# Patient Record
Sex: MALE | Race: WHITE | Employment: OTHER | URBAN - NONMETROPOLITAN AREA
[De-identification: names, ages, dates, MRNs, and addresses within clinical notes are randomized per-mention and may not be internally consistent; named-entity substitution may affect disease eponyms.]

---

## 2017-01-04 ENCOUNTER — OFFICE VISIT (OUTPATIENT)
Dept: CARDIOLOGY | Age: 62
End: 2017-01-04
Payer: COMMERCIAL

## 2017-01-04 VITALS
HEART RATE: 76 BPM | HEIGHT: 72 IN | BODY MASS INDEX: 29.53 KG/M2 | DIASTOLIC BLOOD PRESSURE: 66 MMHG | WEIGHT: 218 LBS | SYSTOLIC BLOOD PRESSURE: 130 MMHG

## 2017-01-04 DIAGNOSIS — I15.9 SECONDARY HYPERTENSION: ICD-10-CM

## 2017-01-04 DIAGNOSIS — I25.10 CORONARY ARTERY DISEASE INVOLVING NATIVE HEART WITHOUT ANGINA PECTORIS, UNSPECIFIED VESSEL OR LESION TYPE: Primary | ICD-10-CM

## 2017-01-04 PROCEDURE — 99213 OFFICE O/P EST LOW 20 MIN: CPT | Performed by: CLINICAL NURSE SPECIALIST

## 2017-01-04 RX ORDER — CYANOCOBALAMIN 1000 UG/ML
1000 INJECTION INTRAMUSCULAR; SUBCUTANEOUS ONCE
COMMUNITY
End: 2017-10-14 | Stop reason: SDUPTHER

## 2017-02-14 ENCOUNTER — TRANSCRIBE ORDERS (OUTPATIENT)
Dept: GENERAL RADIOLOGY | Facility: HOSPITAL | Age: 62
End: 2017-02-14

## 2017-02-14 ENCOUNTER — HOSPITAL ENCOUNTER (OUTPATIENT)
Dept: GENERAL RADIOLOGY | Facility: HOSPITAL | Age: 62
Discharge: HOME | End: 2017-02-14
Admitting: NURSE PRACTITIONER

## 2017-02-14 ENCOUNTER — LAB (OUTPATIENT)
Dept: LAB | Facility: HOSPITAL | Age: 62
End: 2017-02-14

## 2017-02-14 DIAGNOSIS — R05.9 COUGH: Primary | ICD-10-CM

## 2017-02-14 DIAGNOSIS — R05.9 COUGH: ICD-10-CM

## 2017-02-14 LAB
ALBUMIN SERPL-MCNC: 4.2 G/DL (ref 3.5–5)
ALBUMIN/GLOB SERPL: 1.1 G/DL (ref 1.1–2.5)
ALP SERPL-CCNC: 91 U/L (ref 24–120)
ALT SERPL W P-5'-P-CCNC: 42 U/L (ref 0–54)
ANION GAP SERPL CALCULATED.3IONS-SCNC: 13 MMOL/L (ref 4–13)
AST SERPL-CCNC: 34 U/L (ref 7–45)
AUTO MIXED CELLS #: 0.6 10*3/UL (ref 0.1–2.6)
AUTO MIXED CELLS %: 14.2 % (ref 0.1–24)
BACTERIA UR QL AUTO: ABNORMAL /HPF
BILIRUB SERPL-MCNC: 1 MG/DL (ref 0.1–1)
BILIRUB UR QL STRIP: ABNORMAL
BUN BLD-MCNC: 16 MG/DL (ref 5–21)
BUN/CREAT SERPL: 21.3
CALCIUM SPEC-SCNC: 9.4 MG/DL (ref 8.4–10.4)
CHLORIDE SERPL-SCNC: 96 MMOL/L (ref 98–110)
CLARITY UR: CLEAR
CO2 SERPL-SCNC: 34 MMOL/L (ref 24–31)
COD CRY URNS QL: ABNORMAL /HPF
COLOR UR: ABNORMAL
CREAT BLD-MCNC: 0.75 MG/DL (ref 0.5–1.4)
ERYTHROCYTE [DISTWIDTH] IN BLOOD BY AUTOMATED COUNT: 13.1 % (ref 12–15)
GFR SERPL CREATININE-BSD FRML MDRD: 105 ML/MIN/1.73
GLOBULIN UR ELPH-MCNC: 3.9 GM/DL
GLUCOSE BLD-MCNC: 122 MG/DL (ref 70–100)
GLUCOSE UR STRIP-MCNC: NEGATIVE MG/DL
HCT VFR BLD AUTO: 36.2 % (ref 40–52)
HGB BLD-MCNC: 13 G/DL (ref 14–18)
HGB UR QL STRIP.AUTO: ABNORMAL
HYALINE CASTS UR QL AUTO: ABNORMAL /LPF
KETONES UR QL STRIP: NEGATIVE
LEUKOCYTE ESTERASE UR QL STRIP.AUTO: ABNORMAL
LYMPHOCYTES # BLD AUTO: 1.1 10*3/MM3 (ref 0.8–7)
LYMPHOCYTES NFR BLD AUTO: 25.7 % (ref 15–45)
MCH RBC QN AUTO: 34.1 PG (ref 28–32)
MCHC RBC AUTO-ENTMCNC: 35.9 G/DL (ref 33–36)
MCV RBC AUTO: 95 FL (ref 82–95)
NEUTROPHILS # BLD AUTO: 2.7 10*3/MM3 (ref 1.5–8.3)
NEUTROPHILS NFR BLD AUTO: 60.1 % (ref 39–78)
NITRITE UR QL STRIP: NEGATIVE
PH UR STRIP.AUTO: 5.5 [PH] (ref 5–8)
PLATELET # BLD AUTO: 93 10*3/MM3 (ref 130–400)
PMV BLD AUTO: 8.6 FL (ref 6–12)
POTASSIUM BLD-SCNC: 4.4 MMOL/L (ref 3.5–5.3)
PROCALCITONIN SERPL-MCNC: <0.25 NG/ML
PROT SERPL-MCNC: 8.1 G/DL (ref 6.3–8.7)
PROT UR QL STRIP: ABNORMAL
RBC # BLD AUTO: 3.81 10*6/MM3 (ref 4.2–5.4)
RBC # UR: ABNORMAL /HPF
REF LAB TEST METHOD: ABNORMAL
SODIUM BLD-SCNC: 143 MMOL/L (ref 135–145)
SP GR UR STRIP: >=1.03 (ref 1–1.03)
SQUAMOUS #/AREA URNS HPF: ABNORMAL /HPF
UROBILINOGEN UR QL STRIP: ABNORMAL
WBC NRBC COR # BLD: 4.4 10*3/MM3 (ref 4.8–10.8)
WBC UR QL AUTO: ABNORMAL /HPF

## 2017-02-14 PROCEDURE — 87086 URINE CULTURE/COLONY COUNT: CPT | Performed by: NURSE PRACTITIONER

## 2017-02-14 PROCEDURE — 81001 URINALYSIS AUTO W/SCOPE: CPT

## 2017-02-14 PROCEDURE — 36415 COLL VENOUS BLD VENIPUNCTURE: CPT

## 2017-02-14 PROCEDURE — 71020 HC CHEST PA AND LATERAL: CPT

## 2017-02-14 PROCEDURE — 84145 PROCALCITONIN (PCT): CPT | Performed by: NURSE PRACTITIONER

## 2017-02-14 PROCEDURE — 85025 COMPLETE CBC W/AUTO DIFF WBC: CPT

## 2017-02-14 PROCEDURE — 86738 MYCOPLASMA ANTIBODY: CPT | Performed by: NURSE PRACTITIONER

## 2017-02-14 PROCEDURE — 80053 COMPREHEN METABOLIC PANEL: CPT

## 2017-02-15 LAB
M PNEUMONIAE IGG ABS: 121 U/ML (ref 0–99)
M PNEUMONIAE IGM ABS: <770 U/ML (ref 0–769)

## 2017-02-16 LAB — BACTERIA SPEC AEROBE CULT: NORMAL

## 2017-02-17 ENCOUNTER — LAB (OUTPATIENT)
Dept: LAB | Facility: HOSPITAL | Age: 62
End: 2017-02-17

## 2017-02-17 ENCOUNTER — TRANSCRIBE ORDERS (OUTPATIENT)
Dept: LAB | Facility: HOSPITAL | Age: 62
End: 2017-02-17

## 2017-02-17 DIAGNOSIS — R53.83 OTHER FATIGUE: Primary | ICD-10-CM

## 2017-02-17 DIAGNOSIS — R53.83 OTHER FATIGUE: ICD-10-CM

## 2017-02-17 LAB
ALBUMIN SERPL-MCNC: 4.2 G/DL (ref 3.5–5)
ALBUMIN/GLOB SERPL: 1.1 G/DL (ref 1.1–2.5)
ALP SERPL-CCNC: 94 U/L (ref 24–120)
ALT SERPL W P-5'-P-CCNC: 35 U/L (ref 0–54)
ANION GAP SERPL CALCULATED.3IONS-SCNC: 13 MMOL/L (ref 4–13)
AST SERPL-CCNC: 28 U/L (ref 7–45)
AUTO MIXED CELLS #: 0.7 10*3/UL (ref 0.1–2.6)
AUTO MIXED CELLS %: 9.8 % (ref 0.1–24)
BILIRUB SERPL-MCNC: 1.1 MG/DL (ref 0.1–1)
BUN BLD-MCNC: 19 MG/DL (ref 5–21)
BUN/CREAT SERPL: 24.4
CALCIUM SPEC-SCNC: 9.7 MG/DL (ref 8.4–10.4)
CHLORIDE SERPL-SCNC: 95 MMOL/L (ref 98–110)
CO2 SERPL-SCNC: 32 MMOL/L (ref 24–31)
CREAT BLD-MCNC: 0.78 MG/DL (ref 0.5–1.4)
ERYTHROCYTE [DISTWIDTH] IN BLOOD BY AUTOMATED COUNT: 13 % (ref 12–15)
FLUAV AG NPH QL: NEGATIVE
FLUBV AG NPH QL IA: NEGATIVE
GFR SERPL CREATININE-BSD FRML MDRD: 101 ML/MIN/1.73
GLOBULIN UR ELPH-MCNC: 3.9 GM/DL
GLUCOSE BLD-MCNC: 128 MG/DL (ref 70–100)
HCT VFR BLD AUTO: 37.2 % (ref 40–52)
HGB BLD-MCNC: 13.2 G/DL (ref 14–18)
LYMPHOCYTES # BLD AUTO: 1.8 10*3/MM3 (ref 0.8–7)
LYMPHOCYTES NFR BLD AUTO: 26.4 % (ref 15–45)
MCH RBC QN AUTO: 33.8 PG (ref 28–32)
MCHC RBC AUTO-ENTMCNC: 35.5 G/DL (ref 33–36)
MCV RBC AUTO: 95.1 FL (ref 82–95)
NEUTROPHILS # BLD AUTO: 4.5 10*3/MM3 (ref 1.5–8.3)
NEUTROPHILS NFR BLD AUTO: 63.8 % (ref 39–78)
PLATELET # BLD AUTO: 135 10*3/MM3 (ref 130–400)
PMV BLD AUTO: 8.1 FL (ref 6–12)
POTASSIUM BLD-SCNC: 4.7 MMOL/L (ref 3.5–5.3)
PROT SERPL-MCNC: 8.1 G/DL (ref 6.3–8.7)
RBC # BLD AUTO: 3.91 10*6/MM3 (ref 4.2–5.4)
SODIUM BLD-SCNC: 140 MMOL/L (ref 135–145)
WBC NRBC COR # BLD: 7 10*3/MM3 (ref 4.8–10.8)

## 2017-02-17 PROCEDURE — 85025 COMPLETE CBC W/AUTO DIFF WBC: CPT

## 2017-02-17 PROCEDURE — 80053 COMPREHEN METABOLIC PANEL: CPT

## 2017-02-17 PROCEDURE — 87804 INFLUENZA ASSAY W/OPTIC: CPT

## 2017-02-17 PROCEDURE — 36415 COLL VENOUS BLD VENIPUNCTURE: CPT

## 2017-03-01 DIAGNOSIS — I73.9 CLAUDICATION OF BOTH LOWER EXTREMITIES (HCC): Primary | ICD-10-CM

## 2017-03-07 ENCOUNTER — HOSPITAL ENCOUNTER (OUTPATIENT)
Dept: VASCULAR LAB | Age: 62
Discharge: HOME | End: 2017-03-07
Payer: COMMERCIAL

## 2017-03-07 DIAGNOSIS — I73.9 CLAUDICATION OF BOTH LOWER EXTREMITIES (HCC): ICD-10-CM

## 2017-03-07 PROCEDURE — 93923 UPR/LXTR ART STDY 3+ LVLS: CPT

## 2017-03-16 DIAGNOSIS — I15.9 SECONDARY HYPERTENSION: ICD-10-CM

## 2017-03-17 RX ORDER — DILTIAZEM HYDROCHLORIDE 240 MG/1
CAPSULE, EXTENDED RELEASE ORAL
Qty: 30 CAPSULE | Refills: 5 | Status: SHIPPED | OUTPATIENT
Start: 2017-03-17 | End: 2017-08-08 | Stop reason: DRUGHIGH

## 2017-04-05 ENCOUNTER — TRANSCRIBE ORDERS (OUTPATIENT)
Dept: LAB | Facility: HOSPITAL | Age: 62
End: 2017-04-05

## 2017-04-05 ENCOUNTER — LAB (OUTPATIENT)
Dept: LAB | Facility: HOSPITAL | Age: 62
End: 2017-04-05

## 2017-04-05 DIAGNOSIS — E11.21 TYPE 2 DIABETES MELLITUS WITH DIABETIC NEPHROPATHY, UNSPECIFIED LONG TERM INSULIN USE STATUS: Primary | ICD-10-CM

## 2017-04-05 DIAGNOSIS — E11.21 TYPE 2 DIABETES MELLITUS WITH DIABETIC NEPHROPATHY, UNSPECIFIED LONG TERM INSULIN USE STATUS: ICD-10-CM

## 2017-04-05 LAB
ALBUMIN SERPL-MCNC: 4.3 G/DL (ref 3.5–5)
ALBUMIN/GLOB SERPL: 1.2 G/DL (ref 1.1–2.5)
ALP SERPL-CCNC: 78 U/L (ref 24–120)
ALT SERPL W P-5'-P-CCNC: 19 U/L (ref 0–54)
ANION GAP SERPL CALCULATED.3IONS-SCNC: 10 MMOL/L (ref 4–13)
AST SERPL-CCNC: 22 U/L (ref 7–45)
AUTO MIXED CELLS #: 0.7 10*3/UL (ref 0.1–2.6)
AUTO MIXED CELLS %: 14.6 % (ref 0.1–24)
BACTERIA UR QL AUTO: ABNORMAL /HPF
BILIRUB SERPL-MCNC: 0.9 MG/DL (ref 0.1–1)
BILIRUB UR QL STRIP: NEGATIVE
BUN BLD-MCNC: 18 MG/DL (ref 5–21)
BUN/CREAT SERPL: 21.2
CALCIUM SPEC-SCNC: 9.9 MG/DL (ref 8.4–10.4)
CHLORIDE SERPL-SCNC: 100 MMOL/L (ref 98–110)
CHOLEST SERPL-MCNC: 147 MG/DL (ref 130–200)
CLARITY UR: CLEAR
CO2 SERPL-SCNC: 32 MMOL/L (ref 24–31)
COLOR UR: YELLOW
CREAT BLD-MCNC: 0.85 MG/DL (ref 0.5–1.4)
ERYTHROCYTE [DISTWIDTH] IN BLOOD BY AUTOMATED COUNT: 13 % (ref 12–15)
GFR SERPL CREATININE-BSD FRML MDRD: 91 ML/MIN/1.73
GLOBULIN UR ELPH-MCNC: 3.7 GM/DL
GLUCOSE BLD-MCNC: 111 MG/DL (ref 70–100)
GLUCOSE UR STRIP-MCNC: NEGATIVE MG/DL
HBA1C MFR BLD: 5.7 %
HCT VFR BLD AUTO: 33.7 % (ref 40–52)
HDLC SERPL-MCNC: 46 MG/DL
HGB BLD-MCNC: 11.9 G/DL (ref 14–18)
HGB UR QL STRIP.AUTO: ABNORMAL
HYALINE CASTS UR QL AUTO: ABNORMAL /LPF
KETONES UR QL STRIP: NEGATIVE
LDLC SERPL CALC-MCNC: 72 MG/DL (ref 0–99)
LDLC/HDLC SERPL: 1.56 {RATIO}
LEUKOCYTE ESTERASE UR QL STRIP.AUTO: ABNORMAL
LYMPHOCYTES # BLD AUTO: 1.1 10*3/MM3 (ref 0.8–7)
LYMPHOCYTES NFR BLD AUTO: 24.3 % (ref 15–45)
MCH RBC QN AUTO: 34.1 PG (ref 28–32)
MCHC RBC AUTO-ENTMCNC: 35.3 G/DL (ref 33–36)
MCV RBC AUTO: 96.6 FL (ref 82–95)
NEUTROPHILS # BLD AUTO: 2.9 10*3/MM3 (ref 1.5–8.3)
NEUTROPHILS NFR BLD AUTO: 61.1 % (ref 39–78)
NITRITE UR QL STRIP: NEGATIVE
PH UR STRIP.AUTO: 7 [PH] (ref 5–8)
PLATELET # BLD AUTO: 88 10*3/MM3 (ref 130–400)
PMV BLD AUTO: 8.8 FL (ref 6–12)
POTASSIUM BLD-SCNC: 4.9 MMOL/L (ref 3.5–5.3)
PROT SERPL-MCNC: 8 G/DL (ref 6.3–8.7)
PROT UR QL STRIP: ABNORMAL
RBC # BLD AUTO: 3.49 10*6/MM3 (ref 4.2–5.4)
RBC # UR: ABNORMAL /HPF
REF LAB TEST METHOD: ABNORMAL
SODIUM BLD-SCNC: 142 MMOL/L (ref 135–145)
SP GR UR STRIP: 1.02 (ref 1–1.03)
SQUAMOUS #/AREA URNS HPF: ABNORMAL /HPF
TRIGL SERPL-MCNC: 146 MG/DL (ref 0–149)
UROBILINOGEN UR QL STRIP: ABNORMAL
VLDLC SERPL-MCNC: 29.2 MG/DL
WBC NRBC COR # BLD: 4.7 10*3/MM3 (ref 4.8–10.8)
WBC UR QL AUTO: ABNORMAL /HPF

## 2017-04-05 PROCEDURE — 81001 URINALYSIS AUTO W/SCOPE: CPT

## 2017-04-05 PROCEDURE — 82043 UR ALBUMIN QUANTITATIVE: CPT | Performed by: NURSE PRACTITIONER

## 2017-04-05 PROCEDURE — 80061 LIPID PANEL: CPT

## 2017-04-05 PROCEDURE — 36415 COLL VENOUS BLD VENIPUNCTURE: CPT

## 2017-04-05 PROCEDURE — 80053 COMPREHEN METABOLIC PANEL: CPT

## 2017-04-05 PROCEDURE — 83036 HEMOGLOBIN GLYCOSYLATED A1C: CPT

## 2017-04-05 PROCEDURE — 87086 URINE CULTURE/COLONY COUNT: CPT | Performed by: NURSE PRACTITIONER

## 2017-04-05 PROCEDURE — 82570 ASSAY OF URINE CREATININE: CPT | Performed by: NURSE PRACTITIONER

## 2017-04-05 PROCEDURE — 85025 COMPLETE CBC W/AUTO DIFF WBC: CPT

## 2017-04-06 LAB
CREAT 24H UR-MCNC: 77.4 MG/DL
MICROALB/CRT. RATIO UR: 150.6 MG/G CREAT (ref 0–30)
MICROALBUMIN UR-MCNC: 116.6 UG/ML

## 2017-04-07 LAB — BACTERIA SPEC AEROBE CULT: NORMAL

## 2017-04-10 ENCOUNTER — HOSPITAL ENCOUNTER (OUTPATIENT)
Dept: CT IMAGING | Facility: HOSPITAL | Age: 62
Discharge: HOME | End: 2017-04-10
Admitting: NURSE PRACTITIONER

## 2017-04-10 ENCOUNTER — TRANSCRIBE ORDERS (OUTPATIENT)
Dept: GENERAL RADIOLOGY | Facility: HOSPITAL | Age: 62
End: 2017-04-10

## 2017-04-10 DIAGNOSIS — R17 JAUNDICE: Primary | ICD-10-CM

## 2017-04-10 DIAGNOSIS — R17 JAUNDICE: ICD-10-CM

## 2017-04-10 LAB — CREAT BLDA-MCNC: 0.9 MG/DL (ref 0.6–1.3)

## 2017-04-10 PROCEDURE — 82565 ASSAY OF CREATININE: CPT

## 2017-04-10 PROCEDURE — 0 IOPAMIDOL 61 % SOLUTION: Performed by: PEDIATRICS

## 2017-04-10 PROCEDURE — 74178 CT ABD&PLV WO CNTR FLWD CNTR: CPT

## 2017-04-10 RX ADMIN — IOPAMIDOL 100 ML: 612 INJECTION, SOLUTION INTRAVENOUS at 16:45

## 2017-04-12 ENCOUNTER — OFFICE VISIT (OUTPATIENT)
Dept: UROLOGY | Facility: CLINIC | Age: 62
End: 2017-04-12

## 2017-04-12 ENCOUNTER — HOSPITAL ENCOUNTER (OUTPATIENT)
Dept: GENERAL RADIOLOGY | Facility: HOSPITAL | Age: 62
Discharge: HOME | End: 2017-04-12
Attending: UROLOGY | Admitting: UROLOGY

## 2017-04-12 VITALS
SYSTOLIC BLOOD PRESSURE: 110 MMHG | HEIGHT: 72 IN | TEMPERATURE: 97.1 F | WEIGHT: 225 LBS | BODY MASS INDEX: 30.48 KG/M2 | DIASTOLIC BLOOD PRESSURE: 68 MMHG

## 2017-04-12 DIAGNOSIS — N20.0 KIDNEY STONE: Primary | ICD-10-CM

## 2017-04-12 DIAGNOSIS — N20.1 URETERAL STONE: ICD-10-CM

## 2017-04-12 DIAGNOSIS — N20.0 KIDNEY STONE: ICD-10-CM

## 2017-04-12 LAB
BILIRUB BLD-MCNC: NEGATIVE MG/DL
CLARITY, POC: CLEAR
COLOR UR: YELLOW
GLUCOSE UR STRIP-MCNC: ABNORMAL MG/DL
KETONES UR QL: NEGATIVE
LEUKOCYTE EST, POC: ABNORMAL
NITRITE UR-MCNC: NEGATIVE MG/ML
PH UR: 5 [PH] (ref 5–8)
PROT UR STRIP-MCNC: ABNORMAL MG/DL
RBC # UR STRIP: ABNORMAL /UL
SP GR UR: 1.02 (ref 1–1.03)
UROBILINOGEN UR QL: NORMAL

## 2017-04-12 PROCEDURE — 74000 HC ABDOMEN KUB: CPT

## 2017-04-12 PROCEDURE — 81003 URINALYSIS AUTO W/O SCOPE: CPT | Performed by: UROLOGY

## 2017-04-12 PROCEDURE — 99205 OFFICE O/P NEW HI 60 MIN: CPT | Performed by: UROLOGY

## 2017-04-12 RX ORDER — LANOLIN ALCOHOL/MO/W.PET/CERES
325 CREAM (GRAM) TOPICAL
COMMUNITY
Start: 2014-12-17

## 2017-04-12 RX ORDER — RANOLAZINE 1000 MG/1
1000 TABLET, EXTENDED RELEASE ORAL DAILY
COMMUNITY
Start: 2016-09-27

## 2017-04-12 RX ORDER — HYDROCODONE BITARTRATE AND ACETAMINOPHEN 7.5; 325 MG/1; MG/1
TABLET ORAL AS NEEDED
Status: ON HOLD | COMMUNITY
Start: 2011-08-19 | End: 2017-04-26 | Stop reason: SDUPTHER

## 2017-04-12 RX ORDER — BUSPIRONE HYDROCHLORIDE 15 MG/1
15 TABLET ORAL DAILY
COMMUNITY

## 2017-04-12 RX ORDER — MULTIVIT-MIN/IRON/FOLIC ACID/K 18-600-40
1 CAPSULE ORAL DAILY
COMMUNITY

## 2017-04-12 RX ORDER — CYANOCOBALAMIN 1000 UG/ML
INJECTION, SOLUTION INTRAMUSCULAR; SUBCUTANEOUS WEEKLY
COMMUNITY
End: 2022-08-29

## 2017-04-12 RX ORDER — PRAVASTATIN SODIUM 40 MG
40 TABLET ORAL NIGHTLY
COMMUNITY
Start: 2016-04-14

## 2017-04-12 RX ORDER — DOCUSATE SODIUM 100 MG/1
CAPSULE, LIQUID FILLED ORAL DAILY
COMMUNITY

## 2017-04-12 RX ORDER — VITAMIN B COMPLEX
CAPSULE ORAL
COMMUNITY

## 2017-04-12 RX ORDER — PANTOPRAZOLE SODIUM 40 MG/1
40 TABLET, DELAYED RELEASE ORAL DAILY
COMMUNITY
Start: 2016-07-12

## 2017-04-12 RX ORDER — SODIUM CHLORIDE 0.9 % (FLUSH) 0.9 %
1-10 SYRINGE (ML) INJECTION AS NEEDED
Status: CANCELLED | OUTPATIENT
Start: 2017-04-12

## 2017-04-12 RX ORDER — M-VIT,TX,IRON,MINS/CALC/FOLIC 27MG-0.4MG
TABLET ORAL
COMMUNITY

## 2017-04-12 RX ORDER — TRAMADOL HYDROCHLORIDE 50 MG/1
TABLET ORAL
Refills: 0 | COMMUNITY
Start: 2017-01-21 | End: 2022-08-29

## 2017-04-12 RX ORDER — CIPROFLOXACIN 500 MG/1
TABLET, FILM COATED ORAL
COMMUNITY
Start: 2017-04-10 | End: 2017-08-11

## 2017-04-12 RX ORDER — FUROSEMIDE 40 MG/1
40 TABLET ORAL DAILY
COMMUNITY
Start: 2016-07-12 | End: 2022-08-29

## 2017-04-12 RX ORDER — ASPIRIN 81 MG/1
81 TABLET ORAL DAILY
COMMUNITY
Start: 2014-11-04 | End: 2022-08-29

## 2017-04-12 RX ORDER — DILTIAZEM HYDROCHLORIDE 240 MG/1
CAPSULE, COATED, EXTENDED RELEASE ORAL
COMMUNITY
Start: 2017-03-17 | End: 2022-08-29

## 2017-04-12 RX ORDER — SODIUM CHLORIDE 9 MG/ML
100 INJECTION, SOLUTION INTRAVENOUS CONTINUOUS
Status: CANCELLED | OUTPATIENT
Start: 2017-04-12

## 2017-04-12 RX ORDER — CLOPIDOGREL BISULFATE 75 MG/1
75 TABLET ORAL DAILY
COMMUNITY
Start: 2016-04-14

## 2017-04-12 RX ORDER — TAMSULOSIN HYDROCHLORIDE 0.4 MG/1
1 CAPSULE ORAL DAILY
COMMUNITY
Start: 2017-04-11 | End: 2017-08-23 | Stop reason: SDUPTHER

## 2017-04-12 NOTE — PROGRESS NOTES
Subjective    Mr. Lucero is 61 y.o. male    Chief Complaint: Left Flank Pain    History of Present Illness     Urolithiasis  Patient complains of left flank pain without radiation to the abdomen. Onset of symptoms was asymptomatic 2 days ago with controlled course since that time. Patient describes the pain as none, continuous and rated as no. The patient has had no nausea and no vomiting. There has been no fever or chills. The patient is not complaining of dysuria, frequency, or urgency.  Previous management of stones includes ESWL    The following portions of the patient's history were reviewed and updated as appropriate: allergies, current medications, past family history, past medical history, past social history, past surgical history and problem list.    Review of Systems   Constitutional: Negative for appetite change and fever.   HENT: Negative for hearing loss and sore throat.    Eyes: Negative for pain and redness.   Respiratory: Negative for cough and shortness of breath.    Cardiovascular: Negative for chest pain and leg swelling.   Gastrointestinal: Negative for anal bleeding, nausea and vomiting.   Endocrine: Negative for cold intolerance and heat intolerance.   Genitourinary: Negative for dysuria, flank pain and hematuria.   Musculoskeletal: Negative for joint swelling and myalgias.   Skin: Negative for color change and rash.   Allergic/Immunologic: Negative for immunocompromised state.   Neurological: Negative for dizziness and speech difficulty.   Hematological: Negative for adenopathy. Does not bruise/bleed easily.   Psychiatric/Behavioral: Negative for dysphoric mood and suicidal ideas.         Current Outpatient Prescriptions:   •  aspirin (ASPIR) 81 MG EC tablet, Take  by mouth., Disp: , Rfl:   •  b complex vitamins capsule, Take  by mouth., Disp: , Rfl:   •  busPIRone (BUSPAR) 15 MG tablet, Take  by mouth., Disp: , Rfl:   •  ciprofloxacin (CIPRO) 500 MG tablet, , Disp: , Rfl:   •  clopidogrel  (PLAVIX) 75 MG tablet, Take  by mouth., Disp: , Rfl:   •  cyanocobalamin 1000 MCG/ML injection, Inject  into the shoulder, thigh, or buttocks., Disp: , Rfl:   •  diltiaZEM CD (CARTIA XT) 240 MG 24 hr capsule, Take 1 capsule by mouth daily, Disp: , Rfl:   •  docusate sodium (COLACE) 100 MG capsule, Take  by mouth., Disp: , Rfl:   •  ferrous sulfate 325 (65 FE) MG EC tablet, Take  by mouth., Disp: , Rfl:   •  furosemide (LASIX) 40 MG tablet, Take  by mouth., Disp: , Rfl:   •  HYDROcodone-acetaminophen (NORCO) 7.5-325 MG per tablet, Take  by mouth Every 6 (Six) Hours., Disp: , Rfl:   •  metFORMIN (GLUCOPHAGE) 1000 MG tablet, Take  by mouth., Disp: , Rfl:   •  pantoprazole (PROTONIX) 40 MG EC tablet, Take  by mouth., Disp: , Rfl:   •  pravastatin (PRAVACHOL) 40 MG tablet, Take  by mouth., Disp: , Rfl:   •  ranolazine (RANEXA) 1000 MG 12 hr tablet, Take 1 tablet by mouth 2 times daily, Disp: , Rfl:   •  tamsulosin (FLOMAX) 0.4 MG capsule 24 hr capsule, , Disp: , Rfl:   •  therapeutic multivitamin-minerals (THERAGRAN-M) tablet, Take  by mouth., Disp: , Rfl:   •  traMADol (ULTRAM) 50 MG tablet, TAKE 1 TABLET BY MOUTH FOUR TIMES DAILY AS NEEDED FOR PAIN, Disp: , Rfl: 0  •  Vitamin D, Cholecalciferol, 1000 UNITS tablet, Take  by mouth., Disp: , Rfl:     Past Medical History:   Diagnosis Date   • Cancer     non-hodgkins lymphoma   • Diabetes mellitus    • Hyperlipidemia    • Hypertension    • Kidney stone        Past Surgical History:   Procedure Laterality Date   • CORONARY ARTERY BYPASS GRAFT      2   • KIDNEY STONE SURGERY     • KNEE SURGERY     • NECK SURGERY     • SHOULDER SURGERY         Social History     Social History   • Marital status:      Spouse name: N/A   • Number of children: N/A   • Years of education: N/A     Social History Main Topics   • Smoking status: Never Smoker   • Smokeless tobacco: None   • Alcohol use No   • Drug use: None   • Sexual activity: Not Asked     Other Topics Concern   • None  "    Social History Narrative   • None       Family History   Problem Relation Age of Onset   • Kidney disease Father    • Cancer Mother      lung       Objective    /68  Temp 97.1 °F (36.2 °C)  Ht 72\" (182.9 cm)  Wt 225 lb (102 kg)  BMI 30.52 kg/m2    Physical Exam   Constitutional: He is oriented to person, place, and time. He appears well-developed and well-nourished. No distress.   Pulmonary/Chest: Effort normal.   Abdominal: Soft. He exhibits no distension and no mass. There is no tenderness. There is no rebound and no guarding. No hernia.   Neurological: He is alert and oriented to person, place, and time.   Skin: Skin is warm and dry. He is not diaphoretic.   Psychiatric: He has a normal mood and affect.   Vitals reviewed.          Results for orders placed or performed in visit on 04/12/17   POC Urinalysis Dipstick, Automated   Result Value Ref Range    Color Yellow Yellow, Straw, Dark Yellow, Philomena    Clarity, UA Clear Clear    Glucose,  mg/dL (A) Negative, 1000 mg/dL (3+) mg/dL    Bilirubin Negative Negative    Ketones, UA Negative Negative    Specific Gravity  1.025 1.005 - 1.030    Blood, UA Large (A) Negative    pH, Urine 5.0 5.0 - 8.0    Protein, POC 30 mg/dL (A) Negative mg/dL    Urobilinogen, UA Normal Normal    Leukocytes Small (1+) (A) Negative    Nitrite, UA Negative Negative     CT independent review  The CT scan of the abdomen/pelvis done without contrast is available for me to review.  Treatment recommendations require an independent review.  First I scanned the liver, spleen, and bowel pattern.  The retroperitoneum including the major vessels and lymphatic packages are briefly reviewed.  This film as been reviewed by the radiologist to determine any non urologic abnormalities that are present.  The kidneys are closely inspected for size, symmetry, contour, parenchymal thickness, perinephric reaction, presence of calcifications, and intrarenal dilation of the collecting system.  " The ureters are inspected for their course, caliber, and any calcifications.  The bladder is inspected for its thickness, size, and presence of any calcifications.  This scan shows:    The right kidney appears normal on this non-contrasted CT scan.  The renal parenchymal is norml in thickness.  There are no solid masses or cysts.  There is no hydronephrosis.  There are no stones.      The left kidney appears abnormal on this non contrasted CT scan.   3.2 cm worth of stone in his left lower pole and left renal pelvis there is virtually no hydronephrosis.  He has a 5 mm stone in his proximal left ureter.    The bladder appears normal on this non-contrasted CT scan.  The bladder appears normal in thickness.  There no masses or stones seen on this exam.    KUB independent review    A KUB is available for me to review today.  The image is inspected for a bowel gas pattern and the general bone structure of the spine and pelvis. The kidneys are then inspected closely.  Renal outline is noted if identifiable. The kidney, collecting system, and anticipated path of the ureter are examined for calcifications including those in the true pelvis.  This film reveals:    On the right there unable to see calcifications because of oral contrast in colon.    On the left there unable to see calcifications because of oral contrast in colon.  .      Assessment and Plan    Franko was seen today for flank pain.    Diagnoses and all orders for this visit:    Kidney stone  -     POC Urinalysis Dipstick, Automated  -     Case Request; Standing  -     sodium chloride 0.9 % flush 1-10 mL; Infuse 1-10 mL into a venous catheter As Needed for Line Care.  -     sodium chloride 0.9 % infusion; Infuse 100 mL/hr into a venous catheter Continuous.  -     levoFLOXacin (LEVAQUIN) 500 mg in sodium chloride 0.9 % 150 mL IVPB; Infuse 500 mg into a venous catheter 1 (One) Time.  -     Case Request    Ureteral stone  -     Case Request; Standing  -     sodium  chloride 0.9 % flush 1-10 mL; Infuse 1-10 mL into a venous catheter As Needed for Line Care.  -     sodium chloride 0.9 % infusion; Infuse 100 mL/hr into a venous catheter Continuous.  -     levoFLOXacin (LEVAQUIN) 500 mg in sodium chloride 0.9 % 150 mL IVPB; Infuse 500 mg into a venous catheter 1 (One) Time.  -     Case Request    Other orders  -     Provide instructions to patient on NPO status  -     Obtain informed consent  -     Follow Anesthesia Guidelines / Standing Orders; Standing  -     Verify NPO Status; Standing  -     Verify informed consent; Standing  -     INDIGO hose- To be placed on patient in pre-op; Standing  -     SCD (sequential compression device)- to be placed on patient in Pre-op; Standing  -     Insert Peripheral IV; Standing  -     Saline Lock & Maintain IV Access; Standing  -     Follow Anesthesia Guidelines / Standing Orders; Future            Extremely complicated patient.  He has had a CT scan done in workup of jaundice.  This revealed significant amount of stone disease approximately 3.7 cm in aggregate diameter.  He has 3.2 cm stone in his left lower pole and left renal pelvis and his proximal ureter as a 5 mm stone interestingly enough his collecting system is not dilated.  He does have a history of non-Hodgkin's lymphoma in addition he does have thrombocytopenia with a platelet count of 88.  I discussed with him that he would need at least 2 procedures to fully eradicate his stone disease.    For the proximal ureteral stone I have recommended ureteroscopic management with the stent we discussed risks benefits and alternatives to that.    Again he has a significant amount of stone disease in his kidney totaling much greater than 2 cm. Typically first stone burden this high output recommend a percutaneous nephrolithotomy.  Certainly with his thrombocytopenia I am concerned about the possibility of putting a 30 French hole in his kidney.  I am going to discuss this with Dr. Sprague before   proceeding with a percutaneous nephrolithotomy.

## 2017-04-13 ENCOUNTER — OFFICE VISIT (OUTPATIENT)
Dept: VASCULAR SURGERY | Facility: CLINIC | Age: 62
End: 2017-04-13

## 2017-04-13 VITALS
HEIGHT: 72 IN | WEIGHT: 227 LBS | HEART RATE: 76 BPM | DIASTOLIC BLOOD PRESSURE: 70 MMHG | BODY MASS INDEX: 30.75 KG/M2 | SYSTOLIC BLOOD PRESSURE: 92 MMHG

## 2017-04-13 DIAGNOSIS — I87.2 VENOUS (PERIPHERAL) INSUFFICIENCY: ICD-10-CM

## 2017-04-13 DIAGNOSIS — I10 ESSENTIAL HYPERTENSION: ICD-10-CM

## 2017-04-13 DIAGNOSIS — E78.5 HYPERLIPIDEMIA, UNSPECIFIED HYPERLIPIDEMIA TYPE: ICD-10-CM

## 2017-04-13 DIAGNOSIS — I72.3 ILIAC ARTERY ANEURYSM, BILATERAL (HCC): Primary | ICD-10-CM

## 2017-04-13 PROCEDURE — 99204 OFFICE O/P NEW MOD 45 MIN: CPT | Performed by: SURGERY

## 2017-04-13 RX ORDER — TIZANIDINE 4 MG/1
4 TABLET ORAL AS NEEDED
COMMUNITY
End: 2022-08-29

## 2017-04-13 NOTE — PROGRESS NOTES
04/13/2017      Bartolo Pierson MD  [address] [place] [address]    Franko Lucero  1955    Chief Complaint   Patient presents with   • Aortic Aneurysm     Hi iliac aneurysm/Ref Dr. Pierson/Pt has Non Hod Lymph/Having kidney stone surg next week       Dear Bartolo Pierson MD:      HPI  I had the pleasure of seeing your patient Franko Lucero in the office today.  Thank you kindly for this consultation.  As you recall, Franko Lucero is a 61 y.o.  male who you are currently following for routine health maintenance.  He recently underwent a CAT scan of his abdomen and pelvis which found incidental bilateral common iliac artery aneurysms.  Currently he denies any symptoms of claudication in the lower extremities.    Past Medical History:   Diagnosis Date   • Anemia    • Anxiety    • BPH (benign prostatic hypertrophy)    • CAD (coronary artery disease)    • Cancer     non-hodgkins lymphoma   • Diabetes mellitus    • Hyperlipidemia    • Hypertension    • Iliac artery aneurysm, bilateral    • Kidney stone    • Sleep apnea        Past Surgical History:   Procedure Laterality Date   • COLONOSCOPY  02/04/2014   • CORONARY ARTERY BYPASS GRAFT      2   • KIDNEY STONE SURGERY     • KNEE SURGERY      replacement   • NECK SURGERY     • ROTATOR CUFF REPAIR     • SHOULDER SURGERY     • TONSILLECTOMY         Family History   Problem Relation Age of Onset   • Kidney disease Father    • Heart disease Father    • Cancer Mother      lung       Social History     Social History   • Marital status:      Spouse name: N/A   • Number of children: N/A   • Years of education: N/A     Occupational History   • Not on file.     Social History Main Topics   • Smoking status: Former Smoker     Quit date: 1997   • Smokeless tobacco: Never Used   • Alcohol use No   • Drug use: No   • Sexual activity: Defer     Other Topics Concern   • Not on file     Social History Narrative       Allergies   Allergen Reactions   • Adhesive  Tape      Pt states that if it isn't left on very long it is okay   • Cefazolin    • Cefuroxime Axetil    • Cephalosporins    • Neomycin-Polymyxin B Gu    • Neosporin [Neomycin-Bacitracin Zn-Polymyx]    • Penicillins      Pt denies   • Percocet [Oxycodone-Acetaminophen]        Prior to Admission medications    Medication Sig Start Date End Date Taking? Authorizing Provider   aspirin (ASPIR) 81 MG EC tablet Take  by mouth. 11/4/14   Historical Provider, MD   b complex vitamins capsule Take  by mouth.    Historical Provider, MD   busPIRone (BUSPAR) 15 MG tablet Take  by mouth.    Historical Provider, MD   ciprofloxacin (CIPRO) 500 MG tablet  4/10/17   Historical Provider, MD   clopidogrel (PLAVIX) 75 MG tablet Take  by mouth. 4/14/16   Historical Provider, MD   cyanocobalamin 1000 MCG/ML injection Inject  into the shoulder, thigh, or buttocks.    Historical Provider, MD   diltiaZEM CD (CARTIA XT) 240 MG 24 hr capsule Take 1 capsule by mouth daily 3/17/17   Historical Provider, MD   docusate sodium (COLACE) 100 MG capsule Take  by mouth.    Historical Provider, MD   ferrous sulfate 325 (65 FE) MG EC tablet Take  by mouth. 12/17/14   Historical Provider, MD   furosemide (LASIX) 40 MG tablet Take  by mouth. 7/12/16   Historical Provider, MD   HYDROcodone-acetaminophen (NORCO) 7.5-325 MG per tablet Take  by mouth Every 6 (Six) Hours. 8/19/11   Historical Provider, MD   metFORMIN (GLUCOPHAGE) 1000 MG tablet Take  by mouth.    Historical Provider, MD   pantoprazole (PROTONIX) 40 MG EC tablet Take  by mouth. 7/12/16   Historical Provider, MD   pravastatin (PRAVACHOL) 40 MG tablet Take  by mouth. 4/14/16   Historical Provider, MD   ranolazine (RANEXA) 1000 MG 12 hr tablet Take 1 tablet by mouth 2 times daily 9/27/16   Historical Provider, MD   tamsulosin (FLOMAX) 0.4 MG capsule 24 hr capsule  4/11/17   Historical Provider, MD   therapeutic multivitamin-minerals (THERAGRAN-M) tablet Take  by mouth.    Historical Provider, MD  "  traMADol (ULTRAM) 50 MG tablet TAKE 1 TABLET BY MOUTH FOUR TIMES DAILY AS NEEDED FOR PAIN 1/21/17   Historical Provider, MD   Vitamin D, Cholecalciferol, 1000 UNITS tablet Take  by mouth.    Historical Provider, MD       Review of Systems   Constitutional: Negative.    HENT: Negative.    Eyes: Negative.    Respiratory: Negative.    Cardiovascular: Negative.    Gastrointestinal: Negative.    Endocrine: Negative.    Genitourinary: Negative.    Musculoskeletal: Negative.    Skin: Negative.    Allergic/Immunologic: Negative.    Neurological: Negative.    Hematological: Negative.    Psychiatric/Behavioral: Negative.    All other systems reviewed and are negative.      BP 92/70  Pulse 76  Ht 72\" (182.9 cm)  Wt 227 lb (103 kg)  BMI 30.79 kg/m2  Physical Exam   Constitutional: He is oriented to person, place, and time. He appears well-developed and well-nourished. No distress.   HENT:   Head: Normocephalic and atraumatic.   Mouth/Throat: Oropharynx is clear and moist and mucous membranes are normal.   Eyes: Pupils are equal, round, and reactive to light. No scleral icterus.   Neck: Normal range of motion. No JVD present. Carotid bruit is not present. No thyromegaly present.   Cardiovascular: Normal rate, regular rhythm, S1 normal, S2 normal, normal heart sounds, intact distal pulses and normal pulses.  Exam reveals no gallop and no friction rub.    No murmur heard.  Pulses:       Femoral pulses are 2+ on the right side, and 2+ on the left side.       Popliteal pulses are 2+ on the right side, and 2+ on the left side.        Dorsalis pedis pulses are 2+ on the right side, and 2+ on the left side.        Posterior tibial pulses are 2+ on the right side, and 2+ on the left side.   Pulmonary/Chest: Effort normal and breath sounds normal.   Abdominal: Soft. Normal appearance, normal aorta and bowel sounds are normal. He exhibits no abdominal bruit. There is no hepatosplenomegaly. There is no tenderness.   Musculoskeletal: " Normal range of motion.       Vascular Status -  His exam exhibits no right foot edema. His exam exhibits no left foot edema.  Neurological: He is alert and oriented to person, place, and time. He has normal strength. No cranial nerve deficit.   Skin: Skin is warm, dry and intact. He is not diaphoretic.   Psychiatric: He has a normal mood and affect. His behavior is normal. Judgment and thought content normal. Cognition and memory are normal.   Nursing note and vitals reviewed.      Diagnostic Data:  CT ABDOMEN PELVIS W WO CONTRAST- 4/10/2017 15:00 CST      HISTORY: R17, unspecified jaundice       COMPARISON: None available       DOSE LENGTH PRODUCT: 1338 mGy cm. Automated exposure control was also  utilized to decrease patient radiation dose.      TECHNIQUE: Precontrast images of the abdomen followed by postcontrast  images of the abdomen and pelvis were obtained with intravenous and oral  contrast. Multiplanar reformatted images were provided for review.      FINDINGS:   Coronary artery calcifications are seen in the lower chest. The lung  bases are clear.      No worrisome liver lesion is seen. The gallbladder is normal in  appearance. There is no biliary ductal dilation to explain the patient's  jaundice.      The pancreas is normal in appearance.      The spleen is normal in appearance.      Multiple, large stones are present in the LEFT renal collecting system.  There is also a 5 mm, partially obstructive stone in the proximal to mid  LEFT ureter. Mild proximal urothelial enhancement and thickening are  noted. The RIGHT kidney and bilateral adrenal glands are normal in  appearance.      There is atherosclerosis throughout the aorta and iliac arteries.  Bilateral iliac artery aneurysms are present. These measure up to 1.9 cm  on the LEFT and 1.8 cm on the RIGHT.      There is no evidence of bowel obstruction. No wall thickening is seen.      No abdominopelvic fluid collections are seen. There is  no  lymphadenopathy.      The urinary bladder and prostate are grossly unremarkable.      Marked degenerative changes are seen in the pubic symphysis. Moderate to  marked degenerative changes are also seen in the spine.      IMPRESSION:  1. No CT findings to spine the patient's jaundice.  2. Marked stone disease in the LEFT kidney. There is mild proximal  hydroureter due to a 5 mm LEFT ureteral stone.  3. Bilateral iliac artery aneurysms measuring up to 1.9 cm in AP  dimension.           This report was finalized on 04/10/2017 16:46 by Dr. Hair Alaniz MD.  Patient Active Problem List   Diagnosis   • Iliac artery aneurysm, bilateral   • Hypertension   • Hyperlipidemia   • Diabetes mellitus   • CAD (coronary artery disease)         ICD-10-CM ICD-9-CM   1. Iliac artery aneurysm, bilateral I72.3 442.2   2. Essential hypertension I10 401.9   3. Hyperlipidemia, unspecified hyperlipidemia type E78.5 272.4   4. Venous (peripheral) insufficiency I87.2 459.81       Lab Frequency Next Occurrence   Follow Anesthesia Guidelines / Standing Orders Once 4/12/2017       Plan: After thoroughly evaluating Franko Lucero, I believe the best course of action is to remain conservative from a vascular standpoint.  Currently, he has bilateral iliac artery aneurysms measuring 2.2 and 2.0 cm in greatest dimensions.  We do not need to fix them at this point only continued surveillance.  When they reach 3.0-3.5 cm we will repair them.  We will see Franko Lucero back in 1 year with repeat noninvasive testing for continued surveillance.  We would like for him to wear compression stockings in the 20-30 mmHg as well for the chronic swelling of his legs.  The patient can continue taking her current medication regimen as previously planned.  This was all discussed in full with complete understanding.    Thank you for allowing me to participate in the care of your patient.  Please do not hesitate with any questions or concerns.  I will keep you  aware of any further encounters with Franko Lucero.        Sincerely yours,         Handy Traylor, DO    MD BRYANT Bocanegra/Transcription disclaimer:  Much of this encounter note is an electronic transcription/translation of spoken language to printed text. The electronic translation of spoken language may permit erroneous, or at times, nonsensical words or phrases to be inadvertently transcribed; although I have reviewed the note for such errors, some may still exist.

## 2017-04-14 ENCOUNTER — LAB (OUTPATIENT)
Dept: LAB | Facility: HOSPITAL | Age: 62
End: 2017-04-14

## 2017-04-14 ENCOUNTER — TELEPHONE (OUTPATIENT)
Dept: GASTROENTEROLOGY | Facility: CLINIC | Age: 62
End: 2017-04-14

## 2017-04-14 ENCOUNTER — APPOINTMENT (OUTPATIENT)
Dept: PREADMISSION TESTING | Facility: HOSPITAL | Age: 62
End: 2017-04-14

## 2017-04-14 ENCOUNTER — OFFICE VISIT (OUTPATIENT)
Dept: GASTROENTEROLOGY | Facility: CLINIC | Age: 62
End: 2017-04-14

## 2017-04-14 ENCOUNTER — TELEPHONE (OUTPATIENT)
Dept: UROLOGY | Facility: CLINIC | Age: 62
End: 2017-04-14

## 2017-04-14 VITALS
TEMPERATURE: 96.7 F | DIASTOLIC BLOOD PRESSURE: 70 MMHG | HEART RATE: 72 BPM | OXYGEN SATURATION: 99 % | BODY MASS INDEX: 30.61 KG/M2 | HEIGHT: 72 IN | WEIGHT: 226 LBS | SYSTOLIC BLOOD PRESSURE: 128 MMHG

## 2017-04-14 VITALS
HEIGHT: 72 IN | BODY MASS INDEX: 30.63 KG/M2 | OXYGEN SATURATION: 99 % | WEIGHT: 226.13 LBS | RESPIRATION RATE: 20 BRPM | DIASTOLIC BLOOD PRESSURE: 65 MMHG | SYSTOLIC BLOOD PRESSURE: 108 MMHG | HEART RATE: 67 BPM

## 2017-04-14 DIAGNOSIS — K59.00 CONSTIPATION, UNSPECIFIED CONSTIPATION TYPE: ICD-10-CM

## 2017-04-14 DIAGNOSIS — D61.818 PANCYTOPENIA (HCC): ICD-10-CM

## 2017-04-14 DIAGNOSIS — K92.1 BLACK STOOL: ICD-10-CM

## 2017-04-14 DIAGNOSIS — I10 ESSENTIAL HYPERTENSION: ICD-10-CM

## 2017-04-14 DIAGNOSIS — D50.9 IRON DEFICIENCY ANEMIA, UNSPECIFIED IRON DEFICIENCY ANEMIA TYPE: Primary | ICD-10-CM

## 2017-04-14 DIAGNOSIS — R79.89 ABNORMAL LFTS: ICD-10-CM

## 2017-04-14 DIAGNOSIS — D68.9 COAGULOPATHY (HCC): ICD-10-CM

## 2017-04-14 DIAGNOSIS — E11.9 TYPE 2 DIABETES MELLITUS WITHOUT COMPLICATION, WITHOUT LONG-TERM CURRENT USE OF INSULIN (HCC): ICD-10-CM

## 2017-04-14 LAB
ALBUMIN SERPL-MCNC: 4.2 G/DL (ref 3.5–5)
ALP SERPL-CCNC: 78 U/L (ref 24–120)
ALT SERPL W P-5'-P-CCNC: 19 U/L (ref 0–54)
ANION GAP SERPL CALCULATED.3IONS-SCNC: 11 MMOL/L (ref 4–13)
AST SERPL-CCNC: 34 U/L (ref 7–45)
BILIRUB CONJ SERPL-MCNC: 0 MG/DL (ref 0–0.3)
BILIRUB INDIRECT SERPL-MCNC: 0.4 MG/DL (ref 0–1.1)
BILIRUB SERPL-MCNC: 0.8 MG/DL (ref 0.1–1)
BUN BLD-MCNC: 15 MG/DL (ref 5–21)
BUN/CREAT SERPL: 17.2 (ref 7–25)
CALCIUM SPEC-SCNC: 9.6 MG/DL (ref 8.4–10.4)
CHLORIDE SERPL-SCNC: 100 MMOL/L (ref 98–110)
CO2 SERPL-SCNC: 30 MMOL/L (ref 24–31)
CREAT BLD-MCNC: 0.87 MG/DL (ref 0.5–1.4)
DEPRECATED RDW RBC AUTO: 47.1 FL (ref 40–54)
ERYTHROCYTE [DISTWIDTH] IN BLOOD BY AUTOMATED COUNT: 13.5 % (ref 12–15)
FERRITIN SERPL-MCNC: 256 NG/ML (ref 17.9–464)
GFR SERPL CREATININE-BSD FRML MDRD: 89 ML/MIN/1.73
GLUCOSE BLD-MCNC: 118 MG/DL (ref 70–100)
HAV IGM SERPL QL IA: NEGATIVE
HBV CORE IGM SERPL QL IA: NEGATIVE
HBV SURFACE AG SERPL QL IA: NEGATIVE
HCT VFR BLD AUTO: 32.4 % (ref 40–52)
HCV AB SER DONR QL: NEGATIVE
HCV S/C RATIO: 0.01 (ref 0–0.99)
HGB BLD-MCNC: 11.2 G/DL (ref 14–18)
INR PPP: 1.01 (ref 0.91–1.09)
IRON 24H UR-MRATE: 76 MCG/DL (ref 42–180)
IRON SATN MFR SERPL: 29 % (ref 20–45)
MCH RBC QN AUTO: 33.4 PG (ref 28–32)
MCHC RBC AUTO-ENTMCNC: 34.6 G/DL (ref 33–36)
MCV RBC AUTO: 96.7 FL (ref 82–95)
PLATELET # BLD AUTO: 87 10*3/MM3 (ref 130–400)
PMV BLD AUTO: 9.6 FL (ref 6–12)
POTASSIUM BLD-SCNC: 3.9 MMOL/L (ref 3.5–5.3)
PROT SERPL-MCNC: 7.5 G/DL (ref 6.3–8.7)
PROTHROMBIN TIME: 13.6 SECONDS (ref 11.9–14.6)
RBC # BLD AUTO: 3.35 10*6/MM3 (ref 4.8–5.9)
SODIUM BLD-SCNC: 141 MMOL/L (ref 135–145)
TIBC SERPL-MCNC: 263 MCG/DL (ref 225–420)
WBC NRBC COR # BLD: 5.27 10*3/MM3 (ref 4.8–10.8)

## 2017-04-14 PROCEDURE — 82390 ASSAY OF CERULOPLASMIN: CPT | Performed by: NURSE PRACTITIONER

## 2017-04-14 PROCEDURE — 86038 ANTINUCLEAR ANTIBODIES: CPT | Performed by: NURSE PRACTITIONER

## 2017-04-14 PROCEDURE — 83516 IMMUNOASSAY NONANTIBODY: CPT | Performed by: NURSE PRACTITIONER

## 2017-04-14 PROCEDURE — 85027 COMPLETE CBC AUTOMATED: CPT

## 2017-04-14 PROCEDURE — 85610 PROTHROMBIN TIME: CPT | Performed by: NURSE PRACTITIONER

## 2017-04-14 PROCEDURE — 93005 ELECTROCARDIOGRAM TRACING: CPT

## 2017-04-14 PROCEDURE — 80074 ACUTE HEPATITIS PANEL: CPT | Performed by: NURSE PRACTITIONER

## 2017-04-14 PROCEDURE — 93010 ELECTROCARDIOGRAM REPORT: CPT | Performed by: INTERNAL MEDICINE

## 2017-04-14 PROCEDURE — 82728 ASSAY OF FERRITIN: CPT | Performed by: NURSE PRACTITIONER

## 2017-04-14 PROCEDURE — 80048 BASIC METABOLIC PNL TOTAL CA: CPT

## 2017-04-14 PROCEDURE — 82103 ALPHA-1-ANTITRYPSIN TOTAL: CPT | Performed by: NURSE PRACTITIONER

## 2017-04-14 PROCEDURE — 99204 OFFICE O/P NEW MOD 45 MIN: CPT | Performed by: NURSE PRACTITIONER

## 2017-04-14 PROCEDURE — 80076 HEPATIC FUNCTION PANEL: CPT | Performed by: NURSE PRACTITIONER

## 2017-04-14 PROCEDURE — 83540 ASSAY OF IRON: CPT | Performed by: NURSE PRACTITIONER

## 2017-04-14 PROCEDURE — 36415 COLL VENOUS BLD VENIPUNCTURE: CPT | Performed by: NURSE PRACTITIONER

## 2017-04-14 PROCEDURE — 83550 IRON BINDING TEST: CPT | Performed by: NURSE PRACTITIONER

## 2017-04-14 RX ORDER — OSELTAMIVIR PHOSPHATE 75 MG/1
CAPSULE ORAL
Refills: 0 | COMMUNITY
Start: 2017-02-17 | End: 2017-04-14

## 2017-04-14 RX ORDER — MELOXICAM 15 MG/1
15 TABLET ORAL DAILY
COMMUNITY
Start: 2017-04-11 | End: 2022-08-29

## 2017-04-14 RX ORDER — NITROGLYCERIN 0.4 MG/1
0.4 TABLET SUBLINGUAL
COMMUNITY
Start: 2016-03-01

## 2017-04-14 RX ORDER — ALBUTEROL SULFATE 2.5 MG/3ML
SOLUTION RESPIRATORY (INHALATION)
Refills: 0 | COMMUNITY
Start: 2017-02-14 | End: 2017-08-11

## 2017-04-14 RX ORDER — SODIUM PHOSPHATE,MONO-DIBASIC 19G-7G/118
ENEMA (ML) RECTAL
COMMUNITY
End: 2017-04-14

## 2017-04-14 RX ORDER — POLYETHYLENE GLYCOL 3350 17 G/17G
17 POWDER, FOR SOLUTION ORAL DAILY PRN
COMMUNITY
Start: 2017-04-10

## 2017-04-14 RX ORDER — LEVOFLOXACIN 500 MG/1
TABLET, FILM COATED ORAL
Refills: 0 | COMMUNITY
Start: 2017-02-14 | End: 2017-04-14

## 2017-04-14 RX ORDER — METHYLPREDNISOLONE 4 MG/1
TABLET ORAL
Refills: 0 | COMMUNITY
Start: 2017-02-14 | End: 2017-04-14

## 2017-04-14 RX ORDER — CLARITHROMYCIN 500 MG/1
TABLET, COATED ORAL
Refills: 0 | COMMUNITY
Start: 2017-02-09 | End: 2017-04-14

## 2017-04-14 NOTE — TELEPHONE ENCOUNTER
Miroslava, he is scheduled for EGD and colonoscopy on April 26, his platelets have been low and so I have ordered a CBC for April 24, we will need to see his platelets before he preps on the 25th for colonoscopy on April 26.  I put the order and if he can just call in her mind him to get that lab test done on April 24, I will put her amount in the computer to me so that I know to check on that next week.  Also I think that you did send a coag sheet to Dr. Nguyen about the Plavix.

## 2017-04-14 NOTE — DISCHARGE INSTRUCTIONS
DAY OF SURGERY INSTRUCTIONS        YOUR SURGEON: ***ZAHIDA BURTON    PROCEDURE: ***CYSTOSCOPY LITHOTRIPSY  04/17/2017DATE OF SURGERY: ***    ARRIVAL TIME: AS DIRECTED BY OFFICE    DAY OF SURGERY TAKE ONLY THESE MEDICATIONS: ***            BEFORE YOU COME TO THE HOSPITAL  (Pre-op instructions)  • Do not eat, drink, smoke or chew gum after midnight the night before surgery.  This also includes no mints.  • Morning of surgery take only the medicines you have been instructed with a sip of water unless otherwise instructed  by your physician.  • Do not shave, wear makeup or dark nail polish.  • Remove all jewelry including rings.  • Leave anything you consider valuable at home.  • Leave your suitcase in the car until after your surgery.  • Bring the following with you if applicable:  o Picture ID and insurance, Medicare or Medicaid cards  o Co-pay/deductible required by insurance (cash, check, credit card)  o Medications (no narcotics) in original bottles (not a list) including all over-the-counter medications.  o Copy of advance directive, living will or power-of- documents if not brought to PAT  o CPAP or BIPAP mask and tubing  o Skin prep instruction sheet  o Relaxation aids (MP3 player, book, magazine)  • Confirm your arrival time with you surgeon the day before your surgery (surgery times are subject to change)  • On the day of surgery check in at registration located at the main entrance of the hospital.       Outpatient Surgery Guidelines, Adult  Outpatient procedures are those for which the person having the procedure is allowed to go home the same day as the procedure. Various procedures are done on an outpatient basis. You should follow some general guidelines if you will be having an outpatient procedure.  LET YOUR HEALTH CARE PROVIDER KNOW ABOUT:  · Any allergies you have.  · All medicines you are taking, including vitamins, herbs, eye drops, creams, and over-the-counter medicines.  · Previous problems  you or members of your family have had with the use of anesthetics.  · Any blood disorders you have.  · Previous surgeries you have had.  · Medical conditions you have.  RISKS AND COMPLICATIONS  Your health care provider will discuss possible risks and complications with you before surgery. Common risks and complications include:    · Problems due to the use of anesthetics.  · Blood loss and replacement (does not apply to minor surgical procedures).  · Temporary increase in pain due to surgery.  · Uncorrected pain or problems that the surgery was meant to correct.  · Infection.  · New damage.  BEFORE THE PROCEDURE  · Ask your health care provider about changing or stopping your regular medicines. You may need to stop taking certain medicines in the days or weeks before the procedure.  · Stop smoking at least 2 weeks before surgery. This lowers your risk for complications during and after surgery. Ask your health care provider for help with this if needed.  · Eat your usual meals and a light supper the day before surgery. Continue fluid intake. Do not drink alcohol.  · Do not eat or drink after midnight the night before your surgery.   · Arrange for someone to take you home and to stay with you for 24 hours after the procedure. Medicine given for your procedure may affect your ability to drive or to care for yourself.  · Call your health care provider's office if you develop an illness or problem that may prevent you from safely having your procedure.  AFTER THE PROCEDURE  After surgery, you will be taken to a recovery area, where your progress will be monitored. If there are no complications, you will be allowed to go home when you are awake, stable, and taking fluids well. You may have numbness around the surgical site. Healing will take some time. You will have tenderness at the surgical site and may have some swelling and bruising. You may also have some nausea.  HOME CARE INSTRUCTIONS  · Do not drive for 24  hours, or as directed by your health care provider. Do not drive while taking prescription pain medicines.  · Do not drink alcohol for 24 hours.  · Do not make important decisions or sign legal documents for 24 hours.  · You may resume a normal diet and activities as directed.  · Do not lift anything heavier than 10 pounds (4.5 kg) or play contact sports until your health care provider says it is okay.  · Change your bandages (dressings) as directed.  · Only take over-the-counter or prescription medicines as directed by your health care provider.  · Follow up with your health care provider as directed.  SEEK MEDICAL CARE IF:  · You have increased bleeding (more than a small spot) from the surgical site.  · You have redness, swelling, or increasing pain in the wound.  · You see pus coming from the wound.  · You have a fever.  · You notice a bad smell coming from the wound or dressing.  · You feel lightheaded or faint.  · You develop a rash.  · You have trouble breathing.  · You develop allergies.  MAKE SURE YOU:  · Understand these instructions.  · Will watch your condition.  · Will get help right away if you are not doing well or get worse.     This information is not intended to replace advice given to you by your health care provider. Make sure you discuss any questions you have with your health care provider.     Document Released: 09/12/2002 Document Revised: 05/03/2016 Document Reviewed: 05/22/2014  [employer] Interactive Patient Education ©2016 [employer]       Fall Prevention in Hospitals, Adult  As a hospital patient, your condition and the treatments you receive can increase your risk for falls. Some additional risk factors for falls in a hospital include:  · Being in an unfamiliar environment.  · Being on bed rest.  · Your surgery.  · Taking certain medicines.  · Your tubing requirements, such as intravenous (IV) therapy or catheters.  It is important that you learn how to decrease fall risks while at the  hospital. Below are important tips that can help prevent falls.  SAFETY TIPS FOR PREVENTING FALLS  Talk about your risk of falling.  · Ask your health care provider why you are at risk for falling. Is it your medicine, illness, tubing placement, or something else?  · Make a plan with your health care provider to keep you safe from falls.  · Ask your health care provider or pharmacist about side effects of your medicines. Some medicines can make you dizzy or affect your coordination.  Ask for help.  · Ask for help before getting out of bed. You may need to press your call button.  · Ask for assistance in getting safely to the toilet.  · Ask for a walker or cane to be put at your bedside. Ask that most of the side rails on your bed be placed up before your health care provider leaves the room.  · Ask family or friends to sit with you.  · Ask for things that are out of your reach, such as your glasses, hearing aids, telephone, bedside table, or call button.  Follow these tips to avoid falling:  · Stay lying or seated, rather than standing, while waiting for help.  · Wear rubber-soled slippers or shoes whenever you walk in the hospital.  · Avoid quick, sudden movements.  ¨ Change positions slowly.  ¨ Sit on the side of your bed before standing.  ¨ Stand up slowly and wait before you start to walk.  · Let your health care provider know if there is a spill on the floor.  · Pay careful attention to the medical equipment, electrical cords, and tubes around you.  · When you need help, use your call button by your bed or in the bathroom. Wait for one of your health care providers to help you.  · If you feel dizzy or unsure of your footing, return to bed and wait for assistance.  · Avoid being distracted by the TV, telephone, or another person in your room.  · Do not lean or support yourself on rolling objects, such as IV poles or bedside tables.     This information is not intended to replace advice given to you by your  health care provider. Make sure you discuss any questions you have with your health care provider.     Document Released: 12/15/2001 Document Revised: 01/08/2016 Document Reviewed: 08/25/2013  [employer] Interactive Patient Education ©2016 [employer]       Surgical Site Infections FAQs  What is a Surgical Site Infection (SSI)?  A surgical site infection is an infection that occurs after surgery in the part of the body where the surgery took place. Most patients who have surgery do not develop an infection. However, infections develop in about 1 to 3 out of every 100 patients who have surgery.  Some of the common symptoms of a surgical site infection are:  · Redness and pain around the area where you had surgery  · Drainage of cloudy fluid from your surgical wound  · Fever  Can SSIs be treated?  Yes. Most surgical site infections can be treated with antibiotics. The antibiotic given to you depends on the bacteria (germs) causing the infection. Sometimes patients with SSIs also need another surgery to treat the infection.  What are some of the things that hospitals are doing to prevent SSIs?  To prevent SSIs, doctors, nurses, and other healthcare providers:  · Clean their hands and arms up to their elbows with an antiseptic agent just before the surgery.  · Clean their hands with soap and water or an alcohol-based hand rub before and after caring for each patient.  · May remove some of your hair immediately before your surgery using electric clippers if the hair is in the same area where the procedure will occur. They should not shave you with a razor.  · Wear special hair covers, masks, gowns, and gloves during surgery to keep the surgery area clean.  · Give you antibiotics before your surgery starts. In most cases, you should get antibiotics within 60 minutes before the surgery starts and the antibiotics should be stopped within 24 hours after surgery.  · Clean the skin at the site of your surgery with a special  soap that kills germs.  What can I do to help prevent SSIs?  Before your surgery:  · Tell your doctor about other medical problems you may have. Health problems such as allergies, diabetes, and obesity could affect your surgery and your treatment.  · Quit smoking. Patients who smoke get more infections. Talk to your doctor about how you can quit before your surgery.  · Do not shave near where you will have surgery. Shaving with a razor can irritate your skin and make it easier to develop an infection.  At the time of your surgery:  · Speak up if someone tries to shave you with a razor before surgery. Ask why you need to be shaved and talk with your surgeon if you have any concerns.  · Ask if you will get antibiotics before surgery.  After your surgery:  · Make sure that your healthcare providers clean their hands before examining you, either with soap and water or an alcohol-based hand rub.  · If you do not see your providers clean their hands, please ask them to do so.  · Family and friends who visit you should not touch the surgical wound or dressings.  · Family and friends should clean their hands with soap and water or an alcohol-based hand rub before and after visiting you. If you do not see them clean their hands, ask them to clean their hands.  What do I need to do when I go home from the hospital?  · Before you go home, your doctor or nurse should explain everything you need to know about taking care of your wound. Make sure you understand how to care for your wound before you leave the hospital.  · Always clean your hands before and after caring for your wound.  · Before you go home, make sure you know who to contact if you have questions or problems after you get home.  · If you have any symptoms of an infection, such as redness and pain at the surgery site, drainage, or fever, call your doctor immediately.  If you have additional questions, please ask your doctor or nurse.  Developed and co-sponsored by  The Society for Healthcare Epidemiology of [place] (SHEA); Infectious Diseases Society of [place] (IDSA); American Hospital Association; Association for Professionals in Infection Control and Epidemiology (APIC); Centers for Disease Control and Prevention (CDC); and The Joint Commission.     This information is not intended to replace advice given to you by your health care provider. Make sure you discuss any questions you have with your health care provider.     Document Released: 12/23/2014 Document Revised: 01/08/2016 Document Reviewed: 03/02/2016  [employer] Interactive Patient Education ©2016 [employer]     PATIENT/FAMILY/RESPONSIBLE PARTY VERBALIZES UNDERSTANDING OF ABOVE EDUCATION

## 2017-04-14 NOTE — PROGRESS NOTES
[facility] GASTROENTEROLOGY - OFFICE NOTE    4/14/2017    Franko Lucero   1955    Chief Complaint   Patient presents with   • Anemia    constipation, elevated LFTs,      PCP  Bartolo Pierson MD        HISTORY OF PRESENT ILLNESS    Franko Lucero is a 61 y.o. male presents for evaluation of anemia.  Reviewed labs in [facility] and was anemic June 2016 with a hemoglobin of 12.8 and MCV 98.4, platelets were low at 110 white blood count was normal.  prior to that this his  hemoglobin was normal.  Last CBC was 9 days ago with a white blood count of 4.7, hemoglobin 11.9, MCV 96.6, and platelets 88,000.  Has been on iron supplement for about 2 years.  He is also on B12 supplement.  Referring provider states that he has iron deficiency anemia but I have no previous iron studies for review.  States that he had a dark stool about one week ago but was formed question if it was black.  No Pepto-Bismol , he is on iron pills.  There has been no bright red blood per rectum.  Has been having problems with constipation over the last month, he rarely takes hydrocodone.  He does take tramadol for pain daily over the last year.  No recent new medicines prior to the constipation starting.  He has started on MiraLAX one week ago.  Denies any unintentional weight loss.  He admits to history of peptic ulcer disease around 30 years ago.  Does take aspirin daily as well as Plavix.  Dr. Nguyen manages his Plavix.  Has not noted any hematemesis.  Denies abdominal pain.  Denies nausea or vomiting.  Denies fevers or chills.  Denies unintentional weight loss.  He does not drink alcohol.  Last alcohol was 25 years ago.  Has never had a upper endoscopy.  Last colonoscopy was February 2014 by Dr. Julian noting internal hemorrhoids otherwise normal, recommend repeat colonoscopy in 10 years.      According to PCP's note, 04/10/2017, they felt as if the patient had a jaundiced color.  Was scheduled for a CT scan of the abdomen and  pelvis which showed no liver lesion.  He has had some elevated LFTs intermittently.  Bilirubin was elevated March and May 2016 with normal alkaline phosphatase,  AST, and ALT.  May 2016 bilirubin was 1.4 and AST 49 otherwise normal LFTs.  LFTs were normal October 2016 , there were 2017 and April 2017.  No blood transfusion prior to 1990.  No IV drug use.  No tattoos.  No history of viral hepatitis.  He is unsure of medicines that would have been  The time of liver function tests being elevated.  There is no family history of liver disease.    He does have history of non-Hodgkin's lymphoma diagnosed 6 months ago, he is followed by Dr. Sprague.  He has an upcoming appointment with Dr. Sprague for low platelets.  His last CBC does show pancytopenia.  Admits to seeing Dr. Sprague 2015 with low platelets, says Dr. Sprague wanted to do a bone marrow biopsy and the patient declined.   CT scan abdomen and pelvis with and without contrast 04/10/2017, no worrisome liver lesion seen, the spleen was normal in appearance, marked stone disease in the left kidney, mild proximal hydroureter due to a 5 mm left ureteral stone, bilateral iliac artery aneurysms measuring up to 1.9 cm.  He has seen vascular and they are planning to follow the iliac artery aneurysms.          Past Medical History:   Diagnosis Date   • Anemia    • Anxiety    • BPH (benign prostatic hypertrophy)    • CAD (coronary artery disease)    • Cancer     non-hodgkins lymphoma   • Diabetes mellitus    • Hyperlipidemia    • Hypertension    • Iliac artery aneurysm, bilateral    • Kidney stone    • Sleep apnea        Past Surgical History:   Procedure Laterality Date   • COLONOSCOPY  02/04/2014   • CORONARY ARTERY BYPASS GRAFT      2   • KIDNEY STONE SURGERY     • KNEE SURGERY      replacement   • NECK SURGERY     • ROTATOR CUFF REPAIR     • SHOULDER SURGERY     • TONSILLECTOMY         Outpatient Prescriptions Marked as Taking for the 4/14/17 encounter (Office Visit)  with NEAL Reid   Medication Sig Dispense Refill   • albuterol (PROVENTIL) (2.5 MG/3ML) 0.083% nebulizer solution INHALE 3 ML 4 TIMES A DAY BY NEBULIZATION ROUTE AS DIRECTED FOR 14 DAYS.  0   • aspirin (ASPIR) 81 MG EC tablet Take  by mouth.     • b complex vitamins capsule Take  by mouth.     • busPIRone (BUSPAR) 15 MG tablet Take  by mouth.     • ciprofloxacin (CIPRO) 500 MG tablet      • clopidogrel (PLAVIX) 75 MG tablet Take  by mouth.     • cyanocobalamin 1000 MCG/ML injection Inject  into the shoulder, thigh, or buttocks.     • diltiaZEM CD (CARTIA XT) 240 MG 24 hr capsule Take 1 capsule by mouth daily     • docusate sodium (COLACE) 100 MG capsule Take  by mouth.     • ferrous sulfate 325 (65 FE) MG EC tablet Take  by mouth.     • furosemide (LASIX) 40 MG tablet Take  by mouth.     • HYDROcodone-acetaminophen (NORCO) 7.5-325 MG per tablet Take  by mouth As Needed.     • meloxicam (MOBIC) 15 MG tablet      • metFORMIN (GLUCOPHAGE) 1000 MG tablet Take  by mouth.     • nitroglycerin (NITROSTAT) 0.4 MG SL tablet Place  under the tongue.     • pantoprazole (PROTONIX) 40 MG EC tablet Take  by mouth.     • polyethylene glycol (MIRALAX) powder      • pravastatin (PRAVACHOL) 40 MG tablet Take  by mouth.     • ranolazine (RANEXA) 1000 MG 12 hr tablet Take 1 tablet by mouth 2 times daily     • tamsulosin (FLOMAX) 0.4 MG capsule 24 hr capsule      • therapeutic multivitamin-minerals (THERAGRAN-M) tablet Take  by mouth.     • tiZANidine (ZANAFLEX) 4 MG tablet Take 4 mg by mouth As Needed for Muscle Spasms.     • traMADol (ULTRAM) 50 MG tablet TAKE 1 TABLET BY MOUTH FOUR TIMES DAILY AS NEEDED FOR PAIN  0   • Vitamin D, Cholecalciferol, 1000 UNITS tablet Take  by mouth.         Allergies   Allergen Reactions   • Adhesive Tape      Pt states that if it isn't left on very long it is okay   • Cefazolin    • Cefuroxime Axetil    • Cephalosporins    • Neomycin-Polymyxin B Gu    • Neosporin [Neomycin-Bacitracin  "Zn-Polymyx]    • Penicillins      Pt denies   • Percocet [Oxycodone-Acetaminophen]        Social History     Social History   • Marital status:      Spouse name: N/A   • Number of children: N/A   • Years of education: N/A     Occupational History   • Not on file.     Social History Main Topics   • Smoking status: Former Smoker     Quit date: 1997   • Smokeless tobacco: Never Used   • Alcohol use No   • Drug use: No   • Sexual activity: Defer     Other Topics Concern   • Not on file     Social History Narrative       Family History   Problem Relation Age of Onset   • Kidney disease Father    • Heart disease Father    • Cancer Mother      lung   • Colon cancer Neg Hx    • Colon polyps Neg Hx        Review of Systems   Constitutional: Negative for appetite change, chills, fatigue, fever and unexpected weight change.   HENT: Negative for sore throat and trouble swallowing.    Respiratory: Positive for choking (occasional). Negative for cough, chest tightness, shortness of breath and wheezing.    Cardiovascular: Negative for chest pain and palpitations.   Gastrointestinal: Negative for abdominal distention, abdominal pain, anal bleeding, blood in stool, constipation, diarrhea, nausea, rectal pain and vomiting.        As mentioned in hpi   Genitourinary: Negative for difficulty urinating, dysuria and hematuria.   Musculoskeletal: Negative for arthralgias and back pain.   Skin: Negative for color change and rash.   Neurological: Positive for headaches (intermittent chronic). Negative for dizziness, tremors, seizures, syncope and light-headedness.   Hematological: Negative for adenopathy. Does not bruise/bleed easily.   Psychiatric/Behavioral: Negative for confusion. The patient is not nervous/anxious.        Vitals:    04/14/17 0923   BP: 128/70   BP Location: Left arm   Patient Position: Sitting   Cuff Size: Adult   Pulse: 72   Temp: 96.7 °F (35.9 °C)   SpO2: 99%   Weight: 226 lb (103 kg)   Height: 72\" (182.9 cm) "      Body mass index is 30.65 kg/(m^2).    Physical Exam   Constitutional: He is oriented to person, place, and time. He appears well-developed and well-nourished. No distress.   HENT:   Head: Normocephalic and atraumatic.   Mouth/Throat: Oropharynx is clear and moist.   Eyes: Pupils are equal, round, and reactive to light. No scleral icterus.   Neck: Normal range of motion. Neck supple. No JVD present. No tracheal deviation present.   Cardiovascular: Normal rate, regular rhythm, normal heart sounds and intact distal pulses.  Exam reveals no gallop and no friction rub.    No murmur heard.  Pulmonary/Chest: Effort normal and breath sounds normal. No stridor. No respiratory distress. He has no wheezes. He has no rales.   Abdominal: Soft. Bowel sounds are normal. He exhibits no distension and no mass. There is no tenderness. There is no rebound and no guarding.   Musculoskeletal: Normal range of motion. He exhibits no edema or deformity.   Neurological: He is alert and oriented to person, place, and time.   Skin: Skin is warm and dry. No rash noted.   Psychiatric: He has a normal mood and affect. His behavior is normal.   Vitals reviewed.      Results for orders placed or performed in visit on 04/14/17   Hepatic Function Panel   Result Value Ref Range    Total Protein 7.5 6.3 - 8.7 g/dL    Albumin 4.20 3.50 - 5.00 g/dL    ALT (SGPT) 19 0 - 54 U/L    AST (SGOT) 34 7 - 45 U/L    Alkaline Phosphatase 78 24 - 120 U/L    Total Bilirubin 0.8 0.1 - 1.0 mg/dL    Bilirubin, Direct 0.0 0.0 - 0.3 mg/dL    Bilirubin, Indirect 0.4 0.0 - 1.1 mg/dL   Hepatitis Panel, Acute   Result Value Ref Range    HCV S/C Ratio 0.01 0.00 - 0.99    Hepatitis C Ab Negative Negative    Hep A IgM Negative Negative    Hep B C IgM Negative Negative    Hepatitis B Surface Ag Negative Negative   Protime-INR   Result Value Ref Range    Protime 13.6 11.9 - 14.6 Seconds    INR 1.01 0.91 - 1.09           ASSESSMENT AND PLAN    Franko was seen today for  anemia.    Diagnoses and all orders for this visit:    Iron deficiency anemia, unspecified iron deficiency anemia type  -     Case Request; Standing  -     Case Request  -     CBC & Differential; Future    Constipation, unspecified constipation type  -     Case Request; Standing  -     Case Request    Abnormal LFTs  -     Anti-Smooth Muscle Antibody Titer  -     JEFF  -     Alpha - 1 - Antitrypsin  -     Ceruloplasmin  -     Ferritin  -     Iron Profile  -     Hepatic Function Panel  -     Hepatitis Panel, Acute  -     Mitochondrial Antibodies, M2  -     Hepatic Function Panel; Future  -     Protime-INR  -     US Liver    Coagulopathy    Essential hypertension    Type 2 diabetes mellitus without complication, without long-term current use of insulin    Black stool  -     Case Request; Standing  -     Case Request    Pancytopenia  Comments:  Noted on recent labs, he is to see Dr. Sprague in the near future.    Other orders  -     Implement Anesthesia Orders Day of Procedure; Standing  -     Obtain Informed Consent; Standing  -     Verify Informed Consent; Standing  -     polyethylene glycol (GOLYTELY) 236 G solution; Take 4,000 mL by mouth 1 (One) Time for 1 dose. Take as directed per instruction sheet.     in regards to anemia, constipation, and questionable black stool 1 week ago, we will plan for upper endoscopy as well as colonoscopy.  We will need to have a CBC rechecked a few days prior to procedure to make sure his platelets are not  too low.  Last CBC show pancytopenia, he has upcoming appointment with Dr. Sprague.  LFTs have been intermittently elevated over the last several months.  We will check liver serologies as well as ultrasound of the liver.  In regards to LFTs being abnormal we will have him follow back up in the office in about 6 weeks.  Recommend continue MiraLAX for constipation.  Recommend continue Protonix on a daily basis.  ER if worsening symptoms or active bleeding.  He is on Plavix and will  send coag sheet to Dr. Nguyen.    ESOPHAGOGASTRODUODENOSCOPY WITH ANESTHESIA (N/A), COLONOSCOPY WITH ANESTHESIA (N/A) All risks, benefits, alternatives, and indications of colonoscopy procedure have been discussed with the patient. Risks to include perforation of the colon requiring possible surgery or colostomy, risk of bleeding from biopsies or removal of colon tissue, possibility of missing a colon polyp or cancer, or adverse drug reaction.  Benefits to include the diagnosis and management of disease of the colon and rectum. Alternatives to include barium enema, radiographic evaluation, lab testing or no intervention. Pt verbalizes understanding and agrees.     Risk, benefits, and alternatives of endoscopy were explained in full.  They understand that there is a risk of bleeding, perforation, and infection.  The risk of perforation goes up with esophageal dilation.  Other options to evaluate UGI complaints could involve barium swallow or UGI series, but these would be diagnostic tests only.  Patient was given time to ask questions.  I answered them to their satisfaction and they are agreeable to proceeding    The patient was advised on the risks of stopping blood thinners for a procedure.  The risks discussed included the risk of developing myocardial infarction, CVA, embolus, clogging of the arteries or stents, etc.  We discussed the potential consequences of the risks discussed.  The benefits of stopping as well as the alternatives were discussed as well.   Patient is to hold their anticoagulation medication per the direction of their prescribing physician, Dr Nguyen .    A letter will be sent to prescribing This is to prevent any risk or complication from bleeding intra and post procedure. If they develop bleeding post procedure they are to go the emergency department for further evaluation and treatment immediately.             Body mass index is 30.65 kg/(m^2).     Return in about 6 weeks (around  5/26/2017).          NEAL Mazariegos    EMR Dragon/transcription disclaimer:  Much of this encounter note is electronic transcription/translation of spoken language to printed text.  The electronic translation of spoken language may be erroneous, or at times, nonsensical words or phrases may be inadvertently transcribed.  Although I have reviewed the note for such errors, some may still exist.    Results for orders placed or performed in visit on 04/14/17   Hepatic Function Panel   Result Value Ref Range    Total Protein 7.5 6.3 - 8.7 g/dL    Albumin 4.20 3.50 - 5.00 g/dL    ALT (SGPT) 19 0 - 54 U/L    AST (SGOT) 34 7 - 45 U/L    Alkaline Phosphatase 78 24 - 120 U/L    Total Bilirubin 0.8 0.1 - 1.0 mg/dL    Bilirubin, Direct 0.0 0.0 - 0.3 mg/dL    Bilirubin, Indirect 0.4 0.0 - 1.1 mg/dL   Hepatitis Panel, Acute   Result Value Ref Range    HCV S/C Ratio 0.01 0.00 - 0.99    Hepatitis C Ab Negative Negative    Hep A IgM Negative Negative    Hep B C IgM Negative Negative    Hepatitis B Surface Ag Negative Negative   Protime-INR   Result Value Ref Range    Protime 13.6 11.9 - 14.6 Seconds    INR 1.01 0.91 - 1.09       Results for orders placed or performed in visit on 04/14/17   Anti-Smooth Muscle Antibody Titer   Result Value Ref Range    Smooth Muscle Ab 14 0 - 19 Units   JEFF   Result Value Ref Range    JEFF Direct Negative Negative   Alpha - 1 - Antitrypsin   Result Value Ref Range    A-1 Antitrypsin 137 90 - 200 mg/dL   Ceruloplasmin   Result Value Ref Range    Ceruloplasmin 27.2 16.0 - 31.0 mg/dL   Ferritin   Result Value Ref Range    Ferritin 256.00 17.90 - 464.00 ng/mL   Iron Profile   Result Value Ref Range    Iron 76 42 - 180 mcg/dL    TIBC 263 225 - 420 mcg/dL    Iron Saturation 29 20 - 45 %   Hepatic Function Panel   Result Value Ref Range    Total Protein 7.5 6.3 - 8.7 g/dL    Albumin 4.20 3.50 - 5.00 g/dL    ALT (SGPT) 19 0 - 54 U/L    AST (SGOT) 34 7 - 45 U/L    Alkaline Phosphatase 78 24 - 120 U/L     Total Bilirubin 0.8 0.1 - 1.0 mg/dL    Bilirubin, Direct 0.0 0.0 - 0.3 mg/dL    Bilirubin, Indirect 0.4 0.0 - 1.1 mg/dL   Hepatitis Panel, Acute   Result Value Ref Range    HCV S/C Ratio 0.01 0.00 - 0.99    Hepatitis C Ab Negative Negative    Hep A IgM Negative Negative    Hep B C IgM Negative Negative    Hepatitis B Surface Ag Negative Negative   Mitochondrial Antibodies, M2   Result Value Ref Range    Mitochondrial Ab <20.0 0.0 - 20.0 Units   Protime-INR   Result Value Ref Range    Protime 13.6 11.9 - 14.6 Seconds    INR 1.01 0.91 - 1.09

## 2017-04-14 NOTE — TELEPHONE ENCOUNTER
----- Message from Amanda Alex MA sent at 4/14/2017 12:55 PM CDT -----  Jessie from [employer] called and family thought he shouldn't stop Plavix please call her at [phone]

## 2017-04-15 LAB
A1AT SERPL-MCNC: 137 MG/DL (ref 90–200)
ACTIN IGG SERPL-ACNC: 14 UNITS (ref 0–19)
CERULOPLASMIN SERPL-MCNC: 27.2 MG/DL (ref 16–31)
DEPRECATED MITOCHONDRIA M2 IGG SER-ACNC: <20 UNITS (ref 0–20)

## 2017-04-17 ENCOUNTER — APPOINTMENT (OUTPATIENT)
Dept: GENERAL RADIOLOGY | Facility: HOSPITAL | Age: 62
End: 2017-04-17

## 2017-04-17 ENCOUNTER — ANESTHESIA EVENT (OUTPATIENT)
Dept: PERIOP | Facility: HOSPITAL | Age: 62
End: 2017-04-17

## 2017-04-17 ENCOUNTER — HOSPITAL ENCOUNTER (OUTPATIENT)
Facility: HOSPITAL | Age: 62
Setting detail: HOSPITAL OUTPATIENT SURGERY
Discharge: HOME | End: 2017-04-17
Attending: UROLOGY | Admitting: UROLOGY

## 2017-04-17 ENCOUNTER — ANESTHESIA (OUTPATIENT)
Dept: PERIOP | Facility: HOSPITAL | Age: 62
End: 2017-04-17

## 2017-04-17 VITALS
OXYGEN SATURATION: 95 % | TEMPERATURE: 97.4 F | DIASTOLIC BLOOD PRESSURE: 80 MMHG | RESPIRATION RATE: 18 BRPM | SYSTOLIC BLOOD PRESSURE: 126 MMHG | HEART RATE: 68 BPM

## 2017-04-17 DIAGNOSIS — N20.1 URETERAL STONE: ICD-10-CM

## 2017-04-17 DIAGNOSIS — N20.0 KIDNEY STONE: ICD-10-CM

## 2017-04-17 LAB
ANA SER QL: NEGATIVE
GLUCOSE BLDC GLUCOMTR-MCNC: 122 MG/DL (ref 70–130)
GLUCOSE BLDC GLUCOMTR-MCNC: 135 MG/DL (ref 70–130)

## 2017-04-17 PROCEDURE — C2617 STENT, NON-COR, TEM W/O DEL: HCPCS | Performed by: UROLOGY

## 2017-04-17 PROCEDURE — C1758 CATHETER, URETERAL: HCPCS | Performed by: UROLOGY

## 2017-04-17 PROCEDURE — C1769 GUIDE WIRE: HCPCS | Performed by: UROLOGY

## 2017-04-17 PROCEDURE — 25010000002 LEVOFLOXACIN PER 250 MG: Performed by: UROLOGY

## 2017-04-17 PROCEDURE — 52356 CYSTO/URETERO W/LITHOTRIPSY: CPT | Performed by: UROLOGY

## 2017-04-17 PROCEDURE — 25010000002 FENTANYL CITRATE (PF) 250 MCG/5ML SOLUTION: Performed by: NURSE ANESTHETIST, CERTIFIED REGISTERED

## 2017-04-17 PROCEDURE — C1894 INTRO/SHEATH, NON-LASER: HCPCS | Performed by: UROLOGY

## 2017-04-17 PROCEDURE — 74420 UROGRAPHY RTRGR +-KUB: CPT

## 2017-04-17 PROCEDURE — 25010000002 PROPOFOL 10 MG/ML EMULSION: Performed by: NURSE ANESTHETIST, CERTIFIED REGISTERED

## 2017-04-17 PROCEDURE — 74420 UROGRAPHY RTRGR +-KUB: CPT | Performed by: UROLOGY

## 2017-04-17 PROCEDURE — 88300 SURGICAL PATH GROSS: CPT | Performed by: UROLOGY

## 2017-04-17 PROCEDURE — 25010000002 NEOSTIGMINE PER 0.5 MG: Performed by: NURSE ANESTHETIST, CERTIFIED REGISTERED

## 2017-04-17 PROCEDURE — 25010000002 MIDAZOLAM PER 1 MG: Performed by: ANESTHESIOLOGY

## 2017-04-17 PROCEDURE — 82360 CALCULUS ASSAY QUANT: CPT | Performed by: UROLOGY

## 2017-04-17 PROCEDURE — 0 IOPAMIDOL 61 % SOLUTION: Performed by: UROLOGY

## 2017-04-17 PROCEDURE — 25010000002 ONDANSETRON PER 1 MG: Performed by: ANESTHESIOLOGY

## 2017-04-17 PROCEDURE — 82962 GLUCOSE BLOOD TEST: CPT

## 2017-04-17 DEVICE — URETERAL STENT
Type: IMPLANTABLE DEVICE | Site: URETER | Status: FUNCTIONAL
Brand: PERCUFLEX™ PLUS

## 2017-04-17 RX ORDER — MIDAZOLAM HYDROCHLORIDE 1 MG/ML
2 INJECTION INTRAMUSCULAR; INTRAVENOUS
Status: DISCONTINUED | OUTPATIENT
Start: 2017-04-17 | End: 2017-04-17 | Stop reason: HOSPADM

## 2017-04-17 RX ORDER — BENZONATATE 100 MG/1
100 CAPSULE ORAL
Status: DISCONTINUED | OUTPATIENT
Start: 2017-04-17 | End: 2017-04-17 | Stop reason: HOSPADM

## 2017-04-17 RX ORDER — SODIUM CHLORIDE 0.9 % (FLUSH) 0.9 %
1-10 SYRINGE (ML) INJECTION AS NEEDED
Status: DISCONTINUED | OUTPATIENT
Start: 2017-04-17 | End: 2017-04-17 | Stop reason: HOSPADM

## 2017-04-17 RX ORDER — ONDANSETRON 2 MG/ML
4 INJECTION INTRAMUSCULAR; INTRAVENOUS AS NEEDED
Status: DISCONTINUED | OUTPATIENT
Start: 2017-04-17 | End: 2017-04-17 | Stop reason: HOSPADM

## 2017-04-17 RX ORDER — PHENYLEPHRINE HCL IN 0.9% NACL 0.8MG/10ML
SYRINGE (ML) INTRAVENOUS AS NEEDED
Status: DISCONTINUED | OUTPATIENT
Start: 2017-04-17 | End: 2017-04-17 | Stop reason: SURG

## 2017-04-17 RX ORDER — MAGNESIUM HYDROXIDE 1200 MG/15ML
LIQUID ORAL AS NEEDED
Status: DISCONTINUED | OUTPATIENT
Start: 2017-04-17 | End: 2017-04-17 | Stop reason: HOSPADM

## 2017-04-17 RX ORDER — FENTANYL CITRATE 50 UG/ML
50 INJECTION, SOLUTION INTRAMUSCULAR; INTRAVENOUS
Status: DISCONTINUED | OUTPATIENT
Start: 2017-04-17 | End: 2017-04-17 | Stop reason: HOSPADM

## 2017-04-17 RX ORDER — HYDROCODONE BITARTRATE AND ACETAMINOPHEN 7.5; 325 MG/1; MG/1
1 TABLET ORAL ONCE AS NEEDED
Status: DISCONTINUED | OUTPATIENT
Start: 2017-04-17 | End: 2017-04-17 | Stop reason: HOSPADM

## 2017-04-17 RX ORDER — MIDAZOLAM HYDROCHLORIDE 1 MG/ML
1 INJECTION INTRAMUSCULAR; INTRAVENOUS
Status: DISCONTINUED | OUTPATIENT
Start: 2017-04-17 | End: 2017-04-17 | Stop reason: HOSPADM

## 2017-04-17 RX ORDER — FENTANYL CITRATE 50 UG/ML
INJECTION, SOLUTION INTRAMUSCULAR; INTRAVENOUS AS NEEDED
Status: DISCONTINUED | OUTPATIENT
Start: 2017-04-17 | End: 2017-04-17 | Stop reason: SURG

## 2017-04-17 RX ORDER — ONDANSETRON 2 MG/ML
4 INJECTION INTRAMUSCULAR; INTRAVENOUS ONCE AS NEEDED
Status: DISCONTINUED | OUTPATIENT
Start: 2017-04-17 | End: 2017-04-17 | Stop reason: HOSPADM

## 2017-04-17 RX ORDER — PHENAZOPYRIDINE HYDROCHLORIDE 100 MG/1
100 TABLET, FILM COATED ORAL 3 TIMES DAILY PRN
Qty: 21 TABLET | Refills: 1 | Status: SHIPPED | OUTPATIENT
Start: 2017-04-17 | End: 2017-08-11

## 2017-04-17 RX ORDER — GLYCOPYRROLATE 0.2 MG/ML
INJECTION INTRAMUSCULAR; INTRAVENOUS AS NEEDED
Status: DISCONTINUED | OUTPATIENT
Start: 2017-04-17 | End: 2017-04-17 | Stop reason: SURG

## 2017-04-17 RX ORDER — HYDROCODONE BITARTRATE AND ACETAMINOPHEN 7.5; 325 MG/1; MG/1
1-2 TABLET ORAL EVERY 4 HOURS PRN
Qty: 20 TABLET | Refills: 0 | Status: SHIPPED | OUTPATIENT
Start: 2017-04-17 | End: 2017-08-11

## 2017-04-17 RX ORDER — LABETALOL HYDROCHLORIDE 5 MG/ML
5 INJECTION, SOLUTION INTRAVENOUS
Status: DISCONTINUED | OUTPATIENT
Start: 2017-04-17 | End: 2017-04-17 | Stop reason: HOSPADM

## 2017-04-17 RX ORDER — SODIUM CHLORIDE 9 MG/ML
100 INJECTION, SOLUTION INTRAVENOUS CONTINUOUS
Status: DISCONTINUED | OUTPATIENT
Start: 2017-04-17 | End: 2017-04-17 | Stop reason: HOSPADM

## 2017-04-17 RX ORDER — NALOXONE HCL 0.4 MG/ML
0.04 VIAL (ML) INJECTION AS NEEDED
Status: DISCONTINUED | OUTPATIENT
Start: 2017-04-17 | End: 2017-04-17 | Stop reason: HOSPADM

## 2017-04-17 RX ORDER — METOCLOPRAMIDE HYDROCHLORIDE 5 MG/ML
5 INJECTION INTRAMUSCULAR; INTRAVENOUS
Status: DISCONTINUED | OUTPATIENT
Start: 2017-04-17 | End: 2017-04-17 | Stop reason: HOSPADM

## 2017-04-17 RX ORDER — FLUMAZENIL 0.1 MG/ML
0.2 INJECTION INTRAVENOUS AS NEEDED
Status: DISCONTINUED | OUTPATIENT
Start: 2017-04-17 | End: 2017-04-17 | Stop reason: HOSPADM

## 2017-04-17 RX ORDER — LEVOFLOXACIN 5 MG/ML
500 INJECTION, SOLUTION INTRAVENOUS ONCE
Status: COMPLETED | OUTPATIENT
Start: 2017-04-17 | End: 2017-04-17

## 2017-04-17 RX ORDER — IPRATROPIUM BROMIDE AND ALBUTEROL SULFATE 2.5; .5 MG/3ML; MG/3ML
3 SOLUTION RESPIRATORY (INHALATION) ONCE AS NEEDED
Status: DISCONTINUED | OUTPATIENT
Start: 2017-04-17 | End: 2017-04-17 | Stop reason: HOSPADM

## 2017-04-17 RX ORDER — HYDRALAZINE HYDROCHLORIDE 20 MG/ML
5 INJECTION INTRAMUSCULAR; INTRAVENOUS
Status: DISCONTINUED | OUTPATIENT
Start: 2017-04-17 | End: 2017-04-17 | Stop reason: HOSPADM

## 2017-04-17 RX ORDER — MEPERIDINE HYDROCHLORIDE 25 MG/ML
12.5 INJECTION INTRAMUSCULAR; INTRAVENOUS; SUBCUTANEOUS
Status: DISCONTINUED | OUTPATIENT
Start: 2017-04-17 | End: 2017-04-17 | Stop reason: HOSPADM

## 2017-04-17 RX ORDER — ROCURONIUM BROMIDE 10 MG/ML
INJECTION, SOLUTION INTRAVENOUS AS NEEDED
Status: DISCONTINUED | OUTPATIENT
Start: 2017-04-17 | End: 2017-04-17 | Stop reason: SURG

## 2017-04-17 RX ORDER — SODIUM CHLORIDE, SODIUM LACTATE, POTASSIUM CHLORIDE, CALCIUM CHLORIDE 600; 310; 30; 20 MG/100ML; MG/100ML; MG/100ML; MG/100ML
100 INJECTION, SOLUTION INTRAVENOUS CONTINUOUS
Status: DISCONTINUED | OUTPATIENT
Start: 2017-04-17 | End: 2017-04-17 | Stop reason: HOSPADM

## 2017-04-17 RX ORDER — LIDOCAINE HYDROCHLORIDE 20 MG/ML
INJECTION, SOLUTION INFILTRATION; PERINEURAL AS NEEDED
Status: DISCONTINUED | OUTPATIENT
Start: 2017-04-17 | End: 2017-04-17 | Stop reason: SURG

## 2017-04-17 RX ORDER — PROPOFOL 10 MG/ML
VIAL (ML) INTRAVENOUS AS NEEDED
Status: DISCONTINUED | OUTPATIENT
Start: 2017-04-17 | End: 2017-04-17 | Stop reason: SURG

## 2017-04-17 RX ADMIN — BENZONATATE 100 MG: 100 CAPSULE, LIQUID FILLED ORAL at 11:59

## 2017-04-17 RX ADMIN — ONDANSETRON 4 MG: 2 INJECTION INTRAMUSCULAR; INTRAVENOUS at 14:05

## 2017-04-17 RX ADMIN — Medication 4 MG: at 13:33

## 2017-04-17 RX ADMIN — LEVOFLOXACIN 500 MG: 500 INJECTION, SOLUTION INTRAVENOUS at 12:25

## 2017-04-17 RX ADMIN — LIDOCAINE HYDROCHLORIDE 0.5 ML: 10 INJECTION, SOLUTION EPIDURAL; INFILTRATION; INTRACAUDAL; PERINEURAL at 11:56

## 2017-04-17 RX ADMIN — SODIUM CHLORIDE, POTASSIUM CHLORIDE, SODIUM LACTATE AND CALCIUM CHLORIDE 100 ML/HR: 600; 310; 30; 20 INJECTION, SOLUTION INTRAVENOUS at 11:56

## 2017-04-17 RX ADMIN — LIDOCAINE HYDROCHLORIDE 100 MG: 20 INJECTION, SOLUTION INFILTRATION; PERINEURAL at 12:33

## 2017-04-17 RX ADMIN — GLYCOPYRROLATE 0.4 MG: 0.2 INJECTION, SOLUTION INTRAMUSCULAR; INTRAVENOUS at 13:33

## 2017-04-17 RX ADMIN — Medication 160 MCG: at 12:50

## 2017-04-17 RX ADMIN — Medication 80 MCG: at 12:43

## 2017-04-17 RX ADMIN — MIDAZOLAM HYDROCHLORIDE 2 MG: 1 INJECTION, SOLUTION INTRAMUSCULAR; INTRAVENOUS at 12:24

## 2017-04-17 RX ADMIN — PROPOFOL 150 MG: 10 INJECTION, EMULSION INTRAVENOUS at 12:33

## 2017-04-17 RX ADMIN — FENTANYL CITRATE 250 MCG: 50 INJECTION INTRAMUSCULAR; INTRAVENOUS at 12:33

## 2017-04-17 RX ADMIN — ROCURONIUM BROMIDE 30 MG: 10 INJECTION INTRAVENOUS at 12:33

## 2017-04-17 NOTE — DISCHARGE INSTRUCTIONS

## 2017-04-17 NOTE — OP NOTE
CYSTOSCOPY URETEROSCOPY LASER LITHOTRIPSY, CYSTOSCOPY URETERAL CATHETER/STENT INSERTION  Procedure Note    Franko Lucero  4/17/2017    Pre-op Diagnosis:   Kidney stone [N20.0]  Ureteral stone [N20.1]    Post-op Diagnosis:     Post-Op Diagnosis Codes:     * Kidney stone [N20.0]     * Ureteral stone [N20.1]    Procedure/CPT® Codes:      Procedure(s):  URETEROSCOPY LASER LITHOTRIPSY WITH DOUBLE J STENT INSERTION, LEFT   CYSTOSCOPY URETERAL DOUBLE J STENT INSERTION, LEFT    Surgeon(s):  Weston Peck MD    Anesthesia: General    Staff:   Circulator: Denae Connelly RN  Scrub Person: Alexis Mora; Terrie Forrest    Estimated Blood Loss: * No values recorded between 4/17/2017 12:28 PM and 4/17/2017  1:31 PM *    Specimens:                  ID Type Source Tests Collected by Time Destination   A : LEFT URETERAL STONE Calculus Ureter, Left TISSUE EXAM Weston Peck MD 4/17/2017 1324          Drains:           Findings: Total 3.7 cm stone burden in left kidney, 3 separate stones treated, 5 mm stone in proximal ureter, 1.1 cm stone renal pelvis, 1cm stone upper pole all treated to passable fragments    Complications: none    Indications: 61-year-old male who had a CT scan done for workup of jaundice.  This revealed 3.7 cm worth of stone in his left kidney and proximal ureter.  This included a 5 mm stone in the proximal ureter 1.17 m stone in the renal pelvis and one similar stone in the upper pole.  In addition he had a 1.5 cm stone in lower pole.  Options were discussed plan was formulated for him to at least ureteroscopic management of his ureteral stone with plans for either percutaneous nephrolithotomy versus stage ureteroscopy.  His clinical course is complicated by thrombocytopenia.    Description of Procedure: After informed consent was obtained, the patient brought the operating room where he underwent general endotracheal anesthesia in the supine position.  He was converted to the dorsal  lithotomy position.  He was prepped and draped in usual sterile fashion.  Timeout was done to ensure the correct patient, procedure and site.  He did receive preoperative antibiotics in the holding area.    22 Chinese Storz endoscope was inserted into urethra in retrograde fashion.  Anterior and posterior urethra were normal.  The bladder was entered and drained.  Bladder was inspected there was no lesions noted the left ureteral orifice was identified and cannulated the 0.38 sensor tip wire.  This went up into the upper pole under fluoroscopic guidance easily.  I then placed a 10 Chinese dual-lumen catheter placed another 0.38 sensor tip wire.  I then placed a 12, 14 Chinese 36 cm ureteral access sheath and this went up atraumatically.  I then placed the flexible ureteroscope over the wire removed the wire.  I encountered the 5 mm stone in the proximal ureter I used settings of 0.2 J and 80 Hz to dust the stone.  I then moved onto the stone in the renal pelvis.  I retropulsed back into the upper pole.  I then used the 200 micron laser fiber on settings of 0.2 J and 80 Hz as well as 2 J and 20 Hz to  combination fragmented and dust the stone.  I then went ahead and treated the stone in the upper pole.  Again we treated the stone until all fragments were dust.  Some dust collected through the access sheath and we went ahead and sent this for chemical analysis.  I inspected the remaining calyces there was no large stones  in the upper or middle pole calyces.  I decided it would be best not to treat the lower pole stone today due to overwhelming burden of stone passage.  I then inspected the ureter its entirety on the way out there was no evidence of ureteral injury I then placed the 10 Chinese dual lumen catheter over the wire instilled 20 mL dilute contrast outlining the collecting system with the findings to be dictated below.  I then backloaded the cystoscope over the wire placed a 626 Percuflex stent with a good curl  seen in the upper pole calyx and good curl seen distally in the bladder.  The bladder was drained scope was removed patient was then awakened from anesthesia and transferred to the recovery room in satisfactory condition.    Left retrograde pyelogram: A 10 Barbadian dual lumen catheter was placed over 0.38 sensor tip wire 20 mL dilute contrast used outline the collecting system.  The ureter was normal course and caliber there was some dilation renal pelvis with some dilation of the upper pole calyx.  There was a filling defect noted in the left lower pole calyx this was an anterior calyx this consistent with his known stone disease.  There is no extravasation of contrast.    Weston Peck MD     Date: 4/17/2017  Time: 1:33 PM

## 2017-04-17 NOTE — ANESTHESIA POSTPROCEDURE EVALUATION
Patient: Franko Lucero    Procedure Summary     Date Anesthesia Start Anesthesia Stop Room / Location    04/17/17 1228 2397  [facility] OR 01 / [facility] OR       Procedure Diagnosis Surgeon Provider    URETEROSCOPY LASER LITHOTRIPSY WITH DOUBLE J STENT INSERTION, LEFT  (Left Ureter); CYSTOSCOPY URETERAL DOUBLE J STENT INSERTION, LEFT (Left Ureter) Kidney stone; Ureteral stone  (Kidney stone [N20.0]; Ureteral stone [N20.1]) MD Bartolo Mitchell CRNA          Anesthesia Type: general  Last vitals  /73 (04/17/17 1457)    Temp      Pulse 64 (04/17/17 1457)   Resp 18 (04/17/17 1457)    SpO2 97 % (04/17/17 1457)      Post Anesthesia Care and Evaluation    Patient location during evaluation: PACU  Patient participation: complete - patient participated  Level of consciousness: awake and alert  Pain management: adequate  Airway patency: patent  Anesthetic complications: No anesthetic complications    Cardiovascular status: acceptable  Respiratory status: acceptable  Hydration status: acceptable

## 2017-04-17 NOTE — ANESTHESIA PREPROCEDURE EVALUATION
Anesthesia Evaluation     no history of anesthetic complications:  NPO Status: > 8 hours   Airway   Mallampati: II  TM distance: >3 FB  Neck ROM: full  no difficulty expected  Dental - normal exam     Pulmonary - normal exam    breath sounds clear to auscultation  (+) a smoker Former, COPD, sleep apnea,   Cardiovascular - normal exam  Exercise tolerance: good (4-7 METS)    Rhythm: regular  Rate: normal    (+) hypertension well controlled, CAD, CABG, PVD,       Neuro/Psych  (+) psychiatric history Depression,    GI/Hepatic/Renal/Endo    (+)  chronic renal disease stones, diabetes mellitus type 2 well controlled,     Musculoskeletal (-) negative ROS    Abdominal  - normal exam   Substance History - negative use     OB/GYN          Other - negative ROS                                   Anesthesia Plan    ASA 3     general     intravenous induction   Anesthetic plan and risks discussed with patient.    Plan discussed with CRNA.

## 2017-04-17 NOTE — PLAN OF CARE
Problem: Patient Care Overview (Adult)  Goal: Plan of Care Review    04/17/17 1523   Coping/Psychosocial Response Interventions   Plan Of Care Reviewed With patient;spouse   Patient Care Overview   Progress improving   Outcome Evaluation   Outcome Summary/Follow up Plan patient mets discharge criteria

## 2017-04-17 NOTE — ANESTHESIA PROCEDURE NOTES
Airway  Airway not difficult    General Information and Staff    Patient location during procedure: OR  CRNA: STEFFANY BRADSHAW    Indications and Patient Condition  Indications for airway management: airway protection    Preoxygenated: yes  MILS maintained throughout  Mask difficulty assessment: 1 - vent by mask    Final Airway Details  Final airway type: endotracheal airway      Successful airway: ETT  Cuffed: yes   Successful intubation technique: direct laryngoscopy  Facilitating devices/methods: intubating stylet  Endotracheal tube insertion site: oral  Blade: Mohan  Blade size: #4  ETT size: 7.5 mm  Cormack-Lehane Classification: grade I - full view of glottis  Placement verified by: chest auscultation and capnometry   Cuff volume (mL): 8  Measured from: lips  ETT to lips (cm): 22  Number of attempts at approach: 1

## 2017-04-19 ENCOUNTER — TRANSCRIBE ORDERS (OUTPATIENT)
Dept: ADMINISTRATIVE | Facility: HOSPITAL | Age: 62
End: 2017-04-19

## 2017-04-19 DIAGNOSIS — D47.2 GAMMOPATHY, MONOCLONAL: ICD-10-CM

## 2017-04-19 DIAGNOSIS — C88.0 WALDENSTROMS MACROGLOBULINEMIA (HCC): Primary | ICD-10-CM

## 2017-04-20 ENCOUNTER — TELEPHONE (OUTPATIENT)
Dept: GASTROENTEROLOGY | Facility: CLINIC | Age: 62
End: 2017-04-20

## 2017-04-20 NOTE — TELEPHONE ENCOUNTER
Please let him know all of his labs are unremarkable.  Waiting on ultrasound of the liver.  He has egd  scheduled for April 26.  I will fax labs to PCP.

## 2017-04-21 ENCOUNTER — TELEPHONE (OUTPATIENT)
Dept: GASTROENTEROLOGY | Facility: CLINIC | Age: 62
End: 2017-04-21

## 2017-04-21 ENCOUNTER — TELEPHONE (OUTPATIENT)
Dept: UROLOGY | Facility: CLINIC | Age: 62
End: 2017-04-21

## 2017-04-21 ENCOUNTER — HOSPITAL ENCOUNTER (OUTPATIENT)
Dept: ULTRASOUND IMAGING | Facility: HOSPITAL | Age: 62
Discharge: HOME | End: 2017-04-21
Admitting: NURSE PRACTITIONER

## 2017-04-21 PROCEDURE — 76705 ECHO EXAM OF ABDOMEN: CPT

## 2017-04-21 NOTE — TELEPHONE ENCOUNTER
Patient was advised that blood in the urine is normal with a stent in place. The patient denies any fever, chills, N&V or suprapubic abdominal pain. Patient was advised to drink clear fluids for the next couple hours and urinate. The patient was advised he may experience some blood in the urine and burning with urination while the stent is in place. If the patient is unable to urinate or develops fever, chills, N&V or suprapubic pain he will return to call for an appt at clinic or seek medical treatment elsewhere after hours. Patient verbalized understanding and all questions were answered.

## 2017-04-21 NOTE — TELEPHONE ENCOUNTER
Please let patient know that the ultrasound did not show any liver masses, no gallstones or ductal dilatation, it did show some mild coarsening of the liver echotexture which could indicate underlying chronic liver disease, can discuss further at his next office appointment when he follows up on his elevated liver function tests.

## 2017-04-21 NOTE — TELEPHONE ENCOUNTER
----- Message from Amanda Alex MA sent at 4/21/2017  2:35 PM CDT -----  Patient called he had surgery with Dr. Peck the 17th on Monday he has passed 3 clots, and it is turning the water in the toilet pure red/lyla color. He is very concerned just wanted to check before he got into the weekend.    Please advise    [phone]

## 2017-04-21 NOTE — TELEPHONE ENCOUNTER
PT called me back and confirmed he will get his lab on Monday when he gets his CT.  PT wanted me to let you that this past Monday, Dr. Peck removed kidney stones.

## 2017-04-24 ENCOUNTER — LAB (OUTPATIENT)
Dept: LAB | Facility: HOSPITAL | Age: 62
End: 2017-04-24

## 2017-04-24 ENCOUNTER — HOSPITAL ENCOUNTER (OUTPATIENT)
Dept: CT IMAGING | Facility: HOSPITAL | Age: 62
Discharge: HOME | End: 2017-04-24
Attending: INTERNAL MEDICINE | Admitting: INTERNAL MEDICINE

## 2017-04-24 ENCOUNTER — ANESTHESIA EVENT (OUTPATIENT)
Dept: GASTROENTEROLOGY | Facility: HOSPITAL | Age: 62
End: 2017-04-24

## 2017-04-24 ENCOUNTER — APPOINTMENT (OUTPATIENT)
Dept: CT IMAGING | Facility: HOSPITAL | Age: 62
End: 2017-04-24
Attending: INTERNAL MEDICINE

## 2017-04-24 DIAGNOSIS — C88.0 WALDENSTROMS MACROGLOBULINEMIA (HCC): ICD-10-CM

## 2017-04-24 DIAGNOSIS — D50.9 IRON DEFICIENCY ANEMIA, UNSPECIFIED IRON DEFICIENCY ANEMIA TYPE: ICD-10-CM

## 2017-04-24 DIAGNOSIS — D47.2 GAMMOPATHY, MONOCLONAL: ICD-10-CM

## 2017-04-24 LAB
BASOPHILS # BLD AUTO: 0.01 10*3/MM3 (ref 0–0.2)
BASOPHILS NFR BLD AUTO: 0.2 % (ref 0–2)
DEPRECATED RDW RBC AUTO: 46.5 FL (ref 40–54)
EOSINOPHIL # BLD AUTO: 0.12 10*3/MM3 (ref 0–0.7)
EOSINOPHIL NFR BLD AUTO: 2.5 % (ref 0–4)
ERYTHROCYTE [DISTWIDTH] IN BLOOD BY AUTOMATED COUNT: 13.2 % (ref 12–15)
HCT VFR BLD AUTO: 31.4 % (ref 40–52)
HGB BLD-MCNC: 10.7 G/DL (ref 14–18)
IMM GRANULOCYTES # BLD: 0.02 10*3/MM3 (ref 0–0.03)
IMM GRANULOCYTES NFR BLD: 0.4 % (ref 0–5)
LYMPHOCYTES # BLD AUTO: 0.94 10*3/MM3 (ref 0.72–4.86)
LYMPHOCYTES NFR BLD AUTO: 19.8 % (ref 15–45)
MCH RBC QN AUTO: 33 PG (ref 28–32)
MCHC RBC AUTO-ENTMCNC: 34.1 G/DL (ref 33–36)
MCV RBC AUTO: 96.9 FL (ref 82–95)
MONOCYTES # BLD AUTO: 0.6 10*3/MM3 (ref 0.19–1.3)
MONOCYTES NFR BLD AUTO: 12.6 % (ref 4–12)
NEUTROPHILS # BLD AUTO: 3.06 10*3/MM3 (ref 1.87–8.4)
NEUTROPHILS NFR BLD AUTO: 64.5 % (ref 39–78)
PLATELET # BLD AUTO: 104 10*3/MM3 (ref 130–400)
PMV BLD AUTO: 9.8 FL (ref 6–12)
RBC # BLD AUTO: 3.24 10*6/MM3 (ref 4.8–5.9)
WBC NRBC COR # BLD: 4.75 10*3/MM3 (ref 4.8–10.8)

## 2017-04-24 PROCEDURE — 85025 COMPLETE CBC W/AUTO DIFF WBC: CPT | Performed by: NURSE PRACTITIONER

## 2017-04-24 PROCEDURE — 36415 COLL VENOUS BLD VENIPUNCTURE: CPT

## 2017-04-24 PROCEDURE — 82565 ASSAY OF CREATININE: CPT

## 2017-04-24 PROCEDURE — 70491 CT SOFT TISSUE NECK W/DYE: CPT

## 2017-04-24 PROCEDURE — 71260 CT THORAX DX C+: CPT

## 2017-04-24 PROCEDURE — 0 IOPAMIDOL 61 % SOLUTION: Performed by: INTERNAL MEDICINE

## 2017-04-24 RX ADMIN — IOPAMIDOL 100 ML: 612 INJECTION, SOLUTION INTRAVENOUS at 12:00

## 2017-04-25 LAB — CREAT BLDA-MCNC: 0.9 MG/DL (ref 0.6–1.3)

## 2017-04-26 ENCOUNTER — TELEPHONE (OUTPATIENT)
Dept: GASTROENTEROLOGY | Facility: CLINIC | Age: 62
End: 2017-04-26

## 2017-04-26 ENCOUNTER — ANESTHESIA (OUTPATIENT)
Dept: GASTROENTEROLOGY | Facility: HOSPITAL | Age: 62
End: 2017-04-26

## 2017-04-26 ENCOUNTER — HOSPITAL ENCOUNTER (OUTPATIENT)
Facility: HOSPITAL | Age: 62
Setting detail: HOSPITAL OUTPATIENT SURGERY
Discharge: HOME | End: 2017-04-26
Attending: INTERNAL MEDICINE | Admitting: INTERNAL MEDICINE

## 2017-04-26 VITALS
OXYGEN SATURATION: 95 % | SYSTOLIC BLOOD PRESSURE: 116 MMHG | HEART RATE: 77 BPM | TEMPERATURE: 97.2 F | WEIGHT: 222 LBS | DIASTOLIC BLOOD PRESSURE: 71 MMHG | BODY MASS INDEX: 30.07 KG/M2 | HEIGHT: 72 IN | RESPIRATION RATE: 18 BRPM

## 2017-04-26 PROCEDURE — 25010000002 PHENYLEPHRINE PER 1 ML: Performed by: NURSE ANESTHETIST, CERTIFIED REGISTERED

## 2017-04-26 PROCEDURE — 43235 EGD DIAGNOSTIC BRUSH WASH: CPT | Performed by: INTERNAL MEDICINE

## 2017-04-26 PROCEDURE — 25010000002 PROPOFOL 10 MG/ML EMULSION: Performed by: NURSE ANESTHETIST, CERTIFIED REGISTERED

## 2017-04-26 PROCEDURE — 45378 DIAGNOSTIC COLONOSCOPY: CPT | Performed by: INTERNAL MEDICINE

## 2017-04-26 RX ORDER — ONDANSETRON 2 MG/ML
4 INJECTION INTRAMUSCULAR; INTRAVENOUS ONCE AS NEEDED
Status: DISCONTINUED | OUTPATIENT
Start: 2017-04-26 | End: 2017-04-26 | Stop reason: HOSPADM

## 2017-04-26 RX ORDER — SODIUM CHLORIDE 9 MG/ML
100 INJECTION, SOLUTION INTRAVENOUS CONTINUOUS
Status: DISCONTINUED | OUTPATIENT
Start: 2017-04-26 | End: 2017-04-26 | Stop reason: HOSPADM

## 2017-04-26 RX ORDER — SODIUM CHLORIDE 0.9 % (FLUSH) 0.9 %
1-10 SYRINGE (ML) INJECTION AS NEEDED
Status: DISCONTINUED | OUTPATIENT
Start: 2017-04-26 | End: 2017-04-26 | Stop reason: HOSPADM

## 2017-04-26 RX ORDER — PROPOFOL 10 MG/ML
VIAL (ML) INTRAVENOUS AS NEEDED
Status: DISCONTINUED | OUTPATIENT
Start: 2017-04-26 | End: 2017-04-26 | Stop reason: SURG

## 2017-04-26 RX ADMIN — PROPOFOL 50 MG: 10 INJECTION, EMULSION INTRAVENOUS at 11:30

## 2017-04-26 RX ADMIN — PHENYLEPHRINE HYDROCHLORIDE 400 MCG: 10 INJECTION INTRAVENOUS at 11:25

## 2017-04-26 RX ADMIN — SODIUM CHLORIDE 100 ML/HR: 9 INJECTION, SOLUTION INTRAVENOUS at 10:36

## 2017-04-26 RX ADMIN — PROPOFOL 50 MG: 10 INJECTION, EMULSION INTRAVENOUS at 11:28

## 2017-04-26 RX ADMIN — PROPOFOL 100 MG: 10 INJECTION, EMULSION INTRAVENOUS at 11:10

## 2017-04-26 RX ADMIN — PROPOFOL 50 MG: 10 INJECTION, EMULSION INTRAVENOUS at 11:37

## 2017-04-26 RX ADMIN — PROPOFOL 100 MG: 10 INJECTION, EMULSION INTRAVENOUS at 11:16

## 2017-04-26 RX ADMIN — PROPOFOL 100 MG: 10 INJECTION, EMULSION INTRAVENOUS at 11:20

## 2017-04-26 NOTE — ANESTHESIA PREPROCEDURE EVALUATION
Anesthesia Evaluation     Patient summary reviewed and Nursing notes reviewed   no history of anesthetic complications:  NPO Status: > 2 hours   Airway   Mallampati: II  TM distance: >3 FB  Neck ROM: limited  no difficulty expected  Dental      Pulmonary    (+) sleep apnea on CPAP,   (-) asthma, not a smoker  Cardiovascular   Exercise tolerance: poor (<4 METS)    ECG reviewed    (+) hypertension, CAD, CABG (2014, 2008), PVD (bilat iliac artery aneurysm),       Neuro/Psych  (+) psychiatric history Anxiety,    (-) seizures, TIA, CVA  GI/Hepatic/Renal/Endo    (+)  chronic renal disease (current stent in place) stones, diabetes mellitus type 2,     ROS Comment: Problems swallowing since acf 1 year ago.     Musculoskeletal (-) negative ROS    Abdominal    Substance History - negative use     OB/GYN negative ob/gyn ROS         Other      history of cancer (non0Hodgkins lymphoma, monitoring, no chemo currently) active      Other Comment: Anemia, thrombocytopenia, having Egd an colon to eval.                           Anesthesia Plan    ASA 3     general     intravenous induction   Anesthetic plan and risks discussed with patient.

## 2017-04-26 NOTE — PLAN OF CARE
Problem: Patient Care Overview (Adult)  Goal: Plan of Care Review  Outcome: Outcome(s) achieved Date Met:  04/26/17 04/26/17 3295   Coping/Psychosocial Response Interventions   Plan Of Care Reviewed With patient;spouse   Patient Care Overview   Progress improving   Outcome Evaluation   Outcome Summary/Follow up Plan discharge criteria met

## 2017-04-26 NOTE — INTERVAL H&P NOTE
At the long conversation with him about his liver function tests.  His LFTs are now normal.  His liver serologies came back unremarkable.  At this point I do not recommend any further GI investigation.  I did time that there are some indicators on his ultrasound that he may be developing early cirrhosis.  This would be idiopathic.  At this point time I advised the patient avoid any liver potential toxicities such as alcohol.  He does not drink any significant alcohol at this point.  I also advised ideal body weight as fatty liver can lead to cirrhosis.  I discussed the importance of a healthy lifestyle, healthy diet, healthy weight and exercise.  He agreed and we will work on his weight.  He was scheduled to come back and see us in the office to review the liver tests.  Since we did this today all seem back in the office as needed.  He was in agreement with this approach.

## 2017-04-26 NOTE — ANESTHESIA POSTPROCEDURE EVALUATION
Patient: Franko Lucero    Procedure Summary     Date Anesthesia Start Anesthesia Stop Room / Location    04/26/17 1111 1145 [facility] ENDOSCOPY 4 / [facility]       Procedure Diagnosis Surgeon Provider    ESOPHAGOGASTRODUODENOSCOPY WITH ANESTHESIA (N/A Esophagus); COLONOSCOPY WITH ANESTHESIA (N/A ) Black stool; Constipation, unspecified constipation type; Iron deficiency anemia, unspecified iron deficiency anemia type  (Black stool [K92.1]; Constipation, unspecified constipation type [K59.00]; Iron deficiency anemia, unspecified iron deficiency anemia type [D50.9]) MD Philip Teague CRNA          Anesthesia Type: general  Last vitals  BP      Temp      Pulse     Resp      SpO2        Post Anesthesia Care and Evaluation    Patient location during evaluation: PACU  Patient participation: complete - patient participated  Level of consciousness: awake and alert  Pain management: adequate  Airway patency: patent  Anesthetic complications: No anesthetic complications    Cardiovascular status: acceptable  Respiratory status: acceptable  Hydration status: acceptable

## 2017-04-26 NOTE — PLAN OF CARE
Problem: GI Endoscopy (Adult)  Goal: Signs and Symptoms of Listed Potential Problems Will be Absent or Manageable (GI Endoscopy)  Outcome: Ongoing (interventions implemented as appropriate)    04/26/17 1146   GI Endoscopy   Problems Assessed (GI Endoscopy) all   Problems Present (GI Endoscopy) none

## 2017-04-26 NOTE — PLAN OF CARE
Problem: Patient Care Overview (Adult)  Goal: Plan of Care Review  Outcome: Ongoing (interventions implemented as appropriate)    04/26/17 1145   Coping/Psychosocial Response Interventions   Plan Of Care Reviewed With patient   Patient Care Overview   Progress progress toward functional goals as expected   Outcome Evaluation   Outcome Summary/Follow up Plan tolerated procedure well

## 2017-04-26 NOTE — H&P (VIEW-ONLY)
[facility] GASTROENTEROLOGY - OFFICE NOTE    4/14/2017    Franko Lucero   1955    Chief Complaint   Patient presents with   • Anemia    constipation, elevated LFTs,      PCP  Bartolo Pierson MD        HISTORY OF PRESENT ILLNESS    Franko Lucero is a 61 y.o. male presents for evaluation of anemia.  Reviewed labs in [facility] and was anemic June 2016 with a hemoglobin of 12.8 and MCV 98.4, platelets were low at 110 white blood count was normal.  prior to that this his  hemoglobin was normal.  Last CBC was 9 days ago with a white blood count of 4.7, hemoglobin 11.9, MCV 96.6, and platelets 88,000.  Has been on iron supplement for about 2 years.  He is also on B12 supplement.  Referring provider states that he has iron deficiency anemia but I have no previous iron studies for review.  States that he had a dark stool about one week ago but was formed question if it was black.  No Pepto-Bismol , he is on iron pills.  There has been no bright red blood per rectum.  Has been having problems with constipation over the last month, he rarely takes hydrocodone.  He does take tramadol for pain daily over the last year.  No recent new medicines prior to the constipation starting.  He has started on MiraLAX one week ago.  Denies any unintentional weight loss.  He admits to history of peptic ulcer disease around 30 years ago.  Does take aspirin daily as well as Plavix.  Dr. Nguyen manages his Plavix.  Has not noted any hematemesis.  Denies abdominal pain.  Denies nausea or vomiting.  Denies fevers or chills.  Denies unintentional weight loss.  He does not drink alcohol.  Last alcohol was 25 years ago.  Has never had a upper endoscopy.  Last colonoscopy was February 2014 by Dr. Julian noting internal hemorrhoids otherwise normal, recommend repeat colonoscopy in 10 years.      According to PCP's note, 04/10/2017, they felt as if the patient had a jaundiced color.  Was scheduled for a CT scan of the abdomen and  pelvis which showed no liver lesion.  He has had some elevated LFTs intermittently.  Bilirubin was elevated March and May 2016 with normal alkaline phosphatase,  AST, and ALT.  May 2016 bilirubin was 1.4 and AST 49 otherwise normal LFTs.  LFTs were normal October 2016 , there were 2017 and April 2017.  No blood transfusion prior to 1990.  No IV drug use.  No tattoos.  No history of viral hepatitis.  He is unsure of medicines that would have been  The time of liver function tests being elevated.  There is no family history of liver disease.    He does have history of non-Hodgkin's lymphoma diagnosed 6 months ago, he is followed by Dr. Sprague.  He has an upcoming appointment with Dr. Sprague for low platelets.  His last CBC does show pancytopenia.  Admits to seeing Dr. Sprague 2015 with low platelets, says Dr. Sprague wanted to do a bone marrow biopsy and the patient declined.   CT scan abdomen and pelvis with and without contrast 04/10/2017, no worrisome liver lesion seen, the spleen was normal in appearance, marked stone disease in the left kidney, mild proximal hydroureter due to a 5 mm left ureteral stone, bilateral iliac artery aneurysms measuring up to 1.9 cm.  He has seen vascular and they are planning to follow the iliac artery aneurysms.          Past Medical History:   Diagnosis Date   • Anemia    • Anxiety    • BPH (benign prostatic hypertrophy)    • CAD (coronary artery disease)    • Cancer     non-hodgkins lymphoma   • Diabetes mellitus    • Hyperlipidemia    • Hypertension    • Iliac artery aneurysm, bilateral    • Kidney stone    • Sleep apnea        Past Surgical History:   Procedure Laterality Date   • COLONOSCOPY  02/04/2014   • CORONARY ARTERY BYPASS GRAFT      2   • KIDNEY STONE SURGERY     • KNEE SURGERY      replacement   • NECK SURGERY     • ROTATOR CUFF REPAIR     • SHOULDER SURGERY     • TONSILLECTOMY         Outpatient Prescriptions Marked as Taking for the 4/14/17 encounter (Office Visit)  with NEAL Reid   Medication Sig Dispense Refill   • albuterol (PROVENTIL) (2.5 MG/3ML) 0.083% nebulizer solution INHALE 3 ML 4 TIMES A DAY BY NEBULIZATION ROUTE AS DIRECTED FOR 14 DAYS.  0   • aspirin (ASPIR) 81 MG EC tablet Take  by mouth.     • b complex vitamins capsule Take  by mouth.     • busPIRone (BUSPAR) 15 MG tablet Take  by mouth.     • ciprofloxacin (CIPRO) 500 MG tablet      • clopidogrel (PLAVIX) 75 MG tablet Take  by mouth.     • cyanocobalamin 1000 MCG/ML injection Inject  into the shoulder, thigh, or buttocks.     • diltiaZEM CD (CARTIA XT) 240 MG 24 hr capsule Take 1 capsule by mouth daily     • docusate sodium (COLACE) 100 MG capsule Take  by mouth.     • ferrous sulfate 325 (65 FE) MG EC tablet Take  by mouth.     • furosemide (LASIX) 40 MG tablet Take  by mouth.     • HYDROcodone-acetaminophen (NORCO) 7.5-325 MG per tablet Take  by mouth As Needed.     • meloxicam (MOBIC) 15 MG tablet      • metFORMIN (GLUCOPHAGE) 1000 MG tablet Take  by mouth.     • nitroglycerin (NITROSTAT) 0.4 MG SL tablet Place  under the tongue.     • pantoprazole (PROTONIX) 40 MG EC tablet Take  by mouth.     • polyethylene glycol (MIRALAX) powder      • pravastatin (PRAVACHOL) 40 MG tablet Take  by mouth.     • ranolazine (RANEXA) 1000 MG 12 hr tablet Take 1 tablet by mouth 2 times daily     • tamsulosin (FLOMAX) 0.4 MG capsule 24 hr capsule      • therapeutic multivitamin-minerals (THERAGRAN-M) tablet Take  by mouth.     • tiZANidine (ZANAFLEX) 4 MG tablet Take 4 mg by mouth As Needed for Muscle Spasms.     • traMADol (ULTRAM) 50 MG tablet TAKE 1 TABLET BY MOUTH FOUR TIMES DAILY AS NEEDED FOR PAIN  0   • Vitamin D, Cholecalciferol, 1000 UNITS tablet Take  by mouth.         Allergies   Allergen Reactions   • Adhesive Tape      Pt states that if it isn't left on very long it is okay   • Cefazolin    • Cefuroxime Axetil    • Cephalosporins    • Neomycin-Polymyxin B Gu    • Neosporin [Neomycin-Bacitracin  "Zn-Polymyx]    • Penicillins      Pt denies   • Percocet [Oxycodone-Acetaminophen]        Social History     Social History   • Marital status:      Spouse name: N/A   • Number of children: N/A   • Years of education: N/A     Occupational History   • Not on file.     Social History Main Topics   • Smoking status: Former Smoker     Quit date: 1997   • Smokeless tobacco: Never Used   • Alcohol use No   • Drug use: No   • Sexual activity: Defer     Other Topics Concern   • Not on file     Social History Narrative       Family History   Problem Relation Age of Onset   • Kidney disease Father    • Heart disease Father    • Cancer Mother      lung   • Colon cancer Neg Hx    • Colon polyps Neg Hx        Review of Systems   Constitutional: Negative for appetite change, chills, fatigue, fever and unexpected weight change.   HENT: Negative for sore throat and trouble swallowing.    Respiratory: Positive for choking (occasional). Negative for cough, chest tightness, shortness of breath and wheezing.    Cardiovascular: Negative for chest pain and palpitations.   Gastrointestinal: Negative for abdominal distention, abdominal pain, anal bleeding, blood in stool, constipation, diarrhea, nausea, rectal pain and vomiting.        As mentioned in hpi   Genitourinary: Negative for difficulty urinating, dysuria and hematuria.   Musculoskeletal: Negative for arthralgias and back pain.   Skin: Negative for color change and rash.   Neurological: Positive for headaches (intermittent chronic). Negative for dizziness, tremors, seizures, syncope and light-headedness.   Hematological: Negative for adenopathy. Does not bruise/bleed easily.   Psychiatric/Behavioral: Negative for confusion. The patient is not nervous/anxious.        Vitals:    04/14/17 0923   BP: 128/70   BP Location: Left arm   Patient Position: Sitting   Cuff Size: Adult   Pulse: 72   Temp: 96.7 °F (35.9 °C)   SpO2: 99%   Weight: 226 lb (103 kg)   Height: 72\" (182.9 cm) "      Body mass index is 30.65 kg/(m^2).    Physical Exam   Constitutional: He is oriented to person, place, and time. He appears well-developed and well-nourished. No distress.   HENT:   Head: Normocephalic and atraumatic.   Mouth/Throat: Oropharynx is clear and moist.   Eyes: Pupils are equal, round, and reactive to light. No scleral icterus.   Neck: Normal range of motion. Neck supple. No JVD present. No tracheal deviation present.   Cardiovascular: Normal rate, regular rhythm, normal heart sounds and intact distal pulses.  Exam reveals no gallop and no friction rub.    No murmur heard.  Pulmonary/Chest: Effort normal and breath sounds normal. No stridor. No respiratory distress. He has no wheezes. He has no rales.   Abdominal: Soft. Bowel sounds are normal. He exhibits no distension and no mass. There is no tenderness. There is no rebound and no guarding.   Musculoskeletal: Normal range of motion. He exhibits no edema or deformity.   Neurological: He is alert and oriented to person, place, and time.   Skin: Skin is warm and dry. No rash noted.   Psychiatric: He has a normal mood and affect. His behavior is normal.   Vitals reviewed.      Results for orders placed or performed in visit on 04/14/17   Hepatic Function Panel   Result Value Ref Range    Total Protein 7.5 6.3 - 8.7 g/dL    Albumin 4.20 3.50 - 5.00 g/dL    ALT (SGPT) 19 0 - 54 U/L    AST (SGOT) 34 7 - 45 U/L    Alkaline Phosphatase 78 24 - 120 U/L    Total Bilirubin 0.8 0.1 - 1.0 mg/dL    Bilirubin, Direct 0.0 0.0 - 0.3 mg/dL    Bilirubin, Indirect 0.4 0.0 - 1.1 mg/dL   Hepatitis Panel, Acute   Result Value Ref Range    HCV S/C Ratio 0.01 0.00 - 0.99    Hepatitis C Ab Negative Negative    Hep A IgM Negative Negative    Hep B C IgM Negative Negative    Hepatitis B Surface Ag Negative Negative   Protime-INR   Result Value Ref Range    Protime 13.6 11.9 - 14.6 Seconds    INR 1.01 0.91 - 1.09           ASSESSMENT AND PLAN    Franko was seen today for  anemia.    Diagnoses and all orders for this visit:    Iron deficiency anemia, unspecified iron deficiency anemia type  -     Case Request; Standing  -     Case Request  -     CBC & Differential; Future    Constipation, unspecified constipation type  -     Case Request; Standing  -     Case Request    Abnormal LFTs  -     Anti-Smooth Muscle Antibody Titer  -     JEFF  -     Alpha - 1 - Antitrypsin  -     Ceruloplasmin  -     Ferritin  -     Iron Profile  -     Hepatic Function Panel  -     Hepatitis Panel, Acute  -     Mitochondrial Antibodies, M2  -     Hepatic Function Panel; Future  -     Protime-INR  -     US Liver    Coagulopathy    Essential hypertension    Type 2 diabetes mellitus without complication, without long-term current use of insulin    Black stool  -     Case Request; Standing  -     Case Request    Pancytopenia  Comments:  Noted on recent labs, he is to see Dr. Sprague in the near future.    Other orders  -     Implement Anesthesia Orders Day of Procedure; Standing  -     Obtain Informed Consent; Standing  -     Verify Informed Consent; Standing  -     polyethylene glycol (GOLYTELY) 236 G solution; Take 4,000 mL by mouth 1 (One) Time for 1 dose. Take as directed per instruction sheet.     in regards to anemia, constipation, and questionable black stool 1 week ago, we will plan for upper endoscopy as well as colonoscopy.  We will need to have a CBC rechecked a few days prior to procedure to make sure his platelets are not  too low.  Last CBC show pancytopenia, he has upcoming appointment with Dr. Sprague.  LFTs have been intermittently elevated over the last several months.  We will check liver serologies as well as ultrasound of the liver.  In regards to LFTs being abnormal we will have him follow back up in the office in about 6 weeks.  Recommend continue MiraLAX for constipation.  Recommend continue Protonix on a daily basis.  ER if worsening symptoms or active bleeding.  He is on Plavix and will  send coag sheet to Dr. Nguyen.    ESOPHAGOGASTRODUODENOSCOPY WITH ANESTHESIA (N/A), COLONOSCOPY WITH ANESTHESIA (N/A) All risks, benefits, alternatives, and indications of colonoscopy procedure have been discussed with the patient. Risks to include perforation of the colon requiring possible surgery or colostomy, risk of bleeding from biopsies or removal of colon tissue, possibility of missing a colon polyp or cancer, or adverse drug reaction.  Benefits to include the diagnosis and management of disease of the colon and rectum. Alternatives to include barium enema, radiographic evaluation, lab testing or no intervention. Pt verbalizes understanding and agrees.     Risk, benefits, and alternatives of endoscopy were explained in full.  They understand that there is a risk of bleeding, perforation, and infection.  The risk of perforation goes up with esophageal dilation.  Other options to evaluate UGI complaints could involve barium swallow or UGI series, but these would be diagnostic tests only.  Patient was given time to ask questions.  I answered them to their satisfaction and they are agreeable to proceeding    The patient was advised on the risks of stopping blood thinners for a procedure.  The risks discussed included the risk of developing myocardial infarction, CVA, embolus, clogging of the arteries or stents, etc.  We discussed the potential consequences of the risks discussed.  The benefits of stopping as well as the alternatives were discussed as well.   Patient is to hold their anticoagulation medication per the direction of their prescribing physician, Dr Nguyen .    A letter will be sent to prescribing This is to prevent any risk or complication from bleeding intra and post procedure. If they develop bleeding post procedure they are to go the emergency department for further evaluation and treatment immediately.             Body mass index is 30.65 kg/(m^2).     Return in about 6 weeks (around  5/26/2017).          NEAL Mazariegos    EMR Dragon/transcription disclaimer:  Much of this encounter note is electronic transcription/translation of spoken language to printed text.  The electronic translation of spoken language may be erroneous, or at times, nonsensical words or phrases may be inadvertently transcribed.  Although I have reviewed the note for such errors, some may still exist.    Results for orders placed or performed in visit on 04/14/17   Hepatic Function Panel   Result Value Ref Range    Total Protein 7.5 6.3 - 8.7 g/dL    Albumin 4.20 3.50 - 5.00 g/dL    ALT (SGPT) 19 0 - 54 U/L    AST (SGOT) 34 7 - 45 U/L    Alkaline Phosphatase 78 24 - 120 U/L    Total Bilirubin 0.8 0.1 - 1.0 mg/dL    Bilirubin, Direct 0.0 0.0 - 0.3 mg/dL    Bilirubin, Indirect 0.4 0.0 - 1.1 mg/dL   Hepatitis Panel, Acute   Result Value Ref Range    HCV S/C Ratio 0.01 0.00 - 0.99    Hepatitis C Ab Negative Negative    Hep A IgM Negative Negative    Hep B C IgM Negative Negative    Hepatitis B Surface Ag Negative Negative   Protime-INR   Result Value Ref Range    Protime 13.6 11.9 - 14.6 Seconds    INR 1.01 0.91 - 1.09       Results for orders placed or performed in visit on 04/14/17   Anti-Smooth Muscle Antibody Titer   Result Value Ref Range    Smooth Muscle Ab 14 0 - 19 Units   JEFF   Result Value Ref Range    JEFF Direct Negative Negative   Alpha - 1 - Antitrypsin   Result Value Ref Range    A-1 Antitrypsin 137 90 - 200 mg/dL   Ceruloplasmin   Result Value Ref Range    Ceruloplasmin 27.2 16.0 - 31.0 mg/dL   Ferritin   Result Value Ref Range    Ferritin 256.00 17.90 - 464.00 ng/mL   Iron Profile   Result Value Ref Range    Iron 76 42 - 180 mcg/dL    TIBC 263 225 - 420 mcg/dL    Iron Saturation 29 20 - 45 %   Hepatic Function Panel   Result Value Ref Range    Total Protein 7.5 6.3 - 8.7 g/dL    Albumin 4.20 3.50 - 5.00 g/dL    ALT (SGPT) 19 0 - 54 U/L    AST (SGOT) 34 7 - 45 U/L    Alkaline Phosphatase 78 24 - 120 U/L     Total Bilirubin 0.8 0.1 - 1.0 mg/dL    Bilirubin, Direct 0.0 0.0 - 0.3 mg/dL    Bilirubin, Indirect 0.4 0.0 - 1.1 mg/dL   Hepatitis Panel, Acute   Result Value Ref Range    HCV S/C Ratio 0.01 0.00 - 0.99    Hepatitis C Ab Negative Negative    Hep A IgM Negative Negative    Hep B C IgM Negative Negative    Hepatitis B Surface Ag Negative Negative   Mitochondrial Antibodies, M2   Result Value Ref Range    Mitochondrial Ab <20.0 0.0 - 20.0 Units   Protime-INR   Result Value Ref Range    Protime 13.6 11.9 - 14.6 Seconds    INR 1.01 0.91 - 1.09

## 2017-04-26 NOTE — INTERVAL H&P NOTE
H&P updated. The patient was examined and the following changes are noted:  He feels well.  His liver serologies were negative.  He did take his Plavix 2 days ago.  He states he believes the hospital told him to continue Plavix but forgot that our office one him stop it 7 days beforehand.  We discussed pursuing the scopes for diagnostic purposes only and even abnormalities found that he may need to come back later for therapeutic maneuver.  Expressed understanding agreed with the plan.

## 2017-04-26 NOTE — PLAN OF CARE
Problem: GI Endoscopy (Adult)  Goal: Signs and Symptoms of Listed Potential Problems Will be Absent or Manageable (GI Endoscopy)  Outcome: Outcome(s) achieved Date Met:  04/26/17 04/26/17 1155   GI Endoscopy   Problems Assessed (GI Endoscopy) all   Problems Present (GI Endoscopy) none

## 2017-05-01 ENCOUNTER — LAB (OUTPATIENT)
Dept: LAB | Facility: HOSPITAL | Age: 62
End: 2017-05-01

## 2017-05-01 ENCOUNTER — TRANSCRIBE ORDERS (OUTPATIENT)
Dept: GENERAL RADIOLOGY | Facility: HOSPITAL | Age: 62
End: 2017-05-01

## 2017-05-01 DIAGNOSIS — R53.83 OTHER FATIGUE: Primary | ICD-10-CM

## 2017-05-01 DIAGNOSIS — D64.9 ANEMIA, UNSPECIFIED: ICD-10-CM

## 2017-05-01 DIAGNOSIS — R53.83 OTHER FATIGUE: ICD-10-CM

## 2017-05-01 LAB
25(OH)D3 SERPL-MCNC: 48.3 NG/ML (ref 30–100)
ALBUMIN SERPL-MCNC: 4.1 G/DL (ref 3.5–5)
ALBUMIN/GLOB SERPL: 1.1 G/DL (ref 1.1–2.5)
ALP SERPL-CCNC: 87 U/L (ref 24–120)
ALT SERPL W P-5'-P-CCNC: 19 U/L (ref 0–54)
ANION GAP SERPL CALCULATED.3IONS-SCNC: 11 MMOL/L (ref 4–13)
AST SERPL-CCNC: 24 U/L (ref 7–45)
AUTO MIXED CELLS #: 0.7 10*3/UL (ref 0.1–2.6)
AUTO MIXED CELLS %: 11.9 % (ref 0.1–24)
BILIRUB SERPL-MCNC: 1 MG/DL (ref 0.1–1)
BUN BLD-MCNC: 17 MG/DL (ref 5–21)
BUN/CREAT SERPL: 18.5
CALCIUM SPEC-SCNC: 9.8 MG/DL (ref 8.4–10.4)
CHLORIDE SERPL-SCNC: 98 MMOL/L (ref 98–110)
CO2 SERPL-SCNC: 33 MMOL/L (ref 24–31)
CREAT BLD-MCNC: 0.92 MG/DL (ref 0.5–1.4)
ERYTHROCYTE [DISTWIDTH] IN BLOOD BY AUTOMATED COUNT: 13.3 % (ref 12–15)
FOLATE SERPL-MCNC: >20 NG/ML
GFR SERPL CREATININE-BSD FRML MDRD: 83 ML/MIN/1.73
GLOBULIN UR ELPH-MCNC: 3.6 GM/DL
GLUCOSE BLD-MCNC: 104 MG/DL (ref 70–100)
HCT VFR BLD AUTO: 31.5 % (ref 40–52)
HETEROPH AB SER QL LA: NEGATIVE
HGB BLD-MCNC: 10.9 G/DL (ref 14–18)
IRON 24H UR-MRATE: 94 MCG/DL (ref 42–180)
IRON SATN MFR SERPL: 34 % (ref 20–45)
LYMPHOCYTES # BLD AUTO: 1.1 10*3/MM3 (ref 0.8–7)
LYMPHOCYTES NFR BLD AUTO: 20.5 % (ref 15–45)
MCH RBC QN AUTO: 33.3 PG (ref 28–32)
MCHC RBC AUTO-ENTMCNC: 34.6 G/DL (ref 33–36)
MCV RBC AUTO: 96.3 FL (ref 82–95)
NEUTROPHILS # BLD AUTO: 3.8 10*3/MM3 (ref 1.5–8.3)
NEUTROPHILS NFR BLD AUTO: 67.6 % (ref 39–78)
PLATELET # BLD AUTO: 132 10*3/MM3 (ref 130–400)
PMV BLD AUTO: 7.9 FL (ref 6–12)
POTASSIUM BLD-SCNC: 4.2 MMOL/L (ref 3.5–5.3)
PROT SERPL-MCNC: 7.7 G/DL (ref 6.3–8.7)
RBC # BLD AUTO: 3.27 10*6/MM3 (ref 4.2–5.4)
RETICS #: 0.11 10*6/MM3 (ref 0.02–0.13)
RETICS/RBC NFR AUTO: 3.42 % (ref 0.6–1.8)
SODIUM BLD-SCNC: 142 MMOL/L (ref 135–145)
T3FREE SERPL-MCNC: 2.87 PG/ML (ref 2.77–5.27)
T4 FREE SERPL-MCNC: 0.84 NG/DL (ref 0.78–2.19)
TIBC SERPL-MCNC: 276 MCG/DL (ref 225–420)
TSH SERPL DL<=0.05 MIU/L-ACNC: 4.23 MIU/ML (ref 0.47–4.68)
VIT B12 BLD-MCNC: >1000 PG/ML (ref 239–931)
WBC NRBC COR # BLD: 5.6 10*3/MM3 (ref 4.8–10.8)

## 2017-05-01 PROCEDURE — 85045 AUTOMATED RETICULOCYTE COUNT: CPT | Performed by: NURSE PRACTITIONER

## 2017-05-01 PROCEDURE — 82607 VITAMIN B-12: CPT | Performed by: NURSE PRACTITIONER

## 2017-05-01 PROCEDURE — 84439 ASSAY OF FREE THYROXINE: CPT | Performed by: NURSE PRACTITIONER

## 2017-05-01 PROCEDURE — 83550 IRON BINDING TEST: CPT | Performed by: NURSE PRACTITIONER

## 2017-05-01 PROCEDURE — 84443 ASSAY THYROID STIM HORMONE: CPT | Performed by: NURSE PRACTITIONER

## 2017-05-01 PROCEDURE — 84481 FREE ASSAY (FT-3): CPT | Performed by: NURSE PRACTITIONER

## 2017-05-01 PROCEDURE — 82668 ASSAY OF ERYTHROPOIETIN: CPT | Performed by: NURSE PRACTITIONER

## 2017-05-01 PROCEDURE — 82306 VITAMIN D 25 HYDROXY: CPT | Performed by: NURSE PRACTITIONER

## 2017-05-01 PROCEDURE — 36415 COLL VENOUS BLD VENIPUNCTURE: CPT

## 2017-05-01 PROCEDURE — 85025 COMPLETE CBC W/AUTO DIFF WBC: CPT

## 2017-05-01 PROCEDURE — 82746 ASSAY OF FOLIC ACID SERUM: CPT | Performed by: NURSE PRACTITIONER

## 2017-05-01 PROCEDURE — 83540 ASSAY OF IRON: CPT | Performed by: NURSE PRACTITIONER

## 2017-05-01 PROCEDURE — 80053 COMPREHEN METABOLIC PANEL: CPT

## 2017-05-01 PROCEDURE — 86308 HETEROPHILE ANTIBODY SCREEN: CPT

## 2017-05-03 ENCOUNTER — OFFICE VISIT (OUTPATIENT)
Dept: UROLOGY | Facility: CLINIC | Age: 62
End: 2017-05-03

## 2017-05-03 ENCOUNTER — HOSPITAL ENCOUNTER (OUTPATIENT)
Dept: GENERAL RADIOLOGY | Facility: HOSPITAL | Age: 62
Discharge: HOME | End: 2017-05-03
Attending: UROLOGY | Admitting: UROLOGY

## 2017-05-03 VITALS
WEIGHT: 225 LBS | BODY MASS INDEX: 30.48 KG/M2 | HEIGHT: 72 IN | SYSTOLIC BLOOD PRESSURE: 132 MMHG | DIASTOLIC BLOOD PRESSURE: 84 MMHG | TEMPERATURE: 97.3 F

## 2017-05-03 DIAGNOSIS — N20.1 URETERAL STONE: ICD-10-CM

## 2017-05-03 DIAGNOSIS — N20.0 KIDNEY STONE: ICD-10-CM

## 2017-05-03 DIAGNOSIS — N20.1 URETERAL STONE: Primary | ICD-10-CM

## 2017-05-03 LAB
BILIRUB BLD-MCNC: NEGATIVE MG/DL
CLARITY, POC: CLEAR
COLOR UR: YELLOW
ETHNIC BACKGROUND STATED: 55.9 MIU/ML (ref 2.6–18.5)
GLUCOSE UR STRIP-MCNC: NEGATIVE MG/DL
KETONES UR QL: NEGATIVE
LEUKOCYTE EST, POC: ABNORMAL
NITRITE UR-MCNC: NEGATIVE MG/ML
PH UR: 5 [PH] (ref 5–8)
PROT UR STRIP-MCNC: ABNORMAL MG/DL
RBC # UR STRIP: ABNORMAL /UL
SP GR UR: 1.02 (ref 1–1.03)
UROBILINOGEN UR QL: NORMAL

## 2017-05-03 PROCEDURE — 99213 OFFICE O/P EST LOW 20 MIN: CPT | Performed by: UROLOGY

## 2017-05-03 PROCEDURE — 81003 URINALYSIS AUTO W/O SCOPE: CPT | Performed by: UROLOGY

## 2017-05-03 PROCEDURE — 52310 CYSTOSCOPY AND TREATMENT: CPT | Performed by: UROLOGY

## 2017-05-03 PROCEDURE — 74000 HC ABDOMEN KUB: CPT

## 2017-05-10 ENCOUNTER — HOSPITAL ENCOUNTER (OUTPATIENT)
Dept: NON INVASIVE DIAGNOSTICS | Age: 62
Discharge: HOME | End: 2017-05-10
Payer: COMMERCIAL

## 2017-05-10 LAB
LV EF: 58 %
LVEF MODALITY: NORMAL

## 2017-05-10 PROCEDURE — 93306 TTE W/DOPPLER COMPLETE: CPT

## 2017-05-10 PROCEDURE — 93227 XTRNL ECG REC<48 HR R&I: CPT | Performed by: INTERNAL MEDICINE

## 2017-05-10 PROCEDURE — 93226 XTRNL ECG REC<48 HR SCAN A/R: CPT

## 2017-05-10 PROCEDURE — 93225 XTRNL ECG REC<48 HRS REC: CPT

## 2017-05-12 DIAGNOSIS — R60.9 EDEMA, UNSPECIFIED TYPE: ICD-10-CM

## 2017-05-12 DIAGNOSIS — I25.10 CORONARY ARTERY DISEASE INVOLVING NATIVE HEART WITHOUT ANGINA PECTORIS, UNSPECIFIED VESSEL OR LESION TYPE: ICD-10-CM

## 2017-05-12 RX ORDER — CLOPIDOGREL BISULFATE 75 MG/1
TABLET ORAL
Qty: 90 TABLET | Refills: 3 | Status: SHIPPED | OUTPATIENT
Start: 2017-05-12 | End: 2018-06-11 | Stop reason: SDUPTHER

## 2017-05-12 RX ORDER — FUROSEMIDE 40 MG/1
TABLET ORAL
Qty: 90 TABLET | Refills: 3 | Status: SHIPPED | OUTPATIENT
Start: 2017-05-12 | End: 2017-07-07 | Stop reason: SDUPTHER

## 2017-05-16 LAB
LAB AP CASE REPORT: NORMAL
LAB AP CLINICAL INFORMATION: NORMAL
Lab: NORMAL
PATH REPORT.FINAL DX SPEC: NORMAL
PATH REPORT.GROSS SPEC: NORMAL

## 2017-06-09 ENCOUNTER — HOSPITAL ENCOUNTER (OUTPATIENT)
Dept: GENERAL RADIOLOGY | Facility: HOSPITAL | Age: 62
Discharge: HOME | End: 2017-06-09
Attending: UROLOGY | Admitting: UROLOGY

## 2017-06-09 DIAGNOSIS — N20.0 KIDNEY STONE: ICD-10-CM

## 2017-06-09 PROCEDURE — 74000 HC ABDOMEN KUB: CPT

## 2017-07-07 ENCOUNTER — OFFICE VISIT (OUTPATIENT)
Dept: PRIMARY CARE CLINIC | Age: 62
End: 2017-07-07
Payer: COMMERCIAL

## 2017-07-07 VITALS
DIASTOLIC BLOOD PRESSURE: 70 MMHG | HEIGHT: 73 IN | HEART RATE: 74 BPM | OXYGEN SATURATION: 97 % | TEMPERATURE: 97.5 F | BODY MASS INDEX: 29.42 KG/M2 | SYSTOLIC BLOOD PRESSURE: 124 MMHG | RESPIRATION RATE: 18 BRPM | WEIGHT: 222 LBS

## 2017-07-07 DIAGNOSIS — R53.83 OTHER FATIGUE: Primary | ICD-10-CM

## 2017-07-07 DIAGNOSIS — R17 JAUNDICE: ICD-10-CM

## 2017-07-07 DIAGNOSIS — Z13.220 SCREENING FOR HYPERLIPIDEMIA: ICD-10-CM

## 2017-07-07 DIAGNOSIS — E11.8 DM (DIABETES MELLITUS) WITH COMPLICATIONS (HCC): ICD-10-CM

## 2017-07-07 DIAGNOSIS — Z13.1 DM (DIABETES MELLITUS SCREEN): ICD-10-CM

## 2017-07-07 DIAGNOSIS — K21.9 GASTROESOPHAGEAL REFLUX DISEASE WITHOUT ESOPHAGITIS: ICD-10-CM

## 2017-07-07 DIAGNOSIS — F41.9 ANXIETY: ICD-10-CM

## 2017-07-07 DIAGNOSIS — E78.5 HYPERLIPIDEMIA, UNSPECIFIED HYPERLIPIDEMIA TYPE: ICD-10-CM

## 2017-07-07 DIAGNOSIS — G47.30 SLEEP APNEA, UNSPECIFIED TYPE: ICD-10-CM

## 2017-07-07 DIAGNOSIS — R60.9 EDEMA, UNSPECIFIED TYPE: ICD-10-CM

## 2017-07-07 LAB
ABO/RH: NORMAL
ALBUMIN SERPL-MCNC: 4.1 G/DL (ref 3.5–5.2)
ALP BLD-CCNC: 71 U/L (ref 40–130)
ALT SERPL-CCNC: 14 U/L (ref 5–41)
AMMONIA: 20 MCG/DL (ref 16–60)
AMYLASE: 30 U/L (ref 28–100)
ANION GAP SERPL CALCULATED.3IONS-SCNC: 19 MMOL/L (ref 7–19)
ANTIBODY SCREEN: NORMAL
AST SERPL-CCNC: 17 U/L (ref 5–40)
BILIRUB SERPL-MCNC: 0.6 MG/DL (ref 0.2–1.2)
BUN BLDV-MCNC: 20 MG/DL (ref 8–23)
CALCIUM SERPL-MCNC: 9.5 MG/DL (ref 8.8–10.2)
CHLORIDE BLD-SCNC: 97 MMOL/L (ref 98–111)
CO2: 26 MMOL/L (ref 22–29)
CREAT SERPL-MCNC: 1.1 MG/DL (ref 0.5–1.2)
DAT POLYSPECIFIC: NORMAL
GFR NON-AFRICAN AMERICAN: >60
GLUCOSE BLD-MCNC: 177 MG/DL (ref 74–109)
HBA1C MFR BLD: 6.2 %
HCT VFR BLD CALC: 32.8 % (ref 42–52)
HEMOGLOBIN: 11 G/DL (ref 14–18)
LIPASE: 49 U/L (ref 13–60)
MCH RBC QN AUTO: 33.1 PG (ref 27–31)
MCHC RBC AUTO-ENTMCNC: 33.5 G/DL (ref 33–37)
MCV RBC AUTO: 98.8 FL (ref 80–94)
PDW BLD-RTO: 13.2 % (ref 11.5–14.5)
PLATELET # BLD: 114 K/UL (ref 130–400)
PMV BLD AUTO: 9.6 FL (ref 9.4–12.4)
POTASSIUM SERPL-SCNC: 4 MMOL/L (ref 3.5–5)
RBC # BLD: 3.32 M/UL (ref 4.7–6.1)
SODIUM BLD-SCNC: 142 MMOL/L (ref 136–145)
TOTAL PROTEIN: 7.7 G/DL (ref 6.6–8.7)
WBC # BLD: 5.4 K/UL (ref 4.8–10.8)

## 2017-07-07 PROCEDURE — 99214 OFFICE O/P EST MOD 30 MIN: CPT | Performed by: NURSE PRACTITIONER

## 2017-07-07 PROCEDURE — 83036 HEMOGLOBIN GLYCOSYLATED A1C: CPT | Performed by: NURSE PRACTITIONER

## 2017-07-07 RX ORDER — BUSPIRONE HYDROCHLORIDE 15 MG/1
15 TABLET ORAL DAILY
Qty: 60 TABLET | Refills: 2 | Status: SHIPPED | OUTPATIENT
Start: 2017-07-07 | End: 2017-09-07 | Stop reason: SDUPTHER

## 2017-07-07 RX ORDER — PRAVASTATIN SODIUM 40 MG
40 TABLET ORAL DAILY
Qty: 90 TABLET | Refills: 2 | Status: SHIPPED | OUTPATIENT
Start: 2017-07-07 | End: 2018-05-16 | Stop reason: SDUPTHER

## 2017-07-07 RX ORDER — FUROSEMIDE 40 MG/1
40 TABLET ORAL DAILY
Qty: 30 TABLET | Refills: 2 | Status: SHIPPED | OUTPATIENT
Start: 2017-07-07 | End: 2017-08-17 | Stop reason: CLARIF

## 2017-07-07 RX ORDER — PANTOPRAZOLE SODIUM 40 MG/1
40 TABLET, DELAYED RELEASE ORAL DAILY
Qty: 30 TABLET | Refills: 2 | Status: SHIPPED | OUTPATIENT
Start: 2017-07-07 | End: 2017-08-17 | Stop reason: CLARIF

## 2017-07-07 ASSESSMENT — ENCOUNTER SYMPTOMS
COUGH: 0
SHORTNESS OF BREATH: 0
ABDOMINAL PAIN: 0
ABDOMINAL DISTENTION: 0
COLOR CHANGE: 1
CHEST TIGHTNESS: 0
EYE ITCHING: 0
EYE DISCHARGE: 0

## 2017-07-19 ENCOUNTER — HOSPITAL ENCOUNTER (OUTPATIENT)
Dept: SLEEP CENTER | Age: 62
Discharge: HOME | End: 2017-07-19
Payer: COMMERCIAL

## 2017-07-19 ENCOUNTER — TELEPHONE (OUTPATIENT)
Dept: PRIMARY CARE CLINIC | Age: 62
End: 2017-07-19

## 2017-07-19 PROCEDURE — 95811 POLYSOM 6/>YRS CPAP 4/> PARM: CPT

## 2017-07-19 PROCEDURE — 95811 POLYSOM 6/>YRS CPAP 4/> PARM: CPT | Performed by: PSYCHIATRY & NEUROLOGY

## 2017-08-01 ENCOUNTER — TELEPHONE (OUTPATIENT)
Dept: PRIMARY CARE CLINIC | Age: 62
End: 2017-08-01

## 2017-08-01 DIAGNOSIS — R07.9 CHEST PAIN, UNSPECIFIED TYPE: Primary | ICD-10-CM

## 2017-08-01 DIAGNOSIS — D69.6 THROMBOPENIA (HCC): Primary | ICD-10-CM

## 2017-08-03 DIAGNOSIS — R07.9 CHEST PAIN, UNSPECIFIED TYPE: Primary | ICD-10-CM

## 2017-08-04 ENCOUNTER — HOSPITAL ENCOUNTER (OUTPATIENT)
Dept: ULTRASOUND IMAGING | Age: 62
Discharge: HOME | End: 2017-08-04
Payer: COMMERCIAL

## 2017-08-04 DIAGNOSIS — D69.6 THROMBOPENIA (HCC): ICD-10-CM

## 2017-08-04 PROCEDURE — 76705 ECHO EXAM OF ABDOMEN: CPT

## 2017-08-04 PROCEDURE — 76536 US EXAM OF HEAD AND NECK: CPT

## 2017-08-08 ENCOUNTER — OFFICE VISIT (OUTPATIENT)
Dept: PRIMARY CARE CLINIC | Age: 62
End: 2017-08-08
Payer: COMMERCIAL

## 2017-08-08 ENCOUNTER — TELEPHONE (OUTPATIENT)
Dept: UROLOGY | Facility: CLINIC | Age: 62
End: 2017-08-08

## 2017-08-08 VITALS
HEART RATE: 79 BPM | DIASTOLIC BLOOD PRESSURE: 74 MMHG | RESPIRATION RATE: 18 BRPM | WEIGHT: 224 LBS | HEIGHT: 73 IN | TEMPERATURE: 97.9 F | BODY MASS INDEX: 29.69 KG/M2 | OXYGEN SATURATION: 96 % | SYSTOLIC BLOOD PRESSURE: 106 MMHG

## 2017-08-08 DIAGNOSIS — I95.9 HYPOTENSION, UNSPECIFIED HYPOTENSION TYPE: ICD-10-CM

## 2017-08-08 DIAGNOSIS — F41.9 ANXIETY: ICD-10-CM

## 2017-08-08 DIAGNOSIS — R53.0 NEOPLASTIC MALIGNANT RELATED FATIGUE: ICD-10-CM

## 2017-08-08 DIAGNOSIS — I15.9 SECONDARY HYPERTENSION: ICD-10-CM

## 2017-08-08 DIAGNOSIS — C83.00 MALIGNANT LYMPHOPLASMACYTIC LYMPHOMA (HCC): ICD-10-CM

## 2017-08-08 DIAGNOSIS — I10 ESSENTIAL HYPERTENSION: Primary | ICD-10-CM

## 2017-08-08 DIAGNOSIS — E07.9 THYROID DISEASE: ICD-10-CM

## 2017-08-08 DIAGNOSIS — N20.0 KIDNEY STONE: Primary | ICD-10-CM

## 2017-08-08 PROCEDURE — 99214 OFFICE O/P EST MOD 30 MIN: CPT | Performed by: NURSE PRACTITIONER

## 2017-08-08 RX ORDER — DILTIAZEM HYDROCHLORIDE 180 MG/1
180 CAPSULE, COATED, EXTENDED RELEASE ORAL DAILY
Qty: 30 CAPSULE | Refills: 1 | Status: SHIPPED | OUTPATIENT
Start: 2017-08-08 | End: 2018-02-15

## 2017-08-08 ASSESSMENT — ENCOUNTER SYMPTOMS
EYE REDNESS: 0
NAUSEA: 0
CHANGE IN BOWEL HABIT: 0
COUGH: 0
ABDOMINAL PAIN: 0
CHEST TIGHTNESS: 0
RHINORRHEA: 0
BLOOD IN STOOL: 0
WHEEZING: 0
SHORTNESS OF BREATH: 0
VOMITING: 0
VOICE CHANGE: 0

## 2017-08-08 NOTE — TELEPHONE ENCOUNTER
Order in       ----- Message from Elsie Le sent at 8/8/2017  3:26 PM CDT -----  Can you put in order for KUB?

## 2017-08-09 ENCOUNTER — HOSPITAL ENCOUNTER (OUTPATIENT)
Dept: NON INVASIVE DIAGNOSTICS | Age: 62
Discharge: HOME | End: 2017-08-09
Payer: COMMERCIAL

## 2017-08-09 ENCOUNTER — HOSPITAL ENCOUNTER (OUTPATIENT)
Dept: NUCLEAR MEDICINE | Age: 62
Discharge: HOME | End: 2017-08-09
Payer: COMMERCIAL

## 2017-08-09 ENCOUNTER — TELEPHONE (OUTPATIENT)
Dept: PRIMARY CARE CLINIC | Age: 62
End: 2017-08-09

## 2017-08-09 ENCOUNTER — HOSPITAL ENCOUNTER (OUTPATIENT)
Dept: NUCLEAR MEDICINE | Age: 62
End: 2017-08-09
Payer: COMMERCIAL

## 2017-08-09 DIAGNOSIS — R07.9 CHEST PAIN, UNSPECIFIED TYPE: ICD-10-CM

## 2017-08-09 DIAGNOSIS — R53.83 FATIGUE, UNSPECIFIED TYPE: ICD-10-CM

## 2017-08-09 DIAGNOSIS — C90.00 MULTIPLE MYELOMA, REMISSION STATUS UNSPECIFIED (HCC): Primary | ICD-10-CM

## 2017-08-09 PROCEDURE — 93017 CV STRESS TEST TRACING ONLY: CPT

## 2017-08-09 PROCEDURE — 6360000002 HC RX W HCPCS: Performed by: NURSE PRACTITIONER

## 2017-08-09 PROCEDURE — A9500 TC99M SESTAMIBI: HCPCS | Performed by: NURSE PRACTITIONER

## 2017-08-09 PROCEDURE — 78452 HT MUSCLE IMAGE SPECT MULT: CPT

## 2017-08-09 PROCEDURE — 3430000000 HC RX DIAGNOSTIC RADIOPHARMACEUTICAL: Performed by: NURSE PRACTITIONER

## 2017-08-09 RX ADMIN — TETRAKIS(2-METHOXYISOBUTYLISOCYANIDE)COPPER(I) TETRAFLUOROBORATE 10 MILLICURIE: 1 INJECTION, POWDER, LYOPHILIZED, FOR SOLUTION INTRAVENOUS at 13:27

## 2017-08-09 RX ADMIN — REGADENOSON 0.4 MG: 0.08 INJECTION, SOLUTION INTRAVENOUS at 13:27

## 2017-08-09 RX ADMIN — TETRAKIS(2-METHOXYISOBUTYLISOCYANIDE)COPPER(I) TETRAFLUOROBORATE 30 MILLICURIE: 1 INJECTION, POWDER, LYOPHILIZED, FOR SOLUTION INTRAVENOUS at 13:27

## 2017-08-11 ENCOUNTER — HOSPITAL ENCOUNTER (OUTPATIENT)
Dept: GENERAL RADIOLOGY | Facility: HOSPITAL | Age: 62
Discharge: HOME | End: 2017-08-11
Attending: UROLOGY | Admitting: UROLOGY

## 2017-08-11 ENCOUNTER — APPOINTMENT (OUTPATIENT)
Dept: PREADMISSION TESTING | Facility: HOSPITAL | Age: 62
End: 2017-08-11

## 2017-08-11 ENCOUNTER — OFFICE VISIT (OUTPATIENT)
Dept: UROLOGY | Facility: CLINIC | Age: 62
End: 2017-08-11

## 2017-08-11 VITALS
BODY MASS INDEX: 30.06 KG/M2 | TEMPERATURE: 96.7 F | DIASTOLIC BLOOD PRESSURE: 84 MMHG | SYSTOLIC BLOOD PRESSURE: 130 MMHG | WEIGHT: 226.8 LBS | HEIGHT: 73 IN

## 2017-08-11 VITALS
BODY MASS INDEX: 30.48 KG/M2 | WEIGHT: 225 LBS | HEIGHT: 72 IN | HEART RATE: 75 BPM | SYSTOLIC BLOOD PRESSURE: 100 MMHG | OXYGEN SATURATION: 98 % | RESPIRATION RATE: 18 BRPM | DIASTOLIC BLOOD PRESSURE: 58 MMHG

## 2017-08-11 DIAGNOSIS — N20.0 KIDNEY STONE: ICD-10-CM

## 2017-08-11 DIAGNOSIS — N20.0 LEFT NEPHROLITHIASIS: ICD-10-CM

## 2017-08-11 DIAGNOSIS — N20.1 URETERAL STONE: Primary | ICD-10-CM

## 2017-08-11 LAB
ANION GAP SERPL CALCULATED.3IONS-SCNC: 10 MMOL/L (ref 4–13)
BACTERIA UR QL AUTO: ABNORMAL /HPF
BILIRUB BLD-MCNC: NEGATIVE MG/DL
BILIRUB UR QL STRIP: NEGATIVE
BUN BLD-MCNC: 21 MG/DL (ref 5–21)
BUN/CREAT SERPL: 19.3 (ref 7–25)
CALCIUM SPEC-SCNC: 9.9 MG/DL (ref 8.4–10.4)
CHLORIDE SERPL-SCNC: 100 MMOL/L (ref 98–110)
CLARITY UR: CLEAR
CLARITY, POC: CLEAR
CO2 SERPL-SCNC: 29 MMOL/L (ref 24–31)
COLOR UR: ABNORMAL
COLOR UR: YELLOW
CREAT BLD-MCNC: 1.09 MG/DL (ref 0.5–1.4)
DEPRECATED RDW RBC AUTO: 50.1 FL (ref 40–54)
ERYTHROCYTE [DISTWIDTH] IN BLOOD BY AUTOMATED COUNT: 14.2 % (ref 12–15)
GFR SERPL CREATININE-BSD FRML MDRD: 68 ML/MIN/1.73
GLUCOSE BLD-MCNC: 100 MG/DL (ref 70–100)
GLUCOSE UR STRIP-MCNC: NEGATIVE MG/DL
GLUCOSE UR STRIP-MCNC: NEGATIVE MG/DL
HCT VFR BLD AUTO: 32.2 % (ref 40–52)
HGB BLD-MCNC: 10.6 G/DL (ref 14–18)
HGB UR QL STRIP.AUTO: NEGATIVE
HYALINE CASTS UR QL AUTO: ABNORMAL /LPF
KETONES UR QL STRIP: NEGATIVE
KETONES UR QL: NEGATIVE
LEUKOCYTE EST, POC: ABNORMAL
LEUKOCYTE ESTERASE UR QL STRIP.AUTO: ABNORMAL
MCH RBC QN AUTO: 32.1 PG (ref 28–32)
MCHC RBC AUTO-ENTMCNC: 32.9 G/DL (ref 33–36)
MCV RBC AUTO: 97.6 FL (ref 82–95)
NITRITE UR QL STRIP: NEGATIVE
NITRITE UR-MCNC: NEGATIVE MG/ML
PH UR STRIP.AUTO: <=5 [PH] (ref 5–8)
PH UR: 5 [PH] (ref 5–8)
PLATELET # BLD AUTO: 123 10*3/MM3 (ref 130–400)
PMV BLD AUTO: 9.9 FL (ref 6–12)
POTASSIUM BLD-SCNC: 4.2 MMOL/L (ref 3.5–5.3)
PROT UR QL STRIP: NEGATIVE
PROT UR STRIP-MCNC: ABNORMAL MG/DL
RBC # BLD AUTO: 3.3 10*6/MM3 (ref 4.8–5.9)
RBC # UR STRIP: ABNORMAL /UL
RBC # UR: ABNORMAL /HPF
REF LAB TEST METHOD: ABNORMAL
SODIUM BLD-SCNC: 139 MMOL/L (ref 135–145)
SP GR UR STRIP: 1.02 (ref 1–1.03)
SP GR UR: 1.02 (ref 1–1.03)
SQUAMOUS #/AREA URNS HPF: ABNORMAL /HPF
UROBILINOGEN UR QL STRIP: ABNORMAL
UROBILINOGEN UR QL: NORMAL
WBC NRBC COR # BLD: 6.27 10*3/MM3 (ref 4.8–10.8)
WBC UR QL AUTO: ABNORMAL /HPF

## 2017-08-11 PROCEDURE — 74000 HC ABDOMEN KUB: CPT

## 2017-08-11 PROCEDURE — 81003 URINALYSIS AUTO W/O SCOPE: CPT | Performed by: UROLOGY

## 2017-08-11 PROCEDURE — 81001 URINALYSIS AUTO W/SCOPE: CPT | Performed by: UROLOGY

## 2017-08-11 PROCEDURE — 87086 URINE CULTURE/COLONY COUNT: CPT | Performed by: UROLOGY

## 2017-08-11 PROCEDURE — 85027 COMPLETE CBC AUTOMATED: CPT | Performed by: UROLOGY

## 2017-08-11 PROCEDURE — 93010 ELECTROCARDIOGRAM REPORT: CPT | Performed by: INTERNAL MEDICINE

## 2017-08-11 PROCEDURE — 80048 BASIC METABOLIC PNL TOTAL CA: CPT | Performed by: UROLOGY

## 2017-08-11 PROCEDURE — 99214 OFFICE O/P EST MOD 30 MIN: CPT | Performed by: UROLOGY

## 2017-08-11 PROCEDURE — 93005 ELECTROCARDIOGRAM TRACING: CPT

## 2017-08-11 RX ORDER — SODIUM CHLORIDE 9 MG/ML
100 INJECTION, SOLUTION INTRAVENOUS CONTINUOUS
Status: CANCELLED | OUTPATIENT
Start: 2017-08-11

## 2017-08-11 RX ORDER — SODIUM CHLORIDE 0.9 % (FLUSH) 0.9 %
1-10 SYRINGE (ML) INJECTION AS NEEDED
Status: CANCELLED | OUTPATIENT
Start: 2017-08-11

## 2017-08-11 NOTE — DISCHARGE INSTRUCTIONS
DAY OF SURGERY INSTRUCTIONS        YOUR SURGEON: dr torrez    PROCEDURE: ***CYSTOSCOPY URETEROSCOPY LASER LITHOTRIPSY  CYSTOSCOPY URETERAL CATHETER/STENT INSERTION    DATE OF SURGERY: ***8/14/2017    ARRIVAL TIME: AS DIRECTED BY OFFICE    DAY OF SURGERY TAKE ONLY THESE MEDICATIONS: ***cartia and ranexa            BEFORE YOU COME TO THE HOSPITAL  (Pre-op instructions)  • Do not eat, drink, smoke or chew gum after midnight the night before surgery.  This also includes no mints.  • Morning of surgery take only the medicines you have been instructed with a sip of water unless otherwise instructed  by your physician.  • Do not shave, wear makeup or dark nail polish.  • Remove all jewelry including rings.  • Leave anything you consider valuable at home.  • Leave your suitcase in the car until after your surgery.  • Bring the following with you if applicable:  o Picture ID and insurance, Medicare or Medicaid cards  o Co-pay/deductible required by insurance (cash, check, credit card)  o Copy of advance directive, living will or power-of- documents if not brought to PAT  o CPAP or BIPAP mask and tubing  o Relaxation aids (MP3 player, book, magazine)  • On the day of surgery check in at registration located at the main entrance of the hospital.       Outpatient Surgery Guidelines, Adult  Outpatient procedures are those for which the person having the procedure is allowed to go home the same day as the procedure. Various procedures are done on an outpatient basis. You should follow some general guidelines if you will be having an outpatient procedure.  LET YOUR HEALTH CARE PROVIDER KNOW ABOUT:  · Any allergies you have.  · All medicines you are taking, including vitamins, herbs, eye drops, creams, and over-the-counter medicines.  · Previous problems you or members of your family have had with the use of anesthetics.  · Any blood disorders you have.  · Previous surgeries you have had.  · Medical conditions you  have.  RISKS AND COMPLICATIONS  Your health care provider will discuss possible risks and complications with you before surgery. Common risks and complications include:    · Problems due to the use of anesthetics.  · Blood loss and replacement (does not apply to minor surgical procedures).  · Temporary increase in pain due to surgery.  · Uncorrected pain or problems that the surgery was meant to correct.  · Infection.  · New damage.  BEFORE THE PROCEDURE  · Ask your health care provider about changing or stopping your regular medicines. You may need to stop taking certain medicines in the days or weeks before the procedure.  · Stop smoking at least 2 weeks before surgery. This lowers your risk for complications during and after surgery. Ask your health care provider for help with this if needed.  · Eat your usual meals and a light supper the day before surgery. Continue fluid intake. Do not drink alcohol.  · Do not eat or drink after midnight the night before your surgery.   · Arrange for someone to take you home and to stay with you for 24 hours after the procedure. Medicine given for your procedure may affect your ability to drive or to care for yourself.  · Call your health care provider's office if you develop an illness or problem that may prevent you from safely having your procedure.  AFTER THE PROCEDURE  After surgery, you will be taken to a recovery area, where your progress will be monitored. If there are no complications, you will be allowed to go home when you are awake, stable, and taking fluids well. You may have numbness around the surgical site. Healing will take some time. You will have tenderness at the surgical site and may have some swelling and bruising. You may also have some nausea.  HOME CARE INSTRUCTIONS  · Do not drive for 24 hours, or as directed by your health care provider. Do not drive while taking prescription pain medicines.  · Do not drink alcohol for 24 hours.  · Do not make  important decisions or sign legal documents for 24 hours.  · You may resume a normal diet and activities as directed.  · Do not lift anything heavier than 10 pounds (4.5 kg) or play contact sports until your health care provider says it is okay.  · Change your bandages (dressings) as directed.  · Only take over-the-counter or prescription medicines as directed by your health care provider.  · Follow up with your health care provider as directed.  SEEK MEDICAL CARE IF:  · You have increased bleeding (more than a small spot) from the surgical site.  · You have redness, swelling, or increasing pain in the wound.  · You see pus coming from the wound.  · You have a fever.  · You notice a bad smell coming from the wound or dressing.  · You feel lightheaded or faint.  · You develop a rash.  · You have trouble breathing.  · You develop allergies.  MAKE SURE YOU:  · Understand these instructions.  · Will watch your condition.  · Will get help right away if you are not doing well or get worse.     This information is not intended to replace advice given to you by your health care provider. Make sure you discuss any questions you have with your health care provider.     Document Released: 09/12/2002 Document Revised: 05/03/2016 Document Reviewed: 05/22/2014  [employer] Interactive Patient Education ©2016 [employer]       Fall Prevention in Hospitals, Adult  As a hospital patient, your condition and the treatments you receive can increase your risk for falls. Some additional risk factors for falls in a hospital include:  · Being in an unfamiliar environment.  · Being on bed rest.  · Your surgery.  · Taking certain medicines.  · Your tubing requirements, such as intravenous (IV) therapy or catheters.  It is important that you learn how to decrease fall risks while at the hospital. Below are important tips that can help prevent falls.  SAFETY TIPS FOR PREVENTING FALLS  Talk about your risk of falling.  · Ask your health care  provider why you are at risk for falling. Is it your medicine, illness, tubing placement, or something else?  · Make a plan with your health care provider to keep you safe from falls.  · Ask your health care provider or pharmacist about side effects of your medicines. Some medicines can make you dizzy or affect your coordination.  Ask for help.  · Ask for help before getting out of bed. You may need to press your call button.  · Ask for assistance in getting safely to the toilet.  · Ask for a walker or cane to be put at your bedside. Ask that most of the side rails on your bed be placed up before your health care provider leaves the room.  · Ask family or friends to sit with you.  · Ask for things that are out of your reach, such as your glasses, hearing aids, telephone, bedside table, or call button.  Follow these tips to avoid falling:  · Stay lying or seated, rather than standing, while waiting for help.  · Wear rubber-soled slippers or shoes whenever you walk in the hospital.  · Avoid quick, sudden movements.  ¨ Change positions slowly.  ¨ Sit on the side of your bed before standing.  ¨ Stand up slowly and wait before you start to walk.  · Let your health care provider know if there is a spill on the floor.  · Pay careful attention to the medical equipment, electrical cords, and tubes around you.  · When you need help, use your call button by your bed or in the bathroom. Wait for one of your health care providers to help you.  · If you feel dizzy or unsure of your footing, return to bed and wait for assistance.  · Avoid being distracted by the TV, telephone, or another person in your room.  · Do not lean or support yourself on rolling objects, such as IV poles or bedside tables.     This information is not intended to replace advice given to you by your health care provider. Make sure you discuss any questions you have with your health care provider.     Document Released: 12/15/2001 Document Revised: 01/08/2016  Document Reviewed: 08/25/2013  [employer] Interactive Patient Education ©2016 [employer]       Surgical Site Infections FAQs  What is a Surgical Site Infection (SSI)?  A surgical site infection is an infection that occurs after surgery in the part of the body where the surgery took place. Most patients who have surgery do not develop an infection. However, infections develop in about 1 to 3 out of every 100 patients who have surgery.  Some of the common symptoms of a surgical site infection are:  · Redness and pain around the area where you had surgery  · Drainage of cloudy fluid from your surgical wound  · Fever  Can SSIs be treated?  Yes. Most surgical site infections can be treated with antibiotics. The antibiotic given to you depends on the bacteria (germs) causing the infection. Sometimes patients with SSIs also need another surgery to treat the infection.  What are some of the things that hospitals are doing to prevent SSIs?  To prevent SSIs, doctors, nurses, and other healthcare providers:  · Clean their hands and arms up to their elbows with an antiseptic agent just before the surgery.  · Clean their hands with soap and water or an alcohol-based hand rub before and after caring for each patient.  · May remove some of your hair immediately before your surgery using electric clippers if the hair is in the same area where the procedure will occur. They should not shave you with a razor.  · Wear special hair covers, masks, gowns, and gloves during surgery to keep the surgery area clean.  · Give you antibiotics before your surgery starts. In most cases, you should get antibiotics within 60 minutes before the surgery starts and the antibiotics should be stopped within 24 hours after surgery.  · Clean the skin at the site of your surgery with a special soap that kills germs.  What can I do to help prevent SSIs?  Before your surgery:  · Tell your doctor about other medical problems you may have. Health problems  such as allergies, diabetes, and obesity could affect your surgery and your treatment.  · Quit smoking. Patients who smoke get more infections. Talk to your doctor about how you can quit before your surgery.  · Do not shave near where you will have surgery. Shaving with a razor can irritate your skin and make it easier to develop an infection.  At the time of your surgery:  · Speak up if someone tries to shave you with a razor before surgery. Ask why you need to be shaved and talk with your surgeon if you have any concerns.  · Ask if you will get antibiotics before surgery.  After your surgery:  · Make sure that your healthcare providers clean their hands before examining you, either with soap and water or an alcohol-based hand rub.  · If you do not see your providers clean their hands, please ask them to do so.  · Family and friends who visit you should not touch the surgical wound or dressings.  · Family and friends should clean their hands with soap and water or an alcohol-based hand rub before and after visiting you. If you do not see them clean their hands, ask them to clean their hands.  What do I need to do when I go home from the hospital?  · Before you go home, your doctor or nurse should explain everything you need to know about taking care of your wound. Make sure you understand how to care for your wound before you leave the hospital.  · Always clean your hands before and after caring for your wound.  · Before you go home, make sure you know who to contact if you have questions or problems after you get home.  · If you have any symptoms of an infection, such as redness and pain at the surgery site, drainage, or fever, call your doctor immediately.  If you have additional questions, please ask your doctor or nurse.  Developed and co-sponsored by The Society for Healthcare Epidemiology of [place] (SHEA); Infectious Diseases Society of [place] (IDSA); American Hospital Association; Association for  Professionals in Infection Control and Epidemiology (APIC); Centers for Disease Control and Prevention (CDC); and The Joint Commission.     This information is not intended to replace advice given to you by your health care provider. Make sure you discuss any questions you have with your health care provider.     Document Released: 12/23/2014 Document Revised: 01/08/2016 Document Reviewed: 03/02/2016  [employer] Interactive Patient Education ©2016 [employer]     PATIENT/FAMILY/RESPONSIBLE PARTY VERBALIZES UNDERSTANDING OF ABOVE EDUCATION.  COPY OF PAIN SCALE GIVEN AND REVIEWED WITH VERBALIZED UNDERSTANDING.

## 2017-08-11 NOTE — PROGRESS NOTES
Subjective    Mr. Lucero is 61 y.o. male    Chief Complaint: Ureteral Stone    History of Present Illness     Urolithiasis  Patient complains of left flank pain without radiation to the abdomen. Onset of symptoms was asymptomatic 4 months ago with controlled course since that time. Patient describes the pain as none, continuous and rated as no. The patient has had no nausea and no vomiting. There has been no fever or chills. The patient is not complaining of dysuria, frequency, or urgency.  Previous management of stones includes ESWL    The following portions of the patient's history were reviewed and updated as appropriate: allergies, current medications, past family history, past medical history, past social history, past surgical history and problem list.    Review of Systems   Constitutional: Negative for chills and fever.   Gastrointestinal: Negative for abdominal pain, anal bleeding and blood in stool.   Genitourinary: Negative for flank pain and hematuria.         Current Outpatient Prescriptions:   •  albuterol (PROVENTIL) (2.5 MG/3ML) 0.083% nebulizer solution, INHALE 3 ML 4 TIMES A DAY BY NEBULIZATION ROUTE AS DIRECTED FOR 14 DAYS., Disp: , Rfl: 0  •  aspirin (ASPIR) 81 MG EC tablet, Take  by mouth., Disp: , Rfl:   •  b complex vitamins capsule, Take  by mouth., Disp: , Rfl:   •  busPIRone (BUSPAR) 15 MG tablet, Take  by mouth., Disp: , Rfl:   •  ciprofloxacin (CIPRO) 500 MG tablet, , Disp: , Rfl:   •  clopidogrel (PLAVIX) 75 MG tablet, Take  by mouth., Disp: , Rfl:   •  cyanocobalamin 1000 MCG/ML injection, Inject  into the shoulder, thigh, or buttocks., Disp: , Rfl:   •  diltiaZEM CD (CARTIA XT) 240 MG 24 hr capsule, Take 1 capsule by mouth daily, Disp: , Rfl:   •  docusate sodium (COLACE) 100 MG capsule, Take  by mouth., Disp: , Rfl:   •  ferrous sulfate 325 (65 FE) MG EC tablet, Take  by mouth., Disp: , Rfl:   •  furosemide (LASIX) 40 MG tablet, Take  by mouth., Disp: , Rfl:   •  meloxicam (MOBIC) 15  MG tablet, , Disp: , Rfl:   •  metFORMIN (GLUCOPHAGE) 1000 MG tablet, Take  by mouth., Disp: , Rfl:   •  nitroglycerin (NITROSTAT) 0.4 MG SL tablet, Place  under the tongue., Disp: , Rfl:   •  pantoprazole (PROTONIX) 40 MG EC tablet, Take  by mouth., Disp: , Rfl:   •  polyethylene glycol (MIRALAX) powder, , Disp: , Rfl:   •  pravastatin (PRAVACHOL) 40 MG tablet, Take  by mouth., Disp: , Rfl:   •  ranolazine (RANEXA) 1000 MG 12 hr tablet, Take 1 tablet by mouth 2 times daily, Disp: , Rfl:   •  tamsulosin (FLOMAX) 0.4 MG capsule 24 hr capsule, , Disp: , Rfl:   •  therapeutic multivitamin-minerals (THERAGRAN-M) tablet, Take  by mouth., Disp: , Rfl:   •  tiZANidine (ZANAFLEX) 4 MG tablet, Take 4 mg by mouth As Needed for Muscle Spasms., Disp: , Rfl:   •  traMADol (ULTRAM) 50 MG tablet, TAKE 1 TABLET BY MOUTH FOUR TIMES DAILY AS NEEDED FOR PAIN, Disp: , Rfl: 0  •  Vitamin D, Cholecalciferol, 1000 UNITS tablet, Take  by mouth., Disp: , Rfl:     Past Medical History:   Diagnosis Date   • Anemia    • Anxiety    • BPH (benign prostatic hypertrophy)    • CAD (coronary artery disease)    • Cancer     non-hodgkins lymphoma   • Diabetes mellitus    • Hyperlipidemia    • Hypertension    • Iliac artery aneurysm, bilateral    • Kidney stone    • Sleep apnea        Past Surgical History:   Procedure Laterality Date   • COLONOSCOPY  02/04/2014   • COLONOSCOPY N/A 4/26/2017    Procedure: COLONOSCOPY WITH ANESTHESIA;  Surgeon: Sher Cui MD;  Location: [facility] ENDOSCOPY;  Service:    • CORONARY ARTERY BYPASS GRAFT      2   • CYSTOSCOPY URETEROSCOPY LASER LITHOTRIPSY Left 4/17/2017    Procedure: URETEROSCOPY LASER LITHOTRIPSY WITH DOUBLE J STENT INSERTION, LEFT ;  Surgeon: Weston Peck MD;  Location: [facility] OR;  Service:    • CYSTOSCOPY W/ URETERAL STENT PLACEMENT Left 4/17/2017    Procedure: CYSTOSCOPY URETERAL DOUBLE J STENT INSERTION, LEFT;  Surgeon: Weston Peck MD;  Location: [facility] OR;  Service:    •  "ENDOSCOPY N/A 4/26/2017    Procedure: ESOPHAGOGASTRODUODENOSCOPY WITH ANESTHESIA;  Surgeon: Sher Cui MD;  Location: [facility] ENDOSCOPY;  Service:    • KIDNEY STONE SURGERY     • KNEE SURGERY      replacement   • NECK SURGERY     • ROTATOR CUFF REPAIR     • SHOULDER SURGERY     • TONSILLECTOMY         Social History     Social History   • Marital status:      Spouse name: N/A   • Number of children: N/A   • Years of education: N/A     Social History Main Topics   • Smoking status: Former Smoker     Types: Cigarettes     Quit date: 1997   • Smokeless tobacco: Never Used   • Alcohol use No   • Drug use: No   • Sexual activity: Defer     Other Topics Concern   • None     Social History Narrative       Family History   Problem Relation Age of Onset   • Kidney disease Father    • Heart disease Father    • Cancer Mother      brain   • Colon cancer Neg Hx    • Colon polyps Neg Hx        Objective    /84  Temp 96.7 °F (35.9 °C)  Ht 73\" (185.4 cm)  Wt 226 lb 12.8 oz (103 kg)  BMI 29.92 kg/m2    Physical Exam   Constitutional: He is oriented to person, place, and time. He appears well-developed and well-nourished. No distress.   Pulmonary/Chest: Effort normal.   Abdominal: Soft. He exhibits no distension and no mass. There is no tenderness. There is no rebound and no guarding. No hernia.   Neurological: He is alert and oriented to person, place, and time.   Skin: Skin is warm and dry. He is not diaphoretic.   Psychiatric: He has a normal mood and affect.   Vitals reviewed.      KUB independent review    A KUB is available for me to review today.  The image is inspected for a bowel gas pattern and the general bone structure of the spine and pelvis. The kidneys are then inspected closely.  Renal outline is noted if identifiable. The kidney, collecting system, and anticipated path of the ureter are examined for calcifications including those in the true pelvis.  This film reveals:    On the right there are " no calcificaitons seen in the kidney or the expected course of the ureter. .    On the left there 1.7 mm stone in proximal ureter, 2.2 cm stone in lower pole.        Results for orders placed or performed in visit on 08/11/17   POC Urinalysis Dipstick, Automated   Result Value Ref Range    Color Yellow Yellow, Straw, Dark Yellow, Philomena    Clarity, UA Clear Clear    Glucose, UA Negative Negative, 1000 mg/dL (3+) mg/dL    Bilirubin Negative Negative    Ketones, UA Negative Negative    Specific Gravity  1.020 1.005 - 1.030    Blood, UA Small (A) Negative    pH, Urine 5.0 5.0 - 8.0    Protein, POC Trace (A) Negative mg/dL    Urobilinogen, UA Normal Normal    Leukocytes Trace (A) Negative    Nitrite, UA Negative Negative     Assessment and Plan    Franko was seen today for flank pain.    Diagnoses and all orders for this visit:    Ureteral stone  -     POC Urinalysis Dipstick, Automated  -     sodium chloride 0.9 % flush 1-10 mL; Infuse 1-10 mL into a venous catheter As Needed for Line Care.  -     sodium chloride 0.9 % infusion; Infuse 100 mL/hr into a venous catheter Continuous.  -     levoFLOXacin (LEVAQUIN) 500 mg in sodium chloride 0.9 % 150 mL IVPB; Infuse 500 mg into a venous catheter 1 (One) Time.  -     Case Request; Standing  -     Case Request    Left nephrolithiasis    Other orders  -     Follow Anesthesia Guidelines / Standing Orders; Future  -     Obtain informed consent  -     Follow Anesthesia Guidelines / Standing Orders; Standing  -     Verify NPO Status; Standing  -     INDIGO hose- To be placed on patient in pre-op; Standing  -     SCD (sequential compression device)- to be placed on patient in Pre-op; Standing  -     Insert Peripheral IV; Standing  -     Saline Lock & Maintain IV Access; Standing          KUB reviewed he has 1.7 cm worth of stone fragments in his proximal ureter.  I am concerned about the possibility that his ureter has been a bucket for several weeks now.  We will plan for ureteroscopic  management Monday.

## 2017-08-13 LAB — BACTERIA SPEC AEROBE CULT: NORMAL

## 2017-08-14 ENCOUNTER — ANESTHESIA EVENT (OUTPATIENT)
Dept: PERIOP | Facility: HOSPITAL | Age: 62
End: 2017-08-14

## 2017-08-14 ENCOUNTER — ANESTHESIA (OUTPATIENT)
Dept: PERIOP | Facility: HOSPITAL | Age: 62
End: 2017-08-14

## 2017-08-14 ENCOUNTER — HOSPITAL ENCOUNTER (OUTPATIENT)
Facility: HOSPITAL | Age: 62
Setting detail: HOSPITAL OUTPATIENT SURGERY
Discharge: HOME | End: 2017-08-14
Attending: UROLOGY | Admitting: UROLOGY

## 2017-08-14 ENCOUNTER — APPOINTMENT (OUTPATIENT)
Dept: GENERAL RADIOLOGY | Facility: HOSPITAL | Age: 62
End: 2017-08-14

## 2017-08-14 VITALS
OXYGEN SATURATION: 97 % | SYSTOLIC BLOOD PRESSURE: 118 MMHG | DIASTOLIC BLOOD PRESSURE: 67 MMHG | HEART RATE: 65 BPM | RESPIRATION RATE: 18 BRPM | TEMPERATURE: 97.6 F

## 2017-08-14 DIAGNOSIS — N20.1 URETERAL STONE: ICD-10-CM

## 2017-08-14 LAB
GLUCOSE BLDC GLUCOMTR-MCNC: 109 MG/DL (ref 70–130)
GLUCOSE BLDC GLUCOMTR-MCNC: 120 MG/DL (ref 70–130)

## 2017-08-14 PROCEDURE — 52356 CYSTO/URETERO W/LITHOTRIPSY: CPT | Performed by: UROLOGY

## 2017-08-14 PROCEDURE — 82360 CALCULUS ASSAY QUANT: CPT | Performed by: UROLOGY

## 2017-08-14 PROCEDURE — 25010000002 ONDANSETRON PER 1 MG: Performed by: NURSE ANESTHETIST, CERTIFIED REGISTERED

## 2017-08-14 PROCEDURE — 82962 GLUCOSE BLOOD TEST: CPT

## 2017-08-14 PROCEDURE — C2617 STENT, NON-COR, TEM W/O DEL: HCPCS | Performed by: UROLOGY

## 2017-08-14 PROCEDURE — 25010000002 LEVOFLOXACIN PER 250 MG: Performed by: UROLOGY

## 2017-08-14 PROCEDURE — 0 IOPAMIDOL 61 % SOLUTION: Performed by: UROLOGY

## 2017-08-14 PROCEDURE — C1758 CATHETER, URETERAL: HCPCS | Performed by: UROLOGY

## 2017-08-14 PROCEDURE — 25010000002 PROPOFOL 10 MG/ML EMULSION: Performed by: NURSE ANESTHETIST, CERTIFIED REGISTERED

## 2017-08-14 PROCEDURE — 74420 UROGRAPHY RTRGR +-KUB: CPT

## 2017-08-14 PROCEDURE — 25010000002 FENTANYL CITRATE (PF) 100 MCG/2ML SOLUTION: Performed by: NURSE ANESTHETIST, CERTIFIED REGISTERED

## 2017-08-14 PROCEDURE — C1894 INTRO/SHEATH, NON-LASER: HCPCS | Performed by: UROLOGY

## 2017-08-14 PROCEDURE — 25010000002 MIDAZOLAM PER 1 MG: Performed by: ANESTHESIOLOGY

## 2017-08-14 PROCEDURE — 25010000002 DEXAMETHASONE PER 1 MG: Performed by: NURSE ANESTHETIST, CERTIFIED REGISTERED

## 2017-08-14 PROCEDURE — C1769 GUIDE WIRE: HCPCS | Performed by: UROLOGY

## 2017-08-14 PROCEDURE — 52352 CYSTOURETERO W/STONE REMOVE: CPT | Performed by: UROLOGY

## 2017-08-14 PROCEDURE — 88300 SURGICAL PATH GROSS: CPT | Performed by: UROLOGY

## 2017-08-14 PROCEDURE — 25010000002 NEOSTIGMINE PER 0.5 MG: Performed by: NURSE ANESTHETIST, CERTIFIED REGISTERED

## 2017-08-14 DEVICE — URETERAL STENT
Type: IMPLANTABLE DEVICE | Site: URETER | Status: FUNCTIONAL
Brand: PERCUFLEX™ PLUS

## 2017-08-14 RX ORDER — MIDAZOLAM HYDROCHLORIDE 1 MG/ML
1 INJECTION INTRAMUSCULAR; INTRAVENOUS
Status: DISCONTINUED | OUTPATIENT
Start: 2017-08-14 | End: 2017-08-14 | Stop reason: HOSPADM

## 2017-08-14 RX ORDER — ONDANSETRON 2 MG/ML
INJECTION INTRAMUSCULAR; INTRAVENOUS AS NEEDED
Status: DISCONTINUED | OUTPATIENT
Start: 2017-08-14 | End: 2017-08-14 | Stop reason: SURG

## 2017-08-14 RX ORDER — PROPOFOL 10 MG/ML
VIAL (ML) INTRAVENOUS AS NEEDED
Status: DISCONTINUED | OUTPATIENT
Start: 2017-08-14 | End: 2017-08-14 | Stop reason: SURG

## 2017-08-14 RX ORDER — DEXTROSE MONOHYDRATE 25 G/50ML
12.5 INJECTION, SOLUTION INTRAVENOUS AS NEEDED
Status: DISCONTINUED | OUTPATIENT
Start: 2017-08-14 | End: 2017-08-14 | Stop reason: HOSPADM

## 2017-08-14 RX ORDER — MEPERIDINE HYDROCHLORIDE 25 MG/ML
12.5 INJECTION INTRAMUSCULAR; INTRAVENOUS; SUBCUTANEOUS
Status: DISCONTINUED | OUTPATIENT
Start: 2017-08-14 | End: 2017-08-14 | Stop reason: HOSPADM

## 2017-08-14 RX ORDER — NALOXONE HCL 0.4 MG/ML
0.4 VIAL (ML) INJECTION AS NEEDED
Status: DISCONTINUED | OUTPATIENT
Start: 2017-08-14 | End: 2017-08-14 | Stop reason: HOSPADM

## 2017-08-14 RX ORDER — MAGNESIUM HYDROXIDE 1200 MG/15ML
LIQUID ORAL AS NEEDED
Status: DISCONTINUED | OUTPATIENT
Start: 2017-08-14 | End: 2017-08-14 | Stop reason: HOSPADM

## 2017-08-14 RX ORDER — SODIUM CHLORIDE, SODIUM LACTATE, POTASSIUM CHLORIDE, CALCIUM CHLORIDE 600; 310; 30; 20 MG/100ML; MG/100ML; MG/100ML; MG/100ML
9 INJECTION, SOLUTION INTRAVENOUS CONTINUOUS
Status: DISCONTINUED | OUTPATIENT
Start: 2017-08-14 | End: 2017-08-14 | Stop reason: HOSPADM

## 2017-08-14 RX ORDER — HYDROCODONE BITARTRATE AND ACETAMINOPHEN 7.5; 325 MG/1; MG/1
1-2 TABLET ORAL EVERY 4 HOURS PRN
Qty: 40 TABLET | Refills: 0 | Status: SHIPPED | OUTPATIENT
Start: 2017-08-14 | End: 2022-09-01 | Stop reason: HOSPADM

## 2017-08-14 RX ORDER — LIDOCAINE HYDROCHLORIDE 10 MG/ML
0.5 INJECTION, SOLUTION INFILTRATION; PERINEURAL ONCE AS NEEDED
Status: DISCONTINUED | OUTPATIENT
Start: 2017-08-14 | End: 2017-08-14 | Stop reason: HOSPADM

## 2017-08-14 RX ORDER — FENTANYL CITRATE 50 UG/ML
INJECTION, SOLUTION INTRAMUSCULAR; INTRAVENOUS AS NEEDED
Status: DISCONTINUED | OUTPATIENT
Start: 2017-08-14 | End: 2017-08-14 | Stop reason: SURG

## 2017-08-14 RX ORDER — ONDANSETRON 2 MG/ML
4 INJECTION INTRAMUSCULAR; INTRAVENOUS ONCE AS NEEDED
Status: DISCONTINUED | OUTPATIENT
Start: 2017-08-14 | End: 2017-08-14 | Stop reason: HOSPADM

## 2017-08-14 RX ORDER — DEXAMETHASONE SODIUM PHOSPHATE 4 MG/ML
INJECTION, SOLUTION INTRA-ARTICULAR; INTRALESIONAL; INTRAMUSCULAR; INTRAVENOUS; SOFT TISSUE AS NEEDED
Status: DISCONTINUED | OUTPATIENT
Start: 2017-08-14 | End: 2017-08-14 | Stop reason: SURG

## 2017-08-14 RX ORDER — MORPHINE SULFATE 2 MG/ML
2 INJECTION, SOLUTION INTRAMUSCULAR; INTRAVENOUS
Status: DISCONTINUED | OUTPATIENT
Start: 2017-08-14 | End: 2017-08-14 | Stop reason: HOSPADM

## 2017-08-14 RX ORDER — LEVOFLOXACIN 5 MG/ML
500 INJECTION, SOLUTION INTRAVENOUS ONCE
Status: COMPLETED | OUTPATIENT
Start: 2017-08-14 | End: 2017-08-14

## 2017-08-14 RX ORDER — DIPHENHYDRAMINE HYDROCHLORIDE 50 MG/ML
12.5 INJECTION INTRAMUSCULAR; INTRAVENOUS
Status: DISCONTINUED | OUTPATIENT
Start: 2017-08-14 | End: 2017-08-14 | Stop reason: HOSPADM

## 2017-08-14 RX ORDER — HYDROCODONE BITARTRATE AND ACETAMINOPHEN 7.5; 325 MG/1; MG/1
1 TABLET ORAL ONCE AS NEEDED
Status: DISCONTINUED | OUTPATIENT
Start: 2017-08-14 | End: 2017-08-14 | Stop reason: HOSPADM

## 2017-08-14 RX ORDER — SODIUM CHLORIDE 0.9 % (FLUSH) 0.9 %
3 SYRINGE (ML) INJECTION AS NEEDED
Status: DISCONTINUED | OUTPATIENT
Start: 2017-08-14 | End: 2017-08-14 | Stop reason: HOSPADM

## 2017-08-14 RX ORDER — SODIUM CHLORIDE 0.9 % (FLUSH) 0.9 %
1-10 SYRINGE (ML) INJECTION AS NEEDED
Status: DISCONTINUED | OUTPATIENT
Start: 2017-08-14 | End: 2017-08-14 | Stop reason: HOSPADM

## 2017-08-14 RX ORDER — ROCURONIUM BROMIDE 10 MG/ML
INJECTION, SOLUTION INTRAVENOUS AS NEEDED
Status: DISCONTINUED | OUTPATIENT
Start: 2017-08-14 | End: 2017-08-14 | Stop reason: SURG

## 2017-08-14 RX ORDER — SODIUM CHLORIDE, SODIUM LACTATE, POTASSIUM CHLORIDE, CALCIUM CHLORIDE 600; 310; 30; 20 MG/100ML; MG/100ML; MG/100ML; MG/100ML
1000 INJECTION, SOLUTION INTRAVENOUS CONTINUOUS PRN
Status: DISCONTINUED | OUTPATIENT
Start: 2017-08-14 | End: 2017-08-14 | Stop reason: HOSPADM

## 2017-08-14 RX ORDER — IPRATROPIUM BROMIDE AND ALBUTEROL SULFATE 2.5; .5 MG/3ML; MG/3ML
3 SOLUTION RESPIRATORY (INHALATION) ONCE AS NEEDED
Status: DISCONTINUED | OUTPATIENT
Start: 2017-08-14 | End: 2017-08-14 | Stop reason: HOSPADM

## 2017-08-14 RX ORDER — PHENYLEPHRINE HCL IN 0.9% NACL 0.8MG/10ML
SYRINGE (ML) INTRAVENOUS AS NEEDED
Status: DISCONTINUED | OUTPATIENT
Start: 2017-08-14 | End: 2017-08-14 | Stop reason: SURG

## 2017-08-14 RX ORDER — FENTANYL CITRATE 50 UG/ML
25 INJECTION, SOLUTION INTRAMUSCULAR; INTRAVENOUS
Status: DISCONTINUED | OUTPATIENT
Start: 2017-08-14 | End: 2017-08-14 | Stop reason: HOSPADM

## 2017-08-14 RX ORDER — LIDOCAINE HYDROCHLORIDE 20 MG/ML
INJECTION, SOLUTION INFILTRATION; PERINEURAL AS NEEDED
Status: DISCONTINUED | OUTPATIENT
Start: 2017-08-14 | End: 2017-08-14 | Stop reason: SURG

## 2017-08-14 RX ORDER — PHENAZOPYRIDINE HYDROCHLORIDE 100 MG/1
100 TABLET, FILM COATED ORAL 3 TIMES DAILY PRN
Qty: 21 TABLET | Refills: 1 | Status: SHIPPED | OUTPATIENT
Start: 2017-08-14 | End: 2022-08-29

## 2017-08-14 RX ORDER — SODIUM CHLORIDE 9 MG/ML
100 INJECTION, SOLUTION INTRAVENOUS CONTINUOUS
Status: DISCONTINUED | OUTPATIENT
Start: 2017-08-14 | End: 2017-08-14 | Stop reason: HOSPADM

## 2017-08-14 RX ORDER — LABETALOL HYDROCHLORIDE 5 MG/ML
5 INJECTION, SOLUTION INTRAVENOUS
Status: DISCONTINUED | OUTPATIENT
Start: 2017-08-14 | End: 2017-08-14 | Stop reason: HOSPADM

## 2017-08-14 RX ORDER — MIDAZOLAM HYDROCHLORIDE 1 MG/ML
2 INJECTION INTRAMUSCULAR; INTRAVENOUS
Status: DISCONTINUED | OUTPATIENT
Start: 2017-08-14 | End: 2017-08-14 | Stop reason: HOSPADM

## 2017-08-14 RX ORDER — GLYCOPYRROLATE 0.2 MG/ML
INJECTION INTRAMUSCULAR; INTRAVENOUS AS NEEDED
Status: DISCONTINUED | OUTPATIENT
Start: 2017-08-14 | End: 2017-08-14 | Stop reason: SURG

## 2017-08-14 RX ORDER — HYDRALAZINE HYDROCHLORIDE 20 MG/ML
5 INJECTION INTRAMUSCULAR; INTRAVENOUS
Status: DISCONTINUED | OUTPATIENT
Start: 2017-08-14 | End: 2017-08-14 | Stop reason: HOSPADM

## 2017-08-14 RX ADMIN — ONDANSETRON HYDROCHLORIDE 4 MG: 2 SOLUTION INTRAMUSCULAR; INTRAVENOUS at 15:50

## 2017-08-14 RX ADMIN — FENTANYL CITRATE 100 MCG: 50 INJECTION, SOLUTION INTRAMUSCULAR; INTRAVENOUS at 15:08

## 2017-08-14 RX ADMIN — LIDOCAINE HYDROCHLORIDE 0.5 ML: 10 INJECTION, SOLUTION EPIDURAL; INFILTRATION; INTRACAUDAL; PERINEURAL at 14:17

## 2017-08-14 RX ADMIN — DEXAMETHASONE SODIUM PHOSPHATE 4 MG: 4 INJECTION, SOLUTION INTRAMUSCULAR; INTRAVENOUS at 15:50

## 2017-08-14 RX ADMIN — HYDROCODONE BITARTRATE AND ACETAMINOPHEN 1 TABLET: 7.5; 325 TABLET ORAL at 16:52

## 2017-08-14 RX ADMIN — GLYCOPYRROLATE 0.4 MG: 0.2 INJECTION, SOLUTION INTRAMUSCULAR; INTRAVENOUS at 15:50

## 2017-08-14 RX ADMIN — MIDAZOLAM HYDROCHLORIDE 2 MG: 1 INJECTION, SOLUTION INTRAMUSCULAR; INTRAVENOUS at 14:55

## 2017-08-14 RX ADMIN — LIDOCAINE HYDROCHLORIDE 50 MG: 20 INJECTION, SOLUTION INFILTRATION; PERINEURAL at 15:08

## 2017-08-14 RX ADMIN — Medication 3 MG: at 15:50

## 2017-08-14 RX ADMIN — ROCURONIUM BROMIDE 5 MG: 10 INJECTION INTRAVENOUS at 15:08

## 2017-08-14 RX ADMIN — PROPOFOL 200 MG: 10 INJECTION, EMULSION INTRAVENOUS at 15:08

## 2017-08-14 RX ADMIN — Medication 160 MCG: at 15:26

## 2017-08-14 RX ADMIN — SODIUM CHLORIDE, POTASSIUM CHLORIDE, SODIUM LACTATE AND CALCIUM CHLORIDE 1000 ML: 600; 310; 30; 20 INJECTION, SOLUTION INTRAVENOUS at 14:17

## 2017-08-14 RX ADMIN — LEVOFLOXACIN 500 MG: 5 INJECTION, SOLUTION INTRAVENOUS at 14:55

## 2017-08-14 NOTE — PLAN OF CARE
Problem: Patient Care Overview (Adult)  Goal: Plan of Care Review  Outcome: Ongoing (interventions implemented as appropriate)    08/14/17 1624   Coping/Psychosocial Response Interventions   Plan Of Care Reviewed With patient   Patient Care Overview   Progress improving   Outcome Evaluation   Outcome Summary/Follow up Plan AAO.VSS. No bleeding. Meets narda carrasquillo.         Problem: Perioperative Period (Adult)  Goal: Signs and Symptoms of Listed Potential Problems Will be Absent or Manageable (Perioperative Period)  Outcome: Ongoing (interventions implemented as appropriate)

## 2017-08-14 NOTE — DISCHARGE INSTRUCTIONS

## 2017-08-14 NOTE — PLAN OF CARE
Problem: Patient Care Overview (Adult)  Goal: Plan of Care Review  Outcome: Ongoing (interventions implemented as appropriate)    08/14/17 1443   Coping/Psychosocial Response Interventions   Plan Of Care Reviewed With patient   Patient Care Overview   Progress no change         Problem: Perioperative Period (Adult)  Goal: Signs and Symptoms of Listed Potential Problems Will be Absent or Manageable (Perioperative Period)  Outcome: Ongoing (interventions implemented as appropriate)    08/14/17 1443   Perioperative Period   Problems Assessed (Perioperative Period) pain;embolism;infection   Problems Present (Perioperative Period) none

## 2017-08-14 NOTE — PLAN OF CARE
Problem: Patient Care Overview (Adult)  Goal: Plan of Care Review  Outcome: Outcome(s) achieved Date Met:  08/14/17 08/14/17 6730   Coping/Psychosocial Response Interventions   Plan Of Care Reviewed With patient;spouse   Patient Care Overview   Progress improving   Outcome Evaluation   Outcome Summary/Follow up Plan patient mets discharge criteria. Medicated for complaints of pain with good relief noted. Patient and spouse verbalize understanding of discharge instructions.          Problem: Perioperative Period (Adult)  Goal: Signs and Symptoms of Listed Potential Problems Will be Absent or Manageable (Perioperative Period)  Outcome: Outcome(s) achieved Date Met:  08/14/17

## 2017-08-14 NOTE — OP NOTE
CYSTOSCOPY URETEROSCOPY LASER LITHOTRIPSY  Procedure Note    Franko Lucero  8/14/2017    Pre-op Diagnosis:   Ureteral stone [N20.1]    Post-op Diagnosis:     Post-Op Diagnosis Codes:     * Ureteral stone [N20.1]    Procedure/CPT® Codes:      Procedure(s):  CYSTOSCOPY URETEROSCOPY LASER LITHOTRIPSY STENT INSERTION STONE EXTRACTION    Surgeon(s):  Weston Peck MD    Anesthesia: General    Staff:   Circulator: Karly Green RN  Scrub Person: Edgar Naik; Bindu Oliva    Estimated Blood Loss: *No blood loss documented*    Specimens:                  ID Type Source Tests Collected by Time Destination   A : left ureteral stone Calculus Ureter, Left TISSUE EXAM Weston Peck MD 8/14/2017 1555          Drains:           Findings: 1.5 cm stone fragments at UPJ; 2 cm stone in lower pole, submucosal wire placement, no extrav on RGP    Complications: none    Indications: 62-year-old gentleman who had a large partial staghorn calculus underwent ureteroscopic management due to thrombocytopenia.  Follow-up imaging revealed fragments and a Steinstrasse in his proximal ureter.  Patient opted for ureteroscopic management.    Description of Procedure: After informed scissors obtained, the patient brought the operating room where he underwent general endotracheal anesthesia in the supine position.  He was then converted to the dorsal lithotomy position.  He was then prepped and draped usual sterile fashion.  Timeout was done to ensure the correct patient, procedure and site.  He did receive preoperative antibiotics in the holding area.    22 [place] Storz endoscope inserted urethra retrograde fashion.  Anterior urethra was normal posterior urethra showed mild bilobar hyperplasia the prostate.  Bladder was entered and drained.  The bladder was inspected there was no lesions noted.  The left ureteral orifice was identified and cannulated with a 0.38 sensor tip wire.  This went up past the area of stone fragments  easily.  I then placed a 10 Mohawk dual-lumen catheter and placed another 0.38 sensor tip wire.  Placed the [employer] 12, 14 [id number] cm ureteral access sheath placing it over the 0.38 sensor tip wire into the proximal ureter.  I then placed the flexible ureteroscope over the wire removed the wire we were at the level of stone.  Laser 200 µ fiber on settings of 1 J and 10 Hz to dust the stone into very small pieces.  Most of the pieces that were above this then flushed down through the access sheath.  I was able to visualize that my other wire had submucosally tunneled through the proximal ureter at the level stone and reentered the collecting system up in the kidney.  I inspected the calyces at this point and did not see any large stone fragments remaining except there was a 2 cm stone in his lower pole.  Because of the submucosal tunneling I felt it was not prudent to proceed with ureteroscopic management of this lower pole stone.  I then removed all the fragments with a 1.9 Mohawk 0 tip nitinol basket clearing them from the ureter.  The ureter was completely clear.  I then placed a 0.38 sensor tip wire through the scope under direct vision into the collecting system.  I removed the wire that was submucosal.  I then removed the access sheath inspecting the ureter in its entirety the way out there is no evidence of damage.  I then placed a 10 Mohawk dual-lumen catheter over the wire.  I instilled 20 mL dilute contrast outlining the collecting system there was no extravasation from approximately ureter there was mild dilation renal pelvis with moderate blunting and dilation of the calyces.  Filling defect was noted in the lower pole consistent with 2 cm worth of stone.  I then backloaded the cystoscope over the wire and placed a 626 [employer] stent with a good curl seen in the upper pole calyx and good curl seen distally in the bladder.  The bladder was drained scope was removed the patient was  wakened from anesthesia and transferred recovery room in satisfactory condition.    Left Retrograde Pyelogram Read: I instilled 20 mL dilute contrast outlining the collecting system there was no extravasation from approximately ureter there was mild dilation renal pelvis with moderate blunting and dilation of the calyces.  Filling defect was noted in the lower pole consistent with 2 cm worth of stone.    Weston Peck MD     Date: 8/14/2017  Time: 3:57 PM

## 2017-08-14 NOTE — ANESTHESIA PROCEDURE NOTES
Airway  Urgency: elective    Airway not difficult    General Information and Staff    Patient location during procedure: OR    Indications and Patient Condition  Indications for airway management: airway protection    Preoxygenated: yes  MILS maintained throughout  Mask difficulty assessment: 1 - vent by mask    Final Airway Details  Final airway type: endotracheal airway      Successful airway: ETT  Cuffed: yes   Successful intubation technique: direct laryngoscopy  Endotracheal tube insertion site: oral  Blade: Dai  Blade size: #2  ETT size: 7.5 mm  Cormack-Lehane Classification: grade I - full view of glottis  Placement verified by: chest auscultation, capnometry and palpation of cuff   Cuff volume (mL): 10  Measured from: lips  ETT to lips (cm): 22  Number of attempts at approach: 1

## 2017-08-14 NOTE — H&P (VIEW-ONLY)
Subjective    Mr. Lucero is 61 y.o. male    Chief Complaint: Ureteral Stone    History of Present Illness     Urolithiasis  Patient complains of left flank pain without radiation to the abdomen. Onset of symptoms was asymptomatic 4 months ago with controlled course since that time. Patient describes the pain as none, continuous and rated as no. The patient has had no nausea and no vomiting. There has been no fever or chills. The patient is not complaining of dysuria, frequency, or urgency.  Previous management of stones includes ESWL    The following portions of the patient's history were reviewed and updated as appropriate: allergies, current medications, past family history, past medical history, past social history, past surgical history and problem list.    Review of Systems   Constitutional: Negative for chills and fever.   Gastrointestinal: Negative for abdominal pain, anal bleeding and blood in stool.   Genitourinary: Negative for flank pain and hematuria.         Current Outpatient Prescriptions:   •  albuterol (PROVENTIL) (2.5 MG/3ML) 0.083% nebulizer solution, INHALE 3 ML 4 TIMES A DAY BY NEBULIZATION ROUTE AS DIRECTED FOR 14 DAYS., Disp: , Rfl: 0  •  aspirin (ASPIR) 81 MG EC tablet, Take  by mouth., Disp: , Rfl:   •  b complex vitamins capsule, Take  by mouth., Disp: , Rfl:   •  busPIRone (BUSPAR) 15 MG tablet, Take  by mouth., Disp: , Rfl:   •  ciprofloxacin (CIPRO) 500 MG tablet, , Disp: , Rfl:   •  clopidogrel (PLAVIX) 75 MG tablet, Take  by mouth., Disp: , Rfl:   •  cyanocobalamin 1000 MCG/ML injection, Inject  into the shoulder, thigh, or buttocks., Disp: , Rfl:   •  diltiaZEM CD (CARTIA XT) 240 MG 24 hr capsule, Take 1 capsule by mouth daily, Disp: , Rfl:   •  docusate sodium (COLACE) 100 MG capsule, Take  by mouth., Disp: , Rfl:   •  ferrous sulfate 325 (65 FE) MG EC tablet, Take  by mouth., Disp: , Rfl:   •  furosemide (LASIX) 40 MG tablet, Take  by mouth., Disp: , Rfl:   •  meloxicam (MOBIC) 15  MG tablet, , Disp: , Rfl:   •  metFORMIN (GLUCOPHAGE) 1000 MG tablet, Take  by mouth., Disp: , Rfl:   •  nitroglycerin (NITROSTAT) 0.4 MG SL tablet, Place  under the tongue., Disp: , Rfl:   •  pantoprazole (PROTONIX) 40 MG EC tablet, Take  by mouth., Disp: , Rfl:   •  polyethylene glycol (MIRALAX) powder, , Disp: , Rfl:   •  pravastatin (PRAVACHOL) 40 MG tablet, Take  by mouth., Disp: , Rfl:   •  ranolazine (RANEXA) 1000 MG 12 hr tablet, Take 1 tablet by mouth 2 times daily, Disp: , Rfl:   •  tamsulosin (FLOMAX) 0.4 MG capsule 24 hr capsule, , Disp: , Rfl:   •  therapeutic multivitamin-minerals (THERAGRAN-M) tablet, Take  by mouth., Disp: , Rfl:   •  tiZANidine (ZANAFLEX) 4 MG tablet, Take 4 mg by mouth As Needed for Muscle Spasms., Disp: , Rfl:   •  traMADol (ULTRAM) 50 MG tablet, TAKE 1 TABLET BY MOUTH FOUR TIMES DAILY AS NEEDED FOR PAIN, Disp: , Rfl: 0  •  Vitamin D, Cholecalciferol, 1000 UNITS tablet, Take  by mouth., Disp: , Rfl:     Past Medical History:   Diagnosis Date   • Anemia    • Anxiety    • BPH (benign prostatic hypertrophy)    • CAD (coronary artery disease)    • Cancer     non-hodgkins lymphoma   • Diabetes mellitus    • Hyperlipidemia    • Hypertension    • Iliac artery aneurysm, bilateral    • Kidney stone    • Sleep apnea        Past Surgical History:   Procedure Laterality Date   • COLONOSCOPY  02/04/2014   • COLONOSCOPY N/A 4/26/2017    Procedure: COLONOSCOPY WITH ANESTHESIA;  Surgeon: Sher Cui MD;  Location: [facility] ENDOSCOPY;  Service:    • CORONARY ARTERY BYPASS GRAFT      2   • CYSTOSCOPY URETEROSCOPY LASER LITHOTRIPSY Left 4/17/2017    Procedure: URETEROSCOPY LASER LITHOTRIPSY WITH DOUBLE J STENT INSERTION, LEFT ;  Surgeon: Weston Peck MD;  Location: [facility] OR;  Service:    • CYSTOSCOPY W/ URETERAL STENT PLACEMENT Left 4/17/2017    Procedure: CYSTOSCOPY URETERAL DOUBLE J STENT INSERTION, LEFT;  Surgeon: Weston Peck MD;  Location: [facility] OR;  Service:    •  "ENDOSCOPY N/A 4/26/2017    Procedure: ESOPHAGOGASTRODUODENOSCOPY WITH ANESTHESIA;  Surgeon: Sher Cui MD;  Location: [facility] ENDOSCOPY;  Service:    • KIDNEY STONE SURGERY     • KNEE SURGERY      replacement   • NECK SURGERY     • ROTATOR CUFF REPAIR     • SHOULDER SURGERY     • TONSILLECTOMY         Social History     Social History   • Marital status:      Spouse name: N/A   • Number of children: N/A   • Years of education: N/A     Social History Main Topics   • Smoking status: Former Smoker     Types: Cigarettes     Quit date: 1997   • Smokeless tobacco: Never Used   • Alcohol use No   • Drug use: No   • Sexual activity: Defer     Other Topics Concern   • None     Social History Narrative       Family History   Problem Relation Age of Onset   • Kidney disease Father    • Heart disease Father    • Cancer Mother      brain   • Colon cancer Neg Hx    • Colon polyps Neg Hx        Objective    /84  Temp 96.7 °F (35.9 °C)  Ht 73\" (185.4 cm)  Wt 226 lb 12.8 oz (103 kg)  BMI 29.92 kg/m2    Physical Exam   Constitutional: He is oriented to person, place, and time. He appears well-developed and well-nourished. No distress.   Pulmonary/Chest: Effort normal.   Abdominal: Soft. He exhibits no distension and no mass. There is no tenderness. There is no rebound and no guarding. No hernia.   Neurological: He is alert and oriented to person, place, and time.   Skin: Skin is warm and dry. He is not diaphoretic.   Psychiatric: He has a normal mood and affect.   Vitals reviewed.      KUB independent review    A KUB is available for me to review today.  The image is inspected for a bowel gas pattern and the general bone structure of the spine and pelvis. The kidneys are then inspected closely.  Renal outline is noted if identifiable. The kidney, collecting system, and anticipated path of the ureter are examined for calcifications including those in the true pelvis.  This film reveals:    On the right there are " no calcificaitons seen in the kidney or the expected course of the ureter. .    On the left there 1.7 mm stone in proximal ureter, 2.2 cm stone in lower pole.        Results for orders placed or performed in visit on 08/11/17   POC Urinalysis Dipstick, Automated   Result Value Ref Range    Color Yellow Yellow, Straw, Dark Yellow, Philomena    Clarity, UA Clear Clear    Glucose, UA Negative Negative, 1000 mg/dL (3+) mg/dL    Bilirubin Negative Negative    Ketones, UA Negative Negative    Specific Gravity  1.020 1.005 - 1.030    Blood, UA Small (A) Negative    pH, Urine 5.0 5.0 - 8.0    Protein, POC Trace (A) Negative mg/dL    Urobilinogen, UA Normal Normal    Leukocytes Trace (A) Negative    Nitrite, UA Negative Negative     Assessment and Plan    Franko was seen today for flank pain.    Diagnoses and all orders for this visit:    Ureteral stone  -     POC Urinalysis Dipstick, Automated  -     sodium chloride 0.9 % flush 1-10 mL; Infuse 1-10 mL into a venous catheter As Needed for Line Care.  -     sodium chloride 0.9 % infusion; Infuse 100 mL/hr into a venous catheter Continuous.  -     levoFLOXacin (LEVAQUIN) 500 mg in sodium chloride 0.9 % 150 mL IVPB; Infuse 500 mg into a venous catheter 1 (One) Time.  -     Case Request; Standing  -     Case Request    Left nephrolithiasis    Other orders  -     Follow Anesthesia Guidelines / Standing Orders; Future  -     Obtain informed consent  -     Follow Anesthesia Guidelines / Standing Orders; Standing  -     Verify NPO Status; Standing  -     INDIGO hose- To be placed on patient in pre-op; Standing  -     SCD (sequential compression device)- to be placed on patient in Pre-op; Standing  -     Insert Peripheral IV; Standing  -     Saline Lock & Maintain IV Access; Standing          KUB reviewed he has 1.7 cm worth of stone fragments in his proximal ureter.  I am concerned about the possibility that his ureter has been a bucket for several weeks now.  We will plan for ureteroscopic  management Monday.

## 2017-08-14 NOTE — ANESTHESIA POSTPROCEDURE EVALUATION
Patient: Franko Lucero    Procedure Summary     Date Anesthesia Start Anesthesia Stop Room / Location    08/14/17 1500 1604 [facility] OR 01 / [facility]       Procedure Diagnosis Surgeon Provider    CYSTOSCOPY URETEROSCOPY LASER LITHOTRIPSY STENT INSERTION STONE EXTRACTION (Left Ureter) Ureteral stone  (Ureteral stone [N20.1]) MD Alaina Mitchell CRNA          Anesthesia Type: general  Last vitals  BP   111/64 (08/14/17 1637)    Temp   97.6 °F (36.4 °C) (08/14/17 1625)    Pulse   67 (08/14/17 1637)   Resp   20 (08/14/17 1637)    SpO2   93 % (08/14/17 1637)      Post Anesthesia Care and Evaluation      Comments: Patient d/c from PACU prior to anes eval based on Leigh Ann score.  Please see RN notes for details of d/c criteria.

## 2017-08-14 NOTE — ANESTHESIA PREPROCEDURE EVALUATION
Anesthesia Evaluation     Patient summary reviewed   no history of anesthetic complications:  NPO Solid Status: > 8 hours       Airway   Mallampati: III  TM distance: >3 FB  Neck ROM: full  Dental      Pulmonary    (+) sleep apnea on CPAP,   (-) asthma, not a smoker  Cardiovascular   Exercise tolerance: good (4-7 METS)    ECG reviewed    (+) hypertension, CAD, CABG (2005, 2014), cardiac stents (prior to Cabg) hyperlipidemia  (-) pacemaker, angina      Neuro/Psych  (-) seizures, CVA  GI/Hepatic/Renal/Endo    (+)  renal disease stones, diabetes mellitus,   (-) GERD, liver disease    Musculoskeletal     Abdominal    Substance History      OB/GYN          Other                                        Anesthesia Plan    ASA 3     general     intravenous induction   Anesthetic plan and risks discussed with patient.

## 2017-08-17 ENCOUNTER — OFFICE VISIT (OUTPATIENT)
Dept: CARDIOLOGY | Age: 62
End: 2017-08-17
Payer: COMMERCIAL

## 2017-08-17 VITALS
BODY MASS INDEX: 31.42 KG/M2 | SYSTOLIC BLOOD PRESSURE: 106 MMHG | DIASTOLIC BLOOD PRESSURE: 62 MMHG | WEIGHT: 232 LBS | HEART RATE: 74 BPM | HEIGHT: 72 IN

## 2017-08-17 DIAGNOSIS — I25.10 CORONARY ARTERY DISEASE INVOLVING NATIVE HEART WITHOUT ANGINA PECTORIS, UNSPECIFIED VESSEL OR LESION TYPE: Primary | ICD-10-CM

## 2017-08-17 DIAGNOSIS — I10 ESSENTIAL HYPERTENSION: ICD-10-CM

## 2017-08-17 PROCEDURE — 99213 OFFICE O/P EST LOW 20 MIN: CPT | Performed by: CLINICAL NURSE SPECIALIST

## 2017-08-17 RX ORDER — FUROSEMIDE 40 MG/1
40 TABLET ORAL DAILY
COMMUNITY
End: 2017-09-07 | Stop reason: SDUPTHER

## 2017-08-17 RX ORDER — PANTOPRAZOLE SODIUM 40 MG/1
40 GRANULE, DELAYED RELEASE ORAL
COMMUNITY
End: 2017-09-07 | Stop reason: SDUPTHER

## 2017-08-18 DIAGNOSIS — G47.33 OBSTRUCTIVE SLEEP APNEA: Primary | ICD-10-CM

## 2017-08-18 RX ORDER — TAMSULOSIN HYDROCHLORIDE 0.4 MG/1
CAPSULE ORAL
Qty: 30 CAPSULE | Refills: 2 | Status: SHIPPED | OUTPATIENT
Start: 2017-08-18 | End: 2017-09-07 | Stop reason: SDUPTHER

## 2017-08-21 ENCOUNTER — TELEPHONE (OUTPATIENT)
Dept: UROLOGY | Facility: CLINIC | Age: 62
End: 2017-08-21

## 2017-08-21 DIAGNOSIS — N20.0 KIDNEY STONE: Primary | ICD-10-CM

## 2017-08-23 ENCOUNTER — PROCEDURE VISIT (OUTPATIENT)
Dept: UROLOGY | Facility: CLINIC | Age: 62
End: 2017-08-23

## 2017-08-23 ENCOUNTER — HOSPITAL ENCOUNTER (OUTPATIENT)
Dept: GENERAL RADIOLOGY | Facility: HOSPITAL | Age: 62
Discharge: HOME | End: 2017-08-23
Attending: UROLOGY | Admitting: UROLOGY

## 2017-08-23 DIAGNOSIS — N20.1 URETERAL STONE: ICD-10-CM

## 2017-08-23 DIAGNOSIS — N20.0 KIDNEY STONE: ICD-10-CM

## 2017-08-23 DIAGNOSIS — N20.0 KIDNEY STONE: Primary | ICD-10-CM

## 2017-08-23 DIAGNOSIS — N20.0 LEFT NEPHROLITHIASIS: Primary | ICD-10-CM

## 2017-08-23 LAB
BILIRUB BLD-MCNC: NEGATIVE MG/DL
CLARITY, POC: CLEAR
COLOR UR: YELLOW
GLUCOSE UR STRIP-MCNC: ABNORMAL MG/DL
KETONES UR QL: NEGATIVE
LAB AP CASE REPORT: NORMAL
LEUKOCYTE EST, POC: ABNORMAL
Lab: NORMAL
NITRITE UR-MCNC: POSITIVE MG/ML
PATH REPORT.FINAL DX SPEC: NORMAL
PATH REPORT.GROSS SPEC: NORMAL
PH UR: 6.5 [PH] (ref 5–8)
PROT UR STRIP-MCNC: ABNORMAL MG/DL
RBC # UR STRIP: ABNORMAL /UL
SP GR UR: 1.02 (ref 1–1.03)
UROBILINOGEN UR QL: NORMAL

## 2017-08-23 PROCEDURE — 81003 URINALYSIS AUTO W/O SCOPE: CPT | Performed by: UROLOGY

## 2017-08-23 PROCEDURE — 74000 HC ABDOMEN KUB: CPT

## 2017-08-23 PROCEDURE — 52310 CYSTOSCOPY AND TREATMENT: CPT | Performed by: UROLOGY

## 2017-08-23 RX ORDER — TAMSULOSIN HYDROCHLORIDE 0.4 MG/1
1 CAPSULE ORAL DAILY
Qty: 30 CAPSULE | Refills: 2 | Status: SHIPPED | OUTPATIENT
Start: 2017-08-23 | End: 2022-08-29

## 2017-08-23 NOTE — PROGRESS NOTES
Cystoscopy with stent removal    Indications: Status post ureteroscopy    Prep: Hibiclens solution    Instrumentation:Flexible cystoscope and Alligator grasping forceps    Procedure: After lidocaine jelly is instilled into the urethra for 10 minutes, the well-lubricated cystoscope was introduced through the urethra into the bladder.  The stent is seen protruding from the left side.  The alligator forceps or used to grasp the stent and pull it out the urethra.  The patient tolerated the procedure well.    Follow-up in 6 weeks renal ultrasound we are going to observe his 2 cm lower pole stone at this point.    Microscopic Urinalysis  I inspected the urine myself based on the clinical situation including the dipstick urine. The urine is spun in a centrifuge for three minutes. The spun urine shows 20-50 rbc/hpf, 0-2 wbc/hpf, none epi/hpf, negative bacteria, trace crystals, and negative casts.

## 2017-08-28 ENCOUNTER — HOSPITAL ENCOUNTER (OUTPATIENT)
Dept: NUCLEAR MEDICINE | Age: 62
Discharge: HOME | End: 2017-08-28
Payer: COMMERCIAL

## 2017-08-28 DIAGNOSIS — E07.9 THYROID DISEASE: ICD-10-CM

## 2017-08-28 PROCEDURE — A9516 IODINE I-123 SOD IODIDE MIC: HCPCS | Performed by: NURSE PRACTITIONER

## 2017-08-28 PROCEDURE — 3430000000 HC RX DIAGNOSTIC RADIOPHARMACEUTICAL: Performed by: NURSE PRACTITIONER

## 2017-08-28 PROCEDURE — 78014 THYROID IMAGING W/BLOOD FLOW: CPT

## 2017-08-28 RX ADMIN — Medication 400 MICRO CURIE: at 15:00

## 2017-08-29 PROCEDURE — A9516 IODINE I-123 SOD IODIDE MIC: HCPCS | Performed by: NURSE PRACTITIONER

## 2017-08-29 PROCEDURE — 3430000000 HC RX DIAGNOSTIC RADIOPHARMACEUTICAL: Performed by: NURSE PRACTITIONER

## 2017-08-30 ENCOUNTER — TELEPHONE (OUTPATIENT)
Dept: NEUROSURGERY | Age: 62
End: 2017-08-30

## 2017-09-07 ENCOUNTER — OFFICE VISIT (OUTPATIENT)
Dept: PRIMARY CARE CLINIC | Age: 62
End: 2017-09-07
Payer: COMMERCIAL

## 2017-09-07 ENCOUNTER — TELEPHONE (OUTPATIENT)
Dept: PRIMARY CARE CLINIC | Age: 62
End: 2017-09-07

## 2017-09-07 VITALS
SYSTOLIC BLOOD PRESSURE: 116 MMHG | RESPIRATION RATE: 16 BRPM | TEMPERATURE: 97.3 F | HEART RATE: 64 BPM | BODY MASS INDEX: 31.69 KG/M2 | OXYGEN SATURATION: 97 % | WEIGHT: 234 LBS | HEIGHT: 72 IN | DIASTOLIC BLOOD PRESSURE: 60 MMHG

## 2017-09-07 DIAGNOSIS — C90.00 MULTIPLE MYELOMA, REMISSION STATUS UNSPECIFIED (HCC): ICD-10-CM

## 2017-09-07 DIAGNOSIS — R53.83 FATIGUE, UNSPECIFIED TYPE: ICD-10-CM

## 2017-09-07 DIAGNOSIS — D64.9 ANEMIA, UNSPECIFIED TYPE: ICD-10-CM

## 2017-09-07 DIAGNOSIS — N20.0 KIDNEY STONES: Primary | ICD-10-CM

## 2017-09-07 DIAGNOSIS — F41.9 ANXIETY: ICD-10-CM

## 2017-09-07 DIAGNOSIS — E11.8 TYPE 2 DIABETES MELLITUS WITH COMPLICATION, WITHOUT LONG-TERM CURRENT USE OF INSULIN (HCC): ICD-10-CM

## 2017-09-07 DIAGNOSIS — K21.9 GASTROESOPHAGEAL REFLUX DISEASE WITHOUT ESOPHAGITIS: ICD-10-CM

## 2017-09-07 PROCEDURE — 99214 OFFICE O/P EST MOD 30 MIN: CPT | Performed by: NURSE PRACTITIONER

## 2017-09-07 RX ORDER — PANTOPRAZOLE SODIUM 40 MG/1
40 GRANULE, DELAYED RELEASE ORAL
Qty: 90 EACH | Refills: 2 | Status: SHIPPED | OUTPATIENT
Start: 2017-09-07 | End: 2017-09-07 | Stop reason: ALTCHOICE

## 2017-09-07 RX ORDER — BUSPIRONE HYDROCHLORIDE 15 MG/1
15 TABLET ORAL DAILY
Qty: 180 TABLET | Refills: 0 | Status: SHIPPED | OUTPATIENT
Start: 2017-09-07 | End: 2018-11-06 | Stop reason: SDUPTHER

## 2017-09-07 RX ORDER — LANOLIN ALCOHOL/MO/W.PET/CERES
325 CREAM (GRAM) TOPICAL 2 TIMES DAILY
Qty: 180 TABLET | Refills: 1 | Status: SHIPPED | OUTPATIENT
Start: 2017-09-07 | End: 2018-02-15 | Stop reason: ALTCHOICE

## 2017-09-07 RX ORDER — HYDROCHLOROTHIAZIDE 25 MG/1
12.5 TABLET ORAL DAILY
Qty: 30 TABLET | Refills: 1 | Status: SHIPPED | OUTPATIENT
Start: 2017-09-07 | End: 2017-12-14 | Stop reason: SDUPTHER

## 2017-09-07 RX ORDER — TAMSULOSIN HYDROCHLORIDE 0.4 MG/1
0.4 CAPSULE ORAL DAILY
Qty: 90 CAPSULE | Refills: 1 | Status: SHIPPED | OUTPATIENT
Start: 2017-09-07 | End: 2017-10-16 | Stop reason: SDUPTHER

## 2017-09-07 RX ORDER — PANTOPRAZOLE SODIUM 40 MG/1
40 TABLET, DELAYED RELEASE ORAL DAILY
Qty: 90 TABLET | Refills: 2 | Status: SHIPPED | OUTPATIENT
Start: 2017-09-07

## 2017-09-07 RX ORDER — FUROSEMIDE 40 MG/1
40 TABLET ORAL DAILY
Qty: 90 TABLET | Refills: 1 | Status: SHIPPED | OUTPATIENT
Start: 2017-09-07 | End: 2018-01-25 | Stop reason: SINTOL

## 2017-09-07 ASSESSMENT — ENCOUNTER SYMPTOMS
CHEST TIGHTNESS: 0
SORE THROAT: 0
EYE REDNESS: 0
ABDOMINAL PAIN: 0
DIARRHEA: 0
SHORTNESS OF BREATH: 0
CONSTIPATION: 0
COUGH: 0
NAUSEA: 0
TROUBLE SWALLOWING: 0
RHINORRHEA: 0
WHEEZING: 0
VOMITING: 0
BLOOD IN STOOL: 0
VOICE CHANGE: 0

## 2017-09-23 DIAGNOSIS — J01.90 ACUTE SINUSITIS, RECURRENCE NOT SPECIFIED, UNSPECIFIED LOCATION: Primary | ICD-10-CM

## 2017-09-23 RX ORDER — AZITHROMYCIN 250 MG/1
TABLET, FILM COATED ORAL
Qty: 1 PACKET | Refills: 0 | Status: SHIPPED | OUTPATIENT
Start: 2017-09-23 | End: 2017-10-03

## 2017-09-23 RX ORDER — METHYLPREDNISOLONE 4 MG/1
TABLET ORAL
Qty: 1 KIT | Refills: 0 | Status: SHIPPED | OUTPATIENT
Start: 2017-09-23 | End: 2017-09-29

## 2017-10-04 ENCOUNTER — OFFICE VISIT (OUTPATIENT)
Dept: UROLOGY | Facility: CLINIC | Age: 62
End: 2017-10-04

## 2017-10-04 ENCOUNTER — HOSPITAL ENCOUNTER (OUTPATIENT)
Dept: ULTRASOUND IMAGING | Facility: HOSPITAL | Age: 62
Discharge: HOME | End: 2017-10-04
Attending: UROLOGY | Admitting: UROLOGY

## 2017-10-04 VITALS — TEMPERATURE: 97.1 F | BODY MASS INDEX: 31.04 KG/M2 | HEIGHT: 72 IN | WEIGHT: 229.2 LBS

## 2017-10-04 DIAGNOSIS — N20.0 LEFT NEPHROLITHIASIS: Primary | ICD-10-CM

## 2017-10-04 DIAGNOSIS — N20.0 KIDNEY STONE: ICD-10-CM

## 2017-10-04 LAB
BILIRUB BLD-MCNC: NEGATIVE MG/DL
CLARITY, POC: CLEAR
COLOR UR: YELLOW
GLUCOSE UR STRIP-MCNC: NEGATIVE MG/DL
KETONES UR QL: NEGATIVE
LEUKOCYTE EST, POC: ABNORMAL
NITRITE UR-MCNC: NEGATIVE MG/ML
PH UR: 5.5 [PH] (ref 5–8)
PROT UR STRIP-MCNC: NEGATIVE MG/DL
RBC # UR STRIP: NEGATIVE /UL
SP GR UR: 1.01 (ref 1–1.03)
UROBILINOGEN UR QL: NORMAL

## 2017-10-04 PROCEDURE — 76775 US EXAM ABDO BACK WALL LIM: CPT

## 2017-10-04 PROCEDURE — 81003 URINALYSIS AUTO W/O SCOPE: CPT | Performed by: UROLOGY

## 2017-10-04 PROCEDURE — 99213 OFFICE O/P EST LOW 20 MIN: CPT | Performed by: UROLOGY

## 2017-10-04 NOTE — PROGRESS NOTES
Subjective    Mr. Lucero is 62 y.o. male    Chief Complaint: Nephrolithiasis    History of Present Illness     Recurrent Nephrolithiasis  Patient is her for recurrent nephrolithiasis.  Location of stone is left renal. Stones were found for workup of abdominal pain.  Pt is currently Asymptomatic.  Pt. Has had stone disease for 4month(s). Risk factors for stone disease include none. Previous management of stone disease was Ureteroscopy.  Stone analysis was Calcium Oxalate Monohydrate.  Metabolic workup revealed hyperoxaluria.  Current treatment regimen includes hydration.  Associated symptoms include none.            The following portions of the patient's history were reviewed and updated as appropriate: allergies, current medications, past family history, past medical history, past social history, past surgical history and problem list.    Review of Systems   Constitutional: Negative for chills and fever.   Gastrointestinal: Negative for abdominal pain, anal bleeding and blood in stool.   Genitourinary: Negative for flank pain and hematuria.         Current Outpatient Prescriptions:   •  aspirin (ASPIR) 81 MG EC tablet, Take 81 mg by mouth Daily., Disp: , Rfl:   •  b complex vitamins capsule, Take  by mouth., Disp: , Rfl:   •  busPIRone (BUSPAR) 15 MG tablet, Take 15 mg by mouth Daily., Disp: , Rfl:   •  clopidogrel (PLAVIX) 75 MG tablet, Take 75 mg by mouth Daily., Disp: , Rfl:   •  cyanocobalamin 1000 MCG/ML injection, Inject  into the shoulder, thigh, or buttocks 1 (One) Time Per Week., Disp: , Rfl:   •  diltiaZEM CD (CARTIA XT) 240 MG 24 hr capsule, Take 1 capsule by mouth daily, Disp: , Rfl:   •  docusate sodium (COLACE) 100 MG capsule, Take  by mouth Daily., Disp: , Rfl:   •  ferrous sulfate 325 (65 FE) MG EC tablet, Take 325 mg by mouth Daily With Breakfast., Disp: , Rfl:   •  furosemide (LASIX) 40 MG tablet, Take 40 mg by mouth Daily., Disp: , Rfl:   •  HYDROcodone-acetaminophen (NORCO) 7.5-325 MG per  tablet, Take 1-2 tablets by mouth Every 4 (Four) Hours As Needed for Moderate Pain (4-6) (Pain)., Disp: 40 tablet, Rfl: 0  •  meloxicam (MOBIC) 15 MG tablet, Take 15 mg by mouth Daily., Disp: , Rfl:   •  metFORMIN (GLUCOPHAGE) 1000 MG tablet, Take 500 mg by mouth 2 (Two) Times a Day With Meals., Disp: , Rfl:   •  nitroglycerin (NITROSTAT) 0.4 MG SL tablet, Place 0.4 mg under the tongue Every 5 (Five) Minutes As Needed., Disp: , Rfl:   •  pantoprazole (PROTONIX) 40 MG EC tablet, Take 40 mg by mouth Daily., Disp: , Rfl:   •  phenazopyridine (PYRIDIUM) 100 MG tablet, Take 1 tablet by mouth 3 (Three) Times a Day As Needed for bladder spasms., Disp: 21 tablet, Rfl: 1  •  polyethylene glycol (MIRALAX) powder, Take 17 g by mouth Daily As Needed., Disp: , Rfl:   •  pravastatin (PRAVACHOL) 40 MG tablet, Take 40 mg by mouth Every Night., Disp: , Rfl:   •  ranolazine (RANEXA) 1000 MG 12 hr tablet, Take 1 tablet by mouth 2 times daily, Disp: , Rfl:   •  tamsulosin (FLOMAX) 0.4 MG capsule 24 hr capsule, Take 1 capsule by mouth Daily., Disp: 30 capsule, Rfl: 2  •  therapeutic multivitamin-minerals (THERAGRAN-M) tablet, Take  by mouth., Disp: , Rfl:   •  tiZANidine (ZANAFLEX) 4 MG tablet, Take 4 mg by mouth As Needed for Muscle Spasms., Disp: , Rfl:   •  traMADol (ULTRAM) 50 MG tablet, TAKE 1 TABLET BY MOUTH FOUR TIMES DAILY AS NEEDED FOR PAIN, Disp: , Rfl: 0  •  Vitamin D, Cholecalciferol, 1000 UNITS tablet, Take 1 tablet by mouth Daily., Disp: , Rfl:     Past Medical History:   Diagnosis Date   • Anemia    • Anxiety    • Arthritis    • BPH (benign prostatic hypertrophy)    • CAD (coronary artery disease)    • Cancer     non-hodgkins lymphoma   • Diabetes mellitus    • Hyperlipidemia    • Hypertension    • Iliac artery aneurysm, bilateral    • Kidney stone    • Sleep apnea        Past Surgical History:   Procedure Laterality Date   • COLONOSCOPY  02/04/2014   • COLONOSCOPY N/A 4/26/2017    Procedure: COLONOSCOPY WITH ANESTHESIA;   "Surgeon: Sher Cui MD;  Location: [facility] ENDOSCOPY;  Service:    • CORONARY ARTERY BYPASS GRAFT      2   • CYSTOSCOPY URETEROSCOPY LASER LITHOTRIPSY Left 4/17/2017    Procedure: URETEROSCOPY LASER LITHOTRIPSY WITH DOUBLE J STENT INSERTION, LEFT ;  Surgeon: Weston Peck MD;  Location: [facility] OR;  Service:    • CYSTOSCOPY URETEROSCOPY LASER LITHOTRIPSY Left 8/14/2017    Procedure: CYSTOSCOPY URETEROSCOPY LASER LITHOTRIPSY STENT INSERTION STONE EXTRACTION;  Surgeon: Weston Peck MD;  Location: [facility] OR;  Service:    • CYSTOSCOPY W/ URETERAL STENT PLACEMENT Left 4/17/2017    Procedure: CYSTOSCOPY URETERAL DOUBLE J STENT INSERTION, LEFT;  Surgeon: Weston Peck MD;  Location: [facility] OR;  Service:    • ENDOSCOPY N/A 4/26/2017    Procedure: ESOPHAGOGASTRODUODENOSCOPY WITH ANESTHESIA;  Surgeon: Sher Cui MD;  Location: [facility] ENDOSCOPY;  Service:    • JOINT REPLACEMENT Right    • KIDNEY STONE SURGERY     • KNEE SURGERY      replacement   • NECK SURGERY     • ROTATOR CUFF REPAIR     • SHOULDER SURGERY     • TONSILLECTOMY         Social History     Social History   • Marital status:      Spouse name: N/A   • Number of children: N/A   • Years of education: N/A     Social History Main Topics   • Smoking status: Former Smoker     Types: Cigarettes     Quit date: 1997   • Smokeless tobacco: Never Used   • Alcohol use No   • Drug use: No   • Sexual activity: Defer     Other Topics Concern   • None     Social History Narrative       Family History   Problem Relation Age of Onset   • Kidney disease Father    • Heart disease Father    • Cancer Mother      brain   • Colon cancer Neg Hx    • Colon polyps Neg Hx        Objective    Temp 97.1 °F (36.2 °C)  Ht 72\" (182.9 cm)  Wt 229 lb 3.2 oz (104 kg)  BMI 31.09 kg/m2    Physical Exam   Constitutional: He is oriented to person, place, and time. He appears well-developed and well-nourished. No distress.   Pulmonary/Chest: Effort normal. "   Abdominal: Soft. He exhibits no distension and no mass. There is no tenderness. There is no rebound and no guarding. No hernia.   Neurological: He is alert and oriented to person, place, and time.   Skin: Skin is warm and dry. He is not diaphoretic.   Psychiatric: He has a normal mood and affect.   Vitals reviewed.          Results for orders placed or performed in visit on 10/04/17   POC Urinalysis Dipstick, Automated   Result Value Ref Range    Color Yellow Yellow, Straw, Dark Yellow, Philomena    Clarity, UA Clear Clear    Glucose, UA Negative Negative, 1000 mg/dL (3+) mg/dL    Bilirubin Negative Negative    Ketones, UA Negative Negative    Specific Gravity  1.015 1.005 - 1.030    Blood, UA Negative Negative    pH, Urine 5.5 5.0 - 8.0    Protein, POC Negative Negative mg/dL    Urobilinogen, UA Normal Normal    Leukocytes Trace (A) Negative    Nitrite, UA Negative Negative   Renal ultrasound independent review    The renal ultrasound is available for me to review.  Treatment recommendations require an independent review.  This film has been reviewed by the radiologist to determine any non urologic abnormalities that are presents.  However, I very closely inspected the kidneys for size, symmetry, contour, parenchymal thickness, perinephric reaction, presence of calcifications, and intrarenal dilation of the collecting system.       The right kidney appears normal on this ultrasound.  The renal parenchymal is norml in thickness.  There are no solid masses or cysts.  There is no hydronephrosis.  There are no stones.      The left kidney appears 1.2 cm stone, no hydro    The bladder appears normal on thisultrsaound.  The bladder appears normal in thickness.  There no masses or stones seen on this exam.         Assessment and Plan    Diagnoses and all orders for this visit:    Left nephrolithiasis  -     POC Urinalysis Dipstick, Automated    Other orders  -     SCANNED PATHOLOGY              Renal ultrasound reviewed he  does have stone in lower pole is no hydronephrosis or washes stone with a KUB in 6 months.

## 2017-10-17 ENCOUNTER — OFFICE VISIT (OUTPATIENT)
Dept: PRIMARY CARE CLINIC | Age: 62
End: 2017-10-17
Payer: COMMERCIAL

## 2017-10-17 VITALS
HEIGHT: 73 IN | BODY MASS INDEX: 29.29 KG/M2 | DIASTOLIC BLOOD PRESSURE: 60 MMHG | HEART RATE: 78 BPM | SYSTOLIC BLOOD PRESSURE: 108 MMHG | WEIGHT: 221 LBS | OXYGEN SATURATION: 98 % | TEMPERATURE: 98.4 F

## 2017-10-17 DIAGNOSIS — J01.00 ACUTE MAXILLARY SINUSITIS, RECURRENCE NOT SPECIFIED: Primary | ICD-10-CM

## 2017-10-17 DIAGNOSIS — R53.83 FATIGUE DUE TO TREATMENT: ICD-10-CM

## 2017-10-17 PROCEDURE — 99214 OFFICE O/P EST MOD 30 MIN: CPT | Performed by: NURSE PRACTITIONER

## 2017-10-17 RX ORDER — METHYLPHENIDATE HYDROCHLORIDE 10 MG/1
10 TABLET ORAL 2 TIMES DAILY
Qty: 60 TABLET | Refills: 0 | Status: SHIPPED | OUTPATIENT
Start: 2017-10-17 | End: 2017-11-22 | Stop reason: SINTOL

## 2017-10-17 RX ORDER — LEVOFLOXACIN 500 MG/1
500 TABLET, FILM COATED ORAL DAILY
Qty: 10 TABLET | Refills: 0 | Status: SHIPPED | OUTPATIENT
Start: 2017-10-17 | End: 2017-10-27

## 2017-11-16 DIAGNOSIS — I25.10 CORONARY ARTERY DISEASE INVOLVING NATIVE HEART WITHOUT ANGINA PECTORIS, UNSPECIFIED VESSEL OR LESION TYPE: ICD-10-CM

## 2017-11-16 DIAGNOSIS — I20.8 ANGINA EFFORT: ICD-10-CM

## 2017-11-16 RX ORDER — RANOLAZINE 1000 MG/1
TABLET, FILM COATED, EXTENDED RELEASE ORAL
Qty: 180 TABLET | Refills: 3 | Status: SHIPPED | OUTPATIENT
Start: 2017-11-16 | End: 2018-03-28 | Stop reason: SDUPTHER

## 2017-11-22 ENCOUNTER — OFFICE VISIT (OUTPATIENT)
Dept: PRIMARY CARE CLINIC | Age: 62
End: 2017-11-22
Payer: COMMERCIAL

## 2017-11-22 VITALS
RESPIRATION RATE: 20 BRPM | WEIGHT: 233 LBS | HEART RATE: 72 BPM | DIASTOLIC BLOOD PRESSURE: 70 MMHG | SYSTOLIC BLOOD PRESSURE: 130 MMHG | TEMPERATURE: 98 F | HEIGHT: 72 IN | BODY MASS INDEX: 31.56 KG/M2 | OXYGEN SATURATION: 98 %

## 2017-11-22 DIAGNOSIS — C88.0 WALDENSTROMS MACROGLOBULINEMIA (HCC): Primary | ICD-10-CM

## 2017-11-22 DIAGNOSIS — R53.83 OTHER FATIGUE: ICD-10-CM

## 2017-11-22 DIAGNOSIS — E53.8 VITAMIN B 12 DEFICIENCY: ICD-10-CM

## 2017-11-22 PROCEDURE — 99214 OFFICE O/P EST MOD 30 MIN: CPT | Performed by: NURSE PRACTITIONER

## 2017-11-22 RX ORDER — DEXTROAMPHETAMINE SACCHARATE, AMPHETAMINE ASPARTATE MONOHYDRATE, DEXTROAMPHETAMINE SULFATE AND AMPHETAMINE SULFATE 2.5; 2.5; 2.5; 2.5 MG/1; MG/1; MG/1; MG/1
10 CAPSULE, EXTENDED RELEASE ORAL EVERY MORNING
Qty: 30 CAPSULE | Refills: 0 | Status: SHIPPED | OUTPATIENT
Start: 2017-11-22 | End: 2017-12-29 | Stop reason: SDUPTHER

## 2017-11-22 RX ORDER — CYANOCOBALAMIN 1000 UG/ML
1000 INJECTION INTRAMUSCULAR; SUBCUTANEOUS ONCE
Qty: 10 ML | Refills: 3 | Status: SHIPPED | OUTPATIENT
Start: 2017-11-22 | End: 2018-04-10

## 2017-11-22 ASSESSMENT — ENCOUNTER SYMPTOMS
BLOOD IN STOOL: 0
CONSTIPATION: 0
SORE THROAT: 0
WHEEZING: 0
CHEST TIGHTNESS: 0
ABDOMINAL PAIN: 0
COUGH: 0
TROUBLE SWALLOWING: 0
VOMITING: 0
NAUSEA: 0
SHORTNESS OF BREATH: 0
VOICE CHANGE: 0
RHINORRHEA: 0
EYE REDNESS: 0
DIARRHEA: 0

## 2017-11-22 NOTE — PROGRESS NOTES
[facility] PRIMARY CARE  [facility] [facility]  [address] [facility] [place] [address]  Dept: [phone]  Dept Fax: [phone]  Loc: [phone]    Rolando Oliveira is a 57 y.o. male who presents today for his medical conditions/complaints as noted below. Rolando Oliveira is c/o of 1 Month Follow-Up and Sinusitis (resolved)      Chief Complaint   Patient presents with    1 Month Follow-Up    Sinusitis     resolved       HPI:       HPI  Patient here to follow-up on fatigue and sinusitis. Patient states that his sinusitis has cleared. Patient states that he began taking the Ritalin twice a day for his persistent fatigue related to his cancer treatment. Patient states that this medication caused him to grind his teeth and talk very fast. Patient states that he cannot take this medication. Patient states that he checked with [address] [place] [address] [employer] and they are okay with him being on weekly vitamin B12 injections. Past Medical History:   Diagnosis Date    Abnormal nuclear stress test 10/22/2014    BiPAP (biphasic positive airway pressure) dependence     8cm to 20cm    Cerebrovascular disease     Chronic kidney disease, stage II (mild) 08/17/2016    Shima Nesbitt M.D.    Coronary artery disease 2/24/2011    Depression     Diabetes mellitus ([facility] [address]     Headache     Hyperlipemia 2/24/2011    Dr. Raquel Berg follows lipids.     Hyperlipidemia     Hypertension     LONG TERM ANTICOAGULENT USE     Low blood pressure 11/19/2014    lymphostatic lymphoma     Nausea 11/19/2014    Obesity     Obstructive sleep apnea     AHI:  21.7 per PSG, 7/2017    Peptic ulcer disease 2/24/2011    Restless leg     S/P CABG x 3 10/22/2014    SVG to RCA; SVG to LAD diagonal; LIMA to LAD    Sleep apnea     Tachyarrhythmia     Thrombocythemia, essential (HCC)     Type 2 diabetes mellitus without complication (HCC)     Type II or unspecified type diabetes mellitus without mention of complication, not stated as uncontrolled         Past Surgical History:   Procedure Laterality Date    CARDIAC CATHETERIZATION  2/28/11    EF 60%    CARDIAC CATHETERIZATION  9/16/05, 3/7/07    EF < 50%    CARDIAC CATHETERIZATION  12/4/12   GERBER    EF  50%    CARDIAC CATHETERIZATION  10/28/14  GERBER Cole M.D.   Matt Estevez CORONARY ARTERY BYPASS GRAFT  9/21/05    LIMA to LAD and diagonal; SVG to posterior descending branch RCA    CORONARY ARTERY BYPASS GRAFT  10/30/2014    Redo Sternotomy - SVG-PDA, TMR (RBL)    KIDNEY SURGERY  08/14/2017    KNEE ARTHROSCOPY      right ACL repair    SHOULDER ARTHROSCOPY      left-rotator cuff repair right- rotator cuff repair    SHOULDER ARTHROSCOPY  08-23-11    right    TONSILLECTOMY         Social History   Substance Use Topics    Smoking status: Former Smoker     Packs/day: 2.00     Years: 30.00     Quit date: 11/13/1998    Smokeless tobacco: Never Used    Alcohol use No        Current Outpatient Prescriptions   Medication Sig Dispense Refill    cyanocobalamin 1000 MCG/ML injection Inject 1 mL into the muscle once for 1 dose 10 mL 3    amphetamine-dextroamphetamine (ADDERALL XR) 10 MG extended release capsule Take 1 capsule by mouth every morning . 30 capsule 0    RANEXA 1000 MG extended release tablet TAKE 1 TABLET BY MOUTH TWO TIMES A  tablet 3    tamsulosin (FLOMAX) 0.4 MG capsule Take 1 capsule(s) every day by oral route as directed for 30 days.  30 capsule 11    furosemide (LASIX) 40 MG tablet Take 1 tablet by mouth daily 90 tablet 1    busPIRone (BUSPAR) 15 MG tablet Take 1 tablet by mouth daily 180 tablet 0    metFORMIN (GLUCOPHAGE) 1000 MG tablet Take 0.5 tablets by mouth every evening 90 tablet 1    ferrous sulfate 325 (65 Fe) MG EC tablet Take 1 tablet by mouth 2 times daily 180 tablet 1    pantoprazole (PROTONIX) 40 MG tablet Take 1 tablet by mouth daily 90 tablet 2    sertraline (ZOLOFT) 50 MG tablet 1/2 QHS x 7 days then increase to one full tablet. 30 tablet 3    pravastatin (PRAVACHOL) 40 MG tablet Take 1 tablet by mouth daily 90 tablet 2    clopidogrel (PLAVIX) 75 MG tablet Take 1 tablet by mouth daily 90 tablet 3    nitroGLYCERIN (NITROSTAT) 0.4 MG SL tablet Place 1 tablet under the tongue every 5 minutes as needed for Chest pain 25 tablet 5    ASPIR-81 81 MG EC tablet Take 81 mg by mouth daily. 0    b complex vitamins capsule Take 1 capsule by mouth daily.  Vitamin D (CHOLECALCIFEROL) 1000 UNITS CAPS capsule Take 1,000 Units by mouth daily.  docusate sodium (COLACE) 100 MG capsule Take 100 mg by mouth daily.  therapeutic multivitamin-minerals (THERAGRAN-M) tablet Take 1 tablet by mouth daily.  hydrochlorothiazide (HYDRODIURIL) 25 MG tablet Take 0.5 tablets by mouth daily 30 tablet 1    diltiazem (CARTIA XT) 180 MG extended release capsule Take 1 capsule by mouth daily 30 capsule 1     No current facility-administered medications for this visit. Allergies   Allergen Reactions    Ancef [Cefazolin Sodium]     Cefuroxime Axetil     Cephalosporins     Neosporin [Neomycin-Polymyxin B Gu]     Tape [Adhesive Tape]          Subjective:      Review of Systems   Constitutional: Positive for fatigue. Negative for activity change, appetite change, fever and unexpected weight change. HENT: Negative for congestion, ear pain, nosebleeds, rhinorrhea, sore throat, trouble swallowing and voice change. Eyes: Negative for redness and visual disturbance. Respiratory: Negative for cough, chest tightness, shortness of breath and wheezing. Cardiovascular: Negative for chest pain, palpitations and leg swelling. Gastrointestinal: Negative for abdominal pain, blood in stool, constipation, diarrhea, nausea and vomiting. Endocrine: Negative for polydipsia, polyphagia and polyuria. Genitourinary: Negative for dysuria, frequency and urgency.    Musculoskeletal: Negative for

## 2017-11-29 ENCOUNTER — TELEPHONE (OUTPATIENT)
Dept: CARDIOLOGY | Age: 62
End: 2017-11-29

## 2017-11-30 ENCOUNTER — TELEPHONE (OUTPATIENT)
Dept: CARDIOLOGY | Age: 62
End: 2017-11-30

## 2017-12-14 DIAGNOSIS — N20.0 KIDNEY STONES: ICD-10-CM

## 2017-12-14 RX ORDER — HYDROCHLOROTHIAZIDE 25 MG/1
TABLET ORAL
Qty: 30 TABLET | Refills: 1 | Status: SHIPPED | OUTPATIENT
Start: 2017-12-14 | End: 2018-01-25 | Stop reason: CLARIF

## 2017-12-29 ENCOUNTER — OFFICE VISIT (OUTPATIENT)
Dept: PRIMARY CARE CLINIC | Age: 62
End: 2017-12-29
Payer: COMMERCIAL

## 2017-12-29 VITALS
BODY MASS INDEX: 29.69 KG/M2 | HEIGHT: 73 IN | SYSTOLIC BLOOD PRESSURE: 108 MMHG | OXYGEN SATURATION: 98 % | RESPIRATION RATE: 18 BRPM | TEMPERATURE: 97.8 F | DIASTOLIC BLOOD PRESSURE: 64 MMHG | WEIGHT: 224 LBS | HEART RATE: 80 BPM

## 2017-12-29 DIAGNOSIS — C90.00 MULTIPLE MYELOMA, REMISSION STATUS UNSPECIFIED (HCC): ICD-10-CM

## 2017-12-29 DIAGNOSIS — D69.6 THROMBOCYTOPENIA (HCC): Primary | ICD-10-CM

## 2017-12-29 DIAGNOSIS — D64.9 ANEMIA, UNSPECIFIED TYPE: ICD-10-CM

## 2017-12-29 DIAGNOSIS — R53.83 OTHER FATIGUE: ICD-10-CM

## 2017-12-29 PROCEDURE — 99214 OFFICE O/P EST MOD 30 MIN: CPT | Performed by: NURSE PRACTITIONER

## 2017-12-29 RX ORDER — DEXTROAMPHETAMINE SACCHARATE, AMPHETAMINE ASPARTATE MONOHYDRATE, DEXTROAMPHETAMINE SULFATE AND AMPHETAMINE SULFATE 2.5; 2.5; 2.5; 2.5 MG/1; MG/1; MG/1; MG/1
10 CAPSULE, EXTENDED RELEASE ORAL DAILY
Qty: 30 CAPSULE | Refills: 0 | Status: SHIPPED | OUTPATIENT
Start: 2017-12-29 | End: 2018-05-08 | Stop reason: CLARIF

## 2017-12-29 ASSESSMENT — ENCOUNTER SYMPTOMS
WHEEZING: 0
RHINORRHEA: 0
VOICE CHANGE: 0
COUGH: 0
EYE REDNESS: 0
SHORTNESS OF BREATH: 0
CONSTIPATION: 0
SORE THROAT: 0
BLOOD IN STOOL: 0
ABDOMINAL PAIN: 0
TROUBLE SWALLOWING: 0
NAUSEA: 0
DIARRHEA: 0
VOMITING: 0
CHEST TIGHTNESS: 0

## 2017-12-29 NOTE — PATIENT INSTRUCTIONS
1 week follow up. Have labs completed one day before appointment. Plan to treat with feraheme in one week if needed due to low iron. Start adderall xr 10mg if needed. Plan to transfuse if Hemoglobin drops below 7 or if become symptomatic with low platelets.

## 2017-12-29 NOTE — PROGRESS NOTES
[facility] PRIMARY CARE  [address]  [address] [place] [address]  Dept: [phone]  Dept Fax: [phone]  Loc: [phone]    Cary Poole is a 57 y.o. male who presents today for his medical conditions/complaints as noted below. Cary Poole is c/o of No chief complaint on file. No chief complaint on file. HPI:       HPI    Pt here today to follow up on labs. Pt is currently receiving rituxan and bendamustine for treatment of his multple myeloma. Pt states that he was given his lab work in [place] and told to report here for possible infusion. Pt H/H are low at 8.7, and 25. Pt platelets are 78 which has been persistent. Pt denies bleeding. Pt c/o fatigue. Pt states that he never got his adderall prescription filled. Pt states that he cannot eat. He states that he will try to eat and cannot due to feeling full. Past Medical History:   Diagnosis Date    Abnormal nuclear stress test 10/22/2014    BiPAP (biphasic positive airway pressure) dependence     8cm to 20cm    Cerebrovascular disease     Chronic kidney disease, stage II (mild) 08/17/2016    Dwight Tafoya M.D.    Coronary artery disease 2/24/2011    Depression     [facility]     Headache     Hyperlipemia 2/24/2011    Dr. Kalina German follows lipids.     Hyperlipidemia     Hypertension     LONG TERM ANTICOAGULENT USE     Low blood pressure 11/19/2014    lymphostatic lymphoma     Nausea 11/19/2014    Obesity     Obstructive sleep apnea     AHI:  21.7 per PSG, 7/2017    Peptic ulcer disease 2/24/2011    Restless leg     S/P CABG x 3 10/22/2014    SVG to RCA; SVG to LAD diagonal; LIMA to LAD    Sleep apnea     Tachyarrhythmia     Thrombocythemia, essential (HCC)     Type 2 diabetes mellitus without complication (HCC)     Type II or unspecified type diabetes mellitus without mention of complication, not stated as uncontrolled         Past Surgical History: Procedure Laterality Date    CARDIAC CATHETERIZATION  2/28/11    EF 60%    CARDIAC CATHETERIZATION  9/16/05, 3/7/07    EF < 50%    CARDIAC CATHETERIZATION  12/4/12   GERBER    EF  50%    CARDIAC CATHETERIZATION  10/28/14  GERBER Andre M.D.   Slimemaria esther Padmini CORONARY ARTERY BYPASS GRAFT  9/21/05    LIMA to LAD and diagonal; SVG to posterior descending branch RCA    CORONARY ARTERY BYPASS GRAFT  10/30/2014    Redo Sternotomy - SVG-PDA, TMR (RBL)    KIDNEY SURGERY  08/14/2017    KNEE ARTHROSCOPY      right ACL repair    SHOULDER ARTHROSCOPY      left-rotator cuff repair right- rotator cuff repair    SHOULDER ARTHROSCOPY  08-23-11    right    TONSILLECTOMY         Social History   Substance Use Topics    Smoking status: Former Smoker     Packs/day: 2.00     Years: 30.00     Quit date: 11/13/1998    Smokeless tobacco: Never Used    Alcohol use No        Current Outpatient Prescriptions   Medication Sig Dispense Refill    amphetamine-dextroamphetamine (ADDERALL XR) 10 MG extended release capsule Take 1 capsule by mouth daily for 30 days. 30 capsule 0    hydrochlorothiazide (HYDRODIURIL) 25 MG tablet TAKE 1/2 TABLET BY MOUTH DAILY. 30 tablet 1    cyanocobalamin 1000 MCG/ML injection Inject 1 mL into the muscle once for 1 dose 10 mL 3    RANEXA 1000 MG extended release tablet TAKE 1 TABLET BY MOUTH TWO TIMES A  tablet 3    tamsulosin (FLOMAX) 0.4 MG capsule Take 1 capsule(s) every day by oral route as directed for 30 days.  30 capsule 11    furosemide (LASIX) 40 MG tablet Take 1 tablet by mouth daily 90 tablet 1    metFORMIN (GLUCOPHAGE) 1000 MG tablet Take 0.5 tablets by mouth every evening 90 tablet 1    ferrous sulfate 325 (65 Fe) MG EC tablet Take 1 tablet by mouth 2 times daily 180 tablet 1    pantoprazole (PROTONIX) 40 MG tablet Take 1 tablet by mouth daily 90 tablet 2    diltiazem (CARTIA XT) 180 MG extended release capsule Take 1 capsule by mouth daily 30 capsule 1    sertraline (ZOLOFT) 50 MG tablet 1/2 QHS x 7 days then increase to one full tablet. 30 tablet 3    pravastatin (PRAVACHOL) 40 MG tablet Take 1 tablet by mouth daily 90 tablet 2    clopidogrel (PLAVIX) 75 MG tablet Take 1 tablet by mouth daily 90 tablet 3    nitroGLYCERIN (NITROSTAT) 0.4 MG SL tablet Place 1 tablet under the tongue every 5 minutes as needed for Chest pain 25 tablet 5    ASPIR-81 81 MG EC tablet Take 81 mg by mouth daily. 0    b complex vitamins capsule Take 1 capsule by mouth daily.  Vitamin D (CHOLECALCIFEROL) 1000 UNITS CAPS capsule Take 1,000 Units by mouth daily.  docusate sodium (COLACE) 100 MG capsule Take 100 mg by mouth daily.  therapeutic multivitamin-minerals (THERAGRAN-M) tablet Take 1 tablet by mouth daily. No current facility-administered medications for this visit. Allergies   Allergen Reactions    Ancef [Cefazolin Sodium]     Cefuroxime Axetil     Cephalosporins     Neosporin [Neomycin-Polymyxin B Gu]     Tape [Adhesive Tape]          Subjective:      Review of Systems   Constitutional: Negative for activity change, appetite change, fatigue, fever and unexpected weight change. HENT: Negative for congestion, ear pain, nosebleeds, rhinorrhea, sore throat, trouble swallowing and voice change. Eyes: Negative for redness and visual disturbance. Respiratory: Negative for cough, chest tightness, shortness of breath and wheezing. Cardiovascular: Negative for chest pain, palpitations and leg swelling. Gastrointestinal: Negative for abdominal pain, blood in stool, constipation, diarrhea, nausea and vomiting. Endocrine: Negative for polydipsia, polyphagia and polyuria. Genitourinary: Negative for dysuria, frequency and urgency. Musculoskeletal: Negative for myalgias. Skin: Negative for rash and wound. Neurological: Negative for dizziness, speech difficulty, light-headedness and headaches. Psychiatric/Behavioral: Negative for agitation, confusion, self-injury and suicidal ideas. The patient is not nervous/anxious. Objective:     Physical Exam   Constitutional: He is oriented to person, place, and time. He appears well-developed and well-nourished. No distress. HENT:   Head: Normocephalic and atraumatic. Right Ear: External ear normal.   Left Ear: External ear normal.   Nose: Nose normal.   Mouth/Throat: Oropharynx is clear and moist. No oropharyngeal exudate. Eyes: Conjunctivae are normal. Pupils are equal, round, and reactive to light. Right eye exhibits no discharge. Left eye exhibits no discharge. Neck: Normal range of motion. Neck supple. Cardiovascular: Normal rate, regular rhythm, normal heart sounds and intact distal pulses. No murmur heard. Pulmonary/Chest: Effort normal and breath sounds normal. No stridor. No respiratory distress. He has no wheezes. He has no rales. He exhibits no tenderness. Abdominal: Soft. Bowel sounds are normal. He exhibits no distension. There is no tenderness. Musculoskeletal: Normal range of motion. He exhibits no edema, tenderness or deformity. Neurological: He is alert and oriented to person, place, and time. He has normal reflexes. No cranial nerve deficit. Coordination normal.   Skin: Skin is warm and dry. No rash noted. He is not diaphoretic. No erythema. Psychiatric: He has a normal mood and affect. His behavior is normal. Thought content normal.   Nursing note and vitals reviewed. /64   Pulse 80   Temp 97.8 °F (36.6 °C) (Temporal)   Resp 18   Ht 6' 1\" (1.854 m)   Wt 224 lb (101.6 kg)   SpO2 98%   BMI 29.55 kg/m²         Assessment:     1. Thrombocytopenia (HCC)  CBC Auto Differential    Iron and TIBC    Vitamin B12    Folate    TSH without Reflex    Urinalysis   2. Multiple myeloma, remission status unspecified (HCC)  CBC Auto Differential    Comprehensive Metabolic Panel   3.  Anemia, unspecified type  CBC Auto Differential    Comprehensive Metabolic Panel    Iron and TIBC    Vitamin B12    Folate    TSH without Reflex    Urinalysis   4. Other fatigue  amphetamine-dextroamphetamine (ADDERALL XR) 10 MG extended release capsule       No results found for this visit on 12/29/17. Plan:     1. Thrombocytopenia ([facility] [address]    2. Multiple myeloma, remission status unspecified ([facility] [address]    3. Anemia, unspecified type    4. Other fatigue        Return in about 1 week (around 1/5/2018). Orders Placed This Encounter   Procedures    CBC Auto Differential     Standing Status:   Future     Standing Expiration Date:   12/29/2018    Comprehensive Metabolic Panel     Standing Status:   Future     Standing Expiration Date:   12/29/2018    Iron and TIBC     Standing Status:   Future     Standing Expiration Date:   12/29/2018     Order Specific Question:   Is Patient Fasting? Answer:   n     Order Specific Question:   No of Hours? Answer:   no    Vitamin B12     Standing Status:   Future     Standing Expiration Date:   12/29/2018    Folate     Standing Status:   Future     Standing Expiration Date:   12/29/2018    TSH without Reflex     Standing Status:   Future     Standing Expiration Date:   12/29/2018    Urinalysis     Standing Status:   Future     Standing Expiration Date:   12/29/2018       Orders Placed This Encounter   Medications    amphetamine-dextroamphetamine (ADDERALL XR) 10 MG extended release capsule     Sig: Take 1 capsule by mouth daily for 30 days. Dispense:  30 capsule     Refill:  0        Patient given educational materials - see patient instructions. Discussed use, benefit, and side effects of prescribed medications. All patient questions answered. Pt voiced understanding. Reviewed health maintenance. Instructed to continue current medications, diet and exercise. Patient agreed with treatment plan. Follow up as directed.      Pt instructed that is symptoms worsen or persist they are to contact office or report to nearest ER. Pt voices understanding and agreement with this plan.      Electronically signed by GRECIA Nixon on 12/29/2017 at 12:58 PM

## 2018-01-05 DIAGNOSIS — D69.6 THROMBOCYTOPENIA (HCC): ICD-10-CM

## 2018-01-05 DIAGNOSIS — C90.00 MULTIPLE MYELOMA, REMISSION STATUS UNSPECIFIED (HCC): ICD-10-CM

## 2018-01-05 DIAGNOSIS — D64.9 ANEMIA, UNSPECIFIED TYPE: ICD-10-CM

## 2018-01-05 LAB
ALBUMIN SERPL-MCNC: 4.2 G/DL (ref 3.5–5.2)
ALP BLD-CCNC: 100 U/L (ref 40–130)
ALT SERPL-CCNC: 12 U/L (ref 5–41)
ANION GAP SERPL CALCULATED.3IONS-SCNC: 20 MMOL/L (ref 7–19)
AST SERPL-CCNC: 21 U/L (ref 5–40)
ATYPICAL LYMPHOCYTE RELATIVE PERCENT: 6 % (ref 0–8)
BACTERIA: NEGATIVE /HPF
BANDED NEUTROPHILS RELATIVE PERCENT: 4 % (ref 0–5)
BASOPHILS ABSOLUTE: 0 K/UL (ref 0–0.2)
BASOPHILS MANUAL: 0 %
BASOPHILS RELATIVE PERCENT: 0 % (ref 0–1)
BILIRUB SERPL-MCNC: 0.7 MG/DL (ref 0.2–1.2)
BILIRUBIN URINE: ABNORMAL
BLOOD, URINE: NEGATIVE
BUN BLDV-MCNC: 20 MG/DL (ref 8–23)
CALCIUM SERPL-MCNC: 9.4 MG/DL (ref 8.8–10.2)
CHLORIDE BLD-SCNC: 92 MMOL/L (ref 98–111)
CLARITY: CLEAR
CO2: 29 MMOL/L (ref 22–29)
COLOR: ABNORMAL
CREAT SERPL-MCNC: 1.1 MG/DL (ref 0.5–1.2)
EOSINOPHILS ABSOLUTE: 0.14 K/UL (ref 0–0.6)
EOSINOPHILS RELATIVE PERCENT: 8 % (ref 0–5)
EPITHELIAL CELLS, UA: 1 /HPF (ref 0–5)
FOLATE: >20 NG/ML (ref 4.5–32.2)
GFR NON-AFRICAN AMERICAN: >60
GLUCOSE BLD-MCNC: 208 MG/DL (ref 74–109)
GLUCOSE URINE: NEGATIVE MG/DL
HCT VFR BLD CALC: 27.2 % (ref 42–52)
HEMOGLOBIN: 8.8 G/DL (ref 14–18)
HYALINE CASTS: 3 /HPF (ref 0–8)
HYPOCHROMIA: ABNORMAL
IRON SATURATION: 21 % (ref 14–50)
IRON: 50 UG/DL (ref 59–158)
KETONES, URINE: NEGATIVE MG/DL
LEUKOCYTE ESTERASE, URINE: ABNORMAL
LYMPHOCYTES ABSOLUTE: 0.1 K/UL (ref 1.1–4.5)
LYMPHOCYTES RELATIVE PERCENT: 2 % (ref 20–40)
MACROCYTES: ABNORMAL
MCH RBC QN AUTO: 32.7 PG (ref 27–31)
MCHC RBC AUTO-ENTMCNC: 32.4 G/DL (ref 33–37)
MCV RBC AUTO: 101.1 FL (ref 80–94)
MONOCYTES ABSOLUTE: 0.4 K/UL (ref 0–0.9)
MONOCYTES RELATIVE PERCENT: 24 % (ref 0–10)
NEUTROPHILS ABSOLUTE: 1.1 K/UL (ref 1.5–7.5)
NEUTROPHILS MANUAL: 56 %
NEUTROPHILS RELATIVE PERCENT: 56 % (ref 50–65)
NITRITE, URINE: NEGATIVE
PDW BLD-RTO: 15.6 % (ref 11.5–14.5)
PH UA: 6
PLATELET # BLD: 102 K/UL (ref 130–400)
PLATELET SLIDE REVIEW: ABNORMAL
PMV BLD AUTO: 10.4 FL (ref 9.4–12.4)
POTASSIUM SERPL-SCNC: 3.6 MMOL/L (ref 3.5–5)
PROTEIN UA: ABNORMAL MG/DL
RBC # BLD: 2.69 M/UL (ref 4.7–6.1)
RBC UA: 2 /HPF (ref 0–4)
SODIUM BLD-SCNC: 141 MMOL/L (ref 136–145)
SPECIFIC GRAVITY UA: 1.02
TOTAL IRON BINDING CAPACITY: 234 UG/DL (ref 250–400)
TOTAL PROTEIN: 7.1 G/DL (ref 6.6–8.7)
TSH SERPL DL<=0.05 MIU/L-ACNC: 3.16 UIU/ML (ref 0.27–4.2)
UROBILINOGEN, URINE: 1 E.U./DL
VITAMIN B-12: 978 PG/ML (ref 211–946)
WBC # BLD: 1.8 K/UL (ref 4.8–10.8)
WBC UA: 8 /HPF (ref 0–5)

## 2018-01-07 LAB — URINE CULTURE, ROUTINE: NORMAL

## 2018-01-08 DIAGNOSIS — D70.9 NEUTROPENIA, UNSPECIFIED TYPE (HCC): ICD-10-CM

## 2018-01-08 DIAGNOSIS — D50.9 IRON DEFICIENCY ANEMIA, UNSPECIFIED IRON DEFICIENCY ANEMIA TYPE: Primary | ICD-10-CM

## 2018-01-08 RX ORDER — FERROUS SULFATE 325(65) MG
325 TABLET ORAL
Qty: 120 TABLET | Refills: 1 | Status: SHIPPED | OUTPATIENT
Start: 2018-01-08 | End: 2018-08-09 | Stop reason: CLARIF

## 2018-01-10 DIAGNOSIS — I15.9 SECONDARY HYPERTENSION: ICD-10-CM

## 2018-01-10 RX ORDER — DILTIAZEM HYDROCHLORIDE 240 MG/1
CAPSULE, COATED, EXTENDED RELEASE ORAL
Qty: 30 CAPSULE | Refills: 6 | Status: SHIPPED | OUTPATIENT
Start: 2018-01-10 | End: 2018-03-28 | Stop reason: SDUPTHER

## 2018-01-14 ASSESSMENT — ENCOUNTER SYMPTOMS
RHINORRHEA: 0
SHORTNESS OF BREATH: 0
VOMITING: 0
COUGH: 1
ABDOMINAL PAIN: 0
CONSTIPATION: 0
NAUSEA: 0
TROUBLE SWALLOWING: 0
CHEST TIGHTNESS: 0
SORE THROAT: 0
DIARRHEA: 0
BLOOD IN STOOL: 0
EYE REDNESS: 0
VOICE CHANGE: 0
WHEEZING: 0

## 2018-01-15 NOTE — PROGRESS NOTES
[facility] PRIMARY CARE  [address]  [address] [place] [address]  Dept: [phone]  Dept Fax: [phone]  Loc: [phone]    Kathy Crow is a 57 y.o. male who presents today for his medical conditions/complaints as noted below. Kathy Crow is c/o of 1 Month Follow-Up (Had a Chemo treatment yesterday and just not feeling well.)      Chief Complaint   Patient presents with    1 Month Follow-Up     Had a Chemo treatment yesterday and just not feeling well. HPI:       Sinusitis   This is a new problem. The current episode started 1 to 4 weeks ago. The problem is unchanged. There has been no fever. His pain is at a severity of 4/10. Associated symptoms include congestion and coughing. Pertinent negatives include no ear pain, headaches, shortness of breath or sore throat. Past treatments include nothing. The treatment provided no relief. Past Medical History:   Diagnosis Date    Abnormal nuclear stress test 10/22/2014    BiPAP (biphasic positive airway pressure) dependence     8cm to 20cm    Cerebrovascular disease     Chronic kidney disease, stage II (mild) 08/17/2016    Osmany Turner M.D.    Coronary artery disease 2/24/2011    Depression     [facility] ([email or web address]     Headache     Hyperlipemia 2/24/2011    Dr. Cl Bruce follows lipids.     Hyperlipidemia     Hypertension     LONG TERM ANTICOAGULENT USE     Low blood pressure 11/19/2014    lymphostatic lymphoma     Nausea 11/19/2014    Obesity     Obstructive sleep apnea     AHI:  21.7 per PSG, 7/2017    Peptic ulcer disease 2/24/2011    Restless leg     S/P CABG x 3 10/22/2014    SVG to RCA; SVG to LAD diagonal; LIMA to LAD    Sleep apnea     Tachyarrhythmia     Thrombocythemia, essential (HCC)     Type 2 diabetes mellitus without complication (HCC)     Type II or unspecified type diabetes mellitus without mention of complication, not stated as uncontrolled         Past

## 2018-01-30 DIAGNOSIS — D50.9 IRON DEFICIENCY ANEMIA, UNSPECIFIED IRON DEFICIENCY ANEMIA TYPE: ICD-10-CM

## 2018-01-30 DIAGNOSIS — D70.9 NEUTROPENIA, UNSPECIFIED TYPE (HCC): ICD-10-CM

## 2018-01-30 LAB
BASOPHILS ABSOLUTE: 0 K/UL (ref 0–0.2)
BASOPHILS RELATIVE PERCENT: 0.5 % (ref 0–1)
EOSINOPHILS ABSOLUTE: 0.3 K/UL (ref 0–0.6)
EOSINOPHILS RELATIVE PERCENT: 7.2 % (ref 0–5)
HCT VFR BLD CALC: 26.8 % (ref 42–52)
HEMOGLOBIN: 8.6 G/DL (ref 14–18)
LYMPHOCYTES ABSOLUTE: 1.6 K/UL (ref 1.1–4.5)
LYMPHOCYTES RELATIVE PERCENT: 37.6 % (ref 20–40)
MCH RBC QN AUTO: 33.1 PG (ref 27–31)
MCHC RBC AUTO-ENTMCNC: 32.1 G/DL (ref 33–37)
MCV RBC AUTO: 103.1 FL (ref 80–94)
MONOCYTES ABSOLUTE: 0.8 K/UL (ref 0–0.9)
MONOCYTES RELATIVE PERCENT: 20.2 % (ref 0–10)
NEUTROPHILS ABSOLUTE: 1.4 K/UL (ref 1.5–7.5)
NEUTROPHILS RELATIVE PERCENT: 34.3 % (ref 50–65)
PDW BLD-RTO: 17.9 % (ref 11.5–14.5)
PLATELET # BLD: 65 K/UL (ref 130–400)
PMV BLD AUTO: 10 FL (ref 9.4–12.4)
RBC # BLD: 2.6 M/UL (ref 4.7–6.1)
WBC # BLD: 4.2 K/UL (ref 4.8–10.8)

## 2018-02-06 ENCOUNTER — OFFICE VISIT (OUTPATIENT)
Dept: PRIMARY CARE CLINIC | Age: 63
End: 2018-02-06
Payer: COMMERCIAL

## 2018-02-06 VITALS
RESPIRATION RATE: 18 BRPM | DIASTOLIC BLOOD PRESSURE: 52 MMHG | BODY MASS INDEX: 30.62 KG/M2 | WEIGHT: 231 LBS | TEMPERATURE: 96.8 F | HEART RATE: 66 BPM | OXYGEN SATURATION: 98 % | HEIGHT: 73 IN | SYSTOLIC BLOOD PRESSURE: 104 MMHG

## 2018-02-06 DIAGNOSIS — B35.4 TINEA CORPORIS: ICD-10-CM

## 2018-02-06 DIAGNOSIS — R60.9 EDEMA, UNSPECIFIED TYPE: ICD-10-CM

## 2018-02-06 DIAGNOSIS — E11.8 TYPE 2 DIABETES MELLITUS WITH COMPLICATION, UNSPECIFIED LONG TERM INSULIN USE STATUS: Primary | ICD-10-CM

## 2018-02-06 DIAGNOSIS — G89.29 OTHER CHRONIC PAIN: ICD-10-CM

## 2018-02-06 LAB — HBA1C MFR BLD: 5.1 %

## 2018-02-06 PROCEDURE — 99214 OFFICE O/P EST MOD 30 MIN: CPT | Performed by: NURSE PRACTITIONER

## 2018-02-06 PROCEDURE — 83036 HEMOGLOBIN GLYCOSYLATED A1C: CPT | Performed by: NURSE PRACTITIONER

## 2018-02-06 RX ORDER — TERBINAFINE HYDROCHLORIDE 250 MG/1
250 TABLET ORAL DAILY
Qty: 7 TABLET | Refills: 0 | Status: SHIPPED | OUTPATIENT
Start: 2018-02-06 | End: 2018-02-13

## 2018-02-06 RX ORDER — SPIRONOLACTONE 25 MG/1
25 TABLET ORAL DAILY
Qty: 30 TABLET | Refills: 0 | Status: SHIPPED | OUTPATIENT
Start: 2018-02-06 | End: 2018-03-01 | Stop reason: SDUPTHER

## 2018-02-06 RX ORDER — TRAMADOL HYDROCHLORIDE 50 MG/1
50 TABLET ORAL EVERY 6 HOURS PRN
Qty: 60 TABLET | Refills: 2 | Status: SHIPPED | OUTPATIENT
Start: 2018-02-06 | End: 2018-03-08

## 2018-02-06 ASSESSMENT — ENCOUNTER SYMPTOMS
VOMITING: 0
RHINORRHEA: 0
WHEEZING: 0
ABDOMINAL PAIN: 0
BLOOD IN STOOL: 0
SHORTNESS OF BREATH: 0
CHEST TIGHTNESS: 0
EYE REDNESS: 0
TROUBLE SWALLOWING: 0
CONSTIPATION: 0
VOICE CHANGE: 0
DIARRHEA: 0
NAUSEA: 0
SORE THROAT: 0
COUGH: 0

## 2018-02-07 NOTE — PROGRESS NOTES
Jose Luis Mckeon M.D.   Burke Lo GRAFT  9/21/05    LIMA to LAD and diagonal; SVG to posterior descending branch RCA    CORONARY ARTERY BYPASS GRAFT  10/30/2014    Redo Sternotomy - SVG-PDA, TMR (RBL)    KIDNEY SURGERY  08/14/2017    KNEE ARTHROSCOPY      right ACL repair    SHOULDER ARTHROSCOPY      left-rotator cuff repair right- rotator cuff repair    SHOULDER ARTHROSCOPY  08-23-11    right    TONSILLECTOMY         Social History   Substance Use Topics    Smoking status: Former Smoker     Packs/day: 2.00     Years: 30.00     Quit date: 11/13/1998    Smokeless tobacco: Never Used    Alcohol use No        Current Outpatient Prescriptions   Medication Sig Dispense Refill    spironolactone (ALDACTONE) 25 MG tablet Take 1 tablet by mouth daily 30 tablet 0    terbinafine (LAMISIL) 250 MG tablet Take 1 tablet by mouth daily for 7 days 7 tablet 0    traMADol (ULTRAM) 50 MG tablet Take 1 tablet by mouth every 6 hours as needed for Pain for up to 30 days . Take lowest dose possible to manage pain. 60 tablet 2    diltiazem (CARDIZEM CD) 240 MG extended release capsule TAKE 1 CAPSULE BY MOUTH DAILY 30 capsule 6    ferrous sulfate 325 (65 Fe) MG tablet Take 1 tablet by mouth 3 times daily (with meals) 120 tablet 1    amphetamine-dextroamphetamine (ADDERALL XR) 10 MG extended release capsule Take 1 capsule by mouth daily for 30 days. 30 capsule 0    cyanocobalamin 1000 MCG/ML injection Inject 1 mL into the muscle once for 1 dose 10 mL 3    RANEXA 1000 MG extended release tablet TAKE 1 TABLET BY MOUTH TWO TIMES A  tablet 3    tamsulosin (FLOMAX) 0.4 MG capsule Take 1 capsule(s) every day by oral route as directed for 30 days.  30 capsule 11    metFORMIN (GLUCOPHAGE) 1000 MG tablet Take 0.5 tablets by mouth every evening 90 tablet 1    ferrous sulfate 325 (65 Fe) MG EC tablet Take 1 tablet by mouth 2 times daily 180 tablet 1

## 2018-02-15 ENCOUNTER — OFFICE VISIT (OUTPATIENT)
Dept: PRIMARY CARE CLINIC | Age: 63
End: 2018-02-15
Payer: COMMERCIAL

## 2018-02-15 VITALS
DIASTOLIC BLOOD PRESSURE: 58 MMHG | SYSTOLIC BLOOD PRESSURE: 104 MMHG | HEART RATE: 87 BPM | TEMPERATURE: 98.4 F | BODY MASS INDEX: 29 KG/M2 | WEIGHT: 221 LBS

## 2018-02-15 DIAGNOSIS — B35.4 TINEA CORPORIS: Primary | ICD-10-CM

## 2018-02-15 DIAGNOSIS — D64.81 ANEMIA ASSOCIATED WITH CHEMOTHERAPY: ICD-10-CM

## 2018-02-15 DIAGNOSIS — T45.1X5A ANEMIA ASSOCIATED WITH CHEMOTHERAPY: ICD-10-CM

## 2018-02-15 PROCEDURE — 99213 OFFICE O/P EST LOW 20 MIN: CPT | Performed by: NURSE PRACTITIONER

## 2018-02-15 RX ORDER — PRENATAL VIT 91/IRON/FOLIC/DHA 28-975-200
COMBINATION PACKAGE (EA) ORAL
Qty: 42 G | Refills: 2 | Status: SHIPPED | OUTPATIENT
Start: 2018-02-15 | End: 2018-08-14

## 2018-02-15 RX ORDER — TERBINAFINE HYDROCHLORIDE 250 MG/1
250 TABLET ORAL DAILY
Qty: 30 TABLET | Refills: 0 | Status: SHIPPED | OUTPATIENT
Start: 2018-02-15 | End: 2018-03-14 | Stop reason: SDUPTHER

## 2018-02-15 ASSESSMENT — ENCOUNTER SYMPTOMS
CHEST TIGHTNESS: 0
CONSTIPATION: 0
VOMITING: 0
SORE THROAT: 0
COUGH: 0
RHINORRHEA: 0
TROUBLE SWALLOWING: 0
VOICE CHANGE: 0
WHEEZING: 0
SHORTNESS OF BREATH: 0
NAUSEA: 0
BLOOD IN STOOL: 0
ABDOMINAL PAIN: 0
EYE REDNESS: 0
DIARRHEA: 0

## 2018-02-15 NOTE — PROGRESS NOTES
[facility] PRIMARY CARE  [address]  [address] [facility] [address]  Dept: [phone]  Dept Fax: [phone]  Loc: [phone]    Sandie Juarez is a 57 y.o. male who presents today for his medical conditions/complaints as noted below. Sandie Juarez is c/o of Rash      Chief Complaint   Patient presents with    Rash       HPI:       HPI     Pt here today for c/o persistent rash and chill that began yesterday. Pt states that the rash has improved but is still there. Pt states that he also developed a chill yesterday but has no other symptoms. Due to pt's current chemo wife wanted him to come in and be seen. Past Medical History:   Diagnosis Date    Abnormal nuclear stress test 10/22/2014    BiPAP (biphasic positive airway pressure) dependence     8cm to 20cm    Cerebrovascular disease     Chronic kidney disease, stage II (mild) 08/17/2016    Noé Kingsley M.D.    Coronary artery disease 2/24/2011    Depression     Diabetes mellitus ([address]     Headache     Hyperlipemia 2/24/2011    Dr. Gibran Lovell follows lipids.     Hyperlipidemia     Hypertension     LONG TERM ANTICOAGULENT USE     Low blood pressure 11/19/2014    lymphostatic lymphoma     Nausea 11/19/2014    Obesity     Obstructive sleep apnea     AHI:  21.7 per PSG, 7/2017    Peptic ulcer disease 2/24/2011    Restless leg     S/P CABG x 3 10/22/2014    SVG to RCA; SVG to LAD diagonal; LIMA to LAD    Sleep apnea     Tachyarrhythmia     Thrombocythemia, essential ([address]     Type 2 diabetes mellitus without complication ([address]     Type II or unspecified type diabetes mellitus without mention of complication, not stated as uncontrolled         Past Surgical History:   Procedure Laterality Date    CARDIAC CATHETERIZATION  2/28/11    EF 60%    CARDIAC CATHETERIZATION  9/16/05, 3/7/07    EF < 50%    CARDIAC CATHETERIZATION  12/4/12   MDL    EF  50%    CARDIAC CATHETERIZATION  10/28/14 Edi Mike M.D.   Caleb Rota GRAFT  9/21/05    LIMA to LAD and diagonal; SVG to posterior descending branch RCA    CORONARY ARTERY BYPASS GRAFT  10/30/2014    Redo Sternotomy - SVG-PDA, TMR (RBL)    KIDNEY SURGERY  08/14/2017    KNEE ARTHROSCOPY      right ACL repair    SHOULDER ARTHROSCOPY      left-rotator cuff repair right- rotator cuff repair    SHOULDER ARTHROSCOPY  08-23-11    right    TONSILLECTOMY         Social History   Substance Use Topics    Smoking status: Former Smoker     Packs/day: 2.00     Years: 30.00     Quit date: 11/13/1998    Smokeless tobacco: Never Used    Alcohol use No        Family History   Problem Relation Age of Onset    Heart Disease Other     Lung Cancer Mother     Cancer Sister        Current Outpatient Prescriptions   Medication Sig Dispense Refill    terbinafine (LAMISIL) 250 MG tablet Take 1 tablet by mouth daily 30 tablet 0    terbinafine (ATHLETES FOOT) 1 % cream Apply topically 2 times daily. 42 g 2    spironolactone (ALDACTONE) 25 MG tablet Take 1 tablet by mouth daily 30 tablet 0    traMADol (ULTRAM) 50 MG tablet Take 1 tablet by mouth every 6 hours as needed for Pain for up to 30 days . Take lowest dose possible to manage pain. 60 tablet 2    diltiazem (CARDIZEM CD) 240 MG extended release capsule TAKE 1 CAPSULE BY MOUTH DAILY 30 capsule 6    ferrous sulfate 325 (65 Fe) MG tablet Take 1 tablet by mouth 3 times daily (with meals) 120 tablet 1    amphetamine-dextroamphetamine (ADDERALL XR) 10 MG extended release capsule Take 1 capsule by mouth daily for 30 days. 30 capsule 0    RANEXA 1000 MG extended release tablet TAKE 1 TABLET BY MOUTH TWO TIMES A  tablet 3    tamsulosin (FLOMAX) 0.4 MG capsule Take 1 capsule(s) every day by oral route as directed for 30 days.  30 capsule 11    metFORMIN (GLUCOPHAGE) 1000 MG tablet Take 0.5 tablets by mouth every evening 90

## 2018-02-16 ENCOUNTER — TELEPHONE (OUTPATIENT)
Dept: PRIMARY CARE CLINIC | Age: 63
End: 2018-02-16

## 2018-02-22 DIAGNOSIS — Z13.220 SCREENING FOR HYPERLIPIDEMIA: ICD-10-CM

## 2018-02-22 DIAGNOSIS — Z13.1 ENCOUNTER FOR SCREENING FOR DIABETES MELLITUS: ICD-10-CM

## 2018-02-22 LAB
CHOLESTEROL, TOTAL: 172 MG/DL (ref 160–199)
HBA1C MFR BLD: 5.3 %
HDLC SERPL-MCNC: 47 MG/DL (ref 55–121)
LDL CHOLESTEROL CALCULATED: 97 MG/DL
TRIGL SERPL-MCNC: 138 MG/DL (ref 0–149)

## 2018-03-14 DIAGNOSIS — B35.4 TINEA CORPORIS: ICD-10-CM

## 2018-03-15 RX ORDER — TERBINAFINE HYDROCHLORIDE 250 MG/1
250 TABLET ORAL DAILY
Qty: 30 TABLET | Refills: 2 | Status: SHIPPED | OUTPATIENT
Start: 2018-03-15 | End: 2018-06-13

## 2018-03-27 DIAGNOSIS — R53.1 WEAKNESS GENERALIZED: Primary | ICD-10-CM

## 2018-03-27 DIAGNOSIS — R53.1 WEAKNESS GENERALIZED: ICD-10-CM

## 2018-03-27 LAB
HCT VFR BLD CALC: 27.5 % (ref 42–52)
HEMOGLOBIN: 9 G/DL (ref 14–18)
MCH RBC QN AUTO: 34.7 PG (ref 27–31)
MCHC RBC AUTO-ENTMCNC: 32.7 G/DL (ref 33–37)
MCV RBC AUTO: 106.2 FL (ref 80–94)
PDW BLD-RTO: 17.2 % (ref 11.5–14.5)
PLATELET # BLD: 53 K/UL (ref 130–400)
PMV BLD AUTO: 10.9 FL (ref 9.4–12.4)
RBC # BLD: 2.59 M/UL (ref 4.7–6.1)
WBC # BLD: 2.7 K/UL (ref 4.8–10.8)

## 2018-03-28 ENCOUNTER — OFFICE VISIT (OUTPATIENT)
Dept: CARDIOLOGY | Age: 63
End: 2018-03-28
Payer: COMMERCIAL

## 2018-03-28 VITALS
HEART RATE: 88 BPM | WEIGHT: 219 LBS | SYSTOLIC BLOOD PRESSURE: 106 MMHG | BODY MASS INDEX: 29.03 KG/M2 | DIASTOLIC BLOOD PRESSURE: 58 MMHG | HEIGHT: 73 IN

## 2018-03-28 DIAGNOSIS — I20.9 ANGINA PECTORIS (HCC): ICD-10-CM

## 2018-03-28 DIAGNOSIS — I15.9 SECONDARY HYPERTENSION: ICD-10-CM

## 2018-03-28 DIAGNOSIS — I25.10 CORONARY ARTERY DISEASE INVOLVING NATIVE HEART WITHOUT ANGINA PECTORIS, UNSPECIFIED VESSEL OR LESION TYPE: ICD-10-CM

## 2018-03-28 DIAGNOSIS — I25.10 ASHD (ARTERIOSCLEROTIC HEART DISEASE): Primary | ICD-10-CM

## 2018-03-28 PROCEDURE — 99213 OFFICE O/P EST LOW 20 MIN: CPT | Performed by: INTERNAL MEDICINE

## 2018-03-28 RX ORDER — RANOLAZINE 1000 MG/1
1000 TABLET, FILM COATED, EXTENDED RELEASE ORAL 2 TIMES DAILY
Qty: 180 TABLET | Refills: 3 | Status: SHIPPED | OUTPATIENT
Start: 2018-03-28 | End: 2019-05-30 | Stop reason: SDUPTHER

## 2018-03-28 RX ORDER — NITROGLYCERIN 0.4 MG/1
0.4 TABLET SUBLINGUAL EVERY 5 MIN PRN
Qty: 25 TABLET | Refills: 5 | Status: SHIPPED | OUTPATIENT
Start: 2018-03-28 | End: 2018-09-27 | Stop reason: SDUPTHER

## 2018-03-28 RX ORDER — METFORMIN HYDROCHLORIDE 500 MG/1
500 TABLET, EXTENDED RELEASE ORAL 2 TIMES DAILY
COMMUNITY
End: 2019-09-05 | Stop reason: DRUGHIGH

## 2018-03-28 RX ORDER — PRAVASTATIN SODIUM 40 MG
40 TABLET ORAL DAILY
Qty: 90 TABLET | Refills: 3 | Status: CANCELLED | OUTPATIENT
Start: 2018-03-28

## 2018-03-28 RX ORDER — DILTIAZEM HYDROCHLORIDE 240 MG/1
240 CAPSULE, COATED, EXTENDED RELEASE ORAL DAILY
Qty: 90 CAPSULE | Refills: 3 | Status: SHIPPED | OUTPATIENT
Start: 2018-03-28 | End: 2018-08-14 | Stop reason: DRUGHIGH

## 2018-03-28 RX ORDER — FERROUS SULFATE 325(65) MG
325 TABLET ORAL 2 TIMES DAILY
COMMUNITY

## 2018-04-05 ENCOUNTER — TELEPHONE (OUTPATIENT)
Dept: PRIMARY CARE CLINIC | Age: 63
End: 2018-04-05

## 2018-04-10 ENCOUNTER — OFFICE VISIT (OUTPATIENT)
Dept: PRIMARY CARE CLINIC | Age: 63
End: 2018-04-10
Payer: COMMERCIAL

## 2018-04-10 VITALS
DIASTOLIC BLOOD PRESSURE: 70 MMHG | OXYGEN SATURATION: 98 % | TEMPERATURE: 97.5 F | HEIGHT: 73 IN | WEIGHT: 219 LBS | BODY MASS INDEX: 29.03 KG/M2 | HEART RATE: 101 BPM | SYSTOLIC BLOOD PRESSURE: 122 MMHG

## 2018-04-10 DIAGNOSIS — J01.00 ACUTE NON-RECURRENT MAXILLARY SINUSITIS: Primary | ICD-10-CM

## 2018-04-10 PROCEDURE — 99213 OFFICE O/P EST LOW 20 MIN: CPT | Performed by: NURSE PRACTITIONER

## 2018-04-10 PROCEDURE — 96372 THER/PROPH/DIAG INJ SC/IM: CPT | Performed by: NURSE PRACTITIONER

## 2018-04-10 RX ORDER — BENZONATATE 100 MG/1
100 CAPSULE ORAL 3 TIMES DAILY PRN
Qty: 21 CAPSULE | Refills: 0 | Status: SHIPPED | OUTPATIENT
Start: 2018-04-10 | End: 2018-04-17

## 2018-04-10 RX ORDER — LEVOFLOXACIN 750 MG/1
750 TABLET ORAL DAILY
Qty: 10 TABLET | Refills: 0 | Status: SHIPPED | OUTPATIENT
Start: 2018-04-10 | End: 2018-04-20

## 2018-04-10 RX ORDER — DEXAMETHASONE SODIUM PHOSPHATE 10 MG/ML
10 INJECTION INTRAMUSCULAR; INTRAVENOUS ONCE
Status: COMPLETED | OUTPATIENT
Start: 2018-04-10 | End: 2018-04-10

## 2018-04-10 RX ADMIN — DEXAMETHASONE SODIUM PHOSPHATE 10 MG: 10 INJECTION INTRAMUSCULAR; INTRAVENOUS at 14:10

## 2018-04-10 ASSESSMENT — PATIENT HEALTH QUESTIONNAIRE - PHQ9
1. LITTLE INTEREST OR PLEASURE IN DOING THINGS: 0
2. FEELING DOWN, DEPRESSED OR HOPELESS: 0
SUM OF ALL RESPONSES TO PHQ QUESTIONS 1-9: 0
SUM OF ALL RESPONSES TO PHQ9 QUESTIONS 1 & 2: 0

## 2018-04-11 ASSESSMENT — ENCOUNTER SYMPTOMS
DIARRHEA: 0
CHEST TIGHTNESS: 0
BLOOD IN STOOL: 0
CONSTIPATION: 0
VOMITING: 0
WHEEZING: 0
COUGH: 0
TROUBLE SWALLOWING: 0
NAUSEA: 0
SINUS PAIN: 1
RHINORRHEA: 1
ABDOMINAL PAIN: 0
SORE THROAT: 1
EYE REDNESS: 0
SINUS PRESSURE: 1
SHORTNESS OF BREATH: 0
VOICE CHANGE: 0

## 2018-04-12 ENCOUNTER — APPOINTMENT (OUTPATIENT)
Dept: ULTRASOUND IMAGING | Facility: HOSPITAL | Age: 63
End: 2018-04-12

## 2018-04-13 DIAGNOSIS — I73.9 PAD (PERIPHERAL ARTERY DISEASE) (HCC): ICD-10-CM

## 2018-04-13 DIAGNOSIS — I71.40 AAA (ABDOMINAL AORTIC ANEURYSM) WITHOUT RUPTURE (HCC): Primary | ICD-10-CM

## 2018-05-08 ENCOUNTER — OFFICE VISIT (OUTPATIENT)
Dept: PRIMARY CARE CLINIC | Age: 63
End: 2018-05-08
Payer: COMMERCIAL

## 2018-05-08 VITALS
TEMPERATURE: 97.8 F | DIASTOLIC BLOOD PRESSURE: 64 MMHG | WEIGHT: 223 LBS | OXYGEN SATURATION: 96 % | HEART RATE: 78 BPM | HEIGHT: 73 IN | BODY MASS INDEX: 29.55 KG/M2 | RESPIRATION RATE: 18 BRPM | SYSTOLIC BLOOD PRESSURE: 96 MMHG

## 2018-05-08 DIAGNOSIS — K08.89 TOOTH PAIN: ICD-10-CM

## 2018-05-08 DIAGNOSIS — D50.9 IRON DEFICIENCY ANEMIA, UNSPECIFIED IRON DEFICIENCY ANEMIA TYPE: ICD-10-CM

## 2018-05-08 DIAGNOSIS — D50.9 IRON DEFICIENCY ANEMIA, UNSPECIFIED IRON DEFICIENCY ANEMIA TYPE: Primary | ICD-10-CM

## 2018-05-08 DIAGNOSIS — R53.83 OTHER FATIGUE: ICD-10-CM

## 2018-05-08 LAB
ALBUMIN SERPL-MCNC: 4.5 G/DL (ref 3.5–5.2)
ALP BLD-CCNC: 76 U/L (ref 40–130)
ALT SERPL-CCNC: 13 U/L (ref 5–41)
ANION GAP SERPL CALCULATED.3IONS-SCNC: 15 MMOL/L (ref 7–19)
AST SERPL-CCNC: 20 U/L (ref 5–40)
BASOPHILS ABSOLUTE: 0 K/UL (ref 0–0.2)
BASOPHILS RELATIVE PERCENT: 0.3 % (ref 0–1)
BILIRUB SERPL-MCNC: 0.6 MG/DL (ref 0.2–1.2)
BUN BLDV-MCNC: 16 MG/DL (ref 8–23)
CALCIUM SERPL-MCNC: 10 MG/DL (ref 8.8–10.2)
CHLORIDE BLD-SCNC: 98 MMOL/L (ref 98–111)
CO2: 27 MMOL/L (ref 22–29)
CREAT SERPL-MCNC: 0.8 MG/DL (ref 0.5–1.2)
CREATININE URINE: 77.7 MG/DL (ref 4.2–622)
EOSINOPHILS ABSOLUTE: 0.1 K/UL (ref 0–0.6)
EOSINOPHILS RELATIVE PERCENT: 3 % (ref 0–5)
GFR NON-AFRICAN AMERICAN: >60
GLUCOSE BLD-MCNC: 107 MG/DL (ref 74–109)
HCT VFR BLD CALC: 27.5 % (ref 42–52)
HEMOGLOBIN: 8.9 G/DL (ref 14–18)
LYMPHOCYTES ABSOLUTE: 0.6 K/UL (ref 1.1–4.5)
LYMPHOCYTES RELATIVE PERCENT: 18.1 % (ref 20–40)
MCH RBC QN AUTO: 35.2 PG (ref 27–31)
MCHC RBC AUTO-ENTMCNC: 32.4 G/DL (ref 33–37)
MCV RBC AUTO: 108.7 FL (ref 80–94)
MICROALBUMIN UR-MCNC: 1.8 MG/DL (ref 0–19)
MICROALBUMIN/CREAT UR-RTO: 23.2 MG/G
MONOCYTES ABSOLUTE: 0.6 K/UL (ref 0–0.9)
MONOCYTES RELATIVE PERCENT: 19.3 % (ref 0–10)
NEUTROPHILS ABSOLUTE: 1.9 K/UL (ref 1.5–7.5)
NEUTROPHILS RELATIVE PERCENT: 57.5 % (ref 50–65)
PDW BLD-RTO: 15.9 % (ref 11.5–14.5)
PLATELET # BLD: 67 K/UL (ref 130–400)
PMV BLD AUTO: 10.1 FL (ref 9.4–12.4)
POTASSIUM SERPL-SCNC: 4.7 MMOL/L (ref 3.5–5)
RBC # BLD: 2.53 M/UL (ref 4.7–6.1)
SODIUM BLD-SCNC: 140 MMOL/L (ref 136–145)
TOTAL PROTEIN: 7 G/DL (ref 6.6–8.7)
WBC # BLD: 3.3 K/UL (ref 4.8–10.8)

## 2018-05-08 PROCEDURE — 99213 OFFICE O/P EST LOW 20 MIN: CPT | Performed by: NURSE PRACTITIONER

## 2018-05-08 ASSESSMENT — ENCOUNTER SYMPTOMS
SHORTNESS OF BREATH: 0
NAUSEA: 0
ABDOMINAL PAIN: 0
CONSTIPATION: 0
VOMITING: 0
TROUBLE SWALLOWING: 0
COUGH: 0
VOICE CHANGE: 0
SORE THROAT: 0
WHEEZING: 0
EYE REDNESS: 0
DIARRHEA: 0
RHINORRHEA: 0
CHEST TIGHTNESS: 0
BLOOD IN STOOL: 0

## 2018-05-08 ASSESSMENT — PATIENT HEALTH QUESTIONNAIRE - PHQ9
SUM OF ALL RESPONSES TO PHQ QUESTIONS 1-9: 0
SUM OF ALL RESPONSES TO PHQ9 QUESTIONS 1 & 2: 0
1. LITTLE INTEREST OR PLEASURE IN DOING THINGS: 0
2. FEELING DOWN, DEPRESSED OR HOPELESS: 0

## 2018-05-16 ENCOUNTER — APPOINTMENT (OUTPATIENT)
Dept: ULTRASOUND IMAGING | Facility: HOSPITAL | Age: 63
End: 2018-05-16

## 2018-05-16 DIAGNOSIS — E78.5 HYPERLIPIDEMIA, UNSPECIFIED HYPERLIPIDEMIA TYPE: ICD-10-CM

## 2018-05-16 RX ORDER — PRAVASTATIN SODIUM 40 MG
40 TABLET ORAL DAILY
Qty: 90 TABLET | Refills: 2 | Status: ON HOLD | OUTPATIENT
Start: 2018-05-16 | End: 2021-03-30

## 2018-05-18 ENCOUNTER — TELEPHONE (OUTPATIENT)
Dept: PRIMARY CARE CLINIC | Age: 63
End: 2018-05-18

## 2018-06-11 DIAGNOSIS — I25.10 CORONARY ARTERY DISEASE INVOLVING NATIVE HEART WITHOUT ANGINA PECTORIS, UNSPECIFIED VESSEL OR LESION TYPE: ICD-10-CM

## 2018-06-12 RX ORDER — CLOPIDOGREL BISULFATE 75 MG/1
TABLET ORAL
Qty: 90 TABLET | Refills: 3 | Status: SHIPPED | OUTPATIENT
Start: 2018-06-12 | End: 2019-07-10 | Stop reason: SDUPTHER

## 2018-07-16 DIAGNOSIS — K21.9 GASTROESOPHAGEAL REFLUX DISEASE WITHOUT ESOPHAGITIS: ICD-10-CM

## 2018-07-16 RX ORDER — PANTOPRAZOLE SODIUM 40 MG/1
TABLET, DELAYED RELEASE ORAL
Qty: 90 TABLET | Refills: 2 | Status: SHIPPED | OUTPATIENT
Start: 2018-07-16 | End: 2018-08-14 | Stop reason: SDUPTHER

## 2018-08-09 ENCOUNTER — OFFICE VISIT (OUTPATIENT)
Dept: PRIMARY CARE CLINIC | Age: 63
End: 2018-08-09
Payer: MEDICARE

## 2018-08-09 ENCOUNTER — TELEPHONE (OUTPATIENT)
Dept: PRIMARY CARE CLINIC | Age: 63
End: 2018-08-09

## 2018-08-09 ENCOUNTER — HOSPITAL ENCOUNTER (OUTPATIENT)
Dept: GENERAL RADIOLOGY | Age: 63
Discharge: HOME | End: 2018-08-09
Payer: MEDICARE

## 2018-08-09 VITALS
DIASTOLIC BLOOD PRESSURE: 60 MMHG | SYSTOLIC BLOOD PRESSURE: 106 MMHG | HEIGHT: 72 IN | HEART RATE: 65 BPM | TEMPERATURE: 96.8 F | WEIGHT: 230.8 LBS | BODY MASS INDEX: 31.26 KG/M2 | OXYGEN SATURATION: 97 %

## 2018-08-09 DIAGNOSIS — I25.10 CARDIOVASCULAR DISEASE: ICD-10-CM

## 2018-08-09 DIAGNOSIS — D64.9 ANEMIA, UNSPECIFIED TYPE: Primary | ICD-10-CM

## 2018-08-09 DIAGNOSIS — R53.83 OTHER FATIGUE: ICD-10-CM

## 2018-08-09 DIAGNOSIS — R06.00 DYSPNEA, UNSPECIFIED TYPE: ICD-10-CM

## 2018-08-09 DIAGNOSIS — D64.9 ANEMIA, UNSPECIFIED TYPE: ICD-10-CM

## 2018-08-09 DIAGNOSIS — R23.2 HOT FLASHES: ICD-10-CM

## 2018-08-09 DIAGNOSIS — R06.02 SHORTNESS OF BREATH: ICD-10-CM

## 2018-08-09 DIAGNOSIS — R45.1 AGITATION: ICD-10-CM

## 2018-08-09 DIAGNOSIS — R79.89 ABNORMAL TSH: ICD-10-CM

## 2018-08-09 DIAGNOSIS — R05.9 COUGH: ICD-10-CM

## 2018-08-09 DIAGNOSIS — R53.1 WEAKNESS: ICD-10-CM

## 2018-08-09 DIAGNOSIS — R17 JAUNDICE: ICD-10-CM

## 2018-08-09 LAB
ALBUMIN SERPL-MCNC: 4.5 G/DL (ref 3.5–5.2)
ALP BLD-CCNC: 82 U/L (ref 40–130)
ALT SERPL-CCNC: 16 U/L (ref 5–41)
ANION GAP SERPL CALCULATED.3IONS-SCNC: 12 MMOL/L (ref 7–19)
AST SERPL-CCNC: 23 U/L (ref 5–40)
BACTERIA: NEGATIVE /HPF
BASOPHILS ABSOLUTE: 0 K/UL (ref 0–0.2)
BASOPHILS RELATIVE PERCENT: 0.3 % (ref 0–1)
BILIRUB SERPL-MCNC: 0.5 MG/DL (ref 0.2–1.2)
BILIRUBIN URINE: NEGATIVE
BLOOD, URINE: NEGATIVE
BUN BLDV-MCNC: 16 MG/DL (ref 8–23)
CALCIUM SERPL-MCNC: 9.9 MG/DL (ref 8.8–10.2)
CHLORIDE BLD-SCNC: 99 MMOL/L (ref 98–111)
CHOLESTEROL, TOTAL: 171 MG/DL (ref 160–199)
CLARITY: CLEAR
CO2: 27 MMOL/L (ref 22–29)
COLOR: YELLOW
CREAT SERPL-MCNC: 0.8 MG/DL (ref 0.5–1.2)
EOSINOPHILS ABSOLUTE: 0.1 K/UL (ref 0–0.6)
EOSINOPHILS RELATIVE PERCENT: 3.9 % (ref 0–5)
EPITHELIAL CELLS, UA: 1 /HPF (ref 0–5)
GFR NON-AFRICAN AMERICAN: >60
GLUCOSE BLD-MCNC: 113 MG/DL (ref 74–109)
GLUCOSE URINE: NEGATIVE MG/DL
HBA1C MFR BLD: 5.9 % (ref 4–6)
HCT VFR BLD CALC: 28.7 % (ref 42–52)
HDLC SERPL-MCNC: 36 MG/DL (ref 55–121)
HEMOGLOBIN: 9.3 G/DL (ref 14–18)
HYALINE CASTS: 0 /HPF (ref 0–8)
IRON SATURATION: 32 % (ref 14–50)
IRON: 86 UG/DL (ref 59–158)
KETONES, URINE: NEGATIVE MG/DL
LDL CHOLESTEROL CALCULATED: 96 MG/DL
LEUKOCYTE ESTERASE, URINE: ABNORMAL
LYMPHOCYTES ABSOLUTE: 0.5 K/UL (ref 1.1–4.5)
LYMPHOCYTES RELATIVE PERCENT: 17.5 % (ref 20–40)
MCH RBC QN AUTO: 34.6 PG (ref 27–31)
MCHC RBC AUTO-ENTMCNC: 32.4 G/DL (ref 33–37)
MCV RBC AUTO: 106.7 FL (ref 80–94)
MONOCYTES ABSOLUTE: 0.4 K/UL (ref 0–0.9)
MONOCYTES RELATIVE PERCENT: 13.9 % (ref 0–10)
NEUTROPHILS ABSOLUTE: 2 K/UL (ref 1.5–7.5)
NEUTROPHILS RELATIVE PERCENT: 63.4 % (ref 50–65)
NITRITE, URINE: NEGATIVE
PDW BLD-RTO: 14.6 % (ref 11.5–14.5)
PH UA: 5.5
PLATELET # BLD: 73 K/UL (ref 130–400)
PMV BLD AUTO: 10.4 FL (ref 9.4–12.4)
POTASSIUM SERPL-SCNC: 4.5 MMOL/L (ref 3.5–5)
PRO-BNP: 127 PG/ML (ref 0–900)
PROTEIN UA: NEGATIVE MG/DL
RBC # BLD: 2.69 M/UL (ref 4.7–6.1)
RBC UA: 2 /HPF (ref 0–4)
SODIUM BLD-SCNC: 138 MMOL/L (ref 136–145)
SPECIFIC GRAVITY UA: 1.02
T3 FREE: 3 PG/ML (ref 2–4.4)
T4 FREE: 1.1 NG/DL (ref 0.9–1.7)
TOTAL IRON BINDING CAPACITY: 272 UG/DL (ref 250–400)
TOTAL PROTEIN: 7.1 G/DL (ref 6.6–8.7)
TRIGL SERPL-MCNC: 196 MG/DL (ref 0–149)
TSH SERPL DL<=0.05 MIU/L-ACNC: 7.62 UIU/ML (ref 0.27–4.2)
UROBILINOGEN, URINE: 1 E.U./DL
VITAMIN B-12: 565 PG/ML (ref 211–946)
WBC # BLD: 3.1 K/UL (ref 4.8–10.8)
WBC UA: 11 /HPF (ref 0–5)

## 2018-08-09 PROCEDURE — 71046 X-RAY EXAM CHEST 2 VIEWS: CPT

## 2018-08-09 PROCEDURE — 93000 ELECTROCARDIOGRAM COMPLETE: CPT | Performed by: NURSE PRACTITIONER

## 2018-08-09 PROCEDURE — 99214 OFFICE O/P EST MOD 30 MIN: CPT | Performed by: NURSE PRACTITIONER

## 2018-08-09 RX ORDER — TRAMADOL HYDROCHLORIDE 50 MG/1
50 TABLET ORAL DAILY PRN
COMMUNITY
Start: 2017-01-21 | End: 2021-01-20

## 2018-08-09 RX ORDER — LEVOTHYROXINE SODIUM 0.05 MG/1
50 TABLET ORAL DAILY
Qty: 30 TABLET | Refills: 3 | Status: SHIPPED | OUTPATIENT
Start: 2018-08-09 | End: 2018-09-27 | Stop reason: ALTCHOICE

## 2018-08-09 RX ORDER — ONDANSETRON HYDROCHLORIDE 8 MG/1
8 TABLET, FILM COATED ORAL PRN
COMMUNITY
Start: 2017-10-03

## 2018-08-09 RX ORDER — BUSPIRONE HYDROCHLORIDE 15 MG/1
7.5 TABLET ORAL 2 TIMES DAILY
COMMUNITY
End: 2019-01-16 | Stop reason: SDUPTHER

## 2018-08-09 RX ORDER — MELOXICAM 15 MG/1
15 TABLET ORAL DAILY
Status: ON HOLD | COMMUNITY
Start: 2017-04-11 | End: 2021-12-17

## 2018-08-09 RX ORDER — POLYETHYLENE GLYCOL 3350 17 G/17G
17 POWDER, FOR SOLUTION ORAL DAILY
COMMUNITY
Start: 2017-04-10 | End: 2019-02-25 | Stop reason: DRUGHIGH

## 2018-08-09 RX ORDER — TIZANIDINE 4 MG/1
TABLET ORAL
Status: ON HOLD | COMMUNITY
Start: 2017-10-24 | End: 2022-08-25 | Stop reason: HOSPADM

## 2018-08-09 RX ORDER — HYDROCODONE BITARTRATE AND ACETAMINOPHEN 7.5; 325 MG/1; MG/1
1-2 TABLET ORAL EVERY 6 HOURS PRN
COMMUNITY
Start: 2017-08-14 | End: 2021-08-24

## 2018-08-09 ASSESSMENT — ENCOUNTER SYMPTOMS
RHINORRHEA: 0
COUGH: 1
WHEEZING: 0
EYE REDNESS: 0
SORE THROAT: 0
ABDOMINAL PAIN: 0
BLOOD IN STOOL: 0
VOMITING: 0
NAUSEA: 0
VOICE CHANGE: 0
CHEST TIGHTNESS: 0
SHORTNESS OF BREATH: 1
DIARRHEA: 0
COLOR CHANGE: 1
TROUBLE SWALLOWING: 0
CONSTIPATION: 0

## 2018-08-09 NOTE — TELEPHONE ENCOUNTER
XR CHEST STANDARD (2 VW)   Order: [id number]   Status:  Final result   Visible to patient:  Yes (MyChart) Dx:  Cardiovascular disease; Other fatigue. .. Notes recorded by GRECIA Jeffrey on 8/9/2018 at 4:10 PM CDT  Normal Results. Contacted pt with normal results above. Pt verbalized understanding.  PP, LPN

## 2018-08-09 NOTE — PROGRESS NOTES
tiZANidine (ZANAFLEX) 4 MG tablet Take 4 mg by mouth      traMADol (ULTRAM) 50 MG tablet Take 50 mg by mouth. Hershal Call levothyroxine (SYNTHROID) 50 MCG tablet Take 1 tablet by mouth Daily 30 tablet 3    pantoprazole (PROTONIX) 40 MG tablet TAKE 1 TABLET BY MOUTH EVERY MORNING BEFORE BREAKFAST. 90 tablet 2    clopidogrel (PLAVIX) 75 MG tablet TAKE 1 TABLET BY MOUTH EVERY DAY 90 tablet 3    pravastatin (PRAVACHOL) 40 MG tablet Take 1 tablet by mouth daily 90 tablet 2    ferrous sulfate 325 (65 Fe) MG tablet Take 325 mg by mouth 2 times daily      metFORMIN (GLUCOPHAGE-XR) 500 MG extended release tablet Take 500 mg by mouth 2 times daily      nitroGLYCERIN (NITROSTAT) 0.4 MG SL tablet Place 1 tablet under the tongue every 5 minutes as needed for Chest pain 25 tablet 5    RANEXA 1000 MG extended release tablet Take 1 tablet by mouth 2 times daily 180 tablet 3    diltiazem (CARDIZEM CD) 240 MG extended release capsule Take 1 capsule by mouth daily 90 capsule 3    sertraline (ZOLOFT) 50 MG tablet TAKE 1/2 TABLET BY MOUTH AT BEDTIME. FOR 7 DAYS THEN INCREASE TO ONE FULL TABLET AT BEDTIME. 30 tablet 3    spironolactone (ALDACTONE) 25 MG tablet Take 1 tablet by mouth daily 30 tablet 11    tamsulosin (FLOMAX) 0.4 MG capsule Take 1 capsule(s) every day by oral route as directed for 30 days. 30 capsule 11    pantoprazole (PROTONIX) 40 MG tablet Take 1 tablet by mouth daily 90 tablet 2    ASPIR-81 81 MG EC tablet Take 81 mg by mouth daily. 0    b complex vitamins capsule Take 1 capsule by mouth daily.  Vitamin D (CHOLECALCIFEROL) 1000 UNITS CAPS capsule Take 1,000 Units by mouth daily.  docusate sodium (COLACE) 100 MG capsule Take 100 mg by mouth daily.  terbinafine (ATHLETES FOOT) 1 % cream Apply topically 2 times daily. 42 g 2    therapeutic multivitamin-minerals (THERAGRAN-M) tablet Take 1 tablet by mouth daily. No current facility-administered medications for this visit.         Allergies Allergen Reactions    Ancef [Cefazolin Sodium]     Cefuroxime Axetil     Cephalosporins     Neosporin [Neomycin-Polymyxin B Gu]     Tape [Adhesive Tape]        Lab Review not applicable    Subjective:   Review of Systems   Constitutional: Positive for activity change (One month), appetite change (improving), fatigue and fever. Negative for unexpected weight change. HENT: Negative for congestion, ear pain, nosebleeds, rhinorrhea, sore throat, trouble swallowing and voice change. Eyes: Negative for redness and visual disturbance. Respiratory: Positive for cough and shortness of breath. Negative for chest tightness and wheezing. Cardiovascular: Positive for leg swelling. Negative for chest pain and palpitations. Gastrointestinal: Negative for abdominal pain, blood in stool, constipation, diarrhea, nausea and vomiting. Endocrine: Positive for heat intolerance (hot flashes). Negative for cold intolerance, polydipsia, polyphagia and polyuria. Genitourinary: Negative for dysuria, frequency and urgency. Musculoskeletal: Negative for gait problem and myalgias. Skin: Positive for color change (gradually getting worse, turning jaundice. ). Negative for rash and wound. Neurological: Positive for dizziness, speech difficulty, weakness and headaches (everyday). Negative for light-headedness. Psychiatric/Behavioral: Positive for agitation and confusion. Negative for self-injury and suicidal ideas. The patient is not nervous/anxious. Objective:     Physical Exam   Constitutional: He is oriented to person, place, and time. He appears well-developed and well-nourished. No distress. HENT:   Head: Normocephalic and atraumatic. Right Ear: External ear normal.   Left Ear: External ear normal.   Nose: Nose normal.   Mouth/Throat: Oropharynx is clear and moist. No oropharyngeal exudate. Eyes: Pupils are equal, round, and reactive to light.  Conjunctivae are normal. Right eye exhibits no discharge. Left eye exhibits no discharge. Neck: Normal range of motion. Neck supple. Cardiovascular: Normal rate, regular rhythm, normal heart sounds and intact distal pulses. No murmur heard. Pulmonary/Chest: Effort normal and breath sounds normal. No stridor. No respiratory distress. He has no wheezes. He has no rales. He exhibits no tenderness. Right breast exhibits no inverted nipple, no mass, no nipple discharge, no skin change and no tenderness. Left breast exhibits no inverted nipple, no mass, no nipple discharge, no skin change and no tenderness. Abdominal: Soft. Bowel sounds are normal. He exhibits no distension. There is no tenderness. Musculoskeletal: Normal range of motion. He exhibits no edema, tenderness or deformity. Neurological: He is alert and oriented to person, place, and time. He has normal reflexes. No cranial nerve deficit. Coordination normal.   Skin: Skin is warm and dry. No rash noted. He is not diaphoretic. No erythema. Psychiatric: He has a normal mood and affect. His behavior is normal. Thought content normal.   Nursing note and vitals reviewed. /60   Pulse 65   Temp 96.8 °F (36 °C)   Ht 6' (1.829 m)   Wt 230 lb 12.8 oz (104.7 kg)   SpO2 97%   BMI 31.30 kg/m²      ? Heart medication causing anayeli, ? Cause of fatigue. Assessment:      Diagnosis Orders   1.  Anemia, unspecified type  CBC Auto Differential    Comprehensive Metabolic Panel    Hemoglobin A1C    Iron and TIBC    Lipid Panel    proBNP, N-TERMINAL    TSH without Reflex    Urinalysis    Vitamin B12    Vitamin D 25 Hydrox, D2 & D3    T3, Free    T4, Free    Transferrin    XR CHEST STANDARD (2 VW)   2. Cardiovascular disease  CBC Auto Differential    Comprehensive Metabolic Panel    Hemoglobin A1C    Iron and TIBC    Lipid Panel    proBNP, N-TERMINAL    TSH without Reflex    Urinalysis    Vitamin B12    Vitamin D 25 Hydrox, D2 & D3    T3, Free    T4, Free    Transferrin    XR CHEST STANDARD (2 VW)

## 2018-08-11 LAB — TRANSFERRIN: 216 MG/DL (ref 200–400)

## 2018-08-13 LAB
VITAMIN D2 AND D3, TOTAL: 49.7 NG/ML (ref 30–80)
VITAMIN D2, 25 HYDROXY: <1 NG/ML
VITAMIN D3,25 HYDROXY: 49.7 NG/ML

## 2018-08-14 ENCOUNTER — OFFICE VISIT (OUTPATIENT)
Dept: CARDIOLOGY | Age: 63
End: 2018-08-14
Payer: MEDICARE

## 2018-08-14 VITALS
SYSTOLIC BLOOD PRESSURE: 110 MMHG | HEART RATE: 72 BPM | HEIGHT: 72 IN | WEIGHT: 230 LBS | BODY MASS INDEX: 31.15 KG/M2 | DIASTOLIC BLOOD PRESSURE: 60 MMHG

## 2018-08-14 DIAGNOSIS — F32.A DEPRESSION, UNSPECIFIED DEPRESSION TYPE: Primary | ICD-10-CM

## 2018-08-14 DIAGNOSIS — I25.10 CORONARY ARTERY DISEASE INVOLVING NATIVE CORONARY ARTERY OF NATIVE HEART WITHOUT ANGINA PECTORIS: ICD-10-CM

## 2018-08-14 DIAGNOSIS — I95.9 HYPOTENSION, UNSPECIFIED HYPOTENSION TYPE: ICD-10-CM

## 2018-08-14 DIAGNOSIS — R53.83 FATIGUE, UNSPECIFIED TYPE: ICD-10-CM

## 2018-08-14 DIAGNOSIS — R06.09 DOE (DYSPNEA ON EXERTION): Primary | ICD-10-CM

## 2018-08-14 DIAGNOSIS — E78.2 MIXED HYPERLIPIDEMIA: ICD-10-CM

## 2018-08-14 DIAGNOSIS — Z95.1 HISTORY OF CORONARY ARTERY BYPASS GRAFT X 1: ICD-10-CM

## 2018-08-14 DIAGNOSIS — R60.0 BILATERAL LEG EDEMA: ICD-10-CM

## 2018-08-14 PROCEDURE — 1101F PT FALLS ASSESS-DOCD LE1/YR: CPT | Performed by: NURSE PRACTITIONER

## 2018-08-14 PROCEDURE — G8427 DOCREV CUR MEDS BY ELIG CLIN: HCPCS | Performed by: NURSE PRACTITIONER

## 2018-08-14 PROCEDURE — G8598 ASA/ANTIPLAT THER USED: HCPCS | Performed by: NURSE PRACTITIONER

## 2018-08-14 PROCEDURE — 99212 OFFICE O/P EST SF 10 MIN: CPT | Performed by: NURSE PRACTITIONER

## 2018-08-14 PROCEDURE — 1036F TOBACCO NON-USER: CPT | Performed by: NURSE PRACTITIONER

## 2018-08-14 PROCEDURE — 1123F ACP DISCUSS/DSCN MKR DOCD: CPT | Performed by: NURSE PRACTITIONER

## 2018-08-14 PROCEDURE — 3017F COLORECTAL CA SCREEN DOC REV: CPT | Performed by: NURSE PRACTITIONER

## 2018-08-14 PROCEDURE — G8417 CALC BMI ABV UP PARAM F/U: HCPCS | Performed by: NURSE PRACTITIONER

## 2018-08-14 PROCEDURE — 4040F PNEUMOC VAC/ADMIN/RCVD: CPT | Performed by: NURSE PRACTITIONER

## 2018-08-14 RX ORDER — DESVENLAFAXINE 25 MG/1
25 TABLET, EXTENDED RELEASE ORAL DAILY
Qty: 60 TABLET | Refills: 1 | Status: SHIPPED | OUTPATIENT
Start: 2018-08-14 | End: 2018-10-15

## 2018-08-14 RX ORDER — DILTIAZEM HYDROCHLORIDE 180 MG/1
180 CAPSULE, COATED, EXTENDED RELEASE ORAL DAILY
Qty: 90 CAPSULE | Refills: 3 | Status: ON HOLD | OUTPATIENT
Start: 2018-08-14 | End: 2022-07-26 | Stop reason: HOSPADM

## 2018-08-14 NOTE — PATIENT INSTRUCTIONS
Continue current medications as prescribed. Continue to follow up with primary care provider for non cardiac medical problems. Call the office with any problems, questions or concerns at [phone]. Follow up as scheduled with your cardiologist - Dr. Mattie Goff as scheduled. The following educational material has been included in this after visit summary for your review: heart health.     Additional instructions:  Coronary artery disease risk factors you can control: Smoking, high blood pressure, high cholesterol, diabetes, being overweight, lack of exercise and stress. Continue heart healthy diet. Take medications as directed. Exercise as tolerated. Strive for 15 minutes of exercise most days of the week. If asked to keep a blood pressure log, do so for 2 weeks. Call the office to report readings at [phone]. Blood pressure goal is 140/90 or less. If you are a diabetic, the goal is 130/80 or less. If you are taking cholesterol lowering medications, it is recommended that lab work be checked annually. Always keep a current medication list. Bring your medications to every office visit. Echocardiogram -  No prep.     [place] at the [address] [place] [address] and [address] [facility] located on the first floor of [place] [address] through hospital main entrance and turn immediately to your left. Date/Time:   Echocardiogram -  No prep. Takes approximately 30 min. An echocardiogram uses sound waves to produce images of your heart. This commonly used test allows your doctor to see how your heart is beating and pumping blood. Your doctor can use the images from an echocardiogram to identify various abnormalities in the heart muscle and valves. This test has 2 parts:   Ø You will be asked to disrobe from the waist up and given a gown to wear.  The technologist will then hook up an EKG monitor to you for the entire exam.   Ø You will then have an ultrasound of your heart (echocardiogram) to assess the heart muscle, heart valves and heart function. You may eat and take any medicines before the exam.   If you need to change your appointment, please call outpatient scheduling at [phone]. Patient Education        A Healthy Heart: Care Instructions  Your Care Instructions    Heart disease occurs when a substance called plaque builds up in the vessels that supply oxygen-rich blood to your heart. This can narrow the blood vessels and reduce blood flow. A heart attack happens when blood flow is completely blocked. A high-fat diet, smoking, and other factors increase the risk of heart disease. Your doctor has found that you have a chance of having heart disease. You can do lots of things to keep your heart healthy. It may not be easy, but you can change your diet, exercise more, and quit smoking. These steps really work to lower your chance of heart disease. Follow-up care is a key part of your treatment and safety. Be sure to make and go to all appointments, and call your doctor if you are having problems. It's also a good idea to know your test results and keep a list of the medicines you take. How can you care for yourself at home? Diet    · Use less salt when you cook and eat. This helps lower your blood pressure. Taste food before salting. Add only a little salt when you think you need it. With time, your taste buds will adjust to less salt.     · Eat fewer snack items, fast foods, canned soups, and other high-salt, high-fat, processed foods.     · Read food labels and try to avoid saturated and trans fats. They increase your risk of heart disease by raising cholesterol levels.     · Limit the amount of solid fat-butter, margarine, and shortening-you eat. Use olive, peanut, or canola oil when you cook. Bake, broil, and steam foods instead of frying them.     · Eating fish can lower your risk for heart disease. Eat at least 2 servings of fish a week.  [place], mackerel, herring, sardines, and chunk a strange feeling in the back, neck, jaw, or upper belly or in one or both shoulders or arms.     · Lightheadedness or sudden weakness.     · A fast or irregular heartbeat.    After you call 911, the  may tell you to chew 1 adult-strength or 2 to 4 low-dose aspirin. Wait for an ambulance. Do not try to drive yourself.   Watch closely for changes in your health, and be sure to contact your doctor if you have any problems. Where can you learn more? Go to https://Acesis.[employer] [facility]. org and sign in to your MetaPack account. Enter W599 in the [facility] box to learn more about \"A Healthy Heart: Care Instructions. \"     If you do not have an account, please click on the \"Sign Up Now\" link. Current as of: December 6, 2017  Content Version: 11.7  © 5271-4683 [employer]. Care instructions adapted under license by [facility] ([facility]). If you have questions about a medical condition or this instruction, always ask your healthcare professional. [address] any warranty or liability for your use of this information.

## 2018-08-14 NOTE — PROGRESS NOTES
Redo Sternotomy - SVG-PDA, TMR (RBL)    KIDNEY SURGERY  08/14/2017    KNEE ARTHROSCOPY      right ACL repair    SHOULDER ARTHROSCOPY      left-rotator cuff repair right- rotator cuff repair    SHOULDER ARTHROSCOPY  08-23-11    right    TONSILLECTOMY       Family History   Problem Relation Age of Onset    Heart Disease Other     Lung Cancer Mother     Cancer Sister      Social History   Substance Use Topics    Smoking status: Former Smoker     Packs/day: 2.00     Years: 30.00     Quit date: 11/13/1998    Smokeless tobacco: Never Used    Alcohol use No      Current Outpatient Prescriptions   Medication Sig Dispense Refill    desvenlafaxine succinate (PRISTIQ) 25 MG TB24 extended release tablet Take 1 tablet by mouth daily Take one tablet daily x 3 days then increase to 2 tablets daily. 60 tablet 1    busPIRone (BUSPAR) 15 MG tablet Take 15 mg by mouth      HYDROcodone-acetaminophen (NORCO) 7.5-325 MG per tablet Take 1-2 tablets by mouth. Mustapha Jhon meloxicam (MOBIC) 15 MG tablet Take 15 mg by mouth      ondansetron (ZOFRAN) 8 MG tablet Take 8 mg by mouth      polyethylene glycol (GLYCOLAX) powder Take 17 g by mouth      tiZANidine (ZANAFLEX) 4 MG tablet Take 4 mg by mouth 1/2 tablet at night      traMADol (ULTRAM) 50 MG tablet Take 50 mg by mouth. Mustapha John levothyroxine (SYNTHROID) 50 MCG tablet Take 1 tablet by mouth Daily 30 tablet 3    clopidogrel (PLAVIX) 75 MG tablet TAKE 1 TABLET BY MOUTH EVERY DAY 90 tablet 3    pravastatin (PRAVACHOL) 40 MG tablet Take 1 tablet by mouth daily 90 tablet 2    ferrous sulfate 325 (65 Fe) MG tablet Take 325 mg by mouth 2 times daily      metFORMIN (GLUCOPHAGE-XR) 500 MG extended release tablet Take 500 mg by mouth 2 times daily      nitroGLYCERIN (NITROSTAT) 0.4 MG SL tablet Place 1 tablet under the tongue every 5 minutes as needed for Chest pain 25 tablet 5    RANEXA 1000 MG extended release tablet Take 1 tablet by mouth 2 times daily 180 tablet 3    involving native coronary artery of native heart without angina pectoris     5. History of coronary artery bypass graft x 1     6. Hypotension, unspecified hypotension type     7. Mixed hyperlipidemia       Data reviewed:  Findings: The resting ejection fraction was calculated to be 59   percent. There was no significantNo change from XR BONE SURVEY   COMPLETE with report dated 9/6/2016 9:00 AM.   evidence of reversible ischemia. There was  evidence of myocardial   infarction with evidence of possible prior inferior infarct. Conclusion: Lexiscan without evidence of reversible ischemia                        Lexiscan with evidence of myocardial infarction   Signed by Dr David Jain on 8/9/2017 5:01 PM     Stable CV status without symptoms of overt heart failure, arrhythmia or angina. JOHNSON multifactorial - hemoglobin 9.3. Mild lower extremity edema could be secondary to hypothyroidism. Recent creatinine and BNP normal.  Check 2D echo with hx of chemotherapy to check EF. Decrease Cardizem CD to 180 mg daily due to low BP. Patient is compliant with medication regimen.     BP Readings from Last 3 Encounters:   08/14/18 110/60   08/09/18 106/60   05/08/18 96/64    Pulse Readings from Last 3 Encounters:   08/14/18 72   08/09/18 65   05/08/18 78        Wt Readings from Last 3 Encounters:   08/14/18 230 lb (104.3 kg)   08/09/18 230 lb 12.8 oz (104.7 kg)   05/08/18 223 lb (101.2 kg)     Labs reviewed:  Recent Results (from the past 1008 hour(s))   CBC Auto Differential    Collection Time: 08/09/18 12:33 PM   Result Value Ref Range    WBC 3.1 (L) 4.8 - 10.8 K/uL    RBC 2.69 (L) 4.70 - 6.10 M/uL    Hemoglobin 9.3 (L) 14.0 - 18.0 g/dL    Hematocrit 28.7 (L) 42.0 - 52.0 %    .7 (H) 80.0 - 94.0 fL    MCH 34.6 (H) 27.0 - 31.0 pg    MCHC 32.4 (L) 33.0 - 37.0 g/dL    RDW 14.6 (H) 11.5 - 14.5 %    Platelets 73 (L) 666 - 400 K/uL    MPV 10.4 9.4 - 12.4 fL    Neutrophils % 63.4 50.0 - 65.0 %    Lymphocytes % 17.5 (L) weeks. Call the office to report readings at [phone]. Blood pressure goal is 140/90 or less. If you are a diabetic, the goal is 130/80 or less. If you are taking cholesterol lowering medications, it is recommended that lab work be checked annually. Always keep a current medication list. Bring your medications to every office visit. Echocardiogram -  No prep.     [place] at the [facility] and [address] [facility] located on the first floor [place] [address] through hospital main entrance and turn immediately to your left. Date/Time: to be scheduled. Echocardiogram -  No prep. Takes approximately 30 min. An echocardiogram uses sound waves to produce images of your heart. This commonly used test allows your doctor to see how your heart is beating and pumping blood. Your doctor can use the images from an echocardiogram to identify various abnormalities in the heart muscle and valves. This test has 2 parts:   Ø You will be asked to disrobe from the waist up and given a gown to wear. The technologist will then hook up an EKG monitor to you for the entire exam.   Ø You will then have an ultrasound of your heart (echocardiogram) to assess the heart muscle, heart valves and heart function. You may eat and take any medicines before the exam.   If you need to change your appointment, please call outpatient scheduling at [phone].     GRECIA King

## 2018-09-14 ENCOUNTER — TELEPHONE (OUTPATIENT)
Dept: PRIMARY CARE CLINIC | Age: 63
End: 2018-09-14

## 2018-09-14 RX ORDER — DOXYCYCLINE HYCLATE 100 MG/1
100 CAPSULE ORAL 2 TIMES DAILY
Qty: 20 CAPSULE | Refills: 0 | Status: SHIPPED | OUTPATIENT
Start: 2018-09-14 | End: 2018-09-24

## 2018-09-14 NOTE — TELEPHONE ENCOUNTER
Called in medication per GRECIA Villar and contacted pt back and left vm notifying of this.  PP, LPN

## 2018-09-21 ENCOUNTER — TELEPHONE (OUTPATIENT)
Dept: VASCULAR SURGERY | Facility: CLINIC | Age: 63
End: 2018-09-21

## 2018-09-21 NOTE — TELEPHONE ENCOUNTER
Inquired if patient would like to reschedule tests and office appointment.Does not wish to reschedule.

## 2018-09-27 ENCOUNTER — OFFICE VISIT (OUTPATIENT)
Dept: CARDIOLOGY | Age: 63
End: 2018-09-27
Payer: MEDICARE

## 2018-09-27 VITALS
DIASTOLIC BLOOD PRESSURE: 58 MMHG | SYSTOLIC BLOOD PRESSURE: 118 MMHG | BODY MASS INDEX: 32.23 KG/M2 | HEIGHT: 72 IN | HEART RATE: 60 BPM | WEIGHT: 238 LBS

## 2018-09-27 DIAGNOSIS — R06.09 DOE (DYSPNEA ON EXERTION): ICD-10-CM

## 2018-09-27 DIAGNOSIS — I25.10 CORONARY ARTERY DISEASE INVOLVING NATIVE CORONARY ARTERY OF NATIVE HEART WITHOUT ANGINA PECTORIS: Primary | ICD-10-CM

## 2018-09-27 DIAGNOSIS — I10 ESSENTIAL HYPERTENSION: ICD-10-CM

## 2018-09-27 DIAGNOSIS — Z95.1 S/P CABG X 3: ICD-10-CM

## 2018-09-27 PROCEDURE — 3017F COLORECTAL CA SCREEN DOC REV: CPT | Performed by: NURSE PRACTITIONER

## 2018-09-27 PROCEDURE — G8417 CALC BMI ABV UP PARAM F/U: HCPCS | Performed by: NURSE PRACTITIONER

## 2018-09-27 PROCEDURE — 99214 OFFICE O/P EST MOD 30 MIN: CPT | Performed by: NURSE PRACTITIONER

## 2018-09-27 PROCEDURE — G8427 DOCREV CUR MEDS BY ELIG CLIN: HCPCS | Performed by: NURSE PRACTITIONER

## 2018-09-27 PROCEDURE — 1101F PT FALLS ASSESS-DOCD LE1/YR: CPT | Performed by: NURSE PRACTITIONER

## 2018-09-27 RX ORDER — NITROGLYCERIN 0.4 MG/1
0.4 TABLET SUBLINGUAL EVERY 5 MIN PRN
Qty: 25 TABLET | Refills: 5 | Status: SHIPPED | OUTPATIENT
Start: 2018-09-27 | End: 2019-09-05 | Stop reason: SDUPTHER

## 2018-09-27 NOTE — PATIENT INSTRUCTIONS
Continue current medications as prescribed. Continue to follow up with primary care provider for non cardiac medical problems. Call the office with any problems, questions or concerns at [phone]. Follow up as scheduled with your cardiologist - Dr. Hilario Mc as scheduled. The following educational material has been included in this after visit summary for your review: heart health.     Additional instructions:  Coronary artery disease risk factors you can control: Smoking, high blood pressure, high cholesterol, diabetes, being overweight, lack of exercise and stress. Continue heart healthy diet. Take medications as directed. Exercise as tolerated. Strive for 15 minutes of exercise most days of the week. If asked to keep a blood pressure log, do so for 2 weeks. Call the office to report readings at [phone]. Blood pressure goal is 140/90 or less. If you are a diabetic, the goal is 130/80 or less. If you are taking cholesterol lowering medications, it is recommended that lab work be checked annually. Always keep a current medication list. Bring your medications to every office visit.      [place] at the [address] [place] [address] and [address] [facility] located on the first floor [place] [address] through hospital main entrance and turn immediately to your left. Date/Time:     Patient's contact number:  [phone] (home) [phone] (work)    Echocardiogram -  No prep. Takes approximately 30 min. An echocardiogram uses sound waves to produce images of your heart. This commonly used test allows your doctor to see how your heart is beating and pumping blood. Your doctor can use the images from an echocardiogram to identify various abnormalities in the heart muscle and valves. This test has 2 parts:   Ø You will be asked to disrobe from the waist up and given a gown to wear.  The technologist will then hook up an EKG monitor to you for the entire exam.   Ø You will then have an · Sweating.     · Shortness of breath.     · Pain, pressure, or a strange feeling in the back, neck, jaw, or upper belly or in one or both shoulders or arms.     · Lightheadedness or sudden weakness.     · A fast or irregular heartbeat.    After you call 911, the  may tell you to chew 1 adult-strength or 2 to 4 low-dose aspirin. Wait for an ambulance. Do not try to drive yourself.   Watch closely for changes in your health, and be sure to contact your doctor if you have any problems. Where can you learn more? Go to https://JinggaMall.compeClearSaleingeb.Wavemark. org and sign in to your [employer]os account. Enter K143 in the [facility] box to learn more about \"A Healthy Heart: Care Instructions. \"     If you do not have an account, please click on the \"Sign Up Now\" link. Current as of: December 6, 2017  Content Version: 11.7  © 9514-9143 [employer]. Care instructions adapted under license by [facility] ([facility]). If you have questions about a medical condition or this instruction, always ask your healthcare professional. [place] [address] any warranty or liability for your use of this information.

## 2018-09-27 NOTE — PROGRESS NOTES
[facility], [place]. [address], [place] [address], [address]  [phone] office  [phone] fax      OFFICE VISIT:  2018    Eder Toussaint - : 1955    Reason For Visit:  Kate Duffy is a 70 y.o. male who is here for 1 Month Follow-Up (Presents for follow up. JOHNSON stable. Has not had echo scheduled. Hx Waldenstrom's macroglobulinemia ); Coronary Artery Disease; Shortness of Breath (JOHNSON); Hypertension; and Hyperlipidemia    The patient presents today for cardiology follow up. Hx Waldenstrom's macroglobulinemia now in remission. Currently on treatment for low RBC. Overall, the patient is doing well from a cardiac standpoint without symptoms to suggest myocardial ischemia. Dyspnea stable. No edema or orthopnea. BP is well controlled on current regimen. The patient's PCP monitors cholesterol. Needs echo scheduled. Subjective  Kate Duffy denies exertional chest pain, orthopnea, paroxysmal nocturnal dyspnea, syncope, presyncope, sustained arrythmia, edema and fatigue. The patient denies numbness or weakness to suggest cerebrovascular accident or transient ischemic attack. + stable JOHNSON.     Eder Toussaint has the following history as recorded in [facility]:    Patient Active Problem List   Diagnosis Code    Coronary artery disease I25.10    Hyperlipemia E78.5    LONG TERM ANTICOAGULENT USE     Diabetes mellitus ([facility] [address] E11.9    Shortness of breath R06.02    Chest tightness R07.89    JOHNSON (dyspnea on exertion) R06.09    Abnormal nuclear stress test R94.39    S/P CABG x 3 Z95.1    Hypertension I10    Type II or unspecified type diabetes mellitus without mention of complication, not stated as uncontrolled E11.9    Weakness generalized R53.1    Nausea R11.0    Hypotension I95.9    History of coronary artery bypass graft x 1 Z95.1    Fatigue R53.83    Tachycardia R00.0    Tachyarrhythmia R00.0    Chronic kidney disease, stage II (mild) N18.2    Obstructive sleep apnea G47.33    BiPAP (biphasic positive airway pressure) dependence Z99.89    Restless leg G25.81    Bilateral leg edema R60.0     Past Medical History:   Diagnosis Date    Abnormal nuclear stress test 10/22/2014    BiPAP (biphasic positive airway pressure) dependence     8cm to 20cm    Cerebrovascular disease     Chronic kidney disease, stage II (mild) 08/17/2016    Sarah Bah M.D.    Coronary artery disease 2/24/2011    Depression     Diabetes mellitus ([facility] [address]     Headache     Hyperlipemia 2/24/2011    Dr. Mikaela Smith follows lipids.     Hyperlipidemia     Hypertension     LONG TERM ANTICOAGULENT USE     Low blood pressure 11/19/2014    lymphostatic lymphoma     Nausea 11/19/2014    Obesity     Obstructive sleep apnea     AHI:  21.7 per PSG, 7/2017    Peptic ulcer disease 2/24/2011    Restless leg     S/P CABG x 3 10/22/2014    SVG to RCA; SVG to LAD diagonal; LIMA to LAD    Sleep apnea     Tachyarrhythmia     Thrombocythemia, essential ([facility] [address]     Type 2 diabetes mellitus without complication ([address]     Type II or unspecified type diabetes mellitus without mention of complication, not stated as uncontrolled      Past Surgical History:   Procedure Laterality Date    CARDIAC CATHETERIZATION  2/28/11    EF 60%    CARDIAC CATHETERIZATION  9/16/05, 3/7/07    EF < 50%    CARDIAC CATHETERIZATION  12/4/12   MDL    EF  50%    CARDIAC CATHETERIZATION  10/28/14  GERBER Ho M.D.   Bertis Linker GRAFT  9/21/05    LIMA to LAD and diagonal; SVG to posterior descending branch RCA    CORONARY ARTERY BYPASS GRAFT  10/30/2014    Redo Sternotomy - SVG-PDA, TMR (RBL)    KIDNEY SURGERY  08/14/2017    KNEE ARTHROSCOPY      right ACL repair    SHOULDER ARTHROSCOPY      left-rotator cuff repair right- rotator cuff repair    SHOULDER ARTHROSCOPY  08-23-11    right    TONSILLECTOMY tablet by mouth daily 90 tablet 2    ASPIR-81 81 MG EC tablet Take 81 mg by mouth daily. 0    b complex vitamins capsule Take 1 capsule by mouth daily.  Vitamin D (CHOLECALCIFEROL) 1000 UNITS CAPS capsule Take 1,000 Units by mouth daily.  docusate sodium (COLACE) 100 MG capsule Take 100 mg by mouth daily. No current facility-administered medications for this visit. Allergies: Ancef [cefazolin sodium]; Cefuroxime axetil; Cephalosporins; Neosporin [neomycin-polymyxin b gu]; and Tape [adhesive tape]    Review of Systems  Constitutional  no appetite change, or unexpected weight change. No fever, chills or diaphoresis. No significant change in activity level or new onset of fatigue. HEENT  no significant rhinorrhea or epistaxis. No tinnitus or significant hearing loss. Eyes  no sudden vision change or amaurosis. No corneal arcus, xantholasma, subconjunctival hemorrhage or discharge. Respiratory  no significant wheezing, stridor, apnea or cough. + stable JOHNSON. Cardiovascular  no exertional chest pain to suggest myocardial ischemia. No orthopnea or PND. No sensation of sustained arrythmia. No occurrence of slow heart rate. No palpitations. No claudication. No leg edema. Gastrointestinal  no abdominal swelling or pain. No blood in stool. No severe constipation, diarrhea, nausea, or vomiting. Genitourinary  no dysuria, frequency, or urgency. No flank pain or hematuria. Musculoskeletal  no back pain or myalgia. No problems with gait. Extremities - no clubbing, cyanosis or edema. Skin  no color change or rash. No pallor. No new surgical incision. Neurologic  no speech difficulty, facial asymmetry or lateralizing weakness. No seizures, presyncope or syncope. No significant dizziness. Hematologic  no easy bruising or excessive bleeding. Psychiatric  no severe anxiety or insomnia. No confusion. All other review of systems are negative.     Objective  Vital Signs 08/09/18 106/60    Pulse Readings from Last 3 Encounters:   09/27/18 60   08/14/18 72   08/09/18 65        Wt Readings from Last 3 Encounters:   09/27/18 238 lb (108 kg)   08/14/18 230 lb (104.3 kg)   08/09/18 230 lb 12.8 oz (104.7 kg)     Plan  Previous cardiac history and records reviewed. Continue current medications as prescribed. Continue to follow up with primary care provider for non cardiac medical problems. Call the office with any problems, questions or concerns at [phone]. Follow up as scheduled with your cardiologist.  The following educational material has been included in this after visit summary for your review: heart health. Additional instructions:  Coronary artery disease risk factors you can control:  Smoking, high blood pressure, high cholesterol, diabetes, being overweight, lack of exercise and stress. Continue heart healthy diet. Take medications as directed. Exercise as tolerated. Strive for 15 minutes of exercise most days of the week. If asked to keep a blood pressure log, do so for 2 weeks. Call the office to report readings at [phone]. Blood pressure goal is 140/90 or less. If you are a diabetic, the goal is 130/80 or less. If you are taking cholesterol lowering medications, it is recommended that lab work be checked annually. Always keep a current medication list.  Bring your medications to every office visit.       GRECIA Anne

## 2018-10-15 ENCOUNTER — HOSPITAL ENCOUNTER (EMERGENCY)
Age: 63
Discharge: HOME | End: 2018-10-15
Payer: MEDICARE

## 2018-10-15 ENCOUNTER — APPOINTMENT (OUTPATIENT)
Dept: GENERAL RADIOLOGY | Age: 63
End: 2018-10-15
Payer: MEDICARE

## 2018-10-15 VITALS
OXYGEN SATURATION: 94 % | TEMPERATURE: 98 F | BODY MASS INDEX: 31.15 KG/M2 | HEART RATE: 77 BPM | WEIGHT: 230 LBS | DIASTOLIC BLOOD PRESSURE: 60 MMHG | HEIGHT: 72 IN | SYSTOLIC BLOOD PRESSURE: 112 MMHG | RESPIRATION RATE: 18 BRPM

## 2018-10-15 DIAGNOSIS — S61.012A LACERATION OF LEFT THUMB WITHOUT FOREIGN BODY WITHOUT DAMAGE TO NAIL, INITIAL ENCOUNTER: Primary | ICD-10-CM

## 2018-10-15 PROCEDURE — 6360000002 HC RX W HCPCS: Performed by: NURSE PRACTITIONER

## 2018-10-15 PROCEDURE — 90471 IMMUNIZATION ADMIN: CPT | Performed by: NURSE PRACTITIONER

## 2018-10-15 PROCEDURE — 90715 TDAP VACCINE 7 YRS/> IM: CPT | Performed by: NURSE PRACTITIONER

## 2018-10-15 PROCEDURE — 12001 RPR S/N/AX/GEN/TRNK 2.5CM/<: CPT

## 2018-10-15 PROCEDURE — 99282 EMERGENCY DEPT VISIT SF MDM: CPT

## 2018-10-15 PROCEDURE — 73130 X-RAY EXAM OF HAND: CPT

## 2018-10-15 PROCEDURE — 99282 EMERGENCY DEPT VISIT SF MDM: CPT | Performed by: NURSE PRACTITIONER

## 2018-10-15 PROCEDURE — 12001 RPR S/N/AX/GEN/TRNK 2.5CM/<: CPT | Performed by: NURSE PRACTITIONER

## 2018-10-15 RX ORDER — TAMSULOSIN HYDROCHLORIDE 0.4 MG/1
CAPSULE ORAL
Qty: 30 CAPSULE | Refills: 11 | Status: SHIPPED | OUTPATIENT
Start: 2018-10-15 | End: 2021-08-24 | Stop reason: SDUPTHER

## 2018-10-15 RX ADMIN — TETANUS TOXOID, REDUCED DIPHTHERIA TOXOID AND ACELLULAR PERTUSSIS VACCINE, ADSORBED 0.5 ML: 5; 2.5; 8; 8; 2.5 SUSPENSION INTRAMUSCULAR at 14:16

## 2018-10-15 ASSESSMENT — PAIN SCALES - GENERAL: PAINLEVEL_OUTOF10: 2

## 2018-10-15 NOTE — ED PROVIDER NOTES
[facility] EMERGENCY DEPT  eMERGENCY dEPARTMENT eNCOUnter      Pt Name: Carmenza Small  MRN: [id number]  [place] 1955  Date of evaluation: 10/15/2018  Provider: GRECIA Rodriguez    CHIEF COMPLAINT       Chief Complaint   Patient presents with    Laceration     Pt to ED with laceration to left hand from pocket knife         HISTORY OF PRESENT ILLNESS   (Location/Symptom, Timing/Onset, Context/Setting, Quality, Duration, Modifying Factors, Severity)  Note limiting factors. Carmenza Small is a 70 y.o. male who presents to the emergency department after lacerating his left hand when he was using a knife to pry something and it slipped     The history is provided by the patient. Hand Problem   Laceration   Location:  Hand  Hand laceration location:  L hand  Length:  2  Depth:  Cutaneous  Quality: straight    Bleeding: controlled    Time since incident:  1 hour  Laceration mechanism:  Knife  Foreign body present:  No foreign bodies  Tetanus status:  Unknown      Nursing Notes were reviewed. REVIEW OF SYSTEMS    (2-9 systems for level 4, 10 or more for level 5)     Review of Systems   Skin: Positive for wound. Except as noted above the remainder of the review ofsystems was reviewed and negative. PAST MEDICAL HISTORY     Past Medical History:   Diagnosis Date    Abnormal nuclear stress test 10/22/2014    BiPAP (biphasic positive airway pressure) dependence     8cm to 20cm    Cerebrovascular disease     Chronic kidney disease, stage II (mild) 08/17/2016    Terence Leavitt M.D.    Coronary artery disease 2/24/2011    Depression     Diabetes mellitus ([facility] [address]     Headache     Hyperlipemia 2/24/2011    Dr. Marbin Godoy follows lipids.     Hyperlipidemia     Hypertension     LONG TERM ANTICOAGULENT USE     Low blood pressure 11/19/2014    lymphostatic lymphoma     Nausea 11/19/2014    Obesity     Obstructive sleep apnea     AHI:  21.7 per PSG, 7/2017    Peptic ulcer disease 2/24/2011    Restless leg     S/P CABG x 3 10/22/2014    SVG to RCA; SVG to LAD diagonal; LIMA to LAD    Sleep apnea     Tachyarrhythmia     Thrombocythemia, essential ([facility] [address]     Type 2 diabetes mellitus without complication ([facility] [address] [facility]     Type II or unspecified type diabetes mellitus without mention of complication, not stated as uncontrolled          SURGICAL HISTORY       Past Surgical History:   Procedure Laterality Date    CARDIAC CATHETERIZATION  2/28/11    EF 60%    CARDIAC CATHETERIZATION  9/16/05, 3/7/07    EF < 50%    CARDIAC CATHETERIZATION  12/4/12   MDL    EF  50%    CARDIAC CATHETERIZATION  10/28/14  GERBER Chand M.D.   Lorrayne Flicker GRAFT  9/21/05    LIMA to LAD and diagonal; SVG to posterior descending branch RCA    CORONARY ARTERY BYPASS GRAFT  10/30/2014    Redo Sternotomy - SVG-PDA, TMR (RBL)    KIDNEY SURGERY  08/14/2017    KNEE ARTHROSCOPY      right ACL repair    SHOULDER ARTHROSCOPY      left-rotator cuff repair right- rotator cuff repair    SHOULDER ARTHROSCOPY  08-23-11    right    TONSILLECTOMY           CURRENT MEDICATIONS       Discharge Medication List as of 10/15/2018  2:52 PM      CONTINUE these medications which have NOT CHANGED    Details   tamsulosin (FLOMAX) 0.4 MG capsule Take 1 capsule(s) every day by oral route as directed for 30 days. , Disp-30 capsule, R-11Normal      nitroGLYCERIN (NITROSTAT) 0.4 MG SL tablet Place 1 tablet under the tongue every 5 minutes as needed for Chest pain, Disp-25 tablet, R-5Normal      diltiazem (CARDIZEM CD) 180 MG extended release capsule Take 1 capsule by mouth daily, Disp-90 capsule, R-3Dose decreasedNormal      busPIRone (BUSPAR) 15 MG tablet Take 15 mg by mouthHistorical Med      HYDROcodone-acetaminophen (NORCO) 7.5-325 MG per tablet Take 1-2 tablets by mouth. .Historical Med      meloxicam (MOBIC) 15 MG tablet Take 15 mg by mouthHistorical Med      ondansetron (ZOFRAN) 8 MG EVENS L  Authorized by: Calin Whittington     Consent:     Consent obtained:  Verbal    Consent given by:  Patient    Risks discussed:  Infection  Anesthesia (see MAR for exact dosages): Anesthesia method:  Local infiltration    Local anesthetic:  Lidocaine 1% w/o epi  Laceration details:     Location:  Finger    Finger location:  L thumb    Length (cm):  2    Depth (mm):  2  Exploration:     Wound extent: no foreign bodies/material noted, no underlying fracture noted and no vascular damage noted      Contaminated: no    Treatment:     Area cleansed with:  Betadine and saline    Amount of cleaning:  Standard    Irrigation method:  Syringe    Visualized foreign bodies/material removed: no    Skin repair:     Repair method:  Sutures    Suture material:  Nylon    Suture technique:  Simple interrupted    Number of sutures:  5  Approximation:     Approximation:  Close    Vermilion border: well-aligned    Post-procedure details:     Dressing:  Antibiotic ointment and sterile dressing    Patient tolerance of procedure: Tolerated well, no immediate complications        FINAL IMPRESSION      1.  Laceration of left thumb without foreign body without damage to nail, initial encounter          DISPOSITION/PLAN   DISPOSITION Decision To Discharge    PATIENT REFERRED TO:  [address] [place] [address] [facility]  [phone]  In 10 days  For suture removal      DISCHARGE MEDICATIONS:  Discharge Medication List as of 10/15/2018  2:52 PM             (Please notethat portions of this note were completed with a voice recognition program.  Efforts were made to edit the dictations but occasionally words are mis-transcribed.)    GRECIA Alston (electronically signed)          GRECIA Alston  10/17/18 0700

## 2018-10-19 LAB
ALBUMIN SERPL-MCNC: 4.6 G/DL (ref 3.5–5.2)
ANION GAP SERPL CALCULATED.3IONS-SCNC: 14 MMOL/L (ref 7–19)
BACTERIA: NEGATIVE /HPF
BASOPHILS ABSOLUTE: 0 K/UL (ref 0–0.2)
BASOPHILS RELATIVE PERCENT: 0.3 % (ref 0–1)
BILIRUBIN URINE: NEGATIVE
BLOOD, URINE: NEGATIVE
BUN BLDV-MCNC: 15 MG/DL (ref 8–23)
CALCIUM SERPL-MCNC: 10.1 MG/DL (ref 8.8–10.2)
CHLORIDE BLD-SCNC: 98 MMOL/L (ref 98–111)
CLARITY: CLEAR
CO2: 27 MMOL/L (ref 22–29)
COLOR: YELLOW
CREAT SERPL-MCNC: 1 MG/DL (ref 0.5–1.2)
CREATININE URINE: 70.5 MG/DL (ref 4.2–622)
EOSINOPHILS ABSOLUTE: 0.1 K/UL (ref 0–0.6)
EOSINOPHILS RELATIVE PERCENT: 2.5 % (ref 0–5)
EPITHELIAL CELLS, UA: 1 /HPF (ref 0–5)
GFR NON-AFRICAN AMERICAN: >60
GLUCOSE BLD-MCNC: 134 MG/DL (ref 74–109)
GLUCOSE URINE: NEGATIVE MG/DL
HCT VFR BLD CALC: 31 % (ref 42–52)
HEMOGLOBIN: 10 G/DL (ref 14–18)
HYALINE CASTS: 0 /HPF (ref 0–8)
KETONES, URINE: NEGATIVE MG/DL
LEUKOCYTE ESTERASE, URINE: ABNORMAL
LYMPHOCYTES ABSOLUTE: 0.6 K/UL (ref 1.1–4.5)
LYMPHOCYTES RELATIVE PERCENT: 16.3 % (ref 20–40)
MAGNESIUM: 2 MG/DL (ref 1.6–2.4)
MCH RBC QN AUTO: 34.5 PG (ref 27–31)
MCHC RBC AUTO-ENTMCNC: 32.3 G/DL (ref 33–37)
MCV RBC AUTO: 106.9 FL (ref 80–94)
MONOCYTES ABSOLUTE: 0.5 K/UL (ref 0–0.9)
MONOCYTES RELATIVE PERCENT: 14.4 % (ref 0–10)
NEUTROPHILS ABSOLUTE: 2.3 K/UL (ref 1.5–7.5)
NEUTROPHILS RELATIVE PERCENT: 65.9 % (ref 50–65)
NITRITE, URINE: NEGATIVE
PARATHYROID HORMONE INTACT: 27.3 PG/ML (ref 15–65)
PDW BLD-RTO: 15.9 % (ref 11.5–14.5)
PH UA: 7
PHOSPHORUS: 3.8 MG/DL (ref 2.5–4.5)
PLATELET # BLD: 71 K/UL (ref 130–400)
PMV BLD AUTO: 10.9 FL (ref 9.4–12.4)
POTASSIUM SERPL-SCNC: 4.4 MMOL/L (ref 3.5–5)
PROTEIN PROTEIN: 19 MG/DL (ref 15–45)
PROTEIN UA: ABNORMAL MG/DL
RBC # BLD: 2.9 M/UL (ref 4.7–6.1)
RBC UA: 4 /HPF (ref 0–4)
SODIUM BLD-SCNC: 139 MMOL/L (ref 136–145)
SPECIFIC GRAVITY UA: 1.02
URIC ACID, SERUM: 4.5 MG/DL (ref 3.4–7)
UROBILINOGEN, URINE: 2 E.U./DL
WBC # BLD: 3.6 K/UL (ref 4.8–10.8)
WBC UA: 13 /HPF (ref 0–5)

## 2018-12-02 ENCOUNTER — HOSPITAL ENCOUNTER (EMERGENCY)
Age: 63
Discharge: HOME | End: 2018-12-02
Payer: MEDICARE

## 2018-12-02 ENCOUNTER — APPOINTMENT (OUTPATIENT)
Dept: GENERAL RADIOLOGY | Age: 63
End: 2018-12-02
Payer: MEDICARE

## 2018-12-02 VITALS
RESPIRATION RATE: 22 BRPM | TEMPERATURE: 98.7 F | WEIGHT: 235 LBS | DIASTOLIC BLOOD PRESSURE: 57 MMHG | SYSTOLIC BLOOD PRESSURE: 122 MMHG | BODY MASS INDEX: 31.87 KG/M2 | OXYGEN SATURATION: 95 % | HEART RATE: 85 BPM

## 2018-12-02 DIAGNOSIS — L03.032 CELLULITIS OF TOE, LEFT: Primary | ICD-10-CM

## 2018-12-02 PROCEDURE — 87070 CULTURE OTHR SPECIMN AEROBIC: CPT

## 2018-12-02 PROCEDURE — 87205 SMEAR GRAM STAIN: CPT

## 2018-12-02 PROCEDURE — 99283 EMERGENCY DEPT VISIT LOW MDM: CPT

## 2018-12-02 PROCEDURE — 99283 EMERGENCY DEPT VISIT LOW MDM: CPT | Performed by: PHYSICIAN ASSISTANT

## 2018-12-02 PROCEDURE — 73630 X-RAY EXAM OF FOOT: CPT

## 2018-12-02 RX ORDER — SULFAMETHOXAZOLE AND TRIMETHOPRIM 800; 160 MG/1; MG/1
1 TABLET ORAL 3 TIMES DAILY
Qty: 21 TABLET | Refills: 0 | Status: SHIPPED | OUTPATIENT
Start: 2018-12-02 | End: 2018-12-09

## 2018-12-02 NOTE — ED PROVIDER NOTES
Disp-30 tablet, R-11Normal      pantoprazole (PROTONIX) 40 MG tablet Take 1 tablet by mouth daily, Disp-90 tablet, R-2Normal      ASPIR-81 81 MG EC tablet Take 81 mg by mouth daily. , R-0      b complex vitamins capsule Take 1 capsule by mouth daily. Vitamin D (CHOLECALCIFEROL) 1000 UNITS CAPS capsule Take 1,000 Units by mouth daily. docusate sodium (COLACE) 100 MG capsule Take 100 mg by mouth daily. !! - Potential duplicate medications found. Please discuss with provider. 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 Social History Main Topics    Smoking status: Former Smoker     Packs/day: 2.00     Years: 30.00     Quit date: 11/13/1998    Smokeless tobacco: Never Used    Alcohol use No    Drug use: No    Sexual activity: Yes     Partners: Female     Other Topics Concern    None     Social History Narrative    None       SCREENINGS           PHYSICAL EXAM    (up to 7 forlevel 4, 8 or more for level 5)     ED Triage Vitals   BP Temp Temp Source Pulse Resp SpO2 Height Weight   12/02/18 1414 12/02/18 1414 12/02/18 1414 12/02/18 1414 12/02/18 1416 12/02/18 1414 -- 12/02/18 1414   (!) 122/57 98.7 °F (37.1 °C) Oral 85 22 95 %  235 lb (106.6 kg)       Physical Exam   Constitutional: He is oriented to person, place, and time. He appears well-developed and well-nourished. No distress. HENT:   Head: Normocephalic and atraumatic.    Right Ear: External ear normal.   Left Ear:

## 2018-12-04 DIAGNOSIS — S91.309A WOUND OF FOOT: Primary | ICD-10-CM

## 2018-12-04 ASSESSMENT — ENCOUNTER SYMPTOMS
ABDOMINAL PAIN: 0
SHORTNESS OF BREATH: 0
COUGH: 0
COLOR CHANGE: 0
NAUSEA: 0
BACK PAIN: 0
WHEEZING: 0
CHEST TIGHTNESS: 0

## 2018-12-06 ENCOUNTER — HOSPITAL ENCOUNTER (OUTPATIENT)
Dept: WOUND CARE | Age: 63
Discharge: HOME | End: 2018-12-06
Payer: MEDICARE

## 2018-12-06 VITALS
SYSTOLIC BLOOD PRESSURE: 97 MMHG | DIASTOLIC BLOOD PRESSURE: 57 MMHG | RESPIRATION RATE: 18 BRPM | WEIGHT: 235 LBS | BODY MASS INDEX: 31.83 KG/M2 | TEMPERATURE: 97.3 F | HEART RATE: 77 BPM | HEIGHT: 72 IN

## 2018-12-06 DIAGNOSIS — E11.621 DIABETIC ULCER OF TOE OF RIGHT FOOT ASSOCIATED WITH TYPE 2 DIABETES MELLITUS, WITH FAT LAYER EXPOSED (HCC): ICD-10-CM

## 2018-12-06 DIAGNOSIS — L97.512 NON-PRESSURE CHRONIC ULCER OF OTHER PART OF RIGHT FOOT WITH FAT LAYER EXPOSED (HCC): ICD-10-CM

## 2018-12-06 DIAGNOSIS — L97.512 DIABETIC ULCER OF TOE OF RIGHT FOOT ASSOCIATED WITH TYPE 2 DIABETES MELLITUS, WITH FAT LAYER EXPOSED (HCC): ICD-10-CM

## 2018-12-06 DIAGNOSIS — E11.8 TYPE 2 DIABETES MELLITUS WITH COMPLICATION, UNSPECIFIED WHETHER LONG TERM INSULIN USE: Primary | ICD-10-CM

## 2018-12-06 LAB
GRAM STAIN RESULT: NORMAL
WOUND/ABSCESS: NORMAL

## 2018-12-06 PROCEDURE — 97597 DBRDMT OPN WND 1ST 20 CM/<: CPT | Performed by: NURSE PRACTITIONER

## 2018-12-06 PROCEDURE — 97597 DBRDMT OPN WND 1ST 20 CM/<: CPT

## 2018-12-06 PROCEDURE — 99214 OFFICE O/P EST MOD 30 MIN: CPT | Performed by: NURSE PRACTITIONER

## 2018-12-06 PROCEDURE — 99213 OFFICE O/P EST LOW 20 MIN: CPT

## 2018-12-06 ASSESSMENT — PAIN DESCRIPTION - PROGRESSION: CLINICAL_PROGRESSION: NOT CHANGED

## 2018-12-06 ASSESSMENT — PAIN DESCRIPTION - ONSET: ONSET: ON-GOING

## 2018-12-06 ASSESSMENT — PAIN DESCRIPTION - DESCRIPTORS: DESCRIPTORS: ACHING

## 2018-12-06 ASSESSMENT — PAIN SCALES - GENERAL: PAINLEVEL_OUTOF10: 4

## 2018-12-06 ASSESSMENT — PAIN DESCRIPTION - FREQUENCY: FREQUENCY: CONTINUOUS

## 2018-12-06 ASSESSMENT — PAIN DESCRIPTION - LOCATION: LOCATION: FOOT

## 2018-12-06 ASSESSMENT — PAIN DESCRIPTION - PAIN TYPE: TYPE: ACUTE PAIN

## 2018-12-06 ASSESSMENT — PAIN DESCRIPTION - ORIENTATION: ORIENTATION: RIGHT

## 2018-12-06 NOTE — PROGRESS NOTES
Cefuroxime axetil; Cephalosporins; and Neosporin [neomycin-polymyxin b gu]    Past Medical History:   Diagnosis Date    Abnormal nuclear stress test 10/22/2014    BiPAP (biphasic positive airway pressure) dependence     8cm to 20cm    Cerebrovascular disease     Chronic kidney disease, stage II (mild) 08/17/2016    Delilah Vincent M.D.   [facility] Coronary artery disease 2/24/2011    Depression     Diabetes mellitus ([facility] [address]     Headache     Hyperlipemia 2/24/2011    Dr. Kan Wadsworth follows lipids.     Hyperlipidemia     Hypertension     LONG TERM ANTICOAGULENT USE     Low blood pressure 11/19/2014    lymphostatic lymphoma     Nausea 11/19/2014    Obesity     Obstructive sleep apnea     AHI:  21.7 per PSG, 7/2017    Peptic ulcer disease 2/24/2011    Restless leg     S/P CABG x 3 10/22/2014    SVG to RCA; SVG to LAD diagonal; LIMA to LAD    Sleep apnea     Tachyarrhythmia     Thrombocythemia, essential ([address]     Type 2 diabetes mellitus without complication ([address]     Type II or unspecified type diabetes mellitus without mention of complication, not stated as uncontrolled      Past Surgical History:   Procedure Laterality Date    CARDIAC CATHETERIZATION  2/28/11    EF 60%    CARDIAC CATHETERIZATION  9/16/05, 3/7/07    EF < 50%    CARDIAC CATHETERIZATION  12/4/12   MDL    EF  50%    CARDIAC CATHETERIZATION  10/28/14  GERBER Weber M.D.   Ellie Richter CORONARY ARTERY BYPASS GRAFT  9/21/05    LIMA to LAD and diagonal; SVG to posterior descending branch RCA    CORONARY ARTERY BYPASS GRAFT  10/30/2014    Redo Sternotomy - SVG-PDA, TMR (RBL)    KIDNEY SURGERY  08/14/2017    KNEE ARTHROSCOPY      right ACL repair    SHOULDER ARTHROSCOPY      left-rotator cuff repair right- rotator cuff repair    SHOULDER ARTHROSCOPY  08-23-11    right    TONSILLECTOMY       Family History   Problem Relation Age of Onset    Heart Disease Other     Lung Cancer atherosclerosis of all vascular beds have been reviewed with the patient including:  Family history, tobacco abuse in all forms, elevated cholesterol, hyperlipidemia, and diabetes. Assessment    1. Type 2 diabetes mellitus with complication, unspecified whether long term insulin use ([address]    2. Non-pressure chronic ulcer of other part of right foot with fat layer exposed ([address]    3. Diabetic ulcer of toe of right foot associated with type 2 diabetes mellitus, with fat layer exposed ([address]        Plan for wound - Dress per physician order  Treatment:     Compression : No   Offloading : Yes   Dressing : SEE AVS Discharge Instructions    Plan  Discussed importance of glucose control and offloading wound. Patient needs diabetic shoes, will address once wound has healed. 1. Follow up 1 week   2. LEAs prior to next visit   3. Labs prior to next visit     I spent a total of 45 minutes face to face with the patient. Over 50% of that time was spent on counseling and care coordination. Patient was told that if symptoms worsen or new symptoms develop they are to go to the emergency department immediately. Patient was educated on diagnosis and treatment plan. All of patient's questions were answered, and the patient understands the discharge plan. Discussed appropriate home care of this wound. Wound redressed. Patient instructions were given. Recommend no smoking  Offloading instructions given    The patient was educated on care and need for follow-up. Strict return instructions including red flag signs and symptoms were discussed with the patient. Medications for discharge discussed, and adverse effects reviewed. Questions invited and answered. Patient shows understanding of the discharge information and agrees to follow-up.     Discharge Instructions       Visit Discharge/Physician Orders    Discharge condition: Stable    Discharge to: Home    Left via:Private automobile    Accompanied by:  self    ECF/HHA:

## 2018-12-11 DIAGNOSIS — E11.621 DIABETIC ULCER OF TOE OF RIGHT FOOT ASSOCIATED WITH TYPE 2 DIABETES MELLITUS, WITH FAT LAYER EXPOSED (HCC): ICD-10-CM

## 2018-12-11 DIAGNOSIS — L97.512 NON-PRESSURE CHRONIC ULCER OF OTHER PART OF RIGHT FOOT WITH FAT LAYER EXPOSED (HCC): ICD-10-CM

## 2018-12-11 DIAGNOSIS — E11.8 TYPE 2 DIABETES MELLITUS WITH COMPLICATION, UNSPECIFIED WHETHER LONG TERM INSULIN USE: ICD-10-CM

## 2018-12-11 DIAGNOSIS — L97.512 DIABETIC ULCER OF TOE OF RIGHT FOOT ASSOCIATED WITH TYPE 2 DIABETES MELLITUS, WITH FAT LAYER EXPOSED (HCC): ICD-10-CM

## 2018-12-11 LAB
ALBUMIN SERPL-MCNC: 4.6 G/DL (ref 3.5–5.2)
ALP BLD-CCNC: 88 U/L (ref 40–130)
ALT SERPL-CCNC: 18 U/L (ref 5–41)
ANION GAP SERPL CALCULATED.3IONS-SCNC: 12 MMOL/L (ref 7–19)
AST SERPL-CCNC: 19 U/L (ref 5–40)
BASOPHILS ABSOLUTE: 0 K/UL (ref 0–0.2)
BASOPHILS RELATIVE PERCENT: 0.2 % (ref 0–1)
BILIRUB SERPL-MCNC: 0.7 MG/DL (ref 0.2–1.2)
BUN BLDV-MCNC: 20 MG/DL (ref 8–23)
C-REACTIVE PROTEIN: 0.21 MG/DL (ref 0–0.5)
CALCIUM SERPL-MCNC: 9.8 MG/DL (ref 8.8–10.2)
CHLORIDE BLD-SCNC: 99 MMOL/L (ref 98–111)
CO2: 27 MMOL/L (ref 22–29)
CREAT SERPL-MCNC: 1.4 MG/DL (ref 0.5–1.2)
EOSINOPHILS ABSOLUTE: 0.1 K/UL (ref 0–0.6)
EOSINOPHILS RELATIVE PERCENT: 3.3 % (ref 0–5)
GFR NON-AFRICAN AMERICAN: 51
GLUCOSE BLD-MCNC: 115 MG/DL (ref 74–109)
HBA1C MFR BLD: 6 % (ref 4–6)
HCT VFR BLD CALC: 31.4 % (ref 42–52)
HEMOGLOBIN: 9.9 G/DL (ref 14–18)
LYMPHOCYTES ABSOLUTE: 0.9 K/UL (ref 1.1–4.5)
LYMPHOCYTES RELATIVE PERCENT: 21.8 % (ref 20–40)
MCH RBC QN AUTO: 34 PG (ref 27–31)
MCHC RBC AUTO-ENTMCNC: 31.5 G/DL (ref 33–37)
MCV RBC AUTO: 107.9 FL (ref 80–94)
MONOCYTES ABSOLUTE: 0.6 K/UL (ref 0–0.9)
MONOCYTES RELATIVE PERCENT: 14.8 % (ref 0–10)
NEUTROPHILS ABSOLUTE: 2.5 K/UL (ref 1.5–7.5)
NEUTROPHILS RELATIVE PERCENT: 59.2 % (ref 50–65)
PDW BLD-RTO: 15.9 % (ref 11.5–14.5)
PLATELET # BLD: 85 K/UL (ref 130–400)
PMV BLD AUTO: 10.3 FL (ref 9.4–12.4)
POTASSIUM SERPL-SCNC: 5.7 MMOL/L (ref 3.5–5)
PREALBUMIN: 25 MG/DL (ref 20–40)
RBC # BLD: 2.91 M/UL (ref 4.7–6.1)
SEDIMENTATION RATE, ERYTHROCYTE: 44 MM/HR (ref 0–15)
SODIUM BLD-SCNC: 138 MMOL/L (ref 136–145)
TOTAL PROTEIN: 7.2 G/DL (ref 6.6–8.7)
WBC # BLD: 4.2 K/UL (ref 4.8–10.8)

## 2018-12-12 ENCOUNTER — HOSPITAL ENCOUNTER (OUTPATIENT)
Dept: NON INVASIVE DIAGNOSTICS | Age: 63
Discharge: HOME | End: 2018-12-12
Payer: MEDICARE

## 2018-12-12 DIAGNOSIS — E11.8 TYPE 2 DIABETES MELLITUS WITH COMPLICATION, UNSPECIFIED WHETHER LONG TERM INSULIN USE: ICD-10-CM

## 2018-12-12 DIAGNOSIS — L97.512 NON-PRESSURE CHRONIC ULCER OF OTHER PART OF RIGHT FOOT WITH FAT LAYER EXPOSED (HCC): ICD-10-CM

## 2018-12-12 DIAGNOSIS — E11.621 DIABETIC ULCER OF TOE OF RIGHT FOOT ASSOCIATED WITH TYPE 2 DIABETES MELLITUS, WITH FAT LAYER EXPOSED (HCC): ICD-10-CM

## 2018-12-12 DIAGNOSIS — L97.512 DIABETIC ULCER OF TOE OF RIGHT FOOT ASSOCIATED WITH TYPE 2 DIABETES MELLITUS, WITH FAT LAYER EXPOSED (HCC): ICD-10-CM

## 2018-12-12 PROCEDURE — 93923 UPR/LXTR ART STDY 3+ LVLS: CPT

## 2018-12-13 ENCOUNTER — OFFICE VISIT (OUTPATIENT)
Dept: PRIMARY CARE CLINIC | Age: 63
End: 2018-12-13
Payer: MEDICARE

## 2018-12-13 ENCOUNTER — HOSPITAL ENCOUNTER (OUTPATIENT)
Dept: WOUND CARE | Age: 63
Discharge: HOME | End: 2018-12-13
Payer: MEDICARE

## 2018-12-13 VITALS
HEIGHT: 72 IN | WEIGHT: 234 LBS | BODY MASS INDEX: 31.69 KG/M2 | HEART RATE: 72 BPM | SYSTOLIC BLOOD PRESSURE: 120 MMHG | DIASTOLIC BLOOD PRESSURE: 60 MMHG | OXYGEN SATURATION: 99 % | TEMPERATURE: 97.7 F

## 2018-12-13 VITALS
TEMPERATURE: 98.5 F | RESPIRATION RATE: 18 BRPM | WEIGHT: 234 LBS | DIASTOLIC BLOOD PRESSURE: 70 MMHG | SYSTOLIC BLOOD PRESSURE: 125 MMHG | HEART RATE: 74 BPM | BODY MASS INDEX: 31.69 KG/M2 | HEIGHT: 72 IN

## 2018-12-13 DIAGNOSIS — E11.9 ENCOUNTER FOR DIABETIC FOOT EXAM (HCC): Primary | ICD-10-CM

## 2018-12-13 DIAGNOSIS — M21.612 BILATERAL BUNIONS: ICD-10-CM

## 2018-12-13 DIAGNOSIS — E11.621 DIABETIC ULCER OF TOE OF RIGHT FOOT ASSOCIATED WITH TYPE 2 DIABETES MELLITUS, WITH FAT LAYER EXPOSED (HCC): ICD-10-CM

## 2018-12-13 DIAGNOSIS — M21.611 BILATERAL BUNIONS: ICD-10-CM

## 2018-12-13 DIAGNOSIS — L97.512 DIABETIC ULCER OF TOE OF RIGHT FOOT ASSOCIATED WITH TYPE 2 DIABETES MELLITUS, WITH FAT LAYER EXPOSED (HCC): ICD-10-CM

## 2018-12-13 DIAGNOSIS — L97.512 NON-PRESSURE CHRONIC ULCER OF OTHER PART OF RIGHT FOOT WITH FAT LAYER EXPOSED (HCC): ICD-10-CM

## 2018-12-13 PROCEDURE — G8427 DOCREV CUR MEDS BY ELIG CLIN: HCPCS | Performed by: NURSE PRACTITIONER

## 2018-12-13 PROCEDURE — 1101F PT FALLS ASSESS-DOCD LE1/YR: CPT | Performed by: NURSE PRACTITIONER

## 2018-12-13 PROCEDURE — G8484 FLU IMMUNIZE NO ADMIN: HCPCS | Performed by: NURSE PRACTITIONER

## 2018-12-13 PROCEDURE — 1123F ACP DISCUSS/DSCN MKR DOCD: CPT | Performed by: NURSE PRACTITIONER

## 2018-12-13 PROCEDURE — 4040F PNEUMOC VAC/ADMIN/RCVD: CPT | Performed by: NURSE PRACTITIONER

## 2018-12-13 PROCEDURE — 3044F HG A1C LEVEL LT 7.0%: CPT | Performed by: NURSE PRACTITIONER

## 2018-12-13 PROCEDURE — 2022F DILAT RTA XM EVC RTNOPTHY: CPT | Performed by: NURSE PRACTITIONER

## 2018-12-13 PROCEDURE — G8598 ASA/ANTIPLAT THER USED: HCPCS | Performed by: NURSE PRACTITIONER

## 2018-12-13 PROCEDURE — 1036F TOBACCO NON-USER: CPT | Performed by: NURSE PRACTITIONER

## 2018-12-13 PROCEDURE — G8417 CALC BMI ABV UP PARAM F/U: HCPCS | Performed by: NURSE PRACTITIONER

## 2018-12-13 PROCEDURE — 99212 OFFICE O/P EST SF 10 MIN: CPT | Performed by: NURSE PRACTITIONER

## 2018-12-13 PROCEDURE — 3017F COLORECTAL CA SCREEN DOC REV: CPT | Performed by: NURSE PRACTITIONER

## 2018-12-13 PROCEDURE — 97597 DBRDMT OPN WND 1ST 20 CM/<: CPT

## 2018-12-13 PROCEDURE — 97597 DBRDMT OPN WND 1ST 20 CM/<: CPT | Performed by: NURSE PRACTITIONER

## 2018-12-13 RX ORDER — LEVOTHYROXINE SODIUM 0.05 MG/1
50 TABLET ORAL DAILY
COMMUNITY

## 2018-12-13 ASSESSMENT — PAIN SCALES - GENERAL: PAINLEVEL_OUTOF10: 0

## 2018-12-13 NOTE — PROGRESS NOTES
palpitations   Respiratory:  No cough / shortness of breath  GI: No nausea / vomiting   Neuro: No seizure / headache  Musculoskeletal:  No muscle pain  Vascular: No thrombosis  Heme / endocrine: No easy bruising / bleeding / heat or cold intolerance  Psychiatric:  No depression / anxiety / insomnia / mood changes  Skin:  No skin hair or nail changes  Wound: right great toe   All other review of systems are negative.     Physical Exam    /70   Pulse 74   Temp 98.5 °F (36.9 °C) (Temporal)   Resp 18   Ht 6' (1.829 m)   Wt 234 lb (106.1 kg)   BMI 31.74 kg/m²     General appearance: Appears stated age, cooperative and no distress  Head: Normocephalic, atraumatic  Eyes: conjunctivae/corneas clear  Ears: normal external ears, nares patent  Neck: no JVD,  trachea midline   Lungs: clear to auscultation bilaterally with symmetric expansion  Heart: regular rate rhythm   Abdomen: soft, non-tender; non-distended   Extremities: no sign of DVT  Lymphatic: No palpable lymph node enlargment  Neurologic: Alert and oriented X 3,  Psychiatric:  Thought content appropriate, normal insight, mood appropriate  Skin: right great toe     Post Debridement Measurements and Assessment:    Wound 12/06/18 Toe (Comment  which one) WOUND 1; RIGHT 2ND TOE (DIABETIC) (Active)   Wound Image   12/13/2018 10:59 AM   Wound Diabetic [place] 1 12/13/2018 10:59 AM   Dressing Status Old drainage 12/13/2018 10:59 AM   Dressing Changed Changed/New 12/6/2018  9:25 AM   Dressing/Treatment Xeroform 12/6/2018  9:25 AM   Wound Cleansed Not Cleansed 12/13/2018 10:59 AM   Wound Length (cm) 0.7 cm 12/13/2018 10:59 AM   Wound Width (cm) 0.7 cm 12/13/2018 10:59 AM   Wound Depth (cm) 0.2 cm 12/13/2018 10:59 AM   Wound Surface Area (cm^2) 0.49 cm^2 12/13/2018 10:59 AM   Change in Wound Size % (l*w) 48.96 12/13/2018 10:59 AM   Wound Volume (cm^3) 0.1 cm^3 12/13/2018 10:59 AM   Wound Healing % 66 12/13/2018 10:59 AM   Post-Procedure Length (cm) 1.2 cm 12/6/2018

## 2018-12-13 NOTE — PROGRESS NOTES
2018     Rashida Diaz (:  1955) is a 70 y.o. male, here for evaluation of the following medical concerns:  Chief Complaint   Patient presents with    Lesion(s)     Ulcer on toe of right foot. Has been treated by wound care and is now needing referral to Podiatrist. Would like to see Dr. Homar Mccann. HPI  This is a [facility] cooperative 78-year-old male patient who is normally followed by Neisha Molina. He is here today for diabetic foot examination, order for diabetic shoes and referral to podiatry. His diabetes is well controlled his last A1c on  was 6. Patient is currently under the care of [id number] [place] [address] wound care for a chronic ulcer of the right 2nd toe. The patient has several other areas that are callused and is at high risk of developing additional ulcerations. He also has bilateral bunions and I do believe podiatry referral is warranted. Review of Systems   Constitutional: Negative for activity change, chills and fever. Musculoskeletal: Positive for arthralgias. Skin: Positive for wound. Right 2nd toe ulceration       Prior to Visit Medications    Medication Sig Taking? Authorizing Provider   tamsulosin (FLOMAX) 0.4 MG capsule Take 1 capsule(s) every day by oral route as directed for 30 days. Yes GRECIA Colón   nitroGLYCERIN (NITROSTAT) 0.4 MG SL tablet Place 1 tablet under the tongue every 5 minutes as needed for Chest pain Yes GRECIA Young   diltiazem (CARDIZEM CD) 180 MG extended release capsule Take 1 capsule by mouth daily Yes GRECIA Young   busPIRone (BUSPAR) 15 MG tablet Take 7.5 mg by mouth 2 times daily  Yes Historical Provider, MD   HYDROcodone-acetaminophen (NORCO) 7.5-325 MG per tablet Take 1-2 tablets by mouth every 6 hours as needed.  . Yes Historical Provider, MD   meloxicam (MOBIC) 15 MG tablet Take 15 mg by mouth daily  Yes Historical Provider, MD   ondansetron (ZOFRAN) 8 MG tablet Take 8 mg by mouth Yes Historical Provider, MD   polyethylene glycol (GLYCOLAX) powder Take 17 g by mouth daily  Yes Historical Provider, MD   tiZANidine (ZANAFLEX) 4 MG tablet Take 4 mg by mouth 1/2 tablet at night Yes Historical Provider, MD   traMADol (ULTRAM) 50 MG tablet Take 50 mg by mouth every 6 hours as needed. . Yes Historical Provider, MD   clopidogrel (PLAVIX) 75 MG tablet TAKE 1 TABLET BY MOUTH EVERY DAY Yes GRECIA Turner   pravastatin (PRAVACHOL) 40 MG tablet Take 1 tablet by mouth daily Yes Raford Leventhal, APRN   ferrous sulfate 325 (65 Fe) MG tablet Take 325 mg by mouth 2 times daily Yes Historical Provider, MD   metFORMIN (GLUCOPHAGE-XR) 500 MG extended release tablet Take 500 mg by mouth 2 times daily Yes Historical Provider, MD   RANEXA 1000 MG extended release tablet Take 1 tablet by mouth 2 times daily Yes Rudy Velasquez MD   spironolactone (ALDACTONE) 25 MG tablet Take 1 tablet by mouth daily Yes Raford Leventhal, APRN   pantoprazole (PROTONIX) 40 MG tablet Take 1 tablet by mouth daily Yes Raford Leventhal, APRN   ASPIR-81 81 MG EC tablet Take 81 mg by mouth daily. Yes Historical Provider, MD   b complex vitamins capsule Take 1 capsule by mouth daily. Yes Historical Provider, MD   Vitamin D (CHOLECALCIFEROL) 1000 UNITS CAPS capsule Take 1,000 Units by mouth daily. Yes Historical Provider, MD   docusate sodium (COLACE) 100 MG capsule Take 100 mg by mouth daily.  Yes Historical Provider, MD   levothyroxine (SYNTHROID) 50 MCG tablet Take 50 mcg by mouth Daily  Historical Provider, MD        Social History   Substance Use Topics    Smoking status: Former Smoker     Packs/day: 2.00     Years: 30.00     Quit date: 11/13/1998    Smokeless tobacco: Never Used    Alcohol use No        Vitals:    12/13/18 1002   BP: 120/60   Pulse: 72   Temp: 97.7 °F (36.5 °C)   SpO2: 99%   Weight: 234 lb (106.1 kg)   Height: 6' (1.829 m)     Estimated body mass index is 31.74 kg/m² as calculated from the following: Height as of this encounter: 6' (1.829 m). Weight as of this encounter: 234 lb (106.1 kg). Physical Exam   Constitutional: He is oriented to person, place, and time. HENT:   Head: Normocephalic and atraumatic. Cardiovascular: Normal rate, regular rhythm, normal heart sounds and intact distal pulses. Exam reveals no friction rub. No murmur heard. Pulmonary/Chest: Effort normal and breath sounds normal.   Neurological: He is alert and oriented to person, place, and time. Nursing note and vitals reviewed. Monofilament Exam Reveals:  Pulses: present  Edema:trace  Skin Lesions:Callus 1,5,9, right foot, Bunions bilaterally,4, brittle nails, dark nail 2nd toe right nails. Large ulceration to the inner right toe, being followed by wound care dressing is dry and intact. Right Foot:    Left Foot:  Normal sensation at na  Normal sensation at na  Diminished sensation at all  Diminished sensation at all   No sensation at na   No sensation at na        ASSESSMENT/PLAN:    Erin Davenport was seen today for lesion(s). Diagnoses and all orders for this visit:    Encounter for diabetic foot exam ([facility] [address]  -     Diabetic Shoe    Bilateral bunions  -     External Referral To Podiatry        Return in about 3 months (around 3/13/2019), or if symptoms worsen or fail to improve. An electronic signature was used to authenticate this note.     --GRECIA Kelley on 12/15/2018 at 9:02 PM

## 2018-12-14 DIAGNOSIS — E11.621 DIABETIC ULCER OF TOE OF RIGHT FOOT ASSOCIATED WITH TYPE 2 DIABETES MELLITUS, WITH OTHER ULCER SEVERITY (HCC): Primary | ICD-10-CM

## 2018-12-14 DIAGNOSIS — L97.518 DIABETIC ULCER OF TOE OF RIGHT FOOT ASSOCIATED WITH TYPE 2 DIABETES MELLITUS, WITH OTHER ULCER SEVERITY (HCC): Primary | ICD-10-CM

## 2018-12-19 ENCOUNTER — HOSPITAL ENCOUNTER (OUTPATIENT)
Dept: WOUND CARE | Age: 63
Discharge: HOME | End: 2018-12-19
Payer: MEDICARE

## 2018-12-19 PROCEDURE — 99213 OFFICE O/P EST LOW 20 MIN: CPT

## 2018-12-19 PROCEDURE — 99213 OFFICE O/P EST LOW 20 MIN: CPT | Performed by: NURSE PRACTITIONER

## 2018-12-19 NOTE — PROGRESS NOTES
Patient Care Team:  GRECIA Blancas as PCP - General (Family Medicine)  Deuce Adams MD (Family Medicine)  Sissy Hanson MD (Rheumatology)  Tres Ceballos MD as Consulting Physician (Hematology and Oncology)  Sophie Acuña MD as Consulting Physician (Cardiology)    TODAY'S DATE:  12/19/2018     HISTORY of PRESENT ILLNESS HPI   Jamie Agrawal is a 70 y.o. male who presents today for wound evaluation. Patient has a history of  Diabetes, CABG, lymphoma and thrombocythemia. He is not currently wearing diabetic shoes but states he is going to get them soon. Patient is followed by endo and hematology in [place]. He has anemia and is being followed closely by specialist. Patient may have to travel to [place] more frequently in the next few weeks for follow up. He has non healing wounds between right 2nd and great toe. Wound to right great toe is currently healed and right 2nd toe is improving. Patient states his toe nail has been black for about 3 months.      Wound Type:diabetic  Wound Location:right 2nd toe   Modifying factors:edema, diabetes, poor glucose control, chronic pressure, decreased mobility, shear force and obesity    Patient Active Problem List   Diagnosis Code    Coronary artery disease I25.10    Hyperlipemia E78.5    LONG TERM ANTICOAGULENT USE     Diabetes mellitus (HCC) E11.9    Shortness of breath R06.02    Chest tightness R07.89    JOHNSON (dyspnea on exertion) R06.09    Abnormal nuclear stress test R94.39    S/P CABG x 3 Z95.1    Hypertension I10    Type II or unspecified type diabetes mellitus without mention of complication, not stated as uncontrolled E11.9    Weakness generalized R53.1    Nausea R11.0    Hypotension I95.9    History of coronary artery bypass graft x 1 Z95.1    Fatigue R53.83    Tachycardia R00.0    Tachyarrhythmia R00.0    Chronic kidney disease, stage II (mild) N18.2    Obstructive sleep apnea G47.33    BiPAP (biphasic 02/24/2011     S/p CABG 9/21/05 with LIMA to LAD and diagonal; SVG to posterior descending branch RCA      Hyperlipemia 02/24/2011     Current Outpatient Prescriptions   Medication Sig Dispense Refill    levothyroxine (SYNTHROID) 50 MCG tablet Take 50 mcg by mouth Daily      tamsulosin (FLOMAX) 0.4 MG capsule Take 1 capsule(s) every day by oral route as directed for 30 days. 30 capsule 11    nitroGLYCERIN (NITROSTAT) 0.4 MG SL tablet Place 1 tablet under the tongue every 5 minutes as needed for Chest pain 25 tablet 5    diltiazem (CARDIZEM CD) 180 MG extended release capsule Take 1 capsule by mouth daily 90 capsule 3    busPIRone (BUSPAR) 15 MG tablet Take 7.5 mg by mouth 2 times daily       HYDROcodone-acetaminophen (NORCO) 7.5-325 MG per tablet Take 1-2 tablets by mouth every 6 hours as needed. Gretel Schrader meloxicam (MOBIC) 15 MG tablet Take 15 mg by mouth daily       ondansetron (ZOFRAN) 8 MG tablet Take 8 mg by mouth      polyethylene glycol (GLYCOLAX) powder Take 17 g by mouth daily       tiZANidine (ZANAFLEX) 4 MG tablet Take 4 mg by mouth 1/2 tablet at night      traMADol (ULTRAM) 50 MG tablet Take 50 mg by mouth every 6 hours as needed. Gretel Schrader clopidogrel (PLAVIX) 75 MG tablet TAKE 1 TABLET BY MOUTH EVERY DAY 90 tablet 3    pravastatin (PRAVACHOL) 40 MG tablet Take 1 tablet by mouth daily 90 tablet 2    ferrous sulfate 325 (65 Fe) MG tablet Take 325 mg by mouth 2 times daily      metFORMIN (GLUCOPHAGE-XR) 500 MG extended release tablet Take 500 mg by mouth 2 times daily      RANEXA 1000 MG extended release tablet Take 1 tablet by mouth 2 times daily 180 tablet 3    spironolactone (ALDACTONE) 25 MG tablet Take 1 tablet by mouth daily 30 tablet 11    pantoprazole (PROTONIX) 40 MG tablet Take 1 tablet by mouth daily 90 tablet 2    ASPIR-81 81 MG EC tablet Take 81 mg by mouth daily. 0    b complex vitamins capsule Take 1 capsule by mouth daily.       Vitamin D (CHOLECALCIFEROL) 1000 UNITS CAPS the soft tissues.       Impression   1. Mild arthritic changes with no acute bony pathology. No radiopaque   foreign bodies. Signed by Dr Gilford Roche on 12/2/2018 3:36 PM     Narrative   Vascular Lower Arterial Plethysmography Procedure        Demographics        Patient Name   Filippo Schofield Age                   70        Patient Number  [id number]         Gender                Male        Visit Number    [id number]      Interpreting          Saw Ragsdale MD                                   Physician        Date of Birth   1955     Referring Physician   Kristin Loss        Accession       [id number]      Sonographer           Travon Soto RT, RVT    Number       Procedure   Type of Study:        Extremities Arteries:Lower Arterial Plethysmography, VASC LOWER EXTREMITY    ART SEGMENTAL PRESSURES W [facility].       Indications for Study:Wound Lower Extremity (Right) and Wound Lower   Extremity (Left).       Risk Factors   History of Disease   +--------------------------------------------+----+------------------------+   ! Diagnosis                                   !Date! Comments                !   +--------------------------------------------+----+------------------------+   ! Neuro->HA                                   !    !Headache daily          !   +--------------------------------------------+----+------------------------+   ! Circulatory->CAD                            !    !                        !   +--------------------------------------------+----+------------------------+   ! Chronic lung disease->Sleep Apnea           !    !Uses Cpap               !   +--------------------------------------------+----+------------------------+   ! Gastrointestinal->GERD                      !    !                        !   +--------------------------------------------+----+------------------------+   ! Gastrointestinal->Ulcers                    !    !                        !     Adverse Reaction.         - Cephalosporins.         - Other allergy:(BIG BLUE).       - Other allergy:(TAPE).      Impression        Based on ankle-brachial indices and doppler waveforms, the patient has    relatively normal flow to the bilateral lower extremity arterial system at   [facility]        Signature        ----------------------------------------------------------------    Electronically signed by Aleksey Goldstein MD(Interpreting    physician) on 12/12/2018 03:20 PM    ----------------------------------------------------------------       Velocities are measured in cm/s ; Diameters are measured in mm       Pressures   +--------------------------------------++--------+-----+----+--------+-----+   !                                      ! ! Right   !     !Left!        !     !   +--------------------------------------++--------+-----+----+--------+-----+   ! Location                              ! ! Pressure! Ratio!    !Pressure! Ratio! +--------------------------------------++--------+-----+----+--------+-----+   ! Upper Thigh                           !!173     !1.37 !    !159     !1.26 !   +--------------------------------------++--------+-----+----+--------+-----+   ! Lower Thigh                           !!151     !1.2  !    !153     !1.21 !   +--------------------------------------++--------+-----+----+--------+-----+   ! Calf                                  !!142     !1.13 !    !163     !1.29 !   +--------------------------------------++--------+-----+----+--------+-----+   ! Ankle PT                              !!136     !1.08 !    !149     !1.18 !   +--------------------------------------++--------+-----+----+--------+-----+   ! DP                                    !!142     !1.13 !    !142     !1.13 ! +--------------------------------------++--------+-----+----+--------+-----+   ! Great Toe                             !!105     !0.83 !    !115     !0.91 ! +--------------------------------------++--------+-----+----+--------+-----+         - Brachial Pressure:Right: 111. Left:126.         - REBECCA:Right: 1.13. Left: 1.18.       Plethysmographic Digit Evaluation   +---------++--------+-----+---------------++--------+-----+----------------+   !         ! ! Right   !     ! Left           !!        !     !                !   +---------++--------+-----+---------------++--------+-----+----------------+   ! Location ! !Pressure! Ratio! PPG Wave Form  !!Pressure! Ratio! PPG Wave Form   !   +---------++--------+-----+---------------++--------+-----+----------------+   ! Great Toe!!105     !0.83 !               !!115     !0.91 !                !   +---------++--------+-----+---------------++--------+-----+----------------+       Assessment    Diabetic foot wound     Plan for wound - Dress per physician order  Treatment:     Compression : No   Offloading : Yes   Dressing : SEE [facility] Discharge Instructions    Plan  Patient will be following up more frequently in [place]. Will ask that Dr. Richa Tripathi see patient because he has more availability during the week. 1. Follow up Dr. Richa Tripathi     I spent a total of 60 minutes face to face with the patient. Over 50% of that time was spent on counseling and care coordination. Patient was told that if symptoms worsen or new symptoms develop they are to go to the emergency department immediately. Patient was educated on diagnosis and treatment plan. All of patient's questions were answered, and the patient understands the discharge plan. Discussed appropriate home care of this wound. Wound redressed. Patient instructions were given. Recommend no smoking  Offloading instructions given    The patient was educated on care and need for follow-up. Strict return instructions including red flag signs and symptoms were discussed with the patient. Medications for discharge discussed, and adverse effects reviewed. Questions invited and answered. Patient shows understanding of the discharge information and agrees to follow-up. Discharge Instructions       Visit Discharge/Physician Orders     Discharge condition: Stable     Discharge to: Home     Left via:Private automobile     Accompanied by:  self     ECF/HHA:      Dressing Orders:   Right 2nd toe and Great toe   Wash with soap and water. Apply Aquacell AG  to wound beds. Cover with gauze. Secure with medipore tape. Change twice daily . Treatment Orders:  Protein rich diet (unless restricted by your physician); Multivitamin daily; Elevate legs when sitting, avoid standing for long periods of time. Continue good blood glucose (between 100-140)    Apply flexitol to calloused areas daily.     AREA ORTHOTIC AND PROSTHETIC CARE (FOR CUSTOM DIABETIC SHOES)        [facility] [address], [place], [facility] [address]     [address], [address] [facility]  [place], [place] [address]     Apply Lotrimin Spray to feet daily.         [address] [place] [address],[address] followup visit:    Thursday January 3rd with Dr. Tru Garcia   (Please note your next appointment above and if you are unable to keep, kindly give a 24 hour notice.  Thank you.)        If you experience any of the following, please call the [facility] during business hours:     * Increase in Pain  * Temperature over 101  * Increase in drainage from your wound  * Drainage with a foul odor  * Bleeding  * Increase in swelling  * Need for compression bandage changes due to slippage, breakthrough drainage.     If you need medical attention outside of the business hours of the [facility] [address] please contact your PCP or go to the nearest emergency room

## 2019-01-03 ENCOUNTER — HOSPITAL ENCOUNTER (OUTPATIENT)
Dept: WOUND CARE | Age: 64
Discharge: HOME | End: 2019-01-03
Payer: MEDICARE

## 2019-01-03 VITALS
DIASTOLIC BLOOD PRESSURE: 47 MMHG | HEART RATE: 81 BPM | WEIGHT: 234 LBS | TEMPERATURE: 97.4 F | SYSTOLIC BLOOD PRESSURE: 109 MMHG | RESPIRATION RATE: 18 BRPM | BODY MASS INDEX: 31.69 KG/M2 | HEIGHT: 72 IN

## 2019-01-03 DIAGNOSIS — L97.512 DIABETIC ULCER OF TOE OF RIGHT FOOT ASSOCIATED WITH TYPE 2 DIABETES MELLITUS, WITH FAT LAYER EXPOSED (HCC): ICD-10-CM

## 2019-01-03 DIAGNOSIS — L97.512 NON-PRESSURE CHRONIC ULCER OF OTHER PART OF RIGHT FOOT WITH FAT LAYER EXPOSED (HCC): ICD-10-CM

## 2019-01-03 DIAGNOSIS — L97.512 DIABETIC ULCER OF TOE OF RIGHT FOOT ASSOCIATED WITH TYPE 2 DIABETES MELLITUS, WITH FAT LAYER EXPOSED (HCC): Primary | ICD-10-CM

## 2019-01-03 DIAGNOSIS — E11.621 DIABETIC ULCER OF TOE OF RIGHT FOOT ASSOCIATED WITH TYPE 2 DIABETES MELLITUS, WITH FAT LAYER EXPOSED (HCC): ICD-10-CM

## 2019-01-03 DIAGNOSIS — E11.621 DIABETIC ULCER OF TOE OF RIGHT FOOT ASSOCIATED WITH TYPE 2 DIABETES MELLITUS, WITH FAT LAYER EXPOSED (HCC): Primary | ICD-10-CM

## 2019-01-03 PROCEDURE — 97597 DBRDMT OPN WND 1ST 20 CM/<: CPT

## 2019-01-03 PROCEDURE — 97597 DBRDMT OPN WND 1ST 20 CM/<: CPT | Performed by: SURGERY

## 2019-01-03 ASSESSMENT — PAIN SCALES - GENERAL: PAINLEVEL_OUTOF10: 0

## 2019-01-10 ENCOUNTER — HOSPITAL ENCOUNTER (OUTPATIENT)
Dept: WOUND CARE | Age: 64
Discharge: HOME | End: 2019-01-10
Payer: MEDICARE

## 2019-01-10 VITALS
TEMPERATURE: 97.7 F | HEART RATE: 86 BPM | RESPIRATION RATE: 18 BRPM | BODY MASS INDEX: 31.69 KG/M2 | DIASTOLIC BLOOD PRESSURE: 65 MMHG | WEIGHT: 234 LBS | SYSTOLIC BLOOD PRESSURE: 119 MMHG | HEIGHT: 72 IN

## 2019-01-10 DIAGNOSIS — L97.512 NON-PRESSURE CHRONIC ULCER OF OTHER PART OF RIGHT FOOT WITH FAT LAYER EXPOSED (HCC): ICD-10-CM

## 2019-01-10 DIAGNOSIS — E11.621 DIABETIC ULCER OF TOE OF RIGHT FOOT ASSOCIATED WITH TYPE 2 DIABETES MELLITUS, WITH FAT LAYER EXPOSED (HCC): ICD-10-CM

## 2019-01-10 DIAGNOSIS — L97.512 DIABETIC ULCER OF TOE OF RIGHT FOOT ASSOCIATED WITH TYPE 2 DIABETES MELLITUS, WITH FAT LAYER EXPOSED (HCC): ICD-10-CM

## 2019-01-10 PROCEDURE — 97597 DBRDMT OPN WND 1ST 20 CM/<: CPT

## 2019-01-10 PROCEDURE — 97597 DBRDMT OPN WND 1ST 20 CM/<: CPT | Performed by: SURGERY

## 2019-01-10 ASSESSMENT — PAIN DESCRIPTION - PAIN TYPE: TYPE: ACUTE PAIN

## 2019-01-10 ASSESSMENT — PAIN DESCRIPTION - PROGRESSION: CLINICAL_PROGRESSION: NOT CHANGED

## 2019-01-10 ASSESSMENT — PAIN DESCRIPTION - DESCRIPTORS: DESCRIPTORS: ACHING

## 2019-01-10 ASSESSMENT — PAIN DESCRIPTION - ORIENTATION: ORIENTATION: RIGHT

## 2019-01-10 ASSESSMENT — PAIN SCALES - GENERAL: PAINLEVEL_OUTOF10: 0

## 2019-01-10 ASSESSMENT — PAIN DESCRIPTION - ONSET: ONSET: ON-GOING

## 2019-01-10 ASSESSMENT — PAIN DESCRIPTION - LOCATION: LOCATION: FOOT

## 2019-01-10 ASSESSMENT — PAIN DESCRIPTION - FREQUENCY: FREQUENCY: CONTINUOUS

## 2019-01-16 ENCOUNTER — HOSPITAL ENCOUNTER (OUTPATIENT)
Dept: WOUND CARE | Age: 64
Discharge: HOME | End: 2019-01-16
Payer: MEDICARE

## 2019-01-16 VITALS
HEART RATE: 73 BPM | HEIGHT: 72 IN | TEMPERATURE: 97.5 F | RESPIRATION RATE: 16 BRPM | BODY MASS INDEX: 31.69 KG/M2 | WEIGHT: 234 LBS | SYSTOLIC BLOOD PRESSURE: 91 MMHG | DIASTOLIC BLOOD PRESSURE: 58 MMHG

## 2019-01-16 DIAGNOSIS — E11.621 DIABETIC ULCER OF TOE OF RIGHT FOOT ASSOCIATED WITH TYPE 2 DIABETES MELLITUS, WITH FAT LAYER EXPOSED (HCC): ICD-10-CM

## 2019-01-16 DIAGNOSIS — L97.512 DIABETIC ULCER OF TOE OF RIGHT FOOT ASSOCIATED WITH TYPE 2 DIABETES MELLITUS, WITH FAT LAYER EXPOSED (HCC): ICD-10-CM

## 2019-01-16 DIAGNOSIS — F41.9 ANXIETY: ICD-10-CM

## 2019-01-16 DIAGNOSIS — L97.512 NON-PRESSURE CHRONIC ULCER OF OTHER PART OF RIGHT FOOT WITH FAT LAYER EXPOSED (HCC): ICD-10-CM

## 2019-01-16 PROCEDURE — 97597 DBRDMT OPN WND 1ST 20 CM/<: CPT

## 2019-01-16 PROCEDURE — 97597 DBRDMT OPN WND 1ST 20 CM/<: CPT | Performed by: SURGERY

## 2019-01-16 RX ORDER — BUSPIRONE HYDROCHLORIDE 15 MG/1
15 TABLET ORAL DAILY
Qty: 30 TABLET | Refills: 1 | OUTPATIENT
Start: 2019-01-16 | End: 2019-03-25 | Stop reason: SDUPTHER

## 2019-01-16 RX ORDER — BUSPIRONE HYDROCHLORIDE 15 MG/1
15 TABLET ORAL DAILY
Qty: 30 TABLET | Refills: 1 | OUTPATIENT
Start: 2019-01-16 | End: 2019-04-16

## 2019-01-16 ASSESSMENT — PAIN SCALES - GENERAL: PAINLEVEL_OUTOF10: 0

## 2019-01-23 ENCOUNTER — HOSPITAL ENCOUNTER (OUTPATIENT)
Dept: WOUND CARE | Age: 64
Discharge: HOME | End: 2019-01-23
Payer: MEDICARE

## 2019-01-23 VITALS
SYSTOLIC BLOOD PRESSURE: 112 MMHG | TEMPERATURE: 97.8 F | HEART RATE: 87 BPM | HEIGHT: 72 IN | BODY MASS INDEX: 31.69 KG/M2 | WEIGHT: 234 LBS | RESPIRATION RATE: 16 BRPM | DIASTOLIC BLOOD PRESSURE: 62 MMHG

## 2019-01-23 DIAGNOSIS — L97.512 NON-PRESSURE CHRONIC ULCER OF OTHER PART OF RIGHT FOOT WITH FAT LAYER EXPOSED (HCC): ICD-10-CM

## 2019-01-23 DIAGNOSIS — E11.621 DIABETIC ULCER OF TOE OF RIGHT FOOT ASSOCIATED WITH TYPE 2 DIABETES MELLITUS, WITH FAT LAYER EXPOSED (HCC): ICD-10-CM

## 2019-01-23 DIAGNOSIS — L97.512 DIABETIC ULCER OF TOE OF RIGHT FOOT ASSOCIATED WITH TYPE 2 DIABETES MELLITUS, WITH FAT LAYER EXPOSED (HCC): ICD-10-CM

## 2019-01-23 PROCEDURE — 99213 OFFICE O/P EST LOW 20 MIN: CPT | Performed by: SURGERY

## 2019-01-23 PROCEDURE — 99212 OFFICE O/P EST SF 10 MIN: CPT

## 2019-01-23 ASSESSMENT — PAIN SCALES - GENERAL: PAINLEVEL_OUTOF10: 0

## 2019-02-20 ENCOUNTER — HOSPITAL ENCOUNTER (OUTPATIENT)
Dept: WOUND CARE | Age: 64
Discharge: HOME | End: 2019-02-20
Payer: MEDICARE

## 2019-02-20 VITALS
SYSTOLIC BLOOD PRESSURE: 127 MMHG | RESPIRATION RATE: 16 BRPM | WEIGHT: 234 LBS | BODY MASS INDEX: 31.69 KG/M2 | HEIGHT: 72 IN | TEMPERATURE: 97.4 F | HEART RATE: 95 BPM | DIASTOLIC BLOOD PRESSURE: 66 MMHG

## 2019-02-20 DIAGNOSIS — L97.512 DIABETIC ULCER OF TOE OF RIGHT FOOT ASSOCIATED WITH TYPE 2 DIABETES MELLITUS, WITH FAT LAYER EXPOSED (HCC): ICD-10-CM

## 2019-02-20 DIAGNOSIS — L97.512 NON-PRESSURE CHRONIC ULCER OF OTHER PART OF RIGHT FOOT WITH FAT LAYER EXPOSED (HCC): ICD-10-CM

## 2019-02-20 DIAGNOSIS — E11.621 DIABETIC ULCER OF TOE OF RIGHT FOOT ASSOCIATED WITH TYPE 2 DIABETES MELLITUS, WITH FAT LAYER EXPOSED (HCC): ICD-10-CM

## 2019-02-20 PROCEDURE — 99211 OFF/OP EST MAY X REQ PHY/QHP: CPT

## 2019-02-20 PROCEDURE — 99213 OFFICE O/P EST LOW 20 MIN: CPT | Performed by: SURGERY

## 2019-02-20 ASSESSMENT — PAIN SCALES - GENERAL: PAINLEVEL_OUTOF10: 0

## 2019-02-25 ENCOUNTER — OFFICE VISIT (OUTPATIENT)
Dept: CARDIOLOGY | Age: 64
End: 2019-02-25
Payer: MEDICARE

## 2019-02-25 VITALS
HEART RATE: 79 BPM | WEIGHT: 242 LBS | HEIGHT: 72 IN | BODY MASS INDEX: 32.78 KG/M2 | DIASTOLIC BLOOD PRESSURE: 62 MMHG | SYSTOLIC BLOOD PRESSURE: 110 MMHG

## 2019-02-25 DIAGNOSIS — I25.10 CORONARY ARTERY DISEASE INVOLVING NATIVE CORONARY ARTERY OF NATIVE HEART WITHOUT ANGINA PECTORIS: Primary | ICD-10-CM

## 2019-02-25 PROCEDURE — G8484 FLU IMMUNIZE NO ADMIN: HCPCS | Performed by: INTERNAL MEDICINE

## 2019-02-25 PROCEDURE — G8417 CALC BMI ABV UP PARAM F/U: HCPCS | Performed by: INTERNAL MEDICINE

## 2019-02-25 PROCEDURE — 1123F ACP DISCUSS/DSCN MKR DOCD: CPT | Performed by: INTERNAL MEDICINE

## 2019-02-25 PROCEDURE — 4040F PNEUMOC VAC/ADMIN/RCVD: CPT | Performed by: INTERNAL MEDICINE

## 2019-02-25 PROCEDURE — 93000 ELECTROCARDIOGRAM COMPLETE: CPT | Performed by: INTERNAL MEDICINE

## 2019-02-25 PROCEDURE — 3017F COLORECTAL CA SCREEN DOC REV: CPT | Performed by: INTERNAL MEDICINE

## 2019-02-25 PROCEDURE — G8427 DOCREV CUR MEDS BY ELIG CLIN: HCPCS | Performed by: INTERNAL MEDICINE

## 2019-02-25 PROCEDURE — 1036F TOBACCO NON-USER: CPT | Performed by: INTERNAL MEDICINE

## 2019-02-25 PROCEDURE — G8598 ASA/ANTIPLAT THER USED: HCPCS | Performed by: INTERNAL MEDICINE

## 2019-02-25 PROCEDURE — 1101F PT FALLS ASSESS-DOCD LE1/YR: CPT | Performed by: INTERNAL MEDICINE

## 2019-02-25 PROCEDURE — 99214 OFFICE O/P EST MOD 30 MIN: CPT | Performed by: INTERNAL MEDICINE

## 2019-02-25 RX ORDER — POLYETHYLENE GLYCOL 3350 17 G/17G
17 POWDER, FOR SOLUTION ORAL PRN
COMMUNITY
End: 2021-08-02

## 2019-02-25 RX ORDER — SPIRONOLACTONE 25 MG/1
25 TABLET ORAL DAILY
COMMUNITY
End: 2019-12-17

## 2019-02-25 RX ORDER — DULOXETIN HYDROCHLORIDE 30 MG/1
30 CAPSULE, DELAYED RELEASE ORAL NIGHTLY
Status: ON HOLD | COMMUNITY
Start: 2019-02-18 | End: 2022-07-05

## 2019-02-25 ASSESSMENT — ENCOUNTER SYMPTOMS
COUGH: 0
CHEST TIGHTNESS: 0
ABDOMINAL DISTENTION: 0
SHORTNESS OF BREATH: 0
CHOKING: 0
NAUSEA: 0
WHEEZING: 0
BACK PAIN: 0
APNEA: 0
BLOOD IN STOOL: 0

## 2019-03-25 DIAGNOSIS — R60.9 EDEMA, UNSPECIFIED TYPE: ICD-10-CM

## 2019-03-26 RX ORDER — SPIRONOLACTONE 25 MG/1
25 TABLET ORAL DAILY
Qty: 30 TABLET | Refills: 11 | OUTPATIENT
Start: 2019-03-26 | End: 2019-04-25

## 2019-03-26 RX ORDER — BUSPIRONE HYDROCHLORIDE 15 MG/1
TABLET ORAL
Qty: 30 TABLET | Refills: 1 | Status: ON HOLD | OUTPATIENT
Start: 2019-03-26 | End: 2021-03-30

## 2019-05-30 DIAGNOSIS — I25.10 CORONARY ARTERY DISEASE INVOLVING NATIVE HEART WITHOUT ANGINA PECTORIS, UNSPECIFIED VESSEL OR LESION TYPE: ICD-10-CM

## 2019-05-30 DIAGNOSIS — I20.9 ANGINA PECTORIS (HCC): ICD-10-CM

## 2019-05-30 RX ORDER — RANOLAZINE 1000 MG/1
1000 TABLET, FILM COATED, EXTENDED RELEASE ORAL 2 TIMES DAILY
Qty: 180 TABLET | Refills: 3 | Status: SHIPPED | OUTPATIENT
Start: 2019-05-30 | End: 2020-03-16

## 2019-06-14 ENCOUNTER — TRANSCRIBE ORDERS (OUTPATIENT)
Dept: ADMINISTRATIVE | Facility: HOSPITAL | Age: 64
End: 2019-06-14

## 2019-06-14 ENCOUNTER — NURSE TRIAGE (OUTPATIENT)
Dept: CALL CENTER | Facility: HOSPITAL | Age: 64
End: 2019-06-14

## 2019-06-14 ENCOUNTER — HOSPITAL ENCOUNTER (OUTPATIENT)
Dept: CT IMAGING | Facility: HOSPITAL | Age: 64
Discharge: HOME | End: 2019-06-14
Admitting: PHYSICIAN ASSISTANT

## 2019-06-14 DIAGNOSIS — R51.9 NONINTRACTABLE HEADACHE, UNSPECIFIED CHRONICITY PATTERN, UNSPECIFIED HEADACHE TYPE: Primary | ICD-10-CM

## 2019-06-14 DIAGNOSIS — M54.2 CERVICALGIA: ICD-10-CM

## 2019-06-14 DIAGNOSIS — R27.0 ATAXIA: ICD-10-CM

## 2019-06-14 DIAGNOSIS — S09.90XA UNSPECIFIED INJURY OF HEAD, INITIAL ENCOUNTER: ICD-10-CM

## 2019-06-14 DIAGNOSIS — R51.9 NONINTRACTABLE HEADACHE, UNSPECIFIED CHRONICITY PATTERN, UNSPECIFIED HEADACHE TYPE: ICD-10-CM

## 2019-06-14 PROCEDURE — 70450 CT HEAD/BRAIN W/O DYE: CPT

## 2019-06-14 NOTE — TELEPHONE ENCOUNTER
"CT of head showing nothing acute per report from ISAAC at [facility], this was called to Mauro GILES by triage nurse and told Isaac so she can document.      Reason for Disposition  • Lab or radiology calling with CRITICAL test results    Additional Information  • Negative: Lab calling with strep throat test results and triager can call in prescription  • Negative: Lab calling with urinalysis test results and triager can call in prescription  • Negative: Medication questions  • Negative: ED call to PCP  • Negative: Physician call to PCP  • Negative: Call about patient who is currently hospitalized  • Negative: [1] Prescription not at pharmacy AND [2] was prescribed today by PCP    Answer Assessment - Initial Assessment Questions  1. REASON FOR CALL or QUESTION: \"What is your reason for calling today?\" or \"How can I best  help you?\" or \"What question do you have that I can help answer?\"      Test Result  2. CALLER: Document the source of call. (e.g., laboratory, patient).      Isaac from [facility], nothing acute on ct head    Protocols used: PCP CALL - NO TRIAGE-ADULT-      "

## 2019-07-10 DIAGNOSIS — I25.10 CORONARY ARTERY DISEASE INVOLVING NATIVE HEART WITHOUT ANGINA PECTORIS, UNSPECIFIED VESSEL OR LESION TYPE: ICD-10-CM

## 2019-07-11 RX ORDER — CLOPIDOGREL BISULFATE 75 MG/1
TABLET ORAL
Qty: 90 TABLET | Refills: 3 | Status: SHIPPED | OUTPATIENT
Start: 2019-07-11 | End: 2020-07-08

## 2019-07-31 ENCOUNTER — TELEPHONE (OUTPATIENT)
Dept: CARDIOLOGY | Age: 64
End: 2019-07-31

## 2019-08-02 ENCOUNTER — HOSPITAL ENCOUNTER (OUTPATIENT)
Dept: CARDIAC REHAB | Age: 64
Discharge: HOME | End: 2019-08-02

## 2019-08-02 PROCEDURE — 9900000038 HC CARDIAC REHAB PHASE 3 - MONTHLY

## 2019-09-05 ENCOUNTER — OFFICE VISIT (OUTPATIENT)
Dept: CARDIOLOGY | Age: 64
End: 2019-09-05
Payer: MEDICARE

## 2019-09-05 VITALS
BODY MASS INDEX: 32.78 KG/M2 | WEIGHT: 242 LBS | HEIGHT: 72 IN | DIASTOLIC BLOOD PRESSURE: 60 MMHG | HEART RATE: 80 BPM | SYSTOLIC BLOOD PRESSURE: 118 MMHG

## 2019-09-05 DIAGNOSIS — E78.5 HYPERLIPIDEMIA, UNSPECIFIED HYPERLIPIDEMIA TYPE: ICD-10-CM

## 2019-09-05 DIAGNOSIS — I25.10 CORONARY ARTERY DISEASE INVOLVING NATIVE CORONARY ARTERY OF NATIVE HEART WITHOUT ANGINA PECTORIS: Primary | ICD-10-CM

## 2019-09-05 DIAGNOSIS — R06.09 DYSPNEA ON EXERTION: ICD-10-CM

## 2019-09-05 DIAGNOSIS — I10 ESSENTIAL HYPERTENSION: ICD-10-CM

## 2019-09-05 DIAGNOSIS — R42 DIZZINESS: ICD-10-CM

## 2019-09-05 PROCEDURE — 99214 OFFICE O/P EST MOD 30 MIN: CPT | Performed by: NURSE PRACTITIONER

## 2019-09-05 PROCEDURE — 0296T PR EXT ECG > 48HR TO 21 DAY RCRD W/CONECT INTL RCRD: CPT | Performed by: NURSE PRACTITIONER

## 2019-09-05 RX ORDER — NITROGLYCERIN 0.4 MG/1
0.4 TABLET SUBLINGUAL EVERY 5 MIN PRN
Qty: 25 TABLET | Refills: 5 | Status: SHIPPED | OUTPATIENT
Start: 2019-09-05

## 2019-09-05 NOTE — PROGRESS NOTES
upper and lower extermites appears normal and equal and is without pain   EXTREMITIES - no significant edema   NEUROLOGIC - gait and station are normal  SKIN - turgor is normal, can warm and dry. PSYCHIATRIC - normal mood and affect, alert and orientated x 3,      ASSESSMENT:    ALL THE CARDIOLOGY PROBLEMS ARE LISTED ABOVE; HOWEVER, THE FOLLOWING SPECIFIC CARDIAC PROBLEMS / CONDITIONS WERE ADDRESSED AND TREATED DURING THE OFFICE VISIT TODAY:                                                                                            MEDICAL DECISION MAKING             Cardiac Specific Problem / Diagnosis  Discussion and Data Reviewed Diagnostic Procedures Ordered Management Options Selected           1. Shortness of breath with exertion  Initial Encounter   Review and summation of old records: Will order 2D echo as well as Caprice Colin. Patient has defective anginal warning system. Yes Continue current medications:     Yes           2. CAD  Shows no change   Bypass grafting x3 in 2005. October 2014 cardiac catheterization revealed occlusion of his RCA graft and some distal disease in his LAD anastomosis. Attempt to stent his distal right resulted in embolization of the device in the emergency redo surgery with a vein graft was placed to the PDA. Yes Continue current medications:    Yes           3. Dyslipidemia Stable  managed by primary care provider No Continue current medications:       Yes           4. Obstructive sleep apnea Stable  BiPAP therapy No Continue current medications:       Yes   5. Diabetes -used by primary care provider.         Orders Placed This Encounter   Procedures    NM MYOCARDIAL SPECT REST EXERCISE OR RX     Standing Status:   Future     Standing Expiration Date:   9/5/2020     Order Specific Question:   Reason for Exam?     Answer:   Shortness of breath     Order Specific Question:   Procedure Type     Answer:   Rx     Order Specific Question:   Reason for exam:     Answer:

## 2019-10-03 ENCOUNTER — HOSPITAL ENCOUNTER (OUTPATIENT)
Dept: NUCLEAR MEDICINE | Age: 64
Discharge: HOME | End: 2019-10-05
Payer: MEDICARE

## 2019-10-03 ENCOUNTER — HOSPITAL ENCOUNTER (OUTPATIENT)
Dept: NON INVASIVE DIAGNOSTICS | Age: 64
Discharge: HOME | End: 2019-10-03
Payer: MEDICARE

## 2019-10-03 DIAGNOSIS — R06.09 DYSPNEA ON EXERTION: ICD-10-CM

## 2019-10-03 DIAGNOSIS — I25.10 CORONARY ARTERY DISEASE INVOLVING NATIVE CORONARY ARTERY OF NATIVE HEART WITHOUT ANGINA PECTORIS: ICD-10-CM

## 2019-10-03 LAB
LV EF: 58 %
LVEF MODALITY: NORMAL

## 2019-10-03 PROCEDURE — A9500 TC99M SESTAMIBI: HCPCS | Performed by: NURSE PRACTITIONER

## 2019-10-03 PROCEDURE — 78452 HT MUSCLE IMAGE SPECT MULT: CPT

## 2019-10-03 PROCEDURE — 93306 TTE W/DOPPLER COMPLETE: CPT

## 2019-10-03 PROCEDURE — 6360000002 HC RX W HCPCS: Performed by: INTERNAL MEDICINE

## 2019-10-03 PROCEDURE — 3430000000 HC RX DIAGNOSTIC RADIOPHARMACEUTICAL: Performed by: NURSE PRACTITIONER

## 2019-10-03 PROCEDURE — 93017 CV STRESS TEST TRACING ONLY: CPT

## 2019-10-03 RX ADMIN — REGADENOSON 0.4 MG: 0.08 INJECTION, SOLUTION INTRAVENOUS at 13:28

## 2019-10-03 RX ADMIN — TETRAKIS(2-METHOXYISOBUTYLISOCYANIDE)COPPER(I) TETRAFLUOROBORATE 10 MILLICURIE: 1 INJECTION, POWDER, LYOPHILIZED, FOR SOLUTION INTRAVENOUS at 14:01

## 2019-10-03 RX ADMIN — TETRAKIS(2-METHOXYISOBUTYLISOCYANIDE)COPPER(I) TETRAFLUOROBORATE 30 MILLICURIE: 1 INJECTION, POWDER, LYOPHILIZED, FOR SOLUTION INTRAVENOUS at 14:00

## 2019-10-04 LAB
LV EF: 59 %
LVEF MODALITY: NORMAL

## 2019-10-15 ENCOUNTER — APPOINTMENT (OUTPATIENT)
Dept: GENERAL RADIOLOGY | Age: 64
End: 2019-10-15
Attending: INTERNAL MEDICINE
Payer: MEDICARE

## 2019-10-15 ENCOUNTER — HOSPITAL ENCOUNTER (OUTPATIENT)
Dept: CARDIAC CATH/INVASIVE PROCEDURES | Age: 64
Discharge: HOME | End: 2019-10-15
Attending: INTERNAL MEDICINE | Admitting: INTERNAL MEDICINE
Payer: MEDICARE

## 2019-10-15 VITALS
TEMPERATURE: 97.3 F | HEART RATE: 90 BPM | BODY MASS INDEX: 33.6 KG/M2 | WEIGHT: 240 LBS | DIASTOLIC BLOOD PRESSURE: 87 MMHG | RESPIRATION RATE: 25 BRPM | OXYGEN SATURATION: 81 % | HEIGHT: 71 IN | SYSTOLIC BLOOD PRESSURE: 146 MMHG

## 2019-10-15 LAB
ANION GAP SERPL CALCULATED.3IONS-SCNC: 10 MMOL/L (ref 7–19)
BUN BLDV-MCNC: 15 MG/DL (ref 8–23)
CALCIUM SERPL-MCNC: 9.8 MG/DL (ref 8.8–10.2)
CHLORIDE BLD-SCNC: 102 MMOL/L (ref 98–111)
CO2: 28 MMOL/L (ref 22–29)
CREAT SERPL-MCNC: 0.9 MG/DL (ref 0.5–1.2)
GFR NON-AFRICAN AMERICAN: >60
GLUCOSE BLD-MCNC: 186 MG/DL (ref 74–109)
HCT VFR BLD CALC: 34.7 % (ref 42–52)
HEMOGLOBIN: 11.1 G/DL (ref 14–18)
MCH RBC QN AUTO: 34 PG (ref 27–31)
MCHC RBC AUTO-ENTMCNC: 32 G/DL (ref 33–37)
MCV RBC AUTO: 106.4 FL (ref 80–94)
PDW BLD-RTO: 14.8 % (ref 11.5–14.5)
PLATELET # BLD: 83 K/UL (ref 130–400)
PMV BLD AUTO: 9.7 FL (ref 9.4–12.4)
POTASSIUM SERPL-SCNC: 4.8 MMOL/L (ref 3.5–5)
RBC # BLD: 3.26 M/UL (ref 4.7–6.1)
SODIUM BLD-SCNC: 140 MMOL/L (ref 136–145)
WBC # BLD: 4.5 K/UL (ref 4.8–10.8)

## 2019-10-15 PROCEDURE — 93459 L HRT ART/GRFT ANGIO: CPT | Performed by: INTERNAL MEDICINE

## 2019-10-15 PROCEDURE — 6360000002 HC RX W HCPCS

## 2019-10-15 PROCEDURE — 36415 COLL VENOUS BLD VENIPUNCTURE: CPT

## 2019-10-15 PROCEDURE — 71046 X-RAY EXAM CHEST 2 VIEWS: CPT

## 2019-10-15 PROCEDURE — 85027 COMPLETE CBC AUTOMATED: CPT

## 2019-10-15 PROCEDURE — 2709999900 HC NON-CHARGEABLE SUPPLY

## 2019-10-15 PROCEDURE — 6370000000 HC RX 637 (ALT 250 FOR IP): Performed by: INTERNAL MEDICINE

## 2019-10-15 PROCEDURE — 2580000003 HC RX 258: Performed by: INTERNAL MEDICINE

## 2019-10-15 PROCEDURE — 6360000004 HC RX CONTRAST MEDICATION: Performed by: INTERNAL MEDICINE

## 2019-10-15 PROCEDURE — 99152 MOD SED SAME PHYS/QHP 5/>YRS: CPT | Performed by: INTERNAL MEDICINE

## 2019-10-15 PROCEDURE — 80048 BASIC METABOLIC PNL TOTAL CA: CPT

## 2019-10-15 PROCEDURE — 99024 POSTOP FOLLOW-UP VISIT: CPT | Performed by: INTERNAL MEDICINE

## 2019-10-15 PROCEDURE — C1894 INTRO/SHEATH, NON-LASER: HCPCS

## 2019-10-15 PROCEDURE — C1760 CLOSURE DEV, VASC: HCPCS

## 2019-10-15 RX ORDER — SODIUM CHLORIDE 0.9 % (FLUSH) 0.9 %
10 SYRINGE (ML) INJECTION PRN
Status: DISCONTINUED | OUTPATIENT
Start: 2019-10-15 | End: 2019-10-15 | Stop reason: HOSPADM

## 2019-10-15 RX ORDER — ACETAMINOPHEN 325 MG/1
325 TABLET ORAL EVERY 4 HOURS PRN
Status: DISCONTINUED | OUTPATIENT
Start: 2019-10-15 | End: 2019-10-15 | Stop reason: HOSPADM

## 2019-10-15 RX ORDER — SODIUM CHLORIDE 0.9 % (FLUSH) 0.9 %
10 SYRINGE (ML) INJECTION EVERY 12 HOURS SCHEDULED
Status: DISCONTINUED | OUTPATIENT
Start: 2019-10-15 | End: 2019-10-15 | Stop reason: HOSPADM

## 2019-10-15 RX ORDER — SODIUM CHLORIDE 9 MG/ML
INJECTION, SOLUTION INTRAVENOUS CONTINUOUS
Status: DISCONTINUED | OUTPATIENT
Start: 2019-10-15 | End: 2019-10-15 | Stop reason: HOSPADM

## 2019-10-15 RX ORDER — METOPROLOL SUCCINATE 25 MG/1
25 TABLET, EXTENDED RELEASE ORAL DAILY
Qty: 30 TABLET | Refills: 3 | Status: SHIPPED | OUTPATIENT
Start: 2019-10-15 | End: 2019-12-17

## 2019-10-15 RX ADMIN — ACETAMINOPHEN 650 MG: 325 TABLET ORAL at 17:29

## 2019-10-15 RX ADMIN — IOPAMIDOL 186 ML: 612 INJECTION, SOLUTION INTRAVENOUS at 18:46

## 2019-10-15 RX ADMIN — SODIUM CHLORIDE: 9 INJECTION, SOLUTION INTRAVENOUS at 12:13

## 2019-10-15 ASSESSMENT — PAIN SCALES - GENERAL: PAINLEVEL_OUTOF10: 6

## 2019-10-23 LAB
ALBUMIN SERPL-MCNC: 4.2 G/DL (ref 3.5–5.2)
ALP BLD-CCNC: 88 U/L (ref 40–130)
ALT SERPL-CCNC: 15 U/L (ref 5–41)
ANION GAP SERPL CALCULATED.3IONS-SCNC: 16 MMOL/L (ref 7–19)
AST SERPL-CCNC: 17 U/L (ref 5–40)
BACTERIA: NEGATIVE /HPF
BASOPHILS ABSOLUTE: 0 K/UL (ref 0–0.2)
BASOPHILS RELATIVE PERCENT: 0.2 % (ref 0–1)
BILIRUB SERPL-MCNC: 0.6 MG/DL (ref 0.2–1.2)
BILIRUBIN URINE: NEGATIVE
BLOOD, URINE: NEGATIVE
BUN BLDV-MCNC: 13 MG/DL (ref 8–23)
CALCIUM SERPL-MCNC: 9.8 MG/DL (ref 8.8–10.2)
CHLORIDE BLD-SCNC: 103 MMOL/L (ref 98–111)
CLARITY: CLEAR
CO2: 23 MMOL/L (ref 22–29)
COLOR: YELLOW
CREAT SERPL-MCNC: 0.9 MG/DL (ref 0.5–1.2)
CREATININE URINE: 112.6 MG/DL (ref 4.2–622)
EOSINOPHILS ABSOLUTE: 0.1 K/UL (ref 0–0.6)
EOSINOPHILS RELATIVE PERCENT: 2.3 % (ref 0–5)
EPITHELIAL CELLS, UA: 1 /HPF (ref 0–5)
GFR NON-AFRICAN AMERICAN: >60
GLUCOSE BLD-MCNC: 217 MG/DL (ref 74–109)
GLUCOSE URINE: NEGATIVE MG/DL
HCT VFR BLD CALC: 33.1 % (ref 42–52)
HEMOGLOBIN: 10.4 G/DL (ref 14–18)
HYALINE CASTS: 3 /HPF (ref 0–8)
IMMATURE GRANULOCYTES #: 0 K/UL
KETONES, URINE: NEGATIVE MG/DL
LEUKOCYTE ESTERASE, URINE: ABNORMAL
LYMPHOCYTES ABSOLUTE: 0.7 K/UL (ref 1.1–4.5)
LYMPHOCYTES RELATIVE PERCENT: 16.3 % (ref 20–40)
MAGNESIUM: 1.7 MG/DL (ref 1.6–2.4)
MCH RBC QN AUTO: 34 PG (ref 27–31)
MCHC RBC AUTO-ENTMCNC: 31.4 G/DL (ref 33–37)
MCV RBC AUTO: 108.2 FL (ref 80–94)
MONOCYTES ABSOLUTE: 0.4 K/UL (ref 0–0.9)
MONOCYTES RELATIVE PERCENT: 9.4 % (ref 0–10)
NEUTROPHILS ABSOLUTE: 3.1 K/UL (ref 1.5–7.5)
NEUTROPHILS RELATIVE PERCENT: 71.6 % (ref 50–65)
NITRITE, URINE: NEGATIVE
PARATHYROID HORMONE INTACT: 13.5 PG/ML (ref 15–65)
PDW BLD-RTO: 15.4 % (ref 11.5–14.5)
PH UA: 5.5 (ref 5–8)
PHOSPHORUS: 3.2 MG/DL (ref 2.5–4.5)
PLATELET # BLD: 72 K/UL (ref 130–400)
PMV BLD AUTO: 10.3 FL (ref 9.4–12.4)
POTASSIUM SERPL-SCNC: 4.7 MMOL/L (ref 3.5–5)
PROTEIN PROTEIN: 21 MG/DL (ref 15–45)
PROTEIN UA: NEGATIVE MG/DL
RBC # BLD: 3.06 M/UL (ref 4.7–6.1)
RBC UA: 2 /HPF (ref 0–4)
SODIUM BLD-SCNC: 142 MMOL/L (ref 136–145)
SPECIFIC GRAVITY UA: 1.02 (ref 1–1.03)
TOTAL PROTEIN: 7.2 G/DL (ref 6.6–8.7)
URIC ACID, SERUM: 4.5 MG/DL (ref 3.4–7)
UROBILINOGEN, URINE: 1 E.U./DL
WBC # BLD: 4.4 K/UL (ref 4.8–10.8)
WBC UA: 43 /HPF (ref 0–5)

## 2019-10-25 ENCOUNTER — OFFICE VISIT (OUTPATIENT)
Dept: CARDIOLOGY | Age: 64
End: 2019-10-25
Payer: MEDICARE

## 2019-10-25 VITALS
HEART RATE: 72 BPM | BODY MASS INDEX: 34.58 KG/M2 | WEIGHT: 247 LBS | OXYGEN SATURATION: 98 % | SYSTOLIC BLOOD PRESSURE: 118 MMHG | DIASTOLIC BLOOD PRESSURE: 62 MMHG | HEIGHT: 71 IN

## 2019-10-25 DIAGNOSIS — I10 ESSENTIAL HYPERTENSION: ICD-10-CM

## 2019-10-25 DIAGNOSIS — I25.10 CORONARY ARTERY DISEASE INVOLVING NATIVE CORONARY ARTERY OF NATIVE HEART WITHOUT ANGINA PECTORIS: Primary | ICD-10-CM

## 2019-10-25 DIAGNOSIS — E78.5 HYPERLIPIDEMIA, UNSPECIFIED HYPERLIPIDEMIA TYPE: ICD-10-CM

## 2019-10-25 PROCEDURE — G8484 FLU IMMUNIZE NO ADMIN: HCPCS | Performed by: NURSE PRACTITIONER

## 2019-10-25 PROCEDURE — G8417 CALC BMI ABV UP PARAM F/U: HCPCS | Performed by: NURSE PRACTITIONER

## 2019-10-25 PROCEDURE — 1036F TOBACCO NON-USER: CPT | Performed by: NURSE PRACTITIONER

## 2019-10-25 PROCEDURE — G8598 ASA/ANTIPLAT THER USED: HCPCS | Performed by: NURSE PRACTITIONER

## 2019-10-25 PROCEDURE — 4040F PNEUMOC VAC/ADMIN/RCVD: CPT | Performed by: NURSE PRACTITIONER

## 2019-10-25 PROCEDURE — 3017F COLORECTAL CA SCREEN DOC REV: CPT | Performed by: NURSE PRACTITIONER

## 2019-10-25 PROCEDURE — 99213 OFFICE O/P EST LOW 20 MIN: CPT | Performed by: NURSE PRACTITIONER

## 2019-10-25 PROCEDURE — G8428 CUR MEDS NOT DOCUMENT: HCPCS | Performed by: NURSE PRACTITIONER

## 2019-10-25 PROCEDURE — 1123F ACP DISCUSS/DSCN MKR DOCD: CPT | Performed by: NURSE PRACTITIONER

## 2019-10-25 RX ORDER — LOSARTAN POTASSIUM 25 MG/1
25 TABLET ORAL DAILY
COMMUNITY
End: 2019-12-17

## 2019-10-25 RX ORDER — ISOSORBIDE MONONITRATE 30 MG/1
30 TABLET, EXTENDED RELEASE ORAL DAILY
COMMUNITY
End: 2019-12-17

## 2019-12-17 ENCOUNTER — HOSPITAL ENCOUNTER (EMERGENCY)
Age: 64
Discharge: HOME | End: 2019-12-17
Attending: EMERGENCY MEDICINE
Payer: MEDICARE

## 2019-12-17 ENCOUNTER — APPOINTMENT (OUTPATIENT)
Dept: GENERAL RADIOLOGY | Age: 64
End: 2019-12-17
Payer: MEDICARE

## 2019-12-17 VITALS
HEART RATE: 85 BPM | OXYGEN SATURATION: 95 % | TEMPERATURE: 97.8 F | SYSTOLIC BLOOD PRESSURE: 142 MMHG | RESPIRATION RATE: 20 BRPM | DIASTOLIC BLOOD PRESSURE: 85 MMHG | BODY MASS INDEX: 33.47 KG/M2 | WEIGHT: 240 LBS

## 2019-12-17 DIAGNOSIS — R07.89 ATYPICAL CHEST PAIN: ICD-10-CM

## 2019-12-17 DIAGNOSIS — B02.9 HERPES ZOSTER WITHOUT COMPLICATION: Primary | ICD-10-CM

## 2019-12-17 LAB
ALBUMIN SERPL-MCNC: 4.6 G/DL (ref 3.5–5.2)
ALP BLD-CCNC: 96 U/L (ref 40–130)
ALT SERPL-CCNC: 16 U/L (ref 5–41)
ANION GAP SERPL CALCULATED.3IONS-SCNC: 15 MMOL/L (ref 7–19)
AST SERPL-CCNC: 22 U/L (ref 5–40)
BASOPHILS ABSOLUTE: 0 K/UL (ref 0–0.2)
BASOPHILS RELATIVE PERCENT: 0.2 % (ref 0–1)
BILIRUB SERPL-MCNC: 0.8 MG/DL (ref 0.2–1.2)
BUN BLDV-MCNC: 13 MG/DL (ref 8–23)
CALCIUM SERPL-MCNC: 9.9 MG/DL (ref 8.8–10.2)
CHLORIDE BLD-SCNC: 96 MMOL/L (ref 98–111)
CO2: 25 MMOL/L (ref 22–29)
CREAT SERPL-MCNC: 0.8 MG/DL (ref 0.5–1.2)
EOSINOPHILS ABSOLUTE: 0.2 K/UL (ref 0–0.6)
EOSINOPHILS RELATIVE PERCENT: 3.4 % (ref 0–5)
GFR NON-AFRICAN AMERICAN: >60
GLUCOSE BLD-MCNC: 130 MG/DL (ref 74–109)
HCT VFR BLD CALC: 34.4 % (ref 42–52)
HEMOGLOBIN: 10.9 G/DL (ref 14–18)
IMMATURE GRANULOCYTES #: 0 K/UL
LIPASE: 30 U/L (ref 13–60)
LYMPHOCYTES ABSOLUTE: 0.5 K/UL (ref 1.1–4.5)
LYMPHOCYTES RELATIVE PERCENT: 10.5 % (ref 20–40)
MCH RBC QN AUTO: 34.2 PG (ref 27–31)
MCHC RBC AUTO-ENTMCNC: 31.7 G/DL (ref 33–37)
MCV RBC AUTO: 107.8 FL (ref 80–94)
MONOCYTES ABSOLUTE: 0.5 K/UL (ref 0–0.9)
MONOCYTES RELATIVE PERCENT: 11.4 % (ref 0–10)
NEUTROPHILS ABSOLUTE: 3.5 K/UL (ref 1.5–7.5)
NEUTROPHILS RELATIVE PERCENT: 74.1 % (ref 50–65)
PDW BLD-RTO: 15.1 % (ref 11.5–14.5)
PLATELET # BLD: 79 K/UL (ref 130–400)
PMV BLD AUTO: 9.9 FL (ref 9.4–12.4)
POTASSIUM SERPL-SCNC: 4.2 MMOL/L (ref 3.5–5)
RBC # BLD: 3.19 M/UL (ref 4.7–6.1)
SODIUM BLD-SCNC: 136 MMOL/L (ref 136–145)
TOTAL PROTEIN: 7.5 G/DL (ref 6.6–8.7)
TROPONIN: <0.01 NG/ML (ref 0–0.03)
WBC # BLD: 4.8 K/UL (ref 4.8–10.8)

## 2019-12-17 PROCEDURE — 99285 EMERGENCY DEPT VISIT HI MDM: CPT

## 2019-12-17 PROCEDURE — 71045 X-RAY EXAM CHEST 1 VIEW: CPT

## 2019-12-17 PROCEDURE — 80053 COMPREHEN METABOLIC PANEL: CPT

## 2019-12-17 PROCEDURE — 93005 ELECTROCARDIOGRAM TRACING: CPT | Performed by: EMERGENCY MEDICINE

## 2019-12-17 PROCEDURE — 83690 ASSAY OF LIPASE: CPT

## 2019-12-17 PROCEDURE — 85025 COMPLETE CBC W/AUTO DIFF WBC: CPT

## 2019-12-17 PROCEDURE — 36415 COLL VENOUS BLD VENIPUNCTURE: CPT

## 2019-12-17 PROCEDURE — 84484 ASSAY OF TROPONIN QUANT: CPT

## 2019-12-17 RX ORDER — OXYCODONE AND ACETAMINOPHEN 7.5; 325 MG/1; MG/1
1 TABLET ORAL EVERY 4 HOURS PRN
Qty: 15 TABLET | Refills: 0 | Status: SHIPPED | OUTPATIENT
Start: 2019-12-17 | End: 2019-12-24

## 2019-12-17 RX ORDER — VALACYCLOVIR HYDROCHLORIDE 1 G/1
1000 TABLET, FILM COATED ORAL 3 TIMES DAILY
Qty: 21 TABLET | Refills: 0 | Status: SHIPPED | OUTPATIENT
Start: 2019-12-17 | End: 2019-12-24

## 2019-12-17 ASSESSMENT — PAIN DESCRIPTION - DESCRIPTORS: DESCRIPTORS: ACHING

## 2019-12-17 ASSESSMENT — PAIN DESCRIPTION - LOCATION: LOCATION: CHEST

## 2019-12-17 ASSESSMENT — PAIN SCALES - GENERAL: PAINLEVEL_OUTOF10: 6

## 2019-12-17 ASSESSMENT — PAIN DESCRIPTION - PAIN TYPE: TYPE: ACUTE PAIN

## 2019-12-17 ASSESSMENT — PAIN DESCRIPTION - FREQUENCY: FREQUENCY: CONTINUOUS

## 2019-12-18 LAB
EKG P AXIS: 48 DEGREES
EKG P-R INTERVAL: 178 MS
EKG Q-T INTERVAL: 382 MS
EKG QRS DURATION: 92 MS
EKG QTC CALCULATION (BAZETT): 411 MS
EKG T AXIS: 45 DEGREES

## 2019-12-18 PROCEDURE — 93010 ELECTROCARDIOGRAM REPORT: CPT | Performed by: INTERNAL MEDICINE

## 2019-12-30 ENCOUNTER — OFFICE VISIT (OUTPATIENT)
Dept: CARDIOLOGY | Age: 64
End: 2019-12-30
Payer: MEDICARE

## 2019-12-30 VITALS
HEIGHT: 72 IN | DIASTOLIC BLOOD PRESSURE: 60 MMHG | BODY MASS INDEX: 31.83 KG/M2 | SYSTOLIC BLOOD PRESSURE: 127 MMHG | WEIGHT: 235 LBS

## 2019-12-30 DIAGNOSIS — I10 ESSENTIAL HYPERTENSION: Primary | ICD-10-CM

## 2019-12-30 PROCEDURE — 4040F PNEUMOC VAC/ADMIN/RCVD: CPT | Performed by: NURSE PRACTITIONER

## 2019-12-30 PROCEDURE — 1123F ACP DISCUSS/DSCN MKR DOCD: CPT | Performed by: NURSE PRACTITIONER

## 2019-12-30 PROCEDURE — 1036F TOBACCO NON-USER: CPT | Performed by: NURSE PRACTITIONER

## 2019-12-30 PROCEDURE — G8428 CUR MEDS NOT DOCUMENT: HCPCS | Performed by: NURSE PRACTITIONER

## 2019-12-30 PROCEDURE — 3017F COLORECTAL CA SCREEN DOC REV: CPT | Performed by: NURSE PRACTITIONER

## 2019-12-30 PROCEDURE — 99212 OFFICE O/P EST SF 10 MIN: CPT | Performed by: NURSE PRACTITIONER

## 2019-12-30 PROCEDURE — G8484 FLU IMMUNIZE NO ADMIN: HCPCS | Performed by: NURSE PRACTITIONER

## 2019-12-30 PROCEDURE — G8598 ASA/ANTIPLAT THER USED: HCPCS | Performed by: NURSE PRACTITIONER

## 2019-12-30 PROCEDURE — G8417 CALC BMI ABV UP PARAM F/U: HCPCS | Performed by: NURSE PRACTITIONER

## 2020-01-06 ENCOUNTER — HOSPITAL ENCOUNTER (EMERGENCY)
Age: 65
Discharge: HOME | End: 2020-01-06
Payer: MEDICARE

## 2020-01-06 ENCOUNTER — APPOINTMENT (OUTPATIENT)
Dept: GENERAL RADIOLOGY | Age: 65
End: 2020-01-06
Payer: MEDICARE

## 2020-01-06 VITALS
TEMPERATURE: 98 F | HEART RATE: 88 BPM | DIASTOLIC BLOOD PRESSURE: 68 MMHG | OXYGEN SATURATION: 94 % | SYSTOLIC BLOOD PRESSURE: 126 MMHG | HEIGHT: 72 IN | RESPIRATION RATE: 18 BRPM | BODY MASS INDEX: 30.48 KG/M2 | WEIGHT: 225 LBS

## 2020-01-06 PROCEDURE — 71046 X-RAY EXAM CHEST 2 VIEWS: CPT

## 2020-01-06 PROCEDURE — 99999 PR OFFICE/OUTPT VISIT,PROCEDURE ONLY: CPT | Performed by: NURSE PRACTITIONER

## 2020-01-06 PROCEDURE — 99283 EMERGENCY DEPT VISIT LOW MDM: CPT

## 2020-01-06 ASSESSMENT — ENCOUNTER SYMPTOMS
VOMITING: 0
DIARRHEA: 0
NAUSEA: 0
SORE THROAT: 0
COUGH: 1
SHORTNESS OF BREATH: 0
TROUBLE SWALLOWING: 0
RHINORRHEA: 0
CONSTIPATION: 0
ABDOMINAL PAIN: 0

## 2020-01-06 ASSESSMENT — PAIN SCALES - GENERAL
PAINLEVEL_OUTOF10: 8
PAINLEVEL_OUTOF10: 6

## 2020-01-06 ASSESSMENT — PAIN DESCRIPTION - PAIN TYPE: TYPE: ACUTE PAIN

## 2020-01-07 NOTE — ED PROVIDER NOTES
for flank pain (left). Musculoskeletal: Negative for arthralgias, myalgias, neck pain and neck stiffness. Neurological: Negative for headaches. Psychiatric/Behavioral: Negative for decreased concentration and sleep disturbance. The patient is not nervous/anxious. A complete review of systems was performed and is negative except as noted above in the HPI. PAST MEDICAL HISTORY     Past Medical History:   Diagnosis Date    Abnormal nuclear stress test 10/22/2014    BiPAP (biphasic positive airway pressure) dependence     8cm to 20cm    Cerebrovascular disease     Chronic kidney disease, stage II (mild) 08/17/2016    Michelle Lopez M.D.    Coronary artery disease 2/24/2011    Depression     Diabetes mellitus ([address]     Headache     Hyperlipemia 2/24/2011    Dr. Meño Ortiz follows lipids.     Hyperlipidemia     Hypertension     LONG TERM ANTICOAGULENT USE     Low blood pressure 11/19/2014    lymphostatic lymphoma     Nausea 11/19/2014    Obesity     Obstructive sleep apnea     AHI:  21.7 per PSG, 7/2017    Peptic ulcer disease 2/24/2011    Restless leg     S/P CABG x 3 10/22/2014    SVG to RCA; SVG to LAD diagonal; LIMA to LAD    Sleep apnea     Tachyarrhythmia     Thrombocythemia, essential ([address]     Type 2 diabetes mellitus without complication ([address]     Type II or unspecified type diabetes mellitus without mention of complication, not stated as uncontrolled          SURGICAL HISTORY       Past Surgical History:   Procedure Laterality Date    CARDIAC CATHETERIZATION  2/28/11    EF 60%    CARDIAC CATHETERIZATION  9/16/05, 3/7/07    EF < 50%    CARDIAC CATHETERIZATION  12/4/12   MDL    EF  50%    CARDIAC CATHETERIZATION  10/28/14  GERBER Woodward M.D.   [facility] [place] [facility]  9/21/05    LIMA to LAD and diagonal; SVG to posterior descending branch RCA    CORONARY ARTERY BYPASS GRAFT  10/30/2014 Redo Sternotomy - SVG-PDA, TMR (RBL)    KIDNEY SURGERY  08/14/2017    KNEE ARTHROSCOPY      right ACL repair    SHOULDER ARTHROSCOPY      left-rotator cuff repair right- rotator cuff repair    SHOULDER ARTHROSCOPY  08-23-11    right    TONSILLECTOMY           CURRENT MEDICATIONS       Previous Medications    B COMPLEX VITAMINS CAPSULE    Take 1 capsule by mouth daily. BUSPIRONE (BUSPAR) 15 MG TABLET    TAKE 1 TABLET BY MOUTH DAILY    CLOPIDOGREL (PLAVIX) 75 MG TABLET    TAKE 1 TABLET BY MOUTH EVERY DAY    DILTIAZEM (CARDIZEM CD) 180 MG EXTENDED RELEASE CAPSULE    Take 1 capsule by mouth daily    DOCUSATE SODIUM (COLACE) 100 MG CAPSULE    Take 100 mg by mouth daily. DULOXETINE (CYMBALTA) 30 MG EXTENDED RELEASE CAPSULE    Take 30 mg by mouth nightly    FERROUS SULFATE 325 (65 FE) MG TABLET    Take 325 mg by mouth 2 times daily    HYDROCODONE-ACETAMINOPHEN (NORCO) 7.5-325 MG PER TABLET    Take 1-2 tablets by mouth every 6 hours as needed. [employer] LEVOTHYROXINE (SYNTHROID) 50 MCG TABLET    Take 50 mcg by mouth Daily    MELOXICAM (MOBIC) 15 MG TABLET    Take 15 mg by mouth daily     METFORMIN (GLUCOPHAGE) 1000 MG TABLET    HOLD FOR 48 HOURS AFTER THE CARDIAC CATHETERIZATION    NITROGLYCERIN (NITROSTAT) 0.4 MG SL TABLET    Place 1 tablet under the tongue every 5 minutes as needed for Chest pain    ONDANSETRON (ZOFRAN) 8 MG TABLET    Take 8 mg by mouth as needed     PANTOPRAZOLE (PROTONIX) 40 MG TABLET    Take 1 tablet by mouth daily    POLYETHYLENE GLYCOL (GLYCOLAX) POWDER    Take 17 g by mouth as needed    PRAVASTATIN (PRAVACHOL) 40 MG TABLET    Take 1 tablet by mouth daily    RANEXA 1000 MG EXTENDED RELEASE TABLET    Take 1 tablet by mouth 2 times daily    TAMSULOSIN (FLOMAX) 0.4 MG CAPSULE    Take 1 capsule(s) every day by oral route as directed for 30 days. TIZANIDINE (ZANAFLEX) 4 MG TABLET    1/2 tablet at night    TRAMADOL (ULTRAM) 50 MG TABLET    Take 50 mg by mouth daily.      VITAMIN D (CHOLECALCIFEROL) (102.1 kg)       Physical Exam  Vitals signs reviewed. Constitutional:       Appearance: Normal appearance. HENT:      Head: Normocephalic and atraumatic. Nose: Nose normal.      Mouth/Throat:      Mouth: Mucous membranes are moist.      Pharynx: Oropharynx is clear. Eyes:      Conjunctiva/sclera: Conjunctivae normal.   Neck:      Musculoskeletal: Normal range of motion and neck supple. Cardiovascular:      Rate and Rhythm: Normal rate and regular rhythm. Pulses: Normal pulses. Heart sounds: Normal heart sounds. Pulmonary:      Effort: Pulmonary effort is normal.      Breath sounds: Normal breath sounds. Musculoskeletal: Normal range of motion. Skin:     General: Skin is warm and dry. Findings: Rash (dry scaly lesions left flank) present. Neurological:      Mental Status: He is alert and oriented to person, place, and time. DIAGNOSTIC RESULTS     EKG: All EKG's are interpreted by the Emergency Department Physician who either signs or Co-signs this chart in the absence of a cardiologist.        RADIOLOGY:   Non-plain film images such as CT, Ultrasound and MRI are read by the radiologist. William Self images are visualized and preliminarily interpreted by the emergency physician with the below findings:        Interpretation per the Radiologist below, if available at the time of this note:    XR CHEST STANDARD (2 VW)   Final Result   Impression:   1. No acute cardiopulmonary process. Signed by Dr Skinny Regalado on 1/6/2020 9:29 PM            ED BEDSIDE ULTRASOUND:   Performed by ED Physician - none    LABS:  Labs Reviewed - No data to display    All other labs were within normal range or not returned as of this dictation.     EMERGENCY DEPARTMENT COURSE and DIFFERENTIALDIAGNOSIS/MDM:   Vitals:    Vitals:    01/06/20 2053   BP: 115/62   Pulse: 84   Resp: 18   Temp: 98 °F (36.7 °C)   SpO2: 92%   Weight: 225 lb (102.1 kg)   Height: 6' (1.829 m)

## 2020-01-17 ASSESSMENT — ENCOUNTER SYMPTOMS
DIARRHEA: 0
VOMITING: 0
ABDOMINAL PAIN: 0
SHORTNESS OF BREATH: 0
NAUSEA: 0

## 2020-03-16 ENCOUNTER — TELEPHONE (OUTPATIENT)
Dept: CARDIOLOGY | Age: 65
End: 2020-03-16

## 2020-03-16 RX ORDER — RANOLAZINE 1000 MG/1
1000 TABLET, EXTENDED RELEASE ORAL 2 TIMES DAILY
Qty: 60 TABLET | Refills: 5 | Status: SHIPPED | OUTPATIENT
Start: 2020-03-16 | End: 2021-03-19 | Stop reason: SDUPTHER

## 2020-06-11 ENCOUNTER — HOSPITAL ENCOUNTER (OUTPATIENT)
Dept: WOUND CARE | Age: 65
Discharge: HOME | End: 2020-06-11
Payer: MEDICARE

## 2020-06-11 VITALS
SYSTOLIC BLOOD PRESSURE: 117 MMHG | HEART RATE: 80 BPM | DIASTOLIC BLOOD PRESSURE: 58 MMHG | WEIGHT: 235 LBS | HEIGHT: 72 IN | TEMPERATURE: 98.2 F | BODY MASS INDEX: 31.83 KG/M2 | RESPIRATION RATE: 18 BRPM

## 2020-06-11 PROBLEM — L97.522 DIABETIC ULCER OF TOE OF LEFT FOOT ASSOCIATED WITH TYPE 2 DIABETES MELLITUS, WITH FAT LAYER EXPOSED (HCC): Chronic | Status: ACTIVE | Noted: 2018-12-06

## 2020-06-11 PROCEDURE — 97597 DBRDMT OPN WND 1ST 20 CM/<: CPT

## 2020-06-11 PROCEDURE — 97597 DBRDMT OPN WND 1ST 20 CM/<: CPT | Performed by: SURGERY

## 2020-06-11 PROCEDURE — 99213 OFFICE O/P EST LOW 20 MIN: CPT

## 2020-06-11 NOTE — PROGRESS NOTES
embolization of stent in the RCA  10/30/2014  Redo CABG x 1 VG-PDA Bernkasandra Hilt)  5/11/2016  DSE negative for myocardial ischemia  5/10/2017  Echo  Normal LVFX  5/26/2017  Holter NSR  10/3/2019  Echo normal LVFX  10/3/2019  lexiscan Positive for inferior MI + myocardial ischemia, EF 59%, -5% ischemic myocardium on stress, uninterpretable risk findings, AUC indication 21, AUC score 7, Kay Cano MD)   10/15/19  Cath  Patent LIMA-diag & LAD, patent VG-OM, patent VG-PDA, distal anterior apical hypokinesis, EF 50%          Chest tightness 09/18/2014     With exertion      JOHNSON (dyspnea on exertion) 09/18/2014    Shortness of breath 11/29/2012    Diabetes mellitus [address]     LONG TERM ANTICOAGULENT USE     Coronary artery disease 02/24/2011     S/p CABG 9/21/05 with LIMA to LAD and diagonal; SVG to posterior descending branch RCA  10/14 Restudy/Failed Stent with embolization of device in RCA  10/14 Redo CABG SV-PDA        Hyperlipemia 02/24/2011     Current Outpatient Medications   Medication Sig Dispense Refill    mupirocin (BACTROBAN) 2 % ointment Apply topically 2 times daily Apply to wound BID 1 Tube 3    ranolazine (RANEXA) 1000 MG extended release tablet Take 1 tablet by mouth 2 times daily 60 tablet 5    metFORMIN (GLUCOPHAGE) 1000 MG tablet HOLD FOR 48 HOURS AFTER THE CARDIAC CATHETERIZATION (Patient taking differently: 1,000 mg 2 times daily (with meals) HOLD FOR 48 HOURS AFTER THE CARDIAC CATHETERIZATION) 60 tablet 3    clopidogrel (PLAVIX) 75 MG tablet TAKE 1 TABLET BY MOUTH EVERY DAY 90 tablet 3    busPIRone (BUSPAR) 15 MG tablet TAKE 1 TABLET BY MOUTH DAILY 30 tablet 1    polyethylene glycol (GLYCOLAX) powder Take 17 g by mouth as needed      DULoxetine (CYMBALTA) 30 MG extended release capsule Take 30 mg by mouth nightly      levothyroxine (SYNTHROID) 50 MCG tablet Take 50 mcg by mouth Daily      tamsulosin (FLOMAX) 0.4 MG capsule Take 1 capsule(s) every day by oral route as directed for 30 days.  27 GI - Abdomen - soft, non tender, bowel sounds X 4 quadrants. No guarding or rebound tenderness. No distension or palpable mass. Genitourinary - deferred. Musculoskeletal - ROM appears normal. min edema. Skin: warm,dry  Neurologic - alert and oriented X 3. Sensation normal  Psychiatric - mood, affect, and behavior appear normal.  Judgment and thought processes appear normal.  Wound - left great toe wound    Wound Picture:        Assessment     left great toe wound    1. Diabetic ulcer of toe of left foot associated with type 2 diabetes mellitus, with fat layer exposed ([address]    2. Type 2 diabetes mellitus with foot ulcer, unspecified whether long term insulin use ([facility] [address]          Procedure Note:    Debridement: After risks benefits and expected outcomes were discussed with the patient an Non-excisional Debridement was performed on 1 . Anesthetic: lidocaine gel    Using curette the wound was sharply debrided    down through and including the removal of viable and non-viable epidermis and dermis. Devitalized Tissue Debrided:  fibrin, biofilm, slough, necrotic/eschar and exudateand epidermis and dermis. Percent of Wound Debrided: 100%    Total Surface Area Debrided:  0.3 sq cm     Post Debridement Measurements and Assessment:  Wound 06/11/20 Toe (Comment  which one) Left;Plantar Wound 1.   Left great toe   Lawler 1 (Active)   Wound Image   6/11/2020  2:01 PM   Wound Diabetic [place] 1 6/11/2020  2:01 PM   Wound Cleansed Rinsed/Irrigated with saline 6/11/2020  2:01 PM   Wound Length (cm) 0.6 cm 6/11/2020  2:01 PM   Wound Width (cm) 0.5 cm 6/11/2020  2:01 PM   Wound Depth (cm) 0.1 cm 6/11/2020  2:01 PM   Wound Surface Area (cm^2) 0.3 cm^2 6/11/2020  2:01 PM   Wound Volume (cm^3) 0.03 cm^3 6/11/2020  2:01 PM   Post-Procedure Length (cm) 0.6 cm 6/11/2020  2:26 PM   Post-Procedure Width (cm) 0.5 cm 6/11/2020  2:26 PM   Post-Procedure Depth (cm) 0.2 cm 6/11/2020  2:26 PM   Post-Procedure Surface Area (cm^2) 0.3 Metatarsal pad to off load wound    Treatment Orders:  X-ray in [address], Labs in [address],   EMILIA on July 8th [place] in [address] Test will be done in [address] at 2:30  Wound Care Appointment to follow test at 3:30    [address] [place] [address],[address] followup visit _____________1 week________________  (Please note your next appointment above and if you are unable to keep, kindly give a 24 hour notice. Thank you.)    If you experience any of the following, please [facility] during business hours:    * Increase in Pain  * Temperature over 101  * Increase in drainage from your wound  * Drainage with a foul odor  * Bleeding  * Increase in swelling  * Need for compression bandage changes due to slippage, breakthrough drainage. If you need medical attention outside of the business hours of the [facility] please contact your PCP or go to the nearest emergency room.         Electronically signed by Fer Ramirez MD on 6/11/20 at 3:58 PM CDT

## 2020-06-16 ENCOUNTER — HOSPITAL ENCOUNTER (OUTPATIENT)
Dept: GENERAL RADIOLOGY | Age: 65
Discharge: HOME | End: 2020-06-16
Payer: MEDICARE

## 2020-06-16 DIAGNOSIS — L97.522 DIABETIC ULCER OF TOE OF LEFT FOOT ASSOCIATED WITH TYPE 2 DIABETES MELLITUS, WITH FAT LAYER EXPOSED (HCC): Chronic | ICD-10-CM

## 2020-06-16 DIAGNOSIS — L97.509 TYPE 2 DIABETES MELLITUS WITH FOOT ULCER, UNSPECIFIED WHETHER LONG TERM INSULIN USE (HCC): ICD-10-CM

## 2020-06-16 DIAGNOSIS — E11.621 DIABETIC ULCER OF TOE OF LEFT FOOT ASSOCIATED WITH TYPE 2 DIABETES MELLITUS, WITH FAT LAYER EXPOSED (HCC): Chronic | ICD-10-CM

## 2020-06-16 DIAGNOSIS — E11.621 TYPE 2 DIABETES MELLITUS WITH FOOT ULCER, UNSPECIFIED WHETHER LONG TERM INSULIN USE (HCC): ICD-10-CM

## 2020-06-16 LAB
ALBUMIN SERPL-MCNC: 4.2 G/DL (ref 3.5–5.2)
ALP BLD-CCNC: 98 U/L (ref 40–130)
ALT SERPL-CCNC: 18 U/L (ref 5–41)
ANION GAP SERPL CALCULATED.3IONS-SCNC: 13 MMOL/L (ref 7–19)
AST SERPL-CCNC: 20 U/L (ref 5–40)
BASOPHILS ABSOLUTE: 0 K/UL (ref 0–0.2)
BASOPHILS RELATIVE PERCENT: 0.5 % (ref 0–1)
BILIRUB SERPL-MCNC: 0.5 MG/DL (ref 0.2–1.2)
BUN BLDV-MCNC: 17 MG/DL (ref 8–23)
C-REACTIVE PROTEIN: 0.33 MG/DL (ref 0–0.5)
CALCIUM SERPL-MCNC: 9.6 MG/DL (ref 8.8–10.2)
CHLORIDE BLD-SCNC: 102 MMOL/L (ref 98–111)
CO2: 25 MMOL/L (ref 22–29)
CREAT SERPL-MCNC: 0.9 MG/DL (ref 0.5–1.2)
EOSINOPHILS ABSOLUTE: 0.1 K/UL (ref 0–0.6)
EOSINOPHILS RELATIVE PERCENT: 2.3 % (ref 0–5)
GFR NON-AFRICAN AMERICAN: >60
GLUCOSE BLD-MCNC: 192 MG/DL (ref 74–109)
HBA1C MFR BLD: 6.6 % (ref 4–6)
HCT VFR BLD CALC: 31.9 % (ref 42–52)
HEMOGLOBIN: 10.3 G/DL (ref 14–18)
IMMATURE GRANULOCYTES #: 0 K/UL
LYMPHOCYTES ABSOLUTE: 0.7 K/UL (ref 1.1–4.5)
LYMPHOCYTES RELATIVE PERCENT: 17.7 % (ref 20–40)
MCH RBC QN AUTO: 34.4 PG (ref 27–31)
MCHC RBC AUTO-ENTMCNC: 32.3 G/DL (ref 33–37)
MCV RBC AUTO: 106.7 FL (ref 80–94)
MONOCYTES ABSOLUTE: 0.4 K/UL (ref 0–0.9)
MONOCYTES RELATIVE PERCENT: 10.5 % (ref 0–10)
NEUTROPHILS ABSOLUTE: 2.7 K/UL (ref 1.5–7.5)
NEUTROPHILS RELATIVE PERCENT: 68.5 % (ref 50–65)
PDW BLD-RTO: 14.7 % (ref 11.5–14.5)
PLATELET # BLD: 81 K/UL (ref 130–400)
PMV BLD AUTO: 10.6 FL (ref 9.4–12.4)
POTASSIUM SERPL-SCNC: 4.9 MMOL/L (ref 3.5–5)
PREALBUMIN: 23 MG/DL (ref 20–40)
RBC # BLD: 2.99 M/UL (ref 4.7–6.1)
SEDIMENTATION RATE, ERYTHROCYTE: 36 MM/HR (ref 0–15)
SODIUM BLD-SCNC: 140 MMOL/L (ref 136–145)
TOTAL PROTEIN: 6.9 G/DL (ref 6.6–8.7)
WBC # BLD: 3.9 K/UL (ref 4.8–10.8)

## 2020-06-16 PROCEDURE — 85652 RBC SED RATE AUTOMATED: CPT

## 2020-06-16 PROCEDURE — 85025 COMPLETE CBC W/AUTO DIFF WBC: CPT

## 2020-06-16 PROCEDURE — 80053 COMPREHEN METABOLIC PANEL: CPT

## 2020-06-16 PROCEDURE — 86140 C-REACTIVE PROTEIN: CPT

## 2020-06-16 PROCEDURE — 84134 ASSAY OF PREALBUMIN: CPT

## 2020-06-16 PROCEDURE — 73620 X-RAY EXAM OF FOOT: CPT

## 2020-06-16 PROCEDURE — 83036 HEMOGLOBIN GLYCOSYLATED A1C: CPT

## 2020-06-16 PROCEDURE — 36415 COLL VENOUS BLD VENIPUNCTURE: CPT

## 2020-06-19 ENCOUNTER — HOSPITAL ENCOUNTER (OUTPATIENT)
Dept: WOUND CARE | Age: 65
Discharge: HOME | End: 2020-06-19
Payer: MEDICARE

## 2020-06-19 VITALS
HEART RATE: 76 BPM | RESPIRATION RATE: 18 BRPM | TEMPERATURE: 98.6 F | BODY MASS INDEX: 31.83 KG/M2 | WEIGHT: 235 LBS | HEIGHT: 72 IN | DIASTOLIC BLOOD PRESSURE: 71 MMHG | SYSTOLIC BLOOD PRESSURE: 120 MMHG

## 2020-06-19 PROCEDURE — 97597 DBRDMT OPN WND 1ST 20 CM/<: CPT

## 2020-06-19 PROCEDURE — 97597 DBRDMT OPN WND 1ST 20 CM/<: CPT | Performed by: SURGERY

## 2020-06-19 RX ORDER — ACETAMINOPHEN 500 MG
1000 TABLET ORAL EVERY 6 HOURS PRN
COMMUNITY

## 2020-06-19 ASSESSMENT — PAIN DESCRIPTION - PROGRESSION: CLINICAL_PROGRESSION: NOT CHANGED

## 2020-06-19 ASSESSMENT — PAIN DESCRIPTION - ORIENTATION: ORIENTATION: LEFT

## 2020-06-19 ASSESSMENT — PAIN DESCRIPTION - FREQUENCY: FREQUENCY: INTERMITTENT

## 2020-06-19 ASSESSMENT — PAIN DESCRIPTION - ONSET: ONSET: ON-GOING

## 2020-06-19 ASSESSMENT — PAIN DESCRIPTION - LOCATION: LOCATION: TOE (COMMENT WHICH ONE)

## 2020-06-19 ASSESSMENT — PAIN SCALES - GENERAL: PAINLEVEL_OUTOF10: 3

## 2020-06-19 ASSESSMENT — PAIN DESCRIPTION - PAIN TYPE: TYPE: ACUTE PAIN

## 2020-06-19 NOTE — PROGRESS NOTES
PHYSICAL EXAM    General Appearance: alert and oriented to person, place and time, well developed and well- nourished, in no acute distress  Skin: warm and dry, no rash or erythema  Head: normocephalic and atraumatic  Eyes: pupils equal, round, and reactive to light, extraocular eye movements intact, conjunctivae normal  ENT: tympanic membrane, external ear and ear canal normal bilaterally, nose without deformity, nasal mucosa and turbinates normal without polyps, lips teeth and gums normal  Neck: supple and non-tender without mass, no thyromegaly or thyroid nodules, no cervical lymphadenopathy  Pulmonary/Chest: clear to auscultation bilaterally- no wheezes, rales or rhonchi, normal air movement, no respiratory distress  Cardiovascular: normal rate, regular rhythm, normal S1 and S2, no murmurs, rubs, clicks, or gallops, distal pulses intact, no carotid bruits  Abdomen: soft, non-tender, non-distended, normal bowel sounds, no masses or organomegaly  Extremities: no cyanosis, clubbing or edema  Musculoskeletal: normal range of motion, no joint swelling, deformity or tenderness  Neurologic: reflexes normal and symmetric, no cranial nerve deficit, gait, coordination and speech normal, sensation of skin normal      Assessment:      Problem List Items Addressed This Visit     * (Principal) Diabetic ulcer of toe of left foot associated with type 2 diabetes mellitus, with fat layer exposed (HCC) (Chronic)           Procedure Note  Indications:  Based on my examination of this patient's wound(s)/ulcer(s) today, debridement is required to promote healing and evaluate the wound base. Performed by: Echo Dawkins MD    Consent obtained:  Yes    Time out taken:  Yes    Pain Control:         Debridement:Non-excisional Debridement    Using curette the wound(s)/ulcer(s) was/were sharply debrided down through and including the removal of epidermis and dermis.         Devitalized Tissue Debrided:  fibrin, biofilm, slough, Calloused 6/19/2020 10:43 AM   Non-staged Wound Description Not applicable 6/93/6476 01:48 AM   [place]%Wound Bed 0 6/19/2020 10:43 AM   Red%Wound Bed 99 6/19/2020 10:43 AM   Yellow%Wound Bed 1 6/19/2020 10:43 AM   Black%Wound Bed 0 6/19/2020 10:43 AM   Purple%Wound Bed 0 6/19/2020 10:43 AM   Other%Wound Bed 0 6/19/2020 10:43 AM   Number of days: 7             Estimated Blood Loss:  Minimal    Hemostasis Achieved:  by pressure    Procedural Pain:  0  / 10     Post Procedural Pain:  0 / 10     Response to treatment:  Well tolerated by patient. Plan:     Problem List Items Addressed This Visit     * (Principal) Diabetic ulcer of toe of left foot associated with type 2 diabetes mellitus, with fat layer exposed ([address] (Chronic)          Wound much improved    Treatment Note please see attached Discharge Instructions    In my professional opinion this patient would benefit from HBO Therapy: No    Written patient dismissal instructions given to patient and signed by patient or POA. Discharge [address] and Hyperbaric Oxygen Therapy   Physician Orders and Discharge Instructions  [address]  Telephone: [phone]    NAME:  Valentino Smack OF BIRTH:  [id number]  MEDICAL RECORD NUMBER:  [id number]  DATE:  6/19/2020    Discharge condition: Stable    Discharge to: Home    Left via:Private automobile    Accompanied by:  self    ECF/[facility]: [facility]     Dressing Orders: Left Great Toe  Bactroban Ointment to open area, Cover with dry gauze, Secure with roll gauze and medipore tape  Change twice daily,   Metatarsal pad to off load wound      [facility] followup visit __________1 week___________________  (Please note your next appointment above and if you are unable to keep, kindly give a 24 hour notice.  Thank you.)          If you experience any of the following, please call the [address] [place] [address] during business

## 2020-06-30 ENCOUNTER — HOSPITAL ENCOUNTER (OUTPATIENT)
Dept: WOUND CARE | Age: 65
Discharge: HOME | End: 2020-06-30
Payer: MEDICARE

## 2020-06-30 VITALS — WEIGHT: 235 LBS | HEIGHT: 72 IN | BODY MASS INDEX: 31.83 KG/M2

## 2020-06-30 PROCEDURE — 97597 DBRDMT OPN WND 1ST 20 CM/<: CPT

## 2020-06-30 PROCEDURE — 97597 DBRDMT OPN WND 1ST 20 CM/<: CPT | Performed by: SURGERY

## 2020-06-30 ASSESSMENT — PAIN SCALES - GENERAL: PAINLEVEL_OUTOF10: 0

## 2020-06-30 NOTE — PROGRESS NOTES
[address]   Progress Note and Procedure Note      Daryl Mackenzie  MEDICAL RECORD NUMBER:  [id number]  AGE: 75 y.o. GENDER: male  : 1955  EPISODE DATE:  2020    Subjective:     Chief Complaint   Patient presents with    Wound Check     Wound Check         HISTORY of PRESENT ILLNESS HPI     Daryl Mackenzie is a 75 y.o. male who presents today for wound/ulcer evaluation. Wound Context: Pt with L great toe wound here for eval/treat  Wound/Ulcer Pain Timing/Severity: none  Quality of pain: N/A  Severity:  0 / 10   Modifying Factors: None  Associated Signs/Symptoms: none    Ulcer Identification:  Ulcer Type: diabetic and pressure  Contributing Factors: diabetes, chronic pressure and shear force    Wound: DM        PAST MEDICAL HISTORY        Diagnosis Date    Abnormal nuclear stress test 10/22/2014    BiPAP (biphasic positive airway pressure) dependence     8cm to 20cm    Cerebrovascular disease     Chronic kidney disease, stage II (mild) 2016    Fred Urena M.D.    Coronary artery disease 2011    Depression     Diabetes mellitus ([address]     Headache     Hyperlipemia 2011    Dr. Jacklyn Steel follows lipids.     Hyperlipidemia     Hypertension     LONG TERM ANTICOAGULENT USE     Low blood pressure 2014    lymphostatic lymphoma     Nausea 2014    Obesity     Obstructive sleep apnea     AHI:  21.7 per PS[facility], 2017    Peptic ulcer disease 2011    Restless leg     S/P CABG x 3 10/22/2014    SVG to RCA; SVG to LAD diagonal; LIMA to LAD    Sleep apnea     Tachyarrhythmia     Thrombocythemia, essential ([address]     Type 2 diabetes mellitus without complication ([address]     Type II or unspecified type diabetes mellitus without mention of complication, not stated as uncontrolled        PAST SURGICAL HISTORY    Past Surgical History:   Procedure Laterality Date    CARDIAC CATHETERIZATION  11    EF 60%    CARDIAC CATHETERIZATION 9/16/05, 3/7/07    EF < 50%    CARDIAC CATHETERIZATION  12/4/12   GERBER    EF  50%    CARDIAC CATHETERIZATION  10/28/14  MDL Osborne Castleman, M.D.   Susan Bang CORONARY ARTERY BYPASS GRAFT  9/21/05    LIMA to LAD and diagonal; SVG to posterior descending branch RCA    CORONARY ARTERY BYPASS GRAFT  10/30/2014    Redo Sternotomy - SVG-PDA, TMR (RBL)    KIDNEY SURGERY  08/14/2017    KNEE ARTHROSCOPY      right ACL repair    SHOULDER ARTHROSCOPY      left-rotator cuff repair right- rotator cuff repair    SHOULDER ARTHROSCOPY  08-23-11    right    TONSILLECTOMY         FAMILY HISTORY    Family History   Problem Relation Age of Onset    Heart Disease Other     Lung Cancer Mother     Cancer Sister        SOCIAL HISTORY    Social History     Tobacco Use    Smoking status: Former Smoker     Packs/day: 0.00     Types: Cigarettes    Smokeless tobacco: Never Used   Substance Use Topics    Alcohol use: No    Drug use: No       ALLERGIES    Allergies   Allergen Reactions    Ancef [Cefazolin Sodium]     Ceftin [Cefuroxime]      \"anything with ceftin in it\"    Cefuroxime Axetil     Cephalosporins        MEDICATIONS    Current Outpatient Medications on File Prior to Encounter   Medication Sig Dispense Refill    acetaminophen (TYLENOL) 500 MG tablet Take 1,000 mg by mouth every 6 hours as needed for Pain      mupirocin (BACTROBAN) 2 % ointment Apply topically 2 times daily Apply to wound BID 1 Tube 3    ranolazine (RANEXA) 1000 MG extended release tablet Take 1 tablet by mouth 2 times daily 60 tablet 5    metFORMIN (GLUCOPHAGE) 1000 MG tablet HOLD FOR 48 HOURS AFTER THE CARDIAC CATHETERIZATION (Patient taking differently: 1,000 mg 2 times daily (with meals) HOLD FOR 48 HOURS AFTER THE CARDIAC CATHETERIZATION) 60 tablet 3    nitroGLYCERIN (NITROSTAT) 0.4 MG SL tablet Place 1 tablet under the tongue every 5 minutes as needed for Chest pain 25 tablet 5    clopidogrel (PLAVIX) 75 MG tablet TAKE 1 TABLET BY MOUTH EVERY DAY 90 tablet 3    busPIRone (BUSPAR) 15 MG tablet TAKE 1 TABLET BY MOUTH DAILY 30 tablet 1    polyethylene glycol (GLYCOLAX) powder Take 17 g by mouth as needed      DULoxetine (CYMBALTA) 30 MG extended release capsule Take 30 mg by mouth nightly      levothyroxine (SYNTHROID) 50 MCG tablet Take 50 mcg by mouth Daily      tamsulosin (FLOMAX) 0.4 MG capsule Take 1 capsule(s) every day by oral route as directed for 30 days. 30 capsule 11    diltiazem (CARDIZEM CD) 180 MG extended release capsule Take 1 capsule by mouth daily 90 capsule 3    HYDROcodone-acetaminophen (NORCO) 7.5-325 MG per tablet Take 1-2 tablets by mouth every 6 hours as needed. Rosevelt Ganga meloxicam (MOBIC) 15 MG tablet Take 15 mg by mouth daily       ondansetron (ZOFRAN) 8 MG tablet Take 8 mg by mouth as needed       tiZANidine (ZANAFLEX) 4 MG tablet 1/2 tablet at night      traMADol (ULTRAM) 50 MG tablet Take 50 mg by mouth daily as needed.  pravastatin (PRAVACHOL) 40 MG tablet Take 1 tablet by mouth daily 90 tablet 2    ferrous sulfate 325 (65 Fe) MG tablet Take 325 mg by mouth 2 times daily      pantoprazole (PROTONIX) 40 MG tablet Take 1 tablet by mouth daily 90 tablet 2    b complex vitamins capsule Take 1 capsule by mouth daily.  Vitamin D (CHOLECALCIFEROL) 1000 UNITS CAPS capsule Take 1,000 Units by mouth daily.  docusate sodium (COLACE) 100 MG capsule Take 100 mg by mouth daily. No current facility-administered medications on file prior to encounter. REVIEW OF SYSTEMS    A comprehensive review of systems was negative.     Objective:      Ht 6' (1.829 m)   Wt 235 lb (106.6 kg)   BMI 31.87 kg/m²     Wt Readings from Last 3 Encounters:   06/30/20 235 lb (106.6 kg)   06/19/20 235 lb (106.6 kg)   06/11/20 235 lb (106.6 kg)       PHYSICAL EXAM    General Appearance: alert and oriented to person, place and time, well developed and well- nourished, Documentation Flow Sheet    Wound/Ulcer #: 1    Percent of Wound(s)/Ulcer(s) Debrided: 100%    Total Surface Area Debrided:  0.06 sq cm       Diabetic/Pressure/Non Pressure Ulcers only:  Ulcer: Diabetic ulcer, fat layer exposed           Post Debridement Measurements:    Wound/Ulcer Descriptions are Pre Debridement --EXCEPT MEASUREMENTS    Wound 06/11/20 Toe (Comment  which one) Left;Plantar Wound 1. Left great toe   Lawler 1 (Active)   Wound Image      6/19/2020 10:43 AM   Wound Diabetic Lawler 1 6/30/2020 11:15 AM   Offloading for Diabetic Foot Ulcers Felt or foam 6/30/2020 11:15 AM   Dressing Status Old drainage 6/19/2020 10:43 AM   Wound Cleansed Not Cleansed 6/19/2020 10:43 AM   Wound Length (cm) 0.3 cm 6/30/2020 11:15 AM   Wound Width (cm) 0.2 cm 6/30/2020 11:15 AM   Wound Depth (cm) 0.1 cm 6/30/2020 11:15 AM   Wound Surface Area (cm^2) 0.06 cm^2 6/30/2020 11:15 AM   Change in Wound Size % (l*w) 80 6/30/2020 11:15 AM   Wound Volume (cm^3) 0.01 cm^3 6/30/2020 11:15 AM   Wound Healing % 67 6/30/2020 11:15 AM   Post-Procedure Length (cm) 0.3 cm 6/30/2020 11:34 AM   Post-Procedure Width (cm) 0.2 cm 6/30/2020 11:34 AM   Post-Procedure Depth (cm) 0.1 cm 6/30/2020 11:34 AM   Post-Procedure Surface Area (cm^2) 0.06 cm^2 6/30/2020 11:34 AM   Post-Procedure Volume (cm^3) 0.01 cm^3 6/30/2020 11:34 AM   Distance Tunneling (cm) 0 cm 6/19/2020 10:43 AM   Tunneling Position ___ O'Clock 0 6/19/2020 10:43 AM   Undermining Starts ___ O'Clock 0 6/19/2020 10:43 AM   Undermining Ends___ O'Clock 0 6/19/2020 10:43 AM   Undermining Maxium Distance (cm) 0 6/19/2020 10:43 AM   Wound Assessment Red;Drainage;Slough; Yellow 6/19/2020 10:43 AM   Drainage Amount Small 6/19/2020 10:43 AM   Drainage Description Serosanguinous 6/19/2020 10:43 AM   Odor None 6/30/2020 11:15 AM   Margins Defined edges 6/19/2020 10:43 AM   Nelda-wound Assessment Calloused 6/19/2020 10:43 AM   Non-staged Wound Description Not applicable 7/17/4269 86:24 AM [place]%Wound Bed 0 6/19/2020 10:43 AM   Red%Wound Bed 99 6/19/2020 10:43 AM   Yellow%Wound Bed 1 6/19/2020 10:43 AM   Black%Wound Bed 0 6/19/2020 10:43 AM   Purple%Wound Bed 0 6/19/2020 10:43 AM   Other%Wound Bed 0 6/19/2020 10:43 AM   Number of days: 18             Estimated Blood Loss:  Minimal    Hemostasis Achieved:  by pressure    Procedural Pain:  0  / 10     Post Procedural Pain:  0 / 10     Response to treatment:  Well tolerated by patient. 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 Discharge [address] and Hyperbaric Oxygen Therapy   Physician Orders and Discharge Instructions  [address]  Telephone: [phone]    NAME:  Amari Escalante OF BIRTH:  1955  MEDICAL RECORD NUMBER:  [id number]  DATE:  6/30/2020    Discharge condition: Stable    Discharge to: Home    Left via:Private automobile    Accompanied by: Self     ECF/[facility]: [facility]     Dressing Orders: Left Great Toe  Bactroban Ointment to open area, Cover with dry gauze, Secure with roll gauze and medipore tape  Change twice daily,   Metatarsal pad to off load wound    [facility] followup visit _____________1 week________________  (Please note your next appointment above and if you are unable to keep, kindly give a 24 hour notice.  Thank you.)    If you experience any of the following, please call the [address] [place] [address] Road during business hours:    * Increase in Pain  * Temperature over 101  * Increase in drainage from your wound  * Drainage with a foul odor  * Bleeding  * Increase in swelling  * Need for compression bandage changes due to slippage, breakthrough drainage. If you need medical attention outside of the business hours of the [address] [place] [address] Road please contact your PCP or go to the nearest emergency room.         Electronically signed by Gabrielle Sumner MD on 6/30/2020 at 11:37 AM

## 2020-07-07 ENCOUNTER — HOSPITAL ENCOUNTER (OUTPATIENT)
Dept: WOUND CARE | Age: 65
Discharge: HOME | End: 2020-07-07
Payer: MEDICARE

## 2020-07-07 ENCOUNTER — HOSPITAL ENCOUNTER (OUTPATIENT)
Dept: NON INVASIVE DIAGNOSTICS | Age: 65
Discharge: HOME | End: 2020-07-07
Payer: MEDICARE

## 2020-07-07 VITALS
BODY MASS INDEX: 31.83 KG/M2 | DIASTOLIC BLOOD PRESSURE: 72 MMHG | HEIGHT: 72 IN | SYSTOLIC BLOOD PRESSURE: 114 MMHG | TEMPERATURE: 98 F | RESPIRATION RATE: 16 BRPM | HEART RATE: 70 BPM | WEIGHT: 235 LBS

## 2020-07-07 PROCEDURE — 97597 DBRDMT OPN WND 1ST 20 CM/<: CPT | Performed by: SURGERY

## 2020-07-07 PROCEDURE — 97597 DBRDMT OPN WND 1ST 20 CM/<: CPT

## 2020-07-07 PROCEDURE — 93923 UPR/LXTR ART STDY 3+ LVLS: CPT

## 2020-07-07 ASSESSMENT — PAIN SCALES - GENERAL: PAINLEVEL_OUTOF10: 0

## 2020-07-07 NOTE — PROGRESS NOTES
9/16/05, 3/7/07    EF < 50%    CARDIAC CATHETERIZATION  12/4/12   GERBER    EF  50%    CARDIAC CATHETERIZATION  10/28/14  GERBER Angela M.D.   [facility] CORONARY ARTERY BYPASS GRAFT  9/21/05    LIMA to LAD and diagonal; SVG to posterior descending branch RCA    CORONARY ARTERY BYPASS GRAFT  10/30/2014    Redo Sternotomy - SVG-PDA, TMR (RBL)    KIDNEY SURGERY  08/14/2017    KNEE ARTHROSCOPY      right ACL repair    SHOULDER ARTHROSCOPY      left-rotator cuff repair right- rotator cuff repair    SHOULDER ARTHROSCOPY  08-23-11    right    TONSILLECTOMY         FAMILY HISTORY    Family History   Problem Relation Age of Onset    Heart Disease Other     Lung Cancer Mother     Cancer Sister        SOCIAL HISTORY    Social History     Tobacco Use    Smoking status: Former Smoker     Packs/day: 0.00     Types: Cigarettes    Smokeless tobacco: Never Used   Substance Use Topics    Alcohol use: No    Drug use: No       ALLERGIES    Allergies   Allergen Reactions    Ancef [Cefazolin Sodium]     Ceftin [Cefuroxime]      \"anything with ceftin in it\"    Cefuroxime Axetil     Cephalosporins        MEDICATIONS    Current Outpatient Medications on File Prior to Encounter   Medication Sig Dispense Refill    acetaminophen (TYLENOL) 500 MG tablet Take 1,000 mg by mouth every 6 hours as needed for Pain      mupirocin (BACTROBAN) 2 % ointment Apply topically 2 times daily Apply to wound BID 1 Tube 3    ranolazine (RANEXA) 1000 MG extended release tablet Take 1 tablet by mouth 2 times daily 60 tablet 5    metFORMIN (GLUCOPHAGE) 1000 MG tablet HOLD FOR 48 HOURS AFTER THE CARDIAC CATHETERIZATION (Patient taking differently: 1,000 mg 2 times daily (with meals) HOLD FOR 48 HOURS AFTER THE CARDIAC CATHETERIZATION) 60 tablet 3    nitroGLYCERIN (NITROSTAT) 0.4 MG SL tablet Place 1 tablet under the tongue every 5 minutes as needed for Chest pain 25 tablet 5    clopidogrel (PLAVIX) 75 MG tablet TAKE 1 TABLET BY MOUTH EVERY DAY 90 tablet 3    busPIRone (BUSPAR) 15 MG tablet TAKE 1 TABLET BY MOUTH DAILY 30 tablet 1    polyethylene glycol (GLYCOLAX) powder Take 17 g by mouth as needed      DULoxetine (CYMBALTA) 30 MG extended release capsule Take 30 mg by mouth nightly      levothyroxine (SYNTHROID) 50 MCG tablet Take 50 mcg by mouth Daily      tamsulosin (FLOMAX) 0.4 MG capsule Take 1 capsule(s) every day by oral route as directed for 30 days. 30 capsule 11    diltiazem (CARDIZEM CD) 180 MG extended release capsule Take 1 capsule by mouth daily 90 capsule 3    HYDROcodone-acetaminophen (NORCO) 7.5-325 MG per tablet Take 1-2 tablets by mouth every 6 hours as needed. Obdulia Jonas meloxicam (MOBIC) 15 MG tablet Take 15 mg by mouth daily       ondansetron (ZOFRAN) 8 MG tablet Take 8 mg by mouth as needed       tiZANidine (ZANAFLEX) 4 MG tablet 1/2 tablet at night      traMADol (ULTRAM) 50 MG tablet Take 50 mg by mouth daily as needed.  pravastatin (PRAVACHOL) 40 MG tablet Take 1 tablet by mouth daily 90 tablet 2    ferrous sulfate 325 (65 Fe) MG tablet Take 325 mg by mouth 2 times daily      pantoprazole (PROTONIX) 40 MG tablet Take 1 tablet by mouth daily 90 tablet 2    b complex vitamins capsule Take 1 capsule by mouth daily.  Vitamin D (CHOLECALCIFEROL) 1000 UNITS CAPS capsule Take 1,000 Units by mouth daily.  docusate sodium (COLACE) 100 MG capsule Take 100 mg by mouth daily. No current facility-administered medications on file prior to encounter. REVIEW OF SYSTEMS    A comprehensive review of systems was negative.     Objective:      /72   Pulse 70   Temp 98 °F (36.7 °C) (Temporal)   Resp 16   Ht 6' (1.829 m)   Wt 235 lb (106.6 kg)   BMI 31.87 kg/m²     Wt Readings from Last 3 Encounters:   07/07/20 235 lb (106.6 kg)   06/30/20 235 lb (106.6 kg)   06/19/20 235 lb (106.6 kg)       PHYSICAL EXAM    General Appearance: alert and oriented to person, place and time, well developed and well- nourished, in no acute distress  Skin: warm and dry, no rash or erythema  Head: normocephalic and atraumatic  Eyes: pupils equal, round, and reactive to light, extraocular eye movements intact, conjunctivae normal  ENT: tympanic membrane, external ear and ear canal normal bilaterally, nose without deformity, nasal mucosa and turbinates normal without polyps, lips teeth and gums normal  Neck: supple and non-tender without mass, no thyromegaly or thyroid nodules, no cervical lymphadenopathy  Pulmonary/Chest: clear to auscultation bilaterally- no wheezes, rales or rhonchi, normal air movement, no respiratory distress  Cardiovascular: normal rate, regular rhythm, normal S1 and S2, no murmurs, rubs, clicks, or gallops, distal pulses intact, no carotid bruits  Abdomen: soft, non-tender, non-distended, normal bowel sounds, no masses or organomegaly  Extremities: no cyanosis, clubbing or edema  Musculoskeletal: normal range of motion, no joint swelling, deformity or tenderness  Neurologic: reflexes normal and symmetric, no cranial nerve deficit, gait, coordination and speech normal, sensation of skin normal      Assessment:      Problem List Items Addressed This Visit     * (Principal) Diabetic ulcer of toe of left foot associated with type 2 diabetes mellitus, with fat layer exposed ([address] - Primary (Chronic)           Procedure Note  Indications:  Based on my examination of this patient's wound(s)/ulcer(s) today, debridement is required to promote healing and evaluate the wound base. Performed by: Sheeba Eric MD    Consent obtained:  Yes    Time out taken:  Yes    Pain Control: Anesthetic  Anesthetic: None       Debridement:Non-excisional Debridement    Using curette the wound(s)/ulcer(s) was/were sharply debrided down through and including the removal of epidermis and dermis.         Devitalized Tissue Debrided:  fibrin, biofilm, slough, necrotic/eschar, exudate and callus      Pre Debridement Measurements:  Are located in the Wound/Ulcer Documentation Flow Sheet    Wound/Ulcer #: 1    Percent of Wound(s)/Ulcer(s) Debrided: 100%    Total Surface Area Debrided:  0.02 sq cm       Diabetic/Pressure/Non Pressure Ulcers only:  Ulcer: Diabetic ulcer, fat layer exposed         Post Debridement Measurements:    Wound/Ulcer Descriptions are Pre Debridement --EXCEPT MEASUREMENTS    Wound 06/11/20 Toe (Comment  which one) Left;Plantar Wound 1.   Left great toe   Lawler 1 (Active)   Wound Image      6/19/2020 10:43 AM   Wound Diabetic Lawler 1 7/7/2020 11:27 AM   Offloading for Diabetic Foot Ulcers Yes (type) 7/7/2020 11:40 AM   Dressing Status Old drainage 7/7/2020 11:27 AM   Dressing Changed Changed/New 7/7/2020 11:40 AM   Dressing/Treatment Hydrating gel 7/7/2020 11:40 AM   Wound Cleansed Not Cleansed 7/7/2020 11:40 AM   Wound Length (cm) 0.2 cm 7/7/2020 11:30 AM   Wound Width (cm) 0.1 cm 7/7/2020 11:30 AM   Wound Depth (cm) 0.1 cm 7/7/2020 11:30 AM   Wound Surface Area (cm^2) 0.02 cm^2 7/7/2020 11:30 AM   Change in Wound Size % (l*w) 93.33 7/7/2020 11:30 AM   Wound Volume (cm^3) 0 cm^3 7/7/2020 11:30 AM   Wound Healing % 100 7/7/2020 11:30 AM   Post-Procedure Length (cm) 0.2 cm 7/7/2020 11:35 AM   Post-Procedure Width (cm) 0.1 cm 7/7/2020 11:35 AM   Post-Procedure Depth (cm) 0.1 cm 7/7/2020 11:35 AM   Post-Procedure Surface Area (cm^2) 0.02 cm^2 7/7/2020 11:35 AM   Post-Procedure Volume (cm^3) 0 cm^3 7/7/2020 11:35 AM   Distance Tunneling (cm) 0 cm 6/19/2020 10:43 AM   Tunneling Position ___ O'Clock 0 6/19/2020 10:43 AM   Undermining Starts ___ O'Clock 0 6/19/2020 10:43 AM   Undermining Ends___ O'Clock 0 6/19/2020 10:43 AM   Undermining Maxium Distance (cm) 0 6/19/2020 10:43 AM   Wound Assessment Pink 7/7/2020 11:40 AM   Drainage Amount Scant 7/7/2020 11:40 AM   Drainage Description Serous 7/7/2020 11:40 AM   Odor None 7/7/2020 11:27 AM   Margins Defined edges 6/30/2020

## 2020-07-09 RX ORDER — CLOPIDOGREL BISULFATE 75 MG/1
TABLET ORAL
Qty: 90 TABLET | Refills: 0 | Status: SHIPPED | OUTPATIENT
Start: 2020-07-09 | End: 2020-10-05

## 2020-07-14 ENCOUNTER — HOSPITAL ENCOUNTER (OUTPATIENT)
Dept: WOUND CARE | Age: 65
Discharge: HOME | End: 2020-07-14
Payer: MEDICARE

## 2020-07-14 VITALS
DIASTOLIC BLOOD PRESSURE: 62 MMHG | RESPIRATION RATE: 16 BRPM | HEIGHT: 72 IN | TEMPERATURE: 97.5 F | BODY MASS INDEX: 31.83 KG/M2 | SYSTOLIC BLOOD PRESSURE: 108 MMHG | HEART RATE: 70 BPM | WEIGHT: 235 LBS

## 2020-07-14 PROCEDURE — 97597 DBRDMT OPN WND 1ST 20 CM/<: CPT | Performed by: SURGERY

## 2020-07-14 PROCEDURE — 97597 DBRDMT OPN WND 1ST 20 CM/<: CPT

## 2020-07-14 ASSESSMENT — PAIN SCALES - GENERAL: PAINLEVEL_OUTOF10: 0

## 2020-07-14 NOTE — PROGRESS NOTES
[address]   Progress Note and Procedure Note      Jennifer Fernadnez  MEDICAL RECORD NUMBER:  [id number]  AGE: 75 y.o. GENDER: male  : 1955  EPISODE DATE:  2020    Subjective:     Chief Complaint   Patient presents with    Wound Check     Wound Check         HISTORY of PRESENT ILLNESS HPI     Jennifer Fernandez is a 75 y.o. male who presents today for wound/ulcer evaluation. Wound Context: Pt with L great toe wound here for eval/treat  Wound/Ulcer Pain Timing/Severity: none  Quality of pain: N/A  Severity:  0 / 10   Modifying Factors: None  Associated Signs/Symptoms: none    Ulcer Identification:  Ulcer Type: diabetic and pressure  Contributing Factors: diabetes, chronic pressure and shear force    Wound: DM        PAST MEDICAL HISTORY        Diagnosis Date    Abnormal nuclear stress test 10/22/2014    BiPAP (biphasic positive airway pressure) dependence     8cm to 20cm    Cerebrovascular disease     Chronic kidney disease, stage II (mild) 2016    Carmen Iglesias M.D.    Coronary artery disease 2011    Depression     Diabetes mellitus ([address]     Headache     Hyperlipemia 2011    Dr. Bradley Enrique follows lipids.     Hyperlipidemia     Hypertension     LONG TERM ANTICOAGULENT USE     Low blood pressure 2014    lymphostatic lymphoma     Nausea 2014    Obesity     Obstructive sleep apnea     AHI:  21.7 per [facility], 2017    Peptic ulcer disease 2011    Restless leg     S/P CABG x 3 10/22/2014    SVG to RCA; SVG to LAD diagonal; LIMA to LAD    Sleep apnea     Tachyarrhythmia     Thrombocythemia, essential ([address]     Type 2 diabetes mellitus without complication ([address]     Type II or unspecified type diabetes mellitus without mention of complication, not stated as uncontrolled        PAST SURGICAL HISTORY    Past Surgical History:   Procedure Laterality Date    CARDIAC CATHETERIZATION  11    EF 60%    CARDIAC CATHETERIZATION 15 MG tablet TAKE 1 TABLET BY MOUTH DAILY 30 tablet 1    polyethylene glycol (GLYCOLAX) powder Take 17 g by mouth as needed      DULoxetine (CYMBALTA) 30 MG extended release capsule Take 30 mg by mouth nightly      levothyroxine (SYNTHROID) 50 MCG tablet Take 50 mcg by mouth Daily      tamsulosin (FLOMAX) 0.4 MG capsule Take 1 capsule(s) every day by oral route as directed for 30 days. 30 capsule 11    diltiazem (CARDIZEM CD) 180 MG extended release capsule Take 1 capsule by mouth daily 90 capsule 3    HYDROcodone-acetaminophen (NORCO) 7.5-325 MG per tablet Take 1-2 tablets by mouth every 6 hours as needed. Abdullahi Francis meloxicam (MOBIC) 15 MG tablet Take 15 mg by mouth daily       ondansetron (ZOFRAN) 8 MG tablet Take 8 mg by mouth as needed       tiZANidine (ZANAFLEX) 4 MG tablet 1/2 tablet at night      traMADol (ULTRAM) 50 MG tablet Take 50 mg by mouth daily as needed.  pravastatin (PRAVACHOL) 40 MG tablet Take 1 tablet by mouth daily 90 tablet 2    ferrous sulfate 325 (65 Fe) MG tablet Take 325 mg by mouth 2 times daily      pantoprazole (PROTONIX) 40 MG tablet Take 1 tablet by mouth daily 90 tablet 2    b complex vitamins capsule Take 1 capsule by mouth daily.  Vitamin D (CHOLECALCIFEROL) 1000 UNITS CAPS capsule Take 1,000 Units by mouth daily.  docusate sodium (COLACE) 100 MG capsule Take 100 mg by mouth daily. No current facility-administered medications on file prior to encounter. REVIEW OF SYSTEMS    A comprehensive review of systems was negative.     Objective:      /62   Pulse 70   Temp 97.5 °F (36.4 °C) (Core)   Resp 16   Ht 6' (1.829 m)   Wt 235 lb (106.6 kg)   BMI 31.87 kg/m²     Wt Readings from Last 3 Encounters:   07/14/20 235 lb (106.6 kg)   07/07/20 235 lb (106.6 kg)   06/30/20 235 lb (106.6 kg)       PHYSICAL EXAM    General Appearance: alert and oriented to person, place and time, well developed and well- nourished, in no acute distress  Skin: warm Sheet    Wound/Ulcer #: 1    Percent of Wound(s)/Ulcer(s) Debrided: 100%    Total Surface Area Debrided:  0.01 sq cm       Diabetic/Pressure/Non Pressure Ulcers only:  Ulcer: Diabetic ulcer, fat layer exposed           Post Debridement Measurements:    Wound/Ulcer Descriptions are Pre Debridement --EXCEPT MEASUREMENTS    Wound 06/11/20 Toe (Comment  which one) Left;Plantar Wound 1.   Left great toe   Lawler 1 (Active)   Wound Image   07/14/20 1009   Wound Diabetic Lawler 1 07/14/20 1009   Offloading for Diabetic Foot Ulcers Yes (type) 07/14/20 1009   Dressing Status Old drainage 07/14/20 1009   Dressing Changed Changed/New 07/14/20 1026   Dressing/Treatment Hydrating gel 07/14/20 1026   Wound Cleansed Not Cleansed 07/14/20 1009   Wound Length (cm) 0.1 cm 07/14/20 1009   Wound Width (cm) 0.1 cm 07/14/20 1009   Wound Depth (cm) 0.1 cm 07/14/20 1009   Wound Surface Area (cm^2) 0.01 cm^2 07/14/20 1009   Change in Wound Size % (l*w) 96.67 07/14/20 1009   Wound Volume (cm^3) 0 cm^3 07/14/20 1009   Wound Healing % 100 07/14/20 1009   Post-Procedure Length (cm) 0.1 cm 07/14/20 1026   Post-Procedure Width (cm) 0.1 cm 07/14/20 1026   Post-Procedure Depth (cm) 0.1 cm 07/14/20 1026   Post-Procedure Surface Area (cm^2) 0.01 cm^2 07/14/20 1026   Post-Procedure Volume (cm^3) 0 cm^3 07/14/20 1026   Distance Tunneling (cm) 0 cm 06/19/20 1043   Tunneling Position ___ O'Clock 0 06/19/20 1043   Undermining Starts ___ O'Clock 0 06/19/20 1043   Undermining Ends___ O'Clock 0 06/19/20 1043   Undermining Maxium Distance (cm) 0 06/19/20 1043   Wound Assessment Colburn 07/14/20 1009   Drainage Amount Scant 07/14/20 1009   Drainage Description Serous 07/14/20 1009   Odor None 07/14/20 1009   Margins Defined edges 07/14/20 1009   Nedla-wound Assessment Calloused 07/14/20 1009   Non-staged Wound Description Not applicable 78/46/40 6877   Colburn%Wound Bed 0 06/19/20 1043   Red%Wound Bed 100 07/14/20 1009   Yellow%Wound Bed 1 06/19/20 1043   Black%Wound Bed 0 06/19/20 1043   Purple%Wound Bed 0 06/19/20 1043   Other%Wound Bed 0 06/19/20 1043   Number of days: 32             Estimated Blood Loss:  Minimal    Hemostasis Achieved:  by pressure    Procedural Pain:  0  / 10     Post Procedural Pain:  0 / 10     Response to treatment:  Well tolerated by patient. Plan:     Problem List Items Addressed This Visit     * (Principal) Diabetic ulcer of toe of left foot associated with type 2 diabetes mellitus, with fat layer exposed ([address] - Primary (Chronic)          Cont current care    Treatment Note please see attached Discharge Instructions    In my professional opinion this patient would benefit from HBO Therapy: No    Written patient dismissal instructions given to patient and signed by patient or POA. Discharge [address] and Hyperbaric Oxygen Therapy   Physician Orders and Discharge Instructions  [address]  Telephone: [phone]    NAME:  Katie Byrne OF BIRTH:  1955  MEDICAL RECORD NUMBER:  [id number]  DATE:  7/14/2020    Discharge condition: Stable    Discharge to: Home    Left via:Private automobile    Accompanied by: Self     ECF/HHA: [facility]     Dressing Orders: Left Great Toe  Bactroban Ointment to open area, Cover with dry gauze, Secure with roll gauze and medipore tape  Change twice daily,   Metatarsal pad to off load wound    [facility] [facility] followup visit ____________1 week_________________  (Please note your next appointment above and if you are unable to keep, kindly give a 24 hour notice.  Thank you.)    If you experience any of the following, please call the [address] [place] [address] during business hours:    * Increase in Pain  * Temperature over 101  * Increase in drainage from your wound  * Drainage with a foul odor  * Bleeding  * Increase in swelling  * Need for compression bandage changes due to slippage, breakthrough drainage. If you need medical attention outside of the business hours of the [address] [place] [address] Road please contact your PCP or go to the nearest emergency room.         Electronically signed by Jose G Reaves MD on 7/14/2020 at 10:37 AM

## 2020-07-21 ENCOUNTER — HOSPITAL ENCOUNTER (OUTPATIENT)
Dept: WOUND CARE | Age: 65
Discharge: HOME | End: 2020-07-21
Payer: MEDICARE

## 2020-07-21 VITALS
HEIGHT: 72 IN | TEMPERATURE: 98 F | HEART RATE: 70 BPM | SYSTOLIC BLOOD PRESSURE: 108 MMHG | RESPIRATION RATE: 16 BRPM | DIASTOLIC BLOOD PRESSURE: 59 MMHG | BODY MASS INDEX: 31.83 KG/M2 | WEIGHT: 235 LBS

## 2020-07-21 PROCEDURE — 97597 DBRDMT OPN WND 1ST 20 CM/<: CPT | Performed by: SURGERY

## 2020-07-21 PROCEDURE — 97597 DBRDMT OPN WND 1ST 20 CM/<: CPT

## 2020-07-21 ASSESSMENT — PAIN DESCRIPTION - FREQUENCY: FREQUENCY: INTERMITTENT

## 2020-07-21 ASSESSMENT — PAIN DESCRIPTION - ONSET: ONSET: ON-GOING

## 2020-07-21 ASSESSMENT — PAIN DESCRIPTION - PROGRESSION: CLINICAL_PROGRESSION: NOT CHANGED

## 2020-07-21 ASSESSMENT — PAIN SCALES - GENERAL: PAINLEVEL_OUTOF10: 0

## 2020-07-21 NOTE — PROGRESS NOTES
50%    CARDIAC CATHETERIZATION  12/4/12   MDL    EF  50%    CARDIAC CATHETERIZATION  10/28/14  GERBER Erickson M.D.   Candy Han CORONARY ARTERY BYPASS GRAFT  9/21/05    LIMA to LAD and diagonal; SVG to posterior descending branch RCA    CORONARY ARTERY BYPASS GRAFT  10/30/2014    Redo Sternotomy - SVG-PDA, TMR (RBL)    KIDNEY SURGERY  08/14/2017    KNEE ARTHROSCOPY      right ACL repair    SHOULDER ARTHROSCOPY      left-rotator cuff repair right- rotator cuff repair    SHOULDER ARTHROSCOPY  08-23-11    right    TONSILLECTOMY         FAMILY HISTORY    Family History   Problem Relation Age of Onset    Heart Disease Other     Lung Cancer Mother     Cancer Sister        SOCIAL HISTORY    Social History     Tobacco Use    Smoking status: Former Smoker     Packs/day: 0.00     Types: Cigarettes    Smokeless tobacco: Never Used   Substance Use Topics    Alcohol use: No    Drug use: No       ALLERGIES    Allergies   Allergen Reactions    Ancef [Cefazolin Sodium]     Ceftin [Cefuroxime]      \"anything with ceftin in it\"    Cefuroxime Axetil     Cephalosporins        MEDICATIONS    Current Outpatient Medications on File Prior to Encounter   Medication Sig Dispense Refill    clopidogrel (PLAVIX) 75 MG tablet TAKE 1 TABLET BY MOUTH EVERY DAY 90 tablet 0    acetaminophen (TYLENOL) 500 MG tablet Take 1,000 mg by mouth every 6 hours as needed for Pain      ranolazine (RANEXA) 1000 MG extended release tablet Take 1 tablet by mouth 2 times daily 60 tablet 5    metFORMIN (GLUCOPHAGE) 1000 MG tablet HOLD FOR 48 HOURS AFTER THE CARDIAC CATHETERIZATION (Patient taking differently: 1,000 mg 2 times daily (with meals) HOLD FOR 48 HOURS AFTER THE CARDIAC CATHETERIZATION) 60 tablet 3    nitroGLYCERIN (NITROSTAT) 0.4 MG SL tablet Place 1 tablet under the tongue every 5 minutes as needed for Chest pain 25 tablet 5    busPIRone (BUSPAR) 15 MG tablet TAKE 1 TABLET BY MOUTH DAILY 30 tablet 1    polyethylene glycol (GLYCOLAX) powder Take 17 g by mouth as needed      DULoxetine (CYMBALTA) 30 MG extended release capsule Take 30 mg by mouth nightly      levothyroxine (SYNTHROID) 50 MCG tablet Take 50 mcg by mouth Daily      tamsulosin (FLOMAX) 0.4 MG capsule Take 1 capsule(s) every day by oral route as directed for 30 days. 30 capsule 11    diltiazem (CARDIZEM CD) 180 MG extended release capsule Take 1 capsule by mouth daily 90 capsule 3    HYDROcodone-acetaminophen (NORCO) 7.5-325 MG per tablet Take 1-2 tablets by mouth every 6 hours as needed. Dalia Rife meloxicam (MOBIC) 15 MG tablet Take 15 mg by mouth daily       ondansetron (ZOFRAN) 8 MG tablet Take 8 mg by mouth as needed       tiZANidine (ZANAFLEX) 4 MG tablet 1/2 tablet at night      traMADol (ULTRAM) 50 MG tablet Take 50 mg by mouth daily as needed.  pravastatin (PRAVACHOL) 40 MG tablet Take 1 tablet by mouth daily 90 tablet 2    ferrous sulfate 325 (65 Fe) MG tablet Take 325 mg by mouth 2 times daily      pantoprazole (PROTONIX) 40 MG tablet Take 1 tablet by mouth daily 90 tablet 2    b complex vitamins capsule Take 1 capsule by mouth daily.  Vitamin D (CHOLECALCIFEROL) 1000 UNITS CAPS capsule Take 1,000 Units by mouth daily.  docusate sodium (COLACE) 100 MG capsule Take 100 mg by mouth daily. No current facility-administered medications on file prior to encounter. REVIEW OF SYSTEMS    A comprehensive review of systems was negative.     Objective:      BP (!) 108/59   Pulse 70   Temp 98 °F (36.7 °C) (Temporal)   Resp 16   Ht 6' (1.829 m)   Wt 235 lb (106.6 kg)   BMI 31.87 kg/m²     Wt Readings from Last 3 Encounters:   07/21/20 235 lb (106.6 kg)   07/14/20 235 lb (106.6 kg)   07/07/20 235 lb (106.6 kg)       PHYSICAL EXAM    General Appearance: alert and oriented to person, place and time, well developed and well- nourished, in no acute distress  Skin: warm and dry, no rash or erythema  Head: normocephalic and atraumatic  Eyes: pupils equal, round, and reactive to light, extraocular eye movements intact, conjunctivae normal  ENT: tympanic membrane, external ear and ear canal normal bilaterally, nose without deformity, nasal mucosa and turbinates normal without polyps, lips teeth and gums normal  Neck: supple and non-tender without mass, no thyromegaly or thyroid nodules, no cervical lymphadenopathy  Pulmonary/Chest: clear to auscultation bilaterally- no wheezes, rales or rhonchi, normal air movement, no respiratory distress  Cardiovascular: normal rate, regular rhythm, normal S1 and S2, no murmurs, rubs, clicks, or gallops, distal pulses intact, no carotid bruits  Abdomen: soft, non-tender, non-distended, normal bowel sounds, no masses or organomegaly  Extremities: no cyanosis, clubbing or edema  Musculoskeletal: normal range of motion, no joint swelling, deformity or tenderness  Neurologic: reflexes normal and symmetric, no cranial nerve deficit, gait, coordination and speech normal, sensation of skin normal      Assessment:      Problem List Items Addressed This Visit     * (Principal) Diabetic ulcer of toe of left foot associated with type 2 diabetes mellitus, with fat layer exposed ([facility] - Primary (Chronic)           Procedure Note  Indications:  Based on my examination of this patient's wound(s)/ulcer(s) today, debridement is required to promote healing and evaluate the wound base. Performed by: Quan Lane MD    Consent obtained:  Yes    Time out taken:  Yes    Pain Control: Anesthetic  Anesthetic: None       Debridement:Non-excisional Debridement    Using curette the wound(s)/ulcer(s) was/were sharply debrided down through and including the removal of epidermis and dermis.         Devitalized Tissue Debrided:  fibrin, biofilm, slough, necrotic/eschar, exudate and callus      Pre Debridement Measurements:  Are located in the [facility]  Documentation Flow Sheet    Wound/Ulcer #: 1    Percent of Wound(s)/Ulcer(s) Debrided: 100%    Total Surface Area Debrided:  0.01 sq cm       Diabetic/Pressure/Non Pressure Ulcers only:  Ulcer: Diabetic ulcer, fat layer exposed           Post Debridement Measurements:    Wound/Ulcer Descriptions are Pre Debridement --EXCEPT MEASUREMENTS    Wound 06/11/20 Toe (Comment  which one) Left;Plantar Wound 1.   Left great toe   Lawler 1 (Active)   Wound Image   07/14/20 1009   Wound Diabetic Lawler 1 07/21/20 0951   Offloading for Diabetic Foot Ulcers Yes (type) 07/21/20 0951   Dressing Status Old drainage 07/21/20 0951   Dressing Changed Changed/New 07/21/20 0956   Dressing/Treatment Hydrating gel 07/21/20 0956   Wound Cleansed Not Cleansed 07/21/20 0951   Wound Length (cm) 0.1 cm 07/21/20 0951   Wound Width (cm) 0.1 cm 07/21/20 0951   Wound Depth (cm) 0.1 cm 07/21/20 0951   Wound Surface Area (cm^2) 0.01 cm^2 07/21/20 0951   Change in Wound Size % (l*w) 96.67 07/21/20 0951   Wound Volume (cm^3) 0 cm^3 07/21/20 0951   Wound Healing % 100 07/21/20 0951   Post-Procedure Length (cm) 0.1 cm 07/21/20 0956   Post-Procedure Width (cm) 0.1 cm 07/21/20 0956   Post-Procedure Depth (cm) 0.1 cm 07/21/20 0956   Post-Procedure Surface Area (cm^2) 0.01 cm^2 07/21/20 0956   Post-Procedure Volume (cm^3) 0 cm^3 07/21/20 0956   Distance Tunneling (cm) 0 cm 06/19/20 1043   Tunneling Position ___ O'Clock 0 06/19/20 1043   Undermining Starts ___ O'Clock 0 06/19/20 1043   Undermining Ends___ O'Clock 0 06/19/20 1043   Undermining Maxium Distance (cm) 0 06/19/20 1043   Wound Assessment Pink 07/21/20 0951   Drainage Amount Scant 07/21/20 0951   Drainage Description Serous 07/21/20 0951   Odor None 07/21/20 0951   Margins Defined edges 07/21/20 0951   Nelda-wound Assessment Calloused 07/21/20 0951   Non-staged Wound Description Not applicable 81/56/38 8444   Emlyn%Wound Bed 0 06/19/20 1043   Red%Wound Bed 100 07/21/20 0951   Yellow%Wound Bed 1 06/19/20 1043   Black%Wound Bed 0 06/19/20 1043   Purple%Wound Bed 0 06/19/20 1043   Other%Wound Bed 0 06/19/20 1043   Number of days: 39             Estimated Blood Loss:  Minimal    Hemostasis Achieved:  by pressure    Procedural Pain:  0  / 10     Post Procedural Pain:  0 / 10     Response to treatment:  Well tolerated by patient. Plan:     Problem List Items Addressed This Visit     * (Principal) Diabetic ulcer of toe of left foot associated with type 2 diabetes mellitus, with fat layer exposed ([address] - Primary (Chronic)          Cont current care    Treatment Note please see attached Discharge Instructions    In my professional opinion this patient would benefit from HBO Therapy: No    Written patient dismissal instructions given to patient and signed by patient or POA. Discharge [address] and Hyperbaric Oxygen Therapy   Physician Orders and Discharge Instructions  [address]  Telephone: [phone]    NAME:  Dennis Desai OF BIRTH:  1955  MEDICAL RECORD NUMBER:  [id number]  DATE:  7/21/2020    Discharge condition: Stable    Discharge to: Home    Left via:Private automobile    Accompanied by: Self     ECF/HHA: [facility]     Dressing Orders: Left Great Toe  Bactroban Ointment to open area, Cover with dry gauze, Secure with roll gauze and medipore tape  Change twice daily,   Metatarsal pad to off load wound    [facility] followup visit _____________1 week________________  (Please note your next appointment above and if you are unable to keep, kindly give a 24 hour notice.  Thank you.)    If you experience any of the following, please call the [address] [place] [address] Road during business hours:    * Increase in Pain  * Temperature over 101  * Increase in drainage from your wound  * Drainage with a foul odor  * Bleeding  * Increase in swelling  * Need for compression bandage changes due to slippage, breakthrough drainage. If you need medical attention outside of the business hours of the [address] [place] [address] Road please contact your PCP or go to the nearest emergency room.         Electronically signed by Isabelle Carty MD on 7/21/2020 at 10:07 AM

## 2020-10-05 RX ORDER — CLOPIDOGREL BISULFATE 75 MG/1
TABLET ORAL
Qty: 90 TABLET | Refills: 0 | Status: SHIPPED | OUTPATIENT
Start: 2020-10-05 | End: 2021-02-18 | Stop reason: SDUPTHER

## 2020-12-14 ENCOUNTER — HOSPITAL ENCOUNTER (OUTPATIENT)
Dept: PULMONOLOGY | Age: 65
Discharge: HOME | End: 2020-12-14
Payer: MEDICARE

## 2021-01-12 DIAGNOSIS — I25.10 CORONARY ARTERY DISEASE INVOLVING NATIVE HEART WITHOUT ANGINA PECTORIS, UNSPECIFIED VESSEL OR LESION TYPE: ICD-10-CM

## 2021-01-13 RX ORDER — CLOPIDOGREL BISULFATE 75 MG/1
TABLET ORAL
Qty: 90 TABLET | Refills: 0 | OUTPATIENT
Start: 2021-01-13

## 2021-01-20 ENCOUNTER — HOSPITAL ENCOUNTER (EMERGENCY)
Age: 66
Discharge: HOME | End: 2021-01-21
Attending: EMERGENCY MEDICINE
Payer: MEDICARE

## 2021-01-20 DIAGNOSIS — E11.621 DIABETIC ULCER OF RIGHT GREAT TOE (HCC): Primary | ICD-10-CM

## 2021-01-20 DIAGNOSIS — L97.519 DIABETIC ULCER OF RIGHT GREAT TOE (HCC): Primary | ICD-10-CM

## 2021-01-20 LAB
ALBUMIN SERPL-MCNC: 3.8 G/DL (ref 3.5–5.2)
ALP BLD-CCNC: 109 U/L (ref 40–130)
ALT SERPL-CCNC: 25 U/L (ref 5–41)
ANION GAP SERPL CALCULATED.3IONS-SCNC: 11 MMOL/L (ref 7–19)
AST SERPL-CCNC: 17 U/L (ref 5–40)
BASOPHILS ABSOLUTE: 0 K/UL (ref 0–0.2)
BASOPHILS RELATIVE PERCENT: 0.2 % (ref 0–1)
BILIRUB SERPL-MCNC: 0.6 MG/DL (ref 0.2–1.2)
BUN BLDV-MCNC: 21 MG/DL (ref 8–23)
C-REACTIVE PROTEIN: 3.02 MG/DL (ref 0–0.5)
CALCIUM SERPL-MCNC: 9.4 MG/DL (ref 8.8–10.2)
CHLORIDE BLD-SCNC: 100 MMOL/L (ref 98–111)
CO2: 25 MMOL/L (ref 22–29)
CREAT SERPL-MCNC: 1.3 MG/DL (ref 0.5–1.2)
EOSINOPHILS ABSOLUTE: 0.1 K/UL (ref 0–0.6)
EOSINOPHILS RELATIVE PERCENT: 1.1 % (ref 0–5)
GFR AFRICAN AMERICAN: >59
GFR NON-AFRICAN AMERICAN: 55
GLUCOSE BLD-MCNC: 225 MG/DL (ref 74–109)
HCT VFR BLD CALC: 28 % (ref 42–52)
HEMOGLOBIN: 9.3 G/DL (ref 14–18)
IMMATURE GRANULOCYTES #: 0 K/UL
LYMPHOCYTES ABSOLUTE: 0.7 K/UL (ref 1.1–4.5)
LYMPHOCYTES RELATIVE PERCENT: 10.4 % (ref 20–40)
MCH RBC QN AUTO: 34.2 PG (ref 27–31)
MCHC RBC AUTO-ENTMCNC: 33.2 G/DL (ref 33–37)
MCV RBC AUTO: 102.9 FL (ref 80–94)
MONOCYTES ABSOLUTE: 0.6 K/UL (ref 0–0.9)
MONOCYTES RELATIVE PERCENT: 10 % (ref 0–10)
NEUTROPHILS ABSOLUTE: 4.9 K/UL (ref 1.5–7.5)
NEUTROPHILS RELATIVE PERCENT: 78 % (ref 50–65)
PDW BLD-RTO: 14.1 % (ref 11.5–14.5)
PLATELET # BLD: 111 K/UL (ref 130–400)
PMV BLD AUTO: 10.5 FL (ref 9.4–12.4)
POTASSIUM SERPL-SCNC: 4.7 MMOL/L (ref 3.5–5)
RBC # BLD: 2.72 M/UL (ref 4.7–6.1)
SODIUM BLD-SCNC: 136 MMOL/L (ref 136–145)
TOTAL PROTEIN: 6.7 G/DL (ref 6.6–8.7)
WBC # BLD: 6.3 K/UL (ref 4.8–10.8)

## 2021-01-20 PROCEDURE — 36415 COLL VENOUS BLD VENIPUNCTURE: CPT

## 2021-01-20 PROCEDURE — 99285 EMERGENCY DEPT VISIT HI MDM: CPT

## 2021-01-20 PROCEDURE — 86140 C-REACTIVE PROTEIN: CPT

## 2021-01-20 PROCEDURE — 85652 RBC SED RATE AUTOMATED: CPT

## 2021-01-20 PROCEDURE — 80053 COMPREHEN METABOLIC PANEL: CPT

## 2021-01-20 PROCEDURE — 85025 COMPLETE CBC W/AUTO DIFF WBC: CPT

## 2021-01-20 PROCEDURE — 87040 BLOOD CULTURE FOR BACTERIA: CPT

## 2021-01-21 ENCOUNTER — APPOINTMENT (OUTPATIENT)
Dept: GENERAL RADIOLOGY | Age: 66
End: 2021-01-21
Payer: MEDICARE

## 2021-01-21 VITALS
SYSTOLIC BLOOD PRESSURE: 120 MMHG | DIASTOLIC BLOOD PRESSURE: 68 MMHG | HEIGHT: 71 IN | WEIGHT: 220 LBS | TEMPERATURE: 98.2 F | OXYGEN SATURATION: 98 % | HEART RATE: 78 BPM | BODY MASS INDEX: 30.8 KG/M2 | RESPIRATION RATE: 16 BRPM

## 2021-01-21 LAB — SEDIMENTATION RATE, ERYTHROCYTE: 113 MM/HR (ref 0–15)

## 2021-01-21 PROCEDURE — 73660 X-RAY EXAM OF TOE(S): CPT

## 2021-01-21 PROCEDURE — 6360000002 HC RX W HCPCS: Performed by: EMERGENCY MEDICINE

## 2021-01-21 PROCEDURE — 96365 THER/PROPH/DIAG IV INF INIT: CPT

## 2021-01-21 PROCEDURE — 96366 THER/PROPH/DIAG IV INF ADDON: CPT

## 2021-01-21 RX ORDER — AMOXICILLIN AND CLAVULANATE POTASSIUM 875; 125 MG/1; MG/1
1 TABLET, FILM COATED ORAL 2 TIMES DAILY
Qty: 28 TABLET | Refills: 0 | Status: SHIPPED | OUTPATIENT
Start: 2021-01-21 | End: 2021-02-04 | Stop reason: ALTCHOICE

## 2021-01-21 RX ORDER — DOXYCYCLINE HYCLATE 100 MG
100 TABLET ORAL 2 TIMES DAILY
Qty: 28 TABLET | Refills: 0 | Status: SHIPPED | OUTPATIENT
Start: 2021-01-21 | End: 2021-02-04 | Stop reason: ALTCHOICE

## 2021-01-21 RX ADMIN — Medication 1000 MG: at 00:31

## 2021-01-21 ASSESSMENT — PAIN SCALES - GENERAL: PAINLEVEL_OUTOF10: 0

## 2021-01-21 NOTE — ED PROVIDER NOTES
[facility] EMERGENCY DEPT  eMERGENCY dEPARTMENT eNCOUnter      Pt Name: Aimee Rm  MRN: [id number]  Mehrd[place]gfurt 1955  Date of evaluation: 1/20/2021  Provider: Salazar Dover       Chief Complaint   Patient presents with    Toe Pain     right great toe    Wound Infection         HISTORY OF PRESENT ILLNESS   (Location/Symptom, Timing/Onset,Context/Setting, Quality, Duration, Modifying Factors, Severity)  Note limiting factors. Aimee Rm is a 77 y.o. male who presents to the emergency department for toe injury to right great toe. He has a hx of lymphoma and DM. Pt stubbed his toe about 2 weeks ago. He developed a \"blood blister\" to the tip. He continued to take care of it himself to a certain extent, but then roughly 6 days ago he stubbed the toe again. He continued to care for it himself, but 2 nights ago he developed worsening skin breakdown. It then developed a malodorous smell. He has not had any fevers with the injury. HPI    NursingNotes were reviewed. REVIEW OF SYSTEMS    (2-9 systems for level 4, 10 or more for level 5)     Review of Systems   Skin: Positive for wound (Diabetic ulcer of the right great toe). All other systems reviewed and are negative. PAST MEDICALHISTORY     Past Medical History:   Diagnosis Date    Abnormal nuclear stress test 10/22/2014    BiPAP (biphasic positive airway pressure) dependence     8cm to 20cm    Cerebrovascular disease     Chronic kidney disease, stage II (mild) 08/17/2016    Eligio Petty M.D.    Coronary artery disease 2/24/2011    Depression     [facility]     Headache     Hyperlipemia 2/24/2011    Dr. Hope Panchal follows lipids.     Hyperlipidemia     Hypertension     LONG TERM ANTICOAGULENT USE     Low blood pressure 11/19/2014    lymphostatic lymphoma     Nausea 11/19/2014    Obesity     Obstructive sleep apnea     AHI:  21.7 per PSG, 7/2017    Peptic ulcer disease 2/24/2011    Restless leg     S/P CABG x 3 10/22/2014    SVG to RCA; SVG to LAD diagonal; LIMA to LAD    Sleep apnea     Tachyarrhythmia     Thrombocythemia, essential ([facility]     Type 2 diabetes mellitus without complication ([facility]     Type II or unspecified type diabetes mellitus without mention of complication, not stated as uncontrolled          SURGICAL HISTORY       Past Surgical History:   Procedure Laterality Date    CARDIAC CATHETERIZATION  2/28/11    EF 60%    CARDIAC CATHETERIZATION  9/16/05, 3/7/07    EF < 50%    CARDIAC CATHETERIZATION  12/4/12   MDL    EF  50%    CARDIAC CATHETERIZATION  10/28/14  GERBER Wilkinson M.D.   Antyne Grieve GRAFT  9/21/05    LIMA to LAD and diagonal; SVG to posterior descending branch RCA    CORONARY ARTERY BYPASS GRAFT  10/30/2014    Redo Sternotomy - SVG-PDA, TMR (RBL)    KIDNEY SURGERY  08/14/2017    KNEE ARTHROSCOPY      right ACL repair    SHOULDER ARTHROSCOPY      left-rotator cuff repair right- rotator cuff repair    SHOULDER ARTHROSCOPY  08-23-11    right    TONSILLECTOMY           CURRENT MEDICATIONS     Discharge Medication List as of 1/21/2021  3:10 AM      CONTINUE these medications which have NOT CHANGED    Details   clopidogrel (PLAVIX) 75 MG tablet TAKE 1 TABLET BY MOUTH EVERY DAY, Disp-90 tablet, R-0Normal      acetaminophen (TYLENOL) 500 MG tablet Take 1,000 mg by mouth every 6 hours as needed for PainHistorical Med      ranolazine (RANEXA) 1000 MG extended release tablet Take 1 tablet by mouth 2 times daily, Disp-60 tablet, R-5Normal      metFORMIN (GLUCOPHAGE) 1000 MG tablet HOLD FOR 48 HOURS AFTER THE CARDIAC CATHETERIZATION, Disp-60 tablet, R-3Normal      nitroGLYCERIN (NITROSTAT) 0.4 MG SL tablet Place 1 tablet under the tongue every 5 minutes as needed for Chest pain, Disp-25 tablet, R-5Normal      busPIRone (BUSPAR) 15 MG tablet TAKE 1 TABLET BY MOUTH DAILY, Disp-30 tablet, R-1Normal      polyethylene glycol (GLYCOLAX) powder Take 17 g by mouth as neededHistorical Med      DULoxetine (CYMBALTA) 30 MG extended release capsule Take 30 mg by mouth nightlyHistorical Med      levothyroxine (SYNTHROID) 50 MCG tablet Take 50 mcg by mouth DailyHistorical Med      tamsulosin (FLOMAX) 0.4 MG capsule Take 1 capsule(s) every day by oral route as directed for 30 days. , Disp-30 capsule, R-11Normal      diltiazem (CARDIZEM CD) 180 MG extended release capsule Take 1 capsule by mouth daily, Disp-90 capsule, R-3Dose decreasedNormal      HYDROcodone-acetaminophen (NORCO) 7.5-325 MG per tablet Take 1-2 tablets by mouth every 6 hours as needed. [place] [facility] Historical Med      meloxicam (MOBIC) 15 MG tablet Take 15 mg by mouth daily Historical Med      ondansetron (ZOFRAN) 8 MG tablet Take 8 mg by mouth as needed Historical Med      tiZANidine (ZANAFLEX) 4 MG tablet 1/2 tablet at nightHistorical Med      pravastatin (PRAVACHOL) 40 MG tablet Take 1 tablet by mouth daily, Disp-90 tablet, R-2Normal      ferrous sulfate 325 (65 Fe) MG tablet Take 325 mg by mouth 2 times dailyHistorical Med      pantoprazole (PROTONIX) 40 MG tablet Take 1 tablet by mouth daily, Disp-90 tablet, R-2Normal      b complex vitamins capsule Take 1 capsule by mouth daily. Vitamin D (CHOLECALCIFEROL) 1000 UNITS CAPS capsule Take 1,000 Units by mouth daily. docusate sodium (COLACE) 100 MG capsule Take 100 mg by mouth daily.              ALLERGIES     Ancef [cefazolin sodium], Ceftin [cefuroxime], Cefuroxime axetil, Cephalosporins, and Neosporin [bacitracin-polymyxin b]    FAMILY HISTORY       Family History   Problem Relation Age of Onset    Heart Disease Other     Lung Cancer Mother     Cancer Sister           SOCIAL HISTORY       Social History     Socioeconomic History    Marital status:      Spouse name: Go Winston Number of children: 2    Years of education: 15    Highest education level: Not on file   Occupational History    Occupation:      Employer: [employer]     Comment: Retired,  for [employer]. Social Needs    Financial resource strain: Not on file    Food insecurity     Worry: Not on file     Inability: Not on file    Transportation needs     Medical: Not on file     Non-medical: Not on file   Tobacco Use    Smoking status: Former Smoker     Packs/day: 0.00     Types: Cigarettes    Smokeless tobacco: Never Used   Substance and Sexual Activity    Alcohol use: No    Drug use: No    Sexual activity: Not on file   Lifestyle    Physical activity     Days per week: Not on file     Minutes per session: Not on file    Stress: Not on file   Relationships    Social connections     Talks on phone: Not on file     Gets together: Not on file     Attends Restoration service: Not on file     Active member of club or organization: Not on file     Attends meetings of clubs or organizations: Not on file     Relationship status: Not on file    Intimate partner violence     Fear of current or ex partner: Not on file     Emotionally abused: Not on file     Physically abused: Not on file     Forced sexual activity: Not on file   Other Topics Concern    Not on file   Social History Narrative    Not on file       SCREENINGS    Elle Coma Scale  Eye Opening: Spontaneous  Best Verbal Response: Oriented  Best Motor Response: Obeys commands  Elel Coma Scale Score: 15        PHYSICAL EXAM    (up to 7 for level 4, 8 or more for level 5)     ED Triage Vitals [01/20/21 2234]   BP Temp Temp Source Pulse Resp SpO2 Height Weight   131/65 98.4 °F (36.9 °C) Oral 89 18 97 % 5' 11\" (1.803 m) 220 lb (99.8 kg)       Physical Exam  Vitals signs and nursing note reviewed. Constitutional:       General: He is not in acute distress. Appearance: He is well-developed. He is not ill-appearing. HENT:      Head: Normocephalic and atraumatic.       Nose: Nose normal. Mouth/Throat:      Mouth: Mucous membranes are moist.      Pharynx: Oropharynx is clear. Eyes:      Conjunctiva/sclera: Conjunctivae normal.      Pupils: Pupils are equal, round, and reactive to light. Neck:      Musculoskeletal: Normal range of motion and neck supple. No muscular tenderness. Cardiovascular:      Rate and Rhythm: Normal rate and regular rhythm. Heart sounds: Normal heart sounds. No murmur. Pulmonary:      Effort: Pulmonary effort is normal.      Breath sounds: Normal breath sounds. No wheezing, rhonchi or rales. Abdominal:      General: Abdomen is flat. Bowel sounds are normal. There is no distension. Palpations: Abdomen is soft. Tenderness: There is no abdominal tenderness. There is no guarding or rebound. Musculoskeletal: Normal range of motion. Right foot: Decreased capillary refill. Swelling present. No crepitus. Feet:       Comments: There is a 2 cm x 1 cm ulceration of the tip of the right great toe. There is a malodorous smell present. Cap refill is decreased. Patient has no sensation, but this is not new. Skin:     General: Skin is warm and dry. Capillary Refill: Capillary refill takes less than 2 seconds. Neurological:      General: No focal deficit present. Mental Status: He is alert and oriented to person, place, and time. Cranial Nerves: No cranial nerve deficit. Psychiatric:         Mood and Affect: Mood normal.         Behavior: Behavior normal.         Thought Content:  Thought content normal.         DIAGNOSTIC RESULTS     EKG: All EKG's areinterpreted by the Emergency Department Physician who either signs or Co-signs this chart in the absence of a cardiologist.      RADIOLOGY:  Non-plain film images such as CT, Ultrasound and MRI are read by the radiologist. Plain radiographic images are visualized and preliminarily interpreted bythe emergency physician with the below findings:    XR toes:  I do not appreciate fx/dislocation. No obvious acute process      XR TOE RIGHT (MIN 2 VIEWS)   Final Result   No acute fracture or dislocation. Signed by Dr Marcelina Weathers on 1/21/2021 7:32 AM              LABS:  Labs Reviewed   CBC WITH AUTO DIFFERENTIAL - Abnormal; Notable for the following components:       Result Value    RBC 2.72 (*)     Hemoglobin 9.3 (*)     Hematocrit 28.0 (*)     .9 (*)     MCH 34.2 (*)     Platelets 111 (*)     Neutrophils % 78.0 (*)     Lymphocytes % 10.4 (*)     Lymphocytes Absolute 0.7 (*)     All other components within normal limits   COMPREHENSIVE METABOLIC PANEL - Abnormal; Notable for the following components:    Glucose 225 (*)     CREATININE 1.3 (*)     GFR Non- 55 (*)     All other components within normal limits   SEDIMENTATION RATE - Abnormal; Notable for the following components:    Sed Rate 113 (*)     All other components within normal limits   C-REACTIVE PROTEIN - Abnormal; Notable for the following components:    CRP 3.02 (*)     All other components within normal limits   CULTURE, BLOOD 1       All other labs were within normal range or not returned as of this dictation. EMERGENCY DEPARTMENT COURSE and DIFFERENTIAL DIAGNOSIS/MDM:   Vitals:    Vitals:    01/20/21 2234 01/21/21 0200 01/21/21 0316   BP: 131/65 117/68 120/68   Pulse: 89 88 78   Resp: 18 18 16   Temp: 98.4 °F (36.9 °C)  98.2 °F (36.8 °C)   TempSrc: Oral     SpO2: 97%  98%   Weight: 220 lb (99.8 kg)     Height: 5' 11\" (1.803 m)         MDM  Number of Diagnoses or Management Options  Diabetic ulcer of right great toe [facility]): new and requires workup  Diagnosis management comments: Patient has a diabetic ulcer to the tip of his right great toe. The x-ray did not show any apparent osteomyelitis or bone destruction at this time. He was given IV vancomycin while here in the ED.   I spoke with the hospitalist as well as the patient and we felt that it was going to be best for him to try an outpatient oral therapy while we get him into wound care. Patient already has an established relationship with wound care. He tells me he was going to call them in the morning. With patient wanting to go home, relatively stable labs as well has good vital signs I feel the patient can be safely discharged at this time. Patient was told that he could return here at any time if he has any further issues or new complaints. He is in agreement with this discharge planning. Amount and/or Complexity of Data Reviewed  Decide to obtain previous medical records or to obtain history from someone other than the patient: yes    Patient Progress  Patient progress: stable      Reassessment      CONSULTS:  None    PROCEDURES:  Unless otherwise noted below, none     Procedures    FINAL IMPRESSION      1.  Diabetic ulcer of right great toe [facility])          DISPOSITION/PLAN   DISPOSITION Decision To Discharge 01/21/2021 03:03:26 AM      PATIENT REFERRED TO:  ADRY Stubbs  [address] [place], [place] [id number]    Call in 2 days      [facility]  [address] [facility] [address] [phone]  Call today  For follow up      DISCHARGE MEDICATIONS:  Discharge Medication List as of 1/21/2021  3:10 AM      START taking these medications    Details   doxycycline hyclate (VIBRA-TABS) 100 MG tablet Take 1 tablet by mouth 2 times daily for 14 days, Disp-28 tablet, R-0Normal      amoxicillin-clavulanate (AUGMENTIN) 875-125 MG per tablet Take 1 tablet by mouth 2 times daily for 14 days, Disp-28 tablet, R-0Normal                (Please note that portions of this note were completed with a voice recognition program.  Efforts were made to edit thedictations but occasionally words are mis-transcribed.)    Teresa Hayes MD (electronically signed)  Attending Emergency Physician          Stanislav Villeda MD  01/21/21 2007 Principal Discharge DX:	Alcoholic intoxication with complication

## 2021-01-26 ENCOUNTER — HOSPITAL ENCOUNTER (OUTPATIENT)
Dept: WOUND CARE | Age: 66
Discharge: HOME | End: 2021-01-26
Payer: MEDICARE

## 2021-01-26 VITALS
SYSTOLIC BLOOD PRESSURE: 119 MMHG | DIASTOLIC BLOOD PRESSURE: 69 MMHG | BODY MASS INDEX: 30.8 KG/M2 | HEIGHT: 71 IN | WEIGHT: 220 LBS | RESPIRATION RATE: 20 BRPM | HEART RATE: 91 BPM | TEMPERATURE: 97.9 F

## 2021-01-26 DIAGNOSIS — E66.9 OBESITY (BMI 30-39.9): ICD-10-CM

## 2021-01-26 DIAGNOSIS — L97.522 DIABETIC ULCER OF TOE OF LEFT FOOT ASSOCIATED WITH TYPE 2 DIABETES MELLITUS, WITH FAT LAYER EXPOSED (HCC): Primary | Chronic | ICD-10-CM

## 2021-01-26 DIAGNOSIS — E11.621 DIABETIC ULCER OF TOE OF LEFT FOOT ASSOCIATED WITH TYPE 2 DIABETES MELLITUS, WITH FAT LAYER EXPOSED (HCC): Primary | Chronic | ICD-10-CM

## 2021-01-26 DIAGNOSIS — L97.512 NON-PRESSURE CHRONIC ULCER OF OTHER PART OF RIGHT FOOT WITH FAT LAYER EXPOSED (HCC): ICD-10-CM

## 2021-01-26 LAB — BLOOD CULTURE, ROUTINE: NORMAL

## 2021-01-26 PROCEDURE — 99213 OFFICE O/P EST LOW 20 MIN: CPT

## 2021-01-26 PROCEDURE — 97597 DBRDMT OPN WND 1ST 20 CM/<: CPT | Performed by: NURSE PRACTITIONER

## 2021-01-26 PROCEDURE — 97597 DBRDMT OPN WND 1ST 20 CM/<: CPT

## 2021-01-26 ASSESSMENT — VISUAL ACUITY: OU: 1

## 2021-01-26 NOTE — PROGRESS NOTES
Patient Care Team:  ADRY Dietrich as PCP - General (Physician Assistant)  ADRY Dietrich as PCP - [facility]ABILITATION [facility]  Edwin Hassan MD (Family Medicine)  Angeles Rodarte MD (Rheumatology)  Kandy Kumar MD as Consulting Physician (Hematology and Oncology)  GRECIA Salgado NP as Nurse Practitioner (Nurse Practitioner Adult Health)    TODAY'S DATE:  1/26/2021     HISTORY of PRESENTILLNESS HPI   Preeti Dumont is a 77 y.o. male who presents today for wound evaluation. Wound Type:diabetic  Wound Location:bilateral  great toes  Modifying factors:diabetes, poor glucose control, chronic pressure, shear force, obesity and anticoagulation therapy    Patient Active Problem List   Diagnosis Code    Coronary artery disease I25.10    Hyperlipemia E78.5    LONG TERM ANTICOAGULENT USE     Diabetes mellitus (HCC) E11.9    Shortness of breath R06.02    Chest tightness R07.89    JOHNSON (dyspnea on exertion) R06.00    Abnormal nuclear stress test R94.39    S/P CABG x 3 Z95.1    Hypertension I10    Type II or unspecified type diabetes mellitus without mention of complication, not stated as uncontrolled E11.9    Weakness generalized R53.1    Nausea R11.0    Hypotension I95.9    History of coronary artery bypass graft x 1 Z95.1    Fatigue R53.83    Tachycardia R00.0    Tachyarrhythmia R00.0    Chronic kidney disease, stage II (mild) N18.2    Obstructive sleep apnea G47.33    BiPAP (biphasic positive airway pressure) dependence Z99.89    Restless leg G25.81    Bilateral leg edema R60.0    Non-pressure chronic ulcer of other part of right foot with fat layer exposed ([address] L97.512    Diabetic ulcer of toe of left foot associated with type 2 diabetes mellitus, with fat layer exposed ([facility]) E11.621, S81.370    Abnormal nuclear cardiac imaging test R93.1    Obesity (BMI 30-39. 9) E66.9       He reports he developed a wound on right foot. This started 1 month(s) ago.  He reports he developed a wound on left foot 6 months ago. He believes this is not healing. He has been applying neosporin. He has not had  fever orchills. He has a history of diabetes mellitus. Stanley Garcia is a 77 y.o. male with the following history reviewed and recorded in [facility]:    Current Outpatient Medications   Medication Sig Dispense Refill    doxycycline hyclate (VIBRA-TABS) 100 MG tablet Take 1 tablet by mouth 2 times daily for 14 days 28 tablet 0    amoxicillin-clavulanate (AUGMENTIN) 875-125 MG per tablet Take 1 tablet by mouth 2 times daily for 14 days 28 tablet 0    clopidogrel (PLAVIX) 75 MG tablet TAKE 1 TABLET BY MOUTH EVERY DAY 90 tablet 0    acetaminophen (TYLENOL) 500 MG tablet Take 1,000 mg by mouth every 6 hours as needed for Pain      ranolazine (RANEXA) 1000 MG extended release tablet Take 1 tablet by mouth 2 times daily 60 tablet 5    metFORMIN (GLUCOPHAGE) 1000 MG tablet HOLD FOR 48 HOURS AFTER THE CARDIAC CATHETERIZATION (Patient taking differently: 1,000 mg 2 times daily (with meals) HOLD FOR 48 HOURS AFTER THE CARDIAC CATHETERIZATION) 60 tablet 3    busPIRone (BUSPAR) 15 MG tablet TAKE 1 TABLET BY MOUTH DAILY 30 tablet 1    polyethylene glycol (GLYCOLAX) powder Take 17 g by mouth as needed      DULoxetine (CYMBALTA) 30 MG extended release capsule Take 30 mg by mouth nightly      levothyroxine (SYNTHROID) 50 MCG tablet Take 50 mcg by mouth Daily      tamsulosin (FLOMAX) 0.4 MG capsule Take 1 capsule(s) every day by oral route as directed for 30 days. 30 capsule 11    diltiazem (CARDIZEM CD) 180 MG extended release capsule Take 1 capsule by mouth daily 90 capsule 3    HYDROcodone-acetaminophen (NORCO) 7.5-325 MG per tablet Take 1-2 tablets by mouth every 6 hours as needed.  Adwoa Hensley meloxicam (MOBIC) 15 MG tablet Take 15 mg by mouth daily       ondansetron (ZOFRAN) 8 MG tablet Take 8 mg by mouth as needed       tiZANidine (ZANAFLEX) 4 MG tablet 1/2 tablet at night      pravastatin (PRAVACHOL) 40 MG tablet Take 1 tablet by mouth daily 90 tablet 2    ferrous sulfate 325 (65 Fe) MG tablet Take 325 mg by mouth 2 times daily      pantoprazole (PROTONIX) 40 MG tablet Take 1 tablet by mouth daily 90 tablet 2    b complex vitamins capsule Take 1 capsule by mouth daily.  Vitamin D (CHOLECALCIFEROL) 1000 UNITS CAPS capsule Take 1,000 Units by mouth daily.  docusate sodium (COLACE) 100 MG capsule Take 100 mg by mouth daily.  nitroGLYCERIN (NITROSTAT) 0.4 MG SL tablet Place 1 tablet under the tongue every 5 minutes as needed for Chest pain 25 tablet 5     No current facility-administered medications for this encounter. Allergies: Ancef [cefazolin sodium], Ceftin [cefuroxime], Cefuroxime axetil, Cephalosporins, and Neosporin [bacitracin-polymyxin b]  Past Medical History:   Diagnosis Date    Abnormal nuclear stress test 10/22/2014    BiPAP (biphasic positive airway pressure) dependence     8cm to 20cm    Cerebrovascular disease     Chronic kidney disease, stage II (mild) 08/17/2016    Nathaniel Petty M.D.   [facility] Coronary artery disease 2/24/2011    Depression     Diabetes mellitus ([facility]     Headache     Hyperlipemia 2/24/2011    Dr. Machelle Calderon follows lipids.     Hyperlipidemia     Hypertension     LONG TERM ANTICOAGULENT USE     Low blood pressure 11/19/2014    lymphostatic lymphoma     Nausea 11/19/2014    Obesity     Obstructive sleep apnea     AHI:  21.7 per PSG, 7/2017    Peptic ulcer disease 2/24/2011    Restless leg     S/P CABG x 3 10/22/2014    SVG to RCA; SVG to LAD diagonal; LIMA to LAD    Sleep apnea     Tachyarrhythmia     Thrombocythemia, essential ([address]     Type 2 diabetes mellitus without complication ([address]     Type II or unspecified type diabetes mellitus without mention of complication, not stated as uncontrolled        Past Surgical History:   Procedure Laterality Date    CARDIAC CATHETERIZATION  2/28/11    EF 60%    CARDIAC CATHETERIZATION [facility] 01/26/21 1032   Drainage Amount Small 01/26/21 1032   Drainage Description Serosanguinous 01/26/21 1032   Odor None 01/26/21 1032   Nelda-wound Assessment Crepitus 01/26/21 1032   Margins Defined edges 01/26/21 1032   Wound Thickness Description not for Pressure Injury Full thickness 01/26/21 1032   Number of days: 0       Wound 01/26/21 Toe (Comment  which one) Right wound 3- right great toe wag 1 (Active)   Wound Image    01/26/21 1032   Wound Etiology Diabetic [place] 1 01/26/21 1032   Dressing Status Old drainage noted 01/26/21 1032   Wound Cleansed Soap and water 01/26/21 1032   Dressing/Treatment Hydrating gel 01/26/21 1100   Offloading for Diabetic Foot Ulcers Felt or foam 01/26/21 1100   Wound Length (cm) 0.3 cm 01/26/21 1032   Wound Width (cm) 0.2 cm 01/26/21 1032   Wound Depth (cm) 0.1 cm 01/26/21 1032   Wound Surface Area (cm^2) 0.06 cm^2 01/26/21 1032   Wound Volume (cm^3) 0.01 cm^3 01/26/21 1032   Post-Procedure Length (cm) 1.2 cm 01/26/21 1100   Post-Procedure Width (cm) 1.9 cm 01/26/21 1100   Post-Procedure Depth (cm) 0.9 cm 01/26/21 1100   Post-Procedure Surface Area (cm^2) 2.28 cm^2 01/26/21 1100   Post-Procedure Volume (cm^3) 2.05 cm^3 01/26/21 1100   Wound Assessment Pink/red;Slough 01/26/21 1032   Drainage Amount Small 01/26/21 1032   Drainage Description Thick; Yellow 01/26/21 1032   Odor Mild 01/26/21 1032   Nelda-wound Assessment Blanchable erythema;Fragile;Crepitus 01/26/21 1032   Margins Defined edges 01/26/21 1032   Wound Thickness Description not for Pressure Injury Full thickness 01/26/21 1032   Number of days: 0            Debridement: Selective Debridement/Non-Excisional Debridement    Using #15 blade scalpel and forceps the wound(s)/ulcer(s) was/were sharply debrided down through and including the removal of epidermis and dermis.         Devitalized Tissue Debrided:  fibrin, biofilm, slough, exudate and callus    Pre Debridement Measurements:  Are located in the [place]  Documentation Flow Sheet    Wound/Ulcer #: 2 and 3    Post Debridement Measurements:  Wound/Ulcer Descriptions are Pre Debridement except measurements:          Percent of Wound(s)/Ulcer(s) Debrided: 100%    Total Surface Area Debrided:  2.76 sq cm     Diabetic/Pressure/Non Pressure Ulcers only:  Ulcer: Diabetic ulcer, fat layer exposed     Estimated Blood Loss:  Minimal    Hemostasis Achieved:  by pressure    Procedural Pain:  0  / 10     Post Procedural Pain:  0 / 10     Response to treatment:  Well tolerated by patient. Assessment    1. Diabetic ulcer of toe of left foot associated with type 2 diabetes mellitus, with fat layer exposed ([address]    2. Non-pressure chronic ulcer of other part of right foot with fat layer exposed ([address]    3. Obesity (BMI 30-39. 9)          Plan    1. EMILIA    Planfor wound - Dress per physician order  Treatment:     Compression : No   Offloading : Yes  Dressing Orders:  Right and left foot: Soap and water wash, tuck gel into the wound bed then apply a layer of adaptic. Secure with dry gauze, rolled gauze, and tape. Change this daily. Treatment Orders:  Avoid pressure to wound, keep felt/foam on at all times. Re-glue if necessary. Do not get this wet  Protein rich diet, multivitamin  Do not wear a show unless it is open toed    Discussed importance of glucose control (he reports recent HgBA1c 7.2), nutrition, offloading, wound care, and plan of care. Patient understanding and questions answered. I spent a total of 90 minutes face to face with the patient. Over 75% of that time was spent on counseling and care coordination. Patient was told that if symptoms worsen or new symptoms develop they are to go to the emergency department immediately. Patient was educated on diagnosis and treatment plan. All of patient's questions were answered, and the patient understands the discharge plan. Discussed appropriate home care of this wound.  Wound redressed. Patient instructions were given.   Recommend no smoking  Offloading instructions given

## 2021-01-26 NOTE — HOME CARE
[address]:     [address], [address] p: [phone] f: [phone]     Ordering Center:     [address],[place] [address]  [address], [place] [address] [address] [phone]  WOUND CARE Dept: [address] [facility] [phone]    Patient Information:      Parker Castillo 54   [phone]   : 1955  AGE: 77 y.o. GENDER: male   EPISODE DATE: 2021    Insurance:      PRIMARY INSURANCE:  Plan: [facility] [employer]  Coverage: [facility] MEDICARE  Effective Date: 2020  Group Number: [unfilled]  Subscriber Number: [id number] - (Medicare Managed)    Payor/Plan Subscr  Sex Relation Sub. Ins. ID Effective Group Num   1. [id number] 1955 Male Self [id number]                                   [address] [address]       Patient Wound Information:      Problem List Items Addressed This Visit     * (Principal) Diabetic ulcer of toe of left foot associated with type 2 diabetes mellitus, with fat layer exposed ([address] - Primary (Chronic)    Relevant Orders    VL LOWER EXTREMITY ARTERIAL SEGMENTAL PRESSURES W PPG    Non-pressure chronic ulcer of other part of right foot with fat layer exposed ([address]    Relevant Orders    VL LOWER EXTREMITY ARTERIAL SEGMENTAL PRESSURES W PPG          WOUNDS REQUIRING DRESSING SUPPLIES:     Wound 21 Toe (Comment  which one) Anterior; Left wound 2- left great toe wag 1 (Active)   Wound Image    21 1032   Wound Etiology Diabetic Lawler 1 21 1032   Dressing Status Old drainage noted 21 1032   Wound Cleansed Soap and water 21 1032   Dressing/Treatment Hydrating gel 21 1100   Offloading for Diabetic Foot Ulcers Felt or foam 21 1100   Wound Length (cm) 0.3 cm 21 1032   Wound Width (cm) 0.2 cm 21 1032   Wound Depth (cm) 0.2 cm 21 1032   Wound Surface Area (cm^2) 0.06 cm^2 21 1032   Wound Volume (cm^3) 0.01 cm^3 01/26/21 1032   Post-Procedure Length (cm) 0.6 cm 01/26/21 1100   Post-Procedure Width (cm) 0.8 cm 01/26/21 1100   Post-Procedure Depth (cm) 0.3 cm 01/26/21 1100   Post-Procedure Surface Area (cm^2) 0.48 cm^2 01/26/21 1100   Post-Procedure Volume (cm^3) 0.14 cm^3 01/26/21 1100   Wound Assessment Slough 01/26/21 1032   Drainage Amount Small 01/26/21 1032   Drainage Description Serosanguinous 01/26/21 1032   Odor None 01/26/21 1032   Nelda-wound Assessment Crepitus 01/26/21 1032   Margins Defined edges 01/26/21 1032   Wound Thickness Description not for Pressure Injury Full thickness 01/26/21 1032   Number of days: 0       Wound 01/26/21 Toe (Comment  which one) Right wound 3- right great toe wag 1 (Active)   Wound Image    01/26/21 1032   Wound Etiology Diabetic Lawler 1 01/26/21 1032   Dressing Status Old drainage noted 01/26/21 1032   Wound Cleansed Soap and water 01/26/21 1032   Dressing/Treatment Hydrating gel 01/26/21 1100   Offloading for Diabetic Foot Ulcers Felt or foam 01/26/21 1100   Wound Length (cm) 0.3 cm 01/26/21 1032   Wound Width (cm) 0.2 cm 01/26/21 1032   Wound Depth (cm) 0.1 cm 01/26/21 1032   Wound Surface Area (cm^2) 0.06 cm^2 01/26/21 1032   Wound Volume (cm^3) 0.01 cm^3 01/26/21 1032   Post-Procedure Length (cm) 1.2 cm 01/26/21 1100   Post-Procedure Width (cm) 1.9 cm 01/26/21 1100   Post-Procedure Depth (cm) 0.9 cm 01/26/21 1100   Post-Procedure Surface Area (cm^2) 2.28 cm^2 01/26/21 1100   Post-Procedure Volume (cm^3) 2.05 cm^3 01/26/21 1100   Wound Assessment Pink/red;Slough 01/26/21 1032   Drainage Amount Small 01/26/21 1032   Drainage Description Thick; Yellow 01/26/21 1032   Odor Mild 01/26/21 1032   Nelda-wound Assessment Blanchable erythema;Fragile;Crepitus 01/26/21 1032   Margins Defined edges 01/26/21 1032   Wound Thickness Description not for Pressure Injury Full thickness 01/26/21 1032   Number of days: 0          Supplies Requested :      WOUND #: 1 and 2 PRIMARY DRESSING:  Hydrogel tube 3 oz size    Cover and Secure with: 4X4 gauze pad  Bulky roll gauze  Other adaptic     FREQUENCY OF DRESSING CHANGES:  Daily         ADDITIONAL ITEMS:  [] Gloves Small  [x] Gloves Medium [] Gloves Large [] Gloves XLarge  [] Tape 1\" [] Tape 2\" [x] Tape 3\"  [x] Medipore Tape  [] Saline  [] Skin Prep   [] Adhesive Remover   [] Cotton Tip Applicators   [] Other:    Patient Wound(s) Debrided: [x] Yes if yes please add date 1/26/21   [] No    Debribement Type: Excisional/Sharp    Patient currently being seen by Home Health: [] Yes   [x] No    Duration for needed supplies:  []15  [x]30  []60  []90 Days    Electronically signed by GRECIA Winter CNP on 1/26/2021 at 12:15 PM     Provider Information:      PROVIDER'S NAME: Alden PAIGE    NPI: [id number]

## 2021-02-04 ENCOUNTER — HOSPITAL ENCOUNTER (OUTPATIENT)
Dept: NON INVASIVE DIAGNOSTICS | Age: 66
Discharge: HOME | End: 2021-02-04
Payer: MEDICARE

## 2021-02-04 ENCOUNTER — HOSPITAL ENCOUNTER (OUTPATIENT)
Dept: WOUND CARE | Age: 66
Discharge: HOME | End: 2021-02-04
Payer: MEDICARE

## 2021-02-04 VITALS
HEIGHT: 71 IN | RESPIRATION RATE: 20 BRPM | WEIGHT: 220 LBS | SYSTOLIC BLOOD PRESSURE: 103 MMHG | BODY MASS INDEX: 30.8 KG/M2 | DIASTOLIC BLOOD PRESSURE: 64 MMHG | HEART RATE: 80 BPM

## 2021-02-04 DIAGNOSIS — E11.621 DIABETIC ULCER OF TOE OF LEFT FOOT ASSOCIATED WITH TYPE 2 DIABETES MELLITUS, WITH FAT LAYER EXPOSED (HCC): Chronic | ICD-10-CM

## 2021-02-04 DIAGNOSIS — E66.9 OBESITY (BMI 30-39.9): ICD-10-CM

## 2021-02-04 DIAGNOSIS — L97.509 TYPE 2 DIABETES MELLITUS WITH FOOT ULCER, UNSPECIFIED WHETHER LONG TERM INSULIN USE (HCC): ICD-10-CM

## 2021-02-04 DIAGNOSIS — L97.522 DIABETIC ULCER OF TOE OF LEFT FOOT ASSOCIATED WITH TYPE 2 DIABETES MELLITUS, WITH FAT LAYER EXPOSED (HCC): ICD-10-CM

## 2021-02-04 DIAGNOSIS — L97.522 DIABETIC ULCER OF TOE OF LEFT FOOT ASSOCIATED WITH TYPE 2 DIABETES MELLITUS, WITH FAT LAYER EXPOSED (HCC): Chronic | ICD-10-CM

## 2021-02-04 DIAGNOSIS — L97.512 NON-PRESSURE CHRONIC ULCER OF OTHER PART OF RIGHT FOOT WITH FAT LAYER EXPOSED (HCC): ICD-10-CM

## 2021-02-04 DIAGNOSIS — E11.621 TYPE 2 DIABETES MELLITUS WITH FOOT ULCER, UNSPECIFIED WHETHER LONG TERM INSULIN USE (HCC): ICD-10-CM

## 2021-02-04 DIAGNOSIS — L97.512 NON-PRESSURE CHRONIC ULCER OF OTHER PART OF RIGHT FOOT WITH FAT LAYER EXPOSED (HCC): Primary | ICD-10-CM

## 2021-02-04 DIAGNOSIS — E11.621 DIABETIC ULCER OF TOE OF LEFT FOOT ASSOCIATED WITH TYPE 2 DIABETES MELLITUS, WITH FAT LAYER EXPOSED (HCC): ICD-10-CM

## 2021-02-04 PROCEDURE — 97597 DBRDMT OPN WND 1ST 20 CM/<: CPT

## 2021-02-04 PROCEDURE — 93923 UPR/LXTR ART STDY 3+ LVLS: CPT

## 2021-02-04 PROCEDURE — 97597 DBRDMT OPN WND 1ST 20 CM/<: CPT | Performed by: NURSE PRACTITIONER

## 2021-02-04 NOTE — PROGRESS NOTES
[address]   Progress Note and Procedure Note      Cuong He  MEDICAL RECORD NUMBER:  [id number]  AGE: 77 y.o. GENDER: male  : 1955  EPISODE DATE:  2021    Subjective:     Chief Complaint   Patient presents with    Wound Check     Pt would rather not have felt and foam, he has 2 off load shoe         HISTORY of PRESENT ILLNESS HPI     Cuong He is a 77 y.o. male who presents today for wound/ulcer evaluation. History of Wound Context: bilateral great toes  Ulcer Identification:  Ulcer Type: diabetic  Contributing Factors: diabetes, chronic pressure, shear force and obesity    Wound: N/A        PAST MEDICAL HISTORY        Diagnosis Date    Abnormal nuclear stress test 10/22/2014    BiPAP (biphasic positive airway pressure) dependence     8cm to 20cm    Cerebrovascular disease     Chronic kidney disease, stage II (mild) 2016    Gerson Walters M.D.    Coronary artery disease 2011    Depression     Diabetes mellitus ([address]     Headache     Hyperlipemia 2011    Dr. Kali Kaplan follows lipids.     Hyperlipidemia     Hypertension     LONG TERM ANTICOAGULENT USE     Low blood pressure 2014    lymphostatic lymphoma     Nausea 2014    Obesity     Obstructive sleep apnea     AHI:  21.7 per PS[facility], 2017    Peptic ulcer disease 2011    Restless leg     S/P CABG x 3 10/22/2014    SVG to RCA; SVG to LAD diagonal; LIMA to LAD    Sleep apnea     Tachyarrhythmia     Thrombocythemia, essential ([address]     Type 2 diabetes mellitus without complication ([address]     Type II or unspecified type diabetes mellitus without mention of complication, not stated as uncontrolled        PAST SURGICAL HISTORY    Past Surgical History:   Procedure Laterality Date    CARDIAC CATHETERIZATION  11    EF 60%    CARDIAC CATHETERIZATION  05, 3/7/07    EF < 50%    CARDIAC CATHETERIZATION  12   MDL    EF  50%    CARDIAC atraumatic  Eyes: pupils equal, round, and reactive to light, extraocular eye movements intact, conjunctivae normal  ENT: tympanic membrane, external ear and ear canal normal bilaterally, nose without deformity, nasal mucosa and turbinates normal without polyps  Neck: supple and non-tender without mass, no thyromegaly or thyroid nodules, no cervical lymphadenopathy  Pulmonary/Chest: clear to auscultation bilaterally- no wheezes, rales or rhonchi, normal air movement, no respiratory distress  Extremities: no cyanosis, clubbing or edema  Musculoskeletal: normal range of motion, no joint swelling, deformity or tenderness  Neurologic: reflexes normal and symmetric, no cranial nerve deficit, gait, coordination and speech normal      Assessment:      Patient Active Problem List   Diagnosis Code    Coronary artery disease I25.10    Hyperlipemia E78.5    LONG TERM ANTICOAGULENT USE     Diabetes mellitus ([facility] E11.9    Shortness of breath R06.02    Chest tightness R07.89    JOHNSON (dyspnea on exertion) R06.00    Abnormal nuclear stress test R94.39    S/P CABG x 3 Z95.1    Hypertension I10    Type II or unspecified type diabetes mellitus without mention of complication, not stated as uncontrolled E11.9    Weakness generalized R53.1    Nausea R11.0    Hypotension I95.9    History of coronary artery bypass graft x 1 Z95.1    Fatigue R53.83    Tachycardia R00.0    Tachyarrhythmia R00.0    Chronic kidney disease, stage II (mild) N18.2    Obstructive sleep apnea G47.33    BiPAP (biphasic positive airway pressure) dependence Z99.89    Restless leg G25.81    Bilateral leg edema R60.0    Non-pressure chronic ulcer of other part of right foot with fat layer exposed ([facility] L97.512    Diabetic ulcer of toe of left foot associated with type 2 diabetes mellitus, with fat layer exposed ([facility]) E11.621, Q75.759    Abnormal nuclear cardiac imaging test R93.1    Obesity (BMI 30-39. 9) E66.9        Procedure Note  Indications:  Based on my examination of this patient's wound(s)/ulcer(s) today, debridement is required to promote healing and evaluate the wound base. Performed by: GRECIA So CNP    Consent obtained:  Yes    Time out taken:  Yes    Pain Control:         Debridement:Non-excisional Debridement    Using #15 blade scalpel and forceps the wound(s)/ulcer(s) was/were sharply debrided down through and including the removal of epidermis and dermis. Devitalized Tissue Debrided:  fibrin, biofilm, slough, exudate and callus    Pre Debridement Measurements:  Are located in the Wound/Ulcer Documentation Flow Sheet    Wound/Ulcer #: 2 and 3    Post Debridement Measurements:  Wound/Ulcer Descriptions are Pre Debridement except measurements:      Percent of Wound/Ulcer Debrided: 100%    Total Surface Area Debrided:  1.23 sq cm     Wound 01/26/21 Toe (Comment  which one) Anterior; Left wound 2- left great toe wag 1 (Active)   Wound Image   02/04/21 1038   Wound Etiology Diabetic Lawler 1 02/04/21 1038   Dressing Status Dry; Intact 02/04/21 1038   Wound Cleansed Soap and water 01/26/21 1032   Dressing/Treatment Hydrating gel 01/26/21 1100   Offloading for Diabetic Foot Ulcers Felt or foam;Forefoot offloading shoe 02/04/21 1038   Wound Length (cm) 0.3 cm 02/04/21 1038   Wound Width (cm) 0.2 cm 02/04/21 1038   Wound Depth (cm) 0.1 cm 02/04/21 1038   Wound Surface Area (cm^2) 0.06 cm^2 02/04/21 1038   Change in Wound Size % (l*w) 0 02/04/21 1038   Wound Volume (cm^3) 0.01 cm^3 02/04/21 1038   Wound Healing % 0 02/04/21 1038   Post-Procedure Length (cm) 0.3 cm 02/04/21 1100   Post-Procedure Width (cm) 0.2 cm 02/04/21 1100   Post-Procedure Depth (cm) 0.1 cm 02/04/21 1100   Post-Procedure Surface Area (cm^2) 0.06 cm^2 02/04/21 1100   Post-Procedure Volume (cm^3) 0.01 cm^3 02/04/21 1100   Distance Tunneling (cm) 0 cm 02/04/21 1038   Tunneling Position ___ O'Clock 0 02/04/21 1038   Undermining Starts ___ O'Clock 02/04/21 1038   Wound Thickness Description not for Pressure Injury Full thickness 01/26/21 1032   Number of days: 9            Diabetic/Pressure/Non Pressure Ulcers only:  Ulcer: Diabetic ulcer, fat layer exposed      Estimated Blood Loss:  Minimal    Hemostasis Achieved:  by pressure    Procedural Pain:  0  / 10     Post Procedural Pain:  0 / 10     Response to treatment:  Well tolerated by patient. Plan:     Problem List Items Addressed This Visit     Diabetic ulcer of toe of left foot associated with type 2 diabetes mellitus, with fat layer exposed ([address] (Chronic)    Relevant Medications    Lidocaine 2 % GEL (Start on 2/4/2021  1:00 PM)    Other Relevant Orders    Supply: Wound Cleanser    Supply: Nelda Wound    Supply: Pack Wound    Supply: Cover and Secure    Supply: Wound Dressings    Diabetes mellitus ([address]    * (Principal) Non-pressure chronic ulcer of other part of right foot with fat layer exposed ([address] - Primary    Relevant Medications    Lidocaine 2 % GEL (Start on 2/4/2021  1:00 PM)    Other Relevant Orders    Supply: Wound Cleanser    Supply: Nelda Wound    Supply: Pack Wound    Supply: Cover and Secure    Supply: Wound Dressings    Obesity (BMI 30-39. 9)          Treatment Note please see attached Discharge Instructions    In my professional opinion this patient would benefit from HBO Therapy: No    Written patient dismissal instructions given to patient and signed by patient or POA. Mr. Carito Calzada is making great progress and reports less discomfort since offloading.     Electronically signed by GRECIA Owens CNP on 2/4/2021 at 12:32 PM

## 2021-02-17 DIAGNOSIS — I25.10 CORONARY ARTERY DISEASE INVOLVING NATIVE HEART WITHOUT ANGINA PECTORIS, UNSPECIFIED VESSEL OR LESION TYPE: ICD-10-CM

## 2021-02-17 RX ORDER — CLOPIDOGREL BISULFATE 75 MG/1
TABLET ORAL
Qty: 90 TABLET | Refills: 0 | OUTPATIENT
Start: 2021-02-17

## 2021-02-18 DIAGNOSIS — I25.10 CORONARY ARTERY DISEASE INVOLVING NATIVE HEART WITHOUT ANGINA PECTORIS, UNSPECIFIED VESSEL OR LESION TYPE: ICD-10-CM

## 2021-02-19 RX ORDER — CLOPIDOGREL BISULFATE 75 MG/1
TABLET ORAL
Qty: 90 TABLET | Refills: 3 | Status: SHIPPED | OUTPATIENT
Start: 2021-02-19

## 2021-02-24 ENCOUNTER — HOSPITAL ENCOUNTER (OUTPATIENT)
Dept: WOUND CARE | Age: 66
Discharge: HOME | End: 2021-02-24
Payer: MEDICARE

## 2021-02-24 VITALS
TEMPERATURE: 98.1 F | DIASTOLIC BLOOD PRESSURE: 64 MMHG | RESPIRATION RATE: 18 BRPM | HEART RATE: 74 BPM | HEIGHT: 71 IN | SYSTOLIC BLOOD PRESSURE: 121 MMHG | WEIGHT: 220 LBS | BODY MASS INDEX: 30.8 KG/M2

## 2021-02-24 DIAGNOSIS — L97.522 DIABETIC ULCER OF TOE OF LEFT FOOT ASSOCIATED WITH TYPE 2 DIABETES MELLITUS, WITH FAT LAYER EXPOSED (HCC): ICD-10-CM

## 2021-02-24 DIAGNOSIS — E66.9 OBESITY (BMI 30-39.9): ICD-10-CM

## 2021-02-24 DIAGNOSIS — E11.621 DIABETIC ULCER OF TOE OF LEFT FOOT ASSOCIATED WITH TYPE 2 DIABETES MELLITUS, WITH FAT LAYER EXPOSED (HCC): ICD-10-CM

## 2021-02-24 DIAGNOSIS — E11.621 DIABETIC ULCER OF TOE OF RIGHT FOOT ASSOCIATED WITH TYPE 2 DIABETES MELLITUS, WITH FAT LAYER EXPOSED (HCC): ICD-10-CM

## 2021-02-24 DIAGNOSIS — L97.512 DIABETIC ULCER OF TOE OF RIGHT FOOT ASSOCIATED WITH TYPE 2 DIABETES MELLITUS, WITH FAT LAYER EXPOSED (HCC): ICD-10-CM

## 2021-02-24 PROCEDURE — 97597 DBRDMT OPN WND 1ST 20 CM/<: CPT | Performed by: NURSE PRACTITIONER

## 2021-02-24 PROCEDURE — 97597 DBRDMT OPN WND 1ST 20 CM/<: CPT

## 2021-02-24 RX ORDER — LIDOCAINE HYDROCHLORIDE 20 MG/ML
JELLY TOPICAL PRN
Status: DISCONTINUED | OUTPATIENT
Start: 2021-02-24 | End: 2021-02-26 | Stop reason: HOSPADM

## 2021-02-24 NOTE — PROGRESS NOTES
[address]   Progress Note and Procedure Note      Tamara Hollis  MEDICAL RECORD NUMBER:  [id number]  AGE: 77 y.o. GENDER: male  : 1955  EPISODE DATE:  2021    Subjective:     Chief Complaint   Patient presents with    Wound Check     Right and Left Great toe wound; Patient denies increased pain at wound site         HISTORY of PRESENT ILLNESS HPI     Tamara Hollis is a 77 y.o. male who presents today for wound/ulcer evaluation. History of Wound Context: bilateral feet wounds  Ulcer Identification:  Ulcer Type: diabetic  Contributing Factors: diabetes, chronic pressure, shear force and obesity    Wound: N/A        PAST MEDICAL HISTORY        Diagnosis Date    Abnormal nuclear stress test 10/22/2014    BiPAP (biphasic positive airway pressure) dependence     8cm to 20cm    Cerebrovascular disease     Chronic kidney disease, stage II (mild) 2016    Re Oneal M.D.    Coronary artery disease 2011    Depression     Diabetes mellitus ([address]     Headache     Hyperlipemia 2011    Dr. Linda Shepard follows lipids.     Hyperlipidemia     Hypertension     LONG TERM ANTICOAGULENT USE     Low blood pressure 2014    lymphostatic lymphoma     Nausea 2014    Obesity     Obstructive sleep apnea     AHI:  21.7 per PS[facility], 2017    Peptic ulcer disease 2011    Restless leg     S/P CABG x 3 10/22/2014    SVG to RCA; SVG to LAD diagonal; LIMA to LAD    Sleep apnea     Tachyarrhythmia     Thrombocythemia, essential ([address]     Type 2 diabetes mellitus without complication ([address]     Type II or unspecified type diabetes mellitus without mention of complication, not stated as uncontrolled        PAST SURGICAL HISTORY    Past Surgical History:   Procedure Laterality Date    CARDIAC CATHETERIZATION  11    EF 60%    CARDIAC CATHETERIZATION  05, 3/7/07    EF < 50%    CARDIAC CATHETERIZATION  12   MDL    EF  50%    CARDIAC CATHETERIZATION  10/28/14  GERBER Bates M.D.   [address] GRAFT  05    LIMA to LAD and diagonal; SVG to posterior descending branch RCA    CORONARY ARTERY BYPASS GRAFT  10/30/2014    Redo Sternotomy - SVG-PDA, TMR (RBL)    KIDNEY SURGERY  2017    KNEE ARTHROSCOPY      right ACL repair    SHOULDER ARTHROSCOPY      left-rotator cuff repair right- rotator cuff repair    SHOULDER ARTHROSCOPY  11    right    TONSILLECTOMY         FAMILY HISTORY    Family History   Problem Relation Age of Onset    Heart Disease Other     Lung Cancer Mother     Cancer Sister        SOCIAL HISTORY    Social History     Tobacco Use    Smoking status: Former Smoker     Packs/day: 0.00     Types: Cigarettes     Quit date: 1998     Years since quittin.0    Smokeless tobacco: Never Used    Tobacco comment: quit 23 years ago   Substance Use Topics    Alcohol use: No    Drug use: No       ALLERGIES    Allergies   Allergen Reactions    Ancef [Cefazolin Sodium]     Ceftin [Cefuroxime]      \"anything with ceftin in it\"    Cefuroxime Axetil     Cephalosporins     Neosporin [Bacitracin-Polymyxin B]      blisters       MEDICATIONS    Current Outpatient Medications on File Prior to Encounter   Medication Sig Dispense Refill    clopidogrel (PLAVIX) 75 MG tablet One daily 90 tablet 3    acetaminophen (TYLENOL) 500 MG tablet Take 1,000 mg by mouth every 6 hours as needed for Pain      ranolazine (RANEXA) 1000 MG extended release tablet Take 1 tablet by mouth 2 times daily 60 tablet 5    metFORMIN (GLUCOPHAGE) 1000 MG tablet HOLD FOR 48 HOURS AFTER THE CARDIAC CATHETERIZATION (Patient taking differently: 1,000 mg 2 times daily (with meals) HOLD FOR 48 HOURS AFTER THE CARDIAC CATHETERIZATION) 60 tablet 3    nitroGLYCERIN (NITROSTAT) 0.4 MG SL tablet Place 1 tablet under the tongue every 5 minutes as needed for Chest pain 25 tablet 5    busPIRone (BUSPAR) 15 MG tablet TAKE 1 TABLET BY MOUTH DAILY 30 tablet 1    polyethylene glycol (GLYCOLAX) powder Take 17 g by mouth as needed      DULoxetine (CYMBALTA) 30 MG extended release capsule Take 30 mg by mouth nightly      levothyroxine (SYNTHROID) 50 MCG tablet Take 50 mcg by mouth Daily      tamsulosin (FLOMAX) 0.4 MG capsule Take 1 capsule(s) every day by oral route as directed for 30 days. 30 capsule 11    diltiazem (CARDIZEM CD) 180 MG extended release capsule Take 1 capsule by mouth daily 90 capsule 3    HYDROcodone-acetaminophen (NORCO) 7.5-325 MG per tablet Take 1-2 tablets by mouth every 6 hours as needed. Alicja Greaser meloxicam (MOBIC) 15 MG tablet Take 15 mg by mouth daily       ondansetron (ZOFRAN) 8 MG tablet Take 8 mg by mouth as needed       tiZANidine (ZANAFLEX) 4 MG tablet 1/2 tablet at night      pravastatin (PRAVACHOL) 40 MG tablet Take 1 tablet by mouth daily 90 tablet 2    ferrous sulfate 325 (65 Fe) MG tablet Take 325 mg by mouth 2 times daily      pantoprazole (PROTONIX) 40 MG tablet Take 1 tablet by mouth daily 90 tablet 2    b complex vitamins capsule Take 1 capsule by mouth daily.  Vitamin D (CHOLECALCIFEROL) 1000 UNITS CAPS capsule Take 1,000 Units by mouth daily.  docusate sodium (COLACE) 100 MG capsule Take 100 mg by mouth daily. No current facility-administered medications on file prior to encounter. REVIEW OF SYSTEMS    A comprehensive review of systems was negative.     Objective:      /64   Pulse 74   Temp 98.1 °F (36.7 °C) (Temporal)   Resp 18   Ht 5' 11\" (1.803 m)   Wt 220 lb (99.8 kg)   BMI 30.68 kg/m²     Wt Readings from Last 3 Encounters:   02/24/21 220 lb (99.8 kg)   02/04/21 220 lb (99.8 kg)   01/26/21 220 lb (99.8 kg)       PHYSICAL EXAM    General Appearance: alert and oriented to person, place and time, well developed and well- nourished, in no acute distress  Skin: warm and dry, no rash or erythema  Head: normocephalic and atraumatic  Eyes: pupils equal, round, and reactive to light, extraocular eye movements intact, conjunctivae normal  ENT: tympanic membrane, external ear and ear canal normal bilaterally, nose without deformity, nasal mucosa and turbinates normal without polyps  Neck: supple and non-tender without mass, no thyromegaly or thyroid nodules, no cervical lymphadenopathy  Pulmonary/Chest: clear to auscultation bilaterally- no wheezes, rales or rhonchi, normal air movement, no respiratory distress  Extremities: no cyanosis, clubbing or edema  Musculoskeletal: normal range of motion, no joint swelling, deformity or tenderness  Neurologic: reflexes normal and symmetric, no cranial nerve deficit, gait, coordination and speech normal      Assessment:      Patient Active Problem List   Diagnosis Code    Coronary artery disease I25.10    Hyperlipemia E78.5    LONG TERM ANTICOAGULENT USE     Diabetes mellitus (HCC) E11.9    Shortness of breath R06.02    Chest tightness R07.89    JOHNSON (dyspnea on exertion) R06.00    Abnormal nuclear stress test R94.39    S/P CABG x 3 Z95.1    Hypertension I10    Type II or unspecified type diabetes mellitus without mention of complication, not stated as uncontrolled E11.9    Weakness generalized R53.1    Nausea R11.0    Hypotension I95.9    History of coronary artery bypass graft x 1 Z95.1    Fatigue R53.83    Tachycardia R00.0    Tachyarrhythmia R00.0    Chronic kidney disease, stage II (mild) N18.2    Obstructive sleep apnea G47.33    BiPAP (biphasic positive airway pressure) dependence Z99.89    Restless leg G25.81    Bilateral leg edema R60.0    Diabetic ulcer of toe of right foot associated with type 2 diabetes mellitus, with fat layer exposed ([address] E11.621, X77.752    Diabetic ulcer of toe of left foot associated with type 2 diabetes mellitus, with fat layer exposed ([address] W29.347, O65.370    Abnormal nuclear cardiac imaging test R93.1    Obesity (BMI 30-39. 9) E66.9        Procedure Note  Indications:  Based on my examination of this patient's wound(s)/ulcer(s) today, debridement is required to promote healing and evaluate the wound base. Performed by: GRECIA Chong CNP    Consent obtained:  Yes    Time out taken:  Yes    Pain Control: Anesthetic  Anesthetic: 2% Lidocaine Gel Topical       Debridement:Non-excisional Debridement    Using #15 blade scalpel the wound(s)/ulcer(s) was/were sharply debrided down through and including the removal of epidermis and dermis. Devitalized Tissue Debrided:  fibrin, biofilm, slough, exudate and callus    Pre Debridement Measurements:  Are located in the Wound/Ulcer Documentation Flow Sheet    Wound/Ulcer #: 2 and 3    Post Debridement Measurements:  Wound/Ulcer Descriptions are Pre Debridement except measurements:      Percent of Wound/Ulcer Debrided: 100%    Total Surface Area Debrided:  0.66 sq cm     Wound 01/26/21 Toe (Comment  which one) Anterior; Left wound 2- left great toe wag 1 (Active)   Wound Image   02/24/21 1115   Wound Etiology Diabetic Lawler 1 02/24/21 1115   Dressing Status Intact;Dry 02/24/21 1115   Wound Cleansed Soap and water 02/24/21 1115   Dressing/Treatment Hydrating gel 02/24/21 1136   Offloading for Diabetic Foot Ulcers Felt or foam 02/24/21 1115   Wound Length (cm) 0.4 cm 02/24/21 1115   Wound Width (cm) 0.4 cm 02/24/21 1115   Wound Depth (cm) 0.1 cm 02/24/21 1115   Wound Surface Area (cm^2) 0.16 cm^2 02/24/21 1115   Change in Wound Size % (l*w) -166.67 02/24/21 1115   Wound Volume (cm^3) 0.02 cm^3 02/24/21 1115   Wound Healing % -100 02/24/21 1115   Post-Procedure Length (cm) 0.4 cm 02/24/21 1136   Post-Procedure Width (cm) 0.4 cm 02/24/21 1136   Post-Procedure Depth (cm) 0.1 cm 02/24/21 1136   Post-Procedure Surface Area (cm^2) 0.16 cm^2 02/24/21 1136   Post-Procedure Volume (cm^3) 0.02 cm^3 02/24/21 1136   Distance Tunneling (cm) 0 cm 02/04/21 1038 Tunneling Position ___ O'Clock 0 02/04/21 1038   Undermining Starts ___ O'Clock 0 02/04/21 1038   Undermining Ends___ O'Clock 0 02/04/21 1038   Undermining Maxium Distance (cm) 0 02/04/21 1038   Wound Assessment Pink/red;Slough 02/24/21 1115   Drainage Amount None 02/24/21 1115   Drainage Description Serosanguinous 02/24/21 1115   Odor None 02/24/21 1115   Nelda-wound Assessment Hyperkeratosis (callous) 02/24/21 1115   Margins Attached edges 02/24/21 1115   Wound Thickness Description not for Pressure Injury Full thickness 01/26/21 1032   Number of days: 29       Wound 01/26/21 Toe (Comment  which one) Right wound 3- right great toe wag 1 (Active)   Wound Image   02/24/21 1115   Wound Etiology Diabetic Lawler 1 02/24/21 1115   Dressing Status Old drainage noted; Intact 02/24/21 1115   Wound Cleansed Soap and water 02/24/21 1115   Dressing/Treatment Hydrating gel 02/24/21 1136   Offloading for Diabetic Foot Ulcers Felt or foam 02/24/21 1115   Wound Length (cm) 0.5 cm 02/24/21 1115   Wound Width (cm) 1.3 cm 02/24/21 1115   Wound Depth (cm) 0.1 cm 02/24/21 1115   Wound Surface Area (cm^2) 0.65 cm^2 02/24/21 1115   Change in Wound Size % (l*w) -983.33 02/24/21 1115   Wound Volume (cm^3) 0.06 cm^3 02/24/21 1115   Wound Healing % -500 02/24/21 1115   Post-Procedure Length (cm) 0.5 cm 02/24/21 1136   Post-Procedure Width (cm) 1 cm 02/24/21 1136   Post-Procedure Depth (cm) 0.1 cm 02/24/21 1136   Post-Procedure Surface Area (cm^2) 0.5 cm^2 02/24/21 1136   Post-Procedure Volume (cm^3) 0.05 cm^3 02/24/21 1136   Distance Tunneling (cm) 0 cm 02/04/21 1038   Tunneling Position ___ O'Clock 0 02/04/21 1038   Undermining Starts ___ O'Clock 0 02/04/21 1038   Undermining Ends___ O'Clock 0 02/04/21 1038   Undermining Maxium Distance (cm) 0 02/04/21 1038   Wound Assessment Pink/red;Slough 02/24/21 1115   Drainage Amount Moderate 02/24/21 1115   Drainage Description Serosanguinous 02/24/21 1115   Odor None 02/24/21 1115   Nelda-wound Assessment Hyperkeratosis (callous) 02/24/21 1115   Margins Attached edges 02/24/21 1115   Wound Thickness Description not for Pressure Injury Full thickness 01/26/21 1032   Number of days: 29            Diabetic/Pressure/Non Pressure Ulcers only:  Ulcer: Diabetic ulcer, fat layer exposed      Estimated Blood Loss:  Minimal    Hemostasis Achieved:  by pressure    Procedural Pain:  0  / 10     Post Procedural Pain:  0 / 10     Response to treatment:  Well tolerated by patient. Plan:     Problem List Items Addressed This Visit     * (Principal) Diabetic ulcer of toe of left foot associated with type 2 diabetes mellitus, with fat layer exposed ([address] (Chronic)    Diabetic ulcer of toe of right foot associated with type 2 diabetes mellitus, with fat layer exposed ([address]    Obesity (BMI 30-39. 9)          Treatment Note please see attached Discharge Instructions    In my professional opinion this patient would benefit from HBO Therapy: No    Written patient dismissal instructions given to patient and signed by patient or POA. Mr. Ronaldo Meyer is healing well. We continue to try to find the best offloading treatment for him. Felt/foam is sliding on his foot and in his shoe. I want to try using a met-pad to push is toes up when he walks.     Electronically signed by GRECIA Young CNP on 2/24/2021 at 11:39 AM

## 2021-02-24 NOTE — PROGRESS NOTES
[address]:     [address], [address] p: [phone] [phone]     Ordering Center:     [address]  [address], [place] [address] [address] [phone]  WOUND CARE Dept: [address] [id number] [phone]    Patient Information:      [address]   [phone]   : 1955  AGE: 77 y.o. GENDER: male   EPISODE DATE: 2021    Insurance:      PRIMARY INSURANCE:  Plan: [facility] [employer]  Coverage: [facility] MEDICARE  Effective Date: 2020  Group Number: [unfilled]  Subscriber Number: [id number] - (Medicare Managed)    Payor/Plan Subscr  Sex Relation Sub. Ins. ID Effective Group Num   1. [id number] 1955 Male Self [id number]                                   [address] [address]       Patient Wound Information:      Problem List Items Addressed This Visit     * (Principal) Diabetic ulcer of toe of left foot associated with type 2 diabetes mellitus, with fat layer exposed ([address] (Chronic)    Diabetic ulcer of toe of right foot associated with type 2 diabetes mellitus, with fat layer exposed ([address]    Obesity (BMI 30-39. 9)          WOUNDS REQUIRING DRESSING SUPPLIES:     Wound 21 Toe (Comment  which one) Anterior; Left wound 2- left great toe wag 1 (Active)   Wound Image   21 1115   Wound Etiology Diabetic Lawler 1 21 1115   Dressing Status Intact;Dry 21 1115   Wound Cleansed Soap and water 21 1115   Dressing/Treatment Hydrating gel 21 1136   Offloading for Diabetic Foot Ulcers Felt or foam 21 1115   Wound Length (cm) 0.4 cm 21 1115   Wound Width (cm) 0.4 cm 21 1115   Wound Depth (cm) 0.1 cm 21 1115   Wound Surface Area (cm^2) 0.16 cm^2 21 1115   Change in Wound Size % (l*w) -166.67 21 1115   Wound Volume (cm^3) 0.02 cm^3 21 1115   Wound Healing % -100 21 1115   Post-Procedure Length (cm) 0.4 cm 02/24/21 1136   Post-Procedure Width (cm) 0.4 cm 02/24/21 1136   Post-Procedure Depth (cm) 0.1 cm 02/24/21 1136   Post-Procedure Surface Area (cm^2) 0.16 cm^2 02/24/21 1136   Post-Procedure Volume (cm^3) 0.02 cm^3 02/24/21 1136   Distance Tunneling (cm) 0 cm 02/04/21 1038   Tunneling Position ___ O'Clock 0 02/04/21 1038   Undermining Starts ___ O'Clock 0 02/04/21 1038   Undermining Ends___ O'Clock 0 02/04/21 1038   Undermining Maxium Distance (cm) 0 02/04/21 1038   Wound Assessment Pink/red;Slough 02/24/21 1115   Drainage Amount None 02/24/21 1115   Drainage Description Serosanguinous 02/24/21 1115   Odor None 02/24/21 1115   Nelda-wound Assessment Hyperkeratosis (callous) 02/24/21 1115   Margins Attached edges 02/24/21 1115   Wound Thickness Description not for Pressure Injury Full thickness 01/26/21 1032   Number of days: 29       Wound 01/26/21 Toe (Comment  which one) Right wound 3- right great toe wag 1 (Active)   Wound Image   02/24/21 1115   Wound Etiology Diabetic Lawler 1 02/24/21 1115   Dressing Status Old drainage noted; Intact 02/24/21 1115   Wound Cleansed Soap and water 02/24/21 1115   Dressing/Treatment Hydrating gel 02/24/21 1136   Offloading for Diabetic Foot Ulcers Felt or foam 02/24/21 1115   Wound Length (cm) 0.5 cm 02/24/21 1115   Wound Width (cm) 1.3 cm 02/24/21 1115   Wound Depth (cm) 0.1 cm 02/24/21 1115   Wound Surface Area (cm^2) 0.65 cm^2 02/24/21 1115   Change in Wound Size % (l*w) -983.33 02/24/21 1115   Wound Volume (cm^3) 0.06 cm^3 02/24/21 1115   Wound Healing % -500 02/24/21 1115   Post-Procedure Length (cm) 0.5 cm 02/24/21 1136   Post-Procedure Width (cm) 1 cm 02/24/21 1136   Post-Procedure Depth (cm) 0.1 cm 02/24/21 1136   Post-Procedure Surface Area (cm^2) 0.5 cm^2 02/24/21 1136   Post-Procedure Volume (cm^3) 0.05 cm^3 02/24/21 1136   Distance Tunneling (cm) 0 cm 02/04/21 1038   Tunneling Position ___ O'Clock 0 02/04/21 1038   Undermining Starts ___ O'Clock 0 02/04/21 1038   Undermining Ends___ O'Clock 0 02/04/21 1038   Undermining Maxium Distance (cm) 0 02/04/21 1038   Wound Assessment Pink/red;Slough 02/24/21 1115   Drainage Amount Moderate 02/24/21 1115   Drainage Description Serosanguinous 02/24/21 1115   Odor None 02/24/21 1115   Nelda-wound Assessment Hyperkeratosis (callous) 02/24/21 1115   Margins Attached edges 02/24/21 1115   Wound Thickness Description not for Pressure Injury Full thickness 01/26/21 1032   Number of days: 29          Supplies Requested :      WOUND #: 2 and 3   PRIMARY DRESSING:  Other: Wound gel   Cover and Secure with: 4X4 gauze pad  Other Adaptic     FREQUENCY OF DRESSING CHANGES:  Daily         ADDITIONAL ITEMS:  [] Gloves Small  [x] Gloves Medium [] Gloves Large [] Gloves XLarge  [] Tape 1\" [x] Tape 2\" [] Tape 3\"  [] Medipore Tape  [x] Saline  [] Skin Prep   [] Adhesive Remover   [x] Cotton Tip Applicators   [] Other:    Patient Wound(s) Debrided: [x] Yes if yes please add date 2/24/21   [] No    Debribement Type: Non-excisional/Selective    Patient currently being seen by Home Health: [] Yes   [x] No    Duration for needed supplies:  []15  [x]30  []60  []90 Days    Electronically signed by Rufina Runner, RN on 2/24/2021 at 5:29 PM     Provider Information:      PROVIDER'S NAME: GRECIA Martinez    NPI: [id number]

## 2021-03-11 ENCOUNTER — HOSPITAL ENCOUNTER (OUTPATIENT)
Dept: WOUND CARE | Age: 66
Discharge: HOME | End: 2021-03-11
Payer: MEDICARE

## 2021-03-11 VITALS
HEIGHT: 71 IN | SYSTOLIC BLOOD PRESSURE: 121 MMHG | WEIGHT: 220 LBS | HEART RATE: 69 BPM | DIASTOLIC BLOOD PRESSURE: 59 MMHG | TEMPERATURE: 98.2 F | BODY MASS INDEX: 30.8 KG/M2 | RESPIRATION RATE: 18 BRPM

## 2021-03-11 DIAGNOSIS — E11.621 DIABETIC ULCER OF TOE OF LEFT FOOT ASSOCIATED WITH TYPE 2 DIABETES MELLITUS, WITH FAT LAYER EXPOSED (HCC): ICD-10-CM

## 2021-03-11 DIAGNOSIS — E11.621 DIABETIC ULCER OF TOE OF RIGHT FOOT ASSOCIATED WITH TYPE 2 DIABETES MELLITUS, WITH FAT LAYER EXPOSED (HCC): ICD-10-CM

## 2021-03-11 DIAGNOSIS — L97.522 DIABETIC ULCER OF TOE OF LEFT FOOT ASSOCIATED WITH TYPE 2 DIABETES MELLITUS, WITH FAT LAYER EXPOSED (HCC): ICD-10-CM

## 2021-03-11 DIAGNOSIS — L97.512 DIABETIC ULCER OF TOE OF RIGHT FOOT ASSOCIATED WITH TYPE 2 DIABETES MELLITUS, WITH FAT LAYER EXPOSED (HCC): ICD-10-CM

## 2021-03-11 DIAGNOSIS — L97.512 NON-PRESSURE CHRONIC ULCER OF OTHER PART OF RIGHT FOOT WITH FAT LAYER EXPOSED (HCC): ICD-10-CM

## 2021-03-11 DIAGNOSIS — E66.9 OBESITY (BMI 30-39.9): Primary | ICD-10-CM

## 2021-03-11 PROCEDURE — 97597 DBRDMT OPN WND 1ST 20 CM/<: CPT | Performed by: NURSE PRACTITIONER

## 2021-03-11 PROCEDURE — 97597 DBRDMT OPN WND 1ST 20 CM/<: CPT

## 2021-03-11 ASSESSMENT — PAIN SCALES - GENERAL: PAINLEVEL_OUTOF10: 0

## 2021-03-11 NOTE — PROGRESS NOTES
[address]   Progress Note and Procedure Note      Marybel Archer  MEDICAL RECORD NUMBER:  [id number]  AGE: 77 y.o. GENDER: male  : 1955  EPISODE DATE:  3/11/2021    Subjective:     Chief Complaint   Patient presents with    Wound Check     bilateral feet wounds         HISTORY of PRESENT ILLNESS HPI     Marybel Archer is a 77 y.o. male who presents today for wound/ulcer evaluation. History of Wound Context: bilateral great toes    Ulcer Identification:  Ulcer Type: diabetic  Contributing Factors: diabetes, chronic pressure, shear force and obesity    Wound: N/A        PAST MEDICAL HISTORY        Diagnosis Date    Abnormal nuclear stress test 10/22/2014    BiPAP (biphasic positive airway pressure) dependence     8cm to 20cm    Cerebrovascular disease     Chronic kidney disease, stage II (mild) 2016    Teresita Guerra M.D.    Coronary artery disease 2011    Depression     Diabetes mellitus ([address]     Headache     Hyperlipemia 2011    Dr. Bossman Ortega follows lipids.     Hyperlipidemia     Hypertension     LONG TERM ANTICOAGULENT USE     Low blood pressure 2014    lymphostatic lymphoma     Nausea 2014    Obesity     Obstructive sleep apnea     AHI:  21.7 per PSG, 2017    Peptic ulcer disease 2011    Restless leg     S/P CABG x 3 10/22/2014    SVG to RCA; SVG to LAD diagonal; LIMA to LAD    Sleep apnea     Tachyarrhythmia     Thrombocythemia, essential ([address]     Type 2 diabetes mellitus without complication ([address]     Type II or unspecified type diabetes mellitus without mention of complication, not stated as uncontrolled        PAST SURGICAL HISTORY    Past Surgical History:   Procedure Laterality Date    CARDIAC CATHETERIZATION  11    EF 60%    CARDIAC CATHETERIZATION  05, 3/7/07    EF < 50%    CARDIAC CATHETERIZATION  12   MDL    EF  50%    CARDIAC CATHETERIZATION  10/28/14  MDL    CERVICAL DISC Sade Figueroa M.D.   Riana Stagger GRAFT  05    LIMA to LAD and diagonal; SVG to posterior descending branch RCA    CORONARY ARTERY BYPASS GRAFT  10/30/2014    Redo Sternotomy - SVG-PDA, TMR (RBL)    KIDNEY SURGERY  2017    KNEE ARTHROSCOPY      right ACL repair    SHOULDER ARTHROSCOPY      left-rotator cuff repair right- rotator cuff repair    SHOULDER ARTHROSCOPY  11    right    TONSILLECTOMY         FAMILY HISTORY    Family History   Problem Relation Age of Onset    Heart Disease Other     Lung Cancer Mother     Cancer Sister        SOCIAL HISTORY    Social History     Tobacco Use    Smoking status: Former Smoker     Packs/day: 0.00     Types: Cigarettes     Quit date: 1998     Years since quittin.1    Smokeless tobacco: Never Used    Tobacco comment: quit 23 years ago   Substance Use Topics    Alcohol use: No    Drug use: No       ALLERGIES    Allergies   Allergen Reactions    Ancef [Cefazolin Sodium]     Ceftin [Cefuroxime]      \"anything with ceftin in it\"    Cefuroxime Axetil     Cephalosporins     Neosporin [Bacitracin-Polymyxin B]      blisters       MEDICATIONS    Current Outpatient Medications on File Prior to Encounter   Medication Sig Dispense Refill    clopidogrel (PLAVIX) 75 MG tablet One daily 90 tablet 3    ranolazine (RANEXA) 1000 MG extended release tablet Take 1 tablet by mouth 2 times daily 60 tablet 5    metFORMIN (GLUCOPHAGE) 1000 MG tablet HOLD FOR 48 HOURS AFTER THE CARDIAC CATHETERIZATION (Patient taking differently: 1,000 mg 2 times daily (with meals) HOLD FOR 48 HOURS AFTER THE CARDIAC CATHETERIZATION) 60 tablet 3    busPIRone (BUSPAR) 15 MG tablet TAKE 1 TABLET BY MOUTH DAILY 30 tablet 1    DULoxetine (CYMBALTA) 30 MG extended release capsule Take 30 mg by mouth nightly      levothyroxine (SYNTHROID) 50 MCG tablet Take 50 mcg by mouth Daily      tamsulosin (FLOMAX) 0.4 MG capsule Take 1 capsule(s) every day by oral route as directed for 30 days. 30 capsule 11    diltiazem (CARDIZEM CD) 180 MG extended release capsule Take 1 capsule by mouth daily 90 capsule 3    meloxicam (MOBIC) 15 MG tablet Take 15 mg by mouth daily       tiZANidine (ZANAFLEX) 4 MG tablet 1/2 tablet at night      pravastatin (PRAVACHOL) 40 MG tablet Take 1 tablet by mouth daily 90 tablet 2    ferrous sulfate 325 (65 Fe) MG tablet Take 325 mg by mouth 2 times daily      pantoprazole (PROTONIX) 40 MG tablet Take 1 tablet by mouth daily 90 tablet 2    b complex vitamins capsule Take 1 capsule by mouth daily.  Vitamin D (CHOLECALCIFEROL) 1000 UNITS CAPS capsule Take 1,000 Units by mouth daily.  docusate sodium (COLACE) 100 MG capsule Take 100 mg by mouth daily.  acetaminophen (TYLENOL) 500 MG tablet Take 1,000 mg by mouth every 6 hours as needed for Pain      nitroGLYCERIN (NITROSTAT) 0.4 MG SL tablet Place 1 tablet under the tongue every 5 minutes as needed for Chest pain 25 tablet 5    polyethylene glycol (GLYCOLAX) powder Take 17 g by mouth as needed      HYDROcodone-acetaminophen (NORCO) 7.5-325 MG per tablet Take 1-2 tablets by mouth every 6 hours as needed. .      ondansetron (ZOFRAN) 8 MG tablet Take 8 mg by mouth as needed        No current facility-administered medications on file prior to encounter. REVIEW OF SYSTEMS    A comprehensive review of systems was negative.     Objective:      BP (!) 121/59   Pulse 69   Temp 98.2 °F (36.8 °C) (Temporal)   Resp 18   Ht 5' 11\" (1.803 m)   Wt 220 lb (99.8 kg)   BMI 30.68 kg/m²     Wt Readings from Last 3 Encounters:   03/11/21 220 lb (99.8 kg)   02/24/21 220 lb (99.8 kg)   02/04/21 220 lb (99.8 kg)       PHYSICAL EXAM    General Appearance: alert and oriented to person, place and time, well developed and well- nourished, in no acute distress  Skin: warm and dry, no rash or erythema  Head: normocephalic and atraumatic  Eyes: pupils equal, round, and reactive to light, extraocular eye movements intact, conjunctivae normal  ENT: tympanic membrane, external ear and ear canal normal bilaterally, nose without deformity, nasal mucosa and turbinates normal without polyps  Neck: supple and non-tender without mass, no thyromegaly or thyroid nodules, no cervical lymphadenopathy  Pulmonary/Chest: clear to auscultation bilaterally- no wheezes, rales or rhonchi, normal air movement, no respiratory distress  Extremities: no cyanosis, clubbing or edema  Musculoskeletal: normal range of motion, no joint swelling, deformity or tenderness  Neurologic: reflexes normal and symmetric, no cranial nerve deficit, gait, coordination and speech normal      Assessment:      Patient Active Problem List   Diagnosis Code    Coronary artery disease I25.10    Hyperlipemia E78.5    LONG TERM ANTICOAGULENT USE     Diabetes mellitus (HCC) E11.9    Shortness of breath R06.02    Chest tightness R07.89    JOHNSON (dyspnea on exertion) R06.00    Abnormal nuclear stress test R94.39    S/P CABG x 3 Z95.1    Hypertension I10    Type II or unspecified type diabetes mellitus without mention of complication, not stated as uncontrolled E11.9    Weakness generalized R53.1    Nausea R11.0    Hypotension I95.9    History of coronary artery bypass graft x 1 Z95.1    Fatigue R53.83    Tachycardia R00.0    Tachyarrhythmia R00.0    Chronic kidney disease, stage II (mild) N18.2    Obstructive sleep apnea G47.33    BiPAP (biphasic positive airway pressure) dependence Z99.89    Restless leg G25.81    Bilateral leg edema R60.0    Diabetic ulcer of toe of right foot associated with type 2 diabetes mellitus, with fat layer exposed ([address] E11.621, L97.512    Diabetic ulcer of toe of left foot associated with type 2 diabetes mellitus, with fat layer exposed ([address] E11.621, O95.327    Abnormal nuclear cardiac imaging test R93.1    Obesity (BMI 30-39. 9) E66.9        Procedure Note  Indications:  Based on my examination of this patient's wound(s)/ulcer(s) today, debridement is required to promote healing and evaluate the wound base. Performed by: GRECIA Michele CNP    Consent obtained:  Yes    Time out taken:  Yes    Pain Control: Anesthetic  Anesthetic: 2% Lidocaine Gel Topical       Debridement:Non-excisional Debridement    Using #15 blade scalpel the wound(s)/ulcer(s) was/were sharply debrided down through and including the removal of epidermis and dermis. Devitalized Tissue Debrided:  fibrin, biofilm, slough, exudate and callus    Pre Debridement Measurements:  Are located in the Wound/Ulcer Documentation Flow Sheet    Wound/Ulcer #: 2 and 3    Post Debridement Measurements:  Wound/Ulcer Descriptions are Pre Debridement except measurements:      Percent of Wound/Ulcer Debrided: 100%    Total Surface Area Debrided:  0.31 sq cm     Wound 01/26/21 Toe (Comment  which one) Anterior; Left wound 2- left great toe wag 1 (Active)   Wound Image    03/11/21 1130   Wound Etiology Diabetic Lawler 1 03/11/21 1130   Dressing Status Intact;Dry 02/24/21 1115   Wound Cleansed Soap and water 03/11/21 1130   Dressing/Treatment Hydrating gel 02/24/21 1136   Offloading for Diabetic Foot Ulcers Felt or foam 03/11/21 1130   Wound Length (cm) 0.2 cm 03/11/21 1130   Wound Width (cm) 0.2 cm 03/11/21 1130   Wound Depth (cm) 0.2 cm 03/11/21 1130   Wound Surface Area (cm^2) 0.04 cm^2 03/11/21 1130   Change in Wound Size % (l*w) 33.33 03/11/21 1130   Wound Volume (cm^3) 0.01 cm^3 03/11/21 1130   Wound Healing % 0 03/11/21 1130   Post-Procedure Length (cm) 0.2 cm 03/11/21 1157   Post-Procedure Width (cm) 0.2 cm 03/11/21 1157   Post-Procedure Depth (cm) 0.2 cm 03/11/21 1157   Post-Procedure Surface Area (cm^2) 0.04 cm^2 03/11/21 1157   Post-Procedure Volume (cm^3) 0.01 cm^3 03/11/21 1157   Distance Tunneling (cm) 0 cm 03/11/21 1130   Tunneling Position ___ O'Clock 0 03/11/21 1130   Undermining Starts ___ O'Clock 0 03/11/21 1130   Undermining Ends___ O'Clock 0 03/11/21 1130   Undermining Maxium Distance (cm) 0 03/11/21 1130   Wound Assessment Pink/red;Slough 03/11/21 1130   Drainage Amount Scant 03/11/21 1130   Drainage Description Serosanguinous 03/11/21 1130   Odor None 03/11/21 1130   Nelda-wound Assessment Hyperkeratosis (callous) 03/11/21 1130   Margins Attached edges 03/11/21 1130   Wound Thickness Description not for Pressure Injury Full thickness 01/26/21 1032   Number of days: 44       Wound 01/26/21 Toe (Comment  which one) Right wound 3- right great toe wag 1 (Active)   Wound Image     03/11/21 1130   Wound Etiology Diabetic Lawler 1 03/11/21 1130   Dressing Status Old drainage noted 03/11/21 1130   Wound Cleansed Soap and water 03/11/21 1130   Dressing/Treatment Hydrating gel 02/24/21 1136   Offloading for Diabetic Foot Ulcers Felt or foam 03/11/21 1130   Wound Length (cm) 0.3 cm 03/11/21 1130   Wound Width (cm) 0.9 cm 03/11/21 1130   Wound Depth (cm) 0.2 cm 03/11/21 1130   Wound Surface Area (cm^2) 0.27 cm^2 03/11/21 1130   Change in Wound Size % (l*w) -350 03/11/21 1130   Wound Volume (cm^3) 0.05 cm^3 03/11/21 1130   Wound Healing % -400 03/11/21 1130   Post-Procedure Length (cm) 0.3 cm 03/11/21 1157   Post-Procedure Width (cm) 0.9 cm 03/11/21 1157   Post-Procedure Depth (cm) 0.2 cm 03/11/21 1157   Post-Procedure Surface Area (cm^2) 0.27 cm^2 03/11/21 1157   Post-Procedure Volume (cm^3) 0.05 cm^3 03/11/21 1157   Distance Tunneling (cm) 0 cm 03/11/21 1130   Tunneling Position ___ O'Clock 0 03/11/21 1130   Undermining Starts ___ O'Clock 0 03/11/21 1130   Undermining Ends___ O'Clock 0 03/11/21 1130   Undermining Maxium Distance (cm) 0 03/11/21 1130   Wound Assessment Pink/red;Slough 03/11/21 1130   Drainage Amount Moderate 03/11/21 1130   Drainage Description Serosanguinous 03/11/21 1130   Odor None 03/11/21 1130   Nelda-wound Assessment Hyperkeratosis (callous); Edematous 03/11/21 1130   Margins Attached edges 03/11/21 1130   Wound Thickness Description not for Pressure Injury Full thickness 01/26/21 1032   Number of days: 44            Diabetic/Pressure/Non Pressure Ulcers only:  Ulcer: Diabetic ulcer, fat layer exposed      Estimated Blood Loss:  Minimal    Hemostasis Achieved:  by pressure    Procedural Pain:  0  / 10     Post Procedural Pain:  0 / 10     Response to treatment:  Well tolerated by patient. Plan:     Problem List Items Addressed This Visit     * (Principal) Diabetic ulcer of toe of left foot associated with type 2 diabetes mellitus, with fat layer exposed ([address] (Chronic)    Relevant Orders    Supply: Wound Cleanser    Diabetic ulcer of toe of right foot associated with type 2 diabetes mellitus, with fat layer exposed ([address]    Obesity (BMI 30-39.9) - Primary      Other Visit Diagnoses     Non-pressure chronic ulcer of other part of right foot with fat layer exposed ([address]        Relevant Medications    Lidocaine 2 % GEL (Completed)    Other Relevant Orders    Supply: Wound Cleanser          Treatment Note please see attached Discharge Instructions    In my professional opinion this patient would benefit from HBO Therapy: No    Written patient dismissal instructions given to patient and signed by patient or POA.            Electronically signed by GRECIA Chun CNP on 3/11/2021 at 12:08 PM

## 2021-03-19 ENCOUNTER — OFFICE VISIT (OUTPATIENT)
Dept: CARDIOLOGY CLINIC | Age: 66
End: 2021-03-19
Payer: MEDICARE

## 2021-03-19 VITALS
OXYGEN SATURATION: 96 % | HEIGHT: 72 IN | HEART RATE: 75 BPM | DIASTOLIC BLOOD PRESSURE: 72 MMHG | SYSTOLIC BLOOD PRESSURE: 118 MMHG | BODY MASS INDEX: 31.97 KG/M2 | WEIGHT: 236 LBS

## 2021-03-19 DIAGNOSIS — I10 ESSENTIAL HYPERTENSION: ICD-10-CM

## 2021-03-19 DIAGNOSIS — I25.10 CORONARY ARTERY DISEASE INVOLVING NATIVE CORONARY ARTERY OF NATIVE HEART WITHOUT ANGINA PECTORIS: Primary | ICD-10-CM

## 2021-03-19 DIAGNOSIS — E78.5 HYPERLIPIDEMIA, UNSPECIFIED HYPERLIPIDEMIA TYPE: ICD-10-CM

## 2021-03-19 PROCEDURE — 3017F COLORECTAL CA SCREEN DOC REV: CPT | Performed by: NURSE PRACTITIONER

## 2021-03-19 PROCEDURE — 1036F TOBACCO NON-USER: CPT | Performed by: NURSE PRACTITIONER

## 2021-03-19 PROCEDURE — 99214 OFFICE O/P EST MOD 30 MIN: CPT | Performed by: NURSE PRACTITIONER

## 2021-03-19 PROCEDURE — 4040F PNEUMOC VAC/ADMIN/RCVD: CPT | Performed by: NURSE PRACTITIONER

## 2021-03-19 PROCEDURE — G8484 FLU IMMUNIZE NO ADMIN: HCPCS | Performed by: NURSE PRACTITIONER

## 2021-03-19 PROCEDURE — G8417 CALC BMI ABV UP PARAM F/U: HCPCS | Performed by: NURSE PRACTITIONER

## 2021-03-19 PROCEDURE — 1123F ACP DISCUSS/DSCN MKR DOCD: CPT | Performed by: NURSE PRACTITIONER

## 2021-03-19 PROCEDURE — 93000 ELECTROCARDIOGRAM COMPLETE: CPT | Performed by: NURSE PRACTITIONER

## 2021-03-19 PROCEDURE — G8428 CUR MEDS NOT DOCUMENT: HCPCS | Performed by: NURSE PRACTITIONER

## 2021-03-19 RX ORDER — RANOLAZINE 1000 MG/1
1000 TABLET, EXTENDED RELEASE ORAL 2 TIMES DAILY
Qty: 180 TABLET | Refills: 3 | Status: SHIPPED | OUTPATIENT
Start: 2021-03-19

## 2021-03-19 ASSESSMENT — ENCOUNTER SYMPTOMS
SHORTNESS OF BREATH: 1
CHEST TIGHTNESS: 0
COUGH: 0
WHEEZING: 0
SORE THROAT: 0

## 2021-03-19 NOTE — PROGRESS NOTES
[facility] Cardiology   Established Patient Office Visit   [facility]. [address] [facility]  [phone]        OFFICE VISIT:  3/19/2021    Lvey Lobato - : 1955    Reason For Visit:  Justiec Petty is a 77 y.o. male who is here for 1 Year Follow Up, Hypertension, and Coronary Artery Disease    1. Coronary artery disease involving native coronary artery of native heart without angina pectoris    2. Essential hypertension    3. Hyperlipidemia, unspecified hyperlipidemia type      Patient with a history of non-Hodgkin's lymphoma, REGINA, chronic kidney disease, hyperlipidemia, coronary artery disease having undergone bypass grafting x3 in . Presents to clinic today for yearly follow-up. Patient denies any chest pain, pressure or tightness. He is experiencing shortness of breath with exertion. This is occurring every day. Patient would like to talk to Dr. Sarah Navas next week regarding pursuing the cardiac catheterization since he had a bad experience the last time with a different cardiologist.    Patient was instructed to present to the ER should symptoms worsen before this appointment. Patient verbalized understanding and agreed with plan.       10/3/2019  lexiscan Positive for inferior MI + myocardial ischemia, EF 59%, -5% ischemic myocardium on stress, uninterpretable risk findings, AUC indication 21, AUC score 7, (MD Dayne)       Patient had diagnostic cardiac catheterization on 10/15/2019 showing;     Selective left heart and coronary arteriography was preformed, which revealed:     1.  Successful femoral artery ultrasound  2.  Successful femoral artery arteriogram  3.  Supervision of the administration of moderate conscious sedation  4.  Severe native vessel coronary artery disease  5.  Left ventricular function is impaired in the distal anterior apical segments with a visually estimate ejection of 50%.   6.  Patent left BRITTANY to the LAD and diagonal  7.  Patent vein graft to the meals) HOLD FOR 48 HOURS AFTER THE CARDIAC CATHETERIZATION) 60 tablet 3    nitroGLYCERIN (NITROSTAT) 0.4 MG SL tablet Place 1 tablet under the tongue every 5 minutes as needed for Chest pain 25 tablet 5    busPIRone (BUSPAR) 15 MG tablet TAKE 1 TABLET BY MOUTH DAILY 30 tablet 1    polyethylene glycol (GLYCOLAX) powder Take 17 g by mouth as needed      DULoxetine (CYMBALTA) 30 MG extended release capsule Take 30 mg by mouth nightly      levothyroxine (SYNTHROID) 50 MCG tablet Take 50 mcg by mouth Daily      tamsulosin (FLOMAX) 0.4 MG capsule Take 1 capsule(s) every day by oral route as directed for 30 days. 30 capsule 11    diltiazem (CARDIZEM CD) 180 MG extended release capsule Take 1 capsule by mouth daily 90 capsule 3    HYDROcodone-acetaminophen (NORCO) 7.5-325 MG per tablet Take 1-2 tablets by mouth every 6 hours as needed. Laura Radha meloxicam (MOBIC) 15 MG tablet Take 15 mg by mouth daily       ondansetron (ZOFRAN) 8 MG tablet Take 8 mg by mouth as needed       tiZANidine (ZANAFLEX) 4 MG tablet 1/2 tablet at night      pravastatin (PRAVACHOL) 40 MG tablet Take 1 tablet by mouth daily 90 tablet 2    ferrous sulfate 325 (65 Fe) MG tablet Take 325 mg by mouth 2 times daily      pantoprazole (PROTONIX) 40 MG tablet Take 1 tablet by mouth daily 90 tablet 2    b complex vitamins capsule Take 1 capsule by mouth daily.  Vitamin D (CHOLECALCIFEROL) 1000 UNITS CAPS capsule Take 1,000 Units by mouth daily.  docusate sodium (COLACE) 100 MG capsule Take 100 mg by mouth daily. No current facility-administered medications for this visit. Allergies:  Ancef [cefazolin sodium], Ceftin [cefuroxime], Cefuroxime axetil, Cephalosporins, and Neosporin [bacitracin-polymyxin b]  Past Medical History:   Diagnosis Date    Abnormal nuclear stress test 10/22/2014    BiPAP (biphasic positive airway pressure) dependence     8cm to 20cm    Cerebrovascular disease     Chronic kidney disease, stage II (mild) 2016    Echo Samaniego M.D.    Coronary artery disease 2011    Depression     Diabetes mellitus ([facility]     Headache     Hyperlipemia 2011    Dr. Rudi Merlin follows lipids.  Hyperlipidemia     Hypertension     LONG TERM ANTICOAGULENT USE     Low blood pressure 2014    lymphostatic lymphoma     Nausea 2014    Obesity     Obstructive sleep apnea     AHI:  21.7 per PSG, 2017    Peptic ulcer disease 2011    Restless leg     S/P CABG x 3 10/22/2014    SVG to RCA; SVG to LAD diagonal; LIMA to LAD    Sleep apnea     Tachyarrhythmia     Thrombocythemia, essential ([facility]     Type 2 diabetes mellitus without complication ([facility]     Type II or unspecified type diabetes mellitus without mention of complication, not stated as uncontrolled      Past Surgical History:   Procedure Laterality Date    CARDIAC CATHETERIZATION  11    EF 60%    CARDIAC CATHETERIZATION  05, 3/7/07    EF < 50%    CARDIAC CATHETERIZATION  12   MDL    EF  50%    CARDIAC CATHETERIZATION  10/28/14  GERBER Raymundo M.D.   [place] GRAFT  05    LIMA to LAD and diagonal; SVG to posterior descending branch RCA    CORONARY ARTERY BYPASS GRAFT  10/30/2014    Redo Sternotomy - SVG-PDA, TMR (RBL)    KIDNEY SURGERY  2017    KNEE ARTHROSCOPY      right ACL repair    SHOULDER ARTHROSCOPY      left-rotator cuff repair right- rotator cuff repair    SHOULDER ARTHROSCOPY  11    right    TONSILLECTOMY       Family History   Problem Relation Age of Onset    Heart Disease Other     Lung Cancer Mother     Cancer Sister      Social History     Tobacco Use    Smoking status: Former Smoker     Packs/day: 0.00     Types: Cigarettes     Quit date: 1998     Years since quittin.1    Smokeless tobacco: Never Used    Tobacco comment: quit 23 years ago   Substance Use Topics    Alcohol use:  No Review of Systems:    Review of Systems   Constitutional: Positive for activity change and fatigue. Negative for chills, diaphoresis and fever. HENT: Negative for congestion and sore throat. Respiratory: Positive for shortness of breath. Negative for cough, chest tightness and wheezing. Cardiovascular: Negative for chest pain, palpitations and leg swelling. Musculoskeletal: Negative. Neurological: Positive for dizziness. Negative for syncope, light-headedness and headaches. Psychiatric/Behavioral: Negative for confusion. The patient is not nervous/anxious. Objective:    /72   Pulse 75   Ht 6' (1.829 m)   Wt 236 lb (107 kg)   SpO2 96%   BMI 32.01 kg/m²     GENERAL - well developed and well nourished, conversant  HEENT   PERRLA, Hearing appears normal, gentleman lids are normal.  External inspection of ears and nose appear normal.  NECK - no thyromegaly, no JVD, trachea is in the midline  CARDIOVASCULAR  PMI is in the mid line clavicular position, Normal S1 and S2 with  No systolic murmur. No S3 or S4    PULMONARY  no respiratory distress. No wheezes or rales. Lungs are clear to ausculation, normal respiratory effort. ABDOMEN   soft, non tender, no rebound  MUSCULOSKELETAL   range of motion of the upper and lower extermites appears normal and equal and is without pain   EXTREMITIES - no significant edema   NEUROLOGIC  gait and station are normal  SKIN - turgor is normal, can warm and dry.   PSYCHIATRIC - normal mood and affect, alert and orientated x 3,      ASSESSMENT:    ALL THE CARDIOLOGY PROBLEMS ARE LISTED ABOVE; HOWEVER, THE FOLLOWING SPECIFIC CARDIAC PROBLEMS / CONDITIONS WERE ADDRESSED AND TREATED DURING THE OFFICE VISIT TODAY:                                                                                            MEDICAL DECISION MAKING             Cardiac Specific Problem / Diagnosis  Discussion and Data Reviewed Diagnostic Procedures Ordered Management Options Selected           1. Dyspnea on exertion  Worsening   Review and summation of old records:    EKG in the office showing sinus rhythm with heart rate of 75 bpm.    Patient's history of CAD/CABG and anginal equivalent of shortness of breath with exertion did recommend cardiac catheterization. Patient would like to discuss with new cardiologist before pursuing. Ranexa refilled. Patient to meet with Dr. Emil Mills to discuss further next week. Yes Continue current medications:     Yes           2. CAD/CABG  Shows no change   Patient is on Plavix, statin, Ranexa, Yes Continue current medications:    Yes           3. Hypertension Well Controlled  blood pressure in the office today 118/72. O2 sat 96%. No Continue current medications: Yes                     Orders Placed This Encounter   Procedures    EKG 12 lead     Order Specific Question:   Reason for Exam?     Answer:   Coronary artery disease     Order Specific Question:   Reason for Exam?     Answer:   Hypertension     Orders Placed This Encounter   Medications    ranolazine (RANEXA) 1000 MG extended release tablet     Sig: Take 1 tablet by mouth 2 times daily     Dispense:  180 tablet     Refill:  3       Discussed with patient. Return in about 2 weeks (around 4/2/2021) for Dr Emil Mills to discuss heart cath . I greatly appreciate the opportunity to meet Tamara Hollis and your confidence in allowing me to participate in his cardiovascular care. Refugio Lisa, APRN - NP  3/19/2021 10:16 AM CDT                    This dictation was generated by voice recognition computer software. Although all attempts are made to edit dictation for accuracy, there may be errors in the transcription that are not intended.

## 2021-03-22 ENCOUNTER — TELEPHONE (OUTPATIENT)
Dept: CARDIOLOGY CLINIC | Age: 66
End: 2021-03-22

## 2021-03-22 NOTE — TELEPHONE ENCOUNTER
Patient needs to be seen first and discussed with him. Tried to get patient to come in today but says he is in [place] and can't. Will talk with Dr. Mary Vasquez and see what he wants to do.

## 2021-03-23 NOTE — TELEPHONE ENCOUNTER
Talked with patient who is in agreement to coming in on 3/30 arrive at [facility] for 10AM procedure. Will hold metformin two days prior. All other meds will be taken that morning with a sip of water. Will start metoprolol 25 mg BID he was on this previously but can not remember why it was stopped, no documentation either. Other instructions given below. WIll notify MARIA ESTHER about platelets and see what he says.

## 2021-03-25 ENCOUNTER — HOSPITAL ENCOUNTER (OUTPATIENT)
Dept: GENERAL RADIOLOGY | Age: 66
Discharge: HOME | End: 2021-03-25
Payer: MEDICARE

## 2021-03-25 ENCOUNTER — HOSPITAL ENCOUNTER (OUTPATIENT)
Dept: WOUND CARE | Age: 66
Discharge: HOME | End: 2021-03-25
Payer: MEDICARE

## 2021-03-25 VITALS
WEIGHT: 230 LBS | HEIGHT: 72 IN | SYSTOLIC BLOOD PRESSURE: 115 MMHG | HEART RATE: 63 BPM | RESPIRATION RATE: 18 BRPM | TEMPERATURE: 96.7 F | BODY MASS INDEX: 31.15 KG/M2 | DIASTOLIC BLOOD PRESSURE: 70 MMHG

## 2021-03-25 DIAGNOSIS — E11.621 DIABETIC ULCER OF TOE OF RIGHT FOOT ASSOCIATED WITH TYPE 2 DIABETES MELLITUS, WITH FAT LAYER EXPOSED (HCC): ICD-10-CM

## 2021-03-25 DIAGNOSIS — E11.621 DIABETIC ULCER OF TOE OF LEFT FOOT ASSOCIATED WITH TYPE 2 DIABETES MELLITUS, WITH FAT LAYER EXPOSED (HCC): ICD-10-CM

## 2021-03-25 DIAGNOSIS — L97.522 DIABETIC ULCER OF TOE OF LEFT FOOT ASSOCIATED WITH TYPE 2 DIABETES MELLITUS, WITH FAT LAYER EXPOSED (HCC): ICD-10-CM

## 2021-03-25 DIAGNOSIS — E66.9 OBESITY (BMI 30-39.9): Primary | ICD-10-CM

## 2021-03-25 DIAGNOSIS — M79.671 RIGHT FOOT PAIN: ICD-10-CM

## 2021-03-25 DIAGNOSIS — L97.512 DIABETIC ULCER OF TOE OF RIGHT FOOT ASSOCIATED WITH TYPE 2 DIABETES MELLITUS, WITH FAT LAYER EXPOSED (HCC): ICD-10-CM

## 2021-03-25 DIAGNOSIS — L97.512 NON-PRESSURE CHRONIC ULCER OF OTHER PART OF RIGHT FOOT WITH FAT LAYER EXPOSED (HCC): ICD-10-CM

## 2021-03-25 PROCEDURE — 73620 X-RAY EXAM OF FOOT: CPT

## 2021-03-25 PROCEDURE — 97597 DBRDMT OPN WND 1ST 20 CM/<: CPT

## 2021-03-25 PROCEDURE — 97597 DBRDMT OPN WND 1ST 20 CM/<: CPT | Performed by: NURSE PRACTITIONER

## 2021-03-25 ASSESSMENT — PAIN DESCRIPTION - DESCRIPTORS: DESCRIPTORS: STABBING

## 2021-03-25 ASSESSMENT — PAIN DESCRIPTION - LOCATION: LOCATION: TOE (COMMENT WHICH ONE)

## 2021-03-25 ASSESSMENT — PAIN DESCRIPTION - PROGRESSION: CLINICAL_PROGRESSION: NOT CHANGED

## 2021-03-25 ASSESSMENT — PAIN DESCRIPTION - PAIN TYPE: TYPE: ACUTE PAIN

## 2021-03-25 NOTE — PLAN OF CARE
Problem: Pain:  Description: Pain management should include both nonpharmacologic and pharmacologic interventions.   Goal: Pain level will decrease  Description: Pain level will decrease  Outcome: Ongoing  Goal: Control of acute pain  Description: Control of acute pain  Outcome: Ongoing  Goal: Control of chronic pain  Description: Control of chronic pain  Outcome: Ongoing     Problem: Wound:  Goal: Will show signs of wound healing; wound closure and no evidence of infection  Description: Will show signs of wound healing; wound closure and no evidence of infection  Outcome: Ongoing     Problem: Blood Glucose:  Goal: Ability to maintain appropriate glucose levels will improve  Description: Ability to maintain appropriate glucose levels will improve  Outcome: Ongoing

## 2021-03-25 NOTE — PROGRESS NOTES
HISTORY    Past Surgical History:   Procedure Laterality Date    CARDIAC CATHETERIZATION  11    EF 60%    CARDIAC CATHETERIZATION  05, 3/7/07    EF < 50%    CARDIAC CATHETERIZATION  12   MDL    EF  50%    CARDIAC CATHETERIZATION  10/28/14  GERBER Whittaker M.D.   Karishmafabbylanden Apple CORONARY ARTERY BYPASS GRAFT  05    LIMA to LAD and diagonal; SVG to posterior descending branch RCA    CORONARY ARTERY BYPASS GRAFT  10/30/2014    Redo Sternotomy - SVG-PDA, TMR (RBL)    KIDNEY SURGERY  2017    KNEE ARTHROSCOPY      right ACL repair    SHOULDER ARTHROSCOPY      left-rotator cuff repair right- rotator cuff repair    SHOULDER ARTHROSCOPY  11    right    TONSILLECTOMY         FAMILY HISTORY    Family History   Problem Relation Age of Onset    Heart Disease Other     Lung Cancer Mother     Cancer Sister        SOCIAL HISTORY    Social History     Tobacco Use    Smoking status: Former Smoker     Packs/day: 0.00     Types: Cigarettes     Quit date: 1998     Years since quittin.1    Smokeless tobacco: Never Used    Tobacco comment: quit 23 years ago   Substance Use Topics    Alcohol use: No    Drug use: No       ALLERGIES    Allergies   Allergen Reactions    Ancef [Cefazolin Sodium]     Ceftin [Cefuroxime]      \"anything with ceftin in it\"    Cefuroxime Axetil     Cephalosporins     Neosporin [Bacitracin-Polymyxin B]      blisters       MEDICATIONS    Current Outpatient Medications on File Prior to Encounter   Medication Sig Dispense Refill    metoprolol tartrate (LOPRESSOR) 25 MG tablet Take 1 tablet by mouth 2 times daily 60 tablet 5    ranolazine (RANEXA) 1000 MG extended release tablet Take 1 tablet by mouth 2 times daily 180 tablet 3    clopidogrel (PLAVIX) 75 MG tablet One daily 90 tablet 3    acetaminophen (TYLENOL) 500 MG tablet Take 1,000 mg by mouth every 6 hours as needed for Pain      busPIRone (BUSPAR) 15 MG tablet TAKE 1 TABLET BY MOUTH DAILY 30 tablet 1    levothyroxine (SYNTHROID) 50 MCG tablet Take 50 mcg by mouth Daily      tamsulosin (FLOMAX) 0.4 MG capsule Take 1 capsule(s) every day by oral route as directed for 30 days. 30 capsule 11    diltiazem (CARDIZEM CD) 180 MG extended release capsule Take 1 capsule by mouth daily 90 capsule 3    HYDROcodone-acetaminophen (NORCO) 7.5-325 MG per tablet Take 1-2 tablets by mouth every 6 hours as needed. Charlie Houstonia meloxicam (MOBIC) 15 MG tablet Take 15 mg by mouth daily       tiZANidine (ZANAFLEX) 4 MG tablet 1/2 tablet at night      pravastatin (PRAVACHOL) 40 MG tablet Take 1 tablet by mouth daily 90 tablet 2    ferrous sulfate 325 (65 Fe) MG tablet Take 325 mg by mouth 2 times daily      pantoprazole (PROTONIX) 40 MG tablet Take 1 tablet by mouth daily 90 tablet 2    b complex vitamins capsule Take 1 capsule by mouth daily.  Vitamin D (CHOLECALCIFEROL) 1000 UNITS CAPS capsule Take 1,000 Units by mouth daily.  docusate sodium (COLACE) 100 MG capsule Take 100 mg by mouth daily.  metFORMIN (GLUCOPHAGE) 1000 MG tablet HOLD FOR 48 HOURS AFTER THE CARDIAC CATHETERIZATION (Patient taking differently: 1,000 mg 2 times daily (with meals) HOLD FOR 48 HOURS AFTER THE CARDIAC CATHETERIZATION) 60 tablet 3    nitroGLYCERIN (NITROSTAT) 0.4 MG SL tablet Place 1 tablet under the tongue every 5 minutes as needed for Chest pain 25 tablet 5    polyethylene glycol (GLYCOLAX) powder Take 17 g by mouth as needed      DULoxetine (CYMBALTA) 30 MG extended release capsule Take 30 mg by mouth nightly      ondansetron (ZOFRAN) 8 MG tablet Take 8 mg by mouth as needed        No current facility-administered medications on file prior to encounter. REVIEW OF SYSTEMS    Pertinent items are noted in HPI.     Objective:      /70   Pulse 63   Temp 96.7 °F (35.9 °C) (Tympanic)   Resp 18   Ht 6' (1.829 m)   Wt 230 lb (104.3 kg)   BMI 31.19 kg/m² Wt Readings from Last 3 Encounters:   03/25/21 230 lb (104.3 kg)   03/19/21 236 lb (107 kg)   03/11/21 220 lb (99.8 kg)       PHYSICAL EXAM    General Appearance: alert and oriented to person, place and time, well developed and well- nourished, in no acute distress  Skin: warm and dry, no rash or erythema  Head: normocephalic and atraumatic  Eyes: pupils equal, round, and reactive to light, extraocular eye movements intact, conjunctivae normal  ENT: tympanic membrane, external ear and ear canal normal bilaterally, nose without deformity, nasal mucosa and turbinates normal without polyps  Neck: supple and non-tender without mass, no thyromegaly or thyroid nodules, no cervical lymphadenopathy  Pulmonary/Chest: clear to auscultation bilaterally- no wheezes, rales or rhonchi, normal air movement, no respiratory distress  Extremities: no cyanosis, clubbing or edema  Musculoskeletal: normal range of motion, no joint swelling, deformity or tenderness  Neurologic: reflexes normal and symmetric, no cranial nerve deficit, gait, coordination and speech normal      Assessment:      Patient Active Problem List   Diagnosis Code    Coronary artery disease I25.10    Hyperlipemia E78.5    LONG TERM ANTICOAGULENT USE     Diabetes mellitus (HCC) E11.9    Shortness of breath R06.02    Chest tightness R07.89    JOHNSON (dyspnea on exertion) R06.00    Abnormal nuclear stress test R94.39    S/P CABG x 3 Z95.1    Hypertension I10    Type II or unspecified type diabetes mellitus without mention of complication, not stated as uncontrolled E11.9    Weakness generalized R53.1    Nausea R11.0    Hypotension I95.9    History of coronary artery bypass graft x 1 Z95.1    Fatigue R53.83    Tachycardia R00.0    Tachyarrhythmia R00.0    Chronic kidney disease, stage II (mild) N18.2    Obstructive sleep apnea G47.33    BiPAP (biphasic positive airway pressure) dependence Z99.89    Restless leg G25.81    Bilateral leg edema R60.0  Diabetic ulcer of toe of right foot associated with type 2 diabetes mellitus, with fat layer exposed ([address] E11.621, L97.512    Diabetic ulcer of toe of left foot associated with type 2 diabetes mellitus, with fat layer exposed ([facility] [address] E11.621, C53.667    Abnormal nuclear cardiac imaging test R93.1    Obesity (BMI 30-39. 9) E66.9    Right foot pain M79.671        Procedure Note  Indications:  Based on my examination of this patient's wound(s)/ulcer(s) today, debridement is required to promote healing and evaluate the wound base. Performed by: GRECIA Mtz CNP    Consent obtained:  Yes    Time out taken:  Yes    Pain Control: Anesthetic  Anesthetic: 2% Lidocaine Gel Topical       Debridement:Non-excisional Debridement    Using curette and #15 blade scalpel the wound(s)/ulcer(s) was/were sharply debrided down through and including the removal of epidermis and dermis. Devitalized Tissue Debrided:  fibrin, biofilm, slough, exudate and callus    Pre Debridement Measurements:  Are located in the Wound/Ulcer Documentation Flow Sheet    Wound/Ulcer #: 3    Post Debridement Measurements:  Wound/Ulcer Descriptions are Pre Debridement except measurements:      Percent of Wound/Ulcer Debrided: 100%    Total Surface Area Debrided:0.08 sq cm     Wound 01/26/21 Toe (Comment  which one) Anterior; Left wound 2- left great toe wag 1 (Active)   Wound Image   03/25/21 1056   Wound Etiology Diabetic Lawler 1 03/25/21 1056   Dressing Status Clean;Dry; Intact 03/25/21 1056   Wound Cleansed Soap and water 03/25/21 1056   Dressing/Treatment Hydrating gel;Gauze dressing/dressing sponge;Tape/Soft cloth adhesive tape 03/11/21 1215   Offloading for Diabetic Foot Ulcers No 03/25/21 1056   Wound Length (cm) 0 cm 03/25/21 1056   Wound Width (cm) 0 cm 03/25/21 1056   Wound Depth (cm) 0 cm 03/25/21 1056   Wound Surface Area (cm^2) 0 cm^2 03/25/21 1056   Change in Wound Size % (l*w) 100 03/25/21 1056   Wound Volume (cm^3) 0 cm^3 03/25/21 1056   Wound Healing % 100 03/25/21 1056   Post-Procedure Length (cm) 0 cm 03/25/21 1122   Post-Procedure Width (cm) 0 cm 03/25/21 1122   Post-Procedure Depth (cm) 0 cm 03/25/21 1122   Post-Procedure Surface Area (cm^2) 0 cm^2 03/25/21 1122   Post-Procedure Volume (cm^3) 0 cm^3 03/25/21 1122   Distance Tunneling (cm) 0 cm 03/25/21 1056   Tunneling Position ___ O'Clock 0 03/25/21 1056   Undermining Starts ___ O'Clock 0 03/25/21 1056   Undermining Ends___ O'Clock 0 03/25/21 1056   Undermining Maxium Distance (cm) 0 03/25/21 1056   Wound Assessment Epithelialization 03/25/21 1056   Drainage Amount None 03/25/21 1056   Drainage Description Serosanguinous 03/11/21 1130   Odor None 03/25/21 1056   Nelda-wound Assessment Hyperkeratosis (callous) 03/25/21 1056   Margins Defined edges 03/25/21 1056   Wound Thickness Description not for Pressure Injury Full thickness 01/26/21 1032   Number of days: 58       Wound 01/26/21 Toe (Comment  which one) Right wound 3- right great toe wag 1 (Active)   Wound Image   03/25/21 1056   Wound Etiology Diabetic Lawler 1 03/25/21 1056   Dressing Status New dressing applied 03/25/21 1135   Wound Cleansed Soap and water 03/25/21 1056   Dressing/Treatment Gauze dressing/dressing sponge; Hydrating gel;Non adherent;Tape/Soft cloth adhesive tape 03/25/21 1135   Offloading for Diabetic Foot Ulcers No 03/25/21 1056   Wound Length (cm) 0.2 cm 03/25/21 1056   Wound Width (cm) 0.4 cm 03/25/21 1056   Wound Depth (cm) 0.1 cm 03/25/21 1056   Wound Surface Area (cm^2) 0.08 cm^2 03/25/21 1056   Change in Wound Size % (l*w) -33.33 03/25/21 1056   Wound Volume (cm^3) 0.01 cm^3 03/25/21 1056   Wound Healing % 0 03/25/21 1056   Post-Procedure Length (cm) 0.2 cm 03/25/21 1122   Post-Procedure Width (cm) 0.4 cm 03/25/21 1122   Post-Procedure Depth (cm) 0.1 cm 03/25/21 1122   Post-Procedure Surface Area (cm^2) 0.08 cm^2 03/25/21 1122   Post-Procedure Volume (cm^3) 0.01 cm^3 03/25/21 1122   Distance Tunneling (cm) 0 cm 03/25/21 1056   Tunneling Position ___ O'Clock 0 03/25/21 1056   Undermining Starts ___ O'Clock 0 03/25/21 1056   Undermining Ends___ O'Clock 0 03/25/21 1056   Undermining Maxium Distance (cm) 0 03/25/21 1056   Wound Assessment Pink/red;Slough 03/25/21 1056   Drainage Amount Small 03/25/21 1056   Drainage Description Serosanguinous 03/25/21 1056   Odor None 03/25/21 1056   Nelda-wound Assessment Hyperkeratosis (callous) 03/25/21 1056   Margins Defined edges 03/25/21 1056   Wound Thickness Description not for Pressure Injury Full thickness 01/26/21 1032   Number of days: 58            Diabetic/Pressure/Non Pressure Ulcers only:  Ulcer: Diabetic ulcer, fat layer exposed      Estimated Blood Loss:  Minimal    Hemostasis Achieved:  by pressure    Procedural Pain:  0  / 10     Post Procedural Pain:  0 / 10     Response to treatment:  Well tolerated by patient. Plan:     Problem List Items Addressed This Visit     Diabetic ulcer of toe of left foot associated with type 2 diabetes mellitus, with fat layer exposed ([address] (Chronic)    Relevant Orders    Supply: Wound Cleanser    * (Principal) Diabetic ulcer of toe of right foot associated with type 2 diabetes mellitus, with fat layer exposed ([address]    Relevant Orders    XR FOOT RIGHT (2 VIEWS) (Completed)    Obesity (BMI 30-39.9) - Primary    Right foot pain    Relevant Orders    XR FOOT RIGHT (2 VIEWS) (Completed)      Other Visit Diagnoses     Non-pressure chronic ulcer of other part of right foot with fat layer exposed ([address]        Relevant Medications    Lidocaine 2 % GEL (Completed)    Other Relevant Orders    Supply: Wound Cleanser          Treatment Note please see attached Discharge Instructions    In my professional opinion this patient would benefit from HBO Therapy: No    Written patient dismissal instructions given to patient and signed by patient or POA.            Electronically signed by GRECIA Urena CNP on 3/25/2021 at 12:38 PM

## 2021-03-29 ENCOUNTER — OFFICE VISIT (OUTPATIENT)
Age: 66
End: 2021-03-29

## 2021-03-29 VITALS — HEART RATE: 94 BPM | OXYGEN SATURATION: 96 %

## 2021-03-29 DIAGNOSIS — Z11.59 SCREENING FOR VIRAL DISEASE: Primary | ICD-10-CM

## 2021-03-29 LAB — SARS-COV-2, PCR: NOT DETECTED

## 2021-03-29 PROCEDURE — 99999 PR OFFICE/OUTPT VISIT,PROCEDURE ONLY: CPT | Performed by: NURSE PRACTITIONER

## 2021-03-30 ENCOUNTER — APPOINTMENT (OUTPATIENT)
Dept: GENERAL RADIOLOGY | Age: 66
End: 2021-03-30
Attending: INTERNAL MEDICINE
Payer: MEDICARE

## 2021-03-30 ENCOUNTER — HOSPITAL ENCOUNTER (OUTPATIENT)
Dept: CARDIAC CATH/INVASIVE PROCEDURES | Age: 66
Discharge: HOME | End: 2021-03-30
Attending: INTERNAL MEDICINE | Admitting: INTERNAL MEDICINE
Payer: MEDICARE

## 2021-03-30 VITALS
TEMPERATURE: 96.8 F | WEIGHT: 230 LBS | SYSTOLIC BLOOD PRESSURE: 114 MMHG | RESPIRATION RATE: 14 BRPM | OXYGEN SATURATION: 100 % | HEART RATE: 67 BPM | HEIGHT: 72 IN | DIASTOLIC BLOOD PRESSURE: 58 MMHG | BODY MASS INDEX: 31.15 KG/M2

## 2021-03-30 LAB
ALBUMIN SERPL-MCNC: 4.2 G/DL (ref 3.5–5.2)
ALP BLD-CCNC: 96 U/L (ref 40–130)
ALT SERPL-CCNC: 17 U/L (ref 5–41)
ANION GAP SERPL CALCULATED.3IONS-SCNC: 8 MMOL/L (ref 7–19)
AST SERPL-CCNC: 16 U/L (ref 5–40)
BILIRUB SERPL-MCNC: 0.4 MG/DL (ref 0.2–1.2)
BUN BLDV-MCNC: 22 MG/DL (ref 8–23)
CALCIUM SERPL-MCNC: 9.3 MG/DL (ref 8.8–10.2)
CHLORIDE BLD-SCNC: 103 MMOL/L (ref 98–111)
CHOLESTEROL, TOTAL: 149 MG/DL (ref 160–199)
CO2: 26 MMOL/L (ref 22–29)
CREAT SERPL-MCNC: 1 MG/DL (ref 0.5–1.2)
GFR AFRICAN AMERICAN: >59
GFR NON-AFRICAN AMERICAN: >60
GLUCOSE BLD-MCNC: 223 MG/DL (ref 70–99)
GLUCOSE BLD-MCNC: 241 MG/DL (ref 74–109)
HCT VFR BLD CALC: 33.7 % (ref 42–52)
HDLC SERPL-MCNC: 35 MG/DL (ref 55–121)
HEMOGLOBIN: 10.4 G/DL (ref 14–18)
LDL CHOLESTEROL CALCULATED: 70 MG/DL
MCH RBC QN AUTO: 34 PG (ref 27–31)
MCHC RBC AUTO-ENTMCNC: 30.9 G/DL (ref 33–37)
MCV RBC AUTO: 110.1 FL (ref 80–94)
PDW BLD-RTO: 16.3 % (ref 11.5–14.5)
PERFORMED ON: ABNORMAL
PLATELET # BLD: 72 K/UL (ref 130–400)
PMV BLD AUTO: 9.9 FL (ref 9.4–12.4)
POTASSIUM REFLEX MAGNESIUM: 4.7 MMOL/L (ref 3.5–5)
RBC # BLD: 3.06 M/UL (ref 4.7–6.1)
SODIUM BLD-SCNC: 137 MMOL/L (ref 136–145)
TOTAL PROTEIN: 6.9 G/DL (ref 6.6–8.7)
TRIGL SERPL-MCNC: 220 MG/DL (ref 0–149)
WBC # BLD: 4.1 K/UL (ref 4.8–10.8)

## 2021-03-30 PROCEDURE — 6360000004 HC RX CONTRAST MEDICATION: Performed by: INTERNAL MEDICINE

## 2021-03-30 PROCEDURE — 2709999900 HC NON-CHARGEABLE SUPPLY

## 2021-03-30 PROCEDURE — 93459 L HRT ART/GRFT ANGIO: CPT | Performed by: INTERNAL MEDICINE

## 2021-03-30 PROCEDURE — 6370000000 HC RX 637 (ALT 250 FOR IP): Performed by: INTERNAL MEDICINE

## 2021-03-30 PROCEDURE — 71046 X-RAY EXAM CHEST 2 VIEWS: CPT

## 2021-03-30 PROCEDURE — 82947 ASSAY GLUCOSE BLOOD QUANT: CPT

## 2021-03-30 PROCEDURE — 80061 LIPID PANEL: CPT

## 2021-03-30 PROCEDURE — 36415 COLL VENOUS BLD VENIPUNCTURE: CPT

## 2021-03-30 PROCEDURE — 80053 COMPREHEN METABOLIC PANEL: CPT

## 2021-03-30 PROCEDURE — 2580000003 HC RX 258: Performed by: INTERNAL MEDICINE

## 2021-03-30 PROCEDURE — 93459 L HRT ART/GRFT ANGIO: CPT

## 2021-03-30 PROCEDURE — 6360000002 HC RX W HCPCS

## 2021-03-30 PROCEDURE — 85027 COMPLETE CBC AUTOMATED: CPT

## 2021-03-30 PROCEDURE — C1760 CLOSURE DEV, VASC: HCPCS

## 2021-03-30 PROCEDURE — 99152 MOD SED SAME PHYS/QHP 5/>YRS: CPT | Performed by: INTERNAL MEDICINE

## 2021-03-30 PROCEDURE — 99152 MOD SED SAME PHYS/QHP 5/>YRS: CPT

## 2021-03-30 PROCEDURE — C1894 INTRO/SHEATH, NON-LASER: HCPCS

## 2021-03-30 PROCEDURE — 99024 POSTOP FOLLOW-UP VISIT: CPT | Performed by: INTERNAL MEDICINE

## 2021-03-30 RX ORDER — BUSPIRONE HYDROCHLORIDE 15 MG/1
15 TABLET ORAL 2 TIMES DAILY
COMMUNITY

## 2021-03-30 RX ORDER — HYDRALAZINE HYDROCHLORIDE 20 MG/ML
10 INJECTION INTRAMUSCULAR; INTRAVENOUS EVERY 10 MIN PRN
Status: DISCONTINUED | OUTPATIENT
Start: 2021-03-30 | End: 2021-03-30 | Stop reason: HOSPADM

## 2021-03-30 RX ORDER — ACETAMINOPHEN 325 MG/1
650 TABLET ORAL EVERY 4 HOURS PRN
Status: DISCONTINUED | OUTPATIENT
Start: 2021-03-30 | End: 2021-03-30 | Stop reason: HOSPADM

## 2021-03-30 RX ORDER — ASPIRIN 81 MG/1
81 TABLET ORAL ONCE
Status: COMPLETED | OUTPATIENT
Start: 2021-03-30 | End: 2021-03-30

## 2021-03-30 RX ORDER — SODIUM CHLORIDE 9 MG/ML
1000 INJECTION, SOLUTION INTRAVENOUS CONTINUOUS
Status: DISCONTINUED | OUTPATIENT
Start: 2021-03-30 | End: 2021-03-30 | Stop reason: HOSPADM

## 2021-03-30 RX ORDER — ISOSORBIDE MONONITRATE 30 MG/1
30 TABLET, EXTENDED RELEASE ORAL DAILY
Qty: 30 TABLET | Refills: 5 | Status: SHIPPED | OUTPATIENT
Start: 2021-03-30 | End: 2021-05-13

## 2021-03-30 RX ORDER — ICOSAPENT ETHYL 1000 MG/1
1 CAPSULE ORAL 2 TIMES DAILY
Status: ON HOLD | COMMUNITY
End: 2021-12-17

## 2021-03-30 RX ORDER — PRAVASTATIN SODIUM 40 MG
40 TABLET ORAL NIGHTLY
Status: ON HOLD | COMMUNITY
End: 2021-12-17

## 2021-03-30 RX ORDER — ISOSORBIDE MONONITRATE 30 MG/1
30 TABLET, EXTENDED RELEASE ORAL DAILY
Status: DISCONTINUED | OUTPATIENT
Start: 2021-03-30 | End: 2021-03-30 | Stop reason: HOSPADM

## 2021-03-30 RX ORDER — SODIUM CHLORIDE 9 MG/ML
INJECTION, SOLUTION INTRAVENOUS CONTINUOUS
Status: DISCONTINUED | OUTPATIENT
Start: 2021-03-30 | End: 2021-03-30 | Stop reason: SDUPTHER

## 2021-03-30 RX ORDER — ONDANSETRON 2 MG/ML
4 INJECTION INTRAMUSCULAR; INTRAVENOUS EVERY 6 HOURS PRN
Status: DISCONTINUED | OUTPATIENT
Start: 2021-03-30 | End: 2021-03-30 | Stop reason: HOSPADM

## 2021-03-30 RX ADMIN — SODIUM CHLORIDE: 9 INJECTION, SOLUTION INTRAVENOUS at 10:35

## 2021-03-30 RX ADMIN — IOPAMIDOL 150 ML: 612 INJECTION, SOLUTION INTRAVENOUS at 11:51

## 2021-03-30 RX ADMIN — Medication 81 MG: at 11:05

## 2021-03-30 NOTE — H&P
Office Visit    3/19/2021  [facility], APRN - NP  Nurse Practitioner Adult Health  Coronary artery disease involving native coronary artery of native heart without angina pectoris +2 more  Dx  1 Year Follow Up , Hypertension , Coronary Artery Disease  Reason for Visit   Progress Notes    [facility]   Established Patient Office Visit   Melanie Loyola. [address] [place], [place] [id number]  [phone]           OFFICE VISIT:  3/19/2021     [facility] - : 1955     Reason For Visit:  Lucía Fragoso is a 77 y.o. male who is here for 1 Year Follow Up, Hypertension, and Coronary Artery Disease     1. Coronary artery disease involving native coronary artery of native heart without angina pectoris    2. Essential hypertension    3. Hyperlipidemia, unspecified hyperlipidemia type       Patient with a history of non-Hodgkin's lymphoma, REGINA, chronic kidney disease, hyperlipidemia, coronary artery disease having undergone bypass grafting x3 in .     Presents to clinic today for yearly follow-up. Patient denies any chest pain, pressure or tightness. He is experiencing shortness of breath with exertion. This is occurring every day.        Patient would like to talk to Dr. Terry Mcwilliams next week regarding pursuing the cardiac catheterization since he had a bad experience the last time with a different cardiologist.     Patient was instructed to present to the ER should symptoms worsen before this appointment.   Patient verbalized understanding and agreed with plan.        10/3/2019  lexiscan Positive for inferior MI + myocardial ischemia, EF 59%, -5% ischemic myocardium on stress, uninterpretable risk findings, AUC indication 21, AUC score 7, (MD Dayne)         Patient had diagnostic cardiac catheterization on 10/15/2019 showing;     Selective left heart and coronary arteriography was preformed, which revealed:     1.  Successful femoral artery ultrasound  2.  Successful femoral artery arteriogram  3.  Supervision of the administration of moderate conscious sedation  4.  Severe native vessel coronary artery disease  5.  Left ventricular function is impaired in the distal anterior apical segments with a visually estimate ejection of 50%.   6.  Patent left BRITTANY to the LAD and diagonal  7.  Patent vein graft to the obtuse marginal  8.  Patent vein graft to the PDA  .       There were no apparent complications.           Christo Smiley is a 77 y.o. male with the following history as [facility] in [facility]:           Patient Active Problem List     Diagnosis Date Noted    Abnormal nuclear cardiac imaging test         Priority: High    Obesity (BMI 30-39.9) 01/26/2021    Diabetic ulcer of toe of right foot associated with type 2 diabetes mellitus, with fat layer exposed ([address] 12/06/2018    Diabetic ulcer of toe of left foot associated with type 2 diabetes mellitus, with fat layer exposed ([address] 12/06/2018    Bilateral leg edema 08/14/2018    Obstructive sleep apnea      BiPAP (biphasic positive airway pressure) dependence      Restless leg      Chronic kidney disease, stage II (mild) 08/17/2016    Tachyarrhythmia 07/07/2016    Tachycardia 12/15/2014    Weakness generalized 11/19/2014    Nausea 11/19/2014    Hypotension 11/19/2014    History of coronary artery bypass graft x 1 11/19/2014    Fatigue 11/19/2014    Hypertension      Type II or unspecified type diabetes mellitus without mention of complication, not stated as uncontrolled      Abnormal nuclear stress test 10/22/2014    S/P CABG x 3 10/22/2014    Chest tightness 09/18/2014    JOHNSON (dyspnea on exertion) 09/18/2014    Shortness of breath 11/29/2012    Diabetes mellitus ([address]      LONG TERM ANTICOAGULENT USE      Coronary artery disease 02/24/2011    Hyperlipemia 02/24/2011      Current Facility-Administered Medications   Current Outpatient Medications   Medication Sig Dispense Refill    ranolazine (RANEXA) 1000 MG extended release tablet Take 1 tablet by mouth 2 times daily 180 tablet 3    clopidogrel (PLAVIX) 75 MG tablet One daily 90 tablet 3    acetaminophen (TYLENOL) 500 MG tablet Take 1,000 mg by mouth every 6 hours as needed for Pain        metFORMIN (GLUCOPHAGE) 1000 MG tablet HOLD FOR 48 HOURS AFTER THE CARDIAC CATHETERIZATION (Patient taking differently: 1,000 mg 2 times daily (with meals) HOLD FOR 48 HOURS AFTER THE CARDIAC CATHETERIZATION) 60 tablet 3    nitroGLYCERIN (NITROSTAT) 0.4 MG SL tablet Place 1 tablet under the tongue every 5 minutes as needed for Chest pain 25 tablet 5    busPIRone (BUSPAR) 15 MG tablet TAKE 1 TABLET BY MOUTH DAILY 30 tablet 1    polyethylene glycol (GLYCOLAX) powder Take 17 g by mouth as needed        DULoxetine (CYMBALTA) 30 MG extended release capsule Take 30 mg by mouth nightly        levothyroxine (SYNTHROID) 50 MCG tablet Take 50 mcg by mouth Daily        tamsulosin (FLOMAX) 0.4 MG capsule Take 1 capsule(s) every day by oral route as directed for 30 days. 30 capsule 11    diltiazem (CARDIZEM CD) 180 MG extended release capsule Take 1 capsule by mouth daily 90 capsule 3    HYDROcodone-acetaminophen (NORCO) 7.5-325 MG per tablet Take 1-2 tablets by mouth every 6 hours as needed.  .        meloxicam (MOBIC) 15 MG tablet Take 15 mg by mouth daily         ondansetron (ZOFRAN) 8 MG tablet Take 8 mg by mouth as needed         tiZANidine (ZANAFLEX) 4 MG tablet 1/2 tablet at night        pravastatin (PRAVACHOL) 40 MG tablet Take 1 tablet by mouth daily 90 tablet 2    ferrous sulfate 325 (65 Fe) MG tablet Take 325 mg by mouth 2 times daily        pantoprazole (PROTONIX) 40 MG tablet Take 1 tablet by mouth daily 90 tablet 2    b complex vitamins capsule Take 1 capsule by mouth daily.        Vitamin D (CHOLECALCIFEROL) 1000 UNITS CAPS capsule Take 1,000 Units by mouth daily.        docusate sodium (COLACE) 100 MG capsule Take 100 mg by mouth daily.          No current facility-administered medications for this visit.          Allergies: Ancef [cefazolin sodium], Ceftin [cefuroxime], Cefuroxime axetil, Cephalosporins, and Neosporin [bacitracin-polymyxin b]  Past Medical History        Past Medical History:   Diagnosis Date    Abnormal nuclear stress test 10/22/2014    BiPAP (biphasic positive airway pressure) dependence       8cm to 20cm    Cerebrovascular disease      Chronic kidney disease, stage II (mild) 08/17/2016     Caron Camilo Coronary artery disease 2/24/2011    Depression      Diabetes mellitus ([facility] [address]      Headache      Hyperlipemia 2/24/2011     Dr. Marielena Ceja follows lipids.  Hyperlipidemia      Hypertension      LONG TERM ANTICOAGULENT USE      Low blood pressure 11/19/2014    lymphostatic lymphoma      Nausea 11/19/2014    Obesity      Obstructive sleep apnea       AHI:  21.7 per PSG, 7/2017    Peptic ulcer disease 2/24/2011    Restless leg      S/P CABG x 3 10/22/2014     SVG to RCA; SVG to LAD diagonal; LIMA to LAD    Sleep apnea      Tachyarrhythmia      Thrombocythemia, essential (HCC)      Type 2 diabetes mellitus without complication ([facility] [address]      Type II or unspecified type diabetes mellitus without mention of complication, not stated as uncontrolled           Past Surgical History         Past Surgical History:   Procedure Laterality Date    CARDIAC CATHETERIZATION   2/28/11     EF 60%    CARDIAC CATHETERIZATION   9/16/05, 3/7/07     EF < 50%    CARDIAC CATHETERIZATION   12/4/12   MDL     EF  50%    CARDIAC CATHETERIZATION   10/28/14  MDL    CERVICAL DISC SURGERY         PAMELA Mackay M.D.   [address] GRAFT   9/21/05     LIMA to LAD and diagonal; SVG to posterior descending branch RCA    CORONARY ARTERY BYPASS GRAFT   10/30/2014     Redo Sternotomy - SVG-PDA, TMR (RBL)    KIDNEY SURGERY   08/14/2017    KNEE ARTHROSCOPY     with patient.     Return in about 2 weeks (around 4/2/2021) for Dr Janice Arriola to discuss heart cath .     I greatly appreciate the opportunity to meet Lavonia Hamman and your confidence in allowing me to participate in his cardiovascular care.     GRECIA Gray NP  3/19/2021 10:16 AM CDT                      This dictation was generated by voice recognition computer software. Although all attempts are made to edit dictation for accuracy, there may be errors in the transcription that are not intended. No significant interval history change since above visit  Patient complains of progressive exertional dyspnea history of CABG x2 suspect anginal equivalent  Advised indications alternatives benefits and risk for diagnostic catheterization patient agreeable wishes to proceed plan today. Note platelet count 00,602 history of non-Hodgkin's lymphoma previous chemotherapy. I have discussed with the patient regarding indications for the proposed procedure LEFT HEART CATHETERIZATION AND POSSIBLE PERCUTANEOUS INTERVENTION  along with possible alternatives benefits and risks including but not limited to risks of death, myocardial infarction, stroke, contrast induced nephropathy which in some cases may lead to acute kidney failure requiring dialysis, allergic reactions, bleeding requiring blood transfusion,  cardiac arrhthymias, respiratory failure which may require placing the patient on respiratory support such as a ventilator or breathing machine,risk of complications which may require vascular surgery, and if coronary intervention is performed emergency CABG may be required in less than 1% of cases. The patient is awake and alert and understands the issues involved and indicates willingness to proceed as ordered. The patient does not have any contraindications to dual antiplatelet therapy. The patient does not have any known  pending surgical procedures in the next 12 months at this time.     The

## 2021-03-30 NOTE — PROGRESS NOTES
AVS, MYNX BOOKLET, MEDICATION INFORMATION EXPLAINED & GIVEN. PT & WIFE VOICED UNDERSTANDINGS. TRANSPORTED PT TO CAR VIA W/C WITH ALL BELONGINGS FOR DISCHARGE HOME PER WIFE.

## 2021-04-05 ENCOUNTER — OFFICE VISIT (OUTPATIENT)
Dept: CARDIOLOGY CLINIC | Age: 66
End: 2021-04-05
Payer: MEDICARE

## 2021-04-05 VITALS
BODY MASS INDEX: 31.15 KG/M2 | WEIGHT: 230 LBS | HEART RATE: 74 BPM | SYSTOLIC BLOOD PRESSURE: 132 MMHG | DIASTOLIC BLOOD PRESSURE: 64 MMHG | OXYGEN SATURATION: 95 % | HEIGHT: 72 IN

## 2021-04-05 DIAGNOSIS — I25.10 CORONARY ARTERY DISEASE INVOLVING NATIVE CORONARY ARTERY OF NATIVE HEART WITHOUT ANGINA PECTORIS: ICD-10-CM

## 2021-04-05 DIAGNOSIS — E78.2 MIXED HYPERLIPIDEMIA: Primary | ICD-10-CM

## 2021-04-05 DIAGNOSIS — R06.09 DYSPNEA ON EXERTION: ICD-10-CM

## 2021-04-05 DIAGNOSIS — I10 ESSENTIAL HYPERTENSION: ICD-10-CM

## 2021-04-05 DIAGNOSIS — Z95.1 S/P CABG X 3: ICD-10-CM

## 2021-04-05 PROCEDURE — 1123F ACP DISCUSS/DSCN MKR DOCD: CPT | Performed by: INTERNAL MEDICINE

## 2021-04-05 PROCEDURE — 4040F PNEUMOC VAC/ADMIN/RCVD: CPT | Performed by: INTERNAL MEDICINE

## 2021-04-05 PROCEDURE — 99214 OFFICE O/P EST MOD 30 MIN: CPT | Performed by: INTERNAL MEDICINE

## 2021-04-05 PROCEDURE — 1036F TOBACCO NON-USER: CPT | Performed by: INTERNAL MEDICINE

## 2021-04-05 PROCEDURE — G8417 CALC BMI ABV UP PARAM F/U: HCPCS | Performed by: INTERNAL MEDICINE

## 2021-04-05 PROCEDURE — 3017F COLORECTAL CA SCREEN DOC REV: CPT | Performed by: INTERNAL MEDICINE

## 2021-04-05 PROCEDURE — G8427 DOCREV CUR MEDS BY ELIG CLIN: HCPCS | Performed by: INTERNAL MEDICINE

## 2021-04-05 ASSESSMENT — ENCOUNTER SYMPTOMS
GASTROINTESTINAL NEGATIVE: 1
EYES NEGATIVE: 1
RESPIRATORY NEGATIVE: 1
NAUSEA: 0
DIARRHEA: 0
SHORTNESS OF BREATH: 0
VOMITING: 0

## 2021-04-05 NOTE — PROGRESS NOTES
[facility]                            Date:  4/5/2021  Patient: Jenn Perez  Age:  77 y.o., 1955      Reason for evaluation:         SUBJECTIVE:    Returns today follow-up assessment had cardiac catheterization last week revealing patent LIMA graft to LAD and diagonal patent vein graft OM patent vein graft to right PDA but has distal disease beyond the anastomosis small vessels at that point medical therapy recommended. His chief complaint is fatigue dyspnea lack of stamina he is also had more prominent headache since being on the Imdur but has now cut down on the dose to 15 mg with food twice a day seems to be tolerating reasonably well. He also has other noncardiac issues including non-Hodgkin's lymphoma anemia hypothyroidism and others. Unclear as to what is the predominant problem causing his fatigue and lack of stamina. After discussion we will try him on a higher dose of beta-blockers and see if this helps with these complaints or makes them worse in which case we will go back to her previous dose. Review of Systems   Constitutional: Negative. Negative for chills, fever and unexpected weight change. HENT: Negative. Eyes: Negative. Respiratory: Negative. Negative for shortness of breath. Cardiovascular: Negative. Negative for chest pain. Gastrointestinal: Negative. Negative for diarrhea, nausea and vomiting. Endocrine: Negative. Genitourinary: Negative. Musculoskeletal: Negative. Skin: Negative. Neurological: Negative. OBJECTIVE:     /64   Pulse 74   Ht 6' (1.829 m)   Wt 230 lb (104.3 kg)   SpO2 95%   BMI 31.19 kg/m²     Labs:   CBC: No results for input(s): WBC, HGB, HCT, PLT in the last 72 hours. BMP:No results for input(s): NA, K, CO2, BUN, CREATININE, LABGLOM, GLUCOSE in the last 72 hours. BNP: No results for input(s): BNP in the last 72 hours.   PT/INR: No results for input(s): PROTIME, INR in the last 72 glycol (GLYCOLAX) powder Take 17 g by mouth as needed      levothyroxine (SYNTHROID) 50 MCG tablet Take 50 mcg by mouth Daily      tamsulosin (FLOMAX) 0.4 MG capsule Take 1 capsule(s) every day by oral route as directed for 30 days. 30 capsule 11    diltiazem (CARDIZEM CD) 180 MG extended release capsule Take 1 capsule by mouth daily 90 capsule 3    HYDROcodone-acetaminophen (NORCO) 7.5-325 MG per tablet Take 1-2 tablets by mouth every 6 hours as needed. [place] meloxicam (MOBIC) 15 MG tablet Take 15 mg by mouth daily       ondansetron (ZOFRAN) 8 MG tablet Take 8 mg by mouth as needed       tiZANidine (ZANAFLEX) 4 MG tablet 1/2 tablet at night      ferrous sulfate 325 (65 Fe) MG tablet Take 325 mg by mouth 2 times daily      pantoprazole (PROTONIX) 40 MG tablet Take 1 tablet by mouth daily 90 tablet 2    b complex vitamins capsule Take 1 capsule by mouth daily.  Vitamin D (CHOLECALCIFEROL) 1000 UNITS CAPS capsule Take 1,000 Units by mouth daily.  docusate sodium (COLACE) 100 MG capsule Take 100 mg by mouth daily.  DULoxetine (CYMBALTA) 30 MG extended release capsule Take 30 mg by mouth nightly       No current facility-administered medications for this visit. Social History     Socioeconomic History    Marital status:      Spouse name: Lisa Leija Number of children: 2    Years of education: 12    Highest education level: Not on file   Occupational History    Occupation:      Employer: [employer]     Comment: Retired,  for the [employer].    Social Needs    Financial resource strain: Not on file    Food insecurity     Worry: Not on file     Inability: Not on file    Transportation needs     Medical: Not on file     Non-medical: Not on file   Tobacco Use    Smoking status: Former Smoker     Packs/day: 0.00     Types: Cigarettes     Quit date: 1998     Years since quittin.1    Smokeless tobacco: Never Used    Tobacco comment: quit 23 years ago   Substance and Sexual Activity    Alcohol use: No    Drug use: No    Sexual activity: Not on file   Lifestyle    Physical activity     Days per week: Not on file     Minutes per session: Not on file    Stress: Not on file   Relationships    Social connections     Talks on phone: Not on file     Gets together: Not on file     Attends Taoism service: Not on file     Active member of club or organization: Not on file     Attends meetings of clubs or organizations: Not on file     Relationship status: Not on file    Intimate partner violence     Fear of current or ex partner: Not on file     Emotionally abused: Not on file     Physically abused: Not on file     Forced sexual activity: Not on file   Other Topics Concern    Not on file   Social History Narrative    Not on file       Physical Examination:  /64   Pulse 74   Ht 6' (1.829 m)   Wt 230 lb (104.3 kg)   SpO2 95%   BMI 31.19 kg/m²   Physical Exam  Constitutional:       Appearance: He is well-developed. Neck:      Vascular: No carotid bruit or JVD. Cardiovascular:      Rate and Rhythm: Normal rate and regular rhythm. Heart sounds: Normal heart sounds. No murmur. No friction rub. No gallop. Pulmonary:      Effort: Pulmonary effort is normal. No respiratory distress. Breath sounds: Normal breath sounds. No wheezing or rales. Abdominal:      General: There is no distension. Tenderness: There is no abdominal tenderness. Lymphadenopathy:      Cervical: No cervical adenopathy. Skin:     General: Skin is warm and dry. ASSESSMENT:     Diagnosis Orders   1. Mixed hyperlipidemia     2. Dyspnea on exertion     3. Essential hypertension     4. Coronary artery disease involving native coronary artery of native heart without angina pectoris     5. S/P CABG x 3         PLAN:  No orders of the defined types were placed in this encounter.     No orders of the defined types were placed in this encounter. 1. Continue present medications  2. Suggest a trial of increase metoprolol 50 mg p.o. twice daily  3. Recommend follow-up assessment in 6 weeks    Return in about 6 weeks (around 5/17/2021). Jameel Ramos MD 4/5/2021 2:06 PM CDT    [facility]      Thisdictation was generated by voice recognition computer software. Although all attempts are made to edit the dictation for accuracy, there may be errors in the transcription that are not intended.

## 2021-04-08 ENCOUNTER — TELEPHONE (OUTPATIENT)
Dept: CARDIOLOGY CLINIC | Age: 66
End: 2021-04-08

## 2021-04-08 ENCOUNTER — HOSPITAL ENCOUNTER (OUTPATIENT)
Dept: WOUND CARE | Age: 66
Discharge: HOME | End: 2021-04-08
Payer: MEDICARE

## 2021-04-08 VITALS
SYSTOLIC BLOOD PRESSURE: 73 MMHG | DIASTOLIC BLOOD PRESSURE: 40 MMHG | TEMPERATURE: 96.8 F | RESPIRATION RATE: 18 BRPM | HEART RATE: 54 BPM

## 2021-04-08 DIAGNOSIS — L97.522 DIABETIC ULCER OF TOE OF LEFT FOOT ASSOCIATED WITH TYPE 2 DIABETES MELLITUS, WITH FAT LAYER EXPOSED (HCC): Primary | ICD-10-CM

## 2021-04-08 DIAGNOSIS — E11.621 DIABETIC ULCER OF TOE OF LEFT FOOT ASSOCIATED WITH TYPE 2 DIABETES MELLITUS, WITH FAT LAYER EXPOSED (HCC): Primary | ICD-10-CM

## 2021-04-08 DIAGNOSIS — L97.512 NON-PRESSURE CHRONIC ULCER OF OTHER PART OF RIGHT FOOT WITH FAT LAYER EXPOSED (HCC): ICD-10-CM

## 2021-04-08 PROCEDURE — 99213 OFFICE O/P EST LOW 20 MIN: CPT

## 2021-04-08 PROCEDURE — 99212 OFFICE O/P EST SF 10 MIN: CPT | Performed by: SURGERY

## 2021-04-08 ASSESSMENT — PAIN SCALES - GENERAL: PAINLEVEL_OUTOF10: 0

## 2021-04-08 NOTE — TELEPHONE ENCOUNTER
Patient reports he saw Dr. Ozuna today for his feet and they were concerned abut this BP. 73/40 53 Left arm 80/49 54 right arm. Patient reports feeling weak, dizzy, and can barely move.

## 2021-04-08 NOTE — PROGRESS NOTES
(!) 73/40   Pulse 54   Temp 96.8 °F (36 °C) (Temporal)   Resp 18     Post Debridement Measurements and Assessment:    Wound 01/26/21 Toe (Comment  which one) Anterior; Left wound 2- left great toe wag 1 (Active)   Wound Image    04/08/21 1341   Wound Etiology Diabetic Lawler 1 04/08/21 1341   Dressing Status Clean;Dry; Intact 03/25/21 1056   Wound Cleansed Not Cleansed 04/08/21 1341   Dressing/Treatment Hydrating gel;Gauze dressing/dressing sponge;Tape/Soft cloth adhesive tape 03/11/21 1215   Offloading for Diabetic Foot Ulcers No 03/25/21 1056   Wound Length (cm) 0.2 cm 04/08/21 1341   Wound Width (cm) 0.2 cm 04/08/21 1341   Wound Depth (cm) 0.1 cm 04/08/21 1341   Wound Surface Area (cm^2) 0.04 cm^2 04/08/21 1341   Change in Wound Size % (l*w) 33.33 04/08/21 1341   Wound Volume (cm^3) 0 cm^3 04/08/21 1341   Wound Healing % 100 04/08/21 1341   Post-Procedure Length (cm) 0.2 cm 04/08/21 1409   Post-Procedure Width (cm) 0.2 cm 04/08/21 1409   Post-Procedure Depth (cm) 0.2 cm 04/08/21 1409   Post-Procedure Surface Area (cm^2) 0.04 cm^2 04/08/21 1409   Post-Procedure Volume (cm^3) 0.01 cm^3 04/08/21 1409   Distance Tunneling (cm) 0 cm 04/08/21 1341   Tunneling Position ___ O'Clock 0 04/08/21 1341   Undermining Starts ___ O'Clock 0 04/08/21 1341   Undermining Ends___ O'Clock 0 04/08/21 1341   Undermining Maxium Distance (cm) 0 04/08/21 1341   Wound Assessment Dry;Devitalized tissue 04/08/21 1341   Drainage Amount None 04/08/21 1341   Drainage Description Serosanguinous 03/11/21 1130   Odor None 04/08/21 1341   Nelda-wound Assessment Hyperkeratosis (callous) 04/08/21 1341   Margins Attached edges 04/08/21 1341   Wound Thickness Description not for Pressure Injury Full thickness 01/26/21 1032   Number of days: 72       Wound 01/26/21 Toe (Comment  which one) Right wound 3- right great toe wag 1 (Active)   Wound Image    04/08/21 1341   Wound Etiology Diabetic Lawler 1 04/08/21 1341   Dressing Status Old drainage noted 04/08/21 1341   Wound Cleansed Not Cleansed 04/08/21 1341   Dressing/Treatment Gauze dressing/dressing sponge; Hydrating gel;Non adherent;Tape/Soft cloth adhesive tape 03/25/21 1135   Offloading for Diabetic Foot Ulcers No 03/25/21 1056   Wound Length (cm) 0.2 cm 04/08/21 1341   Wound Width (cm) 0.6 cm 04/08/21 1341   Wound Depth (cm) 0.2 cm 04/08/21 1341   Wound Surface Area (cm^2) 0.12 cm^2 04/08/21 1341   Change in Wound Size % (l*w) -100 04/08/21 1341   Wound Volume (cm^3) 0.02 cm^3 04/08/21 1341   Wound Healing % -100 04/08/21 1341   Post-Procedure Length (cm) 0.2 cm 03/25/21 1122   Post-Procedure Width (cm) 0.4 cm 03/25/21 1122   Post-Procedure Depth (cm) 0.1 cm 03/25/21 1122   Post-Procedure Surface Area (cm^2) 0.08 cm^2 03/25/21 1122   Post-Procedure Volume (cm^3) 0.01 cm^3 03/25/21 1122   Distance Tunneling (cm) 0 cm 04/08/21 1341   Tunneling Position ___ O'Clock 0 04/08/21 1341   Undermining Starts ___ O'Clock 0 04/08/21 1341   Undermining Ends___ O'Clock 0 04/08/21 1341   Undermining Maxium Distance (cm) 0 04/08/21 1341   Wound Assessment Pink/red;Slough 04/08/21 1341   Drainage Amount Scant 04/08/21 1341   Drainage Description Serosanguinous 04/08/21 1341   Odor None 04/08/21 1341   Nelda-wound Assessment Hyperkeratosis (callous) 04/08/21 1341   Margins Attached edges 04/08/21 1341   Wound Thickness Description not for Pressure Injury Full thickness 01/26/21 1032   Number of days: 72           The patients pain isPain Level: 0  . Wound(s) has improved. Please refer to nursing measurements and assessment regarding wound pre and post debridement.           Assessment:      Patient Active Problem List   Diagnosis    Coronary artery disease    Hyperlipemia    LONG TERM ANTICOAGULENT USE    Diabetes mellitus ([facility] [address]    Shortness of breath    Chest tightness    JOHNSON (dyspnea on exertion)    Abnormal nuclear stress test    S/P CABG x 3    Hypertension    Type II or unspecified type diabetes mellitus without mention of complication, not stated as uncontrolled    Weakness generalized    Nausea    Hypotension    History of coronary artery bypass graft x 1    Fatigue    Tachycardia    Tachyarrhythmia    Chronic kidney disease, stage II (mild)    Obstructive sleep apnea    BiPAP (biphasic positive airway pressure) dependence    Restless leg    Bilateral leg edema    Diabetic ulcer of toe of right foot associated with type 2 diabetes mellitus, with fat layer exposed ([address]    Diabetic ulcer of toe of left foot associated with type 2 diabetes mellitus, with fat layer exposed (H[facility])    Abnormal nuclear cardiac imaging test    Obesity (BMI 30-39. 9)    Right foot pain    Dyspnea on exertion    Status post aorto-coronary artery bypass graft          Plan:          Plan for wound - Dress per physician order  Treatment:     Compression : No   Offloading : Yes   Dressing : gel   Additional Therapy : Met-pad     1. Discussed appropriate home care of this wound. Wound redressed. 2. Patient instructions were given. 3. Follow up: 2 week(s). Patient has good healing of his first right toe wound. X-ray is questionable for osteomyelitis, however there is no signs of infection with the wound healing. We will continue to watch.                          Electronically signed by Shanda Chavez DO on 4/8/2021 at 2:17 PM

## 2021-04-08 NOTE — TELEPHONE ENCOUNTER
Patient said this did not start until he started breaking his imdur in half and taking BID. Will need to go back to how he was taking it originally. Needs to recheck his BP at home twice a day different times during the day. Call me in one week with BP readings to talk with MARIA ESTHER.

## 2021-04-13 NOTE — TELEPHONE ENCOUNTER
Spoke with patient who said he forgot to tell me last week that he had just started lopressor 25 mg bid as well. He was supposed to be on this previously but when he come in for his ov with MARIA ESTHER he never said he wasn't taking it. He just started taking it with the adjustment of imdur. Says he stopped this on his own last Saturday because his readings had not improved. Says he will not continue to take that medication or imdur. Will let maria esther know to see if he wants to do anything else.

## 2021-04-29 ENCOUNTER — HOSPITAL ENCOUNTER (OUTPATIENT)
Dept: WOUND CARE | Age: 66
Discharge: HOME | End: 2021-04-29
Payer: MEDICARE

## 2021-04-29 VITALS
DIASTOLIC BLOOD PRESSURE: 63 MMHG | HEART RATE: 79 BPM | RESPIRATION RATE: 18 BRPM | HEIGHT: 72 IN | TEMPERATURE: 97.1 F | WEIGHT: 230 LBS | SYSTOLIC BLOOD PRESSURE: 126 MMHG | BODY MASS INDEX: 31.15 KG/M2

## 2021-04-29 DIAGNOSIS — E11.621 DIABETIC ULCER OF TOE OF RIGHT FOOT ASSOCIATED WITH TYPE 2 DIABETES MELLITUS, WITH FAT LAYER EXPOSED (HCC): ICD-10-CM

## 2021-04-29 DIAGNOSIS — L97.512 NON-PRESSURE CHRONIC ULCER OF OTHER PART OF RIGHT FOOT WITH FAT LAYER EXPOSED (HCC): ICD-10-CM

## 2021-04-29 DIAGNOSIS — L97.522 DIABETIC ULCER OF TOE OF LEFT FOOT ASSOCIATED WITH TYPE 2 DIABETES MELLITUS, WITH FAT LAYER EXPOSED (HCC): ICD-10-CM

## 2021-04-29 DIAGNOSIS — E66.9 OBESITY (BMI 30-39.9): ICD-10-CM

## 2021-04-29 DIAGNOSIS — M79.671 RIGHT FOOT PAIN: Primary | ICD-10-CM

## 2021-04-29 DIAGNOSIS — L97.512 DIABETIC ULCER OF TOE OF RIGHT FOOT ASSOCIATED WITH TYPE 2 DIABETES MELLITUS, WITH FAT LAYER EXPOSED (HCC): ICD-10-CM

## 2021-04-29 DIAGNOSIS — E11.621 DIABETIC ULCER OF TOE OF LEFT FOOT ASSOCIATED WITH TYPE 2 DIABETES MELLITUS, WITH FAT LAYER EXPOSED (HCC): ICD-10-CM

## 2021-04-29 PROCEDURE — 97597 DBRDMT OPN WND 1ST 20 CM/<: CPT

## 2021-04-29 PROCEDURE — 97597 DBRDMT OPN WND 1ST 20 CM/<: CPT | Performed by: NURSE PRACTITIONER

## 2021-04-29 ASSESSMENT — PAIN SCALES - GENERAL: PAINLEVEL_OUTOF10: 0

## 2021-04-29 NOTE — PROGRESS NOTES
[address]   Progress Note and Procedure Note      Charlotte Garcia  MEDICAL RECORD NUMBER:  [id number]  AGE: 77 y.o. GENDER: male  : 1955  EPISODE DATE:  2021    Subjective:     Chief Complaint   Patient presents with    Wound Check     follow up         HISTORY of PRESENT ILLNESS HPI     Charlotte Garcia is a 77 y.o. male who presents today for wound/ulcer evaluation. History of Wound Context: bilateral feet wounds    Ulcer Identification:  Ulcer Type: diabetic  Contributing Factors: diabetes, chronic pressure, shear force, obesity and non-adherence    Wound: N/A        PAST MEDICAL HISTORY        Diagnosis Date    Abnormal nuclear stress test 10/22/2014    Arthritis     BiPAP (biphasic positive airway pressure) dependence     8cm to 20cm    Cerebrovascular disease     Chronic kidney disease, stage II (mild) 2016    Chago Bill M.D.    Coronary artery disease 2011    Depression     Diabetes mellitus ([address]     Encounter for wound care     PT SEES \"GRACY\" WITH DR. SAMUEL    Great toe pain     RT    Headache     Hyperlipemia 2011    Dr. Fortino Samano follows lipids.     Hyperlipidemia     Hypertension     LONG TERM ANTICOAGULENT USE     Low blood pressure 2014    lymphostatic lymphoma     Nausea 2014    Non Hodgkin's lymphoma (HCC)     Obesity     Obstructive sleep apnea     AHI:  21.7 per PSG, 2017    Peptic ulcer disease 2011    Restless leg     S/P CABG x 3 10/22/2014    SVG to RCA; SVG to LAD diagonal; LIMA to LAD    Sleep apnea     Tachyarrhythmia     Thrombocythemia, essential ([address]     Type 2 diabetes mellitus without complication ([address]     Type II or unspecified type diabetes mellitus without mention of complication, not stated as uncontrolled        PAST SURGICAL HISTORY    Past Surgical History:   Procedure Laterality Date    CARDIAC CATHETERIZATION  11    EF 60%    CARDIAC CATHETERIZATION  05, 3/7/07    EF < 50%    CARDIAC CATHETERIZATION  12   MDL    EF  50%    CARDIAC CATHETERIZATION  10/28/14  MDL    CARDIAC CATHETERIZATION     Chetna Ospina M.D.   [facility] CORONARY ARTERY BYPASS GRAFT  05    LIMA to LAD and diagonal; SVG to posterior descending branch RCA    CORONARY ARTERY BYPASS GRAFT  10/30/2014    Redo Sternotomy - SVG-PDA, TMR (RBL)    KIDNEY SURGERY  2017    KNEE ARTHROSCOPY      right ACL repair    SHOULDER ARTHROSCOPY      left-rotator cuff repair right- rotator cuff repair    SHOULDER ARTHROSCOPY  11    right    TONSILLECTOMY         FAMILY HISTORY    Family History   Problem Relation Age of Onset    Heart Disease Other     Lung Cancer Mother     Cancer Sister        SOCIAL HISTORY    Social History     Tobacco Use    Smoking status: Former Smoker     Packs/day: 0.00     Types: Cigarettes     Quit date: 1998     Years since quittin.2    Smokeless tobacco: Never Used    Tobacco comment: quit 23 years ago   Substance Use Topics    Alcohol use: No    Drug use: No       ALLERGIES    Allergies   Allergen Reactions    Ancef [Cefazolin Sodium]     Ceftin [Cefuroxime]      \"anything with ceftin in it\"    Cefuroxime Axetil     Cephalosporins     Neosporin [Bacitracin-Polymyxin B]      blisters       MEDICATIONS    Current Outpatient Medications on File Prior to Encounter   Medication Sig Dispense Refill    busPIRone (BUSPAR) 15 MG tablet Take 7.5 mg by mouth 2 times daily      pravastatin (PRAVACHOL) 40 MG tablet Take 40 mg by mouth nightly      Icosapent Ethyl (VASCEPA) 1 g CAPS capsule Take 1 capsule by mouth 2 times daily      ranolazine (RANEXA) 1000 MG extended release tablet Take 1 tablet by mouth 2 times daily 180 tablet 3    clopidogrel (PLAVIX) 75 MG tablet One daily (Patient taking differently: Take 75 mg by mouth daily ) 90 tablet 3    acetaminophen (TYLENOL) 500 MG tablet Take 1,000 mg by mouth every 6 hours as needed for Pain      metFORMIN (GLUCOPHAGE) 1000 MG tablet HOLD FOR 48 HOURS AFTER THE CARDIAC CATHETERIZATION (Patient taking differently: 500 mg 2 times daily (with meals) HOLD FOR 48 HOURS AFTER THE CARDIAC CATHETERIZATION) 60 tablet 3    nitroGLYCERIN (NITROSTAT) 0.4 MG SL tablet Place 1 tablet under the tongue every 5 minutes as needed for Chest pain 25 tablet 5    polyethylene glycol (GLYCOLAX) powder Take 17 g by mouth as needed      levothyroxine (SYNTHROID) 50 MCG tablet Take 50 mcg by mouth Daily      tamsulosin (FLOMAX) 0.4 MG capsule Take 1 capsule(s) every day by oral route as directed for 30 days. 30 capsule 11    diltiazem (CARDIZEM CD) 180 MG extended release capsule Take 1 capsule by mouth daily 90 capsule 3    HYDROcodone-acetaminophen (NORCO) 7.5-325 MG per tablet Take 1-2 tablets by mouth every 6 hours as needed. Jania Charline meloxicam (MOBIC) 15 MG tablet Take 15 mg by mouth daily       ondansetron (ZOFRAN) 8 MG tablet Take 8 mg by mouth as needed       tiZANidine (ZANAFLEX) 4 MG tablet 1/2 tablet at night      ferrous sulfate 325 (65 Fe) MG tablet Take 325 mg by mouth 2 times daily      pantoprazole (PROTONIX) 40 MG tablet Take 1 tablet by mouth daily 90 tablet 2    b complex vitamins capsule Take 1 capsule by mouth daily.  Vitamin D (CHOLECALCIFEROL) 1000 UNITS CAPS capsule Take 1,000 Units by mouth daily.  docusate sodium (COLACE) 100 MG capsule Take 100 mg by mouth daily.  isosorbide mononitrate (IMDUR) 30 MG extended release tablet Take 1 tablet by mouth daily (Patient taking differently: Take 15 mg by mouth 2 times daily ) 30 tablet 5    metoprolol tartrate (LOPRESSOR) 25 MG tablet Take 1 tablet by mouth 2 times daily 60 tablet 5    DULoxetine (CYMBALTA) 30 MG extended release capsule Take 30 mg by mouth nightly       No current facility-administered medications on file prior to encounter.         REVIEW OF SYSTEMS    A comprehensive review of systems was negative.     Objective:      /63   Pulse 79   Temp 97.1 °F (36.2 °C) (Temporal)   Resp 18   Ht 6' (1.829 m)   Wt 230 lb (104.3 kg)   BMI 31.19 kg/m²     Wt Readings from Last 3 Encounters:   04/29/21 230 lb (104.3 kg)   04/05/21 230 lb (104.3 kg)   03/30/21 230 lb (104.3 kg)       PHYSICAL EXAM    General Appearance: alert and oriented to person, place and time, well developed and well- nourished, in no acute distress  Skin: warm and dry, no rash or erythema  Head: normocephalic and atraumatic  Eyes: pupils equal, round, and reactive to light, extraocular eye movements intact, conjunctivae normal  ENT: tympanic membrane, external ear and ear canal normal bilaterally, nose without deformity, nasal mucosa and turbinates normal without polyps  Neck: supple and non-tender without mass, no thyromegaly or thyroid nodules, no cervical lymphadenopathy  Pulmonary/Chest: clear to auscultation bilaterally- no wheezes, rales or rhonchi, normal air movement, no respiratory distress  Extremities: no cyanosis, clubbing or edema  Musculoskeletal: normal range of motion, no joint swelling, deformity or tenderness  Neurologic: reflexes normal and symmetric, no cranial nerve deficit, gait, coordination and speech normal      Assessment:      Patient Active Problem List   Diagnosis Code    Coronary artery disease I25.10    Hyperlipemia E78.5    LONG TERM ANTICOAGULENT USE     Diabetes mellitus (HCC) E11.9    Shortness of breath R06.02    Chest tightness R07.89    JOHNSON (dyspnea on exertion) R06.00    Abnormal nuclear stress test R94.39    S/P CABG x 3 Z95.1    Hypertension I10    Type II or unspecified type diabetes mellitus without mention of complication, not stated as uncontrolled E11.9    Weakness generalized R53.1    Nausea R11.0    Hypotension I95.9    History of coronary artery bypass graft x 1 Z95.1    Fatigue R53.83    Tachycardia R00.0    Tachyarrhythmia R00.0    Chronic kidney disease, stage II (mild) N18.2    Obstructive sleep apnea G47.33    BiPAP (biphasic positive airway pressure) dependence Z99.89    Restless leg G25.81    Bilateral leg edema R60.0    Diabetic ulcer of toe of right foot associated with type 2 diabetes mellitus, with fat layer exposed ([address] E11.621, L97.512    Diabetic ulcer of toe of left foot associated with type 2 diabetes mellitus, with fat layer exposed (HCC) E11.621, W94.842    Abnormal nuclear cardiac imaging test R93.1    Obesity (BMI 30-39. 9) E66.9    Right foot pain M79.671    Dyspnea on exertion R06.00    Status post aorto-coronary artery bypass graft Z95.1        Procedure Note  Indications:  Based on my examination of this patient's wound(s)/ulcer(s) today, debridement is required to promote healing and evaluate the wound base. Performed by: GRECIA Adhikari CNP    Consent obtained:  Yes    Time out taken:  Yes    Pain Control: Anesthetic  Anesthetic: 2% Lidocaine Gel Topical       Debridement:Non-excisional Debridement    Using #15 blade scalpel and forceps the wound(s)/ulcer(s) was/were sharply debrided down through and including the removal of epidermis and dermis. Devitalized Tissue Debrided:  fibrin, biofilm, slough, exudate and callus    Pre Debridement Measurements:  Are located in the Wound/Ulcer Documentation Flow Sheet    Wound/Ulcer #: 2 and 3    Post Debridement Measurements:  Wound/Ulcer Descriptions are Pre Debridement except measurements:      Percent of Wound/Ulcer Debrided: 100%    Total Surface Area Debrided:  0.21 sq cm     Wound 01/26/21 Toe (Comment  which one) Anterior; Left wound 2- left great toe wag 1 (Active)   Wound Image   04/29/21 1144   Wound Etiology Diabetic Lawler 1 04/29/21 1144   Dressing Status Clean;Dry; Intact 03/25/21 1056   Wound Cleansed Cleansed with saline 04/29/21 1144   Dressing/Treatment Hydrating gel;Gauze dressing/dressing sponge;Tape/Soft Wound Volume (cm^3) 0.01 cm^3 04/29/21 1144   Wound Healing % 0 04/29/21 1144   Post-Procedure Length (cm) 0.3 cm 04/29/21 1201   Post-Procedure Width (cm) 0.3 cm 04/29/21 1201   Post-Procedure Depth (cm) 0.1 cm 04/29/21 1201   Post-Procedure Surface Area (cm^2) 0.09 cm^2 04/29/21 1201   Post-Procedure Volume (cm^3) 0.01 cm^3 04/29/21 1201   Distance Tunneling (cm) 0 cm 04/29/21 1144   Tunneling Position ___ O'Clock 0 04/29/21 1144   Undermining Starts ___ O'Clock 0 04/29/21 1144   Undermining Ends___ O'Clock 0 04/29/21 1144   Undermining Maxium Distance (cm) 0 04/29/21 1144   Wound Assessment Dry 04/29/21 1144   Drainage Amount None 04/29/21 1144   Drainage Description Serosanguinous 04/08/21 1341   Odor None 04/29/21 1144   Nelda-wound Assessment Hyperkeratosis (callous) 04/29/21 1144   Margins Attached edges 04/29/21 1144   Wound Thickness Description not for Pressure Injury Full thickness 01/26/21 1032   Number of days: 93            Diabetic/Pressure/Non Pressure Ulcers only:  Ulcer: Diabetic ulcer, fat layer exposed      Estimated Blood Loss:  Minimal    Hemostasis Achieved:  by pressure    Procedural Pain:  0  / 10     Post Procedural Pain:  0 / 10     Response to treatment:  Well tolerated by patient. Plan:     Problem List Items Addressed This Visit     Diabetic ulcer of toe of left foot associated with type 2 diabetes mellitus, with fat layer exposed ([address] (Chronic)    Relevant Orders    Supply: Wound Cleanser    Supply: Nelda Wound    Supply: Pack Wound    Supply: Cover and Secure    * (Principal) Diabetic ulcer of toe of right foot associated with type 2 diabetes mellitus, with fat layer exposed ([address]    Obesity (BMI 30-39. 9)    Right foot pain - Primary      Other Visit Diagnoses     Non-pressure chronic ulcer of other part of right foot with fat layer exposed ([address]        Relevant Medications    Lidocaine 2 % GEL (Completed) (Start on 4/29/2021 12:15 PM)    Other Relevant Orders    Supply: Wound Cleanser    Supply: Nelda Wound    Supply: Pack Wound    Supply: Cover and Secure          Treatment Note please see attached Discharge Instructions    In my professional opinion this patient would benefit from HBO Therapy: No    Written patient dismissal instructions given to patient and signed by patient or POA. Mr. Morocho Shown does not offloading and only uses flexitol for his wound care. He is building a large amount of callus which I removed today. He reports pain in his right foot making it hard to walk.       Electronically signed by GRECIA Rebollar CNP on 4/29/2021 at 12:09 PM

## 2021-05-13 ENCOUNTER — HOSPITAL ENCOUNTER (OUTPATIENT)
Dept: WOUND CARE | Age: 66
Discharge: HOME | End: 2021-05-13
Payer: MEDICARE

## 2021-05-13 VITALS
RESPIRATION RATE: 18 BRPM | HEART RATE: 70 BPM | DIASTOLIC BLOOD PRESSURE: 63 MMHG | WEIGHT: 224 LBS | BODY MASS INDEX: 30.34 KG/M2 | SYSTOLIC BLOOD PRESSURE: 114 MMHG | TEMPERATURE: 96.7 F | HEIGHT: 72 IN

## 2021-05-13 DIAGNOSIS — M79.671 RIGHT FOOT PAIN: Primary | ICD-10-CM

## 2021-05-13 DIAGNOSIS — L97.522 DIABETIC ULCER OF TOE OF LEFT FOOT ASSOCIATED WITH TYPE 2 DIABETES MELLITUS, WITH FAT LAYER EXPOSED (HCC): ICD-10-CM

## 2021-05-13 DIAGNOSIS — E11.621 DIABETIC ULCER OF TOE OF RIGHT FOOT ASSOCIATED WITH TYPE 2 DIABETES MELLITUS, WITH FAT LAYER EXPOSED (HCC): ICD-10-CM

## 2021-05-13 DIAGNOSIS — L97.512 NON-PRESSURE CHRONIC ULCER OF OTHER PART OF RIGHT FOOT WITH FAT LAYER EXPOSED (HCC): ICD-10-CM

## 2021-05-13 DIAGNOSIS — E66.9 OBESITY (BMI 30-39.9): ICD-10-CM

## 2021-05-13 DIAGNOSIS — L97.512 DIABETIC ULCER OF TOE OF RIGHT FOOT ASSOCIATED WITH TYPE 2 DIABETES MELLITUS, WITH FAT LAYER EXPOSED (HCC): ICD-10-CM

## 2021-05-13 DIAGNOSIS — E11.621 DIABETIC ULCER OF TOE OF LEFT FOOT ASSOCIATED WITH TYPE 2 DIABETES MELLITUS, WITH FAT LAYER EXPOSED (HCC): ICD-10-CM

## 2021-05-13 PROCEDURE — 97597 DBRDMT OPN WND 1ST 20 CM/<: CPT

## 2021-05-13 PROCEDURE — 97597 DBRDMT OPN WND 1ST 20 CM/<: CPT | Performed by: NURSE PRACTITIONER

## 2021-05-13 NOTE — PLAN OF CARE
Med reconciliation today. Patient stated would not be taking IMDUR r/t makes BP too low and causes headache. Has f/u with [facility] tomorrow 5/14/21.

## 2021-05-14 NOTE — PROGRESS NOTES
[address]   Progress Note and Procedure Note      Jessy Johnson  MEDICAL RECORD NUMBER:  [id number]  AGE: 77 y.o. GENDER: male  : 1955  EPISODE DATE:  2021    Subjective:     Chief Complaint   Patient presents with    Wound Check         HISTORY of PRESENT ILLNESS HPI     Jessy Johnson is a 77 y.o. male who presents today for wound/ulcer evaluation. History of Wound Context: bilateral toe wounds    Ulcer Identification:  Ulcer Type: diabetic  Contributing Factors: diabetes, chronic pressure and shear force    Wound: N/A        PAST MEDICAL HISTORY        Diagnosis Date    Abnormal nuclear stress test 10/22/2014    Arthritis     BiPAP (biphasic positive airway pressure) dependence     8cm to 20cm    Cerebrovascular disease     Chronic kidney disease, stage II (mild) 2016    Jany Costa M.D.    Coronary artery disease 2011    Depression     Diabetes mellitus ([address]     Encounter for wound care     PT SEES \"GRACY\" WITH DR. SAMUEL    Great toe pain     RT    Headache     Hyperlipemia 2011    Dr. Parveen Coker follows lipids.     Hyperlipidemia     Hypertension     LONG TERM ANTICOAGULENT USE     Low blood pressure 2014    lymphostatic lymphoma     Nausea 2014    Non Hodgkin's lymphoma (HCC)     Obesity     Obstructive sleep apnea     AHI:  21.7 per PSG, 2017    Peptic ulcer disease 2011    Restless leg     S/P CABG x 3 10/22/2014    SVG to RCA; SVG to LAD diagonal; LIMA to LAD    Sleep apnea     Tachyarrhythmia     Thrombocythemia, essential ([address]     Type 2 diabetes mellitus without complication ([address]     Type II or unspecified type diabetes mellitus without mention of complication, not stated as uncontrolled        PAST SURGICAL HISTORY    Past Surgical History:   Procedure Laterality Date    CARDIAC CATHETERIZATION  11    EF 60%    CARDIAC CATHETERIZATION  05, 3/7/07    EF < 50%    CARDIAC CATHETERIZATION  12   MDL    EF  50%    CARDIAC CATHETERIZATION  10/28/14  MDL    CARDIAC CATHETERIZATION     Deshawn Doyle M.D.   [place] GRAFT  05    LIMA to LAD and diagonal; SVG to posterior descending branch RCA    CORONARY ARTERY BYPASS GRAFT  10/30/2014    Redo Sternotomy - SVG-PDA, TMR (RBL)    KIDNEY SURGERY  2017    KNEE ARTHROSCOPY      right ACL repair    SHOULDER ARTHROSCOPY      left-rotator cuff repair right- rotator cuff repair    SHOULDER ARTHROSCOPY  11    right    TONSILLECTOMY         FAMILY HISTORY    Family History   Problem Relation Age of Onset    Heart Disease Other     Lung Cancer Mother     Cancer Sister        SOCIAL HISTORY    Social History     Tobacco Use    Smoking status: Former Smoker     Packs/day: 0.00     Types: Cigarettes     Quit date: 1998     Years since quittin.2    Smokeless tobacco: Never Used    Tobacco comment: quit 23 years ago   Substance Use Topics    Alcohol use: No    Drug use: No       ALLERGIES    Allergies   Allergen Reactions    Ancef [Cefazolin Sodium]     Ceftin [Cefuroxime]      \"anything with ceftin in it\"    Cefuroxime Axetil     Cephalosporins     Neosporin [Bacitracin-Polymyxin B]      blisters       MEDICATIONS    Current Outpatient Medications on File Prior to Encounter   Medication Sig Dispense Refill    busPIRone (BUSPAR) 15 MG tablet Take 7.5 mg by mouth 2 times daily      pravastatin (PRAVACHOL) 40 MG tablet Take 40 mg by mouth nightly      Icosapent Ethyl (VASCEPA) 1 g CAPS capsule Take 1 capsule by mouth 2 times daily      metoprolol tartrate (LOPRESSOR) 25 MG tablet Take 1 tablet by mouth 2 times daily 60 tablet 5    ranolazine (RANEXA) 1000 MG extended release tablet Take 1 tablet by mouth 2 times daily 180 tablet 3    clopidogrel (PLAVIX) 75 MG tablet One daily (Patient taking differently: Take 75 mg by mouth daily ) 90 tablet 3    acetaminophen (TYLENOL) 500 MG tablet Take 1,000 mg by mouth every 6 hours as needed for Pain      metFORMIN (GLUCOPHAGE) 1000 MG tablet HOLD FOR 48 HOURS AFTER THE CARDIAC CATHETERIZATION (Patient taking differently: 500 mg 2 times daily (with meals) HOLD FOR 48 HOURS AFTER THE CARDIAC CATHETERIZATION) 60 tablet 3    polyethylene glycol (GLYCOLAX) powder Take 17 g by mouth as needed      levothyroxine (SYNTHROID) 50 MCG tablet Take 50 mcg by mouth Daily      tamsulosin (FLOMAX) 0.4 MG capsule Take 1 capsule(s) every day by oral route as directed for 30 days. 30 capsule 11    diltiazem (CARDIZEM CD) 180 MG extended release capsule Take 1 capsule by mouth daily 90 capsule 3    meloxicam (MOBIC) 15 MG tablet Take 15 mg by mouth daily       tiZANidine (ZANAFLEX) 4 MG tablet 1/2 tablet at night      ferrous sulfate 325 (65 Fe) MG tablet Take 325 mg by mouth 2 times daily      pantoprazole (PROTONIX) 40 MG tablet Take 1 tablet by mouth daily 90 tablet 2    b complex vitamins capsule Take 1 capsule by mouth daily.  Vitamin D (CHOLECALCIFEROL) 1000 UNITS CAPS capsule Take 1,000 Units by mouth daily.  docusate sodium (COLACE) 100 MG capsule Take 100 mg by mouth daily.  nitroGLYCERIN (NITROSTAT) 0.4 MG SL tablet Place 1 tablet under the tongue every 5 minutes as needed for Chest pain (Patient taking differently: Place 0.4 mg under the tongue every 5 minutes as needed for Chest pain Indications: has not had to take ) 25 tablet 5    DULoxetine (CYMBALTA) 30 MG extended release capsule Take 30 mg by mouth nightly      HYDROcodone-acetaminophen (NORCO) 7.5-325 MG per tablet Take 1-2 tablets by mouth every 6 hours as needed. .      ondansetron (ZOFRAN) 8 MG tablet Take 8 mg by mouth as needed        No current facility-administered medications on file prior to encounter. REVIEW OF SYSTEMS    Pertinent items are noted in HPI.     Objective:      /63   Pulse 70   Temp 96.7 °F (35.9 °C) (Temporal)   Resp 18   Ht 6' (1.829 m)   Wt 224 lb (101.6 kg)   BMI 30.38 kg/m²     Wt Readings from Last 3 Encounters:   05/13/21 224 lb (101.6 kg)   04/29/21 230 lb (104.3 kg)   04/05/21 230 lb (104.3 kg)       PHYSICAL EXAM    General Appearance: alert and oriented to person, place and time, well developed and well- nourished, in no acute distress  Skin: warm and dry, no rash or erythema  Head: normocephalic and atraumatic  Eyes: pupils equal, round, and reactive to light, extraocular eye movements intact, conjunctivae normal  ENT: tympanic membrane, external ear and ear canal normal bilaterally, nose without deformity, nasal mucosa and turbinates normal without polyps  Neck: supple and non-tender without mass, no thyromegaly or thyroid nodules, no cervical lymphadenopathy  Pulmonary/Chest: clear to auscultation bilaterally- no wheezes, rales or rhonchi, normal air movement, no respiratory distress  Extremities: no cyanosis, clubbing or edema  Musculoskeletal: normal range of motion, no joint swelling, deformity or tenderness  Neurologic: reflexes normal and symmetric, no cranial nerve deficit, gait, coordination and speech normal      Assessment:      Patient Active Problem List   Diagnosis Code    Coronary artery disease I25.10    Hyperlipemia E78.5    LONG TERM ANTICOAGULENT USE     Diabetes mellitus (HCC) E11.9    Shortness of breath R06.02    Chest tightness R07.89    JOHNSON (dyspnea on exertion) R06.00    Abnormal nuclear stress test R94.39    S/P CABG x 3 Z95.1    Hypertension I10    Type II or unspecified type diabetes mellitus without mention of complication, not stated as uncontrolled E11.9    Weakness generalized R53.1    Nausea R11.0    Hypotension I95.9    History of coronary artery bypass graft x 1 Z95.1    Fatigue R53.83    Tachycardia R00.0    Tachyarrhythmia R00.0    Chronic kidney disease, stage II (mild) N18.2    Obstructive sleep apnea G47.33  BiPAP (biphasic positive airway pressure) dependence Z99.89    Restless leg G25.81    Bilateral leg edema R60.0    Diabetic ulcer of toe of right foot associated with type 2 diabetes mellitus, with fat layer exposed ([address] E11.621, L97.512    Diabetic ulcer of toe of left foot associated with type 2 diabetes mellitus, with fat layer exposed ([address] E11.621, A26.951    Abnormal nuclear cardiac imaging test R93.1    Obesity (BMI 30-39. 9) E66.9    Right foot pain M79.671    Dyspnea on exertion R06.00    Status post aorto-coronary artery bypass graft Z95.1        Procedure Note  Indications:  Based on my examination of this patient's wound(s)/ulcer(s) today, debridement is required to promote healing and evaluate the wound base. Performed by: GRECIA Hope CNP    Consent obtained:  Yes    Time out taken:  Yes    Pain Control:         Debridement:Non-excisional Debridement    Using curette, #15 blade scalpel and forceps the wound(s)/ulcer(s) was/were sharply debrided down through and including the removal of epidermis and dermis. Devitalized Tissue Debrided:  fibrin, biofilm, slough, exudate and callus    Pre Debridement Measurements:  Are located in the Wound/Ulcer Documentation Flow Sheet    Wound/Ulcer #: 2    Post Debridement Measurements:  Wound/Ulcer Descriptions are Pre Debridement except measurements:      Percent of Wound/Ulcer Debrided: 100%    Total Surface Area Debrided:  0.04 sq cm     Wound 01/26/21 Toe (Comment  which one) Anterior; Left wound 2- left great toe wag 1 (Active)   Wound Image   05/13/21 1129   Wound Etiology Diabetic Lawler 1 05/13/21 1129   Dressing Status New dressing applied 05/13/21 1155   Wound Cleansed Soap and water 05/13/21 1129   Dressing/Treatment Moisture barrier 05/13/21 1155   Offloading for Diabetic Foot Ulcers No 05/13/21 1129   Wound Length (cm) 0.2 cm 05/13/21 1129   Wound Width (cm) 0.2 cm 05/13/21 1129   Wound Depth (cm) 0.1 cm 05/13/21 1129 Wound Surface Area (cm^2) 0.04 cm^2 05/13/21 1129   Change in Wound Size % (l*w) 33.33 05/13/21 1129   Wound Volume (cm^3) 0 cm^3 05/13/21 1129   Wound Healing % 100 05/13/21 1129   Post-Procedure Length (cm) 0.2 cm 05/13/21 1151   Post-Procedure Width (cm) 0.2 cm 05/13/21 1151   Post-Procedure Depth (cm) 0.1 cm 05/13/21 1151   Post-Procedure Surface Area (cm^2) 0.04 cm^2 05/13/21 1151   Post-Procedure Volume (cm^3) 0 cm^3 05/13/21 1151   Distance Tunneling (cm) 0 cm 05/13/21 1129   Tunneling Position ___ O'Clock 0 05/13/21 1129   Undermining Starts ___ O'Clock 0 05/13/21 1129   Undermining Ends___ O'Clock 0 05/13/21 1129   Undermining Maxium Distance (cm) 0 05/13/21 1129   Wound Assessment Dry 05/13/21 1129   Drainage Amount None 05/13/21 1129   Drainage Description Serosanguinous 03/11/21 1130   Odor None 05/13/21 1129   Nelda-wound Assessment Hyperkeratosis (callous) 05/13/21 1129   Margins Attached edges 05/13/21 1129   Wound Thickness Description not for Pressure Injury Full thickness 01/26/21 1032   Number of days: 107       Wound 01/26/21 Toe (Comment  which one) Right wound 3- right great toe wag 1 (Active)   Wound Image   05/13/21 1129   Wound Etiology Diabetic Lawler 1 05/13/21 1129   Dressing Status Old drainage noted 04/08/21 1341   Wound Cleansed Soap and water 05/13/21 1129   Dressing/Treatment Moisture barrier 05/13/21 1155   Offloading for Diabetic Foot Ulcers No 05/13/21 1129   Wound Length (cm) 0 cm 05/13/21 1129   Wound Width (cm) 0 cm 05/13/21 1129   Wound Depth (cm) 0 cm 05/13/21 1129   Wound Surface Area (cm^2) 0 cm^2 05/13/21 1129   Change in Wound Size % (l*w) 100 05/13/21 1129   Wound Volume (cm^3) 0 cm^3 05/13/21 1129   Wound Healing % 100 05/13/21 1129   Post-Procedure Length (cm) 0 cm 05/13/21 1129   Post-Procedure Width (cm) 0 cm 05/13/21 1129   Post-Procedure Depth (cm) 0 cm 05/13/21 1129   Post-Procedure Surface Area (cm^2) 0 cm^2 05/13/21 1129   Post-Procedure Volume (cm^3) 0 cm^3

## 2021-05-17 ENCOUNTER — OFFICE VISIT (OUTPATIENT)
Dept: CARDIOLOGY CLINIC | Age: 66
End: 2021-05-17
Payer: MEDICARE

## 2021-05-17 VITALS
BODY MASS INDEX: 31.42 KG/M2 | WEIGHT: 232 LBS | DIASTOLIC BLOOD PRESSURE: 64 MMHG | HEIGHT: 72 IN | HEART RATE: 82 BPM | SYSTOLIC BLOOD PRESSURE: 136 MMHG

## 2021-05-17 DIAGNOSIS — Z95.1 S/P CABG X 3: ICD-10-CM

## 2021-05-17 DIAGNOSIS — E78.2 MIXED HYPERLIPIDEMIA: ICD-10-CM

## 2021-05-17 DIAGNOSIS — R07.89 CHEST TIGHTNESS: ICD-10-CM

## 2021-05-17 DIAGNOSIS — I10 ESSENTIAL HYPERTENSION: Primary | ICD-10-CM

## 2021-05-17 DIAGNOSIS — E66.9 OBESITY (BMI 30-39.9): ICD-10-CM

## 2021-05-17 DIAGNOSIS — R06.09 DOE (DYSPNEA ON EXERTION): ICD-10-CM

## 2021-05-17 DIAGNOSIS — I25.10 CORONARY ARTERY DISEASE INVOLVING NATIVE CORONARY ARTERY OF NATIVE HEART WITHOUT ANGINA PECTORIS: ICD-10-CM

## 2021-05-17 PROCEDURE — 1036F TOBACCO NON-USER: CPT | Performed by: INTERNAL MEDICINE

## 2021-05-17 PROCEDURE — 4040F PNEUMOC VAC/ADMIN/RCVD: CPT | Performed by: INTERNAL MEDICINE

## 2021-05-17 PROCEDURE — 3017F COLORECTAL CA SCREEN DOC REV: CPT | Performed by: INTERNAL MEDICINE

## 2021-05-17 PROCEDURE — 1123F ACP DISCUSS/DSCN MKR DOCD: CPT | Performed by: INTERNAL MEDICINE

## 2021-05-17 PROCEDURE — G8417 CALC BMI ABV UP PARAM F/U: HCPCS | Performed by: INTERNAL MEDICINE

## 2021-05-17 PROCEDURE — G8427 DOCREV CUR MEDS BY ELIG CLIN: HCPCS | Performed by: INTERNAL MEDICINE

## 2021-05-17 PROCEDURE — 99214 OFFICE O/P EST MOD 30 MIN: CPT | Performed by: INTERNAL MEDICINE

## 2021-05-17 ASSESSMENT — ENCOUNTER SYMPTOMS
DIARRHEA: 0
EYES NEGATIVE: 1
SHORTNESS OF BREATH: 0
NAUSEA: 0
VOMITING: 0
GASTROINTESTINAL NEGATIVE: 1
RESPIRATORY NEGATIVE: 1

## 2021-05-17 NOTE — PROGRESS NOTES
[facility] Progress Note                            Date:  5/17/2021  Patient: Paulo Belle  Age:  77 y.o., 1955      Reason for evaluation:         SUBJECTIVE:    Returns today for follow-up assessment. Recently we had prescribed Imdur and metoprolol and he was taking these together and found out that it dropped his blood pressure excessively so he stopped these. Since then however he is clinically improved his dyspnea has improved his stamina has improved he denies anginal chest pain. He does get out of breath if he walks an extended distance but he is able to do this better than he was previously. I have encouraged him to walk and exercise as much as possible. Blood pressure 136/64 heart rate 82. On 3/30/2021 LDL cholesterol 70 triglycerides 220 HDL 35. Review of Systems   Constitutional: Negative. Negative for chills, fever and unexpected weight change. HENT: Negative. Eyes: Negative. Respiratory: Negative. Negative for shortness of breath. Cardiovascular: Negative. Negative for chest pain. Gastrointestinal: Negative. Negative for diarrhea, nausea and vomiting. Endocrine: Negative. Genitourinary: Negative. Musculoskeletal: Negative. Skin: Negative. Neurological: Negative. All other systems reviewed and are negative. OBJECTIVE:     /64   Pulse 82   Ht 6' (1.829 m)   Wt 232 lb (105.2 kg)   BMI 31.46 kg/m²     Labs:   CBC: No results for input(s): WBC, HGB, HCT, PLT in the last 72 hours. BMP:No results for input(s): NA, K, CO2, BUN, CREATININE, LABGLOM, GLUCOSE in the last 72 hours. BNP: No results for input(s): BNP in the last 72 hours. PT/INR: No results for input(s): PROTIME, INR in the last 72 hours. APTT:No results for input(s): APTT in the last 72 hours. CARDIAC ENZYMES:No results for input(s): CKTOTAL, CKMB, CKMBINDEX, TROPONINI in the last 72 hours.   FASTING LIPID PANEL:  Lab Results   Component Value Date    HDL 35 03/30/2021    LDLDIRECT 99 10/29/2014    LDLCALC 70 03/30/2021    TRIG 220 03/30/2021     LIVER PROFILE:No results for input(s): AST, ALT, LABALBU in the last 72 hours. Past Medical History:   Diagnosis Date    Abnormal nuclear stress test 10/22/2014    Arthritis     BiPAP (biphasic positive airway pressure) dependence     8cm to 20cm    Cerebrovascular disease     Chronic kidney disease, stage II (mild) 08/17/2016    Nishant Good M.D.    Coronary artery disease 2/24/2011    Depression     Diabetes mellitus ([address]     Encounter for wound care     PT SEES \"GRACY\" WITH DR. SAMUEL    Great toe pain     RT    Headache     Hyperlipemia 2/24/2011    Dr. Tricia Adames follows lipids.     Hyperlipidemia     Hypertension     LONG TERM ANTICOAGULENT USE     Low blood pressure 11/19/2014    lymphostatic lymphoma     Nausea 11/19/2014    Non Hodgkin's lymphoma (HCC)     Obesity     Obstructive sleep apnea     AHI:  21.7 per PSG, 7/2017    Peptic ulcer disease 2/24/2011    Restless leg     S/P CABG x 3 10/22/2014    SVG to RCA; SVG to LAD diagonal; LIMA to LAD    Sleep apnea     Tachyarrhythmia     Thrombocythemia, essential ([address]     Type 2 diabetes mellitus without complication ([address]     Type II or unspecified type diabetes mellitus without mention of complication, not stated as uncontrolled      Past Surgical History:   Procedure Laterality Date    CARDIAC CATHETERIZATION  2/28/11    EF 60%    CARDIAC CATHETERIZATION  9/16/05, 3/7/07    EF < 50%    CARDIAC CATHETERIZATION  12/4/12   MDL    EF  50%    CARDIAC CATHETERIZATION  10/28/14  MDL    CARDIAC CATHETERIZATION     Rachell Hooper M.D.   Yarely Taft GRAFT  9/21/05    LIMA to LAD and diagonal; SVG to posterior descending branch RCA    CORONARY ARTERY BYPASS GRAFT  10/30/2014    Redo Sternotomy - SVG-PDA, TMR (RBL)    KIDNEY SURGERY  08/14/2017    KNEE ARTHROSCOPY      right ACL repair    SHOULDER ARTHROSCOPY      left-rotator cuff repair right- rotator cuff repair    SHOULDER ARTHROSCOPY  08-23-11    right    TONSILLECTOMY       Family History   Problem Relation Age of Onset    Heart Disease Other     Lung Cancer Mother     Cancer Sister      Allergies   Allergen Reactions    Ancef [Cefazolin Sodium]     Ceftin [Cefuroxime]      \"anything with ceftin in it\"    Cefuroxime Axetil     Cephalosporins     Neosporin [Bacitracin-Polymyxin B]      blisters     Current Outpatient Medications   Medication Sig Dispense Refill    busPIRone (BUSPAR) 15 MG tablet Take 7.5 mg by mouth 2 times daily      pravastatin (PRAVACHOL) 40 MG tablet Take 40 mg by mouth nightly      Icosapent Ethyl (VASCEPA) 1 g CAPS capsule Take 1 capsule by mouth 2 times daily      metoprolol tartrate (LOPRESSOR) 25 MG tablet Take 1 tablet by mouth 2 times daily 60 tablet 5    ranolazine (RANEXA) 1000 MG extended release tablet Take 1 tablet by mouth 2 times daily 180 tablet 3    clopidogrel (PLAVIX) 75 MG tablet One daily (Patient taking differently: Take 75 mg by mouth daily ) 90 tablet 3    acetaminophen (TYLENOL) 500 MG tablet Take 1,000 mg by mouth every 6 hours as needed for Pain      metFORMIN (GLUCOPHAGE) 1000 MG tablet HOLD FOR 48 HOURS AFTER THE CARDIAC CATHETERIZATION (Patient taking differently: 500 mg 2 times daily (with meals) HOLD FOR 48 HOURS AFTER THE CARDIAC CATHETERIZATION) 60 tablet 3    nitroGLYCERIN (NITROSTAT) 0.4 MG SL tablet Place 1 tablet under the tongue every 5 minutes as needed for Chest pain (Patient taking differently: Place 0.4 mg under the tongue every 5 minutes as needed for Chest pain Indications: has not had to take ) 25 tablet 5    polyethylene glycol (GLYCOLAX) powder Take 17 g by mouth as needed      DULoxetine (CYMBALTA) 30 MG extended release capsule Take 30 mg by mouth nightly      levothyroxine (SYNTHROID) 50 MCG tablet Take 50 mcg by mouth Daily      tamsulosin (FLOMAX) 0.4 MG capsule Take 1 capsule(s) every day by oral route as directed for 30 days. 30 capsule 11    diltiazem (CARDIZEM CD) 180 MG extended release capsule Take 1 capsule by mouth daily 90 capsule 3    HYDROcodone-acetaminophen (NORCO) 7.5-325 MG per tablet Take 1-2 tablets by mouth every 6 hours as needed. [place] meloxicam (MOBIC) 15 MG tablet Take 15 mg by mouth daily       ondansetron (ZOFRAN) 8 MG tablet Take 8 mg by mouth as needed       tiZANidine (ZANAFLEX) 4 MG tablet 1/2 tablet at night      ferrous sulfate 325 (65 Fe) MG tablet Take 325 mg by mouth 2 times daily      pantoprazole (PROTONIX) 40 MG tablet Take 1 tablet by mouth daily 90 tablet 2    b complex vitamins capsule Take 1 capsule by mouth daily.  Vitamin D (CHOLECALCIFEROL) 1000 UNITS CAPS capsule Take 1,000 Units by mouth daily.  docusate sodium (COLACE) 100 MG capsule Take 100 mg by mouth daily. No current facility-administered medications for this visit. Social History     Socioeconomic History    Marital status:      Spouse name: Shivani Hobson Number of children: 2    Years of education: 12    Highest education level: Not on file   Occupational History    Occupation:      Employer: [employer]     Comment: Retired,  for the [employer].    Tobacco Use    Smoking status: Former Smoker     Packs/day: 0.00     Types: Cigarettes     Quit date: 1998     Years since quittin.2    Smokeless tobacco: Never Used    Tobacco comment: quit 23 years ago   Vaping Use    Vaping Use: Never used   Substance and Sexual Activity    Alcohol use: No    Drug use: No    Sexual activity: Not on file   Other Topics Concern    Not on file   Social History Narrative    Not on file     Social Determinants of Health     Financial Resource Strain:     Difficulty of Paying Living Expenses:    Food Insecurity:     Worried About Running Out of Food in the Last Year:     Duane of Food in the Last Year:    Transportation Needs:     Lack of Transportation (Medical):  Lack of Transportation (Non-Medical):    Physical Activity:     Days of Exercise per Week:     Minutes of Exercise per Session:    Stress:     Feeling of Stress :    Social Connections:     Frequency of Communication with Friends and Family:     Frequency of Social Gatherings with Friends and Family:     Attends Islam Services:     Active Member of Clubs or Organizations:     Attends Club or Organization Meetings:     Marital Status:    Intimate Partner Violence:     Fear of Current or Ex-Partner:     Emotionally Abused:     Physically Abused:     Sexually Abused:        Physical Examination:  /64   Pulse 82   Ht 6' (1.829 m)   Wt 232 lb (105.2 kg)   BMI 31.46 kg/m²   Physical Exam  Vitals reviewed. Constitutional:       Appearance: He is well-developed. Neck:      Vascular: No carotid bruit or JVD. Cardiovascular:      Rate and Rhythm: Normal rate and regular rhythm. Heart sounds: Normal heart sounds. No murmur heard. No friction rub. No gallop. Pulmonary:      Effort: Pulmonary effort is normal. No respiratory distress. Breath sounds: Normal breath sounds. No wheezing or rales. Abdominal:      General: There is no distension. Tenderness: There is no abdominal tenderness. Lymphadenopathy:      Cervical: No cervical adenopathy. Skin:     General: Skin is warm and dry. ASSESSMENT:     Diagnosis Orders   1. Essential hypertension     2. Mixed hyperlipidemia     3. S/P CABG x 3     4. JOHNSON (dyspnea on exertion)     5. Coronary artery disease involving native coronary artery of native heart without angina pectoris     6. Obesity (BMI 30-39.9)     7. Chest tightness         PLAN:  No orders of the defined types were placed in this encounter.     No orders of the defined types were placed in this encounter. 1. Continue present medications  2. Recommend follow-up assessment in 6 months    Return in about 6 months (around 11/17/2021) for return to Dr. Yas Saldaña only. Davina Buenrostro MD 5/17/2021 2:54 PM CDT    [facility]      Thisdictation was generated by voice recognition computer software. Although all attempts are made to edit the dictation for accuracy, there may be errors in the transcription that are not intended.

## 2021-05-20 ENCOUNTER — HOSPITAL ENCOUNTER (OUTPATIENT)
Dept: WOUND CARE | Age: 66
Discharge: HOME | End: 2021-05-20
Payer: MEDICARE

## 2021-05-20 VITALS
WEIGHT: 232 LBS | DIASTOLIC BLOOD PRESSURE: 62 MMHG | RESPIRATION RATE: 18 BRPM | HEIGHT: 72 IN | HEART RATE: 80 BPM | BODY MASS INDEX: 31.42 KG/M2 | SYSTOLIC BLOOD PRESSURE: 111 MMHG | TEMPERATURE: 96.5 F

## 2021-05-20 DIAGNOSIS — L97.522 DIABETIC ULCER OF TOE OF LEFT FOOT ASSOCIATED WITH TYPE 2 DIABETES MELLITUS, WITH FAT LAYER EXPOSED (HCC): ICD-10-CM

## 2021-05-20 DIAGNOSIS — L97.512 NON-PRESSURE CHRONIC ULCER OF OTHER PART OF RIGHT FOOT WITH FAT LAYER EXPOSED (HCC): ICD-10-CM

## 2021-05-20 DIAGNOSIS — E66.9 OBESITY (BMI 30-39.9): ICD-10-CM

## 2021-05-20 DIAGNOSIS — M79.671 RIGHT FOOT PAIN: Primary | ICD-10-CM

## 2021-05-20 DIAGNOSIS — E11.621 DIABETIC ULCER OF TOE OF RIGHT FOOT ASSOCIATED WITH TYPE 2 DIABETES MELLITUS, WITH FAT LAYER EXPOSED (HCC): ICD-10-CM

## 2021-05-20 DIAGNOSIS — L97.512 DIABETIC ULCER OF TOE OF RIGHT FOOT ASSOCIATED WITH TYPE 2 DIABETES MELLITUS, WITH FAT LAYER EXPOSED (HCC): ICD-10-CM

## 2021-05-20 DIAGNOSIS — E11.621 DIABETIC ULCER OF TOE OF LEFT FOOT ASSOCIATED WITH TYPE 2 DIABETES MELLITUS, WITH FAT LAYER EXPOSED (HCC): ICD-10-CM

## 2021-05-20 PROCEDURE — 99212 OFFICE O/P EST SF 10 MIN: CPT | Performed by: NURSE PRACTITIONER

## 2021-05-20 PROCEDURE — 99212 OFFICE O/P EST SF 10 MIN: CPT

## 2021-05-20 NOTE — PROGRESS NOTES
[address]   Progress Note and Procedure Note      Paulo Belle  MEDICAL RECORD NUMBER:  [id number]  AGE: 77 y.o. GENDER: male  : 1955  EPISODE DATE:  2021    Subjective:     Chief Complaint   Patient presents with    Wound Check         HISTORY of PRESENT ILLNESS HPI     Paulo Belle is a 77 y.o. male who presents today for wound/ulcer evaluation. History of Wound Context: left foot wound follow up    Ulcer Identification:  Ulcer Type: diabetic  Contributing Factors: diabetes, chronic pressure and shear force    Wound: N/A        PAST MEDICAL HISTORY        Diagnosis Date    Abnormal nuclear stress test 10/22/2014    Arthritis     BiPAP (biphasic positive airway pressure) dependence     8cm to 20cm    Cerebrovascular disease     Chronic kidney disease, stage II (mild) 2016    Beto Vega M.D.    Coronary artery disease 2011    Depression     Diabetes mellitus [facility]     Encounter for wound care     PT SEES \"GRACY\" WITH DR. SAMUEL    Great toe pain     RT    Headache     Hyperlipemia 2011    Dr. Brandi Ho follows lipids.     Hyperlipidemia     Hypertension     LONG TERM ANTICOAGULENT USE     Low blood pressure 2014    lymphostatic lymphoma     Nausea 2014    Non Hodgkin's lymphoma (HCC)     Obesity     Obstructive sleep apnea     AHI:  21.7 per PSG, 2017    Peptic ulcer disease 2011    Restless leg     S/P CABG x 3 10/22/2014    SVG to RCA; SVG to LAD diagonal; LIMA to LAD    Sleep apnea     Tachyarrhythmia     Thrombocythemia, essential ([address]     Type 2 diabetes mellitus without complication ([address]     Type II or unspecified type diabetes mellitus without mention of complication, not stated as uncontrolled        PAST SURGICAL HISTORY    Past Surgical History:   Procedure Laterality Date    CARDIAC CATHETERIZATION  11    EF 60%    CARDIAC CATHETERIZATION  05, 3/7/07    EF < 50%    CARDIAC CATHETERIZATION  12   MDL    EF  50%    CARDIAC CATHETERIZATION  10/28/14  MDL    CARDIAC CATHETERIZATION     Jovita Duverney, M.D.   Janeece Nose GRAFT  05    LIMA to LAD and diagonal; SVG to posterior descending branch RCA    CORONARY ARTERY BYPASS GRAFT  10/30/2014    Redo Sternotomy - SVG-PDA, TMR (RBL)    KIDNEY SURGERY  2017    KNEE ARTHROSCOPY      right ACL repair    SHOULDER ARTHROSCOPY      left-rotator cuff repair right- rotator cuff repair    SHOULDER ARTHROSCOPY  11    right    TONSILLECTOMY         FAMILY HISTORY    Family History   Problem Relation Age of Onset    Heart Disease Other     Lung Cancer Mother     Cancer Sister        SOCIAL HISTORY    Social History     Tobacco Use    Smoking status: Former Smoker     Packs/day: 0.00     Types: Cigarettes     Quit date: 1998     Years since quittin.3    Smokeless tobacco: Never Used    Tobacco comment: quit 23 years ago   Vaping Use    Vaping Use: Never used   Substance Use Topics    Alcohol use: No    Drug use: No       ALLERGIES    Allergies   Allergen Reactions    Ancef [Cefazolin Sodium]     Ceftin [Cefuroxime]      \"anything with ceftin in it\"    Cefuroxime Axetil     Cephalosporins     Neosporin [Bacitracin-Polymyxin B]      blisters       MEDICATIONS    Current Outpatient Medications on File Prior to Encounter   Medication Sig Dispense Refill    busPIRone (BUSPAR) 15 MG tablet Take 7.5 mg by mouth 2 times daily      pravastatin (PRAVACHOL) 40 MG tablet Take 40 mg by mouth nightly      Icosapent Ethyl (VASCEPA) 1 g CAPS capsule Take 1 capsule by mouth 2 times daily      metoprolol tartrate (LOPRESSOR) 25 MG tablet Take 1 tablet by mouth 2 times daily 60 tablet 5    ranolazine (RANEXA) 1000 MG extended release tablet Take 1 tablet by mouth 2 times daily 180 tablet 3    clopidogrel (PLAVIX) 75 MG tablet One daily (Patient taking differently: Take 75 mg by mouth daily ) 90 tablet 3    acetaminophen (TYLENOL) 500 MG tablet Take 1,000 mg by mouth every 6 hours as needed for Pain      metFORMIN (GLUCOPHAGE) 1000 MG tablet HOLD FOR 48 HOURS AFTER THE CARDIAC CATHETERIZATION (Patient taking differently: 500 mg 2 times daily (with meals) HOLD FOR 48 HOURS AFTER THE CARDIAC CATHETERIZATION) 60 tablet 3    polyethylene glycol (GLYCOLAX) powder Take 17 g by mouth as needed      DULoxetine (CYMBALTA) 30 MG extended release capsule Take 30 mg by mouth nightly Indications: patient stated received medications yesterday 5/19/21       levothyroxine (SYNTHROID) 50 MCG tablet Take 50 mcg by mouth Daily      tamsulosin (FLOMAX) 0.4 MG capsule Take 1 capsule(s) every day by oral route as directed for 30 days. 30 capsule 11    diltiazem (CARDIZEM CD) 180 MG extended release capsule Take 1 capsule by mouth daily 90 capsule 3    HYDROcodone-acetaminophen (NORCO) 7.5-325 MG per tablet Take 1-2 tablets by mouth every 6 hours as needed. Renell [place] meloxicam (MOBIC) 15 MG tablet Take 15 mg by mouth daily       tiZANidine (ZANAFLEX) 4 MG tablet 1/2 tablet at night      ferrous sulfate 325 (65 Fe) MG tablet Take 325 mg by mouth 2 times daily      pantoprazole (PROTONIX) 40 MG tablet Take 1 tablet by mouth daily 90 tablet 2    b complex vitamins capsule Take 1 capsule by mouth daily.  Vitamin D (CHOLECALCIFEROL) 1000 UNITS CAPS capsule Take 1,000 Units by mouth daily.  docusate sodium (COLACE) 100 MG capsule Take 100 mg by mouth daily.       nitroGLYCERIN (NITROSTAT) 0.4 MG SL tablet Place 1 tablet under the tongue every 5 minutes as needed for Chest pain (Patient taking differently: Place 0.4 mg under the tongue every 5 minutes as needed for Chest pain Indications: has not had to take ) 25 tablet 5    ondansetron (ZOFRAN) 8 MG tablet Take 8 mg by mouth as needed        No current facility-administered medications on file prior to encounter. REVIEW OF SYSTEMS    Pertinent items are noted in HPI.     Objective:      /62   Pulse 80   Temp 96.5 °F (35.8 °C) (Temporal)   Resp 18   Ht 6' (1.829 m)   Wt 232 lb (105.2 kg)   BMI 31.46 kg/m²     Wt Readings from Last 3 Encounters:   05/20/21 232 lb (105.2 kg)   05/17/21 232 lb (105.2 kg)   05/13/21 224 lb (101.6 kg)       PHYSICAL EXAM    General Appearance: alert and oriented to person, place and time, well developed and well- nourished, in no acute distress  Skin: warm and dry, no rash or erythema  Head: normocephalic and atraumatic  Eyes: pupils equal, round, and reactive to light, extraocular eye movements intact, conjunctivae normal  ENT: tympanic membrane, external ear and ear canal normal bilaterally, nose without deformity, nasal mucosa and turbinates normal without polyps  Neck: supple and non-tender without mass, no thyromegaly or thyroid nodules, no cervical lymphadenopathy  Pulmonary/Chest: clear to auscultation bilaterally- no wheezes, rales or rhonchi, normal air movement, no respiratory distress  Extremities: no cyanosis, clubbing or edema  Musculoskeletal: normal range of motion, no joint swelling, deformity or tenderness  Neurologic: reflexes normal and symmetric, no cranial nerve deficit, gait, coordination and speech normal      Assessment:      Patient Active Problem List   Diagnosis Code    Coronary artery disease I25.10    Hyperlipemia E78.5    LONG TERM ANTICOAGULENT USE     Diabetes mellitus (HCC) E11.9    Shortness of breath R06.02    Chest tightness R07.89    JOHNSON (dyspnea on exertion) R06.00    Abnormal nuclear stress test R94.39    S/P CABG x 3 Z95.1    Hypertension I10    Type II or unspecified type diabetes mellitus without mention of complication, not stated as uncontrolled E11.9    Weakness generalized R53.1    Nausea R11.0    Hypotension I95.9    History of coronary artery bypass graft x 1 Z95.1    Fatigue R53.83    Tachycardia R00.0  Tachyarrhythmia R00.0    Chronic kidney disease, stage II (mild) N18.2    Obstructive sleep apnea G47.33    BiPAP (biphasic positive airway pressure) dependence Z99.89    Restless leg G25.81    Bilateral leg edema R60.0    Diabetic ulcer of toe of right foot associated with type 2 diabetes mellitus, with fat layer exposed ([address] E11.621, L97.512    Diabetic ulcer of toe of left foot associated with type 2 diabetes mellitus, with fat layer exposed (HCC) E11.621, R79.856    Abnormal nuclear cardiac imaging test R93.1    Obesity (BMI 30-39. 9) E66.9    Right foot pain M79.671    Dyspnea on exertion R06.00    Status post aorto-coronary artery bypass graft Z95.1                 Wound 01/26/21 Toe (Comment  which one) Anterior; Left wound 2- left great toe wag 1 (Active)   Wound Image   05/20/21 1201   Wound Etiology Diabetic [place] 1 05/20/21 1201   Dressing Status Dry; Intact 05/20/21 1201   Wound Cleansed Soap and water 05/20/21 1201   Dressing/Treatment Moisture barrier 05/13/21 1155   Offloading for Diabetic Foot Ulcers No 05/13/21 1129   Wound Length (cm) 0.2 cm 05/20/21 1201   Wound Width (cm) 0.1 cm 05/20/21 1201   Wound Depth (cm) 0.1 cm 05/20/21 1201   Wound Surface Area (cm^2) 0.02 cm^2 05/20/21 1201   Change in Wound Size % (l*w) 66.67 05/20/21 1201   Wound Volume (cm^3) 0 cm^3 05/20/21 1201   Wound Healing % 100 05/20/21 1201   Post-Procedure Length (cm) 0 cm 05/20/21 1248   Post-Procedure Width (cm) 0 cm 05/20/21 1248   Post-Procedure Depth (cm) 0 cm 05/20/21 1248   Post-Procedure Surface Area (cm^2) 0 cm^2 05/20/21 1248   Post-Procedure Volume (cm^3) 0 cm^3 05/20/21 1248   Distance Tunneling (cm) 0 cm 05/20/21 1201   Tunneling Position ___ O'Clock 0 05/20/21 1201   Undermining Starts ___ O'Clock 0 05/20/21 1201   Undermining Ends___ O'Clock 0 05/20/21 1201   Undermining Maxium Distance (cm) 0 05/20/21 1201   Wound Assessment Dry 05/20/21 1201   Drainage Amount None 05/20/21 1201   Drainage Description Serosanguinous 03/11/21 1130   Odor None 05/20/21 1201   Nelda-wound Assessment Hyperkeratosis (callous) 05/20/21 1201   Margins Attached edges 05/20/21 1201   Wound Thickness Description not for Pressure Injury Full thickness 01/26/21 1032   Number of days: 114       Plan:     Problem List Items Addressed This Visit     * (Principal) Diabetic ulcer of toe of left foot associated with type 2 diabetes mellitus, with fat layer exposed ([address] (Chronic)    Relevant Orders    Supply: Wound Cleanser    Diabetic ulcer of toe of right foot associated with type 2 diabetes mellitus, with fat layer exposed ([address]    Obesity (BMI 30-39. 9)    Right foot pain - Primary      Other Visit Diagnoses     Non-pressure chronic ulcer of other part of right foot with fat layer exposed ([address]        Relevant Medications    Lidocaine 2 % GEL (Completed)    Other Relevant Orders    Supply: Wound Cleanser              Treatment Note please see attached Discharge Instructions    In my professional opinion this patient would benefit from HBO Therapy: No    Written patient dismissal instructions given to patient and signed by patient or POA. Mr. Yeison Smyth is healed! Discharge [address] and Hyperbaric Oxygen Therapy   Physician Orders and Discharge Instructions  [address] [place] [address]  Telephone: [phone]    NAME:  Erin Wright OF BIRTH:  1955  MEDICAL RECORD NUMBER:  [id number]  DATE:  5/20/2021    Discharge condition: Stable    Discharge to: Home    Left via:Private automobile    Accompanied by:  self    ECF/[facility]: [facility]     Dressing Orders: Apply Met-pad at base of toe. Change pad every 3 days   Left foot: Soap and water wash, Apply flexitol and moisturizer.  .       Treatment Orders:   Wear off-load shoes    [facility] follow up visit _____Call if needed________________________  (Please note your next appointment above and if you are unable to keep, kindly give a 24 hour notice. Thank you.)          If you experience any of the following, please call the [address] during business hours:    * Increase in Pain  * Temperature over 101  * Increase in drainage from your wound  * Drainage with a foul odor  * Bleeding  * Increase in swelling  * Need for compression bandage changes due to slippage, breakthrough drainage. If you need medical attention outside of the business hours of the [address] please contact your PCP or go to the nearest emergency room.           Electronically signed by GRECIA Joseph CNP on 5/20/2021 at 1:36 PM

## 2021-07-23 ENCOUNTER — HOSPITAL ENCOUNTER (OUTPATIENT)
Dept: GENERAL RADIOLOGY | Facility: HOSPITAL | Age: 66
Discharge: HOME | End: 2021-07-23
Admitting: PHYSICIAN ASSISTANT

## 2021-07-23 ENCOUNTER — TRANSCRIBE ORDERS (OUTPATIENT)
Dept: ADMINISTRATIVE | Facility: HOSPITAL | Age: 66
End: 2021-07-23

## 2021-07-23 DIAGNOSIS — R60.0 LOCALIZED EDEMA: ICD-10-CM

## 2021-07-23 DIAGNOSIS — R06.02 SHORTNESS OF BREATH: ICD-10-CM

## 2021-07-23 DIAGNOSIS — R06.02 SHORTNESS OF BREATH: Primary | ICD-10-CM

## 2021-07-23 PROCEDURE — 71046 X-RAY EXAM CHEST 2 VIEWS: CPT

## 2021-08-02 ENCOUNTER — HOSPITAL ENCOUNTER (OUTPATIENT)
Dept: WOUND CARE | Age: 66
Discharge: HOME | End: 2021-08-02
Payer: MEDICARE

## 2021-08-02 VITALS
SYSTOLIC BLOOD PRESSURE: 120 MMHG | HEART RATE: 96 BPM | HEIGHT: 71 IN | TEMPERATURE: 96.7 F | BODY MASS INDEX: 32.9 KG/M2 | WEIGHT: 235 LBS | DIASTOLIC BLOOD PRESSURE: 62 MMHG | RESPIRATION RATE: 20 BRPM

## 2021-08-02 DIAGNOSIS — E66.9 OBESITY (BMI 30-39.9): ICD-10-CM

## 2021-08-02 DIAGNOSIS — E11.621 DIABETIC ULCER OF TOE OF RIGHT FOOT ASSOCIATED WITH TYPE 2 DIABETES MELLITUS, WITH FAT LAYER EXPOSED (HCC): ICD-10-CM

## 2021-08-02 DIAGNOSIS — E11.621 DIABETIC ULCER OF TOE OF LEFT FOOT ASSOCIATED WITH TYPE 2 DIABETES MELLITUS, WITH FAT LAYER EXPOSED (HCC): Primary | Chronic | ICD-10-CM

## 2021-08-02 DIAGNOSIS — L97.512 DIABETIC ULCER OF TOE OF RIGHT FOOT ASSOCIATED WITH TYPE 2 DIABETES MELLITUS, WITH FAT LAYER EXPOSED (HCC): ICD-10-CM

## 2021-08-02 DIAGNOSIS — L97.522 DIABETIC ULCER OF TOE OF LEFT FOOT ASSOCIATED WITH TYPE 2 DIABETES MELLITUS, WITH FAT LAYER EXPOSED (HCC): Primary | Chronic | ICD-10-CM

## 2021-08-02 PROCEDURE — 99213 OFFICE O/P EST LOW 20 MIN: CPT | Performed by: NURSE PRACTITIONER

## 2021-08-02 PROCEDURE — 97597 DBRDMT OPN WND 1ST 20 CM/<: CPT

## 2021-08-02 PROCEDURE — 99213 OFFICE O/P EST LOW 20 MIN: CPT

## 2021-08-02 RX ORDER — FUROSEMIDE 20 MG/1
1 TABLET ORAL DAILY
Status: ON HOLD | COMMUNITY
Start: 2021-07-23 | End: 2021-12-17

## 2021-08-02 RX ORDER — LISINOPRIL 2.5 MG/1
1 TABLET ORAL DAILY
COMMUNITY
Start: 2021-06-25 | End: 2021-11-18 | Stop reason: DRUGHIGH

## 2021-08-02 RX ORDER — GABAPENTIN 100 MG/1
100 CAPSULE ORAL NIGHTLY
COMMUNITY
Start: 2021-07-28

## 2021-08-02 RX ORDER — LIDOCAINE HYDROCHLORIDE 20 MG/ML
JELLY TOPICAL PRN
Status: DISCONTINUED | OUTPATIENT
Start: 2021-08-02 | End: 2021-08-04 | Stop reason: HOSPADM

## 2021-08-02 RX ORDER — AMOXICILLIN AND CLAVULANATE POTASSIUM 875; 125 MG/1; MG/1
1 TABLET, FILM COATED ORAL 2 TIMES DAILY
COMMUNITY
Start: 2021-07-28 | End: 2021-08-24 | Stop reason: ALTCHOICE

## 2021-08-02 ASSESSMENT — VISUAL ACUITY: OU: 1

## 2021-08-02 NOTE — PROGRESS NOTES
Patient Care Team:  ADRY Lee as PCP - General (Physician Assistant)  ADRY Lee as PCP - [facility] Empaneled Provider  Rey Costello MD (Family Medicine)  Shiv Hua MD (Rheumatology)  Sathya Gonzalez MD as Consulting Physician (Hematology and Oncology)  GRECIA Barajas - NP as Nurse Practitioner (Nurse Practitioner Adult Health)  Devyn Tim MD as Consulting Physician (Interventional Cardiology)    TODAY'S DATE:  8/2/2021     HISTORY of PRESENTILLNESS HPI   Saravanan Ramirez is a 77 y.o. male who presents today for wound evaluation. He reports he developed a wound on right/left foot. This started 2 week(s) ago. He believes this is not healing. He has been applying nothing. He has not had  fever orchills. He has a history of diabetes mellitus, recent lab work 6.8. Mr. Danielito Wilks recently obtained his diabetic shoe inserts however I think the callous was already causing damage. I want to try to heal his wounds up before he gets re-evaluated with his inserts. He has not compliant with offloading in the past, I want to try felt/foam on the plantar right foot wound due to the location of the wound. I think he is digging his toes into the shoes when he walks so I am using a met-pad to offload his left great toe.   Wound Type:diabetic  Wound Location:right and left foot  Modifying factors:diabetes, chronic pressure, shear force and obesity    Patient Active Problem List   Diagnosis Code    Coronary artery disease I25.10    Hyperlipemia E78.5    LONG TERM ANTICOAGULENT USE     Diabetes mellitus (HCC) E11.9    Shortness of breath R06.02    Chest tightness R07.89    JOHNSON (dyspnea on exertion) R06.00    Abnormal nuclear stress test R94.39    S/P CABG x 3 Z95.1    Hypertension I10    Type II or unspecified type diabetes mellitus without mention of complication, not stated as uncontrolled E11.9    Weakness generalized R53.1    Nausea R11.0    Hypotension I95.9    History of coronary artery bypass graft x 1 Z95.1    Fatigue R53.83    Tachycardia R00.0    Tachyarrhythmia R00.0    Chronic kidney disease, stage II (mild) N18.2    Obstructive sleep apnea G47.33    BiPAP (biphasic positive airway pressure) dependence Z99.89    Restless leg G25.81    Bilateral leg edema R60.0    Diabetic ulcer of toe of right foot associated with type 2 diabetes mellitus, with fat layer exposed ([address] E11.621, L97.512    Diabetic ulcer of toe of left foot associated with type 2 diabetes mellitus, with fat layer exposed (HCC) E11.621, P70.448    Abnormal nuclear cardiac imaging test R93.1    Obesity (BMI 30-39. 9) E66.9    Right foot pain M79.671    Dyspnea on exertion R06.00    Status post aorto-coronary artery bypass graft Z95.1       Faby Bennett is a 77 y.o. male with the following history reviewed and recorded in [facility]:    Current Outpatient Medications   Medication Sig Dispense Refill    amoxicillin-clavulanate (AUGMENTIN) 875-125 MG per tablet Take 1 tablet by mouth 2 times daily      furosemide (LASIX) 20 MG tablet Take 1 tablet by mouth daily      busPIRone (BUSPAR) 15 MG tablet Take 7.5 mg by mouth 2 times daily      pravastatin (PRAVACHOL) 40 MG tablet Take 40 mg by mouth nightly      Icosapent Ethyl (VASCEPA) 1 g CAPS capsule Take 1 capsule by mouth 2 times daily      metoprolol tartrate (LOPRESSOR) 25 MG tablet Take 1 tablet by mouth 2 times daily 60 tablet 5    ranolazine (RANEXA) 1000 MG extended release tablet Take 1 tablet by mouth 2 times daily 180 tablet 3    clopidogrel (PLAVIX) 75 MG tablet One daily (Patient taking differently: Take 75 mg by mouth daily ) 90 tablet 3    acetaminophen (TYLENOL) 500 MG tablet Take 1,000 mg by mouth every 6 hours as needed for Pain      metFORMIN (GLUCOPHAGE) 1000 MG tablet HOLD FOR 48 HOURS AFTER THE CARDIAC CATHETERIZATION (Patient taking differently: 500 mg 2 times daily (with meals) HOLD FOR 48 HOURS AFTER THE CARDIAC CATHETERIZATION) 60 tablet 3    nitroGLYCERIN (NITROSTAT) 0.4 MG SL tablet Place 1 tablet under the tongue every 5 minutes as needed for Chest pain (Patient taking differently: Place 0.4 mg under the tongue every 5 minutes as needed for Chest pain Indications: has not had to take ) 25 tablet 5    DULoxetine (CYMBALTA) 30 MG extended release capsule Take 30 mg by mouth nightly Indications: patient stated received medications yesterday 5/19/21       levothyroxine (SYNTHROID) 50 MCG tablet Take 50 mcg by mouth Daily      tamsulosin (FLOMAX) 0.4 MG capsule Take 1 capsule(s) every day by oral route as directed for 30 days. 30 capsule 11    diltiazem (CARDIZEM CD) 180 MG extended release capsule Take 1 capsule by mouth daily 90 capsule 3    HYDROcodone-acetaminophen (NORCO) 7.5-325 MG per tablet Take 1-2 tablets by mouth every 6 hours as needed. Eveline All meloxicam (MOBIC) 15 MG tablet Take 15 mg by mouth daily       ondansetron (ZOFRAN) 8 MG tablet Take 8 mg by mouth as needed       tiZANidine (ZANAFLEX) 4 MG tablet 1/2 tablet at night      ferrous sulfate 325 (65 Fe) MG tablet Take 325 mg by mouth 2 times daily      pantoprazole (PROTONIX) 40 MG tablet Take 1 tablet by mouth daily 90 tablet 2    b complex vitamins capsule Take 1 capsule by mouth daily.  Vitamin D (CHOLECALCIFEROL) 1000 UNITS CAPS capsule Take 1,000 Units by mouth daily.  docusate sodium (COLACE) 100 MG capsule Take 100 mg by mouth daily.  gabapentin (NEURONTIN) 100 MG capsule Take 1 capsule by mouth 2 times daily.  lisinopril (PRINIVIL;ZESTRIL) 2.5 MG tablet Take 1 tablet by mouth daily       Current Facility-Administered Medications   Medication Dose Route Frequency Provider Last Rate Last Admin    lidocaine (XYLOCAINE) 2 % uro-jet   Topical PRN GRECIA Tejeda CNP         Allergies:  Ancef [cefazolin sodium], Ceftin [cefuroxime], Cefuroxime axetil, Cephalosporins, and Neosporin [bacitracin-polymyxin b]  Past Medical History:   Diagnosis Date    Abnormal nuclear stress test 10/22/2014    Arthritis     BiPAP (biphasic positive airway pressure) dependence     8cm to 20cm    Cerebrovascular disease     Chronic kidney disease, stage II (mild) 08/17/2016    Epifanio Baker M.D.    Coronary artery disease 2/24/2011    Depression     Diabetes mellitus ([facility]     Encounter for wound care     PT SEES \"GRACY\" WITH DR. SAMUEL    Great toe pain     RT    Headache     Hyperlipemia 2/24/2011    Dr. Marge Akhtar follows lipids.     Hyperlipidemia     Hypertension     LONG TERM ANTICOAGULENT USE     Low blood pressure 11/19/2014    lymphostatic lymphoma     Nausea 11/19/2014    Non Hodgkin's lymphoma (HCC)     Obesity     Obstructive sleep apnea     AHI:  21.7 per PS[facility], 7/2017    Peptic ulcer disease 2/24/2011    Restless leg     S/P CABG x 3 10/22/2014    SVG to RCA; SVG to LAD diagonal; LIMA to LAD    Sleep apnea     Tachyarrhythmia     Thrombocythemia, essential ([facility]     Type 2 diabetes mellitus without complication ([facility]     Type II or unspecified type diabetes mellitus without mention of complication, not stated as uncontrolled        Past Surgical History:   Procedure Laterality Date    CARDIAC CATHETERIZATION  2/28/11    EF 60%    CARDIAC CATHETERIZATION  9/16/05, 3/7/07    EF < 50%    CARDIAC CATHETERIZATION  12/4/12   MDL    EF  50%    CARDIAC CATHETERIZATION  10/28/14  MDL    CARDIAC CATHETERIZATION     Jerome Herring M.D.   Henson Blonder GRAFT  9/21/05    LIMA to LAD and diagonal; SVG to posterior descending branch RCA    CORONARY ARTERY BYPASS GRAFT  10/30/2014    Redo Sternotomy - SVG-PDA, TMR (RBL)    KIDNEY SURGERY  08/14/2017    KNEE ARTHROSCOPY      right ACL repair    SHOULDER ARTHROSCOPY      left-rotator cuff repair right- rotator cuff repair    SHOULDER ARTHROSCOPY  08-23-11    right    TONSILLECTOMY       Family History   Problem Relation Age of Onset    Heart Disease Other     Lung Cancer Mother     Cancer Sister      Social History     Tobacco Use    Smoking status: Former Smoker     Packs/day: 0.00     Types: Cigarettes     Quit date: 1998     Years since quittin.5    Smokeless tobacco: Never Used    Tobacco comment: quit 23 years ago   Substance Use Topics    Alcohol use: No         Review of Systems    Review of Systems   Skin: Positive for wound. All other systems reviewed and are negative. All other review of systems are negative. Physical Exam    /62   Pulse 96   Temp 96.7 °F (35.9 °C) (Temporal)   Resp 20   Ht 5' 11\" (1.803 m)   Wt 235 lb (106.6 kg)   BMI 32.78 kg/m²     Physical Exam  Vitals reviewed. Constitutional:       Appearance: Normal appearance. He is obese. HENT:      Head: Normocephalic and atraumatic. Right Ear: External ear normal.      Left Ear: External ear normal.   Eyes:      General: Lids are normal. Lids are everted, no foreign bodies appreciated. Vision grossly intact. Gaze aligned appropriately. Cardiovascular:      Rate and Rhythm: Normal rate. Pulses: Normal pulses. Pulmonary:      Effort: Pulmonary effort is normal.   Musculoskeletal:         General: Normal range of motion. Feet:    Skin:     General: Skin is warm and dry. Capillary Refill: Capillary refill takes 2 to 3 seconds. Neurological:      Mental Status: He is oriented to person, place, and time. Psychiatric:         Mood and Affect: Mood normal.         Behavior: Behavior normal.         Thought Content: Thought content normal.         Judgment: Judgment normal.             Post Debridement Measurements and Assessment:    The patientspain is   . Wound is has improved. Please refer to nursing measurements and assessment regarding wound pre and postdebridement.     Wound 21 Toe (Comment  which one) Left;Posterior wound 1- left great toe wag 1 (Active)   Wound Image    08/02/21 1340   Wound Etiology Diabetic Lawler 1 08/02/21 1340   Dressing Status Old drainage noted 08/02/21 1340   Wound Cleansed Soap and water 08/02/21 1340   Dressing/Treatment Xeroform 08/02/21 1340   Offloading for Diabetic Foot Ulcers Felt or foam 08/02/21 1427   Wound Length (cm) 0.8 cm 08/02/21 1340   Wound Width (cm) 0.7 cm 08/02/21 1340   Wound Depth (cm) 0.2 cm 08/02/21 1340   Wound Surface Area (cm^2) 0.56 cm^2 08/02/21 1340   Wound Volume (cm^3) 0.112 cm^3 08/02/21 1340   Post-Procedure Length (cm) 0.8 cm 08/02/21 1427   Post-Procedure Width (cm) 0.7 cm 08/02/21 1427   Post-Procedure Depth (cm) 0.2 cm 08/02/21 1427   Post-Procedure Surface Area (cm^2) 0.56 cm^2 08/02/21 1427   Post-Procedure Volume (cm^3) 0.112 cm^3 08/02/21 1427   Wound Assessment Pink/red 08/02/21 1340   Drainage Amount Moderate 08/02/21 1340   Drainage Description Serosanguinous 08/02/21 1340   Odor None 08/02/21 1340   Nelda-wound Assessment Intact; Hyperkeratosis (callous) 08/02/21 1340   Margins Defined edges 08/02/21 1340   Wound Thickness Description not for Pressure Injury Full thickness 08/02/21 1340   Number of days: 0       Wound 08/02/21 Pedal Right;Plantar wound 2- right plantar wag 1 (Active)   Wound Image    08/02/21 1340   Wound Etiology Diabetic Lawler 1 08/02/21 1340   Dressing Status Old drainage noted 08/02/21 1340   Wound Cleansed Soap and water 08/02/21 1340   Dressing/Treatment Xeroform 08/02/21 1427   Offloading for Diabetic Foot Ulcers Felt or foam 08/02/21 1427   Wound Length (cm) 0.3 cm 08/02/21 1340   Wound Width (cm) 0.3 cm 08/02/21 1340   Wound Depth (cm) 0.2 cm 08/02/21 1340   Wound Surface Area (cm^2) 0.09 cm^2 08/02/21 1340   Wound Volume (cm^3) 0.018 cm^3 08/02/21 1340   Post-Procedure Length (cm) 0.3 cm 08/02/21 1427   Post-Procedure Width (cm) 0.3 cm 08/02/21 1427   Post-Procedure Depth (cm) 0.2 cm 08/02/21 1427   Post-Procedure Surface Area (cm^2) 0.09 cm^2 08/02/21 1427   Post-Procedure Volume (cm^3) 0.018 cm^3 08/02/21 1427   Wound Assessment Pink/red 08/02/21 1427   Drainage Amount Moderate 08/02/21 1427   Drainage Description Serosanguinous 08/02/21 1427   Odor None 08/02/21 1427   Nelda-wound Assessment Intact; Hyperkeratosis (callous) 08/02/21 1427   Margins Defined edges 08/02/21 1427   Wound Thickness Description not for Pressure Injury Full thickness 08/02/21 1427   Number of days: 0            Debridement: Selective Debridement/Non-Excisional Debridement    Using curette, #15 blade scalpel and forceps the wound(s)/ulcer(s) was/were sharply debrided down through and including the removal of epidermis and dermis. Devitalized Tissue Debrided:  fibrin, biofilm, slough, exudate and callus    Pre Debridement Measurements:  Are located in the Wound/Ulcer Documentation Flow Sheet    Wound/Ulcer #: 1 and 2    Post Debridement Measurements:  Wound/Ulcer Descriptions are Pre Debridement except measurements:          Percent of Wound(s)/Ulcer(s) Debrided: 100%    Total Surface Area Debrided: 0.65 sq cm     Diabetic/Pressure/Non Pressure Ulcers only:  Ulcer: Diabetic ulcer, fat layer exposed     Estimated Blood Loss:  Minimal    Hemostasis Achieved:  by pressure    Procedural Pain:  0  / 10     Post Procedural Pain:  0 / 10     Response to treatment:  Well tolerated by patient. Assessment    1. Diabetic ulcer of toe of left foot associated with type 2 diabetes mellitus, with fat layer exposed ([address]    2. Diabetic ulcer of toe of right foot associated with type 2 diabetes mellitus, with fat layer exposed ([address]    3. Obesity (BMI 30-39. 9)          Plan    1. Obtain lab work    Plan - for wound - Dress per physician order  Treatment:     Compression : No   Offloading : Yes  Dressing:  Right and left foot wound: Soap and water wash. Apply a double layer of Xeroform (the yellow sticky dressing) over the wound bed daily.     Discussed importance of offloading, wound care, and plan of care. Patient understanding and questions answered. I spent a total of 30 minutes face to face with the patient. Over 75% of that time was spent on counseling and care coordination. Patient was told that if symptoms worsen or new symptoms develop they are to go to the emergency department immediately. Patient was educated on diagnosis and treatment plan. All of patient's questions were answered, and the patient understands the discharge plan. Discussed appropriate home care of this wound. Wound redressed. Patient instructions were given.   Recommend no smoking  Offloading instructions given

## 2021-08-11 ENCOUNTER — HOSPITAL ENCOUNTER (OUTPATIENT)
Dept: WOUND CARE | Age: 66
Discharge: HOME | End: 2021-08-11
Payer: MEDICARE

## 2021-08-11 VITALS
BODY MASS INDEX: 32.9 KG/M2 | TEMPERATURE: 97 F | HEIGHT: 71 IN | DIASTOLIC BLOOD PRESSURE: 56 MMHG | HEART RATE: 96 BPM | RESPIRATION RATE: 18 BRPM | WEIGHT: 235 LBS | SYSTOLIC BLOOD PRESSURE: 98 MMHG

## 2021-08-11 DIAGNOSIS — E11.621 DIABETIC ULCER OF RIGHT MIDFOOT ASSOCIATED WITH TYPE 2 DIABETES MELLITUS, WITH FAT LAYER EXPOSED (HCC): Primary | Chronic | ICD-10-CM

## 2021-08-11 DIAGNOSIS — L97.522 DIABETIC ULCER OF TOE OF LEFT FOOT ASSOCIATED WITH TYPE 2 DIABETES MELLITUS, WITH FAT LAYER EXPOSED (HCC): Chronic | ICD-10-CM

## 2021-08-11 DIAGNOSIS — E11.621 DIABETIC ULCER OF TOE OF LEFT FOOT ASSOCIATED WITH TYPE 2 DIABETES MELLITUS, WITH FAT LAYER EXPOSED (HCC): Chronic | ICD-10-CM

## 2021-08-11 DIAGNOSIS — L97.412 DIABETIC ULCER OF RIGHT MIDFOOT ASSOCIATED WITH TYPE 2 DIABETES MELLITUS, WITH FAT LAYER EXPOSED (HCC): Primary | Chronic | ICD-10-CM

## 2021-08-11 PROCEDURE — 97597 DBRDMT OPN WND 1ST 20 CM/<: CPT

## 2021-08-11 PROCEDURE — 97597 DBRDMT OPN WND 1ST 20 CM/<: CPT | Performed by: SURGERY

## 2021-08-11 RX ORDER — LIDOCAINE 40 MG/G
CREAM TOPICAL ONCE
Status: CANCELLED | OUTPATIENT
Start: 2021-08-11 | End: 2021-08-11

## 2021-08-11 RX ORDER — LIDOCAINE HYDROCHLORIDE 40 MG/ML
SOLUTION TOPICAL ONCE
Status: CANCELLED | OUTPATIENT
Start: 2021-08-11 | End: 2021-08-11

## 2021-08-11 RX ORDER — BETAMETHASONE DIPROPIONATE 0.05 %
OINTMENT (GRAM) TOPICAL ONCE
Status: CANCELLED | OUTPATIENT
Start: 2021-08-11 | End: 2021-08-11

## 2021-08-11 RX ORDER — LIDOCAINE 50 MG/G
OINTMENT TOPICAL ONCE
Status: CANCELLED | OUTPATIENT
Start: 2021-08-11 | End: 2021-08-11

## 2021-08-11 RX ORDER — CLOBETASOL PROPIONATE 0.5 MG/G
OINTMENT TOPICAL ONCE
Status: CANCELLED | OUTPATIENT
Start: 2021-08-11 | End: 2021-08-11

## 2021-08-11 RX ORDER — LIDOCAINE HYDROCHLORIDE 20 MG/ML
JELLY TOPICAL ONCE
Status: CANCELLED | OUTPATIENT
Start: 2021-08-11 | End: 2021-08-11

## 2021-08-11 RX ORDER — GENTAMICIN SULFATE 1 MG/G
OINTMENT TOPICAL ONCE
Status: CANCELLED | OUTPATIENT
Start: 2021-08-11 | End: 2021-08-11

## 2021-08-11 NOTE — PROGRESS NOTES
[address]   Progress Note and Procedure Note      Hiram Young  MEDICAL RECORD NUMBER:  [id number]  AGE: 77 y.o. GENDER: male  : 1955  EPISODE DATE:  2021    Subjective:     Chief Complaint   Patient presents with    Wound Check     follow up         HISTORY of PRESENT ILLNESS HPI     Hriam Young is a 77 y.o. male who presents today for wound/ulcer evaluation. Wound Context: Pt with R and L foot wounds here for eval/treat  Wound/Ulcer Pain Timing/Severity: none  Quality of pain: N/A  Severity:  0 / 10   Modifying Factors: None  Associated Signs/Symptoms: none    Ulcer Identification:  Ulcer Type: diabetic and pressure  Contributing Factors: diabetes, chronic pressure and shear force    Wound: DM        PAST MEDICAL HISTORY        Diagnosis Date    Abnormal nuclear stress test 10/22/2014    Arthritis     BiPAP (biphasic positive airway pressure) dependence     8cm to 20cm    Cerebrovascular disease     Chronic kidney disease, stage II (mild) 2016    Jonas Amaya M.D.    Coronary artery disease 2011    Depression     Diabetes mellitus ([facility] [address]     Encounter for wound care     PT SEES \"GRACY\" WITH DR. SAMUEL    Great toe pain     RT    Headache     Hyperlipemia 2011    Dr. Tricia Morales follows lipids.     Hyperlipidemia     Hypertension     LONG TERM ANTICOAGULENT USE     Low blood pressure 2014    lymphostatic lymphoma     Nausea 2014    Non Hodgkin's lymphoma (HCC)     Obesity     Obstructive sleep apnea     AHI:  21.7 per PSG, 2017    Peptic ulcer disease 2011    Restless leg     S/P CABG x 3 10/22/2014    SVG to RCA; SVG to LAD diagonal; LIMA to LAD    Sleep apnea     Tachyarrhythmia     Thrombocythemia, essential (HCC)     Type 2 diabetes mellitus without complication (HCC)     Type II or unspecified type diabetes mellitus without mention of complication, not stated as uncontrolled        PAST SURGICAL HISTORY    Past Surgical History:   Procedure Laterality Date    CARDIAC CATHETERIZATION  11    EF 60%    CARDIAC CATHETERIZATION  05, 3/7/07    EF < 50%    CARDIAC CATHETERIZATION  12   MDL    EF  50%    CARDIAC CATHETERIZATION  10/28/14  MDL    CARDIAC CATHETERIZATION      CERVICAL DISC SURGERY      Debora Langston M.D.   [facility] CORONARY ARTERY BYPASS GRAFT  05    LIMA to LAD and diagonal; SVG to posterior descending branch RCA    CORONARY ARTERY BYPASS GRAFT  10/30/2014    Redo Sternotomy - SVG-PDA, TMR (RBL)    KIDNEY SURGERY  2017    KNEE ARTHROSCOPY      right ACL repair    SHOULDER ARTHROSCOPY      left-rotator cuff repair right- rotator cuff repair    SHOULDER ARTHROSCOPY  11    right    TONSILLECTOMY         FAMILY HISTORY    Family History   Problem Relation Age of Onset    Heart Disease Other     Lung Cancer Mother     Cancer Sister        SOCIAL HISTORY    Social History     Tobacco Use    Smoking status: Former Smoker     Packs/day: 0.00     Types: Cigarettes     Quit date: 1998     Years since quittin.5    Smokeless tobacco: Never Used    Tobacco comment: quit 23 years ago   Vaping Use    Vaping Use: Never used   Substance Use Topics    Alcohol use: No    Drug use: No       ALLERGIES    Allergies   Allergen Reactions    Ancef [Cefazolin Sodium]     Ceftin [Cefuroxime]      \"anything with ceftin in it\"    Cefuroxime Axetil     Cephalosporins     Neosporin [Bacitracin-Polymyxin B]      blisters       MEDICATIONS    Current Outpatient Medications on File Prior to Encounter   Medication Sig Dispense Refill    amoxicillin-clavulanate (AUGMENTIN) 875-125 MG per tablet Take 1 tablet by mouth 2 times daily      furosemide (LASIX) 20 MG tablet Take 1 tablet by mouth daily      gabapentin (NEURONTIN) 100 MG capsule Take 1 capsule by mouth 2 times daily.       lisinopril (PRINIVIL;ZESTRIL) 2.5 MG tablet Take 1 tablet by mouth daily      busPIRone (BUSPAR) 15 MG tablet Take 7.5 mg by mouth 2 times daily      pravastatin (PRAVACHOL) 40 MG tablet Take 40 mg by mouth nightly      Icosapent Ethyl (VASCEPA) 1 g CAPS capsule Take 1 capsule by mouth 2 times daily      metoprolol tartrate (LOPRESSOR) 25 MG tablet Take 1 tablet by mouth 2 times daily 60 tablet 5    ranolazine (RANEXA) 1000 MG extended release tablet Take 1 tablet by mouth 2 times daily 180 tablet 3    clopidogrel (PLAVIX) 75 MG tablet One daily (Patient taking differently: Take 75 mg by mouth daily ) 90 tablet 3    acetaminophen (TYLENOL) 500 MG tablet Take 1,000 mg by mouth every 6 hours as needed for Pain      metFORMIN (GLUCOPHAGE) 1000 MG tablet HOLD FOR 48 HOURS AFTER THE CARDIAC CATHETERIZATION (Patient taking differently: 500 mg 2 times daily (with meals) ) 60 tablet 3    DULoxetine (CYMBALTA) 30 MG extended release capsule Take 30 mg by mouth nightly Indications: patient stated received medications yesterday 5/19/21       levothyroxine (SYNTHROID) 50 MCG tablet Take 50 mcg by mouth Daily      tamsulosin (FLOMAX) 0.4 MG capsule Take 1 capsule(s) every day by oral route as directed for 30 days. 30 capsule 11    diltiazem (CARDIZEM CD) 180 MG extended release capsule Take 1 capsule by mouth daily 90 capsule 3    HYDROcodone-acetaminophen (NORCO) 7.5-325 MG per tablet Take 1-2 tablets by mouth every 6 hours as needed. Leonard Landry meloxicam (MOBIC) 15 MG tablet Take 15 mg by mouth daily       tiZANidine (ZANAFLEX) 4 MG tablet 1/2 tablet at night      ferrous sulfate 325 (65 Fe) MG tablet Take 325 mg by mouth 2 times daily      pantoprazole (PROTONIX) 40 MG tablet Take 1 tablet by mouth daily 90 tablet 2    b complex vitamins capsule Take 1 capsule by mouth daily.  Vitamin D (CHOLECALCIFEROL) 1000 UNITS CAPS capsule Take 1,000 Units by mouth daily.  docusate sodium (COLACE) 100 MG capsule Take 100 mg by mouth daily.       nitroGLYCERIN (NITROSTAT) 0.4 MG SL tablet Place 1 tablet under the tongue every 5 minutes as needed for Chest pain (Patient taking differently: Place 0.4 mg under the tongue every 5 minutes as needed for Chest pain Indications: has not had to take ) 25 tablet 5    ondansetron (ZOFRAN) 8 MG tablet Take 8 mg by mouth as needed        No current facility-administered medications on file prior to encounter. REVIEW OF SYSTEMS    A comprehensive review of systems was negative.     Objective:      BP (!) 98/56   Pulse 96   Temp 97 °F (36.1 °C) (Temporal)   Resp 18   Ht 5' 11\" (1.803 m)   Wt 235 lb (106.6 kg)   BMI 32.78 kg/m²     Wt Readings from Last 3 Encounters:   08/11/21 235 lb (106.6 kg)   08/02/21 235 lb (106.6 kg)   05/20/21 232 lb (105.2 kg)       PHYSICAL EXAM    General Appearance: alert and oriented to person, place and time, well developed and well- nourished, in no acute distress  Skin: warm and dry, no rash or erythema  Head: normocephalic and atraumatic  Eyes: pupils equal, round, and reactive to light, extraocular eye movements intact, conjunctivae normal  ENT: tympanic membrane, external ear and ear canal normal bilaterally, nose without deformity, nasal mucosa and turbinates normal without polyps, lips teeth and gums normal  Neck: supple and non-tender without mass, no thyromegaly or thyroid nodules, no cervical lymphadenopathy  Pulmonary/Chest: clear to auscultation bilaterally- no wheezes, rales or rhonchi, normal air movement, no respiratory distress  Cardiovascular: normal rate, regular rhythm, normal S1 and S2, no murmurs, rubs, clicks, or gallops, distal pulses intact, no carotid bruits  Abdomen: soft, non-tender, non-distended, normal bowel sounds, no masses or organomegaly  Extremities: no cyanosis, clubbing or edema  Musculoskeletal: normal range of motion, no joint swelling, deformity or tenderness  Neurologic: reflexes normal and symmetric, no cranial nerve deficit, gait, coordination and speech normal, 08/11/21 1057   Wound Healing % 79 08/11/21 1057   Post-Procedure Length (cm) 0.4 cm 08/11/21 1107   Post-Procedure Width (cm) 0.3 cm 08/11/21 1107   Post-Procedure Depth (cm) 0.2 cm 08/11/21 1107   Post-Procedure Surface Area (cm^2) 0.12 cm^2 08/11/21 1107   Post-Procedure Volume (cm^3) 0.024 cm^3 08/11/21 1107   Wound Assessment Pink/red 08/11/21 1057   Drainage Amount Moderate 08/11/21 1057   Drainage Description Serosanguinous 08/11/21 1057   Odor None 08/11/21 1057   Nelda-wound Assessment Intact; Hyperkeratosis (callous) 08/11/21 1057   Margins Defined edges 08/11/21 1057   Wound Thickness Description not for Pressure Injury Full thickness 08/11/21 1057   Number of days: 8       Wound 08/02/21 Pedal Right;Plantar wound 2- right plantar wag 1 (Active)   Wound Image   08/11/21 1057   Wound Etiology Diabetic Lawler 1 08/11/21 1057   Dressing Status Dry 08/11/21 1057   Wound Cleansed Cleansed with saline 08/11/21 1057   Dressing/Treatment Xeroform 08/02/21 1427   Offloading for Diabetic Foot Ulcers Felt or foam 08/11/21 1057   Wound Length (cm) 0.2 cm 08/11/21 1057   Wound Width (cm) 0.3 cm 08/11/21 1057   Wound Depth (cm) 0.1 cm 08/11/21 1057   Wound Surface Area (cm^2) 0.06 cm^2 08/11/21 1057   Change in Wound Size % (l*w) 33.33 08/11/21 1057   Wound Volume (cm^3) 0.006 cm^3 08/11/21 1057   Wound Healing % 67 08/11/21 1057   Post-Procedure Length (cm) 0.2 cm 08/11/21 1107   Post-Procedure Width (cm) 0.3 cm 08/11/21 1107   Post-Procedure Depth (cm) 0.1 cm 08/11/21 1107   Post-Procedure Surface Area (cm^2) 0.06 cm^2 08/11/21 1107   Post-Procedure Volume (cm^3) 0.006 cm^3 08/11/21 1107   Wound Assessment Pink/red 08/11/21 1057   Drainage Amount None 08/11/21 1057   Drainage Description Serosanguinous 08/02/21 1427   Odor None 08/11/21 1057   Nelda-wound Assessment Intact; Hyperkeratosis (callous) 08/11/21 1057   Margins Defined edges 08/11/21 1057   Wound Thickness Description not for Pressure Injury Full thickness 08/11/21 1057   Number of days: 8             Estimated Blood Loss:  Minimal    Hemostasis Achieved:  by pressure    Procedural Pain:  0  / 10     Post Procedural Pain:  0 / 10     Response to treatment:  Well tolerated by patient. Plan:     Problem List Items Addressed This Visit     * (Principal) Diabetic ulcer of right midfoot associated with type 2 diabetes mellitus, with fat layer exposed ([address] - Primary (Chronic)    Diabetic ulcer of toe of left foot associated with type 2 diabetes mellitus, with fat layer exposed ([address] (Chronic)          Cont current care Wounds improving  RTO 1 week    Treatment Note please see attached Discharge Instructions    In my professional opinion this patient would benefit from HBO Therapy: No    Written patient dismissal instructions given to patient and signed by patient or POA. Discharge [address] and Hyperbaric Oxygen Therapy   Physician Orders and Discharge Instructions  [address]  Telephone: [phone]    NAME:  Jaylen Mojica OF BIRTH:  1955  MEDICAL RECORD NUMBER:  [id number]  DATE:  8/11/2021    Discharge condition: Stable    Discharge to: Home    Left via:Private automobile    Accompanied by: Self    [facility]: [facility]    Dressing Orders: Right and left foot wound:   apply a double layer of Xeroform (yellow sticky gauze) over the wound bed, secure with dry  gauze, rolled gauze, and medipore tape daily. Treatment Orders:  Avoid pressure to the wounds  Keep felt/foam on re-glue if necessary  To do not get pads wet  Protein rich diet  Multivitamin    [address] [facility] follow up visit _____________1 week________________  (Please note your next appointment above and if you are unable to keep, kindly give a 24 hour notice.  Thank you.)    If you experience any of the following, please call the [address] [facility] [address] during business hours:    * Increase in Pain  *

## 2021-08-16 NOTE — PATIENT INSTRUCTIONS
An attempt was made to contact the patient; there was no answer.  A message was left for the patient to contact the office at her earliest convenience.  RE:Convey results   Begin adderall xr 10 mg in the morning.

## 2021-08-18 ENCOUNTER — HOSPITAL ENCOUNTER (OUTPATIENT)
Dept: WOUND CARE | Age: 66
Discharge: HOME | End: 2021-08-18
Payer: MEDICARE

## 2021-08-18 VITALS
SYSTOLIC BLOOD PRESSURE: 107 MMHG | WEIGHT: 235 LBS | BODY MASS INDEX: 32.9 KG/M2 | HEART RATE: 90 BPM | HEIGHT: 71 IN | RESPIRATION RATE: 18 BRPM | TEMPERATURE: 95.3 F | DIASTOLIC BLOOD PRESSURE: 56 MMHG

## 2021-08-18 DIAGNOSIS — L97.412 DIABETIC ULCER OF RIGHT MIDFOOT ASSOCIATED WITH TYPE 2 DIABETES MELLITUS, WITH FAT LAYER EXPOSED (HCC): Primary | ICD-10-CM

## 2021-08-18 DIAGNOSIS — E11.621 DIABETIC ULCER OF RIGHT MIDFOOT ASSOCIATED WITH TYPE 2 DIABETES MELLITUS, WITH FAT LAYER EXPOSED (HCC): Primary | ICD-10-CM

## 2021-08-18 PROCEDURE — 6370000000 HC RX 637 (ALT 250 FOR IP): Performed by: SURGERY

## 2021-08-18 PROCEDURE — 97597 DBRDMT OPN WND 1ST 20 CM/<: CPT | Performed by: SURGERY

## 2021-08-18 PROCEDURE — 97597 DBRDMT OPN WND 1ST 20 CM/<: CPT

## 2021-08-18 RX ORDER — LIDOCAINE HYDROCHLORIDE 20 MG/ML
JELLY TOPICAL ONCE
Status: CANCELLED | OUTPATIENT
Start: 2021-08-18 | End: 2021-08-18

## 2021-08-18 RX ORDER — BETAMETHASONE DIPROPIONATE 0.05 %
OINTMENT (GRAM) TOPICAL ONCE
Status: CANCELLED | OUTPATIENT
Start: 2021-08-18 | End: 2021-08-18

## 2021-08-18 RX ORDER — LIDOCAINE HYDROCHLORIDE 20 MG/ML
JELLY TOPICAL ONCE
Status: COMPLETED | OUTPATIENT
Start: 2021-08-18 | End: 2021-08-18

## 2021-08-18 RX ORDER — LIDOCAINE HYDROCHLORIDE 40 MG/ML
SOLUTION TOPICAL ONCE
Status: CANCELLED | OUTPATIENT
Start: 2021-08-18 | End: 2021-08-18

## 2021-08-18 RX ORDER — CLOBETASOL PROPIONATE 0.5 MG/G
OINTMENT TOPICAL ONCE
Status: CANCELLED | OUTPATIENT
Start: 2021-08-18 | End: 2021-08-18

## 2021-08-18 RX ORDER — GENTAMICIN SULFATE 1 MG/G
OINTMENT TOPICAL ONCE
Status: CANCELLED | OUTPATIENT
Start: 2021-08-18 | End: 2021-08-18

## 2021-08-18 RX ORDER — LIDOCAINE 40 MG/G
CREAM TOPICAL ONCE
Status: CANCELLED | OUTPATIENT
Start: 2021-08-18 | End: 2021-08-18

## 2021-08-18 RX ORDER — LIDOCAINE 50 MG/G
OINTMENT TOPICAL ONCE
Status: CANCELLED | OUTPATIENT
Start: 2021-08-18 | End: 2021-08-18

## 2021-08-18 RX ADMIN — LIDOCAINE HYDROCHLORIDE: 20 JELLY TOPICAL at 09:02

## 2021-08-18 NOTE — PROGRESS NOTES
[address]   Progress Note and Procedure Note      Bryant Thompson  MEDICAL RECORD NUMBER:  [id number]  AGE: 74 y.o. GENDER: male  : 1955  EPISODE DATE:  2021    Subjective:     Chief Complaint   Patient presents with    Wound Check         HISTORY of PRESENT ILLNESS HPI     Bryant Thompson is a 74 y.o. male who presents today for wound/ulcer evaluation. Wound Context: Pt with R&L foot wounds here for eval/treat  Wound/Ulcer Pain Timing/Severity: none  Quality of pain: N/A  Severity:  0 / 10   Modifying Factors: None  Associated Signs/Symptoms: none    Ulcer Identification:  Ulcer Type: diabetic and pressure  Contributing Factors: diabetes and chronic pressure    Wound: DM        PAST MEDICAL HISTORY        Diagnosis Date    Abnormal nuclear stress test 10/22/2014    Arthritis     BiPAP (biphasic positive airway pressure) dependence     8cm to 20cm    Cerebrovascular disease     Chronic kidney disease, stage II (mild) 2016    Sheila Madera M.D.    Coronary artery disease 2011    Depression     Diabetes mellitus ([facility]     Encounter for wound care     PT SEES \"GRACY\" WITH DR. SAMUEL    Great toe pain     RT    Headache     Hyperlipemia 2011    Dr. Devon Mercado follows lipids.     Hyperlipidemia     Hypertension     LONG TERM ANTICOAGULENT USE     Low blood pressure 2014    lymphostatic lymphoma     Nausea 2014    Non Hodgkin's lymphoma (HCC)     Obesity     Obstructive sleep apnea     AHI:  21.7 per PSG, 2017    Peptic ulcer disease 2011    Restless leg     S/P CABG x 3 10/22/2014    SVG to RCA; SVG to LAD diagonal; LIMA to LAD    Sleep apnea     Tachyarrhythmia     Thrombocythemia, essential (HCC)     Type 2 diabetes mellitus without complication ([facility] [address]     Type II or unspecified type diabetes mellitus without mention of complication, not stated as uncontrolled        PAST SURGICAL HISTORY    Past Surgical History:   Procedure Laterality Date    CARDIAC CATHETERIZATION  11    EF 60%    CARDIAC CATHETERIZATION  05, 3/7/07    EF < 50%    CARDIAC CATHETERIZATION  12   MDL    EF  50%    CARDIAC CATHETERIZATION  10/28/14  MDL    CARDIAC CATHETERIZATION      CERVICAL DISC SURGERY      Sidney Langley M.D.   Caren Major CORONARY ARTERY BYPASS GRAFT  05    LIMA to LAD and diagonal; SVG to posterior descending branch RCA    CORONARY ARTERY BYPASS GRAFT  10/30/2014    Redo Sternotomy - SVG-PDA, TMR (RBL)    KIDNEY SURGERY  2017    KNEE ARTHROSCOPY      right ACL repair    SHOULDER ARTHROSCOPY      left-rotator cuff repair right- rotator cuff repair    SHOULDER ARTHROSCOPY  11    right    TONSILLECTOMY         FAMILY HISTORY    Family History   Problem Relation Age of Onset    Heart Disease Other     Lung Cancer Mother     Cancer Sister        SOCIAL HISTORY    Social History     Tobacco Use    Smoking status: Former Smoker     Packs/day: 0.00     Types: Cigarettes     Quit date: 1998     Years since quittin.5    Smokeless tobacco: Never Used    Tobacco comment: quit 23 years ago   Vaping Use    Vaping Use: Never used   Substance Use Topics    Alcohol use: No    Drug use: No       ALLERGIES    Allergies   Allergen Reactions    Ancef [Cefazolin Sodium]     Ceftin [Cefuroxime]      \"anything with ceftin in it\"    Cefuroxime Axetil     Cephalosporins     Neosporin [Bacitracin-Polymyxin B]      blisters       MEDICATIONS    Current Outpatient Medications on File Prior to Encounter   Medication Sig Dispense Refill    furosemide (LASIX) 20 MG tablet Take 1 tablet by mouth daily      gabapentin (NEURONTIN) 100 MG capsule Take 1 capsule by mouth 2 times daily.       lisinopril (PRINIVIL;ZESTRIL) 2.5 MG tablet Take 1 tablet by mouth daily      busPIRone (BUSPAR) 15 MG tablet Take 7.5 mg by mouth 2 times daily      pravastatin (PRAVACHOL) 40 MG tablet Take 40 mg by mouth nightly      Icosapent Ethyl (VASCEPA) 1 g CAPS capsule Take 1 capsule by mouth 2 times daily      metoprolol tartrate (LOPRESSOR) 25 MG tablet Take 1 tablet by mouth 2 times daily 60 tablet 5    ranolazine (RANEXA) 1000 MG extended release tablet Take 1 tablet by mouth 2 times daily 180 tablet 3    clopidogrel (PLAVIX) 75 MG tablet One daily (Patient taking differently: Take 75 mg by mouth daily ) 90 tablet 3    acetaminophen (TYLENOL) 500 MG tablet Take 1,000 mg by mouth every 6 hours as needed for Pain      metFORMIN (GLUCOPHAGE) 1000 MG tablet HOLD FOR 48 HOURS AFTER THE CARDIAC CATHETERIZATION (Patient taking differently: 500 mg 2 times daily (with meals) ) 60 tablet 3    DULoxetine (CYMBALTA) 30 MG extended release capsule Take 30 mg by mouth nightly Indications: patient stated received medications yesterday 5/19/21       levothyroxine (SYNTHROID) 50 MCG tablet Take 50 mcg by mouth Daily      tamsulosin (FLOMAX) 0.4 MG capsule Take 1 capsule(s) every day by oral route as directed for 30 days. 30 capsule 11    diltiazem (CARDIZEM CD) 180 MG extended release capsule Take 1 capsule by mouth daily 90 capsule 3    meloxicam (MOBIC) 15 MG tablet Take 15 mg by mouth daily       ondansetron (ZOFRAN) 8 MG tablet Take 8 mg by mouth as needed       tiZANidine (ZANAFLEX) 4 MG tablet 1/2 tablet at night      ferrous sulfate 325 (65 Fe) MG tablet Take 325 mg by mouth 2 times daily      pantoprazole (PROTONIX) 40 MG tablet Take 1 tablet by mouth daily 90 tablet 2    b complex vitamins capsule Take 1 capsule by mouth daily.  Vitamin D (CHOLECALCIFEROL) 1000 UNITS CAPS capsule Take 1,000 Units by mouth daily.  docusate sodium (COLACE) 100 MG capsule Take 100 mg by mouth daily.       amoxicillin-clavulanate (AUGMENTIN) 875-125 MG per tablet Take 1 tablet by mouth 2 times daily Indications: currently not taking       nitroGLYCERIN (NITROSTAT) 0.4 MG SL tablet Place 1 tablet under the tongue every 5 minutes as needed for Chest pain (Patient taking differently: Place 0.4 mg under the tongue every 5 minutes as needed for Chest pain Indications: has not had to take ) 25 tablet 5    HYDROcodone-acetaminophen (NORCO) 7.5-325 MG per tablet Take 1-2 tablets by mouth every 6 hours as needed. .       No current facility-administered medications on file prior to encounter. REVIEW OF SYSTEMS    A comprehensive review of systems was negative.     Objective:      BP (!) 107/56   Pulse 90   Temp 95.3 °F (35.2 °C) (Temporal)   Resp 18   Ht 5' 11\" (1.803 m)   Wt 235 lb (106.6 kg)   BMI 32.78 kg/m²     Wt Readings from Last 3 Encounters:   08/18/21 235 lb (106.6 kg)   08/11/21 235 lb (106.6 kg)   08/02/21 235 lb (106.6 kg)       PHYSICAL EXAM    General Appearance: alert and oriented to person, place and time, well developed and well- nourished, in no acute distress  Skin: warm and dry, no rash or erythema  Head: normocephalic and atraumatic  Eyes: pupils equal, round, and reactive to light, extraocular eye movements intact, conjunctivae normal  ENT: tympanic membrane, external ear and ear canal normal bilaterally, nose without deformity, nasal mucosa and turbinates normal without polyps, lips teeth and gums normal  Neck: supple and non-tender without mass, no thyromegaly or thyroid nodules, no cervical lymphadenopathy  Pulmonary/Chest: clear to auscultation bilaterally- no wheezes, rales or rhonchi, normal air movement, no respiratory distress  Cardiovascular: normal rate, regular rhythm, normal S1 and S2, no murmurs, rubs, clicks, or gallops, distal pulses intact, no carotid bruits  Abdomen: soft, non-tender, non-distended, normal bowel sounds, no masses or organomegaly  Extremities: no cyanosis, clubbing or edema  Musculoskeletal: normal range of motion, no joint swelling, deformity or tenderness  Neurologic: reflexes normal and symmetric, no cranial nerve deficit, gait, coordination and speech normal, sensation of skin normal      Assessment:      Problem List Items Addressed This Visit     * (Principal) Diabetic ulcer of right midfoot associated with type 2 diabetes mellitus, with fat layer exposed ([address] - Primary (Chronic)    Relevant Orders    Initiate Outpatient Wound Care Protocol           Procedure Note  Indications:  Based on my examination of this patient's wound(s)/ulcer(s) today, debridement is required to promote healing and evaluate the wound base. Performed by: Clair Thomas MD    Consent obtained:  Yes    Time out taken:  Yes    Pain Control: Anesthetic  Anesthetic: 2% Lidocaine Gel Topical       Debridement:Non-excisional Debridement    Using curette the wound(s)/ulcer(s) was/were sharply debrided down through and including the removal of epidermis and dermis. Devitalized Tissue Debrided:  fibrin, biofilm, slough, necrotic/eschar, exudate and callus      Pre Debridement Measurements:  Are located in the Wound/Ulcer Documentation Flow Sheet    Wound/Ulcer #: 1 and 2    Percent of Wound(s)/Ulcer(s) Debrided: 100%    Total Surface Area Debrided:  0.31 sq cm       Diabetic/Pressure/Non Pressure Ulcers only:  Ulcer: Diabetic ulcer, fat layer exposed             Post Debridement Measurements:    Wound/Ulcer Descriptions are Pre Debridement --EXCEPT MEASUREMENTS    Wound 08/02/21 Toe (Comment  which one) Left;Posterior wound 1- left great toe wag 1 (Active)   Wound Image   08/18/21 0902   Wound Etiology Diabetic Lawler 1 08/18/21 0902   Dressing Status Dry; Other (Comment) 08/18/21 0902   Wound Cleansed Soap and water 08/18/21 0902   Dressing/Treatment Xeroform 08/11/21 1136   Offloading for Diabetic Foot Ulcers Yes (type); Felt or foam 08/18/21 0902   Wound Length (cm) 0.3 cm 08/18/21 0902   Wound Width (cm) 0.2 cm 08/18/21 0902   Wound Depth (cm) 0.2 cm 08/18/21 0902   Wound Surface Area (cm^2) 0.06 cm^2 08/18/21 0902   Change in Wound Size % (l*w) 89.29 08/18/21 0902   Wound Volume (cm^3) 0.012 cm^3 08/18/21 0902   Wound Healing % 89 08/18/21 0902   Post-Procedure Length (cm) 0.3 cm 08/18/21 0926   Post-Procedure Width (cm) 0.2 cm 08/18/21 0926   Post-Procedure Depth (cm) 0.2 cm 08/18/21 0926   Post-Procedure Surface Area (cm^2) 0.06 cm^2 08/18/21 0926   Post-Procedure Volume (cm^3) 0.012 cm^3 08/18/21 0926   Distance Tunneling (cm) 0 cm 08/18/21 0902   Tunneling Position ___ O'Clock 0 08/18/21 0902   Undermining Starts ___ O'Clock 0 08/18/21 0902   Undermining Ends___ O'Clock 0 08/18/21 0902   Undermining Maxium Distance (cm) 0 08/18/21 0902   Wound Assessment Pink/red 08/18/21 0902   Drainage Amount None 08/18/21 0902   Drainage Description Serosanguinous 08/11/21 1057   Odor None 08/18/21 0902   Nelda-wound Assessment Hyperkeratosis (callous) 08/18/21 0902   Margins Defined edges 08/18/21 0902   Wound Thickness Description not for Pressure Injury Full thickness 08/18/21 0902   Number of days: 15       Wound 08/02/21 Pedal Right;Plantar wound 2- right plantar wag 1 (Active)   Wound Image   08/18/21 0902   Wound Etiology Diabetic Lawler 1 08/18/21 0902   Dressing Status Dry 08/18/21 0902   Wound Cleansed Soap and water 08/18/21 0902   Dressing/Treatment Xeroform 08/11/21 1136   Offloading for Diabetic Foot Ulcers Yes (type); Felt or foam 08/18/21 0902   Wound Length (cm) 0.5 cm 08/18/21 0902   Wound Width (cm) 0.5 cm 08/18/21 0902   Wound Depth (cm) 0.1 cm 08/18/21 0902   Wound Surface Area (cm^2) 0.25 cm^2 08/18/21 0902   Change in Wound Size % (l*w) -177.78 08/18/21 0902   Wound Volume (cm^3) 0.025 cm^3 08/18/21 0902   Wound Healing % -39 08/18/21 0902   Post-Procedure Length (cm) 0.5 cm 08/18/21 0926   Post-Procedure Width (cm) 0.5 cm 08/18/21 0926   Post-Procedure Depth (cm) 0.1 cm 08/18/21 0926   Post-Procedure Surface Area (cm^2) 0.25 cm^2 08/18/21 0926   Post-Procedure Volume (cm^3) 0.025 cm^3 08/18/21 0926   Distance Tunneling (cm) 0 cm 08/18/21 0902   Tunneling Position ___ O'Clock 0 08/18/21 0902   Undermining Starts ___ O'Clock 0 08/18/21 0902   Undermining Ends___ O'Clock 0 08/18/21 0902   Undermining Maxium Distance (cm) 0 08/18/21 0902   Wound Assessment Dry 08/18/21 0902   Drainage Amount None 08/18/21 0902   Drainage Description Serosanguinous 08/02/21 1427   Odor None 08/18/21 0902   Nelda-wound Assessment Dry/flaky; Hyperkeratosis (callous) 08/18/21 0902   Margins Attached edges 08/18/21 0902   Wound Thickness Description not for Pressure Injury Full thickness 08/18/21 0902   Number of days: 15             Estimated Blood Loss:  Minimal    Hemostasis Achieved:  by pressure    Procedural Pain:  0  / 10     Post Procedural Pain:  0 / 10     Response to treatment:  Well tolerated by patient. Plan:     Problem List Items Addressed This Visit     * (Principal) Diabetic ulcer of right midfoot associated with type 2 diabetes mellitus, with fat layer exposed ([facility] [address] - Primary (Chronic)    Relevant Orders    Initiate Outpatient Wound Care Protocol          Pt doing well cont current care RTO 1-2 weeks    Treatment Note please see attached Discharge Instructions    In my professional opinion this patient would benefit from HBO Therapy: No    Written patient dismissal instructions given to patient and signed by patient or POA.          Discharge [address] and Hyperbaric Oxygen Therapy   Physician Orders and Discharge Instructions  [address]  Telephone: [phone]    NAME:  Jaylen Mojica OF BIRTH:  1955  MEDICAL RECORD NUMBER:  [id number]  DATE:  8/18/2021    Discharge condition: Stable    Discharge to: Home    Left via:Private automobile    Accompanied by: Self     [facility]F/[facility]: [facility]     Dressing Orders: Right and left foot wound:   apply a double layer of Xeroform (yellow sticky gauze) over the wound bed, secure with dry  gauze, rolled gauze, and medipore tape daily.     Treatment Orders:  Avoid pressure to the wounds  Keep felt/foam on re-glue if necessary  To do not get pads wet  Protein rich diet  Multivitamin    [facility] follow up visit _____________1 week________________  (Please note your next appointment above and if you are unable to keep, kindly give a 24 hour notice. Thank you.)    If you experience any of the following, please call the [facility] during business hours:    * Increase in Pain  * Temperature over 101  * Increase in drainage from your wound  * Drainage with a foul odor  * Bleeding  * Increase in swelling  * Need for compression bandage changes due to slippage, breakthrough drainage. If you need medical attention outside of the business hours of the [facility] please contact your PCP or go to the nearest emergency room.         Electronically signed by Andreia Dee MD on 8/18/2021 at 9:28 AM

## 2021-08-18 NOTE — PLAN OF CARE
Problem: Wound:  Goal: Will show signs of wound healing; wound closure and no evidence of infection  Description: Will show signs of wound healing; wound closure and no evidence of infection  8/18/2021 3547 by Loras Eisenmenger, RN  Outcome: Ongoing  8/18/2021 0916 by Austin Dotson RN  Outcome: Ongoing

## 2021-08-20 ENCOUNTER — HOSPITAL ENCOUNTER (EMERGENCY)
Age: 66
Discharge: HOME | End: 2021-08-20
Payer: MEDICARE

## 2021-08-20 ENCOUNTER — HOSPITAL ENCOUNTER (EMERGENCY)
Facility: HOSPITAL | Age: 66
Discharge: LEFT BEFORE DISCHARGE | End: 2021-08-20

## 2021-08-20 ENCOUNTER — TELEPHONE (OUTPATIENT)
Dept: UROLOGY | Facility: CLINIC | Age: 66
End: 2021-08-20

## 2021-08-20 VITALS
BODY MASS INDEX: 33.04 KG/M2 | SYSTOLIC BLOOD PRESSURE: 137 MMHG | DIASTOLIC BLOOD PRESSURE: 69 MMHG | TEMPERATURE: 97.7 F | HEART RATE: 85 BPM | OXYGEN SATURATION: 100 % | RESPIRATION RATE: 18 BRPM | WEIGHT: 236 LBS | HEIGHT: 71 IN

## 2021-08-20 VITALS
BODY MASS INDEX: 32.9 KG/M2 | OXYGEN SATURATION: 93 % | DIASTOLIC BLOOD PRESSURE: 90 MMHG | WEIGHT: 235 LBS | HEART RATE: 88 BPM | RESPIRATION RATE: 20 BRPM | HEIGHT: 71 IN | SYSTOLIC BLOOD PRESSURE: 152 MMHG | TEMPERATURE: 98.2 F

## 2021-08-20 DIAGNOSIS — N23 RENAL COLIC: ICD-10-CM

## 2021-08-20 DIAGNOSIS — N20.1 CALCULUS OF URETER: Primary | ICD-10-CM

## 2021-08-20 DIAGNOSIS — N20.0 KIDNEY STONE: ICD-10-CM

## 2021-08-20 LAB
ALBUMIN SERPL-MCNC: 4.5 G/DL (ref 3.5–5.2)
ALP BLD-CCNC: 93 U/L (ref 40–130)
ALT SERPL-CCNC: 20 U/L (ref 5–41)
ANION GAP SERPL CALCULATED.3IONS-SCNC: 12 MMOL/L (ref 7–19)
AST SERPL-CCNC: 19 U/L (ref 5–40)
BACTERIA: NEGATIVE /HPF
BASOPHILS ABSOLUTE: 0 K/UL (ref 0–0.2)
BASOPHILS RELATIVE PERCENT: 0.5 % (ref 0–1)
BILIRUB SERPL-MCNC: 0.6 MG/DL (ref 0.2–1.2)
BILIRUBIN URINE: NEGATIVE
BLOOD, URINE: NEGATIVE
BUN BLDV-MCNC: 21 MG/DL (ref 8–23)
CALCIUM SERPL-MCNC: 9.8 MG/DL (ref 8.8–10.2)
CHLORIDE BLD-SCNC: 101 MMOL/L (ref 98–111)
CLARITY: CLEAR
CO2: 24 MMOL/L (ref 22–29)
COLOR: YELLOW
CREAT SERPL-MCNC: 1.3 MG/DL (ref 0.5–1.2)
CRYSTALS, UA: ABNORMAL /HPF
EOSINOPHILS ABSOLUTE: 0.2 K/UL (ref 0–0.6)
EOSINOPHILS RELATIVE PERCENT: 2.3 % (ref 0–5)
EPITHELIAL CELLS, UA: 0 /HPF (ref 0–5)
GFR AFRICAN AMERICAN: >59
GFR NON-AFRICAN AMERICAN: 55
GLUCOSE BLD-MCNC: 160 MG/DL (ref 74–109)
GLUCOSE URINE: =>1000 MG/DL
HCT VFR BLD CALC: 35.8 % (ref 42–52)
HEMOGLOBIN: 11.4 G/DL (ref 14–18)
HYALINE CASTS: 0 /HPF (ref 0–8)
IMMATURE GRANULOCYTES #: 0 K/UL
INR BLD: 1.04 (ref 0.88–1.18)
KETONES, URINE: NEGATIVE MG/DL
LEUKOCYTE ESTERASE, URINE: ABNORMAL
LYMPHOCYTES ABSOLUTE: 0.9 K/UL (ref 1.1–4.5)
LYMPHOCYTES RELATIVE PERCENT: 13.6 % (ref 20–40)
MCH RBC QN AUTO: 34.9 PG (ref 27–31)
MCHC RBC AUTO-ENTMCNC: 31.8 G/DL (ref 33–37)
MCV RBC AUTO: 109.5 FL (ref 80–94)
MONOCYTES ABSOLUTE: 0.7 K/UL (ref 0–0.9)
MONOCYTES RELATIVE PERCENT: 10.5 % (ref 0–10)
NEUTROPHILS ABSOLUTE: 4.7 K/UL (ref 1.5–7.5)
NEUTROPHILS RELATIVE PERCENT: 72.8 % (ref 50–65)
NITRITE, URINE: NEGATIVE
PDW BLD-RTO: 14.4 % (ref 11.5–14.5)
PH UA: 6.5 (ref 5–8)
PLATELET # BLD: 94 K/UL (ref 130–400)
PMV BLD AUTO: 9.7 FL (ref 9.4–12.4)
POTASSIUM REFLEX MAGNESIUM: 4.4 MMOL/L (ref 3.5–5)
PROTEIN UA: NEGATIVE MG/DL
PROTHROMBIN TIME: 13.8 SEC (ref 12–14.6)
RBC # BLD: 3.27 M/UL (ref 4.7–6.1)
RBC UA: 2 /HPF (ref 0–4)
SODIUM BLD-SCNC: 137 MMOL/L (ref 136–145)
SPECIFIC GRAVITY UA: 1.02 (ref 1–1.03)
TOTAL PROTEIN: 7.6 G/DL (ref 6.6–8.7)
UROBILINOGEN, URINE: 1 E.U./DL
WBC # BLD: 6.5 K/UL (ref 4.8–10.8)
WBC UA: 14 /HPF (ref 0–5)

## 2021-08-20 PROCEDURE — 6370000000 HC RX 637 (ALT 250 FOR IP): Performed by: NURSE PRACTITIONER

## 2021-08-20 PROCEDURE — 99211 OFF/OP EST MAY X REQ PHY/QHP: CPT

## 2021-08-20 PROCEDURE — 36415 COLL VENOUS BLD VENIPUNCTURE: CPT

## 2021-08-20 PROCEDURE — 81001 URINALYSIS AUTO W/SCOPE: CPT

## 2021-08-20 PROCEDURE — 87086 URINE CULTURE/COLONY COUNT: CPT

## 2021-08-20 PROCEDURE — 85025 COMPLETE CBC W/AUTO DIFF WBC: CPT

## 2021-08-20 PROCEDURE — 99283 EMERGENCY DEPT VISIT LOW MDM: CPT

## 2021-08-20 PROCEDURE — 85610 PROTHROMBIN TIME: CPT

## 2021-08-20 PROCEDURE — 80053 COMPREHEN METABOLIC PANEL: CPT

## 2021-08-20 RX ORDER — TAMSULOSIN HYDROCHLORIDE 0.4 MG/1
0.4 CAPSULE ORAL ONCE
Status: COMPLETED | OUTPATIENT
Start: 2021-08-20 | End: 2021-08-20

## 2021-08-20 RX ORDER — TAMSULOSIN HYDROCHLORIDE 0.4 MG/1
0.4 CAPSULE ORAL DAILY
Qty: 10 CAPSULE | Refills: 0 | Status: SHIPPED | OUTPATIENT
Start: 2021-08-20 | End: 2021-08-24

## 2021-08-20 RX ADMIN — TAMSULOSIN HYDROCHLORIDE 0.4 MG: 0.4 CAPSULE ORAL at 23:43

## 2021-08-20 ASSESSMENT — ENCOUNTER SYMPTOMS
VOMITING: 0
NAUSEA: 0

## 2021-08-20 ASSESSMENT — PAIN SCALES - GENERAL: PAINLEVEL_OUTOF10: 6

## 2021-08-20 NOTE — TELEPHONE ENCOUNTER
Pt wife called pt had ct in [place] and has 9mm obstructing stone.  They wanted to be seen today but still in [place]. Their oncologist stated that pt needed to be seen today.  I advised them to the er.

## 2021-08-21 NOTE — ED PROVIDER NOTES
artery disease 2/24/2011    Depression     Diabetes mellitus ([facility] [address]     Encounter for wound care     PT SEES \"GRACY\" WITH DR. SAMUEL    Great toe pain     RT    Headache     Hyperlipemia 2/24/2011    Dr. Pj Young follows lipids.     Hyperlipidemia     Hypertension     LONG TERM ANTICOAGULENT USE     Low blood pressure 11/19/2014    lymphostatic lymphoma     Nausea 11/19/2014    Non Hodgkin's lymphoma ([facility] [address]     Obesity     Obstructive sleep apnea     AHI:  21.7 per PSG, 7/2017    Peptic ulcer disease 2/24/2011    Restless leg     S/P CABG x 3 10/22/2014    SVG to RCA; SVG to LAD diagonal; LIMA to LAD    Sleep apnea     Tachyarrhythmia     Thrombocythemia, essential ([facility] [address]     Type 2 diabetes mellitus without complication ([facility] [address]     Type II or unspecified type diabetes mellitus without mention of complication, not stated as uncontrolled          SURGICAL HISTORY       Past Surgical History:   Procedure Laterality Date    CARDIAC CATHETERIZATION  2/28/11    EF 60%    CARDIAC CATHETERIZATION  9/16/05, 3/7/07    EF < 50%    CARDIAC CATHETERIZATION  12/4/12   MDL    EF  50%    CARDIAC CATHETERIZATION  10/28/14  MDL    CARDIAC CATHETERIZATION     Lu Guerra M.D.   [facility] CORONARY ARTERY BYPASS GRAFT  9/21/05    LIMA to LAD and diagonal; SVG to posterior descending branch RCA    CORONARY ARTERY BYPASS GRAFT  10/30/2014    Redo Sternotomy - SVG-PDA, TMR (RBL)    KIDNEY SURGERY  08/14/2017    KNEE ARTHROSCOPY      right ACL repair    SHOULDER ARTHROSCOPY      left-rotator cuff repair right- rotator cuff repair    SHOULDER ARTHROSCOPY  08-23-11    right    TONSILLECTOMY           CURRENT MEDICATIONS       Previous Medications    ACETAMINOPHEN (TYLENOL) 500 MG TABLET    Take 1,000 mg by mouth every 6 hours as needed for Pain    AMOXICILLIN-CLAVULANATE (AUGMENTIN) 875-125 MG PER TABLET    Take 1 tablet by mouth 2 times daily Indications: currently not taking     B COMPLEX VITAMINS CAPSULE    Take 1 capsule by mouth daily. BUSPIRONE (BUSPAR) 15 MG TABLET    Take 7.5 mg by mouth 2 times daily    CLOPIDOGREL (PLAVIX) 75 MG TABLET    One daily    DILTIAZEM (CARDIZEM CD) 180 MG EXTENDED RELEASE CAPSULE    Take 1 capsule by mouth daily    DOCUSATE SODIUM (COLACE) 100 MG CAPSULE    Take 100 mg by mouth daily. DULOXETINE (CYMBALTA) 30 MG EXTENDED RELEASE CAPSULE    Take 30 mg by mouth nightly Indications: patient stated received medications yesterday 5/19/21     FERROUS SULFATE 325 (65 FE) MG TABLET    Take 325 mg by mouth 2 times daily    FUROSEMIDE (LASIX) 20 MG TABLET    Take 1 tablet by mouth daily    GABAPENTIN (NEURONTIN) 100 MG CAPSULE    Take 1 capsule by mouth 2 times daily. HYDROCODONE-ACETAMINOPHEN (NORCO) 7.5-325 MG PER TABLET    Take 1-2 tablets by mouth every 6 hours as needed. .    ICOSAPENT ETHYL (VASCEPA) 1 G CAPS CAPSULE    Take 1 capsule by mouth 2 times daily    LEVOTHYROXINE (SYNTHROID) 50 MCG TABLET    Take 50 mcg by mouth Daily    LISINOPRIL (PRINIVIL;ZESTRIL) 2.5 MG TABLET    Take 1 tablet by mouth daily    MELOXICAM (MOBIC) 15 MG TABLET    Take 15 mg by mouth daily     METFORMIN (GLUCOPHAGE) 1000 MG TABLET    HOLD FOR 48 HOURS AFTER THE CARDIAC CATHETERIZATION    METOPROLOL TARTRATE (LOPRESSOR) 25 MG TABLET    Take 1 tablet by mouth 2 times daily    NITROGLYCERIN (NITROSTAT) 0.4 MG SL TABLET    Place 1 tablet under the tongue every 5 minutes as needed for Chest pain    ONDANSETRON (ZOFRAN) 8 MG TABLET    Take 8 mg by mouth as needed     PANTOPRAZOLE (PROTONIX) 40 MG TABLET    Take 1 tablet by mouth daily    PRAVASTATIN (PRAVACHOL) 40 MG TABLET    Take 40 mg by mouth nightly    RANOLAZINE (RANEXA) 1000 MG EXTENDED RELEASE TABLET    Take 1 tablet by mouth 2 times daily    TAMSULOSIN (FLOMAX) 0.4 MG CAPSULE    Take 1 capsule(s) every day by oral route as directed for 30 days.     TIZANIDINE (ZANAFLEX) 4 MG TABLET Intimate Partner Violence:     Fear of Current or Ex-Partner:     Emotionally Abused:     Physically Abused:     Sexually Abused:        SCREENINGS             PHYSICAL EXAM    (up to 7 for level 4, 8 or more for level 5)     ED Triage Vitals [08/20/21 2155]   BP Temp Temp Source Pulse Resp SpO2 Height Weight   (!) 152/90 98.2 °F (36.8 °C) Oral 88 20 93 % 5' 11\" (1.803 m) 235 lb (106.6 kg)       Physical Exam  Vitals reviewed. HENT:      Head: Normocephalic. Right Ear: External ear normal.      Left Ear: External ear normal.   Eyes:      Conjunctiva/sclera: Conjunctivae normal.      Pupils: Pupils are equal, round, and reactive to light. Cardiovascular:      Rate and Rhythm: Normal rate and regular rhythm. Heart sounds: Normal heart sounds. Pulmonary:      Effort: Pulmonary effort is normal.      Breath sounds: Normal breath sounds. Abdominal:      General: Bowel sounds are normal.      Palpations: Abdomen is soft. Tenderness: There is no abdominal tenderness. Musculoskeletal:         General: Normal range of motion. Cervical back: Normal range of motion. Skin:     General: Skin is warm and dry. Neurological:      Mental Status: He is alert and oriented to person, place, and time.          DIAGNOSTIC RESULTS     EKG: All EKG's are interpreted by the Emergency Department Physician who either signs or Co-signs this chart in the absence of acardiologist.        RADIOLOGY:   Non-plain film images such as CT, Ultrasound andMRI are read by the radiologist. Plain radiographic images are visualized and preliminarily interpreted by the emergency physician with the below findings:        Interpretation per the Radiologist below, if available at the time of this note:    No orders to display         ED BEDSIDE ULTRASOUND:   Performed by ED Physician - none    LABS:  Labs Reviewed   CBC WITH AUTO DIFFERENTIAL - Abnormal; Notable for the following components:       Result Value    RBC 3.27 (*)     Hemoglobin 11.4 (*)     Hematocrit 35.8 (*)     .5 (*)     MCH 34.9 (*)     MCHC 31.8 (*)     Platelets 94 (*)     Neutrophils % 72.8 (*)     Lymphocytes % 13.6 (*)     Monocytes % 10.5 (*)     Lymphocytes Absolute 0.9 (*)     All other components within normal limits   COMPREHENSIVE METABOLIC PANEL W/ REFLEX TO MG FOR LOW K - Abnormal; Notable for the following components:    Glucose 160 (*)     CREATININE 1.3 (*)     GFR Non- 55 (*)     All other components within normal limits   URINE RT REFLEX TO CULTURE - Abnormal; Notable for the following components:    Leukocyte Esterase, Urine TRACE (*)     All other components within normal limits   MICROSCOPIC URINALYSIS - Abnormal; Notable for the following components:    Bacteria, UA NEGATIVE (*)     Crystals, UA NEG (*)     WBC, UA 14 (*)     All other components within normal limits   CULTURE, URINE   PROTIME-INR       All other labs were within normal range or not returned as of this dictation. RE-ASSESSMENT     CT chest abdomen pelvis w contrast    Impression    1.  Moderate to severe left hydronephrosis and hydroureter related to 9 mm obstructing stone at the distal left ureter. 2.  No lymphadenopathy within the chest, abdomen, pelvis. 3.  Multiple left renal calculi with largest at the lower renal pole measuring 17 mm, unchanged from prior. 4.  Morphologic changes in liver suggestive of chronic underlying liver disease and/or cirrhosis. Small paraesophageal vessels/varices. Unchanged mild splenomegaly. 5.  Extensive atherosclerotic disease with stable left greater than right common iliac artery aneurysmal dilatation. Critical findings including left hydronephrosis and hydroureter were communicated to the ordering provider Dr. Emigdio Barajas at [phone] on 8/20/2021.      Electronically signed by Alonzo Ramirez MD on 8/20/2021 3:26 PM     Chino ALVARADO MD, have reviewed the images and verify the above added. Make sure you understand how and when to take each. * tamsulosin 0.4 MG capsule  Commonly known as: FLOMAX  Take 1 capsule by mouth daily for 10 days  What changed: You were already taking a medication with the same name, and this prescription was added. Make sure you understand how and when to take each. * This list has 2 medication(s) that are the same as other medications prescribed for you. Read the directions carefully, and ask your doctor or other care provider to review them with you.             ASK your doctor about these medications    acetaminophen 500 MG tablet  Commonly known as: TYLENOL     amoxicillin-clavulanate 875-125 MG per tablet  Commonly known as: AUGMENTIN     b complex vitamins capsule     busPIRone 15 MG tablet  Commonly known as: BUSPAR     clopidogrel 75 MG tablet  Commonly known as: PLAVIX  One daily     dilTIAZem 180 MG extended release capsule  Commonly known as: Cardizem CD  Take 1 capsule by mouth daily     docusate sodium 100 MG capsule  Commonly known as: COLACE     DULoxetine 30 MG extended release capsule  Commonly known as: CYMBALTA     ferrous sulfate 325 (65 Fe) MG tablet  Commonly known as: IRON 325     furosemide 20 MG tablet  Commonly known as: LASIX     gabapentin 100 MG capsule  Commonly known as: NEURONTIN     HYDROcodone-acetaminophen 7.5-325 MG per tablet  Commonly known as: NORCO     levothyroxine 50 MCG tablet  Commonly known as: SYNTHROID     lisinopril 2.5 MG tablet  Commonly known as: PRINIVIL;ZESTRIL     meloxicam 15 MG tablet  Commonly known as: MOBIC     metFORMIN 1000 MG tablet  Commonly known as: GLUCOPHAGE  HOLD FOR 48 HOURS AFTER THE CARDIAC CATHETERIZATION     metoprolol tartrate 25 MG tablet  Commonly known as: LOPRESSOR  Take 1 tablet by mouth 2 times daily     nitroGLYCERIN 0.4 MG SL tablet  Commonly known as: NITROSTAT  Place 1 tablet under the tongue every 5 minutes as needed for Chest pain     ondansetron 8 MG tablet  Commonly known as:  ZOFRAN     pantoprazole 40 MG tablet  Commonly known as: PROTONIX  Take 1 tablet by mouth daily     pravastatin 40 MG tablet  Commonly known as: PRAVACHOL     ranolazine 1000 MG extended release tablet  Commonly known as: RANEXA  Take 1 tablet by mouth 2 times daily     tiZANidine 4 MG tablet  Commonly known as: ZANAFLEX     Vascepa 1 g Caps capsule  Generic drug: Icosapent Ethyl     Vitamin D 1000 units Caps capsule  Commonly known as: CHOLECALCIFEROL           Where to Get Your Medications      You can get these medications from any pharmacy    Bring a paper prescription for each of these medications  · tamsulosin 0.4 MG capsule           (Pleasenote that portions of this note were completed with a voice recognition program.  Efforts were made to edit the dictations but occasionally words are mis-transcribed.)              Komal Arechiga, APRN  08/20/21 [address], APRN  08/20/21 1765

## 2021-08-22 LAB — URINE CULTURE, ROUTINE: NORMAL

## 2021-08-23 ENCOUNTER — TELEPHONE (OUTPATIENT)
Dept: UROLOGY | Facility: CLINIC | Age: 66
End: 2021-08-23

## 2021-08-23 NOTE — TELEPHONE ENCOUNTER
CALLED PATIENT'S SPOUSE TO INFORM HER WE WOULD GO AHEAD AND SEE HIM AT 08:20 TOMORROW 0/24/2021 WITH A KUB AT 07:20.       PATIENT'S SPOUSE STATED THEY ALREADY HAD AN APPOINTMENT WITH [facility] UROLOG[facility] AND SHE WOULD HAVE TO CALL ME BACK TO SEE IF THEY WANTED TO COME HERE INSTEAD.

## 2021-08-24 ENCOUNTER — OFFICE VISIT (OUTPATIENT)
Dept: UROLOGY | Age: 66
End: 2021-08-24
Payer: MEDICARE

## 2021-08-24 VITALS — HEIGHT: 71 IN | BODY MASS INDEX: 32.34 KG/M2 | WEIGHT: 231 LBS | TEMPERATURE: 97 F

## 2021-08-24 DIAGNOSIS — N20.0 LEFT RENAL STONE: Primary | ICD-10-CM

## 2021-08-24 LAB
BACTERIA URINE, POC: 0
BILIRUBIN URINE: 0 MG/DL
BLOOD, URINE: POSITIVE
CASTS URINE, POC: 0
CLARITY: CLEAR
COLOR: YELLOW
CRYSTALS URINE, POC: 0
EPI CELLS URINE, POC: 0
GLUCOSE URINE: NORMAL
KETONES, URINE: NEGATIVE
LEUKOCYTE EST, POC: NORMAL
NITRITE, URINE: NEGATIVE
PH UA: 6 (ref 4.5–8)
PROTEIN UA: NEGATIVE
RBC URINE, POC: 1
SPECIFIC GRAVITY UA: 1.01 (ref 1–1.03)
UROBILINOGEN, URINE: NORMAL
WBC URINE, POC: 0
YEAST URINE, POC: 0

## 2021-08-24 PROCEDURE — 1123F ACP DISCUSS/DSCN MKR DOCD: CPT | Performed by: NURSE PRACTITIONER

## 2021-08-24 PROCEDURE — 81001 URINALYSIS AUTO W/SCOPE: CPT | Performed by: NURSE PRACTITIONER

## 2021-08-24 PROCEDURE — 1036F TOBACCO NON-USER: CPT | Performed by: NURSE PRACTITIONER

## 2021-08-24 PROCEDURE — G8427 DOCREV CUR MEDS BY ELIG CLIN: HCPCS | Performed by: NURSE PRACTITIONER

## 2021-08-24 PROCEDURE — 99205 OFFICE O/P NEW HI 60 MIN: CPT | Performed by: NURSE PRACTITIONER

## 2021-08-24 PROCEDURE — G8417 CALC BMI ABV UP PARAM F/U: HCPCS | Performed by: NURSE PRACTITIONER

## 2021-08-24 PROCEDURE — 4040F PNEUMOC VAC/ADMIN/RCVD: CPT | Performed by: NURSE PRACTITIONER

## 2021-08-24 PROCEDURE — 3017F COLORECTAL CA SCREEN DOC REV: CPT | Performed by: NURSE PRACTITIONER

## 2021-08-24 RX ORDER — EMPAGLIFLOZIN 10 MG/1
TABLET, FILM COATED ORAL
Status: ON HOLD | COMMUNITY
Start: 2021-08-09 | End: 2021-12-17

## 2021-08-24 RX ORDER — ATORVASTATIN CALCIUM 40 MG/1
TABLET, FILM COATED ORAL
COMMUNITY
Start: 2021-08-09

## 2021-08-24 RX ORDER — ONDANSETRON 4 MG/1
TABLET, FILM COATED ORAL
COMMUNITY
Start: 2021-08-09 | End: 2021-08-24

## 2021-08-24 RX ORDER — DULOXETIN HYDROCHLORIDE 60 MG/1
CAPSULE, DELAYED RELEASE ORAL
COMMUNITY
Start: 2021-08-09 | End: 2021-08-24

## 2021-08-24 RX ORDER — TAMSULOSIN HYDROCHLORIDE 0.4 MG/1
0.4 CAPSULE ORAL DAILY
Qty: 90 CAPSULE | Refills: 3 | Status: ON HOLD | OUTPATIENT
Start: 2021-08-24 | End: 2022-07-26 | Stop reason: HOSPADM

## 2021-08-24 NOTE — PROGRESS NOTES
Jace Darden is a 74 y.o., male, New patient who presents today   Chief Complaint   Patient presents with    New Patient     I am here today for a kidney stone       HPI   Patient presents for evaluation of nephrolithiasis. He reports he has a long history of nephrolithiasis and has been treated with ESWL as well as ureteroscopy in addition to passing stones on his own. He reports being seen by his hematologist in [place] he was told to follow-up with a urologist in an urgent manner secondary to a 9 mm obstructing stone. After he was unable to be seen at [facility] urology, he presented to [facility] emergency room for further evaluation. He reports that he left without being seen earlier after spending about 2 hours in the waiting room. At that point, he presented to the [facility] emergency room. The doctor on-call at [facility] suggested the patient be discharged home if his pain was controlled and to follow-up in the office next week. Today, he brings in a stone fragments for analysis. There are 2 fragments, one larger than the other. Both, together, measure about 9 mm. He is complaining of only mild symptoms at this time with some mild discomfort on his lower left abdomen and back in addition to hesitancy in his urinary stream.  He reports taking Flomax chronically for urinary symptoms. REVIEW OF SYSTEMS:  Review of Systems   Constitutional: Negative for chills and fever. Gastrointestinal: Positive for abdominal pain (mild, lower left side). Negative for abdominal distention, nausea and vomiting. Genitourinary: Negative for difficulty urinating, dysuria, flank pain, frequency, hematuria and urgency. Musculoskeletal: Negative for back pain and gait problem. Psychiatric/Behavioral: Negative for agitation and confusion. PHYSICAL EXAM:  Temp 97 °F (36.1 °C) (Temporal)   Ht 5' 11\" (1.803 m)   Wt 231 lb (104.8 kg)   BMI 32.22 kg/m²   Physical Exam  Vitals and nursing note reviewed. Constitutional:       General: He is not in acute distress. Appearance: Normal appearance. He is not ill-appearing. Pulmonary:      Effort: Pulmonary effort is normal. No respiratory distress. Abdominal:      General: There is no distension. Tenderness: There is no abdominal tenderness. There is no right CVA tenderness or left CVA tenderness. Neurological:      Mental Status: He is alert and oriented to person, place, and time. Mental status is at baseline. Psychiatric:         Mood and Affect: Mood normal.         Behavior: Behavior normal.         DATA:  Results for orders placed or performed in visit on 08/24/21   POCT Urinalysis Dipstick w/ Micro (Auto)   Result Value Ref Range    Color, UA Yellow     Clarity, UA Clear Clear    Glucose, Ur 1000mg/dL     Bilirubin Urine 0 mg/dL    Ketones, Urine Negative     Specific Gravity, UA 1.015 1.005 - 1.030    Blood, Urine Positive     pH, UA 6 4.5 - 8.0    Protein, UA Negative Negative    Nitrite, Urine Negative     Leukocytes, UA NEG     Urobilinogen, Urine Normal     rbc urine, poc 1     wbc urine, poc 0     bacteria urine, poc 0     yeast urine, poc 0     casts urine, poc 0     epi cells urine, poc 0     crystals urine, poc 0        IMAGING:  Impression    1.  Moderate to severe left hydronephrosis and hydroureter related to 9 mm obstructing stone at the distal left ureter. 2.  No lymphadenopathy within the chest, abdomen, pelvis. 3.  Multiple left renal calculi with largest at the lower renal pole measuring 17 mm, unchanged from prior. 4.  Morphologic changes in liver suggestive of chronic underlying liver disease and/or cirrhosis. Small paraesophageal vessels/varices. Unchanged mild splenomegaly. 5.  Extensive atherosclerotic disease with stable left greater than right common iliac artery aneurysmal dilatation.      Critical findings including left hydronephrosis and hydroureter were communicated to the ordering provider Dr. Hernandez Exon Sofi Martinez at [phone] on 8/20/2021. Electronically signed by Jennie Quintanilla MD on 8/20/2021 3:26 PM     I, Ibis Carbajal MD, have reviewed the images and verify the above interpretation on 8/20/2021 3:40 PM.     Electronically signed by Ibis Carbajal MD on 8/20/2021 3:40 PM    I reviewed CT imaging that patient has provided to us on a disc today and agree with radiologist report. I believe that the stone fragments the patient has brought in today are consistent with the distal stone noted on CT. However, there are still several stones in the left kidney including a large stone in the inferior pole. ASSESSMENT/PLAN  1. Left renal stone  Patient presents for evaluation of nephrolithiasis. I believe he has passed the distal stone in question as his symptoms have basically resolved and he has physical evidence of a near 9 mm stone in the office today. I reviewed CT imaging which does reveal additional nephrolithiasis in the left kidney. I explained to the patient and his wife that while we could explore ESWL for the stone, he may require multiple procedures to become stone free. For now, we will follow-up in a couple weeks with CT prior to follow-up on the distal stone before making a plan of action for his remaining stones. - CT ABDOMEN PELVIS WO CONTRAST Additional Contrast? None; Future  - tamsulosin (FLOMAX) 0.4 MG capsule; Take 1 capsule by mouth daily  Dispense: 90 capsule; Refill: 3  - HYDROcodone-acetaminophen (NORCO) 5-325 MG per tablet; Take 1 tablet by mouth every 6 hours as needed for Pain for up to 3 days. Intended supply: 3 days. Take lowest dose possible to manage pain  Dispense: 12 tablet; Refill: 0  - Stone Analysis  - POCT Urinalysis Dipstick w/ Micro (Auto)    At least 60 minutes were spent on the day of the visit reviewing the patient's past medical records/imaging, speaking face to face with the patient, and charting in the post visit period.     Orders Placed This Encounter   Procedures    Stone Analysis     Left ureteral stone    CT ABDOMEN PELVIS WO CONTRAST Additional Contrast? None     For renal stone protocol     Standing Status:   Future     Standing Expiration Date:   8/24/2022     Scheduling Instructions: For renal stone protocol     Order Specific Question:   Additional Contrast?     Answer:   None     Order Specific Question:   Reason for exam:     Answer:   renal colic for stone protocol    Surgical Pathology    POCT Urinalysis Dipstick w/ Micro (Auto)        Return in about 1 week (around 8/31/2021) for CT prior, ct day of appt. An electronic signature was used to authenticate this note. GRECIA TIERNEY - CNP    All information inputted into the note by the MA to include chief complaint, past medical history, past surgical history, medications, allergies, social and family history and review of systems has been reviewed and updated as needed by me. EMR Dragon/transcription disclaimer: Much of this document is electronic transcription/translation of spoken language to printed text. The electronic translation of spoken language may be erroneous or, at times, nonsensical words or phrases may be inadvertently transcribed.  Although I have reviewed the document for such errors, some may still exist.

## 2021-08-25 ENCOUNTER — HOSPITAL ENCOUNTER (OUTPATIENT)
Dept: WOUND CARE | Age: 66
Discharge: HOME | End: 2021-08-25
Payer: MEDICARE

## 2021-08-25 VITALS
DIASTOLIC BLOOD PRESSURE: 54 MMHG | TEMPERATURE: 97.1 F | HEART RATE: 81 BPM | SYSTOLIC BLOOD PRESSURE: 84 MMHG | BODY MASS INDEX: 32.34 KG/M2 | RESPIRATION RATE: 20 BRPM | WEIGHT: 231 LBS | HEIGHT: 71 IN

## 2021-08-25 DIAGNOSIS — E11.621 DIABETIC ULCER OF TOE OF LEFT FOOT ASSOCIATED WITH TYPE 2 DIABETES MELLITUS, WITH FAT LAYER EXPOSED (HCC): Primary | Chronic | ICD-10-CM

## 2021-08-25 DIAGNOSIS — L97.522 DIABETIC ULCER OF TOE OF LEFT FOOT ASSOCIATED WITH TYPE 2 DIABETES MELLITUS, WITH FAT LAYER EXPOSED (HCC): Primary | Chronic | ICD-10-CM

## 2021-08-25 DIAGNOSIS — E11.621 DIABETIC ULCER OF RIGHT MIDFOOT ASSOCIATED WITH TYPE 2 DIABETES MELLITUS, WITH FAT LAYER EXPOSED (HCC): ICD-10-CM

## 2021-08-25 DIAGNOSIS — L97.412 DIABETIC ULCER OF RIGHT MIDFOOT ASSOCIATED WITH TYPE 2 DIABETES MELLITUS, WITH FAT LAYER EXPOSED (HCC): ICD-10-CM

## 2021-08-25 PROCEDURE — 97597 DBRDMT OPN WND 1ST 20 CM/<: CPT

## 2021-08-25 PROCEDURE — 97597 DBRDMT OPN WND 1ST 20 CM/<: CPT | Performed by: SURGERY

## 2021-08-25 RX ORDER — BETAMETHASONE DIPROPIONATE 0.05 %
OINTMENT (GRAM) TOPICAL ONCE
Status: CANCELLED | OUTPATIENT
Start: 2021-08-25 | End: 2021-08-25

## 2021-08-25 RX ORDER — LIDOCAINE HYDROCHLORIDE 20 MG/ML
JELLY TOPICAL ONCE
Status: CANCELLED | OUTPATIENT
Start: 2021-08-25 | End: 2021-08-25

## 2021-08-25 RX ORDER — LIDOCAINE HYDROCHLORIDE 40 MG/ML
SOLUTION TOPICAL ONCE
Status: CANCELLED | OUTPATIENT
Start: 2021-08-25 | End: 2021-08-25

## 2021-08-25 RX ORDER — LIDOCAINE 40 MG/G
CREAM TOPICAL ONCE
Status: CANCELLED | OUTPATIENT
Start: 2021-08-25 | End: 2021-08-25

## 2021-08-25 RX ORDER — LIDOCAINE 50 MG/G
OINTMENT TOPICAL ONCE
Status: CANCELLED | OUTPATIENT
Start: 2021-08-25 | End: 2021-08-25

## 2021-08-25 RX ORDER — GENTAMICIN SULFATE 1 MG/G
OINTMENT TOPICAL ONCE
Status: CANCELLED | OUTPATIENT
Start: 2021-08-25 | End: 2021-08-25

## 2021-08-25 RX ORDER — CLOBETASOL PROPIONATE 0.5 MG/G
OINTMENT TOPICAL ONCE
Status: CANCELLED | OUTPATIENT
Start: 2021-08-25 | End: 2021-08-25

## 2021-08-25 ASSESSMENT — PAIN SCALES - GENERAL: PAINLEVEL_OUTOF10: 3

## 2021-08-25 NOTE — PROGRESS NOTES
[address]   Progress Note and Procedure Note      Hiram Young  MEDICAL RECORD NUMBER:  [id number]  AGE: 74 y.o. GENDER: male  : 1955  EPISODE DATE:  2021    Subjective:     Chief Complaint   Patient presents with    Wound Check     follow up         HISTORY of PRESENT ILLNESS HPI     Hiram Young is a 74 y.o. male who presents today for wound/ulcer evaluation. Wound Context: Pt with L toe wound here for eval/treat  Wound/Ulcer Pain Timing/Severity: none  Quality of pain: N/A  Severity:  0 / 10   Modifying Factors: None  Associated Signs/Symptoms: none    Ulcer Identification:  Ulcer Type: diabetic and pressure  Contributing Factors: chronic pressure and shear force    Wound: DM        PAST MEDICAL HISTORY        Diagnosis Date    Abnormal nuclear stress test 10/22/2014    Arthritis     BiPAP (biphasic positive airway pressure) dependence     8cm to 20cm    Cerebrovascular disease     Chronic kidney disease, stage II (mild) 2016    Jonas Amaya M.D.    Coronary artery disease 2011    Depression     Diabetes mellitus ([facility] [address]     Encounter for wound care     PT SEES \"GRACY\" WITH DR. SAMUEL    Great toe pain     RT    Headache     Hyperlipemia 2011    Dr. Tricia Morales follows lipids.     Hyperlipidemia     Hypertension     LONG TERM ANTICOAGULENT USE     Low blood pressure 2014    lymphostatic lymphoma     Nausea 2014    Non Hodgkin's lymphoma (HCC)     Obesity     Obstructive sleep apnea     AHI:  21.7 per PS[facility], 2017    Peptic ulcer disease 2011    Restless leg     S/P CABG x 3 10/22/2014    SVG to RCA; SVG to LAD diagonal; LIMA to LAD    Sleep apnea     Tachyarrhythmia     Thrombocythemia, essential (HCC)     Type 2 diabetes mellitus without complication (HCC)     Type II or unspecified type diabetes mellitus without mention of complication, not stated as uncontrolled        PAST SURGICAL HISTORY    Past Surgical History:   Procedure Laterality Date    CARDIAC CATHETERIZATION  11    EF 60%    CARDIAC CATHETERIZATION  05, 3/7/07    EF < 50%    CARDIAC CATHETERIZATION  12   MDL    EF  50%    CARDIAC CATHETERIZATION  10/28/14  MDL    CARDIAC CATHETERIZATION     CLARIBEL Garcia Arm CORONARY ARTERY BYPASS GRAFT  05    LIMA to LAD and diagonal; SVG to posterior descending branch RCA    CORONARY ARTERY BYPASS GRAFT  10/30/2014    Redo Sternotomy - SVG-PDA, TMR (RBL)    KIDNEY SURGERY  2017    KNEE ARTHROSCOPY      right ACL repair    SHOULDER ARTHROSCOPY      left-rotator cuff repair right- rotator cuff repair    SHOULDER ARTHROSCOPY  11    right    TONSILLECTOMY         FAMILY HISTORY    Family History   Problem Relation Age of Onset    Heart Disease Other     Lung Cancer Mother     Cancer Sister        SOCIAL HISTORY    Social History     Tobacco Use    Smoking status: Former Smoker     Packs/day: 0.00     Types: Cigarettes     Quit date: 1998     Years since quittin.5    Smokeless tobacco: Never Used    Tobacco comment: quit 23 years ago   Vaping Use    Vaping Use: Never used   Substance Use Topics    Alcohol use: No    Drug use: No       ALLERGIES    Allergies   Allergen Reactions    Ancef [Cefazolin Sodium]     Ceftin [Cefuroxime]      \"anything with ceftin in it\"    Cefuroxime Axetil     Cephalosporins     Neosporin [Bacitracin-Polymyxin B]      blisters       MEDICATIONS    Current Outpatient Medications on File Prior to Encounter   Medication Sig Dispense Refill    atorvastatin (LIPITOR) 40 MG tablet TAKE 1 TABLET BY MOUTH NIGHTLY AT BEDTIME      JARDIANCE 10 MG tablet TAKE 1 TABLET BY MOUTH DAILY; TAKE ONE DAILY FOR 30 DAYS THEN START 25 MG DAILY      tamsulosin (FLOMAX) 0.4 MG capsule Take 1 capsule by mouth daily 90 capsule 3    furosemide (LASIX) 20 MG tablet Take 1 tablet by mouth daily      gabapentin (NEURONTIN) 100 MG capsule Take 1 capsule by mouth 2 times daily.  lisinopril (PRINIVIL;ZESTRIL) 2.5 MG tablet Take 1 tablet by mouth daily      busPIRone (BUSPAR) 15 MG tablet Take 7.5 mg by mouth 2 times daily      pravastatin (PRAVACHOL) 40 MG tablet Take 40 mg by mouth nightly      Icosapent Ethyl (VASCEPA) 1 g CAPS capsule Take 1 capsule by mouth 2 times daily      metoprolol tartrate (LOPRESSOR) 25 MG tablet Take 1 tablet by mouth 2 times daily 60 tablet 5    ranolazine (RANEXA) 1000 MG extended release tablet Take 1 tablet by mouth 2 times daily 180 tablet 3    clopidogrel (PLAVIX) 75 MG tablet One daily (Patient taking differently: Take 75 mg by mouth daily ) 90 tablet 3    acetaminophen (TYLENOL) 500 MG tablet Take 1,000 mg by mouth every 6 hours as needed for Pain      metFORMIN (GLUCOPHAGE) 1000 MG tablet HOLD FOR 48 HOURS AFTER THE CARDIAC CATHETERIZATION (Patient taking differently: 500 mg 2 times daily (with meals) ) 60 tablet 3    DULoxetine (CYMBALTA) 30 MG extended release capsule Take 30 mg by mouth nightly Indications: patient stated received medications yesterday 5/19/21       levothyroxine (SYNTHROID) 50 MCG tablet Take 50 mcg by mouth Daily      diltiazem (CARDIZEM CD) 180 MG extended release capsule Take 1 capsule by mouth daily 90 capsule 3    meloxicam (MOBIC) 15 MG tablet Take 15 mg by mouth daily       ondansetron (ZOFRAN) 8 MG tablet Take 8 mg by mouth as needed       tiZANidine (ZANAFLEX) 4 MG tablet 1/2 tablet at night      ferrous sulfate 325 (65 Fe) MG tablet Take 325 mg by mouth 2 times daily      pantoprazole (PROTONIX) 40 MG tablet Take 1 tablet by mouth daily 90 tablet 2    b complex vitamins capsule Take 1 capsule by mouth daily.  Vitamin D (CHOLECALCIFEROL) 1000 UNITS CAPS capsule Take 1,000 Units by mouth daily.  docusate sodium (COLACE) 100 MG capsule Take 100 mg by mouth daily.       nitroGLYCERIN (NITROSTAT) 0.4 MG SL tablet Place 1 tablet under the tongue every 5 minutes as needed for Chest pain (Patient taking differently: Place 0.4 mg under the tongue every 5 minutes as needed for Chest pain Indications: has not had to take ) 25 tablet 5     No current facility-administered medications on file prior to encounter. REVIEW OF SYSTEMS    A comprehensive review of systems was negative.     Objective:      BP (!) 84/54   Pulse 81   Temp 97.1 °F (36.2 °C) (Temporal)   Resp 20   Ht 5' 11\" (1.803 m)   Wt 231 lb (104.8 kg)   BMI 32.22 kg/m²     Wt Readings from Last 3 Encounters:   08/25/21 231 lb (104.8 kg)   08/24/21 231 lb (104.8 kg)   08/20/21 235 lb (106.6 kg)       PHYSICAL EXAM    General Appearance: alert and oriented to person, place and time, well developed and well- nourished, in no acute distress  Skin: warm and dry, no rash or erythema  Head: normocephalic and atraumatic  Eyes: pupils equal, round, and reactive to light, extraocular eye movements intact, conjunctivae normal  ENT: tympanic membrane, external ear and ear canal normal bilaterally, nose without deformity, nasal mucosa and turbinates normal without polyps, lips teeth and gums normal  Neck: supple and non-tender without mass, no thyromegaly or thyroid nodules, no cervical lymphadenopathy  Pulmonary/Chest: clear to auscultation bilaterally- no wheezes, rales or rhonchi, normal air movement, no respiratory distress  Cardiovascular: normal rate, regular rhythm, normal S1 and S2, no murmurs, rubs, clicks, or gallops, distal pulses intact, no carotid bruits  Abdomen: soft, non-tender, non-distended, normal bowel sounds, no masses or organomegaly  Extremities: no cyanosis, clubbing or edema  Musculoskeletal: normal range of motion, no joint swelling, deformity or tenderness  Neurologic: reflexes normal and symmetric, no cranial nerve deficit, gait, coordination and speech normal, sensation of skin normal      Assessment:      Problem List Items Addressed This Visit     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 Performed by: Selin Alexander MD    Consent obtained:  Yes    Time out taken:  Yes    Pain Control:         Debridement:Non-excisional Debridement    Using curette the wound(s)/ulcer(s) was/were sharply debrided down through and including the removal of epidermis and dermis.         Devitalized Tissue Debrided:  fibrin, biofilm, slough, necrotic/eschar, exudate and callus      Pre Debridement Measurements:  Are located in the Wound/Ulcer Documentation Flow Sheet    Wound/Ulcer #: 1    Percent of Wound(s)/Ulcer(s) Debrided: 100%    Total Surface Area Debrided:  0.02 sq cm       Diabetic/Pressure/Non Pressure Ulcers only:  Ulcer: Diabetic ulcer, fat layer exposed             Post Debridement Measurements:    Wound/Ulcer Descriptions are Pre Debridement --EXCEPT MEASUREMENTS    Wound 08/02/21 Toe (Comment  which one) Left;Posterior wound 1- left great toe wag 1 (Active)   Wound Image   08/25/21 1201   Wound Etiology Diabetic Lawler 1 08/25/21 1201   Dressing Status Dry 08/25/21 1201   Wound Cleansed Cleansed with saline 08/25/21 1201   Dressing/Treatment Xeroform 08/18/21 0930   Offloading for Diabetic Foot Ulcers Felt or foam 08/18/21 0930   Wound Length (cm) 0.2 cm 08/25/21 1201   Wound Width (cm) 0.1 cm 08/25/21 1201   Wound Depth (cm) 0.1 cm 08/25/21 1201   Wound Surface Area (cm^2) 0.02 cm^2 08/25/21 1201   Change in Wound Size % (l*w) 96.43 08/25/21 1201   Wound Volume (cm^3) 0.002 cm^3 08/25/21 1201 Wound Healing % 98 08/25/21 1201   Post-Procedure Length (cm) 0.2 cm 08/25/21 1253   Post-Procedure Width (cm) 0.1 cm 08/25/21 1253   Post-Procedure Depth (cm) 0.1 cm 08/25/21 1253   Post-Procedure Surface Area (cm^2) 0.02 cm^2 08/25/21 1253   Post-Procedure Volume (cm^3) 0.002 cm^3 08/25/21 1253   Distance Tunneling (cm) 0 cm 08/25/21 1201   Tunneling Position ___ O'Clock 0 08/25/21 1201   Undermining Starts ___ O'Clock 0 08/25/21 1201   Undermining Ends___ O'Clock 0 08/25/21 1201   Undermining Maxium Distance (cm) 0 08/25/21 1201   Wound Assessment Pink/red 08/25/21 1201   Drainage Amount None 08/25/21 1201   Drainage Description Serosanguinous 08/25/21 1201   Odor None 08/25/21 1201   Nelda-wound Assessment Hyperkeratosis (callous) 08/25/21 1201   Margins Defined edges 08/25/21 1201   Wound Thickness Description not for Pressure Injury Full thickness 08/25/21 1201   Number of days: 22       Wound 08/02/21 Pedal Right;Plantar wound 2- right plantar wag 1 (Active)   Wound Image   08/25/21 1201   Wound Etiology Diabetic 08/25/21 1201   Dressing Status Dry 08/25/21 1201   Wound Cleansed Not Cleansed 08/25/21 1201   Dressing/Treatment Xeroform 08/11/21 1136   Offloading for Diabetic Foot Ulcers Yes (type); Felt or foam 08/18/21 0902   Wound Length (cm) 0 cm 08/25/21 1201   Wound Width (cm) 0 cm 08/25/21 1201   Wound Depth (cm) 0 cm 08/25/21 1201   Wound Surface Area (cm^2) 0 cm^2 08/25/21 1201   Change in Wound Size % (l*w) 100 08/25/21 1201   Wound Volume (cm^3) 0 cm^3 08/25/21 1201   Wound Healing % 100 08/25/21 1201   Post-Procedure Length (cm) 0 cm 08/25/21 1201   Post-Procedure Width (cm) 0 cm 08/25/21 1201   Post-Procedure Depth (cm) 0 cm 08/25/21 1201   Post-Procedure Surface Area (cm^2) 0 cm^2 08/25/21 1201   Post-Procedure Volume (cm^3) 0 cm^3 08/25/21 1201   Distance Tunneling (cm) 0 cm 08/25/21 1201   Tunneling Position ___ O'Clock 0 08/25/21 1201   Undermining Starts ___ O'Clock 0 08/25/21 1201 Undermining Ends___ O'Clock 0 08/25/21 1201   Undermining Maxium Distance (cm) 0 08/25/21 1201   Wound Assessment Dry 08/25/21 1201   Drainage Amount None 08/25/21 1201   Drainage Description Serosanguinous 08/02/21 1427   Odor None 08/25/21 1201   Nelda-wound Assessment Dry/flaky; Hyperkeratosis (callous) 08/25/21 1201   Margins Attached edges 08/25/21 1201   Wound Thickness Description not for Pressure Injury Full thickness 08/25/21 1201   Number of days: 22             Estimated Blood Loss:  Minimal    Hemostasis Achieved:  by pressure    Procedural Pain:  0  / 10     Post Procedural Pain:  0 / 10     Response to treatment:  Well tolerated by patient. Plan:     Problem List Items Addressed This Visit     Diabetic ulcer of right midfoot associated with type 2 diabetes mellitus, with fat layer exposed ([address] (Chronic)    Relevant Orders    Initiate Outpatient Wound Care Protocol    * (Principal) Diabetic ulcer of toe of left foot associated with type 2 diabetes mellitus, with fat layer exposed ([address] - Primary (Chronic)          Cont current care    Treatment Note please see attached Discharge Instructions    In my professional opinion this patient would benefit from HBO Therapy: No    Written patient dismissal instructions given to patient and signed by patient or POA.          Discharge [address] and Hyperbaric Oxygen Therapy   Physician Orders and Discharge Instructions  [address]  Telephone: [phone]    NAME:  Christian Meza OF BIRTH:  1955  MEDICAL RECORD NUMBER:  [id number]  DATE:  8/25/2021    Discharge condition: Stable    Discharge to: Home    Left via:Private automobile    Accompanied by: Self     [facility]F/[facility]: [facility]     Dressing Orders: Right and left foot wound:   apply a double layer of Xeroform (yellow sticky gauze) over the wound bed, secure with dry  gauze, rolled gauze, and medipore tape daily.     Treatment Orders:  Avoid pressure to the wounds  Off load areas with Metatarsal Pads, send extra   Protein rich diet  Multivitamin     [facility] follow up visit _____________1 week________________  (Please note your next appointment above and if you are unable to keep, kindly give a 24 hour notice. Thank you.)    If you experience any of the following, please call the [facility] during business hours:    * Increase in Pain  * Temperature over 101  * Increase in drainage from your wound  * Drainage with a foul odor  * Bleeding  * Increase in swelling  * Need for compression bandage changes due to slippage, breakthrough drainage. If you need medical attention outside of the business hours of the [facility] [address] please contact your PCP or go to the nearest emergency room.         Electronically signed by Mack Rodriguez MD on 8/25/2021 at 12:55 PM

## 2021-08-26 ENCOUNTER — APPOINTMENT (OUTPATIENT)
Dept: CT IMAGING | Age: 66
End: 2021-08-26
Payer: MEDICARE

## 2021-08-26 ENCOUNTER — HOSPITAL ENCOUNTER (EMERGENCY)
Age: 66
Discharge: HOME | End: 2021-08-26
Attending: EMERGENCY MEDICINE
Payer: MEDICARE

## 2021-08-26 ENCOUNTER — APPOINTMENT (OUTPATIENT)
Dept: GENERAL RADIOLOGY | Age: 66
End: 2021-08-26
Payer: MEDICARE

## 2021-08-26 VITALS
DIASTOLIC BLOOD PRESSURE: 68 MMHG | OXYGEN SATURATION: 93 % | HEART RATE: 103 BPM | SYSTOLIC BLOOD PRESSURE: 132 MMHG | RESPIRATION RATE: 18 BRPM | TEMPERATURE: 98.3 F

## 2021-08-26 DIAGNOSIS — I95.1 ORTHOSTATIC HYPOTENSION: ICD-10-CM

## 2021-08-26 DIAGNOSIS — R55 NEAR SYNCOPE: Primary | ICD-10-CM

## 2021-08-26 DIAGNOSIS — R06.02 SHORTNESS OF BREATH: ICD-10-CM

## 2021-08-26 DIAGNOSIS — R10.9 FLANK PAIN: ICD-10-CM

## 2021-08-26 LAB
ALBUMIN SERPL-MCNC: 4.1 G/DL (ref 3.5–5.2)
ALP BLD-CCNC: 87 U/L (ref 40–130)
ALT SERPL-CCNC: 17 U/L (ref 5–41)
ANION GAP SERPL CALCULATED.3IONS-SCNC: 12 MMOL/L (ref 7–19)
APTT: 26.9 SEC (ref 26–36.2)
AST SERPL-CCNC: 17 U/L (ref 5–40)
BASE EXCESS ARTERIAL: -0.8 MMOL/L (ref -2–2)
BASOPHILS ABSOLUTE: 0 K/UL (ref 0–0.2)
BASOPHILS RELATIVE PERCENT: 0 % (ref 0–1)
BILIRUB SERPL-MCNC: 0.6 MG/DL (ref 0.2–1.2)
BILIRUBIN URINE: NEGATIVE
BLOOD, URINE: NEGATIVE
BUN BLDV-MCNC: 23 MG/DL (ref 8–23)
CALCIUM SERPL-MCNC: 9.2 MG/DL (ref 8.8–10.2)
CARBOXYHEMOGLOBIN ARTERIAL: 1 % (ref 0–5)
CHLORIDE BLD-SCNC: 96 MMOL/L (ref 98–111)
CLARITY: CLEAR
CO2: 25 MMOL/L (ref 22–29)
COLOR: YELLOW
CREAT SERPL-MCNC: 1.4 MG/DL (ref 0.5–1.2)
D DIMER: 0.75 UG/ML FEU (ref 0–0.48)
EOSINOPHILS ABSOLUTE: 0 K/UL (ref 0–0.6)
EOSINOPHILS RELATIVE PERCENT: 0.3 % (ref 0–5)
GFR AFRICAN AMERICAN: >59
GFR NON-AFRICAN AMERICAN: 50
GLUCOSE BLD-MCNC: 239 MG/DL (ref 74–109)
GLUCOSE URINE: =>1000 MG/DL
HCO3 ARTERIAL: 23.4 MMOL/L (ref 22–26)
HCT VFR BLD CALC: 33 % (ref 42–52)
HEMOGLOBIN, ART, EXTENDED: 11 G/DL (ref 14–18)
HEMOGLOBIN: 10.6 G/DL (ref 14–18)
IMMATURE GRANULOCYTES #: 0 K/UL
INR BLD: 1.1 (ref 0.88–1.18)
KETONES, URINE: NEGATIVE MG/DL
LACTIC ACID: 1.8 MMOL/L (ref 0.5–1.9)
LEUKOCYTE ESTERASE, URINE: NEGATIVE
LYMPHOCYTES ABSOLUTE: 0.4 K/UL (ref 1.1–4.5)
LYMPHOCYTES RELATIVE PERCENT: 3.9 % (ref 20–40)
MCH RBC QN AUTO: 34.8 PG (ref 27–31)
MCHC RBC AUTO-ENTMCNC: 32.1 G/DL (ref 33–37)
MCV RBC AUTO: 108.2 FL (ref 80–94)
METHEMOGLOBIN ARTERIAL: 0.4 %
MONOCYTES ABSOLUTE: 0.8 K/UL (ref 0–0.9)
MONOCYTES RELATIVE PERCENT: 8.5 % (ref 0–10)
NEUTROPHILS ABSOLUTE: 7.7 K/UL (ref 1.5–7.5)
NEUTROPHILS RELATIVE PERCENT: 86.8 % (ref 50–65)
NITRITE, URINE: NEGATIVE
O2 CONTENT ARTERIAL: 14.3 ML/DL
O2 SAT, ARTERIAL: 91.9 %
O2 THERAPY: ABNORMAL
PCO2 ARTERIAL: 36 MMHG (ref 35–45)
PDW BLD-RTO: 14.6 % (ref 11.5–14.5)
PH ARTERIAL: 7.42 (ref 7.35–7.45)
PH UA: 6 (ref 5–8)
PLATELET # BLD: 63 K/UL (ref 130–400)
PMV BLD AUTO: 9.3 FL (ref 9.4–12.4)
PO2 ARTERIAL: 64 MMHG (ref 80–100)
POTASSIUM SERPL-SCNC: 4.4 MMOL/L (ref 3.5–5)
POTASSIUM, WHOLE BLOOD: 4.4
PRO-BNP: 205 PG/ML (ref 0–900)
PROTEIN UA: NEGATIVE MG/DL
PROTHROMBIN TIME: 14.4 SEC (ref 12–14.6)
RBC # BLD: 3.05 M/UL (ref 4.7–6.1)
SARS-COV-2, NAAT: NOT DETECTED
SODIUM BLD-SCNC: 133 MMOL/L (ref 136–145)
SPECIFIC GRAVITY UA: 1.03 (ref 1–1.03)
TOTAL PROTEIN: 6.8 G/DL (ref 6.6–8.7)
TROPONIN: <0.01 NG/ML (ref 0–0.03)
TROPONIN: <0.01 NG/ML (ref 0–0.03)
UROBILINOGEN, URINE: 1 E.U./DL
WBC # BLD: 8.9 K/UL (ref 4.8–10.8)

## 2021-08-26 PROCEDURE — 83605 ASSAY OF LACTIC ACID: CPT

## 2021-08-26 PROCEDURE — 85025 COMPLETE CBC W/AUTO DIFF WBC: CPT

## 2021-08-26 PROCEDURE — 85610 PROTHROMBIN TIME: CPT

## 2021-08-26 PROCEDURE — 85379 FIBRIN DEGRADATION QUANT: CPT

## 2021-08-26 PROCEDURE — 36415 COLL VENOUS BLD VENIPUNCTURE: CPT

## 2021-08-26 PROCEDURE — 84484 ASSAY OF TROPONIN QUANT: CPT

## 2021-08-26 PROCEDURE — 84132 ASSAY OF SERUM POTASSIUM: CPT

## 2021-08-26 PROCEDURE — 87635 SARS-COV-2 COVID-19 AMP PRB: CPT

## 2021-08-26 PROCEDURE — 96360 HYDRATION IV INFUSION INIT: CPT

## 2021-08-26 PROCEDURE — 93005 ELECTROCARDIOGRAM TRACING: CPT | Performed by: EMERGENCY MEDICINE

## 2021-08-26 PROCEDURE — 87040 BLOOD CULTURE FOR BACTERIA: CPT

## 2021-08-26 PROCEDURE — 81003 URINALYSIS AUTO W/O SCOPE: CPT

## 2021-08-26 PROCEDURE — 6360000004 HC RX CONTRAST MEDICATION: Performed by: EMERGENCY MEDICINE

## 2021-08-26 PROCEDURE — 2580000003 HC RX 258: Performed by: EMERGENCY MEDICINE

## 2021-08-26 PROCEDURE — 71275 CT ANGIOGRAPHY CHEST: CPT

## 2021-08-26 PROCEDURE — 80053 COMPREHEN METABOLIC PANEL: CPT

## 2021-08-26 PROCEDURE — 74150 CT ABDOMEN W/O CONTRAST: CPT

## 2021-08-26 PROCEDURE — 71045 X-RAY EXAM CHEST 1 VIEW: CPT

## 2021-08-26 PROCEDURE — 99283 EMERGENCY DEPT VISIT LOW MDM: CPT

## 2021-08-26 PROCEDURE — 70450 CT HEAD/BRAIN W/O DYE: CPT

## 2021-08-26 PROCEDURE — 85730 THROMBOPLASTIN TIME PARTIAL: CPT

## 2021-08-26 PROCEDURE — 82803 BLOOD GASES ANY COMBINATION: CPT

## 2021-08-26 PROCEDURE — 36600 WITHDRAWAL OF ARTERIAL BLOOD: CPT

## 2021-08-26 PROCEDURE — 83880 ASSAY OF NATRIURETIC PEPTIDE: CPT

## 2021-08-26 RX ORDER — HYDROCODONE BITARTRATE AND ACETAMINOPHEN 5; 325 MG/1; MG/1
1 TABLET ORAL EVERY 6 HOURS PRN
Qty: 12 TABLET | Refills: 0 | Status: SHIPPED | OUTPATIENT
Start: 2021-08-26 | End: 2021-08-29

## 2021-08-26 RX ORDER — 0.9 % SODIUM CHLORIDE 0.9 %
1000 INTRAVENOUS SOLUTION INTRAVENOUS ONCE
Status: COMPLETED | OUTPATIENT
Start: 2021-08-26 | End: 2021-08-26

## 2021-08-26 RX ADMIN — IOPAMIDOL 95 ML: 755 INJECTION, SOLUTION INTRAVENOUS at 15:13

## 2021-08-26 RX ADMIN — SODIUM CHLORIDE 1000 ML: 9 INJECTION, SOLUTION INTRAVENOUS at 15:16

## 2021-08-26 RX ADMIN — SODIUM CHLORIDE 1000 ML: 9 INJECTION, SOLUTION INTRAVENOUS at 18:41

## 2021-08-26 ASSESSMENT — ENCOUNTER SYMPTOMS
ABDOMINAL PAIN: 1
SHORTNESS OF BREATH: 1
BACK PAIN: 0
NAUSEA: 0
BACK PAIN: 0
RHINORRHEA: 0
DIARRHEA: 0
VOMITING: 1
VOMITING: 0
SORE THROAT: 0
ABDOMINAL PAIN: 1
ABDOMINAL DISTENTION: 0
NAUSEA: 1

## 2021-08-26 ASSESSMENT — PAIN SCALES - GENERAL: PAINLEVEL_OUTOF10: 5

## 2021-08-26 NOTE — PROGRESS NOTES
Contains abnormal data Blood Gas, Arterial  Status:  Final result   Ref Range & Units 08/26/21 1259   pH, Arterial 7.350 - 7.450 7.420    pCO2, Arterial 35.0 - 45.0 mmHg 36.0    pO2, Arterial 80.0 - 100.0 mmHg 64. 0Low     HCO3, Arterial 22.0 - 26.0 mmol/L 23.4    Base Excess, Arterial -2.0 - 2.0 mmol/L -0.8    Hemoglobin, Art, Extended 14.0 - 18.0 g/dL 11.0Low     O2 Sat, Arterial >92 % 91.9    Carboxyhgb, Arterial 0.0 - 5.0 % 1.0    Comment:      0.0-1.5   (Smokers 1.5-5.0)    Methemoglobin, Arterial <1.5 % 0.4    O2 Content, Arterial Not Established mL/dL 14.3    O2 Therapy  Unknown    Resulting Agency  [facility]        Specimen Collected: 08/26/21 1259 Last Resulted: 08/26/21 1300 View Other Order Details        Pt on room air, rr 22 site RB

## 2021-08-26 NOTE — ED NOTES
Bed: 18  Expected date:   Expected time:   Means of arrival:   Comments:  juan Alexander RN  08/26/21 1060

## 2021-08-26 NOTE — ED PROVIDER NOTES
[facility] EMERGENCY DEPT  eMERGENCY dEPARTMENT eNCOUnter      Pt Name: Halima Tubbs  MRN: [id number]  [place] 1955  Date of evaluation: 8/26/2021  Provider: Tarik Mendez MD    [address] [place] [address]       Chief Complaint   Patient presents with    Shortness of Breath    Flank Pain         HISTORY OF PRESENT ILLNESS   (Location/Symptom, Timing/Onset,Context/Setting, Quality, Duration, Modifying Factors, Severity)  Note limiting factors. Halima Tubbs is a 74 y.o. male who presents to the emergency department with dizziness fatigue shortness of breath and left flank pain. Patient is hard to get a history from. He was given Phenergan on route for nausea and is extremely drowsy. Per nursing report he has a kidney stone. Spoke with wife and she states all of a sudden he got shaky and nauseous. No cp. Had some shortness of breath. HPI    NursingNotes were reviewed. REVIEW OF SYSTEMS    (2-9 systems for level 4, 10 or more for level 5)     Review of Systems   Constitutional: Positive for activity change and appetite change. Negative for chills and fever. HENT: Negative for rhinorrhea and sore throat. Respiratory: Positive for shortness of breath. Cardiovascular: Negative for chest pain and leg swelling. Gastrointestinal: Positive for abdominal pain, nausea and vomiting. Negative for diarrhea. Genitourinary: Positive for flank pain. Negative for difficulty urinating. Musculoskeletal: Negative for back pain and neck pain. Skin: Negative for rash. Neurological: Positive for dizziness. Negative for weakness and headaches. Psychiatric/Behavioral: Negative for confusion. A complete review of systems was performed and is negative except as noted above in the HPI.        PAST MEDICAL HISTORY     Past Medical History:   Diagnosis Date    Abnormal nuclear stress test 10/22/2014    Arthritis     BiPAP (biphasic positive airway pressure) dependence     8cm to 20cm    Cerebrovascular disease  Chronic kidney disease, stage II (mild) 08/17/2016    Juan Carlos Harris M.D.    Coronary artery disease 2/24/2011    Depression     Diabetes mellitus ([facility]     Encounter for wound care     PT SEES \"GRACY\" WITH DR. SAMUEL    Great toe pain     RT    Headache     Hyperlipemia 2/24/2011    Dr. Jose David Constantino follows lipids.     Hyperlipidemia     Hypertension     LONG TERM ANTICOAGULENT USE     Low blood pressure 11/19/2014    lymphostatic lymphoma     Nausea 11/19/2014    Non Hodgkin's lymphoma (HCC)     Obesity     Obstructive sleep apnea     AHI:  21.7 per [facility], 7/2017    Peptic ulcer disease 2/24/2011    Restless leg     S/P CABG x 3 10/22/2014    SVG to RCA; SVG to LAD diagonal; LIMA to LAD    Sleep apnea     Tachyarrhythmia     Thrombocythemia, essential ([facility]     Type 2 diabetes mellitus without complication ([facility]     Type II or unspecified type diabetes mellitus without mention of complication, not stated as uncontrolled          SURGICAL HISTORY       Past Surgical History:   Procedure Laterality Date    CARDIAC CATHETERIZATION  2/28/11    EF 60%    CARDIAC CATHETERIZATION  9/16/05, 3/7/07    EF < 50%    CARDIAC CATHETERIZATION  12/4/12   MDL    EF  50%    CARDIAC CATHETERIZATION  10/28/14  MDL    CARDIAC CATHETERIZATION     Obey Espinoza M.D.   [facility] [employer] GRAFT  9/21/05    LIMA to LAD and diagonal; SVG to posterior descending branch RCA    CORONARY ARTERY BYPASS GRAFT  10/30/2014    Redo Sternotomy - SVG-PDA, TMR (RBL)    KIDNEY SURGERY  08/14/2017    KNEE ARTHROSCOPY      right ACL repair    SHOULDER ARTHROSCOPY      left-rotator cuff repair right- rotator cuff repair    SHOULDER ARTHROSCOPY  08-23-11    right    TONSILLECTOMY           CURRENT MEDICATIONS       Previous Medications    ACETAMINOPHEN (TYLENOL) 500 MG TABLET    Take 1,000 mg by mouth every 6 hours as needed for Pain    ATORVASTATIN (LIPITOR) 40 MG TABLET    TAKE 1 TABLET BY MOUTH NIGHTLY AT BEDTIME    B COMPLEX VITAMINS CAPSULE    Take 1 capsule by mouth daily. BUSPIRONE (BUSPAR) 15 MG TABLET    Take 7.5 mg by mouth 2 times daily    CLOPIDOGREL (PLAVIX) 75 MG TABLET    One daily    DILTIAZEM (CARDIZEM CD) 180 MG EXTENDED RELEASE CAPSULE    Take 1 capsule by mouth daily    DOCUSATE SODIUM (COLACE) 100 MG CAPSULE    Take 100 mg by mouth daily. DULOXETINE (CYMBALTA) 30 MG EXTENDED RELEASE CAPSULE    Take 30 mg by mouth nightly Indications: patient stated received medications yesterday 5/19/21     FERROUS SULFATE 325 (65 FE) MG TABLET    Take 325 mg by mouth 2 times daily    FUROSEMIDE (LASIX) 20 MG TABLET    Take 1 tablet by mouth daily    GABAPENTIN (NEURONTIN) 100 MG CAPSULE    Take 1 capsule by mouth 2 times daily.     ICOSAPENT ETHYL (VASCEPA) 1 G CAPS CAPSULE    Take 1 capsule by mouth 2 times daily    JARDIANCE 10 MG TABLET    TAKE 1 TABLET BY MOUTH DAILY; TAKE ONE DAILY FOR 30 DAYS THEN START 25 MG DAILY    LEVOTHYROXINE (SYNTHROID) 50 MCG TABLET    Take 50 mcg by mouth Daily    LISINOPRIL (PRINIVIL;ZESTRIL) 2.5 MG TABLET    Take 1 tablet by mouth daily    MELOXICAM (MOBIC) 15 MG TABLET    Take 15 mg by mouth daily     METFORMIN (GLUCOPHAGE) 1000 MG TABLET    HOLD FOR 48 HOURS AFTER THE CARDIAC CATHETERIZATION    METOPROLOL TARTRATE (LOPRESSOR) 25 MG TABLET    Take 1 tablet by mouth 2 times daily    NITROGLYCERIN (NITROSTAT) 0.4 MG SL TABLET    Place 1 tablet under the tongue every 5 minutes as needed for Chest pain    ONDANSETRON (ZOFRAN) 8 MG TABLET    Take 8 mg by mouth as needed     PANTOPRAZOLE (PROTONIX) 40 MG TABLET    Take 1 tablet by mouth daily    PRAVASTATIN (PRAVACHOL) 40 MG TABLET    Take 40 mg by mouth nightly    RANOLAZINE (RANEXA) 1000 MG EXTENDED RELEASE TABLET    Take 1 tablet by mouth 2 times daily    TAMSULOSIN (FLOMAX) 0.4 MG CAPSULE    Take 1 capsule by mouth daily    TIZANIDINE (ZANAFLEX) 4 MG TABLET    1/2 tablet at night    VITAMIN D (CHOLECALCIFEROL) 1000 UNITS CAPS CAPSULE    Take 1,000 Units by mouth daily. ALLERGIES     Ancef [cefazolin sodium], Ceftin [cefuroxime], Cefuroxime axetil, Cephalosporins, and Neosporin [bacitracin-polymyxin b]    FAMILY HISTORY       Family History   Problem Relation Age of Onset    Heart Disease Other     Lung Cancer Mother     Cancer Sister           SOCIAL HISTORY       Social History     Socioeconomic History    Marital status:      Spouse name: Alexia Felix Number of children: 2    Years of education: 15    Highest education level: None   Occupational History    Occupation:      Employer: [employer]     Comment: Retired,  for the [employer]. Tobacco Use    Smoking status: Former Smoker     Packs/day: 0.00     Types: Cigarettes     Quit date: 1998     Years since quittin.5    Smokeless tobacco: Never Used    Tobacco comment: quit 23 years ago   Vaping Use    Vaping Use: Never used   Substance and Sexual Activity    Alcohol use: No    Drug use: No    Sexual activity: None   Other Topics Concern    None   Social History Narrative    None     Social Determinants of Health     Financial Resource Strain:     Difficulty of Paying Living Expenses:    Food Insecurity:     Worried About Running Out of Food in the Last Year:     Ran Out of Food in the Last Year:    Transportation Needs:     Lack of Transportation (Medical):      Lack of Transportation (Non-Medical):    Physical Activity:     Days of Exercise per Week:     Minutes of Exercise per Session:    Stress:     Feeling of Stress :    Social Connections:     Frequency of Communication with Friends and Family:     Frequency of Social Gatherings with Friends and Family:     Attends Mandaen Services:     Active Member of Clubs or Organizations:     Attends Club or Organization Meetings:     Marital Status:    Intimate Partner Violence:     Fear of Current or Ex-Partner:     Emotionally Abused:     Physically Abused:     Sexually Abused:        SCREENINGS             PHYSICAL EXAM    (up to 7 for level 4, 8 or more for level 5)     ED Triage Vitals [08/26/21 1224]   BP Temp Temp Source Pulse Resp SpO2 Height Weight   132/68 98.3 °F (36.8 °C) Oral 103 18 93 % -- --       Physical Exam  Vitals and nursing note reviewed. Constitutional:       General: He is not in acute distress. Appearance: He is well-developed. He is ill-appearing. He is not diaphoretic. HENT:      Head: Normocephalic and atraumatic. Eyes:      Pupils: Pupils are equal, round, and reactive to light. Cardiovascular:      Rate and Rhythm: Normal rate and regular rhythm. Heart sounds: Normal heart sounds. Pulmonary:      Effort: Pulmonary effort is normal. No respiratory distress. Breath sounds: Normal breath sounds. Abdominal:      General: Bowel sounds are normal. There is no distension. Palpations: Abdomen is soft. Tenderness: There is abdominal tenderness. Musculoskeletal:         General: Normal range of motion. Cervical back: Normal range of motion and neck supple. Skin:     General: Skin is warm and dry. Findings: No rash. Neurological:      Mental Status: He is oriented to person, place, and time. Cranial Nerves: No cranial nerve deficit. Motor: No abnormal muscle tone.       Coordination: Coordination normal.   Psychiatric:         Behavior: Behavior normal.         DIAGNOSTIC RESULTS     EKG: All EKG's are interpreted by the Emergency Department Physician who either signs or Co-signs this chart in the absence of a cardiologist.  Sinus tach rate 103 nonspecific ST waves    Sinus rhythm rate 92 nonspecific ST waves      RADIOLOGY:   Non-plain film images such as CT, Ultrasound and MRI are read by the radiologist. Plainradiographic images are visualized and preliminarily interpreted by the emergency physician with the below findings:        Interpretation per the Radiologist below, if available at the time of this note:    [address] [place]   Final Result   1. No acute intracranial process. Signed by Dr Ganesh Mazariegos   Final Result   1. No acute process in the abdomen or pelvis. 2. There are several left-sided renal stones including a dominant 1.9   cm left inferior pole stone. No obstructing stones are identified. No   hydronephrosis. 3. Asymmetric left renal atrophy. 4. Cholelithiasis. 5. Large volume colonic stool, especially along the transverse and   descending colon. Signed by Dr Gisella Sin      CTA [address]   Final Result   No evidence of pulmonary embolism. No aortic aneurysm or   dissection. The lungs are unremarkable. No infiltrate or consolidation. A mild splenomegaly. Left adrenal nodule. Signed by Dr Rebecca Liriano   Final Result   No active cardiopulmonary disease.    Signed by Dr Olympia Lesches            ED BEDSIDE ULTRASOUND:   Performed by ED Physician - none    LABS:  Labs Reviewed   BLOOD GAS, ARTERIAL - Abnormal; Notable for the following components:       Result Value    pO2, Arterial 64.0 (*)     Hemoglobin, Art, Extended 11.0 (*)     All other components within normal limits   CBC WITH AUTO DIFFERENTIAL - Abnormal; Notable for the following components:    RBC 3.05 (*)     Hemoglobin 10.6 (*)     Hematocrit 33.0 (*)     .2 (*)     MCH 34.8 (*)     MCHC 32.1 (*)     RDW 14.6 (*)     Platelets 63 (*)     MPV 9.3 (*)     Neutrophils % 86.8 (*)     Lymphocytes % 3.9 (*)     Neutrophils Absolute 7.7 (*)     Lymphocytes Absolute 0.4 (*)     All other components within normal limits   COMPREHENSIVE METABOLIC PANEL - Abnormal; Notable for the following components:    Sodium 133 (*)     Chloride 96 (*)     Glucose 239 (*)     CREATININE 1.4 (*)     GFR Non- 50 (*)     All other components within normal limits   D-DIMER, QUANTITATIVE - Abnormal; Notable for the following components:    D-Dimer, Quant 0.75 (*)     All other components within normal limits   COVID-19, RAPID   CULTURE, BLOOD 1   CULTURE, BLOOD 2   APTT   BRAIN NATRIURETIC PEPTIDE   PROTIME-INR   TROPONIN   URINE RT REFLEX TO CULTURE   POTASSIUM, WHOLE BLOOD   LACTIC ACID, PLASMA   TROPONIN       All other labs were within normal range or not returned as of this dictation. EMERGENCY DEPARTMENT COURSE and DIFFERENTIALDIAGNOSIS/MDM:   Vitals:    Vitals:    08/26/21 1224   BP: 132/68   Pulse: 103   Resp: 18   Temp: 98.3 °F (36.8 °C)   TempSrc: Oral   SpO2: 93%       MDM  Orthostatics improved with IVF. Pt is now awake, alert, oriented and has no complaints. I think his earlier ams was 2/2 phenergan. He said it makes him drowsy. CONSULTS:  None    PROCEDURES:  Unless otherwise notedbelow, none     Procedures    FINAL IMPRESSION     1. Near syncope    2. Flank pain    3. Shortness of breath    4.  Orthostatic hypotension          DISPOSITION/PLAN   DISPOSITION        PATIENT REFERRED TO:  @FUP@    DISCHARGE MEDICATIONS:  New Prescriptions    No medications on file          (Please note that portions of this note were completed with a voice recognition program.  Efforts were made to edit the dictations butoccasionally words are mis-transcribed.)    Haley Mackey MD (electronically signed)  AttendingEmergency Physician         Haley Mackey MD  08/26/21 1498

## 2021-08-29 LAB
EKG P AXIS: 34 DEGREES
EKG P AXIS: 39 DEGREES
EKG P-R INTERVAL: 198 MS
EKG P-R INTERVAL: 206 MS
EKG Q-T INTERVAL: 346 MS
EKG Q-T INTERVAL: 354 MS
EKG QRS DURATION: 90 MS
EKG QRS DURATION: 92 MS
EKG QTC CALCULATION (BAZETT): 410 MS
EKG QTC CALCULATION (BAZETT): 419 MS
EKG T AXIS: 56 DEGREES
EKG T AXIS: 59 DEGREES

## 2021-08-29 PROCEDURE — 93010 ELECTROCARDIOGRAM REPORT: CPT | Performed by: INTERNAL MEDICINE

## 2021-08-30 LAB
CALCULI COMPOSITION: NORMAL
MASS: 144 MG
STONE DESCRIPTION: NORMAL
STONE NUMBER: 2
STONE SIZE: NORMAL MM

## 2021-08-31 LAB
BLOOD CULTURE, ROUTINE: NORMAL
CULTURE, BLOOD 2: NORMAL

## 2021-09-01 ENCOUNTER — OFFICE VISIT (OUTPATIENT)
Dept: WOUND CARE | Facility: HOSPITAL | Age: 66
End: 2021-09-01

## 2021-09-01 ENCOUNTER — TELEPHONE (OUTPATIENT)
Dept: UROLOGY | Age: 66
End: 2021-09-01

## 2021-09-01 DIAGNOSIS — L97.509 TYPE 2 DIABETES MELLITUS WITH FOOT ULCER, UNSPECIFIED WHETHER LONG TERM INSULIN USE (HCC): ICD-10-CM

## 2021-09-01 DIAGNOSIS — E11.40 TYPE 2 DIABETES MELLITUS WITH DIABETIC NEUROPATHY, UNSPECIFIED WHETHER LONG TERM INSULIN USE (HCC): ICD-10-CM

## 2021-09-01 DIAGNOSIS — L97.512 NON-PRESSURE CHRONIC ULCER OF OTHER PART OF RIGHT FOOT WITH FAT LAYER EXPOSED (HCC): ICD-10-CM

## 2021-09-01 DIAGNOSIS — E11.621 TYPE 2 DIABETES MELLITUS WITH FOOT ULCER, UNSPECIFIED WHETHER LONG TERM INSULIN USE (HCC): ICD-10-CM

## 2021-09-01 DIAGNOSIS — N18.9 CHRONIC KIDNEY DISEASE, UNSPECIFIED CKD STAGE: ICD-10-CM

## 2021-09-01 DIAGNOSIS — L97.522 NON-PRESSURE CHRONIC ULCER OF OTHER PART OF LEFT FOOT WITH FAT LAYER EXPOSED (HCC): ICD-10-CM

## 2021-09-01 PROCEDURE — 11042 DBRDMT SUBQ TIS 1ST 20SQCM/<: CPT | Performed by: NURSE PRACTITIONER

## 2021-09-01 PROCEDURE — 99214 OFFICE O/P EST MOD 30 MIN: CPT | Performed by: NURSE PRACTITIONER

## 2021-09-01 NOTE — TELEPHONE ENCOUNTER
Called pt to tell him his appt was cancelled and to get him rescheduled in 6 mo w a kub prior yuliet because he passed all of his stones that were able to move. The wife was worried about the other kidney stones he has, I did discuss this with yuliet and she said as of right now the pt did not want surgery so there was nothing else to do but the wife wanted a referral to [facility] to see if they can do something for him.

## 2021-09-05 ENCOUNTER — HOSPITAL ENCOUNTER (EMERGENCY)
Age: 66
Discharge: HOME | End: 2021-09-06
Attending: EMERGENCY MEDICINE
Payer: MEDICARE

## 2021-09-05 DIAGNOSIS — E11.9 TYPE 2 DIABETES MELLITUS WITHOUT COMPLICATION, WITHOUT LONG-TERM CURRENT USE OF INSULIN (HCC): ICD-10-CM

## 2021-09-05 DIAGNOSIS — Z88.1 ALLERGY TO CEPHALOSPORIN: ICD-10-CM

## 2021-09-05 DIAGNOSIS — L03.116 CELLULITIS OF LEFT LOWER EXTREMITY: Primary | ICD-10-CM

## 2021-09-05 PROCEDURE — 96365 THER/PROPH/DIAG IV INF INIT: CPT

## 2021-09-05 PROCEDURE — 99283 EMERGENCY DEPT VISIT LOW MDM: CPT

## 2021-09-06 VITALS
RESPIRATION RATE: 17 BRPM | HEART RATE: 78 BPM | BODY MASS INDEX: 32.34 KG/M2 | WEIGHT: 231 LBS | TEMPERATURE: 98.4 F | HEIGHT: 71 IN | SYSTOLIC BLOOD PRESSURE: 115 MMHG | DIASTOLIC BLOOD PRESSURE: 56 MMHG | OXYGEN SATURATION: 94 %

## 2021-09-06 LAB
ALBUMIN SERPL-MCNC: 4.1 G/DL (ref 3.5–5.2)
ALP BLD-CCNC: 111 U/L (ref 40–130)
ALT SERPL-CCNC: 20 U/L (ref 5–41)
ANION GAP SERPL CALCULATED.3IONS-SCNC: 11 MMOL/L (ref 7–19)
AST SERPL-CCNC: 21 U/L (ref 5–40)
BASOPHILS ABSOLUTE: 0 K/UL (ref 0–0.2)
BASOPHILS RELATIVE PERCENT: 0.2 % (ref 0–1)
BILIRUB SERPL-MCNC: 0.6 MG/DL (ref 0.2–1.2)
BUN BLDV-MCNC: 15 MG/DL (ref 8–23)
CALCIUM SERPL-MCNC: 9.6 MG/DL (ref 8.8–10.2)
CHLORIDE BLD-SCNC: 95 MMOL/L (ref 98–111)
CO2: 27 MMOL/L (ref 22–29)
CREAT SERPL-MCNC: 1.1 MG/DL (ref 0.5–1.2)
EOSINOPHILS ABSOLUTE: 0.1 K/UL (ref 0–0.6)
EOSINOPHILS RELATIVE PERCENT: 1.4 % (ref 0–5)
GFR AFRICAN AMERICAN: >59
GFR NON-AFRICAN AMERICAN: >60
GLUCOSE BLD-MCNC: 315 MG/DL (ref 74–109)
HCT VFR BLD CALC: 32.9 % (ref 42–52)
HEMOGLOBIN: 10.7 G/DL (ref 14–18)
IMMATURE GRANULOCYTES #: 0.1 K/UL
LYMPHOCYTES ABSOLUTE: 1 K/UL (ref 1.1–4.5)
LYMPHOCYTES RELATIVE PERCENT: 11 % (ref 20–40)
MCH RBC QN AUTO: 35 PG (ref 27–31)
MCHC RBC AUTO-ENTMCNC: 32.5 G/DL (ref 33–37)
MCV RBC AUTO: 107.5 FL (ref 80–94)
MONOCYTES ABSOLUTE: 0.6 K/UL (ref 0–0.9)
MONOCYTES RELATIVE PERCENT: 7.4 % (ref 0–10)
NEUTROPHILS ABSOLUTE: 6.9 K/UL (ref 1.5–7.5)
NEUTROPHILS RELATIVE PERCENT: 79.4 % (ref 50–65)
PDW BLD-RTO: 14.8 % (ref 11.5–14.5)
PLATELET # BLD: 220 K/UL (ref 130–400)
PMV BLD AUTO: 11.2 FL (ref 9.4–12.4)
POTASSIUM REFLEX MAGNESIUM: 4.2 MMOL/L (ref 3.5–5)
RBC # BLD: 3.06 M/UL (ref 4.7–6.1)
SODIUM BLD-SCNC: 133 MMOL/L (ref 136–145)
TOTAL PROTEIN: 7.4 G/DL (ref 6.6–8.7)
WBC # BLD: 8.7 K/UL (ref 4.8–10.8)

## 2021-09-06 PROCEDURE — 85025 COMPLETE CBC W/AUTO DIFF WBC: CPT

## 2021-09-06 PROCEDURE — 80053 COMPREHEN METABOLIC PANEL: CPT

## 2021-09-06 PROCEDURE — 36415 COLL VENOUS BLD VENIPUNCTURE: CPT

## 2021-09-06 PROCEDURE — 2580000003 HC RX 258: Performed by: EMERGENCY MEDICINE

## 2021-09-06 PROCEDURE — 6360000002 HC RX W HCPCS: Performed by: EMERGENCY MEDICINE

## 2021-09-06 RX ORDER — AMOXICILLIN AND CLAVULANATE POTASSIUM 875; 125 MG/1; MG/1
1 TABLET, FILM COATED ORAL 2 TIMES DAILY
Qty: 20 TABLET | Refills: 0 | Status: SHIPPED | OUTPATIENT
Start: 2021-09-06 | End: 2021-09-16

## 2021-09-06 RX ADMIN — AMPICILLIN AND SULBACTAM 1500 MG: 1; .5 INJECTION, POWDER, FOR SOLUTION INTRAMUSCULAR; INTRAVENOUS at 01:54

## 2021-09-06 ASSESSMENT — ENCOUNTER SYMPTOMS
ABDOMINAL PAIN: 0
SHORTNESS OF BREATH: 0
RHINORRHEA: 0
VOMITING: 0
VOICE CHANGE: 0
EYE REDNESS: 0
COUGH: 0
EYE PAIN: 0
DIARRHEA: 0

## 2021-09-06 NOTE — ED PROVIDER NOTES
[facility] EMERGENCY DEPT  EMERGENCY DEPARTMENT ENCOUNTER      Pt Name: Jocelyne Maravilla  MRN: [id number]  [place] 1955  Date of evaluation: 9/5/2021  Provider: Easton Garcia MD    CHIEF COMPLAINT       Chief Complaint   Patient presents with    Cellulitis     red area to L lower leg, pt has wounds to bilateral legs         HISTORY OF PRESENT ILLNESS   (Location/Symptom, Timing/Onset,Context/Setting, Quality, Duration, Modifying Factors, Severity)  Note limiting factors. Jocelyne Maravilla is a 74 y.o. male who presents to the emergency department with complaint of redness with associated warmth and focal tenderness to the left leg. Patient has a history of diabetes with diabetic ulcers in the past.  Has a diabetic ulcer to the left great toe that has been healing well recently with no evidence of associated infection there. Has not had any recent injuries or breaks in the skin otherwise. Patient just noticed the redness after getting out of shower tonight. Denies any fevers or other systemic symptoms. States blood sugars have been running in the 100s recently. HPI    NursingNotes were reviewed. REVIEW OF SYSTEMS    (2-9 systems for level 4, 10 or more for level 5)     Review of Systems   Constitutional: Negative for fatigue and fever. HENT: Negative for congestion, rhinorrhea and voice change. Eyes: Negative for pain and redness. Respiratory: Negative for cough and shortness of breath. Cardiovascular: Negative for chest pain. Gastrointestinal: Negative for abdominal pain, diarrhea and vomiting. Endocrine: Negative. Genitourinary: Negative. Musculoskeletal: Negative for arthralgias and gait problem. Skin: Positive for rash. Negative for wound. Neurological: Negative for weakness and headaches. Hematological: Negative. Psychiatric/Behavioral: Negative. All other systems reviewed and are negative.       A complete review of systems was performed and is negative except as noted rotator cuff repair    SHOULDER ARTHROSCOPY  08-23-11    right    TONSILLECTOMY           CURRENT MEDICATIONS       Discharge Medication List as of 9/6/2021  2:34 AM      CONTINUE these medications which have NOT CHANGED    Details   JARDIANCE 10 MG tablet TAKE 1 TABLET BY MOUTH DAILY; TAKE ONE DAILY FOR 30 DAYS THEN START 25 MG DAILY, DAWHistorical Med      tamsulosin (FLOMAX) 0.4 MG capsule Take 1 capsule by mouth daily, Disp-90 capsule, R-3Normal      furosemide (LASIX) 20 MG tablet Take 1 tablet by mouth dailyHistorical Med      gabapentin (NEURONTIN) 100 MG capsule Take 1 capsule by mouth 2 times daily. Historical Med      lisinopril (PRINIVIL;ZESTRIL) 2.5 MG tablet Take 1 tablet by mouth dailyHistorical Med      busPIRone (BUSPAR) 15 MG tablet Take 7.5 mg by mouth 2 times dailyHistorical Med      pravastatin (PRAVACHOL) 40 MG tablet Take 40 mg by mouth nightlyHistorical Med      Icosapent Ethyl (VASCEPA) 1 g CAPS capsule Take 1 capsule by mouth 2 times dailyHistorical Med      metoprolol tartrate (LOPRESSOR) 25 MG tablet Take 1 tablet by mouth 2 times daily, Disp-60 tablet, R-5Normal      ranolazine (RANEXA) 1000 MG extended release tablet Take 1 tablet by mouth 2 times daily, Disp-180 tablet, R-3Normal      clopidogrel (PLAVIX) 75 MG tablet One daily, Disp-90 tablet, R-3Normal      DULoxetine (CYMBALTA) 30 MG extended release capsule Take 30 mg by mouth nightly Indications: patient stated received medications yesterday 5/19/21 Historical Med      levothyroxine (SYNTHROID) 50 MCG tablet Take 50 mcg by mouth DailyHistorical Med      diltiazem (CARDIZEM CD) 180 MG extended release capsule Take 1 capsule by mouth daily, Disp-90 capsule, R-3Dose decreasedNormal      meloxicam (MOBIC) 15 MG tablet Take 15 mg by mouth daily Historical Med      tiZANidine (ZANAFLEX) 4 MG tablet 1/2 tablet at nightHistorical Med      ferrous sulfate 325 (65 Fe) MG tablet Take 325 mg by mouth 2 times dailyHistorical Med pantoprazole (PROTONIX) 40 MG tablet Take 1 tablet by mouth daily, Disp-90 tablet, R-2Normal      b complex vitamins capsule Take 1 capsule by mouth daily. Vitamin D (CHOLECALCIFEROL) 1000 UNITS CAPS capsule Take 1,000 Units by mouth daily. docusate sodium (COLACE) 100 MG capsule Take 100 mg by mouth daily. atorvastatin (LIPITOR) 40 MG tablet TAKE 1 TABLET BY MOUTH NIGHTLY AT BEDTIMEHistorical Med      acetaminophen (TYLENOL) 500 MG tablet Take 1,000 mg by mouth every 6 hours as needed for PainHistorical Med      nitroGLYCERIN (NITROSTAT) 0.4 MG SL tablet Place 1 tablet under the tongue every 5 minutes as needed for Chest pain, Disp-25 tablet, R-5Normal      ondansetron (ZOFRAN) 8 MG tablet Take 8 mg by mouth as needed Historical Med             ALLERGIES     Ancef [cefazolin sodium], Ceftin [cefuroxime], Cefuroxime axetil, Cephalosporins, and Neosporin [bacitracin-polymyxin b]    FAMILY HISTORY       Family History   Problem Relation Age of Onset    Heart Disease Other     Lung Cancer Mother     Cancer Sister           SOCIAL HISTORY       Social History     Socioeconomic History    Marital status:      Spouse name: Lupillo Snyder Number of children: 2    Years of education: 12    Highest education level: None   Occupational History    Occupation:      Employer: [employer]     Comment: Retired,  for [employer].    Tobacco Use    Smoking status: Former Smoker     Packs/day: 0.00     Types: Cigarettes     Quit date: 1998     Years since quittin.6    Smokeless tobacco: Never Used    Tobacco comment: quit 23 years ago   Vaping Use    Vaping Use: Never used   Substance and Sexual Activity    Alcohol use: No    Drug use: No    Sexual activity: None   Other Topics Concern    None   Social History Narrative    None     Social Determinants of Health     Financial Resource Strain:     Difficulty of Paying Living Expenses:    Food Insecurity:     Worried About Running Out of Food in the Last Year:     [address] in the Last Year:    Transportation Needs:     Lack of Transportation (Medical):  Lack of Transportation (Non-Medical):    Physical Activity:     Days of Exercise per Week:     Minutes of Exercise per Session:    Stress:     Feeling of Stress :    Social Connections:     Frequency of Communication with Friends and Family:     Frequency of Social Gatherings with Friends and Family:     Attends Jewish Services:     Active Member of Clubs or Organizations:     Attends Club or Organization Meetings:     Marital Status:    Intimate Partner Violence:     Fear of Current or Ex-Partner:     Emotionally Abused:     Physically Abused:     Sexually Abused:        SCREENINGS             PHYSICAL EXAM    (up to 7 for level 4, 8 or more for level 5)     ED Triage Vitals [09/05/21 2307]   BP Temp Temp src Pulse Resp SpO2 Height Weight   (!) 118/55 98.6 °F (37 °C) -- 84 20 94 % 5' 11\" (1.803 m) 231 lb (104.8 kg)       Physical Exam  Vitals and nursing note reviewed. Constitutional:       General: He is not in acute distress. Appearance: He is well-developed. He is not toxic-appearing or diaphoretic. HENT:      Head: Normocephalic and atraumatic. Eyes:      General: No scleral icterus. Right eye: No discharge. Left eye: No discharge. Pupils: Pupils are equal, round, and reactive to light. Cardiovascular:      Rate and Rhythm: Normal rate and regular rhythm. Pulmonary:      Effort: Pulmonary effort is normal. No respiratory distress. Breath sounds: No stridor. Abdominal:      General: There is no distension. Musculoskeletal:         General: No deformity. Normal range of motion. Cervical back: Normal range of motion. Skin:     General: Skin is warm and dry. Comments: Patchy areas of redness with warmth to the left leg.   Mostly on the anterior medial aspect but then extending laterally more proximally in the leg. No breaks in the skin. Ulcer to the left great toe is healing well with no evidence of infection. Neurological:      Mental Status: He is alert and oriented to person, place, and time. GCS: GCS eye subscore is 4. GCS verbal subscore is 5. GCS motor subscore is 6. Cranial Nerves: No cranial nerve deficit. Motor: No abnormal muscle tone. Psychiatric:         Behavior: Behavior normal.         Thought Content: Thought content normal.         Judgment: Judgment normal.         DIAGNOSTIC RESULTS     EKG: All EKG's are interpreted by the Emergency Department Physician who either signs or Co-signs this chart in the absence of a cardiologist.        RADIOLOGY:   Non-plain film images such as CT, Ultrasound and MRI are read by the radiologist. Boris Liz images are visualized and preliminarily interpreted by the emergency physician with the below findings:        Interpretation per the Radiologist below, if available at the time of this note:    No orders to display         ED BEDSIDE ULTRASOUND:   Performed by ED Physician - none    LABS:  Labs Reviewed   CBC WITH AUTO DIFFERENTIAL - Abnormal; Notable for the following components:       Result Value    RBC 3.06 (*)     Hemoglobin 10.7 (*)     Hematocrit 32.9 (*)     .5 (*)     MCH 35.0 (*)     MCHC 32.5 (*)     RDW 14.8 (*)     Neutrophils % 79.4 (*)     Lymphocytes % 11.0 (*)     Lymphocytes Absolute 1.0 (*)     All other components within normal limits   COMPREHENSIVE METABOLIC PANEL W/ REFLEX TO MG FOR LOW K - Abnormal; Notable for the following components:    Sodium 133 (*)     Chloride 95 (*)     Glucose 315 (*)     All other components within normal limits       All other labs were within normal range or not returned as of this dictation.     Medications   ampicillin-sulbactam (UNASYN) 1500 mg IVPB minibag (0 mg IntraVENous Stopped 9/6/21 0224)       EMERGENCY DEPARTMENT COURSE and DIFFERENTIALDIAGNOSIS/MDM:   Vitals:    Vitals:    09/05/21 2307 09/06/21 0242   BP: (!) 118/55 (!) 115/56   Pulse: 84 78   Resp: 20 17   Temp: 98.6 °F (37 °C) 98.4 °F (36.9 °C)   SpO2: 94%    Weight: 231 lb (104.8 kg)    Height: 5' 11\" (1.803 m)        MDM     Area consistent with cellulitis on evaluation. No evidence of diabetic ulcer infection. Vital signs within normal limits. Evaluation and work-up here revealed no signs of emergent or life-threatening pathology that would necessitate admission for further work-up or management at this time. Patient is felt to be stable for discharge home with return precautions for worsening of the condition or development of new concerning symptoms. Patient was encouraged to follow-up with their primary care doctor in the appropriate timeframe. Necessary prescriptions and information have been provided for treatment at home. Patient voices understanding and agreement with the plan.'      CONSULTS:  None    PROCEDURES:  Unless otherwise notedbelow, none     Procedures      FINAL IMPRESSION     1. Cellulitis of left lower extremity    2. Type 2 diabetes mellitus without complication, without long-term current use of insulin ([address]    3.  Allergy to cephalosporin          DISPOSITION/PLAN   DISPOSITION Decision To Discharge 09/06/2021 02:42:21 AM      PATIENT REFERRED TO:  [facility] EMERGENCY DEPT  [address] [address] [phone]    If symptoms worsen    [address] [place] [address]  [phone]    Schedule an appointment as soon as possible for a visit         DISCHARGE MEDICATIONS:  Discharge Medication List as of 9/6/2021  2:34 AM      START taking these medications    Details   amoxicillin-clavulanate (AUGMENTIN) 875-125 MG per tablet Take 1 tablet by mouth 2 times daily for 10 days, Disp-20 tablet, R-0Normal             Evaluation and work-up here revealed no signs of emergent or life-threatening pathology that would necessitate admission for further work-up or management at this time. Patient is felt to be stable for discharge home with return precautions for worsening of the condition or development of new concerning symptoms. Patient was encouraged to follow-up with their primary care doctor in the appropriate timeframe. Necessary prescriptions and information have been provided for treatment at home. Patient voices understanding and agreement with the plan.       (Please note that portions of this note were completed with a voice recognition program.  Efforts were made to edit the dictations butoccasionally words are mis-transcribed.)    Andrew Siddiqui MD (electronically signed)  AttendingEmerMagnolia Regional Medical Center Physician         Andrew Siddiqui., MD  09/06/21 7306

## 2021-09-10 ENCOUNTER — OFFICE VISIT (OUTPATIENT)
Dept: WOUND CARE | Facility: HOSPITAL | Age: 66
End: 2021-09-10

## 2021-09-10 ENCOUNTER — HOSPITAL ENCOUNTER (OUTPATIENT)
Dept: GENERAL RADIOLOGY | Facility: HOSPITAL | Age: 66
Discharge: HOME | End: 2021-09-10

## 2021-09-10 ENCOUNTER — TRANSCRIBE ORDERS (OUTPATIENT)
Dept: ADMINISTRATIVE | Facility: HOSPITAL | Age: 66
End: 2021-09-10

## 2021-09-10 DIAGNOSIS — E11.621 TYPE 2 DIABETES MELLITUS WITH FOOT ULCER, UNSPECIFIED WHETHER LONG TERM INSULIN USE (HCC): ICD-10-CM

## 2021-09-10 DIAGNOSIS — M86.9 OSTEOMYELITIS OF ANKLE AND FOOT (HCC): Primary | ICD-10-CM

## 2021-09-10 DIAGNOSIS — L97.509 TYPE 2 DIABETES MELLITUS WITH FOOT ULCER, UNSPECIFIED WHETHER LONG TERM INSULIN USE (HCC): ICD-10-CM

## 2021-09-10 DIAGNOSIS — L97.522 NON-PRESSURE CHRONIC ULCER OF OTHER PART OF LEFT FOOT WITH FAT LAYER EXPOSED (HCC): ICD-10-CM

## 2021-09-10 DIAGNOSIS — E11.40 TYPE 2 DIABETES MELLITUS WITH DIABETIC NEUROPATHY, UNSPECIFIED WHETHER LONG TERM INSULIN USE (HCC): ICD-10-CM

## 2021-09-10 DIAGNOSIS — L97.512 NON-PRESSURE CHRONIC ULCER OF OTHER PART OF RIGHT FOOT WITH FAT LAYER EXPOSED (HCC): ICD-10-CM

## 2021-09-10 PROCEDURE — 11042 DBRDMT SUBQ TIS 1ST 20SQCM/<: CPT | Performed by: PODIATRIST

## 2021-09-10 PROCEDURE — 73630 X-RAY EXAM OF FOOT: CPT

## 2021-09-20 ENCOUNTER — OFFICE VISIT (OUTPATIENT)
Dept: WOUND CARE | Facility: HOSPITAL | Age: 66
End: 2021-09-20

## 2021-09-20 DIAGNOSIS — L97.522 NON-PRESSURE CHRONIC ULCER OF OTHER PART OF LEFT FOOT WITH FAT LAYER EXPOSED (HCC): ICD-10-CM

## 2021-09-20 DIAGNOSIS — E11.40 TYPE 2 DIABETES MELLITUS WITH DIABETIC NEUROPATHY, UNSPECIFIED WHETHER LONG TERM INSULIN USE (HCC): ICD-10-CM

## 2021-09-20 DIAGNOSIS — L97.509 TYPE 2 DIABETES MELLITUS WITH FOOT ULCER, UNSPECIFIED WHETHER LONG TERM INSULIN USE (HCC): ICD-10-CM

## 2021-09-20 DIAGNOSIS — L97.512 NON-PRESSURE CHRONIC ULCER OF OTHER PART OF RIGHT FOOT WITH FAT LAYER EXPOSED (HCC): ICD-10-CM

## 2021-09-20 DIAGNOSIS — M20.42 OTHER HAMMER TOE(S) (ACQUIRED), LEFT FOOT: ICD-10-CM

## 2021-09-20 DIAGNOSIS — E11.621 TYPE 2 DIABETES MELLITUS WITH FOOT ULCER, UNSPECIFIED WHETHER LONG TERM INSULIN USE (HCC): ICD-10-CM

## 2021-09-20 PROCEDURE — 97597 DBRDMT OPN WND 1ST 20 CM/<: CPT | Performed by: PODIATRIST

## 2021-09-20 PROCEDURE — 11042 DBRDMT SUBQ TIS 1ST 20SQCM/<: CPT | Performed by: PODIATRIST

## 2021-09-20 PROCEDURE — 28010 INCISION OF TOE TENDON: CPT | Performed by: PODIATRIST

## 2021-09-27 ENCOUNTER — OFFICE VISIT (OUTPATIENT)
Dept: WOUND CARE | Facility: HOSPITAL | Age: 66
End: 2021-09-27

## 2021-09-27 DIAGNOSIS — E11.621 TYPE 2 DIABETES MELLITUS WITH FOOT ULCER, UNSPECIFIED WHETHER LONG TERM INSULIN USE (HCC): ICD-10-CM

## 2021-09-27 DIAGNOSIS — L97.522 NON-PRESSURE CHRONIC ULCER OF OTHER PART OF LEFT FOOT WITH FAT LAYER EXPOSED (HCC): ICD-10-CM

## 2021-09-27 DIAGNOSIS — E11.40 TYPE 2 DIABETES MELLITUS WITH DIABETIC NEUROPATHY, UNSPECIFIED WHETHER LONG TERM INSULIN USE (HCC): ICD-10-CM

## 2021-09-27 DIAGNOSIS — L97.512 NON-PRESSURE CHRONIC ULCER OF OTHER PART OF RIGHT FOOT WITH FAT LAYER EXPOSED (HCC): ICD-10-CM

## 2021-09-27 DIAGNOSIS — L97.509 TYPE 2 DIABETES MELLITUS WITH FOOT ULCER, UNSPECIFIED WHETHER LONG TERM INSULIN USE (HCC): ICD-10-CM

## 2021-09-27 PROCEDURE — 11042 DBRDMT SUBQ TIS 1ST 20SQCM/<: CPT | Performed by: NURSE PRACTITIONER

## 2021-10-04 ENCOUNTER — OFFICE VISIT (OUTPATIENT)
Dept: WOUND CARE | Facility: HOSPITAL | Age: 66
End: 2021-10-04

## 2021-10-04 DIAGNOSIS — L97.512 NON-PRESSURE CHRONIC ULCER OF OTHER PART OF RIGHT FOOT WITH FAT LAYER EXPOSED (HCC): ICD-10-CM

## 2021-10-04 DIAGNOSIS — L97.509 TYPE 2 DIABETES MELLITUS WITH FOOT ULCER, UNSPECIFIED WHETHER LONG TERM INSULIN USE (HCC): ICD-10-CM

## 2021-10-04 DIAGNOSIS — M20.42 OTHER HAMMER TOE(S) (ACQUIRED), LEFT FOOT: ICD-10-CM

## 2021-10-04 DIAGNOSIS — E11.621 TYPE 2 DIABETES MELLITUS WITH FOOT ULCER, UNSPECIFIED WHETHER LONG TERM INSULIN USE (HCC): ICD-10-CM

## 2021-10-04 DIAGNOSIS — E11.40 TYPE 2 DIABETES MELLITUS WITH DIABETIC NEUROPATHY, UNSPECIFIED WHETHER LONG TERM INSULIN USE (HCC): ICD-10-CM

## 2021-10-04 PROCEDURE — 99213 OFFICE O/P EST LOW 20 MIN: CPT | Performed by: PODIATRIST

## 2021-10-04 PROCEDURE — G0463 HOSPITAL OUTPT CLINIC VISIT: HCPCS

## 2021-10-05 ENCOUNTER — TRANSCRIBE ORDERS (OUTPATIENT)
Dept: ADMINISTRATIVE | Facility: HOSPITAL | Age: 66
End: 2021-10-05

## 2021-10-05 ENCOUNTER — HOSPITAL ENCOUNTER (OUTPATIENT)
Dept: GENERAL RADIOLOGY | Facility: HOSPITAL | Age: 66
Discharge: HOME | End: 2021-10-05
Admitting: PHYSICIAN ASSISTANT

## 2021-10-05 DIAGNOSIS — R31.9 HEMATURIA, UNSPECIFIED TYPE: ICD-10-CM

## 2021-10-05 DIAGNOSIS — R31.9 HEMATURIA, UNSPECIFIED TYPE: Primary | ICD-10-CM

## 2021-10-05 PROCEDURE — 74018 RADEX ABDOMEN 1 VIEW: CPT

## 2021-10-29 ENCOUNTER — TRANSCRIBE ORDERS (OUTPATIENT)
Dept: ADMINISTRATIVE | Facility: HOSPITAL | Age: 66
End: 2021-10-29

## 2021-10-29 DIAGNOSIS — E55.9 VITAMIN D DEFICIENCY, UNSPECIFIED: ICD-10-CM

## 2021-10-29 DIAGNOSIS — N18.2 CHRONIC KIDNEY DISEASE, STAGE II (MILD): Primary | ICD-10-CM

## 2021-11-01 NOTE — PROGRESS NOTES
[facility] - PODIATRY    Today's Date: 11/02/21    Patient Name: Franko Lucero  MRN: [id number]  CSN: [id number]  PCP: Wilner Engel PA-C  Referring Provider: No ref. provider found    SUBJECTIVE     Chief Complaint   Patient presents with   • Establish Care     pcp10/05/2021 routine diabetic foot care- pt states right foot has multiple callusis on ball of foot, left foot has no issues- pt denies pain at present- pt presents with long thick nails, callusis to bottom of right foot ball area   • Diabetes     166mg/dl BG this am     HPI: Franko Lucero, a 66 y.o.male, comes to clinic as a(n) new patient presenting for diabetic foot exam and complaining of thickened, irregular toenails. Patient has h/o anemia, anxiety, arthritis, BPH, CAD, CA, DM with neuropathy, Iliac artery aneurysm, HTN, HLD. Patient is NIDDM with last stated BG level of 166mg/dl. Patient presents for diabetic foot exam and nail care. States that he has previously been treated for diabetic ulcerations in the wound care center but these have healed. Reports multiple calluses to the ball of the right foot. States that nails are long, thick, and irregular and that he is unable to care for them himself. Denies pain at the present time. Relates previous treatment(s) including wound care treatment in [facility]. Denies any constitutional symptoms. No other pedal complaints at this time.    Past Medical History:   Diagnosis Date   • Anemia    • Anxiety    • Arthritis    • BPH (benign prostatic hypertrophy)    • CAD (coronary artery disease)    • Cancer (HCC)     non-hodgkins lymphoma   • Diabetes mellitus (HCC)    • Hyperlipidemia    • Hypertension    • Iliac artery aneurysm, bilateral (HCC)    • Kidney stone    • Sleep apnea      Past Surgical History:   Procedure Laterality Date   • COLONOSCOPY  02/04/2014   • COLONOSCOPY N/A 4/26/2017    Procedure: COLONOSCOPY WITH ANESTHESIA;  Surgeon: Sher Cui MD;  Location: [facility]  ENDOSCOPY;  Service:    • CORONARY ARTERY BYPASS GRAFT      2   • CYSTOSCOPY URETEROSCOPY LASER LITHOTRIPSY Left 2017    Procedure: URETEROSCOPY LASER LITHOTRIPSY WITH DOUBLE J STENT INSERTION, LEFT ;  Surgeon: Weston Peck MD;  Location: [facility] OR;  Service:    • CYSTOSCOPY URETEROSCOPY LASER LITHOTRIPSY Left 2017    Procedure: CYSTOSCOPY URETEROSCOPY LASER LITHOTRIPSY STENT INSERTION STONE EXTRACTION;  Surgeon: Weston Peck MD;  Location: [facility] OR;  Service:    • CYSTOSCOPY W/ URETERAL STENT PLACEMENT Left 2017    Procedure: CYSTOSCOPY URETERAL DOUBLE J STENT INSERTION, LEFT;  Surgeon: Weston Peck MD;  Location: [facility] OR;  Service:    • ENDOSCOPY N/A 2017    Procedure: ESOPHAGOGASTRODUODENOSCOPY WITH ANESTHESIA;  Surgeon: Sher Cui MD;  Location: [facility] ENDOSCOPY;  Service:    • JOINT REPLACEMENT Right    • KIDNEY STONE SURGERY     • KNEE SURGERY      replacement   • NECK SURGERY     • ROTATOR CUFF REPAIR     • SHOULDER SURGERY     • TONSILLECTOMY       Family History   Problem Relation Age of Onset   • Kidney disease Father    • Heart disease Father    • Cancer Mother         brain   • Colon cancer Neg Hx    • Colon polyps Neg Hx      Social History     Socioeconomic History   • Marital status:    Tobacco Use   • Smoking status: Former Smoker     Types: Cigarettes     Quit date:      Years since quittin.8   • Smokeless tobacco: Never Used   Vaping Use   • Vaping Use: Never used   Substance and Sexual Activity   • Alcohol use: No   • Drug use: No   • Sexual activity: Defer     Allergies   Allergen Reactions   • Adhesive Tape Rash     Pt states that if it isn't left on very long it is okay   • Cefazolin Rash   • Cefuroxime Axetil Rash   • Cephalosporins Rash   • Neomycin-Polymyxin B Gu Rash   • Neosporin [Neomycin-Bacitracin Zn-Polymyx] Rash   • Percocet [Oxycodone-Acetaminophen] Rash     Current Outpatient Medications   Medication Sig  Dispense Refill   • aspirin (ASPIR) 81 MG EC tablet Take 81 mg by mouth Daily.     • b complex vitamins capsule Take  by mouth.     • busPIRone (BUSPAR) 15 MG tablet Take 15 mg by mouth Daily.     • clopidogrel (PLAVIX) 75 MG tablet Take 75 mg by mouth Daily.     • cyanocobalamin 1000 MCG/ML injection Inject  into the shoulder, thigh, or buttocks 1 (One) Time Per Week.     • diltiaZEM CD (CARTIA XT) 240 MG 24 hr capsule Take 1 capsule by mouth daily     • docusate sodium (COLACE) 100 MG capsule Take  by mouth Daily.     • ferrous sulfate 325 (65 FE) MG EC tablet Take 325 mg by mouth Daily With Breakfast.     • furosemide (LASIX) 40 MG tablet Take 40 mg by mouth Daily.     • HYDROcodone-acetaminophen (NORCO) 7.5-325 MG per tablet Take 1-2 tablets by mouth Every 4 (Four) Hours As Needed for Moderate Pain (4-6) (Pain). 40 tablet 0   • meloxicam (MOBIC) 15 MG tablet Take 15 mg by mouth Daily.     • metFORMIN (GLUCOPHAGE) 1000 MG tablet Take 500 mg by mouth 2 (Two) Times a Day With Meals.     • nitroglycerin (NITROSTAT) 0.4 MG SL tablet Place 0.4 mg under the tongue Every 5 (Five) Minutes As Needed.     • pantoprazole (PROTONIX) 40 MG EC tablet Take 40 mg by mouth Daily.     • phenazopyridine (PYRIDIUM) 100 MG tablet Take 1 tablet by mouth 3 (Three) Times a Day As Needed for bladder spasms. 21 tablet 1   • polyethylene glycol (MIRALAX) powder Take 17 g by mouth Daily As Needed.     • pravastatin (PRAVACHOL) 40 MG tablet Take 40 mg by mouth Every Night.     • ranolazine (RANEXA) 1000 MG 12 hr tablet Take 1 tablet by mouth 2 times daily     • tamsulosin (FLOMAX) 0.4 MG capsule 24 hr capsule Take 1 capsule by mouth Daily. 30 capsule 2   • therapeutic multivitamin-minerals (THERAGRAN-M) tablet Take  by mouth.     • tiZANidine (ZANAFLEX) 4 MG tablet Take 4 mg by mouth As Needed for Muscle Spasms.     • traMADol (ULTRAM) 50 MG tablet TAKE 1 TABLET BY MOUTH FOUR TIMES DAILY AS NEEDED FOR PAIN  0   • Vitamin D, Cholecalciferol,  1000 UNITS tablet Take 1 tablet by mouth Daily.       No current facility-administered medications for this visit.     Review of Systems   Constitutional: Negative for chills and fever.   HENT: Negative for congestion.    Respiratory: Negative for shortness of breath.    Cardiovascular: Negative for chest pain and leg swelling.   Gastrointestinal: Negative for constipation, diarrhea, nausea and vomiting.   Musculoskeletal: Positive for arthralgias.        Foot pain   Skin: Negative for wound.   Neurological: Positive for numbness.       OBJECTIVE     Vitals:    11/02/21 1131   BP: 138/62   Pulse: 70   SpO2: 99%       PHYSICAL EXAM  GEN:   Accompanied by none.     Foot/Ankle Exam:       General:   Diabetic Foot Exam Performed    Appearance: appears stated age and healthy    Orientation: AAOx3    Affect: appropriate    Gait: unimpaired    Assistance: independent    Shoe Gear:  Casual shoes    VASCULAR      Right Foot Vascularity   Dorsalis pedis:  2+  Posterior tibial:  2+  Skin Temperature: warm    Edema Grading:  None  CFT:  3  Pedal Hair Growth:  Present  Varicosities: mild varicosities       Left Foot Vascularity   Dorsalis pedis:  2+  Posterior tibial:  2+  Skin Temperature: warm    Edema Grading:  None  CFT:  3  Pedal Hair Growth:  Present  Varicosities: mild varicosities        NEUROLOGIC     Right Foot Neurologic   Light touch sensation:  Diminished  Vibratory sensation:  Diminished  Hot/Cold sensation: diminished    Protective Sensation using Milton-Derek Monofilament:  3     Left Foot Neurologic   Light touch sensation:  Diminished  Vibratory sensation:  Diminished  Hot/cold sensation: diminished    Protective Sensation using Milton-Derek Monofilament:  4     MUSCULOSKELETAL      Right Foot Musculoskeletal   Ecchymosis:  None  Tenderness: right foot callus    Arch:  Normal     Left Foot Musculoskeletal   Ecchymosis:  None  Tenderness: none    Arch:  Normal     MUSCLE STRENGTH     Right Foot Muscle  Strength   Foot dorsiflexion:  5  Foot plantar flexion:  5  Foot inversion:  5  Foot eversion:  5     Left Foot Muscle Strength   Foot dorsiflexion:  5  Foot plantar flexion:  5  Foot inversion:  5  Foot eversion:  5     RANGE OF MOTION      Right Foot Range of Motion   Foot and ankle ROM within normal limits       Left Foot Range of Motion   Foot and ankle ROM within normal limits       DERMATOLOGIC     Right Foot Dermatologic   Skin: skin intact    Nails: onychomycosis and abnormally thick       Left Foot Dermatologic   Skin: skin intact    Nails: onychomycosis and abnormally thick       Image:       RADIOLOGY/NUCLEAR:  XR Abdomen KUB    Result Date: 10/5/2021  Narrative: EXAMINATION: KUB radiograph 10/5/2021  HISTORY: Follow-up kidney stones. Hematuria  FINDINGS: KUB radiograph demonstrates arthritic change throughout the lumbar spine with scoliosis convex to the left. There is mild constipation. There is a 2.1 cm calculus projecting over the lower pole of the left kidney unchanged from the previous study. No evidence of right-sided nephrolithiasis or discrete ureteral calculus.      Impression: 1.. 2.1 cm calculus projecting over the lower pole of the left kidney. 2. Mild to moderate constipation. This report was finalized on 10/05/2021 10:05 by Dr. Jesus Lepe MD.      LABORATORY/CULTURE RESULTS:      PATHOLOGY RESULTS:       ASSESSMENT/PLAN     Diagnoses and all orders for this visit:    1. Onychomycosis (Primary)    2. Type 2 diabetes mellitus with diabetic polyneuropathy, without long-term current use of insulin (HCC)    3. Foot callus    4. History of diabetic ulcer of foot    5. Encounter for current long term use of antiplatelet drug      Comprehensive lower extremity examination and evaluation was performed.  Discussed findings and treatment plan including risks, benefits, and treatment options with patient in detail. Patient agreed with treatment plan.  After verbal consent obtained, nail(s) x10  debrided of length and thickness with nail nipper without incidence  After verbal consent obtained, calluses x3 pared utilizing dermal curette and/or scalpel without incidence  Patient may maintain nails and calluses at home utilizing emery board or pumice stone between visits as needed  Reviewed at home diabetic foot care including daily foot checks   An After Visit Summary was printed and given to the patient at discharge, including (if requested) any available informative/educational handouts regarding diagnosis, treatment, or medications. All questions were answered to patient/family satisfaction. Should symptoms fail to improve or worsen they agree to call or return to clinic or to go to the Emergency Department. Discussed the importance of following up with any needed screening tests/labs/specialist appointments and any requested follow-up recommended by me today. Importance of maintaining follow-up discussed and patient accepts that missed appointments can delay diagnosis and potentially lead to worsening of conditions.  Return in about 3 months (around 2/2/2022)., or sooner if acute issues arise.    Lab Frequency Next Occurrence   Follow Anesthesia Guidelines / Standing Orders Once 04/12/2017   Follow Anesthesia Guidelines / Standing Orders Once 08/11/2017   Comprehensive Metabolic Panel Once 11/03/2021   CBC (No Diff) Once 11/03/2021   Phosphorus Once 11/03/2021   Magnesium Once 11/03/2021   Uric Acid Once 11/03/2021   Creatinine, Urine, Random - Urine, Clean Catch Once 10/29/2021   Urinalysis With Microscopic If Indicated (No Culture) - Urine, Clean Catch Once 10/29/2021   Vitamin D 25 Hydroxy Once 11/03/2021   PTH, Intact Once 11/03/2021       This document has been electronically signed by Silas Swan DPM on November 2, 2021 11:49 CDT

## 2021-11-02 ENCOUNTER — OFFICE VISIT (OUTPATIENT)
Dept: PODIATRY | Facility: CLINIC | Age: 66
End: 2021-11-02

## 2021-11-02 ENCOUNTER — PATIENT ROUNDING (BHMG ONLY) (OUTPATIENT)
Dept: PODIATRY | Facility: CLINIC | Age: 66
End: 2021-11-02

## 2021-11-02 VITALS
SYSTOLIC BLOOD PRESSURE: 138 MMHG | HEART RATE: 70 BPM | BODY MASS INDEX: 31 KG/M2 | WEIGHT: 221.4 LBS | DIASTOLIC BLOOD PRESSURE: 62 MMHG | HEIGHT: 71 IN | OXYGEN SATURATION: 99 %

## 2021-11-02 DIAGNOSIS — L84 FOOT CALLUS: ICD-10-CM

## 2021-11-02 DIAGNOSIS — B35.1 ONYCHOMYCOSIS: Primary | ICD-10-CM

## 2021-11-02 DIAGNOSIS — Z79.02 ENCOUNTER FOR CURRENT LONG TERM USE OF ANTIPLATELET DRUG: ICD-10-CM

## 2021-11-02 DIAGNOSIS — E11.42 TYPE 2 DIABETES MELLITUS WITH DIABETIC POLYNEUROPATHY, WITHOUT LONG-TERM CURRENT USE OF INSULIN (HCC): ICD-10-CM

## 2021-11-02 DIAGNOSIS — Z86.31 HISTORY OF DIABETIC ULCER OF FOOT: ICD-10-CM

## 2021-11-02 LAB
ALBUMIN SERPL-MCNC: 4.4 G/DL (ref 3.5–5.2)
ALP BLD-CCNC: 88 U/L (ref 40–130)
ALT SERPL-CCNC: 18 U/L (ref 5–41)
ANION GAP SERPL CALCULATED.3IONS-SCNC: 14 MMOL/L (ref 7–19)
AST SERPL-CCNC: 21 U/L (ref 5–40)
BASOPHILS ABSOLUTE: 0 K/UL (ref 0–0.2)
BASOPHILS RELATIVE PERCENT: 0.5 % (ref 0–1)
BILIRUB SERPL-MCNC: 0.6 MG/DL (ref 0.2–1.2)
BILIRUBIN URINE: NEGATIVE
BLOOD, URINE: NEGATIVE
BUN BLDV-MCNC: 20 MG/DL (ref 8–23)
CALCIUM SERPL-MCNC: 10.3 MG/DL (ref 8.8–10.2)
CHLORIDE BLD-SCNC: 97 MMOL/L (ref 98–111)
CLARITY: CLEAR
CO2: 25 MMOL/L (ref 22–29)
COLOR: ABNORMAL
CREAT SERPL-MCNC: 1 MG/DL (ref 0.5–1.2)
CREATININE URINE: 43.2 MG/DL (ref 4.2–622)
EOSINOPHILS ABSOLUTE: 0.1 K/UL (ref 0–0.6)
EOSINOPHILS RELATIVE PERCENT: 2.5 % (ref 0–5)
GFR AFRICAN AMERICAN: >59
GFR NON-AFRICAN AMERICAN: >60
GLUCOSE BLD-MCNC: 160 MG/DL (ref 74–109)
GLUCOSE URINE: =>1000 MG/DL
HCT VFR BLD CALC: 36.4 % (ref 42–52)
HEMOGLOBIN: 11.7 G/DL (ref 14–18)
IMMATURE GRANULOCYTES #: 0 K/UL
KETONES, URINE: NEGATIVE MG/DL
LEUKOCYTE ESTERASE, URINE: NEGATIVE
LYMPHOCYTES ABSOLUTE: 0.8 K/UL (ref 1.1–4.5)
LYMPHOCYTES RELATIVE PERCENT: 17.5 % (ref 20–40)
MAGNESIUM: 2.2 MG/DL (ref 1.6–2.4)
MCH RBC QN AUTO: 34 PG (ref 27–31)
MCHC RBC AUTO-ENTMCNC: 32.1 G/DL (ref 33–37)
MCV RBC AUTO: 105.8 FL (ref 80–94)
MONOCYTES ABSOLUTE: 0.5 K/UL (ref 0–0.9)
MONOCYTES RELATIVE PERCENT: 10.2 % (ref 0–10)
NEUTROPHILS ABSOLUTE: 3 K/UL (ref 1.5–7.5)
NEUTROPHILS RELATIVE PERCENT: 68.8 % (ref 50–65)
NITRITE, URINE: NEGATIVE
PARATHYROID HORMONE INTACT: 22.8 PG/ML (ref 15–65)
PDW BLD-RTO: 15 % (ref 11.5–14.5)
PH UA: 5 (ref 5–8)
PHOSPHORUS: 4.4 MG/DL (ref 2.5–4.5)
PLATELET # BLD: 96 K/UL (ref 130–400)
PMV BLD AUTO: 9.9 FL (ref 9.4–12.4)
POTASSIUM SERPL-SCNC: 4.9 MMOL/L (ref 3.5–5)
PROTEIN PROTEIN: 9 MG/DL (ref 15–45)
PROTEIN UA: NEGATIVE MG/DL
RBC # BLD: 3.44 M/UL (ref 4.7–6.1)
SODIUM BLD-SCNC: 136 MMOL/L (ref 136–145)
SPECIFIC GRAVITY UA: 1.03 (ref 1–1.03)
TOTAL PROTEIN: 7.3 G/DL (ref 6.6–8.7)
URIC ACID, SERUM: 3.8 MG/DL (ref 3.4–7)
UROBILINOGEN, URINE: 1 E.U./DL
VITAMIN D 25-HYDROXY: 59.1 NG/ML
WBC # BLD: 4.4 K/UL (ref 4.8–10.8)

## 2021-11-02 PROCEDURE — 11056 PARNG/CUTG B9 HYPRKR LES 2-4: CPT | Performed by: PODIATRIST

## 2021-11-02 PROCEDURE — 99213 OFFICE O/P EST LOW 20 MIN: CPT | Performed by: PODIATRIST

## 2021-11-02 PROCEDURE — 11721 DEBRIDE NAIL 6 OR MORE: CPT | Performed by: PODIATRIST

## 2021-11-02 NOTE — PROGRESS NOTES
November 2, 2021    Hello, may I speak with Franko Lucero?    My name is AMAN NUÑEZ      I am  with [facility]  [address], [address]  [place] [address] [phone].    Before we get started may I verify your date of birth? 1955    I am calling to officially welcome you to our practice and ask about your recent visit. Is this a good time to talk? yes    Tell me about your visit with us. What things went well?  EVERYTHING WENT GREAT ALL QUESTIONS ANSWERED       We're always looking for ways to make our patients' experiences even better. Do you have recommendations on ways we may improve?  no    Overall were you satisfied with your first visit to our practice? yes       I appreciate you taking the time to speak with me today. Is there anything else I can do for you? no      Thank you, and have a great day.

## 2021-11-18 ENCOUNTER — OFFICE VISIT (OUTPATIENT)
Dept: CARDIOLOGY CLINIC | Age: 66
End: 2021-11-18
Payer: MEDICARE

## 2021-11-18 VITALS
HEART RATE: 91 BPM | BODY MASS INDEX: 30.66 KG/M2 | SYSTOLIC BLOOD PRESSURE: 102 MMHG | DIASTOLIC BLOOD PRESSURE: 56 MMHG | HEIGHT: 71 IN | WEIGHT: 219 LBS

## 2021-11-18 DIAGNOSIS — I10 PRIMARY HYPERTENSION: ICD-10-CM

## 2021-11-18 DIAGNOSIS — E78.2 MIXED HYPERLIPIDEMIA: Primary | ICD-10-CM

## 2021-11-18 DIAGNOSIS — Z95.1 HISTORY OF CORONARY ARTERY BYPASS GRAFT X 1: ICD-10-CM

## 2021-11-18 DIAGNOSIS — Z95.1 S/P CABG X 3: ICD-10-CM

## 2021-11-18 DIAGNOSIS — I25.10 CORONARY ARTERY DISEASE INVOLVING NATIVE CORONARY ARTERY OF NATIVE HEART WITHOUT ANGINA PECTORIS: ICD-10-CM

## 2021-11-18 DIAGNOSIS — R06.09 DOE (DYSPNEA ON EXERTION): ICD-10-CM

## 2021-11-18 PROCEDURE — G8484 FLU IMMUNIZE NO ADMIN: HCPCS | Performed by: INTERNAL MEDICINE

## 2021-11-18 PROCEDURE — 1123F ACP DISCUSS/DSCN MKR DOCD: CPT | Performed by: INTERNAL MEDICINE

## 2021-11-18 PROCEDURE — 1036F TOBACCO NON-USER: CPT | Performed by: INTERNAL MEDICINE

## 2021-11-18 PROCEDURE — 99214 OFFICE O/P EST MOD 30 MIN: CPT | Performed by: INTERNAL MEDICINE

## 2021-11-18 PROCEDURE — 4040F PNEUMOC VAC/ADMIN/RCVD: CPT | Performed by: INTERNAL MEDICINE

## 2021-11-18 PROCEDURE — G8427 DOCREV CUR MEDS BY ELIG CLIN: HCPCS | Performed by: INTERNAL MEDICINE

## 2021-11-18 PROCEDURE — 3017F COLORECTAL CA SCREEN DOC REV: CPT | Performed by: INTERNAL MEDICINE

## 2021-11-18 PROCEDURE — G8417 CALC BMI ABV UP PARAM F/U: HCPCS | Performed by: INTERNAL MEDICINE

## 2021-11-18 RX ORDER — LOSARTAN POTASSIUM 25 MG/1
25 TABLET ORAL DAILY
Status: ON HOLD | COMMUNITY
End: 2022-07-26 | Stop reason: SDUPTHER

## 2021-11-18 ASSESSMENT — ENCOUNTER SYMPTOMS
NAUSEA: 0
RESPIRATORY NEGATIVE: 1
VOMITING: 0
SHORTNESS OF BREATH: 0
EYES NEGATIVE: 1
GASTROINTESTINAL NEGATIVE: 1
DIARRHEA: 0

## 2021-11-18 NOTE — PROGRESS NOTES
[facility] Progress Note                            Date:  11/18/2021  Patient: Edwin Tirado  Age:  74 y.o., 1955      Reason for evaluation:         SUBJECTIVE:    Returns today follow-up assessment follow-up for coronary artery disease previous CABG hyperlipidemia hypertension and chronic shortness of breath. Denies anginal chest pain shortness of breath unchanged. Recent LDL cholesterol 70 blood pressure 102/56 heart 91 overall feels reasonably well no new issues reported. Review of Systems   Constitutional: Negative. Negative for chills, fever and unexpected weight change. HENT: Negative. Eyes: Negative. Respiratory: Negative. Negative for shortness of breath. Cardiovascular: Negative. Negative for chest pain. Gastrointestinal: Negative. Negative for diarrhea, nausea and vomiting. Endocrine: Negative. Genitourinary: Negative. Musculoskeletal: Negative. Skin: Negative. Neurological: Negative. All other systems reviewed and are negative. OBJECTIVE:     BP (!) 102/56   Pulse 91   Ht 5' 11\" (1.803 m)   Wt 219 lb (99.3 kg)   BMI 30.54 kg/m²     Labs:   CBC: No results for input(s): WBC, HGB, HCT, PLT in the last 72 hours. BMP:No results for input(s): NA, K, CO2, BUN, CREATININE, LABGLOM, GLUCOSE in the last 72 hours. BNP: No results for input(s): BNP in the last 72 hours. PT/INR: No results for input(s): PROTIME, INR in the last 72 hours. APTT:No results for input(s): APTT in the last 72 hours. CARDIAC ENZYMES:No results for input(s): CKTOTAL, CKMB, CKMBINDEX, TROPONINI in the last 72 hours. FASTING LIPID PANEL:  Lab Results   Component Value Date    HDL 35 03/30/2021    LDLDIRECT 99 10/29/2014    LDLCALC 70 03/30/2021    TRIG 220 03/30/2021     LIVER PROFILE:No results for input(s): AST, ALT, LABALBU in the last 72 hours.         Past Medical History:   Diagnosis Date    Abnormal nuclear stress test 10/22/2014    Arthritis     BiPAP (biphasic positive airway pressure) dependence     8cm to 20cm    Cerebrovascular disease     Chronic kidney disease, stage II (mild) 08/17/2016    Sandra Romero M.D.    Coronary artery disease 2/24/2011    Depression     Diabetes mellitus [facility] [address]     Encounter for wound care     PT SEES \"GRACY\" WITH DR. SAMUEL    Great toe pain     RT    Headache     Hyperlipemia 2/24/2011    Dr. Joe Lehman follows lipids.     Hyperlipidemia     Hypertension     LONG TERM ANTICOAGULENT USE     Low blood pressure 11/19/2014    lymphostatic lymphoma     Nausea 11/19/2014    Non Hodgkin's lymphoma (HCC)     Obesity     Obstructive sleep apnea     AHI:  21.7 per PSG, 7/2017    Peptic ulcer disease 2/24/2011    Restless leg     S/P CABG x 3 10/22/2014    SVG to RCA; SVG to LAD diagonal; LIMA to LAD    Sleep apnea     Tachyarrhythmia     Thrombocythemia, essential ([address]     Type 2 diabetes mellitus without complication ([facility] [address]     Type II or unspecified type diabetes mellitus without mention of complication, not stated as uncontrolled      Past Surgical History:   Procedure Laterality Date    CARDIAC CATHETERIZATION  2/28/11    EF 60%    CARDIAC CATHETERIZATION  9/16/05, 3/7/07    EF < 50%    CARDIAC CATHETERIZATION  12/4/12   MDL    EF  50%    CARDIAC CATHETERIZATION  10/28/14  GERBER Reyez M.D.   [facility] CORONARY ARTERY BYPASS GRAFT  9/21/05    LIMA to LAD and diagonal; SVG to posterior descending branch RCA    CORONARY ARTERY BYPASS GRAFT  10/30/2014    Redo Sternotomy - SVG-PDA, TMR (RBL)    KIDNEY SURGERY  08/14/2017    KNEE ARTHROSCOPY      right ACL repair    SHOULDER ARTHROSCOPY      left-rotator cuff repair right- rotator cuff repair    SHOULDER ARTHROSCOPY  08-23-11    right    TONSILLECTOMY       Family History   Problem Relation Age of Onset    Heart Disease Other     Lung Cancer Mother    [place] Cancer Sister      Allergies   Allergen Reactions    Ancef [Cefazolin Sodium]     Ceftin [Cefuroxime]      \"anything with ceftin in it\"    Cefuroxime Axetil     Cephalosporins     Neosporin [Bacitracin-Polymyxin B]      blisters     Current Outpatient Medications   Medication Sig Dispense Refill    losartan (COZAAR) 25 MG tablet Take 25 mg by mouth daily      atorvastatin (LIPITOR) 40 MG tablet TAKE 1 TABLET BY MOUTH NIGHTLY AT BEDTIME      JARDIANCE 10 MG tablet TAKE 1 TABLET BY MOUTH DAILY; TAKE ONE DAILY FOR 30 DAYS THEN START 25 MG DAILY      tamsulosin (FLOMAX) 0.4 MG capsule Take 1 capsule by mouth daily (Patient taking differently: Take 0.2 mg by mouth daily ) 90 capsule 3    gabapentin (NEURONTIN) 100 MG capsule Take 1 capsule by mouth 2 times daily.       busPIRone (BUSPAR) 15 MG tablet Take 7.5 mg by mouth 2 times daily      pravastatin (PRAVACHOL) 40 MG tablet Take 40 mg by mouth nightly      Icosapent Ethyl (VASCEPA) 1 g CAPS capsule Take 1 capsule by mouth 2 times daily      metoprolol tartrate (LOPRESSOR) 25 MG tablet Take 1 tablet by mouth 2 times daily 60 tablet 5    ranolazine (RANEXA) 1000 MG extended release tablet Take 1 tablet by mouth 2 times daily 180 tablet 3    clopidogrel (PLAVIX) 75 MG tablet One daily (Patient taking differently: Take 75 mg by mouth daily ) 90 tablet 3    acetaminophen (TYLENOL) 500 MG tablet Take 1,000 mg by mouth every 6 hours as needed for Pain      nitroGLYCERIN (NITROSTAT) 0.4 MG SL tablet Place 1 tablet under the tongue every 5 minutes as needed for Chest pain (Patient taking differently: Place 0.4 mg under the tongue every 5 minutes as needed for Chest pain Indications: has not had to take ) 25 tablet 5    DULoxetine (CYMBALTA) 30 MG extended release capsule Take 30 mg by mouth nightly Indications: patient stated received medications yesterday 5/19/21       levothyroxine (SYNTHROID) 50 MCG tablet Take 50 mcg by mouth Daily      ondansetron (ZOFRAN) 8 MG tablet Take 8 mg by mouth as needed       tiZANidine (ZANAFLEX) 4 MG tablet 1/2 tablet at night      ferrous sulfate 325 (65 Fe) MG tablet Take 325 mg by mouth 2 times daily      pantoprazole (PROTONIX) 40 MG tablet Take 1 tablet by mouth daily 90 tablet 2    b complex vitamins capsule Take 1 capsule by mouth daily.  Vitamin D (CHOLECALCIFEROL) 1000 UNITS CAPS capsule Take 1,000 Units by mouth daily.  docusate sodium (COLACE) 100 MG capsule Take 100 mg by mouth daily.  furosemide (LASIX) 20 MG tablet Take 1 tablet by mouth daily (Patient not taking: Reported on 2021)      diltiazem (CARDIZEM CD) 180 MG extended release capsule Take 1 capsule by mouth daily (Patient not taking: Reported on 2021) 90 capsule 3    meloxicam (MOBIC) 15 MG tablet Take 15 mg by mouth daily  (Patient not taking: Reported on 2021)       No current facility-administered medications for this visit. Social History     Socioeconomic History    Marital status:      Spouse name: Adonay Clemente Number of children: 2    Years of education: 12    Highest education level: Not on file   Occupational History    Occupation:      Employer: [employer]     Comment: Retired,  for the [employer].    Tobacco Use    Smoking status: Former Smoker     Packs/day: 0.00     Types: Cigarettes     Quit date: 1998     Years since quittin.8    Smokeless tobacco: Never Used    Tobacco comment: quit 23 years ago   Vaping Use    Vaping Use: Never used   Substance and Sexual Activity    Alcohol use: No    Drug use: No    Sexual activity: Not on file   Other Topics Concern    Not on file   Social History Narrative    Not on file     Social Determinants of Health     Financial Resource Strain:     Difficulty of Paying Living Expenses: Not on file   Food Insecurity:     Worried About Running Out of Food in the Last Year: Not on file    [address] in the Last Year: Not on file   Transportation Needs:     Lack of Transportation (Medical): Not on file    Lack of Transportation (Non-Medical): Not on file   Physical Activity:     Days of Exercise per Week: Not on file    Minutes of Exercise per Session: Not on file   Stress:     Feeling of Stress : Not on file   Social Connections:     Frequency of Communication with Friends and Family: Not on file    Frequency of Social Gatherings with Friends and Family: Not on file    Attends [employer] Services: Not on file    Active Member of [address] [place] [address] [email or web address] or Organizations: Not on file    Attends Club or Organization Meetings: Not on file    Marital Status: Not on file   Intimate Partner Violence:     Fear of Current or Ex-Partner: Not on file    Emotionally Abused: Not on file    Physically Abused: Not on file    Sexually Abused: Not on file   Housing Stability:     Unable to Pay for Housing in the Last Year: Not on file    Number of [place] in the Last Year: Not on file    Unstable Housing in the Last Year: Not on file       Physical Examination:  BP (!) 102/56   Pulse 91   Ht 5' 11\" (1.803 m)   Wt 219 lb (99.3 kg)   BMI 30.54 kg/m²   Physical Exam  Vitals reviewed. Constitutional:       Appearance: He is well-developed. Neck:      Vascular: No carotid bruit or JVD. Cardiovascular:      Rate and Rhythm: Normal rate and regular rhythm. Heart sounds: Normal heart sounds. No murmur heard. No friction rub. No gallop. Pulmonary:      Effort: Pulmonary effort is normal. No respiratory distress. Breath sounds: Normal breath sounds. No wheezing or rales. Abdominal:      General: There is no distension. Tenderness: There is no abdominal tenderness. Lymphadenopathy:      Cervical: No cervical adenopathy. Skin:     General: Skin is warm and dry. ASSESSMENT:     Diagnosis Orders   1. Mixed hyperlipidemia     2. Primary hypertension     3.  S/P CABG x

## 2021-12-16 ENCOUNTER — APPOINTMENT (OUTPATIENT)
Dept: CT IMAGING | Age: 66
DRG: 563 | End: 2021-12-16
Payer: MEDICARE

## 2021-12-16 ENCOUNTER — HOSPITAL ENCOUNTER (INPATIENT)
Age: 66
LOS: 1 days | Discharge: HOME | DRG: 563 | End: 2021-12-17
Attending: STUDENT IN AN ORGANIZED HEALTH CARE EDUCATION/TRAINING PROGRAM | Admitting: STUDENT IN AN ORGANIZED HEALTH CARE EDUCATION/TRAINING PROGRAM
Payer: MEDICARE

## 2021-12-16 ENCOUNTER — APPOINTMENT (OUTPATIENT)
Dept: GENERAL RADIOLOGY | Age: 66
DRG: 563 | End: 2021-12-16
Payer: MEDICARE

## 2021-12-16 DIAGNOSIS — S06.5XAA SUBDURAL HEMATOMA: Primary | ICD-10-CM

## 2021-12-16 DIAGNOSIS — S42.001A CLOSED NONDISPLACED FRACTURE OF RIGHT CLAVICLE, UNSPECIFIED PART OF CLAVICLE, INITIAL ENCOUNTER: ICD-10-CM
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
## 2021-12-16 LAB
ALBUMIN SERPL-MCNC: 3.9 G/DL (ref 3.5–5.2)
ALP BLD-CCNC: 85 U/L (ref 40–130)
ALT SERPL-CCNC: 17 U/L (ref 5–41)
ANION GAP SERPL CALCULATED.3IONS-SCNC: 11 MMOL/L (ref 7–19)
APTT: 28.4 SEC (ref 26–36.2)
AST SERPL-CCNC: 21 U/L (ref 5–40)
BASOPHILS ABSOLUTE: 0 K/UL (ref 0–0.2)
BASOPHILS RELATIVE PERCENT: 0.1 % (ref 0–1)
BILIRUB SERPL-MCNC: 0.4 MG/DL (ref 0.2–1.2)
BUN BLDV-MCNC: 16 MG/DL (ref 8–23)
CALCIUM SERPL-MCNC: 9.3 MG/DL (ref 8.8–10.2)
CHLORIDE BLD-SCNC: 99 MMOL/L (ref 98–111)
CO2: 24 MMOL/L (ref 22–29)
CREAT SERPL-MCNC: 1 MG/DL (ref 0.5–1.2)
EOSINOPHILS ABSOLUTE: 0.1 K/UL (ref 0–0.6)
EOSINOPHILS RELATIVE PERCENT: 0.7 % (ref 0–5)
GFR AFRICAN AMERICAN: >59
GFR NON-AFRICAN AMERICAN: >60
GLUCOSE BLD-MCNC: 184 MG/DL (ref 74–109)
HCT VFR BLD CALC: 32.4 % (ref 42–52)
HEMOGLOBIN: 10 G/DL (ref 14–18)
IMMATURE GRANULOCYTES #: 0.1 K/UL
INR BLD: 1.05 (ref 0.88–1.18)
LYMPHOCYTES ABSOLUTE: 0.8 K/UL (ref 1.1–4.5)
LYMPHOCYTES RELATIVE PERCENT: 11.2 % (ref 20–40)
MCH RBC QN AUTO: 33.1 PG (ref 27–31)
MCHC RBC AUTO-ENTMCNC: 30.9 G/DL (ref 33–37)
MCV RBC AUTO: 107.3 FL (ref 80–94)
MONOCYTES ABSOLUTE: 0.6 K/UL (ref 0–0.9)
MONOCYTES RELATIVE PERCENT: 9 % (ref 0–10)
NEUTROPHILS ABSOLUTE: 5.2 K/UL (ref 1.5–7.5)
NEUTROPHILS RELATIVE PERCENT: 78.1 % (ref 50–65)
PDW BLD-RTO: 15.3 % (ref 11.5–14.5)
PLATELET # BLD: 100 K/UL (ref 130–400)
PMV BLD AUTO: 10.2 FL (ref 9.4–12.4)
POTASSIUM REFLEX MAGNESIUM: 4.4 MMOL/L (ref 3.5–5)
PROTHROMBIN TIME: 13.9 SEC (ref 12–14.6)
RBC # BLD: 3.02 M/UL (ref 4.7–6.1)
SARS-COV-2, NAAT: NOT DETECTED
SODIUM BLD-SCNC: 134 MMOL/L (ref 136–145)
TOTAL PROTEIN: 6.5 G/DL (ref 6.6–8.7)
WBC # BLD: 6.7 K/UL (ref 4.8–10.8)

## 2021-12-16 PROCEDURE — 6360000002 HC RX W HCPCS: Performed by: PHYSICIAN ASSISTANT

## 2021-12-16 PROCEDURE — 71101 X-RAY EXAM UNILAT RIBS/CHEST: CPT

## 2021-12-16 PROCEDURE — 87635 SARS-COV-2 COVID-19 AMP PRB: CPT

## 2021-12-16 PROCEDURE — 73060 X-RAY EXAM OF HUMERUS: CPT

## 2021-12-16 PROCEDURE — 85025 COMPLETE CBC W/AUTO DIFF WBC: CPT

## 2021-12-16 PROCEDURE — 36415 COLL VENOUS BLD VENIPUNCTURE: CPT

## 2021-12-16 PROCEDURE — 72125 CT NECK SPINE W/O DYE: CPT

## 2021-12-16 PROCEDURE — 70450 CT HEAD/BRAIN W/O DYE: CPT

## 2021-12-16 PROCEDURE — 96374 THER/PROPH/DIAG INJ IV PUSH: CPT

## 2021-12-16 PROCEDURE — 85610 PROTHROMBIN TIME: CPT

## 2021-12-16 PROCEDURE — 80053 COMPREHEN METABOLIC PANEL: CPT

## 2021-12-16 PROCEDURE — 1210000000 HC MED SURG R&B

## 2021-12-16 PROCEDURE — 85730 THROMBOPLASTIN TIME PARTIAL: CPT

## 2021-12-16 PROCEDURE — 6370000000 HC RX 637 (ALT 250 FOR IP): Performed by: PHYSICIAN ASSISTANT

## 2021-12-16 PROCEDURE — 73030 X-RAY EXAM OF SHOULDER: CPT

## 2021-12-16 PROCEDURE — 99284 EMERGENCY DEPT VISIT MOD MDM: CPT

## 2021-12-16 PROCEDURE — 73070 X-RAY EXAM OF ELBOW: CPT

## 2021-12-16 RX ORDER — HYDROMORPHONE HYDROCHLORIDE 1 MG/ML
0.5 INJECTION, SOLUTION INTRAMUSCULAR; INTRAVENOUS; SUBCUTANEOUS
Status: DISCONTINUED | OUTPATIENT
Start: 2021-12-16 | End: 2021-12-17 | Stop reason: HOSPADM

## 2021-12-16 RX ORDER — HYDROMORPHONE HYDROCHLORIDE 1 MG/ML
0.5 INJECTION, SOLUTION INTRAMUSCULAR; INTRAVENOUS; SUBCUTANEOUS ONCE
Status: COMPLETED | OUTPATIENT
Start: 2021-12-16 | End: 2021-12-16

## 2021-12-16 RX ORDER — OXYCODONE HYDROCHLORIDE AND ACETAMINOPHEN 5; 325 MG/1; MG/1
1 TABLET ORAL EVERY 4 HOURS PRN
Status: DISCONTINUED | OUTPATIENT
Start: 2021-12-16 | End: 2021-12-17 | Stop reason: HOSPADM

## 2021-12-16 RX ORDER — OXYCODONE HYDROCHLORIDE AND ACETAMINOPHEN 5; 325 MG/1; MG/1
1 TABLET ORAL ONCE
Status: COMPLETED | OUTPATIENT
Start: 2021-12-16 | End: 2021-12-16

## 2021-12-16 RX ADMIN — OXYCODONE HYDROCHLORIDE AND ACETAMINOPHEN 1 TABLET: 5; 325 TABLET ORAL at 21:07

## 2021-12-16 RX ADMIN — HYDROMORPHONE HYDROCHLORIDE 0.5 MG: 1 INJECTION, SOLUTION INTRAMUSCULAR; INTRAVENOUS; SUBCUTANEOUS at 22:41

## 2021-12-16 ASSESSMENT — ENCOUNTER SYMPTOMS
SHORTNESS OF BREATH: 0
PHOTOPHOBIA: 0

## 2021-12-16 ASSESSMENT — PAIN DESCRIPTION - FREQUENCY: FREQUENCY: CONTINUOUS

## 2021-12-16 ASSESSMENT — PAIN SCALES - GENERAL
PAINLEVEL_OUTOF10: 8
PAINLEVEL_OUTOF10: 9
PAINLEVEL_OUTOF10: 8

## 2021-12-16 ASSESSMENT — PAIN DESCRIPTION - PAIN TYPE: TYPE: ACUTE PAIN

## 2021-12-16 ASSESSMENT — PAIN DESCRIPTION - LOCATION: LOCATION: SHOULDER;HEAD

## 2021-12-16 ASSESSMENT — PAIN DESCRIPTION - ORIENTATION: ORIENTATION: RIGHT

## 2021-12-17 ENCOUNTER — APPOINTMENT (OUTPATIENT)
Dept: CT IMAGING | Age: 66
DRG: 563 | End: 2021-12-17
Payer: MEDICARE

## 2021-12-17 VITALS
WEIGHT: 219 LBS | HEIGHT: 71 IN | DIASTOLIC BLOOD PRESSURE: 66 MMHG | TEMPERATURE: 98.4 F | OXYGEN SATURATION: 95 % | BODY MASS INDEX: 30.66 KG/M2 | HEART RATE: 96 BPM | SYSTOLIC BLOOD PRESSURE: 119 MMHG | RESPIRATION RATE: 16 BRPM

## 2021-12-17 PROBLEM — I62.00 SUBDURAL HEMORRHAGE (HCC): Status: ACTIVE | Noted: 2021-12-16

## 2021-12-17 LAB
ANION GAP SERPL CALCULATED.3IONS-SCNC: 13 MMOL/L (ref 7–19)
APTT: 29.1 SEC (ref 26–36.2)
BUN BLDV-MCNC: 16 MG/DL (ref 8–23)
CALCIUM SERPL-MCNC: 9.6 MG/DL (ref 8.8–10.2)
CHLORIDE BLD-SCNC: 102 MMOL/L (ref 98–111)
CO2: 24 MMOL/L (ref 22–29)
CREAT SERPL-MCNC: 1 MG/DL (ref 0.5–1.2)
GFR AFRICAN AMERICAN: >59
GFR NON-AFRICAN AMERICAN: >60
GLUCOSE BLD-MCNC: 145 MG/DL (ref 70–99)
GLUCOSE BLD-MCNC: 168 MG/DL (ref 70–99)
GLUCOSE BLD-MCNC: 211 MG/DL (ref 70–99)
GLUCOSE BLD-MCNC: 220 MG/DL (ref 74–109)
HCT VFR BLD CALC: 32.7 % (ref 42–52)
HEMOGLOBIN: 10.2 G/DL (ref 14–18)
INR BLD: 1.08 (ref 0.88–1.18)
MCH RBC QN AUTO: 33 PG (ref 27–31)
MCHC RBC AUTO-ENTMCNC: 31.2 G/DL (ref 33–37)
MCV RBC AUTO: 105.8 FL (ref 80–94)
PDW BLD-RTO: 15.3 % (ref 11.5–14.5)
PERFORMED ON: ABNORMAL
PLATELET # BLD: 93 K/UL (ref 130–400)
PMV BLD AUTO: 10 FL (ref 9.4–12.4)
POTASSIUM REFLEX MAGNESIUM: 4.1 MMOL/L (ref 3.5–5)
PROTHROMBIN TIME: 14.2 SEC (ref 12–14.6)
RBC # BLD: 3.09 M/UL (ref 4.7–6.1)
SODIUM BLD-SCNC: 139 MMOL/L (ref 136–145)
WBC # BLD: 7 K/UL (ref 4.8–10.8)

## 2021-12-17 PROCEDURE — 97166 OT EVAL MOD COMPLEX 45 MIN: CPT

## 2021-12-17 PROCEDURE — 36415 COLL VENOUS BLD VENIPUNCTURE: CPT

## 2021-12-17 PROCEDURE — 97116 GAIT TRAINING THERAPY: CPT

## 2021-12-17 PROCEDURE — 85730 THROMBOPLASTIN TIME PARTIAL: CPT

## 2021-12-17 PROCEDURE — 85027 COMPLETE CBC AUTOMATED: CPT

## 2021-12-17 PROCEDURE — 85610 PROTHROMBIN TIME: CPT

## 2021-12-17 PROCEDURE — 80048 BASIC METABOLIC PNL TOTAL CA: CPT

## 2021-12-17 PROCEDURE — 97535 SELF CARE MNGMENT TRAINING: CPT

## 2021-12-17 PROCEDURE — 6370000000 HC RX 637 (ALT 250 FOR IP): Performed by: STUDENT IN AN ORGANIZED HEALTH CARE EDUCATION/TRAINING PROGRAM

## 2021-12-17 PROCEDURE — 70450 CT HEAD/BRAIN W/O DYE: CPT

## 2021-12-17 PROCEDURE — 6360000002 HC RX W HCPCS: Performed by: STUDENT IN AN ORGANIZED HEALTH CARE EDUCATION/TRAINING PROGRAM

## 2021-12-17 PROCEDURE — 82947 ASSAY GLUCOSE BLOOD QUANT: CPT

## 2021-12-17 PROCEDURE — 97161 PT EVAL LOW COMPLEX 20 MIN: CPT

## 2021-12-17 PROCEDURE — 99221 1ST HOSP IP/OBS SF/LOW 40: CPT | Performed by: NURSE PRACTITIONER

## 2021-12-17 RX ORDER — BUSPIRONE HYDROCHLORIDE 5 MG/1
7.5 TABLET ORAL 2 TIMES DAILY
Status: DISCONTINUED | OUTPATIENT
Start: 2021-12-17 | End: 2021-12-17 | Stop reason: HOSPADM

## 2021-12-17 RX ORDER — DEXTROSE MONOHYDRATE 25 G/50ML
12.5 INJECTION, SOLUTION INTRAVENOUS PRN
Status: DISCONTINUED | OUTPATIENT
Start: 2021-12-17 | End: 2021-12-17 | Stop reason: HOSPADM

## 2021-12-17 RX ORDER — DEXTROSE MONOHYDRATE 50 MG/ML
100 INJECTION, SOLUTION INTRAVENOUS PRN
Status: DISCONTINUED | OUTPATIENT
Start: 2021-12-17 | End: 2021-12-17 | Stop reason: HOSPADM

## 2021-12-17 RX ORDER — PANTOPRAZOLE SODIUM 40 MG/1
40 TABLET, DELAYED RELEASE ORAL DAILY
Status: DISCONTINUED | OUTPATIENT
Start: 2021-12-17 | End: 2021-12-17 | Stop reason: HOSPADM

## 2021-12-17 RX ORDER — DILTIAZEM HYDROCHLORIDE 180 MG/1
180 CAPSULE, COATED, EXTENDED RELEASE ORAL DAILY
Status: DISCONTINUED | OUTPATIENT
Start: 2021-12-17 | End: 2021-12-17 | Stop reason: HOSPADM

## 2021-12-17 RX ORDER — EMPAGLIFLOZIN 25 MG/1
25 TABLET, FILM COATED ORAL DAILY
COMMUNITY

## 2021-12-17 RX ORDER — VITAMIN B COMPLEX
1000 TABLET ORAL DAILY
Status: DISCONTINUED | OUTPATIENT
Start: 2021-12-17 | End: 2021-12-17 | Stop reason: HOSPADM

## 2021-12-17 RX ORDER — ACETAMINOPHEN 500 MG
500 TABLET ORAL EVERY 6 HOURS PRN
Status: DISCONTINUED | OUTPATIENT
Start: 2021-12-17 | End: 2021-12-17 | Stop reason: HOSPADM

## 2021-12-17 RX ORDER — ONDANSETRON 4 MG/1
8 TABLET, FILM COATED ORAL EVERY 8 HOURS PRN
Status: DISCONTINUED | OUTPATIENT
Start: 2021-12-17 | End: 2021-12-17 | Stop reason: HOSPADM

## 2021-12-17 RX ORDER — LEVOTHYROXINE SODIUM 0.05 MG/1
50 TABLET ORAL DAILY
Status: DISCONTINUED | OUTPATIENT
Start: 2021-12-17 | End: 2021-12-17 | Stop reason: HOSPADM

## 2021-12-17 RX ORDER — DULOXETIN HYDROCHLORIDE 30 MG/1
30 CAPSULE, DELAYED RELEASE ORAL NIGHTLY
Status: DISCONTINUED | OUTPATIENT
Start: 2021-12-17 | End: 2021-12-17 | Stop reason: HOSPADM

## 2021-12-17 RX ORDER — ATORVASTATIN CALCIUM 40 MG/1
40 TABLET, FILM COATED ORAL NIGHTLY
Status: DISCONTINUED | OUTPATIENT
Start: 2021-12-17 | End: 2021-12-17 | Stop reason: HOSPADM

## 2021-12-17 RX ORDER — LOSARTAN POTASSIUM 25 MG/1
25 TABLET ORAL DAILY
Status: DISCONTINUED | OUTPATIENT
Start: 2021-12-17 | End: 2021-12-17 | Stop reason: HOSPADM

## 2021-12-17 RX ORDER — GABAPENTIN 100 MG/1
100 CAPSULE ORAL 2 TIMES DAILY
Status: DISCONTINUED | OUTPATIENT
Start: 2021-12-17 | End: 2021-12-17 | Stop reason: HOSPADM

## 2021-12-17 RX ORDER — TAMSULOSIN HYDROCHLORIDE 0.4 MG/1
0.4 CAPSULE ORAL DAILY
Status: DISCONTINUED | OUTPATIENT
Start: 2021-12-17 | End: 2021-12-17 | Stop reason: HOSPADM

## 2021-12-17 RX ORDER — DOCUSATE SODIUM 100 MG/1
100 CAPSULE, LIQUID FILLED ORAL DAILY
Status: DISCONTINUED | OUTPATIENT
Start: 2021-12-17 | End: 2021-12-17 | Stop reason: HOSPADM

## 2021-12-17 RX ORDER — NICOTINE POLACRILEX 4 MG
15 LOZENGE BUCCAL PRN
Status: DISCONTINUED | OUTPATIENT
Start: 2021-12-17 | End: 2021-12-17 | Stop reason: HOSPADM

## 2021-12-17 RX ORDER — INSULIN DEGLUDEC INJECTION 100 U/ML
6 INJECTION, SOLUTION SUBCUTANEOUS NIGHTLY
COMMUNITY

## 2021-12-17 RX ADMIN — OXYCODONE HYDROCHLORIDE AND ACETAMINOPHEN 1 TABLET: 5; 325 TABLET ORAL at 10:37

## 2021-12-17 RX ADMIN — BUSPIRONE HYDROCHLORIDE 7.5 MG: 5 TABLET ORAL at 08:54

## 2021-12-17 RX ADMIN — GABAPENTIN 100 MG: 100 CAPSULE ORAL at 08:54

## 2021-12-17 RX ADMIN — OXYCODONE HYDROCHLORIDE AND ACETAMINOPHEN 1 TABLET: 5; 325 TABLET ORAL at 02:18

## 2021-12-17 RX ADMIN — BUSPIRONE HYDROCHLORIDE 7.5 MG: 5 TABLET ORAL at 02:21

## 2021-12-17 RX ADMIN — PANTOPRAZOLE SODIUM 40 MG: 40 TABLET, DELAYED RELEASE ORAL at 08:54

## 2021-12-17 RX ADMIN — TAMSULOSIN HYDROCHLORIDE 0.4 MG: 0.4 CAPSULE ORAL at 08:54

## 2021-12-17 RX ADMIN — HYDROMORPHONE HYDROCHLORIDE 0.5 MG: 1 INJECTION, SOLUTION INTRAMUSCULAR; INTRAVENOUS; SUBCUTANEOUS at 07:29

## 2021-12-17 RX ADMIN — LEVOTHYROXINE SODIUM 50 MCG: 50 TABLET ORAL at 06:12

## 2021-12-17 RX ADMIN — OXYCODONE HYDROCHLORIDE AND ACETAMINOPHEN 1 TABLET: 5; 325 TABLET ORAL at 13:40

## 2021-12-17 RX ADMIN — OXYCODONE HYDROCHLORIDE AND ACETAMINOPHEN 1 TABLET: 5; 325 TABLET ORAL at 06:14

## 2021-12-17 RX ADMIN — INSULIN LISPRO 2 UNITS: 100 INJECTION, SOLUTION INTRAVENOUS; SUBCUTANEOUS at 09:03

## 2021-12-17 RX ADMIN — DOCUSATE SODIUM 100 MG: 100 CAPSULE ORAL at 08:54

## 2021-12-17 RX ADMIN — Medication 1000 UNITS: at 08:54

## 2021-12-17 RX ADMIN — GABAPENTIN 100 MG: 100 CAPSULE ORAL at 02:21

## 2021-12-17 ASSESSMENT — PAIN SCALES - GENERAL
PAINLEVEL_OUTOF10: 8
PAINLEVEL_OUTOF10: 0
PAINLEVEL_OUTOF10: 8
PAINLEVEL_OUTOF10: 8

## 2021-12-17 ASSESSMENT — PAIN DESCRIPTION - PAIN TYPE
TYPE: ACUTE PAIN

## 2021-12-17 ASSESSMENT — PAIN DESCRIPTION - LOCATION
LOCATION: OTHER (COMMENT)
LOCATION: SHOULDER

## 2021-12-17 ASSESSMENT — PAIN DESCRIPTION - DESCRIPTORS
DESCRIPTORS: DISCOMFORT
DESCRIPTORS: DISCOMFORT

## 2021-12-17 ASSESSMENT — PAIN DESCRIPTION - FREQUENCY
FREQUENCY: CONTINUOUS
FREQUENCY: CONTINUOUS

## 2021-12-17 ASSESSMENT — PAIN - FUNCTIONAL ASSESSMENT: PAIN_FUNCTIONAL_ASSESSMENT: PREVENTS OR INTERFERES WITH ALL ACTIVE AND SOME PASSIVE ACTIVITIES

## 2021-12-17 ASSESSMENT — PAIN DESCRIPTION - ORIENTATION
ORIENTATION: RIGHT

## 2021-12-17 NOTE — PROGRESS NOTES
Physical Therapy    Facility/Department: [facility]  Initial Assessment    NAME: Darryle Silk  : 1955  MRN: [id number]    Date of Service: 2021    Discharge Recommendations:  Continue to assess pending progress, 24 hour supervision or assist, Patient would benefit from continued therapy after discharge, Outpatient PT, Home with Home health PT        Assessment   Body structures, Functions, Activity limitations: Decreased functional mobility ; Decreased ROM; Decreased strength; Decreased sensation; Decreased safe awareness; Decreased balance; Increased pain; Decreased posture  Assessment: Pt. will benefit from cont. PT to decrease impairments. Pt. thinks he will be fine at home, but PT recommends he get more therapy prior to d/c home. Pt. unsteady and is a fall risk due to hip pain, prior falls and decreased balance, and he would benefit from use of cane, gait belt and A. Pt. feels like he can manage at home, so recommend HHPT or outpatient PT. Treatment Diagnosis: impaired gait and mobility  Prognosis: Good  Decision Making: Low Complexity  PT Education: Goals; PT Role; Plan of Care; General Safety; Transfer Training; Weight-bearing Education; Functional Mobility Training; Gait Training; Precautions  Patient Education: use of call light for staff A  Barriers to Learning: decreased safety awareness, needs  REQUIRES PT FOLLOW UP: Yes  Activity Tolerance  Activity Tolerance: Patient Tolerated treatment well       Patient Diagnosis(es): The primary encounter diagnosis was Subdural hematoma ([facility] [address] Diagnoses of Closed nondisplaced fracture of right clavicle, unspecified part of clavicle, initial encounter and Fall, initial encounter were also pertinent to this visit.      has a past medical history of Abnormal nuclear stress test, Arthritis, BiPAP (biphasic positive airway pressure) dependence, Cerebrovascular disease, Chronic kidney disease, stage II (mild), Cirrhosis ([address] Coronary artery disease, Depression, Diabetes mellitus ([facility] [address] Encounter for wound care, Great toe pain, Headache, Hyperlipemia, Hyperlipidemia, Hypertension, Liver disease, LONG TERM ANTICOAGULENT USE, Low blood pressure, lymphostatic lymphoma, Nausea, Non Hodgkin's lymphoma ([facility] [address] Obesity, Obstructive sleep apnea, Peptic ulcer disease, Restless leg, S/P CABG x 3, Sleep apnea, Tachyarrhythmia, Thrombocythemia, essential (HCC), Type 2 diabetes mellitus without complication ([facility] [address] and Type II or unspecified type diabetes mellitus without mention of complication, not stated as uncontrolled. has a past surgical history that includes Knee arthroscopy; Tonsillectomy; Shoulder arthroscopy; Shoulder arthroscopy (08-23-11); Cardiac catheterization (2/28/11); Cardiac catheterization (9/16/05, 3/7/07); Cardiac catheterization (12/4/12   MDL); Cardiac catheterization (10/28/14  MDL); Coronary artery bypass graft (9/21/05); Coronary artery bypass graft (10/30/2014); Cervical disc surgery; Cervical spine surgery; Kidney surgery (08/14/2017); and Cardiac catheterization. Restrictions  Restrictions/Precautions  Restrictions/Precautions: Weight Bearing, Fall Risk  Required Braces or Orthoses?: Yes  Upper Extremity Weight Bearing Restrictions  Right Upper Extremity Weight Bearing: Non Weight Bearing  Required Braces or Orthoses  Right Upper Extremity Brace/Splint: Sling  Vision/Hearing  Vision: Impaired  Vision Exceptions: Wears glasses at all times  Hearing: Within functional limits     Subjective  General  Chart Reviewed: Yes  Patient assessed for rehabilitation services?: Yes  Response To Previous Treatment: Not applicable  Family / Caregiver Present: Yes  Referring Practitioner: Tamara Sanabria MD  Referral Date : 12/17/21  Diagnosis: SDH, closed non-displaced fx R clavicle, fall  Follows Commands: Impaired  Other (Comment): needs simple v. cues, decreased safety awareness  General Comment  Comments: Astrid LUKE.   Subjective  Subjective: Pt. states he is going home today and he will be fine. States he has a bad hip. Pain Screening  Patient Currently in Pain: Yes  Pain Assessment  Pain Assessment: 0-10  Pain Level: 8  Pain Type: Acute pain  Pain Location:  (clavicle)  Pain Orientation: Right  Pain Descriptors: Discomfort  Pain Frequency: Continuous  Response to Pain Intervention: Patient Satisfied  Vital Signs  Patient Currently in Pain: Yes  Pre Treatment Pain Screening  Intervention List: Patient able to continue with treatment; Nurse/physician notified    Orientation     Social/Functional History  Social/Functional History  Lives With: Spouse  Type of Home: House  Home Layout: Multi-level (ramp inside)  Home Access: Ramped entrance  Bathroom Shower/Tub: Walk-in shower  Bathroom Toilet: Handicap height  Bathroom Equipment: Grab bars in shower, Shower chair  Home Equipment: Rolling walker, Cane, [employer]  ADL Assistance: Independent  Ambulation Assistance: Independent  Transfer Assistance: Independent  Active : Yes  Occupation: Retired  Type of occupation: [employer], [employer],  of a [employer]  Cognition   Cognition  Overall Cognitive Status: Exceptions  Attention Span: Appears intact  Memory: Appears intact  Safety Judgement: Decreased awareness of need for assistance; Decreased awareness of need for safety  Insights: Decreased awareness of deficits  Cognition Comment: Likely WFL. Unable to determine if pt has true cog. deficits that relate to decreased safety awareness or chooses to execute personal autonomy.     Objective     Observation/Palpation  Posture: Fair  Observation: sling RUE (assisted pt with putting it back on as well as gown)    AROM RLE (degrees)  RLE AROM: WFL  AROM LLE (degrees)  LLE AROM : WFL  Strength RLE  Strength RLE: WFL  Comment: 4+/5  Strength LLE  Strength LLE: WFL  Comment: 4+/5     Sensation  Overall Sensation Status: Impaired  Bed mobility  Supine to Sit: Minimal assistance  Sit to Supine: Unable to assess  Transfers  Sit to Stand: Minimal Assistance  Stand to sit: Minimal Assistance  Ambulation  Ambulation?: Yes  WB Status: FWBAT BLEs  Ambulation 1  Surface: level tile  Device: No Device  Assistance: Minimal assistance  Quality of Gait: unsteady, decreased WB BLEs due to hip pain  Gait Deviations: Slow Emily; Decreased step length; Decreased step height; Shuffles  Distance: 20'x2  Comments: pt took 1 seated rest break, then decided to sit up in chair, so wife moved out of it and onto couch and pt sat in recliner     Balance  Posture: Fair  Sitting - Static: Fair; +  Sitting - Dynamic: Fair; -  Standing - Static: Poor; +  Standing - Dynamic: +; Poor        Plan   Plan  Times per week: 3-7  Times per day: Daily  Plan weeks: 2  Current Treatment Recommendations: Strengthening, ROM, Balance Training, Functional Mobility Training, Transfer Training, Gait Training, Safety Education & Training, Positioning, Equipment Evaluation, Education, & procurement, Patient/Caregiver Education & Training  Plan Comment: cont. PT per POC.   Safety Devices  Type of devices: Gait belt, Patient at risk for falls, Nurse notified, Call light within reach, Left in chair, Chair alarm in place (pillow under R UE for elevation)    G-Code       OutComes Score                                                  AM-PAC Score             Goals  Short term goals  Time Frame for Short term goals: 2 wks  Short term goal 1: supine to sit indep  Short term goal 2: sit to stand indep  Short term goal 3: amb. 100' with cane SBA  Patient Goals   Patient goals : go home       Therapy Time   Individual Concurrent Group Co-treatment   Time In           Time Out           Minutes                   Hugh Denton PT     Electronically signed by Hugh Denton PT on 12/17/2021 at 1:49 PM

## 2021-12-17 NOTE — ED PROVIDER NOTES
[facility] EMERGENCY DEPT  eMERGENCY dEPARTMENT eNCOUnter      Pt Name: Burgess Weller  MRN: [id number]  [place] 1955  Date of evaluation: 12/16/2021  Provider: Macey Rodriguez, [address] [place] [address]       Chief Complaint   Patient presents with    Fall     down 3 steps    Shoulder Injury    Head Injury     pt on plavix and hx of low platelets         HISTORY OF PRESENT ILLNESS   (Location/Symptom, Timing/Onset,Context/Setting, Quality, Duration, Modifying Factors, Severity)  Note limiting factors. Burgess Weller is a 74 y.o. male with history of non-Hodgkin's lymphoma, diabetes, CAD with bypass, chronic kidney disease, and on long-term anticoagulation with Plavix who presents to the emergency department for a fall that occurred earlier around 6 PM.  The patient was walking downstairs whenever he fell down approximately 3 stairs. The patient states that he did briefly lose consciousness. He denies any significant neck pain but does note a headache. The patient is denying any dizziness but the patient's wife does note that he has made multiple comments of having a difficulty with getting his balance when he stands up. The patient is mostly complaining of right shoulder pain. He states that he has no decrease in range of motion but does note painful range of motion. He denies any numbness or tingling. HPI    NursingNotes were reviewed. REVIEW OF SYSTEMS    (2-9 systems for level 4, 10 or more for level 5)     Review of Systems   Constitutional: Negative for chills and fever. Eyes: Negative for photophobia and visual disturbance. Respiratory: Negative for shortness of breath. Cardiovascular: Positive for chest pain (Right lateral chest pain). Musculoskeletal: Positive for arthralgias (Right shoulder). Negative for neck pain and neck stiffness. Neurological: Positive for dizziness, syncope and headaches. Negative for numbness. All other systems reviewed and are negative.            PAST MEDICALHISTORY     Past Medical History:   Diagnosis Date    Abnormal nuclear stress test 10/22/2014    Arthritis     BiPAP (biphasic positive airway pressure) dependence     8cm to 20cm    Cerebrovascular disease     Chronic kidney disease, stage II (mild) 08/17/2016    Kevon Mcdonald M.D.    Coronary artery disease 2/24/2011    Depression     Diabetes mellitus ([facility]     Encounter for wound care     PT SEES \"GRACY\" WITH DR. SAMUEL    Great toe pain     RT    Headache     Hyperlipemia 2/24/2011    Dr. Tonny Archer follows lipids.     Hyperlipidemia     Hypertension     LONG TERM ANTICOAGULENT USE     Low blood pressure 11/19/2014    lymphostatic lymphoma     Nausea 11/19/2014    Non Hodgkin's lymphoma (HCC)     Obesity     Obstructive sleep apnea     AHI:  21.7 per PSG, 7/2017    Peptic ulcer disease 2/24/2011    Restless leg     S/P CABG x 3 10/22/2014    SVG to RCA; SVG to LAD diagonal; LIMA to LAD    Sleep apnea     Tachyarrhythmia     Thrombocythemia, essential ([facility]     Type 2 diabetes mellitus without complication ([facility]     Type II or unspecified type diabetes mellitus without mention of complication, not stated as uncontrolled          SURGICAL HISTORY       Past Surgical History:   Procedure Laterality Date    CARDIAC CATHETERIZATION  2/28/11    EF 60%    CARDIAC CATHETERIZATION  9/16/05, 3/7/07    EF < 50%    CARDIAC CATHETERIZATION  12/4/12   MDL    EF  50%    CARDIAC CATHETERIZATION  10/28/14  MDL    CARDIAC CATHETERIZATION     Kindra Pimentel M.D.   Miachel Chun GRAFT  9/21/05    LIMA to LAD and diagonal; SVG to posterior descending branch RCA    CORONARY ARTERY BYPASS GRAFT  10/30/2014    Redo Sternotomy - SVG-PDA, TMR (RBL)    KIDNEY SURGERY  08/14/2017    KNEE ARTHROSCOPY      right ACL repair    SHOULDER ARTHROSCOPY      left-rotator cuff repair right- rotator cuff repair    SHOULDER ARTHROSCOPY  08-23-11    right    TONSILLECTOMY           CURRENT MEDICATIONS     Previous Medications    ACETAMINOPHEN (TYLENOL) 500 MG TABLET    Take 1,000 mg by mouth every 6 hours as needed for Pain    ATORVASTATIN (LIPITOR) 40 MG TABLET    TAKE 1 TABLET BY MOUTH NIGHTLY AT BEDTIME    B COMPLEX VITAMINS CAPSULE    Take 1 capsule by mouth daily. BUSPIRONE (BUSPAR) 15 MG TABLET    Take 7.5 mg by mouth 2 times daily    CLOPIDOGREL (PLAVIX) 75 MG TABLET    One daily    DILTIAZEM (CARDIZEM CD) 180 MG EXTENDED RELEASE CAPSULE    Take 1 capsule by mouth daily    DOCUSATE SODIUM (COLACE) 100 MG CAPSULE    Take 100 mg by mouth daily. DULOXETINE (CYMBALTA) 30 MG EXTENDED RELEASE CAPSULE    Take 30 mg by mouth nightly Indications: patient stated received medications yesterday 5/19/21     FERROUS SULFATE 325 (65 FE) MG TABLET    Take 325 mg by mouth 2 times daily    FUROSEMIDE (LASIX) 20 MG TABLET    Take 1 tablet by mouth daily    GABAPENTIN (NEURONTIN) 100 MG CAPSULE    Take 1 capsule by mouth 2 times daily.     ICOSAPENT ETHYL (VASCEPA) 1 G CAPS CAPSULE    Take 1 capsule by mouth 2 times daily    JARDIANCE 10 MG TABLET    TAKE 1 TABLET BY MOUTH DAILY; TAKE ONE DAILY FOR 30 DAYS THEN START 25 MG DAILY    LEVOTHYROXINE (SYNTHROID) 50 MCG TABLET    Take 50 mcg by mouth Daily    LOSARTAN (COZAAR) 25 MG TABLET    Take 25 mg by mouth daily    MELOXICAM (MOBIC) 15 MG TABLET    Take 15 mg by mouth daily     METOPROLOL TARTRATE (LOPRESSOR) 25 MG TABLET    Take 1 tablet by mouth 2 times daily    NITROGLYCERIN (NITROSTAT) 0.4 MG SL TABLET    Place 1 tablet under the tongue every 5 minutes as needed for Chest pain    ONDANSETRON (ZOFRAN) 8 MG TABLET    Take 8 mg by mouth as needed     PANTOPRAZOLE (PROTONIX) 40 MG TABLET    Take 1 tablet by mouth daily    PRAVASTATIN (PRAVACHOL) 40 MG TABLET    Take 40 mg by mouth nightly    RANOLAZINE (RANEXA) 1000 MG EXTENDED RELEASE TABLET    Take 1 tablet by mouth 2 times daily TAMSULOSIN (FLOMAX) 0.4 MG CAPSULE    Take 1 capsule by mouth daily    TIZANIDINE (ZANAFLEX) 4 MG TABLET    1/2 tablet at night    VITAMIN D (CHOLECALCIFEROL) 1000 UNITS CAPS CAPSULE    Take 1,000 Units by mouth daily. ALLERGIES     Ancef [cefazolin sodium], Ceftin [cefuroxime], Cefuroxime axetil, Cephalosporins, and Neosporin [bacitracin-polymyxin b]    FAMILY HISTORY       Family History   Problem Relation Age of Onset    Heart Disease Other     Lung Cancer Mother     Cancer Sister           SOCIAL HISTORY       Social History     Socioeconomic History    Marital status:      Spouse name: Lakesha Galarza Number of children: 2    Years of education: 15    Highest education level: Not on file   Occupational History    Occupation:      Employer: [employer]     Comment: Retired,  for the [employer]. Tobacco Use    Smoking status: Former Smoker     Packs/day: 0.00     Types: Cigarettes     Quit date: 1998     Years since quittin.8    Smokeless tobacco: Never Used    Tobacco comment: quit 23 years ago   Vaping Use    Vaping Use: Never used   Substance and Sexual Activity    Alcohol use: No    Drug use: No    Sexual activity: Not on file   Other Topics Concern    Not on file   Social History Narrative    Not on file     Social Determinants of Health     Financial Resource Strain:     Difficulty of Paying Living Expenses: Not on file   Food Insecurity:     Worried About Running Out of Food in the Last Year: Not on file    [place] of Food in the Last Year: Not on file   Transportation Needs:     Lack of Transportation (Medical): Not on file    Lack of Transportation (Non-Medical):  Not on file   Physical Activity:     Days of Exercise per Week: Not on file    Minutes of Exercise per Session: Not on file   Stress:     Feeling of Stress : Not on file   Social Connections:     Frequency of Communication with Friends and Family: Not on file    Frequency of Social Gatherings with Friends and Family: Not on file    Attends Evangelical Services: Not on file    Active Member of Clubs or Organizations: Not on file    Attends Club or Organization Meetings: Not on file    Marital Status: Not on file   Intimate Partner Violence:     Fear of Current or Ex-Partner: Not on file    Emotionally Abused: Not on file    Physically Abused: Not on file    Sexually Abused: Not on file   Housing Stability:     Unable to Pay for Housing in the Last Year: Not on file    Number of [place] in the Last Year: Not on file    Unstable Housing in the Last Year: Not on file       SCREENINGS    [place] Coma Scale  Eye Opening: Spontaneous  Best Verbal Response: Oriented  Best Motor Response: Obeys commands (pt lost balance around 1800 and fell down three steps. positive loc )  [place] Coma Scale Score: 15        PHYSICAL EXAM    (up to 7 for level 4, 8 or more for level 5)     ED Triage Vitals [12/16/21 1930]   BP Temp Temp src Pulse Resp SpO2 Height Weight   115/66 98.2 °F (36.8 °C) -- 74 17 96 % 5' 11\" (1.803 m) 219 lb (99.3 kg)       Physical Exam  Vitals and nursing note reviewed. Constitutional:       Appearance: Normal appearance. HENT:      Head: Normocephalic. Comments: Tenderness on the right side, no bruising, laceration, or abrasions  Eyes:      General:         Right eye: No discharge. Left eye: No discharge. Extraocular Movements: Extraocular movements intact. Conjunctiva/sclera: Conjunctivae normal.      Pupils: Pupils are equal, round, and reactive to light. Cardiovascular:      Rate and Rhythm: Normal rate and regular rhythm. Pulses: Normal pulses. Pulmonary:      Effort: Pulmonary effort is normal. No respiratory distress. Breath sounds: Normal breath sounds. Chest:      Chest wall: No tenderness. Abdominal:      General: There is no distension. Palpations: Abdomen is soft. Tenderness: There is no abdominal tenderness. Musculoskeletal:      Right shoulder: Swelling (Over clavicle), tenderness and bony tenderness present. No deformity. Normal range of motion (Painful ROm). Normal pulse. Left shoulder: Normal.      Right upper arm: Tenderness and bony tenderness present. No swelling or deformity. Left upper arm: Normal.      Right elbow: No swelling or deformity. Decreased range of motion. Tenderness present. Left elbow: Normal.      Right forearm: Normal.      Left forearm: Normal.      Right wrist: Normal.      Left wrist: Normal.      Right hand: Normal.      Left hand: Normal.      Cervical back: Normal range of motion and neck supple. No swelling, deformity, rigidity, tenderness or bony tenderness. No pain with movement. Normal range of motion. Thoracic back: Normal. No swelling, deformity, tenderness or bony tenderness. Normal range of motion. Lumbar back: Normal. No swelling, deformity, tenderness or bony tenderness. Normal range of motion. Comments: Bilateral lower extremities are negative for deformity, tenderness, decrease in ROM, swelling, or NV deficit. Lymphadenopathy:      Cervical: No cervical adenopathy. Skin:     General: Skin is warm and dry. Neurological:      General: No focal deficit present. Mental Status: He is alert and oriented to person, place, and time. DIAGNOSTIC RESULTS     RADIOLOGY:  Non-plain film images such as CT, Ultrasound and MRI are read by the radiologist. Plain radiographic images are visualized and preliminarily interpreted bythe emergency physician with the below findings:      XR SHOULDER RIGHT (MIN 2 VIEWS)   Final Result      XR ELBOW RIGHT (2 VIEWS)   Final Result   Impression:   No acute osseous pathology. Signed by Dr Luzma Garces (MIN 2 VIEWS)   Final Result   Impression:   No acute osseous pathology.    Signed by Dr Kati Borges (MIN 3 VIEWS)   Final Result   Impression:   No acute displaced right rib fracture. No measurable pneumothorax. No   pleural effusion. Signed by Dr Allegra Hill      CT Cervical Spine WO Contrast   Final Result   CT head. Findings concerning for trace subdural hemorrhage along the   tentorial leaves. CT cervical spine. No acute osseous posttraumatic findings. Postoperative changes as described above. Signed by Dr Owen Corona   Final Result   CT head. Findings concerning for trace subdural hemorrhage along the   tentorial leaves. CT cervical spine. No acute osseous posttraumatic findings. Postoperative changes as described above. Signed by Dr Haris Miller:  Labs Reviewed   CBC WITH AUTO DIFFERENTIAL - Abnormal; Notable for the following components:       Result Value    RBC 3.02 (*)     Hemoglobin 10.0 (*)     Hematocrit 32.4 (*)     .3 (*)     MCH 33.1 (*)     MCHC 30.9 (*)     RDW 15.3 (*)     Platelets 060 (*)     Neutrophils % 78.1 (*)     Lymphocytes % 11.2 (*)     Lymphocytes Absolute 0.8 (*)     All other components within normal limits   COMPREHENSIVE METABOLIC PANEL W/ REFLEX TO MG FOR LOW K - Abnormal; Notable for the following components:    Sodium 134 (*)     Glucose 184 (*)     Total Protein 6.5 (*)     All other components within normal limits   PROTIME-INR   APTT       All other labs were within normal range or not returned as of this dictation. EMERGENCY DEPARTMENT COURSE and DIFFERENTIAL DIAGNOSIS/MDM:   Vitals:    Vitals:    12/16/21 1930   BP: 115/66   Pulse: 74   Resp: 17   Temp: 98.2 °F (36.8 °C)   SpO2: 96%   Weight: 219 lb (99.3 kg)   Height: 5' 11\" (1.803 m)       MDM  Patient is a 61-year-old male presents with complaint of a fall that occurred just prior to arrival.  On physical exam, he is holding his right arm and does appear to be uncomfortable. He does have a small amount of swelling over the right shoulder.   He does have normal range of motion otherwise even though it is painful. Plain films of the right shoulder, humerus, and elbow were obtained. Imaging was concerning for a right clavicle fracture. The patient was placed in a sling. CT of the head and C-spine were also obtained. CT of the C-spine is negative for any acute bony fracture or malalignment of the C-spine. However, CT of the head was concerning for a trace subdural hemorrhage along the tentorial leaves. Spoke with Dr. Kale Gresham, neurosurgeon who recommends coag and lab work, holding anticoagulation medication, and admission to hospitalist, and a repeat CT at 5 AM.  The patient will be seen by Dr. Kale Gresham in the morning and he will determine a plan at that time. I spoke with Dr. Ron Walls, hospitalist who is agreeable to this plan. The patient is also agreeable to plan. All his questions were answered. CONSULTS:  Dr Kale Gresham, Neurosurgery  Dr Rohit Sal, [address]      1. Subdural hematoma (HCC)    2. Closed nondisplaced fracture of right clavicle, unspecified part of clavicle, initial encounter    3.  Fall, initial encounter          DISPOSITION/PLAN   DISPOSITION Decision To Admit 12/16/2021 10:35:56 PM    (Please note that portions of this note were completed with a voice recognition program.  Efforts were made to edit thedictations but occasionally words are mis-transcribed.)    Cathy Thomas (electronically signed)       Cathy Thomas  12/16/21 [id number]

## 2021-12-17 NOTE — PROGRESS NOTES
diabetes mellitus without mention of complication, not stated as uncontrolled. has a past surgical history that includes Knee arthroscopy; Tonsillectomy; Shoulder arthroscopy; Shoulder arthroscopy (08-23-11); Cardiac catheterization (2/28/11); Cardiac catheterization (9/16/05, 3/7/07); Cardiac catheterization (12/4/12   MDL); Cardiac catheterization (10/28/14  MDL); Coronary artery bypass graft (9/21/05); Coronary artery bypass graft (10/30/2014); Cervical disc surgery; Cervical spine surgery; Kidney surgery (08/14/2017); and Cardiac catheterization.            Restrictions  Restrictions/Precautions  Restrictions/Precautions: Weight Bearing, Fall Risk  Required Braces or Orthoses?: Yes  Upper Extremity Weight Bearing Restrictions  Right Upper Extremity Weight Bearing: Non Weight Bearing  Required Braces or Orthoses  Right Upper Extremity Brace/Splint: Sling    Subjective   General  Chart Reviewed: Yes  Patient assessed for rehabilitation services?: Yes  Additional Pertinent Hx: (L) rotator cuff repair; (R) shld arthroscopy; cardiac cath x5; CABG; (R) ACL repair; RLS; DM; lymphoma depression; (R) great toe repair  Family / Caregiver Present: Yes (wife)  Diagnosis: (R) clavicle fx (displaced, distal); subdural hemorrhage (upon admit) both d/t fall  Pain Assessment  Response to Pain Intervention: Patient Satisfied  Vital Signs  Temp: 98.4 °F (36.9 °C)  Temp Source: Temporal  Pulse: 96  Heart Rate Source: Monitor  Resp: 16  BP: 119/66  BP Location: Left upper arm  MAP (mmHg): 81  Patient Position: Sitting  Oxygen Therapy  SpO2: 95 %  O2 Device: None (Room air)    Social/Functional History  Social/Functional History  Lives With: Spouse  Type of Home: House  Home Layout: Multi-level (ramp inside)  Home Access: Ramped entrance  Bathroom Shower/Tub: Walk-in shower  Bathroom Toilet: Handicap height  Bathroom Equipment: Grab bars in shower, Shower chair  Home Equipment: Rolling walker, Cane, Wheelchair-electric  ADL Assistance: Independent  Ambulation Assistance: Independent  Transfer Assistance: Independent  Active : Yes  Occupation: Retired  Type of occupation: [employer], [employer],  of a [employer]       Objective   Vision: Impaired  Vision Exceptions: Wears glasses at all times  Hearing: Within functional limits          Balance  Sitting Balance: Stand by assistance  Standing Balance: Contact guard assistance  Functional Mobility  Functional - Mobility Device: No device (HHA)  Assist Level: Contact guard assistance (CGA - min A and +1 SBA for safety)  Functional Mobility Comments: Pt exhibits balance issues during functional ambulation d/t pain in Lhip. Toilet Transfers  Toilet - Technique: Stand step; Ambulating  Equipment Used: Raised toilet seat with rails  Toilet Transfer: Contact guard assistance; Minimal assistance  ADL  Feeding: Minimal assistance; Setup  Grooming: Setup; Contact guard assistance; Minimal assistance  UE Bathing: Minimal assistance; Moderate assistance (seated)  LE Bathing: Maximum assistance  UE Dressing: Moderate assistance  LE Dressing: Maximum assistance  Toileting: Moderate assistance; Maximum assistance  Additional Comments: Standing balance is unsteady; additionally pt is unable to utilize dominant UE due to fx. May take time to use LUE for tasks equally. Bed mobility  Supine to Sit: Minimal assistance  Sit to Supine: Unable to assess  Transfers  Stand Step Transfers: Contact guard assistance; Minimal assistance  Sit to stand: Contact guard assistance  Stand to sit: Contact guard assistance; Minimal assistance (min A for lower surface height)     Cognition  Safety Judgement: Decreased awareness of need for assistance; Decreased awareness of need for safety  Insights: Decreased awareness of deficits  Cognition Comment: Likely WFL. Unable to determine if pt has true cog. deficits that relate to decreased safety awareness or chooses to execute personal autonomy. Sensation  Overall Sensation Status: Impaired (for BLE)        LUE AROM (degrees)  LUE AROM : WFL  RUE AROM (degrees)  RUE General AROM: DID NOT TEST/ASSESS  LUE Strength  L Hand General: 4/5  LUE Strength Comment: Grossly 4/5. Is not fully functional; partially d/t large body habitus and new RUE restrictions. RUE Strength  Gross RUE Strength:  (DID NOT TEST/ASSESS)  R Hand General: 4-/5              Included Treatment  Tx consisted of: bed mobility; pt/family education; transfer training; DME/AE discussion/training; activity tolerance/balance challenges; functional ambulation; positioning; UBD (including sling); and toileting skills. (Treatment time: 15 min)       Plan   Plan  Times per week: 3-5  Current Treatment Recommendations: Strengthening, Pain Management, Positioning, ROM, Safety Education & Training, Balance Training, Patient/Caregiver Education & Training, Self-Care / ADL, Cognitive/Perceptual Training, Functional Mobility Training, Equipment Evaluation, Education, & procurement, Home Management Training, Endurance Training    Goals  Short term goals  Time Frame for Short term goals: 1 week  Short term goal 1: Complete UBD, including sling, using LUE with supervision. Short term goal 2: Complete transfers with SBA. Short term goal 3: Complete standing components of ADLs with CGA. Short term goal 4: Complete toileting skills with CGA using LUE. Short term goal 5: Pt/family will verbalize/demo: AE/DME options; sling use and NWB precautions on RUE; recommended therapeutic activities; homemaking/IADL strategies; energy conservation techniques; and fall prevention strategies. Long term goals  Long term goal 1: Return to PLOF.                JAQUELINE Carson/L  Electronically signed by Andrae PARSONS/L on 12/17/2021 at 1:00 PM.

## 2021-12-17 NOTE — H&P
Hospitalist: History and Physical    Date: 2021 Time: 3:11 AM    Name: Baljinder Molina : 1955 MRN: [id number]    Code Status: Full Code No additional code details    PCP: ADRY Ingram    Patient's Chief Complaint Is: Fall    HPI: Patient is a 74 y.o. male with non-Hodgkin's lymphoma, diabetes, CAD status post CABG, CKD, REGINA who presented to the emergency department following a fall while walking downstairs with transient LOC and headache, however no prodromal symptoms prior to event. Patient also complaining of right shoulder pain. Work-up including plain films of right shoulder revealed minimally displaced fracture of distal right clavicle. CT head showed trace subdural hemorrhage. Patient has been maintained on Plavix. Otherwise no other significant findings on trauma imaging. Neurosurgery consulted from ED for further recommendations. Past Medical History:   Diagnosis Date    Abnormal nuclear stress test 10/22/2014    Arthritis     BiPAP (biphasic positive airway pressure) dependence     8cm to 20cm    Cerebrovascular disease     Chronic kidney disease, stage II (mild) 2016    Leigha Alonso M.D.    Cirrhosis [facility])     Coronary artery disease 2011    Depression     Diabetes mellitus ([facility] [address]     Encounter for wound care     PT SEES \"GRACY\" WITH DR. SAMUEL    Great toe pain     RT    Headache     Hyperlipemia 2011    Dr. Jaida Don follows lipids.     Hyperlipidemia     Hypertension     Liver disease     LONG TERM ANTICOAGULENT USE     Low blood pressure 2014    lymphostatic lymphoma     Nausea 2014    Non Hodgkin's lymphoma (HCC)     Obesity     Obstructive sleep apnea     AHI:  21.7 per PSG, 2017    Peptic ulcer disease 2011    Restless leg     S/P CABG x 3 10/22/2014    SVG to RCA; SVG to LAD diagonal; LIMA to LAD    Sleep apnea     Tachyarrhythmia     Thrombocythemia, essential (HCC)     Type 2 diabetes mellitus Paying Living Expenses: Not on file   Food Insecurity:     Worried About Running Out of Food in the Last Year: Not on file    Ran Out of Food in the Last Year: Not on file   Transportation Needs:     Lack of Transportation (Medical): Not on file    Lack of Transportation (Non-Medical): Not on file   Physical Activity:     Days of Exercise per Week: Not on file    Minutes of Exercise per Session: Not on file   Stress:     Feeling of Stress : Not on file   Social Connections:     Frequency of Communication with Friends and Family: Not on file    Frequency of Social Gatherings with Friends and Family: Not on file    Attends [employer] Services: Not on file    Active Member of [address] [place] [address] [employer] or Organizations: Not on file    Attends Club or Organization Meetings: Not on file    Marital Status: Not on file   Intimate Partner Violence:     Fear of Current or Ex-Partner: Not on file    Emotionally Abused: Not on file    Physically Abused: Not on file    Sexually Abused: Not on file   Housing Stability:     Unable to Pay for Housing in the Last Year: Not on file    Number of [place] in the Last Year: Not on file    Unstable Housing in the Last Year: Not on file     Allergies   Allergen Reactions    Ancef [Cefazolin Sodium]     Ceftin [Cefuroxime]      \"anything with ceftin in it\"    Cefuroxime Axetil     Cephalosporins     Neosporin [Bacitracin-Polymyxin B]      blisters     Prior to Admission medications    Medication Sig Start Date End Date Taking?  Authorizing Provider   empagliflozin (JARDIANCE) 25 MG tablet Take 25 mg by mouth daily   Yes Historical Provider, MD   metFORMIN (GLUCOPHAGE) 500 MG tablet Take 500 mg by mouth 2 times daily (with meals)   Yes Historical Provider, MD   Insulin Degludec (TRESIBA) 100 UNIT/ML SOLN Inject 6 Units into the skin nightly   Yes Historical Provider, MD   losartan (COZAAR) 25 MG tablet Take 25 mg by mouth daily    Historical Provider, MD   atorvastatin (LIPITOR) 40 MG tablet TAKE 1 TABLET BY MOUTH NIGHTLY AT BEDTIME 8/9/21   Historical Provider, MD   tamsulosin (FLOMAX) 0.4 MG capsule Take 1 capsule by mouth daily  Patient taking differently: Take 0.2 mg by mouth daily  8/24/21   GRECIA Vieira CNP   gabapentin (NEURONTIN) 100 MG capsule Take 1 capsule by mouth 2 times daily.  7/28/21   Historical Provider, MD   busPIRone (BUSPAR) 15 MG tablet Take 7.5 mg by mouth 2 times daily    Historical Provider, MD   metoprolol tartrate (LOPRESSOR) 25 MG tablet Take 1 tablet by mouth 2 times daily 3/23/21   GRECIA Dutta   ranolazine (RANEXA) 1000 MG extended release tablet Take 1 tablet by mouth 2 times daily 3/19/21   GRECIA Xiong NP   clopidogrel (PLAVIX) 75 MG tablet One daily  Patient taking differently: Take 75 mg by mouth daily  2/19/21   GRECIA Dutta   acetaminophen (TYLENOL) 500 MG tablet Take 1,000 mg by mouth every 6 hours as needed for Pain    Historical Provider, MD   nitroGLYCERIN (NITROSTAT) 0.4 MG SL tablet Place 1 tablet under the tongue every 5 minutes as needed for Chest pain  Patient taking differently: Place 0.4 mg under the tongue every 5 minutes as needed for Chest pain Indications: has not had to take  9/5/19   GRECIA Xiong NP   DULoxetine (CYMBALTA) 30 MG extended release capsule Take 30 mg by mouth nightly Indications: patient stated received medications yesterday 5/19/21 2/18/19 8/11/22  Historical Provider, MD   levothyroxine (SYNTHROID) 50 MCG tablet Take 50 mcg by mouth Daily    Historical Provider, MD   diltiazem (CARDIZEM CD) 180 MG extended release capsule Take 1 capsule by mouth daily  Patient not taking: Reported on 11/18/2021 8/14/18   GRECIA Carvalho   ondansetron (ZOFRAN) 8 MG tablet Take 8 mg by mouth as needed  10/3/17   Historical Provider, MD   tiZANidine (ZANAFLEX) 4 MG tablet 1/2 tablet at night 10/24/17   Historical Provider, MD   ferrous sulfate 325 (65 Fe) MG tablet Take 325 mg by mouth 2 times daily    Historical Provider, MD   pantoprazole (PROTONIX) 40 MG tablet Take 1 tablet by mouth daily 9/7/17   GRECIA Oropeza   b complex vitamins capsule Take 1 capsule by mouth daily. Historical Provider, MD   Vitamin D (CHOLECALCIFEROL) 1000 UNITS CAPS capsule Take 1,000 Units by mouth daily. Historical Provider, MD   docusate sodium (COLACE) 100 MG capsule Take 100 mg by mouth daily. Historical Provider, MD ALVARADO have reviewed all pertinent history. Prior medical records and laboratory evaluation reviewed. Imaging independently reviewed. Review of Systems:   As per HPI, otherwise all other ROS performed and found to be negative at this time. Physical Exam:  Vitals:    12/17/21 0135   BP: (!) 151/90   Pulse: 92   Resp: 18   Temp: 97.5 °F (36.4 °C)   SpO2: 92%     CONSTITUTIONAL: Resting in no acute distress  PSYCH: Normal mood  EYES: LETTY, symmetrical. Conjunctiva are non-icteric. ENT: No deformity  Head: Normocephalic, tender on right side  NECK: Trachea is midline. LUNGS: Normal respiratory effort, no intercostal retractions or accessory muscle use. CARDIOVASCULAR: Regular rate and rhythm,Pulses are equal bilaterally. GI/ABDOMEN: Soft, non-tender, non-distended, no guarding or rebound. Bowel sounds are normal.   SKIN: Warm and dry.    NEUROLOGICAL: Alert, follows commands, no appreciable lateralizing deficit, normal speech  Musculoskeletal: Tenderness over right shoulder/clavicle  EXTREMITIES: No clubbing or cyanosis    Labs:   Recent Results (from the past 72 hour(s))   CBC Auto Differential    Collection Time: 12/16/21  7:44 PM   Result Value Ref Range    WBC 6.7 4.8 - 10.8 K/uL    RBC 3.02 (L) 4.70 - 6.10 M/uL    Hemoglobin 10.0 (L) 14.0 - 18.0 g/dL    Hematocrit 32.4 (L) 42.0 - 52.0 %    .3 (H) 80.0 - 94.0 fL    MCH 33.1 (H) 27.0 - 31.0 pg    MCHC 30.9 (L) 33.0 - 37.0 g/dL    RDW 15.3 (H) 11.5 - 14.5 %    Platelets 100 (L) 429 - 400 K/uL    MPV 10.2 9.4 - 12.4 fL    Neutrophils % 78.1 (H) 50.0 - 65.0 %    Lymphocytes % 11.2 (L) 20.0 - 40.0 %    Monocytes % 9.0 0.0 - 10.0 %    Eosinophils % 0.7 0.0 - 5.0 %    Basophils % 0.1 0.0 - 1.0 %    Neutrophils Absolute 5.2 1.5 - 7.5 K/uL    Immature Granulocytes # 0.1 K/uL    Lymphocytes Absolute 0.8 (L) 1.1 - 4.5 K/uL    Monocytes Absolute 0.60 0.00 - 0.90 K/uL    Eosinophils Absolute 0.10 0.00 - 0.60 K/uL    Basophils Absolute 0.00 0.00 - 0.20 K/uL   Comprehensive Metabolic Panel w/ Reflex to MG    Collection Time: 12/16/21  7:44 PM   Result Value Ref Range    Sodium 134 (L) 136 - 145 mmol/L    Potassium reflex Magnesium 4.4 3.5 - 5.0 mmol/L    Chloride 99 98 - 111 mmol/L    CO2 24 22 - 29 mmol/L    Anion Gap 11 7 - 19 mmol/L    Glucose 184 (H) 74 - 109 mg/dL    BUN 16 8 - 23 mg/dL    CREATININE 1.0 0.5 - 1.2 mg/dL    GFR Non-African American >60 >60    GFR African American >59 >59    Calcium 9.3 8.8 - 10.2 mg/dL    Total Protein 6.5 (L) 6.6 - 8.7 g/dL    Albumin 3.9 3.5 - 5.2 g/dL    Total Bilirubin 0.4 0.2 - 1.2 mg/dL    Alkaline Phosphatase 85 40 - 130 U/L    ALT 17 5 - 41 U/L    AST 21 5 - 40 U/L   Protime-INR    Collection Time: 12/16/21  7:44 PM   Result Value Ref Range    Protime 13.9 12.0 - 14.6 sec    INR 1.05 0.88 - 1.18   APTT    Collection Time: 12/16/21  7:44 PM   Result Value Ref Range    aPTT 28.4 26.0 - 36.2 sec   COVID-19, Rapid    Collection Time: 12/16/21 10:47 PM    Specimen: Nasopharyngeal Swab   Result Value Ref Range    SARS-CoV-2, NAAT Not Detected Not Detected   POCT Glucose    Collection Time: 12/17/21  1:40 AM   Result Value Ref Range    POC Glucose 145 (H) 70 - 99 mg/dl    Performed on [employer]        Imaging: XR RIBS RIGHT INCLUDE CHEST (MIN 3 VIEWS)    Result Date: 12/16/2021  Exam:   XR RIBS RIGHT INCLUDE CHEST (MIN 3 VIEWS)  Date:  12/16/2021 History:  Male, age  74 years; fall, tender to palpation. COMPARISON:  None. Findings : The heart and mediastinum are normal in size.  Lungs are without focal infiltrate, mass or effusions. No acute displaced right rib fracture. No measurable pneumothorax. Prior cervical fusion. Mediastinum wires appear intact. Impression: No acute displaced right rib fracture. No measurable pneumothorax. No pleural effusion. Signed by Dr Santy Jolly (MIN 2 VIEWS)    Result Date: 12/16/2021  Exam:   XR SHOULDER RIGHT (MIN 2 VIEWS)  Date:  12/16/2021 History:  Male, age  74 years; fall, shoulder pain COMPARISON:  None. Findings and impression : Age-indeterminate minimally displaced fracture through the distal right clavicle. Signed by Dr Karyna Raines (MIN 2 VIEWS)    Result Date: 12/16/2021  Exam:   XR HUMERUS RIGHT (MIN 2 VIEWS)  Date:  12/16/2021 History:  Male, age  74 years; fall, tender to palpation. COMPARISON:  None. Findings : There is no acute displaced fracture. No dislocation. No suspicious lytic or blastic lesion. No radiopaque foreign body. Impression: No acute osseous pathology. Signed by Dr Gerber Faust (2 VIEWS)    Result Date: 12/16/2021  Exam:   XR ELBOW RIGHT (2 VIEWS)  Date:  12/16/2021 History:  Male, age  74 years; elbow pain, fall COMPARISON:  None. Findings : There is no acute displaced fracture. No dislocation. No suspicious lytic or blastic lesion. No radiopaque foreign body. Triceps enthesopathy. Impression: No acute osseous pathology. Signed by Dr Moore Half Contrast    Result Date: 12/16/2021  EXAM: CT OF THE HEAD WITHOUT IV CONTRAST CT CERVICAL SPINE WITHOUT IV CONTRAST 12/16/2021 COMPARISON: None. INDICATION: Fall, with head trauma PROCEDURE: Non contrast enhanced head CT and cervical spine CT were performed. The head images were formatted in the axial plane at 5 mm thick intervals. The cervical spine images were formatted in the axial, coronal and sagittal planes. Radiation dose equals DLP 1434 mGy-cm.   Automated exposure control dose reduction technique was implemented. FINDINGS: HEAD: Ventricles and cerebrospinal fluid spaces are normal in size and configuration for the patient's age. There is no evidence of mass-effect or midline shift. The gray-white differentiation is preserved. Hyperdense appearance of bilateral total leaves (image 28, series 6), concerning for trace subdural hemorrhage. The visualized portions of the paranasal sinuses and mastoid air cells are unremarkable. CERVICAL SPINE: The odontoid process is well approximated with the anterior body of C1 and well aligned between the lateral masses of C1. Prior extensive anterior and posterior fusion including C4 corpectomy, C3-C5 C5-C6 C6-C7 anterior and interbody fusion. Prior left-sided C3-C5 C5-C7 posterior fusion is also been performed. There is no evidence of hardware loosening or failure. There is no acute compression deformity. No dislocation. Degenerative changes at multiple levels. There is no paraspinal hematoma. CT head. Findings concerning for trace subdural hemorrhage along the tentorial leaves. CT cervical spine. No acute osseous posttraumatic findings. Postoperative changes as described above. Signed by Dr Yanez Bound    CT Cervical Spine WO Contrast    Result Date: 12/16/2021  EXAM: CT OF THE HEAD WITHOUT IV CONTRAST CT CERVICAL SPINE WITHOUT IV CONTRAST 12/16/2021 COMPARISON: None. INDICATION: Fall, with head trauma PROCEDURE: Non contrast enhanced head CT and cervical spine CT were performed. The head images were formatted in the axial plane at 5 mm thick intervals. The cervical spine images were formatted in the axial, coronal and sagittal planes. Radiation dose equals DLP 1434 mGy-cm. Automated exposure control dose reduction technique was implemented. FINDINGS: HEAD: Ventricles and cerebrospinal fluid spaces are normal in size and configuration for the patient's age. There is no evidence of mass-effect or midline shift. The gray-white differentiation is preserved.   Hyperdense appearance of bilateral total leaves (image 28, series 6), concerning for trace subdural hemorrhage. The visualized portions of the paranasal sinuses and mastoid air cells are unremarkable. CERVICAL SPINE: The odontoid process is well approximated with the anterior body of C1 and well aligned between the lateral masses of C1. Prior extensive anterior and posterior fusion including C4 corpectomy, C3-C5 C5-C6 C6-C7 anterior and interbody fusion. Prior left-sided C3-C5 C5-C7 posterior fusion is also been performed. There is no evidence of hardware loosening or failure. There is no acute compression deformity. No dislocation. Degenerative changes at multiple levels. There is no paraspinal hematoma. CT head. Findings concerning for trace subdural hemorrhage along the tentorial leaves. CT cervical spine. No acute osseous posttraumatic findings. Postoperative changes as described above. Signed by Dr Livia Ji      Assessment/Plan:     Principal Problem:    Subdural hemorrhage ([address]  Active Problems:    Diabetes mellitus ([address]    Hypertension    Right clavicle fracture  Resolved Problems:    * No resolved hospital problems. *       Small subdural hemorrhage  -Neurosurgery on board  Discontinue Plavix  Will hold antihypertensives for now and allow some permissive hypertension  Neurochecks  Repeat CT head in a.m.     Minimally displaced fracture of distal right clavicle  -Orthopedic evaluation  Supportive care  PT/OT    Diabetes mellitus  Glucose monitoring with sliding scale insulin correction if needed    Reconcile and continue appropriate home medication regimen for other chronic issues    DVT prophylaxis- SCDs    Hank Dietrich MD  12/17/2021 3:11 AM

## 2021-12-17 NOTE — PROGRESS NOTES
Physical Therapy   Name: Ehsan Encarnacion  MRN:  [id number]  Date of service:  12/17/2021  Pt. awaiting clearance by neurosurgery. Will do PT eval once pt okayed to mobilize.   Electronically signed by Criselda Boyle PT on 12/17/2021 at 8:14 AM

## 2021-12-17 NOTE — PROGRESS NOTES
4 Eyes Skin Assessment    Luis A Andrews is being assessed upon: Admission    I agree that Jess Duvall, RN, along with Adriana Pimentel RN (either 2 RN's or 1 LPN and 1 RN) have performed a thorough Head to Toe Skin Assessment on the patient. ALL assessment sites listed below have been assessed. Areas assessed by both nurses:     [x]   Head, Face, and Ears   [x]   Shoulders, Back, and Chest  [x]   Arms, Elbows, and Hands   [x]   Coccyx, Sacrum, and Ischium  [x]   Legs, Feet, and Heels    Does the Patient have Skin Breakdown? Yes, wound(s) noted upon assessment. It is the responsibility of the Primary Nurse to assure that the following documentation, preventions, orders, and consults are complete on the above noted wound(s): Wound LDA initiated. LDA Flowsheet Documentation includes the Nelda-wound, Wound Assessment, Measurements, Dressing Treatment, Drainage, and Color. Skin tear to r. Elbow; bruising to r.  Flank      [place] Prevention initiated: No  Wound Care Orders initiated: No    [facility] nurse consulted for Pressure Injury (Stage 3,4, Unstageable, DTI, NWPT, and Complex wounds) and New or Established Ostomies: No        Primary Nurse eSignature: Memo Kelley RN on 12/17/2021 at 5:32 AM      Co-Signer eSignature: {Esignature:[id number]}

## 2021-12-17 NOTE — CONSULTS
Orthopaedic Surgery  Inpatient Consultation    Janie Duggan ([id number])  12/17/2021    Requesting Physician: DR Nunez Been  Consulting Physician: DR Blanca Chen  12/16/2021  7:37 PM    CHIEF COMPLAINT:  right ND LATERAL END CLAVICLE FX S/P FALL    History Obtained From:  Patient/SPOUSE/CHART    HISTORY OF PRESENT ILLNESS:                The patient is a 74 y.o. male who presents with above chief complaint. Patient is a 74 y.o. male with non-Hodgkin's lymphoma, diabetes, CAD status post CABG, CKD, REGINA who presented to the emergency department following a fall while walking downstairs with transient LOC and headache, however no prodromal symptoms prior to event. Patient also complaining of right shoulder pain. Work-up including plain films of right shoulder revealed minimally displaced fracture of distal right clavicle. CT head showed trace subdural hemorrhage. Patient has been maintained on Plavix. Otherwise no other significant findings on trauma imaging. Neurosurgery consulted from ED for further recommendations. Past Medical History:        Diagnosis Date    Abnormal nuclear stress test 10/22/2014    Arthritis     BiPAP (biphasic positive airway pressure) dependence     8cm to 20cm    Cerebrovascular disease     Chronic kidney disease, stage II (mild) 08/17/2016    Gatito William M.D.    Cirrhosis [facility])     Coronary artery disease 2/24/2011    Depression     Diabetes mellitus ([facility] [address]     Encounter for wound care     PT SEES \"GRACY\" WITH DR. SAMUEL    Great toe pain     RT    Headache     Hyperlipemia 2/24/2011    Dr. Liseth Neil follows lipids.     Hyperlipidemia     Hypertension     Liver disease     LONG TERM ANTICOAGULENT USE     Low blood pressure 11/19/2014    lymphostatic lymphoma     Nausea 11/19/2014    Non Hodgkin's lymphoma (HCC)     Obesity     Obstructive sleep apnea     AHI:  21.7 per PSG, 7/2017    Peptic ulcer disease 2/24/2011    Restless leg     S/P CABG x 3 10/22/2014 SVG to RCA; SVG to LAD diagonal; LIMA to LAD    Sleep apnea     Tachyarrhythmia     [facility]rombocythemia, essential [facility] [address]     Type 2 diabetes mellitus without complication ([facility] [address]     Type II or unspecified type diabetes mellitus without mention of complication, not stated as uncontrolled        Past Surgical History:        Procedure Laterality Date    CARDIAC CATHETERIZATION  2/28/11    EF 60%    CARDIAC CATHETERIZATION  9/16/05, 3/7/07    EF < 50%    CARDIAC CATHETERIZATION  12/4/12   MDL    EF  50%    CARDIAC CATHETERIZATION  10/28/14  GERBER Nguyen M.D.   [place] GRAFT  9/21/05    LIMA to LAD and diagonal; SVG to posterior descending branch RCA    CORONARY ARTERY BYPASS GRAFT  10/30/2014    Redo Sternotomy - SVG-PDA, TMR (RBL)    KIDNEY SURGERY  08/14/2017    KNEE ARTHROSCOPY      right ACL repair    SHOULDER ARTHROSCOPY      left-rotator cuff repair right- rotator cuff repair    SHOULDER ARTHROSCOPY  08-23-11    right    TONSILLECTOMY         Current Medications:   Current Facility-Administered Medications: acetaminophen (TYLENOL) tablet 500 mg, 500 mg, Oral, Q6H PRN  atorvastatin (LIPITOR) tablet 40 mg, 40 mg, Oral, Nightly  busPIRone (BUSPAR) tablet 7.5 mg, 7.5 mg, Oral, BID  [Held by provider] dilTIAZem (CARDIZEM CD) extended release capsule 180 mg, 180 mg, Oral, Daily  docusate sodium (COLACE) capsule 100 mg, 100 mg, Oral, Daily  DULoxetine (CYMBALTA) extended release capsule 30 mg, 30 mg, Oral, Nightly  gabapentin (NEURONTIN) capsule 100 mg, 100 mg, Oral, BID  levothyroxine (SYNTHROID) tablet 50 mcg, 50 mcg, Oral, Daily  [Held by provider] losartan (COZAAR) tablet 25 mg, 25 mg, Oral, Daily  [Held by provider] metoprolol tartrate (LOPRESSOR) tablet 25 mg, 25 mg, Oral, BID  ondansetron (ZOFRAN) tablet 8 mg, 8 mg, Oral, Q8H PRN  pantoprazole (PROTONIX) tablet 40 mg, 40 mg, Oral, Daily  tamsulosin (FLOMAX) capsule 0.4 mg, 0.4 mg, Oral, Daily  Vitamin D (CHOLECALCIFEROL) tablet 1,000 Units, 1,000 Units, Oral, Daily  insulin lispro (HUMALOG) injection vial 0-6 Units, 0-6 Units, SubCUTAneous, TID WC  insulin lispro (HUMALOG) injection vial 0-3 Units, 0-3 Units, SubCUTAneous, Nightly  glucose (GLUTOSE) 40 % oral gel 15 g, 15 g, Oral, PRN  dextrose 50 % IV solution, 12.5 g, IntraVENous, PRN  glucagon (rDNA) injection 1 mg, 1 mg, IntraMUSCular, PRN  dextrose 5 % solution, 100 mL/hr, IntraVENous, PRN  oxyCODONE-acetaminophen (PERCOCET) 5-325 MG per tablet 1 tablet, 1 tablet, Oral, Q4H PRN  HYDROmorphone HCl PF (DILAUDID) injection 0.5 mg, 0.5 mg, IntraVENous, Q3H PRN  Prior to Admission medications    Medication Sig Start Date End Date Taking? Authorizing Provider   empagliflozin (JARDIANCE) 25 MG tablet Take 25 mg by mouth daily   Yes Historical Provider, MD   metFORMIN (GLUCOPHAGE) 500 MG tablet Take 500 mg by mouth 2 times daily (with meals)   Yes Historical Provider, MD   Insulin Degludec (TRESIBA) 100 UNIT/ML SOLN Inject 6 Units into the skin nightly   Yes Historical Provider, MD   losartan (COZAAR) 25 MG tablet Take 25 mg by mouth daily    Historical Provider, MD   atorvastatin (LIPITOR) 40 MG tablet TAKE 1 TABLET BY MOUTH NIGHTLY AT BEDTIME 8/9/21   Historical Provider, MD   tamsulosin (FLOMAX) 0.4 MG capsule Take 1 capsule by mouth daily  Patient taking differently: Take 0.2 mg by mouth daily  8/24/21   GRECIA Vieira - CNP   gabapentin (NEURONTIN) 100 MG capsule Take 1 capsule by mouth 2 times daily.  7/28/21   Historical Provider, MD   busPIRone (BUSPAR) 15 MG tablet Take 7.5 mg by mouth 2 times daily    Historical Provider, MD   metoprolol tartrate (LOPRESSOR) 25 MG tablet Take 1 tablet by mouth 2 times daily 3/23/21   GRECIA Coronado   ranolazine (RANEXA) 1000 MG extended release tablet Take 1 tablet by mouth 2 times daily 3/19/21   Dinesh Leap, APRN - NP   clopidogrel (PLAVIX) 75 MG tablet One daily  Patient taking differently: Take 75 mg by mouth daily  2/19/21   GRECIA Conte   acetaminophen (TYLENOL) 500 MG tablet Take 1,000 mg by mouth every 6 hours as needed for Pain    Historical Provider, MD   nitroGLYCERIN (NITROSTAT) 0.4 MG SL tablet Place 1 tablet under the tongue every 5 minutes as needed for Chest pain  Patient taking differently: Place 0.4 mg under the tongue every 5 minutes as needed for Chest pain Indications: has not had to take  9/5/19   GRECIA Jose NP   DULoxetine (CYMBALTA) 30 MG extended release capsule Take 30 mg by mouth nightly Indications: patient stated received medications yesterday 5/19/21 2/18/19 8/11/22  Historical Provider, MD   levothyroxine (SYNTHROID) 50 MCG tablet Take 50 mcg by mouth Daily    Historical Provider, MD   diltiazem (CARDIZEM CD) 180 MG extended release capsule Take 1 capsule by mouth daily  Patient not taking: Reported on 11/18/2021 8/14/18   GRECIA Hugo   ondansetron (ZOFRAN) 8 MG tablet Take 8 mg by mouth as needed  10/3/17   Historical Provider, MD   tiZANidine (ZANAFLEX) 4 MG tablet 1/2 tablet at night 10/24/17   Historical Provider, MD   ferrous sulfate 325 (65 Fe) MG tablet Take 325 mg by mouth 2 times daily    Historical Provider, MD   pantoprazole (PROTONIX) 40 MG tablet Take 1 tablet by mouth daily 9/7/17   GRECIA Cardenas   b complex vitamins capsule Take 1 capsule by mouth daily. Historical Provider, MD   Vitamin D (CHOLECALCIFEROL) 1000 UNITS CAPS capsule Take 1,000 Units by mouth daily. Historical Provider, MD   docusate sodium (COLACE) 100 MG capsule Take 100 mg by mouth daily. Historical Provider, MD       Allergies:   Ancef [cefazolin sodium], Ceftin [cefuroxime], Cefuroxime axetil, Cephalosporins, and Neosporin [bacitracin-polymyxin b]    Social History:   Social History     Socioeconomic History    Marital status:      Spouse name: Idris Lerma Number of children: 2    Years of education: 15    Highest education level: Not on file   Occupational History    Occupation:      Employer: [address] [place] [address] Drive: Retired,  for the [employer]. Tobacco Use    Smoking status: Former Smoker     Packs/day: 0.00     Types: Cigarettes     Quit date: 1998     Years since quittin.8    Smokeless tobacco: Never Used    Tobacco comment: quit 23 years ago   Vaping Use    Vaping Use: Never used   Substance and Sexual Activity    Alcohol use: No    Drug use: No    Sexual activity: Yes     Partners: Female   Other Topics Concern    Not on file   Social History Narrative    Not on file     Social Determinants of Health     Financial Resource Strain:     Difficulty of Paying Living Expenses: Not on file   Food Insecurity:     Worried About Running Out of Food in the Last Year: Not on file    [place]ane of Food in the Last Year: Not on file   Transportation Needs:     Lack of Transportation (Medical): Not on file    Lack of Transportation (Non-Medical):  Not on file   Physical Activity:     Days of Exercise per Week: Not on file    Minutes of Exercise per Session: Not on file   Stress:     Feeling of Stress : Not on file   Social Connections:     Frequency of Communication with Friends and Family: Not on file    Frequency of Social Gatherings with Friends and Family: Not on file    Attends [employer] Services: Not on file    Active Member of Clubs or Organizations: Not on file    Attends Club or Organization Meetings: Not on file    Marital Status: Not on file   Intimate Partner Violence:     Fear of Current or Ex-Partner: Not on file    Emotionally Abused: Not on file    Physically Abused: Not on file    Sexually Abused: Not on file   Housing Stability:     Unable to Pay for Housing in the Last Year: Not on file    Number of [place] in the Last Year: Not on file    Unstable Housing 133/70   Pulse: 74 98 92 103   Resp: 17 18 18 18   Temp: 98.2 °F (36.8 °C)  97.5 °F (36.4 °C) 98.1 °F (36.7 °C)   TempSrc:   Temporal Temporal   SpO2: 96% 93% 92% 94%   Weight: 219 lb (99.3 kg)      Height: 5' 11\" (1.803 m)        General:  Appears stated age, no distress. Orientation:  Alert and oriented to time, place, and person. Mood and Affect:  Cooperative and pleasant. Gait:  Resting comfortably in bed. Cardiovascular:  Symmetric 1-2 plus pulses in upper and lower extremities. Lymph:  No cervical or inguinal lymphadenopathy noted. Sensation:  Grossly intact to light touch. DTR:  Normal, no pathologic reflexes. Coordination/balance:  NOT TESTED    Musculoskeletal:  Right upper extremity exam: IN A SLING, TTP CLAVICLE, LIMITED ROM 2/2 PAIN, NVI DISTALLY    Left upper extremity exam:  There is no tenderness to palpation about the shoulder, elbow, wrist or hand. Unrestricted full function motion is present. Stability is normal with provocative tests, 5/5 strength, and skin is normal.     Right lower extremity exam:  There is no tenderness to palpation about the hip, knee, ankle or foot. Unrestricted full function motion is present. Stability is normal with provocative tests, 5/5 strength, and skin is normal.     Left lower extremity exam:  There is no tenderness to palpation about the hip, knee, ankle or foot. Unrestricted full function motion is present.   Stability is normal with provocative tests, 5/5 strength, and skin is normal.      DATA:    CBC with Differential:    Lab Results   Component Value Date    WBC 7.0 12/17/2021    RBC 3.09 12/17/2021    HGB 10.2 12/17/2021    HGB 15.4 08/19/2011    HCT 32.7 12/17/2021    HCT 47.0 08/19/2011    PLT 93 12/17/2021     08/19/2011    .8 12/17/2021    MCH 33.0 12/17/2021    MCHC 31.2 12/17/2021    RDW 15.3 12/17/2021    BANDSPCT 4 01/05/2018    LYMPHOPCT 11.2 12/16/2021    MONOPCT 9.0 12/16/2021    EOSPCT 3.1 08/19/2011    BASOPCT 0.1 12/16/2021    MONOSABS 0.60 12/16/2021    LYMPHSABS 0.8 12/16/2021    EOSABS 0.10 12/16/2021    BASOSABS 0.00 12/16/2021     CMP:    Lab Results   Component Value Date     12/17/2021     08/19/2011    K 4.1 12/17/2021    K 4.7 02/24/2011     12/17/2021    CL 99 08/19/2011    CO2 24 12/17/2021    BUN 16 12/17/2021    CREATININE 1.0 12/17/2021    CREATININE 1.0 08/19/2011    GFRAA >59 12/17/2021    LABGLOM >60 12/17/2021    GLUCOSE 220 12/17/2021    PROT 6.5 12/16/2021    PROT 7.6 11/29/2012    CALCIUM 9.6 12/17/2021    BILITOT 0.4 12/16/2021    ALKPHOS 85 12/16/2021    AST 21 12/16/2021    ALT 17 12/16/2021     BMP:    Lab Results   Component Value Date     12/17/2021     08/19/2011    K 4.1 12/17/2021    K 4.7 02/24/2011     12/17/2021    CL 99 08/19/2011    CO2 24 12/17/2021    BUN 16 12/17/2021    CREATININE 1.0 12/17/2021    CREATININE 1.0 08/19/2011    CALCIUM 9.6 12/17/2021    GFRAA >59 12/17/2021    LABGLOM >60 12/17/2021    GLUCOSE 220 12/17/2021       Radiology:   Basic Metabolic Panel w/ Reflex to MG [[id number]] (Abnormal)    Collected: 12/17/21 0544    Updated: 12/17/21 0653    Specimen Source: Blood     Sodium 139 mmol/L    Potassium reflex Magnesium 4.1 mmol/L    Chloride 102 mmol/L    CO2 24 mmol/L    Anion Gap 13 mmol/L    Glucose 220 High  mg/dL    BUN 16 mg/dL    CREATININE 1.0 mg/dL    GFR Non-African American >60    Comment: This calculation may be inaccurate for patients under the age of 25 years. For ages 25 and older, a GFR >60 mL/min/1.73m2 (not corrected for weight) is   valid for stable renal function. GFR  >59    Comment: Chronic Kidney Disease: less than 60 ml/min/1.73 sq. m.         Kidney Failure: less than 15 ml/min/1.73 sq.m. Results valid for patients 18 years and older. Calcium 9.6 mg/dL   CT HEAD WO CONTRAST [[id number]]    Resulted: 12/17/21 0646    Updated: 12/17/21 1144    Narrative:     Examination.  CT HEAD WO CONTRAST 12/17/2021 6:13 AM   History: Follow-up of subdural hemorrhage. DLP: 808 mGycm. The CT scan of the head is performed without intravenous contrast   enhancement. The images are acquired in axial plane with subsequent   reconstruction in coronal and sagittal planes. The comparison is made with the previous study dated 12/16/2021 and   8/26/2021. The hyperdense tentorial leaves bilaterally are similar to the   previous study which was not seen in the study and August 2021. A   trace subdural hemorrhage along the tentorial leaves is not excluded   as suggested previously. There is no evidence of a mass. No midline shift. No intraparenchymal hemorrhage. No subarachnoid hemorrhage. The ventricles, basal cistern and the cortical sulci are unremarkable   for patient's age. Chronic white matter ischemic changes in the centrum semiovale   bilaterally are similar to the previous study. The gray-white matter   differentiation is maintained. Impression:     1. No change. The hyperdense tentorial leaves bilaterally are similar   to the previous study and is a change in mental compared with the   previous study in August 2021. The possibility of a trace subdural   hemorrhage along the tentorial leaves is suspected, as suggested   previously. 2. No other intracranial abnormality.    Signed by Dr Caor Rai   APTT [[id number]]    Collected: 12/17/21 0544    Updated: 12/17/21 0634    Specimen Source: Blood     aPTT 29.1 sec   Protime-INR [[id number]]    Collected: 12/17/21 0544    Updated: 12/17/21 0634    Specimen Source: Blood     Protime 14.2 sec    INR 1.08    Comment: INR  < or = 1.3  Normal   INR = 2.0 - 3.0  Therapeutic   INR = 2.5 - 3.5  Therapeutic for patients with mechanical   prosthetic heart valve & MI prophylaxis   INR  > or = 3.5  Abnormal/Elevated   INR  > or = 5.0  Critical (requires immediate physician   notification)       CBC [[id number]] (Abnormal)    Collected: 12/17/21 0544 Updated: 12/17/21 0628    Specimen Source: Blood     WBC 7.0 K/uL    RBC 3.09 Low  M/uL    Hemoglobin 10.2 Low  g/dL    Hematocrit 32.7 Low  %    .8 High  fL    MCH 33.0 High  pg    MCHC 31.2 Low  g/dL    RDW 15.3 High  %    Platelets 67 TYL  K/uL    MPV 10.0 fL   POCT Glucose [[id number]] (Abnormal)    Collected: 12/17/21 0140    Updated: 12/17/21 0146     POC Glucose 145 High  mg/dl    Performed on [employer]    Comment: RN notified      Milo Jose [[id number]]    Collected: 12/16/21 2247    Updated: 12/16/21 2329    Specimen Source: Nasopharyngeal Swab     SARS-CoV-2, NAAT Not Detected    Comment: Rapid NAAT:   Negative results should be treated as presumptive and,   if inconsistent with clinical signs and symptoms or necessary for   patient management, should be tested with an alternative molecular   assay. Negative results do not preclude SARS-CoV-2 infection and   should not be used as the sole basis for patient management decisions. This test has been authorized by the FDA under an Emergency Use   Authorization (EUA) for use by authorized laboratories. Fact sheet for Healthcare Providers:   kstattoo.com   Fact sheet for Patients: kstattoo.com     METHODOLOGY: Isothermal Nucleic Acid Amplification       Comprehensive Metabolic Panel w/ Reflex to MG [[id number]] (Abnormal)    Collected: 12/16/21 1944    Updated: 12/16/21 2223    Specimen Source: Blood     Sodium 134 Low  mmol/L    Potassium reflex Magnesium 4.4 mmol/L    Chloride 99 mmol/L    CO2 24 mmol/L    Anion Gap 11 mmol/L    Glucose 184 High  mg/dL    BUN 16 mg/dL    CREATININE 1.0 mg/dL    GFR Non-African American >60    Comment: This calculation may be inaccurate for patients under the age of 25 years. For ages 25 and older, a GFR >60 mL/min/1.73m2 (not corrected for weight) is   valid for stable renal function.         GFR  >59    Comment: Chronic Kidney Disease: less than 60 ml/min/1.73 sq. m.         Kidney Failure: less than 15 ml/min/1.73 sq.m. Results valid for patients 18 years and older. Calcium 9.3 mg/dL    Total Protein 6.5 Low  g/dL    Albumin 3.9 g/dL    Total Bilirubin 0.4 mg/dL    Alkaline Phosphatase 85 U/L    ALT 17 U/L    AST 21 U/L   APTT [[id number]]    Collected: 12/16/21 1944    Updated: 12/16/21 2221    Specimen Source: Blood     aPTT 28.4 sec   Protime-INR [[id number]]    Collected: 12/16/21 1944    Updated: 12/16/21 2220    Specimen Source: Blood     Protime 13.9 sec    INR 1.05    Comment: INR  < or = 1.3  Normal   INR = 2.0 - 3.0  Therapeutic   INR = 2.5 - 3.5  Therapeutic for patients with mechanical   prosthetic heart valve & MI prophylaxis   INR  > or = 3.5  Abnormal/Elevated   INR  > or = 5.0  Critical (requires immediate physician   notification)       CBC Auto Differential [[id number]] (Abnormal)    Collected: 12/16/21 1944    Updated: 12/16/21 2214    Specimen Source: Blood     WBC 6.7 K/uL    RBC 3.02 Low  M/uL    Hemoglobin 10.0 Low  g/dL    Hematocrit 32.4 Low  %    .3 High  fL    MCH 33.1 High  pg    MCHC 30.9 Low  g/dL    RDW 15.3 High  %    Platelets 530 RAB  K/uL    MPV 10.2 fL    Neutrophils % 78.1 High  %    Lymphocytes % 11.2 Low  %    Monocytes % 9.0 %    Eosinophils % 0.7 %    Basophils % 0.1 %    Neutrophils Absolute 5.2 K/uL    Immature Granulocytes # 0.1 K/uL    Lymphocytes Absolute 0.8 Low  K/uL    Monocytes Absolute 0.60 K/uL    Eosinophils Absolute 0.10 K/uL    Basophils Absolute 0.00 K/uL   XR SHOULDER RIGHT (MIN 2 VIEWS) [[id number]]    Resulted: 12/16/21 2128    Updated: 12/16/21 2130    Narrative:     Exam:   XR SHOULDER RIGHT (MIN 2 VIEWS)     Date:  12/16/2021   History:  Male, age  71 years; fall, shoulder pain   COMPARISON:  None. Findings and impression :   Age-indeterminate minimally displaced fracture through the distal   right clavicle.    Signed by Dr Yemi Valle INCLUDE CHEST (MIN 3 VIEWS) [[id number]]    Resulted: 12/16/21 2125    Updated: 12/16/21 2127    Narrative:     Exam:   XR RIBS RIGHT INCLUDE CHEST (MIN 3 VIEWS)     Date:  12/16/2021   History: Angela Robinson, age  71 years; fall, tender to palpation. COMPARISON:  None. Findings : The heart and mediastinum are normal in size. Lungs are without focal   infiltrate, mass or effusions.  No acute displaced right rib fracture. No measurable pneumothorax.  Prior cervical fusion. Mediastinum wires   appear intact. Impression:     Impression:   No acute displaced right rib fracture. No measurable pneumothorax. No   pleural effusion. Signed by Dr Navneet Byrd (MIN 2 VIEWS) [[id number]]    Resulted: 12/16/21 2123    Updated: 12/16/21 2125    Narrative:     Exam:   XR HUMERUS RIGHT (MIN 2 VIEWS)     Date:  12/16/2021   History: Angela Robinson, age  71 years; fall, tender to palpation. COMPARISON:  None. Findings : There is no acute displaced fracture. No dislocation. No suspicious   lytic or blastic lesion. No radiopaque foreign body. Impression:     Impression:   No acute osseous pathology. Signed by Dr Swati Varela (2 VIEWS) [[id number]]    Resulted: 12/16/21 2120    Updated: 12/16/21 2122    Narrative:     Exam:   XR ELBOW RIGHT (2 VIEWS)     Date:  12/16/2021   History: Angela Robinson, age  71 years; elbow pain, fall   COMPARISON:  None. Findings : There is no acute displaced fracture. No dislocation. No suspicious   lytic or blastic lesion. No radiopaque foreign body. Triceps enthesopathy. Impression:     Impression:   No acute osseous pathology. Signed by Dr Beau Carter   CT Cervical Spine WO Contrast [[id number]]    Resulted: 12/16/21 2102    Updated: 12/16/21 2104    Narrative:     EXAM:   CT OF THE HEAD WITHOUT IV CONTRAST   CT CERVICAL SPINE WITHOUT IV CONTRAST 12/16/2021   COMPARISON: None.    INDICATION: Fall, with head trauma   PROCEDURE: Non contrast enhanced head CT and cervical spine CT were   performed. The head images were formatted in the axial plane at 5 mm   thick intervals. The cervical spine images were formatted in the   axial, coronal and sagittal planes. Radiation dose equals DLP 1434 mGy-cm.  Automated exposure control   dose reduction technique was implemented. FINDINGS:   HEAD: Ventricles and cerebrospinal fluid spaces are normal in size and   configuration for the patient's age. There is no evidence of   mass-effect or midline shift. The gray-white differentiation is   preserved.  Hyperdense appearance of bilateral total leaves (image 28,   series 6), concerning for trace subdural hemorrhage.  The visualized   portions of the paranasal sinuses and mastoid air cells are   unremarkable. CERVICAL SPINE: The odontoid process is well approximated with the   anterior body of C1 and well aligned between the lateral masses of C1. Prior extensive anterior and posterior fusion including C4 corpectomy,   C3-C5 C5-C6 C6-C7 anterior and interbody fusion. Prior left-sided   C3-C5 C5-C7 posterior fusion is also been performed. There is no   evidence of hardware loosening or failure. There is no acute   compression deformity. No dislocation. Degenerative changes at   multiple levels. There is no paraspinal hematoma. Impression:     CT head. Findings concerning for trace subdural hemorrhage along the   tentorial leaves. CT cervical spine. No acute osseous posttraumatic findings. Postoperative changes as described above. Signed by Dr Peggy Ravi [[id number]]    Resulted: 12/16/21 2102    Updated: 12/16/21 2104    Narrative:     EXAM:   CT OF THE HEAD WITHOUT IV CONTRAST   CT CERVICAL SPINE WITHOUT IV CONTRAST 12/16/2021   COMPARISON: None. INDICATION: Fall, with head trauma   PROCEDURE: Non contrast enhanced head CT and cervical spine CT were   performed. The head images were formatted in the axial plane at 5 mm   thick intervals.  The discussed at great length and all expressed understanding and agreement with medial reasoning.     Sara Cruz PA-C 12/17/21 7:44 AM

## 2021-12-17 NOTE — PROGRESS NOTES
Occupational Therapy               RE: Gama Dumont          MRN: [id number]      OT will hold mobility assessment at this time until cleared with neurosurgery. Will follow-up at a later time. Thank you.      Maia Birch OTR/L  Electronically signed by Bard Hinojosa OTR/L on 12/17/2021 at 7:23 AM.

## 2021-12-17 NOTE — DISCHARGE SUMMARY
Admission Diagnosis:  Chief Complaint   Patient presents with    Fall     down 3 steps    Shoulder Injury    Head Injury     pt on plavix and hx of low platelets       Comorbid conditions:  Principal Problem:    Subdural hemorrhage (HCC)  Active Problems:    Diabetes mellitus ([address]    Hypertension    Right clavicle fracture  Resolved Problems:    * No resolved hospital problems. *      Procedures Performed:  None    Hospital Course:  Pt came to hospital after mechanical fall. Seen by neurosurgery for possible intracranial hemorrhage but after imaging determined to not have bleeding. Seen by ortho nonoperative clavicular fracture, given sling. Cleared for DC by both ortho and neurosurgery. Pt wants to leave immediately, states feels good to go home and wants no further inpatient care. Seen by PT could benefit from SNF placement but patient is clear in wish to go home. Pt is AOx3, understands a fall could be very severe.           DC Medications:  See Home medication reconciliation    Condition on Discharge:  Stable    Disposition:  DC home    Recommendations/Follow-up:  -F/u PCP within 3 days  F/u Conor Cormier M.D.,  12/17/2021

## 2021-12-17 NOTE — CONSULTS
[facility]  Consult Note    CHIEF COMPLAINT:  Right clavicular pain s/p fall    HISTORY OF PRESENT ILLNESS:      The patient is a 74 y.o. male with a history of non-Hodgkins lymphoma, diabetes, CAD s/p CABG, CKD, REGINA who fall while going down 3 steps yesterday hitting his head and right shoulder. He was brought to our ED where work up found a right clavicle fracture that is non-operative and a questionable intracranial hemorrhage in which we were asked to evaluate. This morning he is sitting up in bed. He does admit to having headaches almost daily, however, this is no different. He denies any weakness, numbness, tingling, vision changes, or any other cognitive changes. The patient does take Plavix on a daily basis for his heart history. His cardiologist is Dr. Vinny Nieves. Past Medical History:   Diagnosis Date    Abnormal nuclear stress test 10/22/2014    Arthritis     BiPAP (biphasic positive airway pressure) dependence     8cm to 20cm    Cerebrovascular disease     Chronic kidney disease, stage II (mild) 08/17/2016    Jamar Puentes M.D.    [facility])     Coronary artery disease 2/24/2011    Depression     Diabetes mellitus ([address]     Encounter for wound care     PT SEES \"GRACY\" WITH DR. SAMUEL    Great toe pain     RT    Headache     Hyperlipemia 2/24/2011    Dr. Tarsha Barnhart follows lipids.     Hyperlipidemia     Hypertension     Liver disease     LONG TERM ANTICOAGULENT USE     Low blood pressure 11/19/2014    lymphostatic lymphoma     Nausea 11/19/2014    Non Hodgkin's lymphoma (HCC)     Obesity     Obstructive sleep apnea     AHI:  21.7 per PSG, 7/2017    Peptic ulcer disease 2/24/2011    Restless leg     S/P CABG x 3 10/22/2014    SVG to RCA; SVG to LAD diagonal; LIMA to LAD    Sleep apnea     Tachyarrhythmia     Thrombocythemia, essential ([address]     Type 2 diabetes mellitus without complication ([address]     Type II or unspecified type diabetes mellitus without mention of complication, not stated as uncontrolled        Past Surgical History:   Procedure Laterality Date    CARDIAC CATHETERIZATION  2/28/11    EF 60%    CARDIAC CATHETERIZATION  9/16/05, 3/7/07    EF < 50%    CARDIAC CATHETERIZATION  12/4/12   MDL    EF  50%    CARDIAC CATHETERIZATION  10/28/14  MDL    CARDIAC CATHETERIZATION      CERVICAL DISC SURGERY      Bethany Carrero M.D.   Gilmer Mae CORONARY ARTERY BYPASS GRAFT  9/21/05    LIMA to LAD and diagonal; SVG to posterior descending branch RCA    CORONARY ARTERY BYPASS GRAFT  10/30/2014    Redo Sternotomy - SVG-PDA, TMR (RBL)    KIDNEY SURGERY  08/14/2017    KNEE ARTHROSCOPY      right ACL repair    SHOULDER ARTHROSCOPY      left-rotator cuff repair right- rotator cuff repair    SHOULDER ARTHROSCOPY  08-23-11    right    TONSILLECTOMY          Medications    Current Facility-Administered Medications:     acetaminophen (TYLENOL) tablet 500 mg, 500 mg, Oral, Q6H PRN, Makenna Verde MD    atorvastatin (LIPITOR) tablet 40 mg, 40 mg, Oral, Nightly, Makenna Verde MD    busPIRone (BUSPAR) tablet 7.5 mg, 7.5 mg, Oral, BID, Makenna Verde MD, 7.5 mg at 12/17/21 0221    [Held by provider] dilTIAZem (CARDIZEM CD) extended release capsule 180 mg, 180 mg, Oral, Daily, Makenna Verde MD    docusate sodium (COLACE) capsule 100 mg, 100 mg, Oral, Daily, Makenna Verde MD    DULoxetine (CYMBALTA) extended release capsule 30 mg, 30 mg, Oral, Nightly, Makenna Verde MD    gabapentin (NEURONTIN) capsule 100 mg, 100 mg, Oral, BID, Makenna Verde MD, 100 mg at 12/17/21 0221    levothyroxine (SYNTHROID) tablet 50 mcg, 50 mcg, Oral, Daily, Makenna Verde MD, 50 mcg at 12/17/21 0612    [Held by provider] losartan (COZAAR) tablet 25 mg, 25 mg, Oral, Daily, Makenna Verde MD  [facility] by provider] metoprolol tartrate (LOPRESSOR) tablet 25 mg, 25 mg, Oral, BID, Makenna Verde MD    ondansetron (ZOFRAN) tablet 8 mg, 8 mg, Oral, Q8H PRN, Suyapa Machuca MD    pantoprazole (PROTONIX) tablet 40 mg, 40 mg, Oral, Daily, Suyapa Machuca MD    tamsulosin [facility]) capsule 0.4 mg, 0.4 mg, Oral, Daily, Suyapa Machuca MD    Vitamin D (CHOLECALCIFEROL) tablet 1,000 Units, 1,000 Units, Oral, Daily, Suyapa Machuca MD    insulin lispro (HUMALOG) injection vial 0-6 Units, 0-6 Units, SubCUTAneous, TID WC, Suyapa Machuca MD    insulin lispro (HUMALOG) injection vial 0-3 Units, 0-3 Units, SubCUTAneous, Nightly, Suyapa Machuca MD    glucose (GLUTOSE) 40 % oral gel 15 g, 15 g, Oral, PRN, Suyapa Machuca MD    dextrose 50 % IV solution, 12.5 g, IntraVENous, PRN, Suyapa Machuca MD    glucagon (rDNA) injection 1 mg, 1 mg, IntraMUSCular, PRN, Suyapa Machuca MD    dextrose 5 % solution, 100 mL/hr, IntraVENous, PRN, Suyapa Machuca MD    oxyCODONE-acetaminophen (PERCOCET) 5-325 MG per tablet 1 tablet, 1 tablet, Oral, Q4H PRN, Suyapa Machuca MD, 1 tablet at 21 3923    HYDROmorphone HCl PF (DILAUDID) injection 0.5 mg, 0.5 mg, IntraVENous, Q3H PRN, Suyapa Machuca MD, 0.5 mg at 21 2703  Ancef [cefazolin sodium], Ceftin [cefuroxime], Cefuroxime axetil, Cephalosporins, and Neosporin [bacitracin-polymyxin b]    Social History  Social History     Tobacco Use   Smoking Status Former Smoker    Packs/day: 0.00    Types: Cigarettes    Quit date: 1998    Years since quittin.8   Smokeless Tobacco Never Used   Tobacco Comment    quit 23 years ago     Social History     Substance and Sexual Activity   Alcohol Use No         Family History   Problem Relation Age of Onset    Heart Disease Other     Lung Cancer Mother     Cancer Sister          REVIEW OF SYSTEMS:  Constitutional: No fevers No chills  Neck:No stiffness  Respiratory: No shortness of breath  Cardiovascular: No chest pain No palpitations  Gastrointestinal: No abdominal pain    Genitourinary: No Dysuria  Neurological: No headache, no confusion    PHYSICAL EXAM:  Vitals:    12/17/21 0749   BP: 117/65   Pulse: 104   Resp: 18   Temp: 98.1 °F (36.7 °C)   SpO2: 91%       Constitutional: The patient appears well-developed and well-nourished. Eyes - conjunctiva normal.  Conjugate Gaze  Ear, nose, throat - No scars, masses, or lesions over external nose or ears, no atrophy of tongue  Neck-symmetric, no masses noted, no jugular vein distension  Respiration- chest wall appears symmetric, good expansion, normal effort without use of accessory muscles  Musculoskeletal - no significant wasting of muscles noted, no bony deformities  Extremities-no clubbing, cyanosis or edema  Skin - warm, dry, and intact. No rash, erythema, or pallor. Psychiatric - mood, affect, and behavior appear normal.     Neurologic Examination  Awake, Alert and oriented x 4  Normal speech pattern, following commands  No pronator drift noted   Motor 5/5 all extremities  No deficits to light touch   Reflexes are 2+ and symmetric  No clonus or Hoffmans sign      DATA:  Nursing/pcp notes, imaging,labs and vitals reviewed. PT,OT and/or speech notes reviewed    Lab Results   Component Value Date    WBC 7.0 12/17/2021    HGB 10.2 (L) 12/17/2021    HCT 32.7 (L) 12/17/2021    .8 (H) 12/17/2021    PLT 93 (L) 12/17/2021     Lab Results   Component Value Date     12/17/2021    K 4.1 12/17/2021     12/17/2021    CO2 24 12/17/2021    BUN 16 12/17/2021    CREATININE 1.0 12/17/2021    GLUCOSE 220 (H) 12/17/2021    CALCIUM 9.6 12/17/2021    PROT 6.5 (L) 12/16/2021    LABALBU 3.9 12/16/2021    BILITOT 0.4 12/16/2021    ALKPHOS 85 12/16/2021    AST 21 12/16/2021    ALT 17 12/16/2021    LABGLOM >60 12/17/2021    GFRAA >59 12/17/2021     Lab Results   Component Value Date    INR 1.08 12/17/2021    INR 1.05 12/16/2021    INR 1.10 08/26/2021    PROTIME 14.2 12/17/2021    PROTIME 13.9 12/16/2021    PROTIME 14.4 08/26/2021     Narrative   Examination.  CT HEAD WO CONTRAST 12/17/2021 6:13 AM   History: Follow-up of subdural hemorrhage. DLP: 808 mGycm. The CT scan of the head is performed without intravenous contrast   enhancement. The images are acquired in axial plane with subsequent   reconstruction in coronal and sagittal planes. The comparison is made with the previous study dated 12/16/2021 and   8/26/2021. The hyperdense tentorial leaves bilaterally are similar to the   previous study which was not seen in the study and August 2021. A   trace subdural hemorrhage along the tentorial leaves is not excluded   as suggested previously. There is no evidence of a mass. No midline shift. No intraparenchymal hemorrhage. No subarachnoid hemorrhage. The ventricles, basal cistern and the cortical sulci are unremarkable   for patient's age. Chronic white matter ischemic changes in the centrum semiovale   bilaterally are similar to the previous study. The gray-white matter   differentiation is maintained. Impression   1. No change. The hyperdense tentorial leaves bilaterally are similar   to the previous study and is a change in mental compared with the   previous study in August 2021. The possibility of a trace subdural   hemorrhage along the tentorial leaves is suspected, as suggested   previously. 2. No other intracranial abnormality. Signed by Dr Som Gooden   I have personally reviewed the images and my interpretation is:  No acute intracranial hemorrhage noted from our standpoint       IMPRESSION:  Mr. Valerie Mesa is a 74year old male that fell while going down his steps at home hitting his head, he had a questionable intracranial hemorrhage which is not seen on today's CT. RECOMMENDATIONS:    -Repeat CT head does not show any intracranial hemorrhage from our standpoint. No surgical intervention warranted. He is neurologically intact. He is cleared to resume Plavix.     -Follow up in our clinic 2 weeks       GRECIA Sanabria    Neurosurgery Attending  I have reviewed this case thoroughly with the provider above. I have seen and evaluated this patient myself. I have reviewed all the imaging studies, labs, notes etc. within the chart. I agree. There is no clear evidence of intracranial hemorrhage. May resume Plavix.   Follow up in our clinic in 1-2 weeks      Hannah Plenty, DO

## 2021-12-17 NOTE — CARE COORDINATION
Date / Time of Evaluation:   12/17/2021    11:16 AM  Assessment Completed by:   Aixa West RN, BSN      Patient Name:   Jaspreet Garcia  MRN:   [id number]  YOB: 1955    Patient Admission Status:   Inpatient [101]    Patient Contact Information:    [address] [place] [address] (home) [phone] (work)  Telephone Information:   Mobile [phone]     Above information verified? [x]   Yes  []   No    (Best Practice:   Have patient/caregiver verify above address and phone number by stating out loud their current address and reachable phone number. Initial Assessment Completed at bedside with:      [x]   Patient  [x]   Family/Caregiver/Guardian   []   Other:      Current PCP:    ADRY Rodriguez    PCP verified? [x]   Yes  []   No    Emergency Contacts:    Extended Emergency Contact Information  Primary Emergency Contact: Niya Ramirez  Address: [address] [facility] Phone: [phone]  Mobile Phone: [phone]  Relation: Spouse   needed? No  Secondary Emergency Contact: Jacki Rodrigues  Address: [address] [facility] Phone: [phone]  Relation: Child   needed? No    Advance Directives:    Does Mr. Flakita Trent have an advance directive in his electronic medical record? [x]   Yes  []   No    Code Status:   Full Code      Have you been vaccinated for COVID-19 (SARS-CoV-2)?     []   Yes  [x]   No                   Do you have any of the following unmet social needs that would keep you from returning home safely:    []   Yes  [x]   No                    Unmet Social Needs:           []   Living Situation/Housing  []   Food  []   Stroke Education   []   Utilities  []   Personal Safety  []   Financial Strain  []   Employment  []   Mental Health  []   Substance Abuse  []   Transportation Barriers    Additional Unmet Social Needs Notes: [facility]    Financial:    Payor: Amelia [id number] / Plan: [facility] [employer]IONS / Product Type: *No Product type* /     Pre-Cert required for SNF:     [x]   Yes  []   No    Have Long Term Care Insurance:      []   Yes  [x]   No      Pharmacy:    [facility] [address]  Phone: [phone] Fax: [phone]    Potential assistance purchasing medications? []   Yes  [x]   No      ADLS:       Support System:   Spouse/Significant Other, Family Members      Current Home Environment:       Steps:       []   Yes  [x]   No    If yes, how many? Ramp    Plans to RETURN to current housing:     [x]   Yes  []   No    Barriers to RETURNING to current housing:   NA    Currently ACTIVE with Home Health CARE:      []   Yes  [x]   No    Home Health Care Agency:   [facility]    DME Provider:   [facility]    Transition Plan:  Home with spouse    Transportation PLAN for Discharge:  spouse    Additional CM/SW Notes:   I spoke with patient and his spouse. He states that they have steps but also have a ramp. He states that he fell down 3 steps and hit his head/R shoulder. He states that he has a cane and uses it occasionally. He states he does not utilize home healthcare at this time. He states that he is going home upon discharge and does not think that he will need home healthcare. No other needs identified. Will continue to follow.     Isa López and/or his family were provided with choice of provider:    []   Yes   [x]   No      Valentin Carpenter RN, BSN  [address] [address]  Phone: [phone]     Fax:  [phone]    Electronically signed by Valentin Carpenter RN, BSN on 12/17/2021 at 11:21 AM

## 2021-12-17 NOTE — PROGRESS NOTES
Roxy Salgado arrived to room # 519. Presented with: trace subdural hematoma following fall  Mental Status: Patient is oriented, alert, coherent, logical, thought processes intact and able to concentrate and follow conversation. Vitals:    12/17/21 0135   BP: (!) 151/90   Pulse: 92   Resp: 18   Temp: 97.5 °F (36.4 °C)   SpO2: 92%     Patient safety contract and falls prevention contract reviewed with patient Yes. Oriented Patient and Family to room. Call light within reach. Yes. Needs, issues or concerns expressed at this time: yes, pain control and diet.       Electronically signed by Danay Bloom RN on 12/17/2021 at 5:34 AM

## 2022-01-17 ENCOUNTER — TELEPHONE (OUTPATIENT)
Dept: NEUROSURGERY | Age: 67
End: 2022-01-17

## 2022-01-17 NOTE — TELEPHONE ENCOUNTER
Called patient and left VM. Advised patient to call the office back as soon as he received my message so we can reschedule his appt with Dr Adela Junior tomorrow.

## 2022-03-28 NOTE — PROGRESS NOTES
[facility] - PODIATRY    Today's Date: 03/31/22    Patient Name: Franko Lucero  MRN: [id number]  CSN: [id number]  PCP: Wilner Engel PA-C  Referring Provider: No ref. provider found    SUBJECTIVE     Chief Complaint   Patient presents with   • Follow-up     Wilner Engel PA-C pcp10/05/2021 3 MTH FU DIABETIC - TRAVEL SCREEN COMPLETE - pt states has a spot on the bottom of right foot is painful, and smashed left 2nd toe, discolored and 2nd left toe is discolored from getting bumped, has neuropathy - pt pain, tingling, on the bottom of right foot 6/10 when walking - pt presents routine diabetic nail and foot care, right bottom foot pain and left and right 2nd toe is discolored    • Diabetes     128mg/dl this am      HPI: Franko Lucero, a 66 y.o.male, comes to clinic as a(n) estbalished patient presenting for diabetic foot exam and complaining of thickened, irregular toenails, and calluses. Patient has h/o anemia, anxiety, arthritis, BPH, CAD, CA, DM with neuropathy, Iliac artery aneurysm, HTN, HLD. Patient is NIDDM with last stated BG level of 128mg/dl. Patient presents for diabetic foot exam and nail care. States that he has previously been treated for diabetic ulcerations in the wound care center but notes not have any currently. Reports multiple calluses to the ball of the right foot. States that nails are long, thick, and irregular and that he is unable to care for them himself. Admits pain at 6/10 level and described as aching, throbbing and numbness. Relates previous treatment(s) including wound care treatment in [facility]. Denies any constitutional symptoms. No other pedal complaints at this time.    Past Medical History:   Diagnosis Date   • Anemia    • Anxiety    • Arthritis    • BPH (benign prostatic hypertrophy)    • CAD (coronary artery disease)    • Cancer (HCC)     non-hodgkins lymphoma   • Diabetes mellitus (HCC)    • Hyperlipidemia    • Hypertension    • Iliac artery  aneurysm, bilateral (HCC)    • Kidney stone    • Sleep apnea      Past Surgical History:   Procedure Laterality Date   • COLONOSCOPY  2014   • COLONOSCOPY N/A 2017    Procedure: COLONOSCOPY WITH ANESTHESIA;  Surgeon: Sher Cui MD;  Location: [facility] ENDOSCOPY;  Service:    • CORONARY ARTERY BYPASS GRAFT      2   • CYSTOSCOPY URETEROSCOPY LASER LITHOTRIPSY Left 2017    Procedure: URETEROSCOPY LASER LITHOTRIPSY WITH DOUBLE J STENT INSERTION, LEFT ;  Surgeon: Weston Peck MD;  Location: [facility] OR;  Service:    • CYSTOSCOPY URETEROSCOPY LASER LITHOTRIPSY Left 2017    Procedure: CYSTOSCOPY URETEROSCOPY LASER LITHOTRIPSY STENT INSERTION STONE EXTRACTION;  Surgeon: Weston Peck MD;  Location: [facility] OR;  Service:    • CYSTOSCOPY W/ URETERAL STENT PLACEMENT Left 2017    Procedure: CYSTOSCOPY URETERAL DOUBLE J STENT INSERTION, LEFT;  Surgeon: Weston Peck MD;  Location: [facility] OR;  Service:    • ENDOSCOPY N/A 2017    Procedure: ESOPHAGOGASTRODUODENOSCOPY WITH ANESTHESIA;  Surgeon: Sher Cui MD;  Location: [facility] ENDOSCOPY;  Service:    • JOINT REPLACEMENT Right    • KIDNEY STONE SURGERY     • KNEE SURGERY      replacement   • NECK SURGERY     • ROTATOR CUFF REPAIR     • SHOULDER SURGERY     • TONSILLECTOMY       Family History   Problem Relation Age of Onset   • Kidney disease Father    • Heart disease Father    • Cancer Mother         brain   • Colon cancer Neg Hx    • Colon polyps Neg Hx      Social History     Socioeconomic History   • Marital status:    Tobacco Use   • Smoking status: Former Smoker     Types: Cigarettes     Quit date:      Years since quittin.2   • Smokeless tobacco: Never Used   Vaping Use   • Vaping Use: Never used   Substance and Sexual Activity   • Alcohol use: No   • Drug use: No   • Sexual activity: Defer     Allergies   Allergen Reactions   • Adhesive Tape Rash     Pt states that if it isn't left on very long  it is okay   • Cefazolin Rash   • Cefuroxime Axetil Rash   • Cephalosporins Rash   • Neomycin-Polymyxin B Gu Rash   • Neosporin [Neomycin-Bacitracin Zn-Polymyx] Rash   • Percocet [Oxycodone-Acetaminophen] Rash     Current Outpatient Medications   Medication Sig Dispense Refill   • aspirin (aspirin) 81 MG EC tablet Take 81 mg by mouth Daily.     • b complex vitamins capsule Take  by mouth.     • busPIRone (BUSPAR) 15 MG tablet Take 15 mg by mouth Daily.     • clopidogrel (PLAVIX) 75 MG tablet Take 75 mg by mouth Daily.     • cyanocobalamin 1000 MCG/ML injection Inject  into the shoulder, thigh, or buttocks 1 (One) Time Per Week.     • dilTIAZem CD (CARDIZEM CD) 240 MG 24 hr capsule Take 1 capsule by mouth daily     • docusate sodium (COLACE) 100 MG capsule Take  by mouth Daily.     • ferrous sulfate 325 (65 FE) MG EC tablet Take 325 mg by mouth Daily With Breakfast.     • furosemide (LASIX) 40 MG tablet Take 40 mg by mouth Daily.     • HYDROcodone-acetaminophen (NORCO) 7.5-325 MG per tablet Take 1-2 tablets by mouth Every 4 (Four) Hours As Needed for Moderate Pain (4-6) (Pain). 40 tablet 0   • meloxicam (MOBIC) 15 MG tablet Take 15 mg by mouth Daily.     • metFORMIN (GLUCOPHAGE) 1000 MG tablet Take 500 mg by mouth 2 (Two) Times a Day With Meals.     • nitroglycerin (NITROSTAT) 0.4 MG SL tablet Place 0.4 mg under the tongue Every 5 (Five) Minutes As Needed.     • pantoprazole (PROTONIX) 40 MG EC tablet Take 40 mg by mouth Daily.     • phenazopyridine (PYRIDIUM) 100 MG tablet Take 1 tablet by mouth 3 (Three) Times a Day As Needed for bladder spasms. 21 tablet 1   • polyethylene glycol (MIRALAX) powder Take 17 g by mouth Daily As Needed.     • pravastatin (PRAVACHOL) 40 MG tablet Take 40 mg by mouth Every Night.     • ranolazine (RANEXA) 1000 MG 12 hr tablet Take 1 tablet by mouth 2 times daily     • tamsulosin (FLOMAX) 0.4 MG capsule 24 hr capsule Take 1 capsule by mouth Daily. 30 capsule 2   • therapeutic  multivitamin-minerals (THERAGRAN-M) tablet Take  by mouth.     • tiZANidine (ZANAFLEX) 4 MG tablet Take 4 mg by mouth As Needed for Muscle Spasms.     • traMADol (ULTRAM) 50 MG tablet TAKE 1 TABLET BY MOUTH FOUR TIMES DAILY AS NEEDED FOR PAIN  0   • Vitamin D, Cholecalciferol, 1000 UNITS tablet Take 1 tablet by mouth Daily.       No current facility-administered medications for this visit.     Review of Systems   Constitutional: Negative for chills and fever.   HENT: Negative for congestion.    Respiratory: Negative for shortness of breath.    Cardiovascular: Negative for chest pain and leg swelling.   Gastrointestinal: Negative for constipation, diarrhea, nausea and vomiting.   Musculoskeletal: Positive for arthralgias.        Foot pain   Skin: Negative for wound.   Neurological: Positive for numbness.       OBJECTIVE     Vitals:    03/31/22 0921   BP: 115/82   Pulse: 83   SpO2: 100%       PHYSICAL EXAM  GEN:   Accompanied by none.     Foot/Ankle Exam:       General:   Diabetic Foot Exam Performed    Appearance: appears stated age and healthy    Orientation: AAOx3    Affect: appropriate    Gait: unimpaired    Assistance: independent    Shoe Gear:  Casual shoes    VASCULAR      Right Foot Vascularity   Dorsalis pedis:  2+  Posterior tibial:  2+  Skin Temperature: warm    Edema Grading:  None  CFT:  3  Pedal Hair Growth:  Present  Varicosities: mild varicosities       Left Foot Vascularity   Dorsalis pedis:  2+  Posterior tibial:  2+  Skin Temperature: warm    Edema Grading:  None  CFT:  3  Pedal Hair Growth:  Present  Varicosities: mild varicosities        NEUROLOGIC     Right Foot Neurologic   Light touch sensation:  Diminished  Vibratory sensation:  Diminished  Hot/Cold sensation: diminished    Protective Sensation using [place]-Derek Monofilament:  3     Left Foot Neurologic   Light touch sensation:  Diminished  Vibratory sensation:  Diminished  Hot/cold sensation: diminished    Protective Sensation using  [place]-Derek Monofilament:  4     MUSCULOSKELETAL      Right Foot Musculoskeletal   Ecchymosis:  None  Tenderness: right foot callus and toenails    Arch:  Normal     Left Foot Musculoskeletal   Ecchymosis:  None  Tenderness: toenails    Arch:  Normal     MUSCLE STRENGTH     Right Foot Muscle Strength   Foot dorsiflexion:  5  Foot plantar flexion:  5  Foot inversion:  5  Foot eversion:  5     Left Foot Muscle Strength   Foot dorsiflexion:  5  Foot plantar flexion:  5  Foot inversion:  5  Foot eversion:  5     RANGE OF MOTION      Right Foot Range of Motion   Foot and ankle ROM within normal limits       Left Foot Range of Motion   Foot and ankle ROM within normal limits       DERMATOLOGIC     Right Foot Dermatologic   Skin: corn and tinea    Nails: onychomycosis and abnormally thick       Left Foot Dermatologic   Skin: tinea    Nails: onychomycosis and abnormally thick       Image:       RADIOLOGY/NUCLEAR:  No results found.    LABORATORY/CULTURE RESULTS:      PATHOLOGY RESULTS:       ASSESSMENT/PLAN     Diagnoses and all orders for this visit:    1. Onychomycosis (Primary)    2. Foot callus    3. Type 2 diabetes mellitus with diabetic polyneuropathy, without long-term current use of insulin (HCC)    4. Encounter for current long term use of antiplatelet drug    5. History of diabetic ulcer of foot    6. Encounter for diabetic foot exam (HCC)      Comprehensive lower extremity examination and evaluation was performed.  Discussed findings and treatment plan including risks, benefits, and treatment options with patient in detail. Patient agreed with treatment plan.  DFE performed  After verbal consent obtained, nail(s) x10 debrided of length and thickness with nail nipper without incidence  After verbal consent obtained, calluses x3 pared utilizing dermal curette and/or scalpel without incidence  Patient may maintain nails and calluses at home utilizing emery board or pumice stone between visits as  needed  Reviewed at home diabetic foot care including daily foot checks   Discussed potential plantar condylectomy or metatarsal head resection of the right 3rd if painful callus continues to occur.   An After Visit Summary was printed and given to the patient at discharge, including (if requested) any available informative/educational handouts regarding diagnosis, treatment, or medications. All questions were answered to patient/family satisfaction. Should symptoms fail to improve or worsen they agree to call or return to clinic or to go to the Emergency Department. Discussed the importance of following up with any needed screening tests/labs/specialist appointments and any requested follow-up recommended by me today. Importance of maintaining follow-up discussed and patient accepts that missed appointments can delay diagnosis and potentially lead to worsening of conditions.  Return in about 3 months (around 6/30/2022) for Diabetic Foot Exam, Follow-up with Dr. Swan., or sooner if acute issues arise.    Lab Frequency Next Occurrence   Follow Anesthesia Guidelines / Standing Orders Once 04/12/2017   Follow Anesthesia Guidelines / Standing Orders Once 08/11/2017   Comprehensive Metabolic Panel Once 11/03/2021   CBC (No Diff) Once 11/03/2021   Phosphorus Once 11/03/2021   Magnesium Once 11/03/2021   Uric Acid Once 11/03/2021   Creatinine, Urine, Random - Urine, Clean Catch Once 10/29/2021   Urinalysis With Microscopic If Indicated (No Culture) - Urine, Clean Catch Once 10/29/2021   Vitamin D 25 Hydroxy Once 11/03/2021   PTH, Intact Once 11/03/2021       This document has been electronically signed by Silas Swan DPM on March 31, 2022 09:49 CDT

## 2022-03-31 ENCOUNTER — OFFICE VISIT (OUTPATIENT)
Dept: PODIATRY | Facility: CLINIC | Age: 67
End: 2022-03-31

## 2022-03-31 VITALS
WEIGHT: 225 LBS | DIASTOLIC BLOOD PRESSURE: 82 MMHG | OXYGEN SATURATION: 100 % | SYSTOLIC BLOOD PRESSURE: 115 MMHG | HEART RATE: 83 BPM | BODY MASS INDEX: 31.5 KG/M2 | HEIGHT: 71 IN

## 2022-03-31 DIAGNOSIS — B35.1 ONYCHOMYCOSIS: Primary | ICD-10-CM

## 2022-03-31 DIAGNOSIS — E11.9 ENCOUNTER FOR DIABETIC FOOT EXAM: ICD-10-CM

## 2022-03-31 DIAGNOSIS — Z79.02 ENCOUNTER FOR CURRENT LONG TERM USE OF ANTIPLATELET DRUG: ICD-10-CM

## 2022-03-31 DIAGNOSIS — L84 FOOT CALLUS: ICD-10-CM

## 2022-03-31 DIAGNOSIS — Z86.31 HISTORY OF DIABETIC ULCER OF FOOT: ICD-10-CM

## 2022-03-31 DIAGNOSIS — E11.42 TYPE 2 DIABETES MELLITUS WITH DIABETIC POLYNEUROPATHY, WITHOUT LONG-TERM CURRENT USE OF INSULIN: ICD-10-CM

## 2022-03-31 PROCEDURE — 11056 PARNG/CUTG B9 HYPRKR LES 2-4: CPT | Performed by: PODIATRIST

## 2022-03-31 PROCEDURE — 99213 OFFICE O/P EST LOW 20 MIN: CPT | Performed by: PODIATRIST

## 2022-03-31 PROCEDURE — 11721 DEBRIDE NAIL 6 OR MORE: CPT | Performed by: PODIATRIST

## 2022-03-31 NOTE — PATIENT INSTRUCTIONS
Diabetes Mellitus and Foot Care  Foot care is an important part of your health, especially when you have diabetes. Diabetes may cause you to have problems because of poor blood flow (circulation) to your feet and legs, which can cause your skin to:  Become thinner and drier.  Break more easily.  Heal more slowly.  Peel and crack.  You may also have nerve damage (neuropathy) in your legs and feet, causing decreased feeling in them. This means that you may not notice minor injuries to your feet that could lead to more serious problems. Noticing and addressing any potential problems early is the best way to prevent future foot problems.  How to care for your feet  Foot hygiene    Wash your feet daily with warm water and mild soap. Do not use hot water. Then, pat your feet and the areas between your toes until they are completely dry. Do not soak your feet as this can dry your skin.  Trim your toenails straight across. Do not dig under them or around the cuticle. File the edges of your nails with an emery board or nail file.  Apply a moisturizing lotion or petroleum jelly to the skin on your feet and to dry, brittle toenails. Use lotion that does not contain alcohol and is unscented. Do not apply lotion between your toes.    Shoes and socks  Wear clean socks or stockings every day. Make sure they are not too tight. Do not wear knee-high stockings since they may decrease blood flow to your legs.  Wear shoes that fit properly and have enough cushioning. Always look in your shoes before you put them on to be sure there are no objects inside.  To break in new shoes, wear them for just a few hours a day. This prevents injuries on your feet.  Wounds, scrapes, corns, and calluses    Check your feet daily for blisters, cuts, bruises, sores, and redness. If you cannot see the bottom of your feet, use a mirror or ask someone for help.  Do not cut corns or calluses or try to remove them with medicine.  If you find a minor scrape,  cut, or break in the skin on your feet, keep it and the skin around it clean and dry. You may clean these areas with mild soap and water. Do not clean the area with peroxide, alcohol, or iodine.  If you have a wound, scrape, corn, or callus on your foot, look at it several times a day to make sure it is healing and not infected. Check for:  Redness, swelling, or pain.  Fluid or blood.  Warmth.  Pus or a bad smell.    General tips  Do not cross your legs. This may decrease blood flow to your feet.  Do not use heating pads or hot water bottles on your feet. They may burn your skin. If you have lost feeling in your feet or legs, you may not know this is happening until it is too late.  Protect your feet from hot and cold by wearing shoes, such as at the beach or on hot pavement.  Schedule a complete foot exam at least once a year (annually) or more often if you have foot problems. Report any cuts, sores, or bruises to your health care provider immediately.  Where to find more information  American Diabetes Association: www.diabetes.org  Association of Diabetes Care & Education Specialists: www.diabeteseducator.org  Contact a health care provider if:  You have a medical condition that increases your risk of infection and you have any cuts, sores, or bruises on your feet.  You have an injury that is not healing.  You have redness on your legs or feet.  You feel burning or tingling in your legs or feet.  You have pain or cramps in your legs and feet.  Your legs or feet are numb.  Your feet always feel cold.  You have pain around any toenails.  Get help right away if:  You have a wound, scrape, corn, or callus on your foot and:  You have pain, swelling, or redness that gets worse.  You have fluid or blood coming from the wound, scrape, corn, or callus.  Your wound, scrape, corn, or callus feels warm to the touch.  You have pus or a bad smell coming from the wound, scrape, corn, or callus.  You have a fever.  You have a red  line going up your leg.  Summary  Check your feet every day for blisters, cuts, bruises, sores, and redness.  Apply a moisturizing lotion or petroleum jelly to the skin on your feet and to dry, brittle toenails.  Wear shoes that fit properly and have enough cushioning.  If you have foot problems, report any cuts, sores, or bruises to your health care provider immediately.  Schedule a complete foot exam at least once a year (annually) or more often if you have foot problems.  This information is not intended to replace advice given to you by your health care provider. Make sure you discuss any questions you have with your health care provider.  Document Revised: 07/08/2021 Document Reviewed: 07/08/2021  [employer] Patient Education © 2021 [employer]

## 2022-05-19 ENCOUNTER — OFFICE VISIT (OUTPATIENT)
Dept: CARDIOLOGY CLINIC | Age: 67
End: 2022-05-19
Payer: MEDICARE

## 2022-05-19 VITALS
DIASTOLIC BLOOD PRESSURE: 40 MMHG | HEIGHT: 71 IN | SYSTOLIC BLOOD PRESSURE: 90 MMHG | WEIGHT: 217 LBS | BODY MASS INDEX: 30.38 KG/M2 | HEART RATE: 90 BPM

## 2022-05-19 DIAGNOSIS — Z95.1 STATUS POST AORTO-CORONARY ARTERY BYPASS GRAFT: ICD-10-CM

## 2022-05-19 DIAGNOSIS — I10 PRIMARY HYPERTENSION: ICD-10-CM

## 2022-05-19 DIAGNOSIS — I25.10 CORONARY ARTERY DISEASE INVOLVING NATIVE CORONARY ARTERY OF NATIVE HEART WITHOUT ANGINA PECTORIS: Primary | ICD-10-CM

## 2022-05-19 DIAGNOSIS — E78.2 MIXED HYPERLIPIDEMIA: ICD-10-CM

## 2022-05-19 DIAGNOSIS — R06.09 DOE (DYSPNEA ON EXERTION): ICD-10-CM

## 2022-05-19 PROCEDURE — 1123F ACP DISCUSS/DSCN MKR DOCD: CPT | Performed by: INTERNAL MEDICINE

## 2022-05-19 PROCEDURE — G8427 DOCREV CUR MEDS BY ELIG CLIN: HCPCS | Performed by: INTERNAL MEDICINE

## 2022-05-19 PROCEDURE — 3017F COLORECTAL CA SCREEN DOC REV: CPT | Performed by: INTERNAL MEDICINE

## 2022-05-19 PROCEDURE — 93000 ELECTROCARDIOGRAM COMPLETE: CPT | Performed by: INTERNAL MEDICINE

## 2022-05-19 PROCEDURE — 1036F TOBACCO NON-USER: CPT | Performed by: INTERNAL MEDICINE

## 2022-05-19 PROCEDURE — 4040F PNEUMOC VAC/ADMIN/RCVD: CPT | Performed by: INTERNAL MEDICINE

## 2022-05-19 PROCEDURE — 99213 OFFICE O/P EST LOW 20 MIN: CPT | Performed by: INTERNAL MEDICINE

## 2022-05-19 PROCEDURE — G8417 CALC BMI ABV UP PARAM F/U: HCPCS | Performed by: INTERNAL MEDICINE

## 2022-05-19 ASSESSMENT — ENCOUNTER SYMPTOMS
GASTROINTESTINAL NEGATIVE: 1
DIARRHEA: 0
EYES NEGATIVE: 1
NAUSEA: 0
SHORTNESS OF BREATH: 0
VOMITING: 0
RESPIRATORY NEGATIVE: 1

## 2022-05-19 NOTE — PROGRESS NOTES
[facility] Progress Note                            Date:  5/19/2022  Patient: Aakash Ba  Age:  74 y.o., 1955      Reason for evaluation:         SUBJECTIVE:    Returns today follow-up assessment follow-up for coronary artery disease hypertension hyperlipidemia previous CABG. Feeling reasonably well denies exertional dyspnea or chest discomfort. His legs do get tired at times but this has been evaluated before. He is remaining moderately active. Blood pressure today 90/40 heart 90. Review of Systems   Constitutional: Negative. Negative for chills, fever and unexpected weight change. HENT: Negative. Eyes: Negative. Respiratory: Negative. Negative for shortness of breath. Cardiovascular: Negative. Negative for chest pain. Gastrointestinal: Negative. Negative for diarrhea, nausea and vomiting. Endocrine: Negative. Genitourinary: Negative. Musculoskeletal: Negative. Skin: Negative. Neurological: Negative. All other systems reviewed and are negative. OBJECTIVE:     BP (!) 90/40   Pulse 90   Ht 5' 11\" (1.803 m)   Wt 217 lb (98.4 kg)   BMI 30.27 kg/m²     Labs:   CBC: No results for input(s): WBC, HGB, HCT, PLT in the last 72 hours. BMP:No results for input(s): NA, K, CO2, BUN, CREATININE, LABGLOM, GLUCOSE in the last 72 hours. BNP: No results for input(s): BNP in the last 72 hours. PT/INR: No results for input(s): PROTIME, INR in the last 72 hours. APTT:No results for input(s): APTT in the last 72 hours. CARDIAC ENZYMES:No results for input(s): CKTOTAL, CKMB, CKMBINDEX, TROPONINI in the last 72 hours. FASTING LIPID PANEL:  Lab Results   Component Value Date    HDL 35 03/30/2021    LDLDIRECT 99 10/29/2014    LDLCALC 70 03/30/2021    TRIG 220 03/30/2021     LIVER PROFILE:No results for input(s): AST, ALT, LABALBU in the last 72 hours.         Past Medical History:   Diagnosis Date    Abnormal nuclear stress test 10/22/2014    Arthritis     BiPAP (biphasic positive airway pressure) dependence     8cm to 20cm    Cerebrovascular disease     Chronic kidney disease, stage II (mild) 08/17/2016    Reid Sweet M.D.    Cirrhosis [facility])     Coronary artery disease 2/24/2011    Depression     Diabetes mellitus [facility]     Encounter for wound care     PT SEES \"GRACY\" WITH DR. SAMUEL    Great toe pain     RT    Headache     Hyperlipemia 2/24/2011    Dr. Yeni Freedman follows lipids.     Hyperlipidemia     Hypertension     Liver disease     LONG TERM ANTICOAGULENT USE     Low blood pressure 11/19/2014    lymphostatic lymphoma     Nausea 11/19/2014    Non Hodgkin's lymphoma (HCC)     Obesity     Obstructive sleep apnea     AHI:  21.7 per PSG, 7/2017    Peptic ulcer disease 2/24/2011    Restless leg     S/P CABG x 3 10/22/2014    SVG to RCA; SVG to LAD diagonal; LIMA to LAD    Sleep apnea     Tachyarrhythmia     Thrombocythemia, essential ([facility]     Type 2 diabetes mellitus without complication ([facility]     Type II or unspecified type diabetes mellitus without mention of complication, not stated as uncontrolled      Past Surgical History:   Procedure Laterality Date    CARDIAC CATHETERIZATION  2/28/11    EF 60%    CARDIAC CATHETERIZATION  9/16/05, 3/7/07    EF < 50%    CARDIAC CATHETERIZATION  12/4/12   MDL    EF  50%    CARDIAC CATHETERIZATION  10/28/14  GERBER Morel M.D.   [place] CORONARY ARTERY BYPASS GRAFT  9/21/05    LIMA to LAD and diagonal; SVG to posterior descending branch RCA    CORONARY ARTERY BYPASS GRAFT  10/30/2014    Redo Sternotomy - SVG-PDA, TMR (RBL)    KIDNEY SURGERY  08/14/2017    KNEE ARTHROSCOPY      right ACL repair    SHOULDER ARTHROSCOPY      left-rotator cuff repair right- rotator cuff repair    SHOULDER ARTHROSCOPY  08-23-11    right    TONSILLECTOMY       Family History   Problem Relation Age of Onset    Heart Disease Other     Lung Cancer Mother     Cancer Sister      Allergies   Allergen Reactions    Ancef [Cefazolin Sodium]     Ceftin [Cefuroxime]      \"anything with ceftin in it\"    Cefuroxime Axetil     Cephalosporins     Neosporin [Bacitracin-Polymyxin B]      blisters     Current Outpatient Medications   Medication Sig Dispense Refill    empagliflozin (JARDIANCE) 25 MG tablet Take 25 mg by mouth daily      metFORMIN (GLUCOPHAGE) 500 MG tablet Take 500 mg by mouth 2 times daily (with meals)      Insulin Degludec (TRESIBA) 100 UNIT/ML SOLN Inject 6 Units into the skin nightly      losartan (COZAAR) 25 MG tablet Take 25 mg by mouth daily      atorvastatin (LIPITOR) 40 MG tablet TAKE 1 TABLET BY MOUTH NIGHTLY AT BEDTIME      tamsulosin (FLOMAX) 0.4 MG capsule Take 1 capsule by mouth daily (Patient taking differently: Take 0.2 mg by mouth daily ) 90 capsule 3    gabapentin (NEURONTIN) 100 MG capsule Take 1 capsule by mouth 2 times daily.       busPIRone (BUSPAR) 15 MG tablet Take 7.5 mg by mouth 2 times daily      metoprolol tartrate (LOPRESSOR) 25 MG tablet Take 1 tablet by mouth 2 times daily 60 tablet 5    ranolazine (RANEXA) 1000 MG extended release tablet Take 1 tablet by mouth 2 times daily 180 tablet 3    clopidogrel (PLAVIX) 75 MG tablet One daily (Patient taking differently: Take 75 mg by mouth daily ) 90 tablet 3    acetaminophen (TYLENOL) 500 MG tablet Take 1,000 mg by mouth every 6 hours as needed for Pain      nitroGLYCERIN (NITROSTAT) 0.4 MG SL tablet Place 1 tablet under the tongue every 5 minutes as needed for Chest pain (Patient taking differently: Place 0.4 mg under the tongue every 5 minutes as needed for Chest pain Indications: has not had to take ) 25 tablet 5    DULoxetine (CYMBALTA) 30 MG extended release capsule Take 30 mg by mouth nightly Indications: patient stated received medications yesterday 5/19/21       levothyroxine (SYNTHROID) 50 MCG tablet Take 50 mcg by mouth Daily      diltiazem (CARDIZEM CD) 180 MG extended release capsule Take 1 capsule by mouth daily 90 capsule 3    ondansetron (ZOFRAN) 8 MG tablet Take 8 mg by mouth as needed       tiZANidine (ZANAFLEX) 4 MG tablet 1/2 tablet at night      ferrous sulfate 325 (65 Fe) MG tablet Take 325 mg by mouth 2 times daily      pantoprazole (PROTONIX) 40 MG tablet Take 1 tablet by mouth daily 90 tablet 2    b complex vitamins capsule Take 1 capsule by mouth daily.  Vitamin D (CHOLECALCIFEROL) 1000 UNITS CAPS capsule Take 1,000 Units by mouth daily.  docusate sodium (COLACE) 100 MG capsule Take 100 mg by mouth daily. No current facility-administered medications for this visit. Social History     Socioeconomic History    Marital status:      Spouse name: Mercedes Amato Number of children: 2    Years of education: 12    Highest education level: Not on file   Occupational History    Occupation:      Employer: [employer]     Comment: Retired,  for the [employer]. Tobacco Use    Smoking status: Former Smoker     Packs/day: 0.00     Types: Cigarettes     Quit date: 1998     Years since quittin.3    Smokeless tobacco: Never Used    Tobacco comment: quit 23 years ago   Vaping Use    Vaping Use: Never used   Substance and Sexual Activity    Alcohol use: No    Drug use: No    Sexual activity: Yes     Partners: Female   Other Topics Concern    Not on file   Social History Narrative    Not on file     Social Determinants of Health     Financial Resource Strain:     Difficulty of Paying Living Expenses: Not on file   Food Insecurity:     Worried About Running Out of Food in the Last Year: Not on file    Duane of Food in the Last Year: Not on file   Transportation Needs:     Lack of Transportation (Medical): Not on file    Lack of Transportation (Non-Medical):  Not on file   Physical Activity:     Days of Exercise per Week: Not on file    Minutes of Exercise per Session: Not on file   Stress:     Feeling of Stress : Not on file   Social Connections:     Frequency of Communication with Friends and Family: Not on file    Frequency of Social Gatherings with Friends and Family: Not on file    Attends Church Services: Not on file    Active Member of [address] [place] [address] or Organizations: Not on file    Attends Club or Organization Meetings: Not on file    Marital Status: Not on file   Intimate Partner Violence:     Fear of Current or Ex-Partner: Not on file    Emotionally Abused: Not on file    Physically Abused: Not on file    Sexually Abused: Not on file   Housing Stability:     Unable to Pay for Housing in the Last Year: Not on file    Number of [place] in the Last Year: Not on file    Unstable Housing in the Last Year: Not on file       Physical Examination:  BP (!) 90/40   Pulse 90   Ht 5' 11\" (1.803 m)   Wt 217 lb (98.4 kg)   BMI 30.27 kg/m²   Physical Exam  Vitals reviewed. Constitutional:       Appearance: He is well-developed. Neck:      Vascular: No carotid bruit or JVD. Cardiovascular:      Rate and Rhythm: Normal rate and regular rhythm. Heart sounds: Normal heart sounds. No murmur heard. No friction rub. No gallop. Pulmonary:      Effort: Pulmonary effort is normal. No respiratory distress. Breath sounds: Normal breath sounds. No wheezing or rales. Abdominal:      General: There is no distension. Tenderness: There is no abdominal tenderness. Lymphadenopathy:      Cervical: No cervical adenopathy. Skin:     General: Skin is warm and dry. ASSESSMENT:     Diagnosis Orders   1. Coronary artery disease involving native coronary artery of native heart without angina pectoris  EKG 12 lead   2. Primary hypertension     3. Status post aorto-coronary artery bypass graft     4. Mixed hyperlipidemia     5.  JOHNSON (dyspnea on exertion)         PLAN:  Orders Placed This Encounter

## 2022-06-28 ENCOUNTER — OFFICE VISIT (OUTPATIENT)
Dept: CARDIOLOGY CLINIC | Age: 67
End: 2022-06-28
Payer: MEDICARE

## 2022-06-28 VITALS
BODY MASS INDEX: 30.52 KG/M2 | DIASTOLIC BLOOD PRESSURE: 60 MMHG | OXYGEN SATURATION: 97 % | HEIGHT: 71 IN | HEART RATE: 70 BPM | SYSTOLIC BLOOD PRESSURE: 108 MMHG | WEIGHT: 218 LBS

## 2022-06-28 DIAGNOSIS — I10 ESSENTIAL HYPERTENSION: ICD-10-CM

## 2022-06-28 DIAGNOSIS — R06.02 SHORTNESS OF BREATH: ICD-10-CM

## 2022-06-28 DIAGNOSIS — I25.10 CORONARY ARTERY DISEASE INVOLVING NATIVE CORONARY ARTERY OF NATIVE HEART WITHOUT ANGINA PECTORIS: ICD-10-CM

## 2022-06-28 DIAGNOSIS — R06.02 SHORTNESS OF BREATH: Primary | ICD-10-CM

## 2022-06-28 DIAGNOSIS — E78.5 DYSLIPIDEMIA: ICD-10-CM

## 2022-06-28 LAB
ANION GAP SERPL CALCULATED.3IONS-SCNC: 12 MMOL/L (ref 7–19)
BASOPHILS ABSOLUTE: 0 K/UL (ref 0–0.2)
BASOPHILS RELATIVE PERCENT: 0.4 % (ref 0–1)
BUN BLDV-MCNC: 18 MG/DL (ref 8–23)
CALCIUM SERPL-MCNC: 9.5 MG/DL (ref 8.8–10.2)
CHLORIDE BLD-SCNC: 101 MMOL/L (ref 98–111)
CO2: 27 MMOL/L (ref 22–29)
CREAT SERPL-MCNC: 0.9 MG/DL (ref 0.5–1.2)
EOSINOPHILS ABSOLUTE: 0.2 K/UL (ref 0–0.6)
EOSINOPHILS RELATIVE PERCENT: 3.5 % (ref 0–5)
GFR AFRICAN AMERICAN: >59
GFR NON-AFRICAN AMERICAN: >60
GLUCOSE BLD-MCNC: 105 MG/DL (ref 74–109)
HCT VFR BLD CALC: 33.4 % (ref 42–52)
HEMOGLOBIN: 10.4 G/DL (ref 14–18)
IMMATURE GRANULOCYTES #: 0 K/UL
LYMPHOCYTES ABSOLUTE: 0.9 K/UL (ref 1.1–4.5)
LYMPHOCYTES RELATIVE PERCENT: 17.8 % (ref 20–40)
MCH RBC QN AUTO: 32.6 PG (ref 27–31)
MCHC RBC AUTO-ENTMCNC: 31.1 G/DL (ref 33–37)
MCV RBC AUTO: 104.7 FL (ref 80–94)
MONOCYTES ABSOLUTE: 0.5 K/UL (ref 0–0.9)
MONOCYTES RELATIVE PERCENT: 9.9 % (ref 0–10)
NEUTROPHILS ABSOLUTE: 3.3 K/UL (ref 1.5–7.5)
NEUTROPHILS RELATIVE PERCENT: 68 % (ref 50–65)
PDW BLD-RTO: 15.1 % (ref 11.5–14.5)
PLATELET # BLD: 91 K/UL (ref 130–400)
PMV BLD AUTO: 10.1 FL (ref 9.4–12.4)
POTASSIUM SERPL-SCNC: 5 MMOL/L (ref 3.5–5)
RBC # BLD: 3.19 M/UL (ref 4.7–6.1)
SODIUM BLD-SCNC: 140 MMOL/L (ref 136–145)
WBC # BLD: 4.8 K/UL (ref 4.8–10.8)

## 2022-06-28 PROCEDURE — 1036F TOBACCO NON-USER: CPT | Performed by: NURSE PRACTITIONER

## 2022-06-28 PROCEDURE — 1124F ACP DISCUSS-NO DSCNMKR DOCD: CPT | Performed by: NURSE PRACTITIONER

## 2022-06-28 PROCEDURE — G8427 DOCREV CUR MEDS BY ELIG CLIN: HCPCS | Performed by: NURSE PRACTITIONER

## 2022-06-28 PROCEDURE — G8417 CALC BMI ABV UP PARAM F/U: HCPCS | Performed by: NURSE PRACTITIONER

## 2022-06-28 PROCEDURE — 99214 OFFICE O/P EST MOD 30 MIN: CPT | Performed by: NURSE PRACTITIONER

## 2022-06-28 PROCEDURE — 3017F COLORECTAL CA SCREEN DOC REV: CPT | Performed by: NURSE PRACTITIONER

## 2022-06-28 ASSESSMENT — ENCOUNTER SYMPTOMS
SHORTNESS OF BREATH: 1
WHEEZING: 0
CHEST TIGHTNESS: 0
COUGH: 0
SORE THROAT: 0

## 2022-06-28 NOTE — PATIENT INSTRUCTIONS
[facility] the [facility] and [address] [facility] located on the first floor of [place] [address] through hospital main entrance and turn immediately to your left. Date/Time: 7/5/22 arrive at 730A for 930A procedure. Allergies: Ancef [cefazolin sodium], Ceftin [cefuroxime], Cefuroxime axetil, Cephalosporins, and Neosporin [bacitracin-polymyxin b]   Contact number:  [phone] (home) [phone] (work)    Cardiac Catheterization Instructions   · Do not eat or drink anything after midnight on the night before your procedure. You can take your morning medications with a sip of water unless otherwise directed not to. · Bring a list of the names and dosages of all the medications you are taking. · Diabetic medication should be stopped two days prior: Metformin (glucophage), Invokana (canagliflozin), Farxiga (dapagliflozin), Jardiance (empagliflozin)   · Coumadin (warfarin) should be stopped two days prior to this procedure. · Pradaxa (dabigatran) should be stopped one day prior to procedure. · You should arrange to have someone take you home rather than drive yourself. · Further plan will depend upon the result of the cardiac catheterization.       If for any reason you are unable to keep this appointment, please contact [facility], [phone], as soon as possible to reschedule.  -------------------------------------------------------------------------------------------------------------------  \

## 2022-06-28 NOTE — PROGRESS NOTES
[facility]   Established Patient Office Visit   [facility] [address] [place]  [phone]        OFFICE VISIT:  2022    Jace Darden - : 1955    Reason For Visit:  Loki Umanzor is a 74 y.o. male who is here for Follow-up    1. Shortness of breath    2. Coronary artery disease involving native coronary artery of native heart without angina pectoris    3. Essential hypertension    4. Dyslipidemia        Patient with a history of coronary artery disease/CABG, hypertension and hyperlipidemia. He is a patient of Dr. Javier Navarrete. Patient presents to clinic today with complaints of shortness of breath with minimal exertion. Patient was last seen in the clinic on 2022. He stated he saw Dr. Javier Navarrete was feeling good and was having no issues. However he states 2 to 3 weeks ago he noted significant shortness of breath with minimal exertion. Relieved with rest.  Patient denies any chest pain. He states he has never had chest pain his anginal equivalent is the shortness of breath with exertion. He states this is how he felt when he had his bypass surgery. He has decreased stamina and fatigue.       Subjective    Jace Darden is a 74 y.o. male with the following history as recorded in [facility]:    Patient Active Problem List    Diagnosis Date Noted    Dyspnea on exertion     Status post aorto-coronary artery bypass graft     Abnormal nuclear cardiac imaging test     Subdural hemorrhage ([address] 2021    Right clavicle fracture 2021    Right foot pain 2021    Obesity (BMI 30-39.9) 2021    Diabetic ulcer of right midfoot associated with type 2 diabetes mellitus, with fat layer exposed ([address] 2018    Diabetic ulcer of toe of left foot associated with type 2 diabetes mellitus, with fat layer exposed ([address] 2018    Bilateral leg edema 2018    Obstructive sleep apnea     BiPAP (biphasic positive airway pressure) dependence     Restless leg     Chronic kidney disease, stage II (mild) 08/17/2016    Tachyarrhythmia 07/07/2016    Tachycardia 12/15/2014    Weakness generalized 11/19/2014    Nausea 11/19/2014    Hypotension 11/19/2014    History of coronary artery bypass graft x 1 11/19/2014    Fatigue 11/19/2014    Hypertension     Type II or unspecified type diabetes mellitus without mention of complication, not stated as uncontrolled     Abnormal nuclear stress test 10/22/2014    S/P CABG x 3 10/22/2014    Chest tightness 09/18/2014    JOHNSON (dyspnea on exertion) 09/18/2014    Shortness of breath 11/29/2012    Diabetes mellitus ([address]     LONG TERM ANTICOAGULENT USE     Coronary artery disease 02/24/2011    Hyperlipemia 02/24/2011     Current Outpatient Medications   Medication Sig Dispense Refill    empagliflozin (JARDIANCE) 25 MG tablet Take 25 mg by mouth daily      metFORMIN (GLUCOPHAGE) 500 MG tablet Take 500 mg by mouth 2 times daily (with meals)      Insulin Degludec (TRESIBA) 100 UNIT/ML SOLN Inject 6 Units into the skin nightly      losartan (COZAAR) 25 MG tablet Take 25 mg by mouth daily      atorvastatin (LIPITOR) 40 MG tablet TAKE 1 TABLET BY MOUTH NIGHTLY AT BEDTIME      tamsulosin (FLOMAX) 0.4 MG capsule Take 1 capsule by mouth daily (Patient taking differently: Take 0.2 mg by mouth daily ) 90 capsule 3    gabapentin (NEURONTIN) 100 MG capsule Take 1 capsule by mouth 2 times daily.       busPIRone (BUSPAR) 15 MG tablet Take 7.5 mg by mouth 2 times daily      metoprolol tartrate (LOPRESSOR) 25 MG tablet Take 1 tablet by mouth 2 times daily 60 tablet 5    ranolazine (RANEXA) 1000 MG extended release tablet Take 1 tablet by mouth 2 times daily 180 tablet 3    clopidogrel (PLAVIX) 75 MG tablet One daily (Patient taking differently: Take 75 mg by mouth daily ) 90 tablet 3    acetaminophen (TYLENOL) 500 MG tablet Take 1,000 mg by mouth every 6 hours as needed for Pain      nitroGLYCERIN (NITROSTAT) 0.4 MG SL tablet Place 1 tablet under the tongue every 5 minutes as needed for Chest pain (Patient taking differently: Place 0.4 mg under the tongue every 5 minutes as needed for Chest pain Indications: has not had to take ) 25 tablet 5    DULoxetine (CYMBALTA) 30 MG extended release capsule Take 30 mg by mouth nightly Indications: patient stated received medications yesterday 5/19/21       levothyroxine (SYNTHROID) 50 MCG tablet Take 50 mcg by mouth Daily      diltiazem (CARDIZEM CD) 180 MG extended release capsule Take 1 capsule by mouth daily 90 capsule 3    ondansetron (ZOFRAN) 8 MG tablet Take 8 mg by mouth as needed       tiZANidine (ZANAFLEX) 4 MG tablet 1/2 tablet at night      ferrous sulfate 325 (65 Fe) MG tablet Take 325 mg by mouth 2 times daily      pantoprazole (PROTONIX) 40 MG tablet Take 1 tablet by mouth daily 90 tablet 2    b complex vitamins capsule Take 1 capsule by mouth daily.  Vitamin D (CHOLECALCIFEROL) 1000 UNITS CAPS capsule Take 1,000 Units by mouth daily.  docusate sodium (COLACE) 100 MG capsule Take 100 mg by mouth daily. No current facility-administered medications for this visit. Allergies: Ancef [cefazolin sodium], Ceftin [cefuroxime], Cefuroxime axetil, Cephalosporins, and Neosporin [bacitracin-polymyxin b]  Past Medical History:   Diagnosis Date    Abnormal nuclear stress test 10/22/2014    Arthritis     BiPAP (biphasic positive airway pressure) dependence     8cm to 20cm    Cerebrovascular disease     Chronic kidney disease, stage II (mild) 08/17/2016    Jewels Patel M.D.    Cirrhosis [facility])     Coronary artery disease 2/24/2011    Depression     Diabetes mellitus ([facility]     Encounter for wound care     PT SEES \"GRACY\" WITH DR. SAMUEL    Great toe pain     RT    Headache     Hyperlipemia 2/24/2011    Dr. Kellie Rapp follows lipids.     Hyperlipidemia     Hypertension     Liver disease     LONG TERM ANTICOAGULENT USE     Low blood pressure 2014    lymphostatic lymphoma     Nausea 2014    Non Hodgkin's [facility]     Obesity     Obstructive sleep apnea     AHI:  21.7 per PSG, 2017    Peptic ulcer disease 2011    Restless leg     S/P CABG x 3 10/22/2014    SVG to RCA; SVG to LAD diagonal; LIMA to LAD    Sleep apnea     Tachyarrhythmia     Thrombocythemia, essential ([facility]     Type 2 diabetes mellitus without complication ([facility]     Type II or unspecified type diabetes mellitus without mention of complication, not stated as uncontrolled      Past Surgical History:   Procedure Laterality Date    CARDIAC CATHETERIZATION  11    EF 60%    CARDIAC CATHETERIZATION  05, 3/7/07    EF < 50%    CARDIAC CATHETERIZATION  12   MDL    EF  50%    CARDIAC CATHETERIZATION  10/28/14  MDL    CARDIAC CATHETERIZATION     Jose Shafer M.D.   Caityrin Stank GRAFT  05    LIMA to LAD and diagonal; SVG to posterior descending branch RCA    CORONARY ARTERY BYPASS GRAFT  10/30/2014    Redo Sternotomy - SVG-PDA, TMR ([facility])    KIDNEY SURGERY  2017    KNEE ARTHROSCOPY      right ACL repair    SHOULDER ARTHROSCOPY      left-rotator cuff repair right- rotator cuff repair    SHOULDER ARTHROSCOPY  11    right    TONSILLECTOMY       Family History   Problem Relation Age of Onset    Heart Disease Other     Lung Cancer Mother     Cancer Sister      Social History     Tobacco Use    Smoking status: Former Smoker     Packs/day: 0.00     Types: Cigarettes     Quit date: 1998     Years since quittin.4    Smokeless tobacco: Never Used    Tobacco comment: quit 23 years ago   Substance Use Topics    Alcohol use: No          Review of Systems:    Review of Systems   Constitutional: Positive for activity change and fatigue. Negative for chills, diaphoresis and fever. HENT: Negative for congestion and sore throat.     Respiratory: Positive for shortness of breath. Negative for cough, chest tightness and wheezing. Cardiovascular: Negative for chest pain, palpitations and leg swelling. Neurological: Positive for dizziness. Negative for syncope, light-headedness and headaches. Psychiatric/Behavioral: Negative for confusion. The patient is not nervous/anxious. Objective:    /60   Pulse 70   Ht 5' 11\" (1.803 m)   Wt 218 lb (98.9 kg)   SpO2 97%   BMI 30.40 kg/m²     GENERAL - well developed and well nourished, conversant  HEENT -  PERRLA, Hearing appears normal, gentleman lids are normal.  External inspection of ears and nose appear normal.  NECK - no thyromegaly, no JVD, trachea is in the midline  CARDIOVASCULAR - PMI is in the mid line clavicular position, Normal S1 and S2 with nosystolic murmur. No S3 or S4    PULMONARY - no respiratory distress. No wheezes or rales. Lungs are clear to ausculation, normal respiratory effort. ABDOMEN  - soft, non tender, no rebound  MUSCULOSKELETAL  - range of motion of the upper and lower extermites appears normal and equal and is without pain   EXTREMITIES - no significant edema   NEUROLOGIC - gait and station are normal  SKIN - turgor is normal, can warm and dry. PSYCHIATRIC - normal mood and affect, alert and orientated x 3,      ASSESSMENT:    ALL THE CARDIOLOGY PROBLEMS ARE LISTED ABOVE; HOWEVER, THE FOLLOWING SPECIFIC CARDIAC PROBLEMS / CONDITIONS WERE ADDRESSED AND TREATED DURING THE OFFICE VISIT TODAY:                                                                                            MEDICAL DECISION MAKING             Cardiac Specific Problem / Diagnosis  Discussion and Data Reviewed Diagnostic Procedures Ordered Management Options Selected           1. Anginal equivalent/coronary artery disease/CABG  Worsening   Review and summation of old records:    O2 sat in the office 97%.   6-minute walk test.  Patient only able to do 2 minutes at a very slow pace with an O2 sat dropping down to 94%. This is patient's anginal equivalent that he felt prior to his bypass surgeries. We will schedule cardiac catheterization with Dr. Blanca Briceño. Instructed patient should symptoms worsen before cath to report to the ER. Both patient and wife verbalized understanding agreed with plan. Patient is on Plavix, Lipitor, Ranexa Yes Continue current medications: Yes                                                     Orders Placed This Encounter   Procedures    CBC with Auto Differential     Standing Status:   Future     Number of Occurrences:   1     Standing Expiration Date:   6/28/2023    Basic Metabolic Panel     Standing Status:   Future     Number of Occurrences:   1     Standing Expiration Date:   6/28/2023     No orders of the defined types were placed in this encounter. Discussed with patient. Return in about 2 weeks (around 7/12/2022) for results with me . I greatly appreciate the opportunity to meet Slickgaudencio Guerrero and your confidence in allowing me to participate in his cardiovascular care. GRECIA Duggan - JULISA  6/28/2022 1:59 PM CDT                    This dictation was generated by voice recognition computer software. Although all attempts are made to edit dictation for accuracy, there may be errors in the transcription that are not intended.

## 2022-06-29 NOTE — PROGRESS NOTES
[facility] - PODIATRY    Today's Date: 06/30/22    Patient Name: Franko Lucero  MRN: [id number]  CSN: [id number]  PCP: Wilner Engel PA-C  Referring Provider: No ref. provider found    SUBJECTIVE     Chief Complaint   Patient presents with   • Follow-up     Wilner Engel PA-C pcp10/05/2021 3 month fu diabetic foot care- pt states doing ok, no complaints - pt denies pain - pt presents routine diabetic nail and foot care, 3 month follow up with callus on the side of right hallux and long thick discolored nails    • Diabetes     160mg/dl this am      HPI: Franko Lucero, a 66 y.o.male, comes to clinic as a(n) estbalished patient presenting for diabetic foot exam and complaining of thickened, irregular toenails, and calluses. Patient has h/o anemia, anxiety, arthritis, BPH, CAD, CA, DM with neuropathy, Iliac artery aneurysm, HTN, HLD. Patient is NIDDM with last stated BG level of 160mg/dl. Hx of diabetic foot ulcer but denies any open wounds or sores currently. Reports multiple calluses to the ball of both feet. Reports nails are long, thick, and irregular and that he is unable to care for them himself. Denies pain. Relates previous treatment(s) including wound care treatment in OP [facility], care by podiatry. Denies any constitutional symptoms. No other pedal complaints at this time.    Past Medical History:   Diagnosis Date   • Anemia    • Anxiety    • Arthritis    • BPH (benign prostatic hypertrophy)    • CAD (coronary artery disease)    • Cancer (HCC)     non-hodgkins lymphoma   • Diabetes mellitus (HCC)    • Hyperlipidemia    • Hypertension    • Iliac artery aneurysm, bilateral (HCC)    • Kidney stone    • Sleep apnea      Past Surgical History:   Procedure Laterality Date   • COLONOSCOPY  02/04/2014   • COLONOSCOPY N/A 4/26/2017    Procedure: COLONOSCOPY WITH ANESTHESIA;  Surgeon: Sher Cui MD;  Location: [facility] ENDOSCOPY;  Service:    • CORONARY ARTERY BYPASS GRAFT      2   •  CYSTOSCOPY URETEROSCOPY LASER LITHOTRIPSY Left 2017    Procedure: URETEROSCOPY LASER LITHOTRIPSY WITH DOUBLE J STENT INSERTION, LEFT ;  Surgeon: Weston Peck MD;  Location: [facility] OR;  Service:    • CYSTOSCOPY URETEROSCOPY LASER LITHOTRIPSY Left 2017    Procedure: CYSTOSCOPY URETEROSCOPY LASER LITHOTRIPSY STENT INSERTION STONE EXTRACTION;  Surgeon: Weston Peck MD;  Location: [facility] OR;  Service:    • CYSTOSCOPY W/ URETERAL STENT PLACEMENT Left 2017    Procedure: CYSTOSCOPY URETERAL DOUBLE J STENT INSERTION, LEFT;  Surgeon: Weston Peck MD;  Location: [facility] OR;  Service:    • ENDOSCOPY N/A 2017    Procedure: ESOPHAGOGASTRODUODENOSCOPY WITH ANESTHESIA;  Surgeon: Sher Cui MD;  Location: [facility] ENDOSCOPY;  Service:    • JOINT REPLACEMENT Right    • KIDNEY STONE SURGERY     • KNEE SURGERY      replacement   • NECK SURGERY     • ROTATOR CUFF REPAIR     • SHOULDER SURGERY     • TONSILLECTOMY       Family History   Problem Relation Age of Onset   • Kidney disease Father    • Heart disease Father    • Cancer Mother         brain   • Colon cancer Neg Hx    • Colon polyps Neg Hx      Social History     Socioeconomic History   • Marital status:    Tobacco Use   • Smoking status: Former Smoker     Types: Cigarettes     Quit date:      Years since quittin.5   • Smokeless tobacco: Never Used   Vaping Use   • Vaping Use: Never used   Substance and Sexual Activity   • Alcohol use: No   • Drug use: No   • Sexual activity: Defer     Allergies   Allergen Reactions   • Adhesive Tape Rash     Pt states that if it isn't left on very long it is okay   • Cefazolin Rash   • Cefuroxime Axetil Rash   • Cephalosporins Rash   • Neomycin-Polymyxin B Gu Rash   • Neosporin [Neomycin-Bacitracin Zn-Polymyx] Rash   • Percocet [Oxycodone-Acetaminophen] Rash     Current Outpatient Medications   Medication Sig Dispense Refill   • aspirin (aspirin) 81 MG EC tablet Take 81 mg by  mouth Daily.     • b complex vitamins capsule Take  by mouth.     • busPIRone (BUSPAR) 15 MG tablet Take 15 mg by mouth Daily.     • clopidogrel (PLAVIX) 75 MG tablet Take 75 mg by mouth Daily.     • cyanocobalamin 1000 MCG/ML injection Inject  into the shoulder, thigh, or buttocks 1 (One) Time Per Week.     • dilTIAZem CD (CARDIZEM CD) 240 MG 24 hr capsule Take 1 capsule by mouth daily     • docusate sodium (COLACE) 100 MG capsule Take  by mouth Daily.     • ferrous sulfate 325 (65 FE) MG EC tablet Take 325 mg by mouth Daily With Breakfast.     • furosemide (LASIX) 40 MG tablet Take 40 mg by mouth Daily.     • HYDROcodone-acetaminophen (NORCO) 7.5-325 MG per tablet Take 1-2 tablets by mouth Every 4 (Four) Hours As Needed for Moderate Pain (4-6) (Pain). 40 tablet 0   • meloxicam (MOBIC) 15 MG tablet Take 15 mg by mouth Daily.     • metFORMIN (GLUCOPHAGE) 1000 MG tablet Take 500 mg by mouth 2 (Two) Times a Day With Meals.     • nitroglycerin (NITROSTAT) 0.4 MG SL tablet Place 0.4 mg under the tongue Every 5 (Five) Minutes As Needed.     • pantoprazole (PROTONIX) 40 MG EC tablet Take 40 mg by mouth Daily.     • phenazopyridine (PYRIDIUM) 100 MG tablet Take 1 tablet by mouth 3 (Three) Times a Day As Needed for bladder spasms. 21 tablet 1   • polyethylene glycol (MIRALAX) powder Take 17 g by mouth Daily As Needed.     • pravastatin (PRAVACHOL) 40 MG tablet Take 40 mg by mouth Every Night.     • ranolazine (RANEXA) 1000 MG 12 hr tablet Take 1 tablet by mouth 2 times daily     • tamsulosin (FLOMAX) 0.4 MG capsule 24 hr capsule Take 1 capsule by mouth Daily. 30 capsule 2   • therapeutic multivitamin-minerals (THERAGRAN-M) tablet Take  by mouth.     • tiZANidine (ZANAFLEX) 4 MG tablet Take 4 mg by mouth As Needed for Muscle Spasms.     • traMADol (ULTRAM) 50 MG tablet TAKE 1 TABLET BY MOUTH FOUR TIMES DAILY AS NEEDED FOR PAIN  0   • Vitamin D, Cholecalciferol, 1000 UNITS tablet Take 1 tablet by mouth Daily.       No current  facility-administered medications for this visit.     Review of Systems   Constitutional: Negative for chills and fever.   HENT: Negative for congestion.    Respiratory: Negative for shortness of breath.    Cardiovascular: Negative for chest pain and leg swelling.   Gastrointestinal: Negative for constipation, diarrhea, nausea and vomiting.   Musculoskeletal: Positive for arthralgias.        Foot pain   Skin: Negative for wound.        Calluses   Neurological: Positive for numbness.       OBJECTIVE     Vitals:    06/30/22 1014   BP: 117/68   Pulse: 74   SpO2: 97%       PHYSICAL EXAM  GEN:   Accompanied by none.     Foot/Ankle Exam:       General:   Diabetic Foot Exam Performed    Appearance: appears stated age and healthy    Orientation: AAOx3    Affect: appropriate    Gait: unimpaired    Assistance: independent    Shoe Gear:  Casual shoes    VASCULAR      Right Foot Vascularity   Dorsalis pedis:  2+  Posterior tibial:  2+  Skin Temperature: warm    Edema Grading:  None  CFT:  3  Pedal Hair Growth:  Present  Varicosities: mild varicosities       Left Foot Vascularity   Dorsalis pedis:  2+  Posterior tibial:  2+  Skin Temperature: warm    Edema Grading:  None  CFT:  3  Pedal Hair Growth:  Present  Varicosities: mild varicosities        NEUROLOGIC     Right Foot Neurologic   Light touch sensation:  Diminished  Vibratory sensation:  Diminished  Hot/Cold sensation: diminished    Protective Sensation using [place] Varina-Derek Monofilament:  3     Left Foot Neurologic   Light touch sensation:  Diminished  Vibratory sensation:  Diminished  Hot/cold sensation: diminished    Protective Sensation using [place] Varina-Derek Monofilament:  4     MUSCULOSKELETAL      Right Foot Musculoskeletal   Ecchymosis:  None  Tenderness: right foot callus and toenails    Arch:  Normal     Left Foot Musculoskeletal   Ecchymosis:  None  Tenderness: left foot callus and toenails    Arch:  Normal     MUSCLE STRENGTH     Right Foot Muscle Strength    Foot dorsiflexion:  5  Foot plantar flexion:  5  Foot inversion:  5  Foot eversion:  5     Left Foot Muscle Strength   Foot dorsiflexion:  5  Foot plantar flexion:  5  Foot inversion:  5  Foot eversion:  5     RANGE OF MOTION      Right Foot Range of Motion   Foot and ankle ROM within normal limits       Left Foot Range of Motion   Foot and ankle ROM within normal limits       DERMATOLOGIC     Right Foot Dermatologic   Skin: corn and tinea    Nails: onychomycosis and abnormally thick       Left Foot Dermatologic   Skin: corn and tinea    Nails: onychomycosis and abnormally thick       Image:       RADIOLOGY/NUCLEAR:  No results found.    LABORATORY/CULTURE RESULTS:      PATHOLOGY RESULTS:       ASSESSMENT/PLAN     Diagnoses and all orders for this visit:    1. Onychomycosis (Primary)    2. Foot callus    3. Type 2 diabetes mellitus with diabetic polyneuropathy, without long-term current use of insulin (HCC)    4. History of diabetic ulcer of foot    5. Encounter for current long term use of antiplatelet drug      Comprehensive lower extremity examination and evaluation was performed.  Discussed findings and treatment plan including risks, benefits, and treatment options with patient in detail. Patient agreed with treatment plan.  After verbal consent obtained, nail(s) x10 debrided of length and thickness with nail nipper without incidence  After verbal consent obtained, calluses x6 pared utilizing dermal curette and/or scalpel without incidence  Patient may maintain nails and calluses at home utilizing emery board or pumice stone between visits as needed  Reviewed at home diabetic foot care including daily foot checks   Continue diabetic management per PCP.    An After Visit Summary was printed and given to the patient at discharge, including (if requested) any available informative/educational handouts regarding diagnosis, treatment, or medications. All questions were answered to patient/family satisfaction.  Should symptoms fail to improve or worsen they agree to call or return to clinic or to go to the Emergency Department. Discussed the importance of following up with any needed screening tests/labs/specialist appointments and any requested follow-up recommended by me today. Importance of maintaining follow-up discussed and patient accepts that missed appointments can delay diagnosis and potentially lead to worsening of conditions.  Return in about 3 months (around 9/30/2022)., or sooner if acute issues arise.    Lab Frequency Next Occurrence   Follow Anesthesia Guidelines / Standing Orders Once 04/12/2017   Follow Anesthesia Guidelines / Standing Orders Once 08/11/2017   Comprehensive Metabolic Panel Once 11/03/2021   CBC (No Diff) Once 11/03/2021   Phosphorus Once 11/03/2021   Magnesium Once 11/03/2021   Uric Acid Once 11/03/2021   Creatinine, Urine, Random - Urine, Clean Catch Once 10/29/2021   Urinalysis With Microscopic If Indicated (No Culture) - Urine, Clean Catch Once 10/29/2021   Vitamin D 25 Hydroxy Once 11/03/2021   PTH, Intact Once 11/03/2021       This document has been electronically signed by Silas Swan DPM on June 30, 2022 10:36 CDT

## 2022-06-30 ENCOUNTER — TELEPHONE (OUTPATIENT)
Dept: CARDIOLOGY CLINIC | Age: 67
End: 2022-06-30

## 2022-06-30 ENCOUNTER — OFFICE VISIT (OUTPATIENT)
Dept: PODIATRY | Facility: CLINIC | Age: 67
End: 2022-06-30

## 2022-06-30 VITALS
OXYGEN SATURATION: 97 % | WEIGHT: 215 LBS | HEIGHT: 71 IN | BODY MASS INDEX: 30.1 KG/M2 | DIASTOLIC BLOOD PRESSURE: 68 MMHG | HEART RATE: 74 BPM | SYSTOLIC BLOOD PRESSURE: 117 MMHG

## 2022-06-30 DIAGNOSIS — L84 FOOT CALLUS: ICD-10-CM

## 2022-06-30 DIAGNOSIS — E11.42 TYPE 2 DIABETES MELLITUS WITH DIABETIC POLYNEUROPATHY, WITHOUT LONG-TERM CURRENT USE OF INSULIN: ICD-10-CM

## 2022-06-30 DIAGNOSIS — Z86.31 HISTORY OF DIABETIC ULCER OF FOOT: ICD-10-CM

## 2022-06-30 DIAGNOSIS — Z79.02 ENCOUNTER FOR CURRENT LONG TERM USE OF ANTIPLATELET DRUG: ICD-10-CM

## 2022-06-30 DIAGNOSIS — B35.1 ONYCHOMYCOSIS: Primary | ICD-10-CM

## 2022-06-30 PROCEDURE — 11721 DEBRIDE NAIL 6 OR MORE: CPT | Performed by: PODIATRIST

## 2022-06-30 PROCEDURE — 11057 PARNG/CUTG B9 HYPRKR LES >4: CPT | Performed by: PODIATRIST

## 2022-06-30 NOTE — TELEPHONE ENCOUNTER
Patient cath time has changed to arrive at 1030A for 1230P procedure per [facility]. Patient notified.

## 2022-07-05 ENCOUNTER — HOSPITAL ENCOUNTER (OUTPATIENT)
Dept: CARDIAC CATH/INVASIVE PROCEDURES | Age: 67
Discharge: HOME | End: 2022-07-05
Attending: INTERNAL MEDICINE | Admitting: INTERNAL MEDICINE
Payer: MEDICARE

## 2022-07-05 ENCOUNTER — APPOINTMENT (OUTPATIENT)
Dept: GENERAL RADIOLOGY | Age: 67
End: 2022-07-05
Attending: INTERNAL MEDICINE
Payer: MEDICARE

## 2022-07-05 VITALS
HEART RATE: 76 BPM | WEIGHT: 215 LBS | SYSTOLIC BLOOD PRESSURE: 118 MMHG | OXYGEN SATURATION: 92 % | TEMPERATURE: 97 F | DIASTOLIC BLOOD PRESSURE: 68 MMHG | HEIGHT: 71 IN | RESPIRATION RATE: 14 BRPM | BODY MASS INDEX: 30.1 KG/M2

## 2022-07-05 LAB
CHOLESTEROL, TOTAL: 159 MG/DL (ref 160–199)
D DIMER: 0.55 UG/ML FEU (ref 0–0.48)
HDLC SERPL-MCNC: 37 MG/DL (ref 55–121)
LDL CHOLESTEROL CALCULATED: 80 MG/DL
LV EF: 55 %
LVEF MODALITY: NORMAL
PRO-BNP: 173 PG/ML (ref 0–900)
T4 FREE: 1.11 NG/DL (ref 0.93–1.7)
TESTOSTERONE TOTAL: 82.3 NG/DL (ref 193–740)
TRIGL SERPL-MCNC: 211 MG/DL (ref 0–149)
TSH SERPL DL<=0.05 MIU/L-ACNC: 4.22 UIU/ML (ref 0.27–4.2)

## 2022-07-05 PROCEDURE — 80061 LIPID PANEL: CPT

## 2022-07-05 PROCEDURE — 6360000004 HC RX CONTRAST MEDICATION: Performed by: INTERNAL MEDICINE

## 2022-07-05 PROCEDURE — 93459 L HRT ART/GRFT ANGIO: CPT

## 2022-07-05 PROCEDURE — 2709999900 HC NON-CHARGEABLE SUPPLY

## 2022-07-05 PROCEDURE — 84403 ASSAY OF TOTAL TESTOSTERONE: CPT

## 2022-07-05 PROCEDURE — C1769 GUIDE WIRE: HCPCS

## 2022-07-05 PROCEDURE — C8929 TTE W OR WO FOL WCON,DOPPLER: HCPCS

## 2022-07-05 PROCEDURE — 99152 MOD SED SAME PHYS/QHP 5/>YRS: CPT

## 2022-07-05 PROCEDURE — C1894 INTRO/SHEATH, NON-LASER: HCPCS

## 2022-07-05 PROCEDURE — 71046 X-RAY EXAM CHEST 2 VIEWS: CPT | Performed by: RADIOLOGY

## 2022-07-05 PROCEDURE — C1760 CLOSURE DEV, VASC: HCPCS

## 2022-07-05 PROCEDURE — 93459 L HRT ART/GRFT ANGIO: CPT | Performed by: INTERNAL MEDICINE

## 2022-07-05 PROCEDURE — 36415 COLL VENOUS BLD VENIPUNCTURE: CPT

## 2022-07-05 PROCEDURE — 2500000003 HC RX 250 WO HCPCS

## 2022-07-05 PROCEDURE — 84443 ASSAY THYROID STIM HORMONE: CPT

## 2022-07-05 PROCEDURE — 99152 MOD SED SAME PHYS/QHP 5/>YRS: CPT | Performed by: INTERNAL MEDICINE

## 2022-07-05 PROCEDURE — 6370000000 HC RX 637 (ALT 250 FOR IP): Performed by: INTERNAL MEDICINE

## 2022-07-05 PROCEDURE — 6360000002 HC RX W HCPCS

## 2022-07-05 PROCEDURE — 84439 ASSAY OF FREE THYROXINE: CPT

## 2022-07-05 PROCEDURE — 83880 ASSAY OF NATRIURETIC PEPTIDE: CPT

## 2022-07-05 PROCEDURE — 71046 X-RAY EXAM CHEST 2 VIEWS: CPT

## 2022-07-05 PROCEDURE — 85379 FIBRIN DEGRADATION QUANT: CPT

## 2022-07-05 RX ORDER — METOPROLOL TARTRATE 50 MG/1
50 TABLET, FILM COATED ORAL EVERY MORNING
Qty: 30 TABLET | Refills: 5 | Status: ON HOLD | OUTPATIENT
Start: 2022-07-05 | End: 2022-07-26 | Stop reason: HOSPADM

## 2022-07-05 RX ORDER — SODIUM CHLORIDE 0.9 % (FLUSH) 0.9 %
5-40 SYRINGE (ML) INJECTION PRN
Status: DISCONTINUED | OUTPATIENT
Start: 2022-07-05 | End: 2022-07-05 | Stop reason: HOSPADM

## 2022-07-05 RX ORDER — SODIUM CHLORIDE 9 MG/ML
INJECTION, SOLUTION INTRAVENOUS PRN
Status: DISCONTINUED | OUTPATIENT
Start: 2022-07-05 | End: 2022-07-05 | Stop reason: HOSPADM

## 2022-07-05 RX ORDER — HYDRALAZINE HYDROCHLORIDE 20 MG/ML
10 INJECTION INTRAMUSCULAR; INTRAVENOUS EVERY 10 MIN PRN
Status: DISCONTINUED | OUTPATIENT
Start: 2022-07-05 | End: 2022-07-05 | Stop reason: HOSPADM

## 2022-07-05 RX ORDER — ICOSAPENT ETHYL 1000 MG/1
2 CAPSULE ORAL 2 TIMES DAILY
COMMUNITY

## 2022-07-05 RX ORDER — DULOXETIN HYDROCHLORIDE 60 MG/1
60 CAPSULE, DELAYED RELEASE ORAL NIGHTLY
COMMUNITY
Start: 2022-05-20

## 2022-07-05 RX ORDER — ONDANSETRON 2 MG/ML
4 INJECTION INTRAMUSCULAR; INTRAVENOUS EVERY 6 HOURS PRN
Status: DISCONTINUED | OUTPATIENT
Start: 2022-07-05 | End: 2022-07-05 | Stop reason: HOSPADM

## 2022-07-05 RX ORDER — ASPIRIN 81 MG/1
81 TABLET ORAL ONCE
Status: COMPLETED | OUTPATIENT
Start: 2022-07-05 | End: 2022-07-05

## 2022-07-05 RX ORDER — SODIUM CHLORIDE 0.9 % (FLUSH) 0.9 %
5-40 SYRINGE (ML) INJECTION EVERY 12 HOURS SCHEDULED
Status: DISCONTINUED | OUTPATIENT
Start: 2022-07-05 | End: 2022-07-05 | Stop reason: HOSPADM

## 2022-07-05 RX ORDER — MULTIVIT WITH MINERALS/LUTEIN
250 TABLET ORAL DAILY
COMMUNITY

## 2022-07-05 RX ORDER — ISOSORBIDE MONONITRATE 30 MG/1
30 TABLET, EXTENDED RELEASE ORAL DAILY
Qty: 30 TABLET | Refills: 3 | Status: ON HOLD | OUTPATIENT
Start: 2022-07-05 | End: 2022-07-26 | Stop reason: HOSPADM

## 2022-07-05 RX ORDER — ACETAMINOPHEN 325 MG/1
650 TABLET ORAL EVERY 4 HOURS PRN
Status: DISCONTINUED | OUTPATIENT
Start: 2022-07-05 | End: 2022-07-05 | Stop reason: HOSPADM

## 2022-07-05 RX ORDER — SODIUM CHLORIDE 9 MG/ML
1000 INJECTION, SOLUTION INTRAVENOUS CONTINUOUS
Status: DISCONTINUED | OUTPATIENT
Start: 2022-07-05 | End: 2022-07-05 | Stop reason: HOSPADM

## 2022-07-05 RX ADMIN — IOPAMIDOL 190 ML: 612 INJECTION, SOLUTION INTRAVENOUS at 13:13

## 2022-07-05 RX ADMIN — ASPIRIN 81 MG: 81 TABLET, COATED ORAL at 12:01

## 2022-07-05 RX ADMIN — PERFLUTREN 1.5 ML: 6.52 INJECTION, SUSPENSION INTRAVENOUS at 16:06

## 2022-07-05 NOTE — H&P
Office Visit    5/19/2022  [facility] MD    Interventional Cardiology  Coronary artery disease involving native coronary artery of native heart without angina pectoris +4 more    Dx  6 Month Follow-Up    Reason for Visit       Progress Notes  Alyce Vergara MD (Physician) [facility] Inter[facility]  Expand All Collapse All  [facility] Progress Note                              Date:                5/19/2022  Patient:            William Acosta  Age:                 74 y.o., 1955        Reason for evaluation:            SUBJECTIVE:    Returns today follow-up assessment follow-up for coronary artery disease hypertension hyperlipidemia previous CABG. Feeling reasonably well denies exertional dyspnea or chest discomfort. His legs do get tired at times but this has been evaluated before. He is remaining moderately active. Blood pressure today 90/40 heart 90.     Review of Systems   Constitutional: Negative. Negative for chills, fever and unexpected weight change. HENT: Negative. Eyes: Negative. Respiratory: Negative. Negative for shortness of breath. Cardiovascular: Negative. Negative for chest pain. Gastrointestinal: Negative. Negative for diarrhea, nausea and vomiting. Endocrine: Negative. Genitourinary: Negative. Musculoskeletal: Negative. Skin: Negative. Neurological: Negative. All other systems reviewed and are negative.           OBJECTIVE:     BP (!) 90/40   Pulse 90   Ht 5' 11\" (1.803 m)   Wt 217 lb (98.4 kg)   BMI 30.27 kg/m²      Labs:   CBC: No results for input(s): WBC, HGB, HCT, PLT in the last 72 hours. BMP:No results for input(s): NA, K, CO2, BUN, CREATININE, LABGLOM, GLUCOSE in the last 72 hours. BNP: No results for input(s): BNP in the last 72 hours. PT/INR: No results for input(s): PROTIME, INR in the last 72 hours. APTT:No results for input(s): APTT in the last 72 hours.   CARDIAC ENZYMES:No results for input(s): CKTOTAL, CKMB, CKMBINDEX, TROPONINI in the last 72 hours. FASTING LIPID PANEL:        Lab Results   Component Value Date     HDL 35 03/30/2021     LDLDIRECT 99 10/29/2014     LDLCALC 70 03/30/2021     TRIG 220 03/30/2021      LIVER PROFILE:No results for input(s): AST, ALT, LABALBU in the last 72 hours.         Past Medical History        Past Medical History:   Diagnosis Date    Abnormal nuclear stress test 10/22/2014    Arthritis      BiPAP (biphasic positive airway pressure) dependence       8cm to 20cm    Cerebrovascular disease      Chronic kidney disease, stage II (mild) 08/17/2016     Morteza Villeda M.D.    Cirrhosis [facility])      Coronary artery disease 2/24/2011    Depression      Diabetes mellitus ([facility] [address]      Encounter for wound care       PT SEES \"GRACY\" WITH DR. SAMUEL    Great toe pain       RT    Headache      Hyperlipemia 2/24/2011     Dr. Eileen Calderon follows lipids.     Hyperlipidemia      Hypertension      Liver disease      LONG TERM ANTICOAGULENT USE      Low blood pressure 11/19/2014    lymphostatic lymphoma      Nausea 11/19/2014    Non Hodgkin's lymphoma (HCC)      Obesity      Obstructive sleep apnea       AHI:  21.7 per [facility], 7/2017    Peptic ulcer disease 2/24/2011    Restless leg      S/P CABG x 3 10/22/2014     SVG to RCA; SVG to LAD diagonal; LIMA to LAD    Sleep apnea      Tachyarrhythmia      Thrombocythemia, essential (HCC)      Type 2 diabetes mellitus without complication (HCC)      Type II or unspecified type diabetes mellitus without mention of complication, not stated as uncontrolled           Past Surgical History         Past Surgical History:   Procedure Laterality Date    CARDIAC CATHETERIZATION   2/28/11     EF 60%    CARDIAC CATHETERIZATION   9/16/05, 3/7/07     EF < 50%    CARDIAC CATHETERIZATION   12/4/12   MDL     EF  50%    CARDIAC CATHETERIZATION   10/28/14  MDL    CARDIAC CATHETERIZATION        CERVICAL DISC SURGERY         PAMELA Thorpe M.D.   Aakash Monica GRAFT   9/21/05     LIMA to LAD and diagonal; SVG to posterior descending branch RCA    CORONARY ARTERY BYPASS GRAFT   10/30/2014     Redo Sternotomy - SVG-PDA, TMR (RBL)    KIDNEY SURGERY   08/14/2017    KNEE ARTHROSCOPY         right ACL repair    SHOULDER ARTHROSCOPY         left-rotator cuff repair right- rotator cuff repair    SHOULDER ARTHROSCOPY   08-23-11     right    TONSILLECTOMY             Family History         Family History   Problem Relation Age of Onset    Heart Disease Other      Lung Cancer Mother      Cancer Sister                 Allergies   Allergen Reactions    Ancef [Cefazolin Sodium]      Ceftin [Cefuroxime]         \"anything with ceftin in it\"    Cefuroxime Axetil      Cephalosporins      Neosporin [Bacitracin-Polymyxin B]         blisters      Current Facility-Administered Medications          Current Outpatient Medications   Medication Sig Dispense Refill    empagliflozin (JARDIANCE) 25 MG tablet Take 25 mg by mouth daily        metFORMIN (GLUCOPHAGE) 500 MG tablet Take 500 mg by mouth 2 times daily (with meals)        Insulin Degludec (TRESIBA) 100 UNIT/ML SOLN Inject 6 Units into the skin nightly        losartan (COZAAR) 25 MG tablet Take 25 mg by mouth daily        atorvastatin (LIPITOR) 40 MG tablet TAKE 1 TABLET BY MOUTH NIGHTLY AT BEDTIME        tamsulosin (FLOMAX) 0.4 MG capsule Take 1 capsule by mouth daily (Patient taking differently: Take 0.2 mg by mouth daily ) 90 capsule 3    gabapentin (NEURONTIN) 100 MG capsule Take 1 capsule by mouth 2 times daily.        busPIRone (BUSPAR) 15 MG tablet Take 7.5 mg by mouth 2 times daily        metoprolol tartrate (LOPRESSOR) 25 MG tablet Take 1 tablet by mouth 2 times daily 60 tablet 5    ranolazine (RANEXA) 1000 MG extended release tablet Take 1 tablet by mouth 2 times daily 180 tablet 3    clopidogrel (PLAVIX) 75 MG tablet One daily (Patient taking differently: Take 75 mg by mouth daily ) 90 tablet 3    acetaminophen (TYLENOL) 500 MG tablet Take 1,000 mg by mouth every 6 hours as needed for Pain        nitroGLYCERIN (NITROSTAT) 0.4 MG SL tablet Place 1 tablet under the tongue every 5 minutes as needed for Chest pain (Patient taking differently: Place 0.4 mg under the tongue every 5 minutes as needed for Chest pain Indications: has not had to take ) 25 tablet 5    DULoxetine (CYMBALTA) 30 MG extended release capsule Take 30 mg by mouth nightly Indications: patient stated received medications yesterday 21         levothyroxine (SYNTHROID) 50 MCG tablet Take 50 mcg by mouth Daily        diltiazem (CARDIZEM CD) 180 MG extended release capsule Take 1 capsule by mouth daily 90 capsule 3    ondansetron (ZOFRAN) 8 MG tablet Take 8 mg by mouth as needed         tiZANidine (ZANAFLEX) 4 MG tablet 1/2 tablet at night        ferrous sulfate 325 (65 Fe) MG tablet Take 325 mg by mouth 2 times daily        pantoprazole (PROTONIX) 40 MG tablet Take 1 tablet by mouth daily 90 tablet 2    b complex vitamins capsule Take 1 capsule by mouth daily.        Vitamin D (CHOLECALCIFEROL) 1000 UNITS CAPS capsule Take 1,000 Units by mouth daily.        docusate sodium (COLACE) 100 MG capsule Take 100 mg by mouth daily.          No current facility-administered medications for this visit.         Social History               Socioeconomic History    Marital status:        Spouse name: Imani Loja Number of children: 2    Years of education: 12    Highest education level: Not on file   Occupational History    Occupation:        Employer: [employer]       Comment: Retired,  for [employer].    Tobacco Use    Smoking status: Former Smoker       Packs/day: 0.00       Types: Cigarettes       Quit date: 1998       Years since quittin.3    Smokeless tobacco: Never Used    Tobacco comment: quit 23 years ago   Vaping Use    Vaping Use: Never used   Substance and Sexual Activity    Alcohol use: No    Drug use: No    Sexual activity: Yes       Partners: Female   Other Topics Concern    Not on file   Social History Narrative    Not on file      Social Determinants of Health          Financial Resource Strain:     Difficulty of Paying Living Expenses: Not on file   Food Insecurity:     Worried About Running Out of Food in the Last Year: Not on file    Duane of Food in the Last Year: Not on file   Transportation Needs:     Lack of Transportation (Medical): Not on file    Lack of Transportation (Non-Medical): Not on file   Physical Activity:     Days of Exercise per Week: Not on file    Minutes of Exercise per Session: Not on file   Stress:     Feeling of Stress : Not on file   Social Connections:     Frequency of Communication with Friends and Family: Not on file    Frequency of Social Gatherings with Friends and Family: Not on file    Attends Yazdanism Services: Not on file    Active Member of [address] [place] [address] or Organizations: Not on file    Attends Club or Organization Meetings: Not on file    Marital Status: Not on file   Intimate Partner Violence:     Fear of Current or Ex-Partner: Not on file    Emotionally Abused: Not on file    Physically Abused: Not on file    Sexually Abused: Not on file   Housing Stability:     Unable to Pay for Housing in the Last Year: Not on file    Number of [place] in the Last Year: Not on file    Unstable Housing in the Last Year: Not on file            Physical Examination:  BP (!) 90/40   Pulse 90   Ht 5' 11\" (1.803 m)   Wt 217 lb (98.4 kg)   BMI 30.27 kg/m²   Physical Exam  Vitals reviewed. Constitutional:       Appearance: He is well-developed. Neck:      Vascular: No carotid bruit or JVD. Cardiovascular:      Rate and Rhythm: Normal rate and regular rhythm.       Heart sounds: Normal heart sounds. No murmur heard. No friction rub. No gallop. Pulmonary:      Effort: Pulmonary effort is normal. No respiratory distress. Breath sounds: Normal breath sounds. No wheezing or rales. Abdominal:      General: There is no distension. Tenderness: There is no abdominal tenderness. Lymphadenopathy:      Cervical: No cervical adenopathy. Skin:     General: Skin is warm and dry.                ASSESSMENT:       Diagnosis Orders   1. Coronary artery disease involving native coronary artery of native heart without angina pectoris  EKG 12 lead   2. Primary hypertension      3. Status post aorto-coronary artery bypass graft      4. Mixed hyperlipidemia      5. JOHNSON (dyspnea on exertion)            PLAN:  Orders Placed This Encounter   Procedures    EKG 12 lead        Encounter Medications    No orders of the defined types were placed in this encounter.            1. Continue present medications  2. Recommend follow-up assessment in 6 months     Return in about 6 months (around 11/19/2022) for return to Dr. Kelsi Ortiz only.        Ash Krishna MD 5/19/2022 11:07 AM CDT     [facility]             Now here for elective diagnostic cardiac catheterization*nurse practitioner 6/28/2022 complains of increasing fatigue discomfort in the arms and legs and pressure in the chest recently along with increased shortness of breath relieved by rest.  Symptoms similar to what he experienced prior to his bypass surgery. Advised indication alternatives benefits and risk patient agreeable plan today.   I have discussed with the patient regarding indications for the proposed procedure LEFT HEART CATHETERIZATION AND POSSIBLE PERCUTANEOUS INTERVENTION  along with possible alternatives benefits and risks including but not limited to risks of death, myocardial infarction, stroke, contrast induced nephropathy which in some cases may lead to acute kidney failure requiring dialysis, allergic reactions, bleeding requiring blood transfusion,  cardiac arrhthymias, respiratory failure which may require placing the patient on respiratory support such as a ventilator or breathing machine,risk of complications which may require vascular surgery, and if coronary intervention is performed emergency CABG may be required in less than 1% of cases. The patient is awake and alert and understands the issues involved and indicates willingness to proceed as ordered. The patient does not have any contraindications to dual antiplatelet therapy. The patient does not have any known  pending surgical procedures in the next 12 months at this time. The patient is  a reasonable candidate for moderate conscious sedation.     ASA score:  ASA 3 - Patient with moderate systemic disease with functional limitations    Mallampati: I (soft palate, uvula, fauces, tonsillar pillars visible)    Preferred vascular access site will be: right Right common femoral artery

## 2022-07-05 NOTE — PROGRESS NOTES
Patient ambulated in [place] without difficulty/complaints of pain. Right groin site c/d/i post ambulation.   Electronically signed by Claudio Panchal RN on 7/5/2022 at 4:18 PM

## 2022-07-05 NOTE — PROGRESS NOTES
Cardiac catheterization preliminary note right common femoral artery retrograde approach tolerated well left ventricular function satisfactory LIMA graft to diagonal and LAD widely patent saphenous vein graft to right coronary artery occluded saphenous vein graft to ramus branch patent with approximate 50% proximal stenosis proximal ramus appears free of significant disease prior to the graft insertion site medical management recommended there is a septal  with moderately severe 80 to 90% stenosis in the proximal segment of the right coronary artery 100% occluded mid distal

## 2022-07-13 ENCOUNTER — TELEPHONE (OUTPATIENT)
Dept: CARDIOLOGY CLINIC | Age: 67
End: 2022-07-13

## 2022-07-13 NOTE — TELEPHONE ENCOUNTER
Kristi Kenney with [employer] called in advising they received an appeal and they are advising it is not needed and it is back for clinical review  Please advise  Thank you

## 2022-07-24 ENCOUNTER — APPOINTMENT (OUTPATIENT)
Dept: CT IMAGING | Age: 67
DRG: 074 | End: 2022-07-24
Payer: MEDICARE

## 2022-07-24 ENCOUNTER — HOSPITAL ENCOUNTER (INPATIENT)
Age: 67
LOS: 2 days | Discharge: HOME | DRG: 074 | End: 2022-07-26
Attending: EMERGENCY MEDICINE | Admitting: INTERNAL MEDICINE
Payer: MEDICARE

## 2022-07-24 ENCOUNTER — APPOINTMENT (OUTPATIENT)
Dept: GENERAL RADIOLOGY | Age: 67
DRG: 074 | End: 2022-07-24
Payer: MEDICARE

## 2022-07-24 DIAGNOSIS — D72.819 LEUKOPENIA, UNSPECIFIED TYPE: ICD-10-CM

## 2022-07-24 DIAGNOSIS — I95.9 HYPOTENSION, UNSPECIFIED HYPOTENSION TYPE: Primary | ICD-10-CM

## 2022-07-24 DIAGNOSIS — D64.9 ANEMIA, UNSPECIFIED TYPE: ICD-10-CM

## 2022-07-24 DIAGNOSIS — R55 SYNCOPE AND COLLAPSE: ICD-10-CM

## 2022-07-24 DIAGNOSIS — D69.6 THROMBOCYTOPENIA (HCC): ICD-10-CM

## 2022-07-24 DIAGNOSIS — I10 ESSENTIAL HYPERTENSION: ICD-10-CM

## 2022-07-24 PROBLEM — C88.0 WALDENSTROM'S MACROGLOBULINEMIA (HCC): Status: ACTIVE | Noted: 2022-07-24

## 2022-07-24 PROBLEM — C88.00 WALDENSTROM'S MACROGLOBULINEMIA: Status: ACTIVE | Noted: 2022-07-24

## 2022-07-24 LAB
ALBUMIN SERPL-MCNC: 3.9 G/DL (ref 3.5–5.2)
ALP BLD-CCNC: 84 U/L (ref 40–130)
ALT SERPL-CCNC: 12 U/L (ref 5–41)
ANION GAP SERPL CALCULATED.3IONS-SCNC: 12 MMOL/L (ref 7–19)
AST SERPL-CCNC: 16 U/L (ref 5–40)
BILIRUB SERPL-MCNC: 0.5 MG/DL (ref 0.2–1.2)
BILIRUBIN URINE: NEGATIVE
BLOOD, URINE: NEGATIVE
BUN BLDV-MCNC: 21 MG/DL (ref 8–23)
CALCIUM SERPL-MCNC: 9.4 MG/DL (ref 8.8–10.2)
CHLORIDE BLD-SCNC: 101 MMOL/L (ref 98–111)
CLARITY: ABNORMAL
CO2: 24 MMOL/L (ref 22–29)
COLOR: ABNORMAL
CREAT SERPL-MCNC: 1.1 MG/DL (ref 0.5–1.2)
D DIMER: 0.61 UG/ML FEU (ref 0–0.48)
GFR AFRICAN AMERICAN: >59
GFR NON-AFRICAN AMERICAN: >60
GLUCOSE BLD-MCNC: 138 MG/DL (ref 74–109)
GLUCOSE BLD-MCNC: 173 MG/DL (ref 70–99)
GLUCOSE URINE: =>1000 MG/DL
HBA1C MFR BLD: 6.5 % (ref 4–6)
HCT VFR BLD CALC: 30.9 % (ref 42–52)
HEMOGLOBIN: 9.7 G/DL (ref 14–18)
KETONES, URINE: NEGATIVE MG/DL
LACTIC ACID: 1.5 MMOL/L (ref 0.5–1.9)
LACTIC ACID: 2.3 MMOL/L (ref 0.5–1.9)
LEUKOCYTE ESTERASE, URINE: NEGATIVE
MCH RBC QN AUTO: 32.4 PG (ref 27–31)
MCHC RBC AUTO-ENTMCNC: 31.4 G/DL (ref 33–37)
MCV RBC AUTO: 103.3 FL (ref 80–94)
NITRITE, URINE: NEGATIVE
PDW BLD-RTO: 15.6 % (ref 11.5–14.5)
PERFORMED ON: ABNORMAL
PH UA: 5 (ref 5–8)
PLATELET # BLD: 71 K/UL (ref 130–400)
PMV BLD AUTO: 9.9 FL (ref 9.4–12.4)
POTASSIUM REFLEX MAGNESIUM: 4.5 MMOL/L (ref 3.5–5)
PROTEIN UA: NEGATIVE MG/DL
RBC # BLD: 2.99 M/UL (ref 4.7–6.1)
SARS-COV-2, NAAT: NOT DETECTED
SODIUM BLD-SCNC: 137 MMOL/L (ref 136–145)
SPECIFIC GRAVITY UA: 1.03 (ref 1–1.03)
T4 FREE: 1.21 NG/DL (ref 0.93–1.7)
TOTAL PROTEIN: 6.4 G/DL (ref 6.6–8.7)
TROPONIN: <0.01 NG/ML (ref 0–0.03)
TSH REFLEX FT4: 5.32 UIU/ML (ref 0.35–5.5)
UROBILINOGEN, URINE: 1 E.U./DL
WBC # BLD: 4 K/UL (ref 4.8–10.8)

## 2022-07-24 PROCEDURE — 72125 CT NECK SPINE W/O DYE: CPT | Performed by: RADIOLOGY

## 2022-07-24 PROCEDURE — 70450 CT HEAD/BRAIN W/O DYE: CPT

## 2022-07-24 PROCEDURE — 84443 ASSAY THYROID STIM HORMONE: CPT

## 2022-07-24 PROCEDURE — G0378 HOSPITAL OBSERVATION PER HR: HCPCS

## 2022-07-24 PROCEDURE — 85027 COMPLETE CBC AUTOMATED: CPT

## 2022-07-24 PROCEDURE — 72125 CT NECK SPINE W/O DYE: CPT

## 2022-07-24 PROCEDURE — 2580000003 HC RX 258: Performed by: EMERGENCY MEDICINE

## 2022-07-24 PROCEDURE — 71045 X-RAY EXAM CHEST 1 VIEW: CPT | Performed by: RADIOLOGY

## 2022-07-24 PROCEDURE — 36415 COLL VENOUS BLD VENIPUNCTURE: CPT

## 2022-07-24 PROCEDURE — 84439 ASSAY OF FREE THYROXINE: CPT

## 2022-07-24 PROCEDURE — 96360 HYDRATION IV INFUSION INIT: CPT

## 2022-07-24 PROCEDURE — 71045 X-RAY EXAM CHEST 1 VIEW: CPT

## 2022-07-24 PROCEDURE — 70450 CT HEAD/BRAIN W/O DYE: CPT | Performed by: RADIOLOGY

## 2022-07-24 PROCEDURE — 99285 EMERGENCY DEPT VISIT HI MDM: CPT

## 2022-07-24 PROCEDURE — 84484 ASSAY OF TROPONIN QUANT: CPT

## 2022-07-24 PROCEDURE — 96361 HYDRATE IV INFUSION ADD-ON: CPT

## 2022-07-24 PROCEDURE — 83036 HEMOGLOBIN GLYCOSYLATED A1C: CPT

## 2022-07-24 PROCEDURE — 6370000000 HC RX 637 (ALT 250 FOR IP): Performed by: INTERNAL MEDICINE

## 2022-07-24 PROCEDURE — 81003 URINALYSIS AUTO W/O SCOPE: CPT

## 2022-07-24 PROCEDURE — 93005 ELECTROCARDIOGRAM TRACING: CPT | Performed by: EMERGENCY MEDICINE

## 2022-07-24 PROCEDURE — 1210000000 HC MED SURG R&B

## 2022-07-24 PROCEDURE — 83605 ASSAY OF LACTIC ACID: CPT

## 2022-07-24 PROCEDURE — 85379 FIBRIN DEGRADATION QUANT: CPT

## 2022-07-24 PROCEDURE — 87635 SARS-COV-2 COVID-19 AMP PRB: CPT

## 2022-07-24 PROCEDURE — 80053 COMPREHEN METABOLIC PANEL: CPT

## 2022-07-24 PROCEDURE — 82947 ASSAY GLUCOSE BLOOD QUANT: CPT

## 2022-07-24 RX ORDER — ASPIRIN 81 MG/1
81 TABLET, CHEWABLE ORAL DAILY
Status: DISCONTINUED | OUTPATIENT
Start: 2022-07-25 | End: 2022-07-26 | Stop reason: HOSPADM

## 2022-07-24 RX ORDER — ONDANSETRON 4 MG/1
4 TABLET, ORALLY DISINTEGRATING ORAL EVERY 8 HOURS PRN
Status: DISCONTINUED | OUTPATIENT
Start: 2022-07-24 | End: 2022-07-26 | Stop reason: HOSPADM

## 2022-07-24 RX ORDER — INSULIN LISPRO 100 [IU]/ML
0-4 INJECTION, SOLUTION INTRAVENOUS; SUBCUTANEOUS NIGHTLY
Status: DISCONTINUED | OUTPATIENT
Start: 2022-07-24 | End: 2022-07-26 | Stop reason: HOSPADM

## 2022-07-24 RX ORDER — INSULIN LISPRO 100 [IU]/ML
4 INJECTION, SOLUTION INTRAVENOUS; SUBCUTANEOUS
Status: DISCONTINUED | OUTPATIENT
Start: 2022-07-25 | End: 2022-07-26 | Stop reason: HOSPADM

## 2022-07-24 RX ORDER — INSULIN GLARGINE 100 [IU]/ML
20 INJECTION, SOLUTION SUBCUTANEOUS NIGHTLY
Status: DISCONTINUED | OUTPATIENT
Start: 2022-07-24 | End: 2022-07-26 | Stop reason: HOSPADM

## 2022-07-24 RX ORDER — ATORVASTATIN CALCIUM 40 MG/1
40 TABLET, FILM COATED ORAL NIGHTLY
Status: DISCONTINUED | OUTPATIENT
Start: 2022-07-24 | End: 2022-07-26 | Stop reason: HOSPADM

## 2022-07-24 RX ORDER — POLYETHYLENE GLYCOL 3350 17 G/17G
17 POWDER, FOR SOLUTION ORAL DAILY PRN
Status: DISCONTINUED | OUTPATIENT
Start: 2022-07-24 | End: 2022-07-26 | Stop reason: HOSPADM

## 2022-07-24 RX ORDER — DEXTROSE MONOHYDRATE 100 MG/ML
INJECTION, SOLUTION INTRAVENOUS CONTINUOUS PRN
Status: DISCONTINUED | OUTPATIENT
Start: 2022-07-24 | End: 2022-07-26 | Stop reason: HOSPADM

## 2022-07-24 RX ORDER — INSULIN LISPRO 100 [IU]/ML
0-4 INJECTION, SOLUTION INTRAVENOUS; SUBCUTANEOUS
Status: DISCONTINUED | OUTPATIENT
Start: 2022-07-25 | End: 2022-07-26 | Stop reason: HOSPADM

## 2022-07-24 RX ORDER — ONDANSETRON 2 MG/ML
4 INJECTION INTRAMUSCULAR; INTRAVENOUS EVERY 6 HOURS PRN
Status: DISCONTINUED | OUTPATIENT
Start: 2022-07-24 | End: 2022-07-26 | Stop reason: HOSPADM

## 2022-07-24 RX ORDER — 0.9 % SODIUM CHLORIDE 0.9 %
500 INTRAVENOUS SOLUTION INTRAVENOUS ONCE
Status: COMPLETED | OUTPATIENT
Start: 2022-07-24 | End: 2022-07-24

## 2022-07-24 RX ORDER — SODIUM CHLORIDE 9 MG/ML
INJECTION, SOLUTION INTRAVENOUS PRN
Status: DISCONTINUED | OUTPATIENT
Start: 2022-07-24 | End: 2022-07-26 | Stop reason: HOSPADM

## 2022-07-24 RX ORDER — NITROGLYCERIN 0.4 MG/1
0.4 TABLET SUBLINGUAL EVERY 5 MIN PRN
Status: DISCONTINUED | OUTPATIENT
Start: 2022-07-24 | End: 2022-07-26 | Stop reason: HOSPADM

## 2022-07-24 RX ORDER — SODIUM CHLORIDE 0.9 % (FLUSH) 0.9 %
10 SYRINGE (ML) INJECTION PRN
Status: DISCONTINUED | OUTPATIENT
Start: 2022-07-24 | End: 2022-07-26 | Stop reason: HOSPADM

## 2022-07-24 RX ORDER — ACETAMINOPHEN 650 MG/1
650 SUPPOSITORY RECTAL EVERY 6 HOURS PRN
Status: DISCONTINUED | OUTPATIENT
Start: 2022-07-24 | End: 2022-07-26 | Stop reason: HOSPADM

## 2022-07-24 RX ORDER — SODIUM CHLORIDE 0.9 % (FLUSH) 0.9 %
5-40 SYRINGE (ML) INJECTION EVERY 12 HOURS SCHEDULED
Status: DISCONTINUED | OUTPATIENT
Start: 2022-07-24 | End: 2022-07-26 | Stop reason: HOSPADM

## 2022-07-24 RX ORDER — ACETAMINOPHEN 325 MG/1
650 TABLET ORAL EVERY 6 HOURS PRN
Status: DISCONTINUED | OUTPATIENT
Start: 2022-07-24 | End: 2022-07-26 | Stop reason: HOSPADM

## 2022-07-24 RX ADMIN — ATORVASTATIN CALCIUM 40 MG: 40 TABLET, FILM COATED ORAL at 21:34

## 2022-07-24 RX ADMIN — ACETAMINOPHEN 650 MG: 325 TABLET ORAL at 21:39

## 2022-07-24 RX ADMIN — INSULIN GLARGINE 20 UNITS: 100 INJECTION, SOLUTION SUBCUTANEOUS at 21:46

## 2022-07-24 RX ADMIN — SODIUM CHLORIDE 500 ML: 9 INJECTION, SOLUTION INTRAVENOUS at 14:15

## 2022-07-24 ASSESSMENT — PAIN SCALES - GENERAL
PAINLEVEL_OUTOF10: 7
PAINLEVEL_OUTOF10: 0
PAINLEVEL_OUTOF10: 2

## 2022-07-24 ASSESSMENT — ENCOUNTER SYMPTOMS
VOMITING: 0
SHORTNESS OF BREATH: 0
DIARRHEA: 0
BLOOD IN STOOL: 0
ABDOMINAL PAIN: 0
EYE PAIN: 0
BACK PAIN: 0

## 2022-07-24 ASSESSMENT — PAIN DESCRIPTION - LOCATION
LOCATION: HEAD
LOCATION: HEAD

## 2022-07-24 ASSESSMENT — PAIN - FUNCTIONAL ASSESSMENT: PAIN_FUNCTIONAL_ASSESSMENT: 0-10

## 2022-07-24 ASSESSMENT — PAIN DESCRIPTION - DESCRIPTORS
DESCRIPTORS: ACHING
DESCRIPTORS: ACHING

## 2022-07-24 ASSESSMENT — PAIN DESCRIPTION - FREQUENCY: FREQUENCY: OTHER (COMMENT)

## 2022-07-24 NOTE — ED PROVIDER NOTES
[facility] EMERGENCY DEPT  eMERGENCY dEPARTMENT eNCOUnter      Pt Name: Janie Duggan  MRN: [id number]  [place] 1955  Date of evaluation: 7/24/2022  Provider: Arabella Enriquez MD    CHIEF COMPLAINT       Chief Complaint   Patient presents with    Loss of Consciousness     Pt arrives via EMS from [facility]. He reports dizziness all morning. While standing at [facility], pt passed out. When he hit ground, he woke up. Pt states \"he feels like he is running out of blood\". HISTORY OF PRESENT ILLNESS   (Location/Symptom, Timing/Onset,Context/Setting, Quality, Duration, Modifying Factors, Severity)  Note limiting factors. Janie Duggan is a 74 y.o. male who presents to the emergency department due to syncopal episode. Patient said that he is felt lightheaded all morning. This persisted and while he was at [employer] after getting up he went to walk across the room and passed out. Feels lightheaded and fatigued still. No chest pain. No dyspnea. Has a headache but he said this is similar to his chronic headache. No vision changes numbness or weakness. No abdominal pain nausea vomiting diarrhea. No dyspnea. Hemoptysis. No leg swelling or pain. No urinary complaints. Denies black or bloody stools. Only complaint at this time is generalized fatigue. Denies any injuries from his fall. Has neck pain chronically which is similar to baseline. No back pain. No injury to the arms or legs. HPI    NursingNotes were reviewed. REVIEW OF SYSTEMS    (2-9 systems for level 4, 10 or more for level 5)     Review of Systems   Constitutional:  Negative for fever. Eyes:  Negative for pain and visual disturbance. Respiratory:  Negative for shortness of breath. Cardiovascular:  Negative for chest pain and palpitations. Gastrointestinal:  Negative for abdominal pain, blood in stool, diarrhea and vomiting. Genitourinary:  Negative for difficulty urinating and dysuria.    Musculoskeletal:  Positive for neck pain (chronic). Negative for back pain. Skin:  Negative for rash. Neurological:  Positive for dizziness, syncope, light-headedness and headaches (chronic). Negative for facial asymmetry, weakness and numbness. All other systems reviewed and are negative. A complete review of systems was performed and is negative except as noted above in the HPI. PAST MEDICAL HISTORY     Past Medical History:   Diagnosis Date    Abnormal nuclear stress test 10/22/2014    Arthritis     BiPAP (biphasic positive airway pressure) dependence     8cm to 20cm    Cerebrovascular disease     Chronic kidney disease, stage II (mild) 08/17/2016    Luis Armando Parmar M.D. Cirrhosis ([facility] [address]     Coronary artery disease 02/24/2011    Depression     Diabetes mellitus ([facility] [address]     Encounter for wound care     PT SEES \"GRACY\" WITH DR. SAMUEL    Great toe pain     RT    Headache     Hyperlipemia 02/24/2011    Dr. Soto Linker follows lipids.     Hyperlipidemia     Hypertension     Liver disease     LONG TERM ANTICOAGULENT USE     Low blood pressure 11/19/2014    lymphostatic lymphoma     Nausea 11/19/2014    Non Hodgkin's lymphoma (HCC)     Obesity     Obstructive sleep apnea     AHI:  21.7 per PS[facility], 7/2017    Peptic ulcer disease 02/24/2011    Restless leg     S/P CABG x 3 10/22/2014    SVG to RCA; SVG to LAD diagonal; LIMA to DCS(5781, 2014)    Sleep apnea     Tachyarrhythmia     Thrombocythemia, essential ([address]     Thyroid disease     Type 2 diabetes mellitus without complication ([facility] [address]     Type II or unspecified type diabetes mellitus without mention of complication, not stated as uncontrolled          SURGICAL HISTORY       Past Surgical History:   Procedure Laterality Date    CARDIAC CATHETERIZATION  2/28/11    EF 60%    CARDIAC CATHETERIZATION  9/16/05, 3/7/07    EF < 50%    CARDIAC CATHETERIZATION  12/4/12   MDL    EF  50%    CARDIAC CATHETERIZATION  10/28/14  MDL    CARDIAC CATHETERIZATION      [facility] CERVICAL SPINE SURGERY      COLONOSCOPY      CORONARY ARTERY BYPASS GRAFT  9/21/05    LIMA to LAD and diagonal; SVG to posterior descending branch RCA    CORONARY ARTERY BYPASS GRAFT  10/30/2014    Redo Sternotomy - SVG-PDA, TMR (RBL)    JOINT REPLACEMENT Right     knee    KIDNEY SURGERY  08/14/2017    KNEE ARTHROSCOPY      right ACL repair    SHOULDER ARTHROSCOPY      left-rotator cuff repair right- rotator cuff repair    SHOULDER ARTHROSCOPY  08-23-11    right    TONSILLECTOMY           CURRENT MEDICATIONS       Previous Medications    ACETAMINOPHEN (TYLENOL) 500 MG TABLET    Take 1,000 mg by mouth every 6 hours as needed for Pain    ASCORBIC ACID (VITAMIN C) 250 MG TABLET    Take 250 mg by mouth daily    ATORVASTATIN (LIPITOR) 40 MG TABLET    TAKE 1 TABLET BY MOUTH NIGHTLY AT BEDTIME    B COMPLEX VITAMINS CAPSULE    Take 1 capsule by mouth daily. BUSPIRONE (BUSPAR) 15 MG TABLET    Take 15 mg by mouth 2 times daily     CLOPIDOGREL (PLAVIX) 75 MG TABLET    One daily    DILTIAZEM (CARDIZEM CD) 180 MG EXTENDED RELEASE CAPSULE    Take 1 capsule by mouth daily    DOCUSATE SODIUM (COLACE) 100 MG CAPSULE    Take 100 mg by mouth daily. DULOXETINE (CYMBALTA) 60 MG EXTENDED RELEASE CAPSULE        EMPAGLIFLOZIN (JARDIANCE) 25 MG TABLET    Take 25 mg by mouth daily    FERROUS SULFATE 325 (65 FE) MG TABLET    Take 325 mg by mouth 2 times daily    GABAPENTIN (NEURONTIN) 100 MG CAPSULE    Take 1 capsule by mouth 2 times daily.     GARLIC-CALCIUM PO    Take by mouth    ICOSAPENT ETHYL (VASCEPA) 1 G CAPS CAPSULE    Take 2 capsules by mouth 2 times daily    INSULIN DEGLUDEC (TRESIBA) 100 UNIT/ML SOLN    Inject 6 Units into the skin nightly    ISOSORBIDE MONONITRATE (IMDUR) 30 MG EXTENDED RELEASE TABLET    Take 1 tablet by mouth daily    LEVOTHYROXINE (SYNTHROID) 50 MCG TABLET    Take 50 mcg by mouth Daily    LOSARTAN (COZAAR) 25 MG TABLET    Take 25 mg by mouth daily    METFORMIN (GLUCOPHAGE) 500 MG TABLET    Take 1 tablet by mouth 2 times daily (with meals) Please hold for 48 hrs after cardiac catheterization. You may resume like normal on 2022. METOPROLOL TARTRATE (LOPRESSOR) 25 MG TABLET    Take 1 tablet by mouth every evening    METOPROLOL TARTRATE (LOPRESSOR) 50 MG TABLET    Take 1 tablet by mouth every morning    NITROGLYCERIN (NITROSTAT) 0.4 MG SL TABLET    Place 1 tablet under the tongue every 5 minutes as needed for Chest pain    ONDANSETRON (ZOFRAN) 8 MG TABLET    Take 8 mg by mouth as needed     PANTOPRAZOLE (PROTONIX) 40 MG TABLET    Take 1 tablet by mouth daily    RANOLAZINE (RANEXA) 1000 MG EXTENDED RELEASE TABLET    Take 1 tablet by mouth 2 times daily    TAMSULOSIN (FLOMAX) 0.4 MG CAPSULE    Take 1 capsule by mouth daily    TIZANIDINE (ZANAFLEX) 4 MG TABLET    1/2 tablet at night    VITAMIN D (CHOLECALCIFEROL) 1000 UNITS CAPS CAPSULE    Take 1,000 Units by mouth daily. ZINC SULFATE (ZINC 15 PO)    Take by mouth       ALLERGIES     Ancef [cefazolin sodium], Ceftin [cefuroxime], Cefuroxime axetil, Cephalosporins, and Neosporin [bacitracin-polymyxin b]    FAMILY HISTORY       Family History   Problem Relation Age of Onset    Heart Disease Other     Lung Cancer Mother     Cancer Sister           SOCIAL HISTORY       Social History     Socioeconomic History    Marital status:      Spouse name: Tita Corbin    Number of children: 2    Years of education: 15   Occupational History    Occupation:      Employer: [employer]     Comment: Retired,  for the [employer]    Tobacco Use    Smoking status: Former     Packs/day: 0.00     Types: Cigarettes     Quit date: 1998     Years since quittin.4    Smokeless tobacco: Never    Tobacco comments:     quit 23 years ago   Vaping Use    Vaping Use: Never used   Substance and Sexual Activity    Alcohol use: No    Drug use: No    Sexual activity: Yes     Partners: Female       SCREENINGS Cranial nerves are intact. Sensory: Sensation is intact. Motor: Motor function is intact. Coordination: Coordination is intact. Psychiatric:         Mood and Affect: Mood normal.         Behavior: Behavior normal.       DIAGNOSTIC RESULTS     EKG: All EKG's are interpreted by the Emergency Department Physician who either signs or Co-signs this chart in the absence of a cardiologist.    Normal sinus rhythm. Normal QT. No signs of acute ischemia. RADIOLOGY:   Non-plain film images such as CT, Ultrasound and MRI are read by the radiologist. [place] images are visualized and preliminarily interpreted by the emergency physician with the below findings:        Interpretation per the Radiologist below, if available at the time of this note:    CT CERVICAL SPINE WO CONTRAST   Final Result       1. No acute fracture in cervical spine. Prior C3-C6 fusion with hardware noted. 2. Degenerative spine changes as above. Recommendation: Follow up as clinically indicated. Electronically Signed by David Peña MD at 24-Jul-2022 03:14:05 PM               CT HEAD WO CONTRAST   Final Result       1. No acute pathology. 2.  Atrophic changes. Recommendation: Follow up as clinically indicated. All CT scans at this facility utilize dose modulation, iterative reconstruction, and/or weight based dosing when appropriate to reduce radiation dose to as low as reasonably achievable. Electronically Signed by David Peña MD at 24-Jul-2022 03:14:50 PM               XR CHEST PORTABLE   Final Result   No acute cardiopulmonary process and no significant interval change. Recommendation: Follow up as clinically indicated.                 Electronically Signed by David Peña MD at 24-Jul-2022 02:44:47 PM                     ED BEDSIDE ULTRASOUND:   Performed by ED Physician - none    LABS:  Labs Reviewed   CBC - Abnormal; Notable for the following components:       Result Value    WBC 4.0 (*) RBC 2.99 (*)     Hemoglobin 9.7 (*)     Hematocrit 30.9 (*)     .3 (*)     MCH 32.4 (*)     MCHC 31.4 (*)     RDW 15.6 (*)     Platelets 71 (*)     All other components within normal limits   COMPREHENSIVE METABOLIC PANEL W/ REFLEX TO MG FOR LOW K - Abnormal; Notable for the following components:    Glucose 138 (*)     Total Protein 6.4 (*)     All other components within normal limits   LACTIC ACID - Abnormal; Notable for the following components:    Lactic Acid 2.3 (*)     All other components within normal limits    Narrative:     CALL  [facility] tel. ,  Chemistry results called to and read back by Kristian Mcpherson RN ER, 07/24/2022 14:56, by  [address] - Abnormal; Notable for the following components:    Color, UA DARK YELLOW (*)     Clarity, UA CLOUDY (*)     All other components within normal limits   TROPONIN   TSH WITH REFLEX TO FT4   T4, FREE   LACTIC ACID       All other labs were within normal range or not returned as of this dictation. EMERGENCY DEPARTMENT COURSE and DIFFERENTIALDIAGNOSIS/MDM:   Vitals:    Vitals:    07/24/22 1213   BP: (!) 91/55   Pulse: 65   Resp: 16   Temp: 97.8 °F (36.6 °C)   TempSrc: Oral   Weight: 215 lb (97.5 kg)   Height: 5' 11\" (1.803 m)       MDM  Patient resting comfortably. No distress. Nontoxic on exam at this time. BP is improving with IV fluids. Systolic is over 961 now. Still feels a little lightheaded but feels better. Lactic was up slightly. We will plan to recheck after fluids are done. No fevers chills or other constitutional symptoms. No indication of any source of infection. Uncertain as to etiology of his syncopal episode and his low BP. Suspect this could be secondary to his multiple blood pressure medications. Has extensive cardiac history so feel that admission for further monitoring is warranted given his syncopal episode earlier in his low blood pressure. Call placed to hospitalist for admission.     Patient's case discussed with hospitalist, Dr. Maximiliano Pool, who is agreeable with plan of care and admission. CONSULTS:  None    PROCEDURES:  Unless otherwise notedbelow, none     Procedures    FINAL IMPRESSION     1. Hypotension, unspecified hypotension type    2. Syncope and collapse    3. Thrombocytopenia ([address]    4. Leukopenia, unspecified type    5.  Anemia, unspecified type          DISPOSITION/PLAN   DISPOSITION        PATIENT REFERRED TO:  @FUP@    DISCHARGE MEDICATIONS:  New Prescriptions    No medications on file          (Please note that portions of this note were completed with a voice recognition program.  Efforts were made to edit the dictations butoccasionally words are mis-transcribed.)    Gold Nieves MD (electronically signed)  AttendingEmergency Physician          Gold Nieves MD  07/24/22 0149

## 2022-07-24 NOTE — H&P
[facility]      History & Physical    03/03  PCP: ADRY Archer    Date of Admission:7/24/2022    Patient:  Bonnie Boone  MRN: [id number]    Date of Service: Pt seen/examined on 7/24/2022 and Admitted to Inpatient with expected LOS greater than two midnights due to medical therapy. CHIEF COMPLAINT:     Chief Complaint   Patient presents with    Loss of Consciousness     Pt arrives via EMS from [facility]. He reports dizziness all morning. While standing at [facility], pt passed out. When he hit ground, he woke up. Pt states \"he feels like he is running out of blood\". History Obtained From:  patient, family member - , electronic medical record  Primary Care Physician: ADRY Archer    HISTORY OF PRESENT ILLNESS:    Mr Cj Vegas,  a 74 y.o. male with a history of SDH, REGINA, Waldenstrom macroglobulinemia, non-Hodgkin's lymphoma, mixed HLD, CAD (prior CABG, recent Cardiac Cath) , CVA, DMT2, HTN, HLD, presenting to [facility] ED (07/24/2022), on account of an acute onset of syncope/LOC. Patient reportedly was at [facility], and when he got up to walk across the room, reportedly \" passed out\"     No reported urinary incontinence, tongue biting, or confusion. Patient reportedly felt dizzy/lightheaded all morning prior to going to [facility], which persisted while at [facility]. Other associated symptoms reported include fatigue as well as headache and neck pain (which are both reportedly chronic). Initial work-up significant for;  CT head WO Con: No acute intracranial pathology. CT cervical spine WO Con:  No acute fracture in cervical spine. Prior C3-C6 fusion with hardware noted. Degenerative spine changes    CXR: No acute cardiopulmonary process and no significant interval change    Thrombocytopenia, with a platelets of 71  Leukopenia, with WBC of 4      Of note  Recent follow-up with cardiology on 07/05/2022) with a documented BP of 90/40.     Most recent diagnostic cardiac catheterization (07/05/2022):  Conclusions: Normal LV systolic function. Patent LIMA graft to diagonal sequential to mid LAD Occluded SVG to R-PDA. Widely patent SVG to ramus; 50% proximal narrowing. Multivessel coronary artery disease 100% mid RCA 80% septal . Recommendations: Risk factor modification & Medical management. PAST MEDICAL & SURGICAL HISTORY    Past Medical History:      Diagnosis Date    Abnormal nuclear stress test 10/22/2014    Arthritis     BiPAP (biphasic positive airway pressure) dependence     8cm to 20cm    Cerebrovascular disease     Chronic kidney disease, stage II (mild) 08/17/2016    Enrique Salguero M.D. Cirrhosis ([facility] [address]     Coronary artery disease 02/24/2011    Depression     Diabetes mellitus ([facility] [address]     Encounter for wound care     PT SEES \"GRACY\" WITH DR. SAMUEL    Great toe pain     RT    Headache     Hyperlipemia 02/24/2011    Dr. Luis Manuel Balderas follows lipids.     Hyperlipidemia     Hypertension     Liver disease     LONG TERM ANTICOAGULENT USE     Low blood pressure 11/19/2014    lymphostatic lymphoma     Nausea 11/19/2014    Non Hodgkin's lymphoma (HCC)     Obesity     Obstructive sleep apnea     AHI:  21.7 per PSG, 7/2017    Peptic ulcer disease 02/24/2011    Restless leg     S/P CABG x 3 10/22/2014    SVG to RCA; SVG to LAD diagonal; LIMA to KWN(7031, 2014)    Sleep apnea     Syncope and collapse 7/24/2022    Tachyarrhythmia     Thrombocythemia, essential ([facility] [address]     Thyroid disease     Type 2 diabetes mellitus without complication ([facility] [address]     Type II or unspecified type diabetes mellitus without mention of complication, not stated as uncontrolled          Past Surgical History:      Procedure Laterality Date    CARDIAC CATHETERIZATION  2/28/11    EF 60%    CARDIAC CATHETERIZATION  9/16/05, 3/7/07    EF < 50%    CARDIAC CATHETERIZATION  12/4/12   MDL    EF  50%    CARDIAC CATHETERIZATION  10/28/14  MDL    CARDIAC CATHETERIZATION      [facility] CERVICAL SPINE SURGERY      COLONOSCOPY      CORONARY ARTERY BYPASS GRAFT  9/21/05    LIMA to LAD and diagonal; SVG to posterior descending branch RCA    CORONARY ARTERY BYPASS GRAFT  10/30/2014    Redo Sternotomy - SVG-PDA, TMR (RBL)    JOINT REPLACEMENT Right     knee    KIDNEY SURGERY  08/14/2017    KNEE ARTHROSCOPY      right ACL repair    SHOULDER ARTHROSCOPY      left-rotator cuff repair right- rotator cuff repair    SHOULDER ARTHROSCOPY  08-23-11    right    TONSILLECTOMY            SOCIAL & FAMILY HISTORY:    Social History:   TOBACCO:   reports that he quit smoking about 24 years ago. His smoking use included cigarettes. He has never used smokeless tobacco.  ETOH:   reports no history of alcohol use. Family History:       Problem Relation Age of Onset    Heart Disease Other     Lung Cancer Mother     Cancer Sister         MEDICATIONS:    Medications Prior to Admission:    Prior to Admission medications    Medication Sig Start Date End Date Taking? Authorizing Provider   Zinc Sulfate (ZINC 15 PO) Take by mouth    Historical Provider, MD   GARLIC-CALCIUM PO Take by mouth    Historical Provider, MD   DULoxetine (CYMBALTA) 60 MG extended release capsule  5/20/22   Historical Provider, MD   Icosapent Ethyl (VASCEPA) 1 g CAPS capsule Take 2 capsules by mouth 2 times daily    Historical Provider, MD   Ascorbic Acid (VITAMIN C) 250 MG tablet Take 250 mg by mouth daily    Historical Provider, MD   metFORMIN (GLUCOPHAGE) 500 MG tablet Take 1 tablet by mouth 2 times daily (with meals) Please hold for 48 hrs after cardiac catheterization.  You may resume like normal on Friday 7/8/2022. 7/5/22   Soto Fernando MD   metoprolol tartrate (LOPRESSOR) 25 MG tablet Take 1 tablet by mouth every evening 7/5/22   Soto Fernando MD   metoprolol tartrate (LOPRESSOR) 50 MG tablet Take 1 tablet by mouth every morning 7/5/22   Soto Fernando MD   isosorbide mononitrate (IMDUR) 30 MG extended release tablet Take 1 tablet by mouth daily 7/5/22   Juliann Villagomez MD   empagliflozin (JARDIANCE) 25 MG tablet Take 25 mg by mouth daily    Historical Provider, MD   Insulin Degludec (TRESIBA) 100 UNIT/ML SOLN Inject 6 Units into the skin nightly    Historical Provider, MD   losartan (COZAAR) 25 MG tablet Take 25 mg by mouth daily    Historical Provider, MD   atorvastatin (LIPITOR) 40 MG tablet TAKE 1 TABLET BY MOUTH NIGHTLY AT BEDTIME 8/9/21   Historical Provider, MD   tamsulosin (FLOMAX) 0.4 MG capsule Take 1 capsule by mouth daily  Patient taking differently: Take 0.2 mg by mouth daily  8/24/21   Wendy Mcrae APRN - CNP   gabapentin (NEURONTIN) 100 MG capsule Take 1 capsule by mouth 2 times daily.  7/28/21   Historical Provider, MD   busPIRone (BUSPAR) 15 MG tablet Take 15 mg by mouth 2 times daily     Historical Provider, MD   ranolazine (RANEXA) 1000 MG extended release tablet Take 1 tablet by mouth 2 times daily 3/19/21   [employer], APRN - NP   clopidogrel (PLAVIX) 75 MG tablet One daily  Patient taking differently: Take 75 mg by mouth daily  2/19/21   GRECIA Dial   acetaminophen (TYLENOL) 500 MG tablet Take 1,000 mg by mouth every 6 hours as needed for Pain    Historical Provider, MD   nitroGLYCERIN (NITROSTAT) 0.4 MG SL tablet Place 1 tablet under the tongue every 5 minutes as needed for Chest pain  Patient taking differently: Place 0.4 mg under the tongue every 5 minutes as needed for Chest pain Indications: has not had to take  9/5/19   [employer], APRN - NP   levothyroxine (SYNTHROID) 50 MCG tablet Take 50 mcg by mouth Daily    Historical Provider, MD   diltiazem (CARDIZEM CD) 180 MG extended release capsule Take 1 capsule by mouth daily 8/14/18   GRECIA Maria   ondansetron (ZOFRAN) 8 MG tablet Take 8 mg by mouth as needed  10/3/17   Historical Provider, MD   tiZANidine (ZANAFLEX) 4 MG tablet 1/2 tablet at night 10/24/17   Historical Provider, MD   ferrous sulfate 325 (65 Fe) MG tablet Pulmonary:      Effort: Pulmonary effort is normal. No respiratory distress. Breath sounds: Normal breath sounds. No stridor. No wheezing, rhonchi or rales. Chest:      Chest wall: No tenderness. Abdominal:      General: Bowel sounds are normal. There is no distension. Palpations: Abdomen is soft. Tenderness: There is no abdominal tenderness. There is no guarding or rebound. Musculoskeletal:         General: No swelling, tenderness, deformity or signs of injury. Normal range of motion. Cervical back: Normal range of motion and neck supple. No rigidity. No muscular tenderness. Right lower leg: No edema. Left lower leg: No edema. Skin:     General: Skin is warm and dry. Capillary Refill: Capillary refill takes less than 2 seconds. Coloration: Skin is not jaundiced or pale. Findings: No bruising, erythema, lesion or rash. Neurological:      General: No focal deficit present. Mental Status: He is alert and oriented to person, place, and time. Cranial Nerves: No cranial nerve deficit. Sensory: No sensory deficit. Motor: No weakness. Coordination: Coordination normal.   Psychiatric:         Mood and Affect: Mood normal.         Behavior: Behavior normal.         Thought Content: Thought content normal.         Judgment: Judgment normal.               DIAGNOSTIC STUDIES:    I have reviewedLaboratory and Imaging data report today.     Recent Labs     07/24/22  1223   WBC 4.0*   HGB 9.7*   PLT 71*     Recent Labs     07/24/22  1223      K 4.5      CO2 24   BUN 21   CREATININE 1.1   GLUCOSE 138*   AST 16   ALT 12   BILITOT 0.5   ALKPHOS 84     Troponin T:   Recent Labs     07/24/22  1223   TROPONINI <0.01     Pro-BNP: No results found for: BNP  Lactate:   Lab Results   Component Value Date    LACTA 1.5 07/24/2022         ABGs:   Lab Results   Component Value Date/Time    PHART 7.420 08/26/2021 12:59 PM    PO2ART 64.0 08/26/2021 12:59 PM    YLI5KZS 36.0 08/26/2021 12:59 PM     INR: No results for input(s): INR in the last 72 hours. TSH:   Lab Results   Component Value Date/Time    TSH 4.220 07/05/2022 11:29 AM     URINALYSIS:  Recent Labs     07/24/22  1525   COLORU DARK YELLOW*   PHUR 5.0   CLARITYU CLOUDY*   SPECGRAV 1.029   LEUKOCYTESUR Negative   UROBILINOGEN 1.0   BILIRUBINUR Negative   BLOODU Negative   GLUCOSEU =>1000          RADIOLOGY / IMAGING REPORTS:     CT HEAD WO CONTRAST    Result Date: 7/24/2022  NO PRIOR REPORT AVAILABLE Exam: CT OF THE BRAIN WITHOUT CONTRAST Clinical data: Syncope, headache. Technique: Contiguous axial images are obtained from the skull base to vertex without intravenous contrast.  Reformatted/MPR images were performed. Radiation dose: CTDIvol = 71.39 mGy, DLP = 1459 mGy x cm. Prior studies: CT scan of brain dated 12/17/2021. Findings:  No acute intracranial abnormality is present. No evidence of acute cortical infarction, hemorrhage, mass or mass effect. No hydrocephalus or abnormal extra-axial fluid collections are present. The posterior fossa is unremarkable. Atrophic changes. The skull base and calvarium are intact. The included portions of the paranasal sinuses and mastoid air cells are clear. 1.  No acute pathology. 2.  Atrophic changes. Recommendation: Follow up as clinically indicated. All CT scans at this facility utilize dose modulation, iterative reconstruction, and/or weight based dosing when appropriate to reduce radiation dose to as low as reasonably achievable. Electronically Signed by Fabián Pratt MD at 24-Jul-2022 03:14:50 PM             CT CERVICAL SPINE WO CONTRAST    Result Date: 7/24/2022  NO PRIOR REPORT AVAILABLE Exam: CT OF THE CERVICAL SPINE WITHOUT CONTRAST Clinical data: The patient fell. Neck pain. Syncopal episode. Technique: Contiguous axial imaging of the cervical spine. Reconstructed imaging in the coronal and sagittal planes. Reformatted/MPR images were performed. Radiation dose: CTDIvol = 71.39 mGy, DLP = 1459 mGy x cm. (Limited evaluation due to metallic implant)  Prior studies: CT scan of cervical spine dated 12/16/2021. Findings: There is grossly unremarkable alignment without acute fracture or subluxation. Reduced bone mineralization. Posterior elements are intact. Metallic fixation involving C3-C6 with hardware noted. No CT evidence of bony spinal canal or neural foramen stenosis. Soft tissues are grossly unremarkable. Marked spondylotic changes seen. Skull base and craniocervical junction are intact. Lung apices are clear. 1. No acute fracture in cervical spine. Prior C3-C6 fusion with hardware noted. 2. Degenerative spine changes as above. Recommendation: Follow up as clinically indicated. Electronically Signed by Maria Del Carmen Ribeiro MD at 24-Jul-2022 03:14:05 PM             XR CHEST PORTABLE    Result Date: 7/24/2022  NO PRIOR REPORT AVAILABLE Exam: X-RAY OF [facility] Clinical data:Syncope. Technique:Single view of the chest. Prior studies: Radiographs of the chest dated 07/05/22. Findings:Normal cardiac size. Median sternotomy. Orthopedic hardware in the cervical spine. So well expanded without consolidation or pneumothorax. No pleural effusion. 5 mm calcified granuloma left lung apex. Degenerative changes of both shoulders  and within the thoracic spine. No acute cardiopulmonary process and no significant interval change. Recommendation: Follow up as clinically indicated. Electronically Signed by Maria Del Carmen Ribeiro MD at 24-Jul-2022 02:44:47 PM                   ECHOCARDIOGRAM:     Summary   Structurally normal.   Normal mitral valve leaflet mobility. No evidence of mitral regurgitation. Aortic valve appears to be tricuspid. Moderate calcification with normal cusp excursion. No significant aortic regurgitation or stenosis is noted. Tricuspid valve is structurally normal.   Trace tricuspid regurgitation.    RVSP 24 mmHg   Normal left ventricular size with preserved LV function and an estimated   ejection fraction of approximately 55%. No evidence of left ventricular mass or thrombus noted.       Signature   ---------------------------------------------------------------   Electronically signed by Valentín Brito MD(Interpreting   physician) on 07/06/2022 08:38 AM   ----------------------------------------------------------------           PATIENT SUMMARY      ASSESSMENT / IMPRESSION & PLAN:          Hospital Problems             Last Modified POA    * (Principal) Syncope and collapse 7/24/2022 Yes    Coronary artery disease 7/24/2022 Yes    Overview Addendum 2/25/2019 11:31 AM by Mike Pineda MD     S/p CABG 9/21/05 with LIMA to LAD and diagonal; SVG to posterior descending branch RCA  10/14 [facility]/Failed Stent with embolization of device in RCA  10/14 Redo CABG SV-PDA           Hyperlipemia 7/24/2022 Yes    S/P CABG x 3 7/24/2022 Yes    Overview Addendum 10/15/2019  7:10 PM by Janie Simons MD     9/16/2005  Cath  TVD with normal LVGX  9/21/2005  Cabg x 4 LIMA-LAD and diagonal; SVG-PDA, VG-OM Saroj Lick)  1/9/2006  GXT negative for myocardial ischemia  3/7/2007  Cath patent LIMA-LAD & diagonal, patent VG-OM, closed VG-PDA, normal LVFX  2/28/2011  Cath patent LIMA-LAD & diagonal, patent VG-OM, closed VG-PDA, normal LVFX  12/18/2012  lexiscan negative for myocardial ischemia, EF 45  %   12/4/2012  Cath patent LIMA, no mention of anastomosis to diagonal, patent VG-OM, closed VG-PDA, anterior hypo, EF 50%  9/24/2014  lexiscan inferior lateral MI, no ischemic, EF 75%  9/24/2014  Echo normal LVFX  10/28/2014  Cath  Cath patent LIMA, no mention of anastomosis to diagonal, patent VG-OM, closed VG-PDA, anterior hypo, EF 15%, embolization of stent in the RCA  10/30/2014  Redo CABG x 1 VG-PDA Catheryn Dexter)  5/11/2016  DSE negative for myocardial ischemia  5/10/2017  Echo  Normal LVFX  5/26/2017  Holter NSR  10/3/2019  Echo normal LVFX  10/3/2019  lexiscan Positive for inferior MI + myocardial ischemia, EF 59%, -5% ischemic myocardium on stress, uninterpretable risk findings, AUC indication 21, AUC score 7, Choco Mccurdy MD)   10/15/19  Cath  Patent LIMA-diag & LAD, patent VG-OM, patent VG-PDA, distal anterior apical hypokinesis, EF 50%             Obstructive sleep apnea 7/24/2022 Yes    Overview Signed 10/30/2017  9:41 AM by ADRY Flores     AHI:  21.7 per PSG, 7/2017         BiPAP (biphasic positive airway pressure) dependence 7/24/2022 Yes    Overview Signed 10/30/2017  9:42 AM by ADRY Flores     8cm to 20cm            Principal Problem:    Syncope and collapse  Active Problems:    Coronary artery disease    Hyperlipemia    S/P CABG x 3    Obstructive sleep apnea    BiPAP (biphasic positive airway pressure) dependence  Resolved Problems:    * No resolved hospital problems. *            Plan  Admit to: PCU  Serial vitals, telemetry,  Diet: Diabetic diet,   Activity: Bedrest, up with assistance. IVF: Normal saline  Follow labs: D-dimer level, CBC, CMP, mag, phosphorus, coags, trend lactic acid level and troponin. Consults: Cardiology and neurology  Resume home medications upon reconciliation  Home medications reconciled.   Oxygen supplementation as needed to keep SPO2 above 90%      Syncope  CAD  Monitor on telemetry  Serial orthostatic vitals  F/u D-dimer level  Cardiology consult, given extensive cardiac history and reported multivessel disease  Continue optimize medical management  Neurology consult, for possible EEG and rule out any possible seizure episode      Pancytopenia  Monitor CBC  Consider hematology consult      Diabetes Mellitus II, with hyperglycemia  Uncontrolled  Hemoglobin A1C   Inpatient Regimens to include;  - Insulin Glargine (Lantus) 20 units subcu nightly  - Insulin Lispro (Humalog) 4 units subcu pre-meal 3 times a day  - Insulin Lispro (Humalog) on a Low dose sliding scale  Monitor POC glucose, and adjust insulin regimen accordingly based on daily insulin requirement. Elevated lactic acid  Initial lactic acid of 2.3, with a repeat lactic acid of 1.5 (07/24/2022), after 500 mL normal saline bolus administered in the ED. Possibly on account of dehydration   IV fluids  Rule out infectious etiology  Blood cultures x2 sets  CRP and Procalcitonin level    REGINA  CPAP/BiPAP at night, and during daytime naps. Continue management of other chronic medical conditions - Please see orders above         CONSULTS:    IP CONSULT TO NEUROLOGY  IP CONSULT TO CARDIOLOGY        INPATIENT CHECKLIST:      Nutrition: No diet orders on file    Prophylaxis Orders:   VTE - SCDs     CODE STATUS: Prior  ISOLATION:       DISCHARGE PLAN: tbd     Total face-to-face time spent with this patient, time spent reviewing medical records, and in coordination of care with the emergency department physician, nursing staff, in the examination, evaluation/assessment, counseling, review of medications and plan, was  70 mins  . Electronically signed by   Padmini Harris MD, MPH, MD  Internal Medicine Hospitalist   7/24/2022 6:06 PM      EMR Dragon/Transcription disclaimer:   Much of this encounter note is an electronic transcription/translation of spoken language to printed text.  The electronic translation of spoken language may permit erroneous, or at times, nonsensical words or phrases to be inadvertently transcribed; although attempts have made to review the note for such errors, some may still exist.

## 2022-07-25 LAB
ALBUMIN SERPL-MCNC: 4.1 G/DL (ref 3.5–5.2)
ALP BLD-CCNC: 97 U/L (ref 40–130)
ALT SERPL-CCNC: 12 U/L (ref 5–41)
ANION GAP SERPL CALCULATED.3IONS-SCNC: 14 MMOL/L (ref 7–19)
APTT: 29.5 SEC (ref 26–36.2)
AST SERPL-CCNC: 17 U/L (ref 5–40)
BASOPHILS ABSOLUTE: 0 K/UL (ref 0–0.2)
BASOPHILS RELATIVE PERCENT: 0.4 % (ref 0–1)
BILIRUB SERPL-MCNC: 0.3 MG/DL (ref 0.2–1.2)
BUN BLDV-MCNC: 21 MG/DL (ref 8–23)
C-REACTIVE PROTEIN: 0.31 MG/DL (ref 0–0.5)
CALCIUM SERPL-MCNC: 9.8 MG/DL (ref 8.8–10.2)
CHLORIDE BLD-SCNC: 102 MMOL/L (ref 98–111)
CHOLESTEROL, TOTAL: 156 MG/DL (ref 160–199)
CO2: 25 MMOL/L (ref 22–29)
CREAT SERPL-MCNC: 1.1 MG/DL (ref 0.5–1.2)
EKG P AXIS: 37 DEGREES
EKG P AXIS: 59 DEGREES
EKG P-R INTERVAL: 190 MS
EKG P-R INTERVAL: 194 MS
EKG Q-T INTERVAL: 398 MS
EKG Q-T INTERVAL: 454 MS
EKG QRS DURATION: 88 MS
EKG QRS DURATION: 90 MS
EKG QTC CALCULATION (BAZETT): 422 MS
EKG QTC CALCULATION (BAZETT): 462 MS
EKG T AXIS: 57 DEGREES
EKG T AXIS: 60 DEGREES
EOSINOPHILS ABSOLUTE: 0.1 K/UL (ref 0–0.6)
EOSINOPHILS RELATIVE PERCENT: 2.4 % (ref 0–5)
GFR AFRICAN AMERICAN: >59
GFR NON-AFRICAN AMERICAN: >60
GLUCOSE BLD-MCNC: 111 MG/DL (ref 70–99)
GLUCOSE BLD-MCNC: 130 MG/DL (ref 70–99)
GLUCOSE BLD-MCNC: 140 MG/DL (ref 70–99)
GLUCOSE BLD-MCNC: 177 MG/DL (ref 70–99)
GLUCOSE BLD-MCNC: 97 MG/DL (ref 74–109)
HCT VFR BLD CALC: 34.4 % (ref 42–52)
HDLC SERPL-MCNC: 34 MG/DL (ref 55–121)
HEMOGLOBIN: 10.9 G/DL (ref 14–18)
IMMATURE GRANULOCYTES #: 0 K/UL
INR BLD: 1.11 (ref 0.88–1.18)
LDL CHOLESTEROL CALCULATED: 48 MG/DL
LYMPHOCYTES ABSOLUTE: 0.8 K/UL (ref 1.1–4.5)
LYMPHOCYTES RELATIVE PERCENT: 15.1 % (ref 20–40)
MCH RBC QN AUTO: 33.3 PG (ref 27–31)
MCHC RBC AUTO-ENTMCNC: 31.7 G/DL (ref 33–37)
MCV RBC AUTO: 105.2 FL (ref 80–94)
MONOCYTES ABSOLUTE: 0.5 K/UL (ref 0–0.9)
MONOCYTES RELATIVE PERCENT: 10.7 % (ref 0–10)
NEUTROPHILS ABSOLUTE: 3.6 K/UL (ref 1.5–7.5)
NEUTROPHILS RELATIVE PERCENT: 71 % (ref 50–65)
PDW BLD-RTO: 15.5 % (ref 11.5–14.5)
PERFORMED ON: ABNORMAL
PLATELET # BLD: 76 K/UL (ref 130–400)
PMV BLD AUTO: 10 FL (ref 9.4–12.4)
POTASSIUM REFLEX MAGNESIUM: 4.5 MMOL/L (ref 3.5–5)
PRO-BNP: 192 PG/ML (ref 0–900)
PROCALCITONIN: 0.08 NG/ML (ref 0–0.09)
PROTHROMBIN TIME: 14.3 SEC (ref 12–14.6)
RBC # BLD: 3.27 M/UL (ref 4.7–6.1)
SODIUM BLD-SCNC: 141 MMOL/L (ref 136–145)
TOTAL PROTEIN: 6.5 G/DL (ref 6.6–8.7)
TRIGL SERPL-MCNC: 372 MG/DL (ref 0–149)
WBC # BLD: 5 K/UL (ref 4.8–10.8)

## 2022-07-25 PROCEDURE — 86140 C-REACTIVE PROTEIN: CPT

## 2022-07-25 PROCEDURE — 80061 LIPID PANEL: CPT

## 2022-07-25 PROCEDURE — 82947 ASSAY GLUCOSE BLOOD QUANT: CPT

## 2022-07-25 PROCEDURE — 83880 ASSAY OF NATRIURETIC PEPTIDE: CPT

## 2022-07-25 PROCEDURE — 80053 COMPREHEN METABOLIC PANEL: CPT

## 2022-07-25 PROCEDURE — 85610 PROTHROMBIN TIME: CPT

## 2022-07-25 PROCEDURE — 93010 ELECTROCARDIOGRAM REPORT: CPT | Performed by: INTERNAL MEDICINE

## 2022-07-25 PROCEDURE — 36415 COLL VENOUS BLD VENIPUNCTURE: CPT

## 2022-07-25 PROCEDURE — 6370000000 HC RX 637 (ALT 250 FOR IP): Performed by: INTERNAL MEDICINE

## 2022-07-25 PROCEDURE — G0378 HOSPITAL OBSERVATION PER HR: HCPCS

## 2022-07-25 PROCEDURE — 84145 PROCALCITONIN (PCT): CPT

## 2022-07-25 PROCEDURE — 85730 THROMBOPLASTIN TIME PARTIAL: CPT

## 2022-07-25 PROCEDURE — 99223 1ST HOSP IP/OBS HIGH 75: CPT | Performed by: PSYCHIATRY & NEUROLOGY

## 2022-07-25 PROCEDURE — 1210000000 HC MED SURG R&B

## 2022-07-25 PROCEDURE — 93005 ELECTROCARDIOGRAM TRACING: CPT | Performed by: INTERNAL MEDICINE

## 2022-07-25 PROCEDURE — 85025 COMPLETE CBC W/AUTO DIFF WBC: CPT

## 2022-07-25 RX ADMIN — ACETAMINOPHEN 650 MG: 325 TABLET ORAL at 14:52

## 2022-07-25 RX ADMIN — INSULIN GLARGINE 20 UNITS: 100 INJECTION, SOLUTION SUBCUTANEOUS at 21:43

## 2022-07-25 RX ADMIN — ASPIRIN 81 MG 81 MG: 81 TABLET ORAL at 08:02

## 2022-07-25 RX ADMIN — ATORVASTATIN CALCIUM 40 MG: 40 TABLET, FILM COATED ORAL at 21:43

## 2022-07-25 RX ADMIN — ACETAMINOPHEN 650 MG: 325 TABLET ORAL at 08:05

## 2022-07-25 RX ADMIN — ACETAMINOPHEN 650 MG: 325 TABLET ORAL at 23:32

## 2022-07-25 ASSESSMENT — ENCOUNTER SYMPTOMS
COUGH: 0
VOMITING: 0
PHOTOPHOBIA: 0
NAUSEA: 0
SHORTNESS OF BREATH: 0
BACK PAIN: 0

## 2022-07-25 ASSESSMENT — PAIN SCALES - GENERAL
PAINLEVEL_OUTOF10: 6
PAINLEVEL_OUTOF10: 6
PAINLEVEL_OUTOF10: 5

## 2022-07-25 ASSESSMENT — PAIN DESCRIPTION - ORIENTATION
ORIENTATION: RIGHT;LEFT
ORIENTATION: RIGHT;LEFT

## 2022-07-25 ASSESSMENT — PAIN DESCRIPTION - DESCRIPTORS
DESCRIPTORS: OTHER (COMMENT)
DESCRIPTORS: THROBBING
DESCRIPTORS: ACHING

## 2022-07-25 ASSESSMENT — PAIN - FUNCTIONAL ASSESSMENT
PAIN_FUNCTIONAL_ASSESSMENT: ACTIVITIES ARE NOT PREVENTED

## 2022-07-25 ASSESSMENT — PAIN DESCRIPTION - LOCATION
LOCATION: HEAD

## 2022-07-25 NOTE — CONSULTS
[facility]  [address] [place] [address] Drive, [address]  [place], [place]  Phone [phone]     Neurology Consultation     Date of Admission: 2022 12:15 PM  Date of Consultation: 22    Attending Provider: Norma Parker MD  Consulting Provider: Sade Silver M.D. Patient: Cliff Vazquez  :  1955  Age:  74 y.o. MRN:  [id number]    CHIEF COMPLAINT:  Syncope    History Source: History obtained from chart review, the patient, and wife. PCP: ADRY Elizabeth    HISTORY OF PRESENT ILLNESS:   Cliff Vazquez is a 74y.o. year old man with a history of diabetes, diabetic polyneuropathy, CAD, and waldenstrom's macroglobulinemia with pancytopenia who was admitted yesterday with syncope and hypotension. He has had diabetes a long time and has a rather severe peripheral neuropathy with numbness, tingling, and burning up to his knees. He has some numbness in his hands. His balance is poor. He has had diabetic foot wounds in the past.  He has chronic constipation. He has had urinary frequency, urgency, and some retention symptoms such as hesitancy. He takes Flomax he says due to kidney stones. He has HTN and takes Lopressor, Cardizem, and Lisinopril. He has known CAD and has had CABG twice. He has been on Ranexa for years. He was having chest pains and had a TTE and heart cath just a few weeks ago that were unremarkable. He was started on Imdur. He has chronic neck pain and headaches. He has had worse headaches since starting the Imdur. He routinely gets lightheaded when he stands. Yesterday, at [facility], he stood up, walked across the room to talk to someone became lightheaded and nauseous and passed out. He fell. He was out only a few seconds and did not hurt himself. He had no incontinence, convulsions, or tongue biting. EMS was called and he was brought into the ER and admitted. He does relate a history of sleep apnea. He uses CPAP.   His machine is a recalled Respironics machine that is over 11years old and does not work adequately. He has difficulty getting his FFM to seal.  He is not resting well. He had a sleep study a few years ago but did not get a new machine then. PAST MEDICAL HISTORY:    Medical History:      Diagnosis Date    Abnormal nuclear stress test 10/22/2014    Arthritis     BiPAP (biphasic positive airway pressure) dependence     8cm to 20cm    Cerebrovascular disease     Chronic kidney disease, stage II (mild) 08/17/2016    Yair Garcia M.D. Cirrhosis ([facility] [address]     Coronary artery disease 02/24/2011    Depression     Diabetes mellitus ([facility] [address]     Encounter for wound care     PT SEES \"GRACY\" WITH DR. SAMUEL    Great toe pain     RT    Headache     Hyperlipemia 02/24/2011    Dr. Jennifer Carpio follows lipids. Hyperlipidemia     Hypertension     Liver disease     LONG TERM ANTICOAGULENT USE     Low blood pressure 11/19/2014    lymphostatic lymphoma     Nausea 11/19/2014    Non Hodgkin's lymphoma (HCC)     Obesity     Obstructive sleep apnea     AHI:  21.7 per PSG, 7/2017    Peptic ulcer disease 02/24/2011    Restless leg     S/P CABG x 3 10/22/2014    SVG to RCA; SVG to LAD diagonal; LIMA to CCS(0946, 2014)    Sleep apnea     Syncope and collapse 7/24/2022    Tachyarrhythmia     Thrombocythemia, essential ([facility]     Thyroid disease     Type 2 diabetes mellitus without complication ([facility] [address]     Type II or unspecified type diabetes mellitus without mention of complication, not stated as uncontrolled        Surgical History:      Procedure Laterality Date    CARDIAC CATHETERIZATION  2/28/11    EF 60%    CARDIAC CATHETERIZATION  9/16/05, 3/7/07    EF < 50%    CARDIAC CATHETERIZATION  12/4/12   MDL    EF  50%    CARDIAC CATHETERIZATION  10/28/14  GERBER Celis M.D.     CERVICAL SPINE SURGERY      COLONOSCOPY      CORONARY ARTERY BYPASS GRAFT  9/21/05    LIMA to LAD and diagonal; SVG to posterior descending branch RCA    CORONARY ARTERY BYPASS GRAFT  10/30/2014    Redo Sternotomy - SVG-PDA, TMR (RBL)    JOINT REPLACEMENT Right     knee    KIDNEY SURGERY  08/14/2017    KNEE ARTHROSCOPY      right ACL repair    SHOULDER ARTHROSCOPY      left-rotator cuff repair right- rotator cuff repair    SHOULDER ARTHROSCOPY  08-23-11    right    TONSILLECTOMY         Medications Prior to Admission:    Prior to Admission medications    Medication Sig Start Date End Date Taking? Authorizing Provider   Zinc Sulfate (ZINC 15 PO) Take by mouth    Historical Provider, MD   GARLIC-CALCIUM PO Take by mouth    Historical Provider, MD   DULoxetine (CYMBALTA) 60 MG extended release capsule  5/20/22   Historical Provider, MD   Icosapent Ethyl (VASCEPA) 1 g CAPS capsule Take 2 capsules by mouth 2 times daily    Historical Provider, MD   Ascorbic Acid (VITAMIN C) 250 MG tablet Take 250 mg by mouth daily    Historical Provider, MD   metFORMIN (GLUCOPHAGE) 500 MG tablet Take 1 tablet by mouth 2 times daily (with meals) Please hold for 48 hrs after cardiac catheterization.  You may resume like normal on Friday 7/8/2022. 7/5/22   Asya Emery MD   metoprolol tartrate (LOPRESSOR) 25 MG tablet Take 1 tablet by mouth every evening 7/5/22   Asya Emery MD   metoprolol tartrate (LOPRESSOR) 50 MG tablet Take 1 tablet by mouth every morning 7/5/22   Asya Emery MD   isosorbide mononitrate (IMDUR) 30 MG extended release tablet Take 1 tablet by mouth daily 7/5/22   Asya Emery MD   empagliflozin (JARDIANCE) 25 MG tablet Take 25 mg by mouth daily    Historical Provider, MD   Insulin Degludec (TRESIBA) 100 UNIT/ML SOLN Inject 6 Units into the skin nightly    Historical Provider, MD   losartan (COZAAR) 25 MG tablet Take 25 mg by mouth daily    Historical Provider, MD   atorvastatin (LIPITOR) 40 MG tablet TAKE 1 TABLET BY MOUTH NIGHTLY AT BEDTIME 8/9/21   Historical Provider, MD   tamsulosin (FLOMAX) 0.4 MG capsule Take 1 capsule by mouth daily  Patient taking differently: Take 0.2 mg by mouth in the morning. 8/24/21   GRECIA Vieira CNP   gabapentin (NEURONTIN) 100 MG capsule Take 1 capsule by mouth 2 times daily. 7/28/21   Historical Provider, MD   busPIRone (BUSPAR) 15 MG tablet Take 15 mg by mouth 2 times daily     Historical Provider, MD   ranolazine (RANEXA) 1000 MG extended release tablet Take 1 tablet by mouth 2 times daily 3/19/21   GRECIA Cutler NP   clopidogrel (PLAVIX) 75 MG tablet One daily  Patient taking differently: Take 75 mg by mouth in the morning. 2/19/21   GRECIA Avila   acetaminophen (TYLENOL) 500 MG tablet Take 1,000 mg by mouth every 6 hours as needed for Pain    Historical Provider, MD   nitroGLYCERIN (NITROSTAT) 0.4 MG SL tablet Place 1 tablet under the tongue every 5 minutes as needed for Chest pain  Patient taking differently: Place 0.4 mg under the tongue every 5 minutes as needed for Chest pain Indications: has not had to take 9/5/19   GRECIA Cutler NP   levothyroxine (SYNTHROID) 50 MCG tablet Take 50 mcg by mouth Daily    Historical Provider, MD   diltiazem (CARDIZEM CD) 180 MG extended release capsule Take 1 capsule by mouth daily 8/14/18   GRECIA Murray   ondansetron (ZOFRAN) 8 MG tablet Take 8 mg by mouth as needed  10/3/17   Historical Provider, MD   tiZANidine (ZANAFLEX) 4 MG tablet 1/2 tablet at night 10/24/17   Historical Provider, MD   ferrous sulfate 325 (65 Fe) MG tablet Take 325 mg by mouth 2 times daily    Historical Provider, MD   pantoprazole (PROTONIX) 40 MG tablet Take 1 tablet by mouth daily 9/7/17   GRECIA Weaver   b complex vitamins capsule Take 1 capsule by mouth daily. Historical Provider, MD   Vitamin D (CHOLECALCIFEROL) 1000 UNITS CAPS capsule Take 1,000 Units by mouth daily. Historical Provider, MD   docusate sodium (COLACE) 100 MG capsule Take 100 mg by mouth daily.     Historical Provider, MD       Current Medications:  Current Facility-Administered Medications: glucose chewable tablet 16 g, 4 tablet, Oral, PRN  dextrose bolus 10% 125 mL, 125 mL, IntraVENous, PRN **OR** dextrose bolus 10% 250 mL, 250 mL, IntraVENous, PRN  glucagon (rDNA) injection 1 mg, 1 mg, SubCUTAneous, PRN  dextrose 10 % infusion, , IntraVENous, Continuous PRN  sodium chloride flush 0.9 % injection 5-40 mL, 5-40 mL, IntraVENous, 2 times per day  sodium chloride flush 0.9 % injection 10 mL, 10 mL, IntraVENous, PRN  0.9 % sodium chloride infusion, , IntraVENous, PRN  ondansetron (ZOFRAN-ODT) disintegrating tablet 4 mg, 4 mg, Oral, Q8H PRN **OR** ondansetron (ZOFRAN) injection 4 mg, 4 mg, IntraVENous, Q6H PRN  acetaminophen (TYLENOL) tablet 650 mg, 650 mg, Oral, Q6H PRN **OR** acetaminophen (TYLENOL) suppository 650 mg, 650 mg, Rectal, Q6H PRN  polyethylene glycol (GLYCOLAX) packet 17 g, 17 g, Oral, Daily PRN  aspirin chewable tablet 81 mg, 81 mg, Oral, Daily  atorvastatin (LIPITOR) tablet 40 mg, 40 mg, Oral, Nightly  nitroGLYCERIN (NITROSTAT) SL tablet 0.4 mg, 0.4 mg, SubLINGual, Q5 Min PRN  insulin glargine (LANTUS) injection vial 20 Units, 20 Units, SubCUTAneous, Nightly  [Held by provider] insulin lispro (HUMALOG) injection vial 4 Units, 4 Units, SubCUTAneous, TID WC  insulin lispro (HUMALOG) injection vial 0-4 Units, 0-4 Units, SubCUTAneous, TID WC  insulin lispro (HUMALOG) injection vial 0-4 Units, 0-4 Units, SubCUTAneous, Nightly    Allergies: Ancef [cefazolin sodium], Ceftin [cefuroxime], Cefuroxime axetil, Cephalosporins, and Neosporin [bacitracin-polymyxin b]    Habits:  TOBACCO:   reports that he quit smoking about 24 years ago. His smoking use included cigarettes. He has never used smokeless tobacco.  ETOH:   reports no history of alcohol use. DRUGS:    Social History     Substance and Sexual Activity   Drug Use No       SOCIAL HISTORY:   Thiago Whittaker is , lives in [place], [place], and is a pasteur.       FAMILY HISTORY:       Problem Relation Age of Onset    Heart Disease Other     Lung Cancer Mother     Cancer Sister        REVIEW OF SYSTEMS:  Review of Systems   Constitutional:  Positive for fatigue. Negative for chills and fever. HENT:  Positive for hearing loss and tinnitus. Eyes:  Negative for photophobia and visual disturbance. Respiratory:  Negative for cough and shortness of breath. Cardiovascular:  Negative for chest pain and palpitations. Gastrointestinal:  Negative for nausea and vomiting. Endocrine: Negative for polydipsia and polyuria. Genitourinary:  Positive for frequency and urgency. Musculoskeletal:  Positive for arthralgias and neck pain. Negative for back pain. Skin:  Positive for wound. Negative for rash. Allergic/Immunologic: Negative for environmental allergies and food allergies. Neurological:  Positive for syncope, weakness, numbness and headaches. Negative for tremors. Hematological:  Negative for adenopathy. Bruises/bleeds easily. Psychiatric/Behavioral:  Negative for dysphoric mood. The patient is not nervous/anxious. PHYSICAL EXAMINATION:  Vitals: /71   Pulse 73   Temp 96.8 °F (36 °C) (Temporal)   Resp 16   Ht 5' 11\" (1.803 m)   Wt 215 lb (97.5 kg)   SpO2 99%   BMI 29.99 kg/m²   General appearance: alert, appears stated age and cooperative  Skin: Skin color, texture, turgor normal. No rashes or lesions  HEENT: Head: Normal, normocephalic, atraumatic. Neck:no adenopathy, no carotid bruit, no JVD, supple, symmetrical, trachea midline, and thyroid not enlarged, symmetric, no tenderness/mass/nodules  Lungs: clear to auscultation bilaterally  Heart: regular rate and rhythm, S1, S2 normal, no murmur, click, rub or gallop  Abdomen: soft, non-tender; bowel sounds normal; no masses,  no organomegaly  Extremities:  OA joint changes. Muscle atrophy below knees. NEUROLOGIC EXAMINATION:  Neurologic Exam     Mental Status   Oriented to person, place, and time.    Speech: speech is normal   Level of consciousness: alert  Speech is fluent. Cranial Nerves     CN II   Visual fields full to confrontation. CN III, IV, VI   Pupils are equal, round, and reactive to light. Extraocular motions are normal.     CN VII   Facial expression full, symmetric.      CN VIII   Hearing: intact    CN IX, X   Palate: symmetric    CN XI   Right sternocleidomastoid strength: normal  Left sternocleidomastoid strength: normal  Right trapezius strength: normal  Left trapezius strength: normal    CN XII   Tongue: not atrophic  Fasciculations: absent  Tongue deviation: none    Motor Exam   Muscle bulk: decreased  Overall muscle tone: normal  Right arm pronator drift: absent  Left arm pronator drift: absent    Strength   Right neck flexion: 5/5  Left neck flexion: 5/5  Right neck extension: 5/5  Left neck extension: 5/5  Right deltoid: 5/5  Left deltoid: 5/5  Right biceps: 5/5  Left biceps: 5/5  Right triceps: 5/5  Left triceps: 5/5  Right wrist flexion: 5/5  Left wrist flexion: 5/5  Right wrist extension: 5/5  Left wrist extension: 5/5  Right interossei: 5/5  Left interossei: 5/5  Right iliopsoas: 5/5  Left iliopsoas: 5/5  Right quadriceps: 5/5  Left quadriceps: 5/5  Right glutei: 5/5  Left glutei: 5/5  Right anterior tibial: 5/5  Left anterior tibial: 5/5  Right posterior tibial: 5/5  Left posterior tibial: 5/5  Right peroneal: 5/5  Left peroneal: 5/5  Right gastroc: 5/5  Left gastroc: 5/5    Sensory Exam   Right arm light touch: decreased from elbow  Left arm light touch: decreased from elbow  Right leg light touch: decreased from knee  Left leg light touch: decreased from knee  Right arm vibration: decreased from wrist  Left arm vibration: decreased from wrist  Right leg vibration: decreased from knee  Left leg vibration: decreased from knee  Right arm pinprick: decreased from elbow  Left arm pinprick: decreased from elbow  Right leg pinprick: decreased from knee  Left leg pinprick: decreased from knee    ADDITIONAL REVIEW:  CBC:    Recent Labs     07/24/22  1223 07/25/22  0204   WBC 4.0* 5.0   HGB 9.7* 10.9*   PLT 71* 76*     BMP:     Recent Labs     07/24/22  1223 07/25/22  0204    141   K 4.5 4.5    102   CO2 24 25   BUN 21 21   CREATININE 1.1 1.1   GLUCOSE 138* 97     Hepatic:  Recent Labs     07/24/22  1223 07/25/22  0204   AST 16 17   ALT 12 12   BILITOT 0.5 0.3   ALKPHOS 84 97     Troponin T:    Recent Labs     07/24/22  1223 07/24/22  2009 07/24/22  2237   TROPONINI <0.01 <0.01 <0.01     Pro-BNP:  No results for input(s): BNP in the last 72 hours. Lipids:    Recent Labs     07/25/22  0204   CHOL 156*   HDL 34*     ABGs:    Lab Results   Component Value Date/Time    PHART 7.420 08/26/2021 12:59 PM    PO2ART 64.0 08/26/2021 12:59 PM    CLV4IUC 36.0 08/26/2021 12:59 PM     INR:    Recent Labs     07/25/22  0204   INR 1.11     Narrative   NO PRIOR REPORT AVAILABLE   Exam: CT OF THE BRAIN WITHOUT CONTRAST   Clinical data: Syncope, headache. Technique: Contiguous axial images are obtained from the skull base to vertex without intravenous contrast.  Reformatted/MPR images were performed. Radiation dose: CTDIvol = 71.39 mGy, DLP = 1459 mGy x cm. Prior studies: CT scan of brain dated 12/17/2021. Findings:        No acute intracranial abnormality is present. No evidence of acute cortical infarction, hemorrhage, mass or mass effect. No hydrocephalus or abnormal extra-axial fluid collections are present. The posterior fossa is unremarkable. Atrophic changes. The skull base and calvarium are intact. The included portions of the paranasal sinuses and mastoid air cells are clear. Impression       1. No acute pathology. 2.  Atrophic changes. Recommendation: Follow up as clinically indicated.        All CT scans at this facility utilize dose modulation, iterative reconstruction, and/or weight based dosing when appropriate to reduce radiation dose to as low as reasonably achievable. Electronically Signed by Katey Seaman MD at 24-Jul-2022 03:14:50 PM              Narrative   NO PRIOR REPORT AVAILABLE   Exam: CT OF THE CERVICAL SPINE WITHOUT CONTRAST   Clinical data: The patient fell. Neck pain. Syncopal episode. Technique: Contiguous axial imaging of the cervical spine. Reconstructed imaging in the coronal and sagittal planes. Reformatted/MPR images were performed. Radiation dose: CTDIvol = 71.39 mGy, DLP = 1459 mGy x cm. (Limited evaluation due to metallic    implant)       Prior studies: CT scan of cervical spine dated 12/16/2021. Findings: There is grossly unremarkable alignment without acute fracture or subluxation. Reduced bone mineralization. Posterior elements are intact. Metallic fixation involving C3-C6 with hardware noted. No CT evidence of bony spinal canal or neural foramen stenosis. Soft tissues are grossly unremarkable. Marked spondylotic changes seen. Skull base and craniocervical junction are intact. Lung apices are clear. Impression       1. No acute fracture in cervical spine. Prior C3-C6 fusion with hardware noted. 2. Degenerative spine changes as above. Recommendation: Follow up as clinically indicated. Electronically Signed by Katey Seaman MD at 24-Jul-2022 03:14:05 PM                IMPRESSION:  1. Syncope, likely due to orthostatic hypotension. He has a severe diabetic polyneuropathy, no doubt with autonomic involvement and is on several medications that can exacerbate - Flomax, Imdur mainly. 2. Diabetes with severe PN  3. CAD, recently evaluated  4. Chronic neck pain and headaches  5. REGINA  6. Waldenstrom's macroglobulinemia with chronic pancytopenia  I discussed the above with him and his wife. PLAN:  1. Stop Flomax. Consider stopping Imdur  2. Monitor BP  3. Needs new CPAP. Will order. 4. Cardiology to see  5.  Will have him follow up with Dr. Amanuel Gonzalez for cervical paraspinal trigger point injections. Andrés Garcia M.D. I spent 80 total minutes in face to face interaction with patient and coordination of care.    I spent > 50% of the total time discussing the diagnoses, evaluation, test results, treatment options, plans; counseling and advising with the patient and/or family and/or caregiver as well as with the care team.    Electronically signed by Andrés Garcia M.D.

## 2022-07-25 NOTE — PROGRESS NOTES
[facility] Progress Note    Patient:  Bushra Stewart  YOB: 1955  Date of Service: 7/25/2022  MRN: [id number]   Acct: [de-identified]   Primary Care Physician: ADRY Villafuerte  Advance Directive: Full Code  Admit Date: 7/24/2022       Hospital Day: 1      CHIEF COMPLAINT:     Chief Complaint   Patient presents with    Loss of Consciousness     Pt arrives via EMS from [facility]. He reports dizziness all morning. While standing at [facility], pt passed out. When he hit ground, he woke up. Pt states \"he feels like he is running out of blood\". 7/25/2022 10:50 AM  Subjective / Interval History:   07/25/2022  No acute changes or acute overnight event reported. Patient seen and examined. Doing well. No new complaints. Denies any further dizziness lightheadedness or LOC. Lying comfortably in bed in no apparent acute distress. Review of Systems:   Review of Systems  ROS: 14 point review of systems is negative except as specifically addressed above.     Diet NPO    Intake/Output Summary (Last 24 hours) at 7/25/2022 1050  Last data filed at 7/25/2022 0930  Gross per 24 hour   Intake 500 ml   Output 800 ml   Net -300 ml       Medications:   dextrose      sodium chloride       Current Facility-Administered Medications   Medication Dose Route Frequency Provider Last Rate Last Admin    glucose chewable tablet 16 g  4 tablet Oral PRN Alyssia Stoddard MD        dextrose bolus 10% 125 mL  125 mL IntraVENous PRN Alyssia Stoddard MD        Or    dextrose bolus 10% 250 mL  250 mL IntraVENous PRN Alyssia Stoddard MD        glucagon (rDNA) injection 1 mg  1 mg SubCUTAneous PRN Alyssia Stoddard MD        dextrose 10 % infusion   IntraVENous Continuous PRN Alyssia Stoddard MD        sodium chloride flush 0.9 % injection 5-40 mL  5-40 mL IntraVENous 2 times per day Alyssia Stoddard MD        sodium chloride flush 0.9 % injection 10 mL  10 mL IntraVENous PRN Alyssia Stoddard MD 0.9 % sodium chloride infusion   IntraVENous PRN Padmini Harris MD        ondansetron (ZOFRAN-ODT) disintegrating tablet 4 mg  4 mg Oral Q8H PRN Padmini Harris MD        Or    ondansetron [facility]) injection 4 mg  4 mg IntraVENous Q6H PRN Padmini Harris MD        acetaminophen (TYLENOL) tablet 650 mg  650 mg Oral Q6H PRN Padmini Harris MD   650 mg at 07/25/22 6091    Or    acetaminophen (TYLENOL) suppository 650 mg  650 mg Rectal Q6H PRN Padmini Harris MD        polyethylene glycol (GLYCOLAX) packet 17 g  17 g Oral Daily PRN Padmini Harris MD        aspirin chewable tablet 81 mg  81 mg Oral Daily Padmini Harris MD   81 mg at 07/25/22 0802    atorvastatin (LIPITOR) tablet 40 mg  40 mg Oral Nightly Padmini Harris MD   40 mg at 07/24/22 2134    nitroGLYCERIN (NITROSTAT) SL tablet 0.4 mg  0.4 mg SubLINGual Q5 Min PRN Padmini Harris MD        insulin glargine (LANTUS) injection vial 20 Units  20 Units SubCUTAneous Nightly Padmini Harris MD   20 Units at 07/24/22 2146    [Held by provider] insulin lispro (HUMALOG) injection vial 4 Units  4 Units SubCUTAneous TID  Padmini Harris MD        insulin lispro (HUMALOG) injection vial 0-4 Units  0-4 Units SubCUTAneous TID  Padmini Harris MD        insulin lispro (HUMALOG) injection vial 0-4 Units  0-4 Units SubCUTAneous Nightly Padmini Harris MD             dextrose      sodium chloride        sodium chloride flush  5-40 mL IntraVENous 2 times per day    aspirin  81 mg Oral Daily    atorvastatin  40 mg Oral Nightly    insulin glargine  20 Units SubCUTAneous Nightly    [Held by provider] insulin lispro  4 Units SubCUTAneous TID     insulin lispro  0-4 Units SubCUTAneous TID     insulin lispro  0-4 Units SubCUTAneous Nightly     glucose, dextrose bolus **OR** dextrose bolus, glucagon (rDNA), dextrose, sodium chloride flush, sodium chloride, ondansetron **OR** ondansetron, acetaminophen **OR** acetaminophen, polyethylene glycol, nitroGLYCERIN  Diet NPO       Labs:   CBC with DIFF:  Recent Labs     07/24/22  1223 07/25/22  0204   WBC 4.0* 5.0   RBC 2.99* 3.27*   HGB 9.7* 10.9*   HCT 30.9* 34.4*   .3* 105.2*   MCH 32.4* 33.3*   MCHC 31.4* 31.7*   RDW 15.6* 15.5*   PLT 71* 76*   MPV 9.9 10.0   NEUTOPHILPCT  --  71.0*   LYMPHOPCT  --  15.1*   MONOPCT  --  10.7*   EOSRELPCT  --  2.4   BASOPCT  --  0.4   NEUTROABS  --  3.6   LYMPHSABS  --  0.8*   MONOSABS  --  0.50   EOSABS  --  0.10   BASOSABS  --  0.00       CMP/BMP:  Recent Labs     07/24/22  1223 07/25/22  0204    141   K 4.5 4.5    102   CO2 24 25   ANIONGAP 12 14   GLUCOSE 138* 97   BUN 21 21   CREATININE 1.1 1.1   LABGLOM >60 >60   CALCIUM 9.4 9.8   PROT 6.4* 6.5*   LABALBU 3.9 4.1   BILITOT 0.5 0.3   ALKPHOS 84 97   ALT 12 12   AST 16 17         CRP:  No results for input(s): CRP in the last 72 hours. Sed Rate:  No results for input(s): SEDRATE in the last 72 hours. HgBA1c:  No components found for: HGBA1C  FLP:    Lab Results   Component Value Date/Time    TRIG 372 07/25/2022 02:04 AM    HDL 34 07/25/2022 02:04 AM    LDLCALC 48 07/25/2022 02:04 AM    LDLDIRECT 99 10/29/2014 06:35 AM     TSH:    Lab Results   Component Value Date/Time    TSH 4.220 07/05/2022 11:29 AM     Troponin T:   Recent Labs     07/24/22  1223 07/24/22 2009 07/24/22 2237   TROPONINI <0.01 <0.01 <0.01     Pro-BNP: No results for input(s): BNP in the last 72 hours. INR:   Recent Labs     07/25/22  0204   INR 1.11     ABGs:   Lab Results   Component Value Date/Time    PHART 7.420 08/26/2021 12:59 PM    PO2ART 64.0 08/26/2021 12:59 PM    VAH2IPV 36.0 08/26/2021 12:59 PM     UA:  Recent Labs     07/24/22  1525   COLORU DARK YELLOW*   PHUR 5.0   CLARITYU CLOUDY*   SPECGRAV 1.029   LEUKOCYTESUR Negative   UROBILINOGEN 1.0   BILIRUBINUR Negative   BLOODU Negative   GLUCOSEU =>1000         Culture Results:    No results for input(s): CXSURG in the last 720 hours.     Blood Culture Recent: No results for input(s): BC in the last 720 hours. No results for input(s): BC, BLOODCULT2, ORG in the last 72 hours. Cultures:   No results for input(s): CULTURE in the last 72 hours. No results for input(s): BC, Shantell Gong in the last 72 hours. No results for input(s): CXSURG in the last 72 hours. No results for input(s): MG, PHOS in the last 72 hours. Recent Labs     07/24/22  1223 07/25/22  0204   AST 16 17   ALT 12 12   BILITOT 0.5 0.3   ALKPHOS 84 97         RAD:   ECHO Complete 2D W Doppler W Color    Result Date: 7/5/2022  Transthoracic Echocardiography Report (TTE)  Demographics   Patient Name  Randa Baumgarten Date of Study          07/05/2022   MRN           [id number]         Gender                 Male   Date of Birth 1955     Room Number            [id number]   Age           74 year(s)   Height:       71 inches      Referring Physician    Sebas Nettles MD   Weight:       215 pounds     Sonographer            Huma Mohamud ROLO   BSA:          2.17 m^2       Interpreting Physician Sebas Nettles MD   BMI:          29.99 kg/m^2  Procedure Type of Study   TTE procedure:ECHO 2D W/DOPPLER/COLOR/CONTRAST. Study Location: Portable Technical Quality: Adequate visualization Patient Status: Outpatient Contrast Medium: Definity. Rhythm: Within normal limits Indications:Dyspnea/SOB. Conclusions   Summary  Structurally normal.  Normal mitral valve leaflet mobility. No evidence of mitral regurgitation. Aortic valve appears to be tricuspid. Moderate calcification with normal cusp excursion. No significant aortic regurgitation or stenosis is noted. Tricuspid valve is structurally normal.  Trace tricuspid regurgitation. RVSP 24 mmHg  Normal left ventricular size with preserved LV function and an estimated  ejection fraction of approximately 55%. No evidence of left ventricular mass or thrombus noted.    Signature ----------------------------------------------------------------  Electronically signed by Chevy Laura MD(Interpreting  physician) on 07/06/2022 08:38 AM  ----------------------------------------------------------------   Findings   Mitral Valve  Structurally normal.  Normal mitral valve leaflet mobility. No evidence of mitral regurgitation. Aortic Valve  Aortic valve appears to be tricuspid. Moderate calcification with normal cusp excursion. No significant aortic regurgitation or stenosis is noted. Tricuspid Valve  Tricuspid valve is structurally normal.  Trace tricuspid regurgitation. RVSP 24 mmHg   Pulmonic Valve  The pulmonic valve was not well visualized. Left Atrium  Normal size left atrium. Left Ventricle  Normal left ventricular size with preserved LV function and an estimated  ejection fraction of approximately 55%. No evidence of left ventricular mass or thrombus noted. Right Atrium  Normal right atrial dimension with no evidence of thrombus or mass noted. Right Ventricle  Normal right ventricular size with preserved RV function. Pericardial Effusion  No evidence of significant pericardial effusion is noted. Pleural Effusion  No evidence of pleural effusion. Miscellaneous  Aortic root is within normal limits. Definity utilized  Allergies   - Ancef:Reaction - Skin ->rash;Severity - Moderate;Sensitivity - Adverse     Reaction.   - Other allergy:Reaction - Skin ->rash;Severity - Moderate;Sensitivity -     Adverse Reaction(Ceftin). - Neosporin:Reaction - Skin ->blistering;Severity - Moderate;Sensitivity -     Adverse Reaction.   - Cephalosporins.   - Other allergy:(BIG BLUE). - Other allergy:(TAPE). 2D Measurements and Calculations(cm)   LVIDd: 4.15 cm                      LVIDs: 3.06 cm  IVSd: 0.86 cm  LVPWd: 0.88 cm                      AO Root Dimension: 2.1 cm  Rt. Vent.  Dimension: 3.32 cm        LA Dimension: 3.1 cm  % Ejection Fraction: 55 %           LA Area: 19.5 cm^2  LA Volume: 56.9 ml                  LV Systolic dimension: 6.47HN  LA Volume Index: 26 ml/m^2          LV PW diastolic: 1.92ND  LV dimension: 4.15 cm               LVOT diameter: 2.1 cm                                      RA Systolic pressure: 3mmHg  LVEDV: 90.2 ml                      RV Diastolic dimension: 7.81LU  LVESV:33.7 ml                       LVEDVI: 42 ml/m^2  Ascending Aorta: 3.5 cm             LVESVI: 16 ml/m^2  Doppler Measurements and Calculations   AV Peak Velocity:112 cm/s               MV Peak E-Wave: 44 cm/s  AV Peak Gradient: 5.02 mmHg             MV Peak A-Wave: 100 cm/s                                          MV E/A Ratio: 0.44 %  TR Velocity:175 cm/s                    MV Mean velocity: NaNcm/s  TR Gradient:12.25 mmHg                  MV Peak Gradient: 0.77 mmHg  Estimated RAP:3 mmHg  RVSP:24 mmHg   MV E' septal velocity: 4.03cm/s  MV E' lateral velocity:8.59 cm/s      XR CHEST (2 VW)    Result Date: 7/5/2022  NO PRIOR REPORT AVAILABLE Exam: X-RAYS OF THE CHEST Clinical data:Preop. Technique: PA and lateral views of the chest. Prior studies: Radiographs of the chest are dated 12/16/2021. CT scan of the chest dated 08/26/2021. Findings: The trachea is midline. The heart size is within normal limits. No infiltrate, effusion or pneumothorax. The soft tissues and bony structures demonstrate no acute pathology. Median sternotomy wires noted, appearing secondary to CABG. Cervical fusion device also noted in  good  repair, with  disc implants. CABG Cervical fusion device in good repair, with disc implants Recommendation: Follow up as clinically indicated. Electronically Signed by Anita Sena MD at 05-Jul-2022 02:49:04 PM             CT HEAD WO CONTRAST    Result Date: 7/24/2022  NO PRIOR REPORT AVAILABLE Exam: CT OF THE BRAIN WITHOUT CONTRAST Clinical data: Syncope, headache.  Technique: Contiguous axial images are obtained from the skull base to vertex without intravenous contrast. Reformatted/MPR images were performed. Radiation dose: CTDIvol = 71.39 mGy, DLP = 1459 mGy x cm. Prior studies: CT scan of brain dated 12/17/2021. Findings:  No acute intracranial abnormality is present. No evidence of acute cortical infarction, hemorrhage, mass or mass effect. No hydrocephalus or abnormal extra-axial fluid collections are present. The posterior fossa is unremarkable. Atrophic changes. The skull base and calvarium are intact. The included portions of the paranasal sinuses and mastoid air cells are clear. 1.  No acute pathology. 2.  Atrophic changes. Recommendation: Follow up as clinically indicated. All CT scans at this facility utilize dose modulation, iterative reconstruction, and/or weight based dosing when appropriate to reduce radiation dose to as low as reasonably achievable. Electronically Signed by Helder Dos Santos MD at 24-Jul-2022 03:14:50 PM             CT CERVICAL SPINE WO CONTRAST    Result Date: 7/24/2022  NO PRIOR REPORT AVAILABLE Exam: CT OF THE CERVICAL SPINE WITHOUT CONTRAST Clinical data: The patient fell. Neck pain. Syncopal episode. Technique: Contiguous axial imaging of the cervical spine. Reconstructed imaging in the coronal and sagittal planes. Reformatted/MPR images were performed. Radiation dose: CTDIvol = 71.39 mGy, DLP = 1459 mGy x cm. (Limited evaluation due to metallic implant)  Prior studies: CT scan of cervical spine dated 12/16/2021. Findings: There is grossly unremarkable alignment without acute fracture or subluxation. Reduced bone mineralization. Posterior elements are intact. Metallic fixation involving C3-C6 with hardware noted. No CT evidence of bony spinal canal or neural foramen stenosis. Soft tissues are grossly unremarkable. Marked spondylotic changes seen. Skull base and craniocervical junction are intact. Lung apices are clear. 1. No acute fracture in cervical spine. Prior C3-C6 fusion with hardware noted.  2. Degenerative spine changes as Temporal 84 16 97 % -- --   07/25/22 0638 109/61 97.3 °F (36.3 °C) Temporal 78 18 97 % -- --   07/24/22 2010 118/67 96.8 °F (36 °C) Temporal 70 18 98 % -- --   07/24/22 1930 123/82 -- -- 73 18 96 % -- --   07/24/22 1900 (!) 122/59 -- -- 77 19 97 % -- --   07/24/22 1830 103/63 -- -- 77 13 93 % -- --   07/24/22 1630 133/79 -- -- 84 21 97 % -- --   07/24/22 1213 (!) 91/55 97.8 °F (36.6 °C) Oral 65 16 -- 5' 11\" (1.803 m) 215 lb (97.5 kg)       24HR INTAKE/OUTPUT:    Intake/Output Summary (Last 24 hours) at 7/25/2022 1050  Last data filed at 7/25/2022 0930  Gross per 24 hour   Intake 500 ml   Output 800 ml   Net -300 ml       Physical Exam  Vitals and nursing note reviewed. Constitutional:       General: He is not in acute distress. Appearance: Normal appearance. He is obese. He is not ill-appearing, toxic-appearing or diaphoretic. HENT:      Head: Normocephalic and atraumatic. Right Ear: External ear normal.      Left Ear: External ear normal.      Nose: Nose normal. No congestion or rhinorrhea. Mouth/Throat:      Mouth: Mucous membranes are moist.      Pharynx: Oropharynx is clear. No oropharyngeal exudate or posterior oropharyngeal erythema. Eyes:      General: No scleral icterus. Right eye: No discharge. Left eye: No discharge. Extraocular Movements: Extraocular movements intact. Conjunctiva/sclera: Conjunctivae normal.   Cardiovascular:      Rate and Rhythm: Normal rate and regular rhythm. Pulses: Normal pulses. Heart sounds: Normal heart sounds. No murmur heard. No friction rub. No gallop. Pulmonary:      Effort: Pulmonary effort is normal. No respiratory distress. Breath sounds: Normal breath sounds. No stridor. No wheezing, rhonchi or rales. Chest:      Chest wall: No tenderness. Abdominal:      General: Bowel sounds are normal. There is no distension. Palpations: Abdomen is soft. Tenderness: There is no abdominal tenderness.  There is no guarding or rebound. Musculoskeletal:         General: No swelling, tenderness, deformity or signs of injury. Normal range of motion. Cervical back: Normal range of motion and neck supple. No rigidity. No muscular tenderness. Right lower leg: No edema. Left lower leg: No edema. Skin:     General: Skin is warm and dry. Capillary Refill: Capillary refill takes less than 2 seconds. Coloration: Skin is not jaundiced or pale. Findings: No bruising, erythema, lesion or rash. Neurological:      General: No focal deficit present. Mental Status: He is alert and oriented to person, place, and time. Cranial Nerves: No cranial nerve deficit. Sensory: No sensory deficit. Motor: No weakness. Coordination: Coordination normal.   Psychiatric:         Mood and Affect: Mood normal.         Behavior: Behavior normal.         Thought Content:  Thought content normal.         Judgment: Judgment normal.       Assessment/plan:         Hospital Problems             Last Modified POA    * (Principal) Syncope and collapse 7/24/2022 Yes    Coronary artery disease 7/24/2022 Yes    Overview Addendum 2/25/2019 11:31 AM by Ryne Dean MD     S/p CABG 9/21/05 with LIMA to LAD and diagonal; SVG to posterior descending branch RCA  10/14 Restudy/Failed Stent with embolization of device in RCA  10/14 Redo CABG SV-PDA           Hyperlipemia 7/24/2022 Yes    S/P CABG x 3 7/24/2022 Yes    Overview Addendum 10/15/2019  7:10 PM by Luzma Vigil MD     9/16/2005  Cath  TVD with normal LVGX  9/21/2005  Cabg x 4 LIMA-LAD and diagonal; SVG-PDA, VG-OM Aden Mon)  1/9/2006  GXT negative for myocardial ischemia  3/7/2007  Cath patent LIMA-LAD & diagonal, patent VG-OM, closed VG-PDA, normal LVFX  2/28/2011  Cath patent LIMA-LAD & diagonal, patent VG-OM, closed VG-PDA, normal LVFX  12/18/2012  lexiscan negative for myocardial ischemia, EF 45  %   12/4/2012  Cath patent LIMA, no mention of anastomosis to diagonal, patent VG-OM, closed VG-PDA, anterior hypo, EF 50%  9/24/2014  lexiscan inferior lateral MI, no ischemic, EF 75%  9/24/2014  Echo normal LVFX  10/28/2014  Cath  Cath patent LIMA, no mention of anastomosis to diagonal, patent VG-OM, closed VG-PDA, anterior hypo, EF 15%, embolization of stent in the RCA  10/30/2014  Redo CABG x 1 VG-PDA Matt Res)  5/11/2016  DSE negative for myocardial ischemia  5/10/2017  Echo  Normal LVFX  5/26/2017  Holter NSR  10/3/2019  Echo normal LVFX  10/3/2019  lexiscan Positive for inferior MI + myocardial ischemia, EF 59%, -5% ischemic myocardium on stress, uninterpretable risk findings, AUC indication 21, AUC score 7, Wolf Gong MD)   10/15/19  Cath  Patent LIMA-diag & LAD, patent VG-OM, patent VG-PDA, distal anterior apical hypokinesis, EF 50%             Obstructive sleep apnea 7/24/2022 Yes    Overview Signed 10/30/2017  9:41 AM by ADRY Damon     AHI:  21.7 per PSG, 7/2017         BiPAP (biphasic positive airway pressure) dependence 7/24/2022 Yes    Overview Signed 10/30/2017  9:42 AM by ADRY Damon     8cm to 20cm            Principal Problem:    Syncope and collapse  Active Problems:    Coronary artery disease    Hyperlipemia    S/P CABG x 3    Obstructive sleep apnea    BiPAP (biphasic positive airway pressure) dependence  Resolved Problems:    * No resolved hospital problems. *        Brief Summary  Mr Cj Vegas,  a 74 y.o. male with a history of SDH, REGINA, Waldenstrom macroglobulinemia, non-Hodgkin's lymphoma, mixed HLD, CAD (prior CABG, recent Cardiac Cath) , CVA, DMT2, HTN, HLD, presenting to [facility] ED (07/24/2022), on account of an acute onset of syncope/LOC. Patient reportedly was at [employer], and when he got up to walk across the room, reportedly \" passed out\"      No reported urinary incontinence, tongue biting, or confusion. Patient reportedly felt dizzy/lightheaded all morning prior to going to [employer], which persisted while at [employer].      Other associated symptoms reported include fatigue as well as headache and neck pain (which are both reportedly chronic). Initial work-up significant for;  CT head WO Con: No acute intracranial pathology. CT cervical spine WO Con:  No acute fracture in cervical spine. Prior C3-C6 fusion with hardware noted. Degenerative spine changes     CXR: No acute cardiopulmonary process and no significant interval change     Thrombocytopenia, with a platelets of 71  Leukopenia, with WBC of 4       Of note  Recent follow-up with cardiology on 07/05/2022) with a documented BP of 90/40. Most recent diagnostic cardiac catheterization (07/05/2022):  Conclusions: Normal LV systolic function. Patent LIMA graft to diagonal sequential to mid LAD Occluded SVG to R-PDA. Widely patent SVG to ramus; 50% proximal narrowing. Multivessel coronary artery disease 100% mid RCA 80% septal . Recommendations: Risk factor modification & Medical management. Syncope  CAD  Monitor on telemetry  Serial orthostatic vitals  Age-adjusted D-dimer within lab reference range: 0.61 (07/24/2022)  Cardiology consulted, given extensive cardiac history and reported multivessel disease  Continue optimize medical management  Neurology consult, for possible EEG and rule out any possible seizure episode        Pancytopenia  Monitor CBC  Consider Hematology consult        Diabetes Mellitus II, with hyperglycemia  Inpatient Regimens to include;  - Insulin Glargine (Lantus) 20 units subcu nightly  - Insulin Lispro (Humalog) on a Low dose sliding scale  Monitor POC glucose, and adjust insulin regimen accordingly based on daily insulin requirement. Elevated lactic acid  Initial lactic acid of 2.3, with a repeat lactic acid of 1.5 (07/24/2022), after 500 mL normal saline bolus administered in the ED. Possibly on account of dehydration  IV fluids  CRP and Procalcitonin level     REGINA  CPAP/BiPAP at night, and during daytime naps.                 Continue management of other chronic medical conditions - see above and orders. Advance Directive: Full Code    Diet NPO         Consults Made:   IP CONSULT TO NEUROLOGY  IP CONSULT TO CARDIOLOGY    DVT prophylaxis: SCDs      Discharge planning: tbd    Time Spent is  35 mins  in the examination, evaluation, counseling and review of medications, assessment and plan.      Electronically signed by   Cecile No MD, MPH, MD,   Internal Medicine Hospitalist   7/25/2022 10:50 AM

## 2022-07-26 VITALS
RESPIRATION RATE: 16 BRPM | OXYGEN SATURATION: 92 % | SYSTOLIC BLOOD PRESSURE: 109 MMHG | BODY MASS INDEX: 30.1 KG/M2 | HEIGHT: 71 IN | TEMPERATURE: 97.1 F | WEIGHT: 215 LBS | DIASTOLIC BLOOD PRESSURE: 79 MMHG | HEART RATE: 99 BPM

## 2022-07-26 LAB
ALBUMIN SERPL-MCNC: 4.3 G/DL (ref 3.5–5.2)
ALP BLD-CCNC: 101 U/L (ref 40–130)
ALT SERPL-CCNC: 14 U/L (ref 5–41)
ANION GAP SERPL CALCULATED.3IONS-SCNC: 13 MMOL/L (ref 7–19)
AST SERPL-CCNC: 19 U/L (ref 5–40)
BASOPHILS ABSOLUTE: 0 K/UL (ref 0–0.2)
BASOPHILS RELATIVE PERCENT: 0.6 % (ref 0–1)
BILIRUB SERPL-MCNC: 0.3 MG/DL (ref 0.2–1.2)
BUN BLDV-MCNC: 19 MG/DL (ref 8–23)
CALCIUM SERPL-MCNC: 9.8 MG/DL (ref 8.8–10.2)
CHLORIDE BLD-SCNC: 100 MMOL/L (ref 98–111)
CO2: 26 MMOL/L (ref 22–29)
CREAT SERPL-MCNC: 1 MG/DL (ref 0.5–1.2)
EOSINOPHILS ABSOLUTE: 0.2 K/UL (ref 0–0.6)
EOSINOPHILS RELATIVE PERCENT: 3.4 % (ref 0–5)
GFR AFRICAN AMERICAN: >59
GFR NON-AFRICAN AMERICAN: >60
GLUCOSE BLD-MCNC: 131 MG/DL (ref 74–109)
GLUCOSE BLD-MCNC: 142 MG/DL (ref 70–99)
GLUCOSE BLD-MCNC: 144 MG/DL (ref 70–99)
GLUCOSE BLD-MCNC: 178 MG/DL (ref 70–99)
HCT VFR BLD CALC: 36.4 % (ref 42–52)
HEMOGLOBIN: 11.9 G/DL (ref 14–18)
IMMATURE GRANULOCYTES #: 0 K/UL
LYMPHOCYTES ABSOLUTE: 1 K/UL (ref 1.1–4.5)
LYMPHOCYTES RELATIVE PERCENT: 19.5 % (ref 20–40)
MCH RBC QN AUTO: 33.6 PG (ref 27–31)
MCHC RBC AUTO-ENTMCNC: 32.7 G/DL (ref 33–37)
MCV RBC AUTO: 102.8 FL (ref 80–94)
MONOCYTES ABSOLUTE: 0.6 K/UL (ref 0–0.9)
MONOCYTES RELATIVE PERCENT: 11.6 % (ref 0–10)
NEUTROPHILS ABSOLUTE: 3.4 K/UL (ref 1.5–7.5)
NEUTROPHILS RELATIVE PERCENT: 64.5 % (ref 50–65)
PDW BLD-RTO: 15.1 % (ref 11.5–14.5)
PERFORMED ON: ABNORMAL
PLATELET # BLD: 96 K/UL (ref 130–400)
PMV BLD AUTO: 10.2 FL (ref 9.4–12.4)
POTASSIUM REFLEX MAGNESIUM: 3.7 MMOL/L (ref 3.5–5)
RBC # BLD: 3.54 M/UL (ref 4.7–6.1)
SODIUM BLD-SCNC: 139 MMOL/L (ref 136–145)
TOTAL PROTEIN: 7.1 G/DL (ref 6.6–8.7)
WBC # BLD: 5.3 K/UL (ref 4.8–10.8)

## 2022-07-26 PROCEDURE — 85025 COMPLETE CBC W/AUTO DIFF WBC: CPT

## 2022-07-26 PROCEDURE — 36415 COLL VENOUS BLD VENIPUNCTURE: CPT

## 2022-07-26 PROCEDURE — 93246 EXT ECG>7D<15D RECORDING: CPT

## 2022-07-26 PROCEDURE — G0378 HOSPITAL OBSERVATION PER HR: HCPCS

## 2022-07-26 PROCEDURE — 6370000000 HC RX 637 (ALT 250 FOR IP): Performed by: INTERNAL MEDICINE

## 2022-07-26 PROCEDURE — 2580000003 HC RX 258: Performed by: INTERNAL MEDICINE

## 2022-07-26 PROCEDURE — 99223 1ST HOSP IP/OBS HIGH 75: CPT | Performed by: INTERNAL MEDICINE

## 2022-07-26 PROCEDURE — 80053 COMPREHEN METABOLIC PANEL: CPT

## 2022-07-26 PROCEDURE — 82947 ASSAY GLUCOSE BLOOD QUANT: CPT

## 2022-07-26 PROCEDURE — 99232 SBSQ HOSP IP/OBS MODERATE 35: CPT | Performed by: PSYCHIATRY & NEUROLOGY

## 2022-07-26 RX ORDER — LOSARTAN POTASSIUM 25 MG/1
12.5 TABLET ORAL DAILY
Qty: 30 TABLET | Refills: 0 | Status: ON HOLD | OUTPATIENT
Start: 2022-07-26 | End: 2022-08-25 | Stop reason: HOSPADM

## 2022-07-26 RX ADMIN — ASPIRIN 81 MG 81 MG: 81 TABLET ORAL at 09:21

## 2022-07-26 RX ADMIN — Medication 5 ML: at 09:30

## 2022-07-26 RX ADMIN — ACETAMINOPHEN 650 MG: 325 TABLET ORAL at 09:21

## 2022-07-26 ASSESSMENT — ENCOUNTER SYMPTOMS
RESPIRATORY NEGATIVE: 1
VOMITING: 0
DIARRHEA: 0
NAUSEA: 0
EYES NEGATIVE: 1
GASTROINTESTINAL NEGATIVE: 1
SHORTNESS OF BREATH: 0

## 2022-07-26 ASSESSMENT — PAIN SCALES - GENERAL
PAINLEVEL_OUTOF10: 6
PAINLEVEL_OUTOF10: 0

## 2022-07-26 ASSESSMENT — PAIN DESCRIPTION - DESCRIPTORS: DESCRIPTORS: ACHING

## 2022-07-26 ASSESSMENT — PAIN DESCRIPTION - LOCATION: LOCATION: HEAD

## 2022-07-26 ASSESSMENT — PAIN DESCRIPTION - ORIENTATION: ORIENTATION: RIGHT;LEFT

## 2022-07-26 ASSESSMENT — PAIN - FUNCTIONAL ASSESSMENT: PAIN_FUNCTIONAL_ASSESSMENT: ACTIVITIES ARE NOT PREVENTED

## 2022-07-26 NOTE — DISCHARGE SUMMARY
[place], [place], [address]  DEPARTMENT OF HOSPITALIST MEDICINE    DISCHARGE SUMMARY:        Luis E Blas  :  1955  MRN:  [id number]    Admit date:  2022  Discharge date: 2022      Admitting Physician:  Juliet Marcial MD    Advance Directive: Full Code    Consults Made:   IP CONSULT TO NEUROLOGY  IP CONSULT TO CARDIOLOGY      Primary Care Physician:  ADRY Torres    Discharge Diagnoses:  Principal Problem:    Syncope and collapse  Active Problems:    Coronary artery disease    Hyperlipemia    S/P CABG x 3    Obstructive sleep apnea    BiPAP (biphasic positive airway pressure) dependence  Resolved Problems:    * No resolved hospital problems. *          Pertinent Labs:  CBC with DIFF:  Recent Labs     22   WBC 4.0* 5.0 5.3   RBC 2.99* 3.27* 3.54*   HGB 9.7* 10.9* 11.9*   HCT 30.9* 34.4* 36.4*   .3* 105.2* 102.8*   MCH 32.4* 33.3* 33.6*   MCHC 31.4* 31.7* 32.7*   RDW 15.6* 15.5* 15.1*   PLT 71* 76* 96*   MPV 9.9 10.0 10.2   NEUTOPHILPCT  --  71.0* 64.5   LYMPHOPCT  --  15.1* 19.5*   MONOPCT  --  10.7* 11.6*   EOSRELPCT  --  2.4 3.4   BASOPCT  --  0.4 0.6   NEUTROABS  --  3.6 3.4   LYMPHSABS  --  0.8* 1.0*   MONOSABS  --  0.50 0.60   EOSABS  --  0.10 0.20   BASOSABS  --  0.00 0.00       CMP/BMP:  Recent Labs     22    141 139   K 4.5 4.5 3.7    102 100   CO2 24 25 26   ANIONGAP 12 14 13   GLUCOSE 138* 97 131*   BUN 21 21 19   CREATININE 1.1 1.1 1.0   LABGLOM >60 >60 >60   CALCIUM 9.4 9.8 9.8   PROT 6.4* 6.5* 7.1   LABALBU 3.9 4.1 4.3   BILITOT 0.5 0.3 0.3   ALKPHOS 84 97 101   ALT 12 12 14   AST 16 17 19         CRP:    Recent Labs     22  0204   CRP 0.31     Sed Rate:  No results for input(s): SEDRATE in the last 72 hours.       HgBA1c:  No components found for: HGBA1C  FLP:    Lab Results   Component Value Date/Time    TRIG 372 2022 02:04 AM    HDL 34 07/25/2022 02:04 AM    LDLCALC 48 07/25/2022 02:04 AM    LDLDIRECT 99 10/29/2014 06:35 AM     TSH:    Lab Results   Component Value Date/Time    TSH 4.220 07/05/2022 11:29 AM     Troponin T:   Recent Labs     07/24/22  1223 07/24/22 2009 07/24/22  2237   TROPONINI <0.01 <0.01 <0.01     Pro-BNP: No results for input(s): BNP in the last 72 hours. INR:   Recent Labs     07/25/22  0204   INR 1.11     ABGs:   Lab Results   Component Value Date/Time    PHART 7.420 08/26/2021 12:59 PM    PO2ART 64.0 08/26/2021 12:59 PM    RAF8LZR 36.0 08/26/2021 12:59 PM     UA:  Recent Labs     07/24/22  1525   COLORU DARK YELLOW*   PHUR 5.0   CLARITYU CLOUDY*   SPECGRAV 1.029   LEUKOCYTESUR Negative   UROBILINOGEN 1.0   BILIRUBINUR Negative   BLOODU Negative   GLUCOSEU =>1000         Culture Results:    No results for input(s): CXSURG in the last 720 hours. Blood Culture Recent: No results for input(s): BC in the last 720 hours. Cultures:   No results for input(s): CULTURE in the last 72 hours. No results for input(s): BC, Barreto Kappa in the last 72 hours. No results for input(s): CXSURG in the last 72 hours. No results for input(s): MG, PHOS in the last 72 hours. Recent Labs     07/24/22  1223 07/25/22  0204 07/26/22  0326   AST 16 17 19   ALT 12 12 14   BILITOT 0.5 0.3 0.3   ALKPHOS 84 97 101           Significant Diagnostic Studies:   CT HEAD WO CONTRAST    Result Date: 7/24/2022  NO PRIOR REPORT AVAILABLE Exam: CT OF THE BRAIN WITHOUT CONTRAST Clinical data: Syncope, headache. Technique: Contiguous axial images are obtained from the skull base to vertex without intravenous contrast.  Reformatted/MPR images were performed. Radiation dose: CTDIvol = 71.39 mGy, DLP = 1459 mGy x cm. Prior studies: CT scan of brain dated 12/17/2021. Findings:  No acute intracranial abnormality is present. No evidence of acute cortical infarction, hemorrhage, mass or mass effect.  No hydrocephalus or abnormal extra-axial fluid collections are present. The posterior fossa is unremarkable. Atrophic changes. The skull base and calvarium are intact. The included portions of the paranasal sinuses and mastoid air cells are clear. 1.  No acute pathology. 2.  Atrophic changes. Recommendation: Follow up as clinically indicated. All CT scans at this facility utilize dose modulation, iterative reconstruction, and/or weight based dosing when appropriate to reduce radiation dose to as low as reasonably achievable. Electronically Signed by David Peña MD at 24-Jul-2022 03:14:50 PM             CT CERVICAL SPINE WO CONTRAST    Result Date: 7/24/2022  NO PRIOR REPORT AVAILABLE Exam: CT OF THE CERVICAL SPINE WITHOUT CONTRAST Clinical data: The patient fell. Neck pain. Syncopal episode. Technique: Contiguous axial imaging of the cervical spine. Reconstructed imaging in the coronal and sagittal planes. Reformatted/MPR images were performed. Radiation dose: CTDIvol = 71.39 mGy, DLP = 1459 mGy x cm. (Limited evaluation due to metallic implant)  Prior studies: CT scan of cervical spine dated 12/16/2021. Findings: There is grossly unremarkable alignment without acute fracture or subluxation. Reduced bone mineralization. Posterior elements are intact. Metallic fixation involving C3-C6 with hardware noted. No CT evidence of bony spinal canal or neural foramen stenosis. Soft tissues are grossly unremarkable. Marked spondylotic changes seen. Skull base and craniocervical junction are intact. Lung apices are clear. 1. No acute fracture in cervical spine. Prior C3-C6 fusion with hardware noted. 2. Degenerative spine changes as above. Recommendation: Follow up as clinically indicated. Electronically Signed by David Peña MD at 24-Jul-2022 03:14:05 PM             XR CHEST PORTABLE    Result Date: 7/24/2022  NO PRIOR REPORT AVAILABLE Exam: X-RAY OF [facility] Clinical data:Syncope.  Technique:Single view of the chest. Prior studies: Radiographs of the chest dated 07/05/22. Findings:Normal cardiac size. Median sternotomy. Orthopedic hardware in the cervical spine. So well expanded without consolidation or pneumothorax. No pleural effusion. 5 mm calcified granuloma left lung apex. Degenerative changes of both shoulders  and within the thoracic spine. No acute cardiopulmonary process and no significant interval change. Recommendation: Follow up as clinically indicated. Electronically Signed by Rena Dumont MD at 24-Jul-2022 02:44:47 PM                   Hospital Course:   Mr Meaghan Agee,  a 74 y.o. male with a history of SDH, REGINA, Waldenstrom macroglobulinemia, non-Hodgkin's lymphoma, mixed HLD, CAD (prior CABG, recent Cardiac Cath) , CVA, DMT2, HTN, HLD, presenting to [facility] ED (07/24/2022), on account of an acute onset of syncope/LOC. Patient reportedly was at [employer], and when he got up to walk across the room, reportedly \" passed out\"      No reported urinary incontinence, tongue biting, or confusion. Patient reportedly felt dizzy/lightheaded all morning prior to going to [employer], which persisted while at [employer]. Other associated symptoms reported include fatigue as well as headache and neck pain (which are both reportedly chronic). Initial work-up significant for;  CT head WO Con: No acute intracranial pathology. CT cervical spine WO Con:  No acute fracture in cervical spine. Prior C3-C6 fusion with hardware noted. Degenerative spine changes     CXR: No acute cardiopulmonary process and no significant interval change     Thrombocytopenia, with a platelets of 71  Leukopenia, with WBC of 4       Of note  Recent follow-up with cardiology on 07/05/2022) with a documented BP of 90/40. Most recent diagnostic cardiac catheterization (07/05/2022):  Conclusions: Normal LV systolic function. Patent LIMA graft to diagonal sequential to mid LAD Occluded SVG to R-PDA. Widely patent SVG to ramus; 50% proximal narrowing.  Multivessel coronary artery disease 100% mid RCA 80% septal . Recommendations: Risk factor modification & Medical management. Syncope   Orthostatic Hypotension  CAD  Monitor on telemetry  Serial orthostatic vitals  Age-adjusted D-dimer within lab reference range: 0.61 (07/24/2022)  Cardiology consulted, given extensive cardiac history and reported multivessel disease  Continue optimize medical management  Neurology consulted on admission  Suspect syncope likely due to orthostatic hypotension only. Okay for discharge from neurology standpoint  Seen by Cardiology  Okay for discharge from cardiology standpoint, with a Zio patch placement prior to discharge. [place] Nicole Macroglobulinemia  Pancytopenia  Monitored CBC  Follow-up with hematology outpatient     Diabetes Mellitus II, with hyperglycemia  Inpatient Regimens to included;  - Insulin Glargine (Lantus) 20 units subcu nightly  - Insulin Lispro (Humalog) on a Low dose sliding scale  Monitored POC glucose, and adjusted insulin regimen accordingly based on daily insulin requirement. Elevated lactic acid  Initial lactic acid of 2.3, with a repeat lactic acid of 1.5 (07/24/2022), after 500 mL normal saline bolus administered in the ED. Possibly on account of dehydration  IV fluids  CRP and Procalcitonin levels within lab reference range     [place]  CPAP/BiPAP at night, and during daytime naps. Continued  management of other chronic medical conditions        Physical Exam:  Vital Signs: /79   Pulse 99   Temp 97.1 °F (36.2 °C) (Temporal)   Resp 16   Ht 5' 11\" (1.803 m)   Wt 215 lb (97.5 kg)   SpO2 92%   BMI 29.99 kg/m²   Physical Exam  Vitals and nursing note reviewed. Constitutional:       General: He is not in acute distress. Appearance: Normal appearance. He is not ill-appearing, toxic-appearing or diaphoretic. HENT:      Head: Normocephalic and atraumatic.       Right Ear: External ear normal.      Left Ear: External ear normal. Nose: Nose normal. No congestion or rhinorrhea. Mouth/Throat:      Mouth: Mucous membranes are moist.      Pharynx: Oropharynx is clear. No oropharyngeal exudate or posterior oropharyngeal erythema. Eyes:      General: No scleral icterus. Right eye: No discharge. Left eye: No discharge. Extraocular Movements: Extraocular movements intact. Conjunctiva/sclera: Conjunctivae normal.      Pupils: Pupils are equal, round, and reactive to light. Cardiovascular:      Rate and Rhythm: Normal rate and regular rhythm. Pulses: Normal pulses. Heart sounds: Normal heart sounds. No murmur heard. No friction rub. No gallop. Pulmonary:      Effort: Pulmonary effort is normal. No respiratory distress. Breath sounds: Normal breath sounds. No stridor. No wheezing, rhonchi or rales. Chest:      Chest wall: No tenderness. Abdominal:      General: Bowel sounds are normal. There is no distension. Palpations: Abdomen is soft. Tenderness: There is no abdominal tenderness. There is no guarding or rebound. Musculoskeletal:         General: No swelling, tenderness, deformity or signs of injury. Normal range of motion. Cervical back: Normal range of motion and neck supple. No rigidity. No muscular tenderness. Right lower leg: No edema. Left lower leg: No edema. Skin:     General: Skin is warm and dry. Capillary Refill: Capillary refill takes less than 2 seconds. Coloration: Skin is not jaundiced or pale. Findings: No bruising, erythema, lesion or rash. Neurological:      General: No focal deficit present. Mental Status: He is alert and oriented to person, place, and time. Cranial Nerves: No cranial nerve deficit. Sensory: No sensory deficit. Motor: No weakness. Coordination: Coordination normal.   Psychiatric:         Mood and Affect: Mood normal.         Behavior: Behavior normal.         Thought Content:  Thought content normal.         Judgment: Judgment normal.         Discharge Medications:        Medication List        CHANGE how you take these medications      losartan 25 MG tablet  Commonly known as: COZAAR  Take 0.5 tablets by mouth in the morning. What changed: how much to take     metoprolol tartrate 25 MG tablet  Commonly known as: LOPRESSOR  Take 1 tablet by mouth every evening  What changed: Another medication with the same name was removed. Continue taking this medication, and follow the directions you see here. CONTINUE taking these medications      acetaminophen 500 MG tablet  Commonly known as: TYLENOL     atorvastatin 40 MG tablet  Commonly known as: LIPITOR     b complex vitamins capsule     busPIRone 15 MG tablet  Commonly known as: BUSPAR     docusate sodium 100 MG capsule  Commonly known as: COLACE     DULoxetine 60 MG extended release capsule  Commonly known as: CYMBALTA     ferrous sulfate 325 (65 Fe) MG tablet  Commonly known as: IRON 325     gabapentin 100 MG capsule  Commonly known as: NEURONTIN     GARLIC-CALCIUM PO     Icosapent Ethyl 1 g Caps capsule  Commonly known as: VASCEPA     Jardiance 25 MG tablet  Generic drug: empagliflozin     levothyroxine 50 MCG tablet  Commonly known as: SYNTHROID     metFORMIN 500 MG tablet  Commonly known as: GLUCOPHAGE  Take 1 tablet by mouth 2 times daily (with meals) Please hold for 48 hrs after cardiac catheterization. You may resume like normal on Friday 7/8/2022.      ondansetron 8 MG tablet  Commonly known as: ZOFRAN     pantoprazole 40 MG tablet  Commonly known as: PROTONIX  Take 1 tablet by mouth daily     ranolazine 1000 MG extended release tablet  Commonly known as: RANEXA  Take 1 tablet by mouth 2 times daily     tiZANidine 4 MG tablet  Commonly known as: ZANAFLEX     Tresiba 100 UNIT/ML Soln  Generic drug: Insulin Degludec     vitamin C 250 MG tablet     Vitamin D 1000 units Caps capsule  Commonly known as: CHOLECALCIFEROL     ZINC 15 PO STOP taking these medications      dilTIAZem 180 MG extended release capsule  Commonly known as: Cardizem CD     isosorbide mononitrate 30 MG extended release tablet  Commonly known as: IMDUR     tamsulosin 0.4 MG capsule  Commonly known as: FLOMAX            ASK your doctor about these medications      clopidogrel 75 MG tablet  Commonly known as: PLAVIX  One daily     nitroGLYCERIN 0.4 MG SL tablet  Commonly known as: NITROSTAT  Place 1 tablet under the tongue every 5 minutes as needed for Chest pain               Where to Get Your Medications        These medications were sent to [address]  [address], [place]      Phone: [phone]   losartan 25 MG tablet           Discharge Instructions: Follow up with ADRY Conrad in 7 days. Take medications as directed. Resume activity as tolerated. Recommended Follow Up:  Doretha Yates MD  [address]  [phone]    Follow up on 8/18/2022  3:30 pm    [address]  [phone]    Go on 7/29/2022  @ 9:30 am      Followup Appointments Scheduled at Time of Discharge:  Future Appointments   Date Time Provider Maximiliano Allen   8/18/2022  3:30 PM Doretha Yates MD [facility]   12/1/2022 10:45 AM Doretha Yates MD [facility]          Diet: ADULT DIET; Regular; 4 carb choices (60 gm/meal)        DISCHARGE STATUS:    Condition on Discharge: stable    Disposition: Patient is medically stable and will be discharged Home      Extended Emergency Contact Information  Primary Emergency Contact: Niya Ramirez  Address: [address] Phone: [phone]  Mobile Phone: [phone]  Relation: Spouse   needed?  No  Secondary Emergency Contact: Jacki Rodrigues  Address: [facility] [place] [address] Phone: [phone]  Relation: Child   needed? No         Time Spent on discharge is   34 mins  in the examination, evaluation, counseling and review of medications and discharge plan. Electronically signed by   Phoebe Burris MD, MPH, MD,   Internal Medicine Hospitalist   7/26/2022 4:25 PM      Thank you ADRY Rg for the opportunity to be involved in this patient's care. If you have any questions or concerns please feel free to contact me at [phone]        EMR Dragon/Transcription disclaimer:   Much of this encounter note is an electronic transcription/translation of spoken language to printed text.  The electronic translation of spoken language may permit erroneous, or at times, nonsensical words or phrases to be inadvertently transcribed; although attempts have made to review the note for such errors, some may still exist.

## 2022-07-26 NOTE — PROGRESS NOTES
I have called the cardiology office regarding the patients cardiology consult. I have asked the nurse to inform Dr. Flakita Cochran that he has a consult for the patient and that no cardiologist saw the patient yesterday. The nurse has stated that she will give Dr. Flakita Cochran the message.  I have added Dr. Flakita Cochran to the patients treatment team.

## 2022-07-26 NOTE — PROGRESS NOTES
[address], [address]  [place], [place]  Phone [phone]     Neurology Progress Note  2022 9:47 AM  Information:   Patient Name: Cliff Vazquez  :   1955  Age:   74 y.o. MRN:   [id number]  Account #:  [de-identified]  Admit Date:   2022  Today:  22     ADMIT DX:   Syncope and collapse    Subjective:     Cliff Vazquez is a 74y.o. year old man with a history of diabetes, diabetic polyneuropathy, CAD, and waldenstrom's macroglobulinemia with pancytopenia who was admitted  with syncope and hypotension. Interval History:   No further lightheadedness or syncope. BP is improved off the Lisinopril. Objective:     Past Medical History:  Past Medical History:   Diagnosis Date    Abnormal nuclear stress test 10/22/2014    Arthritis     BiPAP (biphasic positive airway pressure) dependence     8cm to 20cm    Cerebrovascular disease     Chronic kidney disease, stage II (mild) 2016    Awilda Mi M.D. Cirrhosis ([facility] [address]     Coronary artery disease 2011    Depression     Diabetes mellitus ([facility] [address]     Encounter for wound care     PT SEES \"GRACY\" WITH DR. SAMUEL    Great toe pain     RT    Headache     Hyperlipemia 2011    Dr. Debby Resendiz follows lipids.     Hyperlipidemia     Hypertension     Liver disease     LONG TERM ANTICOAGULENT USE     Low blood pressure 2014    lymphostatic lymphoma     Nausea 2014    Non Hodgkin's lymphoma ([facility] [address]     Obesity     Obstructive sleep apnea     AHI:  21.7 per PSG, 2017    Peptic ulcer disease 2011    Restless leg     S/P CABG x 3 10/22/2014    SVG to RCA; SVG to LAD diagonal; LIMA to JUG(7856, )    Sleep apnea     Syncope and collapse 2022    Tachyarrhythmia     Thrombocythemia, essential (HCC)     Thyroid disease     Type 2 diabetes mellitus without complication (HCC)     Type II or unspecified type diabetes mellitus without mention of complication, not stated as uncontrolled Past Surgical History:   Procedure Laterality Date    CARDIAC CATHETERIZATION  11    EF 60%    CARDIAC CATHETERIZATION  05, 3/7/07    EF < 50%    CARDIAC CATHETERIZATION  12   GERBER    EF  50%    CARDIAC CATHETERIZATION  10/28/14  GERBER Sawyer M.D. CERVICAL SPINE SURGERY      COLONOSCOPY      CORONARY ARTERY BYPASS GRAFT  05    LIMA to LAD and diagonal; SVG to posterior descending branch RCA    CORONARY ARTERY BYPASS GRAFT  10/30/2014    Redo Sternotomy - SVG-PDA, TMR (RBL)    JOINT REPLACEMENT Right     knee    KIDNEY SURGERY  2017    KNEE ARTHROSCOPY      right ACL repair    SHOULDER ARTHROSCOPY      left-rotator cuff repair right- rotator cuff repair    SHOULDER ARTHROSCOPY  11    right    TONSILLECTOMY         Family History   Problem Relation Age of Onset    Heart Disease Other     Lung Cancer Mother     Cancer Sister        Social History     Socioeconomic History    Marital status:      Spouse name: Logan Honeycutt    Number of children: 2    Years of education: 12    Highest education level: Not on file   Occupational History    Occupation:      Employer: [employer]     Comment: Retired,  for [employer].    Tobacco Use    Smoking status: Former     Packs/day: 0.00     Types: Cigarettes     Quit date: 1998     Years since quittin.4    Smokeless tobacco: Never    Tobacco comments:     quit 23 years ago   Vaping Use    Vaping Use: Never used   Substance and Sexual Activity    Alcohol use: No    Drug use: No    Sexual activity: Yes     Partners: Female   Other Topics Concern    Not on file   Social History Narrative    Not on file     Social Determinants of Health     Financial Resource Strain: Not on file   Food Insecurity: Not on file   Transportation Needs: Not on file   Physical Activity: Not on file   Stress: Not on file Social Connections: Not on file   Intimate Partner Violence: Not on file   Housing Stability: Not on file       Medications:   dextrose      sodium chloride        sodium chloride flush  5-40 mL IntraVENous 2 times per day    aspirin  81 mg Oral Daily    atorvastatin  40 mg Oral Nightly    insulin glargine  20 Units SubCUTAneous Nightly    [Held by provider] insulin lispro  4 Units SubCUTAneous TID WC    insulin lispro  0-4 Units SubCUTAneous TID WC    insulin lispro  0-4 Units SubCUTAneous Nightly       Diagnostic Studies:  Reviewed all labs/diagnositcs since last 24hrs:  Recent Results (from the past 24 hour(s))   POCT Glucose    Collection Time: 07/25/22 11:51 AM   Result Value Ref Range    POC Glucose 140 (H) 70 - 99 mg/dl    Performed on AccuChek    POCT Glucose    Collection Time: 07/25/22  4:05 PM   Result Value Ref Range    POC Glucose 111 (H) 70 - 99 mg/dl    Performed on AccuChek    POCT Glucose    Collection Time: 07/25/22  9:18 PM   Result Value Ref Range    POC Glucose 177 (H) 70 - 99 mg/dl    Performed on 3D [employer]uChek    Comprehensive Metabolic Panel w/ Reflex to MG    Collection Time: 07/26/22  3:26 AM   Result Value Ref Range    Sodium 139 136 - 145 mmol/L    Potassium reflex Magnesium 3.7 3.5 - 5.0 mmol/L    Chloride 100 98 - 111 mmol/L    CO2 26 22 - 29 mmol/L    Anion Gap 13 7 - 19 mmol/L    Glucose 131 (H) 74 - 109 mg/dL    BUN 19 8 - 23 mg/dL    Creatinine 1.0 0.5 - 1.2 mg/dL    GFR Non-African American >60 >60    GFR African American >59 >59    Calcium 9.8 8.8 - 10.2 mg/dL    Total Protein 7.1 6.6 - 8.7 g/dL    Albumin 4.3 3.5 - 5.2 g/dL    Total Bilirubin 0.3 0.2 - 1.2 mg/dL    Alkaline Phosphatase 101 40 - 130 U/L    ALT 14 5 - 41 U/L    AST 19 5 - 40 U/L   CBC with Auto Differential    Collection Time: 07/26/22  3:26 AM   Result Value Ref Range    WBC 5.3 4.8 - 10.8 K/uL    RBC 3.54 (L) 4.70 - 6.10 M/uL    Hemoglobin 11.9 (L) 14.0 - 18.0 g/dL    Hematocrit 36.4 (L) 42.0 - 52.0 %    .8 (H) 80.0 - 94.0 fL    MCH 33.6 (H) 27.0 - 31.0 pg    MCHC 32.7 (L) 33.0 - 37.0 g/dL    RDW 15.1 (H) 11.5 - 14.5 %    Platelets 96 (L) 690 - 400 K/uL    MPV 10.2 9.4 - 12.4 fL    Neutrophils % 64.5 50.0 - 65.0 %    Lymphocytes % 19.5 (L) 20.0 - 40.0 %    Monocytes % 11.6 (H) 0.0 - 10.0 %    Eosinophils % 3.4 0.0 - 5.0 %    Basophils % 0.6 0.0 - 1.0 %    Neutrophils Absolute 3.4 1.5 - 7.5 K/uL    Immature Granulocytes # 0.0 K/uL    Lymphocytes Absolute 1.0 (L) 1.1 - 4.5 K/uL    Monocytes Absolute 0.60 0.00 - 0.90 K/uL    Eosinophils Absolute 0.20 0.00 - 0.60 K/uL    Basophils Absolute 0.00 0.00 - 0.20 K/uL   POCT Glucose    Collection Time: 07/26/22  7:37 AM   Result Value Ref Range    POC Glucose 142 (H) 70 - 99 mg/dl    Performed on AccuChek        Diet:  Diet NPO    Examination:  Vitals: /83   Pulse 97   Temp 97 °F (36.1 °C) (Temporal)   Resp 16   Ht 5' 11\" (1.803 m)   Wt 215 lb (97.5 kg)   SpO2 93%   BMI 29.99 kg/m²    Intake/Output Summary (Last 24 hours) at 7/26/2022 0947  Last data filed at 7/26/2022 0735  Gross per 24 hour   Intake 480 ml   Output 1450 ml   Net -970 ml     General appearance: alert, appears stated age and cooperative  Skin: Skin color, texture, turgor normal. No rashes or lesions  HEENT: Head: Normal, normocephalic, atraumatic. Neck:no adenopathy, no carotid bruit, no JVD, supple, symmetrical, trachea midline, and thyroid not enlarged, symmetric, no tenderness/mass/nodules  Lungs: clear to auscultation bilaterally  Extremities:  OA joint changes. Muscle atrophy below knees. Neurologic:  Alert, oriented. No dysarthria. Speech is fluent. EOMI, PERRL, VFF. No facial weakness. Limb strength is normal.  No pronator drift. Rapid alternating movements are normal.  Finger to nose testing shows no dysmetria. Assessment:   1. Syncope, likely due to orthostatic hypotension.   He has a severe diabetic polyneuropathy, no doubt with autonomic involvement and is on several medications that can exacerbate - Flomax, Imdur mainly. 2.         Diabetes with severe PN  3.         CAD, recently evaluated  4. Chronic neck pain and headaches  5. REGINA  6. Waldenstrom's macroglobulinemia with chronic pancytopenia  I discussed the above with him and his wife. Plan:   1. Stop Flomax. Consider stopping Imdur  2. Monitor BP  3. Needs new CPAP. Will order. 4.         Cardiology to see  5. Will have him follow up with Dr. Ellyn Canavan for cervical paraspinal trigger point injections. Discharge planning:   Home    I spent 25 total minutes in face to face interaction with patient and coordination of care.    I spent > 50% of the total time discussing the diagnoses, evaluation, test results, treatment options, plans; counseling and advising with the patient and/or family and/or caregiver as well as with the care team.    Electronically signed by Danita Han M.D.

## 2022-07-26 NOTE — PROGRESS NOTES
I have talked to a nurse in the cardiology office and she has stated that since the patient has Dr. Juancarlos Tejeda on his treatment team it would have to be Dr. Juancarlos Tejeda that sees the patient. She stated that she will send Dr. Juancarlos Tejeda a message and that Dr. Juancarlos Tejeda will either come to see the patient or he will call the patient.

## 2022-07-26 NOTE — CONSULTS
[id number] [facility]  Cardiology Consult      Requesting MD:  Sj Coleman MD   Admit Status:         History obtained from:   [] Patient  [] Other (specify):     Patient:  Jaspreet Garcia  [id number]     Chief Complaint:   Chief Complaint   Patient presents with    Loss of Consciousness     Pt arrives via EMS from [facility]. He reports dizziness all morning. While standing at [facility], pt passed out. When he hit ground, he woke up. Pt states \"he feels like he is running out of blood\". HPI: Mr. Flakita Trent is a 74 y.o. male with a history of coronary artery disease status post cardiac catheterization 7/5/2022 medical management recommended remote CABG in 2005. Medications were adjusted slightly following discharge she has had no further chest pain or dyspnea. He was at [facility] on Sunday stood up for about 5 minutes and then passed out without warning no reported symptoms before or after admitted he has had no further difficulty since admission. No reported arrhythmias. proBNP 192. All troponins are negative. Review of Systems:  Review of Systems   Constitutional: Negative. Negative for chills, fever and unexpected weight change. HENT: Negative. Eyes: Negative. Respiratory: Negative. Negative for shortness of breath. Cardiovascular: Negative. Negative for chest pain. Gastrointestinal: Negative. Negative for diarrhea, nausea and vomiting. Endocrine: Negative. Genitourinary: Negative. Musculoskeletal: Negative. Skin: Negative. Neurological: Negative. All other systems reviewed and are negative.     Cardiac Specific Data:  Specialty Problems          Cardiology Problems    Coronary artery disease        Hyperlipemia        Hypertension        Hypotension        Tachyarrhythmia           Past Medical History:  Past Medical History:   Diagnosis Date    Abnormal nuclear stress test 10/22/2014    Arthritis     BiPAP (biphasic positive airway pressure) dependence     8cm to 20cm    Cerebrovascular disease     Chronic kidney disease, stage II (mild) 08/17/2016    Corinna Mcmillan M.D.    Cirrhosis [facility])     Coronary artery disease 02/24/2011    Depression     [facility]     Encounter for wound care     PT SEES \"GRACY\" WITH DR. SAMUEL    Great toe pain     RT    Headache     Hyperlipemia 02/24/2011    Dr. Eligio Marie follows lipids.     Hyperlipidemia     Hypertension     Liver disease     LONG TERM ANTICOAGULENT USE     Low blood pressure 11/19/2014    lymphostatic lymphoma     Nausea 11/19/2014    Non Hodgkin's lymphoma (HCC)     Obesity     Obstructive sleep apnea     AHI:  21.7 per PSG, 7/2017    Peptic ulcer disease 02/24/2011    Restless leg     S/P CABG x 3 10/22/2014    SVG to RCA; SVG to LAD diagonal; LIMA to VEZ(6302, 2014)    Sleep apnea     Syncope and collapse 7/24/2022    Tachyarrhythmia     Thrombocythemia, essential (HCC)     Thyroid disease     Type 2 diabetes mellitus without complication ([facility]     Type II or unspecified type diabetes mellitus without mention of complication, not stated as uncontrolled         Past Surgical History:  Past Surgical History:   Procedure Laterality Date    CARDIAC CATHETERIZATION  2/28/11    EF 60%    CARDIAC CATHETERIZATION  9/16/05, 3/7/07    EF < 50%    CARDIAC CATHETERIZATION  12/4/12   MDL    EF  50%    CARDIAC CATHETERIZATION  10/28/14  MDL    CARDIAC CATHETERIZATION     Sherron Rasmussen M.D.   Deann Whatcom COLONOS[facility]     [facility]  [facility]    LIMA to LAD and diagonal; SVG to posterior descending branch RCA    CORONARY ARTERY BYPASS GRAFT  10/30/2014    Redo Sternotomy - SVG-PDA, TMR (RBL)    JOINT REPLACEMENT Right     knee    KIDNEY SURGERY  08/14/2017    KNEE ARTHROSCOPY      right ACL repair    SHOULDER ARTHROSCOPY      left-rotator cuff repair right- rotator cuff repair    SHOULDER ARTHROSCOPY  11    right    TONSILLECTOMY         Past Family History:  Family History   Problem Relation Age of Onset    Heart Disease Other     Lung Cancer Mother     Cancer Sister        Past Social History:  Social History     Socioeconomic History    Marital status:      Spouse name: Mary Garay Number of children: 2    Years of education: 15    Highest education level: Not on file   Occupational History    Occupation:      Employer: [employer]     Comment: Retired,  for the [employer]. Tobacco Use    Smoking status: Former     Packs/day: 0.00     Types: Cigarettes     Quit date: 1998     Years since quittin.4    Smokeless tobacco: Never    Tobacco comments:     quit 23 years ago   Vaping Use    Vaping Use: Never used   Substance and Sexual Activity    Alcohol use: No    Drug use: No    Sexual activity: Yes     Partners: Female   Other Topics Concern    Not on file   Social History Narrative    Not on file     Social Determinants of Health     Financial Resource Strain: Not on file   Food Insecurity: Not on file   Transportation Needs: Not on file   Physical Activity: Not on file   Stress: Not on file   Social Connections: Not on file   Intimate Partner Violence: Not on file   Housing Stability: Not on file       Allergies: Allergies   Allergen Reactions    Ancef [Cefazolin Sodium]     Ceftin [Cefuroxime]      \"anything with ceftin in it\"    Cefuroxime Axetil     Cephalosporins     Neosporin [Bacitracin-Polymyxin B]      blisters       Home Meds:  Prior to Admission medications    Medication Sig Start Date End Date Taking?  Authorizing Provider   Zinc Sulfate (ZINC 15 PO) Take by mouth    Historical Provider, MD   GARLIC-CALCIUM PO Take by mouth    Historical Provider, MD   DULoxetine (CYMBALTA) 60 MG extended release capsule  22   Historical Provider, MD   Icosapent Ethyl (VASCEPA) 1 g CAPS capsule Take 2 capsules by mouth 2 times daily    Historical Provider, MD   Ascorbic Acid (VITAMIN C) 250 MG tablet Take 250 mg by mouth daily    Historical Provider, MD   metFORMIN (GLUCOPHAGE) 500 MG tablet Take 1 tablet by mouth 2 times daily (with meals) Please hold for 48 hrs after cardiac catheterization. You may resume like normal on Friday 7/8/2022. 7/5/22   Lico Sr MD   metoprolol tartrate (LOPRESSOR) 25 MG tablet Take 1 tablet by mouth every evening 7/5/22   Lico Sr MD   metoprolol tartrate (LOPRESSOR) 50 MG tablet Take 1 tablet by mouth every morning 7/5/22   Lico Sr MD   isosorbide mononitrate (IMDUR) 30 MG extended release tablet Take 1 tablet by mouth daily 7/5/22   Lico Sr MD   empagliflozin (JARDIANCE) 25 MG tablet Take 25 mg by mouth daily    Historical Provider, MD   Insulin Degludec (TRESIBA) 100 UNIT/ML SOLN Inject 6 Units into the skin nightly    Historical Provider, MD   losartan (COZAAR) 25 MG tablet Take 25 mg by mouth daily    Historical Provider, MD   atorvastatin (LIPITOR) 40 MG tablet TAKE 1 TABLET BY MOUTH NIGHTLY AT BEDTIME 8/9/21   Historical Provider, MD   tamsulosin (FLOMAX) 0.4 MG capsule Take 1 capsule by mouth daily  Patient taking differently: Take 0.2 mg by mouth in the morning. 8/24/21   GRECIA Vieira - CNP   gabapentin (NEURONTIN) 100 MG capsule Take 1 capsule by mouth 2 times daily. 7/28/21   Historical Provider, MD   busPIRone (BUSPAR) 15 MG tablet Take 15 mg by mouth 2 times daily     Historical Provider, MD   ranolazine (RANEXA) 1000 MG extended release tablet Take 1 tablet by mouth 2 times daily 3/19/21   GRECIA Hernandez - NP   clopidogrel (PLAVIX) 75 MG tablet One daily  Patient taking differently: Take 75 mg by mouth in the morning.  2/19/21   GRECIA Pat   acetaminophen (TYLENOL) 500 MG tablet Take 1,000 mg by mouth every 6 hours as needed for Pain    Historical Provider, MD   nitroGLYCERIN (NITROSTAT) 0.4 MG SL tablet Place 1 tablet under the tongue every 5 minutes as needed for Chest pain  Patient taking differently: Place 0.4 mg under the tongue every 5 minutes as needed for Chest pain Indications: has not had to take 9/5/19   GRECIA Rich - NP   levothyroxine (SYNTHROID) 50 MCG tablet Take 50 mcg by mouth Daily    Historical Provider, MD   diltiazem (CARDIZEM CD) 180 MG extended release capsule Take 1 capsule by mouth daily 8/14/18   GRECIA Hugo   ondansetron (ZOFRAN) 8 MG tablet Take 8 mg by mouth as needed  10/3/17   Historical Provider, MD   tiZANidine (ZANAFLEX) 4 MG tablet 1/2 tablet at night 10/24/17   Historical Provider, MD   ferrous sulfate 325 (65 Fe) MG tablet Take 325 mg by mouth 2 times daily    Historical Provider, MD   pantoprazole (PROTONIX) 40 MG tablet Take 1 tablet by mouth daily 9/7/17   GRECIA Cardenas   b complex vitamins capsule Take 1 capsule by mouth daily. Historical Provider, MD   Vitamin D (CHOLECALCIFEROL) 1000 UNITS CAPS capsule Take 1,000 Units by mouth daily. Historical Provider, MD   docusate sodium (COLACE) 100 MG capsule Take 100 mg by mouth daily.     Historical Provider, MD       Current Meds:   sodium chloride flush  5-40 mL IntraVENous 2 times per day    aspirin  81 mg Oral Daily    atorvastatin  40 mg Oral Nightly    insulin glargine  20 Units SubCUTAneous Nightly    [Held by provider] insulin lispro  4 Units SubCUTAneous TID WC    insulin lispro  0-4 Units SubCUTAneous TID WC    insulin lispro  0-4 Units SubCUTAneous Nightly       Current Infused Meds:   dextrose      sodium chloride         Physical Exam:  Vitals:    07/26/22 1143   BP: 121/69   Pulse: 94   Resp: 16   Temp: 97.2 °F (36.2 °C)   SpO2: 92%       Intake/Output Summary (Last 24 hours) at 7/26/2022 1551  Last data filed at 7/26/2022 1006  Gross per 24 hour   Intake 480 ml   Output 1450 ml   Net -970 ml     Estimated body mass index is 29.99 kg/m² as calculated from the following:    Height as of this encounter: 5' 11\" (1.803 m). Weight as of this encounter: 215 lb (97.5 kg). Physical Exam  Vitals reviewed. Constitutional:       General: He is not in acute distress. Appearance: Normal appearance. He is well-developed. He is obese. He is not ill-appearing, toxic-appearing or diaphoretic. HENT:      Head: Normocephalic and atraumatic. Nose: Nose normal.      Mouth/Throat:      Mouth: Mucous membranes are moist.      Pharynx: Oropharynx is clear. Eyes:      General: No scleral icterus. Extraocular Movements: Extraocular movements intact. Pupils: Pupils are equal, round, and reactive to light. Neck:      Vascular: No carotid bruit or JVD. Cardiovascular:      Rate and Rhythm: Normal rate and regular rhythm. Heart sounds: Normal heart sounds. No murmur heard. No friction rub. No gallop. Pulmonary:      Effort: Pulmonary effort is normal. No respiratory distress. Breath sounds: Normal breath sounds. No stridor. No wheezing, rhonchi or rales. Chest:      Chest wall: No tenderness. Abdominal:      General: Abdomen is flat. Bowel sounds are normal. There is no distension. Palpations: Abdomen is soft. There is no mass. Tenderness: There is no abdominal tenderness. There is no right CVA tenderness, left CVA tenderness, guarding or rebound. Hernia: No hernia is present. Musculoskeletal:         General: No swelling, tenderness, deformity or signs of injury. Cervical back: Normal range of motion and neck supple. No rigidity or tenderness. Right lower leg: No edema. Left lower leg: No edema. Lymphadenopathy:      Cervical: No cervical adenopathy. Skin:     General: Skin is warm and dry. Neurological:      General: No focal deficit present. Mental Status: He is alert and oriented to person, place, and time. Mental status is at baseline.       Cranial Nerves: No cranial nerve deficit. Sensory: No sensory deficit. Motor: No weakness. Coordination: Coordination normal.   Psychiatric:         Mood and Affect: Mood normal.         Behavior: Behavior normal.         Thought Content: Thought content normal.         Judgment: Judgment normal.       Labs:  Recent Labs     07/24/22  1223 07/25/22  0204 07/26/22  0326   WBC 4.0* 5.0 5.3   HGB 9.7* 10.9* 11.9*   PLT 71* 76* 96*       Recent Labs     07/24/22  1223 07/25/22  0204 07/26/22  0326    141 139   K 4.5 4.5 3.7    102 100   CO2 24 25 26   BUN 21 21 19   CREATININE 1.1 1.1 1.0   LABGLOM >60 >60 >60   CALCIUM 9.4 9.8 9.8       CK, CKMB, Troponin: @LABRCNT (CKTOTAL:3, CKMB:3, TROPONINI:3)@    Last 3 BNP:  No results for input(s): BNP in the last 72 hours. IMAGING:  ECHO Complete 2D W Doppler W Color    Result Date: 7/5/2022  Transthoracic Echocardiography Report (TTE)  Demographics   Patient Name  Joel Salgado Date of Study          07/05/2022   MRN           [id number]         Gender                 Male   Date of Birth 1955     Room Number            [id number]   Age           74 year(s)   Height:       71 inches      Referring Physician    Peace Merrill MD   Weight:       215 pounds     Sonographer            Luis Fernando Ferreira ROLO   BSA:          2.17 m^2       Interpreting Physician Peace Merrill MD   BMI:          29.99 kg/m^2  Procedure Type of Study   TTE procedure:ECHO 2D W/DOPPLER/COLOR/CONTRAST. Study Location: Portable Technical Quality: Adequate visualization Patient Status: Outpatient Contrast Medium: Definity. Rhythm: Within normal limits Indications:Dyspnea/SOB. Conclusions   Summary  Structurally normal.  Normal mitral valve leaflet mobility. No evidence of mitral regurgitation. Aortic valve appears to be tricuspid. Moderate calcification with normal cusp excursion. No significant aortic regurgitation or stenosis is noted.   Tricuspid valve is structurally normal.  Trace tricuspid regurgitation. RVSP 24 mmHg  Normal left ventricular size with preserved LV function and an estimated  ejection fraction of approximately 55%. No evidence of left ventricular mass or thrombus noted. Signature   ----------------------------------------------------------------  Electronically signed by Sebas Nettles MD(Interpreting  physician) on 07/06/2022 08:38 AM  ----------------------------------------------------------------   Findings   Mitral Valve  Structurally normal.  Normal mitral valve leaflet mobility. No evidence of mitral regurgitation. Aortic Valve  Aortic valve appears to be tricuspid. Moderate calcification with normal cusp excursion. No significant aortic regurgitation or stenosis is noted. Tricuspid Valve  Tricuspid valve is structurally normal.  Trace tricuspid regurgitation. RVSP 24 mmHg   Pulmonic Valve  The pulmonic valve was not well visualized. Left Atrium  Normal size left atrium. Left Ventricle  Normal left ventricular size with preserved LV function and an estimated  ejection fraction of approximately 55%. No evidence of left ventricular mass or thrombus noted. Right Atrium  Normal right atrial dimension with no evidence of thrombus or mass noted. Right Ventricle  Normal right ventricular size with preserved RV function. Pericardial Effusion  No evidence of significant pericardial effusion is noted. Pleural Effusion  No evidence of pleural effusion. Miscellaneous  Aortic root is within normal limits. Definity utilized  Allergies   - Ancef:Reaction - Skin ->rash;Severity - Moderate;Sensitivity - Adverse     Reaction.   - Other allergy:Reaction - Skin ->rash;Severity - Moderate;Sensitivity -     Adverse Reaction(Ceftin). - Neosporin:Reaction - Skin ->blistering;Severity - Moderate;Sensitivity -     Adverse Reaction.   - Cephalosporins.   - Other allergy:(BIG BLUE). - Other allergy:(TAPE).  2D Measurements and Calculations(cm)   LVIDd: 4.15 cm LVIDs: 3.06 cm  IVSd: 0.86 cm  LVPWd: 0.88 cm                      AO Root Dimension: 2.1 cm  Rt. Vent. Dimension: 3.32 cm        LA Dimension: 3.1 cm  % Ejection Fraction: 55 %           LA Area: 19.5 cm^2  LA Volume: 56.9 ml                  LV Systolic dimension: 2.42VV  LA Volume Index: 26 ml/m^2          LV PW diastolic: 9.36LS  LV dimension: 4.15 cm               LVOT diameter: 2.1 cm                                      RA Systolic pressure: 3mmHg  LVEDV: 90.2 ml                      RV Diastolic dimension: 5.77AD  LVESV:33.7 ml                       LVEDVI: 42 ml/m^2  Ascending Aorta: 3.5 cm             LVESVI: 16 ml/m^2  Doppler Measurements and Calculations   AV Peak Velocity:112 cm/s               MV Peak E-Wave: 44 cm/s  AV Peak Gradient: 5.02 mmHg             MV Peak A-Wave: 100 cm/s                                          MV E/A Ratio: 0.44 %  TR Velocity:175 cm/s                    MV Mean velocity: NaNcm/s  TR Gradient:12.25 mmHg                  MV Peak Gradient: 0.77 mmHg  Estimated RAP:3 mmHg  RVSP:24 mmHg   MV E' septal velocity: 4.03cm/s  MV E' lateral velocity:8.59 cm/s      XR CHEST (2 VW)    Result Date: 7/5/2022  NO PRIOR REPORT AVAILABLE Exam: X-RAYS OF THE CHEST Clinical data:Preop. Technique: PA and lateral views of the chest. Prior studies: Radiographs of the chest are dated 12/16/2021. CT scan of the chest dated 08/26/2021. Findings: The trachea is midline. The heart size is within normal limits. No infiltrate, effusion or pneumothorax. The soft tissues and bony structures demonstrate no acute pathology. Median sternotomy wires noted, appearing secondary to CABG. Cervical fusion device also noted in  good  repair, with  disc implants. CABG Cervical fusion device in good repair, with disc implants Recommendation: Follow up as clinically indicated.    Electronically Signed by Anita Sena MD at 05-Jul-2022 02:49:04 PM             CT HEAD WO CONTRAST    Result Date: 7/24/2022  NO PRIOR REPORT AVAILABLE Exam: CT OF THE BRAIN WITHOUT CONTRAST Clinical data: Syncope, headache. Technique: Contiguous axial images are obtained from the skull base to vertex without intravenous contrast.  Reformatted/MPR images were performed. Radiation dose: CTDIvol = 71.39 mGy, DLP = 1459 mGy x cm. Prior studies: CT scan of brain dated 12/17/2021. Findings:  No acute intracranial abnormality is present. No evidence of acute cortical infarction, hemorrhage, mass or mass effect. No hydrocephalus or abnormal extra-axial fluid collections are present. The posterior fossa is unremarkable. Atrophic changes. The skull base and calvarium are intact. The included portions of the paranasal sinuses and mastoid air cells are clear. 1.  No acute pathology. 2.  Atrophic changes. Recommendation: Follow up as clinically indicated. All CT scans at this facility utilize dose modulation, iterative reconstruction, and/or weight based dosing when appropriate to reduce radiation dose to as low as reasonably achievable. Electronically Signed by Adwoa Singh MD at 24-Jul-2022 03:14:50 PM             CT CERVICAL SPINE WO CONTRAST    Result Date: 7/24/2022  NO PRIOR REPORT AVAILABLE Exam: CT OF THE CERVICAL SPINE WITHOUT CONTRAST Clinical data: The patient fell. Neck pain. Syncopal episode. Technique: Contiguous axial imaging of the cervical spine. Reconstructed imaging in the coronal and sagittal planes. Reformatted/MPR images were performed. Radiation dose: CTDIvol = 71.39 mGy, DLP = 1459 mGy x cm. (Limited evaluation due to metallic implant)  Prior studies: CT scan of cervical spine dated 12/16/2021. Findings: There is grossly unremarkable alignment without acute fracture or subluxation. Reduced bone mineralization. Posterior elements are intact. Metallic fixation involving C3-C6 with hardware noted. No CT evidence of bony spinal canal or neural foramen stenosis. Soft tissues are grossly unremarkable.  Marked spondylotic changes seen. Skull base and craniocervical junction are intact. Lung apices are clear. 1. No acute fracture in cervical spine. Prior C3-C6 fusion with hardware noted. 2. Degenerative spine changes as above. Recommendation: Follow up as clinically indicated. Electronically Signed by Stanley Martinez MD at 24-Jul-2022 03:14:05 PM             XR CHEST PORTABLE    Result Date: 7/24/2022  NO PRIOR REPORT AVAILABLE [facility] Clinical data:Syncope. Technique:Single view of the chest. Prior studies: Radiographs of the chest dated 07/05/22. Findings:Normal cardiac size. Median sternotomy. Orthopedic hardware in the cervical spine. So well expanded without consolidation or pneumothorax. No pleural effusion. 5 mm calcified granuloma left lung apex. Degenerative changes of both shoulders  and within the thoracic spine. No acute cardiopulmonary process and no significant interval change. Recommendation: Follow up as clinically indicated.           Electronically Signed by Stanley Martienz MD at 24-Jul-2022 02:44:47 PM               Assessment:  Syncopal episode  Cardiac catheterization 7/5/2022 normal left ventricular systolic function patent LIMA graft to diagonal sequential to mid LAD, occluded vein graft to right PDA, widely patent vein graft to ramus 50% proximal narrowing multivessel coronary disease 100% mid right 80% septal  medical management recommended  Echocardiogram 7/5/2022 trace TR RVSP 24 normal LV systolic function  Coronary artery disease  Status post CABG 9/21/2005  Hyperlipidemia  Obstructive sleep apnea  History of diabetic foot ulceration  Chronic exertional dyspnea  History of Criss Dutch's macroglobulinemia  Diabetes mellitus type 2  Hypertension  Chronic kidney disease  Restless leg syndrome  Obesity  History of subdural hemorrhage  Peptic ulcer disease  History of essential thrombocythemia  Cerebrovascular disease  Depression  History of non-Hodgkin's lymphoma  CT of the head 7/24/2022 no acute findings      Recommendations:  Continue current medical management  Cardiac event monitor  May discharge from a cardiac standpoint follow-up in the office

## 2022-07-26 NOTE — PROGRESS NOTES
The patients IV has been removed and the cannula is intact. The patients ZIO patch has been applied. The patient has been given his discharge instructions and has verbalized understanding. The patient remains in his room eating dinner with his wife at bedside.

## 2022-07-26 NOTE — PROGRESS NOTES
I have attempted to call Dr. Amie Sumner to see if he will be seeing the patient today. The patient is becoming agitated. I did not receive an answer. I will call the office and see if I can talk to him there.

## 2022-07-26 NOTE — DISCHARGE INSTR - DIET
Good nutrition is important when healing from an illness, injury, or surgery. Follow any nutrition recommendations given to you during your hospital stay. If you were given an oral nutrition supplement while in the hospital, continue to take this supplement at home. You can take it with meals, in-between meals, and/or before bedtime. These supplements can be purchased at most local grocery stores, pharmacies, and chain [employer]-stores. If you have any questions about your diet or nutrition, call the hospital and ask for the dietitian.   Regular 3 carb choices/meal

## 2022-07-29 NOTE — PROGRESS NOTES
Physician Progress Note      Josh Haque  CSN #:                  [id number]  :                       1955  ADMIT DATE:       2022 12:15 PM  [address] DATE:        2022 5:36 PM  RESPONDING  PROVIDER #:        Marquis Reji MACIEL          QUERY TEXT:    Pt admitted with syncope. Noted documentation of syncope likely due to   orthostatic hypotension and severe polyneuropathy with autonomic involvement   in Neurology progress notes last dated . If possible, please document in   progress notes and discharge summary:    The medical record reflects the following:  Risk Factors: Diabetic severe polyneuropathy, orthostatic hypotension,  Clinical Indicators: Syncopal episode with LOC  Treatment: Neurology consultant, Progressive Care level of monitoring,   telemetry, serial vital signs,  ml bolus  Options provided:  -- Syncope due to orthostatic hypotension in the setting of diabetic severe   polyneuropathy with autonomic involvement confirmed present on admission  -- [Syncope due to orthostatic hypotension in the setting of diabetic severe   polyneuropathy with autonomic involvement ruled out  -- Defer to Neurology consultant documentation regarding syncope due to   orthostatic hypotension in the setting of diabetic severe polyneuropathy with   autonomic involvement  -- Other - I will add my own diagnosis  -- Disagree - Not applicable / Not valid  -- Disagree - Clinically unable to determine / Unknown  -- Refer to Clinical Documentation Reviewer    PROVIDER RESPONSE TEXT:    I defer to Neurology consultant regarding documentation of syncope due to   orthostatic hypotension in the setting of diabetic severe polyneuropathy with   autonomic involvement.     Query created by: Dario Sanchez on 2022 4:20 PM      Electronically signed by:  Marquis Reji MACIEL 2022 3:21 PM

## 2022-08-02 ENCOUNTER — OFFICE VISIT (OUTPATIENT)
Dept: WOUND CARE | Facility: HOSPITAL | Age: 67
End: 2022-08-02

## 2022-08-02 DIAGNOSIS — E11.621 TYPE 2 DIABETES MELLITUS WITH FOOT ULCER, UNSPECIFIED WHETHER LONG TERM INSULIN USE: ICD-10-CM

## 2022-08-02 DIAGNOSIS — L84 CORNS AND CALLOSITIES: ICD-10-CM

## 2022-08-02 DIAGNOSIS — E11.40 TYPE 2 DIABETES MELLITUS WITH DIABETIC NEUROPATHY, UNSPECIFIED WHETHER LONG TERM INSULIN USE: ICD-10-CM

## 2022-08-02 DIAGNOSIS — L97.509 TYPE 2 DIABETES MELLITUS WITH FOOT ULCER, UNSPECIFIED WHETHER LONG TERM INSULIN USE: ICD-10-CM

## 2022-08-02 DIAGNOSIS — L97.522 NON-PRESSURE CHRONIC ULCER OF OTHER PART OF LEFT FOOT WITH FAT LAYER EXPOSED: ICD-10-CM

## 2022-08-02 PROCEDURE — G0463 HOSPITAL OUTPT CLINIC VISIT: HCPCS

## 2022-08-02 PROCEDURE — 11042 DBRDMT SUBQ TIS 1ST 20SQCM/<: CPT | Performed by: NURSE PRACTITIONER

## 2022-08-02 PROCEDURE — 99214 OFFICE O/P EST MOD 30 MIN: CPT | Performed by: NURSE PRACTITIONER

## 2022-08-08 PROCEDURE — 93248 EXT ECG>7D<15D REV&INTERPJ: CPT | Performed by: NURSE PRACTITIONER

## 2022-08-09 ENCOUNTER — OFFICE VISIT (OUTPATIENT)
Dept: WOUND CARE | Facility: HOSPITAL | Age: 67
End: 2022-08-09

## 2022-08-09 DIAGNOSIS — L97.509 TYPE 2 DIABETES MELLITUS WITH FOOT ULCER, UNSPECIFIED WHETHER LONG TERM INSULIN USE: ICD-10-CM

## 2022-08-09 DIAGNOSIS — E11.40 TYPE 2 DIABETES MELLITUS WITH DIABETIC NEUROPATHY, UNSPECIFIED WHETHER LONG TERM INSULIN USE: ICD-10-CM

## 2022-08-09 DIAGNOSIS — L84 CORNS AND CALLOSITIES: ICD-10-CM

## 2022-08-09 DIAGNOSIS — L97.522 NON-PRESSURE CHRONIC ULCER OF OTHER PART OF LEFT FOOT WITH FAT LAYER EXPOSED: ICD-10-CM

## 2022-08-09 DIAGNOSIS — E11.621 TYPE 2 DIABETES MELLITUS WITH FOOT ULCER, UNSPECIFIED WHETHER LONG TERM INSULIN USE: ICD-10-CM

## 2022-08-09 PROCEDURE — 11042 DBRDMT SUBQ TIS 1ST 20SQCM/<: CPT | Performed by: NURSE PRACTITIONER

## 2022-08-16 ENCOUNTER — OFFICE VISIT (OUTPATIENT)
Dept: WOUND CARE | Facility: HOSPITAL | Age: 67
End: 2022-08-16

## 2022-08-16 DIAGNOSIS — E11.621 TYPE 2 DIABETES MELLITUS WITH FOOT ULCER, UNSPECIFIED WHETHER LONG TERM INSULIN USE: ICD-10-CM

## 2022-08-16 DIAGNOSIS — L84 CORNS AND CALLOSITIES: ICD-10-CM

## 2022-08-16 DIAGNOSIS — L97.509 TYPE 2 DIABETES MELLITUS WITH FOOT ULCER, UNSPECIFIED WHETHER LONG TERM INSULIN USE: ICD-10-CM

## 2022-08-16 DIAGNOSIS — L97.522 NON-PRESSURE CHRONIC ULCER OF OTHER PART OF LEFT FOOT WITH FAT LAYER EXPOSED: ICD-10-CM

## 2022-08-16 DIAGNOSIS — E11.40 TYPE 2 DIABETES MELLITUS WITH DIABETIC NEUROPATHY, UNSPECIFIED WHETHER LONG TERM INSULIN USE: ICD-10-CM

## 2022-08-16 PROCEDURE — 11055 PARING/CUTG B9 HYPRKER LES 1: CPT

## 2022-08-16 PROCEDURE — 11055 PARING/CUTG B9 HYPRKER LES 1: CPT | Performed by: NURSE PRACTITIONER

## 2022-08-16 PROCEDURE — 11042 DBRDMT SUBQ TIS 1ST 20SQCM/<: CPT | Performed by: NURSE PRACTITIONER

## 2022-08-19 ENCOUNTER — HOSPITAL ENCOUNTER (OUTPATIENT)
Dept: GENERAL RADIOLOGY | Facility: HOSPITAL | Age: 67
Discharge: HOME | End: 2022-08-19

## 2022-08-19 ENCOUNTER — OFFICE VISIT (OUTPATIENT)
Dept: UROLOGY | Facility: CLINIC | Age: 67
End: 2022-08-19

## 2022-08-19 VITALS — BODY MASS INDEX: 31.22 KG/M2 | WEIGHT: 223 LBS | HEIGHT: 71 IN

## 2022-08-19 DIAGNOSIS — N20.1 LEFT URETERAL STONE: ICD-10-CM

## 2022-08-19 DIAGNOSIS — N20.0 KIDNEY STONES: Primary | ICD-10-CM

## 2022-08-19 DIAGNOSIS — N20.0 KIDNEY STONES: ICD-10-CM

## 2022-08-19 DIAGNOSIS — R10.9 FLANK PAIN: ICD-10-CM

## 2022-08-19 LAB
BILIRUB BLD-MCNC: NEGATIVE MG/DL
CLARITY, POC: ABNORMAL
COLOR UR: YELLOW
GLUCOSE UR STRIP-MCNC: ABNORMAL MG/DL
KETONES UR QL: NEGATIVE
LEUKOCYTE EST, POC: NEGATIVE
NITRITE UR-MCNC: NEGATIVE MG/ML
PH UR: 5 [PH] (ref 5–8)
PROT UR STRIP-MCNC: NEGATIVE MG/DL
RBC # UR STRIP: NEGATIVE /UL
SP GR UR: 1 (ref 1–1.03)
UROBILINOGEN UR QL: ABNORMAL

## 2022-08-19 PROCEDURE — 81001 URINALYSIS AUTO W/SCOPE: CPT

## 2022-08-19 PROCEDURE — 99213 OFFICE O/P EST LOW 20 MIN: CPT

## 2022-08-19 PROCEDURE — 74018 RADEX ABDOMEN 1 VIEW: CPT

## 2022-08-19 RX ORDER — KETOROLAC TROMETHAMINE 10 MG/1
10 TABLET, FILM COATED ORAL EVERY 6 HOURS PRN
Qty: 20 TABLET | Refills: 0 | Status: SHIPPED | OUTPATIENT
Start: 2022-08-19 | End: 2022-08-29

## 2022-08-19 RX ORDER — LOSARTAN POTASSIUM 25 MG/1
TABLET ORAL
COMMUNITY
Start: 2022-08-13 | End: 2022-08-29

## 2022-08-19 NOTE — PROGRESS NOTES
Subjective    Mr. Lucero is 67 y.o. male    Chief Complaint: kidney stones    History of Present Illness    67-year-old male new patient referred for complaint of right flank pain.  Patient was seen at [place] yesterday by patient's hematology oncology office as well as patient's gastroenterology office regarding patient's liver.  Patient underwent a CT of the abdomen and pelvis which revealed a left 5 mm distal ureteral stone causing moderate hydronephrosis.  However patient's complaint of pain remains on the right flank area.  Patient does appear to have small punctate right lower pole renal stone however no right ureteral stone visualized.  Patient previously seen by Dr. Peck in 2017 regarding a history of kidney stones for which patient was noted to have a large left lower pole renal stone at that time measuring approximately 1.2 cm.  This stone burden is still present based on CT from yesterday and it appears that the stone by longest diameter is measuring 2.5 cm.    KUB today the large stone burden within the left lower pole remains as well as a questionable area within the distal ureter close to the UVJ.    The following portions of the patient's history were reviewed and updated as appropriate: allergies, current medications, past family history, past medical history, past social history, past surgical history and problem list.    Review of Systems   Constitutional: Negative for chills, fatigue and fever.   Gastrointestinal: Positive for nausea. Negative for vomiting.   Genitourinary: Positive for flank pain. Negative for decreased urine volume, difficulty urinating, dysuria, frequency, hematuria and urgency.   Musculoskeletal: Positive for back pain.         Current Outpatient Medications:   •  b complex vitamins capsule, Take  by mouth., Disp: , Rfl:   •  busPIRone (BUSPAR) 15 MG tablet, Take 15 mg by mouth Daily., Disp: , Rfl:   •  clopidogrel (PLAVIX) 75 MG tablet, Take 75 mg by mouth Daily., Disp: ,  Rfl:   •  cyanocobalamin 1000 MCG/ML injection, Inject  into the shoulder, thigh, or buttocks 1 (One) Time Per Week., Disp: , Rfl:   •  docusate sodium (COLACE) 100 MG capsule, Take  by mouth Daily., Disp: , Rfl:   •  ferrous sulfate 325 (65 FE) MG EC tablet, Take 325 mg by mouth Daily With Breakfast., Disp: , Rfl:   •  HYDROcodone-acetaminophen (NORCO) 7.5-325 MG per tablet, Take 1-2 tablets by mouth Every 4 (Four) Hours As Needed for Moderate Pain (4-6) (Pain)., Disp: 40 tablet, Rfl: 0  •  losartan (COZAAR) 25 MG tablet, , Disp: , Rfl:   •  metFORMIN (GLUCOPHAGE) 1000 MG tablet, Take 500 mg by mouth 2 (Two) Times a Day With Meals., Disp: , Rfl:   •  metoprolol tartrate (LOPRESSOR) 25 MG tablet, Take 25 mg by mouth Every Evening., Disp: , Rfl:   •  nitroglycerin (NITROSTAT) 0.4 MG SL tablet, Place 0.4 mg under the tongue Every 5 (Five) Minutes As Needed., Disp: , Rfl:   •  pantoprazole (PROTONIX) 40 MG EC tablet, Take 40 mg by mouth Daily., Disp: , Rfl:   •  polyethylene glycol (MIRALAX) powder, Take 17 g by mouth Daily As Needed., Disp: , Rfl:   •  pravastatin (PRAVACHOL) 40 MG tablet, Take 40 mg by mouth Every Night., Disp: , Rfl:   •  ranolazine (RANEXA) 1000 MG 12 hr tablet, Take 1 tablet by mouth 2 times daily, Disp: , Rfl:   •  therapeutic multivitamin-minerals (THERAGRAN-M) tablet, Take  by mouth., Disp: , Rfl:   •  tiZANidine (ZANAFLEX) 4 MG tablet, Take 4 mg by mouth As Needed for Muscle Spasms., Disp: , Rfl:   •  Vitamin D, Cholecalciferol, 1000 UNITS tablet, Take 1 tablet by mouth Daily., Disp: , Rfl:   •  aspirin (aspirin) 81 MG EC tablet, Take 81 mg by mouth Daily., Disp: , Rfl:   •  dilTIAZem CD (CARDIZEM CD) 240 MG 24 hr capsule, Take 1 capsule by mouth daily, Disp: , Rfl:   •  furosemide (LASIX) 40 MG tablet, Take 40 mg by mouth Daily., Disp: , Rfl:   •  ketorolac (TORADOL) 10 MG tablet, Take 1 tablet by mouth Every 6 (Six) Hours As Needed for Moderate Pain ., Disp: 20 tablet, Rfl: 0  •  meloxicam  (MOBIC) 15 MG tablet, Take 15 mg by mouth Daily., Disp: , Rfl:   •  phenazopyridine (PYRIDIUM) 100 MG tablet, Take 1 tablet by mouth 3 (Three) Times a Day As Needed for bladder spasms., Disp: 21 tablet, Rfl: 1  •  tamsulosin (FLOMAX) 0.4 MG capsule 24 hr capsule, Take 1 capsule by mouth Daily., Disp: 30 capsule, Rfl: 2  •  traMADol (ULTRAM) 50 MG tablet, TAKE 1 TABLET BY MOUTH FOUR TIMES DAILY AS NEEDED FOR PAIN, Disp: , Rfl: 0    Past Medical History:   Diagnosis Date   • Anemia    • Anxiety    • Arthritis    • BPH (benign prostatic hypertrophy)    • CAD (coronary artery disease)    • Cancer (HCC)     non-hodgkins lymphoma   • Diabetes mellitus (HCC)    • Hyperlipidemia    • Hypertension    • Iliac artery aneurysm, bilateral (HCC)    • Kidney stone    • Sleep apnea        Past Surgical History:   Procedure Laterality Date   • COLONOSCOPY  02/04/2014   • COLONOSCOPY N/A 4/26/2017    Procedure: COLONOSCOPY WITH ANESTHESIA;  Surgeon: Sher Cui MD;  Location: [facility] ENDOSCOPY;  Service:    • CORONARY ARTERY BYPASS GRAFT      2   • CYSTOSCOPY URETEROSCOPY LASER LITHOTRIPSY Left 4/17/2017    Procedure: URETEROSCOPY LASER LITHOTRIPSY WITH DOUBLE J STENT INSERTION, LEFT ;  Surgeon: Weston Peck MD;  Location: [facility] OR;  Service:    • CYSTOSCOPY URETEROSCOPY LASER LITHOTRIPSY Left 8/14/2017    Procedure: CYSTOSCOPY URETEROSCOPY LASER LITHOTRIPSY STENT INSERTION STONE EXTRACTION;  Surgeon: Weston Peck MD;  Location: [facility] OR;  Service:    • CYSTOSCOPY W/ URETERAL STENT PLACEMENT Left 4/17/2017    Procedure: CYSTOSCOPY URETERAL DOUBLE J STENT INSERTION, LEFT;  Surgeon: Weston Peck MD;  Location: [facility] OR;  Service:    • ENDOSCOPY N/A 4/26/2017    Procedure: ESOPHAGOGASTRODUODENOSCOPY WITH ANESTHESIA;  Surgeon: Sher Cui MD;  Location: [facility] ENDOSCOPY;  Service:    • JOINT REPLACEMENT Right    • KIDNEY STONE SURGERY     • KNEE SURGERY      replacement   • NECK SURGERY     •  "ROTATOR CUFF REPAIR     • SHOULDER SURGERY     • TONSILLECTOMY         Social History     Socioeconomic History   • Marital status:    Tobacco Use   • Smoking status: Former Smoker     Types: Cigarettes     Quit date:      Years since quittin.6   • Smokeless tobacco: Never Used   Vaping Use   • Vaping Use: Never used   Substance and Sexual Activity   • Alcohol use: No   • Drug use: No   • Sexual activity: Defer       Family History   Problem Relation Age of Onset   • Kidney disease Father    • Heart disease Father    • Cancer Mother         brain   • Colon cancer Neg Hx    • Colon polyps Neg Hx        Objective    Ht 180.3 cm (71\")   Wt 101 kg (223 lb)   BMI 31.10 kg/m²     Physical Exam  Constitutional:       Appearance: Normal appearance.   Abdominal:      Tenderness: There is right CVA tenderness. There is no left CVA tenderness.   Skin:     General: Skin is warm and dry.   Neurological:      Mental Status: He is alert and oriented to person, place, and time.   Psychiatric:         Mood and Affect: Mood normal.         Behavior: Behavior normal.             Results for orders placed or performed in visit on 22   POC Urinalysis Dipstick, Multipro    Specimen: Urine   Result Value Ref Range    Color Yellow Yellow, Straw, Dark Yellow, Philomena    Clarity, UA Slightly Cloudy (A) Clear    Glucose, UA >=1000 mg/dL (3+) (A) Negative mg/dL    Bilirubin Negative Negative    Ketones, UA Negative Negative    Specific Gravity  1.005 1.005 - 1.030    Blood, UA Negative Negative    pH, Urine 5.0 5.0 - 8.0    Protein, POC Negative Negative mg/dL    Urobilinogen, UA 0.2 E.U./dL Normal, 0.2 E.U./dL    Nitrite, UA Negative Negative    Leukocytes Negative Negative   IPSS Questionnaire (AUA-7):           KUB independent review    A KUB is available for me to review today.  The image is inspected for a bowel gas pattern and the general bone structure of the spine and pelvis. The kidneys are then inspected " closely.  Renal outline is noted if identifiable. The kidney, collecting system, and anticipated path of the ureter are examined for calcifications including those in the true pelvis.  This film reveals:    On the right there are no calcificaitons seen in the kidney or the expected course of the ureter. .    On the left there are multiple renal stones. 2.5cm.  With a possible calcification within the left distal ureter by the UVJ.    Independent review of a CT scan of abdomen and pelvis without contrast  The CT scan of the abdomen/pelvis done without contrast is available for me to review.  Treatment recommendations require an independent review.  First I scanned the liver, spleen, and bowel pattern.  The retroperitoneum including the major vessels and lymphatic packages are briefly reviewed.  This film has been reviewed by the radiologist to determine any nonurologic abnormalities that are present.  The kidneys are closely inspected for size, symmetry, contour, parenchymal thickness, perinephric reaction, presence of calcifications, and intrarenal dilation of the collecting system.  The ureters are inspected for their course, caliber, and any calcifications.  The bladder is inspected for its thickness, size, and presence of any calcifications.  This scan shows bilateral renal stones.  Within the right lower pole punctate stone noted.  Patient appears to have at least 3 stones within the left kidney largest of which measuring over 2 cm.  Patient also appears to have a approximate 5 mm stone in the left distal ureter causing moderate hydronephrosis..          Assessment and Plan    Diagnoses and all orders for this visit:    1. Kidney stones (Primary)  -     POC Urinalysis Dipstick, Multipro  -     XR abdomen kub; Future  -     ketorolac (TORADOL) 10 MG tablet; Take 1 tablet by mouth Every 6 (Six) Hours As Needed for Moderate Pain .  Dispense: 20 tablet; Refill: 0  -     XR Abdomen KUB; Future    2. Left ureteral  stone  -     ketorolac (TORADOL) 10 MG tablet; Take 1 tablet by mouth Every 6 (Six) Hours As Needed for Moderate Pain .  Dispense: 20 tablet; Refill: 0  -     XR Abdomen KUB; Future    3. Flank pain  -     XR Abdomen KUB; Future      67-year-old male new patient referred for complaint of right flank pain.  Patient was seen at [place] yesterday by patient's hematology oncology office as well as patient's gastroenterology office regarding patient's liver.  Patient underwent a CT of the abdomen and pelvis which revealed a left 5 mm distal ureteral stone causing moderate hydronephrosis     seen by Dr. Peck in 2017 regarding a history of kidney stones for which patient was noted to have a large left lower pole renal stone at that time measuring approximately 1.2 cm.  This stone burden is still present based on CT from yesterday and it appears that the stone by longest diameter is measuring 2.5 cm.    KUB today the large stone burden within the left lower pole remains as well as a questionable area within the distal ureter close to the UVJ.    Patient reports would like to try to pass the 5 mm distal ureteral stone at this time.  I will send patient in a prescription of Toradol as needed for pain.  Patient encouraged to take Flomax daily as previously prescribed.  I will go ahead and get patient scheduled with Dr. Peck next week to further discuss surgical intervention regarding the increasing left renal stone burden as well as the newly found left ureteral stone.

## 2022-08-19 NOTE — PROGRESS NOTES
Subjective    Mr. Lucero is 67 y.o. male    Chief Complaint: Kidney Stone     History of Present Illness  Patient with history of recurrent nephrolithiasis last seen by me in 2017.  He had a stone burden of 1.2 cm at that time.  He had a CT which showed a 5 mm distal ureteral stone with hydronephrosis as well as a 2.5 cm stone in the kidney. Patient previously underwent ureteroscopy.  Stone analysis consistent with calcium oxalate monohydrate.      The following portions of the patient's history were reviewed and updated as appropriate: allergies, current medications, past family history, past medical history, past social history, past surgical history and problem list.    Review of Systems      Current Outpatient Medications:   •  aspirin (aspirin) 81 MG EC tablet, Take 81 mg by mouth Daily., Disp: , Rfl:   •  b complex vitamins capsule, Take  by mouth., Disp: , Rfl:   •  busPIRone (BUSPAR) 15 MG tablet, Take 15 mg by mouth Daily., Disp: , Rfl:   •  clopidogrel (PLAVIX) 75 MG tablet, Take 75 mg by mouth Daily., Disp: , Rfl:   •  cyanocobalamin 1000 MCG/ML injection, Inject  into the shoulder, thigh, or buttocks 1 (One) Time Per Week., Disp: , Rfl:   •  dilTIAZem CD (CARDIZEM CD) 240 MG 24 hr capsule, Take 1 capsule by mouth daily, Disp: , Rfl:   •  docusate sodium (COLACE) 100 MG capsule, Take  by mouth Daily., Disp: , Rfl:   •  ferrous sulfate 325 (65 FE) MG EC tablet, Take 325 mg by mouth Daily With Breakfast., Disp: , Rfl:   •  furosemide (LASIX) 40 MG tablet, Take 40 mg by mouth Daily., Disp: , Rfl:   •  HYDROcodone-acetaminophen (NORCO) 7.5-325 MG per tablet, Take 1-2 tablets by mouth Every 4 (Four) Hours As Needed for Moderate Pain (4-6) (Pain)., Disp: 40 tablet, Rfl: 0  •  ketorolac (TORADOL) 10 MG tablet, Take 1 tablet by mouth Every 6 (Six) Hours As Needed for Moderate Pain ., Disp: 20 tablet, Rfl: 0  •  losartan (COZAAR) 25 MG tablet, , Disp: , Rfl:   •  meloxicam (MOBIC) 15 MG tablet, Take 15 mg by  mouth Daily., Disp: , Rfl:   •  metFORMIN (GLUCOPHAGE) 1000 MG tablet, Take 500 mg by mouth 2 (Two) Times a Day With Meals., Disp: , Rfl:   •  metoprolol tartrate (LOPRESSOR) 25 MG tablet, Take 25 mg by mouth Every Evening., Disp: , Rfl:   •  nitroglycerin (NITROSTAT) 0.4 MG SL tablet, Place 0.4 mg under the tongue Every 5 (Five) Minutes As Needed., Disp: , Rfl:   •  pantoprazole (PROTONIX) 40 MG EC tablet, Take 40 mg by mouth Daily., Disp: , Rfl:   •  phenazopyridine (PYRIDIUM) 100 MG tablet, Take 1 tablet by mouth 3 (Three) Times a Day As Needed for bladder spasms., Disp: 21 tablet, Rfl: 1  •  polyethylene glycol (MIRALAX) powder, Take 17 g by mouth Daily As Needed., Disp: , Rfl:   •  pravastatin (PRAVACHOL) 40 MG tablet, Take 40 mg by mouth Every Night., Disp: , Rfl:   •  ranolazine (RANEXA) 1000 MG 12 hr tablet, Take 1 tablet by mouth 2 times daily, Disp: , Rfl:   •  tamsulosin (FLOMAX) 0.4 MG capsule 24 hr capsule, Take 1 capsule by mouth Daily., Disp: 30 capsule, Rfl: 2  •  therapeutic multivitamin-minerals (THERAGRAN-M) tablet, Take  by mouth., Disp: , Rfl:   •  tiZANidine (ZANAFLEX) 4 MG tablet, Take 4 mg by mouth As Needed for Muscle Spasms., Disp: , Rfl:   •  traMADol (ULTRAM) 50 MG tablet, TAKE 1 TABLET BY MOUTH FOUR TIMES DAILY AS NEEDED FOR PAIN, Disp: , Rfl: 0  •  Vitamin D, Cholecalciferol, 1000 UNITS tablet, Take 1 tablet by mouth Daily., Disp: , Rfl:     Past Medical History:   Diagnosis Date   • Anemia    • Anxiety    • Arthritis    • BPH (benign prostatic hypertrophy)    • CAD (coronary artery disease)    • Cancer (HCC)     non-hodgkins lymphoma   • Diabetes mellitus (HCC)    • Hyperlipidemia    • Hypertension    • Iliac artery aneurysm, bilateral (HCC)    • Kidney stone    • Sleep apnea        Past Surgical History:   Procedure Laterality Date   • COLONOSCOPY  02/04/2014   • COLONOSCOPY N/A 4/26/2017    Procedure: COLONOSCOPY WITH ANESTHESIA;  Surgeon: Sher Cui MD;  Location: [facility]  "ENDOSCOPY;  Service:    • CORONARY ARTERY BYPASS GRAFT      2   • CYSTOSCOPY URETEROSCOPY LASER LITHOTRIPSY Left 2017    Procedure: URETEROSCOPY LASER LITHOTRIPSY WITH DOUBLE J STENT INSERTION, LEFT ;  Surgeon: Weston Peck MD;  Location: [facility] OR;  Service:    • CYSTOSCOPY URETEROSCOPY LASER LITHOTRIPSY Left 2017    Procedure: CYSTOSCOPY URETEROSCOPY LASER LITHOTRIPSY STENT INSERTION STONE EXTRACTION;  Surgeon: Weston Peck MD;  Location: [facility] OR;  Service:    • CYSTOSCOPY W/ URETERAL STENT PLACEMENT Left 2017    Procedure: CYSTOSCOPY URETERAL DOUBLE J STENT INSERTION, LEFT;  Surgeon: Weston Peck MD;  Location: [facility] OR;  Service:    • ENDOSCOPY N/A 2017    Procedure: ESOPHAGOGASTRODUODENOSCOPY WITH ANESTHESIA;  Surgeon: Sher Cui MD;  Location: [facility] ENDOSCOPY;  Service:    • JOINT REPLACEMENT Right    • KIDNEY STONE SURGERY     • KNEE SURGERY      replacement   • NECK SURGERY     • ROTATOR CUFF REPAIR     • SHOULDER SURGERY     • TONSILLECTOMY         Social History     Socioeconomic History   • Marital status:    Tobacco Use   • Smoking status: Former Smoker     Types: Cigarettes     Quit date:      Years since quittin.6   • Smokeless tobacco: Never Used   Vaping Use   • Vaping Use: Never used   Substance and Sexual Activity   • Alcohol use: No   • Drug use: No   • Sexual activity: Defer       Family History   Problem Relation Age of Onset   • Kidney disease Father    • Heart disease Father    • Cancer Mother         brain   • Colon cancer Neg Hx    • Colon polyps Neg Hx        Objective    Ht 180.3 cm (71\")   Wt 102 kg (225 lb 3.2 oz)   BMI 31.41 kg/m²     Physical Exam    KUB independent review    A KUB is available for me to review today.  The image is inspected for a bowel gas pattern and the general bone structure of the spine and pelvis. The kidneys are then inspected closely.  Renal outline is noted if identifiable. The kidney, " collecting system, and anticipated path of the ureter are examined for calcifications including those in the true pelvis.  This film reveals:    On the right there are no calcificaitons seen in the kidney or the expected course of the ureter. .    On the left there is a single renal stone measuring 25 mm.    CT independent review  The CT scan of the abdomen/pelvis done without contrast is available for me to review.  Treatment recommendations require an independent review.  First I scanned the liver, spleen, and bowel pattern.  The retroperitoneum including the major vessels and lymphatic packages are briefly reviewed.  This film has been reviewed by the radiologist to determine any non-urologic abnormalities that are present.  The kidneys are closely inspected for size, symmetry, contour, parenchymal thickness, perinephric reaction, presence of calcifications, and intrarenal dilation of the collecting system.  The ureters are inspected for their course, caliber, and any calcifications.  The bladder is inspected for its thickness, size, and presence of any calcifications.  This scan shows:    The right kidney appears normal on this non-contrasted CT scan.  The renal parenchymal is normal in thickness.  There are no solid masses or cysts.  There is no hydronephrosis.  There are no stones.      The left kidney appears 2.5 cm branched calculus lower pole and anterior calyx with a 5 mm distal ureteral stone    The bladder appears normal on this non-contrasted CT scan.  The bladder appears normal in thickness.  There no masses or stones seen on this exam.          Results for orders placed or performed in visit on 08/22/22   POC Urinalysis Dipstick, Multipro    Specimen: Urine   Result Value Ref Range    Color Yellow Yellow, Straw, Dark Yellow, Philomena    Clarity, UA Clear Clear    Glucose,  mg/dL (A) Negative mg/dL    Bilirubin Negative Negative    Ketones, UA Negative Negative    Specific Gravity  1.010 1.005 -  1.030    Blood, UA Negative Negative    pH, Urine 5.0 5.0 - 8.0    Protein, POC Negative Negative mg/dL    Urobilinogen, UA 0.2 E.U./dL Normal, 0.2 E.U./dL    Nitrite, UA Negative Negative    Leukocytes Negative Negative     Assessment and Plan    Diagnoses and all orders for this visit:    1. Kidney stones (Primary)  -     POC Urinalysis Dipstick, Multipro  -     Case Request; Standing  -     COVID PRE-OP / PRE-PROCEDURE SCREENING ORDER (NO ISOLATION) - Swab, Nasopharynx; Future  -     Case Request    Other orders  -     Follow Anesthesia Guidelines / Standing Orders; Future  -     Obtain Informed Consent; Future  -     Provide NPO Instructions to Patient; Future  -     Chlorhexidine Skin Prep; Future        Complex patient.  He does have thrombocytopenia.  I do not recommend a PERC neph given the risk of bleeding.  We will proceed with flexible ureteroscopy.  He does not believe he passed his 5 mm distal left ureteral stone and I do not see evidence on his most recent KUB.  Flexible ureteroscopy may be done in staged fashion.

## 2022-08-22 ENCOUNTER — OFFICE VISIT (OUTPATIENT)
Dept: UROLOGY | Facility: CLINIC | Age: 67
End: 2022-08-22

## 2022-08-22 ENCOUNTER — HOSPITAL ENCOUNTER (OUTPATIENT)
Dept: GENERAL RADIOLOGY | Facility: HOSPITAL | Age: 67
Discharge: HOME | End: 2022-08-22

## 2022-08-22 VITALS — HEIGHT: 71 IN | BODY MASS INDEX: 31.53 KG/M2 | WEIGHT: 225.2 LBS

## 2022-08-22 DIAGNOSIS — N20.0 KIDNEY STONES: Primary | ICD-10-CM

## 2022-08-22 DIAGNOSIS — N20.0 KIDNEY STONES: ICD-10-CM

## 2022-08-22 DIAGNOSIS — R10.9 FLANK PAIN: ICD-10-CM

## 2022-08-22 DIAGNOSIS — N20.1 LEFT URETERAL STONE: ICD-10-CM

## 2022-08-22 LAB
BILIRUB BLD-MCNC: NEGATIVE MG/DL
CLARITY, POC: CLEAR
COLOR UR: YELLOW
GLUCOSE UR STRIP-MCNC: ABNORMAL MG/DL
KETONES UR QL: NEGATIVE
LEUKOCYTE EST, POC: NEGATIVE
NITRITE UR-MCNC: NEGATIVE MG/ML
PH UR: 5 [PH] (ref 5–8)
PROT UR STRIP-MCNC: NEGATIVE MG/DL
RBC # UR STRIP: NEGATIVE /UL
SP GR UR: 1.01 (ref 1–1.03)
UROBILINOGEN UR QL: ABNORMAL

## 2022-08-22 PROCEDURE — 99214 OFFICE O/P EST MOD 30 MIN: CPT | Performed by: UROLOGY

## 2022-08-22 PROCEDURE — 74018 RADEX ABDOMEN 1 VIEW: CPT

## 2022-08-22 PROCEDURE — 81003 URINALYSIS AUTO W/O SCOPE: CPT | Performed by: UROLOGY

## 2022-08-22 RX ORDER — SODIUM CHLORIDE 0.9 % (FLUSH) 0.9 %
10 SYRINGE (ML) INJECTION EVERY 12 HOURS SCHEDULED
Status: CANCELLED | OUTPATIENT
Start: 2022-08-22

## 2022-08-22 RX ORDER — SODIUM CHLORIDE 9 MG/ML
100 INJECTION, SOLUTION INTRAVENOUS CONTINUOUS
Status: CANCELLED | OUTPATIENT
Start: 2022-08-22

## 2022-08-22 RX ORDER — SODIUM CHLORIDE 0.9 % (FLUSH) 0.9 %
1-10 SYRINGE (ML) INJECTION AS NEEDED
Status: CANCELLED | OUTPATIENT
Start: 2022-08-22

## 2022-08-23 ENCOUNTER — OFFICE VISIT (OUTPATIENT)
Dept: WOUND CARE | Facility: HOSPITAL | Age: 67
End: 2022-08-23

## 2022-08-23 DIAGNOSIS — L84 CORNS AND CALLOSITIES: ICD-10-CM

## 2022-08-23 DIAGNOSIS — L97.509 TYPE 2 DIABETES MELLITUS WITH FOOT ULCER, UNSPECIFIED WHETHER LONG TERM INSULIN USE: ICD-10-CM

## 2022-08-23 DIAGNOSIS — E11.621 TYPE 2 DIABETES MELLITUS WITH FOOT ULCER, UNSPECIFIED WHETHER LONG TERM INSULIN USE: ICD-10-CM

## 2022-08-23 DIAGNOSIS — L97.522 NON-PRESSURE CHRONIC ULCER OF OTHER PART OF LEFT FOOT WITH FAT LAYER EXPOSED: ICD-10-CM

## 2022-08-23 DIAGNOSIS — E11.40 TYPE 2 DIABETES MELLITUS WITH DIABETIC NEUROPATHY, UNSPECIFIED WHETHER LONG TERM INSULIN USE: ICD-10-CM

## 2022-08-23 PROCEDURE — 11042 DBRDMT SUBQ TIS 1ST 20SQCM/<: CPT | Performed by: NURSE PRACTITIONER

## 2022-08-24 ENCOUNTER — TELEPHONE (OUTPATIENT)
Dept: CARDIOLOGY CLINIC | Age: 67
End: 2022-08-24

## 2022-08-24 ENCOUNTER — APPOINTMENT (OUTPATIENT)
Dept: GENERAL RADIOLOGY | Age: 67
End: 2022-08-24
Payer: MEDICARE

## 2022-08-24 ENCOUNTER — APPOINTMENT (OUTPATIENT)
Dept: CT IMAGING | Age: 67
End: 2022-08-24
Payer: MEDICARE

## 2022-08-24 ENCOUNTER — HOSPITAL ENCOUNTER (OUTPATIENT)
Age: 67
Setting detail: OBSERVATION
Discharge: HOME | End: 2022-08-25
Attending: EMERGENCY MEDICINE | Admitting: STUDENT IN AN ORGANIZED HEALTH CARE EDUCATION/TRAINING PROGRAM
Payer: MEDICARE

## 2022-08-24 DIAGNOSIS — K74.60 CIRRHOSIS, NONALCOHOLIC (HCC): ICD-10-CM

## 2022-08-24 DIAGNOSIS — N20.0 LEFT RENAL STONE: ICD-10-CM

## 2022-08-24 DIAGNOSIS — Z85.72 HISTORY OF NON-HODGKIN'S LYMPHOMA: ICD-10-CM

## 2022-08-24 DIAGNOSIS — D64.9 ACUTE ON CHRONIC ANEMIA: ICD-10-CM

## 2022-08-24 DIAGNOSIS — I95.9 HYPOTENSION, UNSPECIFIED HYPOTENSION TYPE: ICD-10-CM

## 2022-08-24 DIAGNOSIS — R55 NEAR SYNCOPE: Primary | ICD-10-CM

## 2022-08-24 PROBLEM — D69.6 THROMBOCYTOPENIA (HCC): Status: ACTIVE | Noted: 2022-08-24

## 2022-08-24 LAB
ALBUMIN SERPL-MCNC: 3.7 G/DL (ref 3.5–5.2)
ALP BLD-CCNC: 79 U/L (ref 40–130)
ALT SERPL-CCNC: 14 U/L (ref 5–41)
ANION GAP SERPL CALCULATED.3IONS-SCNC: 13 MMOL/L (ref 7–19)
AST SERPL-CCNC: 25 U/L (ref 5–40)
BASOPHILS ABSOLUTE: 0 K/UL (ref 0–0.2)
BASOPHILS RELATIVE PERCENT: 0.3 % (ref 0–1)
BILIRUB SERPL-MCNC: 0.4 MG/DL (ref 0.2–1.2)
BILIRUBIN URINE: NEGATIVE
BLOOD, URINE: NEGATIVE
BUN BLDV-MCNC: 20 MG/DL (ref 8–23)
CALCIUM SERPL-MCNC: 8.6 MG/DL (ref 8.8–10.2)
CHLORIDE BLD-SCNC: 104 MMOL/L (ref 98–111)
CLARITY: CLEAR
CO2: 23 MMOL/L (ref 22–29)
COLOR: ABNORMAL
CREAT SERPL-MCNC: 1.1 MG/DL (ref 0.5–1.2)
EOSINOPHILS ABSOLUTE: 0.1 K/UL (ref 0–0.6)
EOSINOPHILS RELATIVE PERCENT: 2.1 % (ref 0–5)
FOLATE: >20 NG/ML (ref 4.5–32.2)
GFR AFRICAN AMERICAN: >59
GFR NON-AFRICAN AMERICAN: >60
GLUCOSE BLD-MCNC: 124 MG/DL (ref 70–99)
GLUCOSE BLD-MCNC: 126 MG/DL (ref 74–109)
GLUCOSE BLD-MCNC: 95 MG/DL (ref 70–99)
GLUCOSE URINE: =>1000 MG/DL
HBA1C MFR BLD: 6.5 % (ref 4–6)
HCT VFR BLD CALC: 30.6 % (ref 42–52)
HEMOGLOBIN: 9.8 G/DL (ref 14–18)
IMMATURE GRANULOCYTES #: 0 K/UL
KETONES, URINE: NEGATIVE MG/DL
LACTIC ACID: 2.4 MMOL/L (ref 0.5–1.9)
LEUKOCYTE ESTERASE, URINE: NEGATIVE
LYMPHOCYTES ABSOLUTE: 0.8 K/UL (ref 1.1–4.5)
LYMPHOCYTES RELATIVE PERCENT: 20.5 % (ref 20–40)
MCH RBC QN AUTO: 33.6 PG (ref 27–31)
MCHC RBC AUTO-ENTMCNC: 32 G/DL (ref 33–37)
MCV RBC AUTO: 104.8 FL (ref 80–94)
MONOCYTES ABSOLUTE: 0.4 K/UL (ref 0–0.9)
MONOCYTES RELATIVE PERCENT: 11.7 % (ref 0–10)
NEUTROPHILS ABSOLUTE: 2.4 K/UL (ref 1.5–7.5)
NEUTROPHILS RELATIVE PERCENT: 64.9 % (ref 50–65)
NITRITE, URINE: NEGATIVE
PDW BLD-RTO: 15.4 % (ref 11.5–14.5)
PERFORMED ON: ABNORMAL
PERFORMED ON: NORMAL
PH UA: 6 (ref 5–8)
PLATELET # BLD: 64 K/UL (ref 130–400)
PMV BLD AUTO: 10.9 FL (ref 9.4–12.4)
POTASSIUM SERPL-SCNC: 4.8 MMOL/L (ref 3.5–5)
PROTEIN UA: NEGATIVE MG/DL
RBC # BLD: 2.92 M/UL (ref 4.7–6.1)
REASON FOR REJECTION: NORMAL
REJECTED TEST: NORMAL
SARS-COV-2, NAAT: NOT DETECTED
SODIUM BLD-SCNC: 140 MMOL/L (ref 136–145)
SPECIFIC GRAVITY UA: 1.03 (ref 1–1.03)
TOTAL PROTEIN: 5.7 G/DL (ref 6.6–8.7)
TROPONIN: <0.01 NG/ML (ref 0–0.03)
UROBILINOGEN, URINE: 1 E.U./DL
VITAMIN B-12: 326 PG/ML (ref 211–946)
VITAMIN D 25-HYDROXY: 65.7 NG/ML
WBC # BLD: 3.8 K/UL (ref 4.8–10.8)

## 2022-08-24 PROCEDURE — 81003 URINALYSIS AUTO W/O SCOPE: CPT

## 2022-08-24 PROCEDURE — G0378 HOSPITAL OBSERVATION PER HR: HCPCS

## 2022-08-24 PROCEDURE — 2580000003 HC RX 258

## 2022-08-24 PROCEDURE — 83036 HEMOGLOBIN GLYCOSYLATED A1C: CPT

## 2022-08-24 PROCEDURE — 82947 ASSAY GLUCOSE BLOOD QUANT: CPT

## 2022-08-24 PROCEDURE — 83605 ASSAY OF LACTIC ACID: CPT

## 2022-08-24 PROCEDURE — 82607 VITAMIN B-12: CPT

## 2022-08-24 PROCEDURE — 87635 SARS-COV-2 COVID-19 AMP PRB: CPT

## 2022-08-24 PROCEDURE — 83550 IRON BINDING TEST: CPT

## 2022-08-24 PROCEDURE — 93005 ELECTROCARDIOGRAM TRACING: CPT | Performed by: EMERGENCY MEDICINE

## 2022-08-24 PROCEDURE — 71045 X-RAY EXAM CHEST 1 VIEW: CPT

## 2022-08-24 PROCEDURE — 85025 COMPLETE CBC W/AUTO DIFF WBC: CPT

## 2022-08-24 PROCEDURE — 70450 CT HEAD/BRAIN W/O DYE: CPT | Performed by: RADIOLOGY

## 2022-08-24 PROCEDURE — 82306 VITAMIN D 25 HYDROXY: CPT

## 2022-08-24 PROCEDURE — 80053 COMPREHEN METABOLIC PANEL: CPT

## 2022-08-24 PROCEDURE — 71045 X-RAY EXAM CHEST 1 VIEW: CPT | Performed by: RADIOLOGY

## 2022-08-24 PROCEDURE — 36415 COLL VENOUS BLD VENIPUNCTURE: CPT

## 2022-08-24 PROCEDURE — 70450 CT HEAD/BRAIN W/O DYE: CPT

## 2022-08-24 PROCEDURE — 99285 EMERGENCY DEPT VISIT HI MDM: CPT

## 2022-08-24 PROCEDURE — 82746 ASSAY OF FOLIC ACID SERUM: CPT

## 2022-08-24 PROCEDURE — 84484 ASSAY OF TROPONIN QUANT: CPT

## 2022-08-24 PROCEDURE — 6370000000 HC RX 637 (ALT 250 FOR IP)

## 2022-08-24 PROCEDURE — 83540 ASSAY OF IRON: CPT

## 2022-08-24 RX ORDER — PANTOPRAZOLE SODIUM 40 MG/1
40 TABLET, DELAYED RELEASE ORAL DAILY
Status: DISCONTINUED | OUTPATIENT
Start: 2022-08-24 | End: 2022-08-25 | Stop reason: HOSPADM

## 2022-08-24 RX ORDER — DEXTROSE MONOHYDRATE 100 MG/ML
INJECTION, SOLUTION INTRAVENOUS CONTINUOUS PRN
Status: DISCONTINUED | OUTPATIENT
Start: 2022-08-24 | End: 2022-08-25 | Stop reason: HOSPADM

## 2022-08-24 RX ORDER — ATORVASTATIN CALCIUM 40 MG/1
40 TABLET, FILM COATED ORAL NIGHTLY
Status: DISCONTINUED | OUTPATIENT
Start: 2022-08-24 | End: 2022-08-25 | Stop reason: HOSPADM

## 2022-08-24 RX ORDER — ASCORBIC ACID 500 MG
250 TABLET ORAL DAILY
Status: DISCONTINUED | OUTPATIENT
Start: 2022-08-24 | End: 2022-08-25 | Stop reason: HOSPADM

## 2022-08-24 RX ORDER — LEVOTHYROXINE SODIUM 0.05 MG/1
50 TABLET ORAL DAILY
Status: DISCONTINUED | OUTPATIENT
Start: 2022-08-24 | End: 2022-08-25 | Stop reason: HOSPADM

## 2022-08-24 RX ORDER — FERROUS SULFATE 325(65) MG
325 TABLET ORAL 2 TIMES DAILY
Status: DISCONTINUED | OUTPATIENT
Start: 2022-08-24 | End: 2022-08-25 | Stop reason: HOSPADM

## 2022-08-24 RX ORDER — ACETAMINOPHEN 325 MG/1
650 TABLET ORAL EVERY 6 HOURS PRN
Status: DISCONTINUED | OUTPATIENT
Start: 2022-08-24 | End: 2022-08-25 | Stop reason: HOSPADM

## 2022-08-24 RX ORDER — SODIUM CHLORIDE 0.9 % (FLUSH) 0.9 %
5-40 SYRINGE (ML) INJECTION EVERY 12 HOURS SCHEDULED
Status: DISCONTINUED | OUTPATIENT
Start: 2022-08-24 | End: 2022-08-25 | Stop reason: HOSPADM

## 2022-08-24 RX ORDER — SODIUM CHLORIDE 9 MG/ML
INJECTION, SOLUTION INTRAVENOUS CONTINUOUS
Status: DISCONTINUED | OUTPATIENT
Start: 2022-08-24 | End: 2022-08-24 | Stop reason: ALTCHOICE

## 2022-08-24 RX ORDER — VITAMIN C
1 TAB ORAL DAILY
Status: DISCONTINUED | OUTPATIENT
Start: 2022-08-24 | End: 2022-08-25 | Stop reason: HOSPADM

## 2022-08-24 RX ORDER — GABAPENTIN 100 MG/1
100 CAPSULE ORAL NIGHTLY
Status: DISCONTINUED | OUTPATIENT
Start: 2022-08-24 | End: 2022-08-25 | Stop reason: HOSPADM

## 2022-08-24 RX ORDER — INSULIN LISPRO 100 [IU]/ML
0-4 INJECTION, SOLUTION INTRAVENOUS; SUBCUTANEOUS
Status: DISCONTINUED | OUTPATIENT
Start: 2022-08-24 | End: 2022-08-25 | Stop reason: HOSPADM

## 2022-08-24 RX ORDER — TAMSULOSIN HYDROCHLORIDE 0.4 MG/1
0.4 CAPSULE ORAL DAILY
Status: DISCONTINUED | OUTPATIENT
Start: 2022-08-24 | End: 2022-08-25 | Stop reason: HOSPADM

## 2022-08-24 RX ORDER — INSULIN LISPRO 100 [IU]/ML
0-4 INJECTION, SOLUTION INTRAVENOUS; SUBCUTANEOUS NIGHTLY
Status: DISCONTINUED | OUTPATIENT
Start: 2022-08-24 | End: 2022-08-25 | Stop reason: HOSPADM

## 2022-08-24 RX ORDER — ACETAMINOPHEN 650 MG/1
650 SUPPOSITORY RECTAL EVERY 6 HOURS PRN
Status: DISCONTINUED | OUTPATIENT
Start: 2022-08-24 | End: 2022-08-25 | Stop reason: HOSPADM

## 2022-08-24 RX ORDER — SODIUM CHLORIDE 9 MG/ML
INJECTION, SOLUTION INTRAVENOUS CONTINUOUS
Status: DISCONTINUED | OUTPATIENT
Start: 2022-08-24 | End: 2022-08-25 | Stop reason: HOSPADM

## 2022-08-24 RX ORDER — SODIUM CHLORIDE 0.9 % (FLUSH) 0.9 %
5-40 SYRINGE (ML) INJECTION PRN
Status: DISCONTINUED | OUTPATIENT
Start: 2022-08-24 | End: 2022-08-25 | Stop reason: HOSPADM

## 2022-08-24 RX ORDER — CLOPIDOGREL BISULFATE 75 MG/1
75 TABLET ORAL DAILY
Status: DISCONTINUED | OUTPATIENT
Start: 2022-08-24 | End: 2022-08-25 | Stop reason: HOSPADM

## 2022-08-24 RX ORDER — RANOLAZINE 500 MG/1
1000 TABLET, EXTENDED RELEASE ORAL 2 TIMES DAILY
Status: DISCONTINUED | OUTPATIENT
Start: 2022-08-24 | End: 2022-08-25 | Stop reason: HOSPADM

## 2022-08-24 RX ORDER — VITAMIN B COMPLEX
1000 TABLET ORAL DAILY
Status: DISCONTINUED | OUTPATIENT
Start: 2022-08-24 | End: 2022-08-25 | Stop reason: HOSPADM

## 2022-08-24 RX ORDER — DULOXETIN HYDROCHLORIDE 30 MG/1
60 CAPSULE, DELAYED RELEASE ORAL DAILY
Status: DISCONTINUED | OUTPATIENT
Start: 2022-08-24 | End: 2022-08-25 | Stop reason: HOSPADM

## 2022-08-24 RX ORDER — SODIUM CHLORIDE 9 MG/ML
INJECTION, SOLUTION INTRAVENOUS PRN
Status: DISCONTINUED | OUTPATIENT
Start: 2022-08-24 | End: 2022-08-25 | Stop reason: HOSPADM

## 2022-08-24 RX ORDER — POLYETHYLENE GLYCOL 3350 17 G/17G
17 POWDER, FOR SOLUTION ORAL DAILY PRN
Status: DISCONTINUED | OUTPATIENT
Start: 2022-08-24 | End: 2022-08-25 | Stop reason: HOSPADM

## 2022-08-24 RX ORDER — DOCUSATE SODIUM 100 MG/1
100 CAPSULE, LIQUID FILLED ORAL DAILY
Status: DISCONTINUED | OUTPATIENT
Start: 2022-08-24 | End: 2022-08-25 | Stop reason: HOSPADM

## 2022-08-24 RX ORDER — ONDANSETRON 2 MG/ML
4 INJECTION INTRAMUSCULAR; INTRAVENOUS EVERY 6 HOURS PRN
Status: DISCONTINUED | OUTPATIENT
Start: 2022-08-24 | End: 2022-08-25 | Stop reason: HOSPADM

## 2022-08-24 RX ORDER — ONDANSETRON 4 MG/1
4 TABLET, ORALLY DISINTEGRATING ORAL EVERY 8 HOURS PRN
Status: DISCONTINUED | OUTPATIENT
Start: 2022-08-24 | End: 2022-08-25 | Stop reason: HOSPADM

## 2022-08-24 RX ORDER — OMEGA-3-ACID ETHYL ESTERS 1 G/1
2 CAPSULE, LIQUID FILLED ORAL 2 TIMES DAILY
Status: DISCONTINUED | OUTPATIENT
Start: 2022-08-24 | End: 2022-08-25 | Stop reason: HOSPADM

## 2022-08-24 RX ADMIN — RANOLAZINE 1000 MG: 500 TABLET, EXTENDED RELEASE ORAL at 22:28

## 2022-08-24 RX ADMIN — OMEGA-3-ACID ETHYL ESTERS 2 G: 1 CAPSULE, LIQUID FILLED ORAL at 22:28

## 2022-08-24 RX ADMIN — DULOXETINE 60 MG: 30 CAPSULE, DELAYED RELEASE ORAL at 18:47

## 2022-08-24 RX ADMIN — POLYETHYLENE GLYCOL 3350 17 G: 17 POWDER, FOR SOLUTION ORAL at 22:37

## 2022-08-24 RX ADMIN — GABAPENTIN 100 MG: 100 CAPSULE ORAL at 22:27

## 2022-08-24 RX ADMIN — BUSPIRONE HYDROCHLORIDE 15 MG: 10 TABLET ORAL at 22:27

## 2022-08-24 RX ADMIN — DOCUSATE SODIUM 100 MG: 100 CAPSULE, LIQUID FILLED ORAL at 18:47

## 2022-08-24 RX ADMIN — SODIUM CHLORIDE, PRESERVATIVE FREE 10 ML: 5 INJECTION INTRAVENOUS at 22:28

## 2022-08-24 RX ADMIN — SODIUM CHLORIDE: 9 INJECTION, SOLUTION INTRAVENOUS at 18:47

## 2022-08-24 RX ADMIN — FERROUS SULFATE TAB 325 MG (65 MG ELEMENTAL FE) 325 MG: 325 (65 FE) TAB at 22:27

## 2022-08-24 RX ADMIN — ACETAMINOPHEN 650 MG: 325 TABLET, FILM COATED ORAL at 22:43

## 2022-08-24 RX ADMIN — ATORVASTATIN CALCIUM 40 MG: 40 TABLET, FILM COATED ORAL at 22:28

## 2022-08-24 SDOH — ECONOMIC STABILITY: INCOME INSECURITY: IN THE LAST 12 MONTHS, WAS THERE A TIME WHEN YOU WERE NOT ABLE TO PAY THE MORTGAGE OR RENT ON TIME?: NO

## 2022-08-24 SDOH — ECONOMIC STABILITY: HOUSING INSECURITY
IN THE LAST 12 MONTHS, WAS THERE A TIME WHEN YOU DID NOT HAVE A STEADY PLACE TO SLEEP OR SLEPT IN A SHELTER (INCLUDING NOW)?: NO

## 2022-08-24 SDOH — ECONOMIC STABILITY: FOOD INSECURITY: WITHIN THE PAST 12 MONTHS, THE FOOD YOU BOUGHT JUST DIDN'T LAST AND YOU DIDN'T HAVE MONEY TO GET MORE.: NEVER TRUE

## 2022-08-24 SDOH — ECONOMIC STABILITY: HOUSING INSECURITY: IN THE LAST 12 MONTHS, HOW MANY PLACES HAVE YOU LIVED?: 1

## 2022-08-24 SDOH — ECONOMIC STABILITY: FOOD INSECURITY: WITHIN THE PAST 12 MONTHS, YOU WORRIED THAT YOUR FOOD WOULD RUN OUT BEFORE YOU GOT MONEY TO BUY MORE.: NEVER TRUE

## 2022-08-24 ASSESSMENT — PAIN SCALES - GENERAL
PAINLEVEL_OUTOF10: 3
PAINLEVEL_OUTOF10: 0
PAINLEVEL_OUTOF10: 0

## 2022-08-24 ASSESSMENT — PATIENT HEALTH QUESTIONNAIRE - PHQ9
2. FEELING DOWN, DEPRESSED OR HOPELESS: SEVERAL DAYS
2. FEELING DOWN, DEPRESSED OR HOPELESS: SEVERAL DAYS
SUM OF ALL RESPONSES TO PHQ9 QUESTIONS 1 & 2: 1
SUM OF ALL RESPONSES TO PHQ9 QUESTIONS 1 & 2: 1
1. LITTLE INTEREST OR PLEASURE IN DOING THINGS: NOT AT ALL
DEPRESSION UNABLE TO ASSESS: YES
DEPRESSION UNABLE TO ASSESS: YES
SUM OF ALL RESPONSES TO PHQ9 QUESTIONS 1 & 2: 1
1. LITTLE INTEREST OR PLEASURE IN DOING THINGS: NOT AT ALL
1. LITTLE INTEREST OR PLEASURE IN DOING THINGS: NOT AT ALL
DEPRESSION UNABLE TO ASSESS: YES
2. FEELING DOWN, DEPRESSED OR HOPELESS: SEVERAL DAYS

## 2022-08-24 ASSESSMENT — SOCIAL DETERMINANTS OF HEALTH (SDOH)
WITHIN THE LAST YEAR, HAVE YOU BEEN AFRAID OF YOUR PARTNER OR EX-PARTNER?: NO
WITHIN THE LAST YEAR, HAVE YOU BEEN HUMILIATED OR EMOTIONALLY ABUSED IN OTHER WAYS BY YOUR PARTNER OR EX-PARTNER?: NO
WITHIN THE LAST YEAR, HAVE TO BEEN RAPED OR FORCED TO HAVE ANY KIND OF SEXUAL ACTIVITY BY YOUR PARTNER OR EX-PARTNER?: NO
HOW HARD IS IT FOR YOU TO PAY FOR THE VERY BASICS LIKE FOOD, HOUSING, MEDICAL CARE, AND HEATING?: NOT VERY HARD
WITHIN THE LAST YEAR, HAVE YOU BEEN KICKED, HIT, SLAPPED, OR OTHERWISE PHYSICALLY HURT BY YOUR PARTNER OR EX-PARTNER?: NO

## 2022-08-24 ASSESSMENT — ENCOUNTER SYMPTOMS
EYE DISCHARGE: 0
SINUS PRESSURE: 0
VOMITING: 0
WHEEZING: 0
SORE THROAT: 0
SHORTNESS OF BREATH: 0
NAUSEA: 0
CHOKING: 0
CONSTIPATION: 1
VOICE CHANGE: 0
BLOOD IN STOOL: 0
FACIAL SWELLING: 0
ABDOMINAL DISTENTION: 0
COUGH: 0
APNEA: 0
CHEST TIGHTNESS: 0
COLOR CHANGE: 0
DIARRHEA: 0
ABDOMINAL PAIN: 0

## 2022-08-24 ASSESSMENT — PAIN DESCRIPTION - LOCATION
LOCATION: HEAD
LOCATION: HEAD

## 2022-08-24 ASSESSMENT — PAIN DESCRIPTION - ORIENTATION: ORIENTATION: MID

## 2022-08-24 ASSESSMENT — LIFESTYLE VARIABLES
HOW MANY STANDARD DRINKS CONTAINING ALCOHOL DO YOU HAVE ON A TYPICAL DAY: PATIENT DOES NOT DRINK
HOW OFTEN DO YOU HAVE A DRINK CONTAINING ALCOHOL: NEVER

## 2022-08-24 ASSESSMENT — PAIN DESCRIPTION - DESCRIPTORS
DESCRIPTORS: ACHING
DESCRIPTORS: OTHER (COMMENT)

## 2022-08-24 ASSESSMENT — PAIN - FUNCTIONAL ASSESSMENT: PAIN_FUNCTIONAL_ASSESSMENT: 0-10

## 2022-08-24 ASSESSMENT — PAIN DESCRIPTION - FREQUENCY: FREQUENCY: CONTINUOUS

## 2022-08-24 NOTE — CARE COORDINATION
Patient Contact Information:    [address] [place] [address]  [phone] (home) [phone] (work)  Telephone Information:   [address]  [place], [facility] incorrect - [phone]. Will notify registration    Above information verified? [x]   Yes  []   No      Emergency Contacts:    Extended Emergency Contact Information  Primary Emergency Contact: Niya Ramirez  Address: [address] Phone: [phone]  Mobile Phone: [phone]  Relation: Spouse   needed? No  Secondary Emergency Contact: Jacki Rodrigues  Address: [address] Phone: [phone]  Relation: Child   needed? No      Have you been vaccinated for COVID-19 (SARS-CoV-2)? [x]   Yes  []   No                   If so, when?     Which :         [x]   Cotera-PushSpring  []   Moderna  []   Suzie London  []   Other:         [facility] [address] [place] [address]  Phone: [phone] Fax: [phone]          Patient Deficits:    []   Yes   [x]   No    If yes:    []   Confusion/Memory  []   Visual  []   Motor/Sensory         []   Right arm         []   Right leg         []   Left arm         []   Left leg  []   Language/Speech         []   Aphasia         []   Dysarthria         []   Swallow         Elle Coma Scale  Eye Opening: Spontaneous  Best Verbal Response: Oriented  Best Motor Response: Obeys commands  Curtis Coma Scale Score: 15    Patient Deficit Notes:

## 2022-08-24 NOTE — TELEPHONE ENCOUNTER
PT wife called stating pt passed out in the kitchen, feeling very dizzy. BP 90/49 in right arm, 103/53 in right arm. Pt advised to take pt to ER. Pt verbally understood and states she will.

## 2022-08-24 NOTE — H&P
[facility]      Hospitalist - History & Physical      PCP: ADRY Ramos    Date of Admission: 8/24/2022    Date of Service: 8/24/2022    Chief Complaint: Dizziness    History Of Present Illness: The patient is a 69 y.o. male who presented to [facility] ER with PMH CAD, COPD, CVA, type II DM, HTN, hyperlipidemia, REGINA, SDH, nonalcoholic cirrhosis, non-Hodgkin's lymphoma, complaining of dizziness. Patient states that he had been in his normal state of health this morning when he got up from recliner and was headed upstairs when he became dizzy, lightheaded, and felt as if he might pass out. He has had a recent evaluation at [address] [place] [address] on 7/24 due to syncope and collapse, medication adjustments were made while hospitalized, and was sent home with a Zio patch with no adventitious findings. Denies fever, chills, nausea, vomiting, diarrhea, abdominal pain, shortness of breath and chest pain. Denies hematuria or blood in stool. Patient states that he has recently seen urology due to kidney stone and was restarted on Flomax 2 days ago. Work-up in ER CT head no acute intracranial abnormality, CXR no acute cardiopulmonary process, urinalysis negative, Hgb 9.8, HCT 30.6, platelets 64, troponin negative, and lactic acid 2.4. Guaiac negative. Patient is to be admitted to the hospitalist service due to near syncope. Past Medical History:        Diagnosis Date    Abnormal nuclear stress test 10/22/2014    Arthritis     BiPAP (biphasic positive airway pressure) dependence     8cm to 20cm    Cerebrovascular disease     Chronic kidney disease, stage II (mild) 08/17/2016    Mikki Albright M.D. Cirrhosis ([facility] [address]     Coronary artery disease 02/24/2011    Depression     Diabetes mellitus ([facility] [address]     Encounter for wound care     PT SEES \"GRACY\" WITH DR. SAMUEL    Great toe pain     RT    Headache     Hyperlipemia 02/24/2011    Dr. Lenny Desai follows lipids.     Hyperlipidemia     Hypertension     Liver disease LONG TERM ANTICOAGULENT USE     Low blood pressure 11/19/2014    lymphostatic lymphoma     Nausea 11/19/2014    Non Hodgkin's lymphoma ([facility] [address]     Obesity     Obstructive sleep apnea     AHI:  21.7 per PSG, 7/2017    Peptic ulcer disease 02/24/2011    Restless leg     S/P CABG x 3 10/22/2014    SVG to RCA; SVG to LAD diagonal; LIMA to DXV(9431, 2014)    Sleep apnea     Syncope and collapse 7/24/2022    Tachyarrhythmia     Thrombocythemia, essential ([facility] [address]     Thyroid disease     Type 2 diabetes mellitus without complication ([facility] [address]     Type II or unspecified type diabetes mellitus without mention of complication, not stated as uncontrolled        Past Surgical History:        Procedure Laterality Date    CARDIAC CATHETERIZATION  2/28/11    EF 60%    CARDIAC CATHETERIZATION  9/16/05, 3/7/07    EF < 50%    CARDIAC CATHETERIZATION  12/4/12   GERBER    EF  50%    CARDIAC CATHETERIZATION  10/28/14  GERBER Valera M.D. CERVICAL SPINE SURGERY      COLONOSCOPY      CORONARY ARTERY BYPASS GRAFT  9/21/05    LIMA to LAD and diagonal; SVG to posterior descending branch RCA    CORONARY ARTERY BYPASS GRAFT  10/30/2014    Redo Sternotomy - SVG-PDA, TMR ([facility])    JOINT REPLACEMENT Right     knee    KIDNEY SURGERY  08/14/2017    KNEE ARTHROSCOPY      right ACL repair    SHOULDER ARTHROSCOPY      left-rotator cuff repair right- rotator cuff repair    SHOULDER ARTHROSCOPY  08-23-11    right    TONSILLECTOMY         Home Medications:  Prior to Admission medications    Medication Sig Start Date End Date Taking? Authorizing Provider   losartan (COZAAR) 25 MG tablet Take 0.5 tablets by mouth in the morning.  7/26/22   Jerry Mcmanus MD   Zinc Sulfate (ZINC 15 PO) Take by mouth    Historical Provider, MD   GARLIC-CALCIUM PO Take by mouth    Historical Provider, MD   DULoxetine (CYMBALTA) 60 MG extended release capsule  5/20/22   Historical Provider, MD   Icosapent Ethyl Take 50 mcg by mouth Daily    Historical Provider, MD   diltiazem (CARDIZEM CD) 180 MG extended release capsule Take 1 capsule by mouth daily 8/14/18 7/26/22  GRECIA Mathew   ondansetron [facility]) 8 MG tablet Take 8 mg by mouth as needed  10/3/17   Historical Provider, MD   tiZANidine (ZANAFLEX) 4 MG tablet 1/2 tablet at night 10/24/17   Historical Provider, MD   ferrous sulfate 325 (65 Fe) MG tablet Take 325 mg by mouth 2 times daily    Historical Provider, MD   pantoprazole (PROTONIX) 40 MG tablet Take 1 tablet by mouth daily 9/7/17   GRECIA Calixto   b complex vitamins capsule Take 1 capsule by mouth daily. Historical Provider, MD   Vitamin D (CHOLECALCIFEROL) 1000 UNITS CAPS capsule Take 1,000 Units by mouth daily. Historical Provider, MD   docusate sodium (COLACE) 100 MG capsule Take 100 mg by mouth daily. Historical Provider, MD       Allergies: Ancef [cefazolin sodium], Ceftin [cefuroxime], Cefuroxime axetil, Cephalosporins, and Neosporin [bacitracin-polymyxin b]    Social History:    The patient currently lives home  Tobacco:   reports that he quit smoking about 24 years ago. His smoking use included cigarettes. He has never used smokeless tobacco.  Alcohol:   reports no history of alcohol use. Illicit Drugs: denies    Family History:      Problem Relation Age of Onset    Heart Disease Other     Lung Cancer Mother     Cancer Sister        Review of Systems:   Review of Systems   Constitutional:  Negative for chills, diaphoresis, fatigue and fever. Respiratory:  Negative for cough, chest tightness, shortness of breath and wheezing. Cardiovascular:  Negative for chest pain and palpitations. Gastrointestinal:  Positive for constipation. Negative for abdominal distention, abdominal pain, nausea and vomiting. Skin:  Negative for color change, pallor and rash. Neurological:  Positive for dizziness and light-headedness.  Negative for tremors, syncope, weakness, numbness and headaches. Psychiatric/Behavioral:  Negative for agitation, behavioral problems and confusion. 14 point review of systems is negative except as specifically addressed above. Physical Examination:  /61   Pulse 72   Temp 98 °F (36.7 °C) (Oral)   Resp 16   Wt 218 lb (98.9 kg)   SpO2 95%   BMI 30.40 kg/m²   Physical Exam  Vitals and nursing note reviewed. Constitutional:       Appearance: Normal appearance. HENT:      Mouth/Throat:      Mouth: Mucous membranes are moist.      Pharynx: Oropharynx is clear. Eyes:      Extraocular Movements: Extraocular movements intact. Conjunctiva/sclera: Conjunctivae normal.      Pupils: Pupils are equal, round, and reactive to light. Cardiovascular:      Rate and Rhythm: Normal rate and regular rhythm. Pulses: Normal pulses. Heart sounds: Normal heart sounds. No murmur heard. Pulmonary:      Effort: Pulmonary effort is normal. No respiratory distress. Breath sounds: Normal breath sounds. Abdominal:      General: Bowel sounds are normal. There is no distension. Palpations: Abdomen is soft. Tenderness: There is no abdominal tenderness. There is no guarding or rebound. Musculoskeletal:         General: No swelling. Normal range of motion. Cervical back: Normal range of motion and neck supple. No rigidity or tenderness. Right lower leg: No edema. Left lower leg: No edema. Skin:     General: Skin is warm and dry. Capillary Refill: Capillary refill takes less than 2 seconds. Neurological:      General: No focal deficit present. Mental Status: He is alert and oriented to person, place, and time. Cranial Nerves: No cranial nerve deficit.    Psychiatric:         Mood and Affect: Mood normal.         Behavior: Behavior normal.        Diagnostic Data:  CBC:  Recent Labs     08/24/22  1416   WBC 3.8*   HGB 9.8*   HCT 30.6*   PLT 64*     BMP:  Recent Labs     08/24/22  1416      K 4.8    CO2 23   BUN 20   CREATININE 1.1   CALCIUM 8.6*     Recent Labs     08/24/22  1416   AST 25   ALT 14   BILITOT 0.4   ALKPHOS 79     Coag Panel: No results for input(s): INR, PROTIME, APTT in the last 72 hours. Cardiac Enzymes:   Recent Labs     08/24/22  1416   TROPONINI <0.01     ABGs:  Lab Results   Component Value Date/Time    PHART 7.420 08/26/2021 12:59 PM    PO2ART 64.0 08/26/2021 12:59 PM    MKB2DWA 36.0 08/26/2021 12:59 PM     Urinalysis:  Lab Results   Component Value Date/Time    NITRU Negative 08/24/2022 02:10 PM    WBCUA 14 08/20/2021 09:57 PM    BACTERIA 0 08/24/2021 12:00 AM    BACTERIA NEGATIVE 08/20/2021 09:57 PM    RBCUA 1 08/24/2021 12:00 AM    RBCUA 2 08/20/2021 09:57 PM    BLOODU Negative 08/24/2022 02:10 PM    SPECGRAV 1.028 08/24/2022 02:10 PM    GLUCOSEU =>1000 08/24/2022 02:10 PM         CT Head W/O Contrast    Result Date: 8/24/2022  NO PRIOR REPORT AVAILABLE Exam: CT OF THE BRAIN WITHOUT CONTRAST Clinical data: Hypotension, near syncope, abnormal gaze, left eye. Low blood pressure. Technique: Contiguous axial images are obtained from the skull base to vertex without intravenous contrast. Reformatted/MPR images were performed. Radiation dose: CTDIvol =41.68 mGy, DLP =855 mGy x cm. Prior studies: CT scan of the brain dated 07/24/22. Findings: Mild age-related brain volume loss is accompanied by minimal periventricular chronic post-ischemic microvascular white matter disease. There is punctate mineralization of the right and left medial globus pallidus. No acute intracranial abnormality is present. No evidence of acute cortical infarction, hemorrhage, mass or mass effect. No hydrocephalus or abnormal extra-axial fluid collections are present. The posterior fossa is unremarkable. The skull base and calvarium are intact. The included portions of the paranasal sinuses and mastoid air cells are clear.      1.  Mild age-related brain volume loss is accompanied by minimal periventricular chronic post-ischemic microvascular white matter disease. 2.  There is no cerebral hemorrhage nor other acute intracranial abnormality. 3.  No scalp swelling nor skull fracture is evident. 4.  No adverse interval change. Recommendation: Follow up as clinically indicated. All CT scans at this facility utilize dose modulation, iterative reconstruction, and/or weight based dosing when appropriate to reduce radiation dose to as low as reasonably achievable.  Electronically Signed by Kaylin Hollingsworth MD at 24-Aug-2022 05:27:16 PM               Assessment/Plan:  Principal Problem:    Near syncope   -IVF    - ECHO               - Neuro checks              - Orthostatic blood pressure and pulse               - Fall precaution              - Bed alarm on              - Monitor on telemetry   -Hold antihypertensive and sedatives  Active Problems  Acute on chronic anemia/thrombocytopenia   -Iron, TIBC, vitamin B12, and folate              - Avoid NSAIDs/anticoagulations              - Monitor H&H closely              - Monitor stools for color              - Monitor on telemetry    Diabetes mellitus ([facility] [address]  -Accucheck  -A1c  -Sliding scale insulin   -Hypoglycemia protocol as warranted      Obstructive sleep apnea   -Home CPAP nightly   Hyperlipemia    DVT prophylaxis SCDs    Signed:  Luke Duane, APRN - CNP, 8/24/2022 5:10 PM

## 2022-08-24 NOTE — PROGRESS NOTES
Bushra Stewart arrived to room # 726. Presented with: near syncope  Mental Status: Patient is oriented, alert, coherent, logical, thought processes intact, and able to concentrate and follow conversation. Vitals:    08/24/22 1834   BP:    Pulse: 71   Resp:    Temp:    SpO2:      Patient safety contract and falls prevention contract reviewed with patient Yes. Oriented Patient and Family to room. Call light within reach. Yes.   Needs, issues or concerns expressed at this time: no.      Electronically signed by Felicia Herrera RN on 8/24/2022 at 6:42 PM

## 2022-08-24 NOTE — CARE COORDINATION
08/24/22 1744   Service Assessment   Patient Orientation Alert and Oriented   Cognition Alert   History Provided By Patient   Primary Caregiver Self   Accompanied By/Relationship Spouse   Support Systems Spouse/Significant Other;Children;Family Members   Patient's Healthcare Decision Maker is: Legal Next of Kin   PCP Verified by CM Yes   Last Visit to PCP Within last 3 months   Prior Functional Level Independent in ADLs/IADLs;Dressing; Bathing; Toileting;Feeding;Cooking;Housework; Shopping;Mobility   Current Functional Level Independent in ADLs/IADLs; Bathing;Dressing; Toileting;Feeding;Cooking;Housework; Shopping;Mobility   Can patient return to prior living arrangement Yes   Ability to make needs known: Good   Family able to assist with home care needs: Yes   Financial Resources [employer]   (denied needs)   CM/SW Referral   (denied needs)   Social/Functional History   Lives With Spouse   Type of Home House   Bathroom Toilet Standard   Bathroom Equipment   (denied use / need)   [address]   (denied use / need)   [address] Responsibilities Yes   Ambulation Assistance Independent   Transfer Assistance Independent   Active  Yes   Mode of Transportation Car   Occupation Retired   Discharge Planning   Type of [address] Prior To Admission [address] Needed   (denied any needs at this time)   DME Ordered?  No   Potential Assistance Purchasing Medications No   Type of Home Care Services None   Patient expects to be discharged to: [facility]

## 2022-08-24 NOTE — ED NOTES
Report called to 7th floor. Spoke with [employer].       Brooksie Landau, RN  08/24/22 6739

## 2022-08-24 NOTE — ED PROVIDER NOTES
[facility] EMERGENCY DEPT  eMERGENCY dEPARTMENT eNCOUnter      Pt Name: Burgess Weller  MRN: [id number]  [place] 1955  Date of evaluation: 8/24/2022  Provider: Charles Dotson MD    CHIEF COMPLAINT       Chief Complaint   Patient presents with    Dizziness     Pt arrives via EMS from home. Reports dizziness, headache, and weakness intermittently for 3 weeks. This morning was the worse and has been constant. Headache    Fatigue         HISTORY OF PRESENT ILLNESS   (Location/Symptom, Timing/Onset,Context/Setting, Quality, Duration, Modifying Factors, Severity)  Note limiting factors. Burgess Weller is a 69 y.o. male who presents to the emergency department near syncope and low blood pressure. 64-year-old male in his usual state of health. Was getting up off his cast today and felt less if he might pass out and was dizzy. He been having complaints of headache for the past 3 weeks but he was evaluated after a fall then. No fever chills or other infectious symptoms. He is on quite a bit of cardiac medication. His wife called the cardiology office and was advised to come here. He was not having chest pain for me. He denies any rectal bleeding but does complain of not having a regular bowel movement for several days. No fever or chills. History of non-Hodgkin's lymphoma not currently on chemo but has some chronic blood dyscrasias. Also nonalcoholic cirrhosis which may explain his low platelet count. Patient states he takes his medicine correctly because his wife gives it to him. The history is provided by the patient and the spouse. NursingNotes were reviewed. REVIEW OF SYSTEMS    (2-9 systems for level 4, 10 or more for level 5)     Review of Systems   Constitutional:  Negative for chills and fever. HENT:  Negative for congestion, drooling, facial swelling, nosebleeds, sinus pressure, sore throat and voice change. Eyes:  Negative for discharge.    Respiratory:  Negative for apnea, cough, choking and shortness of breath. Cardiovascular:  Negative for chest pain and leg swelling. Low blood pressure   Gastrointestinal:  Positive for constipation. Negative for abdominal pain, blood in stool, diarrhea and nausea. Genitourinary:  Negative for dysuria and enuresis. Musculoskeletal:  Negative for joint swelling. Skin:  Negative for rash and wound. Neurological:  Positive for dizziness and syncope. Negative for seizures, facial asymmetry, speech difficulty and weakness. Psychiatric/Behavioral:  Negative for behavioral problems, hallucinations and suicidal ideas. All other systems reviewed and are negative. A complete review of systems was performed and is negative except as noted above in the HPI. PAST MEDICAL HISTORY     Past Medical History:   Diagnosis Date    Abnormal nuclear stress test 10/22/2014    Arthritis     BiPAP (biphasic positive airway pressure) dependence     8cm to 20cm    Cerebrovascular disease     Chronic kidney disease, stage II (mild) 08/17/2016    Kevon Mcdonald M.D. [facility]     Coronary artery disease 02/24/2011    Depression     Diabetes mellitus ([facility]     Encounter for wound care     PT SEES \"GRACY\" WITH DR. SAMUEL    Great toe pain     RT    Headache     Hyperlipemia 02/24/2011    Dr. Tonny Archer follows lipids.     Hyperlipidemia     Hypertension     Liver disease     LONG TERM ANTICOAGULENT USE     Low blood pressure 11/19/2014    lymphostatic lymphoma     Nausea 11/19/2014    Non Hodgkin's lymphoma ([facility]     Obesity     Obstructive sleep apnea     AHI:  21.7 per PSG, 7/2017    Peptic ulcer disease 02/24/2011    Restless leg     S/P CABG x 3 10/22/2014    SVG to RCA; SVG to LAD diagonal; LIMA to FLM(2300, 2014)    Sleep apnea     Syncope and collapse 7/24/2022    Tachyarrhythmia     Thrombocythemia, essential (HCC)     Thyroid disease     Type 2 diabetes mellitus without complication (HCC)     Type II or unspecified type diabetes mellitus without mention of complication, not stated as uncontrolled          SURGICAL HISTORY       Past Surgical History:   Procedure Laterality Date    CARDIAC CATHETERIZATION  2/28/11    EF 60%    CARDIAC CATHETERIZATION  9/16/05, 3/7/07    EF < 50%    CARDIAC CATHETERIZATION  12/4/12   MDL    EF  50%    CARDIAC CATHETERIZATION  10/28/14  GERBER Bernstein M.D. CERVICAL SPINE SURGERY      COLONOSCOPY      CORONARY ARTERY BYPASS GRAFT  9/21/05    LIMA to LAD and diagonal; SVG to posterior descending branch RCA    CORONARY ARTERY BYPASS GRAFT  10/30/2014    Redo Sternotomy - SVG-PDA, TMR (RBL)    JOINT REPLACEMENT Right     knee    KIDNEY SURGERY  08/14/2017    KNEE ARTHROSCOPY      right ACL repair    SHOULDER ARTHROSCOPY      left-rotator cuff repair right- rotator cuff repair    SHOULDER ARTHROSCOPY  08-23-11    right    TONSILLECTOMY           CURRENT MEDICATIONS       Previous Medications    ACETAMINOPHEN (TYLENOL) 500 MG TABLET    Take 1,000 mg by mouth every 6 hours as needed for Pain    ASCORBIC ACID (VITAMIN C) 250 MG TABLET    Take 250 mg by mouth daily    ATORVASTATIN (LIPITOR) 40 MG TABLET    TAKE 1 TABLET BY MOUTH NIGHTLY AT BEDTIME    B COMPLEX VITAMINS CAPSULE    Take 1 capsule by mouth daily. BUSPIRONE (BUSPAR) 15 MG TABLET    Take 15 mg by mouth 2 times daily     CLOPIDOGREL (PLAVIX) 75 MG TABLET    One daily    DOCUSATE SODIUM (COLACE) 100 MG CAPSULE    Take 100 mg by mouth daily. DULOXETINE (CYMBALTA) 60 MG EXTENDED RELEASE CAPSULE        EMPAGLIFLOZIN (JARDIANCE) 25 MG TABLET    Take 25 mg by mouth daily    FERROUS SULFATE 325 (65 FE) MG TABLET    Take 325 mg by mouth 2 times daily    GABAPENTIN (NEURONTIN) 100 MG CAPSULE    Take 1 capsule by mouth 2 times daily.     GARLIC-CALCIUM PO    Take by mouth    ICOSAPENT ETHYL (VASCEPA) 1 G CAPS CAPSULE    Take 2 capsules by mouth 2 times daily    INSULIN DEGLUDEC (TRESIBA) 100 UNIT/ML SOLN    Inject 6 Units into the skin nightly    LEVOTHYROXINE (SYNTHROID) 50 MCG TABLET    Take 50 mcg by mouth Daily    LOSARTAN (COZAAR) 25 MG TABLET    Take 0.5 tablets by mouth in the morning. METFORMIN (GLUCOPHAGE) 500 MG TABLET    Take 1 tablet by mouth 2 times daily (with meals) Please hold for 48 hrs after cardiac catheterization. You may resume like normal on 2022. METOPROLOL TARTRATE (LOPRESSOR) 25 MG TABLET    Take 1 tablet by mouth every evening    NITROGLYCERIN (NITROSTAT) 0.4 MG SL TABLET    Place 1 tablet under the tongue every 5 minutes as needed for Chest pain    ONDANSETRON (ZOFRAN) 8 MG TABLET    Take 8 mg by mouth as needed     PANTOPRAZOLE (PROTONIX) 40 MG TABLET    Take 1 tablet by mouth daily    RANOLAZINE (RANEXA) 1000 MG EXTENDED RELEASE TABLET    Take 1 tablet by mouth 2 times daily    TIZANIDINE (ZANAFLEX) 4 MG TABLET    1/2 tablet at night    VITAMIN D (CHOLECALCIFEROL) 1000 UNITS CAPS CAPSULE    Take 1,000 Units by mouth daily. ZINC SULFATE (ZINC 15 PO)    Take by mouth       ALLERGIES     Ancef [cefazolin sodium], Ceftin [cefuroxime], Cefuroxime axetil, Cephalosporins, and Neosporin [bacitracin-polymyxin b]    FAMILY HISTORY       Family History   Problem Relation Age of Onset    Heart Disease Other     Lung Cancer Mother     Cancer Sister           SOCIAL HISTORY       Social History     Socioeconomic History    Marital status:      Spouse name: Sola Steel    Number of children: 2    Years of education: 15   Occupational History    Occupation:      Employer: [employer]     Comment: Retired,  for the [employer].    Tobacco Use    Smoking status: Former     Packs/day: 0.00     Types: Cigarettes     Quit date: 1998     Years since quittin.5    Smokeless tobacco: Never    Tobacco comments:     quit 23 years ago   Vaping Use    Vaping Use: Never used   Substance and Sexual Activity Alcohol use: No    Drug use: No    Sexual activity: Yes     Partners: Female       SCREENINGS    Elle Coma Scale  Eye Opening: Spontaneous  Best Verbal Response: Oriented  Best Motor Response: Obeys commands  Elle Coma Scale Score: 15        PHYSICAL EXAM    (up to 7 for level 4, 8 or more for level 5)     ED Triage Vitals   BP Temp Temp Source Heart Rate Resp SpO2 Height Weight   08/24/22 1405 08/24/22 1407 08/24/22 1407 08/24/22 1405 08/24/22 1405 08/24/22 1405 -- 08/24/22 1405   104/61 98 °F (36.7 °C) Oral 72 16 95 %  218 lb (98.9 kg)       Physical Exam  Vitals and nursing note reviewed. Constitutional:       General: He is not in acute distress. Appearance: He is well-developed. HENT:      Head: Normocephalic and atraumatic. Right Ear: External ear normal.      Left Ear: External ear normal.      Nose: Nose normal.      Mouth/Throat:      Pharynx: Oropharynx is clear. Eyes:      General: No scleral icterus. Conjunctiva/sclera: Conjunctivae normal.      Pupils: Pupils are equal, round, and reactive to light. Cardiovascular:      Rate and Rhythm: Normal rate and regular rhythm. Pulses: Normal pulses. Heart sounds: Normal heart sounds. No murmur heard. Pulmonary:      Effort: Pulmonary effort is normal. No respiratory distress. Breath sounds: Normal breath sounds. Abdominal:      General: Bowel sounds are normal.      Palpations: Abdomen is soft. Genitourinary:     Rectum: Guaiac result negative. Musculoskeletal:         General: Normal range of motion. Cervical back: Normal range of motion and neck supple. Right lower leg: No edema. Left lower leg: No edema. Skin:     General: Skin is warm and dry. Coloration: Skin is not jaundiced or pale. Neurological:      General: No focal deficit present. Mental Status: He is alert and oriented to person, place, and time.    Psychiatric:         Mood and Affect: Mood normal.         Behavior: Behavior normal.       DIAGNOSTIC RESULTS     EKG: All EKG's are interpreted by the Emergency Department Physician who either signs or Co-signs this chart in the absence of a cardiologist.    Sinus rhythm rate 72. ND interval 127. QTc 473. No ST abnormality to indicate ischemia. RADIOLOGY:   Non-plain film images such as CT, Ultrasound and MRI are read by the radiologist. Plainradiographic images are visualized and preliminarily interpreted by the emergency physician with the below findings:    I have reviewed the results of the CT. And reviewed the images. Chest x-ray report is still pending I reviewed the films and do not see anything acute at the time of this dictation. Interpretation per the Radiologist below, if available at the time of this note:    CT Head W/O Contrast   Final Result   1. Mild age-related brain volume loss is accompanied by minimal periventricular chronic post-ischemic microvascular white matter disease. 2.  There is no cerebral hemorrhage nor other acute intracranial abnormality. 3.  No scalp swelling nor skull fracture is evident. 4.  No adverse interval change. Recommendation: Follow up as clinically indicated. All CT scans at this facility utilize dose modulation, iterative reconstruction, and/or weight based dosing when appropriate to reduce radiation dose to as low as reasonably achievable.    Electronically Signed by Debi Ventura MD at 24-Aug-2022 05:27:16 PM               XR CHEST PORTABLE    (Results Pending)         ED BEDSIDE ULTRASOUND:   Performed by ED Physician - none    LABS:  Labs Reviewed   CBC WITH AUTO DIFFERENTIAL - Abnormal; Notable for the following components:       Result Value    WBC 3.8 (*)     RBC 2.92 (*)     Hemoglobin 9.8 (*)     Hematocrit 30.6 (*)     .8 (*)     MCH 33.6 (*)     MCHC 32.0 (*)     RDW 15.4 (*)     Platelets 64 (*)     Monocytes % 11.7 (*)     Lymphocytes Absolute 0.8 (*)     All other components within normal limits   COMPREHENSIVE METABOLIC PANEL - Abnormal; Notable for the following components:    Glucose 126 (*)     Calcium 8.6 (*)     Total Protein 5.7 (*)     All other components within normal limits   URINALYSIS - Abnormal; Notable for the following components:    Color, UA DARK YELLOW (*)     All other components within normal limits   LACTIC ACID - Abnormal; Notable for the following components:    Lactic Acid 2.4 (*)     All other components within normal limits    Narrative:     CALL  Recinos  KLEDANNIELLE tel. ,  Chemistry results called to and read back by [facility] AT [facility], RN KLED, 08/24/2022 16:51,  by Rachel MUNROE-19, RAPID   TROPONIN   IRON AND TIBC   VITAMIN D 25 HYDROXY   VITAMIN B12 & FOLATE   HEMOGLOBIN A1C   POCT GLUCOSE       All other labs were within normal range or not returned as of this dictation. EMERGENCY DEPARTMENT COURSE and DIFFERENTIALDIAGNOSIS/MDM:   Vitals:    Vitals:    08/24/22 1405 08/24/22 1407 08/24/22 1700   BP: 104/61  114/66   Pulse: 72  (!) 44   Resp: 16  18   Temp:  98 °F (36.7 °C)    TempSrc:  Oral    SpO2: 95%  94%   Weight: 218 lb (98.9 kg)         MDM  Number of Diagnoses or Management Options  Acute on chronic anemia  Cirrhosis, nonalcoholic (HCC)  History of non-Hodgkin's lymphoma  Hypotension, unspecified hypotension type  Near syncope  Diagnosis management comments: Patient's had 2 L of IV fluid. Blood pressure 104/61. Does fluctuate that is up from the last 1 which was 99 systolic. I do not know why I do not find a reason of infection his anemia is slightly worse but Hemoccult negative. Could be medication related but I suppose or could be something that has not shown itself. I am know to talk to the hospitalist service see if they are comfortable watching him overnight repeat CBC and continue to monitor. CONSULTS:  None    PROCEDURES:  Unless otherwise notedbelow, none     Procedures    FINAL IMPRESSION     1. Near syncope    2.  Hypotension, unspecified hypotension type 3. History of non-Hodgkin's lymphoma    4. Cirrhosis, nonalcoholic ([address]    5.  Acute on chronic anemia          DISPOSITION/PLAN   DISPOSITION Admitted 08/24/2022 05:10:10 PM      PATIENT REFERRED TO:  @FUP@    DISCHARGE MEDICATIONS:  New Prescriptions    No medications on file          (Please note that portions of this note were completed with a voice recognition program.  Efforts were made to edit the dictations butoccasionally words are mis-transcribed.)    Rhea Olmedo MD (electronically signed)  AttendingEmergency Physician          Angel Peña MD  08/24/22 [id number]

## 2022-08-25 VITALS
BODY MASS INDEX: 31.16 KG/M2 | DIASTOLIC BLOOD PRESSURE: 55 MMHG | OXYGEN SATURATION: 100 % | HEART RATE: 78 BPM | HEIGHT: 71 IN | RESPIRATION RATE: 16 BRPM | TEMPERATURE: 96.6 F | WEIGHT: 222.6 LBS | SYSTOLIC BLOOD PRESSURE: 116 MMHG

## 2022-08-25 LAB
ANION GAP SERPL CALCULATED.3IONS-SCNC: 12 MMOL/L (ref 7–19)
BUN BLDV-MCNC: 18 MG/DL (ref 8–23)
CALCIUM SERPL-MCNC: 9.1 MG/DL (ref 8.8–10.2)
CHLORIDE BLD-SCNC: 104 MMOL/L (ref 98–111)
CO2: 24 MMOL/L (ref 22–29)
CREAT SERPL-MCNC: 1.2 MG/DL (ref 0.5–1.2)
EKG P AXIS: 56 DEGREES
EKG P AXIS: 63 DEGREES
EKG P-R INTERVAL: 168 MS
EKG P-R INTERVAL: 202 MS
EKG Q-T INTERVAL: 418 MS
EKG Q-T INTERVAL: 434 MS
EKG QRS DURATION: 92 MS
EKG QRS DURATION: 92 MS
EKG QTC CALCULATION (BAZETT): 439 MS
EKG QTC CALCULATION (BAZETT): 453 MS
EKG T AXIS: 59 DEGREES
EKG T AXIS: 69 DEGREES
GFR AFRICAN AMERICAN: >59
GFR NON-AFRICAN AMERICAN: >60
GLUCOSE BLD-MCNC: 149 MG/DL (ref 74–109)
GLUCOSE BLD-MCNC: 182 MG/DL (ref 70–99)
GLUCOSE BLD-MCNC: 241 MG/DL (ref 70–99)
HCT VFR BLD CALC: 33 % (ref 42–52)
HEMOGLOBIN: 10.6 G/DL (ref 14–18)
IRON SATURATION: 33 % (ref 14–50)
IRON: 69 UG/DL (ref 59–158)
LACTIC ACID: 1.6 MMOL/L (ref 0.5–1.9)
MAGNESIUM: 2.2 MG/DL (ref 1.6–2.4)
MCH RBC QN AUTO: 33.7 PG (ref 27–31)
MCHC RBC AUTO-ENTMCNC: 32.1 G/DL (ref 33–37)
MCV RBC AUTO: 104.8 FL (ref 80–94)
PDW BLD-RTO: 15.2 % (ref 11.5–14.5)
PERFORMED ON: ABNORMAL
PERFORMED ON: ABNORMAL
PLATELET # BLD: 76 K/UL (ref 130–400)
PMV BLD AUTO: 10.3 FL (ref 9.4–12.4)
POTASSIUM REFLEX MAGNESIUM: 4.6 MMOL/L (ref 3.5–5)
RBC # BLD: 3.15 M/UL (ref 4.7–6.1)
SODIUM BLD-SCNC: 140 MMOL/L (ref 136–145)
TOTAL IRON BINDING CAPACITY: 211 UG/DL (ref 250–400)
WBC # BLD: 4.4 K/UL (ref 4.8–10.8)

## 2022-08-25 PROCEDURE — 82947 ASSAY GLUCOSE BLOOD QUANT: CPT

## 2022-08-25 PROCEDURE — 36415 COLL VENOUS BLD VENIPUNCTURE: CPT

## 2022-08-25 PROCEDURE — 80048 BASIC METABOLIC PNL TOTAL CA: CPT

## 2022-08-25 PROCEDURE — 93005 ELECTROCARDIOGRAM TRACING: CPT

## 2022-08-25 PROCEDURE — 83605 ASSAY OF LACTIC ACID: CPT

## 2022-08-25 PROCEDURE — 83735 ASSAY OF MAGNESIUM: CPT

## 2022-08-25 PROCEDURE — G0378 HOSPITAL OBSERVATION PER HR: HCPCS

## 2022-08-25 PROCEDURE — 6370000000 HC RX 637 (ALT 250 FOR IP)

## 2022-08-25 PROCEDURE — 96361 HYDRATE IV INFUSION ADD-ON: CPT

## 2022-08-25 PROCEDURE — 96360 HYDRATION IV INFUSION INIT: CPT

## 2022-08-25 PROCEDURE — 83550 IRON BINDING TEST: CPT

## 2022-08-25 PROCEDURE — 83540 ASSAY OF IRON: CPT

## 2022-08-25 PROCEDURE — 85027 COMPLETE CBC AUTOMATED: CPT

## 2022-08-25 PROCEDURE — 2580000003 HC RX 258

## 2022-08-25 RX ORDER — TAMSULOSIN HYDROCHLORIDE 0.4 MG/1
0.4 CAPSULE ORAL DAILY
Qty: 30 CAPSULE | Refills: 0 | Status: SHIPPED
Start: 2022-08-25 | End: 2022-08-25 | Stop reason: HOSPADM

## 2022-08-25 RX ADMIN — DULOXETINE 60 MG: 30 CAPSULE, DELAYED RELEASE ORAL at 09:01

## 2022-08-25 RX ADMIN — LEVOTHYROXINE SODIUM 50 MCG: 50 TABLET ORAL at 05:26

## 2022-08-25 RX ADMIN — OMEGA-3-ACID ETHYL ESTERS 2 G: 1 CAPSULE, LIQUID FILLED ORAL at 09:10

## 2022-08-25 RX ADMIN — BUSPIRONE HYDROCHLORIDE 15 MG: 10 TABLET ORAL at 09:03

## 2022-08-25 RX ADMIN — PANTOPRAZOLE SODIUM 40 MG: 40 TABLET, DELAYED RELEASE ORAL at 09:03

## 2022-08-25 RX ADMIN — Medication 1000 UNITS: at 09:03

## 2022-08-25 RX ADMIN — TAMSULOSIN HYDROCHLORIDE 0.4 MG: 0.4 CAPSULE ORAL at 09:03

## 2022-08-25 RX ADMIN — RANOLAZINE 1000 MG: 500 TABLET, EXTENDED RELEASE ORAL at 09:01

## 2022-08-25 RX ADMIN — SODIUM CHLORIDE: 9 INJECTION, SOLUTION INTRAVENOUS at 05:30

## 2022-08-25 RX ADMIN — POLYETHYLENE GLYCOL 3350 17 G: 17 POWDER, FOR SOLUTION ORAL at 09:10

## 2022-08-25 RX ADMIN — DOCUSATE SODIUM 100 MG: 100 CAPSULE, LIQUID FILLED ORAL at 09:02

## 2022-08-25 RX ADMIN — OXYCODONE HYDROCHLORIDE AND ACETAMINOPHEN 250 MG: 500 TABLET ORAL at 09:02

## 2022-08-25 RX ADMIN — CLOPIDOGREL BISULFATE 75 MG: 75 TABLET ORAL at 09:03

## 2022-08-25 RX ADMIN — FERROUS SULFATE TAB 325 MG (65 MG ELEMENTAL FE) 325 MG: 325 (65 FE) TAB at 09:03

## 2022-08-25 RX ADMIN — Medication 1 TABLET: at 09:10

## 2022-08-25 NOTE — DISCHARGE SUMMARY
Discharge Summary    NAME: Thiago Whittaker  :  1955  MRN:  [id number]    Admit date:  2022  Discharge date:    2022    Admitting Physician:  Berenice Moralez MD    Advance Directive: Full Code    Primary Care Physician:  ADRY Medina    Discharge Diagnoses:  Principal Problem:    Near syncope  Active Problems:  Orthostatic hypotension  chronic anemia    Thrombocytopenia (HCC)    Hyperlipemia    Diabetes mellitus ([facility]    Obstructive sleep apnea      Significant Diagnostic Studies:   CT Head W/O Contrast    Result Date: 2022  NO PRIOR REPORT AVAILABLE Exam: CT OF THE BRAIN WITHOUT CONTRAST Clinical data: Hypotension, near syncope, abnormal gaze, left eye. Low blood pressure. Technique: Contiguous axial images are obtained from the skull base to vertex without intravenous contrast. Reformatted/MPR images were performed. Radiation dose: CTDIvol =41.68 mGy, DLP =855 mGy x cm. Prior studies: CT scan of the brain dated 22. Findings: Mild age-related brain volume loss is accompanied by minimal periventricular chronic post-ischemic microvascular white matter disease. There is punctate mineralization of the right and left medial globus pallidus. No acute intracranial abnormality is present. No evidence of acute cortical infarction, hemorrhage, mass or mass effect. No hydrocephalus or abnormal extra-axial fluid collections are present. The posterior fossa is unremarkable. The skull base and calvarium are intact. The included portions of the paranasal sinuses and mastoid air cells are clear. 1.  Mild age-related brain volume loss is accompanied by minimal periventricular chronic post-ischemic microvascular white matter disease. 2.  There is no cerebral hemorrhage nor other acute intracranial abnormality. 3.  No scalp swelling nor skull fracture is evident. 4.  No adverse interval change. Recommendation: Follow up as clinically indicated.  All CT scans at this facility utilize dose modulation, iterative reconstruction, and/or weight based dosing when appropriate to reduce radiation dose to as low as reasonably achievable. Electronically Signed by Fabrice Stern MD at 24-Aug-2022 05:27:16 PM             XR CHEST PORTABLE    Result Date: 8/24/2022  [facility] data:Hypotension. Technique:Single view of the chest. Prior studies: Radiograph of the chest dated 07/24/22. CTA of the chest dated 08/26/21 images. Findings: The lungs are grossly clear; noevidence of acute infiltrate or pleural effusion. Cardiac silhouette is within normal limits. No acute osseous abnormality is detected. Intact median sternotomy sutures . No acute cardiopulmonary disease , No interval changes  Recommendation: Follow up as clinically indicated. Electronically Signed by Jarvis Aguilera MD at 24-Aug-2022 06:50:05 PM               Pertinent Labs:   CBC:   Recent Labs     08/24/22  1416 08/25/22  0208   WBC 3.8* 4.4*   HGB 9.8* 10.6*   PLT 64* 76*     BMP:    Recent Labs     08/24/22  1416 08/25/22  0208    140   K 4.8 4.6    104   CO2 23 24   BUN 20 18   CREATININE 1.1 1.2   GLUCOSE 126* 149*     INR: No results for input(s): INR in the last 72 hours. Lipids: No results for input(s): CHOL, HDL in the last 72 hours. Invalid input(s): LDLCALCU  ABGs:No results for input(s): PHART, ZHA8TIA, PO2ART, QMV4UNC, BEART, HGBAE, K0KSHLYW, CARBOXHGBART, 02THERAPY in the last 72 hours. HgBA1c:    Recent Labs     08/24/22  1445   [facility] Course:    68-year-old male with CAD, diabetes, REGINA, nonalcoholic cirrhosis, non-Hodgkin's lymphoma, recently hospitalized with syncope related to orthostasis presented back with recurrent dizziness/lightheadedness with standing and ambulation with hypotension noted on presentation. Home blood pressures have continue to run on lower side on current medication regimen and noted recurrent symptoms within a couple of days of resuming Flomax. Presentation highly likely related to medications. Hemodynamically improved with observation overnight and held antihypertensives and Flomax. He has been worked up for syncope previously with Zio patch and referred to cardiology and neurology. He is medically stable for discharge home, discussed options and he feels most comfortable with holding off antihypertensive medications and Flomax. will continue BP monitoring at home and follow-up with his PCP within 1 week. He has outpatient follow-up with cardiology and neurology. Physical Exam:  Vital Signs: BP (!) 116/55   Pulse 78   Temp (!) 96.6 °F (35.9 °C) (Temporal)   Resp 16   Ht 5' 11\" (1.803 m)   Wt 222 lb 9.6 oz (101 kg)   SpO2 100%   BMI 31.05 kg/m²   General appearance:. Alert and Cooperative   HEENT: Normocephalic. Atraumatic  Chest: clear to auscultation bilaterally without wheezes or rhonchi. Cardiac: Normal heart tones with regular rate and rhythm, S1, S2 normal.   Abdomen:soft, non-tender; normal bowel sounds  Extremities: No clubbing or cyanosis. No peripheral edema. Peripheral pulses palpable. Neurologic: Alert. Follows commands. Normal speech.     Discharge Medications:         Medication List        CONTINUE taking these medications      acetaminophen 500 MG tablet  Commonly known as: TYLENOL     atorvastatin 40 MG tablet  Commonly known as: LIPITOR     b complex vitamins capsule     busPIRone 15 MG tablet  Commonly known as: BUSPAR     docusate sodium 100 MG capsule  Commonly known as: COLACE     DULoxetine 60 MG extended release capsule  Commonly known as: CYMBALTA     ferrous sulfate 325 (65 Fe) MG tablet  Commonly known as: IRON 325     gabapentin 100 MG capsule  Commonly known as: NEURONTIN     GARLIC-CALCIUM PO     Icosapent Ethyl 1 g Caps capsule  Commonly known as: VASCEPA     Jardiance 25 MG tablet  Generic drug: empagliflozin     levothyroxine 50 MCG tablet  Commonly known as: SYNTHROID     metFORMIN 500 MG tablet  Commonly known as: GLUCOPHAGE  Take 1 tablet by mouth 2 times daily (with meals) Please hold for 48 hrs after cardiac catheterization. You may resume like normal on Friday 7/8/2022.     nitroGLYCERIN 0.4 MG SL tablet  Commonly known as: NITROSTAT  Place 1 tablet under the tongue every 5 minutes as needed for Chest pain     ondansetron 8 MG tablet  Commonly known as: ZOFRAN     pantoprazole 40 MG tablet  Commonly known as: PROTONIX  Take 1 tablet by mouth daily     ranolazine 1000 MG extended release tablet  Commonly known as: RANEXA  Take 1 tablet by mouth 2 times daily     Tresiba 100 UNIT/ML Soln  Generic drug: Insulin Degludec     vitamin C 250 MG tablet     Vitamin D 1000 units Caps capsule  Commonly known as: CHOLECALCIFEROL            STOP taking these medications      dilTIAZem 180 MG extended release capsule  Commonly known as: Cardizem CD     isosorbide mononitrate 30 MG extended release tablet  Commonly known as: IMDUR     losartan 25 MG tablet  Commonly known as: COZAAR     metoprolol tartrate 25 MG tablet  Commonly known as: LOPRESSOR     tamsulosin 0.4 MG capsule  Commonly known as: FLOMAX     tiZANidine 4 MG tablet  Commonly known as: ZANAFLEX     ZINC 15 PO            ASK your doctor about these medications      clopidogrel 75 MG tablet  Commonly known as: PLAVIX  One daily              Discharge Instructions: Follow up with ADRY Archer within 1 week. Take medications as directed. Resume activity as tolerated. Disposition: Patient is medically stable and will be discharged home.     Time spent on discharge 33 minutes    Signed:  David Adams MD  8/25/2022 11:57 AM

## 2022-08-25 NOTE — PROGRESS NOTES
4 Eyes Skin Assessment    William Acosta is being assessed upon: Admission    I agree that Theresa Rosado, RN, along with Gladystine Habermann, RN (either 2 RN's or 1 LPN and 1 RN) have performed a thorough Head to Toe Skin Assessment on the patient. ALL assessment sites listed below have been assessed. Areas assessed by both nurses:     [x]   Head, Face, and Ears   [x]   Shoulders, Back, and Chest  [x]   Arms, Elbows, and Hands   [x]   Coccyx, Sacrum, and Ischium  [x]   Legs, Feet, and Heels    Does the Patient have Skin Breakdown? Yes, wound(s) noted upon assessment. It is the responsibility of the Primary Nurse to assure that the following documentation, preventions, orders, and consults are complete on the above noted wound(s): Wound LDA initiated. LDA Flowsheet Documentation includes the Nelda-wound, Wound Assessment, Measurements, Dressing Treatment, Drainage, and Color. Alberto Prevention initiated: Yes  Wound Care Orders initiated: No, pt informed this nurse that he is receiving outpatient wound care treatment for his toe. Pt does not wish to be seen by wound care at this facility.      [id number]  nurse consulted for Pressure Injury (Stage 3,4, Unstageable, DTI, NWPT, and Complex wounds) and New or Established Ostomies: No        Primary Nurse eSignature: Eddye Hatchet, RN on 8/25/2022 at 12:36 AM      Co-Signer eSignature: Electronically signed by Gladystine Habermann, RN on 8/25/22 at 12:41 AM CDT

## 2022-08-25 NOTE — PLAN OF CARE
Problem: Discharge Planning  Goal: Discharge to home or other facility with appropriate resources  Outcome: Progressing  Flowsheets  Taken 8/24/2022 2339 by Mirna Jolly RN  Discharge to home or other facility with appropriate resources:   Identify barriers to discharge with patient and caregiver   Arrange for needed discharge resources and transportation as appropriate   Identify discharge learning needs (meds, wound care, etc)  Taken 8/24/2022 1834 by Adolfo Dye RN  Discharge to home or other facility with appropriate resources: Identify barriers to discharge with patient and caregiver     Problem: Pain  Goal: Verbalizes/displays adequate comfort level or baseline comfort level  Outcome: Progressing

## 2022-08-25 NOTE — PROGRESS NOTES
William Acosta arrived to room # 726. Presented with: near syncope  Mental Status: Patient is oriented, alert, coherent, logical, thought processes intact, and able to concentrate and follow conversation. Vitals:    08/24/22 2215   BP: 125/70   Pulse: 88   Resp: 18   Temp: (!) 96.7 °F (35.9 °C)   SpO2: 97%     Patient safety contract and falls prevention contract reviewed with patient Yes. Oriented Patient and Family to room. Call light within reach. Yes.   Needs, issues or concerns expressed at this time: no.      Electronically signed by Eddye Hatchet, RN on 8/25/2022 at 12:39 AM

## 2022-08-29 ENCOUNTER — TELEPHONE (OUTPATIENT)
Dept: PODIATRY | Facility: CLINIC | Age: 67
End: 2022-08-29

## 2022-08-29 ENCOUNTER — TELEPHONE (OUTPATIENT)
Dept: NEUROLOGY | Age: 67
End: 2022-08-29

## 2022-08-29 ENCOUNTER — PRE-ADMISSION TESTING (OUTPATIENT)
Dept: PREADMISSION TESTING | Facility: HOSPITAL | Age: 67
End: 2022-08-29

## 2022-08-29 VITALS
WEIGHT: 220.9 LBS | SYSTOLIC BLOOD PRESSURE: 137 MMHG | DIASTOLIC BLOOD PRESSURE: 77 MMHG | RESPIRATION RATE: 20 BRPM | OXYGEN SATURATION: 96 % | HEIGHT: 71 IN | BODY MASS INDEX: 30.93 KG/M2 | HEART RATE: 96 BPM

## 2022-08-29 LAB
ANION GAP SERPL CALCULATED.3IONS-SCNC: 12 MMOL/L (ref 5–15)
BUN SERPL-MCNC: 17 MG/DL (ref 8–23)
BUN/CREAT SERPL: 14.8 (ref 7–25)
CALCIUM SPEC-SCNC: 9.7 MG/DL (ref 8.6–10.5)
CHLORIDE SERPL-SCNC: 100 MMOL/L (ref 98–107)
CO2 SERPL-SCNC: 28 MMOL/L (ref 22–29)
CREAT SERPL-MCNC: 1.15 MG/DL (ref 0.76–1.27)
DEPRECATED RDW RBC AUTO: 56.5 FL (ref 37–54)
EGFRCR SERPLBLD CKD-EPI 2021: 68.9 ML/MIN/1.73
ERYTHROCYTE [DISTWIDTH] IN BLOOD BY AUTOMATED COUNT: 15.1 % (ref 12.3–15.4)
GLUCOSE SERPL-MCNC: 129 MG/DL (ref 65–99)
HCT VFR BLD AUTO: 38 % (ref 37.5–51)
HGB BLD-MCNC: 12.1 G/DL (ref 13–17.7)
MCH RBC QN AUTO: 32.8 PG (ref 26.6–33)
MCHC RBC AUTO-ENTMCNC: 31.8 G/DL (ref 31.5–35.7)
MCV RBC AUTO: 103 FL (ref 79–97)
PLATELET # BLD AUTO: 103 10*3/MM3 (ref 140–450)
PMV BLD AUTO: 10.2 FL (ref 6–12)
POTASSIUM SERPL-SCNC: 4.9 MMOL/L (ref 3.5–5.2)
RBC # BLD AUTO: 3.69 10*6/MM3 (ref 4.14–5.8)
SODIUM SERPL-SCNC: 140 MMOL/L (ref 136–145)
WBC NRBC COR # BLD: 5.35 10*3/MM3 (ref 3.4–10.8)

## 2022-08-29 PROCEDURE — 93010 ELECTROCARDIOGRAM REPORT: CPT | Performed by: INTERNAL MEDICINE

## 2022-08-29 PROCEDURE — 85027 COMPLETE CBC AUTOMATED: CPT

## 2022-08-29 PROCEDURE — 93005 ELECTROCARDIOGRAM TRACING: CPT

## 2022-08-29 PROCEDURE — 36415 COLL VENOUS BLD VENIPUNCTURE: CPT

## 2022-08-29 PROCEDURE — 80048 BASIC METABOLIC PNL TOTAL CA: CPT

## 2022-08-29 RX ORDER — ONDANSETRON HYDROCHLORIDE 8 MG/1
8 TABLET, FILM COATED ORAL EVERY 8 HOURS PRN
COMMUNITY

## 2022-08-29 RX ORDER — GABAPENTIN 100 MG/1
100 CAPSULE ORAL DAILY
COMMUNITY

## 2022-08-29 RX ORDER — KETOROLAC TROMETHAMINE 10 MG/1
10 TABLET, FILM COATED ORAL EVERY 6 HOURS PRN
COMMUNITY
End: 2022-09-01 | Stop reason: HOSPADM

## 2022-08-29 RX ORDER — LEVOTHYROXINE SODIUM 0.05 MG/1
50 TABLET ORAL DAILY
COMMUNITY

## 2022-08-29 RX ORDER — DULOXETIN HYDROCHLORIDE 60 MG/1
60 CAPSULE, DELAYED RELEASE ORAL DAILY
COMMUNITY

## 2022-08-29 RX ORDER — ATORVASTATIN CALCIUM 40 MG/1
40 TABLET, FILM COATED ORAL DAILY
COMMUNITY

## 2022-08-29 RX ORDER — ACETAMINOPHEN 500 MG
650 TABLET ORAL EVERY 6 HOURS PRN
COMMUNITY
End: 2022-09-15 | Stop reason: HOSPADM

## 2022-08-29 NOTE — TELEPHONE ENCOUNTER
Stephanie Tate requests that office return their call. The best time to reach him is Anytime. Patient states he is returning a missed call from the office, he does not have an appointment until 10/17/22 with Dr. Suleiman Jackson. Thank you.

## 2022-08-29 NOTE — TELEPHONE ENCOUNTER
Called pt in regards of apt on 09/30/2022 brooklynn is out of office that day, pt wanted to r/s the apt

## 2022-08-30 ENCOUNTER — OFFICE VISIT (OUTPATIENT)
Dept: CARDIOLOGY CLINIC | Age: 67
End: 2022-08-30
Payer: MEDICARE

## 2022-08-30 ENCOUNTER — OFFICE VISIT (OUTPATIENT)
Dept: WOUND CARE | Facility: HOSPITAL | Age: 67
End: 2022-08-30

## 2022-08-30 VITALS
DIASTOLIC BLOOD PRESSURE: 68 MMHG | WEIGHT: 223 LBS | HEIGHT: 71 IN | SYSTOLIC BLOOD PRESSURE: 128 MMHG | HEART RATE: 82 BPM | BODY MASS INDEX: 31.22 KG/M2

## 2022-08-30 DIAGNOSIS — R55 NEAR SYNCOPE: ICD-10-CM

## 2022-08-30 DIAGNOSIS — E11.621 TYPE 2 DIABETES MELLITUS WITH FOOT ULCER, UNSPECIFIED WHETHER LONG TERM INSULIN USE: ICD-10-CM

## 2022-08-30 DIAGNOSIS — E78.5 DYSLIPIDEMIA: ICD-10-CM

## 2022-08-30 DIAGNOSIS — I10 PRIMARY HYPERTENSION: ICD-10-CM

## 2022-08-30 DIAGNOSIS — L97.509 TYPE 2 DIABETES MELLITUS WITH FOOT ULCER, UNSPECIFIED WHETHER LONG TERM INSULIN USE: ICD-10-CM

## 2022-08-30 DIAGNOSIS — I25.10 CORONARY ARTERY DISEASE INVOLVING NATIVE CORONARY ARTERY OF NATIVE HEART WITHOUT ANGINA PECTORIS: Primary | ICD-10-CM

## 2022-08-30 DIAGNOSIS — E11.40 TYPE 2 DIABETES MELLITUS WITH DIABETIC NEUROPATHY, UNSPECIFIED WHETHER LONG TERM INSULIN USE: ICD-10-CM

## 2022-08-30 DIAGNOSIS — L97.522 NON-PRESSURE CHRONIC ULCER OF OTHER PART OF LEFT FOOT WITH FAT LAYER EXPOSED: ICD-10-CM

## 2022-08-30 LAB
QT INTERVAL: 414 MS
QTC INTERVAL: 489 MS

## 2022-08-30 PROCEDURE — 97597 DBRDMT OPN WND 1ST 20 CM/<: CPT | Performed by: NURSE PRACTITIONER

## 2022-08-30 PROCEDURE — 1123F ACP DISCUSS/DSCN MKR DOCD: CPT | Performed by: CLINICAL NURSE SPECIALIST

## 2022-08-30 PROCEDURE — 3017F COLORECTAL CA SCREEN DOC REV: CPT | Performed by: CLINICAL NURSE SPECIALIST

## 2022-08-30 PROCEDURE — 1036F TOBACCO NON-USER: CPT | Performed by: CLINICAL NURSE SPECIALIST

## 2022-08-30 PROCEDURE — G8427 DOCREV CUR MEDS BY ELIG CLIN: HCPCS | Performed by: CLINICAL NURSE SPECIALIST

## 2022-08-30 PROCEDURE — 99214 OFFICE O/P EST MOD 30 MIN: CPT | Performed by: CLINICAL NURSE SPECIALIST

## 2022-08-30 PROCEDURE — G8417 CALC BMI ABV UP PARAM F/U: HCPCS | Performed by: CLINICAL NURSE SPECIALIST

## 2022-08-30 RX ORDER — LOSARTAN POTASSIUM 25 MG/1
25 TABLET ORAL DAILY
Qty: 30 TABLET | Refills: 5 | Status: SHIPPED | OUTPATIENT
Start: 2022-08-30

## 2022-08-30 NOTE — PROGRESS NOTES
[id number] [facility]  [facility] [address]  [id number]  Phone: [phone]  Fax: [phone]    OFFICE VISIT:  2022    Derek April - : 1955    Reason For Visit:  Praveen Jerome is a 69 y.o. male who is here for Follow-up (HFU  no cardiac symptoms), Coronary Artery Disease, Hyperlipidemia, and Hypotension  History hypertension, hyperlipidemia, non-Hodgkin's lymphoma shortness of breath, diabetes and coronary disease with previous bypass in  and redo bypass in   Ongoing symptoms and patient had heart catheterization 2022 showing normal LV systolic function. Patent LIMA graft to diagonal sequential to mid LAD   Occluded SVG to R-PDA   Widely patent SVG to ramus; 50% proximal narrowing   Multivessel coronary artery disease   100% mid RCA   80% septal     Recommendations    Routine Post Diagnostic Cath orders. Risk factor modification. Medical management. Patient was admitted to the hospital 2022 after syncopal episode at [facility]. Thought to have orthostatic hypotension sent home with ZIO monitor. Patient wore monitor for 8 days showing sinus bradycardia and tachycardia with PACs    Patient was again hospitalized 2022 with dizziness and presyncope and syncopal episode at home. Antihypertensives held as well as his Flomax and he was rehydrated. He returns today in follow-up. He states he has not had any recurrent syncopal episodes. Is been keeping on his blood pressure at home. Been running 115-150 over 80s. He saw his PCP and they would like to get him back on his losartan due to diabetes. States his last episode he may have been dehydrated. He is drinking more fluids. He does not drink any soda  States his blood sugars have been well controlled      Subjective  Praveen Jerome denies exertional chest pain, shortness of breath, orthopnea, paroxysmal nocturnal dyspnea, syncope, presyncope, arrhythmia, edema and fatigue.   The patient denies numbness or weakness to suggest cerebrovascular accident or transient ischemic attack. ADRY Craig is PCP and follows labs.   Kimberly Love has the following history as recorded in [facility]:    Patient Active Problem List    Diagnosis Date Noted    Dyspnea on exertion     Status post aorto-coronary artery bypass graft     Abnormal nuclear cardiac imaging test     Near syncope 08/24/2022    Thrombocytopenia ([address] 08/24/2022    Waldenstrom's macroglobulinemia ([address] 07/24/2022    Syncope and collapse 07/24/2022    Subdural hemorrhage ([address] 12/16/2021    Right clavicle fracture 12/16/2021    Right foot pain 03/25/2021    Obesity (BMI 30-39.9) 01/26/2021    Diabetic ulcer of right midfoot associated with type 2 diabetes mellitus, with fat layer exposed ([address] 12/06/2018    Diabetic ulcer of toe of left foot associated with type 2 diabetes mellitus, with fat layer exposed ([address] 12/06/2018    Bilateral leg edema 08/14/2018    Obstructive sleep apnea     BiPAP (biphasic positive airway pressure) dependence     Restless leg     Chronic kidney disease, stage II (mild) 08/17/2016    Tachyarrhythmia 07/07/2016    Tachycardia 12/15/2014    Weakness generalized 11/19/2014    Nausea 11/19/2014    Hypotension 11/19/2014    History of coronary artery bypass graft x 1 11/19/2014    Fatigue 11/19/2014    Hypertension     Type II or unspecified type diabetes mellitus without mention of complication, not stated as uncontrolled     Abnormal nuclear stress test 10/22/2014    S/P CABG x 3 10/22/2014    Chest tightness 09/18/2014    JOHNSON (dyspnea on exertion) 09/18/2014    Shortness of breath 11/29/2012    Diabetes mellitus ([address]     LONG TERM ANTICOAGULENT USE     Coronary artery disease 02/24/2011    Hyperlipemia 02/24/2011     Past Medical History:   Diagnosis Date    Abnormal nuclear stress test 10/22/2014    Arthritis     BiPAP (biphasic positive airway pressure) dependence     8cm to 20cm    Cerebrovascular disease     Chronic kidney disease, stage II (mild) 08/17/2016    Rl Dean M.D. Cirrhosis ([address]     Coronary artery disease 02/24/2011    Depression     Diabetes mellitus ([address]     Encounter for wound care     PT SEES \"GRACY\" WITH DR. SAMUEL    Great toe pain     RT    Headache     Hyperlipemia 02/24/2011    Dr. Karen Medrano follows lipids. Hyperlipidemia     Hypertension     Liver disease     LONG TERM ANTICOAGULENT USE     Low blood pressure 11/19/2014    lymphostatic lymphoma     Nausea 11/19/2014    Non Hodgkin's lymphoma (HCC)     Obesity     Obstructive sleep apnea     AHI:  21.7 per PSG, 7/2017    Peptic ulcer disease 02/24/2011    Restless leg     S/P CABG x 3 10/22/2014    SVG to RCA; SVG to LAD diagonal; LIMA to LCX(5555, 2014)    Sleep apnea     Syncope and collapse 7/24/2022    Tachyarrhythmia     Thrombocythemia, essential ([address]     Thyroid disease     Type 2 diabetes mellitus without complication ([address]     Type II or unspecified type diabetes mellitus without mention of complication, not stated as uncontrolled      Past Surgical History:   Procedure Laterality Date    CARDIAC CATHETERIZATION  2/28/11    EF 60%    CARDIAC CATHETERIZATION  9/16/05, 3/7/07    EF < 50%    CARDIAC CATHETERIZATION  12/4/12   MDL    EF  50%    CARDIAC CATHETERIZATION  10/28/14  GERBER Estevez M.D.     CERVICAL SPINE SURGERY      COLONOSCOPY      CORONARY ARTERY BYPASS GRAFT  9/21/05    LIMA to LAD and diagonal; SVG to posterior descending branch RCA    CORONARY ARTERY BYPASS GRAFT  10/30/2014    Redo Sternotomy - SVG-PDA, TMR (RBL)    JOINT REPLACEMENT Right     knee    KIDNEY SURGERY  08/14/2017    KNEE ARTHROSCOPY      right ACL repair    SHOULDER ARTHROSCOPY      left-rotator cuff repair right- rotator cuff repair    SHOULDER ARTHROSCOPY  08-23-11    right    TONSILLECTOMY       Family History   Problem Relation Age of Onset    Heart Disease Other     Lung Cancer Take 1 tablet by mouth daily 90 tablet 2    b complex vitamins capsule Take 1 capsule by mouth daily. Vitamin D (CHOLECALCIFEROL) 1000 UNITS CAPS capsule Take 1,000 Units by mouth daily. docusate sodium (COLACE) 100 MG capsule Take 100 mg by mouth at bedtime       No current facility-administered medications for this visit. Allergies: Ancef [cefazolin sodium], Ceftin [cefuroxime], Cefuroxime axetil, Cephalosporins, and Neosporin [bacitracin-polymyxin b]    Review of Systems  Constitutional - no significant activity change, appetite change, or unexpected weight change. No fever, chills or diaphoresis. No fatigue. HEENT - no significant rhinorrhea or epistaxis. No tinnitus or significant hearing loss. Eyes - no sudden vision change or amaurosis. Respiratory - no significant wheezing, stridor, apnea or cough. No dyspnea on exertion or shortness of breath. Cardiovascular - no exertional chest pain, orthopnea or PND. No sensation of arrhythmia or slow heart rate. No claudication or leg edema. Gastrointestinal - no abdominal swelling or pain. No blood in stool. No severe constipation, diarrhea, nausea, or vomiting. Genitourinary - no difficulty urinating, dysuria, frequency, or urgency. No flank pain or hematuria. Musculoskeletal - no back pain, gait disturbance, or myalgia. Skin - no color change or rash. No pallor. No new surgical incision. Neurologic - no speech difficulty, facial asymmetry or lateralizing weakness. No seizures, presyncope, syncope, or significant dizziness. Hematologic - no easy bruising or excessive bleeding. Psychiatric - no severe anxiety or insomnia. No confusion. All other review of systems are negative. Objective  Vital Signs - /68   Pulse 82   Ht 5' 11\" (1.803 m)   Wt 223 lb (101.2 kg)   BMI 31.10 kg/m²   General - Ian Schwarz is alert, cooperative, and pleasant. Well groomed. No acute distress. Body habitus is obese.   HEENT - The head is normocephalic. No circumoral cyanosis. Dentition is normal.   EYES -  No Xanthelasma, no arcus senilis, no conjunctival hemorrhages or discharge. Neck - Supple, without increased jugular venous pressures. No carotid bruits. No mass. Respiratory - Lungs are clear bilaterally. No wheezes or rales. Normal effort without use of accessory muscles. Cardiovascular - Heart has regular rhythm and rate. No murmurs, rubs or gallops. + pedal pulses and no varicosities. Abdominal -  Soft, nontender, nondistended. Bowel sounds are intact. Extremities - No clubbing, cyanosis, or  edema. Musculoskeletal -  No clubbing . No Osler's nodes. Gait normal .  No kyphosis or scoliosis. Skin -  no statis ulcers or dermatitis. Neurological - No focal signs are identified. Oriented to person, place and time. Psychiatric -  Appropriate affect and mood. Assessment:     Diagnosis Orders   1. Coronary artery disease involving native coronary artery of native heart without angina pectoris        2. Near syncope          Data:  BP Readings from Last 3 Encounters:   08/30/22 128/68   08/25/22 (!) 116/55   07/26/22 109/79    Pulse Readings from Last 3 Encounters:   08/30/22 82   08/25/22 78   07/26/22 99        Wt Readings from Last 3 Encounters:   08/30/22 223 lb (101.2 kg)   08/25/22 222 lb 9.6 oz (101 kg)   07/24/22 215 lb (97.5 kg)     Blood pressure and heart rate controlled. Occasional elevated blood pressure at home. Currently not on any antihypertensives. Agree adding back his ARB would be prudent with his diabetes to give some renal protection. Blood pressure goal less than 130/80 at rest.  If he does drop less than 100 then may need to cut dose. He is no longer taking long-acting nitrate but doing well with Ranexa. His significant coronary disease with no symptoms. He does remain on his statin. He states his blood sugars been well controlled with hemoglobin A1c in the 6 range.     We discussed signs and symptoms to report. We will see him back as regular follow-up in October   Reviewed recent notes-week ZIO monitor did not show any sustained arrhythmias. Sinus bradycardia and tachycardia   Reviewed recent labs      Heart catheterization 7/5/2022     Lesion on LMCA: Distal subsection. 30% stenosis 7 mm length. Pre procedure    ADIEL III flow was noted. Good runoff was present. LAD:     Lesion on 1st Septal: Proximal subsection. 80% stenosis 8 mm length. Pre    procedure ADIEL III flow was noted. Good runoff was present. Lesion on Prox LAD: Mid subsection. 50% stenosis 3 mm length. Pre    procedure ADIEL III flow was noted. Good runoff was present. LCx: Diffuse irregularity. RCA:     Lesion on Mid RCA: Mid subsection. 100% stenosis 19 mm length. Pre    procedure ADIEL 0 flow was noted. Poor runoff was present. The lesion was    heavily calcified. Chronic total occlusion. Ramus: Diffuse irregularity. Cardiac Grafts    -  There is a Vein graft that originates at the Aorta Right and attaches      to the R PDA. -  There is a Vein graft that originates at the Aorta Left and attaches to      the 1st Ob Rika.    -  There is a LIMA graft that originates at the LIMA and attaches to the      Mid LAD. VA   LV function assessed as:Normal.     2D echo 7/5/2022   Structurally normal.   Normal mitral valve leaflet mobility. No evidence of mitral regurgitation. Aortic valve appears to be tricuspid. Moderate calcification with normal cusp excursion. No significant aortic regurgitation or stenosis is noted. Tricuspid valve is structurally normal.   Trace tricuspid regurgitation. RVSP 24 mmHg   Normal left ventricular size with preserved LV function and an estimated   ejection fraction of approximately 55%. No evidence of left ventricular mass or thrombus noted.    -------------------------------------------------   Electronically signed by Barbra Area MD(Interpreting   physician) on 07/06/2022 08:38 AM   ----------------------------------------------------------------     States taking medications as prescribed  Stable cardiovascular status. No evidence of overt heart failure, angina or dysrhythmia. 30 minutes were spent preparing, reviewing and seeing patient. All questions answered    Plan    OK to restart the losartan for BP and kidney protection  BP goal 110-130/60-80  Follow up in Oct With Dr. Sagrario Arceo   Call with any questions or concerns  Follow up with ADRY Mark for non cardiac problems  Report any new problems  Cardiovascular Fitness-Exercise as tolerated. Strive for 30 minutes of exercise most days of the week. Cardiac / Healthy Diet- stay hydrated  Continue current medications as directed  Continue plan of treatment  It is always recommended that you bring your medications bottles with you to each visit - this is for your safety! GRECIA Yoo/transcription disclaimer: Much of this encounter note is electronic transcription/translation of spoken language to printed tach. Electronic translation of spoken language may be erroneous, or at times, nonsensical words or phrases may be inadvertently transcribed.  Although, I have reviewed the note for such errors, some may still exist.

## 2022-08-30 NOTE — PATIENT INSTRUCTIONS
OK to restart the losartan for BP and kidney protection  BP goal 110-130/60-80  Follow up in Oct With Dr. Shoshana Colmenares   Call with any questions or concerns  Follow up with ADRY Archer for non cardiac problems  Report any new problems  Cardiovascular Fitness-Exercise as tolerated. Strive for 30 minutes of exercise most days of the week. Cardiac / Healthy Diet- stay hydrated  Continue current medications as directed  Continue plan of treatment  It is always recommended that you bring your medications bottles with you to each visit - this is for your safety!

## 2022-09-01 ENCOUNTER — APPOINTMENT (OUTPATIENT)
Dept: GENERAL RADIOLOGY | Facility: HOSPITAL | Age: 67
End: 2022-09-01

## 2022-09-01 ENCOUNTER — ANESTHESIA EVENT (OUTPATIENT)
Dept: PERIOP | Facility: HOSPITAL | Age: 67
End: 2022-09-01

## 2022-09-01 ENCOUNTER — HOSPITAL ENCOUNTER (OUTPATIENT)
Facility: HOSPITAL | Age: 67
Setting detail: HOSPITAL OUTPATIENT SURGERY
Discharge: HOME | End: 2022-09-01
Attending: UROLOGY | Admitting: UROLOGY

## 2022-09-01 ENCOUNTER — ANESTHESIA (OUTPATIENT)
Dept: PERIOP | Facility: HOSPITAL | Age: 67
End: 2022-09-01

## 2022-09-01 VITALS
DIASTOLIC BLOOD PRESSURE: 68 MMHG | TEMPERATURE: 97.6 F | HEART RATE: 87 BPM | RESPIRATION RATE: 18 BRPM | OXYGEN SATURATION: 97 % | SYSTOLIC BLOOD PRESSURE: 115 MMHG

## 2022-09-01 DIAGNOSIS — N20.0 KIDNEY STONES: ICD-10-CM

## 2022-09-01 LAB
GLUCOSE BLDC GLUCOMTR-MCNC: 136 MG/DL (ref 70–130)
GLUCOSE BLDC GLUCOMTR-MCNC: 138 MG/DL (ref 70–130)

## 2022-09-01 PROCEDURE — 25010000002 ONDANSETRON PER 1 MG: Performed by: NURSE ANESTHETIST, CERTIFIED REGISTERED

## 2022-09-01 PROCEDURE — 25010000002 IOPAMIDOL 61 % SOLUTION: Performed by: UROLOGY

## 2022-09-01 PROCEDURE — C1894 INTRO/SHEATH, NON-LASER: HCPCS | Performed by: UROLOGY

## 2022-09-01 PROCEDURE — 82962 GLUCOSE BLOOD TEST: CPT

## 2022-09-01 PROCEDURE — C1769 GUIDE WIRE: HCPCS | Performed by: UROLOGY

## 2022-09-01 PROCEDURE — 74420 UROGRAPHY RTRGR +-KUB: CPT | Performed by: UROLOGY

## 2022-09-01 PROCEDURE — 74420 UROGRAPHY RTRGR +-KUB: CPT

## 2022-09-01 PROCEDURE — C2617 STENT, NON-COR, TEM W/O DEL: HCPCS | Performed by: UROLOGY

## 2022-09-01 PROCEDURE — C1758 CATHETER, URETERAL: HCPCS | Performed by: UROLOGY

## 2022-09-01 PROCEDURE — 25010000002 DROPERIDOL PER 5 MG: Performed by: ANESTHESIOLOGY

## 2022-09-01 PROCEDURE — 25010000002 PROPOFOL 10 MG/ML EMULSION: Performed by: NURSE ANESTHETIST, CERTIFIED REGISTERED

## 2022-09-01 PROCEDURE — 52356 CYSTO/URETERO W/LITHOTRIPSY: CPT | Performed by: UROLOGY

## 2022-09-01 PROCEDURE — 25010000002 FENTANYL CITRATE (PF) 100 MCG/2ML SOLUTION: Performed by: NURSE ANESTHETIST, CERTIFIED REGISTERED

## 2022-09-01 PROCEDURE — 25010000002 LEVOFLOXACIN PER 250 MG: Performed by: UROLOGY

## 2022-09-01 DEVICE — URETERAL STENT
Type: IMPLANTABLE DEVICE | Site: URETER | Status: FUNCTIONAL
Brand: PERCUFLEX™ PLUS

## 2022-09-01 RX ORDER — SODIUM CHLORIDE 0.9 % (FLUSH) 0.9 %
10 SYRINGE (ML) INJECTION EVERY 12 HOURS SCHEDULED
Status: DISCONTINUED | OUTPATIENT
Start: 2022-09-01 | End: 2022-09-01 | Stop reason: HOSPADM

## 2022-09-01 RX ORDER — ONDANSETRON 2 MG/ML
4 INJECTION INTRAMUSCULAR; INTRAVENOUS ONCE AS NEEDED
Status: DISCONTINUED | OUTPATIENT
Start: 2022-09-01 | End: 2022-09-01 | Stop reason: HOSPADM

## 2022-09-01 RX ORDER — SODIUM CHLORIDE 0.9 % (FLUSH) 0.9 %
1-10 SYRINGE (ML) INJECTION AS NEEDED
Status: DISCONTINUED | OUTPATIENT
Start: 2022-09-01 | End: 2022-09-01 | Stop reason: HOSPADM

## 2022-09-01 RX ORDER — ONDANSETRON 4 MG/1
4 TABLET, FILM COATED ORAL ONCE AS NEEDED
Status: DISCONTINUED | OUTPATIENT
Start: 2022-09-01 | End: 2022-09-01 | Stop reason: HOSPADM

## 2022-09-01 RX ORDER — SODIUM CHLORIDE 9 MG/ML
100 INJECTION, SOLUTION INTRAVENOUS CONTINUOUS
Status: DISCONTINUED | OUTPATIENT
Start: 2022-09-01 | End: 2022-09-01 | Stop reason: HOSPADM

## 2022-09-01 RX ORDER — NEOSTIGMINE METHYLSULFATE 5 MG/5 ML
SYRINGE (ML) INTRAVENOUS AS NEEDED
Status: DISCONTINUED | OUTPATIENT
Start: 2022-09-01 | End: 2022-09-01 | Stop reason: SURG

## 2022-09-01 RX ORDER — FENTANYL CITRATE 50 UG/ML
INJECTION, SOLUTION INTRAMUSCULAR; INTRAVENOUS AS NEEDED
Status: DISCONTINUED | OUTPATIENT
Start: 2022-09-01 | End: 2022-09-01 | Stop reason: SURG

## 2022-09-01 RX ORDER — FLUMAZENIL 0.1 MG/ML
0.2 INJECTION INTRAVENOUS AS NEEDED
Status: DISCONTINUED | OUTPATIENT
Start: 2022-09-01 | End: 2022-09-01 | Stop reason: HOSPADM

## 2022-09-01 RX ORDER — SODIUM CHLORIDE, SODIUM LACTATE, POTASSIUM CHLORIDE, CALCIUM CHLORIDE 600; 310; 30; 20 MG/100ML; MG/100ML; MG/100ML; MG/100ML
100 INJECTION, SOLUTION INTRAVENOUS CONTINUOUS
Status: DISCONTINUED | OUTPATIENT
Start: 2022-09-01 | End: 2022-09-01 | Stop reason: HOSPADM

## 2022-09-01 RX ORDER — TAMSULOSIN HYDROCHLORIDE 0.4 MG/1
1 CAPSULE ORAL NIGHTLY
Qty: 14 CAPSULE | Refills: 0 | Status: SHIPPED | OUTPATIENT
Start: 2022-09-01 | End: 2022-09-15 | Stop reason: HOSPADM

## 2022-09-01 RX ORDER — HYDROCODONE BITARTRATE AND ACETAMINOPHEN 7.5; 325 MG/1; MG/1
2 TABLET ORAL EVERY 4 HOURS PRN
Status: DISCONTINUED | OUTPATIENT
Start: 2022-09-01 | End: 2022-09-01 | Stop reason: HOSPADM

## 2022-09-01 RX ORDER — ACETAMINOPHEN 500 MG
1000 TABLET ORAL ONCE
Status: DISCONTINUED | OUTPATIENT
Start: 2022-09-01 | End: 2022-09-01 | Stop reason: HOSPADM

## 2022-09-01 RX ORDER — LIDOCAINE HYDROCHLORIDE 10 MG/ML
0.5 INJECTION, SOLUTION EPIDURAL; INFILTRATION; INTRACAUDAL; PERINEURAL ONCE AS NEEDED
Status: DISCONTINUED | OUTPATIENT
Start: 2022-09-01 | End: 2022-09-01 | Stop reason: HOSPADM

## 2022-09-01 RX ORDER — LABETALOL HYDROCHLORIDE 5 MG/ML
5 INJECTION, SOLUTION INTRAVENOUS
Status: DISCONTINUED | OUTPATIENT
Start: 2022-09-01 | End: 2022-09-01 | Stop reason: HOSPADM

## 2022-09-01 RX ORDER — SODIUM CHLORIDE 0.9 % (FLUSH) 0.9 %
3 SYRINGE (ML) INJECTION EVERY 12 HOURS SCHEDULED
Status: DISCONTINUED | OUTPATIENT
Start: 2022-09-01 | End: 2022-09-01 | Stop reason: HOSPADM

## 2022-09-01 RX ORDER — PHENYLEPHRINE HCL IN 0.9% NACL 1 MG/10 ML
SYRINGE (ML) INTRAVENOUS AS NEEDED
Status: DISCONTINUED | OUTPATIENT
Start: 2022-09-01 | End: 2022-09-01 | Stop reason: SURG

## 2022-09-01 RX ORDER — DROPERIDOL 2.5 MG/ML
0.62 INJECTION, SOLUTION INTRAMUSCULAR; INTRAVENOUS ONCE AS NEEDED
Status: COMPLETED | OUTPATIENT
Start: 2022-09-01 | End: 2022-09-01

## 2022-09-01 RX ORDER — SODIUM CHLORIDE 0.9 % (FLUSH) 0.9 %
3 SYRINGE (ML) INJECTION AS NEEDED
Status: DISCONTINUED | OUTPATIENT
Start: 2022-09-01 | End: 2022-09-01 | Stop reason: HOSPADM

## 2022-09-01 RX ORDER — HYDROCODONE BITARTRATE AND ACETAMINOPHEN 7.5; 325 MG/1; MG/1
1 TABLET ORAL EVERY 6 HOURS PRN
Qty: 12 TABLET | Refills: 0 | Status: SHIPPED | OUTPATIENT
Start: 2022-09-01 | End: 2022-09-15 | Stop reason: HOSPADM

## 2022-09-01 RX ORDER — HYDROCODONE BITARTRATE AND ACETAMINOPHEN 7.5; 325 MG/1; MG/1
1 TABLET ORAL ONCE AS NEEDED
Status: DISCONTINUED | OUTPATIENT
Start: 2022-09-01 | End: 2022-09-01 | Stop reason: HOSPADM

## 2022-09-01 RX ORDER — IBUPROFEN 600 MG/1
600 TABLET ORAL ONCE AS NEEDED
Status: DISCONTINUED | OUTPATIENT
Start: 2022-09-01 | End: 2022-09-01 | Stop reason: HOSPADM

## 2022-09-01 RX ORDER — SODIUM CHLORIDE, SODIUM LACTATE, POTASSIUM CHLORIDE, CALCIUM CHLORIDE 600; 310; 30; 20 MG/100ML; MG/100ML; MG/100ML; MG/100ML
1000 INJECTION, SOLUTION INTRAVENOUS CONTINUOUS
Status: DISCONTINUED | OUTPATIENT
Start: 2022-09-01 | End: 2022-09-01 | Stop reason: HOSPADM

## 2022-09-01 RX ORDER — MAGNESIUM HYDROXIDE 1200 MG/15ML
LIQUID ORAL AS NEEDED
Status: DISCONTINUED | OUTPATIENT
Start: 2022-09-01 | End: 2022-09-01 | Stop reason: HOSPADM

## 2022-09-01 RX ORDER — ONDANSETRON 2 MG/ML
INJECTION INTRAMUSCULAR; INTRAVENOUS AS NEEDED
Status: DISCONTINUED | OUTPATIENT
Start: 2022-09-01 | End: 2022-09-01 | Stop reason: SURG

## 2022-09-01 RX ORDER — NALOXONE HCL 0.4 MG/ML
0.4 VIAL (ML) INJECTION AS NEEDED
Status: DISCONTINUED | OUTPATIENT
Start: 2022-09-01 | End: 2022-09-01 | Stop reason: HOSPADM

## 2022-09-01 RX ORDER — HYDROCODONE BITARTRATE AND ACETAMINOPHEN 5; 325 MG/1; MG/1
1 TABLET ORAL ONCE AS NEEDED
Status: DISCONTINUED | OUTPATIENT
Start: 2022-09-01 | End: 2022-09-01 | Stop reason: HOSPADM

## 2022-09-01 RX ORDER — PROPOFOL 10 MG/ML
VIAL (ML) INTRAVENOUS AS NEEDED
Status: DISCONTINUED | OUTPATIENT
Start: 2022-09-01 | End: 2022-09-01 | Stop reason: SURG

## 2022-09-01 RX ORDER — LEVOFLOXACIN 5 MG/ML
500 INJECTION, SOLUTION INTRAVENOUS ONCE
Status: COMPLETED | OUTPATIENT
Start: 2022-09-01 | End: 2022-09-01

## 2022-09-01 RX ORDER — KETOROLAC TROMETHAMINE 10 MG/1
10 TABLET, FILM COATED ORAL EVERY 6 HOURS PRN
Qty: 12 TABLET | Refills: 0 | Status: SHIPPED | OUTPATIENT
Start: 2022-09-01 | End: 2022-11-09

## 2022-09-01 RX ORDER — OXYBUTYNIN CHLORIDE 5 MG/1
5 TABLET, EXTENDED RELEASE ORAL DAILY
Qty: 14 TABLET | Refills: 0 | Status: SHIPPED | OUTPATIENT
Start: 2022-09-01 | End: 2022-09-15 | Stop reason: HOSPADM

## 2022-09-01 RX ORDER — SODIUM CHLORIDE 0.9 % (FLUSH) 0.9 %
3-10 SYRINGE (ML) INJECTION AS NEEDED
Status: DISCONTINUED | OUTPATIENT
Start: 2022-09-01 | End: 2022-09-01 | Stop reason: HOSPADM

## 2022-09-01 RX ORDER — LIDOCAINE HYDROCHLORIDE 20 MG/ML
INJECTION, SOLUTION EPIDURAL; INFILTRATION; INTRACAUDAL; PERINEURAL AS NEEDED
Status: DISCONTINUED | OUTPATIENT
Start: 2022-09-01 | End: 2022-09-01 | Stop reason: SURG

## 2022-09-01 RX ORDER — FENTANYL CITRATE 50 UG/ML
25 INJECTION, SOLUTION INTRAMUSCULAR; INTRAVENOUS
Status: DISCONTINUED | OUTPATIENT
Start: 2022-09-01 | End: 2022-09-01 | Stop reason: HOSPADM

## 2022-09-01 RX ORDER — ROCURONIUM BROMIDE 10 MG/ML
INJECTION, SOLUTION INTRAVENOUS AS NEEDED
Status: DISCONTINUED | OUTPATIENT
Start: 2022-09-01 | End: 2022-09-01 | Stop reason: SURG

## 2022-09-01 RX ORDER — MIDAZOLAM HYDROCHLORIDE 1 MG/ML
0.5 INJECTION INTRAMUSCULAR; INTRAVENOUS
Status: DISCONTINUED | OUTPATIENT
Start: 2022-09-01 | End: 2022-09-01 | Stop reason: HOSPADM

## 2022-09-01 RX ADMIN — ROCURONIUM BROMIDE 30 MG: 10 SOLUTION INTRAVENOUS at 07:51

## 2022-09-01 RX ADMIN — LIDOCAINE HYDROCHLORIDE 100 MG: 20 INJECTION, SOLUTION EPIDURAL; INFILTRATION; INTRACAUDAL at 07:49

## 2022-09-01 RX ADMIN — PROPOFOL 150 MG: 10 INJECTION, EMULSION INTRAVENOUS at 07:49

## 2022-09-01 RX ADMIN — GLYCOPYRROLATE 0.4 MG: 0.2 INJECTION INTRAMUSCULAR; INTRAVENOUS at 08:30

## 2022-09-01 RX ADMIN — ONDANSETRON 4 MG: 2 INJECTION INTRAMUSCULAR; INTRAVENOUS at 08:30

## 2022-09-01 RX ADMIN — SODIUM CHLORIDE, POTASSIUM CHLORIDE, SODIUM LACTATE AND CALCIUM CHLORIDE 1000 ML: 600; 310; 30; 20 INJECTION, SOLUTION INTRAVENOUS at 06:52

## 2022-09-01 RX ADMIN — DROPERIDOL 0.62 MG: 2.5 INJECTION, SOLUTION INTRAMUSCULAR; INTRAVENOUS at 08:55

## 2022-09-01 RX ADMIN — FENTANYL CITRATE 100 MCG: 50 INJECTION, SOLUTION INTRAMUSCULAR; INTRAVENOUS at 07:49

## 2022-09-01 RX ADMIN — LEVOFLOXACIN 500 MG: 500 INJECTION, SOLUTION INTRAVENOUS at 07:32

## 2022-09-01 RX ADMIN — Medication 4 MG: at 08:30

## 2022-09-01 RX ADMIN — Medication 100 MCG: at 07:55

## 2022-09-01 RX ADMIN — Medication 200 MCG: at 07:59

## 2022-09-01 NOTE — BRIEF OP NOTE
URETEROSCOPY LASER LITHOTRIPSY WITH STENT INSERTION  Progress Note    Franko Lucero  9/1/2022    Pre-op Diagnosis:   Kidney stones [N20.0]       Post-Op Diagnosis Codes:     * Kidney stones [N20.0]    Procedure/CPT® Codes:        Procedure(s):  CYSTOSCOPY, LEFT RETROGRADE PYELOGRAM    Surgeon(s):  Weston Peck MD    Anesthesia: General    Staff:   Circulator: Dariel Mustafa RN  Scrub Person: Alexis Mora; Tita Verduzco                 Drains: * No LDAs found *    Findings: Left retrograde pyelogram read: 10 [place] dual-lumen catheter was placed over previous placed 0.38 sensor tip wire and 10 cc dilute contrast used to outline the collecting system.  The ureter was normal in course and caliber there is no filling defects there is mild dilation of the renal pelvis.  Filling defect was noted in the lower pole consistent with residual stone disease.                Weston Peck MD     Date: 9/1/2022  Time: 08:41 CDT

## 2022-09-01 NOTE — ANESTHESIA PREPROCEDURE EVALUATION
Anesthesia Evaluation     Patient summary reviewed   no history of anesthetic complications:  NPO Solid Status: > 8 hours             Airway   Mallampati: III  TM distance: >3 FB  Small opening  Dental      Pulmonary    (+) sleep apnea on CPAP,   (-) asthma, not a smoker  Cardiovascular   Exercise tolerance: good (4-7 METS)    ECG reviewed    (+) hypertension, CAD, CABG (2014 ), cardiac stents (prior to Cabg --stent complications led to direct transfer to CABG ) PVD, hyperlipidemia,   (-) pacemaker, angina      Neuro/Psych  (-) seizures, CVA  GI/Hepatic/Renal/Endo    (+)   renal disease stones, diabetes mellitus, thyroid problem hypothyroidism  (-) GERD, liver disease    Musculoskeletal     Abdominal   (+) obese,    Substance History      OB/GYN          Other   arthritis,    history of cancer                      Anesthesia Plan    ASA 3     general     intravenous induction     Anesthetic plan, risks, benefits, and alternatives have been provided, discussed and informed consent has been obtained with: patient.

## 2022-09-01 NOTE — ANESTHESIA PROCEDURE NOTES
Airway  Urgency: elective      General Information and Staff    Patient location during procedure: OR  CRNA/CAA: Vikram Navarro CRNA    Indications and Patient Condition  Indications for airway management: airway protection    Preoxygenated: yes  MILS maintained throughout  Mask difficulty assessment: 1 - vent by mask    Final Airway Details  Final airway type: endotracheal airway      Successful airway: ETT  Cuffed: yes   Successful intubation technique: video laryngoscopy  Endotracheal tube insertion site: oral  Blade: Gonzales  Blade size: 4  ETT size (mm): 7.5  Cormack-Lehane Classification: grade I - full view of glottis  Placement verified by: chest auscultation and capnometry   Cuff volume (mL): 8  Measured from: teeth  ETT/EBT  to teeth (cm): 22  Number of attempts at approach: 1  Assessment: lips, teeth, and gum same as pre-op and atraumatic intubation    Additional Comments  Easy intubation by SHANA Cullenx1 attempt.

## 2022-09-01 NOTE — OP NOTE
URETEROSCOPY LASER LITHOTRIPSY WITH STENT INSERTION  Procedure Note    Franko Lucero  9/1/2022    Pre-op Diagnosis:   Kidney stones [N20.0]    Post-op Diagnosis:     Post-Op Diagnosis Codes:     * Kidney stones [N20.0]    Procedure/CPT® Codes:      Procedure(s):  CYSTOSCOPY, LEFT RETROGRADE PYELOGRAM, LEFT URETEROSCOPY LASER LITHOTRIPSY WITH STENT INSERTION    Surgeon(s):  Weston Peck MD    Anesthesia: General    Staff:   Circulator: Dariel Mustafa RN  Scrub Person: Alexis Mora; Tita Verduzco    Estimated Blood Loss: minimal    Specimens:                None      Drains: * No LDAs found *    Findings: Large volume stone disease with 5 mm stone in lower pole and 2.2 cm stone in lower pole  75% of stone treated  Unable to finish treatment secondary to poor visualization    Complications: None    Indications: 67-year-old male with large volume stone disease in his lower pole.  He has coagulopathy as well as thrombocytopenia.  He is had issues with flank pain.  Is brought the operating room for definitive management ureteroscopy.    Description of Procedure:   After informed consent was obtained, patient was brought the operating room where patient underwent general endotracheal anesthesia in the supine position.  Patient was converted to the dorsolithotomy position.  Prepped and draped in the usual sterile fashion.  Timeout was done to ensure the correct patient, procedure, and site.  Patient did receive preoperative antibiotics in the holding area.    23 Citizen of [place] Ramirez endoscope inserted urethra retrograde fashion.  The anterior urethra was normal.  Post urethra showed normal prostate.  Bladder was entered and drained.  Bladder was inspected there was no lesions noted.  The left ureteral orifice was identified and cannulated the 0.38 sensor tip wire.  10 Citizen of [place] dual-lumen catheter was placed over the wire.  Another 0.38 sensor tip wire was placed.  I then placed 11, 13 Citizen of [place] 36 cm ureteral  access sheath atraumatically.  The [employer] flexible ureteroscope was placed over the wire.  For stone was encountered in the lower pole calyx.  A 2 laser micron fiber was used on 0.8 J and 10 Hz to fragment the stone into small pieces.    I then turned my attention to the larger stone in the lower pole.  Initially used 0.8 J and 10 Hz and gradually increased the power to 2 J and 20 Hz due to the large volume stone disease.  The laser tip fiber became degraded and I removed it.  I replaced this with another 200 laser micron fiber.  I continue treating the stone until approximately 75% of it was treated.  There were 70 fragments and dust that made visualization poor and the ankle was very difficult to treat the remaining portion of the stone.  At that point I thought it was best to come back in several weeks for repeat ureteroscopy.  I then inspected the ureter into entirety on way out so no further evidence of stone disease or ureteral injury.  The access sheath was removed.  I placed a 10 [place] dual-lumen catheter over the wire and instilled 10 cc dilute contrast outlining the collecting system.  Findings are dictated the brief operative report.  I then backloaded the cystoscope over the wire and placed a 6 Slovenian 26 cm Percuflex stent.  Good curl was seen in the upper pole calyx and a good curl seen distally in the bladder.  The bladder was drained.  Scope was removed.  The patient was awakened from anesthesia and transferred to recovery [facility] condition.    Weston Peck MD     Date: 9/1/2022  Time: 08:42 CDT

## 2022-09-01 NOTE — ANESTHESIA POSTPROCEDURE EVALUATION
Patient: Franko Lucero    Procedure Summary     Date: 09/01/22 Room / Location:  [facility] OR 01 /  [facility] OR    Anesthesia Start: 0746 Anesthesia Stop: 0836    Procedure: LEFT URETEROSCOPY LASER LITHOTRIPSY WITH STENT INSERTION (Left Ureter) Diagnosis:       Kidney stones      (Kidney stones [N20.0])    Surgeons: Weston Peck MD Provider: Vikram Navarro CRNA    Anesthesia Type: general ASA Status: 3          Anesthesia Type: general    Vitals  Vitals Value Taken Time   /67 09/01/22 0916   Temp 97.6 °F (36.4 °C) 09/01/22 0850   Pulse 89 09/01/22 0929   Resp 18 09/01/22 0904   SpO2 97 % 09/01/22 0929   Vitals shown include unvalidated device data.        Post Anesthesia Care and Evaluation    PONV Status: none  Comments: Patient d/c from PACU prior to anes eval based on Leigh Ann score.  Please see RN notes for details of d/c criteria.    Blood pressure 123/66, pulse 90, temperature 97.6 °F (36.4 °C), temperature source Temporal, resp. rate 18, SpO2 97 %.

## 2022-09-06 ENCOUNTER — PREP FOR SURGERY (OUTPATIENT)
Dept: OTHER | Facility: HOSPITAL | Age: 67
End: 2022-09-06

## 2022-09-06 DIAGNOSIS — N20.0 KIDNEY STONES: Primary | ICD-10-CM

## 2022-09-06 RX ORDER — SODIUM CHLORIDE 0.9 % (FLUSH) 0.9 %
1-10 SYRINGE (ML) INJECTION AS NEEDED
Status: CANCELLED | OUTPATIENT
Start: 2022-09-06

## 2022-09-06 RX ORDER — SODIUM CHLORIDE 9 MG/ML
100 INJECTION, SOLUTION INTRAVENOUS CONTINUOUS
Status: CANCELLED | OUTPATIENT
Start: 2022-09-06

## 2022-09-06 RX ORDER — SODIUM CHLORIDE 0.9 % (FLUSH) 0.9 %
10 SYRINGE (ML) INJECTION EVERY 12 HOURS SCHEDULED
Status: CANCELLED | OUTPATIENT
Start: 2022-09-06

## 2022-09-06 RX ORDER — LEVOFLOXACIN 5 MG/ML
500 INJECTION, SOLUTION INTRAVENOUS ONCE
Status: CANCELLED | OUTPATIENT
Start: 2022-09-06 | End: 2022-09-06

## 2022-09-07 ENCOUNTER — OFFICE VISIT (OUTPATIENT)
Dept: WOUND CARE | Facility: HOSPITAL | Age: 67
End: 2022-09-07

## 2022-09-07 DIAGNOSIS — E11.621 TYPE 2 DIABETES MELLITUS WITH FOOT ULCER, UNSPECIFIED WHETHER LONG TERM INSULIN USE: ICD-10-CM

## 2022-09-07 DIAGNOSIS — L97.522 NON-PRESSURE CHRONIC ULCER OF OTHER PART OF LEFT FOOT WITH FAT LAYER EXPOSED: ICD-10-CM

## 2022-09-07 DIAGNOSIS — E11.40 TYPE 2 DIABETES MELLITUS WITH DIABETIC NEUROPATHY, UNSPECIFIED WHETHER LONG TERM INSULIN USE: ICD-10-CM

## 2022-09-07 DIAGNOSIS — L97.509 TYPE 2 DIABETES MELLITUS WITH FOOT ULCER, UNSPECIFIED WHETHER LONG TERM INSULIN USE: ICD-10-CM

## 2022-09-07 PROCEDURE — 11042 DBRDMT SUBQ TIS 1ST 20SQCM/<: CPT | Performed by: NURSE PRACTITIONER

## 2022-09-13 ENCOUNTER — OFFICE VISIT (OUTPATIENT)
Dept: WOUND CARE | Facility: HOSPITAL | Age: 67
End: 2022-09-13

## 2022-09-13 DIAGNOSIS — L97.509 TYPE 2 DIABETES MELLITUS WITH FOOT ULCER, UNSPECIFIED WHETHER LONG TERM INSULIN USE: ICD-10-CM

## 2022-09-13 DIAGNOSIS — L84 CORNS AND CALLOSITIES: ICD-10-CM

## 2022-09-13 DIAGNOSIS — E11.40 TYPE 2 DIABETES MELLITUS WITH DIABETIC NEUROPATHY, UNSPECIFIED WHETHER LONG TERM INSULIN USE: ICD-10-CM

## 2022-09-13 DIAGNOSIS — L97.522 NON-PRESSURE CHRONIC ULCER OF OTHER PART OF LEFT FOOT WITH FAT LAYER EXPOSED: ICD-10-CM

## 2022-09-13 DIAGNOSIS — E11.621 TYPE 2 DIABETES MELLITUS WITH FOOT ULCER, UNSPECIFIED WHETHER LONG TERM INSULIN USE: ICD-10-CM

## 2022-09-13 PROCEDURE — 11042 DBRDMT SUBQ TIS 1ST 20SQCM/<: CPT | Performed by: NURSE PRACTITIONER

## 2022-09-15 ENCOUNTER — HOSPITAL ENCOUNTER (OUTPATIENT)
Facility: HOSPITAL | Age: 67
Setting detail: HOSPITAL OUTPATIENT SURGERY
Discharge: HOME | End: 2022-09-15
Attending: UROLOGY | Admitting: UROLOGY

## 2022-09-15 ENCOUNTER — APPOINTMENT (OUTPATIENT)
Dept: GENERAL RADIOLOGY | Facility: HOSPITAL | Age: 67
End: 2022-09-15

## 2022-09-15 ENCOUNTER — ANESTHESIA (OUTPATIENT)
Dept: PERIOP | Facility: HOSPITAL | Age: 67
End: 2022-09-15

## 2022-09-15 ENCOUNTER — ANESTHESIA EVENT (OUTPATIENT)
Dept: PERIOP | Facility: HOSPITAL | Age: 67
End: 2022-09-15

## 2022-09-15 VITALS
DIASTOLIC BLOOD PRESSURE: 85 MMHG | TEMPERATURE: 98 F | OXYGEN SATURATION: 100 % | HEART RATE: 93 BPM | SYSTOLIC BLOOD PRESSURE: 141 MMHG | RESPIRATION RATE: 18 BRPM

## 2022-09-15 DIAGNOSIS — N20.0 KIDNEY STONES: ICD-10-CM

## 2022-09-15 LAB
GLUCOSE BLDC GLUCOMTR-MCNC: 126 MG/DL (ref 70–130)
GLUCOSE BLDC GLUCOMTR-MCNC: 133 MG/DL (ref 70–130)

## 2022-09-15 PROCEDURE — C2617 STENT, NON-COR, TEM W/O DEL: HCPCS | Performed by: UROLOGY

## 2022-09-15 PROCEDURE — 82962 GLUCOSE BLOOD TEST: CPT

## 2022-09-15 PROCEDURE — C1894 INTRO/SHEATH, NON-LASER: HCPCS | Performed by: UROLOGY

## 2022-09-15 PROCEDURE — C1758 CATHETER, URETERAL: HCPCS | Performed by: UROLOGY

## 2022-09-15 PROCEDURE — 25010000002 ONDANSETRON PER 1 MG: Performed by: ANESTHESIOLOGY

## 2022-09-15 PROCEDURE — 25010000002 LEVOFLOXACIN PER 250 MG: Performed by: UROLOGY

## 2022-09-15 PROCEDURE — 74420 UROGRAPHY RTRGR +-KUB: CPT

## 2022-09-15 PROCEDURE — 88300 SURGICAL PATH GROSS: CPT | Performed by: UROLOGY

## 2022-09-15 PROCEDURE — 25010000002 DEXAMETHASONE PER 1 MG: Performed by: NURSE ANESTHETIST, CERTIFIED REGISTERED

## 2022-09-15 PROCEDURE — 25010000002 FENTANYL CITRATE (PF) 100 MCG/2ML SOLUTION: Performed by: NURSE ANESTHETIST, CERTIFIED REGISTERED

## 2022-09-15 PROCEDURE — C1769 GUIDE WIRE: HCPCS | Performed by: UROLOGY

## 2022-09-15 PROCEDURE — 52356 CYSTO/URETERO W/LITHOTRIPSY: CPT | Performed by: UROLOGY

## 2022-09-15 PROCEDURE — 25010000002 IOPAMIDOL 61 % SOLUTION: Performed by: UROLOGY

## 2022-09-15 PROCEDURE — 25010000002 ONDANSETRON PER 1 MG: Performed by: NURSE ANESTHETIST, CERTIFIED REGISTERED

## 2022-09-15 PROCEDURE — 82360 CALCULUS ASSAY QUANT: CPT | Performed by: UROLOGY

## 2022-09-15 PROCEDURE — 25010000002 PROPOFOL 10 MG/ML EMULSION: Performed by: NURSE ANESTHETIST, CERTIFIED REGISTERED

## 2022-09-15 DEVICE — URETERAL STENT
Type: IMPLANTABLE DEVICE | Site: URETER | Status: FUNCTIONAL
Brand: PERCUFLEX™ PLUS

## 2022-09-15 RX ORDER — ONDANSETRON 2 MG/ML
INJECTION INTRAMUSCULAR; INTRAVENOUS AS NEEDED
Status: DISCONTINUED | OUTPATIENT
Start: 2022-09-15 | End: 2022-09-15 | Stop reason: SURG

## 2022-09-15 RX ORDER — PROPOFOL 10 MG/ML
VIAL (ML) INTRAVENOUS AS NEEDED
Status: DISCONTINUED | OUTPATIENT
Start: 2022-09-15 | End: 2022-09-15 | Stop reason: SURG

## 2022-09-15 RX ORDER — HYDROCODONE BITARTRATE AND ACETAMINOPHEN 7.5; 325 MG/1; MG/1
1 TABLET ORAL EVERY 6 HOURS PRN
Qty: 12 TABLET | Refills: 0 | Status: SHIPPED | OUTPATIENT
Start: 2022-09-15

## 2022-09-15 RX ORDER — FENTANYL CITRATE 50 UG/ML
25 INJECTION, SOLUTION INTRAMUSCULAR; INTRAVENOUS
Status: DISCONTINUED | OUTPATIENT
Start: 2022-09-15 | End: 2022-09-15 | Stop reason: HOSPADM

## 2022-09-15 RX ORDER — MAGNESIUM HYDROXIDE 1200 MG/15ML
LIQUID ORAL AS NEEDED
Status: DISCONTINUED | OUTPATIENT
Start: 2022-09-15 | End: 2022-09-15 | Stop reason: HOSPADM

## 2022-09-15 RX ORDER — IBUPROFEN 600 MG/1
600 TABLET ORAL ONCE AS NEEDED
Status: DISCONTINUED | OUTPATIENT
Start: 2022-09-15 | End: 2022-09-15 | Stop reason: HOSPADM

## 2022-09-15 RX ORDER — LABETALOL HYDROCHLORIDE 5 MG/ML
5 INJECTION, SOLUTION INTRAVENOUS
Status: DISCONTINUED | OUTPATIENT
Start: 2022-09-15 | End: 2022-09-15 | Stop reason: HOSPADM

## 2022-09-15 RX ORDER — SODIUM CHLORIDE 0.9 % (FLUSH) 0.9 %
10 SYRINGE (ML) INJECTION EVERY 12 HOURS SCHEDULED
Status: DISCONTINUED | OUTPATIENT
Start: 2022-09-15 | End: 2022-09-15 | Stop reason: HOSPADM

## 2022-09-15 RX ORDER — ROCURONIUM BROMIDE 10 MG/ML
INJECTION, SOLUTION INTRAVENOUS AS NEEDED
Status: DISCONTINUED | OUTPATIENT
Start: 2022-09-15 | End: 2022-09-15 | Stop reason: SURG

## 2022-09-15 RX ORDER — MIDAZOLAM HYDROCHLORIDE 1 MG/ML
0.5 INJECTION INTRAMUSCULAR; INTRAVENOUS
Status: DISCONTINUED | OUTPATIENT
Start: 2022-09-15 | End: 2022-09-15 | Stop reason: HOSPADM

## 2022-09-15 RX ORDER — SODIUM CHLORIDE 0.9 % (FLUSH) 0.9 %
1-10 SYRINGE (ML) INJECTION AS NEEDED
Status: DISCONTINUED | OUTPATIENT
Start: 2022-09-15 | End: 2022-09-15 | Stop reason: HOSPADM

## 2022-09-15 RX ORDER — SODIUM CHLORIDE 9 MG/ML
100 INJECTION, SOLUTION INTRAVENOUS CONTINUOUS
Status: DISCONTINUED | OUTPATIENT
Start: 2022-09-15 | End: 2022-09-15 | Stop reason: HOSPADM

## 2022-09-15 RX ORDER — SODIUM CHLORIDE, SODIUM LACTATE, POTASSIUM CHLORIDE, CALCIUM CHLORIDE 600; 310; 30; 20 MG/100ML; MG/100ML; MG/100ML; MG/100ML
100 INJECTION, SOLUTION INTRAVENOUS CONTINUOUS
Status: DISCONTINUED | OUTPATIENT
Start: 2022-09-15 | End: 2022-09-15 | Stop reason: HOSPADM

## 2022-09-15 RX ORDER — OXYBUTYNIN CHLORIDE 5 MG/1
5 TABLET, EXTENDED RELEASE ORAL DAILY
Qty: 10 TABLET | Refills: 0 | Status: SHIPPED | OUTPATIENT
Start: 2022-09-15 | End: 2022-11-09

## 2022-09-15 RX ORDER — SODIUM CHLORIDE, SODIUM LACTATE, POTASSIUM CHLORIDE, CALCIUM CHLORIDE 600; 310; 30; 20 MG/100ML; MG/100ML; MG/100ML; MG/100ML
1000 INJECTION, SOLUTION INTRAVENOUS CONTINUOUS
Status: DISCONTINUED | OUTPATIENT
Start: 2022-09-15 | End: 2022-09-15 | Stop reason: HOSPADM

## 2022-09-15 RX ORDER — HYDROMORPHONE HYDROCHLORIDE 1 MG/ML
0.5 INJECTION, SOLUTION INTRAMUSCULAR; INTRAVENOUS; SUBCUTANEOUS
Status: DISCONTINUED | OUTPATIENT
Start: 2022-09-15 | End: 2022-09-15 | Stop reason: HOSPADM

## 2022-09-15 RX ORDER — DEXAMETHASONE SODIUM PHOSPHATE 4 MG/ML
INJECTION, SOLUTION INTRA-ARTICULAR; INTRALESIONAL; INTRAMUSCULAR; INTRAVENOUS; SOFT TISSUE AS NEEDED
Status: DISCONTINUED | OUTPATIENT
Start: 2022-09-15 | End: 2022-09-15 | Stop reason: SURG

## 2022-09-15 RX ORDER — DROPERIDOL 2.5 MG/ML
0.62 INJECTION, SOLUTION INTRAMUSCULAR; INTRAVENOUS ONCE AS NEEDED
Status: DISCONTINUED | OUTPATIENT
Start: 2022-09-15 | End: 2022-09-15 | Stop reason: HOSPADM

## 2022-09-15 RX ORDER — TAMSULOSIN HYDROCHLORIDE 0.4 MG/1
1 CAPSULE ORAL NIGHTLY
Qty: 14 CAPSULE | Refills: 0 | Status: SHIPPED | OUTPATIENT
Start: 2022-09-15 | End: 2022-11-09

## 2022-09-15 RX ORDER — HYDROCODONE BITARTRATE AND ACETAMINOPHEN 7.5; 325 MG/1; MG/1
1 TABLET ORAL ONCE AS NEEDED
Status: DISCONTINUED | OUTPATIENT
Start: 2022-09-15 | End: 2022-09-15 | Stop reason: HOSPADM

## 2022-09-15 RX ORDER — FENTANYL CITRATE 50 UG/ML
INJECTION, SOLUTION INTRAMUSCULAR; INTRAVENOUS AS NEEDED
Status: DISCONTINUED | OUTPATIENT
Start: 2022-09-15 | End: 2022-09-15 | Stop reason: SURG

## 2022-09-15 RX ORDER — SODIUM CHLORIDE 0.9 % (FLUSH) 0.9 %
3 SYRINGE (ML) INJECTION EVERY 12 HOURS SCHEDULED
Status: DISCONTINUED | OUTPATIENT
Start: 2022-09-15 | End: 2022-09-15 | Stop reason: HOSPADM

## 2022-09-15 RX ORDER — NALOXONE HCL 0.4 MG/ML
0.4 VIAL (ML) INJECTION AS NEEDED
Status: DISCONTINUED | OUTPATIENT
Start: 2022-09-15 | End: 2022-09-15 | Stop reason: HOSPADM

## 2022-09-15 RX ORDER — LIDOCAINE HYDROCHLORIDE 10 MG/ML
0.5 INJECTION, SOLUTION EPIDURAL; INFILTRATION; INTRACAUDAL; PERINEURAL ONCE AS NEEDED
Status: DISCONTINUED | OUTPATIENT
Start: 2022-09-15 | End: 2022-09-15 | Stop reason: HOSPADM

## 2022-09-15 RX ORDER — FLUMAZENIL 0.1 MG/ML
0.2 INJECTION INTRAVENOUS AS NEEDED
Status: DISCONTINUED | OUTPATIENT
Start: 2022-09-15 | End: 2022-09-15 | Stop reason: HOSPADM

## 2022-09-15 RX ORDER — NEOSTIGMINE METHYLSULFATE 5 MG/5 ML
SYRINGE (ML) INTRAVENOUS AS NEEDED
Status: DISCONTINUED | OUTPATIENT
Start: 2022-09-15 | End: 2022-09-15 | Stop reason: SURG

## 2022-09-15 RX ORDER — SODIUM CHLORIDE 0.9 % (FLUSH) 0.9 %
3-10 SYRINGE (ML) INJECTION AS NEEDED
Status: DISCONTINUED | OUTPATIENT
Start: 2022-09-15 | End: 2022-09-15 | Stop reason: HOSPADM

## 2022-09-15 RX ORDER — ONDANSETRON 2 MG/ML
4 INJECTION INTRAMUSCULAR; INTRAVENOUS ONCE AS NEEDED
Status: COMPLETED | OUTPATIENT
Start: 2022-09-15 | End: 2022-09-15

## 2022-09-15 RX ORDER — HYDROCODONE BITARTRATE AND ACETAMINOPHEN 5; 325 MG/1; MG/1
1 TABLET ORAL ONCE AS NEEDED
Status: DISCONTINUED | OUTPATIENT
Start: 2022-09-15 | End: 2022-09-15 | Stop reason: HOSPADM

## 2022-09-15 RX ORDER — LEVOFLOXACIN 5 MG/ML
500 INJECTION, SOLUTION INTRAVENOUS ONCE
Status: COMPLETED | OUTPATIENT
Start: 2022-09-15 | End: 2022-09-15

## 2022-09-15 RX ORDER — HYDROCODONE BITARTRATE AND ACETAMINOPHEN 7.5; 325 MG/1; MG/1
2 TABLET ORAL EVERY 4 HOURS PRN
Status: DISCONTINUED | OUTPATIENT
Start: 2022-09-15 | End: 2022-09-15 | Stop reason: HOSPADM

## 2022-09-15 RX ADMIN — VASOPRESSIN 1 ML: 20 INJECTION INTRAVENOUS at 10:07

## 2022-09-15 RX ADMIN — ONDANSETRON 4 MG: 2 INJECTION INTRAMUSCULAR; INTRAVENOUS at 10:47

## 2022-09-15 RX ADMIN — ONDANSETRON 4 MG: 2 INJECTION INTRAMUSCULAR; INTRAVENOUS at 10:07

## 2022-09-15 RX ADMIN — ROCURONIUM BROMIDE 30 MG: 10 INJECTION INTRAVENOUS at 09:38

## 2022-09-15 RX ADMIN — PROPOFOL 100 MG: 10 INJECTION, EMULSION INTRAVENOUS at 09:38

## 2022-09-15 RX ADMIN — FENTANYL CITRATE 100 MCG: 50 INJECTION, SOLUTION INTRAMUSCULAR; INTRAVENOUS at 09:38

## 2022-09-15 RX ADMIN — LEVOFLOXACIN 500 MG: 500 INJECTION, SOLUTION INTRAVENOUS at 09:04

## 2022-09-15 RX ADMIN — SODIUM CHLORIDE, POTASSIUM CHLORIDE, SODIUM LACTATE AND CALCIUM CHLORIDE 100 ML/HR: 600; 310; 30; 20 INJECTION, SOLUTION INTRAVENOUS at 09:04

## 2022-09-15 RX ADMIN — Medication 3 MG: at 10:18

## 2022-09-15 RX ADMIN — DEXAMETHASONE SODIUM PHOSPHATE 4 MG: 4 INJECTION, SOLUTION INTRA-ARTICULAR; INTRALESIONAL; INTRAMUSCULAR; INTRAVENOUS; SOFT TISSUE at 10:07

## 2022-09-15 RX ADMIN — GLYCOPYRROLATE 0.4 MG: 0.2 INJECTION INTRAMUSCULAR; INTRAVENOUS at 10:18

## 2022-09-15 NOTE — ANESTHESIA PREPROCEDURE EVALUATION
Anesthesia Evaluation     Patient summary reviewed   no history of anesthetic complications:  NPO Solid Status: > 8 hours             Airway   Mallampati: III  TM distance: >3 FB  Small opening  Dental      Pulmonary    (+) sleep apnea on CPAP,   (-) asthma, not a smoker  Cardiovascular   Exercise tolerance: good (4-7 METS)    ECG reviewed    (+) hypertension, CAD, CABG, cardiac stents (prior to Cabg --stent complications led to direct transfer to CABG ) PVD, hyperlipidemia,   (-) pacemaker, angina      Neuro/Psych  (-) seizures, CVA  GI/Hepatic/Renal/Endo    (+)   renal disease stones, diabetes mellitus, thyroid problem hypothyroidism  (-) GERD, liver disease    Musculoskeletal     Abdominal   (+) obese,    Substance History      OB/GYN          Other   arthritis,    history of cancer                      Anesthesia Plan    ASA 3     general     intravenous induction     Anesthetic plan, risks, benefits, and alternatives have been provided, discussed and informed consent has been obtained with: patient.

## 2022-09-15 NOTE — ANESTHESIA PROCEDURE NOTES
Airway  Urgency: elective    Date/Time: 9/15/2022 9:40 AM  Airway not difficult    General Information and Staff    Patient location during procedure: OR  CRNA/CAA: RIGO Chandler CRNA    Indications and Patient Condition  Indications for airway management: airway protection    Preoxygenated: yes  MILS maintained throughout  Mask difficulty assessment: 1 - vent by mask    Final Airway Details  Final airway type: endotracheal airway      Successful airway: ETT  Cuffed: yes   Successful intubation technique: video laryngoscopy  Facilitating devices/methods: intubating stylet  Endotracheal tube insertion site: oral  Blade: Gonzales  Blade size: 4  ETT size (mm): 7.5  Cormack-Lehane Classification: grade I - full view of glottis  Placement verified by: chest auscultation and capnometry   Cuff volume (mL): 5  Measured from: lips  ETT/EBT  to lips (cm): 20  Number of attempts at approach: 1  Assessment: lips, teeth, and gum same as pre-op and atraumatic intubation              
No

## 2022-09-15 NOTE — ANESTHESIA POSTPROCEDURE EVALUATION
Patient: Franko Lucero    Procedure Summary     Date: 09/15/22 Room / Location:  [facility] OR  /  [facility]    Anesthesia Start: 0935 Anesthesia Stop: 1032    Procedure: LEFT URETEROSCOPY LASER LITHOTRIPSY WITH STENT EXCHANGE (Left Ureter) Diagnosis:       Kidney stones      (Kidney stones [N20.0])    Surgeons: Weston Peck MD Provider: RIGO Chandler CRNA    Anesthesia Type: general ASA Status: 3          Anesthesia Type: general    Vitals  Vitals Value Taken Time   /81 09/15/22 1046   Temp 98 °F (36.7 °C) 09/15/22 1049   Pulse 93 09/15/22 1049   Resp 14 09/15/22 1049   SpO2 98 % 09/15/22 1049   Vitals shown include unvalidated device data.        Post Anesthesia Care and Evaluation    Patient location during evaluation: PACU  Patient participation: complete - patient participated  Level of consciousness: awake and alert  Pain management: adequate    Airway patency: patent  Anesthetic complications: No anesthetic complications    Cardiovascular status: acceptable  Respiratory status: acceptable  Hydration status: acceptable    Comments: Blood pressure 125/72, pulse 87, temperature 98 °F (36.7 °C), temperature source Temporal, resp. rate 16, SpO2 96 %.    Pt discharged from PACU based on albert score >8

## 2022-09-15 NOTE — OP NOTE
URETEROSCOPY LASER LITHOTRIPSY WITH STENT INSERTION  Procedure Note    Franko Lucero  9/15/2022    Pre-op Diagnosis:   Kidney stones [N20.0]    Post-op Diagnosis:     Post-Op Diagnosis Codes:     * Kidney stones [N20.0]    Procedure/CPT® Codes:      Procedure(s):  CYSTOSCOPY, LEFT RETROGRADE PYELOGRAM, LEFT URETEROSCOPY LASER LITHOTRIPSY WITH STENT EXCHANGE    Surgeon(s):  Weston Peck MD    Anesthesia: General    Staff:   Circulator: Dalila Lee RN; Rom Alonzo RN  Scrub Person: Tita Verduzco; Edgar Naik    Estimated Blood Loss: minimal    Specimens:                Specimens     ID Source Type Tests Collected By Collected At Frozen?    A Kidney, Left Calculus · TISSUE PATHOLOGY EXAM   Weston Peck MD 9/15/22 1006     Description: Left renal caculus             Drains:   Ureteral Drain/Stent Left ureter 6 Fr. (Active)       Findings: Residual stone in lower pole with good breakup    Complications: None    Indications: 67-year-old male with lower pole stone disease.  He underwent staged ureteroscopic approach.  He presents today for second stage ureteroscopy.    Description of Procedure:   After informed consent was obtained, patient was brought the operating room where patient underwent general endotracheal anesthesia in the supine position.  Patient was converted to the dorsolithotomy position.  Prepped and draped in the usual sterile fashion.  Timeout was done to ensure the correct patient, procedure, and site.  Patient did receive preoperative antibiotics in the holding area.    23 Brune[place] Ramirez endoscope inserted urethra in retrograde fashion.  Anterior wreath was normal.  Post urethra showed lateral lobe hypertrophy of the prostate.  Bladder was entered and drained.  The stent was seen emanating from the left ureteral orifice.  This was grasped and brought out to the meatus.  A 0.38 sensor tip wire was then used to intubate the stent.  The wire was placed up into the kidney  under fluoroscopic guidance.  I then placed a 10 Armenian dual-lumen catheter over the wire and placed another 0.38 sensor tip wire.  I removed the dual-lumen catheter and placed 11, 13 Armenian 36 cm ureteral access sheath.  I placed a Ramirez digital flexible ureteroscope up to the level stone.  0 laser micron fiber was used initially on 1 J and 10 Hz and 0.3 J and 80 Hz to fragment dust the stones.  The end of the laser fiber degraded and I removed this.  I placed another 200 µm laser fiber and increased to 2 J and 20 Hz.  This broke up the remaining portion of the stone nicely.  There were no fragments greater than 2 mm.  The majority of the fragments were dust.  I inspected remaining calyces and saw no evidence of stone disease.  Inspected ureter in its entirety in the way out so no evidence of stone disease.  I then placed a 10 Armenian dual-lumen catheter and instilled 10 cc dilute contrast outlining the collecting system.  Findings are dictated the brief operative note.  I then backloaded the cystoscope over the wire and placed a 6 Armenian 26 cm Percuflex stent with good curl seen renal pelvis and good curl seen distally in the bladder.  The bladder was drained the scope was removed the patient was awakened from anesthesia and transferred recovery room in satisfactory condition.    Weston Peck MD     Date: 9/15/2022  Time: 10:39 CDT

## 2022-09-20 ENCOUNTER — OFFICE VISIT (OUTPATIENT)
Dept: WOUND CARE | Facility: HOSPITAL | Age: 67
End: 2022-09-20

## 2022-09-20 DIAGNOSIS — L84 CORNS AND CALLOSITIES: ICD-10-CM

## 2022-09-20 DIAGNOSIS — L97.509 TYPE 2 DIABETES MELLITUS WITH FOOT ULCER, UNSPECIFIED WHETHER LONG TERM INSULIN USE: ICD-10-CM

## 2022-09-20 DIAGNOSIS — L97.522 NON-PRESSURE CHRONIC ULCER OF OTHER PART OF LEFT FOOT WITH FAT LAYER EXPOSED: ICD-10-CM

## 2022-09-20 DIAGNOSIS — E11.621 TYPE 2 DIABETES MELLITUS WITH FOOT ULCER, UNSPECIFIED WHETHER LONG TERM INSULIN USE: ICD-10-CM

## 2022-09-20 DIAGNOSIS — E11.40 TYPE 2 DIABETES MELLITUS WITH DIABETIC NEUROPATHY, UNSPECIFIED WHETHER LONG TERM INSULIN USE: ICD-10-CM

## 2022-09-20 PROCEDURE — 11042 DBRDMT SUBQ TIS 1ST 20SQCM/<: CPT | Performed by: NURSE PRACTITIONER

## 2022-09-22 ENCOUNTER — PROCEDURE VISIT (OUTPATIENT)
Dept: UROLOGY | Facility: CLINIC | Age: 67
End: 2022-09-22

## 2022-09-22 DIAGNOSIS — N20.0 KIDNEY STONES: Primary | ICD-10-CM

## 2022-09-22 PROCEDURE — 52310 CYSTOSCOPY AND TREATMENT: CPT | Performed by: UROLOGY

## 2022-09-27 ENCOUNTER — OFFICE VISIT (OUTPATIENT)
Dept: WOUND CARE | Facility: HOSPITAL | Age: 67
End: 2022-09-27

## 2022-09-27 DIAGNOSIS — L97.509 TYPE 2 DIABETES MELLITUS WITH FOOT ULCER, UNSPECIFIED WHETHER LONG TERM INSULIN USE: ICD-10-CM

## 2022-09-27 DIAGNOSIS — L97.522 NON-PRESSURE CHRONIC ULCER OF OTHER PART OF LEFT FOOT WITH FAT LAYER EXPOSED: ICD-10-CM

## 2022-09-27 DIAGNOSIS — E11.40 TYPE 2 DIABETES MELLITUS WITH DIABETIC NEUROPATHY, UNSPECIFIED WHETHER LONG TERM INSULIN USE: ICD-10-CM

## 2022-09-27 DIAGNOSIS — E11.621 TYPE 2 DIABETES MELLITUS WITH FOOT ULCER, UNSPECIFIED WHETHER LONG TERM INSULIN USE: ICD-10-CM

## 2022-09-27 PROCEDURE — 11042 DBRDMT SUBQ TIS 1ST 20SQCM/<: CPT | Performed by: NURSE PRACTITIONER

## 2022-10-03 LAB
LAB AP CASE REPORT: NORMAL
Lab: NORMAL
PATH REPORT.FINAL DX SPEC: NORMAL
PATH REPORT.GROSS SPEC: NORMAL

## 2022-10-04 NOTE — ED NOTES
Goal Outcome Evaluation:  Plan of Care Reviewed With: patient        Progress: no change  Outcome Evaluation: Admitted to 4B last night from cath lab. No c/o pain. VSS. Remains on 2L O2 through night with sats WNL. IV diuretics given with good urine output. States he feels his SOA has improved and feels better with the O2 on. Pt. did got into afib for a short time last night but converted back into NSR at 0045 and has remained in NSR since. Cath sites C/D/I. Will continue to monitor.   Pt hand dressed per physician verbal orders at this time.      Peter Toussaint RN  10/15/18 013

## 2022-10-05 ENCOUNTER — OFFICE VISIT (OUTPATIENT)
Dept: WOUND CARE | Facility: HOSPITAL | Age: 67
End: 2022-10-05

## 2022-10-05 DIAGNOSIS — L97.522 NON-PRESSURE CHRONIC ULCER OF OTHER PART OF LEFT FOOT WITH FAT LAYER EXPOSED: ICD-10-CM

## 2022-10-05 DIAGNOSIS — E11.621 TYPE 2 DIABETES MELLITUS WITH FOOT ULCER, UNSPECIFIED WHETHER LONG TERM INSULIN USE: ICD-10-CM

## 2022-10-05 DIAGNOSIS — E11.40 TYPE 2 DIABETES MELLITUS WITH DIABETIC NEUROPATHY, UNSPECIFIED WHETHER LONG TERM INSULIN USE: ICD-10-CM

## 2022-10-05 DIAGNOSIS — L97.509 TYPE 2 DIABETES MELLITUS WITH FOOT ULCER, UNSPECIFIED WHETHER LONG TERM INSULIN USE: ICD-10-CM

## 2022-10-05 PROCEDURE — 11042 DBRDMT SUBQ TIS 1ST 20SQCM/<: CPT | Performed by: NURSE PRACTITIONER

## 2022-10-11 ENCOUNTER — OFFICE VISIT (OUTPATIENT)
Dept: WOUND CARE | Facility: HOSPITAL | Age: 67
End: 2022-10-11

## 2022-10-11 DIAGNOSIS — L84 CORNS AND CALLOSITIES: ICD-10-CM

## 2022-10-11 DIAGNOSIS — L97.509 TYPE 2 DIABETES MELLITUS WITH FOOT ULCER, UNSPECIFIED WHETHER LONG TERM INSULIN USE: ICD-10-CM

## 2022-10-11 DIAGNOSIS — E11.40 TYPE 2 DIABETES MELLITUS WITH DIABETIC NEUROPATHY, UNSPECIFIED WHETHER LONG TERM INSULIN USE: ICD-10-CM

## 2022-10-11 DIAGNOSIS — L97.522 NON-PRESSURE CHRONIC ULCER OF OTHER PART OF LEFT FOOT WITH FAT LAYER EXPOSED: ICD-10-CM

## 2022-10-11 DIAGNOSIS — E11.621 TYPE 2 DIABETES MELLITUS WITH FOOT ULCER, UNSPECIFIED WHETHER LONG TERM INSULIN USE: ICD-10-CM

## 2022-10-11 PROCEDURE — 99212 OFFICE O/P EST SF 10 MIN: CPT | Performed by: NURSE PRACTITIONER

## 2022-10-11 PROCEDURE — G0463 HOSPITAL OUTPT CLINIC VISIT: HCPCS

## 2022-10-17 ENCOUNTER — TELEPHONE (OUTPATIENT)
Dept: NEUROLOGY | Age: 67
End: 2022-10-17

## 2022-10-17 NOTE — TELEPHONE ENCOUNTER
Called to see if patient would like to reschedule no show appt, left a voicemail with a call back number to reschedule.

## 2022-10-19 ENCOUNTER — TELEPHONE (OUTPATIENT)
Dept: NEUROLOGY | Age: 67
End: 2022-10-19

## 2022-10-19 NOTE — TELEPHONE ENCOUNTER
Rescheduled appointment 10/17 to 11/22 per patient request. I forgot templates are being adjusted for providers in office. If changes needed, please reach out to patient to update. Sorry for any inconvenience this may have caused.  -[facility]

## 2022-11-01 ENCOUNTER — LAB (OUTPATIENT)
Dept: LAB | Facility: HOSPITAL | Age: 67
End: 2022-11-01

## 2022-11-01 ENCOUNTER — TRANSCRIBE ORDERS (OUTPATIENT)
Dept: ADMINISTRATIVE | Facility: HOSPITAL | Age: 67
End: 2022-11-01

## 2022-11-01 DIAGNOSIS — N18.2 CHRONIC KIDNEY DISEASE, STAGE II (MILD): ICD-10-CM

## 2022-11-01 DIAGNOSIS — N18.2 CHRONIC KIDNEY DISEASE, STAGE II (MILD): Primary | ICD-10-CM

## 2022-11-01 LAB
ALBUMIN SERPL-MCNC: 4.7 G/DL (ref 3.5–5.2)
ALBUMIN/GLOB SERPL: 1.6 G/DL
ALP SERPL-CCNC: 105 U/L (ref 39–117)
ALT SERPL W P-5'-P-CCNC: 17 U/L (ref 1–41)
ANION GAP SERPL CALCULATED.3IONS-SCNC: 10 MMOL/L (ref 5–15)
AST SERPL-CCNC: 24 U/L (ref 1–40)
BASOPHILS # BLD MANUAL: 0.05 10*3/MM3 (ref 0–0.2)
BASOPHILS NFR BLD MANUAL: 1 % (ref 0–1.5)
BILIRUB SERPL-MCNC: 0.7 MG/DL (ref 0–1.2)
BILIRUB UR QL STRIP: NEGATIVE
BUN SERPL-MCNC: 13 MG/DL (ref 8–23)
BUN/CREAT SERPL: 13.7 (ref 7–25)
CALCIUM SPEC-SCNC: 10 MG/DL (ref 8.6–10.5)
CHLORIDE SERPL-SCNC: 102 MMOL/L (ref 98–107)
CLARITY UR: CLEAR
CO2 SERPL-SCNC: 30 MMOL/L (ref 22–29)
COLOR UR: ABNORMAL
CREAT SERPL-MCNC: 0.95 MG/DL (ref 0.76–1.27)
DEPRECATED RDW RBC AUTO: 58.5 FL (ref 37–54)
EGFRCR SERPLBLD CKD-EPI 2021: 86.6 ML/MIN/1.73
ERYTHROCYTE [DISTWIDTH] IN BLOOD BY AUTOMATED COUNT: 15.6 % (ref 12.3–15.4)
GLOBULIN UR ELPH-MCNC: 2.9 GM/DL
GLUCOSE SERPL-MCNC: 122 MG/DL (ref 65–99)
GLUCOSE UR STRIP-MCNC: ABNORMAL MG/DL
HCT VFR BLD AUTO: 37.3 % (ref 37.5–51)
HGB BLD-MCNC: 11.7 G/DL (ref 13–17.7)
HGB UR QL STRIP.AUTO: NEGATIVE
KETONES UR QL STRIP: NEGATIVE
LEUKOCYTE ESTERASE UR QL STRIP.AUTO: NEGATIVE
LYMPHOCYTES # BLD MANUAL: 0.45 10*3/MM3 (ref 0.7–3.1)
LYMPHOCYTES NFR BLD MANUAL: 13 % (ref 5–12)
MACROCYTES BLD QL SMEAR: ABNORMAL
MAGNESIUM SERPL-MCNC: 2.2 MG/DL (ref 1.6–2.4)
MCH RBC QN AUTO: 32.6 PG (ref 26.6–33)
MCHC RBC AUTO-ENTMCNC: 31.4 G/DL (ref 31.5–35.7)
MCV RBC AUTO: 103.9 FL (ref 79–97)
MONOCYTES # BLD: 0.65 10*3/MM3 (ref 0.1–0.9)
NEUTROPHILS # BLD AUTO: 3.84 10*3/MM3 (ref 1.7–7)
NEUTROPHILS NFR BLD MANUAL: 77 % (ref 42.7–76)
NITRITE UR QL STRIP: NEGATIVE
PH UR STRIP.AUTO: 5.5 [PH] (ref 5–8)
PHOSPHATE SERPL-MCNC: 3.6 MG/DL (ref 2.5–4.5)
PLATELET # BLD AUTO: 109 10*3/MM3 (ref 140–450)
PMV BLD AUTO: 9.8 FL (ref 6–12)
POLYCHROMASIA BLD QL SMEAR: ABNORMAL
POTASSIUM SERPL-SCNC: 4.3 MMOL/L (ref 3.5–5.2)
PROT SERPL-MCNC: 7.6 G/DL (ref 6–8.5)
PROT UR QL STRIP: NEGATIVE
PTH-INTACT SERPL-MCNC: 21.6 PG/ML (ref 15–65)
RBC # BLD AUTO: 3.59 10*6/MM3 (ref 4.14–5.8)
SMALL PLATELETS BLD QL SMEAR: ABNORMAL
SODIUM SERPL-SCNC: 142 MMOL/L (ref 136–145)
SP GR UR STRIP: >=1.03 (ref 1–1.03)
URATE SERPL-MCNC: 4.4 MG/DL (ref 3.4–7)
UROBILINOGEN UR QL STRIP: ABNORMAL
VARIANT LYMPHS NFR BLD MANUAL: 2 % (ref 0–5)
VARIANT LYMPHS NFR BLD MANUAL: 7 % (ref 19.6–45.3)
WBC MORPH BLD: NORMAL
WBC NRBC COR # BLD: 4.99 10*3/MM3 (ref 3.4–10.8)

## 2022-11-01 PROCEDURE — 82570 ASSAY OF URINE CREATININE: CPT

## 2022-11-01 PROCEDURE — 85027 COMPLETE CBC AUTOMATED: CPT

## 2022-11-01 PROCEDURE — 83735 ASSAY OF MAGNESIUM: CPT

## 2022-11-01 PROCEDURE — 84156 ASSAY OF PROTEIN URINE: CPT

## 2022-11-01 PROCEDURE — 84100 ASSAY OF PHOSPHORUS: CPT

## 2022-11-01 PROCEDURE — 83970 ASSAY OF PARATHORMONE: CPT

## 2022-11-01 PROCEDURE — 85007 BL SMEAR W/DIFF WBC COUNT: CPT

## 2022-11-01 PROCEDURE — 36415 COLL VENOUS BLD VENIPUNCTURE: CPT

## 2022-11-01 PROCEDURE — 80053 COMPREHEN METABOLIC PANEL: CPT

## 2022-11-01 PROCEDURE — 84550 ASSAY OF BLOOD/URIC ACID: CPT

## 2022-11-01 PROCEDURE — 82306 VITAMIN D 25 HYDROXY: CPT

## 2022-11-01 PROCEDURE — 81003 URINALYSIS AUTO W/O SCOPE: CPT

## 2022-11-02 LAB
25(OH)D3 SERPL-MCNC: 77.1 NG/ML (ref 30–100)
CREAT UR-MCNC: 51.6 MG/DL
PROT ?TM UR-MCNC: 9.3 MG/DL
PROT/CREAT UR: 180.2 MG/G CREA (ref 0–200)

## 2022-11-04 ENCOUNTER — TELEPHONE (OUTPATIENT)
Dept: UROLOGY | Facility: CLINIC | Age: 67
End: 2022-11-04

## 2022-11-04 NOTE — PROGRESS NOTES
Subjective    Mr. Lucero is 67 y.o. male    Chief Complaint: Kidney Stone    History of Present Illness  Patient with history of recurrent nephrolithiasis last seen by me in 2017. Stone analysis consistent with calcium oxalate monohydrate.    Patient had large volume stone disease and underwent staged left ureteroscopy with his last procedure September 2022.  He does have a 1.2 cm stone in his lower pole on ultrasound.  Serum studies have been negative.  24-hour urine not done.  Denies flank pain gross hematuria.      The following portions of the patient's history were reviewed and updated as appropriate: allergies, current medications, past family history, past medical history, past social history, past surgical history and problem list.    Review of Systems   Constitutional: Negative for chills and fever.   Gastrointestinal: Negative for abdominal pain, anal bleeding and blood in stool.   Genitourinary: Positive for urgency. Negative for difficulty urinating, dysuria, frequency and hematuria.         Current Outpatient Medications:   •  Ascorbic Acid (VITAMIN C PO), Take 250 mg by mouth Daily., Disp: , Rfl:   •  atorvastatin (LIPITOR) 40 MG tablet, Take 40 mg by mouth Daily., Disp: , Rfl:   •  b complex vitamins capsule, Take  by mouth., Disp: , Rfl:   •  busPIRone (BUSPAR) 15 MG tablet, Take 15 mg by mouth Daily., Disp: , Rfl:   •  clopidogrel (PLAVIX) 75 MG tablet, Take 75 mg by mouth Daily., Disp: , Rfl:   •  docusate sodium (COLACE) 100 MG capsule, Take  by mouth Daily., Disp: , Rfl:   •  DULoxetine (CYMBALTA) 60 MG capsule, Take 60 mg by mouth Daily., Disp: , Rfl:   •  empagliflozin (JARDIANCE) 25 MG tablet tablet, Take 25 mg by mouth Daily., Disp: , Rfl:   •  ferrous sulfate 325 (65 FE) MG EC tablet, Take 325 mg by mouth Daily With Breakfast., Disp: , Rfl:   •  gabapentin (NEURONTIN) 100 MG capsule, Take 100 mg by mouth Daily., Disp: , Rfl:   •  GARLIC-CALCIUM PO, Take 1 tablet by mouth Daily., Disp: ,  Rfl:   •  HYDROcodone-acetaminophen (NORCO) 7.5-325 MG per tablet, Take 1 tablet by mouth Every 6 (Six) Hours As Needed for Severe Pain., Disp: 12 tablet, Rfl: 0  •  ICOSAPENT ETHYL PO, Take 1 dose by mouth Daily., Disp: , Rfl:   •  Insulin Degludec (TRESIBA SC), Inject 6 Units under the skin into the appropriate area as directed Every Night., Disp: , Rfl:   •  ketorolac (TORADOL) 10 MG tablet, Take 1 tablet by mouth Every 6 (Six) Hours As Needed for Moderate Pain., Disp: 12 tablet, Rfl: 0  •  levothyroxine (SYNTHROID, LEVOTHROID) 50 MCG tablet, Take 50 mcg by mouth Daily., Disp: , Rfl:   •  metFORMIN (GLUCOPHAGE) 1000 MG tablet, Take 500 mg by mouth 2 (Two) Times a Day With Meals., Disp: , Rfl:   •  nitroglycerin (NITROSTAT) 0.4 MG SL tablet, Place 0.4 mg under the tongue Every 5 (Five) Minutes As Needed., Disp: , Rfl:   •  ondansetron (ZOFRAN) 8 MG tablet, Take 8 mg by mouth Every 8 (Eight) Hours As Needed for Nausea or Vomiting., Disp: , Rfl:   •  oxybutynin XL (DITROPAN-XL) 5 MG 24 hr tablet, Take 1 tablet by mouth Daily., Disp: 10 tablet, Rfl: 0  •  pantoprazole (PROTONIX) 40 MG EC tablet, Take 40 mg by mouth Daily., Disp: , Rfl:   •  polyethylene glycol (MIRALAX) powder, Take 17 g by mouth Daily As Needed., Disp: , Rfl:   •  pravastatin (PRAVACHOL) 40 MG tablet, Take 40 mg by mouth Every Night., Disp: , Rfl:   •  ranolazine (RANEXA) 1000 MG 12 hr tablet, Take 1,000 mg by mouth Daily., Disp: , Rfl:   •  tamsulosin (FLOMAX) 0.4 MG capsule 24 hr capsule, Take 1 capsule by mouth Every Night., Disp: 14 capsule, Rfl: 0  •  therapeutic multivitamin-minerals (THERAGRAN-M) tablet, Take  by mouth., Disp: , Rfl:   •  Vitamin D, Cholecalciferol, 1000 UNITS tablet, Take 1 tablet by mouth Daily., Disp: , Rfl:     Past Medical History:   Diagnosis Date   • Anemia    • Anxiety    • Arthritis    • BPH (benign prostatic hypertrophy)    • CAD (coronary artery disease)    • Cancer (HCC)     non-hodgkins lymphoma   • Diabetes  mellitus (HCC)    • Disease of thyroid gland    • GERD (gastroesophageal reflux disease)    • Hyperlipidemia    • Hypertension    • Iliac artery aneurysm, bilateral (HCC)    • Kidney stone    • Sleep apnea        Past Surgical History:   Procedure Laterality Date   • COLONOSCOPY  02/04/2014   • COLONOSCOPY N/A 4/26/2017    Procedure: COLONOSCOPY WITH ANESTHESIA;  Surgeon: Sher Cui MD;  Location: [facility] ENDOSCOPY;  Service:    • CORONARY ARTERY BYPASS GRAFT      2   • CYSTOSCOPY URETEROSCOPY LASER LITHOTRIPSY Left 4/17/2017    Procedure: URETEROSCOPY LASER LITHOTRIPSY WITH DOUBLE J STENT INSERTION, LEFT ;  Surgeon: Weston Peck MD;  Location:  [facility] OR;  Service:    • CYSTOSCOPY URETEROSCOPY LASER LITHOTRIPSY Left 8/14/2017    Procedure: CYSTOSCOPY URETEROSCOPY LASER LITHOTRIPSY STENT INSERTION STONE EXTRACTION;  Surgeon: Weston Peck MD;  Location: [facility] OR;  Service:    • CYSTOSCOPY W/ URETERAL STENT PLACEMENT Left 4/17/2017    Procedure: CYSTOSCOPY URETERAL DOUBLE J STENT INSERTION, LEFT;  Surgeon: Weston Peck MD;  Location: [facility] OR;  Service:    • ENDOSCOPY N/A 4/26/2017    Procedure: ESOPHAGOGASTRODUODENOSCOPY WITH ANESTHESIA;  Surgeon: Sher Cui MD;  Location: [facility] ENDOSCOPY;  Service:    • JOINT REPLACEMENT Right    • KIDNEY STONE SURGERY     • KNEE SURGERY      replacement   • NECK SURGERY     • ROTATOR CUFF REPAIR     • SHOULDER SURGERY     • TONSILLECTOMY     • URETEROSCOPY LASER LITHOTRIPSY WITH STENT INSERTION Left 9/1/2022    Procedure: LEFT URETEROSCOPY LASER LITHOTRIPSY WITH STENT INSERTION;  Surgeon: Weston Peck MD;  Location: [facility] OR;  Service: Urology;  Laterality: Left;   • URETEROSCOPY LASER LITHOTRIPSY WITH STENT INSERTION Left 9/15/2022    Procedure: LEFT URETEROSCOPY LASER LITHOTRIPSY WITH STENT EXCHANGE;  Surgeon: Weston Peck MD;  Location: [facility] OR;  Service: Urology;  Laterality: Left;       Social History  "    Socioeconomic History   • Marital status:    Tobacco Use   • Smoking status: Former     Types: Cigarettes     Quit date:      Years since quittin.8   • Smokeless tobacco: Never   Vaping Use   • Vaping Use: Never used   Substance and Sexual Activity   • Alcohol use: No   • Drug use: No   • Sexual activity: Defer       Family History   Problem Relation Age of Onset   • Kidney disease Father    • Heart disease Father    • Cancer Mother         brain   • Colon cancer Neg Hx    • Colon polyps Neg Hx        Objective    Temp 97.1 °F (36.2 °C)   Ht 180.3 cm (71\")   Wt 100 kg (221 lb 3.2 oz)   BMI 30.85 kg/m²     Physical Exam        Results for orders placed or performed in visit on 22   POC Urinalysis Dipstick, Multipro    Specimen: Urine   Result Value Ref Range    Color Yellow Yellow, Straw, Dark Yellow, Philomena    Clarity, UA Clear Clear    Glucose, UA >=1000 mg/dL (3+) (A) Negative mg/dL    Bilirubin Negative Negative    Ketones, UA Negative Negative    Specific Gravity  1.010 1.005 - 1.030    Blood, UA Trace (A) Negative    pH, Urine 5.0 5.0 - 8.0    Protein, POC Negative Negative mg/dL    Urobilinogen, UA 0.2 E.U./dL Normal, 0.2 E.U./dL    Nitrite, UA Negative Negative    Leukocytes Negative Negative     IPSS Questionnaire (AUA-7):  Incomplete emptying  Over the past month, how often have you had a sensation of not emptying your bladder completely after you finish?: Less than 1 time in 5 (22 130)  Frequency  Over the past month, how often have you had to urinate again less than two hours after you finishing urinating ?: Less than 1 time in 5 (22 130)  Intermittency  Over the past month, how often have you found you stopped and started again several time when you urinated ?: Less thank 1 time in 5 (22 130)  Urgency  Over the last month, how difficult  have you found it to postpone urination ?: more than half the time (22)  Weak Stream  Over the past month, " how often have you had a weak urinary stream ?: Less than 1 time in 5 (11/09/22 1305)  Straining  Over the past month, how often have you had to push or strain to begin urination ?: Less than 1 time 5 (11/09/22 1305)  Nocturia  Over the past month, how many times did you most typically get up to urinate from the time you went to bed until the time you got up in the morning ?: Less than half the time (11/09/22 1305)  Quality of life due to urinary symptoms  If you were to spend the rest of your life with your urinary condition the way it is now, how would feel about that?: Pleased (11/09/22 1305)    Scores  Total IPSS Score: 11 (11/09/22 1305)  Total Score = Symtomatic Level: moderately symptomatic: 8-19 (11/09/22 1305)      Renal ultrasound independent review    The renal ultrasound is available for me to review.  Treatment recommendations require an independent review.  This film has been reviewed by the radiologist to determine any non urologic abnormalities that are presents.  However, I very closely inspected the kidneys for size, symmetry, contour, parenchymal thickness, perinephric reaction, presence of calcifications, and intrarenal dilation of the collecting system.       The right kidney appears normal on this ultrasound.  The renal parenchymal is normal in thickness.  There are no solid masses or cysts.  There is no hydronephrosis.  There are no stones.      The left kidney appears nonobstructing stone lower pole    The bladder appears normal on thisultrsaound.  The bladder appears normal in thickness.  There no masses or stones seen on this exam.         Assessment and Plan    Diagnoses and all orders for this visit:    1. Kidney stones (Primary)  -     POC Urinalysis Dipstick, Multipro        Follow-up in 3 months with [facility] to check for lower pole stone burden as well as 24-hour urine.    I did review serum studies which showed no abnormalities.    Diet instructions given.

## 2022-11-04 NOTE — TELEPHONE ENCOUNTER
Called Patient to remind them to get Ultrasound prior to appointment.  Spoke with Patient wife and gave number to get that scheduled.  If patient calls back it is ok for the HUB to tell the pt the message.

## 2022-11-07 ENCOUNTER — HOSPITAL ENCOUNTER (OUTPATIENT)
Dept: ULTRASOUND IMAGING | Facility: HOSPITAL | Age: 67
Discharge: HOME | End: 2022-11-07
Admitting: UROLOGY

## 2022-11-07 DIAGNOSIS — N20.0 KIDNEY STONES: ICD-10-CM

## 2022-11-07 PROCEDURE — 76775 US EXAM ABDO BACK WALL LIM: CPT

## 2022-11-09 ENCOUNTER — OFFICE VISIT (OUTPATIENT)
Dept: UROLOGY | Facility: CLINIC | Age: 67
End: 2022-11-09

## 2022-11-09 VITALS — BODY MASS INDEX: 30.97 KG/M2 | TEMPERATURE: 97.1 F | WEIGHT: 221.2 LBS | HEIGHT: 71 IN

## 2022-11-09 DIAGNOSIS — N20.0 KIDNEY STONES: Primary | ICD-10-CM

## 2022-11-09 LAB
BILIRUB BLD-MCNC: NEGATIVE MG/DL
CLARITY, POC: CLEAR
COLOR UR: YELLOW
GLUCOSE UR STRIP-MCNC: ABNORMAL MG/DL
KETONES UR QL: NEGATIVE
LEUKOCYTE EST, POC: NEGATIVE
NITRITE UR-MCNC: NEGATIVE MG/ML
PH UR: 5 [PH] (ref 5–8)
PROT UR STRIP-MCNC: NEGATIVE MG/DL
RBC # UR STRIP: ABNORMAL /UL
SP GR UR: 1.01 (ref 1–1.03)
UROBILINOGEN UR QL: ABNORMAL

## 2022-11-09 PROCEDURE — 99213 OFFICE O/P EST LOW 20 MIN: CPT | Performed by: UROLOGY

## 2022-11-09 PROCEDURE — 81001 URINALYSIS AUTO W/SCOPE: CPT | Performed by: UROLOGY

## 2022-11-10 NOTE — PROGRESS NOTES
[facility] - PODIATRY    Today's Date: 11/18/22    Patient Name: Franko Lucero  MRN: [id number]  CSN: [id number]  PCP: Wilner Engel PA-C  Referring Provider: No ref. provider found    SUBJECTIVE     Chief Complaint   Patient presents with   • Follow-up     Return in about 3 months (around 9/30/2022) for Follow-up with Podiatry Provider- pt states doing good, 2nd toe right foot issues where was being seen by woundcare, tip of toe, and think it might be broke- pt pain 9/10 at worst over last 2 weeks, left foot, in the am the most- pt presents with long thick nails, 2dn toe right foot trauma at nail, poss small calluses on balls of feet [place]   • Diabetes     140mg/dl BG this am     HPI: Franko Lucero, a 67 y.o.male, comes to clinic as a(n) estbalished patient presenting for diabetic foot exam, complaining of foot pain and complaining of thickened, irregular toenails, and calluses. Patient has h/o anemia, anxiety, arthritis, BPH, CAD, CA, DM with neuropathy, Iliac artery aneurysm, HTN, HLD. Patient is NIDDM with last stated BG level of 140mg/dl. Hx of diabetic foot ulcer but denies any open wounds or sores currently and notes he was recently release from [facility]. Reports multiple calluses to the ball of both feet. Also notes painful lesion to the medial aspect of left 5th toe. Reports nails are long, thick, and irregular and that he is unable to care for them himself. Admits pain at 9/10 level and described as stabbing, shooting and aching. Relates previous treatment(s) including wound care treatment in [facility], care by podiatry. Denies any constitutional symptoms. No other pedal complaints at this time.    Past Medical History:   Diagnosis Date   • Anemia    • Anxiety    • Arthritis    • BPH (benign prostatic hypertrophy)    • CAD (coronary artery disease)    • Cancer (HCC)     non-hodgkins lymphoma   • Diabetes mellitus (HCC)    • Disease of thyroid gland    • GERD (gastroesophageal reflux  disease)    • Hyperlipidemia    • Hypertension    • Iliac artery aneurysm, bilateral (HCC)    • Kidney stone    • Sleep apnea      Past Surgical History:   Procedure Laterality Date   • COLONOSCOPY  02/04/2014   • COLONOSCOPY N/A 4/26/2017    Procedure: COLONOSCOPY WITH ANESTHESIA;  Surgeon: Sher Cui MD;  Location: [facility] ENDOSCOPY;  Service:    • CORONARY ARTERY BYPASS GRAFT      2   • CYSTOSCOPY URETEROSCOPY LASER LITHOTRIPSY Left 4/17/2017    Procedure: URETEROSCOPY LASER LITHOTRIPSY WITH DOUBLE J STENT INSERTION, LEFT ;  Surgeon: Weston Peck MD;  Location: [facility] OR;  Service:    • CYSTOSCOPY URETEROSCOPY LASER LITHOTRIPSY Left 8/14/2017    Procedure: CYSTOSCOPY URETEROSCOPY LASER LITHOTRIPSY STENT INSERTION STONE EXTRACTION;  Surgeon: Weston Peck MD;  Location: [facility] OR;  Service:    • CYSTOSCOPY W/ URETERAL STENT PLACEMENT Left 4/17/2017    Procedure: CYSTOSCOPY URETERAL DOUBLE J STENT INSERTION, LEFT;  Surgeon: Weston Peck MD;  Location: [facility] OR;  Service:    • ENDOSCOPY N/A 4/26/2017    Procedure: ESOPHAGOGASTRODUODENOSCOPY WITH ANESTHESIA;  Surgeon: Sher Cui MD;  Location: [facility] ENDOSCOPY;  Service:    • JOINT REPLACEMENT Right    • KIDNEY STONE SURGERY     • KNEE SURGERY      replacement   • NECK SURGERY     • ROTATOR CUFF REPAIR     • SHOULDER SURGERY     • TONSILLECTOMY     • URETEROSCOPY LASER LITHOTRIPSY WITH STENT INSERTION Left 9/1/2022    Procedure: LEFT URETEROSCOPY LASER LITHOTRIPSY WITH STENT INSERTION;  Surgeon: Weston Peck MD;  Location: [facility] OR;  Service: Urology;  Laterality: Left;   • URETEROSCOPY LASER LITHOTRIPSY WITH STENT INSERTION Left 9/15/2022    Procedure: LEFT URETEROSCOPY LASER LITHOTRIPSY WITH STENT EXCHANGE;  Surgeon: Weston Peck MD;  Location: [facility] OR;  Service: Urology;  Laterality: Left;     Family History   Problem Relation Age of Onset   • Kidney disease Father    • Heart disease Father    • Cancer  Mother         brain   • Colon cancer Neg Hx    • Colon polyps Neg Hx      Social History     Socioeconomic History   • Marital status:    Tobacco Use   • Smoking status: Former     Types: Cigarettes     Quit date:      Years since quittin.8   • Smokeless tobacco: Never   Vaping Use   • Vaping Use: Never used   Substance and Sexual Activity   • Alcohol use: No   • Drug use: No   • Sexual activity: Defer     Allergies   Allergen Reactions   • Adhesive Tape Rash     Pt states that if it isn't left on very long it is okay   • Cefazolin Rash   • Cefuroxime Axetil Rash   • Cephalosporins Rash   • Neomycin-Polymyxin B Gu Rash   • Neosporin [Neomycin-Bacitracin Zn-Polymyx] Rash   • Percocet [Oxycodone-Acetaminophen] Rash     Current Outpatient Medications   Medication Sig Dispense Refill   • Ascorbic Acid (VITAMIN C PO) Take 250 mg by mouth Daily.     • atorvastatin (LIPITOR) 40 MG tablet Take 40 mg by mouth Daily.     • b complex vitamins capsule Take  by mouth.     • busPIRone (BUSPAR) 15 MG tablet Take 15 mg by mouth Daily.     • clopidogrel (PLAVIX) 75 MG tablet Take 75 mg by mouth Daily.     • docusate sodium (COLACE) 100 MG capsule Take  by mouth Daily.     • DULoxetine (CYMBALTA) 60 MG capsule Take 60 mg by mouth Daily.     • empagliflozin (JARDIANCE) 25 MG tablet tablet Take 25 mg by mouth Daily.     • ferrous sulfate 325 (65 FE) MG EC tablet Take 325 mg by mouth Daily With Breakfast.     • gabapentin (NEURONTIN) 100 MG capsule Take 100 mg by mouth Daily.     • GARLIC-CALCIUM PO Take 1 tablet by mouth Daily.     • HYDROcodone-acetaminophen (NORCO) 7.5-325 MG per tablet Take 1 tablet by mouth Every 6 (Six) Hours As Needed for Severe Pain. 12 tablet 0   • ICOSAPENT ETHYL PO Take 1 dose by mouth Daily.     • Insulin Degludec (TRESIBA SC) Inject 6 Units under the skin into the appropriate area as directed Every Night.     • levothyroxine (SYNTHROID, LEVOTHROID) 50 MCG tablet Take 50 mcg by mouth Daily.      • metFORMIN (GLUCOPHAGE) 1000 MG tablet Take 500 mg by mouth 2 (Two) Times a Day With Meals.     • nitroglycerin (NITROSTAT) 0.4 MG SL tablet Place 0.4 mg under the tongue Every 5 (Five) Minutes As Needed.     • ondansetron (ZOFRAN) 8 MG tablet Take 8 mg by mouth Every 8 (Eight) Hours As Needed for Nausea or Vomiting.     • pantoprazole (PROTONIX) 40 MG EC tablet Take 40 mg by mouth Daily.     • polyethylene glycol (MIRALAX) powder Take 17 g by mouth Daily As Needed.     • pravastatin (PRAVACHOL) 40 MG tablet Take 40 mg by mouth Every Night.     • ranolazine (RANEXA) 1000 MG 12 hr tablet Take 1,000 mg by mouth Daily.     • therapeutic multivitamin-minerals (THERAGRAN-M) tablet Take  by mouth.     • Vitamin D, Cholecalciferol, 1000 UNITS tablet Take 1 tablet by mouth Daily.       No current facility-administered medications for this visit.     Review of Systems   Constitutional: Negative for chills and fever.   HENT: Negative for congestion.    Respiratory: Negative for shortness of breath.    Cardiovascular: Negative for chest pain and leg swelling.   Gastrointestinal: Negative for constipation, diarrhea, nausea and vomiting.   Musculoskeletal: Positive for arthralgias.        Foot pain   Skin: Negative for wound.        Calluses   Neurological: Positive for numbness.       OBJECTIVE     Vitals:    11/18/22 1115   BP: 128/72   Pulse: 90   SpO2: 98%       PHYSICAL EXAM  GEN:   Accompanied by none.     Foot/Ankle Exam:       General:   Diabetic Foot Exam Performed    Appearance: appears stated age and healthy    Orientation: AAOx3    Affect: appropriate    Gait: unimpaired    Assistance: independent    Shoe Gear:  Casual shoes    VASCULAR      Right Foot Vascularity   Dorsalis pedis:  2+  Posterior tibial:  2+  Skin Temperature: warm    Edema Grading:  None  CFT:  3  Pedal Hair Growth:  Present  Varicosities: mild varicosities       Left Foot Vascularity   Dorsalis pedis:  2+  Posterior tibial:  2+  Skin  Temperature: warm    Edema Grading:  None  CFT:  3  Pedal Hair Growth:  Present  Varicosities: mild varicosities        NEUROLOGIC     Right Foot Neurologic   Light touch sensation:  Diminished  Vibratory sensation:  Diminished  Hot/Cold sensation: diminished    Protective Sensation using Stoughton-Derek Monofilament:  3     Left Foot Neurologic   Light touch sensation:  Diminished  Vibratory sensation:  Diminished  Hot/cold sensation: diminished    Protective Sensation using Stoughton-Derek Monofilament:  4     MUSCULOSKELETAL      Right Foot Musculoskeletal   Ecchymosis:  None  Tenderness: right foot callus and toenails    Arch:  Normal  Hammertoe:  Second toe, third toe, fourth toe and fifth toe     Left Foot Musculoskeletal   Ecchymosis:  None  Tenderness: left foot callus, toenails and toe 5    Arch:  Normal  Hammertoe:  Second toe, third toe, fourth toe and fifth toe     MUSCLE STRENGTH     Right Foot Muscle Strength   Foot dorsiflexion:  5  Foot plantar flexion:  5  Foot inversion:  5  Foot eversion:  5     Left Foot Muscle Strength   Foot dorsiflexion:  5  Foot plantar flexion:  5  Foot inversion:  5  Foot eversion:  5     RANGE OF MOTION      Right Foot Range of Motion   Foot and ankle ROM within normal limits       Left Foot Range of Motion   Foot and ankle ROM within normal limits       DERMATOLOGIC     Right Foot Dermatologic   Skin: corn and tinea    Nails: onychomycosis, abnormally thick, subungual debris and dystrophic nails    Nails comment:  1-5     Left Foot Dermatologic   Skin: corn and tinea    Nails: onychomycosis, abnormally thick, subungual debris and dystrophic nails    Nails comment:  1-5     Image:       RADIOLOGY/NUCLEAR:  US Renal Bilateral    Result Date: 11/7/2022  Narrative: HISTORY: Hydronephrosis  Renal ultrasound: Sonographic imaging the kidneys and bladder performed.  COMPARISON: Retrograde pyelogram 09/15/2022  FINDINGS: The proximal IVC is patent. Abdominal aorta obscured by  regional bowel gas. Visible proximal abdominal aorta normal in caliber.  Kidneys appear normal in contour and echotexture. The right kidney measures 12.0 x 6.2 x 5.4 cm. The left kidney measures 12.1 x 5.5 x 5.2 cm. There are scattered nonobstructing stones within the left kidney, largest projecting over the inferior pole measuring 1.9 cm. There is no hydronephrosis. No abnormal perinephric fluid collection. No solid appearing renal mass.  Images of the bladder are unremarkable.      Impression: 1. Nonobstructing left intrarenal stones, largest of the inferior left kidney measuring 1.9 cm. No hydronephrosis or solid renal mass. This report was finalized on 11/07/2022 15:24 by Dr. Kathy Cruz MD.      LABORATORY/CULTURE RESULTS:      PATHOLOGY RESULTS:       ASSESSMENT/PLAN     Diagnoses and all orders for this visit:    1. Onychomycosis (Primary)    2. Foot callus    3. Type 2 diabetes mellitus with diabetic polyneuropathy, without long-term current use of insulin (HCC)    4. History of diabetic ulcer of foot    5. Antiplatelet or antithrombotic long-term use    6. Hammer toes of both feet      Comprehensive lower extremity examination and evaluation was performed.  Discussed findings and treatment plan including risks, benefits, and treatment options with patient in detail. Patient agreed with treatment plan.  After verbal consent obtained, nail(s) x10 debrided of length and thickness with nail nipper without incidence  After verbal consent obtained, calluses x7 pared utilizing dermal curette and/or scalpel without incidence  Patient may maintain nails and calluses at home utilizing emery board or pumice stone between visits as needed  Reviewed at home diabetic foot care including daily foot checks   Continue diabetic management per PCP.    Toe spacer dispensed for left 5th toe.  Discussed potential arthroplasty of toe if corn or ulcer continues to be problematic.  An After Visit Summary was printed and given  to the patient at discharge, including (if requested) any available informative/educational handouts regarding diagnosis, treatment, or medications. All questions were answered to patient/family satisfaction. Should symptoms fail to improve or worsen they agree to call or return to clinic or to go to the Emergency Department. Discussed the importance of following up with any needed screening tests/labs/specialist appointments and any requested follow-up recommended by me today. Importance of maintaining follow-up discussed and patient accepts that missed appointments can delay diagnosis and potentially lead to worsening of conditions.  Return in about 3 months (around 2/18/2023)., or sooner if acute issues arise.    Lab Frequency Next Occurrence   Follow Anesthesia Guidelines / Standing Orders Once 04/12/2017   Follow Anesthesia Guidelines / Standing Orders Once 08/11/2017   Comprehensive Metabolic Panel Once 11/03/2021   CBC (No Diff) Once 11/03/2021   Phosphorus Once 11/03/2021   Magnesium Once 11/03/2021   Uric Acid Once 11/03/2021   Creatinine, Urine, Random - Urine, Clean Catch Once 10/29/2021   Urinalysis With Microscopic If Indicated (No Culture) - Urine, Clean Catch Once 10/29/2021   Vitamin D 25 Hydroxy Once 11/03/2021   PTH, Intact Once 11/03/2021       This document has been electronically signed by Silas Swan DPM on November 18, 2022 11:44 CST

## 2022-11-14 ENCOUNTER — TELEPHONE (OUTPATIENT)
Dept: NEUROLOGY | Age: 67
End: 2022-11-14

## 2022-11-17 ENCOUNTER — TELEPHONE (OUTPATIENT)
Dept: PODIATRY | Facility: CLINIC | Age: 67
End: 2022-11-17

## 2022-11-18 ENCOUNTER — OFFICE VISIT (OUTPATIENT)
Dept: PODIATRY | Facility: CLINIC | Age: 67
End: 2022-11-18

## 2022-11-18 VITALS
HEIGHT: 71 IN | SYSTOLIC BLOOD PRESSURE: 128 MMHG | DIASTOLIC BLOOD PRESSURE: 72 MMHG | BODY MASS INDEX: 30.52 KG/M2 | WEIGHT: 218 LBS | HEART RATE: 90 BPM | OXYGEN SATURATION: 98 %

## 2022-11-18 DIAGNOSIS — M20.41 HAMMER TOES OF BOTH FEET: ICD-10-CM

## 2022-11-18 DIAGNOSIS — L84 FOOT CALLUS: ICD-10-CM

## 2022-11-18 DIAGNOSIS — M20.42 HAMMER TOES OF BOTH FEET: ICD-10-CM

## 2022-11-18 DIAGNOSIS — B35.1 ONYCHOMYCOSIS: Primary | ICD-10-CM

## 2022-11-18 DIAGNOSIS — Z79.02 ANTIPLATELET OR ANTITHROMBOTIC LONG-TERM USE: ICD-10-CM

## 2022-11-18 DIAGNOSIS — E11.42 TYPE 2 DIABETES MELLITUS WITH DIABETIC POLYNEUROPATHY, WITHOUT LONG-TERM CURRENT USE OF INSULIN: ICD-10-CM

## 2022-11-18 DIAGNOSIS — Z86.31 HISTORY OF DIABETIC ULCER OF FOOT: ICD-10-CM

## 2022-11-18 PROCEDURE — 11721 DEBRIDE NAIL 6 OR MORE: CPT | Performed by: PODIATRIST

## 2022-11-18 PROCEDURE — 11057 PARNG/CUTG B9 HYPRKR LES >4: CPT | Performed by: PODIATRIST

## 2022-11-30 ENCOUNTER — TELEPHONE (OUTPATIENT)
Dept: CARDIOLOGY CLINIC | Age: 67
End: 2022-11-30

## 2022-12-01 ENCOUNTER — OFFICE VISIT (OUTPATIENT)
Dept: CARDIOLOGY CLINIC | Age: 67
End: 2022-12-01
Payer: MEDICARE

## 2022-12-01 VITALS
HEIGHT: 71 IN | SYSTOLIC BLOOD PRESSURE: 124 MMHG | BODY MASS INDEX: 30.8 KG/M2 | WEIGHT: 220 LBS | HEART RATE: 91 BPM | DIASTOLIC BLOOD PRESSURE: 76 MMHG

## 2022-12-01 DIAGNOSIS — I10 PRIMARY HYPERTENSION: ICD-10-CM

## 2022-12-01 DIAGNOSIS — R06.02 SHORTNESS OF BREATH: ICD-10-CM

## 2022-12-01 DIAGNOSIS — I25.10 CORONARY ARTERY DISEASE INVOLVING NATIVE CORONARY ARTERY OF NATIVE HEART WITHOUT ANGINA PECTORIS: Primary | ICD-10-CM

## 2022-12-01 DIAGNOSIS — Z95.1 S/P CABG X 3: ICD-10-CM

## 2022-12-01 DIAGNOSIS — E78.2 MIXED HYPERLIPIDEMIA: ICD-10-CM

## 2022-12-01 DIAGNOSIS — E78.5 DYSLIPIDEMIA: ICD-10-CM

## 2022-12-01 DIAGNOSIS — R06.09 DOE (DYSPNEA ON EXERTION): ICD-10-CM

## 2022-12-01 PROCEDURE — 1036F TOBACCO NON-USER: CPT | Performed by: INTERNAL MEDICINE

## 2022-12-01 PROCEDURE — 3074F SYST BP LT 130 MM HG: CPT | Performed by: INTERNAL MEDICINE

## 2022-12-01 PROCEDURE — G8427 DOCREV CUR MEDS BY ELIG CLIN: HCPCS | Performed by: INTERNAL MEDICINE

## 2022-12-01 PROCEDURE — 3017F COLORECTAL CA SCREEN DOC REV: CPT | Performed by: INTERNAL MEDICINE

## 2022-12-01 PROCEDURE — 1123F ACP DISCUSS/DSCN MKR DOCD: CPT | Performed by: INTERNAL MEDICINE

## 2022-12-01 PROCEDURE — 3078F DIAST BP <80 MM HG: CPT | Performed by: INTERNAL MEDICINE

## 2022-12-01 PROCEDURE — 99214 OFFICE O/P EST MOD 30 MIN: CPT | Performed by: INTERNAL MEDICINE

## 2022-12-01 PROCEDURE — G8417 CALC BMI ABV UP PARAM F/U: HCPCS | Performed by: INTERNAL MEDICINE

## 2022-12-01 PROCEDURE — G8484 FLU IMMUNIZE NO ADMIN: HCPCS | Performed by: INTERNAL MEDICINE

## 2022-12-01 ASSESSMENT — ENCOUNTER SYMPTOMS
NAUSEA: 0
EYES NEGATIVE: 1
GASTROINTESTINAL NEGATIVE: 1
DIARRHEA: 0
VOMITING: 0
RESPIRATORY NEGATIVE: 1
SHORTNESS OF BREATH: 0

## 2022-12-29 NOTE — PROGRESS NOTES
[facility] Progress Note                            Date:  12/1/2022  Patient: Marcia Anand  Age:  69 y.o., 1955      Reason for evaluation:         SUBJECTIVE:    Returns today follow-up assessment for coronary artery disease previous CABG hypertension hyperlipidemia. Overall doing reasonably well. Denies anginal chest pain dyspnea palpitations or overt claudication at this time. Blood pressure 124/76 heart 91. Review of Systems   Constitutional: Negative. Negative for chills, fever and unexpected weight change. HENT: Negative. Eyes: Negative. Respiratory: Negative. Negative for shortness of breath. Cardiovascular: Negative. Negative for chest pain. Gastrointestinal: Negative. Negative for diarrhea, nausea and vomiting. Endocrine: Negative. Genitourinary: Negative. Musculoskeletal: Negative. Skin: Negative. Neurological: Negative. All other systems reviewed and are negative. OBJECTIVE:     /76   Pulse 91   Ht 5' 11\" (1.803 m)   Wt 220 lb (99.8 kg)   BMI 30.68 kg/m²     Labs:   CBC: No results for input(s): WBC, HGB, HCT, PLT in the last 72 hours. BMP:No results for input(s): NA, K, CO2, BUN, CREATININE, LABGLOM, GLUCOSE in the last 72 hours. BNP: No results for input(s): BNP in the last 72 hours. PT/INR: No results for input(s): PROTIME, INR in the last 72 hours. APTT:No results for input(s): APTT in the last 72 hours. CARDIAC ENZYMES:No results for input(s): CKTOTAL, CKMB, CKMBINDEX, TROPONINI in the last 72 hours. FASTING LIPID PANEL:  Lab Results   Component Value Date/Time    HDL 34 07/25/2022 02:04 AM    LDLDIRECT 99 10/29/2014 06:35 AM    LDLCALC 48 07/25/2022 02:04 AM    TRIG 372 07/25/2022 02:04 AM     LIVER PROFILE:No results for input(s): AST, ALT, LABALBU in the last 72 hours.         Past Medical History:   Diagnosis Date    Abnormal nuclear stress test 10/22/2014    Arthritis     BiPAP (biphasic positive airway Patient called stating she needs rx refill for Myrbetriq 25 mg ER tablet   With a 90 day supply due to cost but the doctor has to call her insurance to reduce the cost of it. The insurance number is [phone] and the patient can be reached at [phone]. SHOULDER ARTHROSCOPY  08/23/2011    right    TONSILLECTOMY       Family History   Problem Relation Age of Onset    Heart Disease Other     Lung Cancer Mother     Cancer Sister      Allergies   Allergen Reactions    Ancef [Cefazolin Sodium]     Ceftin [Cefuroxime]      \"anything with ceftin in it\"    Cefuroxime Axetil     Cephalosporins     Neosporin [Bacitracin-Polymyxin B]      blisters     Current Outpatient Medications   Medication Sig Dispense Refill    GARLIC-CALCIUM PO Take by mouth      DULoxetine (CYMBALTA) 60 MG extended release capsule Take 60 mg by mouth at bedtime      Icosapent Ethyl (VASCEPA) 1 g CAPS capsule Take 2 capsules by mouth 2 times daily      Ascorbic Acid (VITAMIN C) 250 MG tablet Take 250 mg by mouth daily      metFORMIN (GLUCOPHAGE) 500 MG tablet Take 1 tablet by mouth 2 times daily (with meals) Please hold for 48 hrs after cardiac catheterization. You may resume like normal on Friday 7/8/2022. 60 tablet 3    empagliflozin (JARDIANCE) 25 MG tablet Take 25 mg by mouth daily      Insulin Degludec (TRESIBA) 100 UNIT/ML SOLN Inject 6 Units into the skin nightly      atorvastatin (LIPITOR) 40 MG tablet TAKE 1 TABLET BY MOUTH NIGHTLY AT BEDTIME      gabapentin (NEURONTIN) 100 MG capsule Take 100 mg by mouth at bedtime.       busPIRone (BUSPAR) 15 MG tablet Take 15 mg by mouth 2 times daily       ranolazine (RANEXA) 1000 MG extended release tablet Take 1 tablet by mouth 2 times daily 180 tablet 3    clopidogrel (PLAVIX) 75 MG tablet One daily (Patient taking differently: Take 75 mg by mouth daily) 90 tablet 3    acetaminophen (TYLENOL) 500 MG tablet Take 1,000 mg by mouth every 6 hours as needed for Pain      nitroGLYCERIN (NITROSTAT) 0.4 MG SL tablet Place 1 tablet under the tongue every 5 minutes as needed for Chest pain 25 tablet 5    levothyroxine (SYNTHROID) 50 MCG tablet Take 50 mcg by mouth Daily      ondansetron (ZOFRAN) 8 MG tablet Take 8 mg by mouth as needed       ferrous sulfate 325 (65 Fe) MG tablet Take 325 mg by mouth 2 times daily      pantoprazole (PROTONIX) 40 MG tablet Take 1 tablet by mouth daily 90 tablet 2    b complex vitamins capsule Take 1 capsule by mouth daily. Vitamin D (CHOLECALCIFEROL) 1000 UNITS CAPS capsule Take 1,000 Units by mouth daily. docusate sodium (COLACE) 100 MG capsule Take 100 mg by mouth at bedtime       No current facility-administered medications for this visit. Social History     Socioeconomic History    Marital status:      Spouse name: Nael Valentin    Number of children: 2    Years of education: 12    Highest education level: Not on file   Occupational History    Occupation:      Employer: [employer]     Comment: Retired,  for the [employer]. Tobacco Use    Smoking status: Former     Packs/day: 0.00     Types: Cigarettes     Quit date: 1998     Years since quittin.8    Smokeless tobacco: Never    Tobacco comments:     quit 23 years ago   Vaping Use    Vaping Use: Never used   Substance and Sexual Activity    Alcohol use: No    Drug use: No    Sexual activity: Yes     Partners: Female   Other Topics Concern    Not on file   Social History Narrative    Not on file     Social Determinants of Health     Financial Resource Strain: Low Risk     Difficulty of Paying Living Expenses: Not very hard   Food Insecurity: No Food Insecurity    Worried About Running Out of Food in the Last Year: Never true    Ran Out of Food in the Last Year: Never true   Transportation Needs: Not on file   Physical Activity: Not on file   Stress: Stress Concern Present    Feeling of Stress :  To some extent   Social Connections: Not on file   Intimate Partner Violence: Not At Risk    Fear of Current or Ex-Partner: No    Emotionally Abused: No    Physically Abused: No    Sexually Abused: No   Housing Stability: Low Risk     Unable to Pay for Housing in the Last Year: No    Number of Places Lived in the Last Year: 1    Unstable Housing in the Last Year: No       Physical Examination:  /76   Pulse 91   Ht 5' 11\" (1.803 m)   Wt 220 lb (99.8 kg)   BMI 30.68 kg/m²   Physical Exam  Vitals reviewed. Constitutional:       Appearance: He is well-developed. Neck:      Vascular: No carotid bruit or JVD. Cardiovascular:      Rate and Rhythm: Normal rate and regular rhythm. Heart sounds: Normal heart sounds. No murmur heard. No friction rub. No gallop. Pulmonary:      Effort: Pulmonary effort is normal. No respiratory distress. Breath sounds: Normal breath sounds. No wheezing or rales. Abdominal:      General: There is no distension. Tenderness: There is no abdominal tenderness. Lymphadenopathy:      Cervical: No cervical adenopathy. Skin:     General: Skin is warm and dry. ASSESSMENT:     Diagnosis Orders   1. Coronary artery disease involving native coronary artery of native heart without angina pectoris        2. Primary hypertension        3. Shortness of breath        4. Dyslipidemia        5. S/P CABG x 3        6. JOHNSON (dyspnea on exertion)            PLAN:  No orders of the defined types were placed in this encounter. No orders of the defined types were placed in this encounter. Continue present medications  Recommend follow-up assessment in 6 months    Return in about 6 months (around 6/1/2023) for return to Dr. Natacha Smith only. Luis Fernando Pro MD 12/1/2022 10:57 AM [facility]      Thisdictation was generated by voice recognition computer software. Although all attempts are made to edit the dictation for accuracy, there may be errors in the transcription that are not intended.

## 2023-02-15 NOTE — PROGRESS NOTES
[facility] - PODIATRY    Today's Date: 02/24/23    Patient Name: Franko Lucero  MRN: [id number]  CSN: [id number]  PCP: Wilner Engel PA-C  Referring Provider: No ref. provider found    SUBJECTIVE     Chief Complaint   Patient presents with   • Follow-up     Wilner Engel   3 month diabetic nail care Wants Beny- pt states feet doing ok, 5th toe left foot some issues- pt pain 3/10 at worst, 5th toe left foot   • Diabetes     160mg/dl BG this am     HPI: Franko Lucero, a 67 y.o.male, comes to clinic as a(n) estbalished patient presenting for diabetic foot exam, complaining of foot pain and complaining of thickened, irregular toenails, and calluses. Patient has h/o anemia, anxiety, arthritis, BPH, CAD, CA, DM with neuropathy, Iliac artery aneurysm, HTN, HLD. Patient is NIDDM with last stated BG level of 160mg/dl. Hx of diabetic foot ulcer. States he has been having pain in the 5th toe of left foot recently due to pre-ulcerative lesion; notes he typically wears a spacer in 4th interspace but hasn't recently.. Reports return of multiple calluses to the ball of both feet. Reports nails are long, thick, and irregular and that he is unable to care for them himself. Admits pain at 3/10 level and described as stabbing, shooting and aching. Relates previous treatment(s) including wound care treatment in [facility], care by podiatry. Denies any constitutional symptoms. No other pedal complaints at this time.    Past Medical History:   Diagnosis Date   • Anemia    • Anxiety    • Arthritis    • BPH (benign prostatic hypertrophy)    • CAD (coronary artery disease)    • Cancer (HCC)     non-hodgkins lymphoma   • Diabetes mellitus (HCC)    • Disease of thyroid gland    • GERD (gastroesophageal reflux disease)    • Hyperlipidemia    • Hypertension    • Iliac artery aneurysm, bilateral (HCC)    • Kidney stone    • Sleep apnea      Past Surgical History:   Procedure Laterality Date   • COLONOSCOPY   02/04/2014   • COLONOSCOPY N/A 4/26/2017    Procedure: COLONOSCOPY WITH ANESTHESIA;  Surgeon: Sher Cui MD;  Location: [facility] ENDOSCOPY;  Service:    • CORONARY ARTERY BYPASS GRAFT      2   • CYSTOSCOPY URETEROSCOPY LASER LITHOTRIPSY Left 4/17/2017    Procedure: URETEROSCOPY LASER LITHOTRIPSY WITH DOUBLE J STENT INSERTION, LEFT ;  Surgeon: Weston Peck MD;  Location: [facility] OR;  Service:    • CYSTOSCOPY URETEROSCOPY LASER LITHOTRIPSY Left 8/14/2017    Procedure: CYSTOSCOPY URETEROSCOPY LASER LITHOTRIPSY STENT INSERTION STONE EXTRACTION;  Surgeon: Weston Peck MD;  Location: [facility] OR;  Service:    • CYSTOSCOPY W/ URETERAL STENT PLACEMENT Left 4/17/2017    Procedure: CYSTOSCOPY URETERAL DOUBLE J STENT INSERTION, LEFT;  Surgeon: Weston Peck MD;  Location: [facility] OR;  Service:    • ENDOSCOPY N/A 4/26/2017    Procedure: ESOPHAGOGASTRODUODENOSCOPY WITH ANESTHESIA;  Surgeon: Sher Cui MD;  Location: [facility] ENDOSCOPY;  Service:    • JOINT REPLACEMENT Right    • KIDNEY STONE SURGERY     • KNEE SURGERY      replacement   • NECK SURGERY     • ROTATOR CUFF REPAIR     • SHOULDER SURGERY     • TONSILLECTOMY     • URETEROSCOPY LASER LITHOTRIPSY WITH STENT INSERTION Left 9/1/2022    Procedure: LEFT URETEROSCOPY LASER LITHOTRIPSY WITH STENT INSERTION;  Surgeon: Weston Peck MD;  Location: [facility] OR;  Service: Urology;  Laterality: Left;   • URETEROSCOPY LASER LITHOTRIPSY WITH STENT INSERTION Left 9/15/2022    Procedure: LEFT URETEROSCOPY LASER LITHOTRIPSY WITH STENT EXCHANGE;  Surgeon: Weston Peck MD;  Location: [facility] OR;  Service: Urology;  Laterality: Left;     Family History   Problem Relation Age of Onset   • Kidney disease Father    • Heart disease Father    • Cancer Mother         brain   • Colon cancer Neg Hx    • Colon polyps Neg Hx      Social History     Socioeconomic History   • Marital status:    Tobacco Use   • Smoking status: Former     Types:  Cigarettes     Quit date:      Years since quittin.   • Smokeless tobacco: Never   Vaping Use   • Vaping Use: Never used   Substance and Sexual Activity   • Alcohol use: No   • Drug use: No   • Sexual activity: Defer     Allergies   Allergen Reactions   • Adhesive Tape Rash     Pt states that if it isn't left on very long it is okay   • Cefazolin Rash   • Cefuroxime Axetil Rash   • Cephalosporins Rash   • Neomycin-Polymyxin B Gu Rash   • Neosporin [Neomycin-Bacitracin Zn-Polymyx] Rash   • Percocet [Oxycodone-Acetaminophen] Rash     Current Outpatient Medications   Medication Sig Dispense Refill   • Ascorbic Acid (VITAMIN C PO) Take 250 mg by mouth Daily.     • atorvastatin (LIPITOR) 40 MG tablet Take 40 mg by mouth Daily.     • b complex vitamins capsule Take  by mouth.     • busPIRone (BUSPAR) 15 MG tablet Take 15 mg by mouth Daily.     • clopidogrel (PLAVIX) 75 MG tablet Take 75 mg by mouth Daily.     • docusate sodium (COLACE) 100 MG capsule Take  by mouth Daily.     • DULoxetine (CYMBALTA) 60 MG capsule Take 60 mg by mouth Daily.     • empagliflozin (JARDIANCE) 25 MG tablet tablet Take 25 mg by mouth Daily.     • ferrous sulfate 325 (65 FE) MG EC tablet Take 325 mg by mouth Daily With Breakfast.     • gabapentin (NEURONTIN) 100 MG capsule Take 100 mg by mouth Daily.     • GARLIC-CALCIUM PO Take 1 tablet by mouth Daily.     • HYDROcodone-acetaminophen (NORCO) 7.5-325 MG per tablet Take 1 tablet by mouth Every 6 (Six) Hours As Needed for Severe Pain. 12 tablet 0   • ICOSAPENT ETHYL PO Take 1 dose by mouth Daily.     • Insulin Degludec (TRESIBA SC) Inject 6 Units under the skin into the appropriate area as directed Every Night.     • levothyroxine (SYNTHROID, LEVOTHROID) 50 MCG tablet Take 50 mcg by mouth Daily.     • metFORMIN (GLUCOPHAGE) 1000 MG tablet Take 500 mg by mouth 2 (Two) Times a Day With Meals.     • nitroglycerin (NITROSTAT) 0.4 MG SL tablet Place 0.4 mg under the tongue Every 5 (Five)  Minutes As Needed.     • ondansetron (ZOFRAN) 8 MG tablet Take 8 mg by mouth Every 8 (Eight) Hours As Needed for Nausea or Vomiting.     • pantoprazole (PROTONIX) 40 MG EC tablet Take 40 mg by mouth Daily.     • polyethylene glycol (MIRALAX) powder Take 17 g by mouth Daily As Needed.     • pravastatin (PRAVACHOL) 40 MG tablet Take 40 mg by mouth Every Night.     • ranolazine (RANEXA) 1000 MG 12 hr tablet Take 1,000 mg by mouth Daily.     • therapeutic multivitamin-minerals (THERAGRAN-M) tablet Take  by mouth.     • Vitamin D, Cholecalciferol, 1000 UNITS tablet Take 1 tablet by mouth Daily.       No current facility-administered medications for this visit.     Review of Systems   Constitutional: Negative for chills and fever.   HENT: Negative for congestion.    Respiratory: Negative for shortness of breath.    Cardiovascular: Negative for chest pain and leg swelling.   Gastrointestinal: Negative for constipation, diarrhea, nausea and vomiting.   Musculoskeletal: Positive for arthralgias.        Foot pain   Skin: Negative for wound.        Calluses   Neurological: Positive for numbness.       OBJECTIVE     Vitals:    02/24/23 1114   BP: 126/62   Pulse: 96   SpO2: 98%       PHYSICAL EXAM  GEN:   Accompanied by none.     Foot/Ankle Exam:       General:   Diabetic Foot Exam Performed    Appearance: appears stated age and healthy    Orientation: AAOx3    Affect: appropriate    Gait: unimpaired    Assistance: independent    Shoe Gear:  Casual shoes    VASCULAR      Right Foot Vascularity   Dorsalis pedis:  2+  Posterior tibial:  2+  Skin Temperature: warm    Edema Grading:  None  CFT:  3  Pedal Hair Growth:  Present  Varicosities: mild varicosities       Left Foot Vascularity   Dorsalis pedis:  2+  Posterior tibial:  2+  Skin Temperature: warm    Edema Grading:  None  CFT:  3  Pedal Hair Growth:  Present  Varicosities: mild varicosities        NEUROLOGIC     Right Foot Neurologic   Light touch sensation:   Diminished  Vibratory sensation:  Diminished  Hot/Cold sensation: diminished    Protective Sensation using Hennessey-Derek Monofilament:  3     Left Foot Neurologic   Light touch sensation:  Diminished  Vibratory sensation:  Diminished  Hot/cold sensation: diminished    Protective Sensation using Hennessey-Derek Monofilament:  4     MUSCULOSKELETAL      Right Foot Musculoskeletal   Ecchymosis:  None  Tenderness: right foot callus and toenails    Arch:  Normal  Hammertoe:  Second toe, third toe, fourth toe and fifth toe     Left Foot Musculoskeletal   Ecchymosis:  None  Tenderness: left foot callus, toenails and toe 5    Arch:  Normal  Hammertoe:  Second toe, third toe, fourth toe and fifth toe     MUSCLE STRENGTH     Right Foot Muscle Strength   Foot dorsiflexion:  5  Foot plantar flexion:  5  Foot inversion:  5  Foot eversion:  5     Left Foot Muscle Strength   Foot dorsiflexion:  5  Foot plantar flexion:  5  Foot inversion:  5  Foot eversion:  5     RANGE OF MOTION      Right Foot Range of Motion   Foot and ankle ROM within normal limits       Left Foot Range of Motion   Foot and ankle ROM within normal limits       DERMATOLOGIC     Right Foot Dermatologic   Skin: corn and tinea    Nails: onychomycosis, abnormally thick, subungual debris and dystrophic nails    Nails comment:  1-5     Left Foot Dermatologic   Skin: corn and tinea    Nails: onychomycosis, abnormally thick, subungual debris and dystrophic nails    Nails comment:  1-5     Image:       RADIOLOGY/NUCLEAR:  Ultrasound abdomen    Result Date: 2/21/2023  Narrative: ABDOMINAL ULTRASOUND COMPLETE REASON FOR STUDY/CLINICAL HISTORY: K74.69Other cirrhosis of liver (CMS/HCC) 67-year-old male COMPARISON: Abdominal ultrasound from 8/18/2022 TECHNIQUE: Grayscale and limited color Doppler images of the abdomen were obtained. FINDINGS: Liver: Nodular surface and diffusely coarsened echotexture, compatible with cirrhosis. No focal liver lesion.           Limited  color Doppler evaluation of the portal and hepatic veins demonstrates normal direction flow without evidence of thrombosis. Gallbladder: Within normal limits. No gallstones or gallbladder wall thickening. Biliary: No intrahepatic or extrahepatic biliary ductal dilation.           CBD: Measures 3 mm. Pancreas: Within normal limits, where visualized. Right Kidney: 11.3 cm. No stones or hydronephrosis. Left Kidney: 10.0 cm. Multiple echogenic foci in the mid to lower pole which demonstrate posterior acoustic shadowing and twinkle artifact on Doppler imaging, consistent with renal calculi, the largest measuring up to 1.3 cm. No hydronephrosis. Spleen: 11.2 cm. Within normal limits. Aorta: Within normal limits, where visualized. IVC: Intrahepatic segment is within normal limits. Peritoneum: No visualized free fluid.    Impression: 1.  Cirrhotic liver morphology without discrete lesion. No ascites. LI-RADS 1A. 2.  Patent hepatic vasculature. No portal vein thrombosis. 3.  Redemonstrated nonobstructing left renal calculi. 4.  No cholecystolithiasis or sonographic evidence of biliary duct obstruction. NOTE: LI-RADS US (Liver Imaging Reporting and Data System, Ultrasound) terminology has been used in this report, LI-RADS is a system of standardized terminology and criteria to assist in the interpretation and reporting  of imaging examinations of the liver in patients with cirrhosis or other risk factors for developing hepatocellular carcinoma (HCC). LI-RADS US can be applied for Ultrasound screening and surveillance of high risk populations.  Enhanced CT or MRI should be used as the diagnostic test for liver lesions in high risk patients, but can also be used for screening or surveillance in select patients. LI-RADS categories are based only on imaging features and, therefore should be interpreted in the context of all other available clinical data  and biomarkers.   US LI-RADS categories (v2017): US-1 = Negative (Negative  or benign observations. Repeat surveillance US in 6 months) US-2 = Subthreshold (Observation(s) <10mm not definitely benign. Repeat surveillance US in 3-6 months) US-3 = Positive (Observation(s) =>10mm not definitely benign OR new thrombus in vein. Further characterization with multiphase contrast-enhanced imaging, MRI preferred; CT can be performed if MRI is contraindicated) Visualization score: A- No or minimal Limitations B- Moderate Limitations C- Severe Limitations Electronically signed by Fransisco Dunn DO on 2/21/2023 10:11 AM I, Roxana Tran MD, have reviewed the images and verify the above interpretation on 2/21/2023 11:10 AM. Electronically signed by  Roxana Tran MD on 2/21/2023 11:10 AM      LABORATORY/CULTURE RESULTS:      PATHOLOGY RESULTS:       ASSESSMENT/PLAN     Diagnoses and all orders for this visit:    1. Onychomycosis (Primary)    2. Pre-ulcerative corn or callous    3. Foot pain, left    4. Type 2 diabetes mellitus with diabetic polyneuropathy, without long-term current use of insulin ([facility])    5. History of diabetic ulcer of foot    6. Antiplatelet or antithrombotic long-term use    7. Hammer toes of both feet    8. Encounter for diabetic foot exam (HCC)      Comprehensive lower extremity examination and evaluation was performed.  Discussed findings and treatment plan including risks, benefits, and treatment options with patient in detail. Patient agreed with treatment plan.  Diabetic foot exam performed.  After verbal consent obtained, nail(s) x10 debrided of length and thickness with nail nipper without incidence  After verbal consent obtained, calluses x7 pared utilizing dermal curette and/or scalpel without incidence  Patient may maintain nails and calluses at home utilizing emery board or pumice stone between visits as needed  Reviewed at home diabetic foot care including daily foot checks   Continue diabetic management per PCP.    Continue use of spacer in left  4th interspace. Discussed potential arthroplasty of toe if corn or ulcer continues to be problematic. Patient considering options.  An After Visit Summary was printed and given to the patient at discharge, including (if requested) any available informative/educational handouts regarding diagnosis, treatment, or medications. All questions were answered to patient/family satisfaction. Should symptoms fail to improve or worsen they agree to call or return to clinic or to go to the Emergency Department. Discussed the importance of following up with any needed screening tests/labs/specialist appointments and any requested follow-up recommended by me today. Importance of maintaining follow-up discussed and patient accepts that missed appointments can delay diagnosis and potentially lead to worsening of conditions.  Return in about 3 months (around 5/24/2023)., or sooner if acute issues arise.    Lab Frequency Next Occurrence   Follow Anesthesia Guidelines / Standing Orders Once 04/12/2017   Follow Anesthesia Guidelines / Standing Orders Once 08/11/2017   Comprehensive Metabolic Panel Once 11/03/2021   CBC (No Diff) Once 11/03/2021   Phosphorus Once 11/03/2021   Magnesium Once 11/03/2021   Uric Acid Once 11/03/2021   Creatinine, Urine, Random - Urine, Clean Catch Once 10/29/2021   Urinalysis With Microscopic If Indicated (No Culture) - Urine, Clean Catch Once 10/29/2021   Vitamin D 25 Hydroxy Once 11/03/2021   PTH, Intact Once 11/03/2021       This document has been electronically signed by Silas Swan DPM on February 24, 2023 11:43 CST

## 2023-02-23 ENCOUNTER — TELEPHONE (OUTPATIENT)
Dept: PODIATRY | Facility: CLINIC | Age: 68
End: 2023-02-23
Payer: MEDICARE

## 2023-02-24 ENCOUNTER — OFFICE VISIT (OUTPATIENT)
Dept: PODIATRY | Facility: CLINIC | Age: 68
End: 2023-02-24
Payer: MEDICARE

## 2023-02-24 VITALS
HEIGHT: 71 IN | SYSTOLIC BLOOD PRESSURE: 126 MMHG | DIASTOLIC BLOOD PRESSURE: 62 MMHG | OXYGEN SATURATION: 98 % | HEART RATE: 96 BPM | WEIGHT: 215 LBS | BODY MASS INDEX: 30.1 KG/M2

## 2023-02-24 DIAGNOSIS — E11.9 ENCOUNTER FOR DIABETIC FOOT EXAM: ICD-10-CM

## 2023-02-24 DIAGNOSIS — L84 PRE-ULCERATIVE CORN OR CALLOUS: ICD-10-CM

## 2023-02-24 DIAGNOSIS — Z79.02 ANTIPLATELET OR ANTITHROMBOTIC LONG-TERM USE: ICD-10-CM

## 2023-02-24 DIAGNOSIS — Z86.31 HISTORY OF DIABETIC ULCER OF FOOT: ICD-10-CM

## 2023-02-24 DIAGNOSIS — B35.1 ONYCHOMYCOSIS: Primary | ICD-10-CM

## 2023-02-24 DIAGNOSIS — E11.42 TYPE 2 DIABETES MELLITUS WITH DIABETIC POLYNEUROPATHY, WITHOUT LONG-TERM CURRENT USE OF INSULIN: ICD-10-CM

## 2023-02-24 DIAGNOSIS — M20.42 HAMMER TOES OF BOTH FEET: ICD-10-CM

## 2023-02-24 DIAGNOSIS — M20.41 HAMMER TOES OF BOTH FEET: ICD-10-CM

## 2023-02-24 DIAGNOSIS — M79.672 FOOT PAIN, LEFT: ICD-10-CM

## 2023-02-24 PROCEDURE — 11057 PARNG/CUTG B9 HYPRKR LES >4: CPT | Performed by: PODIATRIST

## 2023-02-24 PROCEDURE — 11721 DEBRIDE NAIL 6 OR MORE: CPT | Performed by: PODIATRIST

## 2023-02-24 PROCEDURE — 99213 OFFICE O/P EST LOW 20 MIN: CPT | Performed by: PODIATRIST

## 2023-03-15 ENCOUNTER — APPOINTMENT (OUTPATIENT)
Dept: CT IMAGING | Age: 68
End: 2023-03-15
Payer: MEDICARE

## 2023-03-15 ENCOUNTER — HOSPITAL ENCOUNTER (EMERGENCY)
Age: 68
Discharge: HOME | End: 2023-03-15
Payer: MEDICARE

## 2023-03-15 VITALS
BODY MASS INDEX: 29.01 KG/M2 | RESPIRATION RATE: 16 BRPM | OXYGEN SATURATION: 94 % | TEMPERATURE: 98 F | HEART RATE: 83 BPM | WEIGHT: 208 LBS | DIASTOLIC BLOOD PRESSURE: 60 MMHG | SYSTOLIC BLOOD PRESSURE: 96 MMHG

## 2023-03-15 DIAGNOSIS — K52.9 COLITIS: Primary | ICD-10-CM

## 2023-03-15 LAB
ALBUMIN SERPL-MCNC: 4 G/DL (ref 3.5–5.2)
ALP SERPL-CCNC: 93 U/L (ref 40–130)
ALT SERPL-CCNC: 16 U/L (ref 5–41)
ANION GAP SERPL CALCULATED.3IONS-SCNC: 9 MMOL/L (ref 7–19)
APTT PPP: 32 SEC (ref 26–36.2)
AST SERPL-CCNC: 19 U/L (ref 5–40)
BASOPHILS # BLD: 0 K/UL (ref 0–0.2)
BASOPHILS NFR BLD: 0.3 % (ref 0–1)
BILIRUB SERPL-MCNC: 0.6 MG/DL (ref 0.2–1.2)
BILIRUB UR QL STRIP: NEGATIVE
BUN SERPL-MCNC: 10 MG/DL (ref 8–23)
CALCIUM SERPL-MCNC: 9.9 MG/DL (ref 8.8–10.2)
CHLORIDE SERPL-SCNC: 103 MMOL/L (ref 98–111)
CLARITY UR: CLEAR
CO2 SERPL-SCNC: 30 MMOL/L (ref 22–29)
COLOR UR: YELLOW
CREAT SERPL-MCNC: 0.9 MG/DL (ref 0.5–1.2)
EOSINOPHIL # BLD: 0.2 K/UL (ref 0–0.6)
EOSINOPHIL NFR BLD: 3.3 % (ref 0–5)
ERYTHROCYTE [DISTWIDTH] IN BLOOD BY AUTOMATED COUNT: 14.9 % (ref 11.5–14.5)
GLUCOSE SERPL-MCNC: 140 MG/DL (ref 74–109)
GLUCOSE UR STRIP.AUTO-MCNC: =>1000 MG/DL
HCT VFR BLD AUTO: 36 % (ref 42–52)
HGB BLD-MCNC: 11.7 G/DL (ref 14–18)
HGB UR STRIP.AUTO-MCNC: NEGATIVE MG/L
IMM GRANULOCYTES # BLD: 0 K/UL
INR PPP: 1.12 (ref 0.88–1.18)
KETONES UR STRIP.AUTO-MCNC: NEGATIVE MG/DL
LEUKOCYTE ESTERASE UR QL STRIP.AUTO: NEGATIVE
LIPASE SERPL-CCNC: 36 U/L (ref 13–60)
LYMPHOCYTES # BLD: 0.9 K/UL (ref 1.1–4.5)
LYMPHOCYTES NFR BLD: 14.7 % (ref 20–40)
MCH RBC QN AUTO: 33.3 PG (ref 27–31)
MCHC RBC AUTO-ENTMCNC: 32.5 G/DL (ref 33–37)
MCV RBC AUTO: 102.6 FL (ref 80–94)
MONOCYTES # BLD: 0.6 K/UL (ref 0–0.9)
MONOCYTES NFR BLD: 9.5 % (ref 0–10)
NEUTROPHILS # BLD: 4.3 K/UL (ref 1.5–7.5)
NEUTS SEG NFR BLD: 71.7 % (ref 50–65)
NITRITE UR QL STRIP.AUTO: NEGATIVE
PH UR STRIP.AUTO: 5 [PH] (ref 5–8)
PLATELET # BLD AUTO: 102 K/UL (ref 130–400)
PMV BLD AUTO: 9.8 FL (ref 9.4–12.4)
POTASSIUM SERPL-SCNC: 4.2 MMOL/L (ref 3.5–5)
PROT SERPL-MCNC: 7.3 G/DL (ref 6.6–8.7)
PROT UR STRIP.AUTO-MCNC: NEGATIVE MG/DL
PROTHROMBIN TIME: 14.4 SEC (ref 12–14.6)
RBC # BLD AUTO: 3.51 M/UL (ref 4.7–6.1)
SODIUM SERPL-SCNC: 142 MMOL/L (ref 136–145)
SP GR UR STRIP.AUTO: >=1.045 (ref 1–1.03)
UROBILINOGEN UR STRIP.AUTO-MCNC: 0.2 E.U./DL
WBC # BLD AUTO: 6 K/UL (ref 4.8–10.8)

## 2023-03-15 PROCEDURE — 74177 CT ABD & PELVIS W/CONTRAST: CPT

## 2023-03-15 PROCEDURE — 2500000003 HC RX 250 WO HCPCS: Performed by: PHYSICIAN ASSISTANT

## 2023-03-15 PROCEDURE — 6360000002 HC RX W HCPCS: Performed by: PHYSICIAN ASSISTANT

## 2023-03-15 PROCEDURE — 96375 TX/PRO/DX INJ NEW DRUG ADDON: CPT

## 2023-03-15 PROCEDURE — 6360000004 HC RX CONTRAST MEDICATION: Performed by: PHYSICIAN ASSISTANT

## 2023-03-15 PROCEDURE — 83690 ASSAY OF LIPASE: CPT

## 2023-03-15 PROCEDURE — 85610 PROTHROMBIN TIME: CPT

## 2023-03-15 PROCEDURE — 99285 EMERGENCY DEPT VISIT HI MDM: CPT

## 2023-03-15 PROCEDURE — 96367 TX/PROPH/DG ADDL SEQ IV INF: CPT

## 2023-03-15 PROCEDURE — 85025 COMPLETE CBC W/AUTO DIFF WBC: CPT

## 2023-03-15 PROCEDURE — 80053 COMPREHEN METABOLIC PANEL: CPT

## 2023-03-15 PROCEDURE — 85730 THROMBOPLASTIN TIME PARTIAL: CPT

## 2023-03-15 PROCEDURE — 96365 THER/PROPH/DIAG IV INF INIT: CPT

## 2023-03-15 PROCEDURE — 81003 URINALYSIS AUTO W/O SCOPE: CPT

## 2023-03-15 PROCEDURE — 36415 COLL VENOUS BLD VENIPUNCTURE: CPT

## 2023-03-15 PROCEDURE — 2580000003 HC RX 258: Performed by: PHYSICIAN ASSISTANT

## 2023-03-15 RX ORDER — 0.9 % SODIUM CHLORIDE 0.9 %
1000 INTRAVENOUS SOLUTION INTRAVENOUS ONCE
Status: COMPLETED | OUTPATIENT
Start: 2023-03-15 | End: 2023-03-15

## 2023-03-15 RX ORDER — METRONIDAZOLE 500 MG/100ML
500 INJECTION, SOLUTION INTRAVENOUS ONCE
Status: COMPLETED | OUTPATIENT
Start: 2023-03-15 | End: 2023-03-15

## 2023-03-15 RX ORDER — CIPROFLOXACIN 2 MG/ML
400 INJECTION, SOLUTION INTRAVENOUS ONCE
Status: COMPLETED | OUTPATIENT
Start: 2023-03-15 | End: 2023-03-15

## 2023-03-15 RX ORDER — MORPHINE SULFATE 4 MG/ML
2 INJECTION, SOLUTION INTRAMUSCULAR; INTRAVENOUS ONCE
Status: COMPLETED | OUTPATIENT
Start: 2023-03-15 | End: 2023-03-15

## 2023-03-15 RX ORDER — METOCLOPRAMIDE HYDROCHLORIDE 5 MG/ML
10 INJECTION INTRAMUSCULAR; INTRAVENOUS ONCE
Status: COMPLETED | OUTPATIENT
Start: 2023-03-15 | End: 2023-03-15

## 2023-03-15 RX ORDER — CIPROFLOXACIN 500 MG/1
500 TABLET, FILM COATED ORAL 2 TIMES DAILY
Qty: 20 TABLET | Refills: 0 | Status: SHIPPED | OUTPATIENT
Start: 2023-03-15 | End: 2023-03-25

## 2023-03-15 RX ORDER — METRONIDAZOLE 500 MG/1
500 TABLET ORAL 3 TIMES DAILY
Qty: 30 TABLET | Refills: 0 | Status: SHIPPED | OUTPATIENT
Start: 2023-03-15 | End: 2023-03-25

## 2023-03-15 RX ADMIN — MORPHINE SULFATE 2 MG: 4 INJECTION, SOLUTION INTRAMUSCULAR; INTRAVENOUS at 12:28

## 2023-03-15 RX ADMIN — METOCLOPRAMIDE 10 MG: 5 INJECTION, SOLUTION INTRAMUSCULAR; INTRAVENOUS at 12:26

## 2023-03-15 RX ADMIN — METRONIDAZOLE 500 MG: 500 INJECTION, SOLUTION INTRAVENOUS at 17:27

## 2023-03-15 RX ADMIN — IOPAMIDOL 70 ML: 755 INJECTION, SOLUTION INTRAVENOUS at 13:23

## 2023-03-15 RX ADMIN — SODIUM CHLORIDE 1000 ML: 9 INJECTION, SOLUTION INTRAVENOUS at 12:26

## 2023-03-15 RX ADMIN — CIPROFLOXACIN 400 MG: 2 INJECTION, SOLUTION INTRAVENOUS at 16:22

## 2023-03-15 ASSESSMENT — ENCOUNTER SYMPTOMS
COUGH: 0
BACK PAIN: 0
ABDOMINAL PAIN: 1
EYE DISCHARGE: 0
SHORTNESS OF BREATH: 0
COLOR CHANGE: 0
EYE ITCHING: 0
APNEA: 0
PHOTOPHOBIA: 0

## 2023-03-15 ASSESSMENT — PAIN - FUNCTIONAL ASSESSMENT
PAIN_FUNCTIONAL_ASSESSMENT: 0-10
PAIN_FUNCTIONAL_ASSESSMENT: NONE - DENIES PAIN

## 2023-03-15 ASSESSMENT — PAIN DESCRIPTION - LOCATION
LOCATION: ABDOMEN
LOCATION: ABDOMEN

## 2023-03-15 ASSESSMENT — PAIN DESCRIPTION - ORIENTATION: ORIENTATION: RIGHT

## 2023-03-15 ASSESSMENT — PAIN SCALES - GENERAL
PAINLEVEL_OUTOF10: 7
PAINLEVEL_OUTOF10: 7

## 2023-03-15 NOTE — ED PROVIDER NOTES
[facility] EMERGENCY DEPT  eMERGENCYdEPARTMENT eNCOUnter      Pt Name: Eduarda Dowell  MRN: [id number]  [place] 1955  Date of evaluation: 3/15/2023  Provider:ADRY Gage    CHIEF COMPLAINT       Chief Complaint   Patient presents with    Abdominal Pain     Pain to RLQ x1 week with nausea and loose stools          HISTORY OF PRESENT ILLNESS  (Location/Symptom, Timing/Onset, Context/Setting, Quality, Duration, Modifying Factors, Severity.)   Eduarda Dowell is a 69 y.o. male who presents to the emergency department with complaints of RLQ x 1 week loose stool he is a diabetic extensive heart hx no chest pain today. He does have his appendix. Denies fever. No emesis. No blood in stools. Takes plavix. Non hogekin lymphoma followed by onc in [place]. He is in remission states he had black stool yesterday but resolved today. HPI    Nursing Notes were reviewed and I agree. REVIEW OF SYSTEMS    (2-9 systems for level 4, 10 or more for level 5)     Review of Systems   Constitutional:  Negative for activity change, appetite change, chills and fever. HENT:  Negative for congestion and dental problem. Eyes:  Negative for photophobia, discharge and itching. Respiratory:  Negative for apnea, cough and shortness of breath. Cardiovascular:  Negative for chest pain. Gastrointestinal:  Positive for abdominal pain. Musculoskeletal:  Negative for arthralgias, back pain, gait problem, myalgias and neck pain. Skin:  Negative for color change, pallor and rash. Neurological:  Negative for dizziness, seizures and syncope. Psychiatric/Behavioral:  Negative for agitation. The patient is not nervous/anxious. Except as noted above the remainder of the review of systems was reviewed and negative.        PAST MEDICAL HISTORY     Past Medical History:   Diagnosis Date    Abnormal nuclear stress test 10/22/2014    Arthritis     BiPAP (biphasic positive airway pressure) dependence     8cm to 20cm    Cerebrovascular disease     Chronic kidney disease, stage II (mild) 08/17/2016    Opal Heart M.D. Cirrhosis ([facility] [address]     Coronary artery disease 02/24/2011    Depression     Diabetes mellitus ([facility] [address]     Encounter for wound care     PT SEES \"GRACY\" WITH DR. SAMUEL    Great toe pain     RT    Headache     Hyperlipemia 02/24/2011    Dr. Naki Mail follows lipids. Hyperlipidemia     Hypertension     Liver disease     LONG TERM ANTICOAGULENT USE     Low blood pressure 11/19/2014    lymphostatic lymphoma     Nausea 11/19/2014    Non Hodgkin's lymphoma (HCC)     Obesity     Obstructive sleep apnea     AHI:  21.7 per PSG, 7/2017    Peptic ulcer disease 02/24/2011    Restless leg     S/P CABG x 3 10/22/2014    SVG to RCA; SVG to LAD diagonal; LIMA to HFE(5971, 2014)    Sleep apnea     Syncope and collapse 7/24/2022    Tachyarrhythmia     Thrombocythemia, essential ([facility] [address]     Thyroid disease     Type 2 diabetes mellitus without complication ([facility] [address]     Type II or unspecified type diabetes mellitus without mention of complication, not stated as uncontrolled          SURGICAL HISTORY       Past Surgical History:   Procedure Laterality Date    CARDIAC CATHETERIZATION  02/28/2011    EF 60%    CARDIAC CATHETERIZATION  9/16/05, 3/7/07    EF < 50%    CARDIAC CATHETERIZATION  12/4/12   MDL    EF  50%    CARDIAC CATHETERIZATION  10/28/14  MDL    CARDIAC CATHETERIZATION      CARDIAC CATHETERIZATION  07/05/2022    No intervention    Leo Grajeda M.D.     CERVICAL SPINE SURGERY      COLONOSCOPY      CORONARY ARTERY BYPASS GRAFT  09/21/2005    LIMA to LAD and diagonal; SVG to posterior descending branch RCA    CORONARY ARTERY BYPASS GRAFT  10/30/2014    Redo Sternotomy - SVG-PDA, TMR (RBL)    JOINT REPLACEMENT Right     knee    KIDNEY SURGERY  08/14/2017    KNEE ARTHROSCOPY      right ACL repair    SHOULDER ARTHROSCOPY      left-rotator cuff repair right- rotator cuff repair    SHOULDER ARTHROSCOPY  08/23/2011 right    TONSILLECTOMY           CURRENT MEDICATIONS       Previous Medications    ACETAMINOPHEN (TYLENOL) 500 MG TABLET    Take 1,000 mg by mouth every 6 hours as needed for Pain    ASCORBIC ACID (VITAMIN C) 250 MG TABLET    Take 250 mg by mouth daily    ATORVASTATIN (LIPITOR) 40 MG TABLET    TAKE 1 TABLET BY MOUTH NIGHTLY AT BEDTIME    B COMPLEX VITAMINS CAPSULE    Take 1 capsule by mouth daily.    BUSPIRONE (BUSPAR) 15 MG TABLET    Take 15 mg by mouth 2 times daily     CLOPIDOGREL (PLAVIX) 75 MG TABLET    One daily    DOCUSATE SODIUM (COLACE) 100 MG CAPSULE    Take 100 mg by mouth at bedtime    DULOXETINE (CYMBALTA) 60 MG EXTENDED RELEASE CAPSULE    Take 60 mg by mouth at bedtime    EMPAGLIFLOZIN (JARDIANCE) 25 MG TABLET    Take 25 mg by mouth daily    FERROUS SULFATE 325 (65 FE) MG TABLET    Take 325 mg by mouth 2 times daily    GABAPENTIN (NEURONTIN) 100 MG CAPSULE    Take 100 mg by mouth at bedtime.    GARLIC-CALCIUM PO    Take by mouth    ICOSAPENT ETHYL (VASCEPA) 1 G CAPS CAPSULE    Take 2 capsules by mouth 2 times daily    INSULIN DEGLUDEC (TRESIBA) 100 UNIT/ML SOLN    Inject 6 Units into the skin nightly    LEVOTHYROXINE (SYNTHROID) 50 MCG TABLET    Take 50 mcg by mouth Daily    METFORMIN (GLUCOPHAGE) 500 MG TABLET    Take 1 tablet by mouth 2 times daily (with meals) Please hold for 48 hrs after cardiac catheterization. You may resume like normal on Friday 7/8/2022.    NITROGLYCERIN (NITROSTAT) 0.4 MG SL TABLET    Place 1 tablet under the tongue every 5 minutes as needed for Chest pain    ONDANSETRON (ZOFRAN) 8 MG TABLET    Take 8 mg by mouth as needed     PANTOPRAZOLE (PROTONIX) 40 MG TABLET    Take 1 tablet by mouth daily    RANOLAZINE (RANEXA) 1000 MG EXTENDED RELEASE TABLET    Take 1 tablet by mouth 2 times daily    VITAMIN D (CHOLECALCIFEROL) 1000 UNITS CAPS CAPSULE    Take 1,000 Units by mouth daily.       ALLERGIES     Ancef [cefazolin sodium], Ceftin [cefuroxime], Cefuroxime axetil, Cephalosporins,  and Neosporin [bacitracin-polymyxin b]    FAMILY HISTORY       Family History   Problem Relation Age of Onset    Heart Disease Other     Lung Cancer Mother     Cancer Sister           SOCIAL HISTORY       Social History     Socioeconomic History    Marital status:      Spouse name: German Bowers    Number of children: 2    Years of education: 12    Highest education level: None   Occupational History    Occupation:      Employer: [employer]     Comment: Retired,  for the [employer]. Tobacco Use    Smoking status: Former     Packs/day: 0.00     Types: Cigarettes     Quit date: 1998     Years since quittin.1    Smokeless tobacco: Never    Tobacco comments:     quit 23 years ago   Vaping Use    Vaping Use: Never used   Substance and Sexual Activity    Alcohol use: No    Drug use: No    Sexual activity: Yes     Partners: Female     Social Determinants of Health     Financial Resource Strain: Low Risk     Difficulty of Paying Living Expenses: Not very hard   Food Insecurity: No Food Insecurity    Worried About Running Out of Food in the Last Year: Never true    Ran Out of Food in the Last Year: Never true   Stress: Stress Concern Present    Feeling of Stress :  To some extent   Intimate Partner Violence: Not At Risk    Fear of Current or Ex-Partner: No    Emotionally Abused: No    Physically Abused: No    Sexually Abused: No   Housing Stability: Low Risk     Unable to Pay for Housing in the Last Year: No    Number of Places Lived in the Last Year: 1    Unstable Housing in the Last Year: No       SCREENINGS    Slater Coma Scale  Eye Opening: Spontaneous  Best Verbal Response: Oriented  Best Motor Response: Obeys commands  Elle Coma Scale Score: 15      PHYSICAL EXAM    (up to 7 forlevel 4, 8 or more for level 5)     ED Triage Vitals [03/15/23 1204]   BP Temp Temp src Heart Rate Resp SpO2 Height Weight   124/67 97.8 °F (36.6 °C) -- 79 16 95 % -- 208 lb (94.3 kg)       Physical Exam  Constitutional:       General: He is not in acute distress. Appearance: He is well-developed. He is not diaphoretic. HENT:      Head: Normocephalic and atraumatic. Right Ear: External ear normal.      Left Ear: External ear normal.   Eyes:      Pupils: Pupils are equal, round, and reactive to light. Neck:      Trachea: No tracheal deviation. Cardiovascular:      Rate and Rhythm: Normal rate and regular rhythm. Heart sounds: Normal heart sounds. No murmur heard. Pulmonary:      Effort: Pulmonary effort is normal.      Breath sounds: Normal breath sounds. No stridor. No wheezing. Chest:      Chest wall: No tenderness. Abdominal:      General: Bowel sounds are normal. There is no distension. Palpations: Abdomen is soft. Tenderness: There is abdominal tenderness in the right lower quadrant. Musculoskeletal:         General: Normal range of motion. Cervical back: Normal range of motion and neck supple. Skin:     General: Skin is warm and dry. Capillary Refill: Capillary refill takes less than 2 seconds. Neurological:      Mental Status: He is alert and oriented to person, place, and time. Psychiatric:         Behavior: Behavior normal.         DIAGNOSTIC RESULTS     RADIOLOGY:   Non-plain film images such as CT, Ultrasound and MRI are read by the radiologist. Plain radiographic images are visualized and preliminarilyinterpreted by No att. providers found with the below findings:        Interpretation per the Radiologist below, if available at the time of this note:    CT ABDOMEN PELVIS W IV CONTRAST Additional Contrast? None   Final Result   Splenic hilum varices. Nonobstructive left nephrolithiasis. Mural thickening, luminal narrowing and mild para mesenteric inflammatory change of the descending and rectosigmoid colon suggestive of colitis. Cholelithiasis.    All CT scans at this facility utilize dose modulation, iterative reconstruction, and/or weight based dosing when appropriate to reduce radiation dose to as low as reasonably achievable.       Dictated and Electronically Signed by TENISHA NATARAJAN MD at 15-Mar-2023 03:40:15 PM EST                   LABS:  Labs Reviewed   CBC WITH AUTO DIFFERENTIAL - Abnormal; Notable for the following components:       Result Value    RBC 3.51 (*)     Hemoglobin 11.7 (*)     Hematocrit 36.0 (*)     .6 (*)     MCH 33.3 (*)     MCHC 32.5 (*)     RDW 14.9 (*)     Platelets 102 (*)     Neutrophils % 71.7 (*)     Lymphocytes % 14.7 (*)     Lymphocytes Absolute 0.9 (*)     All other components within normal limits   COMPREHENSIVE METABOLIC PANEL - Abnormal; Notable for the following components:    CO2 30 (*)     Glucose 140 (*)     All other components within normal limits   GASTROINTESTINAL PANEL, MOLECULAR   LIPASE   URINALYSIS WITH REFLEX TO CULTURE   PROTIME-INR   APTT       All other labs were within normal range or notreturned as of this dictation.    RE-ASSESSMENT          EMERGENCY DEPARTMENT COURSE and DIFFERENTIAL DIAGNOSIS/MDM:   Vitals:    Vitals:    03/15/23 1621 03/15/23 1631 03/15/23 1702 03/15/23 1731   BP: (!) 102/56 (!) 103/51 (!) 96/32 (!) 96/57   Pulse:       Resp:       Temp:       SpO2:  95%  97%   Weight:             MDM  Number of Diagnoses or Management Options  Colitis: new, needed workup     Amount and/or Complexity of Data Reviewed  Clinical lab tests: ordered and reviewed  Tests in the radiology section of CPT®: reviewed and ordered  Tests in the medicine section of CPT®: ordered and reviewed  Obtain history from someone other than the patient: yes  Discuss the patient with other providers: yes    No acute findings. Has colitis on CT read. Passing PO challenge. Feel he can dc with oral meds close follow via phone with his oncologist nothing impressive from baseline on blood work. Passing PO challenge. Normal lactic. Going home with cipro flagyl and nausea  meds as needed. All questions were entertained and answered. PROCEDURES:    Procedures      FINAL IMPRESSION      1.  Colitis          DISPOSITION/PLAN   DISPOSITION Decision To Discharge 03/15/2023 05:10:51 PM      PATIENT REFERRED TO:  [facility] EMERGENCY DEPT  Tyrell Neftali  [phone]    If symptoms worsen    [place] [address]  [phone]      gi referral    DISCHARGE MEDICATIONS:  New Prescriptions    CIPROFLOXACIN (CIPRO) 500 MG TABLET    Take 1 tablet by mouth 2 times daily for 10 days    METRONIDAZOLE (FLAGYL) 500 MG TABLET    Take 1 tablet by mouth 3 times daily for 10 days       (Please note that portions of this note were completed with a voice recognition program.  Efforts were made to edit the dictations but occasionallywords are mis-transcribed.)    Bina Ford, [address] [place] [address]  03/15/23 0929

## 2023-03-28 ENCOUNTER — OFFICE VISIT (OUTPATIENT)
Dept: GASTROENTEROLOGY | Age: 68
End: 2023-03-28
Payer: MEDICARE

## 2023-03-28 VITALS
DIASTOLIC BLOOD PRESSURE: 65 MMHG | HEIGHT: 71 IN | OXYGEN SATURATION: 97 % | BODY MASS INDEX: 29.26 KG/M2 | WEIGHT: 209 LBS | HEART RATE: 109 BPM | SYSTOLIC BLOOD PRESSURE: 130 MMHG

## 2023-03-28 DIAGNOSIS — R14.3 EXCESSIVE GAS: ICD-10-CM

## 2023-03-28 DIAGNOSIS — R13.10 DYSPHAGIA, UNSPECIFIED TYPE: ICD-10-CM

## 2023-03-28 DIAGNOSIS — K21.9 CHRONIC GERD: ICD-10-CM

## 2023-03-28 DIAGNOSIS — R10.84 GENERALIZED ABDOMINAL PAIN: ICD-10-CM

## 2023-03-28 DIAGNOSIS — R19.7 DIARRHEA, UNSPECIFIED TYPE: ICD-10-CM

## 2023-03-28 DIAGNOSIS — R19.7 DIARRHEA, UNSPECIFIED TYPE: Primary | ICD-10-CM

## 2023-03-28 DIAGNOSIS — K52.9 COLITIS: ICD-10-CM

## 2023-03-28 LAB
ALBUMIN SERPL-MCNC: 4.4 G/DL (ref 3.5–5.2)
ALP SERPL-CCNC: 104 U/L (ref 40–130)
ALT SERPL-CCNC: 15 U/L (ref 5–41)
ANION GAP SERPL CALCULATED.3IONS-SCNC: 12 MMOL/L (ref 7–19)
AST SERPL-CCNC: 19 U/L (ref 5–40)
BASOPHILS # BLD: 0 K/UL (ref 0–0.2)
BASOPHILS NFR BLD: 0.3 % (ref 0–1)
BILIRUB SERPL-MCNC: 1 MG/DL (ref 0.2–1.2)
BUN SERPL-MCNC: 19 MG/DL (ref 8–23)
CALCIUM SERPL-MCNC: 10.1 MG/DL (ref 8.8–10.2)
CHLORIDE SERPL-SCNC: 99 MMOL/L (ref 98–111)
CO2 SERPL-SCNC: 27 MMOL/L (ref 22–29)
CREAT SERPL-MCNC: 1.1 MG/DL (ref 0.5–1.2)
EOSINOPHIL # BLD: 0.1 K/UL (ref 0–0.6)
EOSINOPHIL NFR BLD: 1.2 % (ref 0–5)
ERYTHROCYTE [DISTWIDTH] IN BLOOD BY AUTOMATED COUNT: 15 % (ref 11.5–14.5)
GLUCOSE SERPL-MCNC: 160 MG/DL (ref 74–109)
HCT VFR BLD AUTO: 40.9 % (ref 42–52)
HGB BLD-MCNC: 12.9 G/DL (ref 14–18)
IMM GRANULOCYTES # BLD: 0.1 K/UL
LYMPHOCYTES # BLD: 0.9 K/UL (ref 1.1–4.5)
LYMPHOCYTES NFR BLD: 8.3 % (ref 20–40)
MCH RBC QN AUTO: 33 PG (ref 27–31)
MCHC RBC AUTO-ENTMCNC: 31.5 G/DL (ref 33–37)
MCV RBC AUTO: 104.6 FL (ref 80–94)
MONOCYTES # BLD: 1.1 K/UL (ref 0–0.9)
MONOCYTES NFR BLD: 10.6 % (ref 0–10)
NEUTROPHILS # BLD: 8.1 K/UL (ref 1.5–7.5)
NEUTS SEG NFR BLD: 79.1 % (ref 50–65)
PLATELET # BLD AUTO: 132 K/UL (ref 130–400)
PMV BLD AUTO: 10 FL (ref 9.4–12.4)
POTASSIUM SERPL-SCNC: 5.1 MMOL/L (ref 3.5–5)
PROT SERPL-MCNC: 7.8 G/DL (ref 6.6–8.7)
RBC # BLD AUTO: 3.91 M/UL (ref 4.7–6.1)
SODIUM SERPL-SCNC: 138 MMOL/L (ref 136–145)
WBC # BLD AUTO: 10.2 K/UL (ref 4.8–10.8)

## 2023-03-28 PROCEDURE — 3075F SYST BP GE 130 - 139MM HG: CPT | Performed by: NURSE PRACTITIONER

## 2023-03-28 PROCEDURE — 1123F ACP DISCUSS/DSCN MKR DOCD: CPT | Performed by: NURSE PRACTITIONER

## 2023-03-28 PROCEDURE — 3078F DIAST BP <80 MM HG: CPT | Performed by: NURSE PRACTITIONER

## 2023-03-28 PROCEDURE — 99204 OFFICE O/P NEW MOD 45 MIN: CPT | Performed by: NURSE PRACTITIONER

## 2023-03-28 ASSESSMENT — ENCOUNTER SYMPTOMS
ANAL BLEEDING: 0
DIARRHEA: 1
SHORTNESS OF BREATH: 0
ABDOMINAL PAIN: 1
VOMITING: 0
NAUSEA: 1
RECTAL PAIN: 0
CONSTIPATION: 0
TROUBLE SWALLOWING: 1
COUGH: 0
BLOOD IN STOOL: 0
CHOKING: 0
ABDOMINAL DISTENTION: 0

## 2023-03-28 NOTE — PROGRESS NOTES
Subjective:     Patient ID: Diana Bonilla is a 69 y.o. male  PCP: ADRY Vargas  Referring Provider: ADRY Estrada    HPI  Patient presents to the office today with the following complaints: New Patient and Dysphagia      Pt seen today for c/o dysphagia. This is chronic since neck surgery. I get choken real easy. He will regurgitate foods at times. He c/o excessive gas. Belching and flatulence. Hx chronic GERD, taking Protonix 40 mg daily      Pt also reports recent colitis. He was seen in ER on 3/15/2023 with c/o abdominal pain. Hx chronic constipation, using Miralax and stool softeners. Today, pt reports diarrhea. Stools are loose, type 5 or 6 on stool chart. Denies any blood in stool. Stools are black, however, he is taking iron BID. He c/o abdominal pain today. Denies any mucous in stool. He c/o nausea today as well. Last EGD 2017 - Dr. Aubrey Corona   Last Colonoscopy 2017 - no polyps per pt (Dr. Aubrey Corona)    Assessment:     1. Diarrhea, unspecified type    2. Colitis    3. Generalized abdominal pain    4. Dysphagia, unspecified type    5. Chronic GERD    6. Excessive gas            Plan:   - Check CBC and CMP today  - Order placed for GI panel, pt to collect this if diarrhea does not resolve  - Schedule Colonoscopy and EGD  Instruct on bowel prep. Nothing to eat or drink after midnight the day of the exam.  Unable to drive for 24 hours after the procedure. No aspirin or nonsteroidal anti-inflammatories for 5 days before procedure. I have discussed the benefits, alternatives, and risks (including bleeding, perforation and death)  for pursuing Endoscopy (EGD/Colonscopy/EUS/ERCP) with the patient and they are willing to continue. We also discussed the need for anesthesia, IV access, proper dietary changes, medication changes if necessary, and need for bowel prep (if ordered) prior to their Endoscopic procedure.   They are aware they must have someone accompany them to their

## 2023-03-29 ENCOUNTER — TELEPHONE (OUTPATIENT)
Dept: CARDIOLOGY CLINIC | Age: 68
End: 2023-03-29

## 2023-03-29 NOTE — TELEPHONE ENCOUNTER
Date: 4-27-23    Cardiologist: Dr. Jhon Cortes    Procedure: Endoscopy and Colonoscopy     Surgeon: Dr. Jeni Amador     Last Office Visit: 12-1-22    Reason for office visit and medical concerns addressed at this office visit: CAD, HTN, CKD, DM, hyperlipidemia, SOA    Testing Performed and Date of Service:  EKG 8-24-22  Heart cath 7-5-22    Does the patient have a stent? If so, what type? Not in last year     Current Medications: cymbalta, vascepa, vit C, metformin, jardiance, insulin, lipitor, neurontin, buspar, ranexa, plavix, tylenol, nitro, synthroid, Fe, protonix, vit D, colace     Is the patient currently taking an anticoagulant? If so, what is the diagnosis the patient has been given to warrant the need for the anticoagulant?  Plavix     Additional Notes: med hold on Plavix

## 2023-04-07 LAB
ADV 40+41 DNA STL QL NAA+NON-PROBE: NOT DETECTED
C CAYETANENSIS DNA STL QL NAA+NON-PROBE: NOT DETECTED
C COLI+JEJ+UPSA DNA STL QL NAA+NON-PROBE: NOT DETECTED
C DIF TOX TCDA+TCDB STL QL NAA+NON-PROBE: NOT DETECTED
CRYPTOSP DNA STL QL NAA+NON-PROBE: NOT DETECTED
E HISTOLYT DNA STL QL NAA+NON-PROBE: NOT DETECTED
EAEC PAA PLAS AGGR+AATA ST NAA+NON-PRB: NOT DETECTED
EC STX1+STX2 GENES STL QL NAA+NON-PROBE: NOT DETECTED
EPEC EAE GENE STL QL NAA+NON-PROBE: NOT DETECTED
ETEC LTA+ST1A+ST1B TOX ST NAA+NON-PROBE: NOT DETECTED
G LAMBLIA DNA STL QL NAA+NON-PROBE: NOT DETECTED
GI PATH DNA+RNA PNL STL NAA+NON-PROBE: NOT DETECTED
NOROVIRUS GI+II RNA STL QL NAA+NON-PROBE: NOT DETECTED
P SHIGELLOIDES DNA STL QL NAA+NON-PROBE: NOT DETECTED
RVA RNA STL QL NAA+NON-PROBE: NOT DETECTED
S ENT+BONG DNA STL QL NAA+NON-PROBE: NOT DETECTED
SAPO I+II+IV+V RNA STL QL NAA+NON-PROBE: NOT DETECTED
SHIGELLA SP+EIEC IPAH ST NAA+NON-PROBE: NOT DETECTED
V CHOL+PARA+VUL DNA STL QL NAA+NON-PROBE: NOT DETECTED
V CHOLERAE DNA STL QL NAA+NON-PROBE: NOT DETECTED
Y ENTEROCOL DNA STL QL NAA+NON-PROBE: NOT DETECTED

## 2023-04-27 ENCOUNTER — ANESTHESIA (OUTPATIENT)
Dept: ENDOSCOPY | Age: 68
End: 2023-04-27
Payer: MEDICARE

## 2023-04-27 ENCOUNTER — ANESTHESIA EVENT (OUTPATIENT)
Dept: ENDOSCOPY | Age: 68
End: 2023-04-27
Payer: MEDICARE

## 2023-04-27 ENCOUNTER — HOSPITAL ENCOUNTER (OUTPATIENT)
Age: 68
Setting detail: OUTPATIENT SURGERY
Discharge: HOME | End: 2023-04-27
Attending: INTERNAL MEDICINE | Admitting: INTERNAL MEDICINE
Payer: MEDICARE

## 2023-04-27 ENCOUNTER — TELEPHONE (OUTPATIENT)
Dept: GASTROENTEROLOGY | Age: 68
End: 2023-04-27

## 2023-04-27 VITALS
TEMPERATURE: 97.5 F | OXYGEN SATURATION: 98 % | DIASTOLIC BLOOD PRESSURE: 71 MMHG | SYSTOLIC BLOOD PRESSURE: 122 MMHG | HEART RATE: 87 BPM | RESPIRATION RATE: 16 BRPM | HEIGHT: 71 IN | WEIGHT: 209 LBS | BODY MASS INDEX: 29.26 KG/M2

## 2023-04-27 DIAGNOSIS — R13.10 DYSPHAGIA, UNSPECIFIED TYPE: ICD-10-CM

## 2023-04-27 DIAGNOSIS — Z87.19 HX OF ULCERATIVE COLITIS: ICD-10-CM

## 2023-04-27 LAB
GLUCOSE BLD-MCNC: 95 MG/DL (ref 70–99)
PERFORMED ON: NORMAL

## 2023-04-27 PROCEDURE — 6360000002 HC RX W HCPCS: Performed by: NURSE ANESTHETIST, CERTIFIED REGISTERED

## 2023-04-27 PROCEDURE — 3700000000 HC ANESTHESIA ATTENDED CARE: Performed by: INTERNAL MEDICINE

## 2023-04-27 PROCEDURE — 2709999900 HC NON-CHARGEABLE SUPPLY: Performed by: INTERNAL MEDICINE

## 2023-04-27 PROCEDURE — 3609010600 HC COLONOSCOPY POLYPECTOMY SNARE/COLD BIOPSY: Performed by: INTERNAL MEDICINE

## 2023-04-27 PROCEDURE — 3609012400 HC EGD TRANSORAL BIOPSY SINGLE/MULTIPLE: Performed by: INTERNAL MEDICINE

## 2023-04-27 PROCEDURE — 82962 GLUCOSE BLOOD TEST: CPT

## 2023-04-27 PROCEDURE — 7100000010 HC PHASE II RECOVERY - FIRST 15 MIN: Performed by: INTERNAL MEDICINE

## 2023-04-27 PROCEDURE — 3609010300 HC COLONOSCOPY W/BIOPSY SINGLE/MULTIPLE: Performed by: INTERNAL MEDICINE

## 2023-04-27 PROCEDURE — 2500000003 HC RX 250 WO HCPCS: Performed by: NURSE ANESTHETIST, CERTIFIED REGISTERED

## 2023-04-27 PROCEDURE — 7100000011 HC PHASE II RECOVERY - ADDTL 15 MIN: Performed by: INTERNAL MEDICINE

## 2023-04-27 PROCEDURE — 88305 TISSUE EXAM BY PATHOLOGIST: CPT

## 2023-04-27 PROCEDURE — 3609015300 HC ESOPHAGEAL DILATION MALONEY: Performed by: INTERNAL MEDICINE

## 2023-04-27 PROCEDURE — 88342 IMHCHEM/IMCYTCHM 1ST ANTB: CPT

## 2023-04-27 PROCEDURE — 3700000001 HC ADD 15 MINUTES (ANESTHESIA): Performed by: INTERNAL MEDICINE

## 2023-04-27 PROCEDURE — 2580000003 HC RX 258: Performed by: INTERNAL MEDICINE

## 2023-04-27 RX ORDER — LIDOCAINE HYDROCHLORIDE 10 MG/ML
INJECTION, SOLUTION INFILTRATION; PERINEURAL PRN
Status: DISCONTINUED | OUTPATIENT
Start: 2023-04-27 | End: 2023-04-27 | Stop reason: SDUPTHER

## 2023-04-27 RX ORDER — SODIUM CHLORIDE, SODIUM LACTATE, POTASSIUM CHLORIDE, CALCIUM CHLORIDE 600; 310; 30; 20 MG/100ML; MG/100ML; MG/100ML; MG/100ML
INJECTION, SOLUTION INTRAVENOUS CONTINUOUS
Status: DISCONTINUED | OUTPATIENT
Start: 2023-04-27 | End: 2023-04-27 | Stop reason: HOSPADM

## 2023-04-27 RX ORDER — PROPOFOL 10 MG/ML
INJECTION, EMULSION INTRAVENOUS PRN
Status: DISCONTINUED | OUTPATIENT
Start: 2023-04-27 | End: 2023-04-27 | Stop reason: SDUPTHER

## 2023-04-27 RX ADMIN — SODIUM CHLORIDE, POTASSIUM CHLORIDE, SODIUM LACTATE AND CALCIUM CHLORIDE: 600; 310; 30; 20 INJECTION, SOLUTION INTRAVENOUS at 09:17

## 2023-04-27 RX ADMIN — PROPOFOL 350 MG: 10 INJECTION, EMULSION INTRAVENOUS at 10:28

## 2023-04-27 RX ADMIN — LIDOCAINE HYDROCHLORIDE 40 MG: 10 INJECTION, SOLUTION INFILTRATION; PERINEURAL at 10:28

## 2023-04-27 ASSESSMENT — PAIN - FUNCTIONAL ASSESSMENT: PAIN_FUNCTIONAL_ASSESSMENT: 0-10

## 2023-04-27 ASSESSMENT — ENCOUNTER SYMPTOMS: SHORTNESS OF BREATH: 1

## 2023-04-27 NOTE — OP NOTE
Endoscopic Procedure Note    Patient: Genoveva Mireles: 1955  Med Rec#: [id number] Acc#: [id number]     Primary Care Provider ADRY Pennington    Endoscopist: Saravanan Conner MD, MD    Date of Procedure:  4/27/2023    Procedure:   EGD with  Joshua Lui bougie dilation of esophagus and  Cold biopsies    Indications: For both EGD and colonoscopy exams today:    1. Diarrhea, unspecified type    2. Colitis    3. Generalized abdominal pain    4. Dysphagia, unspecified type    5. Chronic GERD    6. Excessive gas      Anesthesia:  Sedation was administered by anesthesia who monitored the patient during the procedure. Estimated Blood Loss: minimal    Procedure:   After reviewing the patient's chart and obtaining informed consent, the patient was placed in the left lateral decubitus position. A forward-viewing Olympus endoscope was lubricated and inserted through the mouth into the oropharynx. Under direct visualization, the upper esophagus was intubated. The scope was advanced to the level of the third portion of duodenum. Scope was slowly withdrawn with careful inspection of the mucosal surfaces. The scope was retroflexed for inspection of the gastric fundus and incisura. Findings and maneuvers are listed in impression below. Next, a lubricated Nicolas weighted Bougie dilator-54 Fr was gently introduced into the patient's mouth and passed into the Esophagus and into the proximal stomach without much resistance and then withdrawn. Repeat EGD was performed to verify dilation and scope tip was passed into the stomach. NO evidence of perforation or excessive bleeding was noted subsequent to the dilation. The patient tolerated the procedure well. The scope was removed. There were no immediate complications. Findings/IMPRESSION:  Esophagus: normal and a normal EG junction at 40 cm.     NO erosions or ulcers or nodules or strictures or webs or rings or mass lesions or extrinsic

## 2023-04-27 NOTE — TELEPHONE ENCOUNTER
04-27-23 Per Dr Lorena Gregorio note       Recommendations:  1.  Repeat colonoscopy: pending pathology -in the next week or 2 , with a strict 2-day clear liquid diet and a 2-day prep using Plenvu or a similar solution along with Reglan 5 mg p.o. every 6 hours as needed       Routed to [employer]

## 2023-04-27 NOTE — ANESTHESIA POSTPROCEDURE EVALUATION
Department of Anesthesiology  Postprocedure Note    Patient: Loulou Avelar  MRN: [id number]  YOB: 1955  Date of evaluation: 4/27/2023      Procedure Summary     Date: 04/27/23 Room / Location: [address]    Anesthesia Start: 1021 Anesthesia Stop:     Procedures:       EGD BIOPSY (Abdomen)      COLONOSCOPY POLYPECTOMY SNARE/COLD BIOPSY      ESOPHAGEAL DILATION COLBERT      COLONOSCOPY WITH BIOPSY Diagnosis:       Dysphagia, unspecified type      Hx of ulcerative colitis      (Dysphagia, unspecified type [R13.10])      (Hx of ulcerative colitis [Z87.19])    Surgeons: Xiomara Jordan MD Responsible Provider: GRECIA Thomas CRNA    Anesthesia Type: general, TIVA ASA Status: 3          Anesthesia Type: No value filed.     Raciel Phase I: Raciel Score: 10    Raciel Phase II:        Anesthesia Post Evaluation    Patient location during evaluation: bedside  Patient participation: complete - patient participated  Level of consciousness: sleepy but conscious  Pain score: 0  Airway patency: patent  Nausea & Vomiting: no nausea and no vomiting  Complications: no  Cardiovascular status: hemodynamically stable and blood pressure returned to baseline  Respiratory status: acceptable and nasal cannula  Hydration status: stable

## 2023-04-27 NOTE — OP NOTE
Patient: Romayne Miller: 1955  Med Rec#: [id number] Acc#: [id number]   Primary Care Provider ADRY Lopez    Date of Procedure:  4/27/2023    Endoscopist: Sal Garcia MD, MD    Referring Provider: ADRY Lopez,     Operation Performed: Colonoscopy-incomplete exam up to the proximal transverse colon only due to poor prep and with  Hot biopsy polypectomy of a small transverse colon polyp and  Random cold biopsies    Indications: For both EGD and colonoscopy exams today:    1. Diarrhea, unspecified type    2. Colitis    3. Generalized abdominal pain    4. Dysphagia, unspecified type    5. Chronic GERD    6. Excessive gas        Anesthesia:  Sedation was administered by anesthesia who monitored the patient during the procedure. I met with Kirby Grier prior to procedure. We discussed the procedure itself, and I have discussed the risks of endoscopy (including-- but not limited to-- pain, discomfort, bleeding potentially requiring second endoscopic procedure and/or blood transfusion, organ perforation requiring operative repair, damage to organs near the colon, infection, aspiration, cardiopulmonary/allergic reaction), benefits, indications to endoscopy. Additionally, we discussed options other than colonoscopy. The patient expressed understanding. All questions answered. The patient decided to proceed with the procedure. Signed informed consent was placed on the chart. Blood Loss: minimal    Withdrawal time: Not applicable since procedure was incomplete  Bowel Prep: Fair to poor with thick solid and semisolid stool scattered in segments throughout the colon obscuring the underlying mucosa. Small lesions including polyps may have been missed. Complications: no immediate complications    DESCRIPTION OF PROCEDURE:     A time out was performed. After written informed consent was obtained, the patient was placed in the left lateral position.      The perianal area

## 2023-04-27 NOTE — ANESTHESIA PRE PROCEDURE
Department of Anesthesiology  Preprocedure Note       Name:  Rk Agosto   Age:  69 y.o.  :  1955                                          MRN:  [id number]         Date:  2023      Surgeon: Jacki Langley):  Evy Mathews MD    Procedure: Procedure(s):  EGD BIOPSY  COLONOSCOPY DIAGNOSTIC    Medications prior to admission:   Prior to Admission medications    Medication Sig Start Date End Date Taking? Authorizing Provider   tamsulosin (FLOMAX) 0.4 MG capsule Take 1 capsule by mouth daily 22  Bhupendra Becerril MD   DULoxetine (CYMBALTA) 60 MG extended release capsule Take 1 capsule by mouth at bedtime 22   Historical Provider, MD   Icosapent Ethyl (VASCEPA) 1 g CAPS capsule Take 2 capsules by mouth 2 times daily    Historical Provider, MD   metFORMIN (GLUCOPHAGE) 500 MG tablet Take 1 tablet by mouth 2 times daily (with meals) Please hold for 48 hrs after cardiac catheterization. You may resume like normal on 2022. 22   Argentina Philip MD   Zinc Sulfate (ZINC 15 PO) Take by mouth  22  Historical Provider, MD   metoprolol tartrate (LOPRESSOR) 25 MG tablet Take 1 tablet by mouth every evening 22  Argentina Philip MD   isosorbide mononitrate (IMDUR) 30 MG extended release tablet Take 1 tablet by mouth daily 22  Argentina Philip MD   empagliflozin (JARDIANCE) 25 MG tablet Take 1 tablet by mouth daily    Historical Provider, MD   Insulin Degludec (TRESIBA) 100 UNIT/ML SOLN Inject 6 Units into the skin nightly    Historical Provider, MD   gabapentin (NEURONTIN) 100 MG capsule Take 1 capsule by mouth at bedtime.  21   Historical Provider, MD   busPIRone (BUSPAR) 15 MG tablet Take 15 mg by mouth 2 times daily    Historical Provider, MD   ranolazine (RANEXA) 1000 MG extended release tablet Take 1 tablet by mouth 2 times daily 3/19/21   GRECIA Ernandez NP   clopidogrel (PLAVIX) 75 MG tablet One daily  Patient taking differently:

## 2023-04-27 NOTE — DISCHARGE INSTRUCTIONS
1. Await path results, the patient will be contacted in 7-10 days with biopsy results. 2.  Magic mouthwash 5 ml PO Swish and swallow q3h PRN ONLY IF patient has post-procedural sorethroat or chest pain. 3. Full liquids to soft diet today carrie discharge from the surgicenter; may advance  diet starting in AM tomorrow. 4. May resume other meds except any ASA/NSAIDs; may use cough drops or lozenges PRN; also OTC/prescription PPI or H2RA PO qday or BID with anti-GERD measures. 5. NO ASA/NSAIDs x 2 weeks  6. OP f/u in 6-8 weeks with Ms. Heriberto Roper; will consider an Esophageal manometry later if the patient's dysphagia persists. 7.Repeat colonoscopy: pending pathology -in the next week or 2 , with a strict 2-day clear liquid diet and a 2-day prep using Plenvu or a similar solution along with Reglan 5 mg p.o. every 6 hours as needed     Upper GI Endoscopy and Colonoscopy: What to Expect at [address] [place] [address]    After an upper GI endoscopy, you may have a sore throat for a day or two after the test.    After a colonoscopy you may be bloated or have gas pains. You may need to pass gas. If a biopsy was done or a polyp was removed, you may have streaks of blood in your stool (feces) for a few days. Problems such as heavy rectal bleeding may not occur until several weeks after the test. This isn't common. But it can happen after polyps are removed. How can you care for yourself at home? Activity   Rest as much as you need to after you go home. You should be able to go back to your usual activities the day after the test.  You should not drive or operate equipment for the remainder of today. Diet   Follow your doctor's directions for eating after the test.  Unless your doctor has told you not to, drink plenty of fluids. This helps to replace the fluids that were lost during the colon prep.   Do not drink alcohol for 24 hours    Medications   If you have a sore throat the day after the test, use an over-the-counter

## 2023-04-27 NOTE — H&P
Patient Name: Marcia Anand  : 1955  MRN: [id number]  DATE: 23    Allergies: Allergies   Allergen Reactions    Ancef [Cefazolin Sodium]     Ceftin [Cefuroxime]      \"anything with ceftin in it\"    Cefuroxime Axetil     Cephalosporins     Neosporin [Bacitracin-Polymyxin B]      blisters        ENDOSCOPY  History and Physical    Procedure:    [x] Diagnostic Colonoscopy       [] Screening Colonoscopy  [x] EGD      [] ERCP      [] EUS       [] Other    [x] Previous office notes/History and Physical reviewed from the patients chart. Please see EMR for further details of HPI. I have examined the patient's status immediately prior to the procedure and:      Indications/HPI: For both EGD and colonoscopy exams today:      1. Diarrhea, unspecified type    2. Colitis    3. Generalized abdominal pain    4. Dysphagia, unspecified type    5. Chronic GERD    6. Excessive gas    Last EGD 2017 - Dr. Annie Farr   Last Colonoscopy 2017 - no polyps per pt (Dr. Annie Farr)       []Abdominal Pain   []Cancer- GI/Lung     []Fhx of colon CA/polyps  []History of Polyps  []Barretts            []Melena  []Abnormal Imaging              []Dysphagia              []Persistent Pneumonia   []Anemia                            []Food Impaction        []History of Polyps  [] GI Bleed             []Pulmonary nodule/Mass   []Change in bowel habits []Heartburn/Reflux  []Rectal Bleed (BRBPR)  []Chest Pain - Non Cardiac []Heme (+) Stool []Ulcers  []Constipation  []Hemoptysis  []Varices  []Diarrhea  []Hypoxemia    []Nausea/Vomiting   []Screening   []Crohns/Colitis  []Other:     Anesthesia:   [x] MAC [] Moderate Sedation   [] General   [] None     ROS: 12 pt Review of Symptoms was negative unless mentioned above    Medications:   Prior to Admission medications    Medication Sig Start Date End Date Taking?  Authorizing Provider   tamsulosin (FLOMAX) 0.4 MG capsule Take 1 capsule by mouth daily 22  Nick Murray MD   DULoxetine

## 2023-05-12 ENCOUNTER — TELEPHONE (OUTPATIENT)
Dept: ADMINISTRATIVE | Age: 68
End: 2023-05-12

## 2023-05-15 NOTE — PROGRESS NOTES
[facility] - PODIATRY    Today's Date: 05/26/23    Patient Name: Franko Lucero  MRN: [id number]  CSN: [id number]  PCP: Wilner Engel PA-C  Referring Provider: No ref. provider found    SUBJECTIVE     Chief Complaint   Patient presents with    Follow-up      Wilner Engel PA-C-03/07/2023--Return in about 3 months- PT STATES spot on 5th toe left foot issues but feet doing pretty good- pt denies pain     Diabetes     119mg/dl BG this am     HPI: Franko Lucero, a 67 y.o.male, comes to clinic as a(n) estbalished patient presenting for diabetic foot exam, complaining of foot pain and complaining of thickened, irregular toenails, and calluses . Patient has h/o anemia, anxiety, arthritis, BPH, CAD, CA, DM with neuropathy, Iliac artery aneurysm, HTN, HLD . Patient is NIDDM with last stated BG level of 119mg/dl. Hx of diabetic foot ulcer. Continues to utilize spacers due to formation of corns. Reports return of multiple calluses to the ball of both feet and distal right 2nd toe. Reports nails are long, thick, and irregular and that he is unable to care for them himself. Denies pain today. Relates previous treatment(s) including wound care treatment in OP [facility], care by podiatry . Denies any constitutional symptoms. No other pedal complaints at this time.    Past Medical History:   Diagnosis Date    Anemia     Anxiety     Arthritis     BPH (benign prostatic hypertrophy)     CAD (coronary artery disease)     Cancer     non-hodgkins lymphoma    Diabetes mellitus     Disease of thyroid gland     GERD (gastroesophageal reflux disease)     Hyperlipidemia     Hypertension     Iliac artery aneurysm, bilateral     Kidney stone     Sleep apnea      Past Surgical History:   Procedure Laterality Date    COLONOSCOPY  02/04/2014    COLONOSCOPY N/A 4/26/2017    Procedure: COLONOSCOPY WITH ANESTHESIA;  Surgeon: Sher Cui MD;  Location: [facility] ENDOSCOPY;  Service:     CORONARY ARTERY BYPASS GRAFT       2    CYSTOSCOPY URETEROSCOPY LASER LITHOTRIPSY Left 2017    Procedure: URETEROSCOPY LASER LITHOTRIPSY WITH DOUBLE J STENT INSERTION, LEFT ;  Surgeon: Weston Peck MD;  Location: [facility] OR;  Service:     CYSTOSCOPY URETEROSCOPY LASER LITHOTRIPSY Left 2017    Procedure: CYSTOSCOPY URETEROSCOPY LASER LITHOTRIPSY STENT INSERTION STONE EXTRACTION;  Surgeon: Weston Peck MD;  Location: [facility] OR;  Service:     CYSTOSCOPY W/ URETERAL STENT PLACEMENT Left 2017    Procedure: CYSTOSCOPY URETERAL DOUBLE J STENT INSERTION, LEFT;  Surgeon: Weston Peck MD;  Location: [facility] OR;  Service:     ENDOSCOPY N/A 2017    Procedure: ESOPHAGOGASTRODUODENOSCOPY WITH ANESTHESIA;  Surgeon: Sher Cui MD;  Location: [facility] ENDOSCOPY;  Service:     JOINT REPLACEMENT Right     KIDNEY STONE SURGERY      KNEE SURGERY      replacement    NECK SURGERY      ROTATOR CUFF REPAIR      SHOULDER SURGERY      TONSILLECTOMY      URETEROSCOPY LASER LITHOTRIPSY WITH STENT INSERTION Left 2022    Procedure: LEFT URETEROSCOPY LASER LITHOTRIPSY WITH STENT INSERTION;  Surgeon: Weston Peck MD;  Location: [facility] OR;  Service: Urology;  Laterality: Left;    URETEROSCOPY LASER LITHOTRIPSY WITH STENT INSERTION Left 9/15/2022    Procedure: LEFT URETEROSCOPY LASER LITHOTRIPSY WITH STENT EXCHANGE;  Surgeon: Weston Peck MD;  Location: [facility] OR;  Service: Urology;  Laterality: Left;     Family History   Problem Relation Age of Onset    Kidney disease Father     Heart disease Father     Cancer Mother         brain    Colon cancer Neg Hx     Colon polyps Neg Hx      Social History     Socioeconomic History    Marital status:    Tobacco Use    Smoking status: Former     Types: Cigarettes     Quit date:      Years since quittin.4    Smokeless tobacco: Never   Vaping Use    Vaping Use: Never used   Substance and Sexual Activity    Alcohol use: No    Drug use: No    Sexual activity:  Defer     Allergies   Allergen Reactions    Adhesive Tape Rash     Pt states that if it isn't left on very long it is okay    Cefazolin Rash    Cefuroxime Axetil Rash    Cephalosporins Rash    Neomycin-Polymyxin B Gu Rash    Neosporin [Neomycin-Bacitracin Zn-Polymyx] Rash    Percocet [Oxycodone-Acetaminophen] Rash     Current Outpatient Medications   Medication Sig Dispense Refill    Ascorbic Acid (VITAMIN C PO) Take 250 mg by mouth Daily.      atorvastatin (LIPITOR) 40 MG tablet Take 1 tablet by mouth Daily.      b complex vitamins capsule Take  by mouth.      busPIRone (BUSPAR) 15 MG tablet Take 1 tablet by mouth Daily.      clopidogrel (PLAVIX) 75 MG tablet Take 1 tablet by mouth Daily.      docusate sodium (COLACE) 100 MG capsule Take  by mouth Daily.      DULoxetine (CYMBALTA) 60 MG capsule Take 1 capsule by mouth Daily.      empagliflozin (JARDIANCE) 25 MG tablet tablet Take 1 tablet by mouth Daily.      ferrous sulfate 325 (65 FE) MG EC tablet Take 1 tablet by mouth Daily With Breakfast.      gabapentin (NEURONTIN) 100 MG capsule Take 1 capsule by mouth Daily.      GARLIC-CALCIUM PO Take 1 tablet by mouth Daily.      HYDROcodone-acetaminophen (NORCO) 7.5-325 MG per tablet Take 1 tablet by mouth Every 6 (Six) Hours As Needed for Severe Pain. 12 tablet 0    ICOSAPENT ETHYL PO Take 1 dose by mouth Daily.      Insulin Degludec (TRESIBA SC) Inject 6 Units under the skin into the appropriate area as directed Every Night.      levothyroxine (SYNTHROID, LEVOTHROID) 50 MCG tablet Take 1 tablet by mouth Daily.      metFORMIN (GLUCOPHAGE) 1000 MG tablet Take 500 mg by mouth 2 (Two) Times a Day With Meals.      nitroglycerin (NITROSTAT) 0.4 MG SL tablet Place 1 tablet under the tongue Every 5 (Five) Minutes As Needed.      ondansetron (ZOFRAN) 8 MG tablet Take 1 tablet by mouth Every 8 (Eight) Hours As Needed for Nausea or Vomiting.      pantoprazole (PROTONIX) 40 MG EC tablet Take 1 tablet by mouth Daily.       polyethylene glycol (MIRALAX) powder Take 17 g by mouth Daily As Needed.      pravastatin (PRAVACHOL) 40 MG tablet Take 1 tablet by mouth Every Night.      ranolazine (RANEXA) 1000 MG 12 hr tablet Take 1 tablet by mouth Daily.      therapeutic multivitamin-minerals (THERAGRAN-M) tablet Take  by mouth.      Vitamin D, Cholecalciferol, 1000 UNITS tablet Take 1 tablet by mouth Daily.       No current facility-administered medications for this visit.     Review of Systems   Constitutional:  Negative for chills and fever.   HENT:  Negative for congestion.    Respiratory:  Negative for shortness of breath.    Cardiovascular:  Negative for chest pain and leg swelling.   Gastrointestinal:  Negative for constipation, diarrhea, nausea and vomiting.   Musculoskeletal:  Positive for arthralgias.        Foot pain   Skin:  Negative for wound.        Calluses   Neurological:  Positive for numbness.     OBJECTIVE     Vitals:    05/26/23 0933   BP: 128/64   Pulse: 99   SpO2: 97%       PHYSICAL EXAM  GEN:   Accompanied by none.     Foot/Ankle Exam    GENERAL  Diabetic foot exam performed    Appearance:  appears stated age  Orientation:  AAOx3  Affect:  appropriate  Gait:  unimpaired  Assistance:  independent    VASCULAR     Right Foot Vascularity   Dorsalis pedis:  2+  Posterior tibial:  2+  Skin temperature:  warm  Edema grading:  None  CFT:  3  Pedal hair growth:  Present  Varicosities:  mild varicosities     Left Foot Vascularity   Dorsalis pedis:  2+  Posterior tibial:  2+  Skin temperature:  warm  Edema grading:  None  CFT:  3  Pedal hair growth:  Present  Varicosities:  mild varicosities     NEUROLOGIC     Right Foot Neurologic   Light touch sensation: diminished  Vibratory sensation: diminished  Hot/Cold sensation: diminished  Protective Sensation using [place]-Derek Monofilament:   Sites intact: 3  Sites tested: 10     Left Foot Neurologic   Light touch sensation: diminished  Vibratory sensation: diminished  Hot/Cold  sensation:  diminished  Protective Sensation using [place]-Derek Monofilament:   Sites intact: 4  Sites tested: 10    MUSCULOSKELETAL     Right Foot Musculoskeletal   Tenderness:  callous right foot and toenail problem    Arch:  Normal  Hammertoe:  Second toe, third toe, fourth toe and fifth toe     Left Foot Musculoskeletal   Tenderness:  callous left foot, toe 5 tenderness and toenail problem  Arch:  Normal  Hammertoe:  Second toe, third toe, fourth toe and fifth toe    MUSCLE STRENGTH     Right Foot Muscle Strength   Foot dorsiflexion:  5  Foot plantar flexion:  5  Foot inversion:  5  Foot eversion:  5     Left Foot Muscle Strength   Foot dorsiflexion:  5  Foot plantar flexion:  5  Foot inversion:  5  Foot eversion:  5    RANGE OF MOTION     Right Foot Range of Motion   Foot and ankle ROM within normal limits       Left Foot Range of Motion   Foot and ankle ROM within normal limits      DERMATOLOGIC      Right Foot Dermatologic   Skin  Positive for corn and tinea.   Nails  1.  Positive for elongated, onychomycosis, abnormal thickness and dystrophic nail.  2.  Positive for elongated, abnormal thickness and dystrophic nail.  3.  Positive for elongated, abnormal thickness and dystrophic nail.  4.  Positive for elongated, abnormal thickness and dystrophic nail.  5.  Positive for elongated, abnormal thickness and dystrophic nail.     Left Foot Dermatologic   Skin  Positive for corn and tinea.   Nails  1.  Positive for elongated, onychomycosis, abnormal thickness and dystrophic nail.  2.  Positive for elongated, abnormal thickness and dystrophic nail.  3.  Positive for elongated, abnormal thickness and dystrophic nail.  4.  Positive for elongated, abnormally thick and dystrophic nail.  5.  Positive for elongated, abnormally thick and dystrophic nail.    Image:     RADIOLOGY/NUCLEAR:  No results found.    LABORATORY/CULTURE RESULTS:      PATHOLOGY RESULTS:       ASSESSMENT/PLAN     Diagnoses and all orders for this  visit:    1. Onychomycosis (Primary)    2. Pre-ulcerative corn or callous    3. Type 2 diabetes mellitus with diabetic polyneuropathy, without long-term current use of insulin    4. History of diabetic ulcer of foot    5. Antiplatelet or antithrombotic long-term use    6. Hammer toes of both feet      Comprehensive lower extremity examination and evaluation was performed.  Discussed findings and treatment plan including risks, benefits, and treatment options with patient in detail. Patient agreed with treatment plan.  Diabetic foot exam performed.  After verbal consent obtained, nail(s) x10 debrided of length and thickness with nail nipper without incidence  After verbal consent obtained, calluses x8 pared utilizing dermal curette and/or scalpel without incidence  Patient may maintain nails and calluses at home utilizing emery board or pumice stone between visits as needed  Reviewed at home diabetic foot care including daily foot checks   Continue diabetic management per PCP.    Continue use of spacer in left 4th interspace. Discussed potential arthroplasty of toe if corn or ulcer continues to be problematic.  An After Visit Summary was printed and given to the patient at discharge, including (if requested) any available informative/educational handouts regarding diagnosis, treatment, or medications. All questions were answered to patient/family satisfaction. Should symptoms fail to improve or worsen they agree to call or return to clinic or to go to the Emergency Department. Discussed the importance of following up with any needed screening tests/labs/specialist appointments and any requested follow-up recommended by me today. Importance of maintaining follow-up discussed and patient accepts that missed appointments can delay diagnosis and potentially lead to worsening of conditions.  Return in about 3 months (around 8/26/2023) for Follow-up with Podiatry Provider., or sooner if acute issues arise.    Lab Frequency  Next Occurrence   Follow Anesthesia Guidelines / Standing Orders Once 04/12/2017   Follow Anesthesia Guidelines / Standing Orders Once 08/11/2017   Comprehensive Metabolic Panel Once 11/03/2021   CBC (No Diff) Once 11/03/2021   Phosphorus Once 11/03/2021   Magnesium Once 11/03/2021   Uric Acid Once 11/03/2021   Creatinine, Urine, Random - Urine, Clean Catch Once 10/29/2021   Urinalysis With Microscopic If Indicated (No Culture) - Urine, Clean Catch Once 10/29/2021   Vitamin D 25 Hydroxy Once 11/03/2021   PTH, Intact Once 11/03/2021       This document has been electronically signed by Silas Swan DPM on May 26, 2023 10:08 CDT

## 2023-05-25 ENCOUNTER — TELEPHONE (OUTPATIENT)
Dept: PODIATRY | Facility: CLINIC | Age: 68
End: 2023-05-25
Payer: MEDICARE

## 2023-05-26 ENCOUNTER — OFFICE VISIT (OUTPATIENT)
Dept: PODIATRY | Facility: CLINIC | Age: 68
End: 2023-05-26
Payer: MEDICARE

## 2023-05-26 VITALS
WEIGHT: 214 LBS | HEART RATE: 99 BPM | BODY MASS INDEX: 29.96 KG/M2 | OXYGEN SATURATION: 97 % | SYSTOLIC BLOOD PRESSURE: 128 MMHG | DIASTOLIC BLOOD PRESSURE: 64 MMHG | HEIGHT: 71 IN

## 2023-05-26 DIAGNOSIS — M20.42 HAMMER TOES OF BOTH FEET: ICD-10-CM

## 2023-05-26 DIAGNOSIS — B35.1 ONYCHOMYCOSIS: Primary | ICD-10-CM

## 2023-05-26 DIAGNOSIS — L84 PRE-ULCERATIVE CORN OR CALLOUS: ICD-10-CM

## 2023-05-26 DIAGNOSIS — Z79.02 ANTIPLATELET OR ANTITHROMBOTIC LONG-TERM USE: ICD-10-CM

## 2023-05-26 DIAGNOSIS — Z86.31 HISTORY OF DIABETIC ULCER OF FOOT: ICD-10-CM

## 2023-05-26 DIAGNOSIS — E11.42 TYPE 2 DIABETES MELLITUS WITH DIABETIC POLYNEUROPATHY, WITHOUT LONG-TERM CURRENT USE OF INSULIN: ICD-10-CM

## 2023-05-26 DIAGNOSIS — M20.41 HAMMER TOES OF BOTH FEET: ICD-10-CM

## 2023-06-01 ENCOUNTER — OFFICE VISIT (OUTPATIENT)
Dept: CARDIOLOGY CLINIC | Age: 68
End: 2023-06-01
Payer: MEDICARE

## 2023-06-01 VITALS
SYSTOLIC BLOOD PRESSURE: 108 MMHG | DIASTOLIC BLOOD PRESSURE: 58 MMHG | HEART RATE: 91 BPM | BODY MASS INDEX: 30.24 KG/M2 | WEIGHT: 216 LBS | HEIGHT: 71 IN

## 2023-06-01 DIAGNOSIS — R06.02 SHORTNESS OF BREATH: ICD-10-CM

## 2023-06-01 DIAGNOSIS — Z95.1 HISTORY OF CORONARY ARTERY BYPASS GRAFT X 1: ICD-10-CM

## 2023-06-01 DIAGNOSIS — E78.2 MIXED HYPERLIPIDEMIA: ICD-10-CM

## 2023-06-01 DIAGNOSIS — I10 ESSENTIAL HYPERTENSION: ICD-10-CM

## 2023-06-01 DIAGNOSIS — I25.10 CORONARY ARTERY DISEASE INVOLVING NATIVE CORONARY ARTERY OF NATIVE HEART WITHOUT ANGINA PECTORIS: Primary | ICD-10-CM

## 2023-06-01 DIAGNOSIS — Z95.1 S/P CABG X 3: ICD-10-CM

## 2023-06-01 PROCEDURE — 1123F ACP DISCUSS/DSCN MKR DOCD: CPT | Performed by: INTERNAL MEDICINE

## 2023-06-01 PROCEDURE — 99214 OFFICE O/P EST MOD 30 MIN: CPT | Performed by: INTERNAL MEDICINE

## 2023-06-01 PROCEDURE — 3078F DIAST BP <80 MM HG: CPT | Performed by: INTERNAL MEDICINE

## 2023-06-01 PROCEDURE — 3074F SYST BP LT 130 MM HG: CPT | Performed by: INTERNAL MEDICINE

## 2023-06-01 RX ORDER — METOPROLOL SUCCINATE 25 MG/1
25 TABLET, EXTENDED RELEASE ORAL DAILY
Qty: 90 TABLET | Refills: 3 | Status: SHIPPED | OUTPATIENT
Start: 2023-06-01

## 2023-06-01 RX ORDER — ATORVASTATIN CALCIUM 40 MG/1
40 TABLET, FILM COATED ORAL DAILY
COMMUNITY

## 2023-06-01 NOTE — PROGRESS NOTES
[facility] Progress Note                            Date:  6/1/2023  Patient: Juan Pablo Ribera  Age:  69 y.o., 1955      Reason for evaluation:         SUBJECTIVE:    Returns today follow-up assessment coronary artery disease previous CABG hypertension hyperlipidemia shortness of breath doing about the same overall. Dyspnea is unchanged over the past year denies exertional chest discomfort per se. He is also being followed by others for lymphoma which is apparently in remission. Blood pressure 108/58 heart rate 91. No recent lipid profile on file I asked that he get his primary care physician to obtain a lipid profile and forward to us. No other complaints or issues reported. Last echocardiogram 7/5/2022 reviewed trace TR RVSP 24 normal left ventricular function. Cardiac catheterization 7/5/2022 reviewed as well normal left ventricular function patent LIMA graft to diagonal sequential to mid LAD occluded vein graft to right PDA widely patent vein graft to ramus 50% proximal narrowing multivessel coronary disease 100% mid right 80% septal . Review of Systems      OBJECTIVE:     BP (!) 108/58   Pulse 91   Ht 5' 11\" (1.803 m)   Wt 216 lb (98 kg)   BMI 30.13 kg/m²     Labs:   CBC: No results for input(s): WBC, HGB, HCT, PLT in the last 72 hours. BMP:No results for input(s): NA, K, CO2, BUN, CREATININE, LABGLOM, GLUCOSE in the last 72 hours. BNP: No results for input(s): BNP in the last 72 hours. PT/INR: No results for input(s): PROTIME, INR in the last 72 hours. APTT:No results for input(s): APTT in the last 72 hours. CARDIAC ENZYMES:No results for input(s): CKTOTAL, CKMB, CKMBINDEX, TROPONINI in the last 72 hours.   FASTING LIPID PANEL:  Lab Results   Component Value Date/Time    HDL 34 07/25/2022 02:04 AM    LDLDIRECT 99 10/29/2014 06:35 AM    LDLCALC 48 07/25/2022 02:04 AM    TRIG 372 07/25/2022 02:04 AM     LIVER PROFILE:No results for input(s): AST, ALT, LABALBU in the

## 2023-06-19 ENCOUNTER — TELEPHONE (OUTPATIENT)
Dept: PODIATRY | Facility: CLINIC | Age: 68
End: 2023-06-19
Payer: MEDICARE

## 2023-06-19 NOTE — TELEPHONE ENCOUNTER
Pt called and stated on 005/26/2023 you worked on calluses on bottom of feet. They usually hurt for a day or 2 but this time they havent stopped hurting. 7/10 at worst when walking, 2/10 when just sitting or laying down. Pt would like to see you this week if poss.

## 2023-06-19 NOTE — TELEPHONE ENCOUNTER
Called pt back after talking to dr overton and let him know to use a pumice stone and moisturizer on calluses and see if he gets any improvement/relief and to call us back if not. Pt stated understanding

## 2023-06-20 ENCOUNTER — ANESTHESIA (OUTPATIENT)
Dept: ENDOSCOPY | Age: 68
End: 2023-06-20
Payer: MEDICARE

## 2023-06-20 ENCOUNTER — ANESTHESIA EVENT (OUTPATIENT)
Dept: ENDOSCOPY | Age: 68
End: 2023-06-20
Payer: MEDICARE

## 2023-06-20 ENCOUNTER — HOSPITAL ENCOUNTER (OUTPATIENT)
Age: 68
Setting detail: OUTPATIENT SURGERY
Discharge: HOME | End: 2023-06-20
Attending: INTERNAL MEDICINE | Admitting: INTERNAL MEDICINE
Payer: MEDICARE

## 2023-06-20 VITALS
SYSTOLIC BLOOD PRESSURE: 115 MMHG | WEIGHT: 210 LBS | HEIGHT: 71 IN | OXYGEN SATURATION: 98 % | HEART RATE: 86 BPM | RESPIRATION RATE: 18 BRPM | DIASTOLIC BLOOD PRESSURE: 65 MMHG | TEMPERATURE: 98.8 F | BODY MASS INDEX: 29.4 KG/M2

## 2023-06-20 DIAGNOSIS — Z86.010 HX OF COLONIC POLYP: ICD-10-CM

## 2023-06-20 DIAGNOSIS — K57.90 DIVERTICULOSIS: ICD-10-CM

## 2023-06-20 LAB
GLUCOSE BLD-MCNC: 108 MG/DL (ref 70–99)
PERFORMED ON: ABNORMAL

## 2023-06-20 PROCEDURE — 3609010400 HC COLONOSCOPY POLYPECTOMY HOT BIOPSY: Performed by: INTERNAL MEDICINE

## 2023-06-20 PROCEDURE — 88305 TISSUE EXAM BY PATHOLOGIST: CPT

## 2023-06-20 PROCEDURE — 2709999900 HC NON-CHARGEABLE SUPPLY: Performed by: INTERNAL MEDICINE

## 2023-06-20 PROCEDURE — 7100000010 HC PHASE II RECOVERY - FIRST 15 MIN: Performed by: INTERNAL MEDICINE

## 2023-06-20 PROCEDURE — 6360000002 HC RX W HCPCS: Performed by: NURSE ANESTHETIST, CERTIFIED REGISTERED

## 2023-06-20 PROCEDURE — 2500000003 HC RX 250 WO HCPCS: Performed by: NURSE ANESTHETIST, CERTIFIED REGISTERED

## 2023-06-20 PROCEDURE — 45384 COLONOSCOPY W/LESION REMOVAL: CPT | Performed by: INTERNAL MEDICINE

## 2023-06-20 PROCEDURE — 3700000001 HC ADD 15 MINUTES (ANESTHESIA): Performed by: INTERNAL MEDICINE

## 2023-06-20 PROCEDURE — 7100000011 HC PHASE II RECOVERY - ADDTL 15 MIN: Performed by: INTERNAL MEDICINE

## 2023-06-20 PROCEDURE — 82962 GLUCOSE BLOOD TEST: CPT

## 2023-06-20 PROCEDURE — 2580000003 HC RX 258: Performed by: INTERNAL MEDICINE

## 2023-06-20 PROCEDURE — 3700000000 HC ANESTHESIA ATTENDED CARE: Performed by: INTERNAL MEDICINE

## 2023-06-20 RX ORDER — SODIUM CHLORIDE, SODIUM LACTATE, POTASSIUM CHLORIDE, CALCIUM CHLORIDE 600; 310; 30; 20 MG/100ML; MG/100ML; MG/100ML; MG/100ML
INJECTION, SOLUTION INTRAVENOUS CONTINUOUS
Status: DISCONTINUED | OUTPATIENT
Start: 2023-06-20 | End: 2023-06-20 | Stop reason: HOSPADM

## 2023-06-20 RX ORDER — LIDOCAINE HYDROCHLORIDE 10 MG/ML
INJECTION, SOLUTION EPIDURAL; INFILTRATION; INTRACAUDAL; PERINEURAL PRN
Status: DISCONTINUED | OUTPATIENT
Start: 2023-06-20 | End: 2023-06-20 | Stop reason: SDUPTHER

## 2023-06-20 RX ORDER — PROPOFOL 10 MG/ML
INJECTION, EMULSION INTRAVENOUS PRN
Status: DISCONTINUED | OUTPATIENT
Start: 2023-06-20 | End: 2023-06-20 | Stop reason: SDUPTHER

## 2023-06-20 RX ADMIN — LIDOCAINE HYDROCHLORIDE 50 MG: 10 INJECTION, SOLUTION EPIDURAL; INFILTRATION; INTRACAUDAL; PERINEURAL at 15:56

## 2023-06-20 RX ADMIN — SODIUM CHLORIDE, POTASSIUM CHLORIDE, SODIUM LACTATE AND CALCIUM CHLORIDE: 600; 310; 30; 20 INJECTION, SOLUTION INTRAVENOUS at 13:12

## 2023-06-20 RX ADMIN — PROPOFOL 350 MG: 10 INJECTION, EMULSION INTRAVENOUS at 15:56

## 2023-06-20 ASSESSMENT — ENCOUNTER SYMPTOMS: SHORTNESS OF BREATH: 1

## 2023-06-20 ASSESSMENT — PAIN - FUNCTIONAL ASSESSMENT: PAIN_FUNCTIONAL_ASSESSMENT: 0-10

## 2023-06-20 NOTE — DISCHARGE INSTRUCTIONS
1. Repeat colonoscopy: pending pathology -in 5 years for colorectal cancer surveillance; sooner if his personal or family history as pertaining to colorectal cancer risk changes requiring an earlier exam or if the patient were to develop lower GI symptoms such as bleeding, abdominal pain, change in bowel habits or stool caliber or if the patient has anemia or unexplained weight loss in the future. 2. Await biopsy results-you will receive a letter with your results within 7-10 days      - Resume previous meds and diet  - GI clinic f/u 6-8 weeks with Ms. Pj Garner scheduled f/u appts with other MDs     - NO NSAIDs x 2 weeks         Colonoscopy: What to Expect at [address] [place] [address]    After the test, you may be bloated or have gas pains. You may need to pass gas. If a biopsy was done or a polyp was removed, you may have streaks of blood in your stool (feces) for a few days. Problems such as heavy rectal bleeding may not occur until several weeks after the test. This isn't common. But it can happen after polyps are removed. This care sheet gives you a general idea about how long it will take for you to recover. But each person recovers at a different pace. Follow the steps below to get better as quickly as possible. How can you care for yourself at home? Activity    Rest when you feel tired. You can do your normal activities when it feels okay to do so. Diet    You may resume your regular diet. Drink plenty of fluids. This helps to replace the fluids that were lost during the colon prep. Do not drink alcohol. Medicines    Your doctor will tell you if and when you can restart your medicines. You will also be given instructions about taking any new medicines. If you stopped taking aspirin or some other blood thinner, your doctor will tell you when to start taking it again.      If polyps were removed or a biopsy was done during the test, your doctor may tell you not to take aspirin or other

## 2023-06-20 NOTE — ANESTHESIA PRE PROCEDURE
Department of Anesthesiology  Preprocedure Note       Name:  Genesis Blair   Age:  69 y.o.  :  1955                                          MRN:  [id number]         Date:  2023      Surgeon: Cade Scott):  Nirav Cr MD    Procedure: Procedure(s):  COLORECTAL CANCER SCREENING, NOT HIGH RISK    Medications prior to admission:   Prior to Admission medications    Medication Sig Start Date End Date Taking? Authorizing Provider   atorvastatin (LIPITOR) 40 MG tablet Take 1 tablet by mouth daily    Historical Provider, MD   metoprolol succinate (TOPROL XL) 25 MG extended release tablet Take 1 tablet by mouth daily 23   Chidi Hilton MD   tamsulosin [facility]) 0.4 MG capsule Take 1 capsule by mouth daily 22  Varsha Cuadra MD   DULoxetine (CYMBALTA) 60 MG extended release capsule Take 1 capsule by mouth at bedtime 22   Historical Provider, MD   Icosapent Ethyl (VASCEPA) 1 g CAPS capsule Take 2 capsules by mouth 2 times daily    Historical Provider, MD   metFORMIN (GLUCOPHAGE) 500 MG tablet Take 1 tablet by mouth 2 times daily (with meals) Please hold for 48 hrs after cardiac catheterization. You may resume like normal on 2022. 22   Chidi Hilton MD   Zinc Sulfate (ZINC 15 PO) Take by mouth  22  Historical Provider, MD   metoprolol tartrate (LOPRESSOR) 25 MG tablet Take 1 tablet by mouth every evening 22  Chidi Hilton MD   isosorbide mononitrate (IMDUR) 30 MG extended release tablet Take 1 tablet by mouth daily 22  Chidi Hilton MD   empagliflozin (JARDIANCE) 25 MG tablet Take 1 tablet by mouth daily    Historical Provider, MD   Insulin Degludec (TRESIBA) 100 UNIT/ML SOLN Inject 6 Units into the skin nightly    Historical Provider, MD   gabapentin (NEURONTIN) 100 MG capsule Take 1 capsule by mouth at bedtime.  21   Historical Provider, MD   busPIRone (BUSPAR) 15 MG tablet Take 15 mg by mouth 2 times daily

## 2023-06-20 NOTE — OP NOTE
Patient: Yohannes Innocent: 1955  Med Rec#: [id number] Acc#: [id number]   Primary Care Provider ADRY Donaldson    Date of Procedure:  6/20/2023    Endoscopist: Brett Collazo MD, MD    Referring Provider: ADRY Donaldson,     Operation Performed: Colonoscopy up to the cecum with  Hot biopsy polypectomy    Indications:   1. Diarrhea, unspecified type    2. Colitis    3. Generalized abdominal pain     4. History of Non-Hodgkin's lymphoma    Colonoscopy attempt previously was inadequate due to poor prep requiring this repeat exam today. Anesthesia:  Sedation was administered by anesthesia who monitored the patient during the procedure. I met with Randy London prior to procedure. We discussed the procedure itself, and I have discussed the risks of endoscopy (including-- but not limited to-- pain, discomfort, bleeding potentially requiring second endoscopic procedure and/or blood transfusion, organ perforation requiring operative repair, damage to organs near the colon, infection, aspiration, cardiopulmonary/allergic reaction), benefits, indications to endoscopy. Additionally, we discussed options other than colonoscopy. The patient expressed understanding. All questions answered. The patient decided to proceed with the procedure. Signed informed consent was placed on the chart. Blood Loss: minimal    Withdrawal time: More than 9 minutes  Bowel Prep: adequate     Complications: no immediate complications    DESCRIPTION OF PROCEDURE:     A time out was performed. After written informed consent was obtained, the patient was placed in the left lateral position. The perianal area was inspected, and a digital rectal exam was performed. A rectal exam was performed: normal tone, no palpable lesions.  At this point, a forward viewing Olympus colonoscope was inserted into the anus and carefully advanced to the cecum with some difficulty due to redundant colon requiring external

## 2023-06-20 NOTE — ANESTHESIA POSTPROCEDURE EVALUATION
Department of Anesthesiology  Postprocedure Note    Patient: Barbara Cooper  MRN: [id number]  YOB: 1955  Date of evaluation: 6/20/2023      Procedure Summary     Date: 06/20/23 Room / Location: [address]    Anesthesia Start: 0832 Anesthesia Stop: 1625    Procedure: COLONOSCOPY POLYPECTOMY HOT BIOPSY Diagnosis:       Hx of colonic polyp      Diverticulosis      (Hx of colonic polyp [Z86.010])      (Diverticulosis [K57.90])    Surgeons: Abdirizak Leal MD Responsible Provider: GRECIA Fulton CRNA    Anesthesia Type: general, TIVA ASA Status: 3          Anesthesia Type: No value filed.     Raciel Phase I: Raciel Score: 10    Raciel Phase II:        Anesthesia Post Evaluation    Patient location during evaluation: PACU  Patient participation: complete - patient participated  Level of consciousness: awake and alert  Airway patency: patent  Nausea & Vomiting: no nausea and no vomiting  Complications: no  Cardiovascular status: hemodynamically stable  Respiratory status: acceptable, room air, spontaneous ventilation and nonlabored ventilation  Hydration status: stable  Comments: /70   Pulse 79   Temp 96.8 °F (36 °C) (Temporal)   Resp 16   Ht 5' 11\" (1.803 m)   Wt 210 lb (95.3 kg)   SpO2 96%   BMI 29.29 kg/m²

## 2023-06-20 NOTE — H&P
daily 6/1/23   Tyesha Durán MD   tamsulosin [facility]) 0.4 MG capsule Take 1 capsule by mouth daily 8/25/22 8/25/22  Malik Harper MD   DULoxetine (CYMBALTA) 60 MG extended release capsule Take 1 capsule by mouth at bedtime 5/20/22   Historical Provider, MD   Icosapent Ethyl (VASCEPA) 1 g CAPS capsule Take 2 capsules by mouth 2 times daily    Historical Provider, MD   metFORMIN (GLUCOPHAGE) 500 MG tablet Take 1 tablet by mouth 2 times daily (with meals) Please hold for 48 hrs after cardiac catheterization. You may resume like normal on Friday 7/8/2022. 7/5/22   Tyesha Durán MD   Zinc Sulfate (ZINC 15 PO) Take by mouth  8/25/22  Historical Provider, MD   metoprolol tartrate (LOPRESSOR) 25 MG tablet Take 1 tablet by mouth every evening 7/5/22 8/25/22  Tyesha Durán MD   isosorbide mononitrate (IMDUR) 30 MG extended release tablet Take 1 tablet by mouth daily 7/5/22 7/26/22  Tyesha Durán MD   empagliflozin (JARDIANCE) 25 MG tablet Take 1 tablet by mouth daily    Historical Provider, MD   Insulin Degludec (TRESIBA) 100 UNIT/ML SOLN Inject 6 Units into the skin nightly    Historical Provider, MD   gabapentin (NEURONTIN) 100 MG capsule Take 1 capsule by mouth at bedtime.  7/28/21   Historical Provider, MD   busPIRone (BUSPAR) 15 MG tablet Take 15 mg by mouth 2 times daily    Historical Provider, MD   ranolazine (RANEXA) 1000 MG extended release tablet Take 1 tablet by mouth 2 times daily 3/19/21   GRECIA Guy NP   clopidogrel (PLAVIX) 75 MG tablet One daily  Patient taking differently: Take 1 tablet by mouth daily 2/19/21   GRECIA Araya   acetaminophen (TYLENOL) 500 MG tablet Take 2 tablets by mouth every 6 hours as needed for Pain    Historical Provider, MD   nitroGLYCERIN (NITROSTAT) 0.4 MG SL tablet Place 1 tablet under the tongue every 5 minutes as needed for Chest pain 9/5/19   GRECIA Guy NP   levothyroxine (SYNTHROID) 50 MCG tablet Take 1 tablet by mouth

## 2023-07-10 PROBLEM — L08.9 DIABETIC FOOT INFECTION: Status: ACTIVE | Noted: 2023-07-10

## 2023-07-10 PROBLEM — D53.9 MACROCYTIC ANEMIA: Status: ACTIVE | Noted: 2023-07-10

## 2023-07-10 PROBLEM — I95.9 HYPOTENSION: Status: ACTIVE | Noted: 2023-07-10

## 2023-07-10 PROBLEM — L03.119 CELLULITIS IN DIABETIC FOOT: Status: ACTIVE | Noted: 2023-07-10

## 2023-07-10 PROBLEM — E11.628 DIABETIC FOOT INFECTION: Status: ACTIVE | Noted: 2023-07-10

## 2023-07-10 PROBLEM — M79.673 ACUTE FOOT PAIN: Status: ACTIVE | Noted: 2023-07-10

## 2023-07-11 PROBLEM — L02.611 ABSCESS OF RIGHT FOOT: Status: ACTIVE | Noted: 2023-07-10

## 2023-07-12 PROBLEM — L02.611 CELLULITIS AND ABSCESS OF TOE OF RIGHT FOOT: Status: ACTIVE | Noted: 2023-07-12

## 2023-07-12 PROBLEM — L03.031 CELLULITIS AND ABSCESS OF TOE OF RIGHT FOOT: Status: ACTIVE | Noted: 2023-07-12

## 2023-07-24 ENCOUNTER — OFFICE VISIT (OUTPATIENT)
Dept: WOUND CARE | Facility: HOSPITAL | Age: 68
End: 2023-07-24
Payer: MEDICARE

## 2023-07-24 ENCOUNTER — HOSPITAL ENCOUNTER (OUTPATIENT)
Dept: GENERAL RADIOLOGY | Facility: HOSPITAL | Age: 68
Discharge: HOME | End: 2023-07-24
Payer: MEDICARE

## 2023-07-24 ENCOUNTER — TRANSCRIBE ORDERS (OUTPATIENT)
Dept: ADMINISTRATIVE | Facility: HOSPITAL | Age: 68
End: 2023-07-24
Payer: MEDICARE

## 2023-07-24 DIAGNOSIS — E11.69 DIABETIC FOOT ULCER WITH OSTEOMYELITIS: ICD-10-CM

## 2023-07-24 DIAGNOSIS — E11.621 DIABETIC FOOT ULCER WITH OSTEOMYELITIS: ICD-10-CM

## 2023-07-24 DIAGNOSIS — E11.69 DIABETIC FOOT ULCER WITH OSTEOMYELITIS: Primary | ICD-10-CM

## 2023-07-24 DIAGNOSIS — M86.9 DIABETIC FOOT ULCER WITH OSTEOMYELITIS: Primary | ICD-10-CM

## 2023-07-24 DIAGNOSIS — E11.621 DIABETIC FOOT ULCER WITH OSTEOMYELITIS: Primary | ICD-10-CM

## 2023-07-24 DIAGNOSIS — L97.509 DIABETIC FOOT ULCER WITH OSTEOMYELITIS: ICD-10-CM

## 2023-07-24 DIAGNOSIS — L97.509 DIABETIC FOOT ULCER WITH OSTEOMYELITIS: Primary | ICD-10-CM

## 2023-07-24 DIAGNOSIS — M86.9 DIABETIC FOOT ULCER WITH OSTEOMYELITIS: ICD-10-CM

## 2023-07-24 PROCEDURE — 73630 X-RAY EXAM OF FOOT: CPT

## 2023-07-31 ENCOUNTER — READMISSION MANAGEMENT (OUTPATIENT)
Dept: CALL CENTER | Facility: HOSPITAL | Age: 68
End: 2023-07-31
Payer: MEDICARE

## 2023-07-31 ENCOUNTER — TELEPHONE (OUTPATIENT)
Dept: PODIATRY | Facility: CLINIC | Age: 68
End: 2023-07-31
Payer: MEDICARE

## 2023-07-31 ENCOUNTER — PREP FOR SURGERY (OUTPATIENT)
Dept: OTHER | Facility: HOSPITAL | Age: 68
End: 2023-07-31
Payer: MEDICARE

## 2023-07-31 ENCOUNTER — OFFICE VISIT (OUTPATIENT)
Dept: WOUND CARE | Facility: HOSPITAL | Age: 68
End: 2023-07-31
Payer: MEDICARE

## 2023-07-31 DIAGNOSIS — M86.171 ACUTE OSTEOMYELITIS OF RIGHT FOOT: Primary | ICD-10-CM

## 2023-07-31 RX ORDER — CLINDAMYCIN PHOSPHATE 900 MG/50ML
900 INJECTION, SOLUTION INTRAVENOUS ONCE
OUTPATIENT
Start: 2023-07-31 | End: 2023-07-31

## 2023-07-31 NOTE — H&P (VIEW-ONLY)
[facility] - PODIATRY    Today's Date: 07/31/23    Patient Name: Franko Lucero  MRN: [id number]  CSN: [id number]  PCP: Wilner Engel PA-C  Referring Provider: No ref. provider found    SUBJECTIVE    CC: Ulcerations with bone infection    HPI: Franko Lucero, a 67 y.o.male, comes to clinic as a(n) established patient complaining of chronic ulcerations of bone infection . Patient has h/o anemia, anxiety, arthritis, BPH, CAD, CA, DM with neuropathy, Iliac artery aneurysm, HTN, HLD . Patient is NIDDM with last stated BG level of 125mg/dl.  Patient was admitted to [facility] on 7/10/2023 after his wound care appointment due to right foot infection.  He ultimately underwent an I&D on 7/12/2023.  Postoperatively, he was followed up in outpatient wound care center where the plantar and I&D wound sites have been treated.  Recent x-rays show signs of infection to the bone. Admits pain at 2/10 level and described as aching and dull. Relates previous treatment(s) including wound care treatment in OP [facility], care by podiatry . Denies any constitutional symptoms. No other pedal complaints at this time.    Past Medical History:   Diagnosis Date    Anemia     Anxiety     Arthritis     BPH (benign prostatic hypertrophy)     CAD (coronary artery disease)     Cancer     non-hodgkins lymphoma    Diabetes mellitus     Disease of thyroid gland     GERD (gastroesophageal reflux disease)     Hyperlipidemia     Hypertension     Iliac artery aneurysm, bilateral     Kidney stone     Sleep apnea      Past Surgical History:   Procedure Laterality Date    COLONOSCOPY  02/04/2014    COLONOSCOPY N/A 4/26/2017    Procedure: COLONOSCOPY WITH ANESTHESIA;  Surgeon: Sher Cui MD;  Location: [facility] ENDOSCOPY;  Service:     CORONARY ARTERY BYPASS GRAFT      2    CYSTOSCOPY URETEROSCOPY LASER LITHOTRIPSY Left 4/17/2017    Procedure: URETEROSCOPY LASER LITHOTRIPSY WITH DOUBLE J STENT INSERTION, LEFT ;  Surgeon: Weston  Alejo Peck MD;  Location: [facility] OR;  Service:     CYSTOSCOPY URETEROSCOPY LASER LITHOTRIPSY Left 2017    Procedure: CYSTOSCOPY URETEROSCOPY LASER LITHOTRIPSY STENT INSERTION STONE EXTRACTION;  Surgeon: Weston Peck MD;  Location: [facility] OR;  Service:     CYSTOSCOPY W/ URETERAL STENT PLACEMENT Left 2017    Procedure: CYSTOSCOPY URETERAL DOUBLE J STENT INSERTION, LEFT;  Surgeon: Weston Peck MD;  Location: [facility] OR;  Service:     ENDOSCOPY N/A 2017    Procedure: ESOPHAGOGASTRODUODENOSCOPY WITH ANESTHESIA;  Surgeon: Sher Cui MD;  Location: [facility] ENDOSCOPY;  Service:     INCISION AND DRAINAGE LEG Right 2023    Procedure: INCISION AND DRAINAGE - RIGHT FOOT;  Surgeon: Silas Swan DPM;  Location: [facility] OR;  Service: Podiatry;  Laterality: Right;    JOINT REPLACEMENT Right     KIDNEY STONE SURGERY      KNEE SURGERY      replacement    NECK SURGERY      ROTATOR CUFF REPAIR      SHOULDER SURGERY      TONSILLECTOMY      URETEROSCOPY LASER LITHOTRIPSY WITH STENT INSERTION Left 2022    Procedure: LEFT URETEROSCOPY LASER LITHOTRIPSY WITH STENT INSERTION;  Surgeon: Weston Peck MD;  Location: [facility] OR;  Service: Urology;  Laterality: Left;    URETEROSCOPY LASER LITHOTRIPSY WITH STENT INSERTION Left 9/15/2022    Procedure: LEFT URETEROSCOPY LASER LITHOTRIPSY WITH STENT EXCHANGE;  Surgeon: Weston Peck MD;  Location: [facility] OR;  Service: Urology;  Laterality: Left;     Family History   Problem Relation Age of Onset    Kidney disease Father     Heart disease Father     Cancer Mother         brain    Colon cancer Neg Hx     Colon polyps Neg Hx      Social History     Socioeconomic History    Marital status:    Tobacco Use    Smoking status: Former     Types: Cigarettes     Quit date:      Years since quittin.5    Smokeless tobacco: Never   Vaping Use    Vaping Use: Never used   Substance and Sexual Activity    Alcohol use: No    Drug  use: No    Sexual activity: Defer     Allergies   Allergen Reactions    Adhesive Tape Rash     Pt states that if it isn't left on very long it is okay    Cefazolin Rash    Cefuroxime Axetil Rash    Cephalosporins Rash    Neomycin-Polymyxin B Gu Rash    Neosporin [Neomycin-Bacitracin Zn-Polymyx] Rash    Percocet [Oxycodone-Acetaminophen] Rash     Current Outpatient Medications   Medication Sig Dispense Refill    atorvastatin (LIPITOR) 40 MG tablet Take 1 tablet by mouth Every Night.      busPIRone (BUSPAR) 15 MG tablet Take 1 tablet by mouth 2 (Two) Times a Day.      clopidogrel (PLAVIX) 75 MG tablet Take 1 tablet by mouth Daily.      docusate sodium (COLACE) 100 MG capsule Take 1 capsule by mouth Every Evening.      DULoxetine (CYMBALTA) 60 MG capsule Take 1 capsule by mouth Every Night.      empagliflozin (JARDIANCE) 25 MG tablet tablet Take 1 tablet by mouth Daily.      ferrous sulfate 325 (65 FE) MG EC tablet Take 1 tablet by mouth 2 (Two) Times a Day.      gabapentin (NEURONTIN) 100 MG capsule Take 1 capsule by mouth 3 (Three) Times a Day.      GARLIC-CALCIUM PO Take 1 tablet by mouth Daily.      HYDROcodone-acetaminophen (NORCO) 7.5-325 MG per tablet Take 1 tablet by mouth Every 6 (Six) Hours As Needed for Moderate Pain.      HYDROcodone-acetaminophen (NORCO) 7.5-325 MG per tablet Take 1 tablet by mouth Every 8 (Eight) Hours As Needed for Severe Pain. 6 tablet 0    icosapent ethyl (VASCEPA) 1 g capsule capsule Take 2 g by mouth 2 (Two) Times a Day With Meals.      Insulin Degludec (TRESIBA SC) Inject 12 Units under the skin into the appropriate area as directed Every Night.      levothyroxine (SYNTHROID, LEVOTHROID) 75 MCG tablet Take 1 tablet by mouth Daily.      metFORMIN (GLUCOPHAGE) 500 MG tablet Take 1 tablet by mouth 2 (Two) Times a Day With Meals.      naloxone (NARCAN) 4 MG/0.1ML nasal spray Call 911. Don't prime. [place] in 1 nostril for overdose. Repeat in 2-3 minutes in other nostril if no or minimal  breathing/responsiveness. 2 each 0    nitroglycerin (NITROSTAT) 0.4 MG SL tablet Place 1 tablet under the tongue Every 5 (Five) Minutes As Needed.      ondansetron (ZOFRAN) 8 MG tablet Take 1 tablet by mouth Every 8 (Eight) Hours As Needed for Nausea or Vomiting.      pantoprazole (PROTONIX) 40 MG EC tablet Take 1 tablet by mouth Daily.      polyethylene glycol (MIRALAX) powder Take 17 g by mouth Daily As Needed.      ranolazine (RANEXA) 1000 MG 12 hr tablet Take 1 tablet by mouth 2 (Two) Times a Day.      Vitamin D, Cholecalciferol, 1000 UNITS tablet Take 1 tablet by mouth Daily.       No current facility-administered medications for this visit.     Review of Systems   Constitutional:  Negative for chills and fever.   HENT:  Negative for congestion.    Respiratory:  Negative for shortness of breath.    Cardiovascular:  Negative for chest pain and leg swelling.   Gastrointestinal:  Negative for constipation, diarrhea, nausea and vomiting.   Musculoskeletal:  Positive for arthralgias.   Skin:  Positive for color change and wound.   Neurological:  Positive for numbness.     OBJECTIVE     There were no vitals filed for this visit.      PHYSICAL EXAM  GEN:   Accompanied by none.     Physical Exam  Vitals reviewed.   Constitutional:       Appearance: Normal appearance.   HENT:      Head: Normocephalic and atraumatic.      Mouth/Throat:      Mouth: Mucous membranes are moist.      Pharynx: Oropharynx is clear.   Eyes:      Extraocular Movements: Extraocular movements intact.      Conjunctiva/sclera: Conjunctivae normal.   Cardiovascular:      Rate and Rhythm: Normal rate and regular rhythm.      Pulses:           Dorsalis pedis pulses are 2+ on the right side and 2+ on the left side.        Posterior tibial pulses are 2+ on the right side and 2+ on the left side.   Pulmonary:      Effort: Pulmonary effort is normal.      Breath sounds: Normal breath sounds.   Abdominal:      General: Bowel sounds are normal. There is no  distension.      Palpations: Abdomen is soft.   Musculoskeletal:      Cervical back: Normal range of motion.      Right lower leg: Edema (Foot) present.      Left lower leg: No edema.      Comments: Decreased range of motion due to pain right lower extremity   Feet:      Right foot:      Protective Sensation: 10 sites tested.        Skin integrity: Ulcer, erythema and warmth present.      Left foot:      Protective Sensation: 10 sites tested.        Skin integrity: Warmth present.   Skin:     Findings: Erythema (Right foot) present.   Neurological:      General: No focal deficit present.      Mental Status: He is alert and oriented to person, place, and time.   Psychiatric:         Mood and Affect: Mood normal.         Behavior: Behavior normal.        Foot/Ankle Exam    GENERAL  Diabetic foot exam performed    Appearance:  appears stated age  Orientation:  AAOx3  Affect:  appropriate  Assistance:  independent  Right shoe gear: CAM boot  Left shoe gear: casual shoe    VASCULAR     Right Foot Vascularity   Dorsalis pedis:  2+  Posterior tibial:  2+  Skin temperature:  warm  Edema grading:  Trace  CFT:  3  Pedal hair growth:  Present  Varicosities:  mild varicosities     Left Foot Vascularity   Dorsalis pedis:  2+  Posterior tibial:  2+  Skin temperature:  warm  Edema grading:  None  CFT:  3  Pedal hair growth:  Present  Varicosities:  mild varicosities     NEUROLOGIC     Right Foot Neurologic   Light touch sensation: diminished  Vibratory sensation: diminished  Hot/Cold sensation: diminished  Protective Sensation using Grassflat-Derek Monofilament:   Sites intact: 3  Sites tested: 10     Left Foot Neurologic   Light touch sensation: diminished  Vibratory sensation: diminished  Hot/Cold sensation:  diminished  Protective Sensation using Grassflat-Derek Monofilament:   Sites intact: 4  Sites tested: 10    MUSCULOSKELETAL     Right Foot Musculoskeletal   Tenderness:  toe 4 tenderness    Arch:  Normal  Hammertoe:   Second toe, third toe, fourth toe and fifth toe     Left Foot Musculoskeletal   Tenderness:  none  Arch:  Normal  Hammertoe:  Second toe, third toe, fourth toe and fifth toe    MUSCLE STRENGTH     Right Foot Muscle Strength   Foot dorsiflexion:  5  Foot plantar flexion:  5  Foot inversion:  5  Foot eversion:  5     Left Foot Muscle Strength   Foot dorsiflexion:  5  Foot plantar flexion:  5  Foot inversion:  5  Foot eversion:  5    RANGE OF MOTION     Right Foot Range of Motion   Foot and ankle ROM within normal limits       Left Foot Range of Motion   Foot and ankle ROM within normal limits      DERMATOLOGIC      Right Foot Dermatologic   Skin  Positive for erythema, tinea and ulcer. Negative for drainage.   Nails  1.  Positive for elongated, onychomycosis, abnormal thickness and dystrophic nail.  2.  Positive for elongated, abnormal thickness and dystrophic nail.  3.  Positive for elongated, abnormal thickness and dystrophic nail.  4.  Positive for elongated, abnormal thickness and dystrophic nail.  5.  Positive for elongated, abnormal thickness and dystrophic nail.     Left Foot Dermatologic   Skin  Positive for corn and tinea.   Nails  1.  Positive for elongated, onychomycosis, abnormal thickness and dystrophic nail.  2.  Positive for elongated, abnormal thickness and dystrophic nail.  3.  Positive for elongated, abnormal thickness and dystrophic nail.  4.  Positive for elongated, abnormally thick and dystrophic nail.  5.  Positive for elongated, abnormally thick and dystrophic nail.     Right foot additional comments: Full-thickness ulceration to the plantar surface of the fourth metatarsal head.  Mild maceration.  Wound extends down to fourth metatarsal head.  Bone does not feel soft or fragmented.  Small ulceration/wound to the dorsal fourth MTPJ from previous I&D.  Mild slough.  Wound extends down to fourth metatarsal head.    Image:     RADIOLOGY/NUCLEAR:  XR Foot 3+ View Right    Result Date:  7/24/2023  Narrative: HISTORY: Diabetic foot ulcer, nonhealing wound between the third and fourth toes  Right foot: 3 views the right foot are obtained.  COMPARISON: 07/10/2023  FINDINGS: There is an ulcerative lesion along the plantar surface of the right foot at the base of the third and fourth toes. Early resorption of the medial cortex of the distal fourth metatarsal head is considered raising concern for early osteomyelitis. No additional radiographic evidence of osteomyelitis. Arthritic change first MTP joint.      Impression: 1. Ulcerative wound along the plantar surface of the foot the base of the third and fourth toes. Questionable early osteomyelitis involving the distal fourth metatarsal head. This report was finalized on 07/24/2023 17:01 by Dr. Kathy Cruz MD.    XR Foot 3+ View Right    Result Date: 7/10/2023  Narrative: EXAMINATION: Right foot 3 views 07/10/2023  HISTORY: Foot pain  FINDINGS: Three-view exam of the right foot demonstrates spurring of the calcaneus both along the undersurface as well as at the Achilles insertion. There is moderate arthrosis of the first MTP joint. There is arthritic change at the interphalangeal joints. No acute fracture or dislocation. Lisfranc joint is intact.      Impression: 1.. Calcaneal spurring as well as arthritic change of the forefoot. No acute fracture. This report was finalized on 07/10/2023 12:37 by Dr. Jesus Lepe MD.    MRI Foot Right Without Contrast    Result Date: 7/11/2023  Narrative: EXAMINATION: MRI FOOT RIGHT WO CONTRAST-  7/11/2023 2:15 PM CDT  HISTORY: Osteomyelitis, foot; E11.628-Type 2 diabetes mellitus with other skin complications; L08.9-Local infection of the skin and subcutaneous tissue, unspecified; L03.115-Cellulitis of right lower limb; E11.628-Type 2 diabetes mellitus with other skin complications; L03.119-Cellulitis of unspecified part of limb  Noncontrast MR imaging of the right foot. Axial, sagittal, and coronal sequences of  the midfoot and forefoot.  Metatarsal phalangeal joint arthritic change greatest at the first MTP joint.  Moderate second, third, and fourth MTP joint fluid. 12 x 12 mm cystlike fluid collection situated between the third and fourth metatarsal head.  Moderate soft tissue edema about the foot.  No fracture or sign of soft tissue abscess.  No bone destruction or discrete bone marrow edema to indicate osteomyelitis.  Summary: 1. Degenerative joint changes and diffuse soft tissue edema. 2. No definite signs of osteomyelitis. This report was finalized on 07/11/2023 16:36 by Dr. Mike Plasencia MD.     LABORATORY/CULTURE RESULTS:      PATHOLOGY RESULTS:       ASSESSMENT/PLAN     Diagnoses and all orders for this visit:    1. Acute osteomyelitis of right foot (Primary)  -     Case Request; Standing  -     Instructions on coughing, deep breathing, and incentive spirometry.; Future  -     CBC & Differential; Future  -     Basic Metabolic Panel; Future  -     ECG 12 Lead; Future  -     XR chest 2 vw; Future  -     clindamycin (CLEOCIN) 900 mg in sodium chloride 0.9% 50 mL IVPB (premix)  -     Case Request    Other orders  -     Follow Anesthesia Guidelines / Protocol; Future  -     Follow Anesthesia Guidelines / Protocol; Standing  -     Obtain Informed Consent; Future  -     Provide Instructions to Patient Regarding NPO Status; Future  -     Chlorhexidine Skin Prep - Educate and Review With Patient; Future  -     Verify NPO Status; Standing  -     Obtain Informed Consent (If Not Done Inpatient or PAT); Standing  -     Instructions on coughing, deep breathing, and incentive spirometry.; Standing  -     Notify Physician - Standard; Standing  -     Provide NPO Instructions to Patient      Comprehensive lower extremity examination and evaluation was performed.  Discussed findings and treatment plan including risks, benefits, and treatment options with patient in detail. Patient agreed with treatment plan.  Reviewed at home  diabetic foot care including daily foot checks     Reviewed x-ray results with patient.  Restart antibiotic therapy.  Take as prescribed.  Patient was given conservative versus surgical options.  After discussion, patient wishes to proceed with a fourth metatarsal head versus partial fourth ray amputation of the right foot.  All options, benefits, and risks associate with surgery have been discussed with the patient including but not limited to: Standard risk of anesthesia, pain, bleeding, infection, nonhealing/dehiscence, transfer lesion, loss of limb or life.  Pre and postoperative course were discussed in detail including minimum 2 to 3 weeks nonweightbearing status postoperatively.  No guarantees were inferred.    An After Visit Summary was printed and given to the patient at discharge, including (if requested) any available informative/educational handouts regarding diagnosis, treatment, or medications. All questions were answered to patient/family satisfaction. Should symptoms fail to improve or worsen they agree to call or return to clinic or to go to the Emergency Department. Discussed the importance of following up with any needed screening tests/labs/specialist appointments and any requested follow-up recommended by me today. Importance of maintaining follow-up discussed and patient accepts that missed appointments can delay diagnosis and potentially lead to worsening of conditions.  No follow-ups on file., or sooner if acute issues arise.    Lab Frequency Next Occurrence   Follow Anesthesia Guidelines / Standing Orders Once 04/12/2017   Follow Anesthesia Guidelines / Standing Orders Once 08/11/2017   Comprehensive Metabolic Panel Once 11/03/2021   CBC (No Diff) Once 11/03/2021   Phosphorus Once 11/03/2021   Magnesium Once 11/03/2021   Uric Acid Once 11/03/2021   Creatinine, Urine, Random - Urine, Clean Catch Once 10/29/2021   Urinalysis With Microscopic If Indicated (No Culture) - Urine, Clean Catch Once  10/29/2021   Vitamin D 25 Hydroxy Once 11/03/2021   PTH, Intact Once 11/03/2021       This document has been electronically signed by Silas Swan DPM on July 31, 2023 13:04 CDT

## 2023-08-01 ENCOUNTER — OFFICE VISIT (OUTPATIENT)
Dept: GASTROENTEROLOGY | Age: 68
End: 2023-08-01
Payer: MEDICARE

## 2023-08-01 VITALS
DIASTOLIC BLOOD PRESSURE: 70 MMHG | OXYGEN SATURATION: 96 % | WEIGHT: 210 LBS | BODY MASS INDEX: 29.4 KG/M2 | SYSTOLIC BLOOD PRESSURE: 100 MMHG | HEART RATE: 81 BPM | HEIGHT: 71 IN

## 2023-08-01 DIAGNOSIS — D12.6 TUBULAR ADENOMA OF COLON: ICD-10-CM

## 2023-08-01 DIAGNOSIS — K21.9 CHRONIC GERD: Primary | ICD-10-CM

## 2023-08-01 PROCEDURE — 99213 OFFICE O/P EST LOW 20 MIN: CPT | Performed by: NURSE PRACTITIONER

## 2023-08-01 PROCEDURE — 3074F SYST BP LT 130 MM HG: CPT | Performed by: NURSE PRACTITIONER

## 2023-08-01 PROCEDURE — 1123F ACP DISCUSS/DSCN MKR DOCD: CPT | Performed by: NURSE PRACTITIONER

## 2023-08-01 PROCEDURE — 3078F DIAST BP <80 MM HG: CPT | Performed by: NURSE PRACTITIONER

## 2023-08-01 RX ORDER — AMPICILLIN 500 MG/1
500 CAPSULE ORAL 2 TIMES DAILY
COMMUNITY
Start: 2023-07-31

## 2023-08-01 RX ORDER — PANTOPRAZOLE SODIUM 40 MG/1
40 TABLET, DELAYED RELEASE ORAL DAILY
Qty: 90 TABLET | Refills: 3 | Status: SHIPPED | OUTPATIENT
Start: 2023-08-01

## 2023-08-01 ASSESSMENT — ENCOUNTER SYMPTOMS
DIARRHEA: 0
TROUBLE SWALLOWING: 0
CHOKING: 0
CONSTIPATION: 0
NAUSEA: 0
ABDOMINAL DISTENTION: 0
ABDOMINAL PAIN: 0
BLOOD IN STOOL: 0
COUGH: 0
SHORTNESS OF BREATH: 0
VOMITING: 0
ANAL BLEEDING: 0
RECTAL PAIN: 0

## 2023-08-03 ENCOUNTER — READMISSION MANAGEMENT (OUTPATIENT)
Dept: CALL CENTER | Facility: HOSPITAL | Age: 68
End: 2023-08-03
Payer: MEDICARE

## 2023-08-03 ENCOUNTER — TELEPHONE (OUTPATIENT)
Dept: PODIATRY | Facility: CLINIC | Age: 68
End: 2023-08-03
Payer: MEDICARE

## 2023-08-04 ENCOUNTER — PRE-ADMISSION TESTING (OUTPATIENT)
Dept: PREADMISSION TESTING | Facility: HOSPITAL | Age: 68
End: 2023-08-04
Payer: MEDICARE

## 2023-08-04 ENCOUNTER — HOSPITAL ENCOUNTER (OUTPATIENT)
Dept: GENERAL RADIOLOGY | Facility: HOSPITAL | Age: 68
Discharge: HOME | End: 2023-08-04
Payer: MEDICARE

## 2023-08-04 VITALS
HEIGHT: 71 IN | WEIGHT: 209 LBS | OXYGEN SATURATION: 96 % | RESPIRATION RATE: 18 BRPM | HEART RATE: 88 BPM | DIASTOLIC BLOOD PRESSURE: 68 MMHG | SYSTOLIC BLOOD PRESSURE: 136 MMHG | BODY MASS INDEX: 29.26 KG/M2

## 2023-08-04 DIAGNOSIS — M86.171 ACUTE OSTEOMYELITIS OF RIGHT FOOT: ICD-10-CM

## 2023-08-04 LAB
ANION GAP SERPL CALCULATED.3IONS-SCNC: 13 MMOL/L (ref 5–15)
BASOPHILS # BLD AUTO: 0.01 10*3/MM3 (ref 0–0.2)
BASOPHILS NFR BLD AUTO: 0.2 % (ref 0–1.5)
BUN SERPL-MCNC: 16 MG/DL (ref 8–23)
BUN/CREAT SERPL: 15.7 (ref 7–25)
CALCIUM SPEC-SCNC: 9.9 MG/DL (ref 8.6–10.5)
CHLORIDE SERPL-SCNC: 100 MMOL/L (ref 98–107)
CO2 SERPL-SCNC: 24 MMOL/L (ref 22–29)
CREAT SERPL-MCNC: 1.02 MG/DL (ref 0.76–1.27)
DEPRECATED RDW RBC AUTO: 62.4 FL (ref 37–54)
EGFRCR SERPLBLD CKD-EPI 2021: 79.6 ML/MIN/1.73
EOSINOPHIL # BLD AUTO: 0.12 10*3/MM3 (ref 0–0.4)
EOSINOPHIL NFR BLD AUTO: 2.6 % (ref 0.3–6.2)
ERYTHROCYTE [DISTWIDTH] IN BLOOD BY AUTOMATED COUNT: 16.3 % (ref 12.3–15.4)
GLUCOSE SERPL-MCNC: 165 MG/DL (ref 65–99)
HCT VFR BLD AUTO: 33.7 % (ref 37.5–51)
HGB BLD-MCNC: 10.4 G/DL (ref 13–17.7)
IMM GRANULOCYTES # BLD AUTO: 0.03 10*3/MM3 (ref 0–0.05)
IMM GRANULOCYTES NFR BLD AUTO: 0.7 % (ref 0–0.5)
LYMPHOCYTES # BLD AUTO: 1.16 10*3/MM3 (ref 0.7–3.1)
LYMPHOCYTES NFR BLD AUTO: 25.5 % (ref 19.6–45.3)
MCH RBC QN AUTO: 32.4 PG (ref 26.6–33)
MCHC RBC AUTO-ENTMCNC: 30.9 G/DL (ref 31.5–35.7)
MCV RBC AUTO: 105 FL (ref 79–97)
MONOCYTES # BLD AUTO: 0.45 10*3/MM3 (ref 0.1–0.9)
MONOCYTES NFR BLD AUTO: 9.9 % (ref 5–12)
NEUTROPHILS NFR BLD AUTO: 2.78 10*3/MM3 (ref 1.7–7)
NEUTROPHILS NFR BLD AUTO: 61.1 % (ref 42.7–76)
NRBC BLD AUTO-RTO: 0 /100 WBC (ref 0–0.2)
PLATELET # BLD AUTO: 108 10*3/MM3 (ref 140–450)
PMV BLD AUTO: 9 FL (ref 6–12)
POTASSIUM SERPL-SCNC: 4.2 MMOL/L (ref 3.5–5.2)
RBC # BLD AUTO: 3.21 10*6/MM3 (ref 4.14–5.8)
SODIUM SERPL-SCNC: 137 MMOL/L (ref 136–145)
WBC NRBC COR # BLD: 4.55 10*3/MM3 (ref 3.4–10.8)

## 2023-08-04 PROCEDURE — 93005 ELECTROCARDIOGRAM TRACING: CPT

## 2023-08-04 PROCEDURE — 93010 ELECTROCARDIOGRAM REPORT: CPT | Performed by: INTERNAL MEDICINE

## 2023-08-04 PROCEDURE — 36415 COLL VENOUS BLD VENIPUNCTURE: CPT

## 2023-08-04 PROCEDURE — 80048 BASIC METABOLIC PNL TOTAL CA: CPT

## 2023-08-04 PROCEDURE — 71046 X-RAY EXAM CHEST 2 VIEWS: CPT

## 2023-08-04 PROCEDURE — 85025 COMPLETE CBC W/AUTO DIFF WBC: CPT

## 2023-08-04 RX ORDER — AMPICILLIN 500 MG/1
500 CAPSULE ORAL 2 TIMES DAILY
COMMUNITY

## 2023-08-04 RX ORDER — ACETAMINOPHEN 325 MG/1
650 TABLET ORAL AS NEEDED
COMMUNITY

## 2023-08-04 NOTE — DISCHARGE INSTRUCTIONS
Before you come to the hospital        Arrival time: AS DIRECTED BY OFFICE     YOU MAY TAKE THE FOLLOWING MEDICATION(S) THE MORNING OF SURGERY WITH A SIP OF WATER: BUSPAR, GABAPENTIN, NORCO           ALL OTHER HOME MEDICATION CHECK WITH YOUR PHYSICIAN (especially if   you are taking diabetes medicines or blood thinners)    Do not take any Erectile Dysfunction medications (EX: CIALIS, VIAGRA) 24 hours prior to surgery.      If you were given and instructed to use a germ- killing soap, use as directed the night before surgery and again the morning of surgery or as directed by your surgeon. (Use one-half of the bottle with each shower.)   See attached information for How to Use Chlorhexidine for Bathing if applicable.            Eating and drinking restrictions prior to scheduled arrival time    2 Hours before arrival time STOP   Drinking Clear liquids (water, black coffee-NO CREAM,  apple juice-no pulp)      8 Hours before arrival time STOP   All food, full liquids, and dairy products    (It is extremely important that you follow these guidelines to prevent delay or cancelation of your procedure)     Clear Liquids  Water and flavored water                                                                      Clear Fruit juices, such as cranberry juice and apple juice.  Black coffee (NO cream of any kind, including powdered).  Plain tea  Clear bouillon or broth.  Flavored gelatin.  Soda.  Gatorade or Powerade.  Full liquid examples  Juices that have pulp.  Frozen ice pops that contain fruit pieces.  Coffee with creamer  Milk.  Yogurt.                MANAGING PAIN AFTER SURGERY    We know you are probably wondering what your pain will be like after surgery.  Following surgery it is unrealistic to expect you will not have pain.   Pain is how our bodies let us know that something is wrong or cautions us to be careful.  That said, our goal is to make your pain tolerable.    Methods we may use to treat your pain include  (oral or IV medications, PCAs, epidurals, nerve blocks, etc.)   While some procedures require IV pain medications for a short time after surgery, transitioning to pain medications by mouth allows for better management of pain.   Your nurse will encourage you to take oral pain medications whenever possible.  IV medications work almost immediately, but only last a short while.  Taking medications by mouth allows for a more constant level of medication in your blood stream for a longer period of time.      Once your pain is out of control it is harder to get back under control.  It is important you are aware when your next dose of pain medication is due.  If you are admitted, your nurse may write the time of your next dose on the white board in your room to help you remember.      We are interested in your pain and encourage you to inform us about aggravating factors during your visit.   Many times a simple repositioning every few hours can make a big difference.    If your physician says it is okay, do not let your pain prevent you from getting out of bed. Be sure to call your nurse for assistance prior to getting up so you do not fall.      Before surgery, please decide your tolerable pain goal.  These faces help describe the pain ratings we use on a 0-10 scale.   Be prepared to tell us your goal and whether or not you take pain or anxiety medications at home.          Preparing for Surgery  Preparing for surgery is an important part of your care. It can make things go more smoothly and help you avoid complications. The steps leading up to surgery may vary among hospitals. Follow all instructions given to you by your health care providers. Ask questions if you do not understand something. Talk about any concerns that you have.  Here are some questions to consider asking before your surgery:  If my surgery is not an emergency (is elective), when would be the best time to have the surgery?  What arrangements do I need  to make for work, home, or school?  What will my recovery be like? How long will it be before I can return to normal activities?  Will I need to prepare my home? Will I need to arrange care for me or my children?  Should I expect to have pain after surgery? What are my pain management options? Are there nonmedical options that I can try for pain?  Tell a health care provider about:  Any allergies you have.  All medicines you are taking, including vitamins, herbs, eye drops, creams, and over-the-counter medicines.  Any problems you or family members have had with anesthetic medicines.  Any blood disorders you have.  Any surgeries you have had.  Any medical conditions you have.  Whether you are pregnant or may be pregnant.  What are the risks?  The risks and complications of surgery depend on the specific procedure that you have. Discuss all the risks with your health care providers before your surgery. Ask about common surgical complications, which may include:  Infection.  Bleeding or a need for blood replacement (transfusion).  Allergic reactions to medicines.  Damage to surrounding nerves, tissues, or structures.  A blood clot.  Scarring.  Failure of the surgery to correct the problem.  Follow these instructions before the procedure:  Several days or weeks before your procedure  You may have a physical exam by your primary health care provider to make sure it is safe for you to have surgery.  You may have testing. This may include a chest X-ray, blood and urine tests, electrocardiogram (ECG), or other testing.  Ask your health care provider about:  Changing or stopping your regular medicines. This is especially important if you are taking diabetes medicines or blood thinners.  Taking medicines such as aspirin and ibuprofen. These medicines can thin your blood. Do not take these medicines unless your health care provider tells you to take them.  Taking over-the-counter medicines, vitamins, herbs, and  supplements.  Do not use any products that contain nicotine or tobacco, such as cigarettes and e-cigarettes. If you need help quitting, ask your health care provider.  Avoid alcohol.  Ask your health care provider if there are exercises you can do to prepare for surgery.  Eat a healthy diet.   Plan to have someone 18 years of age or older to take you home from the hospital. We will need to verify your ride on the morning of surgery if you are being discharged home on the same day. Tell your ride to be expecting a call from the hospital prior to your procedure.   Plan to have a responsible adult care for you for at least 24 hours after you leave the hospital or clinic. This is important.  The day before your procedure  You may be given antibiotic medicine to take by mouth to help prevent infection. Take it as told by your health care provider.  You may be asked to shower with a germ-killing soap.  Follow instructions from your health care provider about eating and drinking restrictions. This includes gum, mints and hard candy.  Pack comfortable clothes according to your procedure.   The day of your procedure  You may need to take another shower with a germ-killing soap before you leave home in the morning.  With a small sip of water, take only the medicines that you are told to take.  Remove all jewelry including rings.   Leave anything you consider valuable at home except hearing aids if needed.  You do not need to bring your home medications into the hospital.   Do not wear any makeup, nail polish, powder, deodorant, lotion, hair accessories, or anything on your skin or body except your clothes.  If you will be staying in the hospital, bring a case to hold your glasses, contacts, or dentures. You may also want to bring your robe and non-skid footwear.       (Do not use denture adhesives since you will be asked to remove them during  surgery).   If you wear oxygen at home, bring it with you the day of surgery.  If  instructed by your health care provider, bring your sleep apnea device with you on the day of your surgery (if this applies to you).  You may want to leave your suitcase and sleep apnea device in the car until after surgery.   Arrive at the hospital as scheduled.  Bring a friend or family member with you who can help to answer questions and be present while you meet with your health care provider.  At the hospital  When you arrive at the hospital:  Go to registration located at the main entrance of the hospital. You will be registered and given a beeper and a sticker sheet. Take the stickers to the Outpatient nurses desk and place in the black tray. This is to notify staff that you have arrived. Then return to the lobby to wait.   When your beeper lights up and vibrates proceed through the double doors, under the stairs, and a member of the Outpatient Surgery staff will escort you to your preoperative room.  You may have to wear compression sleeves. These help to prevent blood clots and reduce swelling in your legs.  An IV may be inserted into one of your veins.              In the operating room, you may be given one or more of the following:        A medicine to help you relax (sedative).        A medicine to numb the area (local anesthetic).        A medicine to make you fall asleep (general anesthetic).        A medicine that is injected into an area of your body to numb everything below the                      injection site (regional anesthetic).  You may be given an antibiotic through your IV to help prevent infection.  Your surgical site will be marked or identified.    Contact a health care provider if you:  Develop a fever of more than 100.4øF (38øC) or other feelings of illness during the 48 hours before your surgery.  Have symptoms that get worse.  Have questions or concerns about your surgery.  Summary  Preparing for surgery can make the procedure go more smoothly and lower your risk of  complications.  Before surgery, make a list of questions and concerns to discuss with your surgeon. Ask about the risks and possible complications.  In the days or weeks before your surgery, follow all instructions from your health care provider. You may need to stop smoking, avoid alcohol, follow eating restrictions, and change or stop your regular medicines.  Contact your surgeon if you develop a fever or other signs of illness during the few days before your surgery.  This information is not intended to replace advice given to you by your health care provider. Make sure you discuss any questions you have with your health care provider.  Document Revised: 12/21/2018 Document Reviewed: 10/23/2018  Elsevier Patient Education c 2021 [employer]

## 2023-08-07 ENCOUNTER — OFFICE VISIT (OUTPATIENT)
Dept: WOUND CARE | Facility: HOSPITAL | Age: 68
End: 2023-08-07
Payer: MEDICARE

## 2023-08-07 LAB
QT INTERVAL: 408 MS
QTC INTERVAL: 488 MS

## 2023-08-09 ENCOUNTER — HOSPITAL ENCOUNTER (OUTPATIENT)
Facility: HOSPITAL | Age: 68
Setting detail: HOSPITAL OUTPATIENT SURGERY
Discharge: HOME | End: 2023-08-09
Attending: PODIATRIST | Admitting: PODIATRIST
Payer: MEDICARE

## 2023-08-09 ENCOUNTER — APPOINTMENT (OUTPATIENT)
Dept: GENERAL RADIOLOGY | Facility: HOSPITAL | Age: 68
End: 2023-08-09
Payer: MEDICARE

## 2023-08-09 ENCOUNTER — ANESTHESIA (OUTPATIENT)
Dept: PERIOP | Facility: HOSPITAL | Age: 68
End: 2023-08-09
Payer: MEDICARE

## 2023-08-09 ENCOUNTER — ANESTHESIA EVENT (OUTPATIENT)
Dept: PERIOP | Facility: HOSPITAL | Age: 68
End: 2023-08-09
Payer: MEDICARE

## 2023-08-09 VITALS
HEART RATE: 80 BPM | SYSTOLIC BLOOD PRESSURE: 91 MMHG | TEMPERATURE: 98.3 F | OXYGEN SATURATION: 97 % | RESPIRATION RATE: 16 BRPM | DIASTOLIC BLOOD PRESSURE: 56 MMHG

## 2023-08-09 DIAGNOSIS — M86.171 ACUTE OSTEOMYELITIS OF RIGHT FOOT: ICD-10-CM

## 2023-08-09 LAB
GLUCOSE BLDC GLUCOMTR-MCNC: 135 MG/DL (ref 70–130)
GLUCOSE BLDC GLUCOMTR-MCNC: 148 MG/DL (ref 70–130)

## 2023-08-09 PROCEDURE — 25010000002 ONDANSETRON PER 1 MG: Performed by: NURSE ANESTHETIST, CERTIFIED REGISTERED

## 2023-08-09 PROCEDURE — 25010000002 BUPIVACAINE 0.5 % SOLUTION: Performed by: PODIATRIST

## 2023-08-09 PROCEDURE — 28810 AMPUTATION TOE & METATARSAL: CPT | Performed by: PODIATRIST

## 2023-08-09 PROCEDURE — 73620 X-RAY EXAM OF FOOT: CPT

## 2023-08-09 PROCEDURE — 88311 DECALCIFY TISSUE: CPT | Performed by: PODIATRIST

## 2023-08-09 PROCEDURE — 88305 TISSUE EXAM BY PATHOLOGIST: CPT | Performed by: PODIATRIST

## 2023-08-09 PROCEDURE — 82948 REAGENT STRIP/BLOOD GLUCOSE: CPT

## 2023-08-09 PROCEDURE — 25010000002 CLINDAMYCIN PER 300 MG: Performed by: PODIATRIST

## 2023-08-09 PROCEDURE — 25010000002 PROPOFOL 10 MG/ML EMULSION: Performed by: NURSE ANESTHETIST, CERTIFIED REGISTERED

## 2023-08-09 PROCEDURE — 25010000002 FENTANYL CITRATE (PF) 50 MCG/ML SOLUTION: Performed by: NURSE ANESTHETIST, CERTIFIED REGISTERED

## 2023-08-09 RX ORDER — LIDOCAINE HYDROCHLORIDE 10 MG/ML
0.5 INJECTION, SOLUTION EPIDURAL; INFILTRATION; INTRACAUDAL; PERINEURAL ONCE AS NEEDED
Status: DISCONTINUED | OUTPATIENT
Start: 2023-08-09 | End: 2023-08-09 | Stop reason: HOSPADM

## 2023-08-09 RX ORDER — SODIUM CHLORIDE 0.9 % (FLUSH) 0.9 %
3 SYRINGE (ML) INJECTION AS NEEDED
Status: DISCONTINUED | OUTPATIENT
Start: 2023-08-09 | End: 2023-08-09 | Stop reason: HOSPADM

## 2023-08-09 RX ORDER — FENTANYL CITRATE 50 UG/ML
25 INJECTION, SOLUTION INTRAMUSCULAR; INTRAVENOUS
Status: DISCONTINUED | OUTPATIENT
Start: 2023-08-09 | End: 2023-08-09 | Stop reason: HOSPADM

## 2023-08-09 RX ORDER — ACETAMINOPHEN 500 MG
1000 TABLET ORAL ONCE
Status: COMPLETED | OUTPATIENT
Start: 2023-08-09 | End: 2023-08-09

## 2023-08-09 RX ORDER — BUPIVACAINE HYDROCHLORIDE 5 MG/ML
INJECTION, SOLUTION PERINEURAL AS NEEDED
Status: DISCONTINUED | OUTPATIENT
Start: 2023-08-09 | End: 2023-08-09 | Stop reason: HOSPADM

## 2023-08-09 RX ORDER — LABETALOL HYDROCHLORIDE 5 MG/ML
5 INJECTION, SOLUTION INTRAVENOUS
Status: DISCONTINUED | OUTPATIENT
Start: 2023-08-09 | End: 2023-08-09 | Stop reason: HOSPADM

## 2023-08-09 RX ORDER — BUPIVACAINE HCL/0.9 % NACL/PF 0.125 %
PLASTIC BAG, INJECTION (ML) EPIDURAL AS NEEDED
Status: DISCONTINUED | OUTPATIENT
Start: 2023-08-09 | End: 2023-08-09 | Stop reason: SURG

## 2023-08-09 RX ORDER — NALOXONE HCL 0.4 MG/ML
0.4 VIAL (ML) INJECTION AS NEEDED
Status: DISCONTINUED | OUTPATIENT
Start: 2023-08-09 | End: 2023-08-09 | Stop reason: HOSPADM

## 2023-08-09 RX ORDER — HYDROCODONE BITARTRATE AND ACETAMINOPHEN 10; 325 MG/1; MG/1
1 TABLET ORAL EVERY 4 HOURS PRN
Status: DISCONTINUED | OUTPATIENT
Start: 2023-08-09 | End: 2023-08-09 | Stop reason: HOSPADM

## 2023-08-09 RX ORDER — SODIUM CHLORIDE 9 MG/ML
40 INJECTION, SOLUTION INTRAVENOUS AS NEEDED
Status: DISCONTINUED | OUTPATIENT
Start: 2023-08-09 | End: 2023-08-09 | Stop reason: HOSPADM

## 2023-08-09 RX ORDER — FLUMAZENIL 0.1 MG/ML
0.2 INJECTION INTRAVENOUS AS NEEDED
Status: DISCONTINUED | OUTPATIENT
Start: 2023-08-09 | End: 2023-08-09 | Stop reason: HOSPADM

## 2023-08-09 RX ORDER — SODIUM CHLORIDE, SODIUM LACTATE, POTASSIUM CHLORIDE, CALCIUM CHLORIDE 600; 310; 30; 20 MG/100ML; MG/100ML; MG/100ML; MG/100ML
100 INJECTION, SOLUTION INTRAVENOUS CONTINUOUS
Status: DISCONTINUED | OUTPATIENT
Start: 2023-08-09 | End: 2023-08-09 | Stop reason: HOSPADM

## 2023-08-09 RX ORDER — HYDROCODONE BITARTRATE AND ACETAMINOPHEN 5; 325 MG/1; MG/1
1 TABLET ORAL ONCE AS NEEDED
Status: DISCONTINUED | OUTPATIENT
Start: 2023-08-09 | End: 2023-08-09 | Stop reason: HOSPADM

## 2023-08-09 RX ORDER — MIDAZOLAM HYDROCHLORIDE 1 MG/ML
0.5 INJECTION INTRAMUSCULAR; INTRAVENOUS
Status: DISCONTINUED | OUTPATIENT
Start: 2023-08-09 | End: 2023-08-09 | Stop reason: HOSPADM

## 2023-08-09 RX ORDER — FENTANYL CITRATE 50 UG/ML
INJECTION, SOLUTION INTRAMUSCULAR; INTRAVENOUS AS NEEDED
Status: DISCONTINUED | OUTPATIENT
Start: 2023-08-09 | End: 2023-08-09 | Stop reason: SURG

## 2023-08-09 RX ORDER — MAGNESIUM HYDROXIDE 1200 MG/15ML
LIQUID ORAL AS NEEDED
Status: DISCONTINUED | OUTPATIENT
Start: 2023-08-09 | End: 2023-08-09 | Stop reason: HOSPADM

## 2023-08-09 RX ORDER — IBUPROFEN 600 MG/1
600 TABLET ORAL ONCE AS NEEDED
Status: DISCONTINUED | OUTPATIENT
Start: 2023-08-09 | End: 2023-08-09 | Stop reason: HOSPADM

## 2023-08-09 RX ORDER — CLINDAMYCIN PHOSPHATE 900 MG/50ML
900 INJECTION, SOLUTION INTRAVENOUS ONCE
Status: COMPLETED | OUTPATIENT
Start: 2023-08-09 | End: 2023-08-09

## 2023-08-09 RX ORDER — HYDROCODONE BITARTRATE AND ACETAMINOPHEN 7.5; 325 MG/1; MG/1
1 TABLET ORAL EVERY 8 HOURS PRN
Qty: 15 TABLET | Refills: 0 | Status: SHIPPED | OUTPATIENT
Start: 2023-08-09

## 2023-08-09 RX ORDER — SODIUM CHLORIDE 0.9 % (FLUSH) 0.9 %
3 SYRINGE (ML) INJECTION EVERY 12 HOURS SCHEDULED
Status: DISCONTINUED | OUTPATIENT
Start: 2023-08-09 | End: 2023-08-09 | Stop reason: HOSPADM

## 2023-08-09 RX ORDER — PROPOFOL 10 MG/ML
VIAL (ML) INTRAVENOUS AS NEEDED
Status: DISCONTINUED | OUTPATIENT
Start: 2023-08-09 | End: 2023-08-09 | Stop reason: SURG

## 2023-08-09 RX ORDER — ONDANSETRON 2 MG/ML
4 INJECTION INTRAMUSCULAR; INTRAVENOUS ONCE AS NEEDED
Status: DISCONTINUED | OUTPATIENT
Start: 2023-08-09 | End: 2023-08-09 | Stop reason: HOSPADM

## 2023-08-09 RX ORDER — SODIUM CHLORIDE 0.9 % (FLUSH) 0.9 %
3-10 SYRINGE (ML) INJECTION AS NEEDED
Status: DISCONTINUED | OUTPATIENT
Start: 2023-08-09 | End: 2023-08-09 | Stop reason: HOSPADM

## 2023-08-09 RX ORDER — SODIUM CHLORIDE, SODIUM LACTATE, POTASSIUM CHLORIDE, CALCIUM CHLORIDE 600; 310; 30; 20 MG/100ML; MG/100ML; MG/100ML; MG/100ML
1000 INJECTION, SOLUTION INTRAVENOUS CONTINUOUS
Status: DISCONTINUED | OUTPATIENT
Start: 2023-08-09 | End: 2023-08-09 | Stop reason: HOSPADM

## 2023-08-09 RX ORDER — DROPERIDOL 2.5 MG/ML
0.62 INJECTION, SOLUTION INTRAMUSCULAR; INTRAVENOUS ONCE AS NEEDED
Status: DISCONTINUED | OUTPATIENT
Start: 2023-08-09 | End: 2023-08-09 | Stop reason: HOSPADM

## 2023-08-09 RX ORDER — LIDOCAINE HYDROCHLORIDE 20 MG/ML
INJECTION, SOLUTION EPIDURAL; INFILTRATION; INTRACAUDAL; PERINEURAL AS NEEDED
Status: DISCONTINUED | OUTPATIENT
Start: 2023-08-09 | End: 2023-08-09 | Stop reason: SURG

## 2023-08-09 RX ORDER — ONDANSETRON 2 MG/ML
INJECTION INTRAMUSCULAR; INTRAVENOUS AS NEEDED
Status: DISCONTINUED | OUTPATIENT
Start: 2023-08-09 | End: 2023-08-09 | Stop reason: SURG

## 2023-08-09 RX ADMIN — CLINDAMYCIN PHOSPHATE 900 MG: 900 INJECTION, SOLUTION INTRAVENOUS at 07:11

## 2023-08-09 RX ADMIN — ONDANSETRON 4 MG: 2 INJECTION INTRAMUSCULAR; INTRAVENOUS at 07:38

## 2023-08-09 RX ADMIN — FENTANYL CITRATE 100 MCG: 50 INJECTION, SOLUTION INTRAMUSCULAR; INTRAVENOUS at 07:03

## 2023-08-09 RX ADMIN — SODIUM CHLORIDE, POTASSIUM CHLORIDE, SODIUM LACTATE AND CALCIUM CHLORIDE 1000 ML: 600; 310; 30; 20 INJECTION, SOLUTION INTRAVENOUS at 06:08

## 2023-08-09 RX ADMIN — PROPOFOL INJECTABLE EMULSION 150 MG: 10 INJECTION, EMULSION INTRAVENOUS at 07:05

## 2023-08-09 RX ADMIN — Medication 200 MCG: at 07:15

## 2023-08-09 RX ADMIN — ACETAMINOPHEN 1000 MG: 500 TABLET, FILM COATED ORAL at 06:42

## 2023-08-09 RX ADMIN — LIDOCAINE HYDROCHLORIDE 100 MG: 20 INJECTION, SOLUTION EPIDURAL; INFILTRATION; INTRACAUDAL; PERINEURAL at 07:05

## 2023-08-09 NOTE — ANESTHESIA POSTPROCEDURE EVALUATION
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 (Right: Foot) Diagnosis:       Acute osteomyelitis of right foot      (Acute osteomyelitis of right foot [M86.171])    Surgeons: Silas Swan DPM Provider: Noah Segura CRNA    Anesthesia Type: general ASA Status: 3            Anesthesia Type: general    Vitals  Vitals Value Taken Time   BP 91/58 08/09/23 0832   Temp 98.3 øF (36.8 øC) 08/09/23 0825   Pulse 91 08/09/23 0832   Resp 16 08/09/23 0832   SpO2 96 % 08/09/23 0832           Post Anesthesia Care and Evaluation    Patient location during evaluation: PACU  Patient participation: complete - patient participated  Level of consciousness: awake and alert  Pain management: adequate    Airway patency: patent  Anesthetic complications: No anesthetic complications    Cardiovascular status: acceptable  Respiratory status: acceptable  Hydration status: acceptable    Comments: Blood pressure 91/58, pulse 91, temperature 98.3 øF (36.8 øC), temperature source Temporal, resp. rate 16, SpO2 96 %.    Pt discharged from PACU based on albert score >8

## 2023-08-09 NOTE — ANESTHESIA PREPROCEDURE EVALUATION
Anesthesia Evaluation     Patient summary reviewed   no history of anesthetic complications:   NPO Solid Status: > 8 hours  NPO Liquid Status: > 8 hours           Airway   Mallampati: II  TM distance: >3 FB  Small opening  Dental    (+) edentulous    Pulmonary    (+) ,sleep apnea on CPAP  (-) asthma, not a smoker  Cardiovascular   Exercise tolerance: poor (<4 METS)    ECG reviewed    (+) hypertension, CAD, CABG >6 Months, cardiac stents (prior to Cabg --stent complications led to direct transfer to CABG ) more than 12 months ago PVD, hyperlipidemia  (-) pacemaker, angina      Neuro/Psych  (-) seizures, CVA  GI/Hepatic/Renal/Endo    (+) renal disease stones, diabetes mellitus type 2 using insulin, thyroid problem hypothyroidism  (-) GERD, liver disease    Musculoskeletal     Abdominal    Substance History      OB/GYN          Other   arthritis, blood dyscrasia anemia thrombocytopenia,   history of cancer (non hodgkins lymphoma) remission                    Anesthesia Plan    ASA 3     general     intravenous induction     Anesthetic plan, risks, benefits, and alternatives have been provided, discussed and informed consent has been obtained with: patient.

## 2023-08-09 NOTE — OP NOTE
POSTOPERATIVE NOTE  Patient: Franko Lucero  MRN: [id number]    YOB: 1955  Age: 67 y.o.  Sex: male  Unit: [facility] OR Room/Bed: PAD OR/MAIN OR Location: [facility]    Date of Operation: 8/9/2023     Preoperative Diagnosis:  Acute osteomyelitis of right foot [M86.171]     Postoperative Diagnosis:  1. Same as pre-operative    Operative Procedures:  1.  Partial 4th Ray Amputation - Right Foot    Surgeon: Surgeon(s) and Role:     * Silas Swan DPM - Primary    Anesthesia: General     Hemostasis: Anatomic Dissection, Ankle Tourniquet, Electric cauterization    Estimated Blood Loss: minimal    Pathology:   Specimens       ID Source Type Tests Collected By Collected At Frozen?    A Toe, Right Tissue TISSUE PATHOLOGY EXAM   Silas Swan DPM 8/9/23 0732     Description: right fourth toe    This specimen was not marked as sent.            Materials: Nothing was implanted during the procedure    Injectables: 8mL 0.5% Marcaine Plain    Fluids: See anesthesia log.    Drains: None    Complications: None    Postoperative Condition: Stable. Patient tolerated procedure and anesthesia well. Patient left the operating room with vital signs stable and vascular status intact.     Operative Findings: Consistent with preoperative diagnosis.    Indications for Procedure: This 67 y.o. patient presents with chronic wounds and underlying bone infection of the right fourth ray.  Patient states that they have failed conservative therapy and opts for surgical correction at this time. The patient has been NPO for greater than 8 hours. The patient is ready for surgical intervention.    DESCRIPTION OF PROCEDURE  Under mild sedation, patient was brought into the operating room and placed on the operating room table in supine position. Preoperative antibiotic was given. A pneumatic ankle tourniquet was placed about the patient's right ankle. The patient was placed under General anesthesia, then local block was performed at  the surgical site using the above mentioned local anesthesia. The foot and ankle were then scrubbed, prepped and draped in the usual aseptic manner. Using an Esmarch, the foot and ankle were exsanguinated and tourniquet was inflated.    Attention was then directed to the right fourth ray where a linear incision was made along the dorsal aspect of the fourth metatarsal head.  The incision was deepened through the subcutaneous tissues using sharp and blunt dissection.  Care was taken to identify and retract all vital neural and vascular structures.  All bleeders were ligated and cauterized necessary.  Once down to the fourth MTPJ, a linear capsulotomy was performed to inspect the fourth metatarsal head.  There was moderate fragmentation along the fourth metatarsal head and due to concern for infection along the fourth toe, the interoperative decision was made to perform a partial fourth ray amputation.  At this time the incision was converted to a racquet type to include the fourth toe which was made down to the level of bone.  Next a osteotomy was performed along the distal shaft of the fourth metatarsal.  The distal metatarsal and fourth digit were then resected and passed from the operative field to be sent to pathology for analysis.  Wound was then debrided of any nonviable tissue and all tendon/capsule were resected as far proximal.  The wounds then flushed with copious amounts of sterile saline.  Deep and subcutaneous tissues were reapproximated utilizing 3-0 Vicryl.  Skin was reapproximated coapted utilizing 3-0 nylon and vertical mattress and simple suturing technique.    The tourniquet was deflated during closure.  Hemostasis had been obtained.  Capillary fill was noted to all remaining toes.    A bandage consisting of Adaptic, Betadine soaked gauze, ABD, Kerlix, and Coban was applied.    The patient tolerated the procedures and anesthesia well.  He was transferred to the recovery room with vital signs stable  and vascular status intact to the right lower extremity.  After a period of postoperative monitoring, the patient will be discharged to home with oral and written postoperative instructions.  He will follow-up in office within 1 week.  He is to be nonweightbearing to the surgical foot.

## 2023-08-09 NOTE — ANESTHESIA PROCEDURE NOTES
Airway  Urgency: elective    Airway not difficult    General Information and Staff    Patient location during procedure: OR  CRNA/CAA: Noah Segura CRNA    Indications and Patient Condition  Indications for airway management: airway protection    Preoxygenated: yes  Mask difficulty assessment: 0 - not attempted    Final Airway Details  Final airway type: supraglottic airway      Successful airway: Supreme  Size 4     Number of attempts at approach: 1  Assessment: lips, teeth, and gum same as pre-op and atraumatic intubation

## 2023-08-09 NOTE — BRIEF OP NOTE
AMPUTATION TRANS METATARSAL  Progress Note    Franko Lucero  8/9/2023    Pre-op Diagnosis:   Acute osteomyelitis of right foot [M86.171]       Post-Op Diagnosis Codes:     * Acute osteomyelitis of right foot [M86.171]    Procedure/CPTr Codes:        Procedure(s):  Partial 4th Ray Amputation - Right Foot              Surgeon(s):  Silas Swan DPM    Anesthesia: General    Staff:   Circulator: Narcisa Smith RN  Scrub Person: Katia Salgado Selena         Estimated Blood Loss: minimal    Urine Voided: * No values recorded between 8/9/2023  7:00 AM and 8/9/2023  7:46 AM *    Specimens:                Specimens       ID Source Type Tests Collected By Collected At Frozen?    A Toe, Right Tissue TISSUE PATHOLOGY EXAM   Silas Swan DPM 8/9/23 0732     Description: right fourth toe    This specimen was not marked as sent.                  Drains:   [REMOVED] Ureteral Drain/Stent Left ureter 6 Fr. (Removed)       Findings: Consistent with pre-operative diagnoses.         Complications: None        Silas Swan DPM     Date: 8/9/2023  Time: 07:49 CDT

## 2023-08-10 ENCOUNTER — TELEPHONE (OUTPATIENT)
Dept: PODIATRY | Facility: CLINIC | Age: 68
End: 2023-08-10
Payer: MEDICARE

## 2023-08-10 DIAGNOSIS — G89.18 POST-OP PAIN: Primary | ICD-10-CM

## 2023-08-10 LAB
CYTO UR: NORMAL
LAB AP CASE REPORT: NORMAL
Lab: NORMAL
PATH REPORT.FINAL DX SPEC: NORMAL
PATH REPORT.GROSS SPEC: NORMAL

## 2023-08-10 RX ORDER — OXYCODONE AND ACETAMINOPHEN 7.5; 325 MG/1; MG/1
1 TABLET ORAL EVERY 6 HOURS PRN
Qty: 16 TABLET | Refills: 0 | Status: SHIPPED | OUTPATIENT
Start: 2023-08-10

## 2023-08-14 ENCOUNTER — OFFICE VISIT (OUTPATIENT)
Dept: WOUND CARE | Facility: HOSPITAL | Age: 68
End: 2023-08-14
Payer: MEDICARE

## 2023-08-21 ENCOUNTER — OFFICE VISIT (OUTPATIENT)
Dept: WOUND CARE | Facility: HOSPITAL | Age: 68
End: 2023-08-21
Payer: MEDICARE

## 2023-08-25 ENCOUNTER — TELEPHONE (OUTPATIENT)
Dept: PODIATRY | Facility: CLINIC | Age: 68
End: 2023-08-25
Payer: MEDICARE

## 2023-08-25 NOTE — TELEPHONE ENCOUNTER
Patient was called 08/24 @ 0901 AM and a message was left on the phone number listed in the chart stating dr overton will not need to see him in the office since she follows up in wound care.

## 2023-08-28 ENCOUNTER — OFFICE VISIT (OUTPATIENT)
Dept: WOUND CARE | Facility: HOSPITAL | Age: 68
End: 2023-08-28
Payer: MEDICARE

## 2023-09-07 ENCOUNTER — OFFICE VISIT (OUTPATIENT)
Dept: WOUND CARE | Facility: HOSPITAL | Age: 68
End: 2023-09-07
Payer: MEDICARE

## 2023-09-07 PROCEDURE — G0463 HOSPITAL OUTPT CLINIC VISIT: HCPCS

## 2023-09-11 NOTE — PROGRESS NOTES
BEDREST COMPLETED. PT OOB WITH RN @ SIDE. PT AMBULATED SHORT DISTANCE IN CATH HOLDING WITHOUT S/S BLEEDING NOTED. PT STATED, \"MY LEGS ARE ALREADY TIRED AND I GET SOB WHEN I WALK\". NO S/S BLEEDING NOTED. - Improved with IV hydration  - hepatitis panel, RUQ US wnl - Started on Flonase nasal spray and Claritin - Started on Flonase nasal spray and Claritin c/w home amitriptyline  - was recently started on rexulti and trintellix but has not started it yet. Can be started oupatient when she sees her PCP Started on Flonase nasal spray and Claritin - Improved with IV hydration  - hepatitis panel, RUQ US wnl - Started on Flonase nasal spray and Claritin - Improved with IV hydration  - hepatitis panel, RUQ US wnl - Improved with IV hydration  - hepatitis panel, RUQ US wnl Started on Flonase nasal spray and Claritin

## 2023-10-12 ENCOUNTER — OFFICE VISIT (OUTPATIENT)
Dept: WOUND CARE | Facility: HOSPITAL | Age: 68
End: 2023-10-12
Payer: MEDICARE

## 2023-10-12 DIAGNOSIS — Z79.02 LONG TERM (CURRENT) USE OF ANTITHROMBOTICS/ANTIPLATELETS: ICD-10-CM

## 2023-10-12 DIAGNOSIS — E11.628 TYPE 2 DIABETES MELLITUS WITH OTHER SKIN COMPLICATIONS: Primary | ICD-10-CM

## 2023-10-12 PROCEDURE — G0463 HOSPITAL OUTPT CLINIC VISIT: HCPCS

## 2023-11-17 ENCOUNTER — TRANSCRIBE ORDERS (OUTPATIENT)
Dept: ADMINISTRATIVE | Facility: HOSPITAL | Age: 68
End: 2023-11-17
Payer: MEDICARE

## 2023-11-17 DIAGNOSIS — R20.0 NUMBNESS: Primary | ICD-10-CM

## 2023-12-18 ENCOUNTER — OFFICE VISIT (OUTPATIENT)
Dept: CARDIOLOGY CLINIC | Age: 68
End: 2023-12-18

## 2023-12-18 VITALS
SYSTOLIC BLOOD PRESSURE: 122 MMHG | BODY MASS INDEX: 29.12 KG/M2 | WEIGHT: 208 LBS | DIASTOLIC BLOOD PRESSURE: 72 MMHG | HEART RATE: 93 BPM | HEIGHT: 71 IN

## 2023-12-18 DIAGNOSIS — I25.10 CORONARY ARTERY DISEASE INVOLVING NATIVE CORONARY ARTERY OF NATIVE HEART WITHOUT ANGINA PECTORIS: ICD-10-CM

## 2023-12-18 DIAGNOSIS — Z95.1 HISTORY OF CORONARY ARTERY BYPASS GRAFT X 1: ICD-10-CM

## 2023-12-18 DIAGNOSIS — I10 ESSENTIAL HYPERTENSION: ICD-10-CM

## 2023-12-18 DIAGNOSIS — E78.2 MIXED HYPERLIPIDEMIA: ICD-10-CM

## 2023-12-18 DIAGNOSIS — R06.09 DOE (DYSPNEA ON EXERTION): ICD-10-CM

## 2023-12-18 DIAGNOSIS — Z95.1 S/P CABG X 3: ICD-10-CM

## 2023-12-18 DIAGNOSIS — R06.02 SHORTNESS OF BREATH: Primary | ICD-10-CM

## 2023-12-18 NOTE — PROGRESS NOTES
[facility]                            Date:  12/18/2023  Patient: Daniel Ramirez  Age:  68 y.o., 1955      Reason for evaluation:         SUBJECTIVE:    Returns today follow-up assessment gets out of breath readily can walk about 100 feet has to stop and rest.  No definite pulmonary history but he did smoke 35 years 2 packs/day 70 pack years quit 20 years ago.  Denies cough or wheezing not on any pulmonary medications.  Denies anginal chest discomfort.  Forgets readily.  No other complaints or issues reported.  Blood pressure 122/72 heart 93.    Review of Systems   Constitutional: Negative.  Negative for chills, fever and unexpected weight change.   HENT: Negative.     Eyes: Negative.    Respiratory: Negative.  Negative for shortness of breath.    Cardiovascular: Negative.  Negative for chest pain.   Gastrointestinal: Negative.  Negative for diarrhea, nausea and vomiting.   Endocrine: Negative.    Genitourinary: Negative.    Musculoskeletal: Negative.    Skin: Negative.    Neurological: Negative.    All other systems reviewed and are negative.        OBJECTIVE:     /72   Pulse 93   Ht 1.803 m (5' 11\")   Wt 94.3 kg (208 lb)   BMI 29.01 kg/m²     Labs:   CBC: No results for input(s): \"WBC\", \"HGB\", \"HCT\", \"PLT\" in the last 72 hours.  BMP:No results for input(s): \"NA\", \"K\", \"CO2\", \"BUN\", \"CREATININE\", \"LABGLOM\", \"GLUCOSE\" in the last 72 hours.  BNP: No results for input(s): \"BNP\" in the last 72 hours.  PT/INR: No results for input(s): \"PROTIME\", \"INR\" in the last 72 hours.  APTT:No results for input(s): \"APTT\" in the last 72 hours.  CARDIAC ENZYMES:No results for input(s): \"CKTOTAL\", \"CKMB\", \"CKMBINDEX\", \"TROPONINI\" in the last 72 hours.  FASTING LIPID PANEL:  Lab Results   Component Value Date/Time    HDL 34 07/25/2022 02:04 AM    LDLDIRECT 99 10/29/2014 06:35 AM    LDLCALC 48 07/25/2022 02:04 AM    TRIG 372 07/25/2022 02:04 AM     LIVER PROFILE:No results for input(s):

## 2024-01-02 ENCOUNTER — TELEPHONE (OUTPATIENT)
Dept: NEUROLOGY | Facility: HOSPITAL | Age: 69
End: 2024-01-02

## 2024-01-03 ENCOUNTER — HOSPITAL ENCOUNTER (OUTPATIENT)
Dept: NEUROLOGY | Facility: HOSPITAL | Age: 69
Discharge: HOME | End: 2024-01-03
Admitting: ORTHOPAEDIC SURGERY
Payer: MEDICARE

## 2024-01-03 DIAGNOSIS — R20.0 NUMBNESS: ICD-10-CM

## 2024-01-03 PROCEDURE — 95885 MUSC TST DONE W/NERV TST LIM: CPT

## 2024-01-03 PROCEDURE — 95911 NRV CNDJ TEST 9-10 STUDIES: CPT | Performed by: PSYCHIATRY & NEUROLOGY

## 2024-01-03 PROCEDURE — 95911 NRV CNDJ TEST 9-10 STUDIES: CPT

## 2024-01-03 PROCEDURE — 95885 MUSC TST DONE W/NERV TST LIM: CPT | Performed by: PSYCHIATRY & NEUROLOGY

## 2024-02-14 DIAGNOSIS — E78.2 MIXED HYPERLIPIDEMIA: ICD-10-CM

## 2024-02-14 DIAGNOSIS — I10 ESSENTIAL HYPERTENSION: ICD-10-CM

## 2024-02-14 DIAGNOSIS — Z95.1 S/P CABG X 3: ICD-10-CM

## 2024-02-14 DIAGNOSIS — I25.10 CORONARY ARTERY DISEASE INVOLVING NATIVE CORONARY ARTERY OF NATIVE HEART WITHOUT ANGINA PECTORIS: ICD-10-CM

## 2024-02-14 DIAGNOSIS — R06.02 SHORTNESS OF BREATH: Primary | ICD-10-CM

## 2024-02-14 DIAGNOSIS — E78.5 DYSLIPIDEMIA: ICD-10-CM

## 2024-02-20 ENCOUNTER — HOSPITAL ENCOUNTER (OUTPATIENT)
Dept: NON INVASIVE DIAGNOSTICS | Age: 69
Discharge: HOME | End: 2024-02-20
Payer: MEDICARE

## 2024-02-20 DIAGNOSIS — R06.02 SHORTNESS OF BREATH: ICD-10-CM

## 2024-02-20 DIAGNOSIS — E78.5 DYSLIPIDEMIA: ICD-10-CM

## 2024-02-20 DIAGNOSIS — I25.10 CORONARY ARTERY DISEASE INVOLVING NATIVE CORONARY ARTERY OF NATIVE HEART WITHOUT ANGINA PECTORIS: ICD-10-CM

## 2024-02-20 DIAGNOSIS — E78.2 MIXED HYPERLIPIDEMIA: ICD-10-CM

## 2024-02-20 DIAGNOSIS — Z95.1 S/P CABG X 3: ICD-10-CM

## 2024-02-20 DIAGNOSIS — I10 ESSENTIAL HYPERTENSION: ICD-10-CM

## 2024-02-20 PROCEDURE — C8929 TTE W OR WO FOL WCON,DOPPLER: HCPCS

## 2024-02-20 PROCEDURE — 6360000004 HC RX CONTRAST MEDICATION: Performed by: INTERNAL MEDICINE

## 2024-02-20 RX ORDER — METOPROLOL SUCCINATE 50 MG/1
25 TABLET, EXTENDED RELEASE ORAL DAILY
Qty: 90 TABLET | Refills: 3 | Status: SHIPPED | OUTPATIENT
Start: 2024-02-20

## 2024-02-20 RX ADMIN — PERFLUTREN 1.5 ML: 6.52 INJECTION, SUSPENSION INTRAVENOUS at 15:06

## 2024-02-22 ENCOUNTER — OFFICE VISIT (OUTPATIENT)
Dept: CARDIOLOGY CLINIC | Age: 69
End: 2024-02-22

## 2024-02-22 VITALS
HEART RATE: 86 BPM | BODY MASS INDEX: 28.98 KG/M2 | HEIGHT: 71 IN | WEIGHT: 207 LBS | SYSTOLIC BLOOD PRESSURE: 130 MMHG | DIASTOLIC BLOOD PRESSURE: 68 MMHG

## 2024-02-22 DIAGNOSIS — E78.2 MIXED HYPERLIPIDEMIA: ICD-10-CM

## 2024-02-22 DIAGNOSIS — I10 ESSENTIAL HYPERTENSION: Primary | ICD-10-CM

## 2024-02-22 DIAGNOSIS — I25.10 CORONARY ARTERY DISEASE INVOLVING NATIVE CORONARY ARTERY OF NATIVE HEART WITHOUT ANGINA PECTORIS: ICD-10-CM

## 2024-02-22 DIAGNOSIS — R06.02 SHORTNESS OF BREATH: ICD-10-CM

## 2024-02-22 DIAGNOSIS — Z95.1 HISTORY OF CORONARY ARTERY BYPASS GRAFT X 1: ICD-10-CM

## 2024-02-22 DIAGNOSIS — Z95.1 S/P CABG X 3: ICD-10-CM

## 2024-02-22 RX ORDER — ROSUVASTATIN CALCIUM 40 MG/1
40 TABLET, COATED ORAL DAILY
COMMUNITY
Start: 2024-02-05

## 2024-02-22 ASSESSMENT — ENCOUNTER SYMPTOMS
SHORTNESS OF BREATH: 0
EYES NEGATIVE: 1
VOMITING: 0
GASTROINTESTINAL NEGATIVE: 1
DIARRHEA: 0
NAUSEA: 0
RESPIRATORY NEGATIVE: 1

## 2024-02-22 NOTE — PROGRESS NOTES
Heart sounds: Normal heart sounds. No murmur heard.     No friction rub. No gallop.   Pulmonary:      Effort: Pulmonary effort is normal. No respiratory distress.      Breath sounds: Normal breath sounds. No wheezing or rales.   Abdominal:      General: There is no distension.      Tenderness: There is no abdominal tenderness.   Lymphadenopathy:      Cervical: No cervical adenopathy.   Skin:     General: Skin is warm and dry.             ASSESSMENT:     Diagnosis Orders   1. Essential hypertension        2. Shortness of breath        3. S/P CABG x 3        4. Coronary artery disease involving native coronary artery of native heart without angina pectoris        5. History of coronary artery bypass graft x 1        6. Mixed hyperlipidemia            PLAN:  No orders of the defined types were placed in this encounter.    No orders of the defined types were placed in this encounter.        Continue store 40 mg daily  Continue Plavix 75 mg daily  Continue metoprolol XL 50 mg 0.5 tablets p.o. daily  Continue Imdur 30 mg daily  Continue Cardizem  mg p.o. daily  Recommend follow-up assessment in 6 months      Return in about 6 months (around 8/22/2024) for return to Dr. Alvarez only.      Saw Alvarez MD 2/22/2024 11:56 AM [facility]      Thisdictation was generated by voice recognition computer software.  Although all attempts are made to edit the dictation for accuracy, there may be errors in the transcription that are not intended.

## 2024-02-26 ENCOUNTER — HOSPITAL ENCOUNTER (OUTPATIENT)
Dept: GENERAL RADIOLOGY | Facility: HOSPITAL | Age: 69
Discharge: HOME | End: 2024-02-26
Admitting: ORTHOPAEDIC SURGERY
Payer: MEDICARE

## 2024-02-26 ENCOUNTER — TRANSCRIBE ORDERS (OUTPATIENT)
Dept: ADMINISTRATIVE | Facility: HOSPITAL | Age: 69
End: 2024-02-26
Payer: MEDICARE

## 2024-02-26 DIAGNOSIS — M54.2 CERVICALGIA: ICD-10-CM

## 2024-02-26 DIAGNOSIS — M54.2 CERVICALGIA: Primary | ICD-10-CM

## 2024-02-26 PROCEDURE — 72050 X-RAY EXAM NECK SPINE 4/5VWS: CPT

## 2024-05-22 ENCOUNTER — TRANSCRIBE ORDERS (OUTPATIENT)
Dept: ADMINISTRATIVE | Facility: HOSPITAL | Age: 69
End: 2024-05-22
Payer: MEDICARE

## 2024-05-22 DIAGNOSIS — I25.10 CORONARY ARTERY DISEASE INVOLVING NATIVE CORONARY ARTERY OF NATIVE HEART WITHOUT ANGINA PECTORIS: Primary | ICD-10-CM

## 2024-06-04 ENCOUNTER — APPOINTMENT (OUTPATIENT)
Dept: CT IMAGING | Facility: HOSPITAL | Age: 69
End: 2024-06-04
Payer: MEDICARE

## 2024-06-04 ENCOUNTER — HOSPITAL ENCOUNTER (INPATIENT)
Facility: HOSPITAL | Age: 69
LOS: 3 days | Discharge: HOME | End: 2024-06-07
Attending: FAMILY MEDICINE | Admitting: FAMILY MEDICINE
Payer: MEDICARE

## 2024-06-04 ENCOUNTER — APPOINTMENT (OUTPATIENT)
Dept: GENERAL RADIOLOGY | Facility: HOSPITAL | Age: 69
End: 2024-06-04
Payer: MEDICARE

## 2024-06-04 DIAGNOSIS — N20.0 LEFT RENAL STONE: ICD-10-CM

## 2024-06-04 DIAGNOSIS — R79.89 ELEVATED TROPONIN: ICD-10-CM

## 2024-06-04 DIAGNOSIS — B34.8 PARAINFLUENZA VIRUS INFECTION: ICD-10-CM

## 2024-06-04 DIAGNOSIS — N17.9 AKI (ACUTE KIDNEY INJURY): Primary | ICD-10-CM

## 2024-06-04 LAB
ALBUMIN SERPL-MCNC: 4.4 G/DL (ref 3.5–5.2)
ALBUMIN/GLOB SERPL: 1.4 G/DL
ALP SERPL-CCNC: 95 U/L (ref 39–117)
ALT SERPL W P-5'-P-CCNC: 32 U/L (ref 1–41)
ANION GAP SERPL CALCULATED.3IONS-SCNC: 17 MMOL/L (ref 5–15)
AST SERPL-CCNC: 38 U/L (ref 1–40)
B PARAPERT DNA SPEC QL NAA+PROBE: NOT DETECTED
B PERT DNA SPEC QL NAA+PROBE: NOT DETECTED
BACTERIA UR QL AUTO: ABNORMAL /HPF
BASOPHILS # BLD AUTO: 0.02 10*3/MM3 (ref 0–0.2)
BASOPHILS NFR BLD AUTO: 0.3 % (ref 0–1.5)
BILIRUB SERPL-MCNC: 0.7 MG/DL (ref 0–1.2)
BILIRUB UR QL STRIP: NEGATIVE
BUN SERPL-MCNC: 33 MG/DL (ref 8–23)
BUN/CREAT SERPL: 14.3 (ref 7–25)
C PNEUM DNA NPH QL NAA+NON-PROBE: NOT DETECTED
CALCIUM SPEC-SCNC: 10 MG/DL (ref 8.6–10.5)
CHLORIDE SERPL-SCNC: 101 MMOL/L (ref 98–107)
CK SERPL-CCNC: 445 U/L (ref 20–200)
CLARITY UR: CLEAR
CO2 SERPL-SCNC: 21 MMOL/L (ref 22–29)
COLOR UR: YELLOW
CREAT SERPL-MCNC: 2.31 MG/DL (ref 0.76–1.27)
DEPRECATED RDW RBC AUTO: 57 FL (ref 37–54)
EGFRCR SERPLBLD CKD-EPI 2021: 29.6 ML/MIN/1.73
EOSINOPHIL # BLD AUTO: 0.12 10*3/MM3 (ref 0–0.4)
EOSINOPHIL NFR BLD AUTO: 2 % (ref 0.3–6.2)
ERYTHROCYTE [DISTWIDTH] IN BLOOD BY AUTOMATED COUNT: 15.4 % (ref 12.3–15.4)
FLUAV SUBTYP SPEC NAA+PROBE: NOT DETECTED
FLUBV RNA ISLT QL NAA+PROBE: NOT DETECTED
GEN 5 2HR TROPONIN T REFLEX: 40 NG/L
GLOBULIN UR ELPH-MCNC: 3.2 GM/DL
GLUCOSE SERPL-MCNC: 219 MG/DL (ref 65–99)
GLUCOSE UR STRIP-MCNC: ABNORMAL MG/DL
HADV DNA SPEC NAA+PROBE: NOT DETECTED
HCOV 229E RNA SPEC QL NAA+PROBE: NOT DETECTED
HCOV HKU1 RNA SPEC QL NAA+PROBE: NOT DETECTED
HCOV NL63 RNA SPEC QL NAA+PROBE: NOT DETECTED
HCOV OC43 RNA SPEC QL NAA+PROBE: NOT DETECTED
HCT VFR BLD AUTO: 34 % (ref 37.5–51)
HGB BLD-MCNC: 11.3 G/DL (ref 13–17.7)
HGB UR QL STRIP.AUTO: ABNORMAL
HMPV RNA NPH QL NAA+NON-PROBE: NOT DETECTED
HOLD SPECIMEN: NORMAL
HOLD SPECIMEN: NORMAL
HPIV1 RNA ISLT QL NAA+PROBE: NOT DETECTED
HPIV2 RNA SPEC QL NAA+PROBE: NOT DETECTED
HPIV3 RNA NPH QL NAA+PROBE: DETECTED
HPIV4 P GENE NPH QL NAA+PROBE: NOT DETECTED
IMM GRANULOCYTES # BLD AUTO: 0.03 10*3/MM3 (ref 0–0.05)
IMM GRANULOCYTES NFR BLD AUTO: 0.5 % (ref 0–0.5)
KETONES UR QL STRIP: NEGATIVE
LEUKOCYTE ESTERASE UR QL STRIP.AUTO: NEGATIVE
LYMPHOCYTES # BLD AUTO: 1.05 10*3/MM3 (ref 0.7–3.1)
LYMPHOCYTES NFR BLD AUTO: 17.6 % (ref 19.6–45.3)
M PNEUMO IGG SER IA-ACNC: NOT DETECTED
MAGNESIUM SERPL-MCNC: 2.3 MG/DL (ref 1.6–2.4)
MCH RBC QN AUTO: 33.3 PG (ref 26.6–33)
MCHC RBC AUTO-ENTMCNC: 33.2 G/DL (ref 31.5–35.7)
MCV RBC AUTO: 100.3 FL (ref 79–97)
MONOCYTES # BLD AUTO: 0.71 10*3/MM3 (ref 0.1–0.9)
MONOCYTES NFR BLD AUTO: 11.9 % (ref 5–12)
NEUTROPHILS NFR BLD AUTO: 4.05 10*3/MM3 (ref 1.7–7)
NEUTROPHILS NFR BLD AUTO: 67.7 % (ref 42.7–76)
NITRITE UR QL STRIP: NEGATIVE
NRBC BLD AUTO-RTO: 0 /100 WBC (ref 0–0.2)
NT-PROBNP SERPL-MCNC: 353 PG/ML (ref 0–900)
PH UR STRIP.AUTO: 5.5 [PH] (ref 5–8)
PLATELET # BLD AUTO: 81 10*3/MM3 (ref 140–450)
PMV BLD AUTO: 9.8 FL (ref 6–12)
POTASSIUM SERPL-SCNC: 3.3 MMOL/L (ref 3.5–5.2)
PROT SERPL-MCNC: 7.6 G/DL (ref 6–8.5)
PROT UR QL STRIP: ABNORMAL
QT INTERVAL: 412 MS
QTC INTERVAL: 517 MS
RBC # BLD AUTO: 3.39 10*6/MM3 (ref 4.14–5.8)
RBC # UR STRIP: ABNORMAL /HPF
REF LAB TEST METHOD: ABNORMAL
RHINOVIRUS RNA SPEC NAA+PROBE: NOT DETECTED
RSV RNA NPH QL NAA+NON-PROBE: NOT DETECTED
SARS-COV-2 RNA NPH QL NAA+NON-PROBE: NOT DETECTED
SODIUM SERPL-SCNC: 139 MMOL/L (ref 136–145)
SP GR UR STRIP: 1.02 (ref 1–1.03)
SQUAMOUS #/AREA URNS HPF: ABNORMAL /HPF
T4 FREE SERPL-MCNC: 1.45 NG/DL (ref 0.93–1.7)
TROPONIN T DELTA: 1 NG/L
TROPONIN T SERPL HS-MCNC: 39 NG/L
TROPONIN T SERPL HS-MCNC: 42 NG/L
TSH SERPL DL<=0.05 MIU/L-ACNC: 0.93 UIU/ML (ref 0.27–4.2)
UROBILINOGEN UR QL STRIP: ABNORMAL
WBC # UR STRIP: ABNORMAL /HPF
WBC NRBC COR # BLD AUTO: 5.98 10*3/MM3 (ref 3.4–10.8)
WHOLE BLOOD HOLD COAG: NORMAL
WHOLE BLOOD HOLD SPECIMEN: NORMAL

## 2024-06-04 PROCEDURE — 99285 EMERGENCY DEPT VISIT HI MDM: CPT

## 2024-06-04 PROCEDURE — 81001 URINALYSIS AUTO W/SCOPE: CPT | Performed by: NURSE PRACTITIONER

## 2024-06-04 PROCEDURE — 80053 COMPREHEN METABOLIC PANEL: CPT | Performed by: NURSE PRACTITIONER

## 2024-06-04 PROCEDURE — 0202U NFCT DS 22 TRGT SARS-COV-2: CPT | Performed by: NURSE PRACTITIONER

## 2024-06-04 PROCEDURE — 25810000003 SODIUM CHLORIDE 0.9 % SOLUTION: Performed by: NURSE PRACTITIONER

## 2024-06-04 PROCEDURE — G0378 HOSPITAL OBSERVATION PER HR: HCPCS

## 2024-06-04 PROCEDURE — 25010000002 POTASSIUM CHLORIDE 10 MEQ/100ML SOLUTION: Performed by: NURSE PRACTITIONER

## 2024-06-04 PROCEDURE — 83880 ASSAY OF NATRIURETIC PEPTIDE: CPT | Performed by: NURSE PRACTITIONER

## 2024-06-04 PROCEDURE — 84439 ASSAY OF FREE THYROXINE: CPT | Performed by: NURSE PRACTITIONER

## 2024-06-04 PROCEDURE — 82550 ASSAY OF CK (CPK): CPT | Performed by: NURSE PRACTITIONER

## 2024-06-04 PROCEDURE — 93005 ELECTROCARDIOGRAM TRACING: CPT

## 2024-06-04 PROCEDURE — 93005 ELECTROCARDIOGRAM TRACING: CPT | Performed by: FAMILY MEDICINE

## 2024-06-04 PROCEDURE — 74018 RADEX ABDOMEN 1 VIEW: CPT

## 2024-06-04 PROCEDURE — 36415 COLL VENOUS BLD VENIPUNCTURE: CPT

## 2024-06-04 PROCEDURE — 84481 FREE ASSAY (FT-3): CPT | Performed by: NURSE PRACTITIONER

## 2024-06-04 PROCEDURE — 83735 ASSAY OF MAGNESIUM: CPT | Performed by: NURSE PRACTITIONER

## 2024-06-04 PROCEDURE — 85025 COMPLETE CBC W/AUTO DIFF WBC: CPT | Performed by: NURSE PRACTITIONER

## 2024-06-04 PROCEDURE — 84443 ASSAY THYROID STIM HORMONE: CPT | Performed by: NURSE PRACTITIONER

## 2024-06-04 PROCEDURE — 74176 CT ABD & PELVIS W/O CONTRAST: CPT

## 2024-06-04 PROCEDURE — 71045 X-RAY EXAM CHEST 1 VIEW: CPT

## 2024-06-04 PROCEDURE — 84484 ASSAY OF TROPONIN QUANT: CPT | Performed by: NURSE PRACTITIONER

## 2024-06-04 RX ORDER — SODIUM CHLORIDE 0.9 % (FLUSH) 0.9 %
10 SYRINGE (ML) INJECTION AS NEEDED
Status: DISCONTINUED | OUTPATIENT
Start: 2024-06-04 | End: 2024-06-07 | Stop reason: HOSPADM

## 2024-06-04 RX ORDER — POTASSIUM CHLORIDE 7.45 MG/ML
10 INJECTION INTRAVENOUS ONCE
Status: COMPLETED | OUTPATIENT
Start: 2024-06-04 | End: 2024-06-04

## 2024-06-04 RX ADMIN — SODIUM CHLORIDE 500 ML: 9 INJECTION, SOLUTION INTRAVENOUS at 21:25

## 2024-06-04 RX ADMIN — POTASSIUM CHLORIDE 10 MEQ: 7.46 INJECTION, SOLUTION INTRAVENOUS at 21:28

## 2024-06-04 RX ADMIN — SODIUM CHLORIDE 500 ML: 9 INJECTION, SOLUTION INTRAVENOUS at 18:56

## 2024-06-04 NOTE — ED PROVIDER NOTES
Subjective   History of Present Illness  Patient is a 68-year-old male who presents to the ER with multiple complaints.  He states approximately 2 weeks ago he began developing cough with congestion and shortness of breath.  He was evaluated by his PCP, Dr. Chaves, and received a Rocephin injection in the office.  He is currently on antibiotics for respiratory illness.  Patient states he has had increasing shortness of breath in particular with activity.  He complains of worsening generalized weakness.  He has had nausea with episodes of vomiting described as vomiting up phlegm he has attempted to cough up.  Patient reports diffuse chest pain as well that began around the same time as his cough.  He has had constipation for the past 3 to 4 days.  He is scheduled for an outpatient stress test tomorrow however uncertain if he would be able to complete it given his overall symptoms.  Due to symptoms described above he came to the ER for evaluation and treatment.  Past medical history significant for anemia, anxiety, arthritis, BPH, coronary artery disease, non-Hodgkin's lymphoma, diabetes, thyroid disease, GERD, hearing loss, hyperlipidemia, hypertension, iliac artery aneurysm, kidney stones, liver cirrhosis, migraines, sleep apnea        Review of Systems   Constitutional:  Positive for fatigue. Negative for fever.   HENT:  Positive for congestion.    Respiratory:  Positive for cough and shortness of breath.    Cardiovascular:  Positive for chest pain.   Gastrointestinal:  Positive for constipation, nausea and vomiting. Negative for abdominal pain.   Genitourinary: Negative.  Negative for dysuria.   Musculoskeletal:  Positive for myalgias.   Skin: Negative.    Neurological:  Positive for weakness.   All other systems reviewed and are negative.      Past Medical History:   Diagnosis Date    Anemia     Anxiety     Arthritis     BPH (benign prostatic hypertrophy)     CAD (coronary artery disease)     Cancer      non-hodgkins lymphoma    Diabetes mellitus     Disease of thyroid gland     GERD (gastroesophageal reflux disease)     Hearing loss     History of transfusion     Hyperlipidemia     Hypertension     Iliac artery aneurysm, bilateral     Kidney stone     Liver cirrhosis     Migraines     Sleep apnea     c-pap       Allergies   Allergen Reactions    Adhesive Tape Rash     Pt states that if it isn't left on very long it is okay    Cefazolin Rash    Cefuroxime Axetil Rash    Cephalosporins Rash    Neomycin-Polymyxin B Gu Rash    Neosporin [Neomycin-Bacitracin Zn-Polymyx] Rash    Percocet [Oxycodone-Acetaminophen] Rash       Past Surgical History:   Procedure Laterality Date    COLONOSCOPY  02/04/2014    COLONOSCOPY N/A 4/26/2017    Procedure: COLONOSCOPY WITH ANESTHESIA;  Surgeon: Sher Cui MD;  Location: [facility] ENDOSCOPY;  Service:     CORONARY ARTERY BYPASS GRAFT      2    CYSTOSCOPY URETEROSCOPY LASER LITHOTRIPSY Left 4/17/2017    Procedure: URETEROSCOPY LASER LITHOTRIPSY WITH DOUBLE J STENT INSERTION, LEFT ;  Surgeon: Weston Peck MD;  Location: [facility] OR;  Service:     CYSTOSCOPY URETEROSCOPY LASER LITHOTRIPSY Left 8/14/2017    Procedure: CYSTOSCOPY URETEROSCOPY LASER LITHOTRIPSY STENT INSERTION STONE EXTRACTION;  Surgeon: Weston Peck MD;  Location: [facility] OR;  Service:     CYSTOSCOPY W/ URETERAL STENT PLACEMENT Left 4/17/2017    Procedure: CYSTOSCOPY URETERAL DOUBLE J STENT INSERTION, LEFT;  Surgeon: Weston Peck MD;  Location: [facility] OR;  Service:     ENDOSCOPY N/A 4/26/2017    Procedure: ESOPHAGOGASTRODUODENOSCOPY WITH ANESTHESIA;  Surgeon: Sher Cui MD;  Location: [facility] ENDOSCOPY;  Service:     INCISION AND DRAINAGE LEG Right 7/12/2023    Procedure: INCISION AND DRAINAGE - RIGHT FOOT;  Surgeon: Silas Swan DPM;  Location:  [facility] OR;  Service: Podiatry;  Laterality: Right;    JOINT REPLACEMENT Right     KIDNEY STONE SURGERY      KNEE SURGERY      replacement     NECK SURGERY      ROTATOR CUFF REPAIR      SHOULDER SURGERY      TONSILLECTOMY      TRANS METATARSAL AMPUTATION Right 2023    Procedure: Partial 4th Ray Amputation - Right Foot;  Surgeon: Silas Swan DPM;  Location:  [facility] OR;  Service: Podiatry;  Laterality: Right;    URETEROSCOPY LASER LITHOTRIPSY WITH STENT INSERTION Left 2022    Procedure: LEFT URETEROSCOPY LASER LITHOTRIPSY WITH STENT INSERTION;  Surgeon: Weston Peck MD;  Location:  [facility] OR;  Service: Urology;  Laterality: Left;    URETEROSCOPY LASER LITHOTRIPSY WITH STENT INSERTION Left 9/15/2022    Procedure: LEFT URETEROSCOPY LASER LITHOTRIPSY WITH STENT EXCHANGE;  Surgeon: Weston Peck MD;  Location:  [facility] OR;  Service: Urology;  Laterality: Left;       Family History   Problem Relation Age of Onset    Kidney disease Father     Heart disease Father     Cancer Mother         brain    Colon cancer Neg Hx     Colon polyps Neg Hx        Social History     Socioeconomic History    Marital status:    Tobacco Use    Smoking status: Former     Current packs/day: 0.00     Types: Cigarettes     Quit date:      Years since quittin.    Smokeless tobacco: Never   Vaping Use    Vaping status: Never Used   Substance and Sexual Activity    Alcohol use: No    Drug use: No    Sexual activity: Defer           Objective   Physical Exam  Vitals and nursing note reviewed.   Constitutional:       General: He is not in acute distress.     Appearance: He is well-developed. He is not toxic-appearing or diaphoretic.   HENT:      Head: Atraumatic.      Nose: Nose normal.   Eyes:      General: No scleral icterus.     Conjunctiva/sclera: Conjunctivae normal.      Pupils: Pupils are equal, round, and reactive to light.   Neck:      Thyroid: No thyromegaly.      Vascular: No JVD.   Cardiovascular:      Rate and Rhythm: Normal rate and regular rhythm.      Pulses: Normal pulses.      Heart sounds: Normal heart sounds. No murmur  heard.  Pulmonary:      Effort: Pulmonary effort is normal. No respiratory distress.      Breath sounds: Rales present. No wheezing.   Chest:      Chest wall: No tenderness.   Abdominal:      General: Bowel sounds are normal. There is no distension.      Palpations: Abdomen is soft. There is no mass.      Tenderness: There is no abdominal tenderness. There is no guarding or rebound.   Musculoskeletal:         General: Normal range of motion.      Cervical back: Normal range of motion and neck supple.   Lymphadenopathy:      Cervical: No cervical adenopathy.   Skin:     General: Skin is warm and dry.      Capillary Refill: Capillary refill takes less than 2 seconds.      Coloration: Skin is not pale.      Findings: No erythema or rash.   Neurological:      Mental Status: He is alert and oriented to person, place, and time.      Cranial Nerves: No cranial nerve deficit.      Coordination: Coordination normal.      Deep Tendon Reflexes: Reflexes are normal and symmetric.   Psychiatric:         Behavior: Behavior normal.         Thought Content: Thought content normal.         Judgment: Judgment normal.         Procedures           ED Course  ED Course as of 06/04/24 2331   e Jun 04, 2024 1942 Work up remains pending. This will be a turn over for Brigitte DE JESUS.  [TW]   1948 Assumed care of pt from KALEE DE JESUS. Pending workup at this time  [CW]   2042 Assumed care of patient from NEAL Yun.  CT of the abdomen pelvis without contrast showed left renal stones no hydro-.  Chest x-ray is unremarkable.  CBC showed a white count of 5.98 hemoglobin 11.3 hematocrit 34 initial troponin was 42-second troponin was 39.  Patient denies any chest pain.  Has had a cough for the past 2 weeks and respiratory panel was positive for parainfluenza virus.  CMP shows a glucose of 219 BUN of 33 creatinine 2.31.  His baseline creatinine is 1.23.  He received 500 cc bolus of normal saline.  Patient states he is feeling  much better at this time.  Have ordered another 500 cc bolus of fluids as well.  Have placed call to Dr. Chaves at this time for further.  [CW]   2128 Spoke with Debo Yee PA-C with Dr. Chaves. Has graciously accepted pt for admission. Pt will be admitted shortly in stable condition  [CW]      ED Course User Index  [CW] Juan C NEAL Wall  [TW] Terrie Romo APRN                                             Medical Decision Making  Patient is a 68-year-old male who presents to the ER with multiple complaints.  He states approximately 2 weeks ago he began developing cough with congestion and shortness of breath.  He was evaluated by his PCP, Dr. Chaves, and received a Rocephin injection in the office.  He is currently on antibiotics for respiratory illness.  Patient states he has had increasing shortness of breath in particular with activity.  He complains of worsening generalized weakness.  He has had nausea with episodes of vomiting described as vomiting up phlegm he has attempted to cough up.  Patient reports diffuse chest pain as well that began around the same time as his cough.  He has had constipation for the past 3 to 4 days.  He is scheduled for an outpatient stress test tomorrow however uncertain if he would be able to complete it given his overall symptoms.  Due to symptoms described above he came to the ER for evaluation and treatment.  Past medical history significant for anemia, anxiety, arthritis, BPH, coronary artery disease, non-Hodgkin's lymphoma, diabetes, thyroid disease, GERD, hearing loss, hyperlipidemia, hypertension, iliac artery aneurysm, kidney stones, liver cirrhosis, migraines, sleep apnea  Differential diagnosis: Viral illness, pneumonia, electrolyte abnormality, and other    PERC Rule for Pulmonary Embolism - MDCalc  Calculated on Jun 04 2024 7:27 PM  1 criteria -> If any criteria are positive, the PERC rule cannot be used to rule out PE in this patient.    Work up  remains pending. This will be a turn over for Brigitte DE JESUS  Labs Reviewed  RESPIRATORY PANEL PCR W/ COVID-19 (SARS-COV-2), NP SWAB IN UTM/VTP, 2 HR TAT - Abnormal; Notable for the following components:     Parainfluenza Virus 3         Detected (*)            All other components within normal limits         Narrative: In the setting of a positive respiratory panel with a viral infection PLUS a negative procalcitonin without other underlying concern for bacterial infection, consider observing off antibiotics or discontinuation of antibiotics and continue supportive care. If the respiratory panel is positive for atypical bacterial infection (Bordetella pertussis, Chlamydophila pneumoniae, or Mycoplasma pneumoniae), consider antibiotic de-escalation to target atypical bacterial infection.  COMPREHENSIVE METABOLIC PANEL - Abnormal; Notable for the following components:     Glucose                       219 (*)                BUN                           33 (*)                 Creatinine                    2.31 (*)               Potassium                     3.3 (*)                CO2                           21.0 (*)               Anion Gap                     17.0 (*)               eGFR                          29.6 (*)            All other components within normal limits         Narrative: GFR Normal >60                  Chronic Kidney Disease <60                  Kidney Failure <15                    SINGLE HS TROPONIN T - Abnormal; Notable for the following components:     HS Troponin T                 42 (*)              All other components within normal limits         Narrative: High Sensitive Troponin T Reference Range:                  <14.0 ng/L- Negative Female for AMI                  <22.0 ng/L- Negative Male for AMI                  >=14 - Abnormal Female indicating possible myocardial injury.                  >=22 - Abnormal Male indicating possible myocardial injury.                    Clinicians would have to utilize clinical acumen, EKG, Troponin, and serial changes to determine if it is an Acute Myocardial Infarction or myocardial injury due to an underlying chronic condition.                                       URINALYSIS W/ MICROSCOPIC IF INDICATED (NO CULTURE) - Abnormal; Notable for the following components:     Glucose, UA                     (*)                  Blood, UA                       (*)                  Protein, UA                     (*)               All other components within normal limits  CBC WITH AUTO DIFFERENTIAL - Abnormal; Notable for the following components:     RBC                           3.39 (*)               Hemoglobin                    11.3 (*)               Hematocrit                    34.0 (*)               MCV                           100.3 (*)               MCH                           33.3 (*)               RDW-SD                        57.0 (*)               Platelets                     81 (*)                 Lymphocyte %                  17.6 (*)            All other components within normal limits  URINALYSIS, MICROSCOPIC ONLY - Abnormal; Notable for the following components:     RBC, UA                       3-5 (*)                WBC, UA                       3-5 (*)                Squamous Epithelial Cells, UA   3-6 (*)             All other components within normal limits  TROPONIN - Abnormal; Notable for the following components:     HS Troponin T                 39 (*)              All other components within normal limits         Narrative: High Sensitive Troponin T Reference Range:                  <14.0 ng/L- Negative Female for AMI                  <22.0 ng/L- Negative Male for AMI                  >=14 - Abnormal Female indicating possible myocardial injury.                  >=22 - Abnormal Male indicating possible myocardial injury.                   Clinicians would have to utilize clinical acumen, EKG, Troponin, and serial changes  to determine if it is an Acute Myocardial Infarction or myocardial injury due to an underlying chronic condition.                                       CK - Abnormal; Notable for the following components:     Creatine Kinase               445 (*)             All other components within normal limits  BNP (IN-HOUSE) - Normal         Narrative: This assay is used as an aid in the diagnosis of individuals suspected of having heart failure. It can be used as an aid in the diagnosis of acute decompensated heart failure (ADHF) in patients presenting with signs and symptoms of ADHF to the emergency department (ED). In addition, NT-proBNP of <300 pg/mL indicates ADHF is not likely.                                    Age Range Result Interpretation  NT-proBNP Concentration (pg/mL:                                                      <50             Positive            >450                                   Gray                 300-450                                    Negative             <300                                    50-75           Positive            >900                                  Fox                300-900                                  Negative            <300                                                      >75             Positive            >1800                                  Fox                300-1800                                  Negative            <300  MAGNESIUM - Normal  TSH - Normal  T4, FREE - Normal  RAINBOW DRAW         Narrative: The following orders were created for panel order [place] Draw.                  Procedure                               Abnormality         Status                                     ---------                               -----------         ------                                     Green Top (Gel)[[id number]]                                  Final result                               Lavender Top[[id number]]                                      Final result                               Red Top[[id number]]                                          Final result                               Light Blue Top[[id number]]                                   Final result                                                 Please view results for these tests on the individual orders.  T3, FREE  HIGH SENSITIVITIY TROPONIN T 2HR  GREEN TOP  LAVENDER TOP  RED TOP  LIGHT BLUE TOP  CBC AND DIFFERENTIAL  CT Abdomen Pelvis Without Contrast   Final Result    1. Large amount of fecal material throughout the colon.    2. Nonobstructing LEFT renal stones.    3. No hydronephrosis or ureteral stone.    4. No mass, fluid collection, or inflammatory process.         This report was signed and finalized on 6/4/2024 8:30 PM by Dr. Mike Plasencia MD.          XR Chest 1 View   Final Result    1. No acute disease.                             This report was signed and finalized on 6/4/2024 6:10 PM by Dr. Mike Plasencia MD.          XR Abdomen KUB   Final Result    1. LEFT renal stones.    2. Large amount of fecal material throughout the colon.                   This report was signed and finalized on 6/4/2024 6:12 PM by Dr. Mike Plasencia MD.          Assumed care of patient from NEAL Yun.  CT of the abdomen pelvis without contrast showed left renal stones no hydro-.  Chest x-ray is unremarkable.  CBC showed a white count of 5.98 hemoglobin 11.3 hematocrit 34 initial troponin was 42-second troponin was 39.  Patient denies any chest pain.  Has had a cough for the past 2 weeks and respiratory panel was positive for parainfluenza virus.  CMP shows a glucose of 219 BUN of 33 creatinine 2.31.  His baseline creatinine is 1.23.  He received 500 cc bolus of normal saline.  Patient states he is feeling much better at this time.  Have ordered another 500 cc bolus of fluids as well.  Have placed call to Dr. Chaves at this time for further.   Spoke with Debo Yee PA-C. Has  graciously accepted pt for admission. Will be admitted shortly in stable condition     Problems Addressed:  BELTRAN (acute kidney injury): complicated acute illness or injury  Elevated troponin: complicated acute illness or injury  Left renal stone: complicated acute illness or injury  Parainfluenza virus infection: complicated acute illness or injury    Amount and/or Complexity of Data Reviewed  Labs: ordered. Decision-making details documented in ED Course.  Radiology: ordered. Decision-making details documented in ED Course.  ECG/medicine tests: ordered. Decision-making details documented in ED Course.  Discussion of management or test interpretation with external provider(s): Turn over for Brigitte DE JESUS    Risk  Prescription drug management.  Decision regarding hospitalization.        Final diagnoses:   BELTRAN (acute kidney injury)   Parainfluenza virus infection   Left renal stone   Elevated troponin       ED Disposition  ED Disposition       ED Disposition   Decision to Admit    Condition   --    Comment   Level of Care: Telemetry [5]   Diagnosis: BELTRAN (acute kidney injury) [[id number]]   Admitting Physician: DAVID ZAPATA [[id number]   Attending Physician: DAVID ZAPATA [[id number]]                 No follow-up provider specified.       Medication List      No changes were made to your prescriptions during this visit.            Brigitte Irizarry APRN  06/04/24 [id number]

## 2024-06-05 LAB
GLUCOSE BLDC GLUCOMTR-MCNC: 177 MG/DL (ref 70–130)
GLUCOSE BLDC GLUCOMTR-MCNC: 194 MG/DL (ref 70–130)
GLUCOSE BLDC GLUCOMTR-MCNC: 230 MG/DL (ref 70–130)
T3FREE SERPL-MCNC: 2.13 PG/ML (ref 2–4.4)

## 2024-06-05 PROCEDURE — 25810000003 SODIUM CHLORIDE 0.9 % SOLUTION: Performed by: FAMILY MEDICINE

## 2024-06-05 PROCEDURE — 25810000003 SODIUM CHLORIDE 0.9 % SOLUTION 250 ML FLEX CONT: Performed by: FAMILY MEDICINE

## 2024-06-05 PROCEDURE — 94799 UNLISTED PULMONARY SVC/PX: CPT

## 2024-06-05 PROCEDURE — 25010000002 METHYLPREDNISOLONE PER 125 MG: Performed by: FAMILY MEDICINE

## 2024-06-05 PROCEDURE — 94640 AIRWAY INHALATION TREATMENT: CPT

## 2024-06-05 PROCEDURE — G0378 HOSPITAL OBSERVATION PER HR: HCPCS

## 2024-06-05 PROCEDURE — 25010000002 AZITHROMYCIN PER 500 MG: Performed by: FAMILY MEDICINE

## 2024-06-05 PROCEDURE — 82948 REAGENT STRIP/BLOOD GLUCOSE: CPT

## 2024-06-05 PROCEDURE — 63710000001 INSULIN GLARGINE PER 5 UNITS: Performed by: FAMILY MEDICINE

## 2024-06-05 PROCEDURE — 63710000001 INSULIN LISPRO (HUMAN) PER 5 UNITS: Performed by: FAMILY MEDICINE

## 2024-06-05 PROCEDURE — 25010000002 ENOXAPARIN PER 10 MG: Performed by: FAMILY MEDICINE

## 2024-06-05 RX ORDER — INSULIN LISPRO 100 [IU]/ML
3-14 INJECTION, SOLUTION INTRAVENOUS; SUBCUTANEOUS
Status: DISCONTINUED | OUTPATIENT
Start: 2024-06-05 | End: 2024-06-07 | Stop reason: HOSPADM

## 2024-06-05 RX ORDER — BISACODYL 5 MG/1
5 TABLET, DELAYED RELEASE ORAL DAILY PRN
Status: DISCONTINUED | OUTPATIENT
Start: 2024-06-05 | End: 2024-06-07 | Stop reason: HOSPADM

## 2024-06-05 RX ORDER — PANTOPRAZOLE SODIUM 40 MG/1
40 TABLET, DELAYED RELEASE ORAL DAILY
Status: DISCONTINUED | OUTPATIENT
Start: 2024-06-05 | End: 2024-06-07 | Stop reason: HOSPADM

## 2024-06-05 RX ORDER — IBUPROFEN 600 MG/1
1 TABLET ORAL
Status: DISCONTINUED | OUTPATIENT
Start: 2024-06-05 | End: 2024-06-07 | Stop reason: HOSPADM

## 2024-06-05 RX ORDER — BISACODYL 10 MG
10 SUPPOSITORY, RECTAL RECTAL DAILY PRN
Status: DISCONTINUED | OUTPATIENT
Start: 2024-06-05 | End: 2024-06-07 | Stop reason: HOSPADM

## 2024-06-05 RX ORDER — ONDANSETRON 2 MG/ML
4 INJECTION INTRAMUSCULAR; INTRAVENOUS EVERY 6 HOURS PRN
Status: DISCONTINUED | OUTPATIENT
Start: 2024-06-05 | End: 2024-06-07 | Stop reason: HOSPADM

## 2024-06-05 RX ORDER — METHYLPREDNISOLONE SODIUM SUCCINATE 125 MG/2ML
125 INJECTION, POWDER, LYOPHILIZED, FOR SOLUTION INTRAMUSCULAR; INTRAVENOUS EVERY 24 HOURS
Status: DISCONTINUED | OUTPATIENT
Start: 2024-06-05 | End: 2024-06-07 | Stop reason: HOSPADM

## 2024-06-05 RX ORDER — ACETAMINOPHEN 650 MG/1
650 SUPPOSITORY RECTAL EVERY 4 HOURS PRN
Status: DISCONTINUED | OUTPATIENT
Start: 2024-06-05 | End: 2024-06-07 | Stop reason: HOSPADM

## 2024-06-05 RX ORDER — DOCUSATE SODIUM 100 MG/1
100 CAPSULE, LIQUID FILLED ORAL EVERY EVENING
Status: DISCONTINUED | OUTPATIENT
Start: 2024-06-05 | End: 2024-06-07 | Stop reason: HOSPADM

## 2024-06-05 RX ORDER — SODIUM CHLORIDE 9 MG/ML
100 INJECTION, SOLUTION INTRAVENOUS CONTINUOUS
Status: DISCONTINUED | OUTPATIENT
Start: 2024-06-05 | End: 2024-06-07 | Stop reason: HOSPADM

## 2024-06-05 RX ORDER — ACETAMINOPHEN 160 MG/5ML
650 SOLUTION ORAL EVERY 4 HOURS PRN
Status: DISCONTINUED | OUTPATIENT
Start: 2024-06-05 | End: 2024-06-07 | Stop reason: HOSPADM

## 2024-06-05 RX ORDER — AMOXICILLIN 250 MG
2 CAPSULE ORAL 2 TIMES DAILY PRN
Status: DISCONTINUED | OUTPATIENT
Start: 2024-06-05 | End: 2024-06-07 | Stop reason: HOSPADM

## 2024-06-05 RX ORDER — SODIUM CHLORIDE 0.9 % (FLUSH) 0.9 %
10 SYRINGE (ML) INJECTION EVERY 12 HOURS SCHEDULED
Status: DISCONTINUED | OUTPATIENT
Start: 2024-06-05 | End: 2024-06-07 | Stop reason: HOSPADM

## 2024-06-05 RX ORDER — POLYETHYLENE GLYCOL 3350 17 G/17G
17 POWDER, FOR SOLUTION ORAL DAILY PRN
Status: DISCONTINUED | OUTPATIENT
Start: 2024-06-05 | End: 2024-06-07 | Stop reason: HOSPADM

## 2024-06-05 RX ORDER — IPRATROPIUM BROMIDE AND ALBUTEROL SULFATE 2.5; .5 MG/3ML; MG/3ML
3 SOLUTION RESPIRATORY (INHALATION)
Status: DISCONTINUED | OUTPATIENT
Start: 2024-06-05 | End: 2024-06-07 | Stop reason: HOSPADM

## 2024-06-05 RX ORDER — RANOLAZINE 500 MG/1
1000 TABLET, EXTENDED RELEASE ORAL 2 TIMES DAILY
Status: DISCONTINUED | OUTPATIENT
Start: 2024-06-05 | End: 2024-06-07 | Stop reason: HOSPADM

## 2024-06-05 RX ORDER — ENOXAPARIN SODIUM 100 MG/ML
40 INJECTION SUBCUTANEOUS DAILY
Status: DISCONTINUED | OUTPATIENT
Start: 2024-06-05 | End: 2024-06-07 | Stop reason: HOSPADM

## 2024-06-05 RX ORDER — DEXTROSE MONOHYDRATE 25 G/50ML
25 INJECTION, SOLUTION INTRAVENOUS
Status: DISCONTINUED | OUTPATIENT
Start: 2024-06-05 | End: 2024-06-07 | Stop reason: HOSPADM

## 2024-06-05 RX ORDER — ROSUVASTATIN CALCIUM 40 MG/1
40 TABLET, COATED ORAL DAILY
COMMUNITY

## 2024-06-05 RX ORDER — GABAPENTIN 100 MG/1
100 CAPSULE ORAL NIGHTLY
Status: DISCONTINUED | OUTPATIENT
Start: 2024-06-05 | End: 2024-06-07 | Stop reason: HOSPADM

## 2024-06-05 RX ORDER — ATORVASTATIN CALCIUM 40 MG/1
40 TABLET, FILM COATED ORAL NIGHTLY
Status: DISCONTINUED | OUTPATIENT
Start: 2024-06-05 | End: 2024-06-07 | Stop reason: HOSPADM

## 2024-06-05 RX ORDER — CLOPIDOGREL BISULFATE 75 MG/1
75 TABLET ORAL DAILY
Status: DISCONTINUED | OUTPATIENT
Start: 2024-06-05 | End: 2024-06-07 | Stop reason: HOSPADM

## 2024-06-05 RX ORDER — BENZONATATE 100 MG/1
100 CAPSULE ORAL 3 TIMES DAILY PRN
Status: DISCONTINUED | OUTPATIENT
Start: 2024-06-05 | End: 2024-06-07 | Stop reason: HOSPADM

## 2024-06-05 RX ORDER — FERROUS SULFATE 325(65) MG
325 TABLET ORAL 2 TIMES DAILY WITH MEALS
Status: DISCONTINUED | OUTPATIENT
Start: 2024-06-05 | End: 2024-06-07 | Stop reason: HOSPADM

## 2024-06-05 RX ORDER — SODIUM CHLORIDE 9 MG/ML
40 INJECTION, SOLUTION INTRAVENOUS AS NEEDED
Status: DISCONTINUED | OUTPATIENT
Start: 2024-06-05 | End: 2024-06-07 | Stop reason: HOSPADM

## 2024-06-05 RX ORDER — ACETAMINOPHEN 325 MG/1
650 TABLET ORAL EVERY 4 HOURS PRN
Status: DISCONTINUED | OUTPATIENT
Start: 2024-06-05 | End: 2024-06-07 | Stop reason: HOSPADM

## 2024-06-05 RX ORDER — NICOTINE POLACRILEX 4 MG
15 LOZENGE BUCCAL
Status: DISCONTINUED | OUTPATIENT
Start: 2024-06-05 | End: 2024-06-07 | Stop reason: HOSPADM

## 2024-06-05 RX ORDER — ONDANSETRON 4 MG/1
4 TABLET, ORALLY DISINTEGRATING ORAL EVERY 6 HOURS PRN
Status: DISCONTINUED | OUTPATIENT
Start: 2024-06-05 | End: 2024-06-07 | Stop reason: HOSPADM

## 2024-06-05 RX ORDER — LEVOTHYROXINE SODIUM 0.07 MG/1
75 TABLET ORAL
Status: DISCONTINUED | OUTPATIENT
Start: 2024-06-05 | End: 2024-06-07 | Stop reason: HOSPADM

## 2024-06-05 RX ORDER — SODIUM CHLORIDE 0.9 % (FLUSH) 0.9 %
10 SYRINGE (ML) INJECTION AS NEEDED
Status: DISCONTINUED | OUTPATIENT
Start: 2024-06-05 | End: 2024-06-06 | Stop reason: SDUPTHER

## 2024-06-05 RX ADMIN — GABAPENTIN 100 MG: 100 CAPSULE ORAL at 21:32

## 2024-06-05 RX ADMIN — LEVOTHYROXINE SODIUM 75 MCG: 75 TABLET ORAL at 13:44

## 2024-06-05 RX ADMIN — IPRATROPIUM BROMIDE AND ALBUTEROL SULFATE 3 ML: .5; 3 SOLUTION RESPIRATORY (INHALATION) at 10:25

## 2024-06-05 RX ADMIN — PANTOPRAZOLE SODIUM 40 MG: 40 TABLET, DELAYED RELEASE ORAL at 13:42

## 2024-06-05 RX ADMIN — Medication 10 ML: at 21:33

## 2024-06-05 RX ADMIN — EMPAGLIFLOZIN 25 MG: 25 TABLET, FILM COATED ORAL at 13:42

## 2024-06-05 RX ADMIN — RANOLAZINE 1000 MG: 500 TABLET, FILM COATED, EXTENDED RELEASE ORAL at 21:32

## 2024-06-05 RX ADMIN — ATORVASTATIN CALCIUM 40 MG: 40 TABLET ORAL at 21:32

## 2024-06-05 RX ADMIN — BUSPIRONE HYDROCHLORIDE 15 MG: 5 TABLET ORAL at 21:32

## 2024-06-05 RX ADMIN — FERROUS SULFATE TAB 325 MG (65 MG ELEMENTAL FE) 325 MG: 325 (65 FE) TAB at 13:44

## 2024-06-05 RX ADMIN — SODIUM CHLORIDE 100 ML/HR: 9 INJECTION, SOLUTION INTRAVENOUS at 14:27

## 2024-06-05 RX ADMIN — IPRATROPIUM BROMIDE AND ALBUTEROL SULFATE 3 ML: .5; 3 SOLUTION RESPIRATORY (INHALATION) at 14:05

## 2024-06-05 RX ADMIN — AZITHROMYCIN DIHYDRATE 500 MG: 500 INJECTION, POWDER, LYOPHILIZED, FOR SOLUTION INTRAVENOUS at 14:28

## 2024-06-05 RX ADMIN — INSULIN LISPRO 3 UNITS: 100 INJECTION, SOLUTION INTRAVENOUS; SUBCUTANEOUS at 18:47

## 2024-06-05 RX ADMIN — INSULIN LISPRO 3 UNITS: 100 INJECTION, SOLUTION INTRAVENOUS; SUBCUTANEOUS at 13:18

## 2024-06-05 RX ADMIN — Medication 10 ML: at 13:46

## 2024-06-05 RX ADMIN — METHYLPREDNISOLONE SODIUM SUCCINATE 125 MG: 125 INJECTION, POWDER, FOR SOLUTION INTRAMUSCULAR; INTRAVENOUS at 13:47

## 2024-06-05 RX ADMIN — INSULIN GLARGINE 10 UNITS: 100 INJECTION, SOLUTION SUBCUTANEOUS at 21:33

## 2024-06-05 RX ADMIN — METFORMIN HCL 500 MG: 500 TABLET ORAL at 13:44

## 2024-06-05 RX ADMIN — IPRATROPIUM BROMIDE AND ALBUTEROL SULFATE 3 ML: .5; 3 SOLUTION RESPIRATORY (INHALATION) at 18:52

## 2024-06-05 RX ADMIN — BUSPIRONE HYDROCHLORIDE 15 MG: 5 TABLET ORAL at 13:45

## 2024-06-05 RX ADMIN — METFORMIN HCL 500 MG: 500 TABLET ORAL at 21:32

## 2024-06-05 RX ADMIN — RANOLAZINE 1000 MG: 500 TABLET, FILM COATED, EXTENDED RELEASE ORAL at 13:43

## 2024-06-05 RX ADMIN — CLOPIDOGREL BISULFATE 75 MG: 75 TABLET, FILM COATED ORAL at 13:44

## 2024-06-05 RX ADMIN — INSULIN LISPRO 5 UNITS: 100 INJECTION, SOLUTION INTRAVENOUS; SUBCUTANEOUS at 21:33

## 2024-06-05 RX ADMIN — FERROUS SULFATE TAB 325 MG (65 MG ELEMENTAL FE) 325 MG: 325 (65 FE) TAB at 21:32

## 2024-06-05 RX ADMIN — ENOXAPARIN SODIUM 40 MG: 100 INJECTION SUBCUTANEOUS at 13:54

## 2024-06-05 RX ADMIN — DOCUSATE SODIUM 100 MG: 100 CAPSULE, LIQUID FILLED ORAL at 18:47

## 2024-06-05 NOTE — ED NOTES
Nursing report ED to floor  Franko Lucero  68 y.o.  male    HPI:   Chief Complaint   Patient presents with    Shortness of Breath    Cough       Admitting doctor:   Figueroa Chaves MD    Consulting provider(s):  Consults       No orders found for last 30 day(s).             Admitting diagnosis:   The primary encounter diagnosis was BELTRAN (acute kidney injury). Diagnoses of Parainfluenza virus infection, Left renal stone, and Elevated troponin were also pertinent to this visit.    Code status:   Current Code Status       Date Active Code Status Order ID Comments User Context       6/5/2024 0854 CPR (Attempt to Resuscitate) [id number]  Yossi Rosen MD ED        Question Answer    Code Status (Patient has no pulse and is not breathing) CPR (Attempt to Resuscitate)    Medical Interventions (Patient has pulse or is breathing) Full Support                    Allergies:   Adhesive tape, Cefazolin, Cefuroxime axetil, Cephalosporins, Neomycin-polymyxin b gu, Neosporin [neomycin-bacitracin zn-polymyx], and Percocet [oxycodone-acetaminophen]    Intake and Output    Intake/Output Summary (Last 24 hours) at 6/5/2024 1435  Last data filed at 6/5/2024 0000  Gross per 24 hour   Intake 500 ml   Output 600 ml   Net -100 ml       Weight:       06/04/24  1622   Weight: 89.8 kg (198 lb)       Most recent vitals:   Vitals:    06/05/24 1405 06/05/24 1413 06/05/24 1430 06/05/24 1431   BP:   135/64    BP Location:       Patient Position:       Pulse: 101 98  98   Resp: 16 16  18   Temp:       TempSrc:       SpO2: 94% 99%  100%   Weight:       Height:         Oxygen Therapy: .    Active LDAs/IV Access:   Lines, Drains & Airways       Active LDAs       Name Placement date Placement time Site Days    Peripheral IV 06/04/24 1730 Left Antecubital 06/04/24  1730  Antecubital  less than 1                    Labs (abnormal labs have a star):   Labs Reviewed   RESPIRATORY PANEL PCR W/ COVID-19 (SARS-COV-2), NP SWAB IN UTM/VTP, 2 HR TAT -  Abnormal; Notable for the following components:       Result Value    Parainfluenza Virus 3 Detected (*)     All other components within normal limits    Narrative:     In the setting of a positive respiratory panel with a viral infection PLUS a negative procalcitonin without other underlying concern for bacterial infection, consider observing off antibiotics or discontinuation of antibiotics and continue supportive care. If the respiratory panel is positive for atypical bacterial infection (Bordetella pertussis, Chlamydophila pneumoniae, or Mycoplasma pneumoniae), consider antibiotic de-escalation to target atypical bacterial infection.   COMPREHENSIVE METABOLIC PANEL - Abnormal; Notable for the following components:    Glucose 219 (*)     BUN 33 (*)     Creatinine 2.31 (*)     Potassium 3.3 (*)     CO2 21.0 (*)     Anion Gap 17.0 (*)     eGFR 29.6 (*)     All other components within normal limits    Narrative:     GFR Normal >60  Chronic Kidney Disease <60  Kidney Failure <15     SINGLE HS TROPONIN T - Abnormal; Notable for the following components:    HS Troponin T 42 (*)     All other components within normal limits    Narrative:     High Sensitive Troponin T Reference Range:  <14.0 ng/L- Negative Female for AMI  <22.0 ng/L- Negative Male for AMI  >=14 - Abnormal Female indicating possible myocardial injury.  >=22 - Abnormal Male indicating possible myocardial injury.   Clinicians would have to utilize clinical acumen, EKG, Troponin, and serial changes to determine if it is an Acute Myocardial Infarction or myocardial injury due to an underlying chronic condition.        URINALYSIS W/ MICROSCOPIC IF INDICATED (NO CULTURE) - Abnormal; Notable for the following components:    Glucose, UA >=1000 mg/dL (3+) (*)     Blood, UA Moderate (2+) (*)     Protein,  mg/dL (2+) (*)     All other components within normal limits   CBC WITH AUTO DIFFERENTIAL - Abnormal; Notable for the following components:    RBC 3.39 (*)      Hemoglobin 11.3 (*)     Hematocrit 34.0 (*)     .3 (*)     MCH 33.3 (*)     RDW-SD 57.0 (*)     Platelets 81 (*)     Lymphocyte % 17.6 (*)     All other components within normal limits   URINALYSIS, MICROSCOPIC ONLY - Abnormal; Notable for the following components:    RBC, UA 3-5 (*)     WBC, UA 3-5 (*)     Squamous Epithelial Cells, UA 3-6 (*)     All other components within normal limits   TROPONIN - Abnormal; Notable for the following components:    HS Troponin T 39 (*)     All other components within normal limits    Narrative:     High Sensitive Troponin T Reference Range:  <14.0 ng/L- Negative Female for AMI  <22.0 ng/L- Negative Male for AMI  >=14 - Abnormal Female indicating possible myocardial injury.  >=22 - Abnormal Male indicating possible myocardial injury.   Clinicians would have to utilize clinical acumen, EKG, Troponin, and serial changes to determine if it is an Acute Myocardial Infarction or myocardial injury due to an underlying chronic condition.        HIGH SENSITIVITIY TROPONIN T 2HR - Abnormal; Notable for the following components:    HS Troponin T 40 (*)     All other components within normal limits    Narrative:     High Sensitive Troponin T Reference Range:  <14.0 ng/L- Negative Female for AMI  <22.0 ng/L- Negative Male for AMI  >=14 - Abnormal Female indicating possible myocardial injury.  >=22 - Abnormal Male indicating possible myocardial injury.   Clinicians would have to utilize clinical acumen, EKG, Troponin, and serial changes to determine if it is an Acute Myocardial Infarction or myocardial injury due to an underlying chronic condition.        CK - Abnormal; Notable for the following components:    Creatine Kinase 445 (*)     All other components within normal limits   POCT GLUCOSE FINGERSTICK - Abnormal; Notable for the following components:    Glucose 194 (*)     All other components within normal limits   BNP (IN-HOUSE) - Normal    Narrative:     This assay is used as  an aid in the diagnosis of individuals suspected of having heart failure. It can be used as an aid in the diagnosis of acute decompensated heart failure (ADHF) in patients presenting with signs and symptoms of ADHF to the emergency department (ED). In addition, NT-proBNP of <300 pg/mL indicates ADHF is not likely.    Age Range Result Interpretation  NT-proBNP Concentration (pg/mL:      <50             Positive            >450                   Gray                 300-450                    Negative             <300    50-75           Positive            >900                  Gray                300-900                  Negative            <300      >75             Positive            >1800                  Gray                300-1800                  Negative            <300   MAGNESIUM - Normal   TSH - Normal   T3, FREE - Normal    Narrative:     Results may be falsely increased if patient taking Biotin.     T4, FREE - Normal   RAINBOW DRAW    Narrative:     The following orders were created for panel order [facility] Draw.  Procedure                               Abnormality         Status                     ---------                               -----------         ------                     Green Top (Gel)[[id number]]                                  Final result               Lavender Top[[id number]]                                     Final result               Red Top[[id number]]                                          Final result               Light Blue Top[[id number]]                                   Final result                 Please view results for these tests on the individual orders.   POCT GLUCOSE FINGERSTICK   POCT GLUCOSE FINGERSTICK   POCT GLUCOSE FINGERSTICK   POCT GLUCOSE FINGERSTICK   POCT GLUCOSE FINGERSTICK   GREEN TOP   LAVENDER TOP   RED TOP   LIGHT BLUE TOP   CBC AND DIFFERENTIAL    Narrative:     The following orders were created for panel order CBC & Differential.  Procedure                                Abnormality         Status                     ---------                               -----------         ------                     CBC Auto Differential[[id number]]        Abnormal            Final result                 Please view results for these tests on the individual orders.       Meds given in ED:   Medications   sodium chloride 0.9 % flush 10 mL (has no administration in time range)   atorvastatin (LIPITOR) tablet 40 mg (has no administration in time range)   busPIRone (BUSPAR) tablet 15 mg (15 mg Oral Given 6/5/24 1345)   clopidogrel (PLAVIX) tablet 75 mg (75 mg Oral Given 6/5/24 1344)   docusate sodium (COLACE) capsule 100 mg (has no administration in time range)   empagliflozin (JARDIANCE) tablet 25 mg (25 mg Oral Given 6/5/24 1342)   ferrous sulfate tablet 325 mg (325 mg Oral Given 6/5/24 1344)   gabapentin (NEURONTIN) capsule 100 mg (has no administration in time range)   levothyroxine (SYNTHROID, LEVOTHROID) tablet 75 mcg (75 mcg Oral Given 6/5/24 1344)   metFORMIN (GLUCOPHAGE) tablet 500 mg (500 mg Oral Given 6/5/24 1344)   pantoprazole (PROTONIX) EC tablet 40 mg (40 mg Oral Given 6/5/24 1342)   ranolazine (RANEXA) 12 hr tablet 1,000 mg (1,000 mg Oral Given 6/5/24 1343)   sodium chloride 0.9 % flush 10 mL (10 mL Intravenous Given 6/5/24 1346)   sodium chloride 0.9 % flush 10 mL (has no administration in time range)   sodium chloride 0.9 % infusion 40 mL (has no administration in time range)   Enoxaparin Sodium (LOVENOX) syringe 40 mg (40 mg Subcutaneous Given 6/5/24 1354)   sennosides-docusate (PERICOLACE) 8.6-50 MG per tablet 2 tablet (has no administration in time range)     And   polyethylene glycol (MIRALAX) packet 17 g (has no administration in time range)     And   bisacodyl (DULCOLAX) EC tablet 5 mg (has no administration in time range)     And   bisacodyl (DULCOLAX) suppository 10 mg (has no administration in time range)   ondansetron ODT (ZOFRAN-ODT) disintegrating tablet  4 mg (has no administration in time range)     Or   ondansetron (ZOFRAN) injection 4 mg (has no administration in time range)   acetaminophen (TYLENOL) tablet 650 mg (has no administration in time range)     Or   acetaminophen (TYLENOL) 160 MG/5ML oral solution 650 mg (has no administration in time range)     Or   acetaminophen (TYLENOL) suppository 650 mg (has no administration in time range)   ipratropium-albuterol (DUO-NEB) nebulizer solution 3 mL (3 mL Nebulization Given 6/5/24 1405)   benzonatate (TESSALON) capsule 100 mg (has no administration in time range)   sodium chloride 0.9 % infusion (100 mL/hr Intravenous New Bag 6/5/24 1427)   dextrose (GLUTOSE) oral gel 15 g (has no administration in time range)   dextrose (D50W) (25 g/50 mL) IV injection 25 g (has no administration in time range)   glucagon (GLUCAGEN) injection 1 mg (has no administration in time range)   insulin glargine (LANTUS, SEMGLEE) injection 10 Units (has no administration in time range)   Insulin Lispro (humaLOG) injection 3-14 Units (3 Units Subcutaneous Given 6/5/24 1318)   methylPREDNISolone sodium succinate (SOLU-Medrol) injection 125 mg (125 mg Intravenous Given 6/5/24 1347)   azithromycin (ZITHROMAX) 500 mg in sodium chloride 0.9 % 250 mL IVPB-VTB (500 mg Intravenous New Bag 6/5/24 1428)   sodium chloride 0.9 % bolus 500 mL (0 mL Intravenous Stopped 6/4/24 2004)   sodium chloride 0.9 % bolus 500 mL (0 mL Intravenous Stopped 6/4/24 2244)   potassium chloride 10 mEq in 100 mL IVPB (0 mEq Intravenous Stopped 6/4/24 2228)     sodium chloride, 100 mL/hr, Last Rate: 100 mL/hr (06/05/24 1427)         NIH Stroke Scale:       Isolation/Infection(s):  Droplet   MRSA, Other     COVID Testing  Collected .  Resulted .    Nursing report ED to floor:  Mental status: .  Ambulatory status: .  Precautions: .    ED nurse phone extentsion- ..

## 2024-06-05 NOTE — H&P
History and Physical    Patient:  Franko Lucero  MRN: [id number]    CHIEF COMPLAINT: Shortness of breath    History Obtained From: the patient   PCP: Figueroa Chaves MD    HISTORY OF PRESENT ILLNESS:   The patient is a 68 y.o. male who presents with shortness of breath, cough, fatigue over the past 2 weeks.  He was treated outpatient with IM and oral antibiotics with very little improvement.  Presented to the emergency department was he was found to have parainfluenza virus on respiratory viral panel.  BELTRAN from poor p.o. intake.    REVIEW OF SYSTEMS:    Review of Systems   Constitutional:  Positive for activity change, appetite change and fatigue.   Respiratory:  Positive for cough and shortness of breath.    Gastrointestinal:  Negative for abdominal pain, nausea and vomiting.   Skin:  Negative for color change.          Past Medical History:  Past Medical History:   Diagnosis Date    Anemia     Anxiety     Arthritis     BPH (benign prostatic hypertrophy)     CAD (coronary artery disease)     Cancer     non-hodgkins lymphoma    Diabetes mellitus     Disease of thyroid gland     GERD (gastroesophageal reflux disease)     Hearing loss     History of transfusion     Hyperlipidemia     Hypertension     Iliac artery aneurysm, bilateral     Kidney stone     Liver cirrhosis     Migraines     Sleep apnea     c-pap       Past Surgical History:  Past Surgical History:   Procedure Laterality Date    COLONOSCOPY  02/04/2014    COLONOSCOPY N/A 4/26/2017    Procedure: COLONOSCOPY WITH ANESTHESIA;  Surgeon: Sher Cui MD;  Location: [facility] ENDOSCOPY;  Service:     CORONARY ARTERY BYPASS GRAFT      2    CYSTOSCOPY URETEROSCOPY LASER LITHOTRIPSY Left 4/17/2017    Procedure: URETEROSCOPY LASER LITHOTRIPSY WITH DOUBLE J STENT INSERTION, LEFT ;  Surgeon: Weston Peck MD;  Location: [facility] OR;  Service:     CYSTOSCOPY URETEROSCOPY LASER LITHOTRIPSY Left 8/14/2017    Procedure: CYSTOSCOPY URETEROSCOPY LASER  LITHOTRIPSY STENT INSERTION STONE EXTRACTION;  Surgeon: Weston Peck MD;  Location:  [facility] OR;  Service:     CYSTOSCOPY W/ URETERAL STENT PLACEMENT Left 4/17/2017    Procedure: CYSTOSCOPY URETERAL DOUBLE J STENT INSERTION, LEFT;  Surgeon: Weston Peck MD;  Location:  [facility] OR;  Service:     ENDOSCOPY N/A 4/26/2017    Procedure: ESOPHAGOGASTRODUODENOSCOPY WITH ANESTHESIA;  Surgeon: Sher Cui MD;  Location:  [facility] ENDOSCOPY;  Service:     INCISION AND DRAINAGE LEG Right 7/12/2023    Procedure: INCISION AND DRAINAGE - RIGHT FOOT;  Surgeon: Silas Swan DPM;  Location:  [facility] OR;  Service: Podiatry;  Laterality: Right;    JOINT REPLACEMENT Right     KIDNEY STONE SURGERY      KNEE SURGERY      replacement    NECK SURGERY      ROTATOR CUFF REPAIR      SHOULDER SURGERY      TONSILLECTOMY      TRANS METATARSAL AMPUTATION Right 8/9/2023    Procedure: Partial 4th Ray Amputation - Right Foot;  Surgeon: Silas Swan DPM;  Location:  [facility] OR;  Service: Podiatry;  Laterality: Right;    URETEROSCOPY LASER LITHOTRIPSY WITH STENT INSERTION Left 9/1/2022    Procedure: LEFT URETEROSCOPY LASER LITHOTRIPSY WITH STENT INSERTION;  Surgeon: Weston Peck MD;  Location:  [facility] OR;  Service: Urology;  Laterality: Left;    URETEROSCOPY LASER LITHOTRIPSY WITH STENT INSERTION Left 9/15/2022    Procedure: LEFT URETEROSCOPY LASER LITHOTRIPSY WITH STENT EXCHANGE;  Surgeon: Weston Peck MD;  Location:  [facility] OR;  Service: Urology;  Laterality: Left;       Medications Prior to Admission:    (Not in a hospital admission)      Allergies:  Adhesive tape, Cefazolin, Cefuroxime axetil, Cephalosporins, Neomycin-polymyxin b gu, Neosporin [neomycin-bacitracin zn-polymyx], and Percocet [oxycodone-acetaminophen]    Social History:   Social History     Socioeconomic History    Marital status:    Tobacco Use    Smoking status: Former     Current packs/day: 0.00     Types: Cigarettes     Quit date:  "     Years since quittin.4    Smokeless tobacco: Never   Vaping Use    Vaping status: Never Used   Substance and Sexual Activity    Alcohol use: No    Drug use: No    Sexual activity: Defer       Family History:   Family History   Problem Relation Age of Onset    Kidney disease Father     Heart disease Father     Cancer Mother         brain    Colon cancer Neg Hx     Colon polyps Neg Hx            Physical Exam:    Vitals: /67 (BP Location: Right arm, Patient Position: Lying)   Pulse 98   Temp 97.8 °F (36.6 °C) (Oral)   Resp 18   Ht 182.9 cm (72\")   Wt 89.8 kg (198 lb)   SpO2 94%   BMI 26.85 kg/m²   Physical Exam  Vitals reviewed.   Constitutional:       Appearance: Normal appearance. He is not ill-appearing.   HENT:      Head: Normocephalic and atraumatic.   Eyes:      General:         Right eye: No discharge.         Left eye: No discharge.      Extraocular Movements: Extraocular movements intact.      Conjunctiva/sclera: Conjunctivae normal.   Cardiovascular:      Rate and Rhythm: Normal rate and regular rhythm.      Pulses: Normal pulses.      Heart sounds: No murmur heard.  Pulmonary:      Effort: Pulmonary effort is normal. No respiratory distress.      Breath sounds: Rhonchi present. No wheezing.   Abdominal:      General: Abdomen is flat. Bowel sounds are normal. There is no distension.   Skin:     Capillary Refill: Capillary refill takes less than 2 seconds.   Neurological:      General: No focal deficit present.      Mental Status: He is alert and oriented to person, place, and time.   Psychiatric:         Mood and Affect: Mood normal.           Lab Results (last 24 hours)       Procedure Component Value Units Date/Time    T3, Free [[id number]]  (Normal) Collected: 24 1659    Specimen: Blood Updated: 24 0044     T3, Free 2.13 pg/mL     Narrative:      Results may be falsely increased if patient taking Biotin.      High Sensitivity Troponin T 2Hr [[id number]]  (Abnormal) " Collected: 06/04/24 2125    Specimen: Blood Updated: 06/04/24 2152     HS Troponin T 40 ng/L      Troponin T Delta 1 ng/L     Narrative:      High Sensitive Troponin T Reference Range:  <14.0 ng/L- Negative Female for AMI  <22.0 ng/L- Negative Male for AMI  >=14 - Abnormal Female indicating possible myocardial injury.  >=22 - Abnormal Male indicating possible myocardial injury.   Clinicians would have to utilize clinical acumen, EKG, Troponin, and serial changes to determine if it is an Acute Myocardial Infarction or myocardial injury due to an underlying chronic condition.         CK [[id number]]  (Abnormal) Collected: 06/04/24 1910    Specimen: Blood Updated: 06/04/24 2105     Creatine Kinase 445 U/L     High Sensitivity Troponin T [[id number]]  (Abnormal) Collected: 06/04/24 1910    Specimen: Blood Updated: 06/04/24 1956     HS Troponin T 39 ng/L     Narrative:      High Sensitive Troponin T Reference Range:  <14.0 ng/L- Negative Female for AMI  <22.0 ng/L- Negative Male for AMI  >=14 - Abnormal Female indicating possible myocardial injury.  >=22 - Abnormal Male indicating possible myocardial injury.   Clinicians would have to utilize clinical acumen, EKG, Troponin, and serial changes to determine if it is an Acute Myocardial Infarction or myocardial injury due to an underlying chronic condition.         Respiratory Panel PCR w/COVID-19(SARS-CoV-2) [facility]ENN[facility]ER/[facility] In-House, NP Swab in [facility], 2 HR TAT - Swab, Nasopharynx [[id number]]  (Abnormal) Collected: 06/04/24 1734    Specimen: Swab from Nasopharynx Updated: 06/04/24 1841     ADENOVIRUS, PCR Not Detected     Coronavirus 229E Not Detected     Coronavirus HKU1 Not Detected     Coronavirus NL63 Not Detected     Coronavirus OC43 Not Detected     COVID19 Not Detected     Human Metapneumovirus Not Detected     Human Rhinovirus/Enterovirus Not Detected     Influenza A PCR Not Detected     Influenza B PCR Not Detected     Parainfluenza Virus 1 Not Detected      Parainfluenza Virus 2 Not Detected     Parainfluenza Virus 3 Detected     Parainfluenza Virus 4 Not Detected     RSV, PCR Not Detected     Bordetella pertussis pcr Not Detected     Bordetella parapertussis PCR Not Detected     Chlamydophila pneumoniae PCR Not Detected     Mycoplasma pneumo by PCR Not Detected    Narrative:      In the setting of a positive respiratory panel with a viral infection PLUS a negative procalcitonin without other underlying concern for bacterial infection, consider observing off antibiotics or discontinuation of antibiotics and continue supportive care. If the respiratory panel is positive for atypical bacterial infection (Bordetella pertussis, Chlamydophila pneumoniae, or Mycoplasma pneumoniae), consider antibiotic de-escalation to target atypical bacterial infection.    Urinalysis With Microscopic If Indicated (No Culture) - Urine, Clean Catch [[id number]]  (Abnormal) Collected: 06/04/24 1756    Specimen: Urine, Clean Catch Updated: 06/04/24 1819     Color, UA Yellow     Appearance, UA Clear     pH, UA 5.5     Specific Gravity, UA 1.017     Glucose, UA >=1000 mg/dL (3+)     Ketones, UA Negative     Bilirubin, UA Negative     Blood, UA Moderate (2+)     Protein,  mg/dL (2+)     Leuk Esterase, UA Negative     Nitrite, UA Negative     Urobilinogen, UA 0.2 E.U./dL    Urinalysis, Microscopic Only - Urine, Clean Catch [[id number]]  (Abnormal) Collected: 06/04/24 1756    Specimen: Urine, Clean Catch Updated: 06/04/24 1819     RBC, UA 3-5 /HPF      WBC, UA 3-5 /HPF      Bacteria, UA None Seen /HPF      Squamous Epithelial Cells, UA 3-6 /HPF      Methodology Manual Light Microscopy    TSH [[id number]]  (Normal) Collected: 06/04/24 1659    Specimen: Blood Updated: 06/04/24 1751     TSH 0.925 uIU/mL     T4, Free [[id number]]  (Normal) Collected: 06/04/24 1659    Specimen: Blood Updated: 06/04/24 1751     Free T4 1.45 ng/dL     Comprehensive Metabolic Panel [[id number]]  (Abnormal) Collected:  06/04/24 1659    Specimen: Blood Updated: 06/04/24 1747     Glucose 219 mg/dL      BUN 33 mg/dL      Creatinine 2.31 mg/dL      Sodium 139 mmol/L      Potassium 3.3 mmol/L      Chloride 101 mmol/L      CO2 21.0 mmol/L      Calcium 10.0 mg/dL      Total Protein 7.6 g/dL      Albumin 4.4 g/dL      ALT (SGPT) 32 U/L      AST (SGOT) 38 U/L      Alkaline Phosphatase 95 U/L      Total Bilirubin 0.7 mg/dL      Globulin 3.2 gm/dL      A/G Ratio 1.4 g/dL      BUN/Creatinine Ratio 14.3     Anion Gap 17.0 mmol/L      eGFR 29.6 mL/min/1.73     Narrative:      GFR Normal >60  Chronic Kidney Disease <60  Kidney Failure <15      Magnesium [[id number]]  (Normal) Collected: 06/04/24 1659    Specimen: Blood Updated: 06/04/24 1747     Magnesium 2.3 mg/dL     BNP [[id number]]  (Normal) Collected: 06/04/24 1659    Specimen: Blood Updated: 06/04/24 1745     proBNP 353.0 pg/mL     Narrative:      This assay is used as an aid in the diagnosis of individuals suspected of having heart failure. It can be used as an aid in the diagnosis of acute decompensated heart failure (ADHF) in patients presenting with signs and symptoms of ADHF to the emergency department (ED). In addition, NT-proBNP of <300 pg/mL indicates ADHF is not likely.    Age Range Result Interpretation  NT-proBNP Concentration (pg/mL:      <50             Positive            >450                   Gray                 300-450                    Negative             <300    50-75           Positive            >900                  Gray                300-900                  Negative            <300      >75             Positive            >1800                  Gray                300-1800                  Negative            <300    Single High Sensitivity Troponin T [[id number]]  (Abnormal) Collected: 06/04/24 1659    Specimen: Blood Updated: 06/04/24 1744     HS Troponin T 42 ng/L     Narrative:      High Sensitive Troponin T Reference Range:  <14.0 ng/L- Negative Female for  AMI  <22.0 ng/L- Negative Male for AMI  >=14 - Abnormal Female indicating possible myocardial injury.  >=22 - Abnormal Male indicating possible myocardial injury.   Clinicians would have to utilize clinical acumen, EKG, Troponin, and serial changes to determine if it is an Acute Myocardial Infarction or myocardial injury due to an underlying chronic condition.         CBC & Differential [[id number]]  (Abnormal) Collected: 06/04/24 1659    Specimen: Blood Updated: 06/04/24 1734    Narrative:      The following orders were created for panel order CBC & Differential.  Procedure                               Abnormality         Status                     ---------                               -----------         ------                     CBC Auto Differential[[id number]]        Abnormal            Final result                 Please view results for these tests on the individual orders.    CBC Auto Differential [[id number]]  (Abnormal) Collected: 06/04/24 1659    Specimen: Blood Updated: 06/04/24 1734     WBC 5.98 10*3/mm3      RBC 3.39 10*6/mm3      Hemoglobin 11.3 g/dL      Hematocrit 34.0 %      .3 fL      MCH 33.3 pg      MCHC 33.2 g/dL      RDW 15.4 %      RDW-SD 57.0 fl      MPV 9.8 fL      Platelets 81 10*3/mm3      Neutrophil % 67.7 %      Lymphocyte % 17.6 %      Monocyte % 11.9 %      Eosinophil % 2.0 %      Basophil % 0.3 %      Immature Grans % 0.5 %      Neutrophils, Absolute 4.05 10*3/mm3      Lymphocytes, Absolute 1.05 10*3/mm3      Monocytes, Absolute 0.71 10*3/mm3      Eosinophils, Absolute 0.12 10*3/mm3      Basophils, Absolute 0.02 10*3/mm3      Immature Grans, Absolute 0.03 10*3/mm3      nRBC 0.0 /100 WBC     [place] Draw [[id number]] Collected: 06/04/24 1659    Specimen: Blood Updated: 06/04/24 1715    Narrative:      The following orders were created for panel order [place] Draw.  Procedure                               Abnormality         Status                     ---------                                -----------         ------                     Green Top (Gel)[[id number]]                                  Final result               Lavender Top[[id number]]                                     Final result               Red Top[[id number]]                                          Final result               Light Blue Top[[id number]]                                   Final result                 Please view results for these tests on the individual orders.    Green Top (Gel) [[id number]] Collected: 06/04/24 1659    Specimen: Blood Updated: 06/04/24 1715     Extra Tube Hold for add-ons.     Comment: Auto resulted.       Lavender Top [[id number]] Collected: 06/04/24 1659    Specimen: Blood Updated: 06/04/24 1715     Extra Tube hold for add-on     Comment: Auto resulted       Red Top [[id number]] Collected: 06/04/24 1659    Specimen: Blood Updated: 06/04/24 1715     Extra Tube Hold for add-ons.     Comment: Auto resulted.       Light Blue Top [[id number]] Collected: 06/04/24 1659    Specimen: Blood Updated: 06/04/24 1715     Extra Tube Hold for add-ons.     Comment: Auto resulted                -----------------------------------------------------------------  EKG:   Radiology:     CT Abdomen Pelvis Without Contrast    Result Date: 6/4/2024  EXAMINATION: CT ABDOMEN PELVIS WO CONTRAST-   6/4/2024 7:09 PM  HISTORY: left flank pain  In order to have a CT radiation dose as low as reasonably achievable Automated Exposure Control was utilized for adjustment of the mA and/or KV according to patient size.  CT Dose DLP = 515.2 mGy.cm. (If there are multiple studies performed at the same time this represents the total dose).  Noncontrast abdomen/pelvis CT. Axial, sagittal, and coronal sequences.  COMPARISON: 4/10/2017.  Heart size is normal. There is extensive coronary artery calcification. No acute abnormality is seen at the lung bases.  Normal noncontrast appearance of the liver. Unchanged tiny calcified gallstone. No  bile duct dilation. No sign of cholecystitis.  Normal pancreas and spleen. Normal adrenal glands and kidneys. Large LEFT renal stones. The largest in the lower pole measures 12 mm. No hydronephrosis and no ureteral stone. The LEFT renal stones can all be so be seen on the exam from 2017.  Aortic calcification with no aneurysm. No small bowel dilation. There is a large amount of fecal material throughout the entire colon.  No pelvic mass or fluid.  Unremarkable urinary bladder.  Stable mildly dilated common iliac arteries.  No appendicitis. No sign of diverticulitis or colitis.  Mild lumbar scoliosis with prominent degenerative disc and endplate change throughout the lumbar spine and lower thoracic spine.      1. Large amount of fecal material throughout the colon. 2. Nonobstructing LEFT renal stones. 3. No hydronephrosis or ureteral stone. 4. No mass, fluid collection, or inflammatory process.  This report was signed and finalized on 6/4/2024 8:30 PM by Dr. Mike Plasencia MD.      XR Abdomen KUB    Result Date: 6/4/2024  EXAMINATION: XR ABDOMEN KUB-  6/4/2024 5:07 PM  HISTORY: constipation   1 view abdomen/KUB.  COMPARISON: 8/22/2022 KUB.  Ill-defined LEFT renal stones noted.  Severe degenerative disc and endplate change throughout the lumbar spine and lower thoracic spine. 13 degrees LEFT convex scoliosis.  No bowel obstruction.  There is a large amount of fecal material within the ascending, transverse, and descending colon.      1. LEFT renal stones. 2. Large amount of fecal material throughout the colon.    This report was signed and finalized on 6/4/2024 6:12 PM by Dr. Mike Plasencia MD.      XR Chest 1 View    Result Date: 6/4/2024  EXAMINATION: XR CHEST 1 VW-  6/4/2024 5:07 PM  HISTORY: shortness of breath  1 view chest x-ray.  COMPARISON: 8/4/2023.  CABG changes. Heart size is normal. The mediastinum is within normal limits.  The lungs are normally expanded with no pneumonia or pneumothorax.  No congestive  failure changes.      1. No acute disease.      This report was signed and finalized on 6/4/2024 6:10 PM by Dr. Mike Plasencia MD.        Assessment and Plan     Primary Problem:  BELTRAN (acute kidney injury)    Hospital Problem list:    BELTRAN (acute kidney injury)      PMH:  Past Medical History:   Diagnosis Date    Anemia     Anxiety     Arthritis     BPH (benign prostatic hypertrophy)     CAD (coronary artery disease)     Cancer     non-hodgkins lymphoma    Diabetes mellitus     Disease of thyroid gland     GERD (gastroesophageal reflux disease)     Hearing loss     History of transfusion     Hyperlipidemia     Hypertension     Iliac artery aneurysm, bilateral     Kidney stone     Liver cirrhosis     Migraines     Sleep apnea     c-pap       Treatment Plan:  BELTRAN: IVF and continue to monitor allow for p.o. intake.    Viral pneumonia: Respiratory viral panel positive for parainfluenza virus.  Solu-Medrol and azithromycin.    Type 2 diabetes: Monitor blood sugar closely given the Solu-Medrol.  Basal and sliding scale insulin.    CODE STATUS: Full    DVT prophylaxis: Lovenox    Disposition: Observation    Yossi Rosen MD 6/5/2024 08:54 CDT

## 2024-06-05 NOTE — PLAN OF CARE
Goal Outcome Evaluation:         Pt resting comfortably this shift. Admitted orders will be placed this am per conversation with Debo GILES. VSS Afebrile. . No BM in a few days. Pt constipated and requesting Miralax. CTM

## 2024-06-05 NOTE — ED NOTES
Patient is current sitting up in bed eating breakfast.  Patient COA x 4.  Patient has equal chest rise and fall.

## 2024-06-06 PROBLEM — N17.9 ACUTE KIDNEY FAILURE, UNSPECIFIED: Status: ACTIVE | Noted: 2024-06-06

## 2024-06-06 LAB
ALBUMIN SERPL-MCNC: 4.1 G/DL (ref 3.5–5.2)
ALBUMIN/GLOB SERPL: 1.4 G/DL
ALP SERPL-CCNC: 85 U/L (ref 39–117)
ALT SERPL W P-5'-P-CCNC: 35 U/L (ref 1–41)
ANION GAP SERPL CALCULATED.3IONS-SCNC: 14 MMOL/L (ref 5–15)
AST SERPL-CCNC: 47 U/L (ref 1–40)
BASOPHILS # BLD AUTO: 0 10*3/MM3 (ref 0–0.2)
BASOPHILS NFR BLD AUTO: 0 % (ref 0–1.5)
BILIRUB SERPL-MCNC: 0.6 MG/DL (ref 0–1.2)
BUN SERPL-MCNC: 27 MG/DL (ref 8–23)
BUN/CREAT SERPL: 14.8 (ref 7–25)
CALCIUM SPEC-SCNC: 10.1 MG/DL (ref 8.6–10.5)
CHLORIDE SERPL-SCNC: 109 MMOL/L (ref 98–107)
CO2 SERPL-SCNC: 17 MMOL/L (ref 22–29)
CREAT SERPL-MCNC: 1.83 MG/DL (ref 0.76–1.27)
DEPRECATED RDW RBC AUTO: 55.9 FL (ref 37–54)
EGFRCR SERPLBLD CKD-EPI 2021: 39.2 ML/MIN/1.73
EOSINOPHIL # BLD AUTO: 0 10*3/MM3 (ref 0–0.4)
EOSINOPHIL NFR BLD AUTO: 0 % (ref 0.3–6.2)
ERYTHROCYTE [DISTWIDTH] IN BLOOD BY AUTOMATED COUNT: 15.2 % (ref 12.3–15.4)
GLOBULIN UR ELPH-MCNC: 2.9 GM/DL
GLUCOSE BLDC GLUCOMTR-MCNC: 132 MG/DL (ref 70–130)
GLUCOSE BLDC GLUCOMTR-MCNC: 157 MG/DL (ref 70–130)
GLUCOSE BLDC GLUCOMTR-MCNC: 230 MG/DL (ref 70–130)
GLUCOSE BLDC GLUCOMTR-MCNC: 235 MG/DL (ref 70–130)
GLUCOSE SERPL-MCNC: 138 MG/DL (ref 65–99)
HCT VFR BLD AUTO: 30.2 % (ref 37.5–51)
HGB BLD-MCNC: 10 G/DL (ref 13–17.7)
IMM GRANULOCYTES # BLD AUTO: 0.04 10*3/MM3 (ref 0–0.05)
IMM GRANULOCYTES NFR BLD AUTO: 0.8 % (ref 0–0.5)
LYMPHOCYTES # BLD AUTO: 0.59 10*3/MM3 (ref 0.7–3.1)
LYMPHOCYTES NFR BLD AUTO: 11.4 % (ref 19.6–45.3)
MCH RBC QN AUTO: 33.2 PG (ref 26.6–33)
MCHC RBC AUTO-ENTMCNC: 33.1 G/DL (ref 31.5–35.7)
MCV RBC AUTO: 100.3 FL (ref 79–97)
MONOCYTES # BLD AUTO: 0.27 10*3/MM3 (ref 0.1–0.9)
MONOCYTES NFR BLD AUTO: 5.2 % (ref 5–12)
NEUTROPHILS NFR BLD AUTO: 4.29 10*3/MM3 (ref 1.7–7)
NEUTROPHILS NFR BLD AUTO: 82.6 % (ref 42.7–76)
NRBC BLD AUTO-RTO: 0 /100 WBC (ref 0–0.2)
PLATELET # BLD AUTO: 68 10*3/MM3 (ref 140–450)
PMV BLD AUTO: 9.5 FL (ref 6–12)
POTASSIUM SERPL-SCNC: 3 MMOL/L (ref 3.5–5.2)
POTASSIUM SERPL-SCNC: 3.9 MMOL/L (ref 3.5–5.2)
PROT SERPL-MCNC: 7 G/DL (ref 6–8.5)
RBC # BLD AUTO: 3.01 10*6/MM3 (ref 4.14–5.8)
SODIUM SERPL-SCNC: 140 MMOL/L (ref 136–145)
WBC NRBC COR # BLD AUTO: 5.19 10*3/MM3 (ref 3.4–10.8)

## 2024-06-06 PROCEDURE — 36415 COLL VENOUS BLD VENIPUNCTURE: CPT | Performed by: FAMILY MEDICINE

## 2024-06-06 PROCEDURE — 85025 COMPLETE CBC W/AUTO DIFF WBC: CPT | Performed by: FAMILY MEDICINE

## 2024-06-06 PROCEDURE — 25810000003 SODIUM CHLORIDE 0.9 % SOLUTION 250 ML FLEX CONT: Performed by: FAMILY MEDICINE

## 2024-06-06 PROCEDURE — 25810000003 SODIUM CHLORIDE 0.9 % SOLUTION: Performed by: FAMILY MEDICINE

## 2024-06-06 PROCEDURE — 84132 ASSAY OF SERUM POTASSIUM: CPT | Performed by: FAMILY MEDICINE

## 2024-06-06 PROCEDURE — 25010000002 ENOXAPARIN PER 10 MG: Performed by: FAMILY MEDICINE

## 2024-06-06 PROCEDURE — 63710000001 INSULIN GLARGINE PER 5 UNITS: Performed by: FAMILY MEDICINE

## 2024-06-06 PROCEDURE — 94799 UNLISTED PULMONARY SVC/PX: CPT

## 2024-06-06 PROCEDURE — 82948 REAGENT STRIP/BLOOD GLUCOSE: CPT

## 2024-06-06 PROCEDURE — 80053 COMPREHEN METABOLIC PANEL: CPT | Performed by: FAMILY MEDICINE

## 2024-06-06 PROCEDURE — 25010000002 METHYLPREDNISOLONE PER 125 MG: Performed by: FAMILY MEDICINE

## 2024-06-06 PROCEDURE — 63710000001 INSULIN LISPRO (HUMAN) PER 5 UNITS: Performed by: FAMILY MEDICINE

## 2024-06-06 PROCEDURE — 25010000002 AZITHROMYCIN PER 500 MG: Performed by: FAMILY MEDICINE

## 2024-06-06 PROCEDURE — 94664 DEMO&/EVAL PT USE INHALER: CPT

## 2024-06-06 RX ORDER — POTASSIUM CHLORIDE 750 MG/1
40 CAPSULE, EXTENDED RELEASE ORAL EVERY 4 HOURS
Qty: 12 CAPSULE | Refills: 0 | Status: COMPLETED | OUTPATIENT
Start: 2024-06-06 | End: 2024-06-06

## 2024-06-06 RX ADMIN — POTASSIUM CHLORIDE 40 MEQ: 750 CAPSULE, EXTENDED RELEASE ORAL at 11:53

## 2024-06-06 RX ADMIN — METHYLPREDNISOLONE SODIUM SUCCINATE 125 MG: 125 INJECTION, POWDER, FOR SOLUTION INTRAMUSCULAR; INTRAVENOUS at 11:51

## 2024-06-06 RX ADMIN — FERROUS SULFATE TAB 325 MG (65 MG ELEMENTAL FE) 325 MG: 325 (65 FE) TAB at 17:01

## 2024-06-06 RX ADMIN — INSULIN LISPRO 3 UNITS: 100 INJECTION, SOLUTION INTRAVENOUS; SUBCUTANEOUS at 11:51

## 2024-06-06 RX ADMIN — IPRATROPIUM BROMIDE AND ALBUTEROL SULFATE 3 ML: .5; 3 SOLUTION RESPIRATORY (INHALATION) at 11:00

## 2024-06-06 RX ADMIN — METFORMIN HCL 500 MG: 500 TABLET ORAL at 17:01

## 2024-06-06 RX ADMIN — IPRATROPIUM BROMIDE AND ALBUTEROL SULFATE 3 ML: .5; 3 SOLUTION RESPIRATORY (INHALATION) at 07:27

## 2024-06-06 RX ADMIN — METFORMIN HCL 500 MG: 500 TABLET ORAL at 08:04

## 2024-06-06 RX ADMIN — IPRATROPIUM BROMIDE AND ALBUTEROL SULFATE 3 ML: .5; 3 SOLUTION RESPIRATORY (INHALATION) at 15:10

## 2024-06-06 RX ADMIN — FERROUS SULFATE TAB 325 MG (65 MG ELEMENTAL FE) 325 MG: 325 (65 FE) TAB at 08:04

## 2024-06-06 RX ADMIN — DOCUSATE SODIUM 100 MG: 100 CAPSULE, LIQUID FILLED ORAL at 17:01

## 2024-06-06 RX ADMIN — SODIUM CHLORIDE 100 ML/HR: 9 INJECTION, SOLUTION INTRAVENOUS at 15:05

## 2024-06-06 RX ADMIN — INSULIN GLARGINE 10 UNITS: 100 INJECTION, SOLUTION SUBCUTANEOUS at 20:59

## 2024-06-06 RX ADMIN — RANOLAZINE 1000 MG: 500 TABLET, FILM COATED, EXTENDED RELEASE ORAL at 20:58

## 2024-06-06 RX ADMIN — POTASSIUM CHLORIDE 40 MEQ: 750 CAPSULE, EXTENDED RELEASE ORAL at 08:04

## 2024-06-06 RX ADMIN — IPRATROPIUM BROMIDE AND ALBUTEROL SULFATE 3 ML: .5; 3 SOLUTION RESPIRATORY (INHALATION) at 19:28

## 2024-06-06 RX ADMIN — RANOLAZINE 1000 MG: 500 TABLET, FILM COATED, EXTENDED RELEASE ORAL at 08:04

## 2024-06-06 RX ADMIN — BUSPIRONE HYDROCHLORIDE 15 MG: 5 TABLET ORAL at 08:04

## 2024-06-06 RX ADMIN — BUSPIRONE HYDROCHLORIDE 15 MG: 5 TABLET ORAL at 20:58

## 2024-06-06 RX ADMIN — INSULIN LISPRO 5 UNITS: 100 INJECTION, SOLUTION INTRAVENOUS; SUBCUTANEOUS at 17:01

## 2024-06-06 RX ADMIN — PANTOPRAZOLE SODIUM 40 MG: 40 TABLET, DELAYED RELEASE ORAL at 08:04

## 2024-06-06 RX ADMIN — INSULIN LISPRO 5 UNITS: 100 INJECTION, SOLUTION INTRAVENOUS; SUBCUTANEOUS at 20:59

## 2024-06-06 RX ADMIN — Medication 10 ML: at 08:06

## 2024-06-06 RX ADMIN — AZITHROMYCIN DIHYDRATE 500 MG: 500 INJECTION, POWDER, LYOPHILIZED, FOR SOLUTION INTRAVENOUS at 09:27

## 2024-06-06 RX ADMIN — LEVOTHYROXINE SODIUM 75 MCG: 75 TABLET ORAL at 05:44

## 2024-06-06 RX ADMIN — EMPAGLIFLOZIN 25 MG: 25 TABLET, FILM COATED ORAL at 08:04

## 2024-06-06 RX ADMIN — SODIUM CHLORIDE 100 ML/HR: 9 INJECTION, SOLUTION INTRAVENOUS at 03:37

## 2024-06-06 RX ADMIN — CLOPIDOGREL BISULFATE 75 MG: 75 TABLET, FILM COATED ORAL at 08:04

## 2024-06-06 RX ADMIN — POTASSIUM CHLORIDE 40 MEQ: 750 CAPSULE, EXTENDED RELEASE ORAL at 17:01

## 2024-06-06 RX ADMIN — GABAPENTIN 100 MG: 100 CAPSULE ORAL at 20:58

## 2024-06-06 RX ADMIN — ENOXAPARIN SODIUM 40 MG: 100 INJECTION SUBCUTANEOUS at 08:09

## 2024-06-06 RX ADMIN — ATORVASTATIN CALCIUM 40 MG: 40 TABLET ORAL at 20:58

## 2024-06-06 RX ADMIN — SENNOSIDES AND DOCUSATE SODIUM 2 TABLET: 50; 8.6 TABLET ORAL at 08:09

## 2024-06-06 NOTE — PROGRESS NOTES
Daily Progress Note  Franko Lucero  MRN: [id number] LOS: 0    Admit Date: 6/4/2024 6/6/2024 08:22 CDT    Subjective:      Chief Complaint:  Chief Complaint   Patient presents with    Shortness of Breath    Cough       Interval History:    Reviewed overnight events and nursing notes.   No acute events overnight.  Breathing status has improved.  Minimal cough.    Review of Systems   Constitutional:  Positive for fatigue. Negative for activity change and appetite change.   Respiratory:  Positive for cough. Negative for shortness of breath and wheezing.    Cardiovascular:  Negative for chest pain.   Gastrointestinal:  Negative for abdominal pain, nausea and vomiting.       DIET:  Diet: Renal; Low Phosphorus; Fluid Consistency: Thin (IDDSI 0)    Medications:   sodium chloride, 100 mL/hr, Last Rate: 100 mL/hr (06/06/24 0726)      atorvastatin, 40 mg, Oral, Nightly  azithromycin, 500 mg, Intravenous, Q24H  busPIRone, 15 mg, Oral, BID  clopidogrel, 75 mg, Oral, Daily  docusate sodium, 100 mg, Oral, Q PM  empagliflozin, 25 mg, Oral, Daily  enoxaparin, 40 mg, Subcutaneous, Daily  ferrous sulfate, 325 mg, Oral, BID With Meals  gabapentin, 100 mg, Oral, Nightly  insulin glargine, 10 Units, Subcutaneous, Nightly  insulin lispro, 3-14 Units, Subcutaneous, 4x Daily AC & at Bedtime  ipratropium-albuterol, 3 mL, Nebulization, 4x Daily - RT  levothyroxine, 75 mcg, Oral, Q AM  metFORMIN, 500 mg, Oral, BID With Meals  methylPREDNISolone sodium succinate, 125 mg, Intravenous, Q24H  pantoprazole, 40 mg, Oral, Daily  potassium chloride ER, 40 mEq, Oral, Q4H  ranolazine, 1,000 mg, Oral, BID  sodium chloride, 10 mL, Intravenous, Q12H        Data:     Code Status:   Code Status and Medical Interventions:   Ordered at: 06/05/24 0854     Code Status (Patient has no pulse and is not breathing):    CPR (Attempt to Resuscitate)     Medical Interventions (Patient has pulse or is breathing):    Full Support       Family History   Problem  Relation Age of Onset    Kidney disease Father     Heart disease Father     Cancer Mother         brain    Colon cancer Neg Hx     Colon polyps Neg Hx      Social History     Socioeconomic History    Marital status:    Tobacco Use    Smoking status: Former     Current packs/day: 0.00     Types: Cigarettes     Quit date:      Years since quittin.4    Smokeless tobacco: Never   Vaping Use    Vaping status: Never Used   Substance and Sexual Activity    Alcohol use: No    Drug use: No    Sexual activity: Defer       Labs:    Lab Results (last 72 hours)       Procedure Component Value Units Date/Time    POC Glucose Once [[id number]]  (Abnormal) Collected: 24    Specimen: Blood Updated: 24 0809     Glucose 132 mg/dL      Comment: : [id number] Elizabeth [facility]oraAbra[facility] Hospital[facility] ID: [id number]       Comprehensive Metabolic Panel [[id number]]  (Abnormal) Collected: 24    Specimen: Blood Updated: 24 043     Glucose 138 mg/dL      BUN 27 mg/dL      Creatinine 1.83 mg/dL      Sodium 140 mmol/L      Potassium 3.0 mmol/L      Comment: Specimen hemolyzed.  Result may be falsely elevated.        Chloride 109 mmol/L      CO2 17.0 mmol/L      Calcium 10.1 mg/dL      Total Protein 7.0 g/dL      Albumin 4.1 g/dL      ALT (SGPT) 35 U/L      Comment: Specimen hemolyzed.  Result may  be falsely elevated.        AST (SGOT) 47 U/L      Comment: Specimen hemolyzed.  Result may be falsely elevated.        Alkaline Phosphatase 85 U/L      Total Bilirubin 0.6 mg/dL      Globulin 2.9 gm/dL      A/G Ratio 1.4 g/dL      BUN/Creatinine Ratio 14.8     Anion Gap 14.0 mmol/L      eGFR 39.2 mL/min/1.73     Narrative:      GFR Normal >60  Chronic Kidney Disease <60  Kidney Failure <15      CBC Auto Differential [[id number]]  (Abnormal) Collected: 24    Specimen: Blood Updated: 24     WBC 5.19 10*3/mm3      RBC 3.01 10*6/mm3      Hemoglobin 10.0 g/dL      Hematocrit 30.2 %      .3 fL       MCH 33.2 pg      MCHC 33.1 g/dL      RDW 15.2 %      RDW-SD 55.9 fl      MPV 9.5 fL      Platelets 68 10*3/mm3      Neutrophil % 82.6 %      Lymphocyte % 11.4 %      Monocyte % 5.2 %      Eosinophil % 0.0 %      Basophil % 0.0 %      Immature Grans % 0.8 %      Neutrophils, Absolute 4.29 10*3/mm3      Lymphocytes, Absolute 0.59 10*3/mm3      Monocytes, Absolute 0.27 10*3/mm3      Eosinophils, Absolute 0.00 10*3/mm3      Basophils, Absolute 0.00 10*3/mm3      Immature Grans, Absolute 0.04 10*3/mm3      nRBC 0.0 /100 WBC     POC Glucose Once [[id number]]  (Abnormal) Collected: 06/05/24 2036    Specimen: Blood Updated: 06/05/24 2047     Glucose 230 mg/dL      Comment: : [id number] Pranay SydneyMeter ID: [id number]       POC Glucose Once [[id number]]  (Abnormal) Collected: 06/05/24 1612    Specimen: Blood Updated: 06/05/24 1625     Glucose 177 mg/dL      Comment: : [id number] Fabio EuraisaMeter ID: [id number]       POC Glucose Once [[id number]]  (Abnormal) Collected: 06/05/24 1202    Specimen: Blood Updated: 06/05/24 1215     Glucose 194 mg/dL      Comment: : [id number] Allen HeatherMeter ID: [id number]       T3, Free [[id number]]  (Normal) Collected: 06/04/24 1659    Specimen: Blood Updated: 06/05/24 0044     T3, Free 2.13 pg/mL     Narrative:      Results may be falsely increased if patient taking Biotin.      High Sensitivity Troponin T 2Hr [[id number]]  (Abnormal) Collected: 06/04/24 2125    Specimen: Blood Updated: 06/04/24 2152     HS Troponin T 40 ng/L      Troponin T Delta 1 ng/L     Narrative:      High Sensitive Troponin T Reference Range:  <14.0 ng/L- Negative Female for AMI  <22.0 ng/L- Negative Male for AMI  >=14 - Abnormal Female indicating possible myocardial injury.  >=22 - Abnormal Male indicating possible myocardial injury.   Clinicians would have to utilize clinical acumen, EKG, Troponin, and serial changes to determine if it is an Acute Myocardial Infarction or myocardial injury due to an  underlying chronic condition.         CK [[id number]]  (Abnormal) Collected: 06/04/24 1910    Specimen: Blood Updated: 06/04/24 2105     Creatine Kinase 445 U/L     High Sensitivity Troponin T [[id number]]  (Abnormal) Collected: 06/04/24 1910    Specimen: Blood Updated: 06/04/24 1956     HS Troponin T 39 ng/L     Narrative:      High Sensitive Troponin T Reference Range:  <14.0 ng/L- Negative Female for AMI  <22.0 ng/L- Negative Male for AMI  >=14 - Abnormal Female indicating possible myocardial injury.  >=22 - Abnormal Male indicating possible myocardial injury.   Clinicians would have to utilize clinical acumen, EKG, Troponin, and serial changes to determine if it is an Acute Myocardial Infarction or myocardial injury due to an underlying chronic condition.         Respiratory Panel PCR w/COVID-19(SARS-CoV-2) RICHARD/JENNIFER/DENIA/PAD/COR/VONDA In-House, NP Swab in UTM/VTM, 2 HR TAT - Swab, Nasopharynx [[id number]]  (Abnormal) Collected: 06/04/24 1734    Specimen: Swab from Nasopharynx Updated: 06/04/24 1841     ADENOVIRUS, PCR Not Detected     Coronavirus 229E Not Detected     Coronavirus HKU1 Not Detected     Coronavirus NL63 Not Detected     Coronavirus OC43 Not Detected     COVID19 Not Detected     Human Metapneumovirus Not Detected     Human Rhinovirus/Enterovirus Not Detected     Influenza A PCR Not Detected     Influenza B PCR Not Detected     Parainfluenza Virus 1 Not Detected     Parainfluenza Virus 2 Not Detected     Parainfluenza Virus 3 Detected     Parainfluenza Virus 4 Not Detected     RSV, PCR Not Detected     Bordetella pertussis pcr Not Detected     Bordetella parapertussis PCR Not Detected     Chlamydophila pneumoniae PCR Not Detected     Mycoplasma pneumo by PCR Not Detected    Narrative:      In the setting of a positive respiratory panel with a viral infection PLUS a negative procalcitonin without other underlying concern for bacterial infection, consider observing off antibiotics or discontinuation of  antibiotics and continue supportive care. If the respiratory panel is positive for atypical bacterial infection (Bordetella pertussis, Chlamydophila pneumoniae, or Mycoplasma pneumoniae), consider antibiotic de-escalation to target atypical bacterial infection.    Urinalysis With Microscopic If Indicated (No Culture) - Urine, Clean Catch [[id number]]  (Abnormal) Collected: 06/04/24 1756    Specimen: Urine, Clean Catch Updated: 06/04/24 1819     Color, UA Yellow     Appearance, UA Clear     pH, UA 5.5     Specific Gravity, UA 1.017     Glucose, UA >=1000 mg/dL (3+)     Ketones, UA Negative     Bilirubin, UA Negative     Blood, UA Moderate (2+)     Protein,  mg/dL (2+)     Leuk Esterase, UA Negative     Nitrite, UA Negative     Urobilinogen, UA 0.2 E.U./dL    Urinalysis, Microscopic Only - Urine, Clean Catch [[id number]]  (Abnormal) Collected: 06/04/24 1756    Specimen: Urine, Clean Catch Updated: 06/04/24 1819     RBC, UA 3-5 /HPF      WBC, UA 3-5 /HPF      Bacteria, UA None Seen /HPF      Squamous Epithelial Cells, UA 3-6 /HPF      Methodology Manual Light Microscopy    TSH [[id number]]  (Normal) Collected: 06/04/24 1659    Specimen: Blood Updated: 06/04/24 1751     TSH 0.925 uIU/mL     T4, Free [[id number]]  (Normal) Collected: 06/04/24 1659    Specimen: Blood Updated: 06/04/24 1751     Free T4 1.45 ng/dL     Comprehensive Metabolic Panel [[id number]]  (Abnormal) Collected: 06/04/24 1659    Specimen: Blood Updated: 06/04/24 1747     Glucose 219 mg/dL      BUN 33 mg/dL      Creatinine 2.31 mg/dL      Sodium 139 mmol/L      Potassium 3.3 mmol/L      Chloride 101 mmol/L      CO2 21.0 mmol/L      Calcium 10.0 mg/dL      Total Protein 7.6 g/dL      Albumin 4.4 g/dL      ALT (SGPT) 32 U/L      AST (SGOT) 38 U/L      Alkaline Phosphatase 95 U/L      Total Bilirubin 0.7 mg/dL      Globulin 3.2 gm/dL      A/G Ratio 1.4 g/dL      BUN/Creatinine Ratio 14.3     Anion Gap 17.0 mmol/L      eGFR 29.6 mL/min/1.73     Narrative:       GFR Normal >60  Chronic Kidney Disease <60  Kidney Failure <15      Magnesium [[id number]]  (Normal) Collected: 06/04/24 1659    Specimen: Blood Updated: 06/04/24 1747     Magnesium 2.3 mg/dL     BNP [[id number]]  (Normal) Collected: 06/04/24 1659    Specimen: Blood Updated: 06/04/24 1745     proBNP 353.0 pg/mL     Narrative:      This assay is used as an aid in the diagnosis of individuals suspected of having heart failure. It can be used as an aid in the diagnosis of acute decompensated heart failure (ADHF) in patients presenting with signs and symptoms of ADHF to the emergency department (ED). In addition, NT-proBNP of <300 pg/mL indicates ADHF is not likely.    Age Range Result Interpretation  NT-proBNP Concentration (pg/mL:      <50             Positive            >450                   Gray                 300-450                    Negative             <300    50-75           Positive            >900                  Gray                300-900                  Negative            <300      >75             Positive            >1800                  Gray                300-1800                  Negative            <300    Single High Sensitivity Troponin T [[id number]]  (Abnormal) Collected: 06/04/24 1659    Specimen: Blood Updated: 06/04/24 1744     HS Troponin T 42 ng/L     Narrative:      High Sensitive Troponin T Reference Range:  <14.0 ng/L- Negative Female for AMI  <22.0 ng/L- Negative Male for AMI  >=14 - Abnormal Female indicating possible myocardial injury.  >=22 - Abnormal Male indicating possible myocardial injury.   Clinicians would have to utilize clinical acumen, EKG, Troponin, and serial changes to determine if it is an Acute Myocardial Infarction or myocardial injury due to an underlying chronic condition.         CBC & Differential [[id number]]  (Abnormal) Collected: 06/04/24 1659    Specimen: Blood Updated: 06/04/24 1734    Narrative:      The following orders were created for panel order CBC  & Differential.  Procedure                               Abnormality         Status                     ---------                               -----------         ------                     CBC Auto Differential[[id number]]        Abnormal            Final result                 Please view results for these tests on the individual orders.    CBC Auto Differential [[id number]]  (Abnormal) Collected: 06/04/24 1659    Specimen: Blood Updated: 06/04/24 8823     WBC 5.98 10*3/mm3      RBC 3.39 10*6/mm3      Hemoglobin 11.3 g/dL      Hematocrit 34.0 %      .3 fL      MCH 33.3 pg      MCHC 33.2 g/dL      RDW 15.4 %      RDW-SD 57.0 fl      MPV 9.8 fL      Platelets 81 10*3/mm3      Neutrophil % 67.7 %      Lymphocyte % 17.6 %      Monocyte % 11.9 %      Eosinophil % 2.0 %      Basophil % 0.3 %      Immature Grans % 0.5 %      Neutrophils, Absolute 4.05 10*3/mm3      Lymphocytes, Absolute 1.05 10*3/mm3      Monocytes, Absolute 0.71 10*3/mm3      Eosinophils, Absolute 0.12 10*3/mm3      Basophils, Absolute 0.02 10*3/mm3      Immature Grans, Absolute 0.03 10*3/mm3      nRBC 0.0 /100 WBC     [place] Draw [[id number]] Collected: 06/04/24 1659    Specimen: Blood Updated: 06/04/24 8475    Narrative:      The following orders were created for panel order [place] Draw.  Procedure                               Abnormality         Status                     ---------                               -----------         ------                     Green Top (Gel)[[id number]]                                  Final result               Lavender Top[[id number]]                                     Final result               Red Top[[id number]]                                          Final result               Light Blue Top[[id number]]                                   Final result                 Please view results for these tests on the individual orders.    Green Top (Gel) [[id number]] Collected: 06/04/24 1659    Specimen: Blood  "Updated: 24     Extra Tube Hold for add-ons.     Comment: Auto resulted.       Lavender Top [[id number]] Collected: 24    Specimen: Blood Updated: 24     Extra Tube hold for add-on     Comment: Auto resulted       Red Top [[id number]] Collected: 24    Specimen: Blood Updated: 24     Extra Tube Hold for add-ons.     Comment: Auto resulted.       Light Blue Top [[id number]] Collected: 24    Specimen: Blood Updated: 24     Extra Tube Hold for add-ons.     Comment: Auto resulted                 Objective:     Vitals: /61 (BP Location: Right arm, Patient Position: Sitting)   Pulse 83   Temp 97.5 °F (36.4 °C) (Oral)   Resp 20   Ht 182.9 cm (72\")   Wt 89.8 kg (198 lb)   SpO2 100%   BMI 26.85 kg/m²    Intake/Output Summary (Last 24 hours) at 2024 0822  Last data filed at 2024 0731  Gross per 24 hour   Intake --   Output 2150 ml   Net -2150 ml    Temp (24hrs), Av.7 °F (36.5 °C), Min:97.5 °F (36.4 °C), Max:98.1 °F (36.7 °C)      Physical Exam  Vitals reviewed.   Constitutional:       Appearance: Normal appearance.   HENT:      Head: Normocephalic and atraumatic.   Eyes:      General:         Right eye: No discharge.         Left eye: No discharge.      Extraocular Movements: Extraocular movements intact.      Conjunctiva/sclera: Conjunctivae normal.   Cardiovascular:      Rate and Rhythm: Normal rate and regular rhythm.      Pulses: Normal pulses.      Heart sounds: No murmur heard.  Pulmonary:      Effort: Pulmonary effort is normal. No respiratory distress.      Breath sounds: No wheezing.   Abdominal:      General: Abdomen is flat. Bowel sounds are normal. There is no distension.   Skin:     Capillary Refill: Capillary refill takes less than 2 seconds.   Neurological:      General: No focal deficit present.      Mental Status: He is alert and oriented to person, place, and time.   Psychiatric:         Mood and Affect: Mood " normal.             Assessment and Plan:     Primary Problem:  BELTRAN (acute kidney injury)    Hospital Problem list:    BELTRAN (acute kidney injury)      PMH:  Past Medical History:   Diagnosis Date    Anemia     Anxiety     Arthritis     BPH (benign prostatic hypertrophy)     CAD (coronary artery disease)     Cancer     non-hodgkins lymphoma    Diabetes mellitus     Disease of thyroid gland     GERD (gastroesophageal reflux disease)     Hearing loss     History of transfusion     Hyperlipidemia     Hypertension     Iliac artery aneurysm, bilateral     Kidney stone     Liver cirrhosis     Migraines     Sleep apnea     c-pap       Treatment Plan:  BELTRAN: Improving with IVF and p.o. intake.  Would like for him to get 1 more day of IV fluids, plan for discharge tomorrow.     Viral pneumonia: Respiratory viral panel positive for parainfluenza virus.  Solu-Medrol and azithromycin, improving.     Type 2 diabetes: Monitor blood sugar closely given the Solu-Medrol.  Basal and sliding scale insulin.     CODE STATUS: Full     DVT prophylaxis: Lovenox     Disposition: Observation,  Plan for discharge tomorrow.    Reviewed treatment plans with the patient and/or family.   30 minutes spent in face to face interaction and coordination of care.     Electronically signed by Yossi Rosen MD on 6/6/2024 at 08:22 CDT

## 2024-06-06 NOTE — PLAN OF CARE
Goal Outcome Evaluation:  Plan of Care Reviewed With: patient        Progress: improving  Outcome Evaluation: No co pain. Cont IV ABX and steroids. Lungs sounds have improved. He is on RA. Possible home in am.

## 2024-06-06 NOTE — PLAN OF CARE
Goal Outcome Evaluation:  Plan of Care Reviewed With: patient        Progress: improving  Outcome Evaluation: AAOx4, VSS, Pt has no c/o pain overnight. He has NS @100ml/hr. Pt up ad tammie, S/ST  with PVC on tele. Pt has no requests at this time, safety maintained, care plan ongoing.

## 2024-06-06 NOTE — CASE MANAGEMENT/SOCIAL WORK
Discharge Planning Assessment  [facility]     Patient Name: Franko Lucero  MRN: [id number]  Today's Date: 6/6/2024    Admit Date: 6/4/2024        Discharge Needs Assessment       Row Name 06/06/24 1116       Living Environment    People in Home spouse    Name(s) of People in Home Sangita    Current Living Arrangements home    Primary Care Provided by self    Provides Primary Care For no one    Family Caregiver if Needed spouse    Family Caregiver Names Sangita    Able to Return to Prior Arrangements yes       Resource/Environmental Concerns    Resource/Environmental Concerns none       Transition Planning    Patient/Family Anticipates Transition to home with family    Transportation Anticipated family or friend will provide       Discharge Needs Assessment    Readmission Within the Last 30 Days no previous admission in last 30 days    Equipment Currently Used at Home walker, rolling;wheelchair;cane, straight    Concerns to be Addressed no discharge needs identified    Equipment Needed After Discharge none    Discharge Coordination/Progress spoke to spouse who patient lives with patient is independent at home prior to illness; has RX coverage and PCP; will follow for DC needs                   Discharge Plan       Row Name 06/06/24 0946       Plan    Final Discharge Disposition Code 01 - home or self-care                  Continued Care and Services - Admitted Since 6/4/2024    No active coordination exists for this encounter.       Expected Discharge Date and Time       Expected Discharge Date Expected Discharge Time    Jun 6, 2024            Demographic Summary    No documentation.                  Functional Status    No documentation.                  Psychosocial    No documentation.                  Abuse/Neglect    No documentation.                  Legal    No documentation.                  Substance Abuse    No documentation.                  Patient Forms    No documentation.                     Maricel IVERSON  Kvng RN

## 2024-06-06 NOTE — PAYOR COMM NOTE
"6/6/24. [facility] [phone]  [phone]    ER ADMIT TO OBSERVATION ON 6/4/24 THRU 6/5/24    MD CHANGED PATIENT TO INPATIENT ON 6/6/24. INPATIENT ORDER ON 6/6/24.  FAXING  FOR REVIEW.            Alex Lucero (68 y.o. Male)       Date of Birth   1955    Social Security Number       Address   [address] [facility] [address]    Home Phone   [phone]    MRN   [id number]       [facility]    Marital Status                               Admission Date   6/4/24    Admission Type   Emergency    Admitting Provider   Figueroa Chaves MD    Attending Provider   Figueroa Chaves MD    Department, Room/Bed   [facility] 4B, 412/1       Discharge Date       Discharge Disposition       Discharge Destination                                 Attending Provider: Figueroa Chaves MD    Allergies: Adhesive Tape, Cefazolin, Cefuroxime Axetil, Cephalosporins, Neomycin-polymyxin B Gu, Neosporin [Neomycin-bacitracin Zn-polymyx], Percocet [Oxycodone-acetaminophen]    Isolation: Contact Drop   Infection: MRSA (07/15/23), Other (06/05/24)   Code Status: CPR    Ht: 182.9 cm (72\")   Wt: 89.8 kg (198 lb)    Admission Cmt: None   Principal Problem: BELTRAN (acute kidney injury) [N17.9]                   Active Insurance as of 6/4/2024       Primary Coverage       Payor Plan Insurance Group Employer/Plan Group    AETNA MEDICARE REPLACEMENT AETNA MED ADV PPO [id number]       Payor Plan Address Payor Plan Phone Number Payor Plan Fax Number Effective Dates    [address] [phone]  1/1/2024 - None Entered    [facility] [place] [address]         Subscriber Name Subscriber Birth Date Member ID       ALEX LUCERO 1955 [id number]                     Emergency Contacts        (Rel.) Home Phone Work Phone Mobile Phone    Sangita Lucero (Spouse) [phone] -- [phone]             [facility] Encounter Date/Time: 6/4/2024 [facility]   Hospital " Account: [id number]    MRN: [id number]   Patient:  Alex Lucero   Contact Serial #: [id number]   SSN:          ENCOUNTER             Patient Class: Inpatient   Unit: [facility] 4B   Hospital Service: Medicine     Bed: 412/1   Admitting Provider: Figueroa Chaves MD   Referring Physician:     Attending Provider: Figueroa Chaves MD   Adm Diagnosis: BELTRAN (acute kidney injury*               PATIENT             Name: Alex Lucero : 1955 (68 yrs)   Address: [place] [address] Sex: Male   City: [place] [address]   County: [place]   Marital Status:  Ethnicity: NOT                                                                         Race: WHITE   Primary Care Provider: Figueroa Chaves MD Patients Phone: Home Phone: [phone]     Mobile Phone: [phone]     EMERGENCY CONTACT   Contact Name Legal Guardian? Relationship to Patient Home Phone Work Phone Mobile Phone   1. Sangita Lucero  2. *No Contact Specified*      Spouse    [phone]      GUARANTOR             Guarantor: Alex Lucero     : 1955   Address: [place] [address] Sex: [place] [address]     Relation to Patient: Self       Home Phone: [phone]   Guarantor ID: [id number]       Work Phone:     GUARANTOR EMPLOYER   Employer:           Status: RETIRED   COVERAGE          PRIMARY INSURANCE   Payor: AETNA MEDICARE REPLACEMENT Plan: AETNA MED ADV PPO   Group Number: [id number] Insurance Type: INDEMNITY   Subscriber Name: ALEX LUCERO Subscriber : 1955   Subscriber ID: [id number] Coverage Address: [facility] [id number] [place] [address]   Pat. Rel. to Subscriber: Self Coverage Phone: [phone]   SECONDARY INSURANCE   Payor: N/A Plan: N/A   Group Number:   Insurance Type:     Subscriber Name:   Subscriber :     Subscriber ID:   Coverage Address:     Pat. Rel. to Subscriber:   Coverage Phone:        Contact Serial # ([id number])         2024    Chart ID ([id number]  [id number])           Brigitte Irizarry APRN   Nurse Practitioner  Emergency Medicine     ED Provider Notes      Cosign Needed     Date of Service: 06/04/24 1721  Creation Time: 06/04/24 1721     Cosign Needed       Expand All Collapse All       Subjective  History of Present Illness  Patient is a 68-year-old male who presents to the ER with multiple complaints.  He states approximately 2 weeks ago he began developing cough with congestion and shortness of breath.  He was evaluated by his PCP, Dr. Chaves, and received a Rocephin injection in the office.  He is currently on antibiotics for respiratory illness.  Patient states he has had increasing shortness of breath in particular with activity.  He complains of worsening generalized weakness.  He has had nausea with episodes of vomiting described as vomiting up phlegm he has attempted to cough up.  Patient reports diffuse chest pain as well that began around the same time as his cough.  He has had constipation for the past 3 to 4 days.  He is scheduled for an outpatient stress test tomorrow however uncertain if he would be able to complete it given his overall symptoms.  Due to symptoms described above he came to the ER for evaluation and treatment.  Past medical history significant for anemia, anxiety, arthritis, BPH, coronary artery disease, non-Hodgkin's lymphoma, diabetes, thyroid disease, GERD, hearing loss, hyperlipidemia, hypertension, iliac artery aneurysm, kidney stones, liver cirrhosis, migraines, sleep apnea           Review of Systems   Constitutional:  Positive for fatigue. Negative for fever.   HENT:  Positive for congestion.    Respiratory:  Positive for cough and shortness of breath.    Cardiovascular:  Positive for chest pain.   Gastrointestinal:  Positive for constipation, nausea and vomiting. Negative for abdominal pain.   Genitourinary: Negative.  Negative for dysuria.   Musculoskeletal:  Positive for myalgias.   Skin: Negative.     Neurological:  Positive for weakness.   All other systems reviewed and are negative.        Medical History[]Expand by Default        Past Medical History:   Diagnosis Date    Anemia      Anxiety      Arthritis      BPH (benign prostatic hypertrophy)      CAD (coronary artery disease)      Cancer       non-hodgkins lymphoma    Diabetes mellitus      Disease of thyroid gland      GERD (gastroesophageal reflux disease)      Hearing loss      History of transfusion      Hyperlipidemia      Hypertension      Iliac artery aneurysm, bilateral      Kidney stone      Liver cirrhosis      Migraines      Sleep apnea       c-pap            Allergies         Allergies   Allergen Reactions    Adhesive Tape Rash       Pt states that if it isn't left on very long it is okay    Cefazolin Rash    Cefuroxime Axetil Rash    Cephalosporins Rash    Neomycin-Polymyxin B Gu Rash    Neosporin [Neomycin-Bacitracin Zn-Polymyx] Rash    Percocet [Oxycodone-Acetaminophen] Rash            Surgical History         Past Surgical History:   Procedure Laterality Date    COLONOSCOPY   02/04/2014    COLONOSCOPY N/A 4/26/2017     Procedure: COLONOSCOPY WITH ANESTHESIA;  Surgeon: Sher Cui MD;  Location: [facility] ENDOSCOPY;  Service:     CORONARY ARTERY BYPASS GRAFT         2    CYSTOSCOPY URETEROSCOPY LASER LITHOTRIPSY Left 4/17/2017     Procedure: URETEROSCOPY LASER LITHOTRIPSY WITH DOUBLE J STENT INSERTION, LEFT ;  Surgeon: Weston Peck MD;  Location: [facility] OR;  Service:     CYSTOSCOPY URETEROSCOPY LASER LITHOTRIPSY Left 8/14/2017     Procedure: CYSTOSCOPY URETEROSCOPY LASER LITHOTRIPSY STENT INSERTION STONE EXTRACTION;  Surgeon: Weston Peck MD;  Location: [facility] OR;  Service:     CYSTOSCOPY W/ URETERAL STENT PLACEMENT Left 4/17/2017     Procedure: CYSTOSCOPY URETERAL DOUBLE J STENT INSERTION, LEFT;  Surgeon: Weston Peck MD;  Location:  [facility] OR;  Service:     ENDOSCOPY N/A 4/26/2017     Procedure:  ESOPHAGOGASTRODUODENOSCOPY WITH ANESTHESIA;  Surgeon: Sher Cui MD;  Location: [facility] ENDOSCOPY;  Service:     INCISION AND DRAINAGE LEG Right 2023     Procedure: INCISION AND DRAINAGE - RIGHT FOOT;  Surgeon: Silas Swan DPM;  Location:  [facility] OR;  Service: Podiatry;  Laterality: Right;    JOINT REPLACEMENT Right      KIDNEY STONE SURGERY        KNEE SURGERY         replacement    NECK SURGERY        ROTATOR CUFF REPAIR        SHOULDER SURGERY        TONSILLECTOMY        TRANS METATARSAL AMPUTATION Right 2023     Procedure: Partial 4th Ray Amputation - Right Foot;  Surgeon: Silas Swan DPM;  Location: [facility] OR;  Service: Podiatry;  Laterality: Right;    URETEROSCOPY LASER LITHOTRIPSY WITH STENT INSERTION Left 2022     Procedure: LEFT URETEROSCOPY LASER LITHOTRIPSY WITH STENT INSERTION;  Surgeon: Weston Peck MD;  Location: [facility] OR;  Service: Urology;  Laterality: Left;    URETEROSCOPY LASER LITHOTRIPSY WITH STENT INSERTION Left 9/15/2022     Procedure: LEFT URETEROSCOPY LASER LITHOTRIPSY WITH STENT EXCHANGE;  Surgeon: Weston Peck MD;  Location: [facility] OR;  Service: Urology;  Laterality: Left;                  Family History   Problem Relation Age of Onset    Kidney disease Father      Heart disease Father      Cancer Mother           brain    Colon cancer Neg Hx      Colon polyps Neg Hx           Social History   Social History            Socioeconomic History    Marital status:    Tobacco Use    Smoking status: Former       Current packs/day: 0.00       Types: Cigarettes       Quit date:        Years since quittin.4    Smokeless tobacco: Never   Vaping Use    Vaping status: Never Used   Substance and Sexual Activity    Alcohol use: No    Drug use: No    Sexual activity: Defer                        Objective  Physical Exam  Vitals and nursing note reviewed.   Constitutional:       General: He is not in acute distress.     Appearance: He is  well-developed. He is not toxic-appearing or diaphoretic.   HENT:      Head: Atraumatic.      Nose: Nose normal.   Eyes:      General: No scleral icterus.     Conjunctiva/sclera: Conjunctivae normal.      Pupils: Pupils are equal, round, and reactive to light.   Neck:      Thyroid: No thyromegaly.      Vascular: No JVD.   Cardiovascular:      Rate and Rhythm: Normal rate and regular rhythm.      Pulses: Normal pulses.      Heart sounds: Normal heart sounds. No murmur heard.  Pulmonary:      Effort: Pulmonary effort is normal. No respiratory distress.      Breath sounds: Rales present. No wheezing.   Chest:      Chest wall: No tenderness.   Abdominal:      General: Bowel sounds are normal. There is no distension.      Palpations: Abdomen is soft. There is no mass.      Tenderness: There is no abdominal tenderness. There is no guarding or rebound.   Musculoskeletal:         General: Normal range of motion.      Cervical back: Normal range of motion and neck supple.   Lymphadenopathy:      Cervical: No cervical adenopathy.   Skin:     General: Skin is warm and dry.      Capillary Refill: Capillary refill takes less than 2 seconds.      Coloration: Skin is not pale.      Findings: No erythema or rash.   Neurological:      Mental Status: He is alert and oriented to person, place, and time.      Cranial Nerves: No cranial nerve deficit.      Coordination: Coordination normal.      Deep Tendon Reflexes: Reflexes are normal and symmetric.   Psychiatric:         Behavior: Behavior normal.         Thought Content: Thought content normal.         Judgment: Judgment normal.            Procedures                 ED Course      ED Course as of 06/04/24 2331   Tue Jun 04, 2024 1942 Work up remains pending. This will be a turn over for Brigitte DE JESUS.  [TW]   1948 Assumed care of pt from KALEE DE JESUS. Pending workup at this time  [CW]   2042 Assumed care of patient from NEAL Yun.  CT of the abdomen pelvis  without contrast showed left renal stones no hydro-.  Chest x-ray is unremarkable.  CBC showed a white count of 5.98 hemoglobin 11.3 hematocrit 34 initial troponin was 42-second troponin was 39.  Patient denies any chest pain.  Has had a cough for the past 2 weeks and respiratory panel was positive for parainfluenza virus.  CMP shows a glucose of 219 BUN of 33 creatinine 2.31.  His baseline creatinine is 1.23.  He received 500 cc bolus of normal saline.  Patient states he is feeling much better at this time.  Have ordered another 500 cc bolus of fluids as well.  Have placed call to Dr. Chaves at this time for further.  [CW]   2128 Spoke with Debo Yee PA-C with Dr. Chaves. Has graciously accepted pt for admission. Pt will be admitted shortly in stable condition  [CW]       ED Course User Index  [CW] Brigitte Irizarry APRN  [TW] Terrie Romo APRN                                           Medical Decision Making  Patient is a 68-year-old male who presents to the ER with multiple complaints.  He states approximately 2 weeks ago he began developing cough with congestion and shortness of breath.  He was evaluated by his PCP, Dr. Chaves, and received a Rocephin injection in the office.  He is currently on antibiotics for respiratory illness.  Patient states he has had increasing shortness of breath in particular with activity.  He complains of worsening generalized weakness.  He has had nausea with episodes of vomiting described as vomiting up phlegm he has attempted to cough up.  Patient reports diffuse chest pain as well that began around the same time as his cough.  He has had constipation for the past 3 to 4 days.  He is scheduled for an outpatient stress test tomorrow however uncertain if he would be able to complete it given his overall symptoms.  Due to symptoms described above he came to the ER for evaluation and treatment.  Past medical history significant for anemia, anxiety, arthritis, BPH,  coronary artery disease, non-Hodgkin's lymphoma, diabetes, thyroid disease, GERD, hearing loss, hyperlipidemia, hypertension, iliac artery aneurysm, kidney stones, liver cirrhosis, migraines, sleep apnea  Differential diagnosis: Viral illness, pneumonia, electrolyte abnormality, and other     PERC Rule for Pulmonary Embolism - MDCalc  Calculated on Jun 04 2024 7:27 PM  1 criteria -> If any criteria are positive, the PERC rule cannot be used to rule out PE in this patient.     Work up remains pending. This will be a turn over for Brigitte Irizarry APRN  Labs Reviewed  RESPIRATORY PANEL PCR W/ COVID-19 (SARS-COV-2), NP SWAB IN UT/VTP, 2 HR TAT - Abnormal; Notable for the following components:     Parainfluenza Virus 3         Detected (*)            All other components within normal limits          Narrative: In the setting of a positive respiratory panel with a viral infection PLUS a negative procalcitonin without other underlying concern for bacterial infection, consider observing off antibiotics or discontinuation of antibiotics and continue supportive care. If the respiratory panel is positive for atypical bacterial infection (Bordetella pertussis, Chlamydophila pneumoniae, or Mycoplasma pneumoniae), consider antibiotic de-escalation to target atypical bacterial infection.  COMPREHENSIVE METABOLIC PANEL - Abnormal; Notable for the following components:     Glucose                       219 (*)                BUN                           33 (*)                 Creatinine                    2.31 (*)               Potassium                     3.3 (*)                CO2                           21.0 (*)               Anion Gap                     17.0 (*)               eGFR                          29.6 (*)            All other components within normal limits          Narrative: GFR Normal >60                  Chronic Kidney Disease <60                  Kidney Failure <15                    SINGLE HS TROPONIN T -  Abnormal; Notable for the following components:     HS Troponin T                 42 (*)              All other components within normal limits          Narrative: High Sensitive Troponin T Reference Range:                  <14.0 ng/L- Negative Female for AMI                  <22.0 ng/L- Negative Male for AMI                  >=14 - Abnormal Female indicating possible myocardial injury.                  >=22 - Abnormal Male indicating possible myocardial injury.                   Clinicians would have to utilize clinical acumen, EKG, Troponin, and serial changes to determine if it is an Acute Myocardial Infarction or myocardial injury due to an underlying chronic condition.                                       URINALYSIS W/ MICROSCOPIC IF INDICATED (NO CULTURE) - Abnormal; Notable for the following components:     Glucose, UA                     (*)                  Blood, UA                       (*)                  Protein, UA                     (*)               All other components within normal limits  CBC WITH AUTO DIFFERENTIAL - Abnormal; Notable for the following components:     RBC                           3.39 (*)               Hemoglobin                    11.3 (*)               Hematocrit                    34.0 (*)               MCV                           100.3 (*)               MCH                           33.3 (*)               RDW-SD                        57.0 (*)               Platelets                     81 (*)                 Lymphocyte %                  17.6 (*)            All other components within normal limits  URINALYSIS, MICROSCOPIC ONLY - Abnormal; Notable for the following components:     RBC, UA                       3-5 (*)                WBC, UA                       3-5 (*)                Squamous Epithelial Cells, UA   3-6 (*)             All other components within normal limits  TROPONIN - Abnormal; Notable for the following components:     HS Troponin T                  39 (*)              All other components within normal limits          Narrative: High Sensitive Troponin T Reference Range:                  <14.0 ng/L- Negative Female for AMI                  <22.0 ng/L- Negative Male for AMI                  >=14 - Abnormal Female indicating possible myocardial injury.                  >=22 - Abnormal Male indicating possible myocardial injury.                   Clinicians would have to utilize clinical acumen, EKG, Troponin, and serial changes to determine if it is an Acute Myocardial Infarction or myocardial injury due to an underlying chronic condition.                                       CK - Abnormal; Notable for the following components:     Creatine Kinase               445 (*)             All other components within normal limits  BNP (IN-HOUSE) - Normal          Narrative: This assay is used as an aid in the diagnosis of individuals suspected of having heart failure. It can be used as an aid in the diagnosis of acute decompensated heart failure (ADHF) in patients presenting with signs and symptoms of ADHF to the emergency department (ED). In addition, NT-proBNP of <300 pg/mL indicates ADHF is not likely.                                    Age Range  Result Interpretation  NT-proBNP Concentration (pg/mL:                                                      <50             Positive            >450                                         Gray                 300-450                                           Negative                 <300                                    50-75           Positive            >900                                  Fox                300-900                                  Negative            <300                                                      >75             Positive            >1800                                  Fox                300-1800                                  Negative            <300  MAGNESIUM - Normal  TSH -  Normal  T4, FREE - Normal  RAINBOW DRAW          Narrative: The following orders were created for panel order [place] Draw.                  Procedure                               Abnormality         Status                                     ---------                               -----------         ------                                     Green Top (Gel)[[id number]]                                  Final result                               Lavender Top[[id number]]                                     Final result                               Red Top[[id number]]                                          Final result                               Light Blue Top[[id number]]                                   Final result                                                 Please view results for these tests on the individual orders.  T3, FREE  HIGH SENSITIVITIY TROPONIN T 2HR  GREEN TOP  LAVENDER TOP  RED TOP  LIGHT BLUE TOP  CBC AND DIFFERENTIAL  CT Abdomen Pelvis Without Contrast   Final Result    1. Large amount of fecal material throughout the colon.    2. Nonobstructing LEFT renal stones.    3. No hydronephrosis or ureteral stone.    4. No mass, fluid collection, or inflammatory process.         This report was signed and finalized on 6/4/2024 8:30 PM by Dr. Mike Plasencia MD.          XR Chest 1 View   Final Result    1. No acute disease.                             This report was signed and finalized on 6/4/2024 6:10 PM by Dr. Mike Plasencia MD.          XR Abdomen KUB   Final Result    1. LEFT renal stones.    2. Large amount of fecal material throughout the colon.                   This report was signed and finalized on 6/4/2024 6:12 PM by Dr. Mike Plasencia MD.          Assumed care of patient from [facility].  CT of the abdomen pelvis without contrast showed left renal stones no hydro-.  Chest x-ray is unremarkable.  CBC showed a white count of 5.98 hemoglobin 11.3 hematocrit 34 initial  troponin was 42-second troponin was 39.  Patient denies any chest pain.  Has had a cough for the past 2 weeks and respiratory panel was positive for parainfluenza virus.  CMP shows a glucose of 219 BUN of 33 creatinine 2.31.  His baseline creatinine is 1.23.  He received 500 cc bolus of normal saline.  Patient states he is feeling much better at this time.  Have ordered another 500 cc bolus of fluids as well.  Have placed call to Dr. Zapata at this time for further.   Spoke with Debo Yee PA-C. Has graciously accepted pt for admission. Will be admitted shortly in stable condition      Problems Addressed:  BELTRAN (acute kidney injury): complicated acute illness or injury  Elevated troponin: complicated acute illness or injury  Left renal stone: complicated acute illness or injury  Parainfluenza virus infection: complicated acute illness or injury     Amount and/or Complexity of Data Reviewed  Labs: ordered. Decision-making details documented in ED Course.  Radiology: ordered. Decision-making details documented in ED Course.  ECG/medicine tests: ordered. Decision-making details documented in ED Course.  Discussion of management or test interpretation with external provider(s): Turn over for Brigitte DE JESUS     Risk  Prescription drug management.  Decision regarding hospitalization.           Final diagnoses:   BELTRAN (acute kidney injury)   Parainfluenza virus infection   Left renal stone   Elevated troponin         ED Disposition  ED Disposition         ED Disposition   Decision to Admit    Condition   --    Comment   Level of Care: Telemetry [5]   Diagnosis: BELTRAN (acute kidney injury) [447040]   Admitting Physician: DAVID ZAPATA [[id number]]   Attending Physician: DAVID ZAPATA [[id number]]                      No follow-up provider specified.         Medication List       No changes were made to your prescriptions during this visit.               Brigitte Irizarry APRN  06/04/24 [id number]                      Yossi Rosen MD   Physician  Family Medicine     Progress Notes     Signed     Date of Service: 06/06/24 0822  Creation Time: 06/06/24 0822     Signed       Expand All Collapse All      Daily Progress Note  Franko Lucero  MRN: [id number] LOS: 0    Admit Date: 6/4/2024 6/6/2024 08:22 CDT     Subjective:                 Chief Complaint:      Chief Complaint   Patient presents with    Shortness of Breath    Cough         Interval History:    Reviewed overnight events and nursing notes.   No acute events overnight.  Breathing status has improved.  Minimal cough.     Review of Systems   Constitutional:  Positive for fatigue. Negative for activity change and appetite change.   Respiratory:  Positive for cough. Negative for shortness of breath and wheezing.    Cardiovascular:  Negative for chest pain.   Gastrointestinal:  Negative for abdominal pain, nausea and vomiting.         DIET:  Diet: Renal; Low Phosphorus; Fluid Consistency: Thin (IDDSI 0)     Medications:     Infusion Medications   sodium chloride, 100 mL/hr, Last Rate: 100 mL/hr (06/06/24 0726)           Scheduled Medication   atorvastatin, 40 mg, Oral, Nightly  azithromycin, 500 mg, Intravenous, Q24H  busPIRone, 15 mg, Oral, BID  clopidogrel, 75 mg, Oral, Daily  docusate sodium, 100 mg, Oral, Q PM  empagliflozin, 25 mg, Oral, Daily  enoxaparin, 40 mg, Subcutaneous, Daily  ferrous sulfate, 325 mg, Oral, BID With Meals  gabapentin, 100 mg, Oral, Nightly  insulin glargine, 10 Units, Subcutaneous, Nightly  insulin lispro, 3-14 Units, Subcutaneous, 4x Daily AC & at Bedtime  ipratropium-albuterol, 3 mL, Nebulization, 4x Daily - RT  levothyroxine, 75 mcg, Oral, Q AM  metFORMIN, 500 mg, Oral, BID With Meals  methylPREDNISolone sodium succinate, 125 mg, Intravenous, Q24H  pantoprazole, 40 mg, Oral, Daily  potassium chloride ER, 40 mEq, Oral, Q4H  ranolazine, 1,000 mg, Oral, BID  sodium chloride, 10 mL, Intravenous, Q12H            Data:      Code Status:        Code Status and Medical Interventions:   Ordered at: 24 0854     Code Status (Patient has no pulse and is not breathing):     CPR (Attempt to Resuscitate)     Medical Interventions (Patient has pulse or is breathing):     Full Support               Family History   Problem Relation Age of Onset    Kidney disease Father      Heart disease Father      Cancer Mother           brain    Colon cancer Neg Hx      Colon polyps Neg Hx        Social History   Social History            Socioeconomic History    Marital status:    Tobacco Use    Smoking status: Former       Current packs/day: 0.00       Types: Cigarettes       Quit date:        Years since quittin.4    Smokeless tobacco: Never   Vaping Use    Vaping status: Never Used   Substance and Sexual Activity    Alcohol use: No    Drug use: No    Sexual activity: Defer            Labs:     Lab Results (last 72 hours)         Procedure Component Value Units Date/Time     POC Glucose Once [[id number]]  (Abnormal) Collected: 24 0757     Specimen: Blood Updated: 24 0809       Glucose 132 mg/dL         Comment: : [id number] [facility] [id number]        Comprehensive Metabolic Panel [[id number]]  (Abnormal) Collected: 24 0347     Specimen: Blood Updated: 24 0431       Glucose 138 mg/dL         BUN 27 mg/dL         Creatinine 1.83 mg/dL         Sodium 140 mmol/L         Potassium 3.0 mmol/L         Comment: Specimen hemolyzed.  Result may be falsely elevated.          Chloride 109 mmol/L         CO2 17.0 mmol/L         Calcium 10.1 mg/dL         Total Protein 7.0 g/dL         Albumin 4.1 g/dL         ALT (SGPT) 35 U/L         Comment: Specimen hemolyzed.  Result may  be falsely elevated.          AST (SGOT) 47 U/L         Comment: Specimen hemolyzed.  Result may be falsely elevated.          Alkaline Phosphatase 85 U/L         Total Bilirubin 0.6 mg/dL         Globulin 2.9 gm/dL         A/G Ratio 1.4 g/dL          BUN/Creatinine Ratio 14.8       Anion Gap 14.0 mmol/L         eGFR 39.2 mL/min/1.73       Narrative:       GFR Normal >60  Chronic Kidney Disease <60  Kidney Failure <15        CBC Auto Differential [[id number]]  (Abnormal) Collected: 06/06/24 0347     Specimen: Blood Updated: 06/06/24 0411       WBC 5.19 10*3/mm3         RBC 3.01 10*6/mm3         Hemoglobin 10.0 g/dL         Hematocrit 30.2 %         .3 fL         MCH 33.2 pg         MCHC 33.1 g/dL         RDW 15.2 %         RDW-SD 55.9 fl         MPV 9.5 fL         Platelets 68 10*3/mm3         Neutrophil % 82.6 %         Lymphocyte % 11.4 %         Monocyte % 5.2 %         Eosinophil % 0.0 %         Basophil % 0.0 %         Immature Grans % 0.8 %         Neutrophils, Absolute 4.29 10*3/mm3         Lymphocytes, Absolute 0.59 10*3/mm3         Monocytes, Absolute 0.27 10*3/mm3         Eosinophils, Absolute 0.00 10*3/mm3         Basophils, Absolute 0.00 10*3/mm3         Immature Grans, Absolute 0.04 10*3/mm3         nRBC 0.0 /100 WBC       POC Glucose Once [[id number]]  (Abnormal) Collected: 06/05/24 2036     Specimen: Blood Updated: 06/05/24 2047       Glucose 230 mg/dL         Comment: : [id number] Pranay SydneyMeter ID: [id number]        POC Glucose Once [[id number]]  (Abnormal) Collected: 06/05/24 1612     Specimen: Blood Updated: 06/05/24 1625       Glucose 177 mg/dL         Comment: : [id number] Fabio EuraisaMeter ID: [id number]        POC Glucose Once [[id number]]  (Abnormal) Collected: 06/05/24 1202     Specimen: Blood Updated: 06/05/24 1215       Glucose 194 mg/dL         Comment: : [id number] Alejandro HeatherMeter ID: [id number]        T3, Free [[id number]]  (Normal) Collected: 06/04/24 1659     Specimen: Blood Updated: 06/05/24 0044       T3, Free 2.13 pg/mL       Narrative:       Results may be falsely increased if patient taking Biotin.        High Sensitivity Troponin T 2Hr [[id number]]  (Abnormal) Collected: 06/04/24 8829     Specimen:  Blood Updated: 06/04/24 2152       HS Troponin T 40 ng/L         Troponin T Delta 1 ng/L       Narrative:       High Sensitive Troponin T Reference Range:  <14.0 ng/L- Negative Female for AMI  <22.0 ng/L- Negative Male for AMI  >=14 - Abnormal Female indicating possible myocardial injury.  >=22 - Abnormal Male indicating possible myocardial injury.   Clinicians would have to utilize clinical acumen, EKG, Troponin, and serial changes to determine if it is an Acute Myocardial Infarction or myocardial injury due to an underlying chronic condition.           CK [[id number]]  (Abnormal) Collected: 06/04/24 1910     Specimen: Blood Updated: 06/04/24 2105       Creatine Kinase 445 U/L       High Sensitivity Troponin T [[id number]]  (Abnormal) Collected: 06/04/24 1910     Specimen: Blood Updated: 06/04/24 1956       HS Troponin T 39 ng/L       Narrative:       High Sensitive Troponin T Reference Range:  <14.0 ng/L- Negative Female for AMI  <22.0 ng/L- Negative Male for AMI  >=14 - Abnormal Female indicating possible myocardial injury.  >=22 - Abnormal Male indicating possible myocardial injury.   Clinicians would have to utilize clinical acumen, EKG, Troponin, and serial changes to determine if it is an Acute Myocardial Infarction or myocardial injury due to an underlying chronic condition.           Respiratory Panel PCR w/COVID-19(SARS-CoV-2) RICHARD/JENNIFER/DENIA/PAD/COR/VONDA In-House, NP Swab in UTM/VTM, 2 HR TAT - Swab, Nasopharynx [[id number]]  (Abnormal) Collected: 06/04/24 1734     Specimen: Swab from Nasopharynx Updated: 06/04/24 1841       ADENOVIRUS, PCR Not Detected       Coronavirus 229E Not Detected       Coronavirus HKU1 Not Detected       Coronavirus NL63 Not Detected       Coronavirus OC43 Not Detected       COVID19 Not Detected       Human Metapneumovirus Not Detected       Human Rhinovirus/Enterovirus Not Detected       Influenza A PCR Not Detected       Influenza B PCR Not Detected       Parainfluenza Virus 1 Not  Detected       Parainfluenza Virus 2 Not Detected       Parainfluenza Virus 3 Detected       Parainfluenza Virus 4 Not Detected       RSV, PCR Not Detected       Bordetella pertussis pcr Not Detected       Bordetella parapertussis PCR Not Detected       Chlamydophila pneumoniae PCR Not Detected       Mycoplasma pneumo by PCR Not Detected     Narrative:       In the setting of a positive respiratory panel with a viral infection PLUS a negative procalcitonin without other underlying concern for bacterial infection, consider observing off antibiotics or discontinuation of antibiotics and continue supportive care. If the respiratory panel is positive for atypical bacterial infection (Bordetella pertussis, Chlamydophila pneumoniae, or Mycoplasma pneumoniae), consider antibiotic de-escalation to target atypical bacterial infection.     Urinalysis With Microscopic If Indicated (No Culture) - Urine, Clean Catch [[id number]]  (Abnormal) Collected: 06/04/24 1756     Specimen: Urine, Clean Catch Updated: 06/04/24 1819       Color, UA Yellow       Appearance, UA Clear       pH, UA 5.5       Specific Gravity, UA 1.017       Glucose, UA >=1000 mg/dL (3+)       Ketones, UA Negative       Bilirubin, UA Negative       Blood, UA Moderate (2+)       Protein,  mg/dL (2+)       Leuk Esterase, UA Negative       Nitrite, UA Negative       Urobilinogen, UA 0.2 E.U./dL     Urinalysis, Microscopic Only - Urine, Clean Catch [[id number]]  (Abnormal) Collected: 06/04/24 1756     Specimen: Urine, Clean Catch Updated: 06/04/24 1819       RBC, UA 3-5 /HPF         WBC, UA 3-5 /HPF         Bacteria, UA None Seen /HPF         Squamous Epithelial Cells, UA 3-6 /HPF         Methodology Manual Light Microscopy     TSH [[id number]]  (Normal) Collected: 06/04/24 1659     Specimen: Blood Updated: 06/04/24 1751       TSH 0.925 uIU/mL       T4, Free [[id number]]  (Normal) Collected: 06/04/24 1659     Specimen: Blood Updated: 06/04/24 1751       Free T4  1.45 ng/dL       Comprehensive Metabolic Panel [[id number]]  (Abnormal) Collected: 06/04/24 1659     Specimen: Blood Updated: 06/04/24 1747       Glucose 219 mg/dL         BUN 33 mg/dL         Creatinine 2.31 mg/dL         Sodium 139 mmol/L         Potassium 3.3 mmol/L         Chloride 101 mmol/L         CO2 21.0 mmol/L         Calcium 10.0 mg/dL         Total Protein 7.6 g/dL         Albumin 4.4 g/dL         ALT (SGPT) 32 U/L         AST (SGOT) 38 U/L         Alkaline Phosphatase 95 U/L         Total Bilirubin 0.7 mg/dL         Globulin 3.2 gm/dL         A/G Ratio 1.4 g/dL         BUN/Creatinine Ratio 14.3       Anion Gap 17.0 mmol/L         eGFR 29.6 mL/min/1.73       Narrative:       GFR Normal >60  Chronic Kidney Disease <60  Kidney Failure <15        Magnesium [[id number]]  (Normal) Collected: 06/04/24 1659     Specimen: Blood Updated: 06/04/24 1747       Magnesium 2.3 mg/dL       BNP [[id number]]  (Normal) Collected: 06/04/24 1659     Specimen: Blood Updated: 06/04/24 1745       proBNP 353.0 pg/mL       Narrative:       This assay is used as an aid in the diagnosis of individuals suspected of having heart failure. It can be used as an aid in the diagnosis of acute decompensated heart failure (ADHF) in patients presenting with signs and symptoms of ADHF to the emergency department (ED). In addition, NT-proBNP of <300 pg/mL indicates ADHF is not likely.     Age Range        Result Interpretation  NT-proBNP Concentration (pg/mL:        <50             Positive            >450                        Fox                        300-450                          Negative                        <300     50-75           Positive            >900                  Gray                300-900                  Negative            <300        >75             Positive            >1800                  Gray                300-1800                  Negative            <300     Single High Sensitivity Troponin T [[id number]]   (Abnormal) Collected: 06/04/24 1659     Specimen: Blood Updated: 06/04/24 1744       HS Troponin T 42 ng/L       Narrative:       High Sensitive Troponin T Reference Range:  <14.0 ng/L- Negative Female for AMI  <22.0 ng/L- Negative Male for AMI  >=14 - Abnormal Female indicating possible myocardial injury.  >=22 - Abnormal Male indicating possible myocardial injury.   Clinicians would have to utilize clinical acumen, EKG, Troponin, and serial changes to determine if it is an Acute Myocardial Infarction or myocardial injury due to an underlying chronic condition.           CBC & Differential [[id number]]  (Abnormal) Collected: 06/04/24 1659     Specimen: Blood Updated: 06/04/24 1734     Narrative:       The following orders were created for panel order CBC & Differential.  Procedure                               Abnormality         Status                     ---------                               -----------         ------                     CBC Auto Differential[[id number]]        Abnormal            Final result                  Please view results for these tests on the individual orders.     CBC Auto Differential [[id number]]  (Abnormal) Collected: 06/04/24 1659     Specimen: Blood Updated: 06/04/24 1734       WBC 5.98 10*3/mm3         RBC 3.39 10*6/mm3         Hemoglobin 11.3 g/dL         Hematocrit 34.0 %         .3 fL         MCH 33.3 pg         MCHC 33.2 g/dL         RDW 15.4 %         RDW-SD 57.0 fl         MPV 9.8 fL         Platelets 81 10*3/mm3         Neutrophil % 67.7 %         Lymphocyte % 17.6 %         Monocyte % 11.9 %         Eosinophil % 2.0 %         Basophil % 0.3 %         Immature Grans % 0.5 %         Neutrophils, Absolute 4.05 10*3/mm3         Lymphocytes, Absolute 1.05 10*3/mm3         Monocytes, Absolute 0.71 10*3/mm3         Eosinophils, Absolute 0.12 10*3/mm3         Basophils, Absolute 0.02 10*3/mm3         Immature Grans, Absolute 0.03 10*3/mm3         nRBC 0.0 /100 WBC        "[place] Draw [[id number]] Collected: 24     Specimen: Blood Updated: 24     Narrative:       The following orders were created for panel order [place] Draw.  Procedure                               Abnormality         Status                     ---------                               -----------         ------                     Green Top (Gel)[[id number]]                                  Final result               Lavender Top[[id number]]                                     Final result               Red Top[[id number]]                                          Final result               Light Blue Top[[id number]]                                   Final result                  Please view results for these tests on the individual orders.     Green Top (Gel) [[id number]] Collected: 24     Specimen: Blood Updated: 24       Extra Tube Hold for add-ons.       Comment: Auto resulted.        Lavender Top [[id number]] Collected: 24     Specimen: Blood Updated: 24       Extra Tube hold for add-on       Comment: Auto resulted        Red Top [[id number]] Collected: 24     Specimen: Blood Updated: 24       Extra Tube Hold for add-ons.       Comment: Auto resulted.        Light Blue Top [[id number]] Collected: 24     Specimen: Blood Updated: 24       Extra Tube Hold for add-ons.       Comment: Auto resulted                      Objective:      Vitals: /61 (BP Location: Right arm, Patient Position: Sitting)   Pulse 83   Temp 97.5 °F (36.4 °C) (Oral)   Resp 20   Ht 182.9 cm (72\")   Wt 89.8 kg (198 lb)   SpO2 100%   BMI 26.85 kg/m²    Intake/Output Summary (Last 24 hours) at 2024 0822  Last data filed at 2024 0731      Gross per 24 hour   Intake --   Output 2150 ml   Net -2150 ml    Temp (24hrs), Av.7 °F (36.5 °C), Min:97.5 °F (36.4 °C), Max:98.1 °F (36.7 °C)        Physical Exam  Vitals reviewed. "   Constitutional:       Appearance: Normal appearance.   HENT:      Head: Normocephalic and atraumatic.   Eyes:      General:         Right eye: No discharge.         Left eye: No discharge.      Extraocular Movements: Extraocular movements intact.      Conjunctiva/sclera: Conjunctivae normal.   Cardiovascular:      Rate and Rhythm: Normal rate and regular rhythm.      Pulses: Normal pulses.      Heart sounds: No murmur heard.  Pulmonary:      Effort: Pulmonary effort is normal. No respiratory distress.      Breath sounds: No wheezing.   Abdominal:      General: Abdomen is flat. Bowel sounds are normal. There is no distension.   Skin:     Capillary Refill: Capillary refill takes less than 2 seconds.   Neurological:      General: No focal deficit present.      Mental Status: He is alert and oriented to person, place, and time.   Psychiatric:         Mood and Affect: Mood normal.                  Assessment and Plan:      Primary Problem:  BELTRAN (acute kidney injury)     Hospital Problem list:    BELTRAN (acute kidney injury)        PMH:  Medical History        Past Medical History:   Diagnosis Date    Anemia      Anxiety      Arthritis      BPH (benign prostatic hypertrophy)      CAD (coronary artery disease)      Cancer       non-hodgkins lymphoma    Diabetes mellitus      Disease of thyroid gland      GERD (gastroesophageal reflux disease)      Hearing loss      History of transfusion      Hyperlipidemia      Hypertension      Iliac artery aneurysm, bilateral      Kidney stone      Liver cirrhosis      Migraines      Sleep apnea       c-pap            Treatment Plan:  BELTRAN: Improving with IVF and p.o. intake.  Would like for him to get 1 more day of IV fluids, plan for discharge tomorrow.     Viral pneumonia: Respiratory viral panel positive for parainfluenza virus.  Solu-Medrol and azithromycin, improving.     Type 2 diabetes: Monitor blood sugar closely given the Solu-Medrol.  Basal and sliding scale insulin.     CODE  STATUS: Full     DVT prophylaxis: Lovenox     Disposition: Observation,  Plan for discharge tomorrow.     Reviewed treatment plans with the patient and/or family.   30 minutes spent in face to face interaction and coordination of care.      Electronically signed by Yossi Rosen MD on 6/6/2024 at 08:22 CDT                     45  (6/6/24)  [place] 1 d ago  (6/5/24)  [facility]/[place] 1 d ago  (6/5/24)  [facility]/[place] 1 d ago  (6/5/24)  [place] 2 d ago  (6/4/24)  [place] 6 mo ago  (12/5/23)  [facility]/[place] 10 mo ago  (8/9/23)  [facility]    Glucose  65 - 99 mg/dL 138 High  230 High  R,  High  R,  High  R,  High   148 High  R, CM   BUN  8 - 23 mg/dL 27 High     33 High      Creatinine  0.76 - 1.27 mg/dL 1.83 High     2.31 High      Sodium  136 - 145 mmol/L 140    139     Potassium  3.5 - 5.2 mmol/L 3.0 Low     3.3 Low      Comment: Specimen hemolyzed.  Result may be falsely elevated.   Chloride  98 - 107 mmol/L 109 High     101     CO2  22.0 - 29.0 mmol/L 17.0 Low             10*3/mm3 5.19 5.98 4.55 7.13 10.81 High  10.2 R 6.0 R   RBC  4.14 - 5.80 10*6/mm3 3.01 Low  3.39 Low  3.21 Low  2.98 Low  3.38 Low  3.91 Low  R 3.51 Low  R   Hemoglobin  13.0 - 17.7 g/dL 10.0 Low  11.3 Low  10.4 Low  9.5 Low  10.7 Low  12.9 Low  R 11.7 Low  R   Hematocrit  37.5 - 51.0 % 30.2 Low  34.0 Low  33.7 Low  30.9 Low  34.8 Low  40.9 Low  R 36.0 Low  R   MCV  79.0 - 97.0 fL 100.3 High  100.3 High  105.0 High  103.7 High  103.0 High  104.6 High  R 102.6 High  R   MCH  26.6 - 33.0 pg 33.2 High  33.3 High  32.4 31.9 31.7 33.0 High  R 33.3 High  R   MCHC  31.5 - 35.7 g/dL 33.1 33.2 30.9 Low  30.7 Low  30.7 Low  31.5 Low  R 32.5 Low  R   RDW  12.3 - 15.4 % 15.2 15.4 16.3 High  14.7 14.9 15.0 High  R 14.9 High  R   RDW-SD  37.0 - 54.0 fl 55.9 High  57.0 High  62.4 High  56.3 High  56.5 High      MPV  6.0 - 12.0 fL 9.5 9.8 9.0 9.8 9.6 10.0 R 9.8 R   Platelets  140 - 450 10*3/mm3 68 Low  81 Low   108 Low  106 Low  127 Low  132 R 102 Low  R   Neutrophil %  42.7 - 76.0 % 82.6 High  67.7 61.1 77.7 High  81.0 High  79.1 High  R 71.7 High  R   Lymphocyte %  19.6 - 45.3 % 11.4 Low  17.6 Low  25.5 11.1 Low  9.0 Low  8.3 Low  R 14.7 Low  R   Monocyte %  5.0 - 12.0 % 5.2 11.9 9.9 8.8 8.9 10.6 High  R 9.5 R   Eosinophil %  0.3 - 6.2 % 0.0 Low  2.0 2.6 0.6 0.4 1.2 R 3.3 R   Basophil %  0.0 - 1.5 % 0.0 0.3 0.2 0.1 0.2 0.3 R 0.3 R   Immature Grans %  0.0 - 0.5 % 0.8 High  0.5 0.7 High  1.7 High  0.5     Neutrophils, Absolute  1.70 - 7.00 10*3/mm3 4.29 4.05 2.78 5.54 8.77 High  8.1 High  R 4.3 R   Lymphocytes, Absolute  0.70 - 3.10 10*3/mm3 0.59 Low  1.05 1.16 0.79 0.97 0.9 Low  R 0.9 Low  R   Monocytes, Absolute  0.10 - 0.90 10*3/mm3 0.27             e Temp Pulse Resp BP Patient Position Device (Oxygen Therapy) SpO2   06/06/24 1105 -- 94 18 -- -- room air 97   06/06/24 1100 -- 94 18 -- -- room air 97   06/06/24 1049 97.8 (36.6) 94 18 104/57 Sitting room air 97   06/06/24 0731 97.5 (36.4) 83 20 120/61 Sitting room air 100   06/06/24 0727 -- 88 20 -- -- room air 98   06/06/24 0419 97.7 (36.5) 98 20 119/58 Lying room air 97   06/05/24 2344 98.1 (36.7) 103 18 124/63 Lying room air 96   06/05/24 2100 -- -- -- -- -- room air --   06/05/24 2034 97.7 (36.5) 89 18 116/52 Sitting room air 97   06/05/24 1857 -- 95 18 -- -- -- 98   06/05/24 1852 -- 93 18 -- -- room air 98   06/05/24 1517 97.7 (36.5) 95 16 125/60 Lying room air 98   06/05/24 1431 -- 98 18 -- -- -- 100   06/05/24 1430 -- -- -- 135/64 -- -- --   06/05/24 1413 -- 98 16 -- -- room air 99   06/05/24 1405 -- 101 16 -- -- room air 94   06/05/24 1103 -- 97 18 119/69 -- -- 97   06/05/24 1031 -- 96 16 -- -- room air 97   06/05/24 1025 -- 101 16 -- -- room air 98   06/05/24 0322 97.8 (36.6) 98 18 103/67 Lying room air 94   06/04/24                        Current Facility-Administered Medications   Medication Dose Route Frequency Provider Last Rate Last Admin    acetaminophen  (TYLENOL) tablet 650 mg  650 mg Oral Q4H PRN Yossi Rosen MD        Or    acetaminophen (TYLENOL) 160 MG/5ML oral solution 650 mg  650 mg Oral Q4H PRN Yossi Rosen MD        Or    acetaminophen (TYLENOL) suppository 650 mg  650 mg Rectal Q4H PRN Yossi Rosen MD        atorvastatin (LIPITOR) tablet 40 mg  40 mg Oral Nightly Yossi Rosen MD   40 mg at 06/05/24 2132    azithromycin (ZITHROMAX) 500 mg in sodium chloride 0.9 % 250 mL IVPB-VTB  500 mg Intravenous Q24H Yossi Rosen MD 0 mL/hr at 06/05/24 1520 500 mg at 06/06/24 0927    benzonatate (TESSALON) capsule 100 mg  100 mg Oral TID PRN Yossi Rosen MD        sennosides-docusate (PERICOLACE) 8.6-50 MG per tablet 2 tablet  2 tablet Oral BID PRN Yossi Rosen MD   2 tablet at 06/06/24 0809    And    polyethylene glycol (MIRALAX) packet 17 g  17 g Oral Daily PRN Yossi Rosen MD        And    bisacodyl (DULCOLAX) EC tablet 5 mg  5 mg Oral Daily PRN Yossi Rosen MD        And    bisacodyl (DULCOLAX) suppository 10 mg  10 mg Rectal Daily PRN Yossi Rosen MD        busPIRone (BUSPAR) tablet 15 mg  15 mg Oral BID Yossi Rosen MD   15 mg at 06/06/24 0804    Calcium Replacement - Follow Nurse / BPA Driven Protocol   Does not apply PRN Yossi Rosen MD        clopidogrel (PLAVIX) tablet 75 mg  75 mg Oral Daily Yossi Rosen MD   75 mg at 06/06/24 0804    dextrose (D50W) (25 g/50 mL) IV injection 25 g  25 g Intravenous Q15 Min PRN Yossi Rosen MD        dextrose (GLUTOSE) oral gel 15 g  15 g Oral Q15 Min PRN Yossi Rosen MD        docusate sodium (COLACE) capsule 100 mg  100 mg Oral Q PM Yossi Rosen MD   100 mg at 06/05/24 1847    empagliflozin (JARDIANCE) tablet 25 mg  25 mg Oral Daily Yossi Rosen MD   25 mg at 06/06/24 0804    Enoxaparin Sodium (LOVENOX) syringe 40 mg  40 mg Subcutaneous Daily Yossi Rosen MD   40 mg at 06/06/24 0809    ferrous sulfate tablet 325 mg  325  mg Oral BID With Meals Yossi Rosen MD   325 mg at 06/06/24 0804    gabapentin (NEURONTIN) capsule 100 mg  100 mg Oral Nightly Yossi Rosen MD   100 mg at 06/05/24 2132    glucagon (GLUCAGEN) injection 1 mg  1 mg Intramuscular Q15 Min PRN Yossi Rosen MD        insulin glargine (LANTUS, SEMGLEE) injection 10 Units  10 Units Subcutaneous Nightly Yossi Rosen MD   10 Units at 06/05/24 2133    Insulin Lispro (humaLOG) injection 3-14 Units  3-14 Units Subcutaneous 4x Daily AC & at Bedtime Yossi Rosen MD   3 Units at 06/06/24 1151    ipratropium-albuterol (DUO-NEB) nebulizer solution 3 mL  3 mL Nebulization 4x Daily - RT Yossi Rosen MD   3 mL at 06/06/24 1100    levothyroxine (SYNTHROID, LEVOTHROID) tablet 75 mcg  75 mcg Oral Q AM Yossi Rosen MD   75 mcg at 06/06/24 0544    Magnesium Standard Dose Replacement - Follow Nurse / BPA Driven Protocol   Does not apply PRN Yossi Rosen MD        metFORMIN (GLUCOPHAGE) tablet 500 mg  500 mg Oral BID With Meals Yossi Rosen MD   500 mg at 06/06/24 0804    methylPREDNISolone sodium succinate (SOLU-Medrol) injection 125 mg  125 mg Intravenous Q24H Yossi Rosen MD   125 mg at 06/06/24 1151    ondansetron ODT (ZOFRAN-ODT) disintegrating tablet 4 mg  4 mg Oral Q6H PRN Yossi Rosen MD        Or    ondansetron (ZOFRAN) injection 4 mg  4 mg Intravenous Q6H PRN Yossi Rosen MD        pantoprazole (PROTONIX) EC tablet 40 mg  40 mg Oral Daily Yossi Rosen MD   40 mg at 06/06/24 0804    Phosphorus Replacement - Follow Nurse / BPA Driven Protocol   Does not apply PRN Yossi Rosen MD        potassium chloride (MICRO-K/KLOR-CON) CR capsule  40 mEq Oral Q4H Figueroa Chaves MD   40 mEq at 06/06/24 1153    Potassium Replacement - Follow Nurse / BPA Driven Protocol   Does not apply PRN Yossi Rosen MD        ranolazine (RANEXA) 12 hr tablet 1,000 mg  1,000 mg Oral BID Yossi Rosen MD   1,000 mg at  06/06/24 0804    sodium chloride 0.9 % flush 10 mL  10 mL Intravenous PRN Yossi Rosen MD        sodium chloride 0.9 % flush 10 mL  10 mL Intravenous Q12H Yossi Rosen MD   10 mL at 06/06/24 0806    sodium chloride 0.9 % flush 10 mL  10 mL Intravenous PRN Yossi Rosen MD        sodium chloride 0.9 % infusion 40 mL  40 mL Intravenous PRN Yossi Rosen MD        sodium chloride 0.9 % infusion  100 mL/hr Intravenous Continuous Yossi Rosen  mL/hr at 06/06/24 0936 100 mL/hr at 06/06/24 0936

## 2024-06-07 ENCOUNTER — READMISSION MANAGEMENT (OUTPATIENT)
Dept: CALL CENTER | Facility: HOSPITAL | Age: 69
End: 2024-06-07
Payer: MEDICARE

## 2024-06-07 VITALS
HEIGHT: 72 IN | BODY MASS INDEX: 26.82 KG/M2 | RESPIRATION RATE: 18 BRPM | WEIGHT: 198 LBS | TEMPERATURE: 97.6 F | OXYGEN SATURATION: 100 % | DIASTOLIC BLOOD PRESSURE: 70 MMHG | HEART RATE: 92 BPM | SYSTOLIC BLOOD PRESSURE: 133 MMHG

## 2024-06-07 LAB
ALBUMIN SERPL-MCNC: 3.8 G/DL (ref 3.5–5.2)
ALBUMIN/GLOB SERPL: 1.4 G/DL
ALP SERPL-CCNC: 75 U/L (ref 39–117)
ALT SERPL W P-5'-P-CCNC: 37 U/L (ref 1–41)
ANION GAP SERPL CALCULATED.3IONS-SCNC: 13 MMOL/L (ref 5–15)
AST SERPL-CCNC: 42 U/L (ref 1–40)
BASOPHILS # BLD AUTO: 0.01 10*3/MM3 (ref 0–0.2)
BASOPHILS NFR BLD AUTO: 0.2 % (ref 0–1.5)
BILIRUB SERPL-MCNC: 0.5 MG/DL (ref 0–1.2)
BUN SERPL-MCNC: 29 MG/DL (ref 8–23)
BUN/CREAT SERPL: 19.2 (ref 7–25)
CALCIUM SPEC-SCNC: 9.6 MG/DL (ref 8.6–10.5)
CHLORIDE SERPL-SCNC: 110 MMOL/L (ref 98–107)
CO2 SERPL-SCNC: 18 MMOL/L (ref 22–29)
CREAT SERPL-MCNC: 1.51 MG/DL (ref 0.76–1.27)
DEPRECATED RDW RBC AUTO: 57.7 FL (ref 37–54)
EGFRCR SERPLBLD CKD-EPI 2021: 49.4 ML/MIN/1.73
EOSINOPHIL # BLD AUTO: 0 10*3/MM3 (ref 0–0.4)
EOSINOPHIL NFR BLD AUTO: 0 % (ref 0.3–6.2)
ERYTHROCYTE [DISTWIDTH] IN BLOOD BY AUTOMATED COUNT: 15.5 % (ref 12.3–15.4)
GLOBULIN UR ELPH-MCNC: 2.7 GM/DL
GLUCOSE BLDC GLUCOMTR-MCNC: 130 MG/DL (ref 70–130)
GLUCOSE SERPL-MCNC: 133 MG/DL (ref 65–99)
HCT VFR BLD AUTO: 29.5 % (ref 37.5–51)
HGB BLD-MCNC: 9.6 G/DL (ref 13–17.7)
IMM GRANULOCYTES # BLD AUTO: 0.08 10*3/MM3 (ref 0–0.05)
IMM GRANULOCYTES NFR BLD AUTO: 1.2 % (ref 0–0.5)
LYMPHOCYTES # BLD AUTO: 0.61 10*3/MM3 (ref 0.7–3.1)
LYMPHOCYTES NFR BLD AUTO: 9.2 % (ref 19.6–45.3)
MCH RBC QN AUTO: 33 PG (ref 26.6–33)
MCHC RBC AUTO-ENTMCNC: 32.5 G/DL (ref 31.5–35.7)
MCV RBC AUTO: 101.4 FL (ref 79–97)
MONOCYTES # BLD AUTO: 0.43 10*3/MM3 (ref 0.1–0.9)
MONOCYTES NFR BLD AUTO: 6.5 % (ref 5–12)
NEUTROPHILS NFR BLD AUTO: 5.52 10*3/MM3 (ref 1.7–7)
NEUTROPHILS NFR BLD AUTO: 82.9 % (ref 42.7–76)
NRBC BLD AUTO-RTO: 0 /100 WBC (ref 0–0.2)
PLATELET # BLD AUTO: 65 10*3/MM3 (ref 140–450)
PMV BLD AUTO: 9.5 FL (ref 6–12)
POTASSIUM SERPL-SCNC: 3.9 MMOL/L (ref 3.5–5.2)
PROT SERPL-MCNC: 6.5 G/DL (ref 6–8.5)
RBC # BLD AUTO: 2.91 10*6/MM3 (ref 4.14–5.8)
SODIUM SERPL-SCNC: 141 MMOL/L (ref 136–145)
WBC NRBC COR # BLD AUTO: 6.65 10*3/MM3 (ref 3.4–10.8)

## 2024-06-07 PROCEDURE — 94799 UNLISTED PULMONARY SVC/PX: CPT

## 2024-06-07 PROCEDURE — 25010000002 ENOXAPARIN PER 10 MG: Performed by: FAMILY MEDICINE

## 2024-06-07 PROCEDURE — 25810000003 SODIUM CHLORIDE 0.9 % SOLUTION: Performed by: FAMILY MEDICINE

## 2024-06-07 PROCEDURE — 85025 COMPLETE CBC W/AUTO DIFF WBC: CPT | Performed by: FAMILY MEDICINE

## 2024-06-07 PROCEDURE — 82948 REAGENT STRIP/BLOOD GLUCOSE: CPT

## 2024-06-07 PROCEDURE — 94664 DEMO&/EVAL PT USE INHALER: CPT

## 2024-06-07 PROCEDURE — 80053 COMPREHEN METABOLIC PANEL: CPT | Performed by: FAMILY MEDICINE

## 2024-06-07 RX ADMIN — EMPAGLIFLOZIN 25 MG: 25 TABLET, FILM COATED ORAL at 08:27

## 2024-06-07 RX ADMIN — BISACODYL 5 MG: 5 TABLET, COATED ORAL at 08:26

## 2024-06-07 RX ADMIN — PANTOPRAZOLE SODIUM 40 MG: 40 TABLET, DELAYED RELEASE ORAL at 08:26

## 2024-06-07 RX ADMIN — Medication 10 ML: at 08:30

## 2024-06-07 RX ADMIN — LEVOTHYROXINE SODIUM 75 MCG: 75 TABLET ORAL at 05:10

## 2024-06-07 RX ADMIN — BUSPIRONE HYDROCHLORIDE 15 MG: 5 TABLET ORAL at 08:27

## 2024-06-07 RX ADMIN — FERROUS SULFATE TAB 325 MG (65 MG ELEMENTAL FE) 325 MG: 325 (65 FE) TAB at 08:27

## 2024-06-07 RX ADMIN — ENOXAPARIN SODIUM 40 MG: 100 INJECTION SUBCUTANEOUS at 08:30

## 2024-06-07 RX ADMIN — METFORMIN HCL 500 MG: 500 TABLET ORAL at 08:27

## 2024-06-07 RX ADMIN — CLOPIDOGREL BISULFATE 75 MG: 75 TABLET, FILM COATED ORAL at 08:27

## 2024-06-07 RX ADMIN — IPRATROPIUM BROMIDE AND ALBUTEROL SULFATE 3 ML: .5; 3 SOLUTION RESPIRATORY (INHALATION) at 10:13

## 2024-06-07 RX ADMIN — RANOLAZINE 1000 MG: 500 TABLET, FILM COATED, EXTENDED RELEASE ORAL at 08:27

## 2024-06-07 RX ADMIN — IPRATROPIUM BROMIDE AND ALBUTEROL SULFATE 3 ML: .5; 3 SOLUTION RESPIRATORY (INHALATION) at 06:21

## 2024-06-07 RX ADMIN — SODIUM CHLORIDE 100 ML/HR: 9 INJECTION, SOLUTION INTRAVENOUS at 00:49

## 2024-06-07 NOTE — PLAN OF CARE
Goal Outcome Evaluation:  Plan of Care Reviewed With: patient        Progress: improving  Outcome Evaluation: AAOx4, VSS, pt breathing unlabored on RA, K+ 3.9, Pt has no c/o pain this shift. He has not slept very well, says he just not sleepy. -113 with Pvc on tele. anticipates dc home today. pt has no requests at this time, safety maintained, care plan ongoing.

## 2024-06-07 NOTE — PAYOR COMM NOTE
"REF:   [id number].     [facility]  FAX  [phone]     Alex Lucero (68 y.o. Male)       Date of Birth   1955    Social Security Number       Address   [address] [place] [address]    Home Phone   [phone]    MRN   [id number]       [facility]    Marital Status                               Admission Date   24    Admission Type   Emergency    Admitting Provider   Figueroa Chaves MD    Attending Provider       Department, Room/Bed   [facility] 4B, 412/1       Discharge Date   2024    Discharge Disposition   Home or Self Care    Discharge Destination                                 Attending Provider: (none)   Allergies: Adhesive Tape, Cefazolin, Cefuroxime Axetil, Cephalosporins, Neomycin-polymyxin B Gu, Neosporin [Neomycin-bacitracin Zn-polymyx], Percocet [Oxycodone-acetaminophen]    Isolation: Contact Drop   Infection: MRSA (07/15/23), Other (24)   Code Status: Prior    Ht: 182.9 cm (72\")   Wt: 89.8 kg (198 lb)    Admission Cmt: None   Principal Problem: BELTRAN (acute kidney injury) [N17.9]                   Active Insurance as of 2024       Primary Coverage       Payor Plan Insurance Group Employer/Plan Group    [employer]NA MEDICARE REPLACEMENT AETNA MED ADV PPO [id number]       Payor Plan Address Payor Plan Phone Number Payor Plan Fax Number Effective Dates    [address] [phone]  2024 - None Entered    [facility] [id number]         Subscriber Name Subscriber Birth Date Member ID       ALEX LUCERO 1955 [id number]                     Emergency Contacts        (Rel.) Home Phone Work Phone Mobile Phone    Sangita Lucero (Spouse) [phone] -- [phone]                 Discharge Summary        Yossi Rosen MD at 24 0832            Hospital Discharge Summary    Alex Lucero  :  1955  MRN:  [id number]    Admit date:  2024  Discharge date:  2024    Admitting Physician:    Figueroa" Gareth Chaves MD    Discharge Diagnoses:      BELTRAN (acute kidney injury)    Acute kidney failure, unspecified      Hospital Course:   The patient is a 68 y.o. male who presents with shortness of breath, cough, fatigue over the past 2 weeks.  He was treated outpatient with IM and oral antibiotics with very little improvement.  Presented to the emergency department was he was found to have parainfluenza virus on respiratory viral panel.  BELTRAN from poor p.o. intake.  He had tremendous improvement in his kidney function and overall breathing status with IVF NS, IV antibiotics and steroids.  He was treated with IV antibiotics for presumed secondary bacterial infection overlying viral pneumonia with improvement.  Discharged home in stable condition.    The patient was admitted for the above noted medical/surgical issues. Please see daily progress note for further details concerning their stay. The patient improved throughout their stay and reached maximum medical improvement on the day of discharge. The patient/family agree with the treatment plan as outlined above. All questions concerning their stay were answered prior to discharge. They understand the importance of follow up concerning any abnormal test results.     Physical Exam  Vitals reviewed.   Constitutional:       Appearance: Normal appearance.   HENT:      Head: Normocephalic and atraumatic.   Eyes:      General:         Right eye: No discharge.         Left eye: No discharge.      Extraocular Movements: Extraocular movements intact.      Conjunctiva/sclera: Conjunctivae normal.   Cardiovascular:      Rate and Rhythm: Normal rate and regular rhythm.      Pulses: Normal pulses.      Heart sounds: No murmur heard.  Pulmonary:      Effort: Pulmonary effort is normal. No respiratory distress.      Breath sounds: No wheezing.   Abdominal:      General: Abdomen is flat. Bowel sounds are normal. There is no distension.   Skin:     Capillary Refill: Capillary refill takes  less than 2 seconds.   Neurological:      General: No focal deficit present.      Mental Status: He is alert and oriented to person, place, and time.   Psychiatric:         Mood and Affect: Mood normal.           Discharge Medications:         Discharge Medications        Continue These Medications        Instructions Start Date   acetaminophen 325 MG tablet  Commonly known as: TYLENOL   650 mg, Oral, Every 6 Hours PRN      busPIRone 15 MG tablet  Commonly known as: BUSPAR   15 mg, Oral, 2 Times Daily      cholecalciferol 25 MCG (1000 UT) tablet  Commonly known as: VITAMIN D3   1 tablet, Oral, Daily      clopidogrel 75 MG tablet  Commonly known as: PLAVIX   75 mg, Oral, Daily      docusate sodium 100 MG capsule  Commonly known as: COLACE   100 mg, Oral, Every Evening      DULoxetine 60 MG capsule  Commonly known as: CYMBALTA   60 mg, Oral, Nightly      empagliflozin 25 MG tablet tablet  Commonly known as: JARDIANCE   25 mg, Oral, Daily      ferrous sulfate 325 (65 FE) MG EC tablet   325 mg, Oral, 2 Times Daily      gabapentin 300 MG capsule  Commonly known as: NEURONTIN   300 mg, Oral, Daily      GARLIC-CALCIUM PO   1 tablet, Oral, Daily      icosapent ethyl 1 g capsule capsule  Commonly known as: VASCEPA   2 g, Oral, 2 Times Daily With Meals      levothyroxine 75 MCG tablet  Commonly known as: SYNTHROID, LEVOTHROID   75 mcg, Oral, Daily      metFORMIN 500 MG tablet  Commonly known as: GLUCOPHAGE   500 mg, Oral, 2 Times Daily With Meals      naloxone 4 MG/0.1ML nasal spray  Commonly known as: NARCAN   Call 911. Don't prime. [place] in 1 nostril for overdose. Repeat in 2-3 minutes in other nostril if no or minimal breathing/responsiveness.      nitroglycerin 0.4 MG SL tablet  Commonly known as: NITROSTAT   0.4 mg, Sublingual, Every 5 Minutes PRN      pantoprazole 40 MG EC tablet  Commonly known as: PROTONIX   40 mg, Oral, Daily      polyethylene glycol 17 GM/SCOOP powder  Commonly known as: MIRALAX   17 g, Oral, Daily  PRN      ranolazine 1000 MG 12 hr tablet  Commonly known as: RANEXA   1,000 mg, Oral, 2 Times Daily      rosuvastatin 40 MG tablet  Commonly known as: CRESTOR   40 mg, Oral, Daily      TRESIBA SC   12 Units, Subcutaneous, Nightly               Activity: As tolerated    Diet: Regular    Consults: None    Significant Diagnostic Studies:    CT Abdomen Pelvis Without Contrast    Result Date: 6/4/2024  1. Large amount of fecal material throughout the colon. 2. Nonobstructing LEFT renal stones. 3. No hydronephrosis or ureteral stone. 4. No mass, fluid collection, or inflammatory process.  This report was signed and finalized on 6/4/2024 8:30 PM by Dr. Mike Plasencia MD.      XR Abdomen KUB    Result Date: 6/4/2024  1. LEFT renal stones. 2. Large amount of fecal material throughout the colon.    This report was signed and finalized on 6/4/2024 6:12 PM by Dr. Mike Plasencia MD.      XR Chest 1 View    Result Date: 6/4/2024  1. No acute disease.      This report was signed and finalized on 6/4/2024 6:10 PM by Dr. Mike Plasencia MD.            Treatments:   As above    Disposition:   Discharge home    Time spent on discharge including discussion with patient/family, SW, and coordination of care.     Follow up with Figueroa Chaves MD in 1 weeks.    Signed:  Tyler Blandon MD   6/7/2024, 08:32 CDT      Electronically signed by Tyler Blandon MD at 06/07/24 0833       Discharge Order (From admission, onward)       Start     Ordered    06/07/24 0831  Discharge patient  Once        Expected Discharge Date: 06/07/24   Discharge Disposition: Home or Self Care   Physician of Record for Attribution - Please select from Treatment Team: TYLER BLANDON [[id number]]   Review needed by CMO to determine Physician of Record: No      Question Answer Comment   Physician of Record for Attribution - Please select from Treatment Team TYLER BLANDON    Review needed by CMO to determine Physician of Record No        06/07/24 0831

## 2024-06-07 NOTE — DISCHARGE SUMMARY
Hospital Discharge Summary    Franko Lucero  :  1955  MRN:  [id number]    Admit date:  2024  Discharge date:  2024    Admitting Physician:    Figueroa Chaves MD    Discharge Diagnoses:      BELTRAN (acute kidney injury)    Acute kidney failure, unspecified      Hospital Course:   The patient is a 68 y.o. male who presents with shortness of breath, cough, fatigue over the past 2 weeks.  He was treated outpatient with IM and oral antibiotics with very little improvement.  Presented to the emergency department was he was found to have parainfluenza virus on respiratory viral panel.  BELTRAN from poor p.o. intake.  He had tremendous improvement in his kidney function and overall breathing status with IVF NS, IV antibiotics and steroids.  He was treated with IV antibiotics for presumed secondary bacterial infection overlying viral pneumonia with improvement.  Discharged home in stable condition.    The patient was admitted for the above noted medical/surgical issues. Please see daily progress note for further details concerning their stay. The patient improved throughout their stay and reached maximum medical improvement on the day of discharge. The patient/family agree with the treatment plan as outlined above. All questions concerning their stay were answered prior to discharge. They understand the importance of follow up concerning any abnormal test results.     Physical Exam  Vitals reviewed.   Constitutional:       Appearance: Normal appearance.   HENT:      Head: Normocephalic and atraumatic.   Eyes:      General:         Right eye: No discharge.         Left eye: No discharge.      Extraocular Movements: Extraocular movements intact.      Conjunctiva/sclera: Conjunctivae normal.   Cardiovascular:      Rate and Rhythm: Normal rate and regular rhythm.      Pulses: Normal pulses.      Heart sounds: No murmur heard.  Pulmonary:      Effort: Pulmonary effort is normal. No respiratory distress.      Breath  sounds: No wheezing.   Abdominal:      General: Abdomen is flat. Bowel sounds are normal. There is no distension.   Skin:     Capillary Refill: Capillary refill takes less than 2 seconds.   Neurological:      General: No focal deficit present.      Mental Status: He is alert and oriented to person, place, and time.   Psychiatric:         Mood and Affect: Mood normal.           Discharge Medications:         Discharge Medications        Continue These Medications        Instructions Start Date   acetaminophen 325 MG tablet  Commonly known as: TYLENOL   650 mg, Oral, Every 6 Hours PRN      busPIRone 15 MG tablet  Commonly known as: BUSPAR   15 mg, Oral, 2 Times Daily      cholecalciferol 25 MCG (1000 UT) tablet  Commonly known as: VITAMIN D3   1 tablet, Oral, Daily      clopidogrel 75 MG tablet  Commonly known as: PLAVIX   75 mg, Oral, Daily      docusate sodium 100 MG capsule  Commonly known as: COLACE   100 mg, Oral, Every Evening      DULoxetine 60 MG capsule  Commonly known as: CYMBALTA   60 mg, Oral, Nightly      empagliflozin 25 MG tablet tablet  Commonly known as: JARDIANCE   25 mg, Oral, Daily      ferrous sulfate 325 (65 FE) MG EC tablet   325 mg, Oral, 2 Times Daily      gabapentin 300 MG capsule  Commonly known as: NEURONTIN   300 mg, Oral, Daily      GARLIC-CALCIUM PO   1 tablet, Oral, Daily      icosapent ethyl 1 g capsule capsule  Commonly known as: VASCEPA   2 g, Oral, 2 Times Daily With Meals      levothyroxine 75 MCG tablet  Commonly known as: SYNTHROID, LEVOTHROID   75 mcg, Oral, Daily      metFORMIN 500 MG tablet  Commonly known as: GLUCOPHAGE   500 mg, Oral, 2 Times Daily With Meals      naloxone 4 MG/0.1ML nasal spray  Commonly known as: NARCAN   Call 911. Don't prime. [place] in 1 nostril for overdose. Repeat in 2-3 minutes in other nostril if no or minimal breathing/responsiveness.      nitroglycerin 0.4 MG SL tablet  Commonly known as: NITROSTAT   0.4 mg, Sublingual, Every 5 Minutes PRN       pantoprazole 40 MG EC tablet  Commonly known as: PROTONIX   40 mg, Oral, Daily      polyethylene glycol 17 GM/SCOOP powder  Commonly known as: MIRALAX   17 g, Oral, Daily PRN      ranolazine 1000 MG 12 hr tablet  Commonly known as: RANEXA   1,000 mg, Oral, 2 Times Daily      rosuvastatin 40 MG tablet  Commonly known as: CRESTOR   40 mg, Oral, Daily      TRESIBA SC   12 Units, Subcutaneous, Nightly               Activity: As tolerated    Diet: Regular    Consults: None    Significant Diagnostic Studies:    CT Abdomen Pelvis Without Contrast    Result Date: 6/4/2024  1. Large amount of fecal material throughout the colon. 2. Nonobstructing LEFT renal stones. 3. No hydronephrosis or ureteral stone. 4. No mass, fluid collection, or inflammatory process.  This report was signed and finalized on 6/4/2024 8:30 PM by Dr. Mike Plasencia MD.      XR Abdomen KUB    Result Date: 6/4/2024  1. LEFT renal stones. 2. Large amount of fecal material throughout the colon.    This report was signed and finalized on 6/4/2024 6:12 PM by Dr. Mike Plasencia MD.      XR Chest 1 View    Result Date: 6/4/2024  1. No acute disease.      This report was signed and finalized on 6/4/2024 6:10 PM by Dr. Mike Plasencia MD.            Treatments:   As above    Disposition:   Discharge home    Time spent on discharge including discussion with patient/family, SW, and coordination of care.     Follow up with Figueroa Chaves MD in 1 weeks.    Signed:  Yossi Rosen MD   6/7/2024, 08:32 CDT

## 2024-06-08 NOTE — OUTREACH NOTE
Prep Survey      Flowsheet Row Responses   [facility] facility patient discharged from? [place]   Is LACE score < 7 ? No   Eligibility Readm Mgmt   Discharge diagnosis BELTRAN (acute kidney injury   Does the patient have one of the following disease processes/diagnoses(primary or secondary)? Other   Prep survey completed? Yes            Maricel LOW - Registered Nurse

## 2024-06-13 LAB
QT INTERVAL: 412 MS
QTC INTERVAL: 517 MS

## 2024-06-19 ENCOUNTER — READMISSION MANAGEMENT (OUTPATIENT)
Dept: CALL CENTER | Facility: HOSPITAL | Age: 69
End: 2024-06-19
Payer: MEDICARE

## 2024-06-19 NOTE — OUTREACH NOTE
Medical Week 2 Survey      Flowsheet Row Responses   [facility] patient discharged from? [place]   Does the patient have one of the following disease processes/diagnoses(primary or secondary)? Other   Week 2 attempt successful? Yes   Call start time 1627   Discharge diagnosis BELTRAN (acute kidney injury), parainfluenza virus   Call end time 1632   Is patient permission given to speak with other caregiver? Yes   Person spoke with today (if not patient) and relationship wife and patient   Meds reviewed with patient/caregiver? Yes   Does the patient have all medications ordered at discharge? Yes   Is the patient taking all medications as directed (includes completed medication regime)? Yes   Does the patient have a primary care provider?  Yes   Does the patient have an appointment with their PCP within 7 days of discharge? Yes   Comments regarding PCP PCP Dr Chaves. Patient has had 2 appts with PCP since discharge.   Has the patient kept scheduled appointments due by today? Yes   Has home health visited the patient within 72 hours of discharge? N/A   Psychosocial issues? No   Did the patient receive a copy of their discharge instructions? Yes   Nursing interventions Reviewed instructions with patient   What is the patient's perception of their health status since discharge? Same  [Patient reports that he continues to feel very weak. He reports respiratory symptoms with some improvement]   Is the patient/caregiver able to teach back signs and symptoms related to disease process for when to call PCP? Yes   Is the patient/caregiver able to teach back signs and symptoms related to disease process for when to call 911? Yes   Is the patient/caregiver able to teach back the hierarchy of who to call/visit for symptoms/problems? PCP, Specialist, Home health nurse, Urgent Care, ED, 911 Yes   If the patient is a current smoker, are they able to teach back resources for cessation? Not a smoker   Week 2 Call Completed? Yes   Is  the patient interested in additional calls from an ambulatory ? No   Would this patient benefit from a Referral to [facility] Social Work? No   Call end time 3608            JOSH TOMPKINS - Registered Nurse

## 2024-07-01 ENCOUNTER — TELEPHONE (OUTPATIENT)
Age: 69
End: 2024-07-01
Payer: MEDICARE

## 2024-07-02 ENCOUNTER — OFFICE VISIT (OUTPATIENT)
Age: 69
End: 2024-07-02
Payer: MEDICARE

## 2024-07-02 VITALS
SYSTOLIC BLOOD PRESSURE: 130 MMHG | BODY MASS INDEX: 26.82 KG/M2 | HEIGHT: 72 IN | HEART RATE: 98 BPM | WEIGHT: 198 LBS | RESPIRATION RATE: 18 BRPM | OXYGEN SATURATION: 98 % | DIASTOLIC BLOOD PRESSURE: 72 MMHG

## 2024-07-02 DIAGNOSIS — Z79.4 TYPE 2 DIABETES MELLITUS WITH DIABETIC NEUROPATHY, WITH LONG-TERM CURRENT USE OF INSULIN: ICD-10-CM

## 2024-07-02 DIAGNOSIS — S98.131A AMPUTATED TOE, RIGHT: ICD-10-CM

## 2024-07-02 DIAGNOSIS — E11.9 ENCOUNTER FOR DIABETIC FOOT EXAM: ICD-10-CM

## 2024-07-02 DIAGNOSIS — Z79.02 ANTIPLATELET OR ANTITHROMBOTIC LONG-TERM USE: ICD-10-CM

## 2024-07-02 DIAGNOSIS — B35.1 ONYCHOMYCOSIS: Primary | ICD-10-CM

## 2024-07-02 DIAGNOSIS — L84 FOOT CALLUS: ICD-10-CM

## 2024-07-02 DIAGNOSIS — E11.40 TYPE 2 DIABETES MELLITUS WITH DIABETIC NEUROPATHY, WITH LONG-TERM CURRENT USE OF INSULIN: ICD-10-CM

## 2024-07-02 PROBLEM — L03.119 CELLULITIS IN DIABETIC FOOT: Status: RESOLVED | Noted: 2023-07-10 | Resolved: 2024-07-02

## 2024-07-02 PROBLEM — E11.621 TYPE 2 DIABETES MELLITUS WITH FOOT ULCER (CODE): Status: ACTIVE | Noted: 2018-12-06

## 2024-07-02 PROBLEM — M86.171 ACUTE OSTEOMYELITIS OF RIGHT FOOT: Status: RESOLVED | Noted: 2023-07-31 | Resolved: 2024-07-02

## 2024-07-02 PROBLEM — E11.628 CELLULITIS IN DIABETIC FOOT: Status: RESOLVED | Noted: 2023-07-10 | Resolved: 2024-07-02

## 2024-07-02 PROBLEM — L02.611 ABSCESS OF RIGHT FOOT: Status: RESOLVED | Noted: 2023-07-10 | Resolved: 2024-07-02

## 2024-07-02 PROBLEM — L03.031 CELLULITIS AND ABSCESS OF TOE OF RIGHT FOOT: Status: RESOLVED | Noted: 2023-07-12 | Resolved: 2024-07-02

## 2024-07-02 PROBLEM — L02.611 CELLULITIS AND ABSCESS OF TOE OF RIGHT FOOT: Status: RESOLVED | Noted: 2023-07-12 | Resolved: 2024-07-02

## 2024-07-02 PROCEDURE — 11056 PARNG/CUTG B9 HYPRKR LES 2-4: CPT | Performed by: PODIATRIST

## 2024-07-02 PROCEDURE — 3078F DIAST BP <80 MM HG: CPT | Performed by: PODIATRIST

## 2024-07-02 PROCEDURE — 3075F SYST BP GE 130 - 139MM HG: CPT | Performed by: PODIATRIST

## 2024-07-02 PROCEDURE — 11721 DEBRIDE NAIL 6 OR MORE: CPT | Performed by: PODIATRIST

## 2024-07-02 PROCEDURE — 99213 OFFICE O/P EST LOW 20 MIN: CPT | Performed by: PODIATRIST

## 2024-07-02 PROCEDURE — 1160F RVW MEDS BY RX/DR IN RCRD: CPT | Performed by: PODIATRIST

## 2024-07-02 PROCEDURE — 1159F MED LIST DOCD IN RCRD: CPT | Performed by: PODIATRIST

## 2024-07-02 NOTE — PATIENT INSTRUCTIONS
Diabetes Mellitus and Foot Care  Diabetes, also called diabetes mellitus, may cause problems with your feet and legs because of poor blood flow (circulation). Poor circulation may make your skin:  Become thinner and drier.  Break more easily.  Heal more slowly.  Peel and crack.  You may also have nerve damage (neuropathy). This can cause decreased feeling in your legs and feet. This means that you may not notice minor injuries to your feet that could lead to more serious problems. Finding and treating problems early is the best way to prevent future foot problems.  How to care for your feet  Foot hygiene    Wash your feet daily with warm water and mild soap. Do not use hot water. Then, pat your feet and the areas between your toes until they are fully dry. Do not soak your feet. This can dry your skin.  Trim your toenails straight across. Do not dig under them or around the cuticle. File the edges of your nails with an emery board or nail file.  Apply a moisturizing lotion or petroleum jelly to the skin on your feet and to dry, brittle toenails. Use lotion that does not contain alcohol and is unscented. Do not apply lotion between your toes.  Shoes and socks  Wear clean socks or stockings every day. Make sure they are not too tight. Do not wear knee-high stockings. These may decrease blood flow to your legs.  Wear shoes that fit well and have enough cushioning. Always look in your shoes before you put them on to be sure there are no objects inside.  To break in new shoes, wear them for just a few hours a day. This prevents injuries on your feet.  Wounds, scrapes, corns, and calluses    Check your feet daily for blisters, cuts, bruises, sores, and redness. If you cannot see the bottom of your feet, use a mirror or ask someone for help.  Do not cut off corns or calluses or try to remove them with medicine.  If you find a minor scrape, cut, or break in the skin on your feet, keep it and the skin around it clean and  dry. You may clean these areas with mild soap and water. Do not clean the area with peroxide, alcohol, or iodine.  If you have a wound, scrape, corn, or callus on your foot, look at it several times a day to make sure it is healing and not infected. Check for:  Redness, swelling, or pain.  Fluid or blood.  Warmth.  Pus or a bad smell.  General tips  Do not cross your legs. This may decrease blood flow to your feet.  Do not use heating pads or hot water bottles on your feet. They may burn your skin. If you have lost feeling in your feet or legs, you may not know this is happening until it is too late.  Protect your feet from hot and cold by wearing shoes, such as at the beach or on hot pavement.  Schedule a complete foot exam at least once a year or more often if you have foot problems. Report any cuts, sores, or bruises to your health care provider right away.  Where to find more information  American Diabetes Association: diabetes.org  Association of Diabetes Care & Education Specialists: diabeteseducator.org  Contact a health care provider if:  You have a condition that increases your risk of infection, and you have any cuts, sores, or bruises on your feet.  You have an injury that is not healing.  You have redness on your legs or feet.  You feel burning or tingling in your legs or feet.  You have pain or cramps in your legs and feet.  Your legs or feet are numb.  Your feet always feel cold.  You have pain around any toenails.  Get help right away if:  You have a wound, scrape, corn, or callus on your foot and:  You have signs of infection.  You have a fever.  You have a red line going up your leg.  This information is not intended to replace advice given to you by your health care provider. Make sure you discuss any questions you have with your health care provider.  Document Revised: 06/21/2023 Document Reviewed: 06/21/2023  Elsevier Patient Education © 2024 [employer]

## 2024-08-08 DIAGNOSIS — D63.8 ANEMIA, CHRONIC DISEASE: Primary | ICD-10-CM

## 2024-08-09 ENCOUNTER — INFUSION (OUTPATIENT)
Dept: ONCOLOGY | Facility: HOSPITAL | Age: 69
End: 2024-08-09
Payer: MEDICARE

## 2024-08-09 VITALS
OXYGEN SATURATION: 100 % | SYSTOLIC BLOOD PRESSURE: 111 MMHG | RESPIRATION RATE: 18 BRPM | DIASTOLIC BLOOD PRESSURE: 55 MMHG | BODY MASS INDEX: 26.3 KG/M2 | WEIGHT: 194.2 LBS | HEIGHT: 72 IN | HEART RATE: 92 BPM | TEMPERATURE: 96.4 F

## 2024-08-09 DIAGNOSIS — D63.8 ANEMIA, CHRONIC DISEASE: Primary | ICD-10-CM

## 2024-08-09 PROCEDURE — 25010000002 EPOETIN ALFA-EPBX 20000 UNIT/ML SOLUTION: Performed by: FAMILY MEDICINE

## 2024-08-09 PROCEDURE — 96372 THER/PROPH/DIAG INJ SC/IM: CPT

## 2024-08-09 RX ADMIN — EPOETIN ALFA-EPBX 10000 UNITS: 20000 INJECTION, SOLUTION INTRAVENOUS; SUBCUTANEOUS at 11:52

## 2024-08-09 NOTE — PROGRESS NOTES
Verified with Dr. Chaves's nurse Janice Miles that no further labs needed.  Correct per Dr. Chaves's order.

## 2024-08-12 ENCOUNTER — INFUSION (OUTPATIENT)
Dept: ONCOLOGY | Facility: HOSPITAL | Age: 69
End: 2024-08-12
Payer: MEDICARE

## 2024-08-12 ENCOUNTER — TRANSCRIBE ORDERS (OUTPATIENT)
Dept: LAB | Facility: HOSPITAL | Age: 69
End: 2024-08-12
Payer: MEDICARE

## 2024-08-12 VITALS
DIASTOLIC BLOOD PRESSURE: 46 MMHG | OXYGEN SATURATION: 98 % | HEART RATE: 81 BPM | RESPIRATION RATE: 18 BRPM | TEMPERATURE: 97.1 F | SYSTOLIC BLOOD PRESSURE: 89 MMHG

## 2024-08-12 DIAGNOSIS — D63.8 CHRONIC DISEASE ANEMIA: ICD-10-CM

## 2024-08-12 DIAGNOSIS — D61.9 APLASTIC ANEMIA, UNSPECIFIED: Primary | ICD-10-CM

## 2024-08-12 DIAGNOSIS — D63.8 ANEMIA, CHRONIC DISEASE: Primary | ICD-10-CM

## 2024-08-12 DIAGNOSIS — B70.0 MEGALOBLASTIC ANEMIA DUE TO FISH TAPEWORM: ICD-10-CM

## 2024-08-12 DIAGNOSIS — D63.8 MEGALOBLASTIC ANEMIA DUE TO FISH TAPEWORM: ICD-10-CM

## 2024-08-12 PROCEDURE — 96372 THER/PROPH/DIAG INJ SC/IM: CPT

## 2024-08-12 PROCEDURE — 25010000002 EPOETIN ALFA-EPBX 20000 UNIT/ML SOLUTION: Performed by: FAMILY MEDICINE

## 2024-08-12 RX ADMIN — EPOETIN ALFA-EPBX 10000 UNITS: 20000 INJECTION, SOLUTION INTRAVENOUS; SUBCUTANEOUS at 12:02

## 2024-08-13 ENCOUNTER — APPOINTMENT (OUTPATIENT)
Dept: GENERAL RADIOLOGY | Facility: HOSPITAL | Age: 69
End: 2024-08-13
Payer: MEDICARE

## 2024-08-13 ENCOUNTER — HOSPITAL ENCOUNTER (EMERGENCY)
Facility: HOSPITAL | Age: 69
Discharge: HOME | End: 2024-08-14
Payer: MEDICARE

## 2024-08-13 ENCOUNTER — APPOINTMENT (OUTPATIENT)
Dept: CT IMAGING | Facility: HOSPITAL | Age: 69
End: 2024-08-13
Payer: MEDICARE

## 2024-08-13 DIAGNOSIS — M25.561 ACUTE PAIN OF RIGHT KNEE: ICD-10-CM

## 2024-08-13 DIAGNOSIS — M25.461 EFFUSION OF RIGHT KNEE: ICD-10-CM
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
## 2024-08-13 PROCEDURE — 73562 X-RAY EXAM OF KNEE 3: CPT

## 2024-08-13 PROCEDURE — 25010000002 HYDROMORPHONE PER 4 MG: Performed by: NURSE PRACTITIONER

## 2024-08-13 PROCEDURE — 99284 EMERGENCY DEPT VISIT MOD MDM: CPT

## 2024-08-13 PROCEDURE — 96372 THER/PROPH/DIAG INJ SC/IM: CPT

## 2024-08-13 PROCEDURE — 73700 CT LOWER EXTREMITY W/O DYE: CPT

## 2024-08-13 PROCEDURE — 73590 X-RAY EXAM OF LOWER LEG: CPT

## 2024-08-13 PROCEDURE — 63710000001 ONDANSETRON ODT 4 MG TABLET DISPERSIBLE: Performed by: NURSE PRACTITIONER

## 2024-08-13 RX ORDER — ONDANSETRON 4 MG/1
4 TABLET, ORALLY DISINTEGRATING ORAL ONCE
Status: COMPLETED | OUTPATIENT
Start: 2024-08-13 | End: 2024-08-13

## 2024-08-13 RX ORDER — HYDROMORPHONE HYDROCHLORIDE 1 MG/ML
0.5 INJECTION, SOLUTION INTRAMUSCULAR; INTRAVENOUS; SUBCUTANEOUS ONCE
Status: COMPLETED | OUTPATIENT
Start: 2024-08-13 | End: 2024-08-13

## 2024-08-13 RX ADMIN — ONDANSETRON 4 MG: 4 TABLET, ORALLY DISINTEGRATING ORAL at 22:06

## 2024-08-13 RX ADMIN — HYDROMORPHONE HYDROCHLORIDE 0.5 MG: 1 INJECTION, SOLUTION INTRAMUSCULAR; INTRAVENOUS; SUBCUTANEOUS at 22:06

## 2024-08-14 ENCOUNTER — TELEPHONE (OUTPATIENT)
Dept: EMERGENCY DEPT | Facility: HOSPITAL | Age: 69
End: 2024-08-14
Payer: MEDICARE

## 2024-08-14 ENCOUNTER — INFUSION (OUTPATIENT)
Dept: ONCOLOGY | Facility: HOSPITAL | Age: 69
End: 2024-08-14
Payer: MEDICARE

## 2024-08-14 VITALS
RESPIRATION RATE: 18 BRPM | HEART RATE: 85 BPM | TEMPERATURE: 97 F | OXYGEN SATURATION: 100 % | SYSTOLIC BLOOD PRESSURE: 99 MMHG | DIASTOLIC BLOOD PRESSURE: 45 MMHG

## 2024-08-14 VITALS
HEART RATE: 87 BPM | BODY MASS INDEX: 26.46 KG/M2 | TEMPERATURE: 98 F | HEIGHT: 71 IN | OXYGEN SATURATION: 99 % | WEIGHT: 189 LBS | DIASTOLIC BLOOD PRESSURE: 64 MMHG | RESPIRATION RATE: 18 BRPM | SYSTOLIC BLOOD PRESSURE: 114 MMHG

## 2024-08-14 DIAGNOSIS — D63.8 ANEMIA, CHRONIC DISEASE: Primary | ICD-10-CM

## 2024-08-14 PROCEDURE — 25010000002 EPOETIN ALFA-EPBX 20000 UNIT/ML SOLUTION: Performed by: FAMILY MEDICINE

## 2024-08-14 PROCEDURE — 96372 THER/PROPH/DIAG INJ SC/IM: CPT

## 2024-08-14 RX ORDER — HYDROCODONE BITARTRATE AND ACETAMINOPHEN 5; 325 MG/1; MG/1
1 TABLET ORAL EVERY 4 HOURS PRN
Qty: 15 TABLET | Refills: 0 | Status: SHIPPED | OUTPATIENT
Start: 2024-08-14

## 2024-08-14 RX ADMIN — EPOETIN ALFA-EPBX 10000 UNITS: 20000 INJECTION, SOLUTION INTRAVENOUS; SUBCUTANEOUS at 15:13

## 2024-08-14 NOTE — DISCHARGE INSTRUCTIONS
Elevate extremity at rest;  Ice for 20 minutes on and then 20 minutes off for the next few days and then you may apply warm moist heat for comfort measures;  Avoid weight bearing;  Maintain fall precautions;  F/u with orthopedics for further evaluation as previously discussed

## 2024-08-14 NOTE — ED PROVIDER NOTES
Subjective   History of Present Illness  Patient is a 69-year-old male who presents to the ER secondary to a fall.  He states he was walking in his home and missed a small step that he describes as a step down into another room.  He reports landing on his right knee.  He denies hitting his head or loss of consciousness.  He has no back pain, no loss of bowel or bladder control or saddle anesthesia.  He has previous history of knee replacement.  He is unable to bear any weight to the right extremity.  Complains of pain only to the right knee and right lower leg and presents for further evaluation.  Past medical history significant for anemia, anxiety, arthritis, BPH, CAD, non-Hodgkin's lymphoma, diabetes, thyroid disease, GERD, hyperlipidemia, hypertension, iliac artery aneurysm, kidney stones, liver cirrhosis, migraines, sleep apnea        Review of Systems   Constitutional: Negative.  Negative for fever.   HENT: Negative.  Negative for congestion.    Respiratory:  Negative for cough.    Cardiovascular: Negative.  Negative for chest pain.   Gastrointestinal: Negative.  Negative for abdominal pain, constipation, diarrhea, nausea and vomiting.   Genitourinary: Negative.  Negative for dysuria.   Musculoskeletal:  Negative for back pain and neck pain.        Positive for right knee pain   Skin: Negative.  Negative for rash.   All other systems reviewed and are negative.      Past Medical History:   Diagnosis Date    Anemia     Anxiety     Arthritis     BPH (benign prostatic hypertrophy)     CAD (coronary artery disease)     Cancer     non-hodgkins lymphoma    Diabetes mellitus     Disease of thyroid gland     GERD (gastroesophageal reflux disease)     Hearing loss     History of transfusion     Hyperlipidemia     Hypertension     Iliac artery aneurysm, bilateral     Kidney stone     Liver cirrhosis     Migraines     Sleep apnea     c-pap       Allergies   Allergen Reactions    Adhesive Tape Rash     Pt states that if it  isn't left on very long it is okay    Cefazolin Rash    Cefuroxime Axetil Rash    Cephalosporins Rash    Neomycin-Polymyxin B Gu Rash    Percocet [Oxycodone-Acetaminophen] Rash       Past Surgical History:   Procedure Laterality Date    COLONOSCOPY  02/04/2014    COLONOSCOPY N/A 4/26/2017    Procedure: COLONOSCOPY WITH ANESTHESIA;  Surgeon: Sher Cui MD;  Location: [facility] ENDOSCOPY;  Service:     CORONARY ARTERY BYPASS GRAFT      2    CYSTOSCOPY URETEROSCOPY LASER LITHOTRIPSY Left 4/17/2017    Procedure: URETEROSCOPY LASER LITHOTRIPSY WITH DOUBLE J STENT INSERTION, LEFT ;  Surgeon: Weston Peck MD;  Location:  [facility] OR;  Service:     CYSTOSCOPY URETEROSCOPY LASER LITHOTRIPSY Left 8/14/2017    Procedure: CYSTOSCOPY URETEROSCOPY LASER LITHOTRIPSY STENT INSERTION STONE EXTRACTION;  Surgeon: Weston Peck MD;  Location:  [facility] OR;  Service:     CYSTOSCOPY W/ URETERAL STENT PLACEMENT Left 4/17/2017    Procedure: CYSTOSCOPY URETERAL DOUBLE J STENT INSERTION, LEFT;  Surgeon: Weston Peck MD;  Location: [facility] OR;  Service:     ENDOSCOPY N/A 4/26/2017    Procedure: ESOPHAGOGASTRODUODENOSCOPY WITH ANESTHESIA;  Surgeon: Sher Cui MD;  Location: [facility] ENDOSCOPY;  Service:     INCISION AND DRAINAGE LEG Right 7/12/2023    Procedure: INCISION AND DRAINAGE - RIGHT FOOT;  Surgeon: Silas Swan DPM;  Location:  [facility] OR;  Service: Podiatry;  Laterality: Right;    JOINT REPLACEMENT Right     KIDNEY STONE SURGERY      KNEE SURGERY      replacement    NECK SURGERY      ROTATOR CUFF REPAIR      SHOULDER SURGERY      TONSILLECTOMY      TRANS METATARSAL AMPUTATION Right 8/9/2023    Procedure: Partial 4th Ray Amputation - Right Foot;  Surgeon: Silas Swan DPM;  Location:  [facility] OR;  Service: Podiatry;  Laterality: Right;    URETEROSCOPY LASER LITHOTRIPSY WITH STENT INSERTION Left 9/1/2022    Procedure: LEFT URETEROSCOPY LASER LITHOTRIPSY WITH STENT INSERTION;  Surgeon: Weston Peck  MD Alejo;  Location:  [facility] OR;  Service: Urology;  Laterality: Left;    URETEROSCOPY LASER LITHOTRIPSY WITH STENT INSERTION Left 9/15/2022    Procedure: LEFT URETEROSCOPY LASER LITHOTRIPSY WITH STENT EXCHANGE;  Surgeon: Weston Peck MD;  Location:  [facility] OR;  Service: Urology;  Laterality: Left;       Family History   Problem Relation Age of Onset    Kidney disease Father     Heart disease Father     Cancer Mother         brain    Colon cancer Neg Hx     Colon polyps Neg Hx        Social History     Socioeconomic History    Marital status:    Tobacco Use    Smoking status: Former     Current packs/day: 0.00     Types: Cigarettes     Quit date:      Years since quittin.6     Passive exposure: Never    Smokeless tobacco: Never   Vaping Use    Vaping status: Never Used   Substance and Sexual Activity    Alcohol use: No    Drug use: No    Sexual activity: Defer           Objective   Physical Exam  Vitals and nursing note reviewed.   Constitutional:       General: He is not in acute distress.     Appearance: He is well-developed. He is not diaphoretic.   HENT:      Head: Atraumatic.      Right Ear: External ear normal.      Left Ear: External ear normal.      Nose: Nose normal.      Mouth/Throat:      Pharynx: Oropharynx is clear.   Eyes:      General: No scleral icterus.     Extraocular Movements: Extraocular movements intact.      Conjunctiva/sclera: Conjunctivae normal.   Neck:      Thyroid: No thyromegaly.      Vascular: No JVD.   Cardiovascular:      Rate and Rhythm: Normal rate and regular rhythm.      Heart sounds: Normal heart sounds. No murmur heard.  Pulmonary:      Effort: Pulmonary effort is normal. No respiratory distress.      Breath sounds: Normal breath sounds. No wheezing or rales.   Chest:      Chest wall: No tenderness.   Abdominal:      General: Bowel sounds are normal. There is no distension.      Palpations: Abdomen is soft. There is no mass.      Tenderness: There is  no abdominal tenderness. There is no guarding or rebound.   Musculoskeletal:         General: Swelling, tenderness and signs of injury present. No deformity. Normal range of motion.      Cervical back: Normal range of motion and neck supple.      Comments: Patient has pain to palpation of the anterior aspect of the right knee as well as the right tib-fib, no obvious dislocation noted, mild swelling noted, range of motion intact, pulses palpable bilaterally, patient is neurologically and neurovascularly intact   Lymphadenopathy:      Cervical: No cervical adenopathy.   Skin:     General: Skin is warm and dry.      Coloration: Skin is not pale.      Findings: No erythema or rash.   Neurological:      Mental Status: He is alert and oriented to person, place, and time.      Cranial Nerves: No cranial nerve deficit.      Coordination: Coordination normal.      Deep Tendon Reflexes: Reflexes are normal and symmetric.   Psychiatric:         Mood and Affect: Mood normal.         Behavior: Behavior normal.         Thought Content: Thought content normal.         Judgment: Judgment normal.         Procedures           ED Course  ED Course as of 08/14/24 0122   Wed Aug 14, 2024   0116 CT scan of the right lower extremity is read by stat read.  Limited exam due to metallic artifact.  Patient is status post total knee replacement.  There is a knee joint effusion.  No acute fracture or dislocation is seen.  There are hypertrophic degenerative changes with patellar spurring.  Plan is to place patient in a knee immobilizer.  We will write a prescription for a wheelchair to be used as needed as patient is unable to bear any weight at this time to his extremity.  He also has a walker at home which he may use.  He will need to elevate the extremity at rest and apply ice for 20 minutes on and then 20 minutes off for the next 24 to 48 hours and then he may apply warm moist heat for comfort measures.  We will recommend close follow-up  with orthopedics for further evaluation.  For further evaluation.  He will be discharged home shortly in stable condition. [TW]      ED Course User Index  [TW] Terrie Romo APRN                                             Medical Decision Making  Patient is a 69-year-old male who presents to the ER secondary to a fall.  He states he was walking in his home and missed a small step that he describes as a step down into another room.  He reports landing on his right knee.  He denies hitting his head or loss of consciousness.  He has no back pain, no loss of bowel or bladder control or saddle anesthesia.  He has previous history of knee replacement.  He is unable to bear any weight to the right extremity.  Complains of pain only to the right knee and right lower leg and presents for further evaluation.  Past medical history significant for anemia, anxiety, arthritis, BPH, CAD, non-Hodgkin's lymphoma, diabetes, thyroid disease, GERD, hyperlipidemia, hypertension, iliac artery aneurysm, kidney stones, liver cirrhosis, migraines, sleep apnea  Differential diagnosis: Bony fracture, dislocation, contusion, sprain, and other    XR Knee 3 View Right    (Results Pending)  XR Tibia Fibula 2 View Right    (Results Pending)  CT Lower Extremity Right Without Contrast    (Results Pending)     CT scan of the right lower extremity is read by stat read.  Limited exam due to metallic artifact.  Patient is status post total knee replacement.  There is a knee joint effusion.  No acute fracture or dislocation is seen.  There are hypertrophic degenerative changes with patellar spurring.  Plan is to place patient in a knee immobilizer.  We will write a prescription for a wheelchair to be used as needed as patient is unable to bear any weight at this time to his extremity.  He also has a walker at home which he may use.  He will need to elevate the extremity at rest and apply ice for 20 minutes on and then 20 minutes off for the next  24 to 48 hours and then he may apply warm moist heat for comfort measures.  We will recommend close follow-up with orthopedics for further evaluation.  For further evaluation.  He will be discharged home shortly in stable condition.      Amount and/or Complexity of Data Reviewed  Radiology: ordered.    Risk  Prescription drug management.        Final diagnoses:   Fall, initial encounter   Effusion of right knee   Acute pain of right knee       ED Disposition  ED Disposition       ED Disposition   Discharge    Condition   Good    Comment   --               No follow-up provider specified.       Medication List        New Prescriptions      HYDROcodone-acetaminophen 5-325 MG per tablet  Commonly known as: NORCO  Take 1 tablet by mouth Every 4 (Four) Hours As Needed for Moderate Pain.               Where to Get Your Medications        These medications were sent to [id number] - [place], [place] - [address] - [phone]  - [phone] FX  [address], [place] [address]      Phone: [phone]   HYDROcodone-acetaminophen 5-325 MG per tablet            Terrie Romo, APRN  08/14/24 0123

## 2024-08-16 ENCOUNTER — INFUSION (OUTPATIENT)
Dept: ONCOLOGY | Facility: HOSPITAL | Age: 69
End: 2024-08-16
Payer: MEDICARE

## 2024-08-16 VITALS
DIASTOLIC BLOOD PRESSURE: 43 MMHG | RESPIRATION RATE: 16 BRPM | SYSTOLIC BLOOD PRESSURE: 86 MMHG | TEMPERATURE: 97 F | HEART RATE: 86 BPM | OXYGEN SATURATION: 100 %

## 2024-08-16 DIAGNOSIS — D63.8 ANEMIA, CHRONIC DISEASE: Primary | ICD-10-CM

## 2024-08-16 PROCEDURE — 25010000002 EPOETIN ALFA-EPBX 20000 UNIT/ML SOLUTION: Performed by: FAMILY MEDICINE

## 2024-08-16 PROCEDURE — 96372 THER/PROPH/DIAG INJ SC/IM: CPT

## 2024-08-16 RX ADMIN — EPOETIN ALFA-EPBX 10000 UNITS: 20000 INJECTION, SOLUTION INTRAVENOUS; SUBCUTANEOUS at 12:06

## 2024-08-19 ENCOUNTER — LAB (OUTPATIENT)
Dept: LAB | Facility: HOSPITAL | Age: 69
End: 2024-08-19
Payer: MEDICARE

## 2024-08-19 ENCOUNTER — INFUSION (OUTPATIENT)
Dept: ONCOLOGY | Facility: HOSPITAL | Age: 69
End: 2024-08-19
Payer: MEDICARE

## 2024-08-19 ENCOUNTER — TRANSCRIBE ORDERS (OUTPATIENT)
Dept: ADMINISTRATIVE | Facility: HOSPITAL | Age: 69
End: 2024-08-19
Payer: MEDICARE

## 2024-08-19 ENCOUNTER — HOSPITAL ENCOUNTER (INPATIENT)
Age: 69
LOS: 10 days | Discharge: NURSING, REHAB OR LONG-TERM CARE | End: 2024-08-29
Attending: FAMILY MEDICINE | Admitting: FAMILY MEDICINE
Payer: MEDICARE

## 2024-08-19 VITALS
TEMPERATURE: 96.7 F | HEART RATE: 89 BPM | DIASTOLIC BLOOD PRESSURE: 51 MMHG | RESPIRATION RATE: 16 BRPM | SYSTOLIC BLOOD PRESSURE: 100 MMHG | OXYGEN SATURATION: 100 %

## 2024-08-19 DIAGNOSIS — D63.8 CHRONIC DISEASE ANEMIA: ICD-10-CM

## 2024-08-19 DIAGNOSIS — D61.9 APLASTIC ANEMIA, UNSPECIFIED: ICD-10-CM

## 2024-08-19 DIAGNOSIS — M25.561 ACUTE PAIN OF RIGHT KNEE: Primary | ICD-10-CM

## 2024-08-19 DIAGNOSIS — S72.414D CLOSED NONDISPLACED FRACTURE OF CONDYLE OF RIGHT FEMUR WITH ROUTINE HEALING: Primary | ICD-10-CM

## 2024-08-19 DIAGNOSIS — S72.414A CLOSED NONDISPLACED FRACTURE OF CONDYLE OF RIGHT FEMUR, INITIAL ENCOUNTER: ICD-10-CM

## 2024-08-19 DIAGNOSIS — D63.8 ANEMIA, CHRONIC DISEASE: Primary | ICD-10-CM

## 2024-08-19 LAB
ALBUMIN SERPL-MCNC: 3.6 G/DL (ref 3.5–5.2)
ALP SERPL-CCNC: 100 U/L (ref 40–130)
ALT SERPL-CCNC: 22 U/L (ref 5–41)
ANION GAP SERPL CALCULATED.3IONS-SCNC: 15 MMOL/L (ref 5–15)
ANION GAP SERPL CALCULATED.3IONS-SCNC: 15 MMOL/L (ref 7–19)
AST SERPL-CCNC: 35 U/L (ref 5–40)
BASOPHILS # BLD: 0 K/UL (ref 0–0.2)
BASOPHILS NFR BLD: 0.4 % (ref 0–1)
BILIRUB SERPL-MCNC: 1 MG/DL (ref 0.2–1.2)
BUN SERPL-MCNC: 18 MG/DL (ref 8–23)
BUN SERPL-MCNC: 19 MG/DL (ref 8–23)
BUN/CREAT SERPL: 15.7 (ref 7–25)
CALCIUM SERPL-MCNC: 9.7 MG/DL (ref 8.8–10.2)
CALCIUM SPEC-SCNC: 9.7 MG/DL (ref 8.6–10.5)
CHLORIDE SERPL-SCNC: 100 MMOL/L (ref 98–107)
CHLORIDE SERPL-SCNC: 100 MMOL/L (ref 98–111)
CO2 SERPL-SCNC: 25 MMOL/L (ref 22–29)
CO2 SERPL-SCNC: 25 MMOL/L (ref 22–29)
CREAT SERPL-MCNC: 1.2 MG/DL (ref 0.5–1.2)
CREAT SERPL-MCNC: 1.21 MG/DL (ref 0.76–1.27)
DEPRECATED RDW RBC AUTO: 67.1 FL (ref 37–54)
EGFRCR SERPLBLD CKD-EPI 2021: 64 ML/MIN/1.73
EOSINOPHIL # BLD: 0.1 K/UL (ref 0–0.6)
EOSINOPHIL NFR BLD: 1.4 % (ref 0–5)
ERYTHROCYTE [DISTWIDTH] IN BLOOD BY AUTOMATED COUNT: 16.3 % (ref 11.5–14.5)
ERYTHROCYTE [DISTWIDTH] IN BLOOD BY AUTOMATED COUNT: 16.3 % (ref 12.3–15.4)
FERRITIN SERPL-MCNC: 299.1 NG/ML (ref 30–400)
FOLATE SERPL-MCNC: 18.8 NG/ML (ref 4.5–32.2)
GLUCOSE BLD-MCNC: 201 MG/DL (ref 70–99)
GLUCOSE SERPL-MCNC: 126 MG/DL (ref 74–109)
GLUCOSE SERPL-MCNC: 181 MG/DL (ref 65–99)
HCT VFR BLD AUTO: 25.7 % (ref 42–52)
HCT VFR BLD AUTO: 27.7 % (ref 37.5–51)
HGB BLD-MCNC: 8.7 G/DL (ref 14–18)
HGB BLD-MCNC: 8.8 G/DL (ref 13–17.7)
IMM GRANULOCYTES # BLD: 0 K/UL
INR PPP: 1.24 (ref 0.88–1.18)
IRON 24H UR-MRATE: 72 MCG/DL (ref 59–158)
IRON SATN MFR SERPL: 26 % (ref 14–50)
IRON SATN MFR SERPL: 32 % (ref 20–50)
IRON SERPL-MCNC: 47 UG/DL (ref 59–158)
LACTATE BLDV-SCNC: 1.6 MMOL/L (ref 0.5–1.9)
LYMPHOCYTES # BLD: 0.9 K/UL (ref 1.1–4.5)
LYMPHOCYTES NFR BLD: 17.6 % (ref 20–40)
MAGNESIUM SERPL-MCNC: 2.4 MG/DL (ref 1.6–2.4)
MCH RBC QN AUTO: 35.6 PG (ref 26.6–33)
MCH RBC QN AUTO: 39 PG (ref 27–31)
MCHC RBC AUTO-ENTMCNC: 31.8 G/DL (ref 31.5–35.7)
MCHC RBC AUTO-ENTMCNC: 33.9 G/DL (ref 33–37)
MCV RBC AUTO: 112.1 FL (ref 79–97)
MCV RBC AUTO: 115.2 FL (ref 80–94)
MONOCYTES # BLD: 0.7 K/UL (ref 0–0.9)
MONOCYTES NFR BLD: 14.3 % (ref 0–10)
NEUTROPHILS # BLD: 3.2 K/UL (ref 1.5–7.5)
NEUTS SEG NFR BLD: 66.1 % (ref 50–65)
PERFORMED ON: ABNORMAL
PHOSPHATE SERPL-MCNC: 2.9 MG/DL (ref 2.5–4.5)
PLATELET # BLD AUTO: 82 K/UL (ref 130–400)
PLATELET # BLD AUTO: 84 10*3/MM3 (ref 140–450)
PMV BLD AUTO: 10 FL (ref 9.4–12.4)
PMV BLD AUTO: 10.5 FL (ref 6–12)
POTASSIUM SERPL-SCNC: 3.4 MMOL/L (ref 3.5–5)
POTASSIUM SERPL-SCNC: 4.3 MMOL/L (ref 3.5–5.2)
PROT SERPL-MCNC: 6.6 G/DL (ref 6.6–8.7)
PROTHROMBIN TIME: 15.3 SEC (ref 12–14.6)
RBC # BLD AUTO: 2.23 M/UL (ref 4.7–6.1)
RBC # BLD AUTO: 2.47 10*6/MM3 (ref 4.14–5.8)
RETICS # AUTO: 0.07 M/UL (ref 0.03–0.12)
RETICS/RBC NFR: 3.05 % (ref 0.5–1.5)
SODIUM SERPL-SCNC: 140 MMOL/L (ref 136–145)
SODIUM SERPL-SCNC: 140 MMOL/L (ref 136–145)
TIBC SERPL-MCNC: 181 UG/DL (ref 250–400)
TIBC SERPL-MCNC: 224 MCG/DL (ref 298–536)
TRANSFERRIN SERPL-MCNC: 150 MG/DL (ref 200–360)
VIT B12 SERPL-MCNC: 288 PG/ML (ref 211–946)
WBC # BLD AUTO: 4.9 K/UL (ref 4.8–10.8)
WBC NRBC COR # BLD AUTO: 5.94 10*3/MM3 (ref 3.4–10.8)

## 2024-08-19 PROCEDURE — 83540 ASSAY OF IRON: CPT

## 2024-08-19 PROCEDURE — 2580000003 HC RX 258: Performed by: FAMILY MEDICINE

## 2024-08-19 PROCEDURE — 82962 GLUCOSE BLOOD TEST: CPT

## 2024-08-19 PROCEDURE — 82607 VITAMIN B-12: CPT

## 2024-08-19 PROCEDURE — 83735 ASSAY OF MAGNESIUM: CPT

## 2024-08-19 PROCEDURE — 96372 THER/PROPH/DIAG INJ SC/IM: CPT

## 2024-08-19 PROCEDURE — 80048 BASIC METABOLIC PNL TOTAL CA: CPT

## 2024-08-19 PROCEDURE — 36415 COLL VENOUS BLD VENIPUNCTURE: CPT

## 2024-08-19 PROCEDURE — 82668 ASSAY OF ERYTHROPOIETIN: CPT

## 2024-08-19 PROCEDURE — 83550 IRON BINDING TEST: CPT

## 2024-08-19 PROCEDURE — 84466 ASSAY OF TRANSFERRIN: CPT

## 2024-08-19 PROCEDURE — G0379 DIRECT REFER HOSPITAL OBSERV: HCPCS

## 2024-08-19 PROCEDURE — 82728 ASSAY OF FERRITIN: CPT

## 2024-08-19 PROCEDURE — 85025 COMPLETE CBC W/AUTO DIFF WBC: CPT

## 2024-08-19 PROCEDURE — 94760 N-INVAS EAR/PLS OXIMETRY 1: CPT

## 2024-08-19 PROCEDURE — 85027 COMPLETE CBC AUTOMATED: CPT

## 2024-08-19 PROCEDURE — 84100 ASSAY OF PHOSPHORUS: CPT

## 2024-08-19 PROCEDURE — 6370000000 HC RX 637 (ALT 250 FOR IP): Performed by: FAMILY MEDICINE

## 2024-08-19 PROCEDURE — 25010000002 EPOETIN ALFA-EPBX 20000 UNIT/ML SOLUTION: Performed by: FAMILY MEDICINE

## 2024-08-19 PROCEDURE — G0378 HOSPITAL OBSERVATION PER HR: HCPCS

## 2024-08-19 PROCEDURE — 1200000000 HC SEMI PRIVATE

## 2024-08-19 PROCEDURE — 85045 AUTOMATED RETICULOCYTE COUNT: CPT

## 2024-08-19 PROCEDURE — 82746 ASSAY OF FOLIC ACID SERUM: CPT

## 2024-08-19 PROCEDURE — 6360000002 HC RX W HCPCS: Performed by: FAMILY MEDICINE

## 2024-08-19 PROCEDURE — 83605 ASSAY OF LACTIC ACID: CPT

## 2024-08-19 PROCEDURE — 80053 COMPREHEN METABOLIC PANEL: CPT

## 2024-08-19 PROCEDURE — 85610 PROTHROMBIN TIME: CPT

## 2024-08-19 RX ORDER — ROSUVASTATIN CALCIUM 20 MG/1
40 TABLET, COATED ORAL DAILY
Status: DISCONTINUED | OUTPATIENT
Start: 2024-08-19 | End: 2024-08-30 | Stop reason: HOSPADM

## 2024-08-19 RX ORDER — GABAPENTIN 100 MG/1
100 CAPSULE ORAL NIGHTLY
Status: DISCONTINUED | OUTPATIENT
Start: 2024-08-19 | End: 2024-08-30 | Stop reason: HOSPADM

## 2024-08-19 RX ORDER — FERROUS SULFATE 325(65) MG
325 TABLET ORAL 2 TIMES DAILY
Status: DISCONTINUED | OUTPATIENT
Start: 2024-08-19 | End: 2024-08-30 | Stop reason: HOSPADM

## 2024-08-19 RX ORDER — INSULIN DEGLUDEC 100 U/ML
6 INJECTION, SOLUTION SUBCUTANEOUS NIGHTLY
Status: DISCONTINUED | OUTPATIENT
Start: 2024-08-19 | End: 2024-08-19 | Stop reason: CLARIF

## 2024-08-19 RX ORDER — PANTOPRAZOLE SODIUM 40 MG/1
40 TABLET, DELAYED RELEASE ORAL DAILY
Status: DISCONTINUED | OUTPATIENT
Start: 2024-08-20 | End: 2024-08-30 | Stop reason: HOSPADM

## 2024-08-19 RX ORDER — POLYETHYLENE GLYCOL 3350 17 G/17G
17 POWDER, FOR SOLUTION ORAL DAILY PRN
Status: DISCONTINUED | OUTPATIENT
Start: 2024-08-19 | End: 2024-08-28 | Stop reason: SDUPTHER

## 2024-08-19 RX ORDER — DOCUSATE SODIUM 100 MG/1
100 CAPSULE, LIQUID FILLED ORAL NIGHTLY
Status: DISCONTINUED | OUTPATIENT
Start: 2024-08-19 | End: 2024-08-30 | Stop reason: HOSPADM

## 2024-08-19 RX ORDER — MAGNESIUM SULFATE 1 G/100ML
1000 INJECTION INTRAVENOUS PRN
Status: DISCONTINUED | OUTPATIENT
Start: 2024-08-19 | End: 2024-08-30 | Stop reason: HOSPADM

## 2024-08-19 RX ORDER — SODIUM CHLORIDE 9 MG/ML
INJECTION, SOLUTION INTRAVENOUS PRN
Status: DISCONTINUED | OUTPATIENT
Start: 2024-08-19 | End: 2024-08-28 | Stop reason: SDUPTHER

## 2024-08-19 RX ORDER — POTASSIUM CHLORIDE 7.45 MG/ML
10 INJECTION INTRAVENOUS PRN
Status: DISCONTINUED | OUTPATIENT
Start: 2024-08-19 | End: 2024-08-30 | Stop reason: HOSPADM

## 2024-08-19 RX ORDER — METOPROLOL SUCCINATE 25 MG/1
25 TABLET, EXTENDED RELEASE ORAL DAILY
Status: DISCONTINUED | OUTPATIENT
Start: 2024-08-19 | End: 2024-08-30 | Stop reason: HOSPADM

## 2024-08-19 RX ORDER — BUSPIRONE HYDROCHLORIDE 15 MG/1
15 TABLET ORAL 2 TIMES DAILY
Status: DISCONTINUED | OUTPATIENT
Start: 2024-08-19 | End: 2024-08-30 | Stop reason: HOSPADM

## 2024-08-19 RX ORDER — SODIUM CHLORIDE 0.9 % (FLUSH) 0.9 %
5-40 SYRINGE (ML) INJECTION EVERY 12 HOURS SCHEDULED
Status: DISCONTINUED | OUTPATIENT
Start: 2024-08-19 | End: 2024-08-28 | Stop reason: SDUPTHER

## 2024-08-19 RX ORDER — DULOXETIN HYDROCHLORIDE 60 MG/1
60 CAPSULE, DELAYED RELEASE ORAL NIGHTLY
Status: DISCONTINUED | OUTPATIENT
Start: 2024-08-19 | End: 2024-08-30 | Stop reason: HOSPADM

## 2024-08-19 RX ORDER — POTASSIUM CHLORIDE 1500 MG/1
40 TABLET, EXTENDED RELEASE ORAL PRN
Status: DISCONTINUED | OUTPATIENT
Start: 2024-08-19 | End: 2024-08-30 | Stop reason: HOSPADM

## 2024-08-19 RX ORDER — INSULIN GLARGINE 100 [IU]/ML
5 INJECTION, SOLUTION SUBCUTANEOUS NIGHTLY
Status: DISCONTINUED | OUTPATIENT
Start: 2024-08-19 | End: 2024-08-30 | Stop reason: HOSPADM

## 2024-08-19 RX ORDER — CLOPIDOGREL BISULFATE 75 MG/1
75 TABLET ORAL DAILY
Status: DISCONTINUED | OUTPATIENT
Start: 2024-08-19 | End: 2024-08-30 | Stop reason: HOSPADM

## 2024-08-19 RX ORDER — ONDANSETRON 4 MG/1
4 TABLET, ORALLY DISINTEGRATING ORAL EVERY 8 HOURS PRN
Status: DISCONTINUED | OUTPATIENT
Start: 2024-08-19 | End: 2024-08-28 | Stop reason: SDUPTHER

## 2024-08-19 RX ORDER — ACETAMINOPHEN 325 MG/1
650 TABLET ORAL EVERY 6 HOURS PRN
Status: DISCONTINUED | OUTPATIENT
Start: 2024-08-19 | End: 2024-08-30 | Stop reason: HOSPADM

## 2024-08-19 RX ORDER — RANOLAZINE 500 MG/1
1000 TABLET, EXTENDED RELEASE ORAL 2 TIMES DAILY
Status: DISCONTINUED | OUTPATIENT
Start: 2024-08-19 | End: 2024-08-30 | Stop reason: HOSPADM

## 2024-08-19 RX ORDER — ONDANSETRON 2 MG/ML
4 INJECTION INTRAMUSCULAR; INTRAVENOUS EVERY 6 HOURS PRN
Status: DISCONTINUED | OUTPATIENT
Start: 2024-08-19 | End: 2024-08-28 | Stop reason: SDUPTHER

## 2024-08-19 RX ORDER — LEVOTHYROXINE SODIUM 50 UG/1
50 TABLET ORAL DAILY
Status: DISCONTINUED | OUTPATIENT
Start: 2024-08-20 | End: 2024-08-30 | Stop reason: HOSPADM

## 2024-08-19 RX ORDER — ENOXAPARIN SODIUM 100 MG/ML
40 INJECTION SUBCUTANEOUS DAILY
Status: DISCONTINUED | OUTPATIENT
Start: 2024-08-19 | End: 2024-08-24

## 2024-08-19 RX ORDER — SODIUM CHLORIDE 0.9 % (FLUSH) 0.9 %
10 SYRINGE (ML) INJECTION PRN
Status: DISCONTINUED | OUTPATIENT
Start: 2024-08-19 | End: 2024-08-28 | Stop reason: SDUPTHER

## 2024-08-19 RX ORDER — OXYCODONE AND ACETAMINOPHEN 10; 325 MG/1; MG/1
1 TABLET ORAL EVERY 4 HOURS PRN
Status: DISCONTINUED | OUTPATIENT
Start: 2024-08-19 | End: 2024-08-22

## 2024-08-19 RX ORDER — ACETAMINOPHEN 650 MG/1
650 SUPPOSITORY RECTAL EVERY 6 HOURS PRN
Status: DISCONTINUED | OUTPATIENT
Start: 2024-08-19 | End: 2024-08-30 | Stop reason: HOSPADM

## 2024-08-19 RX ORDER — SODIUM CHLORIDE 9 MG/ML
INJECTION, SOLUTION INTRAVENOUS CONTINUOUS
Status: ACTIVE | OUTPATIENT
Start: 2024-08-19 | End: 2024-08-21

## 2024-08-19 RX ADMIN — METOPROLOL SUCCINATE 25 MG: 25 TABLET, EXTENDED RELEASE ORAL at 18:36

## 2024-08-19 RX ADMIN — EPOETIN ALFA-EPBX 10000 UNITS: 20000 INJECTION, SOLUTION INTRAVENOUS; SUBCUTANEOUS at 14:39

## 2024-08-19 RX ADMIN — SODIUM CHLORIDE: 9 INJECTION, SOLUTION INTRAVENOUS at 20:05

## 2024-08-19 RX ADMIN — OXYCODONE HYDROCHLORIDE AND ACETAMINOPHEN 1 TABLET: 10; 325 TABLET ORAL at 20:27

## 2024-08-19 RX ADMIN — SODIUM CHLORIDE, PRESERVATIVE FREE 10 ML: 5 INJECTION INTRAVENOUS at 20:03

## 2024-08-19 RX ADMIN — METFORMIN HYDROCHLORIDE 500 MG: 500 TABLET ORAL at 18:36

## 2024-08-19 RX ADMIN — RANOLAZINE 1000 MG: 500 TABLET, FILM COATED, EXTENDED RELEASE ORAL at 20:27

## 2024-08-19 RX ADMIN — BUSPIRONE HYDROCHLORIDE 15 MG: 15 TABLET ORAL at 20:27

## 2024-08-19 RX ADMIN — DOCUSATE SODIUM 100 MG: 100 CAPSULE, LIQUID FILLED ORAL at 20:27

## 2024-08-19 RX ADMIN — EPOETIN ALFA-EPBX 10000 UNITS: 10000 INJECTION, SOLUTION INTRAVENOUS; SUBCUTANEOUS at 20:28

## 2024-08-19 RX ADMIN — GABAPENTIN 100 MG: 100 CAPSULE ORAL at 20:27

## 2024-08-19 RX ADMIN — FERROUS SULFATE TAB 325 MG (65 MG ELEMENTAL FE) 325 MG: 325 (65 FE) TAB at 20:27

## 2024-08-19 RX ADMIN — ROSUVASTATIN CALCIUM 40 MG: 20 TABLET, COATED ORAL at 18:36

## 2024-08-19 RX ADMIN — DULOXETINE HYDROCHLORIDE 60 MG: 60 CAPSULE, DELAYED RELEASE ORAL at 20:27

## 2024-08-19 RX ADMIN — SODIUM CHLORIDE 250 MG: 9 INJECTION, SOLUTION INTRAVENOUS at 20:33

## 2024-08-19 RX ADMIN — CLOPIDOGREL BISULFATE 75 MG: 75 TABLET ORAL at 18:36

## 2024-08-19 RX ADMIN — INSULIN GLARGINE 5 UNITS: 100 INJECTION, SOLUTION SUBCUTANEOUS at 20:28

## 2024-08-19 ASSESSMENT — PAIN SCALES - GENERAL
PAINLEVEL_OUTOF10: 6
PAINLEVEL_OUTOF10: 8

## 2024-08-19 NOTE — PROGRESS NOTES
PHARMACY NOTE  Daniel Ramirez was ordered Jardiance. Per the [facility] Formulary Committee, this medication is non-formulary if used for indication other then HF or CKD  It has been discontinued. The medication can be reordered at discharge.     Electronically signed by Ilda Franco RPH on 8/19/2024 at 6:27 PM

## 2024-08-19 NOTE — PROGRESS NOTES
Brief Office Admit note:  Mr. Ramirez is a 69 year old male who fell at home on the 18th and sustained a non-displaced condyle fracture of right knee. Seen by ortho this AM and kept in knee immobilizer. MRI scheduled for 8/29 but he is very weak with anemia, and underlying health. His wife is elderly and unable to assist. Grave concern for another fall and subseqent injury. Will admit, get MRI, ortho consult, address anemia, get PT?OT eval and ask Case mgt to attempt to get inpatient rehab .    Jasvir Chappell MD

## 2024-08-20 ENCOUNTER — APPOINTMENT (OUTPATIENT)
Dept: MRI IMAGING | Age: 69
End: 2024-08-20
Attending: FAMILY MEDICINE
Payer: MEDICARE

## 2024-08-20 LAB
BASOPHILS # BLD: 0 K/UL (ref 0–0.2)
BASOPHILS NFR BLD: 0.5 % (ref 0–1)
EOSINOPHIL # BLD: 0.1 K/UL (ref 0–0.6)
EOSINOPHIL NFR BLD: 2.4 % (ref 0–5)
ERYTHROCYTE [DISTWIDTH] IN BLOOD BY AUTOMATED COUNT: 16.1 % (ref 11.5–14.5)
GLUCOSE BLD-MCNC: 112 MG/DL (ref 70–99)
GLUCOSE BLD-MCNC: 154 MG/DL (ref 70–99)
GLUCOSE BLD-MCNC: 192 MG/DL (ref 70–99)
GLUCOSE BLD-MCNC: 447 MG/DL (ref 70–99)
HCT VFR BLD AUTO: 23.6 % (ref 42–52)
HGB BLD-MCNC: 7.7 G/DL (ref 14–18)
IMM GRANULOCYTES # BLD: 0 K/UL
LYMPHOCYTES # BLD: 1 K/UL (ref 1.1–4.5)
LYMPHOCYTES NFR BLD: 22.7 % (ref 20–40)
MCH RBC QN AUTO: 37.9 PG (ref 27–31)
MCHC RBC AUTO-ENTMCNC: 32.6 G/DL (ref 33–37)
MCV RBC AUTO: 116.3 FL (ref 80–94)
MONOCYTES # BLD: 0.6 K/UL (ref 0–0.9)
MONOCYTES NFR BLD: 13.5 % (ref 0–10)
NEUTROPHILS # BLD: 2.6 K/UL (ref 1.5–7.5)
NEUTS SEG NFR BLD: 60.4 % (ref 50–65)
PERFORMED ON: ABNORMAL
PLATELET # BLD AUTO: 72 K/UL (ref 130–400)
PMV BLD AUTO: 10.1 FL (ref 9.4–12.4)
RBC # BLD AUTO: 2.03 M/UL (ref 4.7–6.1)
WBC # BLD AUTO: 4.2 K/UL (ref 4.8–10.8)

## 2024-08-20 PROCEDURE — 6360000004 HC RX CONTRAST MEDICATION: Performed by: FAMILY MEDICINE

## 2024-08-20 PROCEDURE — 36415 COLL VENOUS BLD VENIPUNCTURE: CPT

## 2024-08-20 PROCEDURE — A9577 INJ MULTIHANCE: HCPCS | Performed by: FAMILY MEDICINE

## 2024-08-20 PROCEDURE — 94760 N-INVAS EAR/PLS OXIMETRY 1: CPT

## 2024-08-20 PROCEDURE — 85025 COMPLETE CBC W/AUTO DIFF WBC: CPT

## 2024-08-20 PROCEDURE — 2580000003 HC RX 258: Performed by: FAMILY MEDICINE

## 2024-08-20 PROCEDURE — 70553 MRI BRAIN STEM W/O & W/DYE: CPT

## 2024-08-20 PROCEDURE — 82962 GLUCOSE BLOOD TEST: CPT

## 2024-08-20 PROCEDURE — 1200000000 HC SEMI PRIVATE

## 2024-08-20 PROCEDURE — 6370000000 HC RX 637 (ALT 250 FOR IP): Performed by: FAMILY MEDICINE

## 2024-08-20 RX ORDER — 0.9 % SODIUM CHLORIDE 0.9 %
500 INTRAVENOUS SOLUTION INTRAVENOUS ONCE
Status: COMPLETED | OUTPATIENT
Start: 2024-08-20 | End: 2024-08-20

## 2024-08-20 RX ADMIN — METFORMIN HYDROCHLORIDE 500 MG: 500 TABLET ORAL at 07:33

## 2024-08-20 RX ADMIN — GABAPENTIN 100 MG: 100 CAPSULE ORAL at 19:58

## 2024-08-20 RX ADMIN — PANTOPRAZOLE SODIUM 40 MG: 40 TABLET, DELAYED RELEASE ORAL at 07:33

## 2024-08-20 RX ADMIN — FERROUS SULFATE TAB 325 MG (65 MG ELEMENTAL FE) 325 MG: 325 (65 FE) TAB at 19:58

## 2024-08-20 RX ADMIN — CLOPIDOGREL BISULFATE 75 MG: 75 TABLET ORAL at 07:33

## 2024-08-20 RX ADMIN — INSULIN GLARGINE 5 UNITS: 100 INJECTION, SOLUTION SUBCUTANEOUS at 19:57

## 2024-08-20 RX ADMIN — SODIUM CHLORIDE: 9 INJECTION, SOLUTION INTRAVENOUS at 09:55

## 2024-08-20 RX ADMIN — GADOBENATE DIMEGLUMINE 18 ML: 529 INJECTION, SOLUTION INTRAVENOUS at 18:32

## 2024-08-20 RX ADMIN — SODIUM CHLORIDE: 9 INJECTION, SOLUTION INTRAVENOUS at 19:14

## 2024-08-20 RX ADMIN — FERROUS SULFATE TAB 325 MG (65 MG ELEMENTAL FE) 325 MG: 325 (65 FE) TAB at 07:33

## 2024-08-20 RX ADMIN — BUSPIRONE HYDROCHLORIDE 15 MG: 15 TABLET ORAL at 07:33

## 2024-08-20 RX ADMIN — LEVOTHYROXINE SODIUM 50 MCG: 50 TABLET ORAL at 05:21

## 2024-08-20 RX ADMIN — DOCUSATE SODIUM 100 MG: 100 CAPSULE, LIQUID FILLED ORAL at 19:58

## 2024-08-20 RX ADMIN — RANOLAZINE 1000 MG: 500 TABLET, FILM COATED, EXTENDED RELEASE ORAL at 19:58

## 2024-08-20 RX ADMIN — BUSPIRONE HYDROCHLORIDE 15 MG: 15 TABLET ORAL at 19:58

## 2024-08-20 RX ADMIN — OXYCODONE HYDROCHLORIDE AND ACETAMINOPHEN 1 TABLET: 10; 325 TABLET ORAL at 03:24

## 2024-08-20 RX ADMIN — SODIUM CHLORIDE 500 ML: 9 INJECTION, SOLUTION INTRAVENOUS at 10:10

## 2024-08-20 RX ADMIN — RANOLAZINE 1000 MG: 500 TABLET, FILM COATED, EXTENDED RELEASE ORAL at 07:33

## 2024-08-20 RX ADMIN — DULOXETINE HYDROCHLORIDE 60 MG: 60 CAPSULE, DELAYED RELEASE ORAL at 19:58

## 2024-08-20 RX ADMIN — ROSUVASTATIN CALCIUM 40 MG: 20 TABLET, COATED ORAL at 07:33

## 2024-08-20 RX ADMIN — POLYETHYLENE GLYCOL 3350 17 G: 17 POWDER, FOR SOLUTION ORAL at 07:38

## 2024-08-20 RX ADMIN — POTASSIUM CHLORIDE 40 MEQ: 1500 TABLET, EXTENDED RELEASE ORAL at 05:21

## 2024-08-20 RX ADMIN — SODIUM CHLORIDE, PRESERVATIVE FREE 10 ML: 5 INJECTION INTRAVENOUS at 19:58

## 2024-08-20 ASSESSMENT — PAIN SCALES - GENERAL
PAINLEVEL_OUTOF10: 6
PAINLEVEL_OUTOF10: 8

## 2024-08-20 NOTE — CONSULTS
mg, 40 mg, SubCUTAneous, Daily, Jasvir Chappell MD    ondansetron (ZOFRAN-ODT) disintegrating tablet 4 mg, 4 mg, Oral, Q8H PRN **OR** ondansetron (ZOFRAN) injection 4 mg, 4 mg, IntraVENous, Q6H PRN, Jasvir Chappell MD    polyethylene glycol (GLYCOLAX) packet 17 g, 17 g, Oral, Daily PRN, Jasvir Chappell MD, 17 g at 08/20/24 0738    acetaminophen (TYLENOL) tablet 650 mg, 650 mg, Oral, Q6H PRN **OR** acetaminophen (TYLENOL) suppository 650 mg, 650 mg, Rectal, Q6H PRN, Jasvir Chappell MD    0.9 % sodium chloride infusion, , IntraVENous, Continuous, Jasvir Chappell MD, Last Rate: 75 mL/hr at 08/20/24 0955, New Bag at 08/20/24 0955    oxyCODONE-acetaminophen (PERCOCET)  MG per tablet 1 tablet, 1 tablet, Oral, Q4H PRN, Jasvir Chappell MD, 1 tablet at 08/20/24 0324    busPIRone (BUSPAR) tablet 15 mg, 15 mg, Oral, BID, Jasvir Chappell MD, 15 mg at 08/20/24 0733    clopidogrel (PLAVIX) tablet 75 mg, 75 mg, Oral, Daily, Jasvir Chappell MD, 75 mg at 08/20/24 0733    docusate sodium (COLACE) capsule 100 mg, 100 mg, Oral, Nightly, Jasvir Chappell MD, 100 mg at 08/19/24 2027    DULoxetine (CYMBALTA) extended release capsule 60 mg, 60 mg, Oral, Nightly, Jasvir Chappell MD, 60 mg at 08/19/24 2027    ferrous sulfate (IRON 325) tablet 325 mg, 325 mg, Oral, BID, Jasvir Chappell MD, 325 mg at 08/20/24 0733    gabapentin (NEURONTIN) capsule 100 mg, 100 mg, Oral, Nightly, Jasvir Chappell MD, 100 mg at 08/19/24 2027    levothyroxine (SYNTHROID) tablet 50 mcg, 50 mcg, Oral, Daily, Jasvir Chappell MD, 50 mcg at 08/20/24 0521    metFORMIN (GLUCOPHAGE) tablet 500 mg, 500 mg, Oral, BID WC, Jasvir Chappell MD, 500 mg at 08/20/24 0733    metoprolol succinate (TOPROL XL) extended release tablet 25 mg, 25 mg, Oral, Daily, Jasvir Chappell MD, 25 mg at 08/19/24 1836    pantoprazole (PROTONIX) tablet 40 mg, 40 mg, Oral, Daily, Jasvir Chappell MD, 40 mg at 08/20/24 0733    ranolazine  extensor mechanism rupture.  His patellar tendons also possible palpable and intact.  His patella seems to be at the right height.  He cannot maintain a straight leg raise.  He is tender to palpation over the medial femoral condyle.    Imaging:  CT Lower Extremity Right Without Contrast    Result Date: 8/14/2024  EXAM: CT LOWER EXTREMITY RIGHT WO CONTRAST- DATE: 8/13/2024 10:16 PM HISTORY: attention right knee and tib/fib region; fall; trauma; unable to bear weight   COMPARISON: X-rays 8/14/2024. DOSE LENGTH PRODUCT: 362.5 mGy.cm Automatic exposure control was utilized to make radiation dose as low as reasonably achievable. TECHNIQUE: Noncontract axial images of the right lower extremity obtained with multiplanar reformats. FINDINGS: There is a nondisplaced fracture of the medial aspect of the distal femur and lipohemarthrosis. The patient is status post right total knee arthroplasty. Fracture line comes close to but does not appear to extend to the femoral component of the arthroplasty. There our small fracture fragments in the suprapatellar joint space. There are vascular calcifications. No additional fracture or dislocation is seen. Well marginated area of sclerosis is noted at the distal femur may represent enchondroma or infarct. SOFT TISSUES: Edema is noted in the subcutaneous soft tissue.    1. Nondisplaced fracture of the medial aspect of the distal femur comes within 7 mm of the femoral component of a right total knee arthroplasty. 2. Lipohemarthrosis. These findings are in agreement with the critical and emergent findings from the [facility] preliminary report. The emergency room was notified of this report at 7:34 a.m. This report was signed and finalized on 8/14/2024 7:35 AM by Scott Rouse.    XR KNEE RIGHT (3 VIEWS)    Result Date: 8/14/2024  XR KNEE 3 VW RIGHT- 8/13/2024 9:02 PM HISTORY: fall; trauma; right knee pain COMPARISON: None available FINDINGS: Frontal and lateral radiographs of the knee  were provided for review. There is osteopenia. The patient is status post right total knee arthroplasty. No hardware complication is seen. Well-corticated calcifications are noted in the soft tissues medially. There are vascular calcifications in the soft tissues. There is lipohemarthrosis worrisome for fracture. Linear lucency is noted medially at the distal femur may represent nondisplaced fracture. Please see CT of the right lower extremity report.    1. Osteopenia and status post right total knee arthroplasty without visualized hardware complication. 2. Lipohemarthrosis and question nondisplaced fracture medial aspect distal femur. This report was signed and finalized on 8/14/2024 7:07 AM by Scott Rouse.    XR TIBIA FIBULA RIGHT (2 VIEWS)    Result Date: 8/14/2024  XR TIBIA FIBULA 2 VW RIGHT- 8/13/2024 9:02 PM HISTORY: fall; trauma; leg pain COMPARISON: None available FINDINGS: Frontal and lateral radiographs of the tibia and fibula were provided for review. There is osteopenia. The patient is status post right total knee arthroplasty. No hardware complication is seen. There are vascular calcifications in the soft tissues. No acute fracture or cortical erosion is seen. An ossific density is noted in the soft tissues medial to the proximal tibia but this appears well-corticated. Another well-corticated ossific density is noted in the soft tissues medial to the medial femoral condyle.    1. Osteopenia and right total knee arthroplasty. No acute fracture is visualized. This report was signed and finalized on 8/14/2024 7:02 AM by Scott Rosue.      Impression: 69-year-old male status post a right total knee arthroplasty by Dr. Osvaldo Cisneros in 2013.  Patient was just seen recently in Dr. Cisneros's clinic for knee pain after a fall.  An MRI was ordered but not obtained yet.  A recent CT scan demonstrated a nondisplaced fracture of the medial femur just above the prosthesis that is a vertical fracture line.  The

## 2024-08-20 NOTE — CARE COORDINATION
[facility]  Notification of Admission Decision      [] Patient has been accepted for admit to [facility] on :       Please write discharge orders and summary prior to discharge.    [] Patient acceptance to Rehab pending the following :    [x] Eval in progress : await ortho consult and plan      [] Patient determined to be ineligible for services at [facility] because :       We recommend you consider        Thank you for your referral, we appreciate you. If you have any questions, please feel   free to contact me at [phone].  Electronically Signed by Orquidea Ghotra, Admissions Coordinator 8/20/2024 12:55 PM

## 2024-08-20 NOTE — PROGRESS NOTES
Comprehensive Nutrition Assessment    Type and Reason for Visit:  Initial, Positive Nutrition Screen    Nutrition Recommendations/Plan:   Modify diet to CHO 5     Malnutrition Assessment:  Malnutrition Status:  At risk for malnutrition (Comment) (08/20/24 1036)    Context:  Acute Illness     Findings of the 6 clinical characteristics of malnutrition:  Energy Intake:  Unable to assess  Weight Loss:  No significant weight loss     Body Fat Loss:  No significant body fat loss     Muscle Mass Loss:  No significant muscle mass loss    Fluid Accumulation:  No significant fluid accumulation     Strength:  Not Performed    Nutrition Assessment:    Pt is currently on a Regular diet. He has a h/o DM and is on insulin. Will modify diet at this time to include CHO 5 restriction for better blood sugar control. Will monitor intakes closely to determine if further nutrition intervention is needed.    Current Nutrition Intake & Therapies:    ADULT DIET; Regular    Anthropometric Measures:  Height: 180.3 cm (5' 10.98\")  Ideal Body Weight (IBW): 172 lbs (78 kg)    Current Body Weight: 88.6 kg (195 lb 5.2 oz)  Current BMI (kg/m2): 27.3  BMI Categories: Overweight (BMI 25.0-29.9)    Estimated Daily Nutrient Needs:  Energy Requirements Based On: Kcal/kg  Weight Used for Energy Requirements: Current  Energy (kcal/day): 5062-9479 kcals/day  Weight Used for Protein Requirements: Ideal  Protein (g/day): 101-156 g/protein/day  Method Used for Fluid Requirements: 1 ml/kcal  Fluid (ml/day): 1874-3579 mL/day    Nutrition Diagnosis:   Altered nutrition-related lab values related to endocrine dysfuntion as evidenced by lab values    Nutrition Interventions:   Food and/or Nutrient Delivery: Modify Current Diet, IV Fluid Delivery  Coordination of Nutrition Care: Continue to monitor while inpatient    Goals:  Goals: PO intake 75% or greater    Nutrition Monitoring and Evaluation:   Food/Nutrient Intake Outcomes: Food and Nutrient

## 2024-08-20 NOTE — H&P
History and Physical    Patient:  Daniel Ramirez  MRN: [id number]    CHIEF COMPLAINT: Right knee pain      PCP: Jasvir Chappell MD    HISTORY OF PRESENT ILLNESS:   The patient is a 69 y.o. male with Right knee pain after falling at home.  He has complex medical history with chronic anemia, anxiety, non-Hodgkin's lymphoma, type 2 diabetes on insulin, hypertension, hyperlipidemia, cirrhosis, CAD.  He presented to [facility] ER on 8/14/2024 where imaging demonstrated a nondisplaced fracture of the medial aspect of the distal femur approximately 7 mm from the femoral component of the right TKA.  Unfortunately, the stat read in the emergency department read as \"no acute fracture\" and he was placed in a knee immobilizer and sent home.  He was seen in the office on 8/19/2024 due to persistent knee pain and was directly admitted to [facility] for pain control, orthopedic consultation, likely need for inpatient rehab as he lives at home with his elderly wife who is unable to assist him.        Past Medical History:      Diagnosis Date    Abnormal nuclear stress test 10/22/2014    Arthritis     BiPAP (biphasic positive airway pressure) dependence     8cm to 20cm    Cerebrovascular disease     Chronic kidney disease, stage II (mild) 08/17/2016    Olman Gross M.D.    Cirrhosis (HCC)     Coronary artery disease 02/24/2011    Depression     Diabetes mellitus (HCC)     Encounter for wound care     PT SEES \"GRACY\" WITH DR. SAMUEL    Great toe pain     RT    Headache     Hyperlipemia 02/24/2011    Dr. Pederson follows lipids.    Hyperlipidemia     Hypertension     Liver disease     LONG TERM ANTICOAGULENT USE     Low blood pressure 11/19/2014    lymphostatic lymphoma     Nausea 11/19/2014    Non Hodgkin's lymphoma (HCC)     Obesity     Obstructive sleep apnea     AHI:  21.7 per PSG, 7/2017    Peptic ulcer disease 02/24/2011    Restless leg     S/P CABG x 3 10/22/2014    SVG to RCA; SVG to LAD diagonal; LIMA to LAD(2004, 2014)     1.2   GLUCOSE 126*     Hepatic:   Recent Labs     08/19/24 1919   AST 35   ALT 22   BILITOT 1.0   ALKPHOS 100     Troponin T: No results for input(s): \"TROPONINI\" in the last 72 hours.  Pro-BNP: No results for input(s): \"BNP\" in the last 72 hours.  Lipids: No results for input(s): \"CHOL\", \"HDL\" in the last 72 hours.    Invalid input(s): \"LDLCALCU\"  ABGs:   Lab Results   Component Value Date/Time    PHART 7.420 08/26/2021 12:59 PM    PO2ART 64.0 08/26/2021 12:59 PM    LEA7IRS 36.0 08/26/2021 12:59 PM     INR:   Recent Labs     08/19/24 1919   INR 1.24*     -----------------------------------------------------------------  EKG:   Radiology:     No results found.    Assessment and Plan   Right femur fracture: Essentially periprosthetic fracture of the distal femur that is approximately 7 mm from the femoral portion of the TKA.  Orthopedic consultation is in place, appreciate recommendations.  PT/OT/CM as he will likely need inpatient rehab.  - MRI is ordered, not sure how helpful this will be given the hardware that is in place.  CT from [facility] is available in epic.    Type 2 diabetes: BG very elevated, will add on SSI    Hypokalemia: Replete and monitor.    Anemia: Hemoglobin 7.7, monitor and transfuse to maintain hemoglobin greater than 7.    CODE STATUS: Full    DVT prophylaxis: Lovenox    Disposition: Inpatient, anticipate SNF versus inpatient rehab    Principal Problem:    Closed nondisplaced fracture of condyle of right femur with routine healing  Resolved Problems:    * No resolved hospital problems. *      Sabas Pérez MD  8/20/2024 7:16 AM

## 2024-08-20 NOTE — PROGRESS NOTES
4 Eyes Skin Assessment     NAME:  Daniel Ramirez  YOB: 1955  MEDICAL RECORD NUMBER:  [id number]    The patient is being assessed for  Admission    I agree that at least one RN has performed a thorough Head to Toe Skin Assessment on the patient. ALL assessment sites listed below have been assessed.      Areas assessed by both nurses:    Head, Face, Ears, Shoulders, Back, Chest, Arms, Elbows, Hands, Sacrum. Buttock, Coccyx, Ischium, and Legs. Feet and Heels        Does the Patient have a Wound? No noted wound(s)       Alberto Prevention initiated by RN: No  Wound Care Orders initiated by RN: No    Pressure Injury (Stage 3,4, Unstageable, DTI, NWPT, and Complex wounds) if present, place Wound referral order by RN under : No    New Ostomies, if present place, Ostomy referral order under : No     Nurse 1 eSignature: Electronically signed by Jessica Osullivan RN on 8/19/24 at 7:15 PM CDT    **SHARE this note so that the co-signing nurse can place an eSignature**    Nurse 2 eSignature: Electronically signed by Jennifer Duque RN on 8/19/24 at 7:16 PM CDT

## 2024-08-20 NOTE — PROGRESS NOTES
Physical Therapy  Name: Daniel Ramirez  MRN:  [id number]  Date of service:  8/20/2024    Awaiting ortho consult. Will f/u at a later time.    Electronically signed by Maricel Prajapati PT on 8/20/2024 at 1:53 PM

## 2024-08-21 LAB
BASOPHILS # BLD: 0 K/UL (ref 0–0.2)
BASOPHILS NFR BLD: 0.2 % (ref 0–1)
EOSINOPHIL # BLD: 0.1 K/UL (ref 0–0.6)
EOSINOPHIL NFR BLD: 1.7 % (ref 0–5)
EPO SERPL-ACNC: 418 MU/ML (ref 4–27)
ERYTHROCYTE [DISTWIDTH] IN BLOOD BY AUTOMATED COUNT: 16.2 % (ref 11.5–14.5)
GLUCOSE BLD-MCNC: 120 MG/DL (ref 70–99)
GLUCOSE BLD-MCNC: 146 MG/DL (ref 70–99)
GLUCOSE BLD-MCNC: 155 MG/DL (ref 70–99)
GLUCOSE BLD-MCNC: 163 MG/DL (ref 70–99)
HCT VFR BLD AUTO: 22.7 % (ref 42–52)
HGB BLD-MCNC: 7.9 G/DL (ref 14–18)
IMM GRANULOCYTES # BLD: 0 K/UL
LYMPHOCYTES # BLD: 0.8 K/UL (ref 1.1–4.5)
LYMPHOCYTES NFR BLD: 17.9 % (ref 20–40)
MCH RBC QN AUTO: 41.4 PG (ref 27–31)
MCHC RBC AUTO-ENTMCNC: 34.8 G/DL (ref 33–37)
MCV RBC AUTO: 118.8 FL (ref 80–94)
MONOCYTES # BLD: 0.6 K/UL (ref 0–0.9)
MONOCYTES NFR BLD: 14 % (ref 0–10)
NEUTROPHILS # BLD: 3 K/UL (ref 1.5–7.5)
NEUTS SEG NFR BLD: 65.8 % (ref 50–65)
PERFORMED ON: ABNORMAL
PLATELET # BLD AUTO: 78 K/UL (ref 130–400)
PMV BLD AUTO: 10.4 FL (ref 9.4–12.4)
RBC # BLD AUTO: 1.91 M/UL (ref 4.7–6.1)
WBC # BLD AUTO: 4.6 K/UL (ref 4.8–10.8)

## 2024-08-21 PROCEDURE — 6370000000 HC RX 637 (ALT 250 FOR IP): Performed by: FAMILY MEDICINE

## 2024-08-21 PROCEDURE — 1200000000 HC SEMI PRIVATE

## 2024-08-21 PROCEDURE — 36415 COLL VENOUS BLD VENIPUNCTURE: CPT

## 2024-08-21 PROCEDURE — 97162 PT EVAL MOD COMPLEX 30 MIN: CPT

## 2024-08-21 PROCEDURE — 85025 COMPLETE CBC W/AUTO DIFF WBC: CPT

## 2024-08-21 PROCEDURE — 2580000003 HC RX 258: Performed by: FAMILY MEDICINE

## 2024-08-21 PROCEDURE — 97110 THERAPEUTIC EXERCISES: CPT

## 2024-08-21 PROCEDURE — 97165 OT EVAL LOW COMPLEX 30 MIN: CPT

## 2024-08-21 PROCEDURE — 97535 SELF CARE MNGMENT TRAINING: CPT

## 2024-08-21 PROCEDURE — 97530 THERAPEUTIC ACTIVITIES: CPT

## 2024-08-21 PROCEDURE — 82962 GLUCOSE BLOOD TEST: CPT

## 2024-08-21 PROCEDURE — 6360000002 HC RX W HCPCS: Performed by: FAMILY MEDICINE

## 2024-08-21 RX ADMIN — RANOLAZINE 1000 MG: 500 TABLET, FILM COATED, EXTENDED RELEASE ORAL at 20:24

## 2024-08-21 RX ADMIN — GABAPENTIN 100 MG: 100 CAPSULE ORAL at 20:24

## 2024-08-21 RX ADMIN — METOPROLOL SUCCINATE 25 MG: 25 TABLET, EXTENDED RELEASE ORAL at 09:08

## 2024-08-21 RX ADMIN — OXYCODONE HYDROCHLORIDE AND ACETAMINOPHEN 1 TABLET: 10; 325 TABLET ORAL at 14:55

## 2024-08-21 RX ADMIN — FERROUS SULFATE TAB 325 MG (65 MG ELEMENTAL FE) 325 MG: 325 (65 FE) TAB at 20:24

## 2024-08-21 RX ADMIN — CLOPIDOGREL BISULFATE 75 MG: 75 TABLET ORAL at 09:08

## 2024-08-21 RX ADMIN — BUSPIRONE HYDROCHLORIDE 15 MG: 15 TABLET ORAL at 09:08

## 2024-08-21 RX ADMIN — LEVOTHYROXINE SODIUM 50 MCG: 50 TABLET ORAL at 05:31

## 2024-08-21 RX ADMIN — PANTOPRAZOLE SODIUM 40 MG: 40 TABLET, DELAYED RELEASE ORAL at 09:08

## 2024-08-21 RX ADMIN — SODIUM CHLORIDE 250 MG: 9 INJECTION, SOLUTION INTRAVENOUS at 09:13

## 2024-08-21 RX ADMIN — BUSPIRONE HYDROCHLORIDE 15 MG: 15 TABLET ORAL at 20:24

## 2024-08-21 RX ADMIN — RANOLAZINE 1000 MG: 500 TABLET, FILM COATED, EXTENDED RELEASE ORAL at 09:08

## 2024-08-21 RX ADMIN — FERROUS SULFATE TAB 325 MG (65 MG ELEMENTAL FE) 325 MG: 325 (65 FE) TAB at 09:08

## 2024-08-21 RX ADMIN — INSULIN GLARGINE 5 UNITS: 100 INJECTION, SOLUTION SUBCUTANEOUS at 20:25

## 2024-08-21 RX ADMIN — ROSUVASTATIN CALCIUM 40 MG: 20 TABLET, COATED ORAL at 09:08

## 2024-08-21 RX ADMIN — DOCUSATE SODIUM 100 MG: 100 CAPSULE, LIQUID FILLED ORAL at 20:24

## 2024-08-21 RX ADMIN — OXYCODONE HYDROCHLORIDE AND ACETAMINOPHEN 1 TABLET: 10; 325 TABLET ORAL at 09:08

## 2024-08-21 RX ADMIN — ACETAMINOPHEN 650 MG: 325 TABLET ORAL at 00:42

## 2024-08-21 RX ADMIN — POLYETHYLENE GLYCOL 3350 17 G: 17 POWDER, FOR SOLUTION ORAL at 09:08

## 2024-08-21 RX ADMIN — DULOXETINE HYDROCHLORIDE 60 MG: 60 CAPSULE, DELAYED RELEASE ORAL at 20:24

## 2024-08-21 ASSESSMENT — PAIN SCALES - GENERAL
PAINLEVEL_OUTOF10: 8
PAINLEVEL_OUTOF10: 5
PAINLEVEL_OUTOF10: 8
PAINLEVEL_OUTOF10: 8

## 2024-08-21 NOTE — PROGRESS NOTES
General: Abdomen is flat. Bowel sounds are normal. There is no distension.      Tenderness: There is no abdominal tenderness.   Musculoskeletal:      Comments: Right lower extremity in knee immobilizer, very decreased range of motion secondary to pain.   Skin:     Capillary Refill: Capillary refill takes less than 2 seconds.   Neurological:      General: No focal deficit present.      Mental Status: He is alert and oriented to person, place, and time.   Psychiatric:         Mood and Affect: Mood normal.           Assessment and Plan:     Primary Problem:  Closed nondisplaced fracture of condyle of right femur with routine healing    Hospital Problem list:  Principal Problem:    Closed nondisplaced fracture of condyle of right femur with routine healing  Resolved Problems:    * No resolved hospital problems. *      PMH:  Past Medical History:   Diagnosis Date    Abnormal nuclear stress test 10/22/2014    Arthritis     BiPAP (biphasic positive airway pressure) dependence     8cm to 20cm    Cerebrovascular disease     Chronic kidney disease, stage II (mild) 08/17/2016    Olman Gross M.D.    Cirrhosis (HCC)     Coronary artery disease 02/24/2011    Depression     Diabetes mellitus (HCC)     Encounter for wound care     PT SEES \"GRACY\" WITH DR. SAMUEL    Great toe pain     RT    Headache     Hyperlipemia 02/24/2011    Dr. Pederson follows lipids.    Hyperlipidemia     Hypertension     Liver disease     LONG TERM ANTICOAGULENT USE     Low blood pressure 11/19/2014    lymphostatic lymphoma     Nausea 11/19/2014    Non Hodgkin's lymphoma (HCC)     Obesity     Obstructive sleep apnea     AHI:  21.7 per PSG, 7/2017    Peptic ulcer disease 02/24/2011    Restless leg     S/P CABG x 3 10/22/2014    SVG to RCA; SVG to LAD diagonal; LIMA to LAD(2004, 2014)    Sleep apnea     Syncope and collapse 07/24/2022    Tachyarrhythmia     Tachyarrhythmia 07/07/2016    Thrombocythemia, essential (HCC)     Thyroid disease     Type 2  diabetes mellitus without complication (HCC)     Type II or unspecified type diabetes mellitus without mention of complication, not stated as uncontrolled        Treatment Plan:  Right femur fracture: Essentially periprosthetic fracture of the distal femur that is approximately 7 mm from the femoral portion of the TKA.  Orthopedic consultation is in place, appreciate recommendations.  PT/OT/CM as he will likely need inpatient rehab.  - MRI is ordered, not sure how helpful this will be given the hardware that is in place.  CT from [facility] is available in epic.    Confusion: Suspect due to hypotension.  MRI brain within normal limits.  Improved this morning.     Type 2 diabetes: BG very elevated, will add on SSI     Hypokalemia: Replete and monitor.     Anemia: Hemoglobin 7.7, monitor and transfuse to maintain hemoglobin greater than 7.    Thrombocytopenia: Chronic, will transfuse platelets to maintain platelet level greater than 50     CODE STATUS: Full     DVT prophylaxis: SCD, home plavix     Disposition: Inpatient, anticipate SNF versus inpatient rehab    Reviewed treatment plans with the patient and/or family.   30 minutes spent in face to face interaction and coordination of care.     Electronically signed by Sabas Pérez MD on 8/21/2024 at 7:29 AM

## 2024-08-21 NOTE — CARE COORDINATION
[facility]  Notification of Admission Decision      [] Patient has been accepted for admit to [facility] on :       Please write discharge orders and summary prior to discharge.    [] Patient acceptance to Rehab pending the following :    [] Eval in progress       [x] Patient determined to be ineligible for services at [facility] because : Patient unable to maintain NWB status, feel he will need a longer stay than we can provide before he will be able to return home with assist of his wife.       We recommend you consider : SNF       Thank you for your referral, we appreciate you. If you have any questions, please feel   free to contact me at [phone].  Electronically Signed by Orquidea Ghotra, Admissions Coordinator 8/21/2024 1:25 PM

## 2024-08-21 NOTE — PROGRESS NOTES
Occupational Therapy Initial Assessment  Date: 2024   Patient Name: Daniel Ramirez  MRN: [id number]     : 1955    Date of Service: 2024    Discharge Recommendations:  Patient would benefit from continued therapy after discharge       Assessment   Assessment: Evaluation completed and tx initiated.  The patient would benefit from further skilled therapy to upgrade safety and functional independence.  Excellent candidate to learn sufficient skills for discharge to home eventually.  Treatment Diagnosis: Right condyle fx (non surgical)  REQUIRES OT FOLLOW-UP: Yes  Activity Tolerance  Activity Tolerance: Patient Tolerated treatment well           Patient Diagnosis(es):    has a past medical history of Abnormal nuclear stress test, Arthritis, BiPAP (biphasic positive airway pressure) dependence, Cerebrovascular disease, Chronic kidney disease, stage II (mild), Cirrhosis (HCC), Coronary artery disease, Depression, Diabetes mellitus (HCC), Encounter for wound care, Great toe pain, Headache, Hyperlipemia, Hyperlipidemia, Hypertension, Liver disease, LONG TERM ANTICOAGULENT USE, Low blood pressure, lymphostatic lymphoma, Nausea, Non Hodgkin's lymphoma (HCC), Obesity, Obstructive sleep apnea, Peptic ulcer disease, Restless leg, S/P CABG x 3, Sleep apnea, Syncope and collapse, Tachyarrhythmia, Tachyarrhythmia, Thrombocythemia, essential (HCC), Thyroid disease, Type 2 diabetes mellitus without complication (HCC), and Type II or unspecified type diabetes mellitus without mention of complication, not stated as uncontrolled.   has a past surgical history that includes Knee arthroscopy; Tonsillectomy; Shoulder arthroscopy; Shoulder arthroscopy (2011); Cardiac catheterization (2011); Cardiac catheterization (05, 3/7/07); Cardiac catheterization (12   MDL); Cardiac catheterization (10/28/14  MDL); Coronary artery bypass graft (2005); Coronary artery bypass graft (10/30/2014); Cervical disc  surgery; Cervical spine surgery; Kidney surgery (08/14/2017); Cardiac catheterization; joint replacement (Right); Colonoscopy (02/04/2014); Cardiac catheterization (07/05/2022); Upper gastrointestinal endoscopy (04/26/2017); Colonoscopy (04/26/2017); Upper gastrointestinal endoscopy (N/A, 04/27/2023); Colonoscopy (N/A, 04/27/2023); Esophagus dilation (04/27/2023); Colonoscopy (N/A, 04/27/2023); and Colonoscopy (N/A, 06/20/2023).    Treatment Diagnosis: Right condyle fx (non surgical)      Restrictions  Restrictions/Precautions  Restrictions/Precautions: Fall Risk, Weight Bearing  Required Braces or Orthoses?: Yes  Lower Extremity Weight Bearing Restrictions  Right Lower Extremity Weight Bearing: Non Weight Bearing  Required Braces or Orthoses  Right Lower Extremity Brace: Knee Immobilizer  Position Activity Restriction  Other position/activity restrictions: Wearing knee immobilizer LLE, states he has been told to wear at all time, but no order on chart. Ortho consult on chart mentions that it is OK for OT/PT to work on strengthening and ROM around the knee which will also requires clarification.   Knee immobilizer adjusted and kept in place for eval/tx.    Subjective   General  Chart Reviewed: Yes  Patient assessed for rehabilitation services?: Yes  Pain Screening  Pain at present: 6 (Right leg during activity. Had pain meds prior to tx.)  Scale Used: Numeric Score  Intervention List: Patient able to continue with treatment  Comments / Details: Pain improves with final positioning, rest    Social/Functional History  Social/Functional History  Lives With: Spouse  Type of Home: House  Home Access: Ramped entrance  Bathroom Shower/Tub: Walk-in shower  Bathroom Equipment: 3-in-1 commode, Shower chair  Home Equipment: Walker - Rolling, Electric scooter  Has the patient had two or more falls in the past year or any fall with injury in the past year?: Yes  ADL Assistance: Independent  Ambulation Assistance: Independent

## 2024-08-21 NOTE — PROGRESS NOTES
Physical Therapy    Name: Daniel Ramirez  MRN: [id number]  Date of service: 8/21/2024 08/21/24 1500   Restrictions/Precautions   Restrictions/Precautions Fall Risk;Weight Bearing   Lower Extremity Weight Bearing Restrictions   Right Lower Extremity Weight Bearing Non Weight Bearing   Required Braces or Orthoses   Right Lower Extremity Brace Knee Immobilizer   Position Activity Restriction   Other position/activity restrictions Wearing knee immobilizer LLE, states he has been told to wear at all time, but no order on chart. Ortho consult on chart mentions that it is OK for OT/PT to work on strengthening and ROM around the knee which will also requires clarification.   Knee immobilizer kept in place   General   Diagnosis R periprosthetic fx of distal femur   General Comment   Comments Pt in recliner   Subjective   Subjective agreed to get back in bed   Subjective   Subjective LLE   Pain 8-9/10   Observation/Palpation   Observation IV, KI RLE   Bed mobility   Sit to Supine Minimal assistance;2 Person assistance   Transfers   Lateral Transfers Minimal Assistance;2 Person Assistance   Comment SB transfer   Ambulation   WB Status NWB RLE   Ambulation   Comments not at this time, will progress   Short Term Goals   Time Frame for Short Term Goals 2 wks   Short Term Goal 1 supine to sit indep   Short Term Goal 2 bed to chair SBA with RW, NWB RLE   Short Term Goal 3 sit to stand CGA, NWB RLE   Short Term Goal 4 bed to chair with sliding board SBA   Activity Tolerance   Activity Tolerance Patient tolerated treatment well   Assessment   Assessment Pt did well this afternoon. Used SB to transfer pt back to bed. Left pt in bed with all needs in reach.   Discharge Recommendations Continue to assess pending progress;24 hour supervision or assist;Patient would benefit from continued therapy after discharge   Physical Therapy Plan   General Plan 6-7 times per week   Therapy Duration 2 Weeks   Current Treatment Recommendations  Strengthening;ROM;Balance training;Functional mobility training;Transfer training;Gait training;Endurance training;Safety education & training;Positioning;Equipment evaluation, education, & procurement;Patient/Caregiver education & training;Therapeutic activities   PT Plan of Care   Wednesday X   Safety Devices   Type of Devices Left in bed;Bed alarm in place;Call light within reach;Nurse notified  (spouse present)   PT Whiteboard Notes   Therapy Whiteboard RE 9/4 R periprosthetic femur fx, NWB, drop arm chair, sliding board   Recommendation   Requires PT Follow-Up Yes         Electronically signed by Ranjith Lindquist PTA on 8/21/2024 at 3:26 PM

## 2024-08-21 NOTE — CARE COORDINATION
Case Management Assessment  Initial Evaluation    Date/Time of Evaluation: 8/21/2024 2:36 PM  Assessment Completed by: Brian Rico RN, BSN    If patient is discharged prior to next notation, then this note serves as note for discharge by case management.    Patient Name: Daniel Ramirez                   YOB: 1955  Diagnosis: Closed nondisplaced fracture of condyle of right femur with routine healing [S72.414D]                   Date / Time: 8/19/2024  4:59 PM    Patient Admission Status: Inpatient   Readmission Risk (Low < 19, Mod (19-27), High > 27): Readmission Risk Score: 16.7    Current PCP: Jasvir Chappell MD  PCP verified by CM? (P) Yes    Chart Reviewed: Yes      History Provided by: (P) Patient, Child/Family, Medical Record  Patient Orientation: (P) Person, Place    Patient Cognition: (P) Short Term Memory Deficit    Hospitalization in the last 30 days (Readmission):  Yes    If yes, Readmission Assessment in  Navigator will be completed.    Advance Directives:      Code Status: Full Code   Patient's Primary Decision Maker is: (P) Legal Next of Kin    Primary Decision Maker: Niya Ramirez - Spouse - [phone]    Discharge Planning:    Patient lives with: (P) Spouse/Significant Other Type of Home: (P) Acute Rehab, Skilled Nursing Facility  Primary Care Giver: (P) Self  Patient Support Systems include: (P) Spouse/Significant Other, Family Members   Current Financial resources: (P) Medicare  Current community resources: (P) None  Current services prior to admission: (P) Durable Medical Equipment            Current DME: (P) Walker, Wheelchair (electric scooter)            Type of Home Care services:  (P) None    ADLS  Prior functional level: (P) Assistance with the following:, Mobility  Current functional level: (P) Assistance with the following:, Mobility, Toileting    PT AM-PAC:   /24  OT AM-PAC:   /24    Family can provide assistance at DC: (P) No  Would you like Case Management to

## 2024-08-21 NOTE — PROGRESS NOTES
Physical Therapy  Facility/Department: [facility] SURG SERVICES  Physical Therapy Initial Assessment    Name: Daniel Ramirez  : 1955  MRN: [id number]  Date of Service: 2024    Discharge Recommendations:  Continue to assess pending progress, 24 hour supervision or assist, Patient would benefit from continued therapy after discharge          Patient Diagnosis(es): L periprosthetic femur fx  Past Medical History:  has a past medical history of Abnormal nuclear stress test, Arthritis, BiPAP (biphasic positive airway pressure) dependence, Cerebrovascular disease, Chronic kidney disease, stage II (mild), Cirrhosis (HCC), Coronary artery disease, Depression, Diabetes mellitus (HCC), Encounter for wound care, Great toe pain, Headache, Hyperlipemia, Hyperlipidemia, Hypertension, Liver disease, LONG TERM ANTICOAGULENT USE, Low blood pressure, lymphostatic lymphoma, Nausea, Non Hodgkin's lymphoma (HCC), Obesity, Obstructive sleep apnea, Peptic ulcer disease, Restless leg, S/P CABG x 3, Sleep apnea, Syncope and collapse, Tachyarrhythmia, Tachyarrhythmia, Thrombocythemia, essential (HCC), Thyroid disease, Type 2 diabetes mellitus without complication (HCC), and Type II or unspecified type diabetes mellitus without mention of complication, not stated as uncontrolled.  Past Surgical History:  has a past surgical history that includes Knee arthroscopy; Tonsillectomy; Shoulder arthroscopy; Shoulder arthroscopy (2011); Cardiac catheterization (2011); Cardiac catheterization (05, 3/7/07); Cardiac catheterization (12   MDL); Cardiac catheterization (10/28/14  MDL); Coronary artery bypass graft (2005); Coronary artery bypass graft (10/30/2014); Cervical disc surgery; Cervical spine surgery; Kidney surgery (2017); Cardiac catheterization; joint replacement (Right); Colonoscopy (2014); Cardiac catheterization (2022); Upper gastrointestinal endoscopy (2017); Colonoscopy (2017);  times  Hearing  Hearing: Within functional limits    Cognition   Orientation  Overall Orientation Status: Within Functional Limits  Cognition  Overall Cognitive Status: Exceptions  Arousal/Alertness: Appears intact  Following Commands: Follows one step commands with increased time;Follows one step commands consistently  Attention Span: Appears intact  Memory: Appears intact  Safety Judgement: Decreased awareness of need for safety;Decreased awareness of need for assistance  Problem Solving: Assistance required to implement solutions;Assistance required to generate solutions  Insights: Impaired  Initiation: Impaired  Cognition Comment: slightly impulsive at times, attempting to get up prior to PT/OT being ready, pt needs redirection on NWB RLE     Objective   Temp: 97.5 °F (36.4 °C)  Pulse: 95  Heart Rate Source: Monitor  Respirations: 16  SpO2: 95 %  O2 Device: None (Room air)  BP: 115/67  MAP (Calculated): 83  BP Location: Left upper arm  Patient Position: Sitting     Observation/Palpation  Posture: Good  Observation: IV L hand, KI RLE  Gross Assessment  Sensation: Impaired (neuropathy)     AROM RLE (degrees)  RLE AROM: Exceptions  RLE General AROM: R knee NT, ankle to neutral DF, hip 0-70 AAROM  AROM LLE (degrees)  LLE AROM : WFL  Strength RLE  Strength RLE: Exception  Comment: grossly 2-/5 hip, knee NT due to KI, ankle 3-/5  Strength LLE  Strength LLE: WFL  Comment: 4/5           Bed mobility  Supine to Sit: Minimal assistance;Moderate assistance;2 Person assistance (patient wanting assist to support RLE to minimize pain)  Sit to Supine: Unable to assess  Scooting: Minimal assistance  Bed Mobility Comments: Min A to scoot up to HOB, v. cues to keep WB status NWB RLE  Transfers  Sit to Stand: Moderate Assistance;Minimal Assistance;2 Person Assistance  Stand to Sit: Minimal Assistance;Moderate Assistance;2 Person Assistance  Lateral Transfers: Minimal Assistance;Contact guard assistance;2 Person  Assistance  Comment: attempted sit to stand with RW, NWB RLE, but pt unable to maintain NWB. Pt. sat back on bed and drop arm chair and sliding board brought to room.  Ambulation  WB Status: NWB RLE  Ambulation  Surface: Level tile  Device: Rolling Walker  Other Apparatus: Knee Immobilizer  Assistance: Minimal assistance;Moderate assistance;2 Person assistance  Gait Deviations: Staggers  Distance: 1 small hop to R (HOB)  Comments: pt unable to maintain NWB RLE     Balance  Posture: Fair  Sitting - Static: Fair;+  Sitting - Dynamic: Fair;-  Standing - Static: Poor;+  Standing - Dynamic: Poor           OutComes Score                                                  AM-PAC - Mobility              Tinneti Score       Goals  Short Term Goals  Time Frame for Short Term Goals: 2 wks  Short Term Goal 1: supine to sit indep  Short Term Goal 2: bed to chair SBA with RW, NWB RLE  Short Term Goal 3: sit to stand CGA, NWB RLE  Short Term Goal 4: bed to chair with sliding board SBA  Patient Goals   Patient Goals : go home       Education  Patient Education  Education Given To: Patient  Education Provided: Role of Therapy;Plan of Care;Transfer Training;Fall Prevention Strategies  Education Provided Comments: use of call light, staff A  Education Method: Demonstration;Verbal  Barriers to Learning: Cognition  Education Outcome: Verbalized understanding;Demonstrated understanding;Continued education needed      Therapy Time   Individual Concurrent Group Co-treatment   Time In           Time Out           Minutes                   Maricel Prajapati, PT     Electronically signed by Maricel Prajapati, PT on 8/21/2024 at 12:14 PM

## 2024-08-21 NOTE — PROGRESS NOTES
Facility/Department: [facility] SURG SERVICES  Daily Treatment Note  NAME: Daniel Ramirez  : 1955  MRN: [id number]    Date of Service: 2024    Discharge Recommendations:  Patient would benefit from continued therapy after discharge       Assessment   Assessment: Significant improvement in sliding board transfer this p.m.  Positioned therapeutically.  Anticipate further meaningful gains with skilled therapy.  Treatment Diagnosis: Right condyle fx (non surgical)  REQUIRES OT FOLLOW-UP: Yes  Activity Tolerance  Activity Tolerance: Patient Tolerated treatment well         Patient Diagnosis(es):     has a past medical history of Abnormal nuclear stress test, Arthritis, BiPAP (biphasic positive airway pressure) dependence, Cerebrovascular disease, Chronic kidney disease, stage II (mild), Cirrhosis (HCC), Coronary artery disease, Depression, Diabetes mellitus (HCC), Encounter for wound care, Great toe pain, Headache, Hyperlipemia, Hyperlipidemia, Hypertension, Liver disease, LONG TERM ANTICOAGULENT USE, Low blood pressure, lymphostatic lymphoma, Nausea, Non Hodgkin's lymphoma (HCC), Obesity, Obstructive sleep apnea, Peptic ulcer disease, Restless leg, S/P CABG x 3, Sleep apnea, Syncope and collapse, Tachyarrhythmia, Tachyarrhythmia, Thrombocythemia, essential (HCC), Thyroid disease, Type 2 diabetes mellitus without complication (HCC), and Type II or unspecified type diabetes mellitus without mention of complication, not stated as uncontrolled.   has a past surgical history that includes Knee arthroscopy; Tonsillectomy; Shoulder arthroscopy; Shoulder arthroscopy (2011); Cardiac catheterization (2011); Cardiac catheterization (05, 3/7/07); Cardiac catheterization (12   MDL); Cardiac catheterization (10/28/14  MDL); Coronary artery bypass graft (2005); Coronary artery bypass graft (10/30/2014); Cervical disc surgery; Cervical spine surgery; Kidney surgery (2017); Cardiac catheterization;

## 2024-08-22 ENCOUNTER — APPOINTMENT (OUTPATIENT)
Dept: MRI IMAGING | Age: 69
End: 2024-08-22
Attending: FAMILY MEDICINE
Payer: MEDICARE

## 2024-08-22 LAB
BASOPHILS # BLD: 0 K/UL (ref 0–0.2)
BASOPHILS NFR BLD: 0.2 % (ref 0–1)
EOSINOPHIL # BLD: 0.1 K/UL (ref 0–0.6)
EOSINOPHIL NFR BLD: 2.6 % (ref 0–5)
ERYTHROCYTE [DISTWIDTH] IN BLOOD BY AUTOMATED COUNT: 17 % (ref 11.5–14.5)
GLUCOSE BLD-MCNC: 100 MG/DL (ref 70–99)
GLUCOSE BLD-MCNC: 136 MG/DL (ref 70–99)
GLUCOSE BLD-MCNC: 147 MG/DL (ref 70–99)
GLUCOSE BLD-MCNC: 166 MG/DL (ref 70–99)
HCT VFR BLD AUTO: 23.9 % (ref 42–52)
HGB BLD-MCNC: 8.3 G/DL (ref 14–18)
IMM GRANULOCYTES # BLD: 0 K/UL
LYMPHOCYTES # BLD: 0.7 K/UL (ref 1.1–4.5)
LYMPHOCYTES NFR BLD: 15.8 % (ref 20–40)
MCH RBC QN AUTO: 41.1 PG (ref 27–31)
MCHC RBC AUTO-ENTMCNC: 34.7 G/DL (ref 33–37)
MCV RBC AUTO: 118.3 FL (ref 80–94)
MONOCYTES # BLD: 0.6 K/UL (ref 0–0.9)
MONOCYTES NFR BLD: 13.2 % (ref 0–10)
NEUTROPHILS # BLD: 3.1 K/UL (ref 1.5–7.5)
NEUTS SEG NFR BLD: 67.5 % (ref 50–65)
PERFORMED ON: ABNORMAL
PLATELET # BLD AUTO: 82 K/UL (ref 130–400)
PMV BLD AUTO: 10 FL (ref 9.4–12.4)
RBC # BLD AUTO: 2.02 M/UL (ref 4.7–6.1)
WBC # BLD AUTO: 4.6 K/UL (ref 4.8–10.8)

## 2024-08-22 PROCEDURE — 97530 THERAPEUTIC ACTIVITIES: CPT

## 2024-08-22 PROCEDURE — 82962 GLUCOSE BLOOD TEST: CPT

## 2024-08-22 PROCEDURE — 2580000003 HC RX 258: Performed by: FAMILY MEDICINE

## 2024-08-22 PROCEDURE — 85025 COMPLETE CBC W/AUTO DIFF WBC: CPT

## 2024-08-22 PROCEDURE — 73721 MRI JNT OF LWR EXTRE W/O DYE: CPT

## 2024-08-22 PROCEDURE — 6370000000 HC RX 637 (ALT 250 FOR IP): Performed by: FAMILY MEDICINE

## 2024-08-22 PROCEDURE — 94760 N-INVAS EAR/PLS OXIMETRY 1: CPT

## 2024-08-22 PROCEDURE — 36415 COLL VENOUS BLD VENIPUNCTURE: CPT

## 2024-08-22 PROCEDURE — 1200000000 HC SEMI PRIVATE

## 2024-08-22 PROCEDURE — 97535 SELF CARE MNGMENT TRAINING: CPT

## 2024-08-22 RX ORDER — HYDROCODONE BITARTRATE AND ACETAMINOPHEN 5; 325 MG/1; MG/1
1 TABLET ORAL EVERY 6 HOURS PRN
Status: DISCONTINUED | OUTPATIENT
Start: 2024-08-22 | End: 2024-08-23

## 2024-08-22 RX ADMIN — HYDROCODONE BITARTRATE AND ACETAMINOPHEN 1 TABLET: 5; 325 TABLET ORAL at 08:26

## 2024-08-22 RX ADMIN — LEVOTHYROXINE SODIUM 50 MCG: 50 TABLET ORAL at 05:28

## 2024-08-22 RX ADMIN — HYDROCODONE BITARTRATE AND ACETAMINOPHEN 1 TABLET: 5; 325 TABLET ORAL at 13:53

## 2024-08-22 RX ADMIN — BUSPIRONE HYDROCHLORIDE 15 MG: 15 TABLET ORAL at 20:55

## 2024-08-22 RX ADMIN — FERROUS SULFATE TAB 325 MG (65 MG ELEMENTAL FE) 325 MG: 325 (65 FE) TAB at 08:27

## 2024-08-22 RX ADMIN — BUSPIRONE HYDROCHLORIDE 15 MG: 15 TABLET ORAL at 08:27

## 2024-08-22 RX ADMIN — INSULIN GLARGINE 5 UNITS: 100 INJECTION, SOLUTION SUBCUTANEOUS at 20:55

## 2024-08-22 RX ADMIN — CLOPIDOGREL BISULFATE 75 MG: 75 TABLET ORAL at 08:26

## 2024-08-22 RX ADMIN — RANOLAZINE 1000 MG: 500 TABLET, FILM COATED, EXTENDED RELEASE ORAL at 08:27

## 2024-08-22 RX ADMIN — FERROUS SULFATE TAB 325 MG (65 MG ELEMENTAL FE) 325 MG: 325 (65 FE) TAB at 20:55

## 2024-08-22 RX ADMIN — HYDROCODONE BITARTRATE AND ACETAMINOPHEN 1 TABLET: 5; 325 TABLET ORAL at 20:58

## 2024-08-22 RX ADMIN — ROSUVASTATIN CALCIUM 40 MG: 20 TABLET, COATED ORAL at 08:27

## 2024-08-22 RX ADMIN — PANTOPRAZOLE SODIUM 40 MG: 40 TABLET, DELAYED RELEASE ORAL at 08:26

## 2024-08-22 RX ADMIN — SODIUM CHLORIDE, PRESERVATIVE FREE 10 ML: 5 INJECTION INTRAVENOUS at 20:59

## 2024-08-22 RX ADMIN — DOCUSATE SODIUM 100 MG: 100 CAPSULE, LIQUID FILLED ORAL at 20:55

## 2024-08-22 RX ADMIN — OXYCODONE HYDROCHLORIDE AND ACETAMINOPHEN 1 TABLET: 10; 325 TABLET ORAL at 03:37

## 2024-08-22 RX ADMIN — GABAPENTIN 100 MG: 100 CAPSULE ORAL at 20:55

## 2024-08-22 RX ADMIN — RANOLAZINE 1000 MG: 500 TABLET, FILM COATED, EXTENDED RELEASE ORAL at 20:54

## 2024-08-22 RX ADMIN — DULOXETINE HYDROCHLORIDE 60 MG: 60 CAPSULE, DELAYED RELEASE ORAL at 20:55

## 2024-08-22 ASSESSMENT — PAIN SCALES - GENERAL
PAINLEVEL_OUTOF10: 8
PAINLEVEL_OUTOF10: 9
PAINLEVEL_OUTOF10: 5
PAINLEVEL_OUTOF10: 9
PAINLEVEL_OUTOF10: 9

## 2024-08-22 ASSESSMENT — PAIN DESCRIPTION - ORIENTATION
ORIENTATION: RIGHT
ORIENTATION: RIGHT

## 2024-08-22 ASSESSMENT — PAIN DESCRIPTION - DESCRIPTORS
DESCRIPTORS: THROBBING
DESCRIPTORS: ACHING

## 2024-08-22 ASSESSMENT — PAIN DESCRIPTION - FREQUENCY: FREQUENCY: CONTINUOUS

## 2024-08-22 ASSESSMENT — PAIN DESCRIPTION - PAIN TYPE: TYPE: ACUTE PAIN

## 2024-08-22 ASSESSMENT — PAIN DESCRIPTION - LOCATION
LOCATION: KNEE
LOCATION: LEG

## 2024-08-22 ASSESSMENT — PAIN - FUNCTIONAL ASSESSMENT: PAIN_FUNCTIONAL_ASSESSMENT: PREVENTS OR INTERFERES SOME ACTIVE ACTIVITIES AND ADLS

## 2024-08-22 NOTE — PLAN OF CARE
Problem: Discharge Planning  Goal: Discharge to home or other facility with appropriate resources  Outcome: Progressing     Problem: Skin/Tissue Integrity  Goal: Absence of new skin breakdown  Description: 1.  Monitor for areas of redness and/or skin breakdown  2.  Assess vascular access sites hourly  3.  Every 4-6 hours minimum:  Change oxygen saturation probe site  4.  Every 4-6 hours:  If on nasal continuous positive airway pressure, respiratory therapy assess nares and determine need for appliance change or resting period.  Outcome: Progressing     Problem: ABCDS Injury Assessment  Goal: Absence of physical injury  Outcome: Progressing  Flowsheets (Taken 8/21/2024 2053)  Absence of Physical Injury: Implement safety measures based on patient assessment     Problem: Pain  Goal: Verbalizes/displays adequate comfort level or baseline comfort level  Outcome: Progressing     Problem: Safety - Adult  Goal: Free from fall injury  Outcome: Progressing  Flowsheets (Taken 8/21/2024 2053)  Free From Fall Injury: Instruct family/caregiver on patient safety     Problem: Chronic Conditions and Co-morbidities  Goal: Patient's chronic conditions and co-morbidity symptoms are monitored and maintained or improved  Outcome: Progressing     Problem: Anxiety  Goal: Will report anxiety at manageable levels  Description: INTERVENTIONS:  1. Administer medication as ordered  2. Teach and rehearse alternative coping skills  3. Provide emotional support with 1:1 interaction with staff  Outcome: Progressing  Flowsheets (Taken 8/21/2024 2024)  Will report anxiety at manageable levels: Administer medication as ordered     Problem: Coping  Goal: Pt/Family able to verbalize concerns and demonstrate effective coping strategies  Description: INTERVENTIONS:  1. Assist patient/family to identify coping skills, available support systems and cultural and spiritual values  2. Provide emotional support, including active listening and acknowledgement

## 2024-08-22 NOTE — PROGRESS NOTES
Daily Progress Note  Daniel Ramirez  MRN: [id number] LOS: 3    Admit Date: 8/19/2024 8/22/2024 7:34 AM    Subjective:          Chief Complaint:  No chief complaint on file.      Interval History:    Reviewed overnight events and nursing notes.   Status: Slight improvement, pain is controlled as long as the right leg is not moved.        Review of Systems   Constitutional:  Positive for activity change. Negative for fatigue and fever.   Respiratory:  Negative for cough and shortness of breath.    Cardiovascular:  Negative for chest pain.   Gastrointestinal:  Negative for nausea and vomiting.   Musculoskeletal:  Positive for arthralgias and gait problem.   Skin:  Negative for color change.   Neurological:  Positive for weakness.       DIET:  ADULT DIET; Regular; 5 carb choices (75 gm/meal)    Medications:      sodium chloride        sodium chloride flush  5-40 mL IntraVENous 2 times per day    [Held by provider] enoxaparin  40 mg SubCUTAneous Daily    busPIRone  15 mg Oral BID    clopidogrel  75 mg Oral Daily    docusate sodium  100 mg Oral Nightly    DULoxetine  60 mg Oral Nightly    ferrous sulfate  325 mg Oral BID    gabapentin  100 mg Oral Nightly    levothyroxine  50 mcg Oral Daily    [Held by provider] metFORMIN  500 mg Oral BID WC    metoprolol succinate  25 mg Oral Daily    pantoprazole  40 mg Oral Daily    ranolazine  1,000 mg Oral BID    rosuvastatin  40 mg Oral Daily    ferric gluconate  250 mg IntraVENous Once per day on Monday Wednesday Friday    insulin glargine  5 Units SubCUTAneous Nightly       Data:     Code Status: Full Code    Family History   Problem Relation Age of Onset    Lung Cancer Mother 60    Lung Cancer Sister 80    Cancer Sister     Heart Disease Other     Colon Cancer Neg Hx     Colon Polyps Neg Hx      Social History     Socioeconomic History    Marital status:      Spouse name: Niya    Number of children: 2    Years of education: 12    Highest education level: Not on file  Stability: Low Risk  (2024)    Housing Stability Vital Sign     Unable to Pay for Housing in the Last Year: No     Number of Times Moved in the Last Year: 1     Homeless in the Last Year: No       Labs:  CBC:   Recent Labs     24  0235 24  0253 24  0620   WBC 4.2* 4.6* 4.6*   HGB 7.7* 7.9* 8.3*   PLT 72* 78* 82*     BMP:    Recent Labs     24      K 3.4*      CO2 25   BUN 18   CREATININE 1.2   GLUCOSE 126*     Hepatic:   Recent Labs     24   AST 35   ALT 22   BILITOT 1.0   ALKPHOS 100     Lipids: No results for input(s): \"CHOL\", \"HDL\" in the last 72 hours.    Invalid input(s): \"LDLCALCU\"  INR:   Recent Labs     24   INR 1.24*       Objective:     Vitals: /72   Pulse 86   Temp 97 °F (36.1 °C) (Temporal)   Resp 16   Ht 1.803 m (5' 10.98\")   Wt 103.9 kg (229 lb)   SpO2 97%   BMI 31.95 kg/m²    Intake/Output Summary (Last 24 hours) at 2024 0734  Last data filed at 2024 0038  Gross per 24 hour   Intake --   Output 1400 ml   Net -1400 ml    Temp (24hrs), Av.5 °F (36.4 °C), Min:97 °F (36.1 °C), Max:98.4 °F (36.9 °C)    Glucose:  Recent Labs     24  1129 24  1703 24  1930 24  0725   POCGLU 146* 120* 155* 100*       Physical Exam  Vitals reviewed.   Constitutional:       Appearance: Normal appearance. He is not ill-appearing.   HENT:      Head: Normocephalic and atraumatic.   Eyes:      General:         Right eye: No discharge.         Left eye: No discharge.      Extraocular Movements: Extraocular movements intact.      Conjunctiva/sclera: Conjunctivae normal.   Cardiovascular:      Rate and Rhythm: Normal rate and regular rhythm.      Pulses: Normal pulses.      Heart sounds: Normal heart sounds. No murmur heard.  Pulmonary:      Effort: Pulmonary effort is normal. No respiratory distress.      Breath sounds: Normal breath sounds.   Abdominal:      General: Abdomen is flat. Bowel sounds are normal.  There is no distension.      Tenderness: There is no abdominal tenderness.   Musculoskeletal:      Comments: Right lower extremity in knee immobilizer, very decreased range of motion secondary to pain.   Skin:     Capillary Refill: Capillary refill takes less than 2 seconds.   Neurological:      General: No focal deficit present.      Mental Status: He is alert and oriented to person, place, and time.   Psychiatric:         Mood and Affect: Mood normal.           Assessment and Plan:     Primary Problem:  Closed nondisplaced fracture of condyle of right femur with routine healing    Hospital Problem list:  Principal Problem:    Closed nondisplaced fracture of condyle of right femur with routine healing  Resolved Problems:    * No resolved hospital problems. *      PMH:  Past Medical History:   Diagnosis Date    Abnormal nuclear stress test 10/22/2014    Arthritis     BiPAP (biphasic positive airway pressure) dependence     8cm to 20cm    Cerebrovascular disease     Chronic kidney disease, stage II (mild) 08/17/2016    Olman Gross M.D.    Cirrhosis (HCC)     Coronary artery disease 02/24/2011    Depression     Diabetes mellitus (HCC)     Encounter for wound care     PT SEES \"GRACY\" WITH DR. SAMUEL    Great toe pain     RT    Headache     Hyperlipemia 02/24/2011    Dr. Pederson follows lipids.    Hyperlipidemia     Hypertension     Liver disease     LONG TERM ANTICOAGULENT USE     Low blood pressure 11/19/2014    lymphostatic lymphoma     Nausea 11/19/2014    Non Hodgkin's lymphoma (HCC)     Obesity     Obstructive sleep apnea     AHI:  21.7 per PSG, 7/2017    Peptic ulcer disease 02/24/2011    Restless leg     S/P CABG x 3 10/22/2014    SVG to RCA; SVG to LAD diagonal; LIMA to LAD(2004, 2014)    Sleep apnea     Syncope and collapse 07/24/2022    Tachyarrhythmia     Tachyarrhythmia 07/07/2016    Thrombocythemia, essential (HCC)     Thyroid disease     Type 2 diabetes mellitus without complication (HCC)

## 2024-08-22 NOTE — PROGRESS NOTES
Comprehensive Nutrition Assessment    Type and Reason for Visit:  Reassess    Nutrition Recommendations/Plan:   Continue POC     Malnutrition Assessment:  Malnutrition Status:  No malnutrition (08/22/24 1033)    Context:  Acute Illness     Findings of the 6 clinical characteristics of malnutrition:  Energy Intake:  No significant decrease in energy intake  Weight Loss:  No significant weight loss     Body Fat Loss:  No significant body fat loss     Muscle Mass Loss:  No significant muscle mass loss    Fluid Accumulation:  No significant fluid accumulation     Strength:  Not Performed    Nutrition Assessment:    Pt is adequately nourished with good PO intake that meets nutritional needs. He is able to tolerate a Regular diet. Glucose 100-155. Continue POC.    Current Nutrition Intake & Therapies:    Average Meal Intake: %  ADULT DIET; Regular; 5 carb choices (75 gm/meal)    Anthropometric Measures:  Height: 180.3 cm (5' 10.98\")  Ideal Body Weight (IBW): 172 lbs (78 kg)    Current Body Weight: 103.9 kg (229 lb 0.9 oz)  Current BMI (kg/m2): 32  BMI Categories: Obese Class 1 (BMI 30.0-34.9)    Nutrition Diagnosis:   No nutrition diagnosis at this time     Nutrition Interventions:   Food and/or Nutrient Delivery: Continue Current Diet  Coordination of Nutrition Care: Continue to monitor while inpatient    Goals:  Previous Goal Met: Goal(s) Achieved  Goals: PO intake 75% or greater, Meet at least 75% of estimated needs    Nutrition Monitoring and Evaluation:   Food/Nutrient Intake Outcomes: Food and Nutrient Intake  Physical Signs/Symptoms Outcomes: Biochemical Data, Nutrition Focused Physical Findings, Weight    Lyssa Candelaria, MS, RD, LD  Contact: [phone]

## 2024-08-22 NOTE — CARE COORDINATION
Patient/spouse have requested to go home with home healthcare. They prefer [facility] upon discharge for PT/OT.  Electronically signed by Brian Rico RN, BSN on 8/22/2024 at 3:29 PM

## 2024-08-22 NOTE — PROGRESS NOTES
Facility/Department: [facility]  Daily Treatment Note  NAME: Daniel Ramirez  : 1955  MRN: [id number]    Date of Service: 2024    Discharge Recommendations:  Patient would benefit from continued therapy after discharge       Assessment   Assessment: Seen for various needs in three visits.  The patient is making progress with mobility and ADL.  Will still need clarification on RLE precautions, but for now keeping knee immobilizer in place and maintaining NWB. Generalized strength improving so can consider reassessing ability to perform a static stand while NWB on RLE  Treatment Diagnosis: Right condyle fx (non surgical), possible quad tendon tear  History: MRI 24  Activity Tolerance  Activity Tolerance: Patient Tolerated treatment well       Patient Diagnosis(es):     has a past medical history of Abnormal nuclear stress test, Arthritis, BiPAP (biphasic positive airway pressure) dependence, Cerebrovascular disease, Chronic kidney disease, stage II (mild), Cirrhosis (HCC), Coronary artery disease, Depression, Diabetes mellitus (HCC), Encounter for wound care, Great toe pain, Headache, Hyperlipemia, Hyperlipidemia, Hypertension, Liver disease, LONG TERM ANTICOAGULENT USE, Low blood pressure, lymphostatic lymphoma, Nausea, Non Hodgkin's lymphoma (HCC), Obesity, Obstructive sleep apnea, Peptic ulcer disease, Restless leg, S/P CABG x 3, Sleep apnea, Syncope and collapse, Tachyarrhythmia, Tachyarrhythmia, Thrombocythemia, essential (HCC), Thyroid disease, Type 2 diabetes mellitus without complication (HCC), and Type II or unspecified type diabetes mellitus without mention of complication, not stated as uncontrolled.   has a past surgical history that includes Knee arthroscopy; Tonsillectomy; Shoulder arthroscopy; Shoulder arthroscopy (2011); Cardiac catheterization (2011); Cardiac catheterization (05, 3/7/07); Cardiac catheterization (12   MDL); Cardiac catheterization (10/28/14   GERBER); Coronary artery bypass graft (09/21/2005); Coronary artery bypass graft (10/30/2014); Cervical disc surgery; Cervical spine surgery; Kidney surgery (08/14/2017); Cardiac catheterization; joint replacement (Right); Colonoscopy (02/04/2014); Cardiac catheterization (07/05/2022); Upper gastrointestinal endoscopy (04/26/2017); Colonoscopy (04/26/2017); Upper gastrointestinal endoscopy (N/A, 04/27/2023); Colonoscopy (N/A, 04/27/2023); Esophagus dilation (04/27/2023); Colonoscopy (N/A, 04/27/2023); and Colonoscopy (N/A, 06/20/2023).    Restrictions  Restrictions/Precautions  Restrictions/Precautions: Fall Risk, Weight Bearing  Required Braces or Orthoses?: Yes  Lower Extremity Weight Bearing Restrictions  Right Lower Extremity Weight Bearing: Non Weight Bearing  Required Braces or Orthoses  Right Lower Extremity Brace: Knee Immobilizer  Position Activity Restriction  Other position/activity restrictions: Wearing knee immobilizer LLE, states he has been told to wear at all time, but no order on chart. Ortho consult on chart mentions that it is OK for OT/PT to work on strengthening and ROM around the knee which will also requires clarification.   Knee immobilizer kept in place    Subjective   General  Chart Reviewed: Yes  Patient assessed for rehabilitation services?: Yes  Family / Caregiver Present: Yes (spouse)  Pain Screening  Pain at present: 4  Scale Used: Numeric Score  Intervention List: Patient able to continue with treatment  Comments / Details: had pain med prior to therapy in am     Objective    ADL  Feeding: Independent  Grooming: Independent;Setup  UE Bathing: Independent;Setup  LE Bathing: Moderate assistance  UE Dressing: Independent  LE Dressing: Moderate assistance (up over hips in supine or EOB by leaning side to side)  Toileting: Moderate assistance (for clothing management in sitting.  Supervision for hygiene in sitting)        Toilet Transfers  Equipment Used: Drop-arm commode  Toilet Transfer:  Minimal assistance  Toilet Transfers Comments: direct transfer from bed to wide C with a deck.  Performed twice.  Bed mobility  Supine to Sit: Minimal assistance (of one to support RLE)  Sit to Supine: Minimal assistance (of one to support RLE)  Transfers  Slide Board: Minimal assistance (SBA of another for safety, however may no longer be needed to improved mobility. This was actually a lateral transfer without the sliding board due to the two surfaces being flush, but would require board if there were any gap)  Transfer Comments: Significantly improved with effectiveness with each scoot.                                                                 Plan   Occupational Therapy Plan  Times Per Week: 3-5    Goals (On-Going)   Short Term Goals  Short Term Goal 1: Perform transfers with CGA  Short Term Goal 2: Perform dressing with CGA  Short Term Goal 3: Perform toileting with CGA  Short Term Goal 4: Independent with therapeutic activity recommendations, safety, and positioning recommendations  Long Term Goals  Long Term Goal 1: Upgrade as tolerated to modified independent       Tx Time  Minutes  60             Karishma Gonzalez OT/DAYSI  Electronically signed by Karishma Gonzalez OT on 8/22/2024 at 2:04 PM

## 2024-08-23 ENCOUNTER — APPOINTMENT (OUTPATIENT)
Dept: GENERAL RADIOLOGY | Age: 69
End: 2024-08-23
Attending: FAMILY MEDICINE
Payer: MEDICARE

## 2024-08-23 LAB
BASOPHILS # BLD: 0 K/UL (ref 0–0.2)
BASOPHILS NFR BLD: 0.4 % (ref 0–1)
EOSINOPHIL # BLD: 0.1 K/UL (ref 0–0.6)
EOSINOPHIL NFR BLD: 1.9 % (ref 0–5)
ERYTHROCYTE [DISTWIDTH] IN BLOOD BY AUTOMATED COUNT: 16.6 % (ref 11.5–14.5)
GLUCOSE BLD-MCNC: 120 MG/DL (ref 70–99)
GLUCOSE BLD-MCNC: 163 MG/DL (ref 70–99)
GLUCOSE BLD-MCNC: 208 MG/DL (ref 70–99)
GLUCOSE BLD-MCNC: 97 MG/DL (ref 70–99)
HCT VFR BLD AUTO: 25.4 % (ref 42–52)
HGB BLD-MCNC: 8.1 G/DL (ref 14–18)
IMM GRANULOCYTES # BLD: 0.1 K/UL
LYMPHOCYTES # BLD: 0.9 K/UL (ref 1.1–4.5)
LYMPHOCYTES NFR BLD: 18.3 % (ref 20–40)
MCH RBC QN AUTO: 37 PG (ref 27–31)
MCHC RBC AUTO-ENTMCNC: 31.9 G/DL (ref 33–37)
MCV RBC AUTO: 116 FL (ref 80–94)
MONOCYTES # BLD: 0.6 K/UL (ref 0–0.9)
MONOCYTES NFR BLD: 12.1 % (ref 0–10)
NEUTROPHILS # BLD: 3.1 K/UL (ref 1.5–7.5)
NEUTS SEG NFR BLD: 66.2 % (ref 50–65)
PERFORMED ON: ABNORMAL
PERFORMED ON: NORMAL
PLATELET # BLD AUTO: 71 K/UL (ref 130–400)
PMV BLD AUTO: 10 FL (ref 9.4–12.4)
RBC # BLD AUTO: 2.19 M/UL (ref 4.7–6.1)
WBC # BLD AUTO: 4.7 K/UL (ref 4.8–10.8)

## 2024-08-23 PROCEDURE — 6360000002 HC RX W HCPCS: Performed by: FAMILY MEDICINE

## 2024-08-23 PROCEDURE — 94760 N-INVAS EAR/PLS OXIMETRY 1: CPT

## 2024-08-23 PROCEDURE — 82962 GLUCOSE BLOOD TEST: CPT

## 2024-08-23 PROCEDURE — 36415 COLL VENOUS BLD VENIPUNCTURE: CPT

## 2024-08-23 PROCEDURE — 2580000003 HC RX 258: Performed by: FAMILY MEDICINE

## 2024-08-23 PROCEDURE — 97530 THERAPEUTIC ACTIVITIES: CPT

## 2024-08-23 PROCEDURE — 97535 SELF CARE MNGMENT TRAINING: CPT

## 2024-08-23 PROCEDURE — 85025 COMPLETE CBC W/AUTO DIFF WBC: CPT

## 2024-08-23 PROCEDURE — 1200000000 HC SEMI PRIVATE

## 2024-08-23 PROCEDURE — 73560 X-RAY EXAM OF KNEE 1 OR 2: CPT

## 2024-08-23 PROCEDURE — 6370000000 HC RX 637 (ALT 250 FOR IP): Performed by: FAMILY MEDICINE

## 2024-08-23 RX ORDER — HYDROCODONE BITARTRATE AND ACETAMINOPHEN 10; 325 MG/1; MG/1
1 TABLET ORAL EVERY 6 HOURS PRN
Status: DISCONTINUED | OUTPATIENT
Start: 2024-08-23 | End: 2024-08-30 | Stop reason: HOSPADM

## 2024-08-23 RX ORDER — HYDROCODONE BITARTRATE AND ACETAMINOPHEN 5; 325 MG/1; MG/1
1 TABLET ORAL EVERY 8 HOURS PRN
Qty: 90 TABLET | Refills: 0 | Status: CANCELLED | OUTPATIENT
Start: 2024-08-23 | End: 2024-09-22

## 2024-08-23 RX ORDER — HYDROCODONE BITARTRATE AND ACETAMINOPHEN 5; 325 MG/1; MG/1
1 TABLET ORAL ONCE
Status: COMPLETED | OUTPATIENT
Start: 2024-08-23 | End: 2024-08-23

## 2024-08-23 RX ADMIN — DOCUSATE SODIUM 100 MG: 100 CAPSULE, LIQUID FILLED ORAL at 21:28

## 2024-08-23 RX ADMIN — INSULIN GLARGINE 5 UNITS: 100 INJECTION, SOLUTION SUBCUTANEOUS at 21:28

## 2024-08-23 RX ADMIN — CLOPIDOGREL BISULFATE 75 MG: 75 TABLET ORAL at 07:24

## 2024-08-23 RX ADMIN — DULOXETINE HYDROCHLORIDE 60 MG: 60 CAPSULE, DELAYED RELEASE ORAL at 21:27

## 2024-08-23 RX ADMIN — SODIUM CHLORIDE, PRESERVATIVE FREE 10 ML: 5 INJECTION INTRAVENOUS at 07:24

## 2024-08-23 RX ADMIN — HYDROCODONE BITARTRATE AND ACETAMINOPHEN 1 TABLET: 10; 325 TABLET ORAL at 23:54

## 2024-08-23 RX ADMIN — PANTOPRAZOLE SODIUM 40 MG: 40 TABLET, DELAYED RELEASE ORAL at 07:24

## 2024-08-23 RX ADMIN — BUSPIRONE HYDROCHLORIDE 15 MG: 15 TABLET ORAL at 21:27

## 2024-08-23 RX ADMIN — HYDROCODONE BITARTRATE AND ACETAMINOPHEN 1 TABLET: 5; 325 TABLET ORAL at 02:54

## 2024-08-23 RX ADMIN — BUSPIRONE HYDROCHLORIDE 15 MG: 15 TABLET ORAL at 07:24

## 2024-08-23 RX ADMIN — LEVOTHYROXINE SODIUM 50 MCG: 50 TABLET ORAL at 07:24

## 2024-08-23 RX ADMIN — RANOLAZINE 1000 MG: 500 TABLET, FILM COATED, EXTENDED RELEASE ORAL at 21:28

## 2024-08-23 RX ADMIN — HYDROCODONE BITARTRATE AND ACETAMINOPHEN 1 TABLET: 5; 325 TABLET ORAL at 09:06

## 2024-08-23 RX ADMIN — SODIUM CHLORIDE, PRESERVATIVE FREE 10 ML: 5 INJECTION INTRAVENOUS at 21:28

## 2024-08-23 RX ADMIN — SODIUM CHLORIDE 250 MG: 9 INJECTION, SOLUTION INTRAVENOUS at 09:06

## 2024-08-23 RX ADMIN — GABAPENTIN 100 MG: 100 CAPSULE ORAL at 21:27

## 2024-08-23 RX ADMIN — HYDROCODONE BITARTRATE AND ACETAMINOPHEN 1 TABLET: 5; 325 TABLET ORAL at 12:36

## 2024-08-23 RX ADMIN — ROSUVASTATIN CALCIUM 40 MG: 20 TABLET, COATED ORAL at 07:24

## 2024-08-23 RX ADMIN — RANOLAZINE 1000 MG: 500 TABLET, FILM COATED, EXTENDED RELEASE ORAL at 07:24

## 2024-08-23 RX ADMIN — FERROUS SULFATE TAB 325 MG (65 MG ELEMENTAL FE) 325 MG: 325 (65 FE) TAB at 21:27

## 2024-08-23 RX ADMIN — ACETAMINOPHEN 650 MG: 325 TABLET ORAL at 11:59

## 2024-08-23 RX ADMIN — HYDROCODONE BITARTRATE AND ACETAMINOPHEN 1 TABLET: 10; 325 TABLET ORAL at 17:56

## 2024-08-23 RX ADMIN — FERROUS SULFATE TAB 325 MG (65 MG ELEMENTAL FE) 325 MG: 325 (65 FE) TAB at 07:24

## 2024-08-23 ASSESSMENT — PAIN SCALES - GENERAL
PAINLEVEL_OUTOF10: 8
PAINLEVEL_OUTOF10: 10
PAINLEVEL_OUTOF10: 10
PAINLEVEL_OUTOF10: 9
PAINLEVEL_OUTOF10: 8
PAINLEVEL_OUTOF10: 10

## 2024-08-23 ASSESSMENT — PAIN DESCRIPTION - ORIENTATION
ORIENTATION: RIGHT

## 2024-08-23 ASSESSMENT — PAIN DESCRIPTION - LOCATION
LOCATION: LEG
LOCATION: KNEE
LOCATION: KNEE

## 2024-08-23 ASSESSMENT — PAIN DESCRIPTION - DESCRIPTORS
DESCRIPTORS: THROBBING
DESCRIPTORS: ACHING

## 2024-08-23 NOTE — PROGRESS NOTES
Daily Progress Note  Daniel Ramirez  MRN: [id number] LOS: 4    Admit Date: 8/19/2024 8/23/2024 7:29 AM    Subjective:          Chief Complaint:  No chief complaint on file.      Interval History:    Reviewed overnight events and nursing notes.   Status: Slight improvement, pain is controlled as long as the right leg is not moved.        Review of Systems   Constitutional:  Positive for activity change. Negative for fatigue and fever.   Respiratory:  Negative for cough and shortness of breath.    Cardiovascular:  Negative for chest pain.   Gastrointestinal:  Negative for nausea and vomiting.   Musculoskeletal:  Positive for arthralgias and gait problem.   Skin:  Negative for color change.   Neurological:  Positive for weakness.       DIET:  ADULT DIET; Regular; 5 carb choices (75 gm/meal)    Medications:      sodium chloride        sodium chloride flush  5-40 mL IntraVENous 2 times per day    [Held by provider] enoxaparin  40 mg SubCUTAneous Daily    busPIRone  15 mg Oral BID    clopidogrel  75 mg Oral Daily    docusate sodium  100 mg Oral Nightly    DULoxetine  60 mg Oral Nightly    ferrous sulfate  325 mg Oral BID    gabapentin  100 mg Oral Nightly    levothyroxine  50 mcg Oral Daily    [Held by provider] metFORMIN  500 mg Oral BID WC    metoprolol succinate  25 mg Oral Daily    pantoprazole  40 mg Oral Daily    ranolazine  1,000 mg Oral BID    rosuvastatin  40 mg Oral Daily    ferric gluconate  250 mg IntraVENous Once per day on Monday Wednesday Friday    insulin glargine  5 Units SubCUTAneous Nightly       Data:     Code Status: Full Code    Family History   Problem Relation Age of Onset    Lung Cancer Mother 60    Lung Cancer Sister 80    Cancer Sister     Heart Disease Other     Colon Cancer Neg Hx     Colon Polyps Neg Hx      Social History     Socioeconomic History    Marital status:      Spouse name: Niya    Number of children: 2    Years of education: 12    Highest education level: Not on file  (HCC)     Type II or unspecified type diabetes mellitus without mention of complication, not stated as uncontrolled        Treatment Plan:  Right femur fracture: Essentially periprosthetic fracture of the distal femur that is approximately 7 mm from the femoral portion of the TKA.  Orthopedic consultation, appreciate recommendations.  PT/OT/CM as he will likely need inpatient rehab.  - MRI right knee that redemonstrated the femur fracture, no concern for slight displacement and comminution of the fracture.  Will make sure that there are no changes to orthopedics recommendations from the initial consultation.  Plan is now to go home with home health with nonweightbearing status.    Confusion: Suspect due to hypotension and Percocet.  MRI brain within normal limits.  Drastic improvement with switching pain medication to Maury from Percocet.     Type 2 diabetes: BG control is improved, continue SSI.     Hypokalemia: Replete and monitor.     Anemia: Labs pending this morning, monitor and transfuse to maintain hemoglobin greater than 7.    Thrombocytopenia: Chronic, improving.  Will transfuse platelets to maintain platelet level greater than 50     CODE STATUS: Full     DVT prophylaxis: SCD, home plavix     Disposition: Inpatient, home with home health pending any further recommendations from orthopedics    Reviewed treatment plans with the patient and/or family.   30 minutes spent in face to face interaction and coordination of care.     Electronically signed by Sabas Pérez MD on 8/23/2024 at 7:29 AM

## 2024-08-23 NOTE — PROGRESS NOTES
Physical Therapy     08/23/24 1400   Restrictions/Precautions   Restrictions/Precautions Fall Risk;Weight Bearing   Required Braces or Orthoses? Yes   Lower Extremity Weight Bearing Restrictions   Right Lower Extremity Weight Bearing Non Weight Bearing   Required Braces or Orthoses   Right Lower Extremity Brace Knee Immobilizer   Position Activity Restriction   Other position/activity restrictions Orders on chart8/23/24 state:  Immobilizer on at all times during transfers and walking   General   Diagnosis R periprosthetic fx of distal femur   Subjective   Subjective Pt agreed to therapy.   Bed mobility   Supine to Sit Minimal assistance   Bed Mobility Comments Koby to hold RLE off floor while scooting EOB prior to STS from EOB with rails. 3 STS from bilat rails with RLE supported Koby and CGA in standing- pt unable to make swivel step with LLE at this time due to bad L knee. pt then performed lateral scoot transfer from elevated EOB to recliner with Koby at RLE. pt quicker to fatigue post STS EOB and needed Koby for last scoot into chair.   Transfers   Sit to Stand Minimal Assistance;2 Person Assistance  (plus one for safety and one holding RLE)   Stand to Sit Minimal Assistance   Bed to Chair Minimal assistance   Lateral Transfers Minimal Assistance   Comment scoot transfer from elevated EOB to chair   Short Term Goals   Time Frame for Short Term Goals 2 wks   Short Term Goal 1 supine to sit indep   Short Term Goal 2 bed to chair SBA with RW, NWB RLE   Short Term Goal 3 sit to stand CGA, NWB RLE   Short Term Goal 4 bed to chair with sliding board SBA   Activity Tolerance   Activity Tolerance Patient tolerated treatment well   Assessment   Assessment pt able to improve STS to static standing EOB with support of bilat rails and plus two for safety with Koby at RLE. pt positioned in chair with heels floated and skin checked under brace with slight redness but no skin breakdown noted.   PT Plan of Care   Friday X    Safety Devices   Type of Devices Call light within reach;Chair alarm in place;Gait belt;Heels elevated for pressure relief;Left in chair;Nurse notified     Electronically signed by Yeison Underwood PTA on 8/23/2024 at 2:14 PM

## 2024-08-23 NOTE — PROGRESS NOTES
Physician Progress Note      PATIENT:               MARJORIE ERAZO  CSN #:                  [id number]  :                       1955  ADMIT DATE:       2024 4:59 PM  DISCH DATE:  RESPONDING  PROVIDER #:        Sabas Pérez MD          QUERY TEXT:    Pt admitted with Right femur fracture. Pt noted to have Osteopenia in   Orthopedic Surgery consult . If possible, please document in progress   notes and discharge summary if you are evaluating and/or treating any of the   following:    The medical record reflects the following:  Risk Factors: Advance age, Osteopenia    Clinical Indicators:  H&P  \"Right knee pain after falling at home.Right   femur fracture: Essentially periprosthetic fracture of the distal femur that   is approximately 7 mm from the femoral portion of the TKA.  Orthopedic   consultation is in place, appreciate recommendations.  PT/OT/CM as he will   likely need inpatient rehab\".    Orthopedic Surgery consult  \"XR Tibia Fibula  \"Osteopenia and right   total knee arthroplasty;  A recent CT scan demonstrated a nondisplaced   fracture of the medial femur just above the prosthesis that is a vertical   fracture line; I would recommend nonsurgical care.  The patient's fracture   pattern appears to be 1 that is stable\".    Treatment:  Vit D Capsule, Orthopedic Surgery consult    Thank you  DARIN Vang CDS  Options provided:  -- Pathological Right femur fracture due to osteopenia following fall which   would not usually break a normal, healthy bone  -- Traumatic Right femur fracture  -- Other - I will add my own diagnosis  -- Disagree - Not applicable / Not valid  -- Disagree - Clinically unable to determine / Unknown  -- Refer to Clinical Documentation Reviewer    PROVIDER RESPONSE TEXT:    This patient has a pathological Right femur fracture due to osteopenia   following fall which would not usually break a normal, healthy bone.    Query created by: Rayo Narayan,

## 2024-08-23 NOTE — PLAN OF CARE
Problem: Discharge Planning  Goal: Discharge to home or other facility with appropriate resources  8/23/2024 1035 by Suzy Armstrong RN  Outcome: Progressing  Flowsheets (Taken 8/23/2024 0728)  Discharge to home or other facility with appropriate resources: Identify barriers to discharge with patient and caregiver  8/22/2024 2224 by Jessika Garcia, RN  Outcome: Progressing  Flowsheets (Taken 8/22/2024 2103)  Discharge to home or other facility with appropriate resources: Identify barriers to discharge with patient and caregiver     Problem: Skin/Tissue Integrity  Goal: Absence of new skin breakdown  Description: 1.  Monitor for areas of redness and/or skin breakdown  2.  Assess vascular access sites hourly  3.  Every 4-6 hours minimum:  Change oxygen saturation probe site  4.  Every 4-6 hours:  If on nasal continuous positive airway pressure, respiratory therapy assess nares and determine need for appliance change or resting period.  8/23/2024 1035 by Suzy Armstrong RN  Outcome: Progressing  8/22/2024 2224 by Jessika Garcia, RN  Outcome: Progressing     Problem: ABCDS Injury Assessment  Goal: Absence of physical injury  8/23/2024 1035 by Suzy Armstrong RN  Outcome: Progressing  8/22/2024 2224 by Jessika Garcia RN  Outcome: Progressing  Flowsheets (Taken 8/22/2024 2220)  Absence of Physical Injury: Implement safety measures based on patient assessment     Problem: Pain  Goal: Verbalizes/displays adequate comfort level or baseline comfort level  8/23/2024 1035 by Suzy Armstrong RN  Outcome: Progressing  8/22/2024 2224 by Jessika Garcia RN  Outcome: Progressing     Problem: Safety - Adult  Goal: Free from fall injury  8/23/2024 1035 by Suzy Armstrong RN  Outcome: Progressing  8/22/2024 2224 by Jessika Garcia RN  Outcome: Progressing  Flowsheets (Taken 8/22/2024 2220)  Free From Fall Injury: Instruct family/caregiver on patient safety     Problem: Chronic Conditions and Co-morbidities  Goal: Patient's chronic

## 2024-08-23 NOTE — PROGRESS NOTES
Physical Therapy     08/23/24 1500   Restrictions/Precautions   Restrictions/Precautions Fall Risk;Weight Bearing   Required Braces or Orthoses? Yes   Lower Extremity Weight Bearing Restrictions   Right Lower Extremity Weight Bearing Non Weight Bearing   Required Braces or Orthoses   Right Lower Extremity Brace Knee Immobilizer   Subjective   Subjective Pt agreed to therapy.   Bed mobility   Sit to Supine Minimal assistance;Moderate assistance;2 Person assistance   Scooting Minimal assistance;2 Person assistance   Transfers   Lateral Transfers Minimal Assistance;2 Person Assistance   Comment sliding board transfer from recliner back to EOB due to pt inability to lateral scoot uphill towards EOB. Koby to keep RLE off floor.   Short Term Goals   Time Frame for Short Term Goals 2 wks   Short Term Goal 1 supine to sit indep   Short Term Goal 2 bed to chair SBA with RW, NWB RLE   Short Term Goal 3 sit to stand CGA, NWB RLE   Short Term Goal 4 bed to chair with sliding board SBA   Activity Tolerance   Activity Tolerance Patient tolerated treatment well   Assessment   Assessment pt performs well with sliding board back to bed with set up assistance and Koby for RLE-plus one for safety. R heel floated with all needs in reach.   PT Plan of Care   Friday X   Safety Devices   Type of Devices Call light within reach;Gait belt;Left in bed;Heels elevated for pressure relief;Bed alarm in place     Electronically signed by Yeison Underwood PTA on 8/23/2024 at 3:26 PM

## 2024-08-23 NOTE — PROGRESS NOTES
Facility/Department: [facility] SURG SERVICES  Daily Treatment Note  NAME: Daniel Ramirez  : 1955  MRN: [id number]    Date of Service: 2024    Discharge Recommendations:  Patient would benefit from continued therapy after discharge       Assessment   Assessment: Was able to participate in beginning level static standing.  Performs lateral transfer.  Have been providing patient with dedicated assist to RLE for NWB and pain managment for all mobility tasks  Treatment Diagnosis: Right condyle fx (non surgical), possible quad tendon tear  Activity Tolerance  Activity Tolerance: Patient Tolerated treatment well         Patient Diagnosis(es):     has a past medical history of Abnormal nuclear stress test, Arthritis, BiPAP (biphasic positive airway pressure) dependence, Cerebrovascular disease, Chronic kidney disease, stage II (mild), Cirrhosis (HCC), Coronary artery disease, Depression, Diabetes mellitus (HCC), Encounter for wound care, Great toe pain, Headache, Hyperlipemia, Hyperlipidemia, Hypertension, Liver disease, LONG TERM ANTICOAGULENT USE, Low blood pressure, lymphostatic lymphoma, Nausea, Non Hodgkin's lymphoma (HCC), Obesity, Obstructive sleep apnea, Peptic ulcer disease, Restless leg, S/P CABG x 3, Sleep apnea, Syncope and collapse, Tachyarrhythmia, Tachyarrhythmia, Thrombocythemia, essential (HCC), Thyroid disease, Type 2 diabetes mellitus without complication (HCC), and Type II or unspecified type diabetes mellitus without mention of complication, not stated as uncontrolled.   has a past surgical history that includes Knee arthroscopy; Tonsillectomy; Shoulder arthroscopy; Shoulder arthroscopy (2011); Cardiac catheterization (2011); Cardiac catheterization (05, 3/7/07); Cardiac catheterization (12   MDL); Cardiac catheterization (10/28/14  MDL); Coronary artery bypass graft (2005); Coronary artery bypass graft (10/30/2014); Cervical disc surgery; Cervical spine surgery; Kidney  Goal 3: Perform toileting with CGA  Short Term Goal 4: Independent with therapeutic activity recommendations, safety, and positioning recommendations  Long Term Goals  Long Term Goal 1: Upgrade as tolerated to modified independent       Tx Time  Minutes  30             Karishma Gonzalez OT/DAYSI  Electronically signed by Karishma Gonzalez OT on 8/23/2024 at 3:04 PM

## 2024-08-23 NOTE — PLAN OF CARE
Problem: Discharge Planning  Goal: Discharge to home or other facility with appropriate resources  Outcome: Progressing  Flowsheets (Taken 8/22/2024 2103)  Discharge to home or other facility with appropriate resources: Identify barriers to discharge with patient and caregiver     Problem: Skin/Tissue Integrity  Goal: Absence of new skin breakdown  Description: 1.  Monitor for areas of redness and/or skin breakdown  2.  Assess vascular access sites hourly  3.  Every 4-6 hours minimum:  Change oxygen saturation probe site  4.  Every 4-6 hours:  If on nasal continuous positive airway pressure, respiratory therapy assess nares and determine need for appliance change or resting period.  Outcome: Progressing     Problem: ABCDS Injury Assessment  Goal: Absence of physical injury  Outcome: Progressing  Flowsheets (Taken 8/22/2024 2220)  Absence of Physical Injury: Implement safety measures based on patient assessment     Problem: Pain  Goal: Verbalizes/displays adequate comfort level or baseline comfort level  Outcome: Progressing     Problem: Safety - Adult  Goal: Free from fall injury  Outcome: Progressing  Flowsheets (Taken 8/22/2024 2220)  Free From Fall Injury: Instruct family/caregiver on patient safety     Problem: Chronic Conditions and Co-morbidities  Goal: Patient's chronic conditions and co-morbidity symptoms are monitored and maintained or improved  Outcome: Progressing  Flowsheets (Taken 8/22/2024 2103)  Care Plan - Patient's Chronic Conditions and Co-Morbidity Symptoms are Monitored and Maintained or Improved: Monitor and assess patient's chronic conditions and comorbid symptoms for stability, deterioration, or improvement     Problem: Anxiety  Goal: Will report anxiety at manageable levels  Description: INTERVENTIONS:  1. Administer medication as ordered  2. Teach and rehearse alternative coping skills  3. Provide emotional support with 1:1 interaction with staff  Outcome: Progressing  Flowsheets (Taken  8/22/2024 2103)  Will report anxiety at manageable levels: Administer medication as ordered     Problem: Coping  Goal: Pt/Family able to verbalize concerns and demonstrate effective coping strategies  Description: INTERVENTIONS:  1. Assist patient/family to identify coping skills, available support systems and cultural and spiritual values  2. Provide emotional support, including active listening and acknowledgement of concerns of patient and caregivers  3. Reduce environmental stimuli, as able  4. Instruct patient/family in relaxation techniques, as appropriate  5. Assess for spiritual pain/suffering and initiate Spiritual Care, Psychosocial Clinical Specialist consults as needed  Outcome: Progressing  Flowsheets (Taken 8/22/2024 2103)  Patient/family able to verbalize anxieties, fears, and concerns, and demonstrate effective coping: Assist patient/family to identify coping skills, available support systems and cultural and spiritual values     Problem: Skin/Tissue Integrity - Adult  Goal: Skin integrity remains intact  Outcome: Progressing  Flowsheets (Taken 8/22/2024 2103)  Skin Integrity Remains Intact: Monitor for areas of redness and/or skin breakdown  Goal: Incisions, wounds, or drain sites healing without S/S of infection  Outcome: Progressing  Flowsheets (Taken 8/22/2024 2103)  Incisions, Wounds, or Drain Sites Healing Without Sign and Symptoms of Infection: ADMISSION and DAILY: Assess and document risk factors for pressure ulcer development  Goal: Oral mucous membranes remain intact  Outcome: Progressing  Flowsheets (Taken 8/22/2024 2103)  Oral Mucous Membranes Remain Intact: Assess oral mucosa and hygiene practices     Problem: Musculoskeletal - Adult  Goal: Return mobility to safest level of function  Outcome: Progressing  Flowsheets (Taken 8/22/2024 2103)  Return Mobility to Safest Level of Function: Assess patient stability and activity tolerance for standing, transferring and ambulating with or  without assistive devices  Goal: Maintain proper alignment of affected body part  Outcome: Progressing  Flowsheets (Taken 8/22/2024 2103)  Maintain proper alignment of affected body part: Support and protect limb and body alignment per provider's orders  Goal: Return ADL status to a safe level of function  Outcome: Progressing  Flowsheets (Taken 8/22/2024 2103)  Return ADL Status to a Safe Level of Function: Administer medication as ordered     Problem: Gastrointestinal - Adult  Goal: Minimal or absence of nausea and vomiting  Outcome: Progressing  Flowsheets (Taken 8/22/2024 2103)  Minimal or absence of nausea and vomiting: Administer IV fluids as ordered to ensure adequate hydration  Goal: Maintains or returns to baseline bowel function  Outcome: Progressing  Flowsheets (Taken 8/22/2024 2103)  Maintains or returns to baseline bowel function: Assess bowel function  Goal: Maintains adequate nutritional intake  Outcome: Progressing  Flowsheets (Taken 8/22/2024 2103)  Maintains adequate nutritional intake: Monitor percentage of each meal consumed  Goal: Establish and maintain optimal ostomy function  Outcome: Progressing  Flowsheets (Taken 8/22/2024 2103)  Establish and maintain optimal ostomy function: Monitor output from ostomies     Problem: Hematologic - Adult  Goal: Maintains hematologic stability  Outcome: Progressing  Flowsheets (Taken 8/22/2024 2103)  Maintains hematologic stability: Assess for signs and symptoms of bleeding or hemorrhage     Problem: Infection - Adult  Goal: Absence of infection at discharge  Outcome: Progressing  Flowsheets (Taken 8/22/2024 2103)  Absence of infection at discharge: Assess and monitor for signs and symptoms of infection  Goal: Absence of infection during hospitalization  Outcome: Progressing  Flowsheets (Taken 8/22/2024 2103)  Absence of infection during hospitalization: Assess and monitor for signs and symptoms of infection  Goal: Absence of fever/infection during

## 2024-08-23 NOTE — CARE COORDINATION
Patient/spouse have requested a referral to be sent to [facility]. Referral sent. Awaiting acceptance/denial.  [facility] (formerly [facility])   [phone] P  [phone] F  Electronically signed by Brian Rico RN, BSN on 8/23/2024 at 9:30 AM

## 2024-08-24 ENCOUNTER — APPOINTMENT (OUTPATIENT)
Dept: GENERAL RADIOLOGY | Age: 69
End: 2024-08-24
Attending: ORTHOPAEDIC SURGERY
Payer: MEDICARE

## 2024-08-24 ENCOUNTER — ANESTHESIA EVENT (OUTPATIENT)
Dept: OPERATING ROOM | Age: 69
End: 2024-08-24
Payer: MEDICARE

## 2024-08-24 ENCOUNTER — ANESTHESIA (OUTPATIENT)
Dept: OPERATING ROOM | Age: 69
End: 2024-08-24
Payer: MEDICARE

## 2024-08-24 LAB
ANION GAP SERPL CALCULATED.3IONS-SCNC: 8 MMOL/L (ref 7–19)
BASOPHILS # BLD: 0 K/UL (ref 0–0.2)
BASOPHILS NFR BLD: 0.4 % (ref 0–1)
BUN SERPL-MCNC: 14 MG/DL (ref 8–23)
CALCIUM SERPL-MCNC: 8.9 MG/DL (ref 8.8–10.2)
CHLORIDE SERPL-SCNC: 105 MMOL/L (ref 98–111)
CO2 SERPL-SCNC: 25 MMOL/L (ref 22–29)
CREAT SERPL-MCNC: 1.1 MG/DL (ref 0.7–1.2)
EOSINOPHIL # BLD: 0.1 K/UL (ref 0–0.6)
EOSINOPHIL NFR BLD: 1.7 % (ref 0–5)
ERYTHROCYTE [DISTWIDTH] IN BLOOD BY AUTOMATED COUNT: 16.9 % (ref 11.5–14.5)
GLUCOSE BLD-MCNC: 112 MG/DL (ref 70–99)
GLUCOSE BLD-MCNC: 127 MG/DL (ref 70–99)
GLUCOSE BLD-MCNC: 145 MG/DL (ref 70–99)
GLUCOSE BLD-MCNC: 148 MG/DL (ref 70–99)
GLUCOSE BLD-MCNC: 155 MG/DL (ref 70–99)
GLUCOSE BLD-MCNC: 251 MG/DL (ref 70–99)
GLUCOSE SERPL-MCNC: 124 MG/DL (ref 70–99)
HCT VFR BLD AUTO: 24.9 % (ref 42–52)
HGB BLD-MCNC: 8.1 G/DL (ref 14–18)
IMM GRANULOCYTES # BLD: 0 K/UL
LYMPHOCYTES # BLD: 0.7 K/UL (ref 1.1–4.5)
LYMPHOCYTES NFR BLD: 13.2 % (ref 20–40)
MCH RBC QN AUTO: 37.5 PG (ref 27–31)
MCHC RBC AUTO-ENTMCNC: 32.5 G/DL (ref 33–37)
MCV RBC AUTO: 115.3 FL (ref 80–94)
MONOCYTES # BLD: 0.7 K/UL (ref 0–0.9)
MONOCYTES NFR BLD: 13.6 % (ref 0–10)
NEUTROPHILS # BLD: 3.7 K/UL (ref 1.5–7.5)
NEUTS SEG NFR BLD: 70.5 % (ref 50–65)
PERFORMED ON: ABNORMAL
PLATELET # BLD AUTO: 82 K/UL (ref 130–400)
PMV BLD AUTO: 9.5 FL (ref 9.4–12.4)
POTASSIUM SERPL-SCNC: 3.8 MMOL/L (ref 3.5–5)
RBC # BLD AUTO: 2.16 M/UL (ref 4.7–6.1)
SODIUM SERPL-SCNC: 138 MMOL/L (ref 136–145)
WBC # BLD AUTO: 5.2 K/UL (ref 4.8–10.8)

## 2024-08-24 PROCEDURE — C1713 ANCHOR/SCREW BN/BN,TIS/BN: HCPCS | Performed by: ORTHOPAEDIC SURGERY

## 2024-08-24 PROCEDURE — 85025 COMPLETE CBC W/AUTO DIFF WBC: CPT

## 2024-08-24 PROCEDURE — 2580000003 HC RX 258: Performed by: ORTHOPAEDIC SURGERY

## 2024-08-24 PROCEDURE — 1200000000 HC SEMI PRIVATE

## 2024-08-24 PROCEDURE — 6360000002 HC RX W HCPCS: Performed by: ORTHOPAEDIC SURGERY

## 2024-08-24 PROCEDURE — 6370000000 HC RX 637 (ALT 250 FOR IP): Performed by: FAMILY MEDICINE

## 2024-08-24 PROCEDURE — 6360000002 HC RX W HCPCS

## 2024-08-24 PROCEDURE — 73552 X-RAY EXAM OF FEMUR 2/>: CPT

## 2024-08-24 PROCEDURE — 80048 BASIC METABOLIC PNL TOTAL CA: CPT

## 2024-08-24 PROCEDURE — 7100000000 HC PACU RECOVERY - FIRST 15 MIN: Performed by: ORTHOPAEDIC SURGERY

## 2024-08-24 PROCEDURE — 3700000001 HC ADD 15 MINUTES (ANESTHESIA): Performed by: ORTHOPAEDIC SURGERY

## 2024-08-24 PROCEDURE — 2700000000 HC OXYGEN THERAPY PER DAY

## 2024-08-24 PROCEDURE — 6370000000 HC RX 637 (ALT 250 FOR IP): Performed by: ORTHOPAEDIC SURGERY

## 2024-08-24 PROCEDURE — 2580000003 HC RX 258

## 2024-08-24 PROCEDURE — 3700000000 HC ANESTHESIA ATTENDED CARE: Performed by: ORTHOPAEDIC SURGERY

## 2024-08-24 PROCEDURE — 2500000003 HC RX 250 WO HCPCS

## 2024-08-24 PROCEDURE — 82962 GLUCOSE BLOOD TEST: CPT

## 2024-08-24 PROCEDURE — C1769 GUIDE WIRE: HCPCS | Performed by: ORTHOPAEDIC SURGERY

## 2024-08-24 PROCEDURE — 2720000010 HC SURG SUPPLY STERILE: Performed by: ORTHOPAEDIC SURGERY

## 2024-08-24 PROCEDURE — 36415 COLL VENOUS BLD VENIPUNCTURE: CPT

## 2024-08-24 PROCEDURE — 3600000004 HC SURGERY LEVEL 4 BASE: Performed by: ORTHOPAEDIC SURGERY

## 2024-08-24 PROCEDURE — 7100000001 HC PACU RECOVERY - ADDTL 15 MIN: Performed by: ORTHOPAEDIC SURGERY

## 2024-08-24 PROCEDURE — 0QSB04Z REPOSITION RIGHT LOWER FEMUR WITH INTERNAL FIXATION DEVICE, OPEN APPROACH: ICD-10-PCS | Performed by: ORTHOPAEDIC SURGERY

## 2024-08-24 PROCEDURE — 94150 VITAL CAPACITY TEST: CPT

## 2024-08-24 PROCEDURE — 3600000014 HC SURGERY LEVEL 4 ADDTL 15MIN: Performed by: ORTHOPAEDIC SURGERY

## 2024-08-24 PROCEDURE — 2709999900 HC NON-CHARGEABLE SUPPLY: Performed by: ORTHOPAEDIC SURGERY

## 2024-08-24 DEVICE — IMPLANTABLE DEVICE: Type: IMPLANTABLE DEVICE | Site: FEMUR | Status: FUNCTIONAL

## 2024-08-24 DEVICE — SCREW BNE L42MM DIA5MM CNDYL S STL ST VAR ANG LOK T25: Type: IMPLANTABLE DEVICE | Site: FEMUR | Status: FUNCTIONAL

## 2024-08-24 DEVICE — SCREW BNE L46MM DIA5MM CNDYL S STL ST VAR ANG LOK T25: Type: IMPLANTABLE DEVICE | Site: FEMUR | Status: FUNCTIONAL

## 2024-08-24 DEVICE — SCREW BNE L55MM DIA5MM CNDYL S STL ST VAR ANG LOK FULL THRD: Type: IMPLANTABLE DEVICE | Site: FEMUR | Status: FUNCTIONAL

## 2024-08-24 DEVICE — SCREW BNE L50MM DIA5MM CNDYL S STL ST VAR ANG LOK FULL THRD: Type: IMPLANTABLE DEVICE | Site: FEMUR | Status: FUNCTIONAL

## 2024-08-24 DEVICE — PLATE BNE L195MM 8 H R CNDYL S STL VAR ANG LOK COMPR CRV: Type: IMPLANTABLE DEVICE | Site: FEMUR | Status: FUNCTIONAL

## 2024-08-24 RX ORDER — HYDROMORPHONE HYDROCHLORIDE 1 MG/ML
0.5 INJECTION, SOLUTION INTRAMUSCULAR; INTRAVENOUS; SUBCUTANEOUS EVERY 5 MIN PRN
Status: COMPLETED | OUTPATIENT
Start: 2024-08-24 | End: 2024-08-24

## 2024-08-24 RX ORDER — FENTANYL CITRATE 50 UG/ML
INJECTION, SOLUTION INTRAMUSCULAR; INTRAVENOUS PRN
Status: DISCONTINUED | OUTPATIENT
Start: 2024-08-24 | End: 2024-08-24 | Stop reason: SDUPTHER

## 2024-08-24 RX ORDER — SODIUM CHLORIDE 0.9 % (FLUSH) 0.9 %
5-40 SYRINGE (ML) INJECTION PRN
Status: DISCONTINUED | OUTPATIENT
Start: 2024-08-24 | End: 2024-08-30 | Stop reason: HOSPADM

## 2024-08-24 RX ORDER — HYDRALAZINE HYDROCHLORIDE 20 MG/ML
10 INJECTION INTRAMUSCULAR; INTRAVENOUS
Status: DISCONTINUED | OUTPATIENT
Start: 2024-08-24 | End: 2024-08-24 | Stop reason: HOSPADM

## 2024-08-24 RX ORDER — MORPHINE SULFATE 4 MG/ML
2 INJECTION, SOLUTION INTRAMUSCULAR; INTRAVENOUS
Status: DISCONTINUED | OUTPATIENT
Start: 2024-08-24 | End: 2024-08-30 | Stop reason: HOSPADM

## 2024-08-24 RX ORDER — VANCOMYCIN HYDROCHLORIDE 1 G/20ML
INJECTION, POWDER, LYOPHILIZED, FOR SOLUTION INTRAVENOUS PRN
Status: DISCONTINUED | OUTPATIENT
Start: 2024-08-24 | End: 2024-08-24 | Stop reason: SDUPTHER

## 2024-08-24 RX ORDER — SODIUM CHLORIDE 9 MG/ML
INJECTION, SOLUTION INTRAVENOUS PRN
Status: DISCONTINUED | OUTPATIENT
Start: 2024-08-24 | End: 2024-08-30 | Stop reason: HOSPADM

## 2024-08-24 RX ORDER — LABETALOL HYDROCHLORIDE 5 MG/ML
10 INJECTION, SOLUTION INTRAVENOUS
Status: DISCONTINUED | OUTPATIENT
Start: 2024-08-24 | End: 2024-08-24 | Stop reason: HOSPADM

## 2024-08-24 RX ORDER — DIPHENHYDRAMINE HCL 25 MG
25 TABLET ORAL EVERY 6 HOURS PRN
Status: DISCONTINUED | OUTPATIENT
Start: 2024-08-24 | End: 2024-08-30 | Stop reason: HOSPADM

## 2024-08-24 RX ORDER — HYDROMORPHONE HYDROCHLORIDE 1 MG/ML
0.25 INJECTION, SOLUTION INTRAMUSCULAR; INTRAVENOUS; SUBCUTANEOUS EVERY 5 MIN PRN
Status: DISCONTINUED | OUTPATIENT
Start: 2024-08-24 | End: 2024-08-24 | Stop reason: HOSPADM

## 2024-08-24 RX ORDER — PROPOFOL 10 MG/ML
INJECTION, EMULSION INTRAVENOUS PRN
Status: DISCONTINUED | OUTPATIENT
Start: 2024-08-24 | End: 2024-08-24 | Stop reason: SDUPTHER

## 2024-08-24 RX ORDER — MORPHINE SULFATE 4 MG/ML
4 INJECTION, SOLUTION INTRAMUSCULAR; INTRAVENOUS
Status: DISCONTINUED | OUTPATIENT
Start: 2024-08-24 | End: 2024-08-30 | Stop reason: HOSPADM

## 2024-08-24 RX ORDER — SODIUM CHLORIDE, SODIUM LACTATE, POTASSIUM CHLORIDE, CALCIUM CHLORIDE 600; 310; 30; 20 MG/100ML; MG/100ML; MG/100ML; MG/100ML
INJECTION, SOLUTION INTRAVENOUS CONTINUOUS
Status: DISCONTINUED | OUTPATIENT
Start: 2024-08-24 | End: 2024-08-30 | Stop reason: HOSPADM

## 2024-08-24 RX ORDER — IPRATROPIUM BROMIDE AND ALBUTEROL SULFATE 2.5; .5 MG/3ML; MG/3ML
1 SOLUTION RESPIRATORY (INHALATION)
Status: DISCONTINUED | OUTPATIENT
Start: 2024-08-24 | End: 2024-08-24 | Stop reason: HOSPADM

## 2024-08-24 RX ORDER — ROCURONIUM BROMIDE 10 MG/ML
INJECTION, SOLUTION INTRAVENOUS PRN
Status: DISCONTINUED | OUTPATIENT
Start: 2024-08-24 | End: 2024-08-24 | Stop reason: SDUPTHER

## 2024-08-24 RX ORDER — ONDANSETRON 4 MG/1
4 TABLET, ORALLY DISINTEGRATING ORAL EVERY 8 HOURS PRN
Status: DISCONTINUED | OUTPATIENT
Start: 2024-08-24 | End: 2024-08-30 | Stop reason: HOSPADM

## 2024-08-24 RX ORDER — SODIUM CHLORIDE, SODIUM LACTATE, POTASSIUM CHLORIDE, CALCIUM CHLORIDE 600; 310; 30; 20 MG/100ML; MG/100ML; MG/100ML; MG/100ML
INJECTION, SOLUTION INTRAVENOUS CONTINUOUS PRN
Status: DISCONTINUED | OUTPATIENT
Start: 2024-08-24 | End: 2024-08-24 | Stop reason: SDUPTHER

## 2024-08-24 RX ORDER — ONDANSETRON 2 MG/ML
4 INJECTION INTRAMUSCULAR; INTRAVENOUS EVERY 6 HOURS PRN
Status: DISCONTINUED | OUTPATIENT
Start: 2024-08-24 | End: 2024-08-30 | Stop reason: HOSPADM

## 2024-08-24 RX ORDER — ONDANSETRON 2 MG/ML
INJECTION INTRAMUSCULAR; INTRAVENOUS PRN
Status: DISCONTINUED | OUTPATIENT
Start: 2024-08-24 | End: 2024-08-24 | Stop reason: SDUPTHER

## 2024-08-24 RX ORDER — SODIUM CHLORIDE 0.9 % (FLUSH) 0.9 %
5-40 SYRINGE (ML) INJECTION EVERY 12 HOURS SCHEDULED
Status: DISCONTINUED | OUTPATIENT
Start: 2024-08-24 | End: 2024-08-30 | Stop reason: HOSPADM

## 2024-08-24 RX ORDER — SODIUM CHLORIDE 0.9 % (FLUSH) 0.9 %
5-40 SYRINGE (ML) INJECTION EVERY 12 HOURS SCHEDULED
Status: DISCONTINUED | OUTPATIENT
Start: 2024-08-24 | End: 2024-08-24 | Stop reason: HOSPADM

## 2024-08-24 RX ORDER — LIDOCAINE HYDROCHLORIDE 10 MG/ML
INJECTION, SOLUTION EPIDURAL; INFILTRATION; INTRACAUDAL; PERINEURAL PRN
Status: DISCONTINUED | OUTPATIENT
Start: 2024-08-24 | End: 2024-08-24 | Stop reason: SDUPTHER

## 2024-08-24 RX ORDER — DIPHENHYDRAMINE HYDROCHLORIDE 50 MG/ML
12.5 INJECTION INTRAMUSCULAR; INTRAVENOUS
Status: DISCONTINUED | OUTPATIENT
Start: 2024-08-24 | End: 2024-08-24 | Stop reason: HOSPADM

## 2024-08-24 RX ORDER — SENNA AND DOCUSATE SODIUM 50; 8.6 MG/1; MG/1
1 TABLET, FILM COATED ORAL 2 TIMES DAILY
Status: DISCONTINUED | OUTPATIENT
Start: 2024-08-24 | End: 2024-08-30 | Stop reason: HOSPADM

## 2024-08-24 RX ORDER — VASOPRESSIN 20 [USP'U]/ML
INJECTION, SOLUTION INTRAMUSCULAR; SUBCUTANEOUS PRN
Status: DISCONTINUED | OUTPATIENT
Start: 2024-08-24 | End: 2024-08-24 | Stop reason: SDUPTHER

## 2024-08-24 RX ORDER — SODIUM CHLORIDE 9 MG/ML
INJECTION, SOLUTION INTRAVENOUS PRN
Status: DISCONTINUED | OUTPATIENT
Start: 2024-08-24 | End: 2024-08-24 | Stop reason: HOSPADM

## 2024-08-24 RX ORDER — OXYCODONE HYDROCHLORIDE 5 MG/1
5 TABLET ORAL EVERY 4 HOURS PRN
Status: DISCONTINUED | OUTPATIENT
Start: 2024-08-24 | End: 2024-08-30 | Stop reason: HOSPADM

## 2024-08-24 RX ORDER — NALOXONE HYDROCHLORIDE 0.4 MG/ML
INJECTION, SOLUTION INTRAMUSCULAR; INTRAVENOUS; SUBCUTANEOUS PRN
Status: DISCONTINUED | OUTPATIENT
Start: 2024-08-24 | End: 2024-08-24 | Stop reason: HOSPADM

## 2024-08-24 RX ORDER — DEXAMETHASONE SODIUM PHOSPHATE 10 MG/ML
INJECTION, SOLUTION INTRAMUSCULAR; INTRAVENOUS PRN
Status: DISCONTINUED | OUTPATIENT
Start: 2024-08-24 | End: 2024-08-24 | Stop reason: SDUPTHER

## 2024-08-24 RX ORDER — OXYCODONE HYDROCHLORIDE 10 MG/1
10 TABLET ORAL EVERY 4 HOURS PRN
Status: DISCONTINUED | OUTPATIENT
Start: 2024-08-24 | End: 2024-08-30 | Stop reason: HOSPADM

## 2024-08-24 RX ORDER — POLYETHYLENE GLYCOL 3350 17 G/17G
17 POWDER, FOR SOLUTION ORAL DAILY PRN
Status: DISCONTINUED | OUTPATIENT
Start: 2024-08-24 | End: 2024-08-30 | Stop reason: HOSPADM

## 2024-08-24 RX ORDER — SODIUM CHLORIDE 0.9 % (FLUSH) 0.9 %
5-40 SYRINGE (ML) INJECTION PRN
Status: DISCONTINUED | OUTPATIENT
Start: 2024-08-24 | End: 2024-08-24 | Stop reason: HOSPADM

## 2024-08-24 RX ORDER — PROCHLORPERAZINE EDISYLATE 5 MG/ML
5 INJECTION INTRAMUSCULAR; INTRAVENOUS
Status: DISCONTINUED | OUTPATIENT
Start: 2024-08-24 | End: 2024-08-24 | Stop reason: HOSPADM

## 2024-08-24 RX ORDER — MEPERIDINE HYDROCHLORIDE 25 MG/ML
12.5 INJECTION INTRAMUSCULAR; INTRAVENOUS; SUBCUTANEOUS
Status: DISCONTINUED | OUTPATIENT
Start: 2024-08-24 | End: 2024-08-24 | Stop reason: HOSPADM

## 2024-08-24 RX ADMIN — RANOLAZINE 1000 MG: 500 TABLET, FILM COATED, EXTENDED RELEASE ORAL at 19:57

## 2024-08-24 RX ADMIN — PHENYLEPHRINE HYDROCHLORIDE 200 MCG: 10 INJECTION INTRAVENOUS at 08:54

## 2024-08-24 RX ADMIN — ROCURONIUM BROMIDE 30 MG: 10 INJECTION, SOLUTION INTRAVENOUS at 09:03

## 2024-08-24 RX ADMIN — PROPOFOL 100 MG: 10 INJECTION, EMULSION INTRAVENOUS at 08:32

## 2024-08-24 RX ADMIN — LIDOCAINE HYDROCHLORIDE 50 MG: 10 INJECTION, SOLUTION EPIDURAL; INFILTRATION; INTRACAUDAL; PERINEURAL at 08:32

## 2024-08-24 RX ADMIN — HYDROMORPHONE HYDROCHLORIDE 0.5 MG: 1 INJECTION, SOLUTION INTRAMUSCULAR; INTRAVENOUS; SUBCUTANEOUS at 10:12

## 2024-08-24 RX ADMIN — PANTOPRAZOLE SODIUM 40 MG: 40 TABLET, DELAYED RELEASE ORAL at 17:02

## 2024-08-24 RX ADMIN — PHENYLEPHRINE HYDROCHLORIDE 200 MCG: 10 INJECTION INTRAVENOUS at 08:39

## 2024-08-24 RX ADMIN — SODIUM CHLORIDE, SODIUM LACTATE, POTASSIUM CHLORIDE, AND CALCIUM CHLORIDE: 600; 310; 30; 20 INJECTION, SOLUTION INTRAVENOUS at 11:51

## 2024-08-24 RX ADMIN — APIXABAN 2.5 MG: 2.5 TABLET, FILM COATED ORAL at 21:14

## 2024-08-24 RX ADMIN — VASOPRESSIN 1 UNITS: 20 INJECTION INTRAVENOUS at 09:35

## 2024-08-24 RX ADMIN — ROCURONIUM BROMIDE 50 MG: 10 INJECTION, SOLUTION INTRAVENOUS at 08:33

## 2024-08-24 RX ADMIN — VASOPRESSIN 1 UNITS: 20 INJECTION INTRAVENOUS at 08:59

## 2024-08-24 RX ADMIN — HYDROMORPHONE HYDROCHLORIDE 0.5 MG: 1 INJECTION, SOLUTION INTRAMUSCULAR; INTRAVENOUS; SUBCUTANEOUS at 10:21

## 2024-08-24 RX ADMIN — FERROUS SULFATE TAB 325 MG (65 MG ELEMENTAL FE) 325 MG: 325 (65 FE) TAB at 19:57

## 2024-08-24 RX ADMIN — ROSUVASTATIN CALCIUM 40 MG: 20 TABLET, COATED ORAL at 17:01

## 2024-08-24 RX ADMIN — DIPHENHYDRAMINE HYDROCHLORIDE 25 MG: 25 TABLET ORAL at 13:09

## 2024-08-24 RX ADMIN — SUGAMMADEX 200 MG: 100 INJECTION, SOLUTION INTRAVENOUS at 09:49

## 2024-08-24 RX ADMIN — SENNOSIDES AND DOCUSATE SODIUM 1 TABLET: 50; 8.6 TABLET ORAL at 13:09

## 2024-08-24 RX ADMIN — GABAPENTIN 100 MG: 100 CAPSULE ORAL at 19:57

## 2024-08-24 RX ADMIN — Medication 10 ML: at 12:37

## 2024-08-24 RX ADMIN — FENTANYL CITRATE 50 MCG: 0.05 INJECTION, SOLUTION INTRAMUSCULAR; INTRAVENOUS at 08:32

## 2024-08-24 RX ADMIN — POLYETHYLENE GLYCOL 3350 17 G: 17 POWDER, FOR SOLUTION ORAL at 17:01

## 2024-08-24 RX ADMIN — BUSPIRONE HYDROCHLORIDE 15 MG: 15 TABLET ORAL at 19:57

## 2024-08-24 RX ADMIN — OXYCODONE HYDROCHLORIDE 10 MG: 10 TABLET ORAL at 11:53

## 2024-08-24 RX ADMIN — VANCOMYCIN HYDROCHLORIDE 1500 MG: 10 INJECTION, POWDER, LYOPHILIZED, FOR SOLUTION INTRAVENOUS at 21:16

## 2024-08-24 RX ADMIN — OXYCODONE HYDROCHLORIDE 10 MG: 10 TABLET ORAL at 19:56

## 2024-08-24 RX ADMIN — HYDROCODONE BITARTRATE AND ACETAMINOPHEN 1 TABLET: 10; 325 TABLET ORAL at 17:02

## 2024-08-24 RX ADMIN — PHENYLEPHRINE HYDROCHLORIDE 200 MCG: 10 INJECTION INTRAVENOUS at 08:49

## 2024-08-24 RX ADMIN — VANCOMYCIN HYDROCHLORIDE 1000 MG: 1 INJECTION, POWDER, LYOPHILIZED, FOR SOLUTION INTRAVENOUS at 09:03

## 2024-08-24 RX ADMIN — PHENYLEPHRINE HYDROCHLORIDE 200 MCG: 10 INJECTION INTRAVENOUS at 08:43

## 2024-08-24 RX ADMIN — SODIUM CHLORIDE, SODIUM LACTATE, POTASSIUM CHLORIDE, AND CALCIUM CHLORIDE: 600; 310; 30; 20 INJECTION, SOLUTION INTRAVENOUS at 08:06

## 2024-08-24 RX ADMIN — HYDROMORPHONE HYDROCHLORIDE 0.5 MG: 1 INJECTION, SOLUTION INTRAMUSCULAR; INTRAVENOUS; SUBCUTANEOUS at 10:30

## 2024-08-24 RX ADMIN — INSULIN GLARGINE 5 UNITS: 100 INJECTION, SOLUTION SUBCUTANEOUS at 19:57

## 2024-08-24 RX ADMIN — DULOXETINE HYDROCHLORIDE 60 MG: 60 CAPSULE, DELAYED RELEASE ORAL at 19:57

## 2024-08-24 RX ADMIN — DOCUSATE SODIUM 100 MG: 100 CAPSULE, LIQUID FILLED ORAL at 19:57

## 2024-08-24 RX ADMIN — DEXAMETHASONE SODIUM PHOSPHATE 10 MG: 10 INJECTION, SOLUTION INTRAMUSCULAR; INTRAVENOUS at 08:45

## 2024-08-24 RX ADMIN — PHENYLEPHRINE HYDROCHLORIDE 200 MCG: 10 INJECTION INTRAVENOUS at 08:51

## 2024-08-24 RX ADMIN — SENNOSIDES AND DOCUSATE SODIUM 1 TABLET: 50; 8.6 TABLET ORAL at 19:56

## 2024-08-24 RX ADMIN — HYDROMORPHONE HYDROCHLORIDE 0.5 MG: 1 INJECTION, SOLUTION INTRAMUSCULAR; INTRAVENOUS; SUBCUTANEOUS at 10:38

## 2024-08-24 RX ADMIN — MAGNESIUM HYDROXIDE 30 ML: 400 SUSPENSION ORAL at 17:00

## 2024-08-24 RX ADMIN — ONDANSETRON 4 MG: 2 INJECTION INTRAMUSCULAR; INTRAVENOUS at 09:08

## 2024-08-24 ASSESSMENT — PAIN SCALES - GENERAL
PAINLEVEL_OUTOF10: 8
PAINLEVEL_OUTOF10: 8
PAINLEVEL_OUTOF10: 9
PAINLEVEL_OUTOF10: 8
PAINLEVEL_OUTOF10: 10
PAINLEVEL_OUTOF10: 10
PAINLEVEL_OUTOF10: 8

## 2024-08-24 ASSESSMENT — PAIN DESCRIPTION - ORIENTATION
ORIENTATION: RIGHT
ORIENTATION: LEFT
ORIENTATION: RIGHT

## 2024-08-24 ASSESSMENT — PAIN DESCRIPTION - DESCRIPTORS
DESCRIPTORS: ACHING
DESCRIPTORS: ACHING

## 2024-08-24 ASSESSMENT — PAIN DESCRIPTION - LOCATION
LOCATION: KNEE
LOCATION: LEG
LOCATION: KNEE
LOCATION: LEG

## 2024-08-24 ASSESSMENT — ENCOUNTER SYMPTOMS: SHORTNESS OF BREATH: 0

## 2024-08-24 ASSESSMENT — LIFESTYLE VARIABLES: SMOKING_STATUS: 0

## 2024-08-24 NOTE — PROGRESS NOTES
Daily Progress Note  Daniel Ramirez  MRN: [id number] LOS: 5    Admit Date: 8/19/2024 8/24/2024 6:44 AM    Subjective:          Chief Complaint:  No chief complaint on file.      Interval History:    Reviewed overnight events and nursing notes.   Status: Slight improvement, pain is controlled as long as the right leg is not moved.  He will be taken to OR this AM for repair , per Nursing staff.   He has no New Complaints       Review of Systems   Constitutional:  Positive for activity change. Negative for fatigue and fever.   Respiratory:  Negative for cough and shortness of breath.    Cardiovascular:  Negative for chest pain.   Gastrointestinal:  Negative for nausea and vomiting.   Musculoskeletal:  Positive for arthralgias and gait problem.   Skin:  Negative for color change.   Neurological:  Positive for weakness.       DIET:  Diet NPO    Medications:      sodium chloride        sodium chloride flush  5-40 mL IntraVENous 2 times per day    [Held by provider] enoxaparin  40 mg SubCUTAneous Daily    busPIRone  15 mg Oral BID    [Held by provider] clopidogrel  75 mg Oral Daily    docusate sodium  100 mg Oral Nightly    DULoxetine  60 mg Oral Nightly    ferrous sulfate  325 mg Oral BID    gabapentin  100 mg Oral Nightly    levothyroxine  50 mcg Oral Daily    [Held by provider] metFORMIN  500 mg Oral BID WC    metoprolol succinate  25 mg Oral Daily    pantoprazole  40 mg Oral Daily    ranolazine  1,000 mg Oral BID    rosuvastatin  40 mg Oral Daily    ferric gluconate  250 mg IntraVENous Once per day on Monday Wednesday Friday    insulin glargine  5 Units SubCUTAneous Nightly       Data:     Code Status: Full Code    Family History   Problem Relation Age of Onset    Lung Cancer Mother 60    Lung Cancer Sister 80    Cancer Sister     Heart Disease Other     Colon Cancer Neg Hx     Colon Polyps Neg Hx      Social History     Socioeconomic History    Marital status:      Spouse name: Niya    Number of children: 2  Home?: no     Physical Signs of Abuse Present: no   Housing Stability: Low Risk  (2024)    Housing Stability Vital Sign     Unable to Pay for Housing in the Last Year: No     Number of Times Moved in the Last Year: 1     Homeless in the Last Year: No       Labs:  CBC:   Recent Labs     24  0620 24  1226 24  0244   WBC 4.6* 4.7* 5.2   HGB 8.3* 8.1* 8.1*   PLT 82* 71* 82*     BMP:    No results for input(s): \"NA\", \"K\", \"CL\", \"CO2\", \"BUN\", \"CREATININE\", \"GLUCOSE\" in the last 72 hours.    Hepatic:   No results for input(s): \"AST\", \"ALT\", \"BILITOT\", \"ALKPHOS\" in the last 72 hours.    Invalid input(s): \"ALB\"    Lipids: No results for input(s): \"CHOL\", \"HDL\" in the last 72 hours.    Invalid input(s): \"LDLCALCU\"  INR:   No results for input(s): \"INR\" in the last 72 hours.      Objective:     Vitals: BP (!) 104/56   Pulse 92   Temp 97.3 °F (36.3 °C) (Temporal)   Resp 16   Ht 1.803 m (5' 10.98\")   Wt 103.9 kg (229 lb)   SpO2 94%   BMI 31.95 kg/m²    Intake/Output Summary (Last 24 hours) at 2024 0644  Last data filed at 2024 0402  Gross per 24 hour   Intake --   Output 1475 ml   Net -1475 ml    Temp (24hrs), Av.3 °F (36.3 °C), Min:97.2 °F (36.2 °C), Max:97.5 °F (36.4 °C)    Glucose:  Recent Labs     24  0808 24  1101 24  1647 24  1932   POCGLU 97 208* 120* 163*       Physical Exam  Vitals reviewed.   Constitutional:       Appearance: Normal appearance. He is not ill-appearing.   HENT:      Head: Normocephalic and atraumatic.   Eyes:      General:         Right eye: No discharge.         Left eye: No discharge.      Extraocular Movements: Extraocular movements intact.      Conjunctiva/sclera: Conjunctivae normal.   Cardiovascular:      Rate and Rhythm: Normal rate and regular rhythm.      Pulses: Normal pulses.      Heart sounds: Normal heart sounds. No murmur heard.  Pulmonary:      Effort: Pulmonary effort is normal. No respiratory distress.      Breath

## 2024-08-24 NOTE — BRIEF OP NOTE
Brief Postoperative Note      Patient: Daniel Ramirez  YOB: 1955  MRN: [id number]    Date of Procedure: 8/24/2024    Pre-Op Diagnosis Codes:      * Closed periprosthetic fracture of the distal aspect of the right femur, initial encounter for closed fracture    Post-Op Diagnosis: Same       Procedure(s):  FEMUR OPEN REDUCTION INTERNAL FIXATION RIGHT DISTAL FEMUR    Surgeon(s):  Maikel Rodrigez MD    Assistant:  * No surgical staff found *    Anesthesia: Choice    Estimated Blood Loss (mL): 200     Complications: None    Specimens:   * No specimens in log *    Implants:  Implant Name Type Inv. Item Serial No.  Lot No. LRB No. Used Action   SCREW BNE L42MM DIA5MM CNDYL S STL ST PANDA ANG JO T25 - [id number]  SCREW BNE L42MM DIA5MM CNDYL S STL ST PANDA ANG JO T25  [employer]PU[employer]  Right 1 Implanted   SCREW BNE L46MM DIA5MM CNDYL S STL ST PANDA ANG JO T25 - [id number]  SCREW BNE L46MM DIA5MM CNDYL S STL ST PANDA ANG JO T25  DEPU[employer]  Right 1 Implanted   SCREW BNE L50MM DIA5MM CNDYL S STL ST PANDA ANG JO FULL THRD - [id number]  SCREW BNE L50MM DIA5MM CNDYL S STL ST PANDA ANG JO FULL THRD  DEPU[employer]  Right 1 Implanted   SCREW BNE L55MM DIA5MM CNDYL S STL ST PANDA ANG JO FULL THRD - [id number]  SCREW BNE L55MM DIA5MM CNDYL S STL ST PANDA ANG JO FULL THRD  DEPU[employer]  Right 1 Implanted   SCREW BNE L70MM DIA5MM S STL SELF DRL PANDA ANG LAUREN JO FULL - [id number]  SCREW BNE L70MM DIA5MM S STL SELF DRL PANDA ANG LAUREN JO FULL  DEPU[employer]  Right 1 Implanted   SCREW BNE L80MM DIA5MM CNDYL S STL ST PANDA ANG LAUREN JO FULL - [id number]  SCREW BNE L80MM DIA5MM CNDYL S STL ST PANDA ANG LAUREN JO FULL  DEPUY [employer] [place]-  Right 2 Implanted   SCREW BNE L85MM DIA5MM CNDYL S STL ST PANDA ANG LAUREN JO FULL - [id number]  SCREW BNE L85MM DIA5MM CNDYL S STL ST PANDA ANG LAUREN JO FULL  [employer]WD  Right 1 Implanted   SCREW BNE L90MM DIA5MM CNDYL S STL ST PANDA ANG LAUREN JO FULL  - [id number]  SCREW BNE L90MM DIA5MM CNDYL S STL ST PANDA ANG LAUREN JO FULL  DEPUY [employer]  Right 2 Implanted   PLATE BNE L195MM 8 H R CNDYL S STL PANDA ANG JO COMPR CRV - [id number]  PLATE BNE L195MM 8 H R CNDYL S STL PANDA ANG JO COMPR CRV  DEPUY [employer] [place]  Right 1 Implanted         Drains: * No LDAs found *    Findings:  Infection Present At Time Of Surgery (PATOS) (choose all levels that have infection present):  No infection present  Other Findings: Highly displaced periprosthetic right distal femur fracture above a total knee construct.    Electronically signed by Maikel Rodrigez MD on 8/24/2024 at 10:02 AM

## 2024-08-24 NOTE — DISCHARGE INSTRUCTIONS
swelling (a recliner is not elevated!!!)    Ice: Ice your surgery site 5-6 times per day for 20 minutes at a time with dressing in place. You should wait at least 30 minutes before icing again to avoid ice irritation. It may be difficult at first to ice the surgery area due to the amount of dressing, but continue to be diligent with icing.  Your dressings will be taken down at your first post-op appointment.     Range of motion: KNEE IMMOBILIZER IN PLACE AT ALL TIMES WITH NO RANGE OF MOTION THROUGH THE RIGHT KNEE  For the knee- It is important to keep the knee as straight as possible following surgery. NO PILLOWS UNDER THE KNEE, ONLY under the ankle  For the foot/ankle- range of motion restrictions will be given to you at your first post-op appointment. Until that time, avoid any unnecessary range o f motion.  Physical therapy- Your physical therapy status will be discussed with you at your first post-op appointment.   **Achieving range of motion goals and decreasing swelling/inflammation are the primary focus for the first two (2) weeks following surgery. **    Medications: You will be discharged with the appropriate medications following your surgery. Fill these at the pharmacy and take them as directed on the label.   Possible medications that will be prescribed are below.  You may or may not receive all of these. Occasionally, additional medications may be given with specific instructions.  Percocet/Lortab (oxycodone/hydrocodone with tylenol) - Pain Medication.  Take one tablet every 4-6 hours. DO NOT EXCEED 4,000mg of Tylenol in 24 hours.  **DO NOT MIX WITH ALCOHOL, DRIVE WHILE TAKING, OR TAKE EXTRA TYLENOL*     Zofran - Anti-nausea medication to help prevent nausea and vomiting after surgery.     Eliquis 2.5 mg - all patients with lower extremity surgery should take one 2.5 mg tablet every 12 hours (twice daily) for 42 days after surgery    **If you are running low on pain medications, please notify us if you  need a refill 24-48 hours prior to when you run out, so we can make arrangements to refill the prescription for you if we determine it is necessary**

## 2024-08-24 NOTE — ANESTHESIA PRE PROCEDURE
CREATININE 1.0 08/19/2011 09:40 AM    GFRAA >59 08/25/2022 02:08 AM    LABGLOM 72 08/24/2024 02:44 AM    LABGLOM >60 03/28/2023 12:28 PM    GLUCOSE 124 08/24/2024 02:44 AM    CALCIUM 8.9 08/24/2024 02:44 AM    BILITOT 1.0 08/19/2024 07:19 PM    ALKPHOS 100 08/19/2024 07:19 PM    AST 35 08/19/2024 07:19 PM    ALT 22 08/19/2024 07:19 PM       POC Tests:   Recent Labs     08/24/24  0718   POCGLU 112*       Coags:   Lab Results   Component Value Date/Time    PROTIME 15.3 08/19/2024 07:19 PM    PROTIME 11.76 08/19/2011 09:40 AM    INR 1.24 08/19/2024 07:19 PM    APTT 32.0 03/15/2023 12:19 PM    APTT 55 11/04/2014 06:41 AM    APTT 37.0 10/30/2014 08:10 PM       HCG (If Applicable): No results found for: \"PREGTESTUR\", \"PREGSERUM\", \"HCG\", \"HCGQUANT\"     ABGs:   Lab Results   Component Value Date/Time    PHART 7.420 08/26/2021 12:59 PM    PO2ART 64.0 08/26/2021 12:59 PM    BFZ4OSW 36.0 08/26/2021 12:59 PM    ESU6CJR 23.4 08/26/2021 12:59 PM    BEART -0.8 08/26/2021 12:59 PM    Z6OHSUTG 91.9 08/26/2021 12:59 PM        Type & Screen (If Applicable):  No results found for: \"LABABO\"    Drug/Infectious Status (If Applicable):  No results found for: \"HIV\", \"HEPCAB\"    COVID-19 Screening (If Applicable):   Lab Results   Component Value Date/Time    COVID19 Not Detected 08/24/2022 04:40 PM    COVID19 Not Detected 03/29/2021 09:46 AM           Anesthesia Evaluation     no history of anesthetic complications:   Airway: Mallampati: II  TM distance: >3 FB   Neck ROM: limited  Mouth opening: > = 3 FB   Dental: normal exam         Pulmonary:   (+)  COPD:    sleep apnea: on CPAP,       asthma:     (-) shortness of breath and not a current smoker                           Cardiovascular:    (+) hypertension:, CAD: no interval change, CABG/stent: no interval change    (-)  CHF and  JOHNSON                Neuro/Psych:   (+) headaches: migraine headaches   (-) seizures, TIA and CVA           GI/Hepatic/Renal:   (+) PUD, liver disease:, renal

## 2024-08-24 NOTE — CONSULTS
Orthopaedic Surgery  Inpatient Consultation    Daniel Ramirez (1955)  8/24/2024    Requesting Physician: Osvaldo Cisneros MD  Consulting Physician: Osvaldo Cisneros MD  8/19/2024  4:59 PM    CHIEF COMPLAINT: Right periprosthetic distal femur fracture    History Obtained From:  patient, electronic medical record    HISTORY OF PRESENT ILLNESS:                The patient is a 69 y.o. male who presents with above chief complaint.  The patient apparently sustained a fall that was initially evaluated with plain film imaging suggestive of a nondisplaced medial femoral condyle periprosthetic distal femur fracture of the right lower extremity.  Recommendation for nonweightbearing at that time was provided.  The patient was given the latitude to work with formal physical therapy including strengthening and range of motion around the knee.  Due to progressive weakness with range of motion, increasing pain and inability to maintain a straight leg raise, he was ultimately evaluated with an MRI by another treating provider.  That MRI demonstrated a displaced and angulated periprosthetic right distal femur fracture.  The patient has previously undergone a right total knee replacement performed by Dr. Cisneros in 2013.    Past Medical History:        Diagnosis Date    Abnormal nuclear stress test 10/22/2014    Arthritis     BiPAP (biphasic positive airway pressure) dependence     8cm to 20cm    Cerebrovascular disease     Chronic kidney disease, stage II (mild) 08/17/2016    Olman Gross M.D.    Cirrhosis (HCC)     Coronary artery disease 02/24/2011    Depression     Diabetes mellitus (HCC)     Encounter for wound care     PT SEES \"GRACY\" WITH DR. SAMUEL    Great toe pain     RT    Headache     Hyperlipemia 02/24/2011    Dr. Pederson follows lipids.    Hyperlipidemia     Hypertension     Liver disease     LONG TERM ANTICOAGULENT USE     Low blood pressure 11/19/2014    lymphostatic lymphoma     Nausea 11/19/2014    Non  compressible.     DATA:    CBC with Differential:    Lab Results   Component Value Date/Time    WBC 5.2 08/24/2024 02:44 AM    RBC 2.16 08/24/2024 02:44 AM    HGB 8.1 08/24/2024 02:44 AM    HGB 15.4 08/19/2011 09:40 AM    HCT 24.9 08/24/2024 02:44 AM    HCT 47.0 08/19/2011 09:40 AM    PLT 82 08/24/2024 02:44 AM     08/19/2011 09:40 AM    .3 08/24/2024 02:44 AM    MCH 37.5 08/24/2024 02:44 AM    MCHC 32.5 08/24/2024 02:44 AM    RDW 16.9 08/24/2024 02:44 AM    BANDSPCT 4 01/05/2018 10:20 AM    LYMPHOPCT 13.2 08/24/2024 02:44 AM    MONOPCT 13.6 08/24/2024 02:44 AM    EOSPCT 1.7 08/24/2024 02:44 AM    BASOPCT 0.4 08/24/2024 02:44 AM    MONOSABS 0.70 08/24/2024 02:44 AM    LYMPHSABS 0.7 08/24/2024 02:44 AM    EOSABS 0.10 08/24/2024 02:44 AM    BASOSABS 0.00 08/24/2024 02:44 AM     CMP:    Lab Results   Component Value Date/Time     08/24/2024 02:44 AM     08/19/2011 09:40 AM    K 3.8 08/24/2024 02:44 AM    K 3.4 08/19/2024 07:19 PM    K 4.7 02/24/2011 11:29 AM     08/24/2024 02:44 AM    CL 99 08/19/2011 09:40 AM    CO2 25 08/24/2024 02:44 AM    BUN 14 08/24/2024 02:44 AM    CREATININE 1.1 08/24/2024 02:44 AM    CREATININE 1.0 08/19/2011 09:40 AM    GFRAA >59 08/25/2022 02:08 AM    LABGLOM 72 08/24/2024 02:44 AM    LABGLOM >60 03/28/2023 12:28 PM    GLUCOSE 124 08/24/2024 02:44 AM    CALCIUM 8.9 08/24/2024 02:44 AM    BILITOT 1.0 08/19/2024 07:19 PM    ALKPHOS 100 08/19/2024 07:19 PM    AST 35 08/19/2024 07:19 PM    ALT 22 08/19/2024 07:19 PM     BMP:    Lab Results   Component Value Date/Time     08/24/2024 02:44 AM     08/19/2011 09:40 AM    K 3.8 08/24/2024 02:44 AM    K 3.4 08/19/2024 07:19 PM    K 4.7 02/24/2011 11:29 AM     08/24/2024 02:44 AM    CL 99 08/19/2011 09:40 AM    CO2 25 08/24/2024 02:44 AM    BUN 14 08/24/2024 02:44 AM    CREATININE 1.1 08/24/2024 02:44 AM    CREATININE 1.0 08/19/2011 09:40 AM    CALCIUM 8.9 08/24/2024 02:44 AM    GFRAA >59 08/25/2022

## 2024-08-24 NOTE — PROGRESS NOTES
Physical Therapy    Name: Daniel Ramirez  MRN: [id number]  Date of service: 8/24/2024      Pt is having procedure done today. NO therapy attempted. Will continue to follow post op.     Electronically signed by Ranjith Lindquist PTA on 8/24/2024 at 7:58 AM

## 2024-08-24 NOTE — ANESTHESIA POSTPROCEDURE EVALUATION
Department of Anesthesiology  Postprocedure Note    Patient: Daniel Ramirez  MRN: [id number]  YOB: 1955  Date of evaluation: 8/24/2024    Procedure Summary       Date: 08/24/24 Room / Location: [address]    Anesthesia Start: 0807 Anesthesia Stop:     Procedure: FEMUR OPEN REDUCTION INTERNAL FIXATION RIGHT DISTAL FEMUR (Right) Diagnosis:       Closed fracture of neck of right femur with routine healing, subsequent encounter      (Closed fracture of neck of right femur with routine healing, subsequent encounter [S72.001D])    Surgeons: Maikel Rodrigez MD Responsible Provider: Barbra Limon APRN - CRNA    Anesthesia Type: general ASA Status: 3            Anesthesia Type: No value filed.    Raciel Phase I: Raciel Score: 8    Raciel Phase II:      Anesthesia Post Evaluation    Patient location during evaluation: PACU  Patient participation: complete - patient participated  Level of consciousness: awake  Airway patency: patent  Nausea & Vomiting: no nausea  Cardiovascular status: hemodynamically stable  Respiratory status: acceptable  Hydration status: stable  Comments: /64   Pulse 86   Temp 97.6 °F (36.4 °C)   Resp 15   Ht 1.803 m (5' 10.98\")   Wt 103.9 kg (229 lb)   SpO2 96%   BMI 31.95 kg/m²     Pain management: adequate    No notable events documented.

## 2024-08-24 NOTE — OP NOTE
Patient Name: Justin  MRN: [id number]  : 1955    DATE of SURGERY: 2024    SURGEON: Maikel Rodrigez MD    ASSISTANT: NONE    PREOPERATIVE DIAGNOSIS:  Subacute traumatic displaced transverse periprosthetic fracture of the distal asp of the right femur above a total knee arthroplasty construct, initial encounter for closed fracture periprosthetic distal femur fracture.     POSTOPERATIVE DIAGNOSIS:  Subacute traumatic displaced transverse periprosthetic fracture of the distal asp of the right femur above a total knee arthroplasty construct, initial encounter for closed fracture periprosthetic distal femur fracture.     PROCEDURES PERFORMED:  Open reduction internal fixation, right displaced periprosthetic right distal femur fracture.     IMPLANTS:  Synthes 4.5 mm locking periarticular distal femur 8 hole plate.     ANESTHESIA USED:  General endotracheal anesthesia w/ regional block     OPERATIVE INDICATIONS:  The patient is a 69 y.o. male  who sustained a fall remotely resulting in the acute onset of pain.  He was initially evaluated and diagnosed with a minimally displaced medial femoral condyle fracture.  Despite being nonweightbearing, the patient continued to work with range of motion exercises ultimately resulting in a deterioration of range of motion, increasing pain and increasing weakness.  An MRI and follow-up x-ray showed a displaced and angulated periprosthetic right distal femur fracture above his total knee construct.  Given the amount of displacement visualized on image, I recommended open reduction internal fixation to align the femur.  Risks include but are not limited to that of anesthesia, bleeding, infection, pain, damage to local structures, need for further surgery, malunion, nonunion, prominent hardware, loss of fixation.       ESTIMATED BLOOD LOSS:  200 mL.     SPECIMENS:  None.     DRAINS:  None.     COMPLICATIONS:  None.     PROCEDURE IN DETAIL:  The patient was seen in the  preoperative holding room. Once again the informed consent form was reviewed with the patient and signed. The site of surgery was marked with the patient's agreement.  The patient was transported to the operating room where a timeout was performed identifying the correct patient, as well as the operative site.  Two grams of IV Kefzol were given as perioperative antibiotics.  The right lower extremity was prepped and draped in usual sterile fashion.      A lateral approach to the distal was then utilized as a slightly curved incision was made just this to the joint line, aiming the incision toward the anterior portion of the tibial tubercle distally.  Soft tissue was dissected in line with the incision.  The iliotibial band was split in line with the long axis of the incision distally.  The vastus lateralis was retracted from posterior to anterior.         At this point, manual inline traction was applied to the femur with a slight bump distal to the fracture to align the bone in both the AP and lateral planes confirmed with AP and orthogonal lateral C arm fluoroscopy views.  While maintaining this position, a 4.5 mm Synthes locking periarticular distal femur 8 hole plate was then applied to bone in a submuscular minimally invasive fashion.  A series of locking screws were placed distal y in strategic fashion parallel to the joint line around the femoral component of his total knee arthroplasty.  Additional cortical and locking screws were placed proximal, maintaining fixation.  C-arm images showed no hardware complication.  The femoral alignment and total knee arthroplasty alignment again appeared to be much improved.     The incision was irrigated, followed by closure in layers.  The skin was sealed with an adhesive skin dressing.  A well-padded sterile dressing was placed followed by a knee immobilizer. The patient was awakened from anesthesia and transported to recovery in stable condition.     POSTOPERATIVE

## 2024-08-24 NOTE — PLAN OF CARE
Problem: Discharge Planning  Goal: Discharge to home or other facility with appropriate resources  8/23/2024 2152 by Jessika Garcia RN  Outcome: Progressing  Flowsheets (Taken 8/23/2024 2140)  Discharge to home or other facility with appropriate resources: Identify barriers to discharge with patient and caregiver  8/23/2024 1035 by Suzy Armstrong RN  Outcome: Progressing  Flowsheets (Taken 8/23/2024 0728)  Discharge to home or other facility with appropriate resources: Identify barriers to discharge with patient and caregiver     Problem: Skin/Tissue Integrity  Goal: Absence of new skin breakdown  Description: 1.  Monitor for areas of redness and/or skin breakdown  2.  Assess vascular access sites hourly  3.  Every 4-6 hours minimum:  Change oxygen saturation probe site  4.  Every 4-6 hours:  If on nasal continuous positive airway pressure, respiratory therapy assess nares and determine need for appliance change or resting period.  8/23/2024 2152 by Jessika Garcia RN  Outcome: Progressing  8/23/2024 1035 by Suzy Armstrong RN  Outcome: Progressing     Problem: ABCDS Injury Assessment  Goal: Absence of physical injury  8/23/2024 2152 by Jessika Garcia RN  Outcome: Progressing  Flowsheets (Taken 8/23/2024 2150)  Absence of Physical Injury: Implement safety measures based on patient assessment  8/23/2024 1035 by Suzy Armstrong RN  Outcome: Progressing     Problem: Pain  Goal: Verbalizes/displays adequate comfort level or baseline comfort level  8/23/2024 2152 by Jessika Garcia RN  Outcome: Progressing  8/23/2024 1035 by Suzy Armstrong RN  Outcome: Progressing     Problem: Safety - Adult  Goal: Free from fall injury  8/23/2024 2152 by Jessika Garcia RN  Outcome: Progressing  Flowsheets (Taken 8/23/2024 2150)  Free From Fall Injury: Instruct family/caregiver on patient safety  8/23/2024 1035 by Suzy Armstrong RN  Outcome: Progressing     Problem: Chronic Conditions and Co-morbidities  Goal: Patient's chronic  Without Sign and Symptoms of Infection: ADMISSION and DAILY: Assess and document risk factors for pressure ulcer development  8/23/2024 1035 by Suzy Armstrong RN  Outcome: Progressing  Goal: Oral mucous membranes remain intact  8/23/2024 2152 by Jessika Garcia RN  Outcome: Progressing  Flowsheets (Taken 8/23/2024 2140)  Oral Mucous Membranes Remain Intact: Assess oral mucosa and hygiene practices  8/23/2024 1035 by Suzy Armstrong RN  Outcome: Progressing     Problem: Musculoskeletal - Adult  Goal: Return mobility to safest level of function  8/23/2024 2152 by Jessika Garcia RN  Outcome: Progressing  Flowsheets (Taken 8/23/2024 2140)  Return Mobility to Safest Level of Function: Assess patient stability and activity tolerance for standing, transferring and ambulating with or without assistive devices  8/23/2024 1035 by Suzy Armstrong RN  Outcome: Progressing  Goal: Maintain proper alignment of affected body part  8/23/2024 2152 by Jessika Garcia RN  Outcome: Progressing  Flowsheets (Taken 8/23/2024 2140)  Maintain proper alignment of affected body part: Support and protect limb and body alignment per provider's orders  8/23/2024 1035 by Suzy Armstrong RN  Outcome: Progressing  Goal: Return ADL status to a safe level of function  8/23/2024 2152 by Jessika Garcia RN  Outcome: Progressing  Flowsheets (Taken 8/23/2024 2140)  Return ADL Status to a Safe Level of Function: Administer medication as ordered  8/23/2024 1035 by Suzy Armstrong RN  Outcome: Progressing     Problem: Gastrointestinal - Adult  Goal: Minimal or absence of nausea and vomiting  8/23/2024 2152 by Jessika Garcia RN  Outcome: Progressing  Flowsheets (Taken 8/23/2024 2140)  Minimal or absence of nausea and vomiting: Administer IV fluids as ordered to ensure adequate hydration  8/23/2024 1035 by Suzy Armstrong RN  Outcome: Progressing  Goal: Maintains or returns to baseline bowel function  8/23/2024 2152 by Jessika Garcia RN  Outcome:

## 2024-08-25 LAB
ANION GAP SERPL CALCULATED.3IONS-SCNC: 14 MMOL/L (ref 7–19)
BASOPHILS # BLD: 0 K/UL (ref 0–0.2)
BASOPHILS NFR BLD: 0.1 % (ref 0–1)
BUN SERPL-MCNC: 18 MG/DL (ref 8–23)
CALCIUM SERPL-MCNC: 8.5 MG/DL (ref 8.8–10.2)
CHLORIDE SERPL-SCNC: 101 MMOL/L (ref 98–111)
CO2 SERPL-SCNC: 22 MMOL/L (ref 22–29)
CREAT SERPL-MCNC: 1.1 MG/DL (ref 0.7–1.2)
EOSINOPHIL # BLD: 0 K/UL (ref 0–0.6)
EOSINOPHIL NFR BLD: 0 % (ref 0–5)
ERYTHROCYTE [DISTWIDTH] IN BLOOD BY AUTOMATED COUNT: 17.5 % (ref 11.5–14.5)
GLUCOSE BLD-MCNC: 130 MG/DL (ref 70–99)
GLUCOSE BLD-MCNC: 130 MG/DL (ref 70–99)
GLUCOSE SERPL-MCNC: 153 MG/DL (ref 70–99)
HCT VFR BLD AUTO: 25.6 % (ref 42–52)
HGB BLD-MCNC: 8.5 G/DL (ref 14–18)
IMM GRANULOCYTES # BLD: 0.1 K/UL
LYMPHOCYTES # BLD: 0.9 K/UL (ref 1.1–4.5)
LYMPHOCYTES NFR BLD: 9.9 % (ref 20–40)
MCH RBC QN AUTO: 39 PG (ref 27–31)
MCHC RBC AUTO-ENTMCNC: 33.2 G/DL (ref 33–37)
MCV RBC AUTO: 117.4 FL (ref 80–94)
MONOCYTES # BLD: 1 K/UL (ref 0–0.9)
MONOCYTES NFR BLD: 11.1 % (ref 0–10)
NEUTROPHILS # BLD: 7.2 K/UL (ref 1.5–7.5)
NEUTS SEG NFR BLD: 78 % (ref 50–65)
PERFORMED ON: ABNORMAL
PERFORMED ON: ABNORMAL
PLATELET # BLD AUTO: 116 K/UL (ref 130–400)
PMV BLD AUTO: 9.9 FL (ref 9.4–12.4)
POTASSIUM SERPL-SCNC: 3.8 MMOL/L (ref 3.5–5)
RBC # BLD AUTO: 2.18 M/UL (ref 4.7–6.1)
SODIUM SERPL-SCNC: 137 MMOL/L (ref 136–145)
WBC # BLD AUTO: 9.2 K/UL (ref 4.8–10.8)

## 2024-08-25 PROCEDURE — 6360000002 HC RX W HCPCS: Performed by: FAMILY MEDICINE

## 2024-08-25 PROCEDURE — 97530 THERAPEUTIC ACTIVITIES: CPT

## 2024-08-25 PROCEDURE — 94760 N-INVAS EAR/PLS OXIMETRY 1: CPT

## 2024-08-25 PROCEDURE — 36415 COLL VENOUS BLD VENIPUNCTURE: CPT

## 2024-08-25 PROCEDURE — 1200000000 HC SEMI PRIVATE

## 2024-08-25 PROCEDURE — 82962 GLUCOSE BLOOD TEST: CPT

## 2024-08-25 PROCEDURE — 6370000000 HC RX 637 (ALT 250 FOR IP): Performed by: ORTHOPAEDIC SURGERY

## 2024-08-25 PROCEDURE — 80048 BASIC METABOLIC PNL TOTAL CA: CPT

## 2024-08-25 PROCEDURE — 2700000000 HC OXYGEN THERAPY PER DAY

## 2024-08-25 PROCEDURE — 6370000000 HC RX 637 (ALT 250 FOR IP): Performed by: PHYSICIAN ASSISTANT

## 2024-08-25 PROCEDURE — 85025 COMPLETE CBC W/AUTO DIFF WBC: CPT

## 2024-08-25 RX ORDER — LACTULOSE 10 G/15ML
20 SOLUTION ORAL 2 TIMES DAILY PRN
Status: DISCONTINUED | OUTPATIENT
Start: 2024-08-25 | End: 2024-08-30 | Stop reason: HOSPADM

## 2024-08-25 RX ADMIN — APIXABAN 2.5 MG: 2.5 TABLET, FILM COATED ORAL at 08:42

## 2024-08-25 RX ADMIN — APIXABAN 2.5 MG: 2.5 TABLET, FILM COATED ORAL at 20:15

## 2024-08-25 RX ADMIN — RANOLAZINE 1000 MG: 500 TABLET, FILM COATED, EXTENDED RELEASE ORAL at 20:15

## 2024-08-25 RX ADMIN — BUSPIRONE HYDROCHLORIDE 15 MG: 15 TABLET ORAL at 20:15

## 2024-08-25 RX ADMIN — POLYETHYLENE GLYCOL 3350 17 G: 17 POWDER, FOR SOLUTION ORAL at 08:41

## 2024-08-25 RX ADMIN — GABAPENTIN 100 MG: 100 CAPSULE ORAL at 20:15

## 2024-08-25 RX ADMIN — FERROUS SULFATE TAB 325 MG (65 MG ELEMENTAL FE) 325 MG: 325 (65 FE) TAB at 20:15

## 2024-08-25 RX ADMIN — EPOETIN ALFA-EPBX 10000 UNITS: 10000 INJECTION, SOLUTION INTRAVENOUS; SUBCUTANEOUS at 15:17

## 2024-08-25 RX ADMIN — LEVOTHYROXINE SODIUM 50 MCG: 50 TABLET ORAL at 05:40

## 2024-08-25 RX ADMIN — ROSUVASTATIN CALCIUM 40 MG: 20 TABLET, COATED ORAL at 08:41

## 2024-08-25 RX ADMIN — SENNOSIDES AND DOCUSATE SODIUM 1 TABLET: 50; 8.6 TABLET ORAL at 20:15

## 2024-08-25 RX ADMIN — SENNOSIDES AND DOCUSATE SODIUM 1 TABLET: 50; 8.6 TABLET ORAL at 08:42

## 2024-08-25 RX ADMIN — PANTOPRAZOLE SODIUM 40 MG: 40 TABLET, DELAYED RELEASE ORAL at 08:42

## 2024-08-25 RX ADMIN — OXYCODONE HYDROCHLORIDE 10 MG: 10 TABLET ORAL at 05:40

## 2024-08-25 RX ADMIN — BUSPIRONE HYDROCHLORIDE 15 MG: 15 TABLET ORAL at 08:41

## 2024-08-25 RX ADMIN — INSULIN GLARGINE 5 UNITS: 100 INJECTION, SOLUTION SUBCUTANEOUS at 20:14

## 2024-08-25 RX ADMIN — RANOLAZINE 1000 MG: 500 TABLET, FILM COATED, EXTENDED RELEASE ORAL at 08:41

## 2024-08-25 RX ADMIN — FERROUS SULFATE TAB 325 MG (65 MG ELEMENTAL FE) 325 MG: 325 (65 FE) TAB at 08:41

## 2024-08-25 RX ADMIN — DOCUSATE SODIUM 100 MG: 100 CAPSULE, LIQUID FILLED ORAL at 20:15

## 2024-08-25 RX ADMIN — DULOXETINE HYDROCHLORIDE 60 MG: 60 CAPSULE, DELAYED RELEASE ORAL at 20:15

## 2024-08-25 RX ADMIN — LACTULOSE 20 G: 20 SOLUTION ORAL at 08:42

## 2024-08-25 RX ADMIN — CLOPIDOGREL BISULFATE 75 MG: 75 TABLET ORAL at 08:41

## 2024-08-25 RX ADMIN — METOPROLOL SUCCINATE 25 MG: 25 TABLET, EXTENDED RELEASE ORAL at 08:41

## 2024-08-25 RX ADMIN — OXYCODONE HYDROCHLORIDE 10 MG: 10 TABLET ORAL at 15:20

## 2024-08-25 RX ADMIN — OXYCODONE HYDROCHLORIDE 10 MG: 10 TABLET ORAL at 11:07

## 2024-08-25 ASSESSMENT — PAIN DESCRIPTION - LOCATION: LOCATION: LEG

## 2024-08-25 ASSESSMENT — PAIN SCALES - GENERAL: PAINLEVEL_OUTOF10: 8

## 2024-08-25 ASSESSMENT — PAIN DESCRIPTION - ORIENTATION: ORIENTATION: RIGHT

## 2024-08-25 ASSESSMENT — PAIN DESCRIPTION - DESCRIPTORS: DESCRIPTORS: ACHING

## 2024-08-25 NOTE — PROGRESS NOTES
Physical Therapy Reassessment    Daniel Ramirez  [id number]       08/25/24 0945   Restrictions/Precautions   Restrictions/Precautions Weight Bearing;Fall Risk   Lower Extremity Weight Bearing Restrictions   Right Lower Extremity Weight Bearing Non Weight Bearing  (x 12 wks)   Required Braces or Orthoses   Right Lower Extremity Brace Knee Immobilizer   General   Diagnosis R distal femur ORIF   Subjective   Subjective Pt wanting to sit EOB. Agrees to wait until tomorrow for SB transfers.   Subjective   Pain reports occasional pain R distal outer thigh, does not quantify, states had meds prior to tx   Cognition   Overall Cognitive Status WFL   AROM LLE (degrees)   LLE AROM  WFL   Bed mobility   Supine to Sit Minimal assistance  (to RLE)   Sit to Supine Minimal assistance   Scooting Maximal assistance   Bed Mobility Comments sat EOB > 20 min with only supervision, able to scoot in sitting using LLE and bedrails   Balance   Sitting - Dynamic Fair   Short Term Goals   Time Frame for Short Term Goals 2 wks   Short Term Goal 1 supine to sit indep   Short Term Goal 2 bed to chair SBA with RW, NWB RLE   Short Term Goal 3 sit to stand CGA, NWB RLE   Short Term Goal 4 bed to chair with sliding board SBA   Activity Tolerance   Activity Tolerance Patient tolerated treatment well   Assessment   Assessment Pt able to sit EOB today with minimal pain. Will resume pre-transfer activites in standing and SB transfers tomorrow.   Discharge Recommendations Continue to assess pending progress;Patient would benefit from continued therapy after discharge   Physical Therapy Plan   General Plan 5-7 times per week   Current Treatment Recommendations Balance training;Functional mobility training;Transfer training;Endurance training;Safety education & training;Positioning;Equipment evaluation, education, & procurement;Patient/Caregiver education & training;Therapeutic activities   PT Plan of Care   Sunday X   Safety Devices   Type of Devices Call  light within reach;Bed alarm in place;Heels elevated for pressure relief   PT Whiteboard Notes   Therapy Whiteboard RE 9/8 R dis femur ORIF, NWB x 12 wks, immob       Electronically signed by Melvina Bruner PT on 8/25/2024 at 10:27 AM

## 2024-08-25 NOTE — PROGRESS NOTES
Daily Progress Note  Daniel Ramirez  MRN: [id number] LOS: 6    Admit Date: 8/19/2024 8/25/2024 8:28 AM    Subjective:          Chief Complaint:  Knee pain    Interval History:    Reviewed overnight events and nursing notes.   Status: Slight improvement, pain is controlled as long as the right leg is not moved.  S/P FEMUR OPEN REDUCTION INTERNAL FIXATION RIGHT DISTAL FEMUR     He has no New Complaints       Review of Systems   Constitutional:  Positive for activity change. Negative for fatigue and fever.   Respiratory:  Negative for cough and shortness of breath.    Cardiovascular:  Negative for chest pain.   Gastrointestinal:  Negative for nausea and vomiting.   Musculoskeletal:  Positive for arthralgias and gait problem.   Skin:  Negative for color change.   Neurological:  Positive for weakness.       DIET:  ADULT DIET; Regular; 3 carb choices (45 gm/meal); Low Fat/Low Chol/High Fiber/DONNY; Low Sodium (2 gm)  ADULT ORAL NUTRITION SUPPLEMENT; Dinner; Diabetic Oral Supplement    Medications:      sodium chloride      lactated ringers IV soln 100 mL/hr at 08/24/24 1151    sodium chloride        sodium chloride flush  5-40 mL IntraVENous 2 times per day    sennosides-docusate sodium  1 tablet Oral BID    apixaban  2.5 mg Oral BID    sodium chloride flush  5-40 mL IntraVENous 2 times per day    busPIRone  15 mg Oral BID    clopidogrel  75 mg Oral Daily    docusate sodium  100 mg Oral Nightly    DULoxetine  60 mg Oral Nightly    ferrous sulfate  325 mg Oral BID    gabapentin  100 mg Oral Nightly    levothyroxine  50 mcg Oral Daily    [Held by provider] metFORMIN  500 mg Oral BID WC    metoprolol succinate  25 mg Oral Daily    pantoprazole  40 mg Oral Daily    ranolazine  1,000 mg Oral BID    rosuvastatin  40 mg Oral Daily    ferric gluconate  250 mg IntraVENous Once per day on Monday Wednesday Friday    insulin glargine  5 Units SubCUTAneous Nightly       Data:     Code Status: Full Code    Family History   Problem  Non Hodgkin's lymphoma (HCC)     Obesity     Obstructive sleep apnea     AHI:  21.7 per PSG, 7/2017    Peptic ulcer disease 02/24/2011    Restless leg     S/P CABG x 3 10/22/2014    SVG to RCA; SVG to LAD diagonal; LIMA to LAD(2004, 2014)    Sleep apnea     Syncope and collapse 07/24/2022    Tachyarrhythmia     Tachyarrhythmia 07/07/2016    Thrombocythemia, essential (HCC)     Thyroid disease     Type 2 diabetes mellitus without complication (HCC)     Type II or unspecified type diabetes mellitus without mention of complication, not stated as uncontrolled        Treatment Plan:    Medically Stable     Right femur fracture: Essentially periprosthetic fracture of the distal femur that is approximately 7 mm from the femoral portion of the TKA.  Orthopedic consultation, appreciate recommendations.    S/P FEMUR OPEN REDUCTION INTERNAL FIXATION RIGHT DISTAL FEMUR     Constipation: Add Lactulose PRN     Confusion: Resolved     Type 2 diabetes: BG control is improved, continue SSI.     Hypokalemia: Replete and monitor.     Anemia: Stable -give procrit dose    Thrombocytopenia: Chronic, improving.  Will transfuse platelets to maintain platelet level greater than 50     CODE STATUS: Full     DVT prophylaxis: SCD, home plavix     Disposition: Inpatient, home with home health pending any further recommendations from orthopedics    Reviewed treatment plans with the patient and/or family.   30 minutes spent in face to face interaction and coordination of care.     Electronically signed by Emilio Pereira PA-C on 8/25/2024 at 8:28 AM    I have discussed the care of Daniel Ramirez, including pertinent history and exam findings with the ARNP/PA.  I have seen and examined the patient and the key elements of all parts of the encounter have been performed by me. I agree with the assessment and plan as outlined by the ARNP/PA. Please refer to my separate note for complete documentation.     Electronically signed by Jasvir Chappell MD  on 8/25/2024 at 10:07 AM

## 2024-08-25 NOTE — PROGRESS NOTES
Orthopedic Surgery Right periprosthetic distal femur ORIF progress Note    Daniel Ramirez  2024  [birth year]11[birth year]  POD# 1 Day Post-Op Procedure(s):  FEMUR OPEN REDUCTION INTERNAL FIXATION RIGHT DISTAL FEMUR    Subjective:     Interval History: Patient resting comfortably in bed this a.m.  Wife at bedside.  Questions answered.    Systemic or Specific Complaints: No Complaints    Pain waxing and waning    Objective:     General: A&O x3, NAD, No signs or symptoms of PE.   Wound: clean, dry, intact             Dressing: clean, dry, and intact   Extremity: Distal NVI, Soft throughout           DVT Exam: No evidence of DVT seen on physical exam.                   Data Review:  Vitals:  /64   Pulse 90   Temp 97.2 °F (36.2 °C) (Temporal)   Resp 18   Ht 1.803 m (5' 10.98\")   Wt 103.9 kg (229 lb)   SpO2 97%   BMI 31.95 kg/m²   Temp (24hrs), Av.5 °F (36.4 °C), Min:97.2 °F (36.2 °C), Max:98.1 °F (36.7 °C)      I/O (24Hr):    Intake/Output Summary (Last 24 hours) at 2024 1014  Last data filed at 2024 0434  Gross per 24 hour   Intake --   Output 1650 ml   Net -1650 ml       Assessment:     Closed nondisplaced fracture of condyle of right femur with routine healing  POD 1 Day Post-Op Procedure(s):  FEMUR OPEN REDUCTION INTERNAL FIXATION RIGHT DISTAL FEMUR    Plan:      1:  DVT prophylaxis, ICE, elevate  2:  Pain control  3:  Physical therapy/Occupation therapy  4:  Anticipate discharge once medically capable and if pain well controlled  5: Non-weight bearing operative right lower extremity with knee immobilizer in place at all times    Electronically signed by Maikel Rodrigez MD on 2024 at 10:14 AM

## 2024-08-26 LAB
BASOPHILS # BLD: 0 K/UL (ref 0–0.2)
BASOPHILS NFR BLD: 0.3 % (ref 0–1)
EOSINOPHIL # BLD: 0.1 K/UL (ref 0–0.6)
EOSINOPHIL NFR BLD: 0.8 % (ref 0–5)
ERYTHROCYTE [DISTWIDTH] IN BLOOD BY AUTOMATED COUNT: 18.9 % (ref 11.5–14.5)
GLUCOSE BLD-MCNC: 154 MG/DL (ref 70–99)
GLUCOSE BLD-MCNC: 156 MG/DL (ref 70–99)
GLUCOSE BLD-MCNC: 160 MG/DL (ref 70–99)
GLUCOSE BLD-MCNC: 169 MG/DL (ref 70–99)
HCT VFR BLD AUTO: 25 % (ref 42–52)
HGB BLD-MCNC: 8.3 G/DL (ref 14–18)
IMM GRANULOCYTES # BLD: 0 K/UL
LYMPHOCYTES # BLD: 1.3 K/UL (ref 1.1–4.5)
LYMPHOCYTES NFR BLD: 16.5 % (ref 20–40)
MCH RBC QN AUTO: 39.9 PG (ref 27–31)
MCHC RBC AUTO-ENTMCNC: 33.2 G/DL (ref 33–37)
MCV RBC AUTO: 120.2 FL (ref 80–94)
MONOCYTES # BLD: 1.2 K/UL (ref 0–0.9)
MONOCYTES NFR BLD: 14.5 % (ref 0–10)
NEUTROPHILS # BLD: 5.4 K/UL (ref 1.5–7.5)
NEUTS SEG NFR BLD: 67.4 % (ref 50–65)
PERFORMED ON: ABNORMAL
PLATELET # BLD AUTO: 96 K/UL (ref 130–400)
PMV BLD AUTO: 9.7 FL (ref 9.4–12.4)
RBC # BLD AUTO: 2.08 M/UL (ref 4.7–6.1)
WBC # BLD AUTO: 8 K/UL (ref 4.8–10.8)

## 2024-08-26 PROCEDURE — 6370000000 HC RX 637 (ALT 250 FOR IP): Performed by: ORTHOPAEDIC SURGERY

## 2024-08-26 PROCEDURE — 97530 THERAPEUTIC ACTIVITIES: CPT

## 2024-08-26 PROCEDURE — 97535 SELF CARE MNGMENT TRAINING: CPT

## 2024-08-26 PROCEDURE — 6360000002 HC RX W HCPCS: Performed by: ORTHOPAEDIC SURGERY

## 2024-08-26 PROCEDURE — 85025 COMPLETE CBC W/AUTO DIFF WBC: CPT

## 2024-08-26 PROCEDURE — 2580000003 HC RX 258: Performed by: ORTHOPAEDIC SURGERY

## 2024-08-26 PROCEDURE — 94760 N-INVAS EAR/PLS OXIMETRY 1: CPT

## 2024-08-26 PROCEDURE — 1200000000 HC SEMI PRIVATE

## 2024-08-26 PROCEDURE — 36415 COLL VENOUS BLD VENIPUNCTURE: CPT

## 2024-08-26 PROCEDURE — 82962 GLUCOSE BLOOD TEST: CPT

## 2024-08-26 PROCEDURE — 2700000000 HC OXYGEN THERAPY PER DAY

## 2024-08-26 RX ORDER — PANTOPRAZOLE SODIUM 40 MG/1
40 TABLET, DELAYED RELEASE ORAL DAILY
Qty: 90 TABLET | Refills: 0 | Status: SHIPPED | OUTPATIENT
Start: 2024-08-26

## 2024-08-26 RX ADMIN — SODIUM CHLORIDE 250 MG: 9 INJECTION, SOLUTION INTRAVENOUS at 09:30

## 2024-08-26 RX ADMIN — RANOLAZINE 1000 MG: 500 TABLET, FILM COATED, EXTENDED RELEASE ORAL at 20:15

## 2024-08-26 RX ADMIN — ROSUVASTATIN CALCIUM 40 MG: 20 TABLET, COATED ORAL at 09:22

## 2024-08-26 RX ADMIN — METOPROLOL SUCCINATE 25 MG: 25 TABLET, EXTENDED RELEASE ORAL at 09:22

## 2024-08-26 RX ADMIN — BUSPIRONE HYDROCHLORIDE 15 MG: 15 TABLET ORAL at 09:22

## 2024-08-26 RX ADMIN — INSULIN GLARGINE 5 UNITS: 100 INJECTION, SOLUTION SUBCUTANEOUS at 20:01

## 2024-08-26 RX ADMIN — HYDROCODONE BITARTRATE AND ACETAMINOPHEN 1 TABLET: 10; 325 TABLET ORAL at 20:01

## 2024-08-26 RX ADMIN — ACETAMINOPHEN 650 MG: 325 TABLET ORAL at 17:32

## 2024-08-26 RX ADMIN — BUSPIRONE HYDROCHLORIDE 15 MG: 15 TABLET ORAL at 20:02

## 2024-08-26 RX ADMIN — DOCUSATE SODIUM 100 MG: 100 CAPSULE, LIQUID FILLED ORAL at 20:02

## 2024-08-26 RX ADMIN — GABAPENTIN 100 MG: 100 CAPSULE ORAL at 20:02

## 2024-08-26 RX ADMIN — FERROUS SULFATE TAB 325 MG (65 MG ELEMENTAL FE) 325 MG: 325 (65 FE) TAB at 20:02

## 2024-08-26 RX ADMIN — LEVOTHYROXINE SODIUM 50 MCG: 50 TABLET ORAL at 06:08

## 2024-08-26 RX ADMIN — HYDROCODONE BITARTRATE AND ACETAMINOPHEN 1 TABLET: 10; 325 TABLET ORAL at 04:39

## 2024-08-26 RX ADMIN — APIXABAN 2.5 MG: 2.5 TABLET, FILM COATED ORAL at 09:22

## 2024-08-26 RX ADMIN — APIXABAN 2.5 MG: 2.5 TABLET, FILM COATED ORAL at 20:02

## 2024-08-26 RX ADMIN — PANTOPRAZOLE SODIUM 40 MG: 40 TABLET, DELAYED RELEASE ORAL at 09:22

## 2024-08-26 RX ADMIN — DULOXETINE HYDROCHLORIDE 60 MG: 60 CAPSULE, DELAYED RELEASE ORAL at 20:02

## 2024-08-26 RX ADMIN — CLOPIDOGREL BISULFATE 75 MG: 75 TABLET ORAL at 09:21

## 2024-08-26 RX ADMIN — SENNOSIDES AND DOCUSATE SODIUM 1 TABLET: 50; 8.6 TABLET ORAL at 09:22

## 2024-08-26 RX ADMIN — HYDROCODONE BITARTRATE AND ACETAMINOPHEN 1 TABLET: 10; 325 TABLET ORAL at 13:24

## 2024-08-26 RX ADMIN — FERROUS SULFATE TAB 325 MG (65 MG ELEMENTAL FE) 325 MG: 325 (65 FE) TAB at 09:22

## 2024-08-26 RX ADMIN — RANOLAZINE 1000 MG: 500 TABLET, FILM COATED, EXTENDED RELEASE ORAL at 09:22

## 2024-08-26 RX ADMIN — SENNOSIDES AND DOCUSATE SODIUM 1 TABLET: 50; 8.6 TABLET ORAL at 20:02

## 2024-08-26 ASSESSMENT — PAIN SCALES - GENERAL
PAINLEVEL_OUTOF10: 10
PAINLEVEL_OUTOF10: 10
PAINLEVEL_OUTOF10: 3
PAINLEVEL_OUTOF10: 9
PAINLEVEL_OUTOF10: 6

## 2024-08-26 ASSESSMENT — PAIN DESCRIPTION - ORIENTATION
ORIENTATION: RIGHT
ORIENTATION: RIGHT

## 2024-08-26 ASSESSMENT — PAIN DESCRIPTION - DESCRIPTORS
DESCRIPTORS: ACHING;DISCOMFORT
DESCRIPTORS: ACHING
DESCRIPTORS: DISCOMFORT

## 2024-08-26 ASSESSMENT — PAIN - FUNCTIONAL ASSESSMENT: PAIN_FUNCTIONAL_ASSESSMENT: PREVENTS OR INTERFERES SOME ACTIVE ACTIVITIES AND ADLS

## 2024-08-26 ASSESSMENT — PAIN DESCRIPTION - LOCATION
LOCATION: KNEE
LOCATION: KNEE
LOCATION: LEG

## 2024-08-26 ASSESSMENT — PAIN DESCRIPTION - PAIN TYPE: TYPE: ACUTE PAIN

## 2024-08-26 ASSESSMENT — ENCOUNTER SYMPTOMS
SHORTNESS OF BREATH: 0
VOMITING: 0
COUGH: 0
COLOR CHANGE: 0
NAUSEA: 0

## 2024-08-26 ASSESSMENT — PAIN DESCRIPTION - ONSET: ONSET: ON-GOING

## 2024-08-26 ASSESSMENT — PAIN DESCRIPTION - FREQUENCY: FREQUENCY: CONTINUOUS

## 2024-08-26 NOTE — PROGRESS NOTES
Daily Progress Note  Daniel Ramirez  MRN: [id number] LOS: 7    Admit Date: 8/19/2024 8/26/2024 8:09 AM    Subjective:          Chief Complaint:  No chief complaint on file.      Interval History:    Reviewed overnight events and nursing notes.   Status: Pain is improving following ORIF as long as right leg is still.        Review of Systems   Constitutional:  Positive for activity change. Negative for fatigue and fever.   Respiratory:  Negative for cough and shortness of breath.    Cardiovascular:  Negative for chest pain.   Gastrointestinal:  Negative for nausea and vomiting.   Musculoskeletal:  Positive for arthralgias and gait problem.   Skin:  Negative for color change.   Neurological:  Positive for weakness.       DIET:  ADULT DIET; Regular; 3 carb choices (45 gm/meal); Low Fat/Low Chol/High Fiber/DONNY; Low Sodium (2 gm)  ADULT ORAL NUTRITION SUPPLEMENT; Dinner; Diabetic Oral Supplement    Medications:      sodium chloride      lactated ringers IV soln 100 mL/hr at 08/24/24 1151    sodium chloride        sodium chloride flush  5-40 mL IntraVENous 2 times per day    sennosides-docusate sodium  1 tablet Oral BID    apixaban  2.5 mg Oral BID    sodium chloride flush  5-40 mL IntraVENous 2 times per day    busPIRone  15 mg Oral BID    clopidogrel  75 mg Oral Daily    docusate sodium  100 mg Oral Nightly    DULoxetine  60 mg Oral Nightly    ferrous sulfate  325 mg Oral BID    gabapentin  100 mg Oral Nightly    levothyroxine  50 mcg Oral Daily    [Held by provider] metFORMIN  500 mg Oral BID WC    metoprolol succinate  25 mg Oral Daily    pantoprazole  40 mg Oral Daily    ranolazine  1,000 mg Oral BID    rosuvastatin  40 mg Oral Daily    ferric gluconate  250 mg IntraVENous Once per day on Monday Wednesday Friday    insulin glargine  5 Units SubCUTAneous Nightly       Data:     Code Status: Full Code    Family History   Problem Relation Age of Onset    Lung Cancer Mother 60    Lung Cancer Sister 80    Cancer Sister   System    Abuse Screen     Feels Unsafe at Home or Work/School: no     Feels Threatened by Someone: no     Does Anyone Try to Keep You From Having Contact with Others or Doing Things Outside Your Home?: no     Physical Signs of Abuse Present: no   Housing Stability: Low Risk  (2024)    Housing Stability Vital Sign     Unable to Pay for Housing in the Last Year: No     Number of Times Moved in the Last Year: 1     Homeless in the Last Year: No       Labs:  CBC:   Recent Labs     24  0244 24  0330 24  0150   WBC 5.2 9.2 8.0   HGB 8.1* 8.5* 8.3*   PLT 82* 116* 96*     BMP:    Recent Labs     24  02424  0330    137   K 3.8 3.8    101   CO2 25 22   BUN 14 18   CREATININE 1.1 1.1   GLUCOSE 124* 153*       Hepatic:   No results for input(s): \"AST\", \"ALT\", \"BILITOT\", \"ALKPHOS\" in the last 72 hours.    Invalid input(s): \"ALB\"    Lipids: No results for input(s): \"CHOL\", \"HDL\" in the last 72 hours.    Invalid input(s): \"LDLCALCU\"  INR:   No results for input(s): \"INR\" in the last 72 hours.      Objective:     Vitals: BP (!) 116/94   Pulse 86   Temp 97.7 °F (36.5 °C)   Resp 16   Ht 1.803 m (5' 10.98\")   Wt 103.9 kg (229 lb)   SpO2 (!) 88%   BMI 31.95 kg/m²    Intake/Output Summary (Last 24 hours) at 2024 0809  Last data filed at 2024 1515  Gross per 24 hour   Intake --   Output 350 ml   Net -350 ml    Temp (24hrs), Av.4 °F (36.3 °C), Min:97 °F (36.1 °C), Max:97.7 °F (36.5 °C)    Glucose:  Recent Labs     24  1926 24  0723 24  19324  0713   POCGLU 251* 130* 130* 154*       Physical Exam  Vitals reviewed.   Constitutional:       Appearance: Normal appearance. He is not ill-appearing.   HENT:      Head: Normocephalic and atraumatic.   Eyes:      General:         Right eye: No discharge.         Left eye: No discharge.      Extraocular Movements: Extraocular movements intact.      Conjunctiva/sclera: Conjunctivae normal.

## 2024-08-26 NOTE — PROGRESS NOTES
Physical Therapy    Name: Daniel Ramirez  MRN: [id number]  Date of service: 8/26/2024 08/26/24 1215   Restrictions/Precautions   Restrictions/Precautions Weight Bearing;Fall Risk   Lower Extremity Weight Bearing Restrictions   Right Lower Extremity Weight Bearing Non Weight Bearing   Required Braces or Orthoses   Right Lower Extremity Brace Knee Immobilizer   Position Activity Restriction   Other position/activity restrictions Orders on chart8/23/24 state:  Immobilizer on at all times during transfers and walking   General   Diagnosis R distal femur ORIF   General Comment   Comments Pt in bed, okayed for therapy   Subjective   Subjective agreed to therapy, getting up to the chair   Social/Functional History   Additional Comments uses electric scooter for long distances   Vision   Vision Exceptions Wears glasses at all times   Subjective   Subjective no complaints of pain during session   Observation/Palpation   Observation IV, KI RLE   Bed mobility   Supine to Sit Moderate assistance   Bed Mobility Comments assisted pt due to not being awake fully   Transfers   Lateral Transfers Minimal Assistance;2 Person Assistance   Comment SB to drop arm chair   Ambulation   WB Status NWB RLE   Short Term Goals   Time Frame for Short Term Goals 2 wks   Short Term Goal 1 supine to sit indep   Short Term Goal 2 bed to chair SBA with RW, NWB RLE   Short Term Goal 3 sit to stand CGA, NWB RLE   Short Term Goal 4 bed to chair with sliding board SBA   Activity Tolerance   Activity Tolerance Patient tolerated treatment well   Assessment   Assessment Pt doing well, no complaints of pain. Transfered to drop arm chair with SB min x2. Left pt in recliner with all needs in reach. Lunch tray infront of  pt.   Discharge Recommendations Continue to assess pending progress;Patient would benefit from continued therapy after discharge   Physical Therapy Plan   General Plan 5-7 times per week   Therapy Duration 2 Weeks   Current Treatment
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
## 2024-08-26 NOTE — PROGRESS NOTES
Occupational Therapy     08/26/24 1400   Subjective   Subjective Pt in bed upon arrival for therapy. Pt agreeable to participate.   Pain Assessment   Pain Assessment 0-10   Pain Level 6   Pain Location Knee   Pain Orientation Right   Pain Descriptors Aching;Discomfort   Functional Pain Assessment Prevents or interferes some active activities and ADLs   Pain Type Acute pain   Pain Frequency Continuous   Pain Onset On-going   Non-Pharmaceutical Pain Intervention(s) Ambulation/Increased Activity;Repositioned;Rest   Response to Pain Intervention Patient satisfied   Cognition   Overall Cognitive Status WFL   Orientation   Overall Orientation Status WFL   Bed Mobility Training   Bed Mobility Training Yes   Overall Level of Assistance Minimum assistance;Moderate assistance   Interventions Verbal cues;Tactile cues;Manual cues   Rolling Minimum assistance   Sit to Supine Minimum assistance;Moderate assistance   Scooting Minimum assistance   Transfer Training   Transfer Training Yes   Overall Level of Assistance Assist X2;Moderate assistance;Maximum assistance   Interventions Verbal cues;Tactile cues;Manual cues   Sliding Board Assist X2;Moderate assistance;Maximum assistance   Balance   Sitting Intact   Standing Impaired  (NWB to RLE)   ADL   Feeding Supervision   Grooming Supervision;Setup   UE Bathing Supervision;Minimal assistance   LE Bathing Moderate assistance   UE Dressing Supervision   LE Dressing Moderate assistance   Toileting Moderate assistance   Functional Mobility Moderate assistance;Maximum assistance  (x2 for sliding board t/f)   Assessment   Assessment Tx focused on sliding boad t/f back to bed from drop arm chair. Pt instructed on gown change sitting EOB. Pt instructed on bed mobility during repositioning and pad change. Pt very lethargic during tx.   Activity Tolerance Patient tolerated treatment well   Discharge Recommendations Patient would benefit from continued therapy after discharge;24 hour

## 2024-08-26 NOTE — PROGRESS NOTES
Comprehensive Nutrition Assessment    Type and Reason for Visit:  Reassess    Nutrition Recommendations/Plan:   Continue POC     Malnutrition Assessment:  Malnutrition Status:  No malnutrition (08/22/24 1033)    Context:  Acute Illness     Findings of the 6 clinical characteristics of malnutrition:  Energy Intake:  No significant decrease in energy intake  Weight Loss:  No significant weight loss     Body Fat Loss:  No significant body fat loss     Muscle Mass Loss:  No significant muscle mass loss    Fluid Accumulation:  No significant fluid accumulation     Strength:  Not Performed    Nutrition Assessment:    Pt continues with good PO intake of meals. Glucose 130-154. IVF infusing. Continue POC.    Nutrition Related Findings:      Wound Type: Skin Tears       Current Nutrition Intake & Therapies:    Average Meal Intake: %  ADULT DIET; Regular; 3 carb choices (45 gm/meal); Low Fat/Low Chol/High Fiber/DONNY; Low Sodium (2 gm)  ADULT ORAL NUTRITION SUPPLEMENT; Dinner; Diabetic Oral Supplement    Anthropometric Measures:  Height: 180.3 cm (5' 10.98\")  Ideal Body Weight (IBW): 172 lbs (78 kg)    Current Body Weight: 103.9 kg (229 lb 0.9 oz)  Current BMI (kg/m2): 32  BMI Categories: Obese Class 1 (BMI 30.0-34.9)    Nutrition Diagnosis:   No nutrition diagnosis at this time     Nutrition Interventions:   Food and/or Nutrient Delivery: Continue Current Diet  Coordination of Nutrition Care: Continue to monitor while inpatient    Goals:  Previous Goal Met: Goal(s) Achieved  Goals: PO intake 75% or greater, Meet at least 75% of estimated needs    Nutrition Monitoring and Evaluation:   Food/Nutrient Intake Outcomes: Food and Nutrient Intake  Physical Signs/Symptoms Outcomes: Biochemical Data, Nutrition Focused Physical Findings, Skin, Weight    Lyssa Candelaria, MS, RD, LD  Contact: [phone]

## 2024-08-26 NOTE — PROGRESS NOTES
Physical Therapy    Name: Daniel Ramirez  MRN: [id number]  Date of service: 8/26/2024 08/26/24 1430   Restrictions/Precautions   Restrictions/Precautions Weight Bearing;Fall Risk;Surgical Protocols   Lower Extremity Weight Bearing Restrictions   Right Lower Extremity Weight Bearing Non Weight Bearing   Required Braces or Orthoses   Right Lower Extremity Brace Knee Immobilizer   Position Activity Restriction   Other position/activity restrictions No ROM to right knee 6 weeks, then plan is for progressive gentle ROM   General Comment   Comments Pt in chair   Subjective   Subjective agreed to therapy, getting back to bed   Subjective   Subjective pain medicine is just starting to kick in, states he urinated in the chair   Pain 10/10   Observation/Palpation   Observation IV unhooked, KI RLE   Bed mobility   Sit to Supine Minimal assistance;2 Person assistance   Scooting 2 Person assistance;Minimal assistance   Bed Mobility Comments pt able to use head board and assist scooting   Transfers   Lateral Transfers Moderate Assistance;2 Person Assistance   Comment SB back to bed, pt requiring more assist this afternoon   Short Term Goals   Time Frame for Short Term Goals 2 wks   Short Term Goal 1 supine to sit indep   Short Term Goal 2 bed to chair SBA with RW, NWB RLE   Short Term Goal 3 sit to stand CGA, NWB RLE   Short Term Goal 4 bed to chair with sliding board SBA   Activity Tolerance   Activity Tolerance Patient limited by endurance;Patient limited by fatigue   Assessment   Assessment Pt requiring more assist this afternoon, seems to be sleepy and in more pain. Transfered pt back to bed with SB. Left pt in bed with all needs in reach   Discharge Recommendations Continue to assess pending progress;Patient would benefit from continued therapy after discharge   Physical Therapy Plan   General Plan 5-7 times per week   Therapy Duration 2 Weeks   Current Treatment Recommendations Balance training;Functional mobility

## 2024-08-26 NOTE — TELEPHONE ENCOUNTER
Electronic refill request received today:    Requested Prescriptions     Pending Prescriptions Disp Refills    pantoprazole (PROTONIX) 40 MG tablet [Pharmacy Med Name: PANTOPRAZOLE 40MG TABLETS] 90 tablet 3     Sig: TAKE 1 TABLET BY MOUTH DAILY     Last ov 8/1/23, last colon/egd with Dr AGUILAR 4/2023, no f/u

## 2024-08-27 LAB
BASOPHILS # BLD: 0 K/UL (ref 0–0.2)
BASOPHILS NFR BLD: 0.2 % (ref 0–1)
EOSINOPHIL # BLD: 0.1 K/UL (ref 0–0.6)
EOSINOPHIL NFR BLD: 1.1 % (ref 0–5)
ERYTHROCYTE [DISTWIDTH] IN BLOOD BY AUTOMATED COUNT: 18 % (ref 11.5–14.5)
GLUCOSE BLD-MCNC: 122 MG/DL (ref 70–99)
GLUCOSE BLD-MCNC: 160 MG/DL (ref 70–99)
GLUCOSE BLD-MCNC: 178 MG/DL (ref 70–99)
GLUCOSE BLD-MCNC: 218 MG/DL (ref 70–99)
HCT VFR BLD AUTO: 23.1 % (ref 42–52)
HGB BLD-MCNC: 7.7 G/DL (ref 14–18)
IMM GRANULOCYTES # BLD: 0.1 K/UL
LYMPHOCYTES # BLD: 0.7 K/UL (ref 1.1–4.5)
LYMPHOCYTES NFR BLD: 12.2 % (ref 20–40)
MCH RBC QN AUTO: 40.7 PG (ref 27–31)
MCHC RBC AUTO-ENTMCNC: 33.3 G/DL (ref 33–37)
MCV RBC AUTO: 122.2 FL (ref 80–94)
MONOCYTES # BLD: 0.6 K/UL (ref 0–0.9)
MONOCYTES NFR BLD: 10.9 % (ref 0–10)
NEUTROPHILS # BLD: 4 K/UL (ref 1.5–7.5)
NEUTS SEG NFR BLD: 74.7 % (ref 50–65)
PERFORMED ON: ABNORMAL
PLATELET # BLD AUTO: 90 K/UL (ref 130–400)
PMV BLD AUTO: 10.5 FL (ref 9.4–12.4)
RBC # BLD AUTO: 1.89 M/UL (ref 4.7–6.1)
WBC # BLD AUTO: 5.4 K/UL (ref 4.8–10.8)

## 2024-08-27 PROCEDURE — 6370000000 HC RX 637 (ALT 250 FOR IP): Performed by: ORTHOPAEDIC SURGERY

## 2024-08-27 PROCEDURE — 85025 COMPLETE CBC W/AUTO DIFF WBC: CPT

## 2024-08-27 PROCEDURE — 94760 N-INVAS EAR/PLS OXIMETRY 1: CPT

## 2024-08-27 PROCEDURE — 6370000000 HC RX 637 (ALT 250 FOR IP): Performed by: PHYSICIAN ASSISTANT

## 2024-08-27 PROCEDURE — 97530 THERAPEUTIC ACTIVITIES: CPT

## 2024-08-27 PROCEDURE — 36415 COLL VENOUS BLD VENIPUNCTURE: CPT

## 2024-08-27 PROCEDURE — 1200000000 HC SEMI PRIVATE

## 2024-08-27 PROCEDURE — 2580000003 HC RX 258: Performed by: ORTHOPAEDIC SURGERY

## 2024-08-27 PROCEDURE — 82962 GLUCOSE BLOOD TEST: CPT

## 2024-08-27 RX ADMIN — GABAPENTIN 100 MG: 100 CAPSULE ORAL at 20:38

## 2024-08-27 RX ADMIN — HYDROCODONE BITARTRATE AND ACETAMINOPHEN 1 TABLET: 10; 325 TABLET ORAL at 23:02

## 2024-08-27 RX ADMIN — BUSPIRONE HYDROCHLORIDE 15 MG: 15 TABLET ORAL at 08:50

## 2024-08-27 RX ADMIN — HYDROCODONE BITARTRATE AND ACETAMINOPHEN 1 TABLET: 10; 325 TABLET ORAL at 08:53

## 2024-08-27 RX ADMIN — SENNOSIDES AND DOCUSATE SODIUM 1 TABLET: 50; 8.6 TABLET ORAL at 20:38

## 2024-08-27 RX ADMIN — SENNOSIDES AND DOCUSATE SODIUM 1 TABLET: 50; 8.6 TABLET ORAL at 08:50

## 2024-08-27 RX ADMIN — INSULIN GLARGINE 5 UNITS: 100 INJECTION, SOLUTION SUBCUTANEOUS at 20:40

## 2024-08-27 RX ADMIN — SODIUM CHLORIDE, SODIUM LACTATE, POTASSIUM CHLORIDE, AND CALCIUM CHLORIDE: 600; 310; 30; 20 INJECTION, SOLUTION INTRAVENOUS at 12:53

## 2024-08-27 RX ADMIN — HYDROCODONE BITARTRATE AND ACETAMINOPHEN 1 TABLET: 10; 325 TABLET ORAL at 03:04

## 2024-08-27 RX ADMIN — DOCUSATE SODIUM 100 MG: 100 CAPSULE, LIQUID FILLED ORAL at 20:38

## 2024-08-27 RX ADMIN — LEVOTHYROXINE SODIUM 50 MCG: 50 TABLET ORAL at 05:53

## 2024-08-27 RX ADMIN — CLOPIDOGREL BISULFATE 75 MG: 75 TABLET ORAL at 08:53

## 2024-08-27 RX ADMIN — SODIUM CHLORIDE, SODIUM LACTATE, POTASSIUM CHLORIDE, AND CALCIUM CHLORIDE: 600; 310; 30; 20 INJECTION, SOLUTION INTRAVENOUS at 03:13

## 2024-08-27 RX ADMIN — APIXABAN 2.5 MG: 2.5 TABLET, FILM COATED ORAL at 08:51

## 2024-08-27 RX ADMIN — HYDROCODONE BITARTRATE AND ACETAMINOPHEN 1 TABLET: 10; 325 TABLET ORAL at 16:01

## 2024-08-27 RX ADMIN — RANOLAZINE 1000 MG: 500 TABLET, FILM COATED, EXTENDED RELEASE ORAL at 20:40

## 2024-08-27 RX ADMIN — BUSPIRONE HYDROCHLORIDE 15 MG: 15 TABLET ORAL at 20:38

## 2024-08-27 RX ADMIN — ROSUVASTATIN CALCIUM 40 MG: 20 TABLET, COATED ORAL at 08:50

## 2024-08-27 RX ADMIN — FERROUS SULFATE TAB 325 MG (65 MG ELEMENTAL FE) 325 MG: 325 (65 FE) TAB at 08:50

## 2024-08-27 RX ADMIN — LACTULOSE 20 G: 20 SOLUTION ORAL at 09:05

## 2024-08-27 RX ADMIN — FERROUS SULFATE TAB 325 MG (65 MG ELEMENTAL FE) 325 MG: 325 (65 FE) TAB at 20:38

## 2024-08-27 RX ADMIN — RANOLAZINE 1000 MG: 500 TABLET, FILM COATED, EXTENDED RELEASE ORAL at 08:50

## 2024-08-27 RX ADMIN — DULOXETINE HYDROCHLORIDE 60 MG: 60 CAPSULE, DELAYED RELEASE ORAL at 20:38

## 2024-08-27 RX ADMIN — PANTOPRAZOLE SODIUM 40 MG: 40 TABLET, DELAYED RELEASE ORAL at 08:50

## 2024-08-27 RX ADMIN — APIXABAN 2.5 MG: 2.5 TABLET, FILM COATED ORAL at 20:38

## 2024-08-27 ASSESSMENT — PAIN SCALES - GENERAL
PAINLEVEL_OUTOF10: 10
PAINLEVEL_OUTOF10: 9
PAINLEVEL_OUTOF10: 6
PAINLEVEL_OUTOF10: 9

## 2024-08-27 ASSESSMENT — PAIN DESCRIPTION - PAIN TYPE: TYPE: ACUTE PAIN

## 2024-08-27 ASSESSMENT — ENCOUNTER SYMPTOMS
COUGH: 0
VOMITING: 0
SHORTNESS OF BREATH: 0
COLOR CHANGE: 0
NAUSEA: 0

## 2024-08-27 ASSESSMENT — PAIN SCALES - WONG BAKER
WONGBAKER_NUMERICALRESPONSE: HURTS A LITTLE BIT

## 2024-08-27 ASSESSMENT — PAIN DESCRIPTION - LOCATION
LOCATION: KNEE
LOCATION: LEG
LOCATION: LEG

## 2024-08-27 ASSESSMENT — PAIN - FUNCTIONAL ASSESSMENT: PAIN_FUNCTIONAL_ASSESSMENT: PREVENTS OR INTERFERES SOME ACTIVE ACTIVITIES AND ADLS

## 2024-08-27 ASSESSMENT — PAIN DESCRIPTION - DESCRIPTORS
DESCRIPTORS: DISCOMFORT
DESCRIPTORS: ACHING
DESCRIPTORS: DISCOMFORT

## 2024-08-27 ASSESSMENT — PAIN DESCRIPTION - ORIENTATION
ORIENTATION: RIGHT

## 2024-08-27 ASSESSMENT — PAIN DESCRIPTION - FREQUENCY: FREQUENCY: CONTINUOUS

## 2024-08-27 NOTE — PROGRESS NOTES
Physical Therapy  Name: Daniel Ramirez  MRN:  [id number]  Date of service:  8/27/2024 08/27/24 1519   Restrictions/Precautions   Restrictions/Precautions Weight Bearing;Fall Risk;Surgical Protocols   Required Braces or Orthoses? Yes   Lower Extremity Weight Bearing Restrictions   Right Lower Extremity Weight Bearing Non Weight Bearing   Required Braces or Orthoses   Right Lower Extremity Brace Knee Immobilizer   Subjective   Subjective Pt agreed to therapy.   Pain Assessment   Pain Assessment 0-10   Pain Level 6   Pain Location Leg   Pain Orientation Right   Pain Descriptors Discomfort   Functional Pain Assessment Prevents or interferes some active activities and ADLs   Pain Type Acute pain   Pain Frequency Continuous   Non-Pharmaceutical Pain Intervention(s) Ambulation/Increased Activity;Repositioned;Rest   Response to Pain Intervention Patient satisfied   Bed Mobility   Supine to Sit Minimal assistance;Moderate assistance   Sit to Supine Moderate assistance   Scooting Minimal assistance  (scooting at EOB in sitting)   Comment sat EOB working on scooting and sitting balance   Patient Goals    Patient Goals  go home   Short Term Goals   Time Frame for Short Term Goals 2 wks   Short Term Goal 1 supine to sit indep   Short Term Goal 2 bed to chair SBA with RW, NWB RLE   Short Term Goal 3 sit to stand CGA, NWB RLE   Short Term Goal 4 bed to chair with sliding board SBA   Conditions Requiring Skilled Therapeutic Intervention   Body Structures, Functions, Activity Limitations Requiring Skilled Therapeutic Intervention Decreased functional mobility ;Decreased ADL status;Decreased ROM;Decreased safe awareness;Decreased strength;Decreased balance;Decreased sensation;Increased pain;Decreased posture   Assessment Pt able to work on scooting at EOB but required assist with RLE to maintain NWB. Difficulty scooting and fatigued quickly. PCA came to assist pt with bath, returned pt to bed for this.   Activity Tolerance

## 2024-08-27 NOTE — PLAN OF CARE
Problem: Discharge Planning  Goal: Discharge to home or other facility with appropriate resources  Outcome: Progressing     Problem: Skin/Tissue Integrity  Goal: Absence of new skin breakdown  Description: 1.  Monitor for areas of redness and/or skin breakdown  2.  Assess vascular access sites hourly  3.  Every 4-6 hours minimum:  Change oxygen saturation probe site  4.  Every 4-6 hours:  If on nasal continuous positive airway pressure, respiratory therapy assess nares and determine need for appliance change or resting period.  Outcome: Progressing     Problem: ABCDS Injury Assessment  Goal: Absence of physical injury  Outcome: Progressing     Problem: Pain  Goal: Verbalizes/displays adequate comfort level or baseline comfort level  Outcome: Progressing     Problem: Safety - Adult  Goal: Free from fall injury  Outcome: Progressing     Problem: Chronic Conditions and Co-morbidities  Goal: Patient's chronic conditions and co-morbidity symptoms are monitored and maintained or improved  Outcome: Progressing     Problem: Anxiety  Goal: Will report anxiety at manageable levels  Description: INTERVENTIONS:  1. Administer medication as ordered  2. Teach and rehearse alternative coping skills  3. Provide emotional support with 1:1 interaction with staff  Outcome: Progressing     Problem: Coping  Goal: Pt/Family able to verbalize concerns and demonstrate effective coping strategies  Description: INTERVENTIONS:  1. Assist patient/family to identify coping skills, available support systems and cultural and spiritual values  2. Provide emotional support, including active listening and acknowledgement of concerns of patient and caregivers  3. Reduce environmental stimuli, as able  4. Instruct patient/family in relaxation techniques, as appropriate  5. Assess for spiritual pain/suffering and initiate Spiritual Care, Psychosocial Clinical Specialist consults as needed  Outcome: Progressing     Problem: Skin/Tissue Integrity -

## 2024-08-27 NOTE — CARE COORDINATION
Patient/spouse have requested a referral to be sent to [facility]. Patient's insurance requires a prior authorization. CM will be notified when prior authorization is received.  [facility]   [facility]  Electronically signed by Brian Rico RN, BSN on 8/27/2024 at 1:08 PM

## 2024-08-27 NOTE — PROGRESS NOTES
Daily Progress Note  Daniel Ramirez  MRN: [id number] LOS: 8    Admit Date: 8/19/2024 8/27/2024 8:41 AM    Subjective:          Chief Complaint:  No chief complaint on file.      Interval History:    Reviewed overnight events and nursing notes.   Status: Pain is improving following ORIF as long as right leg is still.        Review of Systems   Constitutional:  Positive for activity change. Negative for fatigue and fever.   Respiratory:  Negative for cough and shortness of breath.    Cardiovascular:  Negative for chest pain.   Gastrointestinal:  Negative for nausea and vomiting.   Musculoskeletal:  Positive for arthralgias and gait problem.   Skin:  Negative for color change.   Neurological:  Positive for weakness.       DIET:  ADULT DIET; Regular; 3 carb choices (45 gm/meal); Low Fat/Low Chol/High Fiber/DONNY; Low Sodium (2 gm)  ADULT ORAL NUTRITION SUPPLEMENT; Dinner; Diabetic Oral Supplement    Medications:      sodium chloride      lactated ringers IV soln 100 mL/hr at 08/27/24 0313    sodium chloride        sodium chloride flush  5-40 mL IntraVENous 2 times per day    sennosides-docusate sodium  1 tablet Oral BID    apixaban  2.5 mg Oral BID    sodium chloride flush  5-40 mL IntraVENous 2 times per day    busPIRone  15 mg Oral BID    clopidogrel  75 mg Oral Daily    docusate sodium  100 mg Oral Nightly    DULoxetine  60 mg Oral Nightly    ferrous sulfate  325 mg Oral BID    gabapentin  100 mg Oral Nightly    levothyroxine  50 mcg Oral Daily    [Held by provider] metFORMIN  500 mg Oral BID WC    metoprolol succinate  25 mg Oral Daily    pantoprazole  40 mg Oral Daily    ranolazine  1,000 mg Oral BID    rosuvastatin  40 mg Oral Daily    ferric gluconate  250 mg IntraVENous Once per day on Monday Wednesday Friday    insulin glargine  5 Units SubCUTAneous Nightly       Data:     Code Status: Full Code    Family History   Problem Relation Age of Onset    Lung Cancer Mother 60    Lung Cancer Sister 80    Cancer Sister

## 2024-08-28 LAB
BASOPHILS # BLD: 0 K/UL (ref 0–0.2)
BASOPHILS NFR BLD: 0.4 % (ref 0–1)
EOSINOPHIL # BLD: 0.1 K/UL (ref 0–0.6)
EOSINOPHIL NFR BLD: 1.4 % (ref 0–5)
ERYTHROCYTE [DISTWIDTH] IN BLOOD BY AUTOMATED COUNT: 18.5 % (ref 11.5–14.5)
GLUCOSE BLD-MCNC: 116 MG/DL (ref 70–99)
GLUCOSE BLD-MCNC: 136 MG/DL (ref 70–99)
GLUCOSE BLD-MCNC: 139 MG/DL (ref 70–99)
GLUCOSE BLD-MCNC: 220 MG/DL (ref 70–99)
HCT VFR BLD AUTO: 22.9 % (ref 42–52)
HGB BLD-MCNC: 7.6 G/DL (ref 14–18)
IMM GRANULOCYTES # BLD: 0.1 K/UL
LYMPHOCYTES # BLD: 0.9 K/UL (ref 1.1–4.5)
LYMPHOCYTES NFR BLD: 15.2 % (ref 20–40)
MCH RBC QN AUTO: 40.4 PG (ref 27–31)
MCHC RBC AUTO-ENTMCNC: 33.2 G/DL (ref 33–37)
MCV RBC AUTO: 121.8 FL (ref 80–94)
MONOCYTES # BLD: 0.6 K/UL (ref 0–0.9)
MONOCYTES NFR BLD: 10.4 % (ref 0–10)
NEUTROPHILS # BLD: 4 K/UL (ref 1.5–7.5)
NEUTS SEG NFR BLD: 71.5 % (ref 50–65)
PERFORMED ON: ABNORMAL
PLATELET # BLD AUTO: 90 K/UL (ref 130–400)
PMV BLD AUTO: 10.6 FL (ref 9.4–12.4)
RBC # BLD AUTO: 1.88 M/UL (ref 4.7–6.1)
WBC # BLD AUTO: 5.7 K/UL (ref 4.8–10.8)

## 2024-08-28 PROCEDURE — 85025 COMPLETE CBC W/AUTO DIFF WBC: CPT

## 2024-08-28 PROCEDURE — 97530 THERAPEUTIC ACTIVITIES: CPT

## 2024-08-28 PROCEDURE — 2580000003 HC RX 258: Performed by: ORTHOPAEDIC SURGERY

## 2024-08-28 PROCEDURE — 94760 N-INVAS EAR/PLS OXIMETRY 1: CPT

## 2024-08-28 PROCEDURE — 6370000000 HC RX 637 (ALT 250 FOR IP): Performed by: ORTHOPAEDIC SURGERY

## 2024-08-28 PROCEDURE — 1200000000 HC SEMI PRIVATE

## 2024-08-28 PROCEDURE — 82962 GLUCOSE BLOOD TEST: CPT

## 2024-08-28 PROCEDURE — 97535 SELF CARE MNGMENT TRAINING: CPT

## 2024-08-28 PROCEDURE — 6360000002 HC RX W HCPCS: Performed by: ORTHOPAEDIC SURGERY

## 2024-08-28 PROCEDURE — 36415 COLL VENOUS BLD VENIPUNCTURE: CPT

## 2024-08-28 RX ADMIN — SENNOSIDES AND DOCUSATE SODIUM 1 TABLET: 50; 8.6 TABLET ORAL at 21:34

## 2024-08-28 RX ADMIN — APIXABAN 2.5 MG: 2.5 TABLET, FILM COATED ORAL at 08:18

## 2024-08-28 RX ADMIN — FERROUS SULFATE TAB 325 MG (65 MG ELEMENTAL FE) 325 MG: 325 (65 FE) TAB at 21:35

## 2024-08-28 RX ADMIN — APIXABAN 2.5 MG: 2.5 TABLET, FILM COATED ORAL at 21:34

## 2024-08-28 RX ADMIN — ACETAMINOPHEN 650 MG: 325 TABLET ORAL at 14:53

## 2024-08-28 RX ADMIN — FERROUS SULFATE TAB 325 MG (65 MG ELEMENTAL FE) 325 MG: 325 (65 FE) TAB at 08:19

## 2024-08-28 RX ADMIN — SENNOSIDES AND DOCUSATE SODIUM 1 TABLET: 50; 8.6 TABLET ORAL at 08:19

## 2024-08-28 RX ADMIN — LEVOTHYROXINE SODIUM 50 MCG: 50 TABLET ORAL at 05:07

## 2024-08-28 RX ADMIN — DULOXETINE HYDROCHLORIDE 60 MG: 60 CAPSULE, DELAYED RELEASE ORAL at 21:34

## 2024-08-28 RX ADMIN — CLOPIDOGREL BISULFATE 75 MG: 75 TABLET ORAL at 08:19

## 2024-08-28 RX ADMIN — SODIUM CHLORIDE 250 MG: 9 INJECTION, SOLUTION INTRAVENOUS at 09:14

## 2024-08-28 RX ADMIN — BUSPIRONE HYDROCHLORIDE 15 MG: 15 TABLET ORAL at 08:18

## 2024-08-28 RX ADMIN — INSULIN GLARGINE 5 UNITS: 100 INJECTION, SOLUTION SUBCUTANEOUS at 21:34

## 2024-08-28 RX ADMIN — RANOLAZINE 1000 MG: 500 TABLET, FILM COATED, EXTENDED RELEASE ORAL at 21:34

## 2024-08-28 RX ADMIN — PANTOPRAZOLE SODIUM 40 MG: 40 TABLET, DELAYED RELEASE ORAL at 08:18

## 2024-08-28 RX ADMIN — RANOLAZINE 1000 MG: 500 TABLET, FILM COATED, EXTENDED RELEASE ORAL at 08:19

## 2024-08-28 RX ADMIN — GABAPENTIN 100 MG: 100 CAPSULE ORAL at 21:34

## 2024-08-28 RX ADMIN — HYDROCODONE BITARTRATE AND ACETAMINOPHEN 1 TABLET: 10; 325 TABLET ORAL at 08:19

## 2024-08-28 RX ADMIN — DOCUSATE SODIUM 100 MG: 100 CAPSULE, LIQUID FILLED ORAL at 21:34

## 2024-08-28 RX ADMIN — BUSPIRONE HYDROCHLORIDE 15 MG: 15 TABLET ORAL at 21:34

## 2024-08-28 RX ADMIN — ROSUVASTATIN CALCIUM 40 MG: 20 TABLET, COATED ORAL at 08:18

## 2024-08-28 RX ADMIN — SODIUM CHLORIDE, SODIUM LACTATE, POTASSIUM CHLORIDE, AND CALCIUM CHLORIDE: 600; 310; 30; 20 INJECTION, SOLUTION INTRAVENOUS at 09:12

## 2024-08-28 RX ADMIN — HYDROCODONE BITARTRATE AND ACETAMINOPHEN 1 TABLET: 10; 325 TABLET ORAL at 18:56

## 2024-08-28 ASSESSMENT — PAIN DESCRIPTION - ONSET: ONSET: ON-GOING

## 2024-08-28 ASSESSMENT — ENCOUNTER SYMPTOMS
VOMITING: 0
NAUSEA: 0
COLOR CHANGE: 0
COUGH: 0
SHORTNESS OF BREATH: 0

## 2024-08-28 ASSESSMENT — PAIN SCALES - GENERAL
PAINLEVEL_OUTOF10: 7
PAINLEVEL_OUTOF10: 5
PAINLEVEL_OUTOF10: 8
PAINLEVEL_OUTOF10: 1
PAINLEVEL_OUTOF10: 4

## 2024-08-28 ASSESSMENT — PAIN DESCRIPTION - PAIN TYPE: TYPE: ACUTE PAIN

## 2024-08-28 ASSESSMENT — PAIN DESCRIPTION - LOCATION
LOCATION: KNEE
LOCATION: HEAD
LOCATION: LEG
LOCATION: KNEE

## 2024-08-28 ASSESSMENT — PAIN SCALES - WONG BAKER
WONGBAKER_NUMERICALRESPONSE: HURTS A LITTLE BIT

## 2024-08-28 ASSESSMENT — PAIN DESCRIPTION - ORIENTATION
ORIENTATION: RIGHT

## 2024-08-28 ASSESSMENT — PAIN - FUNCTIONAL ASSESSMENT: PAIN_FUNCTIONAL_ASSESSMENT: PREVENTS OR INTERFERES SOME ACTIVE ACTIVITIES AND ADLS

## 2024-08-28 ASSESSMENT — PAIN DESCRIPTION - DESCRIPTORS
DESCRIPTORS: ACHING;DISCOMFORT;SORE
DESCRIPTORS: ACHING

## 2024-08-28 ASSESSMENT — PAIN DESCRIPTION - FREQUENCY: FREQUENCY: CONTINUOUS

## 2024-08-28 NOTE — PLAN OF CARE
Problem: Discharge Planning  Goal: Discharge to home or other facility with appropriate resources  8/27/2024 2141 by Olman Henson LPN  Outcome: Progressing  8/27/2024 1855 by Tamika Torrez RN  Outcome: Progressing     Problem: Skin/Tissue Integrity  Goal: Absence of new skin breakdown  Description: 1.  Monitor for areas of redness and/or skin breakdown  2.  Assess vascular access sites hourly  3.  Every 4-6 hours minimum:  Change oxygen saturation probe site  4.  Every 4-6 hours:  If on nasal continuous positive airway pressure, respiratory therapy assess nares and determine need for appliance change or resting period.  8/27/2024 2141 by Olman Henson LPN  Outcome: Progressing  8/27/2024 1855 by Tamika Torrez RN  Outcome: Progressing     Problem: ABCDS Injury Assessment  Goal: Absence of physical injury  8/27/2024 2141 by Olman Henson LPN  Outcome: Progressing  Flowsheets (Taken 8/27/2024 2138)  Absence of Physical Injury: Implement safety measures based on patient assessment  8/27/2024 1855 by Tamika Torrez RN  Outcome: Progressing     Problem: Pain  Goal: Verbalizes/displays adequate comfort level or baseline comfort level  8/27/2024 2141 by Olman Henson LPN  Outcome: Progressing  8/27/2024 1855 by Tamika Torrez RN  Outcome: Progressing     Problem: Safety - Adult  Goal: Free from fall injury  8/27/2024 2141 by Olman Henson LPN  Outcome: Progressing  Flowsheets (Taken 8/27/2024 2138)  Free From Fall Injury: Instruct family/caregiver on patient safety  8/27/2024 1855 by Tamika Torrez RN  Outcome: Progressing     Problem: Chronic Conditions and Co-morbidities  Goal: Patient's chronic conditions and co-morbidity symptoms are monitored and maintained or improved  8/27/2024 2141 by Olman Henson LPN  Outcome: Progressing  8/27/2024 1855 by Tamika Torrez RN  Outcome: Progressing     Problem: Anxiety  Goal: Will report anxiety at manageable levels  Description: INTERVENTIONS:  1.  RN  Outcome: Progressing  Goal: Maintain proper alignment of affected body part  8/27/2024 2141 by Olman Henson LPN  Outcome: Progressing  Flowsheets (Taken 8/27/2024 2038)  Maintain proper alignment of affected body part: Support and protect limb and body alignment per provider's orders  8/27/2024 1855 by Tamika Torrez RN  Outcome: Progressing  Goal: Return ADL status to a safe level of function  8/27/2024 2141 by Olman Henson LPN  Outcome: Progressing  Flowsheets (Taken 8/27/2024 2038)  Return ADL Status to a Safe Level of Function: Administer medication as ordered  8/27/2024 1855 by Tamika Torrez RN  Outcome: Progressing     Problem: Gastrointestinal - Adult  Goal: Minimal or absence of nausea and vomiting  8/27/2024 2141 by Olman Henson LPN  Outcome: Progressing  Flowsheets (Taken 8/27/2024 2038)  Minimal or absence of nausea and vomiting: Administer IV fluids as ordered to ensure adequate hydration  8/27/2024 1855 by Tamika Torrez RN  Outcome: Progressing  Goal: Maintains or returns to baseline bowel function  8/27/2024 2141 by Olman Henson LPN  Outcome: Progressing  Flowsheets (Taken 8/27/2024 2038)  Maintains or returns to baseline bowel function: Assess bowel function  8/27/2024 1855 by Tamika Torrez RN  Outcome: Progressing  Goal: Maintains adequate nutritional intake  8/27/2024 2141 by Olman Henson LPN  Outcome: Progressing  Flowsheets (Taken 8/27/2024 2038)  Maintains adequate nutritional intake: Monitor percentage of each meal consumed  8/27/2024 1855 by Tamika Torrez RN  Outcome: Progressing  Goal: Establish and maintain optimal ostomy function  8/27/2024 2141 by Olman Henson LPN  Outcome: Progressing  Flowsheets (Taken 8/27/2024 2038)  Establish and maintain optimal ostomy function: Administer IV fluids and TPN as ordered  8/27/2024 1855 by Tamika Torrez RN  Outcome: Progressing     Problem: Hematologic - Adult  Goal: Maintains hematologic stability  8/27/2024 2141  by Olman Henson LPN  Outcome: Progressing  8/27/2024 1855 by Tamika Torrez RN  Outcome: Progressing     Problem: Infection - Adult  Goal: Absence of infection at discharge  8/27/2024 2141 by Olman Henson LPN  Outcome: Progressing  8/27/2024 1855 by Tamika Torrez RN  Outcome: Progressing  Goal: Absence of infection during hospitalization  8/27/2024 2141 by Olman Henson LPN  Outcome: Progressing  8/27/2024 1855 by Tamika Torrez RN  Outcome: Progressing  Goal: Absence of fever/infection during anticipated neutropenic period  8/27/2024 2141 by Olman Henson LPN  Outcome: Progressing  8/27/2024 1855 by Tamika Torrez RN  Outcome: Progressing     Problem: Nutrition Deficit:  Goal: Optimize nutritional status  8/27/2024 2141 by Olman Henson LPN  Outcome: Progressing  8/27/2024 1855 by Tamika Torrez RN  Outcome: Progressing

## 2024-08-28 NOTE — PROGRESS NOTES
Daily Progress Note  Daniel Ramirez  MRN: [id number] LOS: 9    Admit Date: 8/19/2024 8/28/2024 7:55 AM    Subjective:          Chief Complaint:  No chief complaint on file.      Interval History:    Reviewed overnight events and nursing notes.   Status: No acute events overnight.  Pain is improving following ORIF as long as right leg is still.        Review of Systems   Constitutional:  Positive for activity change. Negative for fatigue and fever.   Respiratory:  Negative for cough and shortness of breath.    Cardiovascular:  Negative for chest pain.   Gastrointestinal:  Negative for nausea and vomiting.   Musculoskeletal:  Positive for arthralgias and gait problem.   Skin:  Negative for color change.   Neurological:  Positive for weakness.       DIET:  ADULT DIET; Regular; 3 carb choices (45 gm/meal); Low Fat/Low Chol/High Fiber/DONNY; Low Sodium (2 gm)  ADULT ORAL NUTRITION SUPPLEMENT; Dinner; Diabetic Oral Supplement    Medications:      sodium chloride      lactated ringers IV soln 100 mL/hr at 08/27/24 1253    sodium chloride        sodium chloride flush  5-40 mL IntraVENous 2 times per day    sennosides-docusate sodium  1 tablet Oral BID    apixaban  2.5 mg Oral BID    sodium chloride flush  5-40 mL IntraVENous 2 times per day    busPIRone  15 mg Oral BID    clopidogrel  75 mg Oral Daily    docusate sodium  100 mg Oral Nightly    DULoxetine  60 mg Oral Nightly    ferrous sulfate  325 mg Oral BID    gabapentin  100 mg Oral Nightly    levothyroxine  50 mcg Oral Daily    [Held by provider] metFORMIN  500 mg Oral BID WC    metoprolol succinate  25 mg Oral Daily    pantoprazole  40 mg Oral Daily    ranolazine  1,000 mg Oral BID    rosuvastatin  40 mg Oral Daily    ferric gluconate  250 mg IntraVENous Once per day on Monday Wednesday Friday    insulin glargine  5 Units SubCUTAneous Nightly       Data:     Code Status: Full Code    Family History   Problem Relation Age of Onset    Lung Cancer Mother 60    Lung Cancer  Rate and Rhythm: Normal rate and regular rhythm.      Pulses: Normal pulses.      Heart sounds: Normal heart sounds. No murmur heard.  Pulmonary:      Effort: Pulmonary effort is normal. No respiratory distress.      Breath sounds: Normal breath sounds.   Abdominal:      General: Abdomen is flat. Bowel sounds are normal. There is no distension.      Tenderness: There is no abdominal tenderness.   Musculoskeletal:      Comments: Right lower extremity in knee immobilizer, very decreased range of motion secondary to pain.   Skin:     Capillary Refill: Capillary refill takes less than 2 seconds.   Neurological:      General: No focal deficit present.      Mental Status: He is alert and oriented to person, place, and time.   Psychiatric:         Mood and Affect: Mood normal.           Assessment and Plan:     Primary Problem:  Closed nondisplaced fracture of condyle of right femur with routine healing    Hospital Problem list:  Principal Problem:    Closed nondisplaced fracture of condyle of right femur with routine healing  Resolved Problems:    * No resolved hospital problems. *      PMH:  Past Medical History:   Diagnosis Date    Abnormal nuclear stress test 10/22/2014    Arthritis     BiPAP (biphasic positive airway pressure) dependence     8cm to 20cm    Cerebrovascular disease     Chronic kidney disease, stage II (mild) 08/17/2016    Olman Gross M.D.    Cirrhosis (HCC)     Coronary artery disease 02/24/2011    Depression     Diabetes mellitus (HCC)     Encounter for wound care     PT SEES \"GRACY\" WITH DR. SAMUEL    Great toe pain     RT    Headache     Hyperlipemia 02/24/2011    Dr. Pederson follows lipids.    Hyperlipidemia     Hypertension     Liver disease     LONG TERM ANTICOAGULENT USE     Low blood pressure 11/19/2014    lymphostatic lymphoma     Nausea 11/19/2014    Non Hodgkin's lymphoma (HCC)     Obesity     Obstructive sleep apnea     AHI:  21.7 per PSG, 7/2017    Peptic ulcer disease 02/24/2011

## 2024-08-28 NOTE — PROGRESS NOTES
Physical Therapy    Name: Daniel Ramirez  MRN: [id number]  Date of service: 8/28/2024 08/28/24 1100   Restrictions/Precautions   Restrictions/Precautions Weight Bearing;Fall Risk;Surgical Protocols   Lower Extremity Weight Bearing Restrictions   Right Lower Extremity Weight Bearing Non Weight Bearing   Required Braces or Orthoses   Right Lower Extremity Brace Knee Immobilizer   Position Activity Restriction   Other position/activity restrictions No ROM to right knee 6 weeks, then plan is for progressive gentle ROM   General   Diagnosis R distal femur ORIF   General Comment   Comments Pt in bed   Subjective   Subjective agreed to therapy   Subjective   Subjective more awake, no complaints of pain   Observation/Palpation   Observation IV moved to upper R arm   Bed mobility   Supine to Sit Minimal assistance   Sit to Supine Moderate assistance   Transfers   Lateral Transfers Minimal Assistance;2 Person Assistance   Comment SB to drop arm chair   Ambulation   Comments not at this time, will progress   Short Term Goals   Time Frame for Short Term Goals 2 wks   Short Term Goal 1 supine to sit indep   Short Term Goal 2 bed to chair SBA with RW, NWB RLE   Short Term Goal 3 sit to stand CGA, NWB RLE   Short Term Goal 4 bed to chair with sliding board SBA   Activity Tolerance   Activity Tolerance Patient tolerated treatment well   Assessment   Assessment Pt improving with SB transfers. Able to assist more due to being awake SB transfer to droparm chair. Left pt in chair with all needs in reach. Changed linens for bed due to being soiled. Later transfered pt back to bed with SB and left with all needs in reach. OT present.   Discharge Recommendations Continue to assess pending progress;Patient would benefit from continued therapy after discharge   Physical Therapy Plan   General Plan 5-7 times per week   Therapy Duration 2 Weeks   Current Treatment Recommendations Balance training;Functional mobility training;Transfer  training;Endurance training;Safety education & training;Positioning;Equipment evaluation, education, & procurement;Patient/Caregiver education & training;Therapeutic activities   PT Plan of Care   Wednesday X   Safety Devices   Type of Devices Left in chair;Chair alarm in place;Call light within reach   PT Whiteboard Notes   Therapy Whiteboard RE 9/8  R dist femur ORIF, NWB x 12 wks   Recommendation   Requires PT Follow-Up Yes           Electronically signed by Ranjith Lindquist PTA on 8/28/2024 at 1:33 PM

## 2024-08-28 NOTE — PROGRESS NOTES
Occupational Therapy     08/28/24 1201   Subjective   Subjective Pt in bed upon arrival for therapy. Pt agreeable to participate. Wife and friend present.   Pain Assessment   Pain Assessment 0-10   Pain Level 4   Pain Location Leg   Pain Orientation Right   Pain Descriptors Aching;Discomfort;Sore   Functional Pain Assessment Prevents or interferes some active activities and ADLs   Pain Type Acute pain   Pain Frequency Continuous   Pain Onset On-going   Non-Pharmaceutical Pain Intervention(s) Ambulation/Increased Activity;Repositioned;Rest   Vitals   BP (!) 106/52   MAP (Calculated) 70   BP Location Left upper arm   BP Method Manual   Patient Position Sitting   Cognition   Overall Cognitive Status WFL   Orientation   Overall Orientation Status WFL   Bed Mobility Training   Bed Mobility Training Yes   Overall Level of Assistance Minimum assistance;Moderate assistance   Interventions Verbal cues;Tactile cues;Manual cues   Supine to Sit Minimum assistance;Moderate assistance   Scooting Moderate assistance   Transfer Training   Transfer Training Yes   Overall Level of Assistance Minimum assistance;Moderate assistance;Assist X2   Interventions Verbal cues;Tactile cues;Manual cues   Sliding Board Minimum assistance;Moderate assistance;Assist X2   Balance   Sitting Intact   Standing Impaired   Standing - Static Not tested   Standing - Dynamic Not tested   ADL   Feeding Supervision   Grooming Supervision;Setup   UE Bathing Setup;Stand by assistance   UE Bathing Skilled Clinical Factors seated   LE Bathing Moderate assistance   LE Bathing Skilled Clinical Factors seated   UE Dressing Setup;Stand by assistance   LE Dressing Setup;Stand by assistance   Toileting Moderate assistance;Maximum assistance   Functional Mobility Moderate assistance   Functional Mobility Skilled Clinical Factors sliding board   Assessment   Assessment Tx focsed on sitting EOB to perform UB dressing (gown change). Pt instructed on sliding board t/f with

## 2024-08-29 VITALS
HEART RATE: 91 BPM | DIASTOLIC BLOOD PRESSURE: 70 MMHG | WEIGHT: 220 LBS | SYSTOLIC BLOOD PRESSURE: 110 MMHG | OXYGEN SATURATION: 95 % | TEMPERATURE: 97.2 F | BODY MASS INDEX: 30.8 KG/M2 | HEIGHT: 71 IN | RESPIRATION RATE: 16 BRPM

## 2024-08-29 LAB
BASOPHILS # BLD: 0 K/UL (ref 0–0.2)
BASOPHILS NFR BLD: 0.1 % (ref 0–1)
EOSINOPHIL # BLD: 0.1 K/UL (ref 0–0.6)
EOSINOPHIL NFR BLD: 1.3 % (ref 0–5)
ERYTHROCYTE [DISTWIDTH] IN BLOOD BY AUTOMATED COUNT: 18.5 % (ref 11.5–14.5)
GLUCOSE BLD-MCNC: 125 MG/DL (ref 70–99)
GLUCOSE BLD-MCNC: 126 MG/DL (ref 70–99)
GLUCOSE BLD-MCNC: 138 MG/DL (ref 70–99)
GLUCOSE BLD-MCNC: 141 MG/DL (ref 70–99)
HCT VFR BLD AUTO: 25.6 % (ref 42–52)
HGB BLD-MCNC: 8.2 G/DL (ref 14–18)
IMM GRANULOCYTES # BLD: 0.1 K/UL
LYMPHOCYTES # BLD: 0.7 K/UL (ref 1.1–4.5)
LYMPHOCYTES NFR BLD: 9.3 % (ref 20–40)
MCH RBC QN AUTO: 38.5 PG (ref 27–31)
MCHC RBC AUTO-ENTMCNC: 32 G/DL (ref 33–37)
MCV RBC AUTO: 120.2 FL (ref 80–94)
MONOCYTES # BLD: 0.8 K/UL (ref 0–0.9)
MONOCYTES NFR BLD: 11.5 % (ref 0–10)
NEUTROPHILS # BLD: 5.5 K/UL (ref 1.5–7.5)
NEUTS SEG NFR BLD: 77.1 % (ref 50–65)
PERFORMED ON: ABNORMAL
PLATELET # BLD AUTO: 93 K/UL (ref 130–400)
PMV BLD AUTO: 10.3 FL (ref 9.4–12.4)
RBC # BLD AUTO: 2.13 M/UL (ref 4.7–6.1)
WBC # BLD AUTO: 7.1 K/UL (ref 4.8–10.8)

## 2024-08-29 PROCEDURE — 6370000000 HC RX 637 (ALT 250 FOR IP): Performed by: ORTHOPAEDIC SURGERY

## 2024-08-29 PROCEDURE — 97530 THERAPEUTIC ACTIVITIES: CPT

## 2024-08-29 PROCEDURE — 94760 N-INVAS EAR/PLS OXIMETRY 1: CPT

## 2024-08-29 PROCEDURE — 36415 COLL VENOUS BLD VENIPUNCTURE: CPT

## 2024-08-29 PROCEDURE — 2580000003 HC RX 258: Performed by: FAMILY MEDICINE

## 2024-08-29 PROCEDURE — 82962 GLUCOSE BLOOD TEST: CPT

## 2024-08-29 PROCEDURE — 85025 COMPLETE CBC W/AUTO DIFF WBC: CPT

## 2024-08-29 RX ORDER — 0.9 % SODIUM CHLORIDE 0.9 %
500 INTRAVENOUS SOLUTION INTRAVENOUS ONCE
Status: COMPLETED | OUTPATIENT
Start: 2024-08-29 | End: 2024-08-29

## 2024-08-29 RX ORDER — RIVAROXABAN 10 MG/1
10 TABLET, FILM COATED ORAL
Qty: 30 TABLET | Refills: 0 | Status: SHIPPED | OUTPATIENT
Start: 2024-08-29 | End: 2024-09-28

## 2024-08-29 RX ORDER — HYDROCODONE BITARTRATE AND ACETAMINOPHEN 10; 325 MG/1; MG/1
1 TABLET ORAL EVERY 6 HOURS PRN
Qty: 12 TABLET | Refills: 0 | Status: SHIPPED | OUTPATIENT
Start: 2024-08-29 | End: 2024-09-01

## 2024-08-29 RX ORDER — LACTULOSE 10 G/15ML
20 SOLUTION ORAL 2 TIMES DAILY PRN
Qty: 900 ML | Refills: 1 | Status: SHIPPED | OUTPATIENT
Start: 2024-08-29 | End: 2024-09-28

## 2024-08-29 RX ADMIN — SODIUM CHLORIDE 500 ML: 9 INJECTION, SOLUTION INTRAVENOUS at 09:52

## 2024-08-29 RX ADMIN — RANOLAZINE 1000 MG: 500 TABLET, FILM COATED, EXTENDED RELEASE ORAL at 09:56

## 2024-08-29 RX ADMIN — ROSUVASTATIN CALCIUM 40 MG: 20 TABLET, COATED ORAL at 09:56

## 2024-08-29 RX ADMIN — FERROUS SULFATE TAB 325 MG (65 MG ELEMENTAL FE) 325 MG: 325 (65 FE) TAB at 09:56

## 2024-08-29 RX ADMIN — APIXABAN 2.5 MG: 2.5 TABLET, FILM COATED ORAL at 09:56

## 2024-08-29 RX ADMIN — SENNOSIDES AND DOCUSATE SODIUM 1 TABLET: 50; 8.6 TABLET ORAL at 09:56

## 2024-08-29 RX ADMIN — CLOPIDOGREL BISULFATE 75 MG: 75 TABLET ORAL at 09:56

## 2024-08-29 RX ADMIN — LEVOTHYROXINE SODIUM 50 MCG: 50 TABLET ORAL at 05:25

## 2024-08-29 RX ADMIN — PANTOPRAZOLE SODIUM 40 MG: 40 TABLET, DELAYED RELEASE ORAL at 09:56

## 2024-08-29 RX ADMIN — BUSPIRONE HYDROCHLORIDE 15 MG: 15 TABLET ORAL at 09:56

## 2024-08-29 ASSESSMENT — PAIN DESCRIPTION - ORIENTATION
ORIENTATION: RIGHT

## 2024-08-29 ASSESSMENT — PAIN SCALES - GENERAL
PAINLEVEL_OUTOF10: 4
PAINLEVEL_OUTOF10: 4
PAINLEVEL_OUTOF10: 3

## 2024-08-29 ASSESSMENT — PAIN DESCRIPTION - PAIN TYPE
TYPE: ACUTE PAIN

## 2024-08-29 ASSESSMENT — PAIN DESCRIPTION - FREQUENCY
FREQUENCY: CONTINUOUS

## 2024-08-29 ASSESSMENT — ENCOUNTER SYMPTOMS
COLOR CHANGE: 0
VOMITING: 0
COUGH: 0
SHORTNESS OF BREATH: 0
NAUSEA: 0

## 2024-08-29 ASSESSMENT — PAIN DESCRIPTION - LOCATION
LOCATION: KNEE

## 2024-08-29 ASSESSMENT — PAIN - FUNCTIONAL ASSESSMENT
PAIN_FUNCTIONAL_ASSESSMENT: PREVENTS OR INTERFERES SOME ACTIVE ACTIVITIES AND ADLS

## 2024-08-29 ASSESSMENT — PAIN DESCRIPTION - DESCRIPTORS
DESCRIPTORS: ACHING
DESCRIPTORS: ACHING;DISCOMFORT
DESCRIPTORS: ACHING;DISCOMFORT

## 2024-08-29 ASSESSMENT — PAIN DESCRIPTION - ONSET: ONSET: ON-GOING

## 2024-08-29 NOTE — PROGRESS NOTES
PT ACUTE  Treatment Session          Pt seen on [facility] nursing unit.                                                Frequency Comments: MTThF (bring rehab aide)    RECOMMENDATIONS FOR DISCHARGE:  Recommendation for Discharge: PT WI: Sub-acute nursing home (12/23/19 1243)                                                                                                                 Admitting complaint: Intracranial hemorrhage (CMS/HCC) [I62.9]                                     Precautions  Seizure Precautions: Yes (12/23/19 1243)  Other Precautions: fall risk (12/23/19 1243)    SUBJECTIVE:    Subjective: Pt. agreeable to session.  (12/23/19 1243)  Subjective/Objective Comments: okay to see pt. Marianne RN  (12/23/19 1243)    OBJECTIVE:  Basic Lines: IV;G-Tube (12/23/19 1243)  Complex Lines: VEEG (12/23/19 1243)  Safety Measures: Alarms (12/23/19 1243)    RN reported Tapia Fall Scale Score: 95    ASSESSMENT:   Pt overall mobility is maximal assist with going to and from sit to supine due to overall deconditioning. Pt progressed in sitting balance activities at the eob. Patient limited today by fatigue. Pt will benefit from continued PT to work on transfers, bed mobility and theapeutic exer. to allow for maximal mobility. Recommended discharge setting of [facility]. Patient returned to supine 30 degrees post session, alarm set, and call light within reach.              EDUCATION:   On this date, the patient was educated on importance of therapy.    The response to education was: Needs reinforcement    PT Identified Barriers to Discharge: decreased strength and endurance     PLAN:   Continue skilled PT, including the following Treatment/Interventions: Functional transfer training (12/23/19 1243)   Frequency Comments: MTThF (bring rehab aide) (12/23/19 1243)    Treatment Plan for Next Session: bring rehab aide to progress bed mobility, sitting balance, transfers and standing balance with STEVE COATES       Physical Therapy  Name: Daniel Ramirez  MRN:  [id number]  Date of service:  8/29/2024 08/29/24 1416   Restrictions/Precautions   Restrictions/Precautions Weight Bearing;Fall Risk;Surgical Protocols   Required Braces or Orthoses? Yes   Lower Extremity Weight Bearing Restrictions   Right Lower Extremity Weight Bearing Non Weight Bearing   Required Braces or Orthoses   Right Lower Extremity Brace Knee Immobilizer   Position Activity Restriction   Other position/activity restrictions No ROM to right knee 6 weeks, then plan is for progressive gentle ROM   Subjective   Subjective Pt agreeable to get back to bed.   Pain Assessment   Pain Assessment 0-10   Pain Level 3   Pain Location Knee   Pain Orientation Right   Pain Descriptors Aching;Discomfort   Functional Pain Assessment Prevents or interferes some active activities and ADLs   Pain Type Acute pain   Pain Frequency Continuous   Non-Pharmaceutical Pain Intervention(s) Ambulation/Increased Activity;Repositioned;Rest   Response to Pain Intervention Patient satisfied   Bed Mobility   Sit to Supine Minimal assistance   Transfers   Lateral Transfers Minimal Assistance;Moderate Assistance  (1+1; sliding board transfer chair to bed)   Patient Goals    Patient Goals  go home   Short Term Goals   Time Frame for Short Term Goals 2 wks   Short Term Goal 1 supine to sit indep   Short Term Goal 2 bed to chair SBA with RW, NWB RLE   Short Term Goal 3 sit to stand CGA, NWB RLE   Short Term Goal 4 bed to chair with sliding board SBA   Conditions Requiring Skilled Therapeutic Intervention   Body Structures, Functions, Activity Limitations Requiring Skilled Therapeutic Intervention Decreased functional mobility ;Decreased ADL status;Decreased ROM;Decreased safe awareness;Decreased strength;Decreased balance;Decreased sensation;Increased pain;Decreased posture   Assessment Pt continued to work on sliding board transfer. Has more difficulty going uphill and discomfort from cloth pad      RECOMMENDATIONS FOR DISCHARGE:  Recommendation for Discharge: PT WI: Sub-acute nursing home (12/23/19 1243)    PT/OT Mobility Equipment for Discharge: per chart review, has 2ww/4ww (12/23/19 1243)  PT/OT ADL Equipment for Discharge: continue to assess (12/23/19 1243)     Assistance needed when returning home:   Not discussed at this time due to discharge plan/needs not established.  Will continue to address as hospital stay progresses.       ICU Mobility Assesment (PERME):       Last 24 hours of Functional Data  Bed Mobility   Bed Mobility  Rolling to the Right: Maximal Assist (Max) (12/23/19 1243)  Rolling to the Left: Maximal Assist (Max) (12/23/19 1243)  Boosting: Total Assist - Dependent (12/23/19 1243)  Supine to Sit: Maximal Assist (Max) (12/23/19 1243)  Sit to Supine: Maximal Assist (Max) (12/23/19 1243)  Bed Mobility Comments: assist at trunk and LEs  (12/23/19 1243)    Transfers         Gait   ,      Stairs          Neuromuscular Re-education       Balance  Balance  Sitting - Static: Moderate Assist (Mod) (12/23/19 1243)    Wheelchair Mobility       Patient's Personal Goal: Son states goal is to get patient stronger and walking again. (12/18/19 1045)    Therapy Goals:    Goals  Short Term Goals to Be Reviewed On: 12/30/19 (12/23/19 1243)  Short Term Goals = Discharge Goals: No (12/23/19 1243)  Goal Agreement: Patient agrees with goals and treatment plan;Family/significant other/caregiver agrees (12/23/19 1243)  Bed Mobility Short Term Goal: Max assist x1 for supine to sit and reverse (12/23/19 1243)  Bed Mobility Discharge Goal: modified independent with HOB flat (12/23/19 1243)  Bed Mobility Discharge Goal Progress: Outcome not met, continue to monitor (12/23/19 1243)  Transfer Short Term Goal: Pt will perform basic transfers with YESSENIA STEDY and moderate assist. (12/23/19 1243)  Transfer Discharge Goal: modified independent with least restrictive device (12/23/19 1243)  Transfer Discharge Goal  for sliding hindered scooting. Was able to reposition self in bed with cueing. All needs in reach.   Activity Tolerance   Activity Tolerance Patient tolerated treatment well   PT Plan of Care   Thursday X   Safety Devices   Type of Devices Call light within reach;Bed alarm in place;Left in bed         Electronically signed by Amarilis Acuna PTA on 8/29/2024 at 2:22 PM     Progress: Outcome not met, plan adjusted (12/23/19 1243)  Ambulation Short Term Goal: tolerate assessment - set further as appropriate (12/23/19 1243)  Ambulation Discharge Goal: TBD (12/23/19 1243)  Ambulation Discharge Goal Progress: Outcome not met, continue to monitor (12/23/19 1243)  Other Short Term Goal 1: Sitting balance 15 minutes with moderate assist consistently (12/23/19 1243)  Other Discharge Goal 1: Independent sitting EOB (12/23/19 1243)  Other Discharge Goal 1 Progress: Outcome not met, continue to monitor (12/23/19 1243)  Other Short Term Goal 2: Pt to tolerate x10 reps of supine ther ex PROM (12/23/19 1243)  Other Discharge Goal 2: Pt to perform x10 jb LE ROM exercsies (12/23/19 1243)  Other Discharge Goal 2 Progress: Outcome not met, continue to monitor (12/23/19 1243)  Goals Comments: Goals revised for upcoming week (12/23/19 1243)        PT Time Spent: 40 minutes (12/23/19 1243)    See PT flowsheet for full details regarding the PT therapy provided.

## 2024-08-29 NOTE — CARE COORDINATION
Patient/spouse have accepted bed offer from [facility]. Patient's insurance has approved. Patient can discharge 08/29 if medically stable.  [facility]   [facility]  Electronically signed by Brian Rico RN, BSN on 8/29/2024 at 10:51 AM

## 2024-08-29 NOTE — PROGRESS NOTES
Attempted to call report to [facility] at 1600. Nurse stated they had not received the discharge summary yet and could not take report. I confirmed the correct fax number and was asked to call back.

## 2024-08-29 NOTE — PROGRESS NOTES
Attempted to call report to [facility] a 2nd time and was informed that the Gabapentin was not escribed and they could not take report until this was done.

## 2024-08-29 NOTE — PROGRESS NOTES
Daily Progress Note  Daniel Ramirez  MRN: [id number] LOS: 10    Admit Date: 8/19/2024 8/29/2024 9:04 AM    Subjective:          Chief Complaint:  Right leg pain, weakness    Interval History:    Reviewed overnight events and nursing notes.   Status: No acute events overnight.  Pain is improving following ORIF as long as right leg is still.        Review of Systems   Constitutional:  Positive for activity change. Negative for fatigue and fever.   Respiratory:  Negative for cough and shortness of breath.    Cardiovascular:  Negative for chest pain.   Gastrointestinal:  Negative for nausea and vomiting.   Musculoskeletal:  Positive for arthralgias and gait problem.   Skin:  Negative for color change.   Neurological:  Positive for weakness.       DIET:  ADULT DIET; Regular; 3 carb choices (45 gm/meal); Low Fat/Low Chol/High Fiber/DONNY; Low Sodium (2 gm)  ADULT ORAL NUTRITION SUPPLEMENT; Dinner; Diabetic Oral Supplement    Medications:      sodium chloride      lactated ringers IV soln 100 mL/hr at 08/28/24 0912      sodium chloride flush  5-40 mL IntraVENous 2 times per day    sennosides-docusate sodium  1 tablet Oral BID    apixaban  2.5 mg Oral BID    busPIRone  15 mg Oral BID    clopidogrel  75 mg Oral Daily    docusate sodium  100 mg Oral Nightly    DULoxetine  60 mg Oral Nightly    ferrous sulfate  325 mg Oral BID    gabapentin  100 mg Oral Nightly    levothyroxine  50 mcg Oral Daily    [Held by provider] metFORMIN  500 mg Oral BID WC    metoprolol succinate  25 mg Oral Daily    pantoprazole  40 mg Oral Daily    ranolazine  1,000 mg Oral BID    rosuvastatin  40 mg Oral Daily    ferric gluconate  250 mg IntraVENous Once per day on Monday Wednesday Friday    insulin glargine  5 Units SubCUTAneous Nightly       Data:     Code Status: Full Code    Family History   Problem Relation Age of Onset    Lung Cancer Mother 60    Lung Cancer Sister 80    Cancer Sister     Heart Disease Other     Colon Cancer Neg Hx     Colon  Work/School: no     Feels Threatened by Someone: no     Does Anyone Try to Keep You From Having Contact with Others or Doing Things Outside Your Home?: no     Physical Signs of Abuse Present: no   Housing Stability: Low Risk  (2024)    Housing Stability Vital Sign     Unable to Pay for Housing in the Last Year: No     Number of Times Moved in the Last Year: 1     Homeless in the Last Year: No       Labs:  CBC:   Recent Labs     24  0306 24  0335 24  0240   WBC 5.4 5.7 7.1   HGB 7.7* 7.6* 8.2*   PLT 90* 90* 93*     BMP:    No results for input(s): \"NA\", \"K\", \"CL\", \"CO2\", \"BUN\", \"CREATININE\", \"GLUCOSE\" in the last 72 hours.      Hepatic:   No results for input(s): \"AST\", \"ALT\", \"BILITOT\", \"ALKPHOS\" in the last 72 hours.    Invalid input(s): \"ALB\"    Lipids: No results for input(s): \"CHOL\", \"HDL\" in the last 72 hours.    Invalid input(s): \"LDLCALCU\"  INR:   No results for input(s): \"INR\" in the last 72 hours.      Objective:     Vitals: BP (!) 97/53   Pulse 85   Temp (!) 96.6 °F (35.9 °C)   Resp 18   Ht 1.803 m (5' 10.98\")   Wt 99.8 kg (220 lb)   SpO2 96%   BMI 30.70 kg/m²    Intake/Output Summary (Last 24 hours) at 2024 0904  Last data filed at 2024 0432  Gross per 24 hour   Intake 540 ml   Output 2625 ml   Net -2085 ml    Temp (24hrs), Av.4 °F (36.3 °C), Min:96.6 °F (35.9 °C), Max:97.7 °F (36.5 °C)    Glucose:  Recent Labs     24  1106 24  1601 24  1914 24  0736   POCGLU 136* 139* 220* 126*       Physical Exam  Vitals reviewed.   Constitutional:       Appearance: Normal appearance. He is not ill-appearing.   HENT:      Head: Normocephalic and atraumatic.   Eyes:      General:         Right eye: No discharge.         Left eye: No discharge.      Extraocular Movements: Extraocular movements intact.      Conjunctiva/sclera: Conjunctivae normal.   Cardiovascular:      Rate and Rhythm: Normal rate and regular rhythm.      Pulses: Normal pulses.      Heart

## 2024-08-29 NOTE — DISCHARGE INSTR - DIET
Good nutrition is important when healing from an illness, injury, or surgery.  Follow any nutrition recommendations given to you during your hospital stay.   If you were given an oral nutrition supplement while in the hospital, continue to take this supplement at home.  You can take it with meals, in-between meals, and/or before bedtime. These supplements can be purchased at most local grocery stores, pharmacies, and [employer]-stores.   If you have any questions about your diet or nutrition, call the hospital and ask for the dietitian.    ADULT DIET; Regular; 3 carb choices (45 gm/meal); Low Fat/Low Chol/High Fiber/DONNY; Low Sodium (2 gm)   ADULT ORAL NUTRITION SUPPLEMENT; Dinner; Diabetic Oral Supplement

## 2024-08-29 NOTE — DISCHARGE SUMMARY
Discharge Summary    Patient ID: Daniel Ramirez  MRN: [id number]     Acct:  [id number]       Patient's PCP: Jasvir Chappell MD    Admit Date: 8/19/2024     Discharge Date:   8/29/2024      Admitting Physician: Jasvir Chappell MD    Discharge Physician: Jasvir Chappell MD     Active Discharge Diagnoses:  Closed nondisplaced fracture of condyle of right femur with routine healing- MRI right knee that redemonstrated the femur fracture, displacement and comminution of the fracture noted -s/p ORIF 8/24/24 per Dr. Rodrigez.     DMII  Anemia of CK  Thrombocytopenia  Hypokalemia    Primary Problem  Closed nondisplaced fracture of condyle of right femur with routine healing  Hospital Problems  Active Hospital Problems    Diagnosis Date Noted    Closed nondisplaced fracture of condyle of right femur with routine healing [S72.414D] 08/19/2024       The patient was seen and examined on day of discharge and this discharge summary is in conjunction with the daily progress note from day of discharge.    Code Status:  Full Code    Hospital Course: The patient is a 69 y.o. male with Right knee pain after falling at home.  He has complex medical history with chronic anemia, anxiety, non-Hodgkin's lymphoma, type 2 diabetes on insulin, hypertension, hyperlipidemia, cirrhosis, CAD.  He presented to [facility] ER on 8/14/2024 where imaging demonstrated a nondisplaced fracture of the medial aspect of the distal femur approximately 7 mm from the femoral component of the right TKA.  Unfortunately, the stat read in the emergency department read as \"no acute fracture\" and he was placed in a knee immobilizer and sent home.  He was seen in the office on 8/19/2024 due to persistent knee pain and was directly admitted to [facility] for pain control, orthopedic consultation, likely need for inpatient rehab as he lives at home with his elderly wife who is unable to assist him.     MRI right knee that redemonstrated the femur fracture, displacement and  output EVERY 8 HOURS    Initiate Oxygen Therapy Protocol PRN    Pulse Oximetry Spot Check PRN    CBC with Auto Differential Daily (Lab)    Initiate Oxygen Therapy Protocol PRN    Incentive spirometry EVERY 2 HOURS WHILE AWAKE    Ice therapy PRN    Once urinary catheter removed: Bladder scan (see instructions below) PRN    Once urinary catheter removed: Straight cath (see instructions below) PRN    POCT Glucose PRN        Diet: ADULT DIET; Regular; 3 carb choices (45 gm/meal); Low Fat/Low Chol/High Fiber/DONNY; Low Sodium (2 gm)  ADULT ORAL NUTRITION SUPPLEMENT; Dinner; Diabetic Oral Supplement    Discharge Medications:      Medication List        START taking these medications      HYDROcodone-acetaminophen  MG per tablet  Commonly known as: NORCO  Take 1 tablet by mouth every 6 hours as needed for Pain for up to 3 days. Max Daily Amount: 4 tablets     lactulose 10 GM/15ML solution  Commonly known as: CHRONULAC  Take 30 mLs by mouth 2 times daily as needed (Constipation)     metoprolol succinate 50 MG extended release tablet  Commonly known as: TOPROL XL  Take 0.5 tablets by mouth daily     Xarelto 10 MG Tabs tablet  Generic drug: rivaroxaban  Take 1 tablet by mouth daily (with breakfast)            CONTINUE taking these medications      acetaminophen 500 MG tablet  Commonly known as: TYLENOL     b complex vitamins capsule     busPIRone 15 MG tablet  Commonly known as: BUSPAR     docusate sodium 100 MG capsule  Commonly known as: COLACE     DULoxetine 60 MG extended release capsule  Commonly known as: CYMBALTA     ferrous sulfate 325 (65 Fe) MG tablet  Commonly known as: IRON 325     gabapentin 100 MG capsule  Commonly known as: NEURONTIN     Icosapent Ethyl 1 g Caps capsule  Commonly known as: VASCEPA     Jardiance 25 MG tablet  Generic drug: empagliflozin     levothyroxine 50 MCG tablet  Commonly known as: SYNTHROID     metFORMIN 500 MG tablet  Commonly known as: GLUCOPHAGE  Take 1 tablet by mouth 2 times

## 2024-08-29 NOTE — PROGRESS NOTES
Occupational Therapy     08/29/24 1300   Subjective   Subjective Pt in bed upon arrival for therapy. Pt agreeable to participate.   Pain Assessment   Pain Assessment 0-10   Pain Level 4   Pain Location Knee   Pain Orientation Right   Pain Descriptors Aching;Discomfort   Functional Pain Assessment Prevents or interferes some active activities and ADLs   Pain Type Acute pain   Pain Frequency Continuous   Pain Onset On-going   Non-Pharmaceutical Pain Intervention(s) Ambulation/Increased Activity;Repositioned;Rest   Response to Pain Intervention Patient satisfied   Cognition   Overall Cognitive Status WFL   Orientation   Overall Orientation Status WFL   Bed Mobility Training   Bed Mobility Training Yes   Overall Level of Assistance Minimum assistance;Moderate assistance   Interventions Verbal cues;Tactile cues;Manual cues   Supine to Sit Minimum assistance;Moderate assistance   Scooting Minimum assistance;Moderate assistance   Transfer Training   Transfer Training Yes   Overall Level of Assistance Minimum assistance;Moderate assistance;Assist X2   Interventions Verbal cues;Tactile cues;Manual cues   Sliding Board Minimum assistance;Moderate assistance;Assist X2   Balance   Sitting Intact   Standing Impaired   Standing - Static Not tested   Standing - Dynamic Not tested   ADL   Feeding Setup   Grooming Setup   UE Bathing Setup;Stand by assistance   LE Bathing Moderate assistance   LE Bathing Skilled Clinical Factors seated   UE Dressing Setup;Stand by assistance   LE Dressing Setup;Stand by assistance   LE Dressing Skilled Clinical Factors bed level   Toileting Moderate assistance;Maximum assistance   Toileting Skilled Clinical Factors bed level   Functional Mobility Moderate assistance   Functional Mobility Skilled Clinical Factors sliding board   Assessment   Assessment Tx focused on sitting EOB, scooting at bedside and attempting STS on LLE with bedrails to improve strength and (I) with t/fs. Pt was unable to get hips

## 2024-08-29 NOTE — DISCHARGE INSTR - ACTIVITY
Nonweightbearing for 12 weeks to affected extremity with no range of motion to the knee for the first 6 weeks  Immobilizer on at all times during transfers and walking

## 2024-08-29 NOTE — PROGRESS NOTES
Physical Therapy  Name: Daniel Ramirez  MRN:  [id number]  Date of service:  8/29/2024 08/29/24 1149   Restrictions/Precautions   Restrictions/Precautions Weight Bearing;Fall Risk;Surgical Protocols   Required Braces or Orthoses? Yes   Lower Extremity Weight Bearing Restrictions   Right Lower Extremity Weight Bearing Non Weight Bearing   Required Braces or Orthoses   Right Lower Extremity Brace Knee Immobilizer   Position Activity Restriction   Other position/activity restrictions No ROM to right knee 6 weeks, then plan is for progressive gentle ROM   Subjective   Subjective Pt agreed to therapy.   Pain Assessment   Pain Assessment 0-10   Pain Level 4   Pain Location Knee   Pain Orientation Right   Pain Descriptors Aching   Functional Pain Assessment Prevents or interferes some active activities and ADLs   Pain Type Acute pain   Pain Frequency Continuous   Non-Pharmaceutical Pain Intervention(s) Ambulation/Increased Activity;Repositioned;Rest   Response to Pain Intervention Patient satisfied   Bed Mobility   Supine to Sit Minimal assistance   Comment sat EOB several minutes, able to lean L/R laterally to place pad   Transfers   Lateral Transfers Minimal Assistance  (sliding board transfer bed to chair)   Comment attempted a partial stand with bed rails but pt scooted fwd instead of coming up, not able to clear hips from bed   Other Activities   Comment discussed with pt LLE exercises and trying to maintain neutral alignment at rest. Pts LLE in ER at hip and foot moves across midline in sitting, difficulty getting LE into good position to push up to stand   Patient Goals    Patient Goals  go home   Short Term Goals   Time Frame for Short Term Goals 2 wks   Short Term Goal 1 supine to sit indep   Short Term Goal 2 bed to chair SBA with RW, NWB RLE   Short Term Goal 3 sit to stand CGA, NWB RLE   Short Term Goal 4 bed to chair with sliding board SBA   Conditions Requiring Skilled Therapeutic Intervention   Body

## 2024-08-29 NOTE — CARE COORDINATION
2nd IMM Letter given to patient. Reviewed, discussed, answered questions, and signed by patient. Declined to stay 4 hours prior to discharge. Signed copy placed in patient's chart.     08/29/24 1432   IMM Letter   IMM Letter given to Patient/Family/Significant other/Guardian/POA/by: given to patient by Brian Rico RN BSN    IMM Letter date given: 08/29/24   IMM Letter time given: 1430     Electronically signed by Brian Rico RN, BSN on 8/29/2024 at 2:34 PM

## 2024-08-29 NOTE — PROGRESS NOTES
24 hour orders verified.    Electronically signed by Sachin Clemente RN on 8/29/2024 at 12:56 AM

## 2024-08-30 NOTE — PROGRESS NOTES
Pt IV removed, left with EMS transport at 2029 with all belongings.    Electronically signed by Sachin Clemente RN on 8/29/2024 at 11:22 PM

## 2024-09-06 NOTE — PROGRESS NOTES
Physician Progress Note      PATIENT:               MARJORIE ERAZO  CSN #:                  [id number]  :                       1955  ADMIT DATE:       2024 4:59 PM  DISCH DATE:        2024 8:29 PM  RESPONDING  PROVIDER #:        Jasvir BENOIT MD          QUERY TEXT:    Pt admitted with Right femur fracture. Pt noted to have confusion in [facility]   medicine PN . If possible, please document in the progress notes and   discharge summary if you are evaluating and / or treating any of the   following:    The medical record reflects the following:  Risk Factors: s/p ORIF, Advance age    Clinical Indicators:  Family medicine PN  \"Confusion: Suspect due to   hypotension.  MRI brain within normal limits.  Improved this morning\".    [facility] medicine PN  \"Confusion: Suspect due to hypotension and Percocet.    MRI brain within normal limits.  Drastic improvement with switching pain   medication to [place] from Percocet\".    Treatment: 0.9 % sodium chloride infusion, switching pain medication to [place]   from Percocet    Thank you  DARIN Vang CDS  Options provided:  -- Drug-induced encephalopathy due to Percocet  -- Encephalopathy due to Hypotension and Percocet  -- Toxic encephalopathy  -- Other - I will add my own diagnosis  -- Disagree - Not applicable / Not valid  -- Disagree - Clinically unable to determine / Unknown  -- Refer to Clinical Documentation Reviewer    PROVIDER RESPONSE TEXT:    This patient has encephalopathy due to Hypotension and Percocet.    Query created by: Ernesto Maddox on 9/3/2024 1:58 AM      Electronically signed by:  Jasvir BENOIT MD 2024 12:33 AM

## 2024-09-09 ENCOUNTER — HOME HEALTH ADMISSION (OUTPATIENT)
Dept: HOME HEALTH SERVICES | Facility: HOME HEALTHCARE | Age: 69
End: 2024-09-09
Payer: MEDICARE

## 2024-10-03 ENCOUNTER — TELEPHONE (OUTPATIENT)
Age: 69
End: 2024-10-03

## 2024-10-03 DIAGNOSIS — S72.402D CLOSED FRACTURE OF DISTAL END OF LEFT FEMUR WITH ROUTINE HEALING, UNSPECIFIED FRACTURE MORPHOLOGY, SUBSEQUENT ENCOUNTER: Primary | ICD-10-CM

## 2024-10-03 NOTE — TELEPHONE ENCOUNTER
Patient is calling regarding the following:  Jose/Juany  requesting a new therapy provider. PT needs order to go to different Therapy [phone]

## 2024-10-14 ENCOUNTER — TELEPHONE (OUTPATIENT)
Age: 69
End: 2024-10-14

## 2024-10-14 DIAGNOSIS — M25.561 RIGHT KNEE PAIN, UNSPECIFIED CHRONICITY: Primary | ICD-10-CM

## 2024-10-14 ASSESSMENT — ENCOUNTER SYMPTOMS
GASTROINTESTINAL NEGATIVE: 1
RESPIRATORY NEGATIVE: 1
EYES NEGATIVE: 1
ALLERGIC/IMMUNOLOGIC NEGATIVE: 1

## 2024-10-14 NOTE — TELEPHONE ENCOUNTER
[facility] is calling on behalf of pt. The orders that were sent has the wrong body part. The correct body part needs to be rt knee/femur. The order also needs an ICD-10 Code.

## 2024-10-14 NOTE — PROGRESS NOTES
Daniel Ramirez (:  1955) is a 69 y.o. male,Established patient, here for evaluation of the following chief complaint(s):  Rt knee ORIF 24 (Knee doing well )         Assessment & Plan  Closed displaced supracondylar fracture of distal end of right femur without intracondylar extension with routine healing, subsequent encounter     At this point, the patient may discontinue the use of his brace and focus on range of motion restoration.  I do want him to remain nonweightbearing currently.    PLAN:  I will see the patient back in 6 weeks.  At that point we will initiate weightbearing and formal physical therapy.       S/P TKR (total knee replacement), right              Return in about 6 weeks (around 2024).       Subjective   The patient is a pleasant 69-year-old gentleman who now presents 6 weeks status post ORIF of the displaced periprosthetic right distal femur fracture above a total knee construct performed by Dr. Cisneros nearly a decade ago.  He underwent MIPO submuscular plating.  He has been held in extension using a knee immobilizer while nonweightbearing since his fixation.        Review of Systems   Constitutional: Negative.    HENT: Negative.     Eyes: Negative.    Respiratory: Negative.     Cardiovascular: Negative.    Gastrointestinal: Negative.    Skin: Negative.    Allergic/Immunologic: Negative.    Neurological: Negative.    Psychiatric/Behavioral: Negative.            Objective   Physical Exam   On physical examination, the patient's knee immobilizer is removed.  His lateral incision is benign in appearance and well-healing with no signs of redness, streaking, dehiscence or discharge.  He can maintain a straight leg raise.  He tolerates knee flexion actively up to 60 degrees.  There is no laxity with varus or valgus stressing.  His patellofemoral joint tracks appropriately.  Muscle compartments are soft and compressible.  Sensation is intact distally.  Brisk capillary refill is

## 2024-10-15 ENCOUNTER — OFFICE VISIT (OUTPATIENT)
Age: 69
End: 2024-10-15

## 2024-10-15 VITALS — HEIGHT: 71 IN | WEIGHT: 185 LBS | BODY MASS INDEX: 25.9 KG/M2

## 2024-10-15 DIAGNOSIS — S72.451D CLOSED DISPLACED SUPRACONDYLAR FRACTURE OF DISTAL END OF RIGHT FEMUR WITHOUT INTRACONDYLAR EXTENSION WITH ROUTINE HEALING, SUBSEQUENT ENCOUNTER: Primary | ICD-10-CM

## 2024-10-15 DIAGNOSIS — Z96.651 S/P TKR (TOTAL KNEE REPLACEMENT), RIGHT: ICD-10-CM

## 2024-10-15 PROBLEM — S72.451A CLOSED DISPLACED SUPRACONDYLAR FRACTURE WITHOUT INTRACONDYLAR EXTENSION OF LOWER END OF RIGHT FEMUR (HCC): Status: ACTIVE | Noted: 2024-10-15

## 2024-10-15 PROCEDURE — 99024 POSTOP FOLLOW-UP VISIT: CPT | Performed by: ORTHOPAEDIC SURGERY

## 2024-10-15 RX ORDER — ALPRAZOLAM 0.25 MG
0.25 TABLET ORAL NIGHTLY PRN
COMMUNITY

## 2024-10-15 NOTE — ASSESSMENT & PLAN NOTE
At this point, the patient may discontinue the use of his brace and focus on range of motion restoration.  I do want him to remain nonweightbearing currently.    PLAN:  I will see the patient back in 6 weeks.  At that point we will initiate weightbearing and formal physical therapy.

## 2024-11-05 NOTE — PROGRESS NOTES
[facility] - PODIATRY    Today's Date: 11/08/24    Patient Name: Franko Lucero  MRN: [id number]  CSN: [id number]  PCP: Figueroa Chaves MD  Referring Provider: No ref. provider found    SUBJECTIVE     Chief Complaint   Patient presents with    Follow-up     Figueroa Chaves MD-06/04/2024- 2 MO FU FOR FOOT CARE CLINIC DIABETIC- pt states feet doing good, right foot under where 4th toe was removed there is a poss hole that concerned about. Feet have been peeling a lot lately. Broke right femur above the knee recently and dealing with that- pt denies pain     Diabetes     146mg/dl BG this am      HPI: Franko Lucero, a 69 y.o.male, comes to clinic as a(n) established patient presenting for diabetic foot exam, complaining of foot pain and complaining of thickened, irregular toenails, and calluses . Patient has h/o anemia, anxiety, arthritis, BPH, CAD, CA, DM with neuropathy, Iliac artery aneurysm, HTN, HLD . Relates that he recently fractured his right femur. Patient is IDDM with last stated BG level of 146mg/dl. Hx of right 4th toe amputation.  Reports nails are long, thick, and irregular and that he is unable to care for them himself.  Denies open wounds or sores to feet.  Denies pain today. Relates previous treatment(s) including wound care treatment in [facility], care by podiatry . Denies any constitutional symptoms. No other pedal complaints at this time.    Past Medical History:   Diagnosis Date    Anemia     Anxiety     Arthritis     BPH (benign prostatic hypertrophy)     CAD (coronary artery disease)     Cancer     non-hodgkins lymphoma    Diabetes mellitus     Disease of thyroid gland     GERD (gastroesophageal reflux disease)     Hearing loss     History of transfusion     Hyperlipidemia     Hypertension     Iliac artery aneurysm, bilateral     Kidney stone     Liver cirrhosis     Migraines     Sleep apnea     c-pap     Past Surgical History:   Procedure Laterality Date    COLONOSCOPY   02/04/2014    COLONOSCOPY N/A 4/26/2017    Procedure: COLONOSCOPY WITH ANESTHESIA;  Surgeon: Sher Cui MD;  Location:  [facility];  Service:     CORONARY ARTERY BYPASS GRAFT      2    CYSTOSCOPY URETEROSCOPY LASER LITHOTRIPSY Left 4/17/2017    Procedure: URETEROSCOPY LASER LITHOTRIPSY WITH DOUBLE J STENT INSERTION, LEFT ;  Surgeon: Weston Peck MD;  Location:  [facility];  Service:     CYSTOSCOPY URETEROSCOPY LASER LITHOTRIPSY Left 8/14/2017    Procedure: CYSTOSCOPY URETEROSCOPY LASER LITHOTRIPSY STENT INSERTION STONE EXTRACTION;  Surgeon: Weston Peck MD;  Location:  [facility];  Service:     CYSTOSCOPY W/ URETERAL STENT PLACEMENT Left 4/17/2017    Procedure: CYSTOSCOPY URETERAL DOUBLE J STENT INSERTION, LEFT;  Surgeon: Weston Peck MD;  Location:  [facility];  Service:     ENDOSCOPY N/A 4/26/2017    Procedure: ESOPHAGOGASTRODUODENOSCOPY WITH ANESTHESIA;  Surgeon: Sher Cui MD;  Location:  [facility];  Service:     INCISION AND DRAINAGE LEG Right 7/12/2023    Procedure: INCISION AND DRAINAGE - RIGHT FOOT;  Surgeon: Silas Swan DPM;  Location:  [facility];  Service: Podiatry;  Laterality: Right;    JOINT REPLACEMENT Right     KIDNEY STONE SURGERY      KNEE SURGERY      replacement    NECK SURGERY      ROTATOR CUFF REPAIR      SHOULDER SURGERY      TONSILLECTOMY      TRANS METATARSAL AMPUTATION Right 8/9/2023    Procedure: Partial 4th Ray Amputation - Right Foot;  Surgeon: Silas Swan DPM;  Location:  [facility];  Service: Podiatry;  Laterality: Right;    URETEROSCOPY LASER LITHOTRIPSY WITH STENT INSERTION Left 9/1/2022    Procedure: LEFT URETEROSCOPY LASER LITHOTRIPSY WITH STENT INSERTION;  Surgeon: Weston Peck MD;  Location:  [facility];  Service: Urology;  Laterality: Left;    URETEROSCOPY LASER LITHOTRIPSY WITH STENT INSERTION Left 9/15/2022    Procedure: LEFT URETEROSCOPY LASER LITHOTRIPSY WITH STENT EXCHANGE;  Surgeon: Weston Peck MD;   Location: [facility] OR;  Service: Urology;  Laterality: Left;     Family History   Problem Relation Age of Onset    Kidney disease Father     Heart disease Father     Cancer Mother         brain    Colon cancer Neg Hx     Colon polyps Neg Hx      Social History     Socioeconomic History    Marital status:    Tobacco Use    Smoking status: Former     Current packs/day: 0.00     Types: Cigarettes     Quit date:      Years since quittin.8     Passive exposure: Never    Smokeless tobacco: Never   Vaping Use    Vaping status: Never Used   Substance and Sexual Activity    Alcohol use: No    Drug use: No    Sexual activity: Defer     Allergies   Allergen Reactions    Adhesive Tape Rash     Pt states that if it isn't left on very long it is okay    Cefazolin Rash    Cefuroxime Axetil Rash    Cephalosporins Rash    Neomycin-Polymyxin B Gu Rash    Percocet [Oxycodone-Acetaminophen] Rash     Current Outpatient Medications   Medication Sig Dispense Refill    acetaminophen (TYLENOL) 325 MG tablet Take 2 tablets by mouth Every 6 (Six) Hours As Needed for Mild Pain, Fever or Moderate Pain.      ALPRAZolam (XANAX) 0.25 MG tablet Take 1 tablet by mouth 2 (Two) Times a Day As Needed for Anxiety.      busPIRone (BUSPAR) 15 MG tablet Take 1 tablet by mouth 2 (Two) Times a Day.      clopidogrel (PLAVIX) 75 MG tablet Take 1 tablet by mouth Daily.      docusate sodium (COLACE) 100 MG capsule Take 1 capsule by mouth Every Evening.      DULoxetine (CYMBALTA) 60 MG capsule Take 1 capsule by mouth Every Night.      empagliflozin (JARDIANCE) 25 MG tablet tablet Take 1 tablet by mouth Daily.      ferrous sulfate 325 (65 FE) MG EC tablet Take 1 tablet by mouth 2 (Two) Times a Day.      gabapentin (NEURONTIN) 300 MG capsule Take 1 capsule by mouth Daily.      GARLIC-CALCIUM PO Take 1 tablet by mouth Daily.      HYDROcodone-acetaminophen (NORCO) 5-325 MG per tablet Take 1 tablet by mouth Every 4 (Four) Hours As Needed for Moderate  Pain. 15 tablet 0    icosapent ethyl (VASCEPA) 1 g capsule capsule Take 2 g by mouth 2 (Two) Times a Day With Meals.      Insulin Degludec (TRESIBA SC) Inject 12 Units under the skin into the appropriate area as directed Every Night.      levothyroxine (SYNTHROID, LEVOTHROID) 75 MCG tablet Take 1 tablet by mouth Daily.      metFORMIN (GLUCOPHAGE) 500 MG tablet Take 1 tablet by mouth 2 (Two) Times a Day With Meals.      naloxone (NARCAN) 4 MG/0.1ML nasal spray Call 911. Don't prime. Crucible in 1 nostril for overdose. Repeat in 2-3 minutes in other nostril if no or minimal breathing/responsiveness. 2 each 0    nitroglycerin (NITROSTAT) 0.4 MG SL tablet Place 1 tablet under the tongue Every 5 (Five) Minutes As Needed.      pantoprazole (PROTONIX) 40 MG EC tablet Take 1 tablet by mouth Daily.      polyethylene glycol (MIRALAX) powder Take 17 g by mouth Daily As Needed.      ranolazine (RANEXA) 1000 MG 12 hr tablet Take 1 tablet by mouth 2 (Two) Times a Day.      rosuvastatin (CRESTOR) 40 MG tablet Take 1 tablet by mouth Daily.      Vitamin D, Cholecalciferol, 1000 UNITS tablet Take 1 tablet by mouth Daily.       No current facility-administered medications for this visit.     Review of Systems   Constitutional:  Negative for chills and fever.   HENT:  Negative for congestion.    Respiratory:  Negative for shortness of breath.    Cardiovascular:  Negative for chest pain and leg swelling.   Gastrointestinal:  Negative for constipation, diarrhea, nausea and vomiting.   Musculoskeletal:  Positive for arthralgias.        Foot pain   Skin:  Negative for wound.        Calluses   Neurological:  Positive for numbness.       OBJECTIVE     Vitals:    11/08/24 1445   BP: 114/60   Pulse: 57   SpO2: 98%           PHYSICAL EXAM  GEN:   Accompanied by family member.     Foot/Ankle Exam    GENERAL  Diabetic foot exam performed    Appearance:  appears stated age  Orientation:  AAOx3  Affect:  appropriate  Gait:  unimpaired (somewhat  unsteady)  Assistance:  wheelchair  Right shoe gear: casual shoe  Left shoe gear: casual shoe    VASCULAR     Right Foot Vascularity   Dorsalis pedis:  2+  Posterior tibial:  2+  Skin temperature:  warm  Edema grading:  None  CFT:  3  Pedal hair growth:  Present  Varicosities:  mild varicosities     Left Foot Vascularity   Dorsalis pedis:  2+  Posterior tibial:  2+  Skin temperature:  warm  Edema grading:  None  CFT:  3  Pedal hair growth:  Present  Varicosities:  mild varicosities     NEUROLOGIC     Right Foot Neurologic   Light touch sensation: diminished  Vibratory sensation: diminished  Hot/Cold sensation: diminished  Protective Sensation using [place]-Derek Monofilament:   Sites intact: 3  Sites tested: 10     Left Foot Neurologic   Light touch sensation: diminished  Vibratory sensation: diminished  Hot/Cold sensation:  diminished  Protective Sensation using [place]-Derek Monofilament:   Sites intact: 4  Sites tested: 10    MUSCULOSKELETAL     Right Foot Musculoskeletal    Amputation   Toes amputated: fourth toe  Ecchymosis:  none  Tenderness:  callous right foot and toenail problem    Arch:  Normal  Hammertoe:  Second toe, third toe and fifth toe     Left Foot Musculoskeletal   Ecchymosis:  none  Tenderness:  callous left foot and toenail problem  Arch:  Normal  Hammertoe:  Second toe, third toe, fourth toe and fifth toe    MUSCLE STRENGTH     Right Foot Muscle Strength   Foot dorsiflexion:  4+  Foot plantar flexion:  4+  Foot inversion:  4+  Foot eversion:  4+     Left Foot Muscle Strength   Foot dorsiflexion:  4+  Foot plantar flexion:  4+  Foot inversion:  4+  Foot eversion:  4+    RANGE OF MOTION     Right Foot Range of Motion   Foot and ankle ROM within normal limits       Left Foot Range of Motion   Foot and ankle ROM within normal limits      DERMATOLOGIC      Right Foot Dermatologic   Skin  Positive for corn and tinea.   Nails  1.  Positive for elongated, onychomycosis, abnormal thickness and  dystrophic nail.  2.  Positive for elongated, abnormal thickness and dystrophic nail.  3.  Positive for elongated, abnormal thickness and dystrophic nail.  4.  Absent.  5.  Positive for elongated, abnormal thickness and dystrophic nail.     Left Foot Dermatologic   Skin  Positive for corn and tinea.   Nails  1.  Positive for elongated, onychomycosis, abnormal thickness and dystrophic nail.  2.  Positive for elongated, abnormal thickness and dystrophic nail.  3.  Positive for elongated, abnormal thickness and dystrophic nail.  4.  Positive for elongated, abnormally thick and dystrophic nail.  5.  Positive for elongated, abnormally thick and dystrophic nail.    Image:       RADIOLOGY/NUCLEAR:  XR Knee 1 or 2 View Right    Result Date: 10/15/2024  Narrative: This result has an attachment that is not available. Radiographic evaluation of the right knee in the AP and lateral projections focusing on the distal femur demonstrates a stable plate and screw construct of the distal femur above a total knee arthroplasty construct with no signs of interval fracture displacement, hardware failure or screw cut out     LABORATORY/CULTURE RESULTS:      PATHOLOGY RESULTS:       ASSESSMENT/PLAN     Diagnoses and all orders for this visit:    1. Onychomycosis (Primary)    2. Type 2 diabetes mellitus with diabetic neuropathy, with long-term current use of insulin    3. Antiplatelet or antithrombotic long-term use    4. Amputated toe, right          Comprehensive lower extremity examination and evaluation was performed.  Discussed findings and treatment plan including risks, benefits, and treatment options with patient in detail. Patient agreed with treatment plan.  Diabetic foot exam performed.  After verbal consent obtained, nail(s) x9 debrided of length and thickness with nail nipper without incidence  Patient may maintain nails and calluses at home utilizing emery board or pumice stone between visits as needed  Reviewed at home  diabetic foot care including daily foot checks   Continue diabetic management per PCP.    An After Visit Summary was printed and given to the patient at discharge, including (if requested) any available informative/educational handouts regarding diagnosis, treatment, or medications. All questions were answered to patient/family satisfaction. Should symptoms fail to improve or worsen they agree to call or return to clinic or to go to the Emergency Department. Discussed the importance of following up with any needed screening tests/labs/specialist appointments and any requested follow-up recommended by me today. Importance of maintaining follow-up discussed and patient accepts that missed appointments can delay diagnosis and potentially lead to worsening of conditions.  Return in about 3 months (around 2/8/2025) for Follow-up with Podiatry APRN, Schedule Foot Care Clinic., or sooner if acute issues arise.    Lab Frequency Next Occurrence   Follow Anesthesia Guidelines / Standing Orders Once 04/12/2017   Follow Anesthesia Guidelines / Standing Orders Once 08/11/2017   Comprehensive Metabolic Panel Once 11/03/2021   CBC (No Diff) Once 11/03/2021   Phosphorus Once 11/03/2021   Magnesium Once 11/03/2021   Uric Acid Once 11/03/2021   Creatinine, Urine, Random - Urine, Clean Catch Once 10/29/2021   Urinalysis With Microscopic If Indicated (No Culture) - Urine, Clean Catch Once 10/29/2021   Vitamin D 25 Hydroxy Once 11/03/2021   PTH, Intact Once 11/03/2021       This document has been electronically signed by Silas Swan DPM on November 8, 2024 15:13 CST

## 2024-11-08 ENCOUNTER — OFFICE VISIT (OUTPATIENT)
Age: 69
End: 2024-11-08
Payer: MEDICARE

## 2024-11-08 VITALS
BODY MASS INDEX: 26.46 KG/M2 | HEIGHT: 71 IN | SYSTOLIC BLOOD PRESSURE: 114 MMHG | OXYGEN SATURATION: 98 % | WEIGHT: 189 LBS | DIASTOLIC BLOOD PRESSURE: 60 MMHG | HEART RATE: 57 BPM

## 2024-11-08 DIAGNOSIS — Z79.02 ANTIPLATELET OR ANTITHROMBOTIC LONG-TERM USE: ICD-10-CM

## 2024-11-08 DIAGNOSIS — Z79.4 TYPE 2 DIABETES MELLITUS WITH DIABETIC NEUROPATHY, WITH LONG-TERM CURRENT USE OF INSULIN: ICD-10-CM

## 2024-11-08 DIAGNOSIS — E11.40 TYPE 2 DIABETES MELLITUS WITH DIABETIC NEUROPATHY, WITH LONG-TERM CURRENT USE OF INSULIN: ICD-10-CM

## 2024-11-08 DIAGNOSIS — S98.131A AMPUTATED TOE, RIGHT: ICD-10-CM

## 2024-11-08 DIAGNOSIS — B35.1 ONYCHOMYCOSIS: Primary | ICD-10-CM

## 2024-11-08 PROCEDURE — 11721 DEBRIDE NAIL 6 OR MORE: CPT | Performed by: PODIATRIST

## 2024-11-08 PROCEDURE — 1160F RVW MEDS BY RX/DR IN RCRD: CPT | Performed by: PODIATRIST

## 2024-11-08 PROCEDURE — 1159F MED LIST DOCD IN RCRD: CPT | Performed by: PODIATRIST

## 2024-11-08 PROCEDURE — 3074F SYST BP LT 130 MM HG: CPT | Performed by: PODIATRIST

## 2024-11-08 PROCEDURE — 3078F DIAST BP <80 MM HG: CPT | Performed by: PODIATRIST

## 2024-11-08 RX ORDER — ALPRAZOLAM 0.25 MG/1
0.25 TABLET ORAL 2 TIMES DAILY PRN
COMMUNITY

## 2024-12-02 ASSESSMENT — ENCOUNTER SYMPTOMS
EYES NEGATIVE: 1
ALLERGIC/IMMUNOLOGIC NEGATIVE: 1
GASTROINTESTINAL NEGATIVE: 1
RESPIRATORY NEGATIVE: 1

## 2024-12-02 NOTE — ASSESSMENT & PLAN NOTE
At this point, given the interval healing and maintained alignment demonstrated at today's imaging, I will allow the patient to gently restore weightbearing.  I believe this should be with the guidance of formal physical therapy.  As a result we will make a formal referral to physical therapy at this time.    Orders:    XR FEMUR RIGHT (MIN 2 VIEWS); Future    External Referral To Physical Therapy  PLAN:  I will see the patient back in 12 weeks to evaluate his progress.

## 2024-12-02 NOTE — PROGRESS NOTES
Daniel Ramirez (:  1955) is a 69 y.o. male,Established patient, here for evaluation of the following chief complaint(s):  Follow-up (Rt femur)         Assessment & Plan  Closed displaced supracondylar fracture of distal end of right femur without intracondylar extension with routine healing, subsequent encounter  At this point, given the interval healing and maintained alignment demonstrated at today's imaging, I will allow the patient to gently restore weightbearing.  I believe this should be with the guidance of formal physical therapy.  As a result we will make a formal referral to physical therapy at this time.    Orders:    XR FEMUR RIGHT (MIN 2 VIEWS); Future    External Referral To Physical Therapy  PLAN:  I will see the patient back in 12 weeks to evaluate his progress.    Return in about 12 weeks (around 2025) for XR Rt femur FX.       Subjective   The patient is a pleasant 69-year-old gentleman who now presents 3 months status post ORIF of the displaced periprosthetic right distal femur fracture above a total knee construct performed by Dr. Cisneros nearly a decade ago.  He underwent MIPO submuscular plating.  He has been held in extension using a knee immobilizer while nonweightbearing since his fixation.        Review of Systems   Constitutional: Negative.    HENT: Negative.     Eyes: Negative.    Respiratory: Negative.     Cardiovascular: Negative.    Gastrointestinal: Negative.    Skin: Negative.    Allergic/Immunologic: Negative.    Neurological: Negative.    Psychiatric/Behavioral: Negative.            Objective   Physical Exam   On physical examination, the patient is noted to be seated in a wheelchair.  His lateral incision is benign in appearance and well-healing with no signs of redness, streaking, dehiscence or discharge.  He can maintain a straight leg raise.  He tolerates knee flexion actively up to 110 degrees.  There is no laxity with varus or valgus stressing.  His

## 2024-12-03 ENCOUNTER — OFFICE VISIT (OUTPATIENT)
Age: 69
End: 2024-12-03
Payer: MEDICARE

## 2024-12-03 VITALS — WEIGHT: 185 LBS | BODY MASS INDEX: 25.9 KG/M2 | HEIGHT: 71 IN

## 2024-12-03 DIAGNOSIS — S72.451D CLOSED DISPLACED SUPRACONDYLAR FRACTURE OF DISTAL END OF RIGHT FEMUR WITHOUT INTRACONDYLAR EXTENSION WITH ROUTINE HEALING, SUBSEQUENT ENCOUNTER: Primary | ICD-10-CM

## 2024-12-03 PROCEDURE — 1160F RVW MEDS BY RX/DR IN RCRD: CPT | Performed by: ORTHOPAEDIC SURGERY

## 2024-12-03 PROCEDURE — 1123F ACP DISCUSS/DSCN MKR DOCD: CPT | Performed by: ORTHOPAEDIC SURGERY

## 2024-12-03 PROCEDURE — 1159F MED LIST DOCD IN RCRD: CPT | Performed by: ORTHOPAEDIC SURGERY

## 2024-12-03 PROCEDURE — 99214 OFFICE O/P EST MOD 30 MIN: CPT | Performed by: ORTHOPAEDIC SURGERY

## 2024-12-04 NOTE — TELEPHONE ENCOUNTER
12- Called the pt LVM x 2 for return call as that the pharmacy has requested a refill on your current medication (Pantoprazole).  If regular medications are being prescribed our Providers prefers that patients have a yearly follow up apt.     Your last apt was on 08- w Ilda PAIGE     Sent my chart message         Called Niya Spouse & Patient   Apt scheduled for 12-     Routed to MAYCO PAIGE

## 2024-12-05 RX ORDER — PANTOPRAZOLE SODIUM 40 MG/1
40 TABLET, DELAYED RELEASE ORAL DAILY
Qty: 90 TABLET | Refills: 0 | Status: SHIPPED | OUTPATIENT
Start: 2024-12-05

## 2024-12-09 ENCOUNTER — TELEPHONE (OUTPATIENT)
Age: 69
End: 2024-12-09

## 2024-12-09 NOTE — TELEPHONE ENCOUNTER
Patient needs his Physical therapy referral sent to [facility] santosh dove   fx [phone]

## 2024-12-11 ENCOUNTER — TELEPHONE (OUTPATIENT)
Age: 69
End: 2024-12-11

## 2024-12-11 NOTE — TELEPHONE ENCOUNTER
Patient called said that the primary physical therapy place is going to be close for vacation and will not reopen until Jan. 6, so they would like for the PT order to be send someone else.  Please give them a call to get a place setup for him to do PT.

## 2024-12-17 ENCOUNTER — TELEPHONE (OUTPATIENT)
Age: 69
End: 2024-12-17

## 2024-12-17 NOTE — TELEPHONE ENCOUNTER
[facility] care center in [place] called stating they need the patients referral for PT sent over to them    Fax number [phone]

## 2025-01-01 ENCOUNTER — HOSPITAL ENCOUNTER (INPATIENT)
Facility: HOSPITAL | Age: 70
LOS: 8 days | End: 2025-04-25
Attending: FAMILY MEDICINE | Admitting: FAMILY MEDICINE
Payer: MEDICARE

## 2025-01-01 ENCOUNTER — APPOINTMENT (OUTPATIENT)
Dept: GENERAL RADIOLOGY | Facility: HOSPITAL | Age: 70
End: 2025-01-01
Payer: MEDICARE

## 2025-01-01 ENCOUNTER — APPOINTMENT (OUTPATIENT)
Dept: NEUROLOGY | Facility: HOSPITAL | Age: 70
End: 2025-01-01
Payer: MEDICARE

## 2025-01-01 ENCOUNTER — APPOINTMENT (OUTPATIENT)
Dept: CT IMAGING | Facility: HOSPITAL | Age: 70
End: 2025-01-01
Payer: MEDICARE

## 2025-01-01 ENCOUNTER — APPOINTMENT (OUTPATIENT)
Dept: MRI IMAGING | Facility: HOSPITAL | Age: 70
End: 2025-01-01
Payer: MEDICARE

## 2025-01-01 VITALS
WEIGHT: 192.9 LBS | BODY MASS INDEX: 26.13 KG/M2 | DIASTOLIC BLOOD PRESSURE: 23 MMHG | HEIGHT: 72 IN | TEMPERATURE: 98.4 F | OXYGEN SATURATION: 85 % | SYSTOLIC BLOOD PRESSURE: 41 MMHG | HEART RATE: 67 BPM | RESPIRATION RATE: 16 BRPM

## 2025-01-01 DIAGNOSIS — Z74.09 IMPAIRED FUNCTIONAL MOBILITY AND ACTIVITY TOLERANCE: Primary | ICD-10-CM

## 2025-01-01 DIAGNOSIS — R13.10 DYSPHAGIA, UNSPECIFIED TYPE: ICD-10-CM

## 2025-01-01 LAB
ALBUMIN SERPL-MCNC: 2.5 G/DL (ref 3.5–5.2)
ALBUMIN SERPL-MCNC: 2.6 G/DL (ref 3.5–5.2)
ALBUMIN SERPL-MCNC: 2.6 G/DL (ref 3.5–5.2)
ALBUMIN SERPL-MCNC: 2.7 G/DL (ref 3.5–5.2)
ALBUMIN SERPL-MCNC: 2.8 G/DL (ref 3.5–5.2)
ALBUMIN/GLOB SERPL: 0.7 G/DL
ALBUMIN/GLOB SERPL: 0.8 G/DL
ALBUMIN/GLOB SERPL: 0.9 G/DL
ALBUMIN/GLOB SERPL: 1 G/DL
ALP SERPL-CCNC: 127 U/L (ref 39–117)
ALP SERPL-CCNC: 128 U/L (ref 39–117)
ALP SERPL-CCNC: 139 U/L (ref 39–117)
ALP SERPL-CCNC: 156 U/L (ref 39–117)
ALP SERPL-CCNC: 180 U/L (ref 39–117)
ALT SERPL W P-5'-P-CCNC: 37 U/L (ref 1–41)
ALT SERPL W P-5'-P-CCNC: 38 U/L (ref 1–41)
ALT SERPL W P-5'-P-CCNC: 39 U/L (ref 1–41)
ALT SERPL W P-5'-P-CCNC: 40 U/L (ref 1–41)
ALT SERPL W P-5'-P-CCNC: 41 U/L (ref 1–41)
AMMONIA BLD-SCNC: 43 UMOL/L (ref 16–60)
AMMONIA BLD-SCNC: 66 UMOL/L (ref 16–60)
ANION GAP SERPL CALCULATED.3IONS-SCNC: 11 MMOL/L (ref 5–15)
ANION GAP SERPL CALCULATED.3IONS-SCNC: 13 MMOL/L (ref 5–15)
ANION GAP SERPL CALCULATED.3IONS-SCNC: 14 MMOL/L (ref 5–15)
ANION GAP SERPL CALCULATED.3IONS-SCNC: 14 MMOL/L (ref 5–15)
ANION GAP SERPL CALCULATED.3IONS-SCNC: 16 MMOL/L (ref 5–15)
ANION GAP SERPL CALCULATED.3IONS-SCNC: 17 MMOL/L (ref 5–15)
ANISOCYTOSIS BLD QL: ABNORMAL
ANISOCYTOSIS BLD QL: ABNORMAL
ARTERIAL PATENCY WRIST A: ABNORMAL
ARTERIAL PATENCY WRIST A: ABNORMAL
ARTERIAL PATENCY WRIST A: POSITIVE
AST SERPL-CCNC: 57 U/L (ref 1–40)
AST SERPL-CCNC: 68 U/L (ref 1–40)
AST SERPL-CCNC: 69 U/L (ref 1–40)
AST SERPL-CCNC: 82 U/L (ref 1–40)
AST SERPL-CCNC: 83 U/L (ref 1–40)
ATMOSPHERIC PRESS: 755 MMHG
ATMOSPHERIC PRESS: 757 MMHG
ATMOSPHERIC PRESS: 757 MMHG
B PARAPERT DNA SPEC QL NAA+PROBE: NOT DETECTED
B PERT DNA SPEC QL NAA+PROBE: NOT DETECTED
BACTERIA SPEC AEROBE CULT: NORMAL
BACTERIA SPEC AEROBE CULT: NORMAL
BACTERIA UR QL AUTO: NORMAL /HPF
BASE EXCESS BLDA CALC-SCNC: -2.5 MMOL/L (ref 0–2)
BASE EXCESS BLDA CALC-SCNC: 0.5 MMOL/L (ref 0–2)
BASE EXCESS BLDA CALC-SCNC: 1.5 MMOL/L (ref 0–2)
BASOPHILS # BLD AUTO: 0.02 10*3/MM3 (ref 0–0.2)
BASOPHILS # BLD AUTO: 0.04 10*3/MM3 (ref 0–0.2)
BASOPHILS # BLD MANUAL: 0 10*3/MM3 (ref 0–0.2)
BASOPHILS # BLD MANUAL: 0.05 10*3/MM3 (ref 0–0.2)
BASOPHILS NFR BLD AUTO: 0.2 % (ref 0–1.5)
BASOPHILS NFR BLD AUTO: 0.3 % (ref 0–1.5)
BASOPHILS NFR BLD AUTO: 0.3 % (ref 0–1.5)
BASOPHILS NFR BLD MANUAL: 0 % (ref 0–1.5)
BASOPHILS NFR BLD MANUAL: 1 % (ref 0–1.5)
BDY SITE: ABNORMAL
BILIRUB CONJ SERPL-MCNC: 0.7 MG/DL (ref 0–0.3)
BILIRUB INDIRECT SERPL-MCNC: 0.3 MG/DL
BILIRUB SERPL-MCNC: 0.8 MG/DL (ref 0–1.2)
BILIRUB SERPL-MCNC: 0.8 MG/DL (ref 0–1.2)
BILIRUB SERPL-MCNC: 0.9 MG/DL (ref 0–1.2)
BILIRUB SERPL-MCNC: 0.9 MG/DL (ref 0–1.2)
BILIRUB SERPL-MCNC: 1 MG/DL (ref 0–1.2)
BILIRUB UR QL STRIP: NEGATIVE
BODY TEMPERATURE: 37
BUN SERPL-MCNC: 19 MG/DL (ref 8–23)
BUN SERPL-MCNC: 19 MG/DL (ref 8–23)
BUN SERPL-MCNC: 21 MG/DL (ref 8–23)
BUN SERPL-MCNC: 25 MG/DL (ref 8–23)
BUN SERPL-MCNC: 34 MG/DL (ref 8–23)
BUN SERPL-MCNC: 35 MG/DL (ref 8–23)
BUN SERPL-MCNC: 36 MG/DL (ref 8–23)
BUN SERPL-MCNC: 37 MG/DL (ref 8–23)
BUN/CREAT SERPL: 13.6 (ref 7–25)
BUN/CREAT SERPL: 13.8 (ref 7–25)
BUN/CREAT SERPL: 14.9 (ref 7–25)
BUN/CREAT SERPL: 15 (ref 7–25)
BUN/CREAT SERPL: 16 (ref 7–25)
BUN/CREAT SERPL: 16.5 (ref 7–25)
BUN/CREAT SERPL: 16.6 (ref 7–25)
BUN/CREAT SERPL: 17.9 (ref 7–25)
C PNEUM DNA NPH QL NAA+NON-PROBE: NOT DETECTED
CA-I BLD-MCNC: 5.41 MG/DL (ref 4.6–5.4)
CALCIUM SPEC-SCNC: 8.5 MG/DL (ref 8.6–10.5)
CALCIUM SPEC-SCNC: 8.5 MG/DL (ref 8.6–10.5)
CALCIUM SPEC-SCNC: 9 MG/DL (ref 8.6–10.5)
CALCIUM SPEC-SCNC: 9.2 MG/DL (ref 8.6–10.5)
CALCIUM SPEC-SCNC: 9.4 MG/DL (ref 8.6–10.5)
CALCIUM SPEC-SCNC: 9.7 MG/DL (ref 8.6–10.5)
CHLORIDE SERPL-SCNC: 101 MMOL/L (ref 98–107)
CHLORIDE SERPL-SCNC: 101 MMOL/L (ref 98–107)
CHLORIDE SERPL-SCNC: 102 MMOL/L (ref 98–107)
CHLORIDE SERPL-SCNC: 103 MMOL/L (ref 98–107)
CHLORIDE SERPL-SCNC: 103 MMOL/L (ref 98–107)
CHLORIDE SERPL-SCNC: 104 MMOL/L (ref 98–107)
CHLORIDE SERPL-SCNC: 107 MMOL/L (ref 98–107)
CHLORIDE SERPL-SCNC: 99 MMOL/L (ref 98–107)
CLARITY UR: ABNORMAL
CO2 SERPL-SCNC: 23 MMOL/L (ref 22–29)
CO2 SERPL-SCNC: 24 MMOL/L (ref 22–29)
CO2 SERPL-SCNC: 24 MMOL/L (ref 22–29)
CO2 SERPL-SCNC: 25 MMOL/L (ref 22–29)
COD CRY URNS QL: NORMAL /HPF
COHGB MFR BLD: 1.4 % (ref 0–5)
COLOR UR: ABNORMAL
CREAT SERPL-MCNC: 1.31 MG/DL (ref 0.76–1.27)
CREAT SERPL-MCNC: 1.38 MG/DL (ref 0.76–1.27)
CREAT SERPL-MCNC: 1.4 MG/DL (ref 0.76–1.27)
CREAT SERPL-MCNC: 1.67 MG/DL (ref 0.76–1.27)
CREAT SERPL-MCNC: 1.9 MG/DL (ref 0.76–1.27)
CREAT SERPL-MCNC: 2.11 MG/DL (ref 0.76–1.27)
CREAT SERPL-MCNC: 2.24 MG/DL (ref 0.76–1.27)
CREAT SERPL-MCNC: 2.41 MG/DL (ref 0.76–1.27)
D-LACTATE SERPL-SCNC: 2.2 MMOL/L (ref 0.5–2)
D-LACTATE SERPL-SCNC: 2.4 MMOL/L (ref 0.5–2)
D-LACTATE SERPL-SCNC: 2.5 MMOL/L (ref 0.5–2)
D-LACTATE SERPL-SCNC: 2.7 MMOL/L (ref 0.5–2)
D-LACTATE SERPL-SCNC: 2.7 MMOL/L (ref 0.5–2)
DEPRECATED RDW RBC AUTO: 74.6 FL (ref 37–54)
DEPRECATED RDW RBC AUTO: 74.8 FL (ref 37–54)
DEPRECATED RDW RBC AUTO: 75.5 FL (ref 37–54)
DEPRECATED RDW RBC AUTO: 75.6 FL (ref 37–54)
DEPRECATED RDW RBC AUTO: 75.7 FL (ref 37–54)
DEPRECATED RDW RBC AUTO: 76.6 FL (ref 37–54)
DEPRECATED RDW RBC AUTO: 77.9 FL (ref 37–54)
DEPRECATED RDW RBC AUTO: 78.1 FL (ref 37–54)
EGFRCR SERPLBLD CKD-EPI 2021: 28 ML/MIN/1.73
EGFRCR SERPLBLD CKD-EPI 2021: 30.6 ML/MIN/1.73
EGFRCR SERPLBLD CKD-EPI 2021: 32.8 ML/MIN/1.73
EGFRCR SERPLBLD CKD-EPI 2021: 37.2 ML/MIN/1.73
EGFRCR SERPLBLD CKD-EPI 2021: 43.5 ML/MIN/1.73
EGFRCR SERPLBLD CKD-EPI 2021: 53.7 ML/MIN/1.73
EGFRCR SERPLBLD CKD-EPI 2021: 54.7 ML/MIN/1.73
EGFRCR SERPLBLD CKD-EPI 2021: 58.2 ML/MIN/1.73
EOSINOPHIL # BLD AUTO: 0.04 10*3/MM3 (ref 0–0.4)
EOSINOPHIL # BLD AUTO: 0.04 10*3/MM3 (ref 0–0.4)
EOSINOPHIL # BLD AUTO: 0.07 10*3/MM3 (ref 0–0.4)
EOSINOPHIL # BLD AUTO: 0.08 10*3/MM3 (ref 0–0.4)
EOSINOPHIL # BLD AUTO: 0.08 10*3/MM3 (ref 0–0.4)
EOSINOPHIL # BLD MANUAL: 0.12 10*3/MM3 (ref 0–0.4)
EOSINOPHIL # BLD MANUAL: 0.14 10*3/MM3 (ref 0–0.4)
EOSINOPHIL NFR BLD AUTO: 0.3 % (ref 0.3–6.2)
EOSINOPHIL NFR BLD AUTO: 0.4 % (ref 0.3–6.2)
EOSINOPHIL NFR BLD AUTO: 0.6 % (ref 0.3–6.2)
EOSINOPHIL NFR BLD AUTO: 0.7 % (ref 0.3–6.2)
EOSINOPHIL NFR BLD AUTO: 1.4 % (ref 0.3–6.2)
EOSINOPHIL NFR BLD MANUAL: 2.2 % (ref 0.3–6.2)
EOSINOPHIL NFR BLD MANUAL: 3 % (ref 0.3–6.2)
EPAP: 6
EPAP: 6
ERYTHROCYTE [DISTWIDTH] IN BLOOD BY AUTOMATED COUNT: 17.4 % (ref 12.3–15.4)
ERYTHROCYTE [DISTWIDTH] IN BLOOD BY AUTOMATED COUNT: 17.7 % (ref 12.3–15.4)
ERYTHROCYTE [DISTWIDTH] IN BLOOD BY AUTOMATED COUNT: 17.8 % (ref 12.3–15.4)
ERYTHROCYTE [DISTWIDTH] IN BLOOD BY AUTOMATED COUNT: 17.9 % (ref 12.3–15.4)
ERYTHROCYTE [DISTWIDTH] IN BLOOD BY AUTOMATED COUNT: 17.9 % (ref 12.3–15.4)
ERYTHROCYTE [DISTWIDTH] IN BLOOD BY AUTOMATED COUNT: 18 % (ref 12.3–15.4)
ERYTHROCYTE [DISTWIDTH] IN BLOOD BY AUTOMATED COUNT: 18 % (ref 12.3–15.4)
ERYTHROCYTE [DISTWIDTH] IN BLOOD BY AUTOMATED COUNT: 18.2 % (ref 12.3–15.4)
FLUAV SUBTYP SPEC NAA+PROBE: NOT DETECTED
FLUBV RNA ISLT QL NAA+PROBE: NOT DETECTED
GAS FLOW AIRWAY: 3 LPM
GLOBULIN UR ELPH-MCNC: 2.7 GM/DL
GLOBULIN UR ELPH-MCNC: 2.9 GM/DL
GLOBULIN UR ELPH-MCNC: 3.4 GM/DL
GLOBULIN UR ELPH-MCNC: 3.5 GM/DL
GLUCOSE BLDC GLUCOMTR-MCNC: 112 MG/DL (ref 70–130)
GLUCOSE BLDC GLUCOMTR-MCNC: 125 MG/DL (ref 70–130)
GLUCOSE BLDC GLUCOMTR-MCNC: 127 MG/DL (ref 70–130)
GLUCOSE BLDC GLUCOMTR-MCNC: 134 MG/DL (ref 70–130)
GLUCOSE BLDC GLUCOMTR-MCNC: 185 MG/DL (ref 70–130)
GLUCOSE SERPL-MCNC: 103 MG/DL (ref 65–99)
GLUCOSE SERPL-MCNC: 103 MG/DL (ref 65–99)
GLUCOSE SERPL-MCNC: 108 MG/DL (ref 65–99)
GLUCOSE SERPL-MCNC: 121 MG/DL (ref 65–99)
GLUCOSE SERPL-MCNC: 128 MG/DL (ref 65–99)
GLUCOSE SERPL-MCNC: 152 MG/DL (ref 65–99)
GLUCOSE SERPL-MCNC: 91 MG/DL (ref 65–99)
GLUCOSE SERPL-MCNC: 91 MG/DL (ref 65–99)
GLUCOSE UR STRIP-MCNC: ABNORMAL MG/DL
HADV DNA SPEC NAA+PROBE: NOT DETECTED
HCO3 BLDA-SCNC: 25.3 MMOL/L (ref 20–26)
HCO3 BLDA-SCNC: 26.8 MMOL/L (ref 20–26)
HCO3 BLDA-SCNC: 27.1 MMOL/L (ref 20–26)
HCOV 229E RNA SPEC QL NAA+PROBE: NOT DETECTED
HCOV HKU1 RNA SPEC QL NAA+PROBE: NOT DETECTED
HCOV NL63 RNA SPEC QL NAA+PROBE: NOT DETECTED
HCOV OC43 RNA SPEC QL NAA+PROBE: NOT DETECTED
HCT VFR BLD AUTO: 26.6 % (ref 37.5–51)
HCT VFR BLD AUTO: 28.1 % (ref 37.5–51)
HCT VFR BLD AUTO: 28.5 % (ref 37.5–51)
HCT VFR BLD AUTO: 28.7 % (ref 37.5–51)
HCT VFR BLD AUTO: 28.9 % (ref 37.5–51)
HCT VFR BLD AUTO: 29.4 % (ref 37.5–51)
HCT VFR BLD AUTO: 29.8 % (ref 37.5–51)
HCT VFR BLD AUTO: 30.2 % (ref 37.5–51)
HCT VFR BLD CALC: 32.6 % (ref 38–51)
HGB BLD-MCNC: 8.5 G/DL (ref 13–17.7)
HGB BLD-MCNC: 8.9 G/DL (ref 13–17.7)
HGB BLD-MCNC: 9.1 G/DL (ref 13–17.7)
HGB BLD-MCNC: 9.1 G/DL (ref 13–17.7)
HGB BLD-MCNC: 9.2 G/DL (ref 13–17.7)
HGB BLD-MCNC: 9.3 G/DL (ref 13–17.7)
HGB BLD-MCNC: 9.4 G/DL (ref 13–17.7)
HGB BLD-MCNC: 9.4 G/DL (ref 13–17.7)
HGB BLDA-MCNC: 10.6 G/DL (ref 14–18)
HGB UR QL STRIP.AUTO: ABNORMAL
HMPV RNA NPH QL NAA+NON-PROBE: NOT DETECTED
HPIV1 RNA ISLT QL NAA+PROBE: NOT DETECTED
HPIV2 RNA SPEC QL NAA+PROBE: NOT DETECTED
HPIV3 RNA NPH QL NAA+PROBE: NOT DETECTED
HPIV4 P GENE NPH QL NAA+PROBE: NOT DETECTED
HYALINE CASTS UR QL AUTO: NORMAL /LPF
IMM GRANULOCYTES # BLD AUTO: 0.01 10*3/MM3 (ref 0–0.05)
IMM GRANULOCYTES # BLD AUTO: 0.07 10*3/MM3 (ref 0–0.05)
IMM GRANULOCYTES # BLD AUTO: 0.09 10*3/MM3 (ref 0–0.05)
IMM GRANULOCYTES # BLD AUTO: 0.16 10*3/MM3 (ref 0–0.05)
IMM GRANULOCYTES # BLD AUTO: 0.18 10*3/MM3 (ref 0–0.05)
IMM GRANULOCYTES NFR BLD AUTO: 0.2 % (ref 0–0.5)
IMM GRANULOCYTES NFR BLD AUTO: 0.6 % (ref 0–0.5)
IMM GRANULOCYTES NFR BLD AUTO: 0.7 % (ref 0–0.5)
IMM GRANULOCYTES NFR BLD AUTO: 1 % (ref 0–0.5)
IMM GRANULOCYTES NFR BLD AUTO: 1.7 % (ref 0–0.5)
INHALED O2 CONCENTRATION: 60 %
INHALED O2 CONCENTRATION: 60 %
IPAP: 16
IPAP: 16
KETONES UR QL STRIP: NEGATIVE
LEUKOCYTE ESTERASE UR QL STRIP.AUTO: ABNORMAL
LYMPHOCYTES # BLD AUTO: 0.63 10*3/MM3 (ref 0.7–3.1)
LYMPHOCYTES # BLD AUTO: 0.76 10*3/MM3 (ref 0.7–3.1)
LYMPHOCYTES # BLD AUTO: 0.79 10*3/MM3 (ref 0.7–3.1)
LYMPHOCYTES # BLD AUTO: 0.85 10*3/MM3 (ref 0.7–3.1)
LYMPHOCYTES # BLD AUTO: 0.96 10*3/MM3 (ref 0.7–3.1)
LYMPHOCYTES # BLD MANUAL: 0.42 10*3/MM3 (ref 0.7–3.1)
LYMPHOCYTES # BLD MANUAL: 0.61 10*3/MM3 (ref 0.7–3.1)
LYMPHOCYTES NFR BLD AUTO: 14.8 % (ref 19.6–45.3)
LYMPHOCYTES NFR BLD AUTO: 5.8 % (ref 19.6–45.3)
LYMPHOCYTES NFR BLD AUTO: 6.3 % (ref 19.6–45.3)
LYMPHOCYTES NFR BLD AUTO: 6.5 % (ref 19.6–45.3)
LYMPHOCYTES NFR BLD AUTO: 6.8 % (ref 19.6–45.3)
LYMPHOCYTES NFR BLD MANUAL: 10.8 % (ref 5–12)
LYMPHOCYTES NFR BLD MANUAL: 15 % (ref 5–12)
Lab: ABNORMAL
M PNEUMO IGG SER IA-ACNC: NOT DETECTED
MACROCYTES BLD QL SMEAR: ABNORMAL
MACROCYTES BLD QL SMEAR: ABNORMAL
MAGNESIUM SERPL-MCNC: 2 MG/DL (ref 1.6–2.4)
MAGNESIUM SERPL-MCNC: 2.1 MG/DL (ref 1.6–2.4)
MAGNESIUM SERPL-MCNC: 2.5 MG/DL (ref 1.6–2.4)
MAGNESIUM SERPL-MCNC: 2.7 MG/DL (ref 1.6–2.4)
MCH RBC QN AUTO: 36 PG (ref 26.6–33)
MCH RBC QN AUTO: 36.5 PG (ref 26.6–33)
MCH RBC QN AUTO: 36.6 PG (ref 26.6–33)
MCH RBC QN AUTO: 36.8 PG (ref 26.6–33)
MCH RBC QN AUTO: 36.8 PG (ref 26.6–33)
MCH RBC QN AUTO: 36.9 PG (ref 26.6–33)
MCH RBC QN AUTO: 37.1 PG (ref 26.6–33)
MCH RBC QN AUTO: 37.6 PG (ref 26.6–33)
MCHC RBC AUTO-ENTMCNC: 31.1 G/DL (ref 31.5–35.7)
MCHC RBC AUTO-ENTMCNC: 31.2 G/DL (ref 31.5–35.7)
MCHC RBC AUTO-ENTMCNC: 31.3 G/DL (ref 31.5–35.7)
MCHC RBC AUTO-ENTMCNC: 31.5 G/DL (ref 31.5–35.7)
MCHC RBC AUTO-ENTMCNC: 31.7 G/DL (ref 31.5–35.7)
MCHC RBC AUTO-ENTMCNC: 31.9 G/DL (ref 31.5–35.7)
MCHC RBC AUTO-ENTMCNC: 32 G/DL (ref 31.5–35.7)
MCHC RBC AUTO-ENTMCNC: 32.8 G/DL (ref 31.5–35.7)
MCV RBC AUTO: 114.7 FL (ref 79–97)
MCV RBC AUTO: 114.8 FL (ref 79–97)
MCV RBC AUTO: 115.7 FL (ref 79–97)
MCV RBC AUTO: 116.1 FL (ref 79–97)
MCV RBC AUTO: 116.1 FL (ref 79–97)
MCV RBC AUTO: 116.3 FL (ref 79–97)
MCV RBC AUTO: 117.8 FL (ref 79–97)
MCV RBC AUTO: 118.1 FL (ref 79–97)
METAMYELOCYTES NFR BLD MANUAL: 1 % (ref 0–0)
METHGB BLD QL: 0.5 % (ref 0–3)
MODALITY: ABNORMAL
MONOCYTES # BLD AUTO: 0.78 10*3/MM3 (ref 0.1–0.9)
MONOCYTES # BLD AUTO: 0.89 10*3/MM3 (ref 0.1–0.9)
MONOCYTES # BLD AUTO: 0.91 10*3/MM3 (ref 0.1–0.9)
MONOCYTES # BLD AUTO: 0.96 10*3/MM3 (ref 0.1–0.9)
MONOCYTES # BLD AUTO: 1.43 10*3/MM3 (ref 0.1–0.9)
MONOCYTES # BLD: 0.6 10*3/MM3 (ref 0.1–0.9)
MONOCYTES # BLD: 0.71 10*3/MM3 (ref 0.1–0.9)
MONOCYTES NFR BLD AUTO: 13.6 % (ref 5–12)
MONOCYTES NFR BLD AUTO: 7.9 % (ref 5–12)
MONOCYTES NFR BLD AUTO: 8.2 % (ref 5–12)
MONOCYTES NFR BLD AUTO: 8.2 % (ref 5–12)
MONOCYTES NFR BLD AUTO: 9.4 % (ref 5–12)
MRSA DNA SPEC QL NAA+PROBE: ABNORMAL
NEUTROPHILS # BLD AUTO: 3.17 10*3/MM3 (ref 1.7–7)
NEUTROPHILS # BLD AUTO: 4.42 10*3/MM3 (ref 1.7–7)
NEUTROPHILS NFR BLD AUTO: 10.19 10*3/MM3 (ref 1.7–7)
NEUTROPHILS NFR BLD AUTO: 12.66 10*3/MM3 (ref 1.7–7)
NEUTROPHILS NFR BLD AUTO: 4.01 10*3/MM3 (ref 1.7–7)
NEUTROPHILS NFR BLD AUTO: 69.7 % (ref 42.7–76)
NEUTROPHILS NFR BLD AUTO: 82.7 % (ref 42.7–76)
NEUTROPHILS NFR BLD AUTO: 83.6 % (ref 42.7–76)
NEUTROPHILS NFR BLD AUTO: 83.7 % (ref 42.7–76)
NEUTROPHILS NFR BLD AUTO: 84 % (ref 42.7–76)
NEUTROPHILS NFR BLD AUTO: 9.06 10*3/MM3 (ref 1.7–7)
NEUTROPHILS NFR BLD AUTO: 9.31 10*3/MM3 (ref 1.7–7)
NEUTROPHILS NFR BLD MANUAL: 67 % (ref 42.7–76)
NEUTROPHILS NFR BLD MANUAL: 79.6 % (ref 42.7–76)
NITRITE UR QL STRIP: NEGATIVE
NOTIFIED BY: ABNORMAL
NOTIFIED WHO: ABNORMAL
NRBC BLD AUTO-RTO: 0 /100 WBC (ref 0–0.2)
OXYHGB MFR BLDV: 82.1 % (ref 94–99)
PCO2 BLDA: 40.9 MM HG (ref 35–45)
PCO2 BLDA: 46.8 MM HG (ref 35–45)
PCO2 BLDA: 71.5 MM HG (ref 35–45)
PCO2 TEMP ADJ BLD: 40.9 MM HG (ref 35–45)
PCO2 TEMP ADJ BLD: 46.8 MM HG (ref 35–45)
PCO2 TEMP ADJ BLD: 71.5 MM HG (ref 35–45)
PH BLDA: 7.18 PH UNITS (ref 7.35–7.45)
PH BLDA: 7.37 PH UNITS (ref 7.35–7.45)
PH BLDA: 7.4 PH UNITS (ref 7.35–7.45)
PH UR STRIP.AUTO: 5.5 [PH] (ref 5–8)
PH, TEMP CORRECTED: 7.18 PH UNITS (ref 7.35–7.45)
PH, TEMP CORRECTED: 7.37 PH UNITS (ref 7.35–7.45)
PH, TEMP CORRECTED: 7.4 PH UNITS (ref 7.35–7.45)
PHOSPHATE SERPL-MCNC: 2.5 MG/DL (ref 2.5–4.5)
PHOSPHATE SERPL-MCNC: 2.6 MG/DL (ref 2.5–4.5)
PHOSPHATE SERPL-MCNC: 3.1 MG/DL (ref 2.5–4.5)
PHOSPHATE SERPL-MCNC: 3.6 MG/DL (ref 2.5–4.5)
PLATELET # BLD AUTO: 56 10*3/MM3 (ref 140–450)
PLATELET # BLD AUTO: 60 10*3/MM3 (ref 140–450)
PLATELET # BLD AUTO: 60 10*3/MM3 (ref 140–450)
PLATELET # BLD AUTO: 63 10*3/MM3 (ref 140–450)
PLATELET # BLD AUTO: 66 10*3/MM3 (ref 140–450)
PLATELET # BLD AUTO: 66 10*3/MM3 (ref 140–450)
PLATELET # BLD AUTO: 71 10*3/MM3 (ref 140–450)
PLATELET # BLD AUTO: 73 10*3/MM3 (ref 140–450)
PMV BLD AUTO: 10 FL (ref 6–12)
PMV BLD AUTO: 10.1 FL (ref 6–12)
PMV BLD AUTO: 10.3 FL (ref 6–12)
PMV BLD AUTO: 10.5 FL (ref 6–12)
PMV BLD AUTO: 9.7 FL (ref 6–12)
PMV BLD AUTO: 9.9 FL (ref 6–12)
PO2 BLD: 116 MM[HG] (ref 0–500)
PO2 BLD: 133 MM[HG] (ref 0–500)
PO2 BLDA: 60.3 MM HG (ref 83–108)
PO2 BLDA: 69.3 MM HG (ref 83–108)
PO2 BLDA: 79.8 MM HG (ref 83–108)
PO2 TEMP ADJ BLD: 60.3 MM HG (ref 83–108)
PO2 TEMP ADJ BLD: 69.3 MM HG (ref 83–108)
PO2 TEMP ADJ BLD: 79.8 MM HG (ref 83–108)
POIKILOCYTOSIS BLD QL SMEAR: ABNORMAL
POLYCHROMASIA BLD QL SMEAR: ABNORMAL
POLYCHROMASIA BLD QL SMEAR: ABNORMAL
POTASSIUM BLDA-SCNC: 3.5 MMOL/L (ref 3.5–5.2)
POTASSIUM SERPL-SCNC: 3.3 MMOL/L (ref 3.5–5.2)
POTASSIUM SERPL-SCNC: 3.3 MMOL/L (ref 3.5–5.2)
POTASSIUM SERPL-SCNC: 3.4 MMOL/L (ref 3.5–5.2)
POTASSIUM SERPL-SCNC: 3.4 MMOL/L (ref 3.5–5.2)
POTASSIUM SERPL-SCNC: 3.5 MMOL/L (ref 3.5–5.2)
POTASSIUM SERPL-SCNC: 3.5 MMOL/L (ref 3.5–5.2)
POTASSIUM SERPL-SCNC: 3.6 MMOL/L (ref 3.5–5.2)
POTASSIUM SERPL-SCNC: 3.7 MMOL/L (ref 3.5–5.2)
PROT SERPL-MCNC: 5.5 G/DL (ref 6–8.5)
PROT SERPL-MCNC: 5.5 G/DL (ref 6–8.5)
PROT SERPL-MCNC: 5.9 G/DL (ref 6–8.5)
PROT SERPL-MCNC: 6 G/DL (ref 6–8.5)
PROT SERPL-MCNC: 6 G/DL (ref 6–8.5)
PROT UR QL STRIP: ABNORMAL
RBC # BLD AUTO: 2.32 10*6/MM3 (ref 4.14–5.8)
RBC # BLD AUTO: 2.42 10*6/MM3 (ref 4.14–5.8)
RBC # BLD AUTO: 2.45 10*6/MM3 (ref 4.14–5.8)
RBC # BLD AUTO: 2.49 10*6/MM3 (ref 4.14–5.8)
RBC # BLD AUTO: 2.49 10*6/MM3 (ref 4.14–5.8)
RBC # BLD AUTO: 2.5 10*6/MM3 (ref 4.14–5.8)
RBC # BLD AUTO: 2.53 10*6/MM3 (ref 4.14–5.8)
RBC # BLD AUTO: 2.61 10*6/MM3 (ref 4.14–5.8)
RBC # UR STRIP: NORMAL /HPF
REF LAB TEST METHOD: NORMAL
RHINOVIRUS RNA SPEC NAA+PROBE: NOT DETECTED
RSV RNA NPH QL NAA+NON-PROBE: NOT DETECTED
SAO2 % BLDCOA: 83.7 % (ref 94–99)
SAO2 % BLDCOA: 93.6 % (ref 94–99)
SAO2 % BLDCOA: 96.5 % (ref 94–99)
SARS-COV-2 RNA RESP QL NAA+PROBE: NOT DETECTED
SMALL PLATELETS BLD QL SMEAR: ABNORMAL
SMALL PLATELETS BLD QL SMEAR: ABNORMAL
SODIUM BLDA-SCNC: 141 MMOL/L (ref 136–145)
SODIUM SERPL-SCNC: 139 MMOL/L (ref 136–145)
SODIUM SERPL-SCNC: 140 MMOL/L (ref 136–145)
SODIUM SERPL-SCNC: 140 MMOL/L (ref 136–145)
SODIUM SERPL-SCNC: 141 MMOL/L (ref 136–145)
SODIUM SERPL-SCNC: 141 MMOL/L (ref 136–145)
SODIUM SERPL-SCNC: 146 MMOL/L (ref 136–145)
SP GR UR STRIP: 1.02 (ref 1–1.03)
SQUAMOUS #/AREA URNS HPF: NORMAL /HPF
T4 FREE SERPL-MCNC: 1.23 NG/DL (ref 0.93–1.7)
TSH SERPL DL<=0.05 MIU/L-ACNC: 3.49 UIU/ML (ref 0.27–4.2)
UROBILINOGEN UR QL STRIP: ABNORMAL
VANCOMYCIN SERPL-MCNC: 25.3 MCG/ML (ref 5–40)
VANCOMYCIN TROUGH SERPL-MCNC: 18 MCG/ML (ref 5–20)
VARIANT LYMPHS NFR BLD MANUAL: 13 % (ref 19.6–45.3)
VARIANT LYMPHS NFR BLD MANUAL: 7.5 % (ref 19.6–45.3)
VENTILATOR MODE: ABNORMAL
VENTILATOR MODE: ABNORMAL
WBC # UR STRIP: NORMAL /HPF
WBC MORPH BLD: NORMAL
WBC MORPH BLD: NORMAL
WBC NRBC COR # BLD AUTO: 10.82 10*3/MM3 (ref 3.4–10.8)
WBC NRBC COR # BLD AUTO: 11.14 10*3/MM3 (ref 3.4–10.8)
WBC NRBC COR # BLD AUTO: 12.13 10*3/MM3 (ref 3.4–10.8)
WBC NRBC COR # BLD AUTO: 12.98 10*3/MM3 (ref 3.4–10.8)
WBC NRBC COR # BLD AUTO: 15.29 10*3/MM3 (ref 3.4–10.8)
WBC NRBC COR # BLD AUTO: 4.73 10*3/MM3 (ref 3.4–10.8)
WBC NRBC COR # BLD AUTO: 5.55 10*3/MM3 (ref 3.4–10.8)
WBC NRBC COR # BLD AUTO: 5.75 10*3/MM3 (ref 3.4–10.8)

## 2025-01-01 PROCEDURE — 94799 UNLISTED PULMONARY SVC/PX: CPT

## 2025-01-01 PROCEDURE — 84439 ASSAY OF FREE THYROXINE: CPT | Performed by: NURSE PRACTITIONER

## 2025-01-01 PROCEDURE — 97530 THERAPEUTIC ACTIVITIES: CPT

## 2025-01-01 PROCEDURE — 83735 ASSAY OF MAGNESIUM: CPT | Performed by: PHYSICIAN ASSISTANT

## 2025-01-01 PROCEDURE — 25010000002 ONDANSETRON PER 1 MG: Performed by: PHYSICIAN ASSISTANT

## 2025-01-01 PROCEDURE — 84100 ASSAY OF PHOSPHORUS: CPT | Performed by: PHYSICIAN ASSISTANT

## 2025-01-01 PROCEDURE — 25010000002 PIPERACILLIN SOD-TAZOBACTAM PER 1 G: Performed by: INTERNAL MEDICINE

## 2025-01-01 PROCEDURE — 71045 X-RAY EXAM CHEST 1 VIEW: CPT

## 2025-01-01 PROCEDURE — 82140 ASSAY OF AMMONIA: CPT | Performed by: PHYSICIAN ASSISTANT

## 2025-01-01 PROCEDURE — 25010000002 MEROPENEM PER 100 MG

## 2025-01-01 PROCEDURE — 94664 DEMO&/EVAL PT USE INHALER: CPT

## 2025-01-01 PROCEDURE — 99497 ADVNCD CARE PLAN 30 MIN: CPT

## 2025-01-01 PROCEDURE — 80076 HEPATIC FUNCTION PANEL: CPT

## 2025-01-01 PROCEDURE — 80048 BASIC METABOLIC PNL TOTAL CA: CPT | Performed by: FAMILY MEDICINE

## 2025-01-01 PROCEDURE — 85025 COMPLETE CBC W/AUTO DIFF WBC: CPT | Performed by: PHYSICIAN ASSISTANT

## 2025-01-01 PROCEDURE — 5A09557 ASSISTANCE WITH RESPIRATORY VENTILATION, GREATER THAN 96 CONSECUTIVE HOURS, CONTINUOUS POSITIVE AIRWAY PRESSURE: ICD-10-PCS | Performed by: FAMILY MEDICINE

## 2025-01-01 PROCEDURE — 25010000002 PIPERACILLIN SOD-TAZOBACTAM PER 1 G: Performed by: PHYSICIAN ASSISTANT

## 2025-01-01 PROCEDURE — 85027 COMPLETE CBC AUTOMATED: CPT

## 2025-01-01 PROCEDURE — 94760 N-INVAS EAR/PLS OXIMETRY 1: CPT

## 2025-01-01 PROCEDURE — A9579 GAD-BASE MR CONTRAST NOS,1ML: HCPCS | Performed by: FAMILY MEDICINE

## 2025-01-01 PROCEDURE — 94761 N-INVAS EAR/PLS OXIMETRY MLT: CPT

## 2025-01-01 PROCEDURE — 25010000002 VANCOMYCIN 1.25-0.9 GM/250ML-% SOLUTION: Performed by: INTERNAL MEDICINE

## 2025-01-01 PROCEDURE — 25010000002 VANCOMYCIN 1.5-0.9 GM/500ML-% SOLUTION

## 2025-01-01 PROCEDURE — 92610 EVALUATE SWALLOWING FUNCTION: CPT | Performed by: SPEECH-LANGUAGE PATHOLOGIST

## 2025-01-01 PROCEDURE — 74018 RADEX ABDOMEN 1 VIEW: CPT

## 2025-01-01 PROCEDURE — 74230 X-RAY XM SWLNG FUNCJ C+: CPT

## 2025-01-01 PROCEDURE — 95714 VEEG EA 12-26 HR UNMNTR: CPT

## 2025-01-01 PROCEDURE — 25010000002 HEPARIN (PORCINE) PER 1000 UNITS: Performed by: PHYSICIAN ASSISTANT

## 2025-01-01 PROCEDURE — 92526 ORAL FUNCTION THERAPY: CPT

## 2025-01-01 PROCEDURE — 25810000003 LACTATED RINGERS SOLUTION

## 2025-01-01 PROCEDURE — 25010000002 PIPERACILLIN SOD-TAZOBACTAM PER 1 G: Performed by: FAMILY MEDICINE

## 2025-01-01 PROCEDURE — 80053 COMPREHEN METABOLIC PANEL: CPT | Performed by: PHYSICIAN ASSISTANT

## 2025-01-01 PROCEDURE — 82805 BLOOD GASES W/O2 SATURATION: CPT

## 2025-01-01 PROCEDURE — 85025 COMPLETE CBC W/AUTO DIFF WBC: CPT | Performed by: FAMILY MEDICINE

## 2025-01-01 PROCEDURE — 82803 BLOOD GASES ANY COMBINATION: CPT

## 2025-01-01 PROCEDURE — 0202U NFCT DS 22 TRGT SARS-COV-2: CPT

## 2025-01-01 PROCEDURE — 81001 URINALYSIS AUTO W/SCOPE: CPT

## 2025-01-01 PROCEDURE — 97162 PT EVAL MOD COMPLEX 30 MIN: CPT

## 2025-01-01 PROCEDURE — 80048 BASIC METABOLIC PNL TOTAL CA: CPT

## 2025-01-01 PROCEDURE — 82375 ASSAY CARBOXYHB QUANT: CPT

## 2025-01-01 PROCEDURE — 82948 REAGENT STRIP/BLOOD GLUCOSE: CPT

## 2025-01-01 PROCEDURE — 83605 ASSAY OF LACTIC ACID: CPT

## 2025-01-01 PROCEDURE — 94660 CPAP INITIATION&MGMT: CPT

## 2025-01-01 PROCEDURE — 85007 BL SMEAR W/DIFF WBC COUNT: CPT | Performed by: FAMILY MEDICINE

## 2025-01-01 PROCEDURE — 99223 1ST HOSP IP/OBS HIGH 75: CPT | Performed by: PSYCHIATRY & NEUROLOGY

## 2025-01-01 PROCEDURE — 99233 SBSQ HOSP IP/OBS HIGH 50: CPT

## 2025-01-01 PROCEDURE — 36600 WITHDRAWAL OF ARTERIAL BLOOD: CPT | Performed by: INTERNAL MEDICINE

## 2025-01-01 PROCEDURE — 25810000003 LACTATED RINGERS PER 1000 ML

## 2025-01-01 PROCEDURE — 92611 MOTION FLUOROSCOPY/SWALLOW: CPT

## 2025-01-01 PROCEDURE — 87641 MR-STAPH DNA AMP PROBE: CPT

## 2025-01-01 PROCEDURE — 82140 ASSAY OF AMMONIA: CPT | Performed by: FAMILY MEDICINE

## 2025-01-01 PROCEDURE — 99233 SBSQ HOSP IP/OBS HIGH 50: CPT | Performed by: STUDENT IN AN ORGANIZED HEALTH CARE EDUCATION/TRAINING PROGRAM

## 2025-01-01 PROCEDURE — 99232 SBSQ HOSP IP/OBS MODERATE 35: CPT

## 2025-01-01 PROCEDURE — 70553 MRI BRAIN STEM W/O & W/DYE: CPT

## 2025-01-01 PROCEDURE — 83605 ASSAY OF LACTIC ACID: CPT | Performed by: NURSE PRACTITIONER

## 2025-01-01 PROCEDURE — 82803 BLOOD GASES ANY COMBINATION: CPT | Performed by: INTERNAL MEDICINE

## 2025-01-01 PROCEDURE — 25010000002 HYDROMORPHONE PER 4 MG: Performed by: PHYSICIAN ASSISTANT

## 2025-01-01 PROCEDURE — 95720 EEG PHY/QHP EA INCR W/VEEG: CPT | Performed by: PSYCHIATRY & NEUROLOGY

## 2025-01-01 PROCEDURE — 94640 AIRWAY INHALATION TREATMENT: CPT

## 2025-01-01 PROCEDURE — 84443 ASSAY THYROID STIM HORMONE: CPT | Performed by: NURSE PRACTITIONER

## 2025-01-01 PROCEDURE — 74176 CT ABD & PELVIS W/O CONTRAST: CPT

## 2025-01-01 PROCEDURE — 97166 OT EVAL MOD COMPLEX 45 MIN: CPT | Performed by: OCCUPATIONAL THERAPIST

## 2025-01-01 PROCEDURE — 99223 1ST HOSP IP/OBS HIGH 75: CPT

## 2025-01-01 PROCEDURE — 83050 HGB METHEMOGLOBIN QUAN: CPT

## 2025-01-01 PROCEDURE — 25010000002 VANCOMYCIN PER 500 MG: Performed by: FAMILY MEDICINE

## 2025-01-01 PROCEDURE — 87040 BLOOD CULTURE FOR BACTERIA: CPT

## 2025-01-01 PROCEDURE — 3E0G76Z INTRODUCTION OF NUTRITIONAL SUBSTANCE INTO UPPER GI, VIA NATURAL OR ARTIFICIAL OPENING: ICD-10-PCS | Performed by: INTERNAL MEDICINE

## 2025-01-01 PROCEDURE — 36600 WITHDRAWAL OF ARTERIAL BLOOD: CPT

## 2025-01-01 PROCEDURE — 25510000001 GADOPICLENOL 0.5 MMOL/ML SOLUTION: Performed by: FAMILY MEDICINE

## 2025-01-01 PROCEDURE — 80202 ASSAY OF VANCOMYCIN: CPT | Performed by: INTERNAL MEDICINE

## 2025-01-01 PROCEDURE — 25810000003 LACTATED RINGERS SOLUTION: Performed by: PHYSICIAN ASSISTANT

## 2025-01-01 PROCEDURE — 80202 ASSAY OF VANCOMYCIN: CPT | Performed by: FAMILY MEDICINE

## 2025-01-01 PROCEDURE — 92610 EVALUATE SWALLOWING FUNCTION: CPT

## 2025-01-01 PROCEDURE — 99232 SBSQ HOSP IP/OBS MODERATE 35: CPT | Performed by: PSYCHIATRY & NEUROLOGY

## 2025-01-01 RX ORDER — LEVOTHYROXINE SODIUM 75 UG/1
75 TABLET ORAL
Status: DISCONTINUED | OUTPATIENT
Start: 2025-01-01 | End: 2025-01-01

## 2025-01-01 RX ORDER — LORAZEPAM 2 MG/ML
1 INJECTION INTRAMUSCULAR
Status: DISCONTINUED | OUTPATIENT
Start: 2025-01-01 | End: 2025-01-01 | Stop reason: HOSPADM

## 2025-01-01 RX ORDER — MAGNESIUM CARB/ALUMINUM HYDROX 105-160MG
296 TABLET,CHEWABLE ORAL ONCE
Status: COMPLETED | OUTPATIENT
Start: 2025-01-01 | End: 2025-01-01

## 2025-01-01 RX ORDER — LORAZEPAM 2 MG/ML
0.5 CONCENTRATE ORAL
Status: DISCONTINUED | OUTPATIENT
Start: 2025-01-01 | End: 2025-01-01 | Stop reason: HOSPADM

## 2025-01-01 RX ORDER — RANOLAZINE 500 MG/1
1000 TABLET, EXTENDED RELEASE ORAL EVERY 12 HOURS SCHEDULED
Status: DISCONTINUED | OUTPATIENT
Start: 2025-01-01 | End: 2025-01-01

## 2025-01-01 RX ORDER — POLYETHYLENE GLYCOL 3350 17 G/17G
17 POWDER, FOR SOLUTION ORAL DAILY PRN
Status: DISCONTINUED | OUTPATIENT
Start: 2025-01-01 | End: 2025-01-01 | Stop reason: HOSPADM

## 2025-01-01 RX ORDER — CLOPIDOGREL BISULFATE 75 MG/1
75 TABLET ORAL DAILY
Status: DISCONTINUED | OUTPATIENT
Start: 2025-01-01 | End: 2025-01-01

## 2025-01-01 RX ORDER — MORPHINE SULFATE 20 MG/ML
10 SOLUTION ORAL
Refills: 0 | Status: DISCONTINUED | OUTPATIENT
Start: 2025-01-01 | End: 2025-01-01 | Stop reason: HOSPADM

## 2025-01-01 RX ORDER — IPRATROPIUM BROMIDE AND ALBUTEROL SULFATE 2.5; .5 MG/3ML; MG/3ML
3 SOLUTION RESPIRATORY (INHALATION)
Status: DISCONTINUED | OUTPATIENT
Start: 2025-01-01 | End: 2025-01-01

## 2025-01-01 RX ORDER — LORAZEPAM 2 MG/ML
0.5 INJECTION INTRAMUSCULAR
Status: DISCONTINUED | OUTPATIENT
Start: 2025-01-01 | End: 2025-01-01 | Stop reason: HOSPADM

## 2025-01-01 RX ORDER — AMOXICILLIN 250 MG
2 CAPSULE ORAL 2 TIMES DAILY PRN
Status: DISCONTINUED | OUTPATIENT
Start: 2025-01-01 | End: 2025-01-01

## 2025-01-01 RX ORDER — LACTULOSE 10 G/15ML
20 SOLUTION ORAL 2 TIMES DAILY
Status: DISCONTINUED | OUTPATIENT
Start: 2025-01-01 | End: 2025-01-01

## 2025-01-01 RX ORDER — ACETAMINOPHEN 325 MG/1
650 TABLET ORAL EVERY 4 HOURS PRN
Status: DISCONTINUED | OUTPATIENT
Start: 2025-01-01 | End: 2025-01-01

## 2025-01-01 RX ORDER — HEPARIN SODIUM 5000 [USP'U]/ML
5000 INJECTION, SOLUTION INTRAVENOUS; SUBCUTANEOUS EVERY 8 HOURS SCHEDULED
Status: DISCONTINUED | OUTPATIENT
Start: 2025-01-01 | End: 2025-01-01

## 2025-01-01 RX ORDER — GUAIFENESIN 200 MG/10ML
400 LIQUID ORAL EVERY 8 HOURS SCHEDULED
Status: DISCONTINUED | OUTPATIENT
Start: 2025-01-01 | End: 2025-01-01

## 2025-01-01 RX ORDER — BISACODYL 10 MG
10 SUPPOSITORY, RECTAL RECTAL DAILY PRN
Status: DISCONTINUED | OUTPATIENT
Start: 2025-01-01 | End: 2025-01-01 | Stop reason: HOSPADM

## 2025-01-01 RX ORDER — POTASSIUM CHLORIDE 1.5 G/1.58G
20 POWDER, FOR SOLUTION ORAL ONCE
Status: COMPLETED | OUTPATIENT
Start: 2025-01-01 | End: 2025-01-01

## 2025-01-01 RX ORDER — LANSOPRAZOLE 30 MG/1
30 TABLET, ORALLY DISINTEGRATING, DELAYED RELEASE ORAL
Status: DISCONTINUED | OUTPATIENT
Start: 2025-01-01 | End: 2025-01-01

## 2025-01-01 RX ORDER — LORAZEPAM 2 MG/ML
1 CONCENTRATE ORAL
Status: DISCONTINUED | OUTPATIENT
Start: 2025-01-01 | End: 2025-01-01 | Stop reason: HOSPADM

## 2025-01-01 RX ORDER — AMOXICILLIN 250 MG
2 CAPSULE ORAL 2 TIMES DAILY PRN
Status: DISCONTINUED | OUTPATIENT
Start: 2025-01-01 | End: 2025-01-01 | Stop reason: HOSPADM

## 2025-01-01 RX ORDER — SODIUM CHLORIDE 9 MG/ML
40 INJECTION, SOLUTION INTRAVENOUS ONCE
Status: DISCONTINUED | OUTPATIENT
Start: 2025-01-01 | End: 2025-01-01 | Stop reason: HOSPADM

## 2025-01-01 RX ORDER — PROCHLORPERAZINE EDISYLATE 5 MG/ML
5 INJECTION INTRAMUSCULAR; INTRAVENOUS EVERY 6 HOURS PRN
Status: DISCONTINUED | OUTPATIENT
Start: 2025-01-01 | End: 2025-01-01 | Stop reason: HOSPADM

## 2025-01-01 RX ORDER — VANCOMYCIN HYDROCHLORIDE
1250 EVERY 24 HOURS
Status: DISCONTINUED | OUTPATIENT
Start: 2025-01-01 | End: 2025-01-01 | Stop reason: DRUGHIGH

## 2025-01-01 RX ORDER — NOREPINEPHRINE BITARTRATE 0.03 MG/ML
INJECTION, SOLUTION INTRAVENOUS
Status: COMPLETED
Start: 2025-01-01 | End: 2025-01-01

## 2025-01-01 RX ORDER — FERROUS SULFATE 325(65) MG
325 TABLET ORAL 2 TIMES DAILY WITH MEALS
Status: DISCONTINUED | OUTPATIENT
Start: 2025-01-01 | End: 2025-01-01

## 2025-01-01 RX ORDER — LACTULOSE 10 G/15ML
300 SOLUTION ORAL ONCE
Status: COMPLETED | OUTPATIENT
Start: 2025-01-01 | End: 2025-01-01

## 2025-01-01 RX ORDER — ONDANSETRON 2 MG/ML
4 INJECTION INTRAMUSCULAR; INTRAVENOUS EVERY 6 HOURS PRN
Status: DISCONTINUED | OUTPATIENT
Start: 2025-01-01 | End: 2025-01-01

## 2025-01-01 RX ORDER — SODIUM CHLORIDE 0.9 % (FLUSH) 0.9 %
10 SYRINGE (ML) INJECTION AS NEEDED
Status: DISCONTINUED | OUTPATIENT
Start: 2025-01-01 | End: 2025-01-01 | Stop reason: HOSPADM

## 2025-01-01 RX ORDER — ACETAMINOPHEN 325 MG/1
650 TABLET ORAL EVERY 4 HOURS PRN
Status: DISCONTINUED | OUTPATIENT
Start: 2025-01-01 | End: 2025-01-01 | Stop reason: HOSPADM

## 2025-01-01 RX ORDER — MORPHINE SULFATE 20 MG/ML
5 SOLUTION ORAL
Refills: 0 | Status: DISCONTINUED | OUTPATIENT
Start: 2025-01-01 | End: 2025-01-01 | Stop reason: HOSPADM

## 2025-01-01 RX ORDER — SODIUM CHLORIDE 0.9 % (FLUSH) 0.9 %
10 SYRINGE (ML) INJECTION EVERY 12 HOURS SCHEDULED
Status: DISCONTINUED | OUTPATIENT
Start: 2025-01-01 | End: 2025-01-01 | Stop reason: HOSPADM

## 2025-01-01 RX ORDER — ONDANSETRON 4 MG/1
4 TABLET, ORALLY DISINTEGRATING ORAL EVERY 6 HOURS PRN
Status: DISCONTINUED | OUTPATIENT
Start: 2025-01-01 | End: 2025-01-01

## 2025-01-01 RX ORDER — NOREPINEPHRINE BITARTRATE 0.03 MG/ML
.02-.3 INJECTION, SOLUTION INTRAVENOUS
Status: DISCONTINUED | OUTPATIENT
Start: 2025-01-01 | End: 2025-01-01

## 2025-01-01 RX ORDER — LACTULOSE 10 G/15ML
20 SOLUTION ORAL DAILY
Status: DISCONTINUED | OUTPATIENT
Start: 2025-01-01 | End: 2025-01-01

## 2025-01-01 RX ORDER — HYDROMORPHONE HYDROCHLORIDE 1 MG/ML
0.5 INJECTION, SOLUTION INTRAMUSCULAR; INTRAVENOUS; SUBCUTANEOUS EVERY 4 HOURS PRN
Status: DISCONTINUED | OUTPATIENT
Start: 2025-01-01 | End: 2025-01-01 | Stop reason: HOSPADM

## 2025-01-01 RX ORDER — GUAIFENESIN 600 MG/1
600 TABLET, EXTENDED RELEASE ORAL EVERY 12 HOURS SCHEDULED
Status: DISCONTINUED | OUTPATIENT
Start: 2025-01-01 | End: 2025-01-01

## 2025-01-01 RX ORDER — FERROUS SULFATE 300 MG/5ML
300 LIQUID (ML) ORAL DAILY
Status: DISCONTINUED | OUTPATIENT
Start: 2025-01-01 | End: 2025-01-01

## 2025-01-01 RX ORDER — ONDANSETRON 2 MG/ML
4 INJECTION INTRAMUSCULAR; INTRAVENOUS EVERY 6 HOURS PRN
Status: DISCONTINUED | OUTPATIENT
Start: 2025-01-01 | End: 2025-01-01 | Stop reason: HOSPADM

## 2025-01-01 RX ORDER — VANCOMYCIN/0.9 % SOD CHLORIDE 750 MG/250
750 PLASTIC BAG, INJECTION (ML) INTRAVENOUS EVERY 24 HOURS
Status: DISCONTINUED | OUTPATIENT
Start: 2025-01-01 | End: 2025-01-01

## 2025-01-01 RX ORDER — PANTOPRAZOLE SODIUM 40 MG/1
40 TABLET, DELAYED RELEASE ORAL
Status: DISCONTINUED | OUTPATIENT
Start: 2025-01-01 | End: 2025-01-01

## 2025-01-01 RX ORDER — ONDANSETRON 4 MG/1
4 TABLET, ORALLY DISINTEGRATING ORAL EVERY 6 HOURS PRN
Status: DISCONTINUED | OUTPATIENT
Start: 2025-01-01 | End: 2025-01-01 | Stop reason: HOSPADM

## 2025-01-01 RX ORDER — ALPRAZOLAM 0.25 MG
0.25 TABLET ORAL NIGHTLY PRN
Status: DISCONTINUED | OUTPATIENT
Start: 2025-01-01 | End: 2025-01-01

## 2025-01-01 RX ORDER — PROCHLORPERAZINE MALEATE 10 MG
5 TABLET ORAL EVERY 6 HOURS PRN
Status: DISCONTINUED | OUTPATIENT
Start: 2025-01-01 | End: 2025-01-01 | Stop reason: HOSPADM

## 2025-01-01 RX ORDER — SODIUM CHLORIDE, SODIUM LACTATE, POTASSIUM CHLORIDE, CALCIUM CHLORIDE 600; 310; 30; 20 MG/100ML; MG/100ML; MG/100ML; MG/100ML
150 INJECTION, SOLUTION INTRAVENOUS CONTINUOUS
Status: DISPENSED | OUTPATIENT
Start: 2025-01-01 | End: 2025-01-01

## 2025-01-01 RX ORDER — PROCHLORPERAZINE 25 MG
25 SUPPOSITORY, RECTAL RECTAL EVERY 12 HOURS PRN
Status: DISCONTINUED | OUTPATIENT
Start: 2025-01-01 | End: 2025-01-01 | Stop reason: HOSPADM

## 2025-01-01 RX ORDER — BUDESONIDE, GLYCOPYRROLATE, AND FORMOTEROL FUMARATE 160; 9; 4.8 UG/1; UG/1; UG/1
2 AEROSOL, METERED RESPIRATORY (INHALATION) 2 TIMES DAILY
COMMUNITY

## 2025-01-01 RX ORDER — ACETAMINOPHEN 160 MG/5ML
650 SOLUTION ORAL EVERY 4 HOURS PRN
Status: DISCONTINUED | OUTPATIENT
Start: 2025-01-01 | End: 2025-01-01 | Stop reason: HOSPADM

## 2025-01-01 RX ORDER — ACETAMINOPHEN 160 MG/5ML
650 SOLUTION ORAL EVERY 4 HOURS PRN
Status: DISCONTINUED | OUTPATIENT
Start: 2025-01-01 | End: 2025-01-01

## 2025-01-01 RX ORDER — BUDESONIDE 0.25 MG/2ML
0.25 INHALANT ORAL
Status: DISCONTINUED | OUTPATIENT
Start: 2025-01-01 | End: 2025-01-01

## 2025-01-01 RX ORDER — POLYETHYLENE GLYCOL 3350 17 G/17G
17 POWDER, FOR SOLUTION ORAL DAILY PRN
Status: DISCONTINUED | OUTPATIENT
Start: 2025-01-01 | End: 2025-01-01

## 2025-01-01 RX ORDER — GUAIFENESIN 600 MG/1
600 TABLET, EXTENDED RELEASE ORAL DAILY
COMMUNITY

## 2025-01-01 RX ORDER — ACETAMINOPHEN 650 MG/1
650 SUPPOSITORY RECTAL EVERY 4 HOURS PRN
Status: DISCONTINUED | OUTPATIENT
Start: 2025-01-01 | End: 2025-01-01

## 2025-01-01 RX ORDER — ACETAMINOPHEN 650 MG/1
650 SUPPOSITORY RECTAL EVERY 4 HOURS PRN
Status: DISCONTINUED | OUTPATIENT
Start: 2025-01-01 | End: 2025-01-01 | Stop reason: HOSPADM

## 2025-01-01 RX ORDER — BISACODYL 10 MG
10 SUPPOSITORY, RECTAL RECTAL DAILY PRN
Status: DISCONTINUED | OUTPATIENT
Start: 2025-01-01 | End: 2025-01-01

## 2025-01-01 RX ORDER — VANCOMYCIN/0.9 % SOD CHLORIDE 1.5G/250ML
1500 PLASTIC BAG, INJECTION (ML) INTRAVENOUS EVERY 24 HOURS
Status: DISCONTINUED | OUTPATIENT
Start: 2025-01-01 | End: 2025-01-01

## 2025-01-01 RX ORDER — DULOXETIN HYDROCHLORIDE 30 MG/1
60 CAPSULE, DELAYED RELEASE ORAL DAILY
Status: DISCONTINUED | OUTPATIENT
Start: 2025-01-01 | End: 2025-01-01

## 2025-01-01 RX ORDER — IPRATROPIUM BROMIDE AND ALBUTEROL SULFATE 2.5; .5 MG/3ML; MG/3ML
3 SOLUTION RESPIRATORY (INHALATION) EVERY 6 HOURS PRN
Status: DISCONTINUED | OUTPATIENT
Start: 2025-01-01 | End: 2025-01-01 | Stop reason: HOSPADM

## 2025-01-01 RX ORDER — LACTULOSE 10 G/15ML
300 SOLUTION ORAL ONCE
Status: DISCONTINUED | OUTPATIENT
Start: 2025-01-01 | End: 2025-01-01

## 2025-01-01 RX ORDER — NYSTATIN 100000 [USP'U]/ML
5 SUSPENSION ORAL 4 TIMES DAILY
Status: DISCONTINUED | OUTPATIENT
Start: 2025-01-01 | End: 2025-01-01

## 2025-01-01 RX ORDER — ROSUVASTATIN CALCIUM 10 MG/1
40 TABLET, COATED ORAL NIGHTLY
Status: DISCONTINUED | OUTPATIENT
Start: 2025-01-01 | End: 2025-01-01

## 2025-01-01 RX ORDER — BISACODYL 5 MG/1
5 TABLET, DELAYED RELEASE ORAL DAILY PRN
Status: DISCONTINUED | OUTPATIENT
Start: 2025-01-01 | End: 2025-01-01

## 2025-01-01 RX ORDER — CHLORHEXIDINE GLUCONATE 500 MG/1
1 CLOTH TOPICAL DAILY
Status: DISCONTINUED | OUTPATIENT
Start: 2025-01-01 | End: 2025-01-01

## 2025-01-01 RX ORDER — ROSUVASTATIN CALCIUM 20 MG/1
40 TABLET, COATED ORAL NIGHTLY
Status: DISCONTINUED | OUTPATIENT
Start: 2025-01-01 | End: 2025-01-01

## 2025-01-01 RX ORDER — ALPRAZOLAM 0.25 MG
0.25 TABLET ORAL NIGHTLY PRN
Status: DISPENSED | OUTPATIENT
Start: 2025-01-01 | End: 2025-01-01

## 2025-01-01 RX ORDER — PROMETHAZINE HYDROCHLORIDE 6.25 MG/5ML
6.25 SYRUP ORAL EVERY 6 HOURS PRN
Status: DISCONTINUED | OUTPATIENT
Start: 2025-01-01 | End: 2025-01-01

## 2025-01-01 RX ORDER — SCOPOLAMINE 1 MG/3D
1 PATCH, EXTENDED RELEASE TRANSDERMAL
Status: DISCONTINUED | OUTPATIENT
Start: 2025-01-01 | End: 2025-01-01 | Stop reason: HOSPADM

## 2025-01-01 RX ORDER — CODEINE PHOSPHATE AND GUAIFENESIN 10; 100 MG/5ML; MG/5ML
5 SOLUTION ORAL EVERY 6 HOURS PRN
Status: DISCONTINUED | OUTPATIENT
Start: 2025-01-01 | End: 2025-01-01

## 2025-01-01 RX ORDER — GABAPENTIN 300 MG/1
300 CAPSULE ORAL NIGHTLY
Status: DISCONTINUED | OUTPATIENT
Start: 2025-01-01 | End: 2025-01-01

## 2025-01-01 RX ADMIN — HEPARIN SODIUM 5000 UNITS: 5000 INJECTION, SOLUTION INTRAVENOUS; SUBCUTANEOUS at 22:23

## 2025-01-01 RX ADMIN — GABAPENTIN 300 MG: 300 CAPSULE ORAL at 22:19

## 2025-01-01 RX ADMIN — BUSPIRONE HYDROCHLORIDE 15 MG: 5 TABLET ORAL at 20:10

## 2025-01-01 RX ADMIN — GABAPENTIN 300 MG: 300 CAPSULE ORAL at 21:12

## 2025-01-01 RX ADMIN — BUSPIRONE HYDROCHLORIDE 15 MG: 5 TABLET ORAL at 22:22

## 2025-01-01 RX ADMIN — ONDANSETRON 4 MG: 2 INJECTION INTRAMUSCULAR; INTRAVENOUS at 06:27

## 2025-01-01 RX ADMIN — IPRATROPIUM BROMIDE AND ALBUTEROL SULFATE 3 ML: 2.5; .5 SOLUTION RESPIRATORY (INHALATION) at 20:06

## 2025-01-01 RX ADMIN — Medication 1 APPLICATION: at 09:28

## 2025-01-01 RX ADMIN — BARIUM SULFATE 55 ML: 0.81 POWDER, FOR SUSPENSION ORAL at 14:13

## 2025-01-01 RX ADMIN — IPRATROPIUM BROMIDE AND ALBUTEROL SULFATE 3 ML: 2.5; .5 SOLUTION RESPIRATORY (INHALATION) at 07:00

## 2025-01-01 RX ADMIN — IPRATROPIUM BROMIDE AND ALBUTEROL SULFATE 3 ML: 2.5; .5 SOLUTION RESPIRATORY (INHALATION) at 07:26

## 2025-01-01 RX ADMIN — PIPERACILLIN AND TAZOBACTAM 4.5 G: 4; .5 INJECTION, POWDER, FOR SOLUTION INTRAVENOUS at 04:56

## 2025-01-01 RX ADMIN — HEPARIN SODIUM 5000 UNITS: 5000 INJECTION, SOLUTION INTRAVENOUS; SUBCUTANEOUS at 22:01

## 2025-01-01 RX ADMIN — GADOPICLENOL 7.5 ML: 485.1 INJECTION INTRAVENOUS at 13:21

## 2025-01-01 RX ADMIN — MEROPENEM 1000 MG: 1 INJECTION, POWDER, FOR SOLUTION INTRAVENOUS at 04:20

## 2025-01-01 RX ADMIN — Medication 10 ML: at 20:19

## 2025-01-01 RX ADMIN — PIPERACILLIN AND TAZOBACTAM 4.5 G: 4; .5 INJECTION, POWDER, FOR SOLUTION INTRAVENOUS at 06:43

## 2025-01-01 RX ADMIN — BUSPIRONE HYDROCHLORIDE 15 MG: 5 TABLET ORAL at 20:27

## 2025-01-01 RX ADMIN — HEPARIN SODIUM 5000 UNITS: 5000 INJECTION, SOLUTION INTRAVENOUS; SUBCUTANEOUS at 05:23

## 2025-01-01 RX ADMIN — Medication 10 ML: at 20:25

## 2025-01-01 RX ADMIN — GUAIFENESIN 400 MG: 200 SOLUTION ORAL at 15:17

## 2025-01-01 RX ADMIN — GUAIFENESIN 400 MG: 200 SOLUTION ORAL at 22:00

## 2025-01-01 RX ADMIN — MINERAL SUPPLEMENT IRON 300 MG / 5 ML STRENGTH LIQUID 100 PER BOX UNFLAVORED 300 MG: at 11:04

## 2025-01-01 RX ADMIN — GUAIFENESIN 400 MG: 200 SOLUTION ORAL at 06:26

## 2025-01-01 RX ADMIN — IPRATROPIUM BROMIDE AND ALBUTEROL SULFATE 3 ML: 2.5; .5 SOLUTION RESPIRATORY (INHALATION) at 06:36

## 2025-01-01 RX ADMIN — Medication 10 ML: at 09:13

## 2025-01-01 RX ADMIN — IPRATROPIUM BROMIDE AND ALBUTEROL SULFATE 3 ML: 2.5; .5 SOLUTION RESPIRATORY (INHALATION) at 10:03

## 2025-01-01 RX ADMIN — Medication 10 ML: at 09:33

## 2025-01-01 RX ADMIN — SENNOSIDES, DOCUSATE SODIUM 2 TABLET: 50; 8.6 TABLET, FILM COATED ORAL at 12:59

## 2025-01-01 RX ADMIN — SODIUM CHLORIDE, SODIUM LACTATE, POTASSIUM CHLORIDE, CALCIUM CHLORIDE 500 ML: 20; 30; 600; 310 INJECTION, SOLUTION INTRAVENOUS at 02:48

## 2025-01-01 RX ADMIN — Medication 1 APPLICATION: at 09:26

## 2025-01-01 RX ADMIN — Medication 10 ML: at 08:17

## 2025-01-01 RX ADMIN — RANOLAZINE 1000 MG: 500 TABLET, FILM COATED, EXTENDED RELEASE ORAL at 21:12

## 2025-01-01 RX ADMIN — ALPRAZOLAM 0.25 MG: 0.25 TABLET ORAL at 20:09

## 2025-01-01 RX ADMIN — Medication 1 APPLICATION: at 20:20

## 2025-01-01 RX ADMIN — PIPERACILLIN AND TAZOBACTAM 4.5 G: 4; .5 INJECTION, POWDER, FOR SOLUTION INTRAVENOUS at 09:31

## 2025-01-01 RX ADMIN — Medication 750 MG: at 05:02

## 2025-01-01 RX ADMIN — Medication 10 ML: at 21:12

## 2025-01-01 RX ADMIN — LACTULOSE 20 G: 20 SOLUTION ORAL at 09:32

## 2025-01-01 RX ADMIN — BUSPIRONE HYDROCHLORIDE 15 MG: 5 TABLET ORAL at 21:56

## 2025-01-01 RX ADMIN — PANTOPRAZOLE SODIUM 40 MG: 40 TABLET, DELAYED RELEASE ORAL at 06:37

## 2025-01-01 RX ADMIN — DULOXETINE HYDROCHLORIDE 60 MG: 30 CAPSULE, DELAYED RELEASE ORAL at 08:14

## 2025-01-01 RX ADMIN — GUAIFENESIN 600 MG: 600 TABLET, EXTENDED RELEASE ORAL at 21:57

## 2025-01-01 RX ADMIN — Medication 10 ML: at 09:29

## 2025-01-01 RX ADMIN — IPRATROPIUM BROMIDE AND ALBUTEROL SULFATE 3 ML: 2.5; .5 SOLUTION RESPIRATORY (INHALATION) at 14:33

## 2025-01-01 RX ADMIN — PIPERACILLIN SODIUM AND TAZOBACTAM SODIUM 4.5 G: 4; .5 INJECTION, POWDER, LYOPHILIZED, FOR SOLUTION INTRAVENOUS at 13:01

## 2025-01-01 RX ADMIN — BUDESONIDE 0.25 MG: 0.25 INHALANT RESPIRATORY (INHALATION) at 19:39

## 2025-01-01 RX ADMIN — ACETAMINOPHEN 650 MG: 650 SOLUTION ORAL at 13:00

## 2025-01-01 RX ADMIN — LACTULOSE 20 G: 20 SOLUTION ORAL at 20:26

## 2025-01-01 RX ADMIN — Medication 10 ML: at 09:36

## 2025-01-01 RX ADMIN — Medication 1500 MG: at 04:23

## 2025-01-01 RX ADMIN — ROSUVASTATIN CALCIUM 40 MG: 20 TABLET, FILM COATED ORAL at 20:27

## 2025-01-01 RX ADMIN — LACTULOSE 20 G: 20 SOLUTION ORAL at 09:42

## 2025-01-01 RX ADMIN — HYDROMORPHONE HYDROCHLORIDE 0.5 MG: 1 INJECTION, SOLUTION INTRAMUSCULAR; INTRAVENOUS; SUBCUTANEOUS at 09:35

## 2025-01-01 RX ADMIN — ACETAMINOPHEN 650 MG: 325 TABLET, FILM COATED ORAL at 20:09

## 2025-01-01 RX ADMIN — LEVOTHYROXINE SODIUM 75 MCG: 0.07 TABLET ORAL at 05:48

## 2025-01-01 RX ADMIN — CHLORHEXIDINE GLUCONATE 1 APPLICATION: 500 CLOTH TOPICAL at 09:26

## 2025-01-01 RX ADMIN — BUDESONIDE 0.25 MG: 0.25 INHALANT RESPIRATORY (INHALATION) at 20:06

## 2025-01-01 RX ADMIN — NYSTATIN 500000 UNITS: 100000 SUSPENSION ORAL at 12:52

## 2025-01-01 RX ADMIN — Medication 10 ML: at 09:28

## 2025-01-01 RX ADMIN — Medication 1 APPLICATION: at 21:56

## 2025-01-01 RX ADMIN — RANOLAZINE 1000 MG: 500 TABLET, FILM COATED, EXTENDED RELEASE ORAL at 20:25

## 2025-01-01 RX ADMIN — BUSPIRONE HYDROCHLORIDE 15 MG: 5 TABLET ORAL at 08:14

## 2025-01-01 RX ADMIN — HYDROMORPHONE HYDROCHLORIDE 0.5 MG: 1 INJECTION, SOLUTION INTRAMUSCULAR; INTRAVENOUS; SUBCUTANEOUS at 18:43

## 2025-01-01 RX ADMIN — NYSTATIN 500000 UNITS: 100000 SUSPENSION ORAL at 18:13

## 2025-01-01 RX ADMIN — SODIUM CHLORIDE, SODIUM LACTATE, POTASSIUM CHLORIDE, CALCIUM CHLORIDE 150 ML/HR: 20; 30; 600; 310 INJECTION, SOLUTION INTRAVENOUS at 19:32

## 2025-01-01 RX ADMIN — METFORMIN HYDROCHLORIDE 500 MG: 500 TABLET ORAL at 22:18

## 2025-01-01 RX ADMIN — GUAIFENESIN 600 MG: 600 TABLET, EXTENDED RELEASE ORAL at 09:26

## 2025-01-01 RX ADMIN — SODIUM CHLORIDE, SODIUM LACTATE, POTASSIUM CHLORIDE, CALCIUM CHLORIDE 500 ML: 20; 30; 600; 310 INJECTION, SOLUTION INTRAVENOUS at 12:15

## 2025-01-01 RX ADMIN — DULOXETINE HYDROCHLORIDE 60 MG: 30 CAPSULE, DELAYED RELEASE ORAL at 09:26

## 2025-01-01 RX ADMIN — PANTOPRAZOLE SODIUM 40 MG: 40 TABLET, DELAYED RELEASE ORAL at 06:08

## 2025-01-01 RX ADMIN — GUAIFENESIN AND CODEINE PHOSPHATE 5 ML: 100; 10 SOLUTION ORAL at 11:49

## 2025-01-01 RX ADMIN — GUAIFENESIN 600 MG: 600 TABLET, EXTENDED RELEASE ORAL at 22:18

## 2025-01-01 RX ADMIN — ROSUVASTATIN CALCIUM 40 MG: 20 TABLET, FILM COATED ORAL at 20:24

## 2025-01-01 RX ADMIN — BUSPIRONE HYDROCHLORIDE 15 MG: 5 TABLET ORAL at 21:12

## 2025-01-01 RX ADMIN — BUDESONIDE 0.25 MG: 0.25 INHALANT RESPIRATORY (INHALATION) at 06:36

## 2025-01-01 RX ADMIN — GUAIFENESIN 600 MG: 600 TABLET, EXTENDED RELEASE ORAL at 20:27

## 2025-01-01 RX ADMIN — BARIUM SULFATE 250 ML: 400 SUSPENSION ORAL at 14:13

## 2025-01-01 RX ADMIN — HEPARIN SODIUM 5000 UNITS: 5000 INJECTION, SOLUTION INTRAVENOUS; SUBCUTANEOUS at 06:27

## 2025-01-01 RX ADMIN — LANSOPRAZOLE 30 MG: 30 TABLET, ORALLY DISINTEGRATING, DELAYED RELEASE ORAL at 06:27

## 2025-01-01 RX ADMIN — POTASSIUM CHLORIDE 20 MEQ: 1.5 POWDER, FOR SOLUTION ORAL at 09:25

## 2025-01-01 RX ADMIN — LACTULOSE SOLUTION USP, 10 G/15 ML 300 ML: 10 SOLUTION ORAL; RECTAL at 12:11

## 2025-01-01 RX ADMIN — Medication 1 APPLICATION: at 09:12

## 2025-01-01 RX ADMIN — DULOXETINE HYDROCHLORIDE 60 MG: 30 CAPSULE, DELAYED RELEASE ORAL at 09:33

## 2025-01-01 RX ADMIN — CLOPIDOGREL BISULFATE 75 MG: 75 TABLET, FILM COATED ORAL at 09:26

## 2025-01-01 RX ADMIN — GUAIFENESIN 600 MG: 600 TABLET, EXTENDED RELEASE ORAL at 08:14

## 2025-01-01 RX ADMIN — IPRATROPIUM BROMIDE AND ALBUTEROL SULFATE 3 ML: 2.5; .5 SOLUTION RESPIRATORY (INHALATION) at 18:26

## 2025-01-01 RX ADMIN — HEPARIN SODIUM 5000 UNITS: 5000 INJECTION, SOLUTION INTRAVENOUS; SUBCUTANEOUS at 22:21

## 2025-01-01 RX ADMIN — MINERAL SUPPLEMENT IRON 300 MG / 5 ML STRENGTH LIQUID 100 PER BOX UNFLAVORED 300 MG: at 13:00

## 2025-01-01 RX ADMIN — ONDANSETRON 4 MG: 2 INJECTION INTRAMUSCULAR; INTRAVENOUS at 22:24

## 2025-01-01 RX ADMIN — RANOLAZINE 1000 MG: 500 TABLET, FILM COATED, EXTENDED RELEASE ORAL at 09:25

## 2025-01-01 RX ADMIN — IPRATROPIUM BROMIDE AND ALBUTEROL SULFATE 3 ML: 2.5; .5 SOLUTION RESPIRATORY (INHALATION) at 13:50

## 2025-01-01 RX ADMIN — CHLORHEXIDINE GLUCONATE 1 APPLICATION: 500 CLOTH TOPICAL at 08:19

## 2025-01-01 RX ADMIN — PIPERACILLIN AND TAZOBACTAM 4.5 G: 4; .5 INJECTION, POWDER, FOR SOLUTION INTRAVENOUS at 03:38

## 2025-01-01 RX ADMIN — LEVOTHYROXINE SODIUM 75 MCG: 0.07 TABLET ORAL at 05:23

## 2025-01-01 RX ADMIN — HEPARIN SODIUM 5000 UNITS: 5000 INJECTION, SOLUTION INTRAVENOUS; SUBCUTANEOUS at 13:13

## 2025-01-01 RX ADMIN — ALPRAZOLAM 0.25 MG: 0.25 TABLET ORAL at 21:12

## 2025-01-01 RX ADMIN — BUDESONIDE 0.25 MG: 0.25 INHALANT RESPIRATORY (INHALATION) at 06:13

## 2025-01-01 RX ADMIN — RANOLAZINE 1000 MG: 500 TABLET, FILM COATED, EXTENDED RELEASE ORAL at 20:27

## 2025-01-01 RX ADMIN — HYDROMORPHONE HYDROCHLORIDE 0.5 MG: 1 INJECTION, SOLUTION INTRAMUSCULAR; INTRAVENOUS; SUBCUTANEOUS at 00:25

## 2025-01-01 RX ADMIN — CHLORHEXIDINE GLUCONATE 1 APPLICATION: 500 CLOTH TOPICAL at 23:00

## 2025-01-01 RX ADMIN — GUAIFENESIN 400 MG: 200 SOLUTION ORAL at 11:04

## 2025-01-01 RX ADMIN — BUSPIRONE HYDROCHLORIDE 15 MG: 5 TABLET ORAL at 20:25

## 2025-01-01 RX ADMIN — RANOLAZINE 1000 MG: 500 TABLET, FILM COATED, EXTENDED RELEASE ORAL at 09:33

## 2025-01-01 RX ADMIN — HYDROMORPHONE HYDROCHLORIDE 0.5 MG: 1 INJECTION, SOLUTION INTRAMUSCULAR; INTRAVENOUS; SUBCUTANEOUS at 23:03

## 2025-01-01 RX ADMIN — NOREPINEPHRINE BITARTRATE 0.02 MCG/KG/MIN: 0.03 INJECTION, SOLUTION INTRAVENOUS at 01:34

## 2025-01-01 RX ADMIN — LEVOTHYROXINE SODIUM 75 MCG: 0.07 TABLET ORAL at 06:38

## 2025-01-01 RX ADMIN — ONDANSETRON 4 MG: 2 INJECTION INTRAMUSCULAR; INTRAVENOUS at 20:09

## 2025-01-01 RX ADMIN — PANTOPRAZOLE SODIUM 40 MG: 40 TABLET, DELAYED RELEASE ORAL at 03:33

## 2025-01-01 RX ADMIN — PIPERACILLIN AND TAZOBACTAM 4.5 G: 4; .5 INJECTION, POWDER, FOR SOLUTION INTRAVENOUS at 20:30

## 2025-01-01 RX ADMIN — LACTULOSE 20 G: 20 SOLUTION ORAL at 09:38

## 2025-01-01 RX ADMIN — PIPERACILLIN AND TAZOBACTAM 4.5 G: 4; .5 INJECTION, POWDER, FOR SOLUTION INTRAVENOUS at 20:29

## 2025-01-01 RX ADMIN — BUSPIRONE HYDROCHLORIDE 15 MG: 5 TABLET ORAL at 09:26

## 2025-01-01 RX ADMIN — SODIUM CHLORIDE, SODIUM LACTATE, POTASSIUM CHLORIDE, CALCIUM CHLORIDE 150 ML/HR: 20; 30; 600; 310 INJECTION, SOLUTION INTRAVENOUS at 02:55

## 2025-01-01 RX ADMIN — Medication 10 ML: at 20:11

## 2025-01-01 RX ADMIN — IPRATROPIUM BROMIDE AND ALBUTEROL SULFATE 3 ML: 2.5; .5 SOLUTION RESPIRATORY (INHALATION) at 19:38

## 2025-01-01 RX ADMIN — PIPERACILLIN AND TAZOBACTAM 4.5 G: 4; .5 INJECTION, POWDER, FOR SOLUTION INTRAVENOUS at 13:13

## 2025-01-01 RX ADMIN — GABAPENTIN 300 MG: 300 CAPSULE ORAL at 20:25

## 2025-01-01 RX ADMIN — IPRATROPIUM BROMIDE AND ALBUTEROL SULFATE 3 ML: 2.5; .5 SOLUTION RESPIRATORY (INHALATION) at 20:19

## 2025-01-01 RX ADMIN — NOREPINEPHRINE BITARTRATE 0.03 MCG/KG/MIN: 0.03 INJECTION, SOLUTION INTRAVENOUS at 06:37

## 2025-01-01 RX ADMIN — BUDESONIDE 0.25 MG: 0.25 INHALANT RESPIRATORY (INHALATION) at 18:22

## 2025-01-01 RX ADMIN — IPRATROPIUM BROMIDE AND ALBUTEROL SULFATE 3 ML: 2.5; .5 SOLUTION RESPIRATORY (INHALATION) at 06:19

## 2025-01-01 RX ADMIN — ROSUVASTATIN CALCIUM 40 MG: 20 TABLET, FILM COATED ORAL at 22:18

## 2025-01-01 RX ADMIN — SODIUM CHLORIDE, SODIUM LACTATE, POTASSIUM CHLORIDE, CALCIUM CHLORIDE 500 ML: 20; 30; 600; 310 INJECTION, SOLUTION INTRAVENOUS at 00:52

## 2025-01-01 RX ADMIN — BUDESONIDE 0.25 MG: 0.25 INHALANT RESPIRATORY (INHALATION) at 06:19

## 2025-01-01 RX ADMIN — Medication 10 ML: at 21:00

## 2025-01-01 RX ADMIN — METFORMIN HYDROCHLORIDE 500 MG: 500 TABLET ORAL at 08:14

## 2025-01-01 RX ADMIN — Medication 10 ML: at 21:55

## 2025-01-01 RX ADMIN — SODIUM CHLORIDE, SODIUM LACTATE, POTASSIUM CHLORIDE, AND CALCIUM CHLORIDE 1000 ML: .6; .31; .03; .02 INJECTION, SOLUTION INTRAVENOUS at 08:31

## 2025-01-01 RX ADMIN — PIPERACILLIN AND TAZOBACTAM 4.5 G: 4; .5 INJECTION, POWDER, FOR SOLUTION INTRAVENOUS at 20:25

## 2025-01-01 RX ADMIN — Medication 750 MG: at 06:43

## 2025-01-01 RX ADMIN — IPRATROPIUM BROMIDE AND ALBUTEROL SULFATE 3 ML: 2.5; .5 SOLUTION RESPIRATORY (INHALATION) at 18:22

## 2025-01-01 RX ADMIN — ROSUVASTATIN CALCIUM 40 MG: 20 TABLET, FILM COATED ORAL at 21:57

## 2025-01-01 RX ADMIN — GUAIFENESIN AND CODEINE PHOSPHATE 5 ML: 100; 10 SOLUTION ORAL at 10:41

## 2025-01-01 RX ADMIN — ACETAMINOPHEN 650 MG: 650 SUPPOSITORY RECTAL at 01:01

## 2025-01-01 RX ADMIN — LACTULOSE 20 G: 20 SOLUTION ORAL at 21:57

## 2025-01-01 RX ADMIN — BUDESONIDE 0.25 MG: 0.25 INHALANT RESPIRATORY (INHALATION) at 20:20

## 2025-01-01 RX ADMIN — SODIUM CHLORIDE, SODIUM LACTATE, POTASSIUM CHLORIDE, CALCIUM CHLORIDE 150 ML/HR: 20; 30; 600; 310 INJECTION, SOLUTION INTRAVENOUS at 10:38

## 2025-01-01 RX ADMIN — PIPERACILLIN AND TAZOBACTAM 4.5 G: 4; .5 INJECTION, POWDER, FOR SOLUTION INTRAVENOUS at 22:23

## 2025-01-01 RX ADMIN — BUSPIRONE HYDROCHLORIDE 15 MG: 5 TABLET ORAL at 09:33

## 2025-01-01 RX ADMIN — BISACODYL 10 MG: 10 SUPPOSITORY RECTAL at 09:12

## 2025-01-01 RX ADMIN — IPRATROPIUM BROMIDE AND ALBUTEROL SULFATE 3 ML: 2.5; .5 SOLUTION RESPIRATORY (INHALATION) at 14:12

## 2025-01-01 RX ADMIN — ROSUVASTATIN CALCIUM 40 MG: 20 TABLET, FILM COATED ORAL at 20:11

## 2025-01-01 RX ADMIN — IPRATROPIUM BROMIDE AND ALBUTEROL SULFATE 3 ML: 2.5; .5 SOLUTION RESPIRATORY (INHALATION) at 06:13

## 2025-01-01 RX ADMIN — RANOLAZINE 1000 MG: 500 TABLET, FILM COATED, EXTENDED RELEASE ORAL at 22:19

## 2025-01-01 RX ADMIN — PIPERACILLIN AND TAZOBACTAM 4.5 G: 4; .5 INJECTION, POWDER, FOR SOLUTION INTRAVENOUS at 04:24

## 2025-01-01 RX ADMIN — RANOLAZINE 1000 MG: 500 TABLET, FILM COATED, EXTENDED RELEASE ORAL at 08:14

## 2025-01-01 RX ADMIN — HEPARIN SODIUM 5000 UNITS: 5000 INJECTION, SOLUTION INTRAVENOUS; SUBCUTANEOUS at 14:47

## 2025-01-01 RX ADMIN — RANOLAZINE 1000 MG: 500 TABLET, FILM COATED, EXTENDED RELEASE ORAL at 21:57

## 2025-01-01 RX ADMIN — BARIUM SULFATE 10 ML: 400 PASTE ORAL at 14:13

## 2025-01-01 RX ADMIN — BUDESONIDE 0.25 MG: 0.25 INHALANT RESPIRATORY (INHALATION) at 14:33

## 2025-01-01 RX ADMIN — ACETAMINOPHEN 650 MG: 650 SOLUTION ORAL at 13:43

## 2025-01-01 RX ADMIN — SODIUM CHLORIDE, SODIUM LACTATE, POTASSIUM CHLORIDE, CALCIUM CHLORIDE 150 ML/HR: 20; 30; 600; 310 INJECTION, SOLUTION INTRAVENOUS at 01:32

## 2025-01-01 RX ADMIN — LEVOTHYROXINE SODIUM 75 MCG: 0.07 TABLET ORAL at 06:08

## 2025-01-01 RX ADMIN — SODIUM CHLORIDE, SODIUM LACTATE, POTASSIUM CHLORIDE, CALCIUM CHLORIDE 150 ML/HR: 20; 30; 600; 310 INJECTION, SOLUTION INTRAVENOUS at 13:03

## 2025-01-01 RX ADMIN — RANOLAZINE 1000 MG: 500 TABLET, FILM COATED, EXTENDED RELEASE ORAL at 22:22

## 2025-01-01 RX ADMIN — LACTULOSE 20 G: 20 SOLUTION ORAL at 09:27

## 2025-01-01 RX ADMIN — CLOPIDOGREL BISULFATE 75 MG: 75 TABLET, FILM COATED ORAL at 09:33

## 2025-01-01 RX ADMIN — BUSPIRONE HYDROCHLORIDE 15 MG: 5 TABLET ORAL at 22:19

## 2025-01-01 RX ADMIN — NYSTATIN 500000 UNITS: 100000 SUSPENSION ORAL at 22:21

## 2025-01-01 RX ADMIN — IPRATROPIUM BROMIDE AND ALBUTEROL SULFATE 3 ML: 2.5; .5 SOLUTION RESPIRATORY (INHALATION) at 10:47

## 2025-01-01 RX ADMIN — MAGNESIUM CITRATE 296 ML: 1.75 LIQUID ORAL at 11:05

## 2025-01-01 RX ADMIN — ROSUVASTATIN CALCIUM 40 MG: 20 TABLET, FILM COATED ORAL at 22:22

## 2025-01-01 RX ADMIN — LEVOTHYROXINE SODIUM 75 MCG: 75 TABLET ORAL at 06:27

## 2025-01-01 RX ADMIN — METFORMIN HYDROCHLORIDE 500 MG: 500 TABLET ORAL at 18:10

## 2025-01-01 RX ADMIN — PANTOPRAZOLE SODIUM 40 MG: 40 TABLET, DELAYED RELEASE ORAL at 05:48

## 2025-01-01 RX ADMIN — ACETAMINOPHEN 650 MG: 325 TABLET, FILM COATED ORAL at 00:15

## 2025-01-01 RX ADMIN — IPRATROPIUM BROMIDE AND ALBUTEROL SULFATE 3 ML: 2.5; .5 SOLUTION RESPIRATORY (INHALATION) at 14:30

## 2025-01-01 RX ADMIN — CLOPIDOGREL BISULFATE 75 MG: 75 TABLET, FILM COATED ORAL at 08:14

## 2025-01-01 RX ADMIN — ACETAMINOPHEN 650 MG: 650 SOLUTION ORAL at 06:26

## 2025-01-01 RX ADMIN — IPRATROPIUM BROMIDE AND ALBUTEROL SULFATE 3 ML: 2.5; .5 SOLUTION RESPIRATORY (INHALATION) at 14:47

## 2025-01-01 RX ADMIN — HYDROMORPHONE HYDROCHLORIDE 0.5 MG: 1 INJECTION, SOLUTION INTRAMUSCULAR; INTRAVENOUS; SUBCUTANEOUS at 04:50

## 2025-01-01 RX ADMIN — SODIUM CHLORIDE, SODIUM LACTATE, POTASSIUM CHLORIDE, CALCIUM CHLORIDE 500 ML: 20; 30; 600; 310 INJECTION, SOLUTION INTRAVENOUS at 11:00

## 2025-01-01 RX ADMIN — PIPERACILLIN AND TAZOBACTAM 4.5 G: 4; .5 INJECTION, POWDER, FOR SOLUTION INTRAVENOUS at 12:49

## 2025-01-01 RX ADMIN — Medication 10 ML: at 22:23

## 2025-01-01 RX ADMIN — GUAIFENESIN 600 MG: 600 TABLET, EXTENDED RELEASE ORAL at 21:12

## 2025-01-01 RX ADMIN — GUAIFENESIN AND CODEINE PHOSPHATE 5 ML: 100; 10 SOLUTION ORAL at 00:15

## 2025-01-01 RX ADMIN — PANTOPRAZOLE SODIUM 40 MG: 40 TABLET, DELAYED RELEASE ORAL at 05:23

## 2025-01-01 RX ADMIN — PIPERACILLIN AND TAZOBACTAM 4.5 G: 4; .5 INJECTION, POWDER, FOR SOLUTION INTRAVENOUS at 12:11

## 2025-01-01 RX ADMIN — Medication 10 ML: at 09:26

## 2025-01-01 RX ADMIN — Medication 1 APPLICATION: at 23:01

## 2025-01-01 RX ADMIN — PIPERACILLIN AND TAZOBACTAM 4.5 G: 4; .5 INJECTION, POWDER, FOR SOLUTION INTRAVENOUS at 03:30

## 2025-01-01 RX ADMIN — LACTULOSE 20 G: 20 SOLUTION ORAL at 20:25

## 2025-01-01 RX ADMIN — GUAIFENESIN 600 MG: 600 TABLET, EXTENDED RELEASE ORAL at 20:25

## 2025-01-01 RX ADMIN — GUAIFENESIN AND CODEINE PHOSPHATE 5 ML: 100; 10 SOLUTION ORAL at 18:10

## 2025-01-01 RX ADMIN — ACETAMINOPHEN 650 MG: 325 TABLET, FILM COATED ORAL at 19:08

## 2025-01-01 RX ADMIN — BUDESONIDE 0.25 MG: 0.25 INHALANT RESPIRATORY (INHALATION) at 18:26

## 2025-01-01 RX ADMIN — SODIUM CHLORIDE, SODIUM LACTATE, POTASSIUM CHLORIDE, CALCIUM CHLORIDE 150 ML/HR: 20; 30; 600; 310 INJECTION, SOLUTION INTRAVENOUS at 23:15

## 2025-01-01 RX ADMIN — LEVOTHYROXINE SODIUM 75 MCG: 0.07 TABLET ORAL at 03:33

## 2025-01-01 RX ADMIN — HEPARIN SODIUM 5000 UNITS: 5000 INJECTION, SOLUTION INTRAVENOUS; SUBCUTANEOUS at 06:04

## 2025-01-01 RX ADMIN — METFORMIN HYDROCHLORIDE 500 MG: 500 TABLET ORAL at 09:26

## 2025-01-01 RX ADMIN — LACTULOSE 20 G: 20 SOLUTION ORAL at 22:21

## 2025-01-01 RX ADMIN — Medication 1250 MG: at 05:20

## 2025-01-01 RX ADMIN — GUAIFENESIN 400 MG: 200 SOLUTION ORAL at 22:22

## 2025-01-01 RX ADMIN — BUDESONIDE 0.25 MG: 0.25 INHALANT RESPIRATORY (INHALATION) at 07:26

## 2025-01-01 RX ADMIN — NYSTATIN 500000 UNITS: 100000 SUSPENSION ORAL at 13:00

## 2025-01-01 RX ADMIN — BUDESONIDE 0.25 MG: 0.25 INHALANT RESPIRATORY (INHALATION) at 07:04

## 2025-01-01 RX ADMIN — ROSUVASTATIN CALCIUM 40 MG: 20 TABLET, FILM COATED ORAL at 21:12

## 2025-01-01 RX ADMIN — SCOPALAMINE 1 PATCH: 1 PATCH, EXTENDED RELEASE TRANSDERMAL at 22:37

## 2025-01-01 RX ADMIN — FERROUS SULFATE TAB 325 MG (65 MG ELEMENTAL FE) 325 MG: 325 (65 FE) TAB at 17:29

## 2025-01-01 RX ADMIN — Medication 1250 MG: at 05:01

## 2025-01-01 RX ADMIN — BUSPIRONE HYDROCHLORIDE 15 MG: 5 TABLET ORAL at 09:25

## 2025-01-01 RX ADMIN — HEPARIN SODIUM 5000 UNITS: 5000 INJECTION, SOLUTION INTRAVENOUS; SUBCUTANEOUS at 15:17

## 2025-01-01 RX ADMIN — PIPERACILLIN AND TAZOBACTAM 4.5 G: 4; .5 INJECTION, POWDER, FOR SOLUTION INTRAVENOUS at 20:10

## 2025-01-01 RX ADMIN — SODIUM CHLORIDE, SODIUM LACTATE, POTASSIUM CHLORIDE, CALCIUM CHLORIDE 150 ML/HR: 20; 30; 600; 310 INJECTION, SOLUTION INTRAVENOUS at 16:27

## 2025-01-01 RX ADMIN — ONDANSETRON 4 MG: 2 INJECTION INTRAMUSCULAR; INTRAVENOUS at 23:38

## 2025-01-24 ENCOUNTER — HOSPITAL ENCOUNTER (EMERGENCY)
Facility: HOSPITAL | Age: 70
Discharge: HOME | End: 2025-01-24
Attending: EMERGENCY MEDICINE
Payer: MEDICARE

## 2025-01-24 ENCOUNTER — APPOINTMENT (OUTPATIENT)
Dept: CT IMAGING | Facility: HOSPITAL | Age: 70
End: 2025-01-24
Payer: MEDICARE

## 2025-01-24 VITALS
BODY MASS INDEX: 25.9 KG/M2 | HEIGHT: 71 IN | OXYGEN SATURATION: 100 % | SYSTOLIC BLOOD PRESSURE: 122 MMHG | RESPIRATION RATE: 18 BRPM | WEIGHT: 185 LBS | TEMPERATURE: 98.2 F | HEART RATE: 96 BPM | DIASTOLIC BLOOD PRESSURE: 78 MMHG

## 2025-01-24 DIAGNOSIS — S00.93XA CONTUSION OF HEAD, UNSPECIFIED PART OF HEAD, INITIAL ENCOUNTER: Primary | ICD-10-CM

## 2025-01-24 PROCEDURE — 99284 EMERGENCY DEPT VISIT MOD MDM: CPT

## 2025-01-24 PROCEDURE — 70450 CT HEAD/BRAIN W/O DYE: CPT

## 2025-01-24 NOTE — ED PROVIDER NOTES
Subjective   History of Present Illness  Patient says he fell while at physical therapy.  He was getting out of his car and using his walker but when he tried to close the door he lost his balance and fell backwards.  He fell to his tailbone and lower back and then fell backwards and hit his head.  He also says it knocked the wind out of him and cause some pain in the upper part of his back.  The lower part of his back is not painful now in the upper part of his back is getting much better over time but he still has a headache and wanted to get that checked out.  He denies any loss of consciousness.    History provided by:  Patient   used: No    Fall  Mechanism of injury: fall    Injury location:  Head/neck and torso  Head/neck injury location:  Head  Torso injury location:  Back  Incident location: parking lot.  Time since incident:  90 minutes  Arrived directly from scene: no    Fall:     Fall occurred:  Standing    Impact surface: asphalt.    Point of impact:  Unable to specify    Entrapped after fall: no    Protective equipment: none    Suspicion of alcohol use: no    Suspicion of drug use: no    Tetanus status:  Unknown  Prior to arrival data:     Bystander interventions:  None    Patient ambulatory at scene: yes      Blood loss:  None    Responsiveness at scene:  Alert    Orientation at scene:  Person, place, situation and time    Loss of consciousness: no      Amnesic to event: no      Airway interventions:  None    Breathing interventions:  None    IV access status:  None    IO access:  None    Fluids administered:  None    Cardiac interventions:  None    Medications administered:  None    Immobilization:  None    Airway condition since incident:  Stable    Breathing condition since incident:  Stable    Circulation condition since incident:  Stable    Mental status condition since incident:  Stable    Disability condition since incident:  Stable  Associated symptoms: back pain and  headaches    Associated symptoms: no abdominal pain, no blindness, no chest pain, no difficulty breathing, no hearing loss, no loss of consciousness, no nausea, no neck pain, no seizures and no vomiting    Risk factors: no AICD, no anticoagulation therapy, no asthma, no beta blocker therapy, no CABG, no CAD, no CHF, no COPD, no diabetes, no dialysis, no hemophilia, no kidney disease, no pacemaker, no past MI, not pregnant and no steroid use        Review of Systems   Constitutional: Negative.    HENT: Negative.  Negative for hearing loss.    Eyes:  Negative for blindness.   Respiratory: Negative.     Cardiovascular:  Negative for chest pain.   Gastrointestinal: Negative.  Negative for abdominal pain, nausea and vomiting.   Genitourinary: Negative.    Musculoskeletal:  Positive for back pain. Negative for neck pain.   Skin: Negative.    Neurological:  Positive for headaches. Negative for seizures and loss of consciousness.   All other systems reviewed and are negative.      Past Medical History:   Diagnosis Date    Anemia     Anxiety     Arthritis     BPH (benign prostatic hypertrophy)     CAD (coronary artery disease)     Cancer     non-hodgkins lymphoma    Diabetes mellitus     Disease of thyroid gland     GERD (gastroesophageal reflux disease)     Hearing loss     History of transfusion     Hyperlipidemia     Hypertension     Iliac artery aneurysm, bilateral     Kidney stone     Liver cirrhosis     Migraines     Sleep apnea     c-pap       Allergies   Allergen Reactions    Adhesive Tape Rash     Pt states that if it isn't left on very long it is okay    Cefazolin Rash    Cefuroxime Axetil Rash    Cephalosporins Rash    Neomycin-Polymyxin B Gu Rash    Percocet [Oxycodone-Acetaminophen] Rash       Past Surgical History:   Procedure Laterality Date    COLONOSCOPY  02/04/2014    COLONOSCOPY N/A 4/26/2017    Procedure: COLONOSCOPY WITH ANESTHESIA;  Surgeon: Sher Cui MD;  Location: [facility] ENDOSCOPY;  Service:      CORONARY ARTERY BYPASS GRAFT      2    CYSTOSCOPY URETEROSCOPY LASER LITHOTRIPSY Left 4/17/2017    Procedure: URETEROSCOPY LASER LITHOTRIPSY WITH DOUBLE J STENT INSERTION, LEFT ;  Surgeon: Weston Peck MD;  Location:  [facility] OR;  Service:     CYSTOSCOPY URETEROSCOPY LASER LITHOTRIPSY Left 8/14/2017    Procedure: CYSTOSCOPY URETEROSCOPY LASER LITHOTRIPSY STENT INSERTION STONE EXTRACTION;  Surgeon: Weston Peck MD;  Location:  [facility] OR;  Service:     CYSTOSCOPY W/ URETERAL STENT PLACEMENT Left 4/17/2017    Procedure: CYSTOSCOPY URETERAL DOUBLE J STENT INSERTION, LEFT;  Surgeon: Weston Peck MD;  Location:  [facility] OR;  Service:     ENDOSCOPY N/A 4/26/2017    Procedure: ESOPHAGOGASTRODUODENOSCOPY WITH ANESTHESIA;  Surgeon: Sher Cui MD;  Location: [facility] ENDOSCOPY;  Service:     INCISION AND DRAINAGE LEG Right 7/12/2023    Procedure: INCISION AND DRAINAGE - RIGHT FOOT;  Surgeon: Silas Swan DPM;  Location:  [facility] OR;  Service: Podiatry;  Laterality: Right;    JOINT REPLACEMENT Right     KIDNEY STONE SURGERY      KNEE SURGERY      replacement    NECK SURGERY      ROTATOR CUFF REPAIR      SHOULDER SURGERY      TONSILLECTOMY      TRANS METATARSAL AMPUTATION Right 8/9/2023    Procedure: Partial 4th Ray Amputation - Right Foot;  Surgeon: Silas Swan DPM;  Location:  [facility] OR;  Service: Podiatry;  Laterality: Right;    URETEROSCOPY LASER LITHOTRIPSY WITH STENT INSERTION Left 9/1/2022    Procedure: LEFT URETEROSCOPY LASER LITHOTRIPSY WITH STENT INSERTION;  Surgeon: Weston Peck MD;  Location:  [facility] OR;  Service: Urology;  Laterality: Left;    URETEROSCOPY LASER LITHOTRIPSY WITH STENT INSERTION Left 9/15/2022    Procedure: LEFT URETEROSCOPY LASER LITHOTRIPSY WITH STENT EXCHANGE;  Surgeon: Weston Peck MD;  Location:  [facility] OR;  Service: Urology;  Laterality: Left;       Family History   Problem Relation Age of Onset    Kidney disease Father     Heart disease  Father     Cancer Mother         brain    Colon cancer Neg Hx     Colon polyps Neg Hx        Social History     Socioeconomic History    Marital status:    Tobacco Use    Smoking status: Former     Current packs/day: 0.00     Types: Cigarettes     Quit date:      Years since quittin.0     Passive exposure: Never    Smokeless tobacco: Never   Vaping Use    Vaping status: Never Used   Substance and Sexual Activity    Alcohol use: No    Drug use: No    Sexual activity: Defer       Prior to Admission medications    Medication Sig Start Date End Date Taking? Authorizing Provider   acetaminophen (TYLENOL) 325 MG tablet Take 2 tablets by mouth Every 6 (Six) Hours As Needed for Mild Pain, Fever or Moderate Pain.    Juliet Riddle MD   ALPRAZolam (XANAX) 0.25 MG tablet Take 1 tablet by mouth 2 (Two) Times a Day As Needed for Anxiety.    Juliet Riddle MD   busPIRone (BUSPAR) 15 MG tablet Take 1 tablet by mouth 2 (Two) Times a Day.    Juliet Riddle MD   clopidogrel (PLAVIX) 75 MG tablet Take 1 tablet by mouth Daily. 16   Juliet Riddle MD   docusate sodium (COLACE) 100 MG capsule Take 1 capsule by mouth Every Evening.    Juliet Riddle MD   DULoxetine (CYMBALTA) 60 MG capsule Take 1 capsule by mouth Every Night.    Juliet Riddle MD   empagliflozin (JARDIANCE) 25 MG tablet tablet Take 1 tablet by mouth Daily.    Juliet Riddle MD   ferrous sulfate 325 (65 FE) MG EC tablet Take 1 tablet by mouth 2 (Two) Times a Day. 14   Juliet Riddle MD   gabapentin (NEURONTIN) 300 MG capsule Take 1 capsule by mouth Daily.    Juliet Riddle MD   GARLIC-CALCIUM PO Take 1 tablet by mouth Daily.    Juliet Riddle MD   HYDROcodone-acetaminophen (NORCO) 5-325 MG per tablet Take 1 tablet by mouth Every 4 (Four) Hours As Needed for Moderate Pain. 24   Terrie Romo APRN   icosapent ethyl (VASCEPA) 1 g capsule capsule Take 2 g by mouth 2  (Two) Times a Day With Meals.    Juliet Riddle MD   Insulin Degludec (TRESIBA SC) Inject 12 Units under the skin into the appropriate area as directed Every Night.    Juliet Riddle MD   levothyroxine (SYNTHROID, LEVOTHROID) 75 MCG tablet Take 1 tablet by mouth Daily.    Juliet Riddle MD   metFORMIN (GLUCOPHAGE) 500 MG tablet Take 1 tablet by mouth 2 (Two) Times a Day With Meals.    Juliet Riddle MD   naloxone (NARCAN) 4 MG/0.1ML nasal spray Call 911. Don't prime. [place] in 1 nostril for overdose. Repeat in 2-3 minutes in other nostril if no or minimal breathing/responsiveness. 7/14/23   Ana Rosa Rodriguez APRN   nitroglycerin (NITROSTAT) 0.4 MG SL tablet Place 1 tablet under the tongue Every 5 (Five) Minutes As Needed. 3/1/16   Juliet Riddle MD   pantoprazole (PROTONIX) 40 MG EC tablet Take 1 tablet by mouth Daily. 7/12/16   Juliet Riddle MD   polyethylene glycol (MIRALAX) powder Take 17 g by mouth Daily As Needed. 4/10/17   Juliet Riddle MD   ranolazine (RANEXA) 1000 MG 12 hr tablet Take 1 tablet by mouth 2 (Two) Times a Day. 9/27/16   Juliet Riddle MD   rosuvastatin (CRESTOR) 40 MG tablet Take 1 tablet by mouth Daily.    Juliet Riddle MD   Vitamin D, Cholecalciferol, 1000 UNITS tablet Take 1 tablet by mouth Daily.    Juliet Riddle MD       Medications - No data to display    Vitals:    01/24/25 1346   BP: 119/76   Pulse: 96   Resp:    Temp:    SpO2:          Objective   Physical Exam  Vitals and nursing note reviewed.   Constitutional:       Appearance: Normal appearance.   HENT:      Head: Normocephalic.      Comments: Patient has some localized swelling in the right parietal occipital scalp.  There is no bony deformity palpated.  Eyes:      Extraocular Movements: Extraocular movements intact.      Pupils: Pupils are equal, round, and reactive to light.   Cardiovascular:      Rate and Rhythm: Normal rate and regular rhythm.   Pulmonary:       Effort: Pulmonary effort is normal.      Breath sounds: Normal breath sounds.   Musculoskeletal:         General: Normal range of motion.      Cervical back: Normal range of motion and neck supple.   Skin:     General: Skin is warm and dry.   Neurological:      General: No focal deficit present.      Mental Status: He is alert and oriented to person, place, and time. Mental status is at baseline.   Psychiatric:         Mood and Affect: Mood normal.         Behavior: Behavior normal.         Procedures         Lab Results (last 24 hours)       ** No results found for the last 24 hours. **            CT Head Without Contrast   Final Result   1. Chronic changes and no acute intracranial findings.    2. Right parietal scalp soft tissue swelling and mucosal thickening of   the right maxillary sinus.       This report was signed and finalized on 1/24/2025 2:17 PM by Jose Crenshaw.              ED Course          MDM  Number of Diagnoses or Management Options  Contusion of head, unspecified part of head, initial encounter: new and requires workup  Diagnosis management comments: Tell the patient CT head reveals no signs of intracranial hemorrhage or bleeding or skull fracture.  He does have a hematoma on the scalp but that will resolve by itself.  He is discharged in stable condition.       Amount and/or Complexity of Data Reviewed  Tests in the radiology section of CPT®: ordered and reviewed    Risk of Complications, Morbidity, and/or Mortality  Presenting problems: moderate  Diagnostic procedures: moderate  Management options: moderate    Patient Progress  Patient progress: stable        Final diagnoses:   Contusion of head, unspecified part of head, initial encounter          João Davidson Jr., MD  01/24/25 7233

## 2025-01-24 NOTE — ED NOTES
Patient is a 69 year old male that presents to ER with wife. Patient states that around 1100 this evening he experienced a fall on the way to PT. Patient is in PT due to balance issues and poor knees. Patient reports that he lost his balance and fell straight from standing onto his bottom. Patient them fell from his bottom hitting the back of his head on concrete. Patient denies loc. Patient is taking plavix.

## 2025-02-05 NOTE — PROGRESS NOTES
[facility] - PODIATRY    Today's Date: 02/10/25    Patient Name: Franko Lucero  MRN: [id number]  CSN: [id number]  PCP: Figueroa Chaves MD  Referring Provider: No ref. provider found    SUBJECTIVE     Chief Complaint   Patient presents with    Follow-up     Figueroa Chaves MD 12/17/24  Return in about 3 months (around 2/8/2025) for Follow-up with Podiatry APRN, [facility].  [facility]   Pt states he is here today for diabetic foot/nail care. Pt denies pain. Pt has neuropathy. Pt has concern about right great toe nail black.     Diabetes     237 mg/dLbg      HPI: Franko Lucero, a 69 y.o.male, comes to clinic as a(n) established patient presenting for diabetic foot exam, complaining of foot pain and complaining of thickened, irregular toenails, and calluses . Patient has h/o anemia, anxiety, arthritis, BPH, CAD, CA, DM with neuropathy, Iliac artery aneurysm, HTN, HLD . Relates that he is still doing therapy for a right femur fracture. Patient is IDDM with last stated BG level of 237mg/dl.  A1c of 5.2%.  Hx of right 4th toe amputation.  Reports nails are long, thick, and irregular and that he is unable to care for them himself.  Denies open wounds or sores to feet.  Denies pain today. Relates previous treatment(s) including wound care treatment in [facility], care by podiatry . Denies any constitutional symptoms. No other pedal complaints at this time.    Past Medical History:   Diagnosis Date    Anemia     Anxiety     Arthritis     BPH (benign prostatic hypertrophy)     CAD (coronary artery disease)     Cancer     non-hodgkins lymphoma    Diabetes mellitus     Disease of thyroid gland     GERD (gastroesophageal reflux disease)     Hearing loss     History of transfusion     Hyperlipidemia     Hypertension     Iliac artery aneurysm, bilateral     Kidney stone     Liver cirrhosis     Migraines     Sleep apnea     c-pap     Past Surgical History:   Procedure  Laterality Date    COLONOSCOPY  02/04/2014    COLONOSCOPY N/A 04/26/2017    Procedure: COLONOSCOPY WITH ANESTHESIA;  Surgeon: Sher Cui MD;  Location: [facility] ENDOSCOPY;  Service:     CORONARY ARTERY BYPASS GRAFT      2    CYSTOSCOPY URETEROSCOPY LASER LITHOTRIPSY Left 04/17/2017    Procedure: URETEROSCOPY LASER LITHOTRIPSY WITH DOUBLE J STENT INSERTION, LEFT ;  Surgeon: Weston Peck MD;  Location:  [facility] OR;  Service:     CYSTOSCOPY URETEROSCOPY LASER LITHOTRIPSY Left 08/14/2017    Procedure: CYSTOSCOPY URETEROSCOPY LASER LITHOTRIPSY STENT INSERTION STONE EXTRACTION;  Surgeon: Weston Peck MD;  Location:  [facility] OR;  Service:     CYSTOSCOPY W/ URETERAL STENT PLACEMENT Left 04/17/2017    Procedure: CYSTOSCOPY URETERAL DOUBLE J STENT INSERTION, LEFT;  Surgeon: Weston Peck MD;  Location:  [facility] OR;  Service:     ENDOSCOPY N/A 04/26/2017    Procedure: ESOPHAGOGASTRODUODENOSCOPY WITH ANESTHESIA;  Surgeon: Sher Cui MD;  Location:  [facility] ENDOSCOPY;  Service:     INCISION AND DRAINAGE LEG Right 07/12/2023    Procedure: INCISION AND DRAINAGE - RIGHT FOOT;  Surgeon: Silas Swan DPM;  Location:  [facility] OR;  Service: Podiatry;  Laterality: Right;    JOINT REPLACEMENT Right     KIDNEY STONE SURGERY      KNEE SURGERY Right 08/2024    replacement    NECK SURGERY      ROTATOR CUFF REPAIR      SHOULDER SURGERY      TONSILLECTOMY      TRANS METATARSAL AMPUTATION Right 08/09/2023    Procedure: Partial 4th Ray Amputation - Right Foot;  Surgeon: Silas Swan DPM;  Location:  [facility] OR;  Service: Podiatry;  Laterality: Right;    URETEROSCOPY LASER LITHOTRIPSY WITH STENT INSERTION Left 09/01/2022    Procedure: LEFT URETEROSCOPY LASER LITHOTRIPSY WITH STENT INSERTION;  Surgeon: Weston Peck MD;  Location:  [facility] OR;  Service: Urology;  Laterality: Left;    URETEROSCOPY LASER LITHOTRIPSY WITH STENT INSERTION Left 09/15/2022    Procedure: LEFT URETEROSCOPY LASER LITHOTRIPSY  WITH STENT EXCHANGE;  Surgeon: Weston Peck MD;  Location: [facility] OR;  Service: Urology;  Laterality: Left;     Family History   Problem Relation Age of Onset    Kidney disease Father     Heart disease Father     Cancer Mother         brain    Colon cancer Neg Hx     Colon polyps Neg Hx      Social History     Socioeconomic History    Marital status:    Tobacco Use    Smoking status: Former     Current packs/day: 0.00     Types: Cigarettes     Quit date:      Years since quittin.1     Passive exposure: Never    Smokeless tobacco: Never   Vaping Use    Vaping status: Never Used   Substance and Sexual Activity    Alcohol use: No    Drug use: No    Sexual activity: Defer     Allergies   Allergen Reactions    Adhesive Tape Rash     Pt states that if it isn't left on very long it is okay    Cefazolin Rash    Cefuroxime Axetil Rash    Cephalosporins Rash    Neomycin-Polymyxin B Gu Rash    Percocet [Oxycodone-Acetaminophen] Rash     Current Outpatient Medications   Medication Sig Dispense Refill    acetaminophen (TYLENOL) 325 MG tablet Take 2 tablets by mouth Every 6 (Six) Hours As Needed for Mild Pain, Fever or Moderate Pain.      ALPRAZolam (XANAX) 0.25 MG tablet Take 1 tablet by mouth 2 (Two) Times a Day As Needed for Anxiety.      busPIRone (BUSPAR) 15 MG tablet Take 1 tablet by mouth 2 (Two) Times a Day.      clopidogrel (PLAVIX) 75 MG tablet Take 1 tablet by mouth Daily.      docusate sodium (COLACE) 100 MG capsule Take 1 capsule by mouth Every Evening.      DULoxetine (CYMBALTA) 60 MG capsule Take 1 capsule by mouth Every Night.      empagliflozin (JARDIANCE) 25 MG tablet tablet Take 1 tablet by mouth Daily.      ferrous sulfate 325 (65 FE) MG EC tablet Take 1 tablet by mouth 2 (Two) Times a Day.      gabapentin (NEURONTIN) 300 MG capsule Take 1 capsule by mouth Daily.      GARLIC-CALCIUM PO Take 1 tablet by mouth Daily.      HYDROcodone-acetaminophen (NORCO) 5-325 MG per tablet Take 1  tablet by mouth Every 4 (Four) Hours As Needed for Moderate Pain. 15 tablet 0    icosapent ethyl (VASCEPA) 1 g capsule capsule Take 2 g by mouth 2 (Two) Times a Day With Meals.      Insulin Degludec (TRESIBA SC) Inject 12 Units under the skin into the appropriate area as directed Every Night.      levothyroxine (SYNTHROID, LEVOTHROID) 75 MCG tablet Take 1 tablet by mouth Daily.      metFORMIN (GLUCOPHAGE) 500 MG tablet Take 1 tablet by mouth 2 (Two) Times a Day With Meals.      naloxone (NARCAN) 4 MG/0.1ML nasal spray Call 911. Don't prime. [place] in 1 nostril for overdose. Repeat in 2-3 minutes in other nostril if no or minimal breathing/responsiveness. 2 each 0    nitroglycerin (NITROSTAT) 0.4 MG SL tablet Place 1 tablet under the tongue Every 5 (Five) Minutes As Needed.      pantoprazole (PROTONIX) 40 MG EC tablet Take 1 tablet by mouth Daily.      polyethylene glycol (MIRALAX) powder Take 17 g by mouth Daily As Needed.      potassium chloride 10 MEQ CR tablet Take 1 tablet by mouth 2 (Two) Times a Day.      ranolazine (RANEXA) 1000 MG 12 hr tablet Take 1 tablet by mouth 2 (Two) Times a Day.      rosuvastatin (CRESTOR) 40 MG tablet Take 1 tablet by mouth Daily.      Vitamin D, Cholecalciferol, 1000 UNITS tablet Take 1 tablet by mouth Daily.       No current facility-administered medications for this visit.     Review of Systems   Constitutional:  Negative for chills and fever.   HENT:  Negative for congestion.    Respiratory:  Negative for shortness of breath.    Cardiovascular:  Negative for chest pain and leg swelling.   Gastrointestinal:  Negative for constipation, diarrhea, nausea and vomiting.   Musculoskeletal:  Positive for arthralgias.        Foot pain   Skin:  Negative for wound.        Calluses   Neurological:  Positive for numbness.       OBJECTIVE     Vitals:    02/10/25 1512   BP: 120/70   Pulse: 95   SpO2: 98%             PHYSICAL EXAM  GEN:   Accompanied by family member.     Foot/Ankle  Exam    GENERAL  Diabetic foot exam performed    Appearance:  appears stated age  Orientation:  AAOx3  Affect:  appropriate  Gait:  unimpaired (somewhat unsteady)  Assistance:  wheelchair  Right shoe gear: casual shoe  Left shoe gear: casual shoe    VASCULAR     Right Foot Vascularity   Dorsalis pedis:  2+  Posterior tibial:  2+  Skin temperature:  warm  Edema grading:  None  CFT:  3  Pedal hair growth:  Present  Varicosities:  mild varicosities     Left Foot Vascularity   Dorsalis pedis:  2+  Posterior tibial:  2+  Skin temperature:  warm  Edema grading:  None  CFT:  3  Pedal hair growth:  Present  Varicosities:  mild varicosities     NEUROLOGIC     Right Foot Neurologic   Light touch sensation: diminished  Vibratory sensation: diminished  Hot/Cold sensation: diminished  Protective Sensation using [place] Monofilament:   Sites intact: 3  Sites tested: 10     Left Foot Neurologic   Light touch sensation: diminished  Vibratory sensation: diminished  Hot/Cold sensation:  diminished  Protective Sensation using [place] Monofilament:   Sites intact: 4  Sites tested: 10    MUSCULOSKELETAL     Right Foot Musculoskeletal    Amputation   Toes amputated: fourth toe  Ecchymosis:  none  Tenderness:  callous right foot and toenail problem    Arch:  Normal  Hamm[place]oe:  Second toe, third toe and fifth toe     Left Foot Musculoskeletal   Ecchymosis:  none  Tenderness:  callous left foot and toenail problem  Arch:  Normal  Hamm[place]oe:  Second toe, third toe, fourth toe and fifth toe    MUSCLE STRENGTH     Right Foot Muscle Strength   Foot dorsiflexion:  4+  Foot plantar flexion:  4+  Foot inversion:  4+  Foot eversion:  4+     Left Foot Muscle Strength   Foot dorsiflexion:  4+  Foot plantar flexion:  4+  Foot inversion:  4+  Foot eversion:  4+    RANGE OF MOTION     Right Foot Range of Motion   Foot and ankle ROM within normal limits       Left Foot Range of Motion   Foot and ankle ROM within normal limits       DERMATOLOGIC      Right Foot Dermatologic   Skin  Positive for corn and tinea.   Nails  1.  Positive for elongated, onychomycosis, abnormal thickness and dystrophic nail.  2.  Positive for elongated, abnormal thickness and dystrophic nail.  3.  Positive for elongated, abnormal thickness and dystrophic nail.  4.  Absent.  5.  Positive for elongated, abnormal thickness and dystrophic nail.     Left Foot Dermatologic   Skin  Positive for corn and tinea.   Nails  1.  Positive for elongated, onychomycosis, abnormal thickness and dystrophic nail.  2.  Positive for elongated, abnormal thickness and dystrophic nail.  3.  Positive for elongated, abnormal thickness and dystrophic nail.  4.  Positive for elongated, abnormally thick and dystrophic nail.  5.  Positive for elongated, abnormally thick and dystrophic nail.    Image:       RADIOLOGY/NUCLEAR:  LABORATORY/CULTURE RESULTS:      PATHOLOGY RESULTS:       ASSESSMENT/PLAN     Diagnoses and all orders for this visit:    1. Onychomycosis (Primary)    2. Type 2 diabetes mellitus with diabetic neuropathy, with long-term current use of insulin    3. Antiplatelet or antithrombotic long-term use    4. Amputated toe, right    5. Foot callus    6. Encounter for diabetic foot exam      Comprehensive lower extremity examination and evaluation was performed.  Discussed findings and treatment plan including risks, benefits, and treatment options with patient in detail. Patient agreed with treatment plan.  PCP note reviewed.  Hemoglobin A1c reviewed.  Diabetic foot exam performed.  After verbal consent obtained, nail(s) x9 debrided of length and thickness with nail nipper without incidence  Patient may maintain nails and calluses at home utilizing emery board or pumice stone between visits as needed  Reviewed at home diabetic foot care including daily foot checks   Continue diabetic management per PCP.   Continue physical therapy as previously ordered.  An After Visit Summary was  printed and given to the patient at discharge, including (if requested) any available informative/educational handouts regarding diagnosis, treatment, or medications. All questions were answered to patient/family satisfaction. Should symptoms fail to improve or worsen they agree to call or return to clinic or to go to the Emergency Department. Discussed the importance of following up with any needed screening tests/labs/specialist appointments and any requested follow-up recommended by me today. Importance of maintaining follow-up discussed and patient accepts that missed appointments can delay diagnosis and potentially lead to worsening of conditions.  Return in about 3 months (around 5/10/2025) for With Gypsy DE JESUS, Follow-up with Podiatry Provider., or sooner if acute issues arise.    Lab Frequency Next Occurrence   Follow Anesthesia Guidelines / Standing Orders Once 04/12/2017   Follow Anesthesia Guidelines / Standing Orders Once 08/11/2017   Comprehensive Metabolic Panel Once 11/03/2021   CBC (No Diff) Once 11/03/2021   Phosphorus Once 11/03/2021   Magnesium Once 11/03/2021   Uric Acid Once 11/03/2021   Creatinine, Urine, Random - Urine, Clean Catch Once 10/29/2021   Urinalysis With Microscopic If Indicated (No Culture) - Urine, Clean Catch Once 10/29/2021   Vitamin D 25 Hydroxy Once 11/03/2021   PTH, Intact Once 11/03/2021       This document has been electronically signed by NEAL Moya on February 10, 2025 16:35 CST

## 2025-02-10 ENCOUNTER — OFFICE VISIT (OUTPATIENT)
Age: 70
End: 2025-02-10
Payer: MEDICARE

## 2025-02-10 VITALS
SYSTOLIC BLOOD PRESSURE: 120 MMHG | DIASTOLIC BLOOD PRESSURE: 70 MMHG | WEIGHT: 185 LBS | HEIGHT: 71 IN | OXYGEN SATURATION: 98 % | BODY MASS INDEX: 25.9 KG/M2 | HEART RATE: 95 BPM

## 2025-02-10 DIAGNOSIS — L84 FOOT CALLUS: ICD-10-CM

## 2025-02-10 DIAGNOSIS — Z79.4 TYPE 2 DIABETES MELLITUS WITH DIABETIC NEUROPATHY, WITH LONG-TERM CURRENT USE OF INSULIN: ICD-10-CM

## 2025-02-10 DIAGNOSIS — S98.131A AMPUTATED TOE, RIGHT: ICD-10-CM

## 2025-02-10 DIAGNOSIS — B35.1 ONYCHOMYCOSIS: Primary | ICD-10-CM

## 2025-02-10 DIAGNOSIS — E11.40 TYPE 2 DIABETES MELLITUS WITH DIABETIC NEUROPATHY, WITH LONG-TERM CURRENT USE OF INSULIN: ICD-10-CM

## 2025-02-10 DIAGNOSIS — Z79.02 ANTIPLATELET OR ANTITHROMBOTIC LONG-TERM USE: ICD-10-CM

## 2025-02-10 DIAGNOSIS — E11.9 ENCOUNTER FOR DIABETIC FOOT EXAM: ICD-10-CM

## 2025-02-10 RX ORDER — POTASSIUM CHLORIDE 750 MG/1
10 TABLET, EXTENDED RELEASE ORAL 2 TIMES DAILY
COMMUNITY

## 2025-02-13 ENCOUNTER — OFFICE VISIT (OUTPATIENT)
Age: 70
End: 2025-02-13
Payer: MEDICARE

## 2025-02-13 DIAGNOSIS — S72.451D CLOSED DISPLACED SUPRACONDYLAR FRACTURE OF DISTAL END OF RIGHT FEMUR WITHOUT INTRACONDYLAR EXTENSION WITH ROUTINE HEALING, SUBSEQUENT ENCOUNTER: Primary | ICD-10-CM

## 2025-02-13 PROCEDURE — 1160F RVW MEDS BY RX/DR IN RCRD: CPT | Performed by: ORTHOPAEDIC SURGERY

## 2025-02-13 PROCEDURE — 1123F ACP DISCUSS/DSCN MKR DOCD: CPT | Performed by: ORTHOPAEDIC SURGERY

## 2025-02-13 PROCEDURE — 1159F MED LIST DOCD IN RCRD: CPT | Performed by: ORTHOPAEDIC SURGERY

## 2025-02-13 PROCEDURE — 99213 OFFICE O/P EST LOW 20 MIN: CPT | Performed by: ORTHOPAEDIC SURGERY

## 2025-02-13 ASSESSMENT — ENCOUNTER SYMPTOMS
GASTROINTESTINAL NEGATIVE: 1
ALLERGIC/IMMUNOLOGIC NEGATIVE: 1
EYES NEGATIVE: 1
RESPIRATORY NEGATIVE: 1

## 2025-02-13 NOTE — PROGRESS NOTES
[facility]  [facility]  [address]   [facility] [id number] [phone]       Daniel Ramirez (:  1955) is a 69 y.o. male,Established patient, here for evaluation of the following chief complaint(s):  Follow-up (Right femur )        Assessment & Plan  1. Post-surgical status of right femur fracture.  Radiographic images demonstrate satisfactory osseous alignment, with no evidence of implant loosening, weakening, or shifting. The observed pain is not uncommon given the degree of injury and the presence of a large plate. There is no indication of self-inflicted damage from ambulation. He is advised to persist with physical therapy, which appears to be beneficial. A referral to Dr. Cisneros will be made for an evaluation of the total knee replacement. He is encouraged to continue arm strengthening exercises, focusing on higher resistance, higher repetitions, and lower weight. Driving short distances is deemed acceptable, but long trips are discouraged until fall risk is minimized. A new prescription for physical therapy will be provided, with an emphasis on core strengthening, balance, and gait mechanics.    Follow-up  The patient will follow up in 6 months.    PROCEDURE  The patient underwent a total knee replacement procedure in 2024.       ICD-10-CM    1. Closed displaced supracondylar fracture of distal end of right femur without intracondylar extension with routine healing, subsequent encounter  S72.451D XR FEMUR RIGHT (MIN 2 VIEWS)          No follow-ups on file.    SUBJECTIVE/OBJECTIVE:  History of Present Illness  The patient presents for evaluation of right femur fracture.    He is currently 6 months post-surgery, having undergone the procedure in 2024. He reports experiencing pain in the surgical area, which he describes as intermittent and particularly noticeable upon standing. The pain originates from the knee and

## 2025-02-14 ENCOUNTER — TELEPHONE (OUTPATIENT)
Age: 70
End: 2025-02-14

## 2025-02-14 NOTE — TELEPHONE ENCOUNTER
Called spoke to pt who had his MRI done 8/22/2024 and wants to see Addy for results scheduled pt appt 2/26

## 2025-02-14 NOTE — TELEPHONE ENCOUNTER
Dr Rodrigez's MA said pt just had a appt with Dr Rodrigez and pt  saw Addy for Right knee pain and is asking for a appt. Looks like pt first saw Addy 8/2024 and Addy requested a MRI, will need to call pt to see if this was done and then go from there.

## 2025-02-26 ENCOUNTER — OFFICE VISIT (OUTPATIENT)
Age: 70
End: 2025-02-26

## 2025-02-26 VITALS — WEIGHT: 185 LBS | HEIGHT: 71 IN | BODY MASS INDEX: 25.9 KG/M2

## 2025-02-26 DIAGNOSIS — R29.898 SEVERE MUSCLE DECONDITIONING: ICD-10-CM

## 2025-02-26 DIAGNOSIS — Z96.651 S/P TKR (TOTAL KNEE REPLACEMENT), RIGHT: Primary | ICD-10-CM

## 2025-02-27 PROBLEM — R29.898 SEVERE MUSCLE DECONDITIONING: Status: ACTIVE | Noted: 2025-02-27

## 2025-02-27 NOTE — PROGRESS NOTES
deconditioning         Plan:  At this point, he should continue with his PT to gain strength.  He will continue with his protein shakes as well.  He has lost a significant amount of weight.  See him back in 3 months and get xrays of the right knee.    Return in about 3 months (around 5/26/2025) for right knee with xrays.     No orders of the defined types were placed in this encounter.        Electronically signed by Addy Lozano PA-C on 2/27/2025 at 7:45 AM

## 2025-03-11 ENCOUNTER — APPOINTMENT (OUTPATIENT)
Dept: CT IMAGING | Facility: HOSPITAL | Age: 70
DRG: 281 | End: 2025-03-11
Payer: MEDICARE

## 2025-03-11 ENCOUNTER — APPOINTMENT (OUTPATIENT)
Dept: GENERAL RADIOLOGY | Facility: HOSPITAL | Age: 70
DRG: 281 | End: 2025-03-11
Payer: MEDICARE

## 2025-03-11 ENCOUNTER — HOSPITAL ENCOUNTER (INPATIENT)
Facility: HOSPITAL | Age: 70
LOS: 3 days | Discharge: HOME | DRG: 281 | End: 2025-03-14
Attending: EMERGENCY MEDICINE | Admitting: FAMILY MEDICINE
Payer: MEDICARE

## 2025-03-11 DIAGNOSIS — R13.10 DYSPHAGIA, UNSPECIFIED TYPE: ICD-10-CM

## 2025-03-11 DIAGNOSIS — R55 SYNCOPE AND COLLAPSE: ICD-10-CM

## 2025-03-11 DIAGNOSIS — R79.89 ELEVATED TROPONIN: ICD-10-CM

## 2025-03-11 DIAGNOSIS — R55 SYNCOPE, UNSPECIFIED SYNCOPE TYPE: Primary | ICD-10-CM

## 2025-03-11 DIAGNOSIS — F41.1 GAD (GENERALIZED ANXIETY DISORDER): ICD-10-CM

## 2025-03-11 DIAGNOSIS — Z74.09 IMPAIRED MOBILITY: ICD-10-CM

## 2025-03-11 LAB
ALBUMIN SERPL-MCNC: 3.5 G/DL (ref 3.5–5.2)
ALBUMIN/GLOB SERPL: 1 G/DL
ALP SERPL-CCNC: 171 U/L (ref 39–117)
ALT SERPL W P-5'-P-CCNC: 52 U/L (ref 1–41)
ANION GAP SERPL CALCULATED.3IONS-SCNC: 16 MMOL/L (ref 5–15)
AST SERPL-CCNC: 78 U/L (ref 1–40)
B PARAPERT DNA SPEC QL NAA+PROBE: NOT DETECTED
B PERT DNA SPEC QL NAA+PROBE: NOT DETECTED
BASOPHILS # BLD AUTO: 0.01 10*3/MM3 (ref 0–0.2)
BASOPHILS NFR BLD AUTO: 0.2 % (ref 0–1.5)
BILIRUB SERPL-MCNC: 1 MG/DL (ref 0–1.2)
BUN SERPL-MCNC: 16 MG/DL (ref 8–23)
BUN/CREAT SERPL: 11.3 (ref 7–25)
C PNEUM DNA NPH QL NAA+NON-PROBE: NOT DETECTED
CALCIUM SPEC-SCNC: 9.6 MG/DL (ref 8.6–10.5)
CHLORIDE SERPL-SCNC: 99 MMOL/L (ref 98–107)
CO2 SERPL-SCNC: 24 MMOL/L (ref 22–29)
CREAT SERPL-MCNC: 1.41 MG/DL (ref 0.76–1.27)
DEPRECATED RDW RBC AUTO: 68.6 FL (ref 37–54)
EGFRCR SERPLBLD CKD-EPI 2021: 53.3 ML/MIN/1.73
EOSINOPHIL # BLD AUTO: 0.07 10*3/MM3 (ref 0–0.4)
EOSINOPHIL NFR BLD AUTO: 1.5 % (ref 0.3–6.2)
ERYTHROCYTE [DISTWIDTH] IN BLOOD BY AUTOMATED COUNT: 17.3 % (ref 12.3–15.4)
FLUAV SUBTYP SPEC NAA+PROBE: NOT DETECTED
FLUBV RNA ISLT QL NAA+PROBE: NOT DETECTED
GEN 5 1HR TROPONIN T REFLEX: 110 NG/L
GLOBULIN UR ELPH-MCNC: 3.5 GM/DL
GLUCOSE SERPL-MCNC: 122 MG/DL (ref 65–99)
HADV DNA SPEC NAA+PROBE: NOT DETECTED
HCOV 229E RNA SPEC QL NAA+PROBE: NOT DETECTED
HCOV HKU1 RNA SPEC QL NAA+PROBE: NOT DETECTED
HCOV NL63 RNA SPEC QL NAA+PROBE: NOT DETECTED
HCOV OC43 RNA SPEC QL NAA+PROBE: NOT DETECTED
HCT VFR BLD AUTO: 29.8 % (ref 37.5–51)
HGB BLD-MCNC: 9.8 G/DL (ref 13–17.7)
HMPV RNA NPH QL NAA+NON-PROBE: NOT DETECTED
HPIV1 RNA ISLT QL NAA+PROBE: NOT DETECTED
HPIV2 RNA SPEC QL NAA+PROBE: NOT DETECTED
HPIV3 RNA NPH QL NAA+PROBE: NOT DETECTED
HPIV4 P GENE NPH QL NAA+PROBE: NOT DETECTED
IMM GRANULOCYTES # BLD AUTO: 0.01 10*3/MM3 (ref 0–0.05)
IMM GRANULOCYTES NFR BLD AUTO: 0.2 % (ref 0–0.5)
INR PPP: 1.15 (ref 0.91–1.09)
LYMPHOCYTES # BLD AUTO: 0.86 10*3/MM3 (ref 0.7–3.1)
LYMPHOCYTES NFR BLD AUTO: 18.9 % (ref 19.6–45.3)
M PNEUMO IGG SER IA-ACNC: NOT DETECTED
MAGNESIUM SERPL-MCNC: 2.1 MG/DL (ref 1.6–2.4)
MCH RBC QN AUTO: 36 PG (ref 26.6–33)
MCHC RBC AUTO-ENTMCNC: 32.9 G/DL (ref 31.5–35.7)
MCV RBC AUTO: 109.6 FL (ref 79–97)
MONOCYTES # BLD AUTO: 0.53 10*3/MM3 (ref 0.1–0.9)
MONOCYTES NFR BLD AUTO: 11.6 % (ref 5–12)
NEUTROPHILS NFR BLD AUTO: 3.08 10*3/MM3 (ref 1.7–7)
NEUTROPHILS NFR BLD AUTO: 67.6 % (ref 42.7–76)
NT-PROBNP SERPL-MCNC: 874.5 PG/ML (ref 0–900)
PLATELET # BLD AUTO: 66 10*3/MM3 (ref 140–450)
PMV BLD AUTO: 10 FL (ref 6–12)
POTASSIUM SERPL-SCNC: 3 MMOL/L (ref 3.5–5.2)
PROT SERPL-MCNC: 7 G/DL (ref 6–8.5)
PROTHROMBIN TIME: 15.4 SECONDS (ref 11.8–14.8)
RBC # BLD AUTO: 2.72 10*6/MM3 (ref 4.14–5.8)
RHINOVIRUS RNA SPEC NAA+PROBE: DETECTED
RSV RNA NPH QL NAA+NON-PROBE: NOT DETECTED
SARS-COV-2 RNA RESP QL NAA+PROBE: NOT DETECTED
SODIUM SERPL-SCNC: 139 MMOL/L (ref 136–145)
TROPONIN T % DELTA: -8
TROPONIN T NUMERIC DELTA: -9 NG/L
TROPONIN T SERPL HS-MCNC: 119 NG/L
WBC NRBC COR # BLD AUTO: 4.56 10*3/MM3 (ref 3.4–10.8)

## 2025-03-11 PROCEDURE — 83880 ASSAY OF NATRIURETIC PEPTIDE: CPT | Performed by: EMERGENCY MEDICINE

## 2025-03-11 PROCEDURE — 93010 ELECTROCARDIOGRAM REPORT: CPT | Performed by: HOSPITALIST

## 2025-03-11 PROCEDURE — 71045 X-RAY EXAM CHEST 1 VIEW: CPT

## 2025-03-11 PROCEDURE — 36415 COLL VENOUS BLD VENIPUNCTURE: CPT

## 2025-03-11 PROCEDURE — 99285 EMERGENCY DEPT VISIT HI MDM: CPT

## 2025-03-11 PROCEDURE — 85610 PROTHROMBIN TIME: CPT | Performed by: EMERGENCY MEDICINE

## 2025-03-11 PROCEDURE — 25010000002 POTASSIUM CHLORIDE PER 2 MEQ: Performed by: EMERGENCY MEDICINE

## 2025-03-11 PROCEDURE — 80053 COMPREHEN METABOLIC PANEL: CPT | Performed by: EMERGENCY MEDICINE

## 2025-03-11 PROCEDURE — 85025 COMPLETE CBC W/AUTO DIFF WBC: CPT | Performed by: EMERGENCY MEDICINE

## 2025-03-11 PROCEDURE — 93005 ELECTROCARDIOGRAM TRACING: CPT | Performed by: EMERGENCY MEDICINE

## 2025-03-11 PROCEDURE — 25810000003 LACTATED RINGERS SOLUTION: Performed by: EMERGENCY MEDICINE

## 2025-03-11 PROCEDURE — 83735 ASSAY OF MAGNESIUM: CPT | Performed by: EMERGENCY MEDICINE

## 2025-03-11 PROCEDURE — 25810000003 SODIUM CHLORIDE 0.9 % SOLUTION: Performed by: FAMILY MEDICINE

## 2025-03-11 PROCEDURE — 0202U NFCT DS 22 TRGT SARS-COV-2: CPT | Performed by: EMERGENCY MEDICINE

## 2025-03-11 PROCEDURE — 84484 ASSAY OF TROPONIN QUANT: CPT | Performed by: EMERGENCY MEDICINE

## 2025-03-11 PROCEDURE — 70450 CT HEAD/BRAIN W/O DYE: CPT

## 2025-03-11 RX ORDER — ACETAMINOPHEN 325 MG/1
650 TABLET ORAL EVERY 6 HOURS PRN
Status: DISCONTINUED | OUTPATIENT
Start: 2025-03-11 | End: 2025-03-14 | Stop reason: HOSPADM

## 2025-03-11 RX ORDER — ROSUVASTATIN CALCIUM 20 MG/1
40 TABLET, COATED ORAL DAILY
Status: DISCONTINUED | OUTPATIENT
Start: 2025-03-11 | End: 2025-03-14 | Stop reason: HOSPADM

## 2025-03-11 RX ORDER — PANTOPRAZOLE SODIUM 40 MG/1
40 TABLET, DELAYED RELEASE ORAL
Status: DISCONTINUED | OUTPATIENT
Start: 2025-03-12 | End: 2025-03-14 | Stop reason: HOSPADM

## 2025-03-11 RX ORDER — SODIUM CHLORIDE 0.9 % (FLUSH) 0.9 %
10 SYRINGE (ML) INJECTION EVERY 12 HOURS SCHEDULED
Status: DISCONTINUED | OUTPATIENT
Start: 2025-03-11 | End: 2025-03-14 | Stop reason: HOSPADM

## 2025-03-11 RX ORDER — NITROGLYCERIN 0.4 MG/1
0.4 TABLET SUBLINGUAL
Status: DISCONTINUED | OUTPATIENT
Start: 2025-03-11 | End: 2025-03-14 | Stop reason: HOSPADM

## 2025-03-11 RX ORDER — SODIUM CHLORIDE 9 MG/ML
40 INJECTION, SOLUTION INTRAVENOUS AS NEEDED
Status: DISCONTINUED | OUTPATIENT
Start: 2025-03-11 | End: 2025-03-14 | Stop reason: HOSPADM

## 2025-03-11 RX ORDER — SODIUM CHLORIDE 0.9 % (FLUSH) 0.9 %
10 SYRINGE (ML) INJECTION AS NEEDED
Status: DISCONTINUED | OUTPATIENT
Start: 2025-03-11 | End: 2025-03-14 | Stop reason: HOSPADM

## 2025-03-11 RX ORDER — GABAPENTIN 300 MG/1
300 CAPSULE ORAL DAILY
Status: DISCONTINUED | OUTPATIENT
Start: 2025-03-11 | End: 2025-03-14 | Stop reason: HOSPADM

## 2025-03-11 RX ORDER — RANOLAZINE 500 MG/1
1000 TABLET, EXTENDED RELEASE ORAL 2 TIMES DAILY
Status: DISCONTINUED | OUTPATIENT
Start: 2025-03-11 | End: 2025-03-14 | Stop reason: HOSPADM

## 2025-03-11 RX ORDER — BISACODYL 10 MG
10 SUPPOSITORY, RECTAL RECTAL DAILY PRN
Status: DISCONTINUED | OUTPATIENT
Start: 2025-03-11 | End: 2025-03-14 | Stop reason: HOSPADM

## 2025-03-11 RX ORDER — SODIUM CHLORIDE 9 MG/ML
75 INJECTION, SOLUTION INTRAVENOUS CONTINUOUS
Status: DISCONTINUED | OUTPATIENT
Start: 2025-03-11 | End: 2025-03-13

## 2025-03-11 RX ORDER — FERROUS SULFATE 325(65) MG
325 TABLET ORAL
Status: DISCONTINUED | OUTPATIENT
Start: 2025-03-12 | End: 2025-03-14 | Stop reason: HOSPADM

## 2025-03-11 RX ORDER — DOCUSATE SODIUM 100 MG/1
100 CAPSULE, LIQUID FILLED ORAL EVERY EVENING
Status: DISCONTINUED | OUTPATIENT
Start: 2025-03-11 | End: 2025-03-14 | Stop reason: HOSPADM

## 2025-03-11 RX ORDER — POTASSIUM CHLORIDE 750 MG/1
20 CAPSULE, EXTENDED RELEASE ORAL DAILY
Status: DISCONTINUED | OUTPATIENT
Start: 2025-03-11 | End: 2025-03-14 | Stop reason: HOSPADM

## 2025-03-11 RX ORDER — ALPRAZOLAM 0.25 MG
0.25 TABLET ORAL 2 TIMES DAILY PRN
Status: DISCONTINUED | OUTPATIENT
Start: 2025-03-11 | End: 2025-03-14 | Stop reason: HOSPADM

## 2025-03-11 RX ORDER — NITROGLYCERIN 0.4 MG/1
0.4 TABLET SUBLINGUAL
Status: DISCONTINUED | OUTPATIENT
Start: 2025-03-11 | End: 2025-03-12 | Stop reason: SDUPTHER

## 2025-03-11 RX ORDER — LEVOTHYROXINE SODIUM 75 UG/1
75 TABLET ORAL
Status: DISCONTINUED | OUTPATIENT
Start: 2025-03-12 | End: 2025-03-14 | Stop reason: HOSPADM

## 2025-03-11 RX ORDER — POLYETHYLENE GLYCOL 3350 17 G/17G
17 POWDER, FOR SOLUTION ORAL DAILY PRN
Status: DISCONTINUED | OUTPATIENT
Start: 2025-03-11 | End: 2025-03-14 | Stop reason: HOSPADM

## 2025-03-11 RX ORDER — DULOXETIN HYDROCHLORIDE 30 MG/1
60 CAPSULE, DELAYED RELEASE ORAL NIGHTLY
Status: DISCONTINUED | OUTPATIENT
Start: 2025-03-11 | End: 2025-03-14 | Stop reason: HOSPADM

## 2025-03-11 RX ORDER — ONDANSETRON 2 MG/ML
4 INJECTION INTRAMUSCULAR; INTRAVENOUS EVERY 6 HOURS PRN
Status: DISCONTINUED | OUTPATIENT
Start: 2025-03-11 | End: 2025-03-14 | Stop reason: HOSPADM

## 2025-03-11 RX ORDER — HYDROCODONE BITARTRATE AND ACETAMINOPHEN 5; 325 MG/1; MG/1
1 TABLET ORAL EVERY 4 HOURS PRN
Status: DISCONTINUED | OUTPATIENT
Start: 2025-03-11 | End: 2025-03-14 | Stop reason: HOSPADM

## 2025-03-11 RX ORDER — AMOXICILLIN 250 MG
2 CAPSULE ORAL 2 TIMES DAILY PRN
Status: DISCONTINUED | OUTPATIENT
Start: 2025-03-11 | End: 2025-03-14 | Stop reason: HOSPADM

## 2025-03-11 RX ORDER — ENOXAPARIN SODIUM 100 MG/ML
40 INJECTION SUBCUTANEOUS DAILY
Status: DISCONTINUED | OUTPATIENT
Start: 2025-03-11 | End: 2025-03-14 | Stop reason: HOSPADM

## 2025-03-11 RX ORDER — ONDANSETRON 4 MG/1
4 TABLET, ORALLY DISINTEGRATING ORAL EVERY 6 HOURS PRN
Status: DISCONTINUED | OUTPATIENT
Start: 2025-03-11 | End: 2025-03-14 | Stop reason: HOSPADM

## 2025-03-11 RX ORDER — CHOLECALCIFEROL (VITAMIN D3) 25 MCG
1000 TABLET ORAL DAILY
Status: DISCONTINUED | OUTPATIENT
Start: 2025-03-11 | End: 2025-03-14 | Stop reason: HOSPADM

## 2025-03-11 RX ORDER — BISACODYL 5 MG/1
5 TABLET, DELAYED RELEASE ORAL DAILY PRN
Status: DISCONTINUED | OUTPATIENT
Start: 2025-03-11 | End: 2025-03-14 | Stop reason: HOSPADM

## 2025-03-11 RX ORDER — CLOPIDOGREL BISULFATE 75 MG/1
75 TABLET ORAL DAILY
Status: DISCONTINUED | OUTPATIENT
Start: 2025-03-11 | End: 2025-03-14 | Stop reason: HOSPADM

## 2025-03-11 RX ORDER — POTASSIUM CHLORIDE 29.8 MG/ML
20 INJECTION INTRAVENOUS ONCE
Status: COMPLETED | OUTPATIENT
Start: 2025-03-11 | End: 2025-03-11

## 2025-03-11 RX ADMIN — ALPRAZOLAM 0.25 MG: 0.25 TABLET ORAL at 20:46

## 2025-03-11 RX ADMIN — Medication 10 ML: at 20:46

## 2025-03-11 RX ADMIN — DULOXETINE HYDROCHLORIDE 60 MG: 30 CAPSULE, DELAYED RELEASE ORAL at 20:45

## 2025-03-11 RX ADMIN — POTASSIUM CHLORIDE 20 MEQ: 400 INJECTION, SOLUTION INTRAVENOUS at 17:25

## 2025-03-11 RX ADMIN — RANOLAZINE 1000 MG: 500 TABLET, FILM COATED, EXTENDED RELEASE ORAL at 20:45

## 2025-03-11 RX ADMIN — GABAPENTIN 300 MG: 300 CAPSULE ORAL at 20:46

## 2025-03-11 RX ADMIN — BUSPIRONE HYDROCHLORIDE 15 MG: 5 TABLET ORAL at 20:46

## 2025-03-11 RX ADMIN — ACETAMINOPHEN 650 MG: 325 TABLET, FILM COATED ORAL at 20:48

## 2025-03-11 RX ADMIN — CLOPIDOGREL BISULFATE 75 MG: 75 TABLET, FILM COATED ORAL at 20:45

## 2025-03-11 RX ADMIN — SODIUM CHLORIDE, POTASSIUM CHLORIDE, SODIUM LACTATE AND CALCIUM CHLORIDE 500 ML: 600; 310; 30; 20 INJECTION, SOLUTION INTRAVENOUS at 14:56

## 2025-03-11 RX ADMIN — SODIUM CHLORIDE 75 ML/HR: 9 INJECTION, SOLUTION INTRAVENOUS at 18:28

## 2025-03-11 RX ADMIN — METFORMIN HYDROCHLORIDE 500 MG: 500 TABLET ORAL at 20:45

## 2025-03-11 RX ADMIN — ROSUVASTATIN 40 MG: 20 TABLET, FILM COATED ORAL at 20:45

## 2025-03-11 NOTE — ED PROVIDER NOTES
"Subjective   History of Present Illness  69-year-old male presents to the ED for evaluation following episode of syncope.  He has history of hypertension, hyperlipidemia, diabetes, coronary disease.  Patient was participating in outpatient physical therapy earlier today, developed sudden onset of abdominal pain.  This was followed by lightheadedness, generalized weakness, near syncope and hypotension.  He had episode of bowel incontinence.  He denies any prodrome of shortness of breath or chest pain.  Episode lasted about 15 to 20 minutes and resolved before patient arrived to the ED.  Currently states he feels \"woozy\", no abdominal discomfort.  States his belly feels better after his bowel movement.  No chest pain or shortness of breath, no nausea or vomiting.  Does endorse several day history of cough and congestion.  Currently rates his symptoms as mild severity.    History provided by:  Patient      Review of Systems   All other systems reviewed and are negative.      Past Medical History:   Diagnosis Date    Anemia     Anxiety     Arthritis     BPH (benign prostatic hypertrophy)     CAD (coronary artery disease)     Cancer     non-hodgkins lymphoma    Diabetes mellitus     Disease of thyroid gland     GERD (gastroesophageal reflux disease)     Hearing loss     History of transfusion     Hyperlipidemia     Hypertension     Iliac artery aneurysm, bilateral     Kidney stone     Liver cirrhosis     Migraines     Sleep apnea     c-pap       Allergies   Allergen Reactions    Adhesive Tape Rash     Pt states that if it isn't left on very long it is okay    Cefazolin Rash    Cefuroxime Axetil Rash    Cephalosporins Rash    Neomycin-Polymyxin B Gu Rash    Percocet [Oxycodone-Acetaminophen] Rash       Past Surgical History:   Procedure Laterality Date    COLONOSCOPY  02/04/2014    COLONOSCOPY N/A 04/26/2017    Procedure: COLONOSCOPY WITH ANESTHESIA;  Surgeon: Sher Cui MD;  Location: [facility] ENDOSCOPY;  Service:  "    CORONARY ARTERY BYPASS GRAFT      2    CYSTOSCOPY URETEROSCOPY LASER LITHOTRIPSY Left 04/17/2017    Procedure: URETEROSCOPY LASER LITHOTRIPSY WITH DOUBLE J STENT INSERTION, LEFT ;  Surgeon: Weston Peck MD;  Location:  [facility] OR;  Service:     CYSTOSCOPY URETEROSCOPY LASER LITHOTRIPSY Left 08/14/2017    Procedure: CYSTOSCOPY URETEROSCOPY LASER LITHOTRIPSY STENT INSERTION STONE EXTRACTION;  Surgeon: Weston Peck MD;  Location:  [facility] OR;  Service:     CYSTOSCOPY W/ URETERAL STENT PLACEMENT Left 04/17/2017    Procedure: CYSTOSCOPY URETERAL DOUBLE J STENT INSERTION, LEFT;  Surgeon: Weston Peck MD;  Location:  [facility] OR;  Service:     ENDOSCOPY N/A 04/26/2017    Procedure: ESOPHAGOGASTRODUODENOSCOPY WITH ANESTHESIA;  Surgeon: Sher Cui MD;  Location: [facility] ENDOSCOPY;  Service:     INCISION AND DRAINAGE LEG Right 07/12/2023    Procedure: INCISION AND DRAINAGE - RIGHT FOOT;  Surgeon: Silas Swan DPM;  Location:  [facility] OR;  Service: Podiatry;  Laterality: Right;    JOINT REPLACEMENT Right     KIDNEY STONE SURGERY      KNEE SURGERY Right 08/2024    replacement    NECK SURGERY      ROTATOR CUFF REPAIR      SHOULDER SURGERY      TONSILLECTOMY      TRANS METATARSAL AMPUTATION Right 08/09/2023    Procedure: Partial 4th Ray Amputation - Right Foot;  Surgeon: Silas Swan DPM;  Location:  [facility] OR;  Service: Podiatry;  Laterality: Right;    URETEROSCOPY LASER LITHOTRIPSY WITH STENT INSERTION Left 09/01/2022    Procedure: LEFT URETEROSCOPY LASER LITHOTRIPSY WITH STENT INSERTION;  Surgeon: Weston Peck MD;  Location:  [facility] OR;  Service: Urology;  Laterality: Left;    URETEROSCOPY LASER LITHOTRIPSY WITH STENT INSERTION Left 09/15/2022    Procedure: LEFT URETEROSCOPY LASER LITHOTRIPSY WITH STENT EXCHANGE;  Surgeon: Weston Peck MD;  Location:  [facility] OR;  Service: Urology;  Laterality: Left;       Family History   Problem Relation Age of Onset    Kidney disease  Father     Heart disease Father     Cancer Mother         brain    Colon cancer Neg Hx     Colon polyps Neg Hx        Social History     Socioeconomic History    Marital status:    Tobacco Use    Smoking status: Former     Current packs/day: 0.00     Types: Cigarettes     Quit date:      Years since quittin.2     Passive exposure: Never    Smokeless tobacco: Never   Vaping Use    Vaping status: Never Used   Substance and Sexual Activity    Alcohol use: No    Drug use: No    Sexual activity: Defer           Objective   Physical Exam  Vitals and nursing note reviewed.   Constitutional:       Appearance: Normal appearance. He is normal weight.   HENT:      Head: Normocephalic and atraumatic.      Nose: Nose normal. No congestion or rhinorrhea.      Mouth/Throat:      Mouth: Mucous membranes are moist.   Eyes:      Conjunctiva/sclera: Conjunctivae normal.      Pupils: Pupils are equal, round, and reactive to light.   Cardiovascular:      Rate and Rhythm: Normal rate and regular rhythm.      Heart sounds: Normal heart sounds. No murmur heard.  Pulmonary:      Effort: Pulmonary effort is normal.      Breath sounds: Normal breath sounds. No wheezing, rhonchi or rales.   Abdominal:      General: Abdomen is flat. Bowel sounds are normal.      Palpations: Abdomen is soft.   Musculoskeletal:      Right lower leg: No edema.      Left lower leg: No edema.   Skin:     General: Skin is warm and dry.      Capillary Refill: Capillary refill takes less than 2 seconds.   Neurological:      General: No focal deficit present.      Mental Status: He is alert and oriented to person, place, and time.         Procedures         Lab Results (last 24 hours)       Procedure Component Value Units Date/Time    CBC & Differential [[id number]]  (Abnormal) Collected: 25 1331    Specimen: Blood Updated: 25 1350    Narrative:      The following orders were created for panel order CBC & Differential.  Procedure                                Abnormality         Status                     ---------                               -----------         ------                     CBC Auto Differential[[id number]]        Abnormal            Final result                 Please view results for these tests on the individual orders.    Comprehensive Metabolic Panel [[id number]]  (Abnormal) Collected: 03/11/25 1331    Specimen: Blood Updated: 03/11/25 1404     Glucose 122 mg/dL      BUN 16 mg/dL      Creatinine 1.41 mg/dL      Sodium 139 mmol/L      Potassium 3.0 mmol/L      Chloride 99 mmol/L      CO2 24.0 mmol/L      Calcium 9.6 mg/dL      Total Protein 7.0 g/dL      Albumin 3.5 g/dL      ALT (SGPT) 52 U/L      AST (SGOT) 78 U/L      Alkaline Phosphatase 171 U/L      Total Bilirubin 1.0 mg/dL      Globulin 3.5 gm/dL      A/G Ratio 1.0 g/dL      BUN/Creatinine Ratio 11.3     Anion Gap 16.0 mmol/L      eGFR 53.3 mL/min/1.73     Narrative:      GFR Categories in Chronic Kidney Disease (CKD)      GFR Category          GFR (mL/min/1.73)    Interpretation  G1                     90 or greater         Normal or high (1)  G2                      60-89                Mild decrease (1)  G3a                   45-59                Mild to moderate decrease  G3b                   30-44                Moderate to severe decrease  G4                    15-29                Severe decrease  G5                    14 or less           Kidney failure          (1)In the absence of evidence of kidney disease, neither GFR category G1 or G2 fulfill the criteria for CKD.    eGFR calculation 2021 CKD-EPI creatinine equation, which does not include race as a factor    Protime-INR [[id number]]  (Abnormal) Collected: 03/11/25 1331    Specimen: Blood Updated: 03/11/25 1355     Protime 15.4 Seconds      INR 1.15    Magnesium [[id number]]  (Normal) Collected: 03/11/25 1331    Specimen: Blood Updated: 03/11/25 1400     Magnesium 2.1 mg/dL     BNP [[id number]]  (Normal) Collected:  03/11/25 1331    Specimen: Blood Updated: 03/11/25 1402     proBNP 874.5 pg/mL     Narrative:      This assay is used as an aid in the diagnosis of individuals suspected of having heart failure. It can be used as an aid in the diagnosis of acute decompensated heart failure (ADHF) in patients presenting with signs and symptoms of ADHF to the emergency department (ED). In addition, NT-proBNP of <300 pg/mL indicates ADHF is not likely.    Age Range Result Interpretation  NT-proBNP Concentration (pg/mL:      <50             Positive            >450                   Gray                 300-450                    Negative             <300    50-75           Positive            >900                  Gray                300-900                  Negative            <300      >75             Positive            >1800                  Gray                300-1800                  Negative            <300    High Sensitivity Troponin T [[id number]]  (Abnormal) Collected: 03/11/25 1331    Specimen: Blood Updated: 03/11/25 1406     HS Troponin T 119 ng/L     Narrative:      High Sensitive Troponin T Reference Range:  <14.0 ng/L- Negative Female for AMI  <22.0 ng/L- Negative Male for AMI  >=14 - Abnormal Female indicating possible myocardial injury.  >=22 - Abnormal Male indicating possible myocardial injury.   Clinicians would have to utilize clinical acumen, EKG, Troponin, and serial changes to determine if it is an Acute Myocardial Infarction or myocardial injury due to an underlying chronic condition.         CBC Auto Differential [[id number]]  (Abnormal) Collected: 03/11/25 1331    Specimen: Blood Updated: 03/11/25 1350     WBC 4.56 10*3/mm3      RBC 2.72 10*6/mm3      Hemoglobin 9.8 g/dL      Hematocrit 29.8 %      .6 fL      MCH 36.0 pg      MCHC 32.9 g/dL      RDW 17.3 %      RDW-SD 68.6 fl      MPV 10.0 fL      Platelets 66 10*3/mm3      Neutrophil % 67.6 %      Lymphocyte % 18.9 %      Monocyte % 11.6 %       Eosinophil % 1.5 %      Basophil % 0.2 %      Immature Grans % 0.2 %      Neutrophils, Absolute 3.08 10*3/mm3      Lymphocytes, Absolute 0.86 10*3/mm3      Monocytes, Absolute 0.53 10*3/mm3      Eosinophils, Absolute 0.07 10*3/mm3      Basophils, Absolute 0.01 10*3/mm3      Immature Grans, Absolute 0.01 10*3/mm3     Respiratory Panel PCR w/COVID-19(SARS-CoV-2) [facility] In-House, NP Swab in UTM/VTM, 2 HR TAT - Swab, Nasopharynx [[id number]]  (Abnormal) Collected: 03/11/25 1451    Specimen: Swab from Nasopharynx Updated: 03/11/25 1604     ADENOVIRUS, PCR Not Detected     Coronavirus 229E Not Detected     Coronavirus HKU1 Not Detected     Coronavirus NL63 Not Detected     Coronavirus OC43 Not Detected     COVID19 Not Detected     Human Metapneumovirus Not Detected     Human Rhinovirus/Enterovirus Detected     Influenza A PCR Not Detected     Influenza B PCR Not Detected     Parainfluenza Virus 1 Not Detected     Parainfluenza Virus 2 Not Detected     Parainfluenza Virus 3 Not Detected     Parainfluenza Virus 4 Not Detected     RSV, PCR Not Detected     Bordetella pertussis pcr Not Detected     Bordetella parapertussis PCR Not Detected     Chlamydophila pneumoniae PCR Not Detected     Mycoplasma pneumo by PCR Not Detected    Narrative:      In the setting of a positive respiratory panel with a viral infection PLUS a negative procalcitonin without other underlying concern for bacterial infection, consider observing off antibiotics or discontinuation of antibiotics and continue supportive care. If the respiratory panel is positive for atypical bacterial infection (Bordetella pertussis, Chlamydophila pneumoniae, or Mycoplasma pneumoniae), consider antibiotic de-escalation to target atypical bacterial infection.    High Sensitivity Troponin T 1Hr [[id number]]  (Abnormal) Collected: 03/11/25 1451    Specimen: Blood Updated: 03/11/25 1524     HS Troponin T 110 ng/L      Troponin T Numeric Delta -9 ng/L       Troponin T % Delta -8    Narrative:      High Sensitive Troponin T Reference Range:  <14.0 ng/L- Negative Female for AMI  <22.0 ng/L- Negative Male for AMI  >=14 - Abnormal Female indicating possible myocardial injury.  >=22 - Abnormal Male indicating possible myocardial injury.   Clinicians would have to utilize clinical acumen, EKG, Troponin, and serial changes to determine if it is an Acute Myocardial Infarction or myocardial injury due to an underlying chronic condition.              CT Head Without Contrast  Result Date: 3/11/2025  EXAM: CT HEAD WO CONTRAST-  DATE: 3/11/2025 12:56 PM  HISTORY: syncope   COMPARISON: 1/24/2025.  DOSE LENGTH PRODUCT: 943.32 mGy.cm  Automated exposure control was also utilized to decrease patient radiation dose.  TECHNIQUE: Unenhanced CT images obtained from vertex to skull base with multiplanar reformats.  FINDINGS: There is no acute intracranial hemorrhage, midline shift, mass effect, or hydrocephalus. There is no CT evidence for acute infarct.  There are chronic changes with volume loss and chronic small vessel ischemic change of the periventricular white matter.  SOFT TISSUES: The scalp soft tissues are unremarkable.   SINUS:The visualized paranasal sinuses and mastoid air cells are clear.  ORBITS: The visualized orbits and globes are unremarkable.       1. Chronic changes and no acute intracranial findings.  This report was signed and finalized on 3/11/2025 2:26 PM by Jose Crenshaw.      XR Chest 1 View  Result Date: 3/11/2025  EXAMINATION: XR CHEST 1 VW-  3/11/2025 1:52 PM 1 view  HISTORY: syncope  COMPARISON: 6/4/2024  TECHNIQUE: A single frontal view of the chest was obtained.  FINDINGS: Median sternotomy wires are noted. Fusion changes in the lower cervical spine. Arthritis in both shoulders.  No airspace consolidation or pneumothorax. The heart is not enlarged. Since the previous examination, there may have been a proximal LEFT humeral fracture.       1.  No acute  cardiopulmonary process.  2.  Abnormal appearance of the proximal LEFT humerus, different than on the exam from 6/4/2024. Correlate for previous proximal humerus fracture. If no history of previous proximal humeral fracture, consider 3 view shoulder series on the LEFT.  3.  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    This report was signed and finalized on 3/11/2025 1:57 PM by Dr. Jed Messina MD.       ED Course  ED Course as of 03/11/25 1745   Tue Mar 11, 2025   1716 69-year-old male with history of hypertension, hyperlipidemia, diabetes, coronary disease who presents to the ED for evaluation following episode of syncope following outpatient physical therapy.  No acute ischemic changes on EKG.  BNP was normal at 875.  Initial troponin elevated to 119, repeat 110.  This is higher than baseline but delta is within normal limits(less than 20% delta).  He has chronic anemia.  Does have mild elevated ALT and AST, T. bili normal.  Possibly related to his viral URI.  Has had cough and congestion, rhinovirus detected.  COVID-19 negative.  Potassium minimally decreased at 3.0.  Will begin repletion in ED.    Given history risk factors, syncope, elevated troponins, will admit for inpatient syncope workup and serial troponins. [AW]      ED Course User Index  [AW] Yossi Allen MD                                Total (NIH Stroke Scale): 0                      Medical Decision Making  Problems Addressed:  Elevated troponin: complicated acute illness or injury  Syncope, unspecified syncope type: complicated acute illness or injury    Amount and/or Complexity of Data Reviewed  Labs: ordered.  Radiology: ordered.  ECG/medicine tests: ordered.    Risk  Prescription drug management.  Decision regarding hospitalization.        Final diagnoses:   Syncope, unspecified syncope type   Elevated troponin       ED Disposition  ED Disposition       ED Disposition   Decision  to Admit    Condition   --    Comment   Level of Care: Telemetry [5]   Diagnosis: Syncope [483910]   Admitting Physician: DAVID ZAPATA [[id number]]   Attending Physician: DAVID ZAPATA [[id number]]   Certification: I Certify That Inpatient Hospital Services Are Medically Necessary For Greater Than 2 Midnights                 No follow-up provider specified.       Medication List      No changes were made to your prescriptions during this visit.            Yossi Allen MD  03/11/25 2225

## 2025-03-12 ENCOUNTER — APPOINTMENT (OUTPATIENT)
Dept: CARDIOLOGY | Facility: HOSPITAL | Age: 70
DRG: 281 | End: 2025-03-12
Payer: MEDICARE

## 2025-03-12 ENCOUNTER — APPOINTMENT (OUTPATIENT)
Dept: ULTRASOUND IMAGING | Facility: HOSPITAL | Age: 70
DRG: 281 | End: 2025-03-12
Payer: MEDICARE

## 2025-03-12 LAB
ANION GAP SERPL CALCULATED.3IONS-SCNC: 13 MMOL/L (ref 5–15)
AORTIC DIMENSIONLESS INDEX: 0.89 (DI)
AV MEAN PRESS GRAD SYS DOP V1V2: 6 MMHG
AV VMAX SYS DOP: 159 CM/SEC
BASOPHILS # BLD AUTO: 0.01 10*3/MM3 (ref 0–0.2)
BASOPHILS NFR BLD AUTO: 0.3 % (ref 0–1.5)
BH CV ECHO MEAS - AO MAX PG: 10.1 MMHG
BH CV ECHO MEAS - AO ROOT DIAM: 3.8 CM
BH CV ECHO MEAS - AO V2 VTI: 31.1 CM
BH CV ECHO MEAS - AVA(I,D): 2.8 CM2
BH CV ECHO MEAS - EDV(CUBED): 93.6 ML
BH CV ECHO MEAS - EDV(MOD-SP2): 158 ML
BH CV ECHO MEAS - EDV(MOD-SP4): 125 ML
BH CV ECHO MEAS - EF(MOD-SP2): 66.7 %
BH CV ECHO MEAS - EF(MOD-SP4): 58.6 %
BH CV ECHO MEAS - ESV(CUBED): 27.3 ML
BH CV ECHO MEAS - ESV(MOD-SP2): 52.6 ML
BH CV ECHO MEAS - ESV(MOD-SP4): 51.7 ML
BH CV ECHO MEAS - FS: 33.7 %
BH CV ECHO MEAS - IVS/LVPW: 1.04 CM
BH CV ECHO MEAS - IVSD: 1 CM
BH CV ECHO MEAS - LA DIMENSION: 3.8 CM
BH CV ECHO MEAS - LAT PEAK E' VEL: 9.9 CM/SEC
BH CV ECHO MEAS - LV MASS(C)D: 151 GRAMS
BH CV ECHO MEAS - LV MAX PG: 7.6 MMHG
BH CV ECHO MEAS - LV MEAN PG: 4 MMHG
BH CV ECHO MEAS - LV V1 MAX: 138 CM/SEC
BH CV ECHO MEAS - LV V1 VTI: 27.6 CM
BH CV ECHO MEAS - LVIDD: 4.5 CM
BH CV ECHO MEAS - LVIDS: 3 CM
BH CV ECHO MEAS - LVOT AREA: 3.1 CM2
BH CV ECHO MEAS - LVOT DIAM: 2 CM
BH CV ECHO MEAS - LVPWD: 0.96 CM
BH CV ECHO MEAS - MED PEAK E' VEL: 7.5 CM/SEC
BH CV ECHO MEAS - MV A MAX VEL: 148 CM/SEC
BH CV ECHO MEAS - MV DEC TIME: 0.19 SEC
BH CV ECHO MEAS - MV E MAX VEL: 121 CM/SEC
BH CV ECHO MEAS - MV E/A: 0.82
BH CV ECHO MEAS - RAP SYSTOLE: 3 MMHG
BH CV ECHO MEAS - SV(LVOT): 86.7 ML
BH CV ECHO MEAS - SV(MOD-SP2): 105.4 ML
BH CV ECHO MEAS - SV(MOD-SP4): 73.3 ML
BH CV ECHO MEASUREMENTS AVERAGE E/E' RATIO: 13.91
BH CV XLRA - RV BASE: 4.3 CM
BH CV XLRA - RV LENGTH: 5.7 CM
BH CV XLRA - RV MID: 3.2 CM
BUN SERPL-MCNC: 14 MG/DL (ref 8–23)
BUN/CREAT SERPL: 11.2 (ref 7–25)
CALCIUM SPEC-SCNC: 8.6 MG/DL (ref 8.6–10.5)
CHLORIDE SERPL-SCNC: 98 MMOL/L (ref 98–107)
CO2 SERPL-SCNC: 24 MMOL/L (ref 22–29)
CREAT SERPL-MCNC: 1.25 MG/DL (ref 0.76–1.27)
DEPRECATED RDW RBC AUTO: 69.5 FL (ref 37–54)
EGFRCR SERPLBLD CKD-EPI 2021: 61.6 ML/MIN/1.73
EOSINOPHIL # BLD AUTO: 0.07 10*3/MM3 (ref 0–0.4)
EOSINOPHIL NFR BLD AUTO: 2 % (ref 0.3–6.2)
ERYTHROCYTE [DISTWIDTH] IN BLOOD BY AUTOMATED COUNT: 17.2 % (ref 12.3–15.4)
FERRITIN SERPL-MCNC: 637.6 NG/ML (ref 30–400)
GLUCOSE SERPL-MCNC: 86 MG/DL (ref 65–99)
HCT VFR BLD AUTO: 26.8 % (ref 37.5–51)
HGB BLD-MCNC: 8.6 G/DL (ref 13–17.7)
IMM GRANULOCYTES # BLD AUTO: 0.01 10*3/MM3 (ref 0–0.05)
IMM GRANULOCYTES NFR BLD AUTO: 0.3 % (ref 0–0.5)
IRON 24H UR-MRATE: 50 MCG/DL (ref 59–158)
IRON SATN MFR SERPL: 26 % (ref 20–50)
LEFT ATRIUM VOLUME INDEX: 33.3 ML/M2
LEFT ATRIUM VOLUME: 70.3 ML
LV EF BIPLANE MOD: 64.2 %
LYMPHOCYTES # BLD AUTO: 0.9 10*3/MM3 (ref 0.7–3.1)
LYMPHOCYTES NFR BLD AUTO: 25.8 % (ref 19.6–45.3)
MAGNESIUM SERPL-MCNC: 2.1 MG/DL (ref 1.6–2.4)
MCH RBC QN AUTO: 35.5 PG (ref 26.6–33)
MCHC RBC AUTO-ENTMCNC: 32.1 G/DL (ref 31.5–35.7)
MCV RBC AUTO: 110.7 FL (ref 79–97)
MONOCYTES # BLD AUTO: 0.46 10*3/MM3 (ref 0.1–0.9)
MONOCYTES NFR BLD AUTO: 13.2 % (ref 5–12)
NEUTROPHILS NFR BLD AUTO: 2.04 10*3/MM3 (ref 1.7–7)
NEUTROPHILS NFR BLD AUTO: 58.4 % (ref 42.7–76)
PHOSPHATE SERPL-MCNC: 2.6 MG/DL (ref 2.5–4.5)
PLATELET # BLD AUTO: 59 10*3/MM3 (ref 140–450)
PMV BLD AUTO: 10.1 FL (ref 6–12)
POTASSIUM SERPL-SCNC: 3.2 MMOL/L (ref 3.5–5.2)
POTASSIUM SERPL-SCNC: 3.9 MMOL/L (ref 3.5–5.2)
QT INTERVAL: 430 MS
QTC INTERVAL: 528 MS
RBC # BLD AUTO: 2.42 10*6/MM3 (ref 4.14–5.8)
SODIUM SERPL-SCNC: 135 MMOL/L (ref 136–145)
TIBC SERPL-MCNC: 195 MCG/DL (ref 298–536)
TRANSFERRIN SERPL-MCNC: 131 MG/DL (ref 200–360)
TROPONIN T SERPL HS-MCNC: 98 NG/L
WBC NRBC COR # BLD AUTO: 3.49 10*3/MM3 (ref 3.4–10.8)

## 2025-03-12 PROCEDURE — 93306 TTE W/DOPPLER COMPLETE: CPT

## 2025-03-12 PROCEDURE — 99222 1ST HOSP IP/OBS MODERATE 55: CPT | Performed by: HOSPITALIST

## 2025-03-12 PROCEDURE — 97165 OT EVAL LOW COMPLEX 30 MIN: CPT | Performed by: OCCUPATIONAL THERAPIST

## 2025-03-12 PROCEDURE — 25510000001 PERFLUTREN 6.52 MG/ML SUSPENSION: Performed by: FAMILY MEDICINE

## 2025-03-12 PROCEDURE — 25010000002 NA FERRIC GLUC CPLX PER 12.5 MG: Performed by: FAMILY MEDICINE

## 2025-03-12 PROCEDURE — 80048 BASIC METABOLIC PNL TOTAL CA: CPT | Performed by: FAMILY MEDICINE

## 2025-03-12 PROCEDURE — 83735 ASSAY OF MAGNESIUM: CPT | Performed by: FAMILY MEDICINE

## 2025-03-12 PROCEDURE — 93306 TTE W/DOPPLER COMPLETE: CPT | Performed by: HOSPITALIST

## 2025-03-12 PROCEDURE — 93880 EXTRACRANIAL BILAT STUDY: CPT

## 2025-03-12 PROCEDURE — 83540 ASSAY OF IRON: CPT | Performed by: FAMILY MEDICINE

## 2025-03-12 PROCEDURE — 84100 ASSAY OF PHOSPHORUS: CPT | Performed by: FAMILY MEDICINE

## 2025-03-12 PROCEDURE — 94799 UNLISTED PULMONARY SVC/PX: CPT

## 2025-03-12 PROCEDURE — 25010000002 AZITHROMYCIN PER 500 MG: Performed by: FAMILY MEDICINE

## 2025-03-12 PROCEDURE — 84132 ASSAY OF SERUM POTASSIUM: CPT | Performed by: FAMILY MEDICINE

## 2025-03-12 PROCEDURE — 25810000003 SODIUM CHLORIDE 0.9 % SOLUTION 250 ML FLEX CONT: Performed by: FAMILY MEDICINE

## 2025-03-12 PROCEDURE — 82728 ASSAY OF FERRITIN: CPT | Performed by: FAMILY MEDICINE

## 2025-03-12 PROCEDURE — 94640 AIRWAY INHALATION TREATMENT: CPT

## 2025-03-12 PROCEDURE — 25810000003 SODIUM CHLORIDE 0.9 % SOLUTION: Performed by: FAMILY MEDICINE

## 2025-03-12 PROCEDURE — 84466 ASSAY OF TRANSFERRIN: CPT | Performed by: FAMILY MEDICINE

## 2025-03-12 PROCEDURE — 93880 EXTRACRANIAL BILAT STUDY: CPT | Performed by: SURGERY

## 2025-03-12 PROCEDURE — 36415 COLL VENOUS BLD VENIPUNCTURE: CPT | Performed by: FAMILY MEDICINE

## 2025-03-12 PROCEDURE — 84484 ASSAY OF TROPONIN QUANT: CPT | Performed by: FAMILY MEDICINE

## 2025-03-12 PROCEDURE — 92610 EVALUATE SWALLOWING FUNCTION: CPT

## 2025-03-12 PROCEDURE — 85025 COMPLETE CBC W/AUTO DIFF WBC: CPT | Performed by: FAMILY MEDICINE

## 2025-03-12 RX ORDER — SODIUM CHLORIDE 9 MG/ML
75 INJECTION, SOLUTION INTRAVENOUS CONTINUOUS
Status: DISCONTINUED | OUTPATIENT
Start: 2025-03-12 | End: 2025-03-13

## 2025-03-12 RX ORDER — POTASSIUM CHLORIDE 1500 MG/1
20 TABLET, EXTENDED RELEASE ORAL DAILY
COMMUNITY

## 2025-03-12 RX ORDER — POTASSIUM CHLORIDE 1.5 G/1.58G
40 POWDER, FOR SOLUTION ORAL EVERY 4 HOURS
Status: COMPLETED | OUTPATIENT
Start: 2025-03-12 | End: 2025-03-12

## 2025-03-12 RX ORDER — IPRATROPIUM BROMIDE AND ALBUTEROL SULFATE 2.5; .5 MG/3ML; MG/3ML
3 SOLUTION RESPIRATORY (INHALATION)
Status: DISCONTINUED | OUTPATIENT
Start: 2025-03-12 | End: 2025-03-14 | Stop reason: HOSPADM

## 2025-03-12 RX ORDER — INSULIN DEGLUDEC 100 U/ML
18 INJECTION, SOLUTION SUBCUTANEOUS NIGHTLY
COMMUNITY

## 2025-03-12 RX ORDER — HYDROCODONE POLISTIREX AND CHLORPHENIRAMINE POLISTIREX 10; 8 MG/5ML; MG/5ML
5 SUSPENSION, EXTENDED RELEASE ORAL EVERY 12 HOURS PRN
Refills: 0 | Status: DISCONTINUED | OUTPATIENT
Start: 2025-03-12 | End: 2025-03-14 | Stop reason: HOSPADM

## 2025-03-12 RX ADMIN — CLOPIDOGREL BISULFATE 75 MG: 75 TABLET, FILM COATED ORAL at 09:57

## 2025-03-12 RX ADMIN — IPRATROPIUM BROMIDE AND ALBUTEROL SULFATE 3 ML: .5; 3 SOLUTION RESPIRATORY (INHALATION) at 10:25

## 2025-03-12 RX ADMIN — RANOLAZINE 1000 MG: 500 TABLET, FILM COATED, EXTENDED RELEASE ORAL at 09:57

## 2025-03-12 RX ADMIN — RANOLAZINE 1000 MG: 500 TABLET, FILM COATED, EXTENDED RELEASE ORAL at 21:33

## 2025-03-12 RX ADMIN — PERFLUTREN 9.78 MG: 6.52 INJECTION, SUSPENSION INTRAVENOUS at 11:15

## 2025-03-12 RX ADMIN — IPRATROPIUM BROMIDE AND ALBUTEROL SULFATE 3 ML: .5; 3 SOLUTION RESPIRATORY (INHALATION) at 18:44

## 2025-03-12 RX ADMIN — POTASSIUM CHLORIDE 40 MEQ: 1.5 POWDER, FOR SOLUTION ORAL at 06:02

## 2025-03-12 RX ADMIN — GABAPENTIN 300 MG: 300 CAPSULE ORAL at 09:57

## 2025-03-12 RX ADMIN — DULOXETINE HYDROCHLORIDE 60 MG: 30 CAPSULE, DELAYED RELEASE ORAL at 21:33

## 2025-03-12 RX ADMIN — DOCUSATE SODIUM 100 MG: 100 CAPSULE, LIQUID FILLED ORAL at 17:27

## 2025-03-12 RX ADMIN — SODIUM CHLORIDE 250 MG: 9 INJECTION, SOLUTION INTRAVENOUS at 09:58

## 2025-03-12 RX ADMIN — METFORMIN HYDROCHLORIDE 500 MG: 500 TABLET ORAL at 17:27

## 2025-03-12 RX ADMIN — Medication 1000 UNITS: at 09:57

## 2025-03-12 RX ADMIN — FERROUS SULFATE TAB 325 MG (65 MG ELEMENTAL FE) 325 MG: 325 (65 FE) TAB at 09:57

## 2025-03-12 RX ADMIN — METFORMIN HYDROCHLORIDE 500 MG: 500 TABLET ORAL at 09:58

## 2025-03-12 RX ADMIN — ROSUVASTATIN 40 MG: 20 TABLET, FILM COATED ORAL at 09:57

## 2025-03-12 RX ADMIN — BUSPIRONE HYDROCHLORIDE 15 MG: 5 TABLET ORAL at 09:56

## 2025-03-12 RX ADMIN — LEVOTHYROXINE SODIUM 75 MCG: 75 TABLET ORAL at 05:31

## 2025-03-12 RX ADMIN — EMPAGLIFLOZIN 25 MG: 25 TABLET, FILM COATED ORAL at 09:57

## 2025-03-12 RX ADMIN — PANTOPRAZOLE SODIUM 40 MG: 40 TABLET, DELAYED RELEASE ORAL at 05:31

## 2025-03-12 RX ADMIN — AZITHROMYCIN DIHYDRATE 500 MG: 500 INJECTION, POWDER, LYOPHILIZED, FOR SOLUTION INTRAVENOUS at 08:06

## 2025-03-12 RX ADMIN — POTASSIUM CHLORIDE 20 MEQ: 750 CAPSULE, EXTENDED RELEASE ORAL at 09:56

## 2025-03-12 RX ADMIN — SODIUM CHLORIDE 75 ML/HR: 9 INJECTION, SOLUTION INTRAVENOUS at 09:58

## 2025-03-12 RX ADMIN — HYDROCODONE POLISTIREX AND CHLORPHENIRAMINE POLISTIREX 5 ML: 10; 8 SUSPENSION, EXTENDED RELEASE ORAL at 14:13

## 2025-03-12 RX ADMIN — POTASSIUM CHLORIDE 40 MEQ: 1.5 POWDER, FOR SOLUTION ORAL at 09:59

## 2025-03-12 RX ADMIN — BUSPIRONE HYDROCHLORIDE 15 MG: 5 TABLET ORAL at 21:33

## 2025-03-12 NOTE — CONSULTS
[facility] HEART GROUP CONSULT NOTE    Referring Provider: Figueroa Chaves MD    Reason for Consultation: Syncope    Chief complaint:   Chief Complaint   Patient presents with    Syncope       Subjective .     History of present illness:  Franko Lucero is a 69 y.o. male with known history of coronary artery disease status post three-vessel CABG in 2014, chronic kidney disease, COPD, anemia, anxiety, non-Hodgkin's lymphoma, type 2 diabetes on insulin, hypertension, hyperlipidemia, and cirrhosis who cardiology is asked to evaluate due to syncopal episode.  Patient has been followed at [facility] cardiology for his care.    Patient presented to [facility] on 3/11/2025 with a syncopal episode.  Reportedly he was sitting outside his physical therapy location and woke up in an ambulance.  He had defecated on himself.  No reported prodromal symptoms.  Has been having a cough and congestion for at least 2 weeks.  Workup in the ER revealed acute kidney injury and positive rhinovirus.  Troponin elevated at 119 and down trended to 110 with a negative delta of 9.  We are asked to provide further input.      Upon my examination patient sitting up in bed.  Spouse at bedside.  Patient denies any chest pain or shortness of breath leading up to this event.  He been taking his cardiac medications as prescribed.  He was unaware during the event.  The wife described the episode as him sitting on a ledge and being unresponsive.  He had to be assisted to a wheelchair due to his unresponsiveness.  This spontaneously improved during his ambulance ride to the hospital.  He has been sick for a few weeks with upper respiratory infection.  He is also had frequent loose stools.    History  Past Medical History:   Diagnosis Date    Anemia     Anxiety     Arthritis     BPH (benign prostatic hypertrophy)     CAD (coronary artery disease)     Cancer     non-hodgkins lymphoma    Diabetes mellitus     Disease of thyroid gland      GERD (gastroesophageal reflux disease)     Hearing loss     History of transfusion     Hyperlipidemia     Hypertension     Iliac artery aneurysm, bilateral     Kidney stone     Liver cirrhosis     Migraines     Sleep apnea     c-pap   ,   Past Surgical History:   Procedure Laterality Date    COLONOSCOPY  02/04/2014    COLONOSCOPY N/A 04/26/2017    Procedure: COLONOSCOPY WITH ANESTHESIA;  Surgeon: Sher Cui MD;  Location: [facility] ENDOSCOPY;  Service:     CORONARY ARTERY BYPASS GRAFT      2    CYSTOSCOPY URETEROSCOPY LASER LITHOTRIPSY Left 04/17/2017    Procedure: URETEROSCOPY LASER LITHOTRIPSY WITH DOUBLE J STENT INSERTION, LEFT ;  Surgeon: Weston Peck MD;  Location: [facility] OR;  Service:     CYSTOSCOPY URETEROSCOPY LASER LITHOTRIPSY Left 08/14/2017    Procedure: CYSTOSCOPY URETEROSCOPY LASER LITHOTRIPSY STENT INSERTION STONE EXTRACTION;  Surgeon: Weston Peck MD;  Location: [facility] OR;  Service:     CYSTOSCOPY W/ URETERAL STENT PLACEMENT Left 04/17/2017    Procedure: CYSTOSCOPY URETERAL DOUBLE J STENT INSERTION, LEFT;  Surgeon: Weston Peck MD;  Location: [facility] OR;  Service:     ENDOSCOPY N/A 04/26/2017    Procedure: ESOPHAGOGASTRODUODENOSCOPY WITH ANESTHESIA;  Surgeon: Sher Cui MD;  Location: [facility] ENDOSCOPY;  Service:     INCISION AND DRAINAGE LEG Right 07/12/2023    Procedure: INCISION AND DRAINAGE - RIGHT FOOT;  Surgeon: Silas Swan DPM;  Location: [facility] OR;  Service: Podiatry;  Laterality: Right;    JOINT REPLACEMENT Right     KIDNEY STONE SURGERY      KNEE SURGERY Right 08/2024    replacement    NECK SURGERY      ROTATOR CUFF REPAIR      SHOULDER SURGERY      TONSILLECTOMY      TRANS METATARSAL AMPUTATION Right 08/09/2023    Procedure: Partial 4th Ray Amputation - Right Foot;  Surgeon: Silas Swan DPM;  Location: [facility] OR;  Service: Podiatry;  Laterality: Right;    URETEROSCOPY LASER LITHOTRIPSY WITH STENT INSERTION Left 09/01/2022    Procedure:  LEFT URETEROSCOPY LASER LITHOTRIPSY WITH STENT INSERTION;  Surgeon: Weston Peck MD;  Location:  [facility] OR;  Service: Urology;  Laterality: Left;    URETEROSCOPY LASER LITHOTRIPSY WITH STENT INSERTION Left 09/15/2022    Procedure: LEFT URETEROSCOPY LASER LITHOTRIPSY WITH STENT EXCHANGE;  Surgeon: Weston Peck MD;  Location:  [facility] OR;  Service: Urology;  Laterality: Left;   ,   Family History   Problem Relation Age of Onset    Kidney disease Father     Heart disease Father     Cancer Mother         brain    Colon cancer Neg Hx     Colon polyps Neg Hx    ,   Social History     Tobacco Use    Smoking status: Former     Current packs/day: 0.00     Types: Cigarettes     Quit date:      Years since quittin.2     Passive exposure: Never    Smokeless tobacco: Never   Vaping Use    Vaping status: Never Used   Substance Use Topics    Alcohol use: No    Drug use: No   ,     Medications    Prior to Admission medications    Medication Sig Start Date End Date Taking? Authorizing Provider   Insulin Degludec (Tresiba) 100 UNIT/ML solution injection Inject 18 Units under the skin into the appropriate area as directed Every Night.   Yes Juliet Riddle MD   potassium chloride (KLOR-CON M20) 20 MEQ CR tablet Take 1 tablet by mouth Daily.   Yes Juliet Riddle MD   acetaminophen (TYLENOL) 325 MG tablet Take 2 tablets by mouth Every 6 (Six) Hours As Needed for Mild Pain, Fever or Moderate Pain.    Juliet Riddle MD   ALPRAZolam (XANAX) 0.25 MG tablet Take 1 tablet by mouth 2 (Two) Times a Day As Needed for Anxiety.  Patient taking differently: Take 1 tablet by mouth Every Night.   Yes Juliet Riddle MD   busPIRone (BUSPAR) 15 MG tablet Take 1 tablet by mouth 2 (Two) Times a Day.   Yes Juliet Riddle MD   clopidogrel (PLAVIX) 75 MG tablet Take 1 tablet by mouth Every Morning. 16  Yes Juliet Riddle MD   docusate sodium (COLACE) 100 MG capsule Take 1 capsule by  mouth Every Evening.   Yes Juliet Riddle MD   DULoxetine (CYMBALTA) 60 MG capsule Take 1 capsule by mouth Every Night.   Yes Juliet Riddle MD   empagliflozin (JARDIANCE) 25 MG tablet tablet Take 1 tablet by mouth Every Morning.   Yes Juliet Riddle MD   ferrous sulfate 325 (65 FE) MG EC tablet Take 1 tablet by mouth 2 (Two) Times a Day. 12/17/14  Yes Juliet Riddle MD   gabapentin (NEURONTIN) 300 MG capsule Take 1 capsule by mouth Every Night.   Yes Juliet Riddle MD   GARLIC-CALCIUM PO Take 1 tablet by mouth Daily.   Yes Juliet Riddle MD   HYDROcodone-acetaminophen (NORCO) 5-325 MG per tablet Take 1 tablet by mouth Every 4 (Four) Hours As Needed for Moderate Pain. 8/14/24   Terrie Romo APRN   icosapent ethyl (VASCEPA) 1 g capsule capsule Take 2 g by mouth 2 (Two) Times a Day With Meals.   Yes Juliet Riddle MD   levothyroxine (SYNTHROID, LEVOTHROID) 75 MCG tablet Take 1 tablet by mouth Every Morning.   Yes Juliet Riddle MD   metFORMIN (GLUCOPHAGE) 500 MG tablet Take 1 tablet by mouth 2 (Two) Times a Day With Meals.   Yes Juliet Riddle MD   naloxone (NARCAN) 4 MG/0.1ML nasal spray Administer 1 spray into the nostril(s) as directed by provider As Needed for Opioid Reversal. Call 911. Don't prime. [place] in 1 nostril for overdose. Repeat in 2-3 minutes in other nostril if no or minimal breathing/responsiveness.    Juliet Riddle MD   nitroglycerin (NITROSTAT) 0.4 MG SL tablet Place 1 tablet under the tongue Every 5 (Five) Minutes As Needed for Chest Pain. 3/1/16   Juliet Riddle MD   pantoprazole (PROTONIX) 40 MG EC tablet Take 1 tablet by mouth Daily. 7/12/16  Yes Juliet Riddle MD   polyethylene glycol (MIRALAX) powder Take 17 g by mouth Daily As Needed. 4/10/17   Juliet Riddle MD   ranolazine (RANEXA) 1000 MG 12 hr tablet Take 1 tablet by mouth 2 (Two) Times a Day. 9/27/16  Yes Juliet Riddle MD    rosuvastatin (CRESTOR) 40 MG tablet Take 1 tablet by mouth Daily.   Yes Juliet Riddle MD   Vitamin D, Cholecalciferol, 1000 UNITS tablet Take 1 tablet by mouth Daily.   Yes Juliet Riddle MD   Insulin Degludec (TRESIBA SC) Inject 12 Units under the skin into the appropriate area as directed Every Night.  3/12/25  Juliet Riddle MD   naloxone (NARCAN) 4 MG/0.1ML nasal spray Call 911. Don't prime. [place] in 1 nostril for overdose. Repeat in 2-3 minutes in other nostril if no or minimal breathing/responsiveness. 7/14/23 3/12/25  Rodriguez, Ana Rosa, APRMARILY   potassium chloride 10 MEQ CR tablet Take 1 tablet by mouth 2 (Two) Times a Day.  3/12/25  Juliet Riddle MD       Current Facility-Administered Medications   Medication Dose Route Frequency Provider Last Rate Last Admin    acetaminophen (TYLENOL) tablet 650 mg  650 mg Oral Q6H PRN Figueroa Chaves MD   650 mg at 03/11/25 2048    ALPRAZolam (XANAX) tablet 0.25 mg  0.25 mg Oral BID PRN Figueroa Chaves MD   0.25 mg at 03/11/25 2046    azithromycin (ZITHROMAX) 500 mg in sodium chloride 0.9 % 250 mL IVPB-VTB  500 mg Intravenous Q24H Figueroa Chaves MD   500 mg at 03/12/25 0806    sennosides-docusate (PERICOLACE) 8.6-50 MG per tablet 2 tablet  2 tablet Oral BID PRN Figueroa Chaves MD        And    polyethylene glycol (MIRALAX) packet 17 g  17 g Oral Daily PRN Figueroa Chaves MD        And    bisacodyl (DULCOLAX) EC tablet 5 mg  5 mg Oral Daily PRN Figueroa Chaves MD        And    bisacodyl (DULCOLAX) suppository 10 mg  10 mg Rectal Daily PRN Figueroa Chaves MD        busPIRone (BUSPAR) tablet 15 mg  15 mg Oral BID Figueroa Chaves MD   15 mg at 03/11/25 2046    Calcium Replacement - Follow Nurse / BPA Driven Protocol   Not Applicable PRN Figueroa Chaves MD        cholecalciferol (VITAMIN D3) tablet 1,000 Units  1,000 Units Oral Daily Figueroa Chaves MD        clopidogrel (PLAVIX) tablet 75 mg  75 mg Oral Daily  Figueroa Chaves MD   75 mg at 03/11/25 2045    docusate sodium (COLACE) capsule 100 mg  100 mg Oral Q PM Figueroa Chaves MD        DULoxetine (CYMBALTA) DR capsule 60 mg  60 mg Oral Nightly Figueroa Chaves MD   60 mg at 03/11/25 2045    empagliflozin (JARDIANCE) tablet 25 mg  25 mg Oral Daily Figueroa Chaves MD        [Held by provider] enoxaparin sodium (LOVENOX) syringe 40 mg  40 mg Subcutaneous Daily Figueroa Chaves MD        ferric gluconate (FERRLECIT) 250 MG in sodium chloride 0.9% 250 mL IVPB  250 mg Intravenous Once Figueroa Chaves MD        ferrous sulfate tablet 325 mg  325 mg Oral Daily With Breakfast Figueroa Chaves MD        gabapentin (NEURONTIN) capsule 300 mg  300 mg Oral Daily Figueroa Chaves MD   300 mg at 03/11/25 2046    Hydrocod Mak-Chlorphe Mak ER (TUSSIONEX PENNKINETIC) 10-8 MG/5ML ER suspension 5 mL  5 mL Oral Q12H PRN Figueroa Chaves MD        HYDROcodone-acetaminophen (NORCO) 5-325 MG per tablet 1 tablet  1 tablet Oral Q4H PRN Figueroa Chaves MD        ipratropium-albuterol (DUO-NEB) nebulizer solution 3 mL  3 mL Nebulization 4x Daily - RT Figueroa Chaves MD        levothyroxine (SYNTHROID, LEVOTHROID) tablet 75 mcg  75 mcg Oral Q AM Figueroa Chaves MD   75 mcg at 03/12/25 0531    Magnesium Standard Dose Replacement - Follow Nurse / BPA Driven Protocol   Not Applicable PRN Figueroa Chaves MD        metFORMIN (GLUCOPHAGE) tablet 500 mg  500 mg Oral BID With Meals Figueroa Chaves MD   500 mg at 03/11/25 2045    nitroglycerin (NITROSTAT) SL tablet 0.4 mg  0.4 mg Sublingual Q5 Min PRN Figueroa Chaves MD        nitroglycerin (NITROSTAT) SL tablet 0.4 mg  0.4 mg Sublingual Q5 Min PRN Figueroa Chaves MD        ondansetron ODT (ZOFRAN-ODT) disintegrating tablet 4 mg  4 mg Oral Q6H PRN Figueroa Chaves MD        Or    ondansetron (ZOFRAN) injection 4 mg  4 mg Intravenous Q6H PRN Figueroa Chaves MD        pantoprazole (PROTONIX)  EC tablet 40 mg  40 mg Oral Q AM Figueroa Chaves MD   40 mg at 03/12/25 0531    Phosphorus Replacement - Follow Nurse / BPA Driven Protocol   Not Applicable PRN Figueroa Chaves MD        potassium chloride (KLOR-CON) packet 40 mEq  40 mEq Oral Q4H Figueroa Chaves MD   40 mEq at 03/12/25 0602    potassium chloride (MICRO-K/KLOR-CON) CR capsule  20 mEq Oral Daily Figueroa Chaves MD        Potassium Replacement - Follow Nurse / BPA Driven Protocol   Not Applicable PRN Figueroa Chaves MD        ranolazine (RANEXA) 12 hr tablet 1,000 mg  1,000 mg Oral BID Figueroa Chaves MD   1,000 mg at 03/11/25 2045    rosuvastatin (CRESTOR) tablet 40 mg  40 mg Oral Daily Figueroa Chaves MD   40 mg at 03/11/25 2045    sodium chloride 0.9 % flush 10 mL  10 mL Intravenous PRN Figueroa Chaves MD        sodium chloride 0.9 % flush 10 mL  10 mL Intravenous Q12H Figueroa Chaves MD   10 mL at 03/11/25 2046    sodium chloride 0.9 % flush 10 mL  10 mL Intravenous PRN Figueroa Chaves MD        sodium chloride 0.9 % infusion 40 mL  40 mL Intravenous PRN Figueroa Chaves MD        sodium chloride 0.9 % infusion  75 mL/hr Intravenous Continuous Figueroa Chaves MD 75 mL/hr at 03/11/25 1828 75 mL/hr at 03/11/25 1828    sodium chloride 0.9 % infusion  75 mL/hr Intravenous Continuous Figueroa Chaves MD           Allergies:  Adhesive tape, Cefazolin, Cefuroxime axetil, Cephalosporins, Neomycin-polymyxin b gu, and Percocet [oxycodone-acetaminophen]    Review of Systems  Review of Systems   Constitutional: Positive for malaise/fatigue.       Objective     Physical Exam:  Patient Vitals for the past 24 hrs:   BP Temp Temp src Pulse Resp SpO2 Height Weight   03/12/25 0743 107/58 98.3 °F (36.8 °C) Oral 91 18 97 % -- --   03/12/25 0438 97/54 97.8 °F (36.6 °C) Oral 94 16 92 % -- 91.1 kg (200 lb 12.8 oz)   03/11/25 2314 99/50 -- -- 95 16 93 % -- --   03/11/25 1950 113/64 97.7 °F (36.5 °C) Oral 94 16 95 % -- --  "  03/11/25 1833 112/64 97.9 °F (36.6 °C) Oral 97 18 93 % -- --   03/11/25 1746 104/68 -- -- 96 17 94 % -- --   03/11/25 1540 -- -- -- -- -- -- 180.3 cm (71\") 83.9 kg (184 lb 15.5 oz)   03/11/25 1306 100/58 98.1 °F (36.7 °C) Oral 85 20 100 % -- --     Constitutional:       Appearance: Healthy appearance. Not in distress.   Pulmonary:      Effort: Pulmonary effort is normal.      Breath sounds: Normal breath sounds and air entry.   Cardiovascular:      PMI at left midclavicular line. Normal rate. Regular rhythm. Normal S1. Normal S2.       Murmurs: There is no murmur.      No gallop.  No click. No rub.   Pulses:     Intact distal pulses.   Edema:     Peripheral edema absent.   Neurological:      Mental Status: Alert and oriented to person, place and time.         Results Review:   I reviewed the patient's new clinical results.    Lab Results (last 72 hours)       Procedure Component Value Units Date/Time    Ferritin [[id number]] Collected: 03/12/25 0301    Specimen: Blood Updated: 03/12/25 0837    Iron Profile [[id number]] Collected: 03/12/25 0301    Specimen: Blood Updated: 03/12/25 0837    Basic Metabolic Panel [[id number]]  (Abnormal) Collected: 03/12/25 0301    Specimen: Blood Updated: 03/12/25 0411     Glucose 86 mg/dL      BUN 14 mg/dL      Creatinine 1.25 mg/dL      Sodium 135 mmol/L      Potassium 3.2 mmol/L      Chloride 98 mmol/L      CO2 24.0 mmol/L      Calcium 8.6 mg/dL      BUN/Creatinine Ratio 11.2     Anion Gap 13.0 mmol/L      eGFR 61.6 mL/min/1.73     Narrative:      GFR Categories in Chronic Kidney Disease (CKD)      GFR Category          GFR (mL/min/1.73)    Interpretation  G1                     90 or greater         Normal or high (1)  G2                      60-89                Mild decrease (1)  G3a                   45-59                Mild to moderate decrease  G3b                   30-44                Moderate to severe decrease  G4                    15-29                Severe decrease  G5 "                    14 or less           Kidney failure          (1)In the absence of evidence of kidney disease, neither GFR category G1 or G2 fulfill the criteria for CKD.    eGFR calculation 2021 CKD-EPI creatinine equation, which does not include race as a factor    Magnesium [[id number]]  (Normal) Collected: 03/12/25 0301    Specimen: Blood Updated: 03/12/25 0411     Magnesium 2.1 mg/dL     Phosphorus [[id number]]  (Normal) Collected: 03/12/25 0301    Specimen: Blood Updated: 03/12/25 0411     Phosphorus 2.6 mg/dL     CBC & Differential [[id number]]  (Abnormal) Collected: 03/12/25 0301    Specimen: Blood Updated: 03/12/25 0404    Narrative:      The following orders were created for panel order CBC & Differential.  Procedure                               Abnormality         Status                     ---------                               -----------         ------                     CBC Auto Differential[[id number]]        Abnormal            Final result                 Please view results for these tests on the individual orders.    CBC Auto Differential [[id number]]  (Abnormal) Collected: 03/12/25 0301    Specimen: Blood Updated: 03/12/25 0404     WBC 3.49 10*3/mm3      RBC 2.42 10*6/mm3      Hemoglobin 8.6 g/dL      Hematocrit 26.8 %      .7 fL      MCH 35.5 pg      MCHC 32.1 g/dL      RDW 17.2 %      RDW-SD 69.5 fl      MPV 10.1 fL      Platelets 59 10*3/mm3      Neutrophil % 58.4 %      Lymphocyte % 25.8 %      Monocyte % 13.2 %      Eosinophil % 2.0 %      Basophil % 0.3 %      Immature Grans % 0.3 %      Neutrophils, Absolute 2.04 10*3/mm3      Lymphocytes, Absolute 0.90 10*3/mm3      Monocytes, Absolute 0.46 10*3/mm3      Eosinophils, Absolute 0.07 10*3/mm3      Basophils, Absolute 0.01 10*3/mm3      Immature Grans, Absolute 0.01 10*3/mm3     Respiratory Panel PCR w/COVID-19(SARS-CoV-2) RICHARD/JENNIFER/DENIA/[facility] In-House, NP Swab in UTM/VTM, 2 HR TAT - Swab, Nasopharynx [[id number]]  (Abnormal)  Collected: 03/11/25 1451    Specimen: Swab from Nasopharynx Updated: 03/11/25 1604     ADENOVIRUS, PCR Not Detected     Coronavirus 229E Not Detected     Coronavirus HKU1 Not Detected     Coronavirus NL63 Not Detected     Coronavirus OC43 Not Detected     COVID19 Not Detected     Human Metapneumovirus Not Detected     Human Rhinovirus/Enterovirus Detected     Influenza A PCR Not Detected     Influenza B PCR Not Detected     Parainfluenza Virus 1 Not Detected     Parainfluenza Virus 2 Not Detected     Parainfluenza Virus 3 Not Detected     Parainfluenza Virus 4 Not Detected     RSV, PCR Not Detected     Bordetella pertussis pcr Not Detected     Bordetella parapertussis PCR Not Detected     Chlamydophila pneumoniae PCR Not Detected     Mycoplasma pneumo by PCR Not Detected    Narrative:      In the setting of a positive respiratory panel with a viral infection PLUS a negative procalcitonin without other underlying concern for bacterial infection, consider observing off antibiotics or discontinuation of antibiotics and continue supportive care. If the respiratory panel is positive for atypical bacterial infection (Bordetella pertussis, Chlamydophila pneumoniae, or Mycoplasma pneumoniae), consider antibiotic de-escalation to target atypical bacterial infection.    High Sensitivity Troponin T 1Hr [[id number]]  (Abnormal) Collected: 03/11/25 1451    Specimen: Blood Updated: 03/11/25 1524     HS Troponin T 110 ng/L      Troponin T Numeric Delta -9 ng/L      Troponin T % Delta -8    Narrative:      High Sensitive Troponin T Reference Range:  <14.0 ng/L- Negative Female for AMI  <22.0 ng/L- Negative Male for AMI  >=14 - Abnormal Female indicating possible myocardial injury.  >=22 - Abnormal Male indicating possible myocardial injury.   Clinicians would have to utilize clinical acumen, EKG, Troponin, and serial changes to determine if it is an Acute Myocardial Infarction or myocardial injury due to an underlying chronic  condition.         High Sensitivity Troponin T [[id number]]  (Abnormal) Collected: 03/11/25 1331    Specimen: Blood Updated: 03/11/25 1406     HS Troponin T 119 ng/L     Narrative:      High Sensitive Troponin T Reference Range:  <14.0 ng/L- Negative Female for AMI  <22.0 ng/L- Negative Male for AMI  >=14 - Abnormal Female indicating possible myocardial injury.  >=22 - Abnormal Male indicating possible myocardial injury.   Clinicians would have to utilize clinical acumen, EKG, Troponin, and serial changes to determine if it is an Acute Myocardial Infarction or myocardial injury due to an underlying chronic condition.         Comprehensive Metabolic Panel [[id number]]  (Abnormal) Collected: 03/11/25 1331    Specimen: Blood Updated: 03/11/25 1404     Glucose 122 mg/dL      BUN 16 mg/dL      Creatinine 1.41 mg/dL      Sodium 139 mmol/L      Potassium 3.0 mmol/L      Chloride 99 mmol/L      CO2 24.0 mmol/L      Calcium 9.6 mg/dL      Total Protein 7.0 g/dL      Albumin 3.5 g/dL      ALT (SGPT) 52 U/L      AST (SGOT) 78 U/L      Alkaline Phosphatase 171 U/L      Total Bilirubin 1.0 mg/dL      Globulin 3.5 gm/dL      A/G Ratio 1.0 g/dL      BUN/Creatinine Ratio 11.3     Anion Gap 16.0 mmol/L      eGFR 53.3 mL/min/1.73     Narrative:      GFR Categories in Chronic Kidney Disease (CKD)      GFR Category          GFR (mL/min/1.73)    Interpretation  G1                     90 or greater         Normal or high (1)  G2                      60-89                Mild decrease (1)  G3a                   45-59                Mild to moderate decrease  G3b                   30-44                Moderate to severe decrease  G4                    15-29                Severe decrease  G5                    14 or less           Kidney failure          (1)In the absence of evidence of kidney disease, neither GFR category G1 or G2 fulfill the criteria for CKD.    eGFR calculation 2021 CKD-EPI creatinine equation, which does not include race  as a factor    BNP [[id number]]  (Normal) Collected: 03/11/25 1331    Specimen: Blood Updated: 03/11/25 1402     proBNP 874.5 pg/mL     Narrative:      This assay is used as an aid in the diagnosis of individuals suspected of having heart failure. It can be used as an aid in the diagnosis of acute decompensated heart failure (ADHF) in patients presenting with signs and symptoms of ADHF to the emergency department (ED). In addition, NT-proBNP of <300 pg/mL indicates ADHF is not likely.    Age Range Result Interpretation  NT-proBNP Concentration (pg/mL:      <50             Positive            >450                   Gray                 300-450                    Negative             <300    50-75           Positive            >900                  Gray                300-900                  Negative            <300      >75             Positive            >1800                  Gray                300-1800                  Negative            <300    Magnesium [[id number]]  (Normal) Collected: 03/11/25 1331    Specimen: Blood Updated: 03/11/25 1400     Magnesium 2.1 mg/dL     Protime-INR [[id number]]  (Abnormal) Collected: 03/11/25 1331    Specimen: Blood Updated: 03/11/25 1355     Protime 15.4 Seconds      INR 1.15    CBC & Differential [[id number]]  (Abnormal) Collected: 03/11/25 1331    Specimen: Blood Updated: 03/11/25 1350    Narrative:      The following orders were created for panel order CBC & Differential.  Procedure                               Abnormality         Status                     ---------                               -----------         ------                     CBC Auto Differential[[id number]]        Abnormal            Final result                 Please view results for these tests on the individual orders.    CBC Auto Differential [[id number]]  (Abnormal) Collected: 03/11/25 1331    Specimen: Blood Updated: 03/11/25 1350     WBC 4.56 10*3/mm3      RBC 2.72 10*6/mm3      Hemoglobin 9.8 g/dL   "    Hematocrit 29.8 %      .6 fL      MCH 36.0 pg      MCHC 32.9 g/dL      RDW 17.3 %      RDW-SD 68.6 fl      MPV 10.0 fL      Platelets 66 10*3/mm3      Neutrophil % 67.6 %      Lymphocyte % 18.9 %      Monocyte % 11.6 %      Eosinophil % 1.5 %      Basophil % 0.2 %      Immature Grans % 0.2 %      Neutrophils, Absolute 3.08 10*3/mm3      Lymphocytes, Absolute 0.86 10*3/mm3      Monocytes, Absolute 0.53 10*3/mm3      Eosinophils, Absolute 0.07 10*3/mm3      Basophils, Absolute 0.01 10*3/mm3      Immature Grans, Absolute 0.01 10*3/mm3             No results found for: \"ECHOEFEST\"    Imaging Results (Last 72 Hours)       Procedure Component Value Units Date/Time    CT Head Without Contrast [[id number]] Collected: 03/11/25 1423     Updated: 03/11/25 1429    Narrative:      EXAM: CT HEAD WO CONTRAST-      DATE: 3/11/2025 12:56 PM     HISTORY: syncope       COMPARISON: 1/24/2025.     DOSE LENGTH PRODUCT: 943.32 mGy.cm  Automated exposure control was also  utilized to decrease patient radiation dose.     TECHNIQUE: Unenhanced CT images obtained from vertex to skull base with  multiplanar reformats.     FINDINGS:  There is no acute intracranial hemorrhage, midline shift, mass effect,  or hydrocephalus. There is no CT evidence for acute infarct.     There are chronic changes with volume loss and chronic small vessel  ischemic change of the periventricular white matter.      SOFT TISSUES: The scalp soft tissues are unremarkable.        SINUS:The visualized paranasal sinuses and mastoid air cells are clear.      ORBITS: The visualized orbits and globes are unremarkable.          Impression:      1. Chronic changes and no acute intracranial findings.      This report was signed and finalized on 3/11/2025 2:26 PM by Jose Crenshaw.       XR Chest 1 View [[id number]] Collected: 03/11/25 1352     Updated: 03/11/25 1400    Narrative:      EXAMINATION: XR CHEST 1 VW-  3/11/2025 1:52 PM 1 view     HISTORY: syncope   " "  COMPARISON: 6/4/2024     TECHNIQUE: A single frontal view of the chest was obtained.     FINDINGS:  Median sternotomy wires are noted. Fusion changes in the lower cervical  spine. Arthritis in both shoulders.     No airspace consolidation or pneumothorax. The heart is not enlarged.  Since the previous examination, there may have been a proximal LEFT  humeral fracture.       Impression:         1.  No acute cardiopulmonary process.     2.  Abnormal appearance of the proximal LEFT humerus, different than on  the exam from 6/4/2024. Correlate for previous proximal humerus  fracture. If no history of previous proximal humeral fracture, consider  3 view shoulder series on the LEFT.     3.  Severe arthritis in the RIGHT shoulder with probable chronic  full-thickness rotator cuff tear.           This report was signed and finalized on 3/11/2025 1:57 PM by Dr. Jed Messina MD.                     Assessment & Plan     Syncope?  Elevated troponin (of unknown significance)  Rhinovirus  Acute kidney injury  CAD  Hypertension  Hyperlipidemia  Chronic anemia    -Does not appear to be ACS with downtrending troponin and no significant EKG changes.  Likely \"syncopal\" episode due to frail state in the setting of respiratory infection and dehydration.  - Recommend hydration and treatment of underlying condition per PCP  - Continue home Plavix 75 mg daily  - Echocardiogram ordered, pending results can consider further cardiac evaluation  - Will place Holter monitor at discharge to evaluate for electrical etiology for syncope    Further orders per Dr. Jeong    Thank you for asking us to follow this patient with you.       Electronically signed by NEAL Clark, 03/12/25, 9:14 AM CDT.     "

## 2025-03-12 NOTE — PLAN OF CARE
Goal Outcome Evaluation:  Plan of Care Reviewed With: patient        Progress: no change  Outcome Evaluation: VSS.  Pt continues to complain of cough when awake.  K 3.2 on AM labs; replacement protocol orders placed.  Telemetry SR 90-97.  Safety maintained.

## 2025-03-12 NOTE — THERAPY EVALUATION
Patient Name: Franko Lucero  : 1955    MRN: [id number]                              Today's Date: 3/12/2025       Admit Date: 3/11/2025    Visit Dx:     ICD-10-CM ICD-9-CM   1. Syncope, unspecified syncope type  R55 780.2   2. Elevated troponin  R79.89 790.6   3. Syncope and collapse  R55 780.2   4. Dysphagia, unspecified type [R13.10]  R13.10 787.20     Patient Active Problem List   Diagnosis    Iliac artery aneurysm, bilateral    Hypertension    Hyperlipidemia    Diabetes mellitus    CAD (coronary artery disease)    Iron deficiency anemia    Constipation    Abnormal LFTs    Coagulopathy    Ureteral stone    Kidney stones    Hypotension    Acute foot pain    Macrocytic anemia    BELTRAN (acute kidney injury)    Acute kidney failure, unspecified    Type 2 diabetes mellitus with foot ulcer (CODE)    Anemia, chronic disease    Syncope     Past Medical History:   Diagnosis Date    Anemia     Anxiety     Arthritis     BPH (benign prostatic hypertrophy)     CAD (coronary artery disease)     Cancer     non-hodgkins lymphoma    Diabetes mellitus     Disease of thyroid gland     GERD (gastroesophageal reflux disease)     Hearing loss     History of transfusion     Hyperlipidemia     Hypertension     Iliac artery aneurysm, bilateral     Kidney stone     Liver cirrhosis     Migraines     Sleep apnea     c-pap     Past Surgical History:   Procedure Laterality Date    COLONOSCOPY  2014    COLONOSCOPY N/A 2017    Procedure: COLONOSCOPY WITH ANESTHESIA;  Surgeon: Sher Cui MD;  Location: [facility] ENDOSCOPY;  Service:     CORONARY ARTERY BYPASS GRAFT      2    CYSTOSCOPY URETEROSCOPY LASER LITHOTRIPSY Left 2017    Procedure: URETEROSCOPY LASER LITHOTRIPSY WITH DOUBLE J STENT INSERTION, LEFT ;  Surgeon: Weston Peck MD;  Location: [facility] OR;  Service:     CYSTOSCOPY URETEROSCOPY LASER LITHOTRIPSY Left 2017    Procedure: CYSTOSCOPY URETEROSCOPY LASER LITHOTRIPSY STENT INSERTION STONE  EXTRACTION;  Surgeon: Weston Peck MD;  Location: [facility] OR;  Service:     CYSTOSCOPY W/ URETERAL STENT PLACEMENT Left 04/17/2017    Procedure: CYSTOSCOPY URETERAL DOUBLE J STENT INSERTION, LEFT;  Surgeon: Weston Peck MD;  Location:  [facility] OR;  Service:     ENDOSCOPY N/A 04/26/2017    Procedure: ESOPHAGOGASTRODUODENOSCOPY WITH ANESTHESIA;  Surgeon: Sher Cui MD;  Location:  [facility] ENDOSCOPY;  Service:     INCISION AND DRAINAGE LEG Right 07/12/2023    Procedure: INCISION AND DRAINAGE - RIGHT FOOT;  Surgeon: Silas Swan DPM;  Location:  [facility] OR;  Service: Podiatry;  Laterality: Right;    JOINT REPLACEMENT Right     KIDNEY STONE SURGERY      KNEE SURGERY Right 08/2024    replacement    NECK SURGERY      ROTATOR CUFF REPAIR      SHOULDER SURGERY      TONSILLECTOMY      TRANS METATARSAL AMPUTATION Right 08/09/2023    Procedure: Partial 4th Ray Amputation - Right Foot;  Surgeon: Silas Swan DPM;  Location:  [facility] OR;  Service: Podiatry;  Laterality: Right;    URETEROSCOPY LASER LITHOTRIPSY WITH STENT INSERTION Left 09/01/2022    Procedure: LEFT URETEROSCOPY LASER LITHOTRIPSY WITH STENT INSERTION;  Surgeon: Weston Peck MD;  Location:  [facility] OR;  Service: Urology;  Laterality: Left;    URETEROSCOPY LASER LITHOTRIPSY WITH STENT INSERTION Left 09/15/2022    Procedure: LEFT URETEROSCOPY LASER LITHOTRIPSY WITH STENT EXCHANGE;  Surgeon: Weston Peck MD;  Location:  [facility] OR;  Service: Urology;  Laterality: Left;      General Information       Row Name 03/12/25 1319          OT Time and Intention    Document Type evaluation  ED s/p syncopal episode. C/o cough and congestions approx 2 weeks. Dx: syncope, BELTRAN  -JW     Mode of Treatment occupational therapy  -JW     Patient Effort good  -JW       Row Name 03/12/25 0650          General Information    Patient Profile Reviewed yes  -JW     Prior Level of Function independent:;all household mobility;transfer;bed  mobility;ADL's  -     Existing Precautions/Restrictions fall  -     Barriers to Rehab medically complex  -       Row Name 03/12/25 1319          Living Environment    Current Living Arrangements home  -     People in Home spouse  -       Row Name 03/12/25 1319          Cognition    Orientation Status (Cognition) oriented x 4  -[facility] Name 03/12/25 1319          Safety Issues/Impairments Affecting Functional Mobility    Impairments Affecting Function (Mobility) strength;balance;endurance/activity tolerance;shortness of breath  -               User Key  (r) = Recorded By, (t) = Taken By, (c) = Cosigned By      Initials Name Provider Type     Kathy Guevara, OTR/L, CSRS Occupational Therapist                     Mobility/ADL's       Row Name 03/12/25 1319          Bed Mobility    Bed Mobility supine-sit  -     Supine-Sit [place] (Bed Mobility) contact guard  -     Assistive Device (Bed Mobility) head of bed elevated  -[facility] Name 03/12/25 1319          Transfers    Transfers sit-stand transfer;stand-sit transfer;toilet transfer  -[facility] Name 03/12/25 1319          Sit-Stand Transfer    Sit-Stand [place] (Transfers) contact guard  -[facility] Name 03/12/25 1319          Stand-Sit Transfer    Stand-Sit [place] (Transfers) contact guard  -JW     Assistive Device (Stand-Sit Transfers) --  -[facility] Name 03/12/25 1319          Toilet Transfer    Type (Toilet Transfer) sit-stand;stand-sit  -     [place] Level (Toilet Transfer) minimum assist (75% patient effort)  -     Assistive Device (Toilet Transfer) commode;walker, front-wheeled;grab bars/safety frame  -       Row Name 03/12/25 1319          Functional Mobility    Functional Mobility- Ind. Level supervision required  -     Functional Mobility- Device walker, front-wheeled  -     Functional Mobility- Safety Issues other (see comments)  -     Functional Mobility- Comment amb into hallway.  Forward flexed posture in stance.  -       Row Name 03/12/25 1319          Activities of Daily Living    BADL Assessment/Intervention lower body dressing;toileting  -[facility] Name 03/12/25 1319          Lower Body Dressing Assessment/Training    [place] Level (Lower Body Dressing) don;shoes/slippers;set up;standby assist  -     Position (Lower Body Dressing) edge of bed sitting  -[facility] Name 03/12/25 1319          Toileting Assessment/Training    [place] Level (Toileting) adjust/manage clothing;perform perineal hygiene;contact guard assist  -     Assistive Devices (Toileting) commode;grab bar/safety frame  -     Position (Toileting) supported sitting;supported standing  -               User Key  (r) = Recorded By, (t) = Taken By, (c) = Cosigned By      Initials Name Provider Type     Kathy Guevara OTR/L, CSRS Occupational Therapist                   Obj/Interventions       [facility] Name 03/12/25 1319          Sensory Assessment (Somatosensory)    Sensory Assessment (Somatosensory) UE sensation intact  -[facility] 03/12/25 1319          Vision Assessment/Intervention    Visual Impairment/Limitations L  -[facility] Name 03/12/25 1319          Range of Motion Comprehensive    General Range of Motion bilateral upper extremity ROM [facility] Name 03/12/25 1319          Balance    Balance Assessment sitting static balance;sitting dynamic balance;standing static balance;standing dynamic balance  -     Static Sitting Balance independent  -     Dynamic Sitting Balance supervision  -     Position, Sitting Balance unsupported;sitting edge of bed  -     Static Standing Balance supervision  -     Dynamic Standing Balance supervision;contact guard  -     Position/Device Used, Standing Balance supported;walker, front-wheeled  -               User Key  (r) = Recorded By, (t) = Taken By, (c) = Cosigned By      Initials Name Provider Type    Kathy Grissom  OTR/L, CSRS Occupational Therapist                   Goals/Plan       Row Name 03/12/25 1319          Bathing Goal 1 (OT)    Activity/Device (Bathing Goal 1, OT) bathing skills, all  -     [place] Level/Cues Needed (Bathing Goal 1, OT) modified independence  -JW     Time Frame (Bathing Goal 1, OT) long term goal (LTG);by discharge  -     Progress/Outcomes (Bathing Goal 1, OT) new goal  -       Row Name 03/12/25 1319          Dressing Goal 1 (OT)    Activity/Device (Dressing Goal 1, OT) dressing skills, all  -JW     [place]/Cues Needed (Dressing Goal 1, OT) modified independence  -JW     Time Frame (Dressing Goal 1, OT) long term goal (LTG);by discharge  -     Progress/Outcome (Dressing Goal 1, OT) new goal  -       Row Name 03/12/25 1319          Toileting Goal 1 (OT)    Activity/Device (Toileting Goal 1, OT) toileting skills, all  -     [place] Level/Cues Needed (Toileting Goal 1, OT) modified independence  -JW     Time Frame (Toileting Goal 1, OT) long term goal (LTG);by discharge  -     Progress/Outcome (Toileting Goal 1, OT) new goal  -       Row Name 03/12/25 1319          Therapy Assessment/Plan (OT)    Planned Therapy Interventions (OT) activity tolerance training;adaptive equipment training;BADL retraining;functional balance retraining;transfer/mobility retraining;occupation/activity based interventions;strengthening exercise;patient/caregiver education/training  -               User Key  (r) = Recorded By, (t) = Taken By, (c) = Cosigned By      Initials Name Provider Type    Kathy Grissom, OTR/L, CSRS Occupational Therapist                   Clinical Impression       Row Name 03/12/25 1319          Pain Assessment    Pretreatment Pain Rating 0/10 - no pain  -     Posttreatment Pain Rating 0/10 - no pain  -       Row Name 03/12/25 1319          Plan of Care Review    Plan of Care Reviewed With patient  -     Outcome Evaluation OT eval completed. Pt presents  alert and oriented x4, sitting up in bed. He reports at baseline being independent with all adls and mobility, residing at home with spouse. Within the last 6 months he went through rehab placement s/p femur fx and since then has been going to OP therapy. Today he was able to bring self to sitting at EOB with CGA. Donned slip on shoes with set-up only. Performed sit <> stand t/f from EOB with CGA and amb short distance in hallway. Used restroom with set-up and CGA but did require Min A for t/f from toileting. Left sitting up EOB with spouse and nsg present in room. He would benefit from skilled OT services to address decreased activity tolerance, strength and increased SOA with activity. He would benefit from skilled OT services to address these deficits. Recommend d/c home with assist when medically ready.  -       Row Name 03/12/25 1311          Therapy Assessment/Plan (OT)    Rehab Potential (OT) good  -     Criteria for Skilled Therapeutic Interventions Met (OT) yes;skilled treatment is necessary  -     Therapy Frequency (OT) 5 times/wk  -     Predicted Duration of Therapy Intervention (OT) 10 days  -       Row Name 03/12/25 1315          Therapy Plan Review/Discharge Plan (OT)    Anticipated Discharge Disposition (OT) home with assist  -       Row Name 03/12/25 8261          Positioning and Restraints    Pre-Treatment Position in bed  -     Post Treatment Position bed  -JW     In Bed sitting EOB;call light within reach;encouraged to call for assist;with nsg;with family/caregiver  -               User Key  (r) = Recorded By, (t) = Taken By, (c) = Cosigned By      Initials Name Provider Type    Kathy Grissom, OTR/L, CSRS Occupational Therapist                   Outcome Measures       Row Name 03/12/25 1033          How much help from another is currently needed...    Putting on and taking off regular lower body clothing? 3  -JW     Bathing (including washing, rinsing, and drying) 3  -JW      Toileting (which includes using toilet bed pan or urinal) 3  -     Putting on and taking off regular upper body clothing 4  -JW     Taking care of personal grooming (such as brushing teeth) 4  -JW     Eating meals 4  -JW     AM-PAC 6 Clicks Score (OT) 21  -       Row Name 03/12/25 1319          Functional Assessment    Outcome Measure Options AM-PAC 6 Clicks Daily Activity (OT)  -               User Key  (r) = Recorded By, (t) = Taken By, (c) = Cosigned By      Initials Name Provider Type     Kathy Guevara, RONAL/L, ANTHONY Occupational Therapist                    Occupational Therapy Education       Title: PT OT SLP Therapies (In Progress)       Topic: Occupational Therapy (In Progress)       Point: ADL training (Done)       Learning Progress Summary            Patient Acceptance, E, VU by  at 3/12/2025 1443                      Point: Precautions (Done)       Learning Progress Summary            Patient Acceptance, E, VU by  at 3/12/2025 1443                                      User Key       Initials Effective Dates Name Provider Type Discipline     11/15/24 -  Kathy Guevara OTR/L, ANTHONY Occupational Therapist OT                  OT Recommendation and Plan  Planned Therapy Interventions (OT): activity tolerance training, adaptive equipment training, BADL retraining, functional balance retraining, transfer/mobility retraining, occupation/activity based interventions, strengthening exercise, patient/caregiver education/training  Therapy Frequency (OT): 5 times/wk  Plan of Care Review  Plan of Care Reviewed With: patient  Outcome Evaluation: OT eval completed. Pt presents alert and oriented x4, sitting up in bed. He reports at baseline being independent with all adls and mobility, residing at home with spouse. Within the last 6 months he went through rehab placement s/p femur fx and since then has been going to OP therapy. Today he was able to bring self to sitting at EOB with CGA. Tammie  slip on shoes with set-up only. Performed sit <> stand t/f from EOB with CGA and amb short distance in hallway. Used restroom with set-up and CGA but did require Min A for t/f from toileting. Left sitting up EOB with spouse and nsg present in room. He would benefit from skilled OT services to address decreased activity tolerance, strength and increased SOA with activity. He would benefit from skilled OT services to address these deficits. Recommend d/c home with assist when medically ready.     Time Calculation:         Time Calculation- OT       Row Name 03/12/25 1319             Time Calculation- OT    OT Start Time 1319  -      OT Stop Time 1413  -      OT Time Calculation (min) 54 min  -      OT Received On 03/12/25  -      OT Goal Re-Cert Due Date 03/22/25  -                User Key  (r) = Recorded By, (t) = Taken By, (c) = Cosigned By      Initials Name Provider Type     Kathy Guevara OTR/L, CSRS Occupational Therapist                  Therapy Charges for Today       Code Description Service Date Service Provider Modifiers Qty    [id number]  OT EVAL LOW COMPLEXITY 4 3/12/2025 Kathy Guevara OTR/L, CSRS GO 1                 ANIKA Andrew, CSRS  3/12/2025

## 2025-03-12 NOTE — THERAPY EVALUATION
"Acute Care - Speech Language Pathology   Swallow Initial Evaluation [facility]     Patient Name: Franko Lucero  : 1955  MRN: [id number]  Today's Date: 3/12/2025               Admit Date: 3/11/2025    SPEECH-LANGUAGE PATHOLOGY EVALUATION - SWALLOW  Subjective: The patient was seen on this date for a Clinical Swallow evaluation.  Patient was alert and cooperative.  Significant history: Pt presented to ED s/p episode of syncope. Hx of anemia, anxiety, arthritis, BPH, CAD, cancer, diabetes mellitus, GERD, hearing loss, HLD, HTN, Iliac artery, aneurysm, liver cirrhosis, and sleep apnea. ST consulted d/t failed dysphagia screen. Pt denied prior hx of dysphagia or GERD but per documentation reviewed via care everywhere, pt has extensive GI hx including dysphagia and EGD with esophageal dilation as recent as . Pt also has hx of anterior and posterior cervical fusions.     Objective: Textures given included thin liquid, puree consistency, and regular consistency.  Assessment: Difficulties were noted with none of the above consistencies.  Observations: Oral mech revealed generalized weakness. Pt edentulous and does not wear dentures. He completed puree and thin liquid trials with no overt s/s of aspiration. Vocal quality remained clear. Functional mastication of regular solids despite edentulous state. Pt reports consuming a regular diet at baseline and \"just takes his time\" when he eats. Pt stated that he will often start coughing at some point in his meal that he believes that this is \"his time to stop eating.\" Suspect that this is a result of esophageal issues/GERD. This was not observed while SLP present but pt consumed minimal PO trials. Pt also reported occasional globus sensation which he states occurs when he consumes food to quickly. Pt feels that he has most difficulty when swallowing medications and that he does best taking medications with milk or a thicker consistency liquid/puree. Discussed soft to " chew diet vs regular diet with pt and he requested to remain on regular solids at this time.     SLP Findings:  Patient presents with suspected oropharyngeal dysphagia, without esophageal component.   Recommendations: Diet Textures: thin liquid, regular consistency food.  Medications should be taken whole with puree as tolerated. May have water and ice between meals after oral care, under staff or family supervision and with the recommended strategies for safe swallowing.   Recommended Strategies: Upright for PO, small bites and sips, may use straw, and alternate liquids and solids. Oral care before breakfast, after all meals and PRN.  Other Recommended Evaluations: Re-evaluation at bedside and VFSS if concerns persist.  Dysphagia therapy is recommended. Rationale: diet toleration.    Nubia Mcelroy MS CCC-SLP 3/12/2025 10:11 CDT          Visit Dx:     ICD-10-CM ICD-9-CM   1. Syncope, unspecified syncope type  R55 780.2   2. Elevated troponin  R79.89 790.6   3. Syncope and collapse  R55 780.2   4. Dysphagia, unspecified type [R13.10]  R13.10 787.20     Patient Active Problem List   Diagnosis    Iliac artery aneurysm, bilateral    Hypertension    Hyperlipidemia    Diabetes mellitus    CAD (coronary artery disease)    Iron deficiency anemia    Constipation    Abnormal LFTs    Coagulopathy    Ureteral stone    Kidney stones    Hypotension    Acute foot pain    Macrocytic anemia    BELTRAN (acute kidney injury)    Acute kidney failure, unspecified    Type 2 diabetes mellitus with foot ulcer (CODE)    Anemia, chronic disease    Syncope     Past Medical History:   Diagnosis Date    Anemia     Anxiety     Arthritis     BPH (benign prostatic hypertrophy)     CAD (coronary artery disease)     Cancer     non-hodgkins lymphoma    Diabetes mellitus     Disease of thyroid gland     GERD (gastroesophageal reflux disease)     Hearing loss     History of transfusion     Hyperlipidemia     Hypertension     Iliac artery aneurysm, bilateral      Kidney stone     Liver cirrhosis     Migraines     Sleep apnea     c-pap     Past Surgical History:   Procedure Laterality Date    COLONOSCOPY  02/04/2014    COLONOSCOPY N/A 04/26/2017    Procedure: COLONOSCOPY WITH ANESTHESIA;  Surgeon: Sher Cui MD;  Location:  [facility];  Service:     CORONARY ARTERY BYPASS GRAFT      2    CYSTOSCOPY URETEROSCOPY LASER LITHOTRIPSY Left 04/17/2017    Procedure: URETEROSCOPY LASER LITHOTRIPSY WITH DOUBLE J STENT INSERTION, LEFT ;  Surgeon: Weston Peck MD;  Location:  [facility];  Service:     CYSTOSCOPY URETEROSCOPY LASER LITHOTRIPSY Left 08/14/2017    Procedure: CYSTOSCOPY URETEROSCOPY LASER LITHOTRIPSY STENT INSERTION STONE EXTRACTION;  Surgeon: Weston Peck MD;  Location:  [facility];  Service:     CYSTOSCOPY W/ URETERAL STENT PLACEMENT Left 04/17/2017    Procedure: CYSTOSCOPY URETERAL DOUBLE J STENT INSERTION, LEFT;  Surgeon: Weston Peck MD;  Location:  [facility];  Service:     ENDOSCOPY N/A 04/26/2017    Procedure: ESOPHAGOGASTRODUODENOSCOPY WITH ANESTHESIA;  Surgeon: Sher Cui MD;  Location:  [facility];  Service:     INCISION AND DRAINAGE LEG Right 07/12/2023    Procedure: INCISION AND DRAINAGE - RIGHT FOOT;  Surgeon: Silas Swan DPM;  Location:  [facility];  Service: Podiatry;  Laterality: Right;    JOINT REPLACEMENT Right     KIDNEY STONE SURGERY      KNEE SURGERY Right 08/2024    replacement    NECK SURGERY      ROTATOR CUFF REPAIR      SHOULDER SURGERY      TONSILLECTOMY      TRANS METATARSAL AMPUTATION Right 08/09/2023    Procedure: Partial 4th Ray Amputation - Right Foot;  Surgeon: Silas Swan DPM;  Location:  [facility];  Service: Podiatry;  Laterality: Right;    URETEROSCOPY LASER LITHOTRIPSY WITH STENT INSERTION Left 09/01/2022    Procedure: LEFT URETEROSCOPY LASER LITHOTRIPSY WITH STENT INSERTION;  Surgeon: Weston Peck MD;  Location:  [facility];  Service: Urology;  Laterality: Left;     URETEROSCOPY LASER LITHOTRIPSY WITH STENT INSERTION Left 09/15/2022    Procedure: LEFT URETEROSCOPY LASER LITHOTRIPSY WITH STENT EXCHANGE;  Surgeon: Weston Peck MD;  Location: [facility];  Service: Urology;  Laterality: Left;       SLP Recommendation and Plan  SLP Swallowing Diagnosis: oral dysphagia, R/O pharyngeal dysphagia, suspected esophageal dysphagia (03/12/25 0817)  SLP Diet Recommendation: regular textures, thin liquids (03/12/25 0817)  Recommended Precautions and Strategies: upright posture during/after eating, small bites of food and sips of liquid, alternate between small bites of food and sips of liquid, general aspiration precautions, reflux precautions, fatigue precautions (03/12/25 0817)  SLP Rec. for Method of Medication Administration: meds whole, with puree, as tolerated (03/12/25 0817)     Monitor for Signs of Aspiration: yes, notify SLP if any concerns (03/12/25 0817)  Recommended Diagnostics: reassess via clinical swallow evaluation (03/12/25 0817)  Swallow Criteria for Skilled Therapeutic Interventions Met: demonstrates skilled criteria (03/12/25 0817)     Rehab Potential/Prognosis, Swallowing: good, to achieve stated therapy goals (03/12/25 0817)  Therapy Frequency (Swallow): at least, 2 days per week (03/12/25 0817)  Predicted Duration Therapy Intervention (Days): until discharge (03/12/25 0817)  Oral Care Recommendations: Oral Care BID/PRN (03/12/25 0817)                                        Progress: no change  Outcome Evaluation: see note      SWALLOW EVALUATION (Last 72 Hours)       SLP Adult Swallow Evaluation       Row Name 03/12/25 0817                   Rehab Evaluation    Document Type evaluation  -JR        Subjective Information no complaints  -JR        Patient Observations alert;cooperative;agree to therapy  -JR        Patient/Family/Caregiver Comments/Observations no family present, spouse and RN Nan present upon completion of evaluation  -JR        Patient Effort  good  -JR        Symptoms Noted During/After Treatment none  -JR           General Information    Patient Profile Reviewed yes  -JR        Pertinent History Of Current Problem Pt presented to ED s/p episode of syncope. Hx of anemia, anxiety, arthritis, BPH, CAD, cancer, diabetes mellitus, GERD, hearing loss, HLD, HTN, Iliac artery, aneurysm, liver cirrhosis, and sleep apnea. ST consulted d/t failed dysphagia screen.  -JR        Current Method of Nutrition NPO  -JR        Precautions/Limitations, Vision WFL with corrective lenses  -JR        Precautions/Limitations, Hearing WFL;for purposes of eval  -JR        Prior Level of Function-Communication unknown  -JR        Prior Level of Function-Swallowing regular textures;thin liquids  -JR        Plans/Goals Discussed with patient;spouse/S.O.;other (see comments)  RN Nan  -JR        Barriers to Rehab previous functional deficit  -JR        Patient's Goals for Discharge return to all previous roles/activities;return to regular diet  -JR        Family Goals for Discharge family did not state  -JR           Pain    Additional Documentation Pain Scale: FACES Pre/Post-Treatment (Group)  -JR           Pain Scale: FACES Pre/Post-Treatment    Pain: FACES Scale, Pretreatment 0-->no hurt  -JR        Posttreatment Pain Rating 0-->no hurt  -JR           Oral Motor Structure and Function    Dentition Assessment edentulous  -JR        Secretion Management WNL/WFL  -JR        Mucosal Quality moist, healthy  -JR        Volitional Swallow WFL  -JR        Volitional Cough WFL  -JR           Oral Musculature and Cranial Nerve Assessment    Oral Motor General Assessment generalized oral motor weakness  -JR           General Eating/Swallowing Observations    Respiratory Support Currently in Use nasal cannula  -JR        Eating/Swallowing Skills fed by SLP;self-fed  -JR        Positioning During Eating upright in bed  -JR        Utensils Used spoon;straw  -JR        Consistencies Trialed  pureed;thin liquids;regular textures  -           Clinical Swallow Eval    Oral Prep Phase impaired  -JR        Oral Transit WFL  -JR        Oral Residue WFL  -JR        Pharyngeal Phase no overt signs/symptoms of pharyngeal impairment  -JR        Esophageal Phase suspected esophageal impairment  -        Clinical Swallow Evaluation Summary see note  -JR           SLP Evaluation Clinical Impression    SLP Swallowing Diagnosis oral dysphagia;R/O pharyngeal dysphagia;suspected esophageal dysphagia  -        Functional Impact risk of aspiration/pneumonia  -        Rehab Potential/Prognosis, Swallowing good, to achieve stated therapy goals  -        Swallow Criteria for Skilled Therapeutic Interventions Met demonstrates skilled criteria  -           Recommendations    Therapy Frequency (Swallow) at least;2 days per week  -JR        Predicted Duration Therapy Intervention (Days) until discharge  -        SLP Diet Recommendation regular textures;thin liquids  -        Recommended Diagnostics reassess via clinical swallow evaluation  -        Recommended Precautions and Strategies upright posture during/after eating;small bites of food and sips of liquid;alternate between small bites of food and sips of liquid;general aspiration precautions;reflux precautions;fatigue precautions  -        Oral Care Recommendations Oral Care BID/PRN  -        SLP Rec. for Method of Medication Administration meds whole;with puree;as tolerated  -        Monitor for Signs of Aspiration yes;notify SLP if any concerns  -           Swallow Goals (SLP)    Swallow LTGs Swallow Long Term Goal (free text)  -        Swallow STGs diet tolerance goal selection (SLP)  -        Diet Tolerance Goal Selection (SLP) Patient will tolerate trials of  -JR           (LTG) Swallow    (LTG) Swallow PT will tolerate LRD with no overt s/s of aspiration or distress.  -        [place] (Swallow Long Term Goal) independently (over  90% accuracy)  -JR        Time Frame (Swallow Long Term Goal) by discharge  -JR        Progress/Outcomes (Swallow Long Term Goal) new goal  -JR           (STG) Patient will tolerate trials of    Consistencies Trialed (Tolerate trials) regular textures;thin liquids  -JR        Desired Outcome (Tolerate trials) without signs/symptoms of aspiration;without signs of distress;with adequate oral prep/transit/clearance  -JR        [place] (Tolerate trials) independently (over 90% accuracy)  -JR        Time Frame (Tolerate trials) other (see comments)  -JR        Progress/Outcomes (Tolerate trials) new goal  -JR                  User Key  (r) = Recorded By, (t) = Taken By, (c) = Cosigned By      Initials Name Effective Dates    Nubia Lepe MS CCC-SLP 08/22/23 -                     EDUCATION  The patient has been educated in the following areas:   Dysphagia (Swallowing Impairment).        SLP GOALS       Row Name 03/12/25 0817             (LTG) Swallow    (LTG) Swallow PT will tolerate LRD with no overt s/s of aspiration or distress.  -JR      [place] (Swallow Long Term Goal) independently (over 90% accuracy)  -JR      Time Frame (Swallow Long Term Goal) by discharge  -JR      Progress/Outcomes (Swallow Long Term Goal) new goal  -JR         (STG) Patient will tolerate trials of    Consistencies Trialed (Tolerate trials) regular textures;thin liquids  -JR      Desired Outcome (Tolerate trials) without signs/symptoms of aspiration;without signs of distress;with adequate oral prep/transit/clearance  -JR      [place] (Tolerate trials) independently (over 90% accuracy)  -JR      Time Frame (Tolerate trials) other (see comments)  -JR      Progress/Outcomes (Tolerate trials) new goal  -                User Key  (r) = Recorded By, (t) = Taken By, (c) = Cosigned By      Initials Name Provider Type    Nubia Lepe MS CCC-SLP Speech and Language Pathologist                         Time Calculation:    Time  Calculation- SLP       Row Name 03/12/25 1010             Time Calculation- SLP    SLP Start Time 0817  -JR      SLP Stop Time 0920  -      SLP Time Calculation (min) 63 min  -JR      SLP Received On 03/12/25  -      SLP Goal Re-Cert Due Date 03/22/25  -         Untimed Charges    [id number] Eval Oral Pharyng Swallow Minutes 63  -JR         Total Minutes    Untimed Charges Total Minutes 63  -JR       Total Minutes 63  -JR                User Key  (r) = Recorded By, (t) = Taken By, (c) = Cosigned By      Initials Name Provider Type    Nubia Lepe MS CCC-SLP Speech and Language Pathologist                    Therapy Charges for Today       Code Description Service Date Service Provider Modifiers Qty    [id number] HC ST EVAL ORAL PHARYNG SWALLOW 4 3/12/2025 Nubia Mcelroy MS CCC-SLP GN 1                 Nubia Mcelroy MS CCC-SLP  3/12/2025

## 2025-03-12 NOTE — PLAN OF CARE
Goal Outcome Evaluation:  Plan of Care Reviewed With: patient, spouse, caregiver        Progress: no change  Outcome Evaluation: see note               SLP Swallowing Diagnosis: oral dysphagia, R/O pharyngeal dysphagia, suspected esophageal dysphagia (03/12/25 8667)

## 2025-03-12 NOTE — H&P
"    History and Physical    Patient:  Franko Lucero  MRN: [id number]    CHIEF COMPLAINT:  syncope    History Obtained From: the patient   PCP: Figueroa Chaves MD    HISTORY OF PRESENT ILLNESS:   The patient is a 69 y.o. male who presents with an extensive PMH was sitting outside on a brick wall awaiting his wife to  from physical therapy when he had a true syncopal event. Awoke in ambulance had defecated on himself. No prodromal symptoms. He has had a cold with cough and congestion 2 weeks. He has had two prior similar events in the past. This AM, a/o x4, \"feeling awful\".     REVIEW OF SYSTEMS:    Review of Systems   Constitutional:  Positive for activity change and fatigue.   HENT:  Positive for congestion.    Respiratory:  Positive for cough and shortness of breath.    Cardiovascular: Negative.    Gastrointestinal:  Positive for diarrhea.   Genitourinary: Negative.    Musculoskeletal:  Positive for arthralgias and gait problem.   Neurological:  Positive for dizziness and weakness.          Past Medical History:  Past Medical History:   Diagnosis Date   • Anemia    • Anxiety    • Arthritis    • BPH (benign prostatic hypertrophy)    • CAD (coronary artery disease)    • Cancer     non-hodgkins lymphoma   • Diabetes mellitus    • Disease of thyroid gland    • GERD (gastroesophageal reflux disease)    • Hearing loss    • History of transfusion    • Hyperlipidemia    • Hypertension    • Iliac artery aneurysm, bilateral    • Kidney stone    • Liver cirrhosis    • Migraines    • Sleep apnea     c-pap       Past Surgical History:  Past Surgical History:   Procedure Laterality Date   • COLONOSCOPY  02/04/2014   • COLONOSCOPY N/A 04/26/2017    Procedure: COLONOSCOPY WITH ANESTHESIA;  Surgeon: Sher Cui MD;  Location: [facility] ENDOSCOPY;  Service:    • CORONARY ARTERY BYPASS GRAFT      2   • CYSTOSCOPY URETEROSCOPY LASER LITHOTRIPSY Left 04/17/2017    Procedure: URETEROSCOPY LASER LITHOTRIPSY WITH DOUBLE J " STENT INSERTION, LEFT ;  Surgeon: Weston Peck MD;  Location: [facility] OR;  Service:    • CYSTOSCOPY URETEROSCOPY LASER LITHOTRIPSY Left 08/14/2017    Procedure: CYSTOSCOPY URETEROSCOPY LASER LITHOTRIPSY STENT INSERTION STONE EXTRACTION;  Surgeon: Weston Peck MD;  Location:  [facility] OR;  Service:    • CYSTOSCOPY W/ URETERAL STENT PLACEMENT Left 04/17/2017    Procedure: CYSTOSCOPY URETERAL DOUBLE J STENT INSERTION, LEFT;  Surgeon: Weston Peck MD;  Location: [facility] OR;  Service:    • ENDOSCOPY N/A 04/26/2017    Procedure: ESOPHAGOGASTRODUODENOSCOPY WITH ANESTHESIA;  Surgeon: Sher Cui MD;  Location: [facility] ENDOSCOPY;  Service:    • INCISION AND DRAINAGE LEG Right 07/12/2023    Procedure: INCISION AND DRAINAGE - RIGHT FOOT;  Surgeon: Silas Swan DPM;  Location:  [facility] OR;  Service: Podiatry;  Laterality: Right;   • JOINT REPLACEMENT Right    • KIDNEY STONE SURGERY     • KNEE SURGERY Right 08/2024    replacement   • NECK SURGERY     • ROTATOR CUFF REPAIR     • SHOULDER SURGERY     • TONSILLECTOMY     • TRANS METATARSAL AMPUTATION Right 08/09/2023    Procedure: Partial 4th Ray Amputation - Right Foot;  Surgeon: Silas Swan DPM;  Location:  [facility] OR;  Service: Podiatry;  Laterality: Right;   • URETEROSCOPY LASER LITHOTRIPSY WITH STENT INSERTION Left 09/01/2022    Procedure: LEFT URETEROSCOPY LASER LITHOTRIPSY WITH STENT INSERTION;  Surgeon: Weston Peck MD;  Location: [facility] OR;  Service: Urology;  Laterality: Left;   • URETEROSCOPY LASER LITHOTRIPSY WITH STENT INSERTION Left 09/15/2022    Procedure: LEFT URETEROSCOPY LASER LITHOTRIPSY WITH STENT EXCHANGE;  Surgeon: Weston Peck MD;  Location: [facility] OR;  Service: Urology;  Laterality: Left;       Medications Prior to Admission:    Medications Prior to Admission   Medication Sig Dispense Refill Last Dose/Taking   • acetaminophen (TYLENOL) 325 MG tablet Take 2 tablets by mouth Every 6 (Six) Hours As Needed  for Mild Pain, Fever or Moderate Pain.      • ALPRAZolam (XANAX) 0.25 MG tablet Take 1 tablet by mouth 2 (Two) Times a Day As Needed for Anxiety.      • busPIRone (BUSPAR) 15 MG tablet Take 1 tablet by mouth 2 (Two) Times a Day.      • clopidogrel (PLAVIX) 75 MG tablet Take 1 tablet by mouth Daily.      • docusate sodium (COLACE) 100 MG capsule Take 1 capsule by mouth Every Evening.      • DULoxetine (CYMBALTA) 60 MG capsule Take 1 capsule by mouth Every Night.      • empagliflozin (JARDIANCE) 25 MG tablet tablet Take 1 tablet by mouth Daily.      • ferrous sulfate 325 (65 FE) MG EC tablet Take 1 tablet by mouth 2 (Two) Times a Day.      • gabapentin (NEURONTIN) 300 MG capsule Take 1 capsule by mouth Daily.      • GARLIC-CALCIUM PO Take 1 tablet by mouth Daily.      • HYDROcodone-acetaminophen (NORCO) 5-325 MG per tablet Take 1 tablet by mouth Every 4 (Four) Hours As Needed for Moderate Pain. 15 tablet 0    • icosapent ethyl (VASCEPA) 1 g capsule capsule Take 2 g by mouth 2 (Two) Times a Day With Meals.      • Insulin Degludec (TRESIBA SC) Inject 12 Units under the skin into the appropriate area as directed Every Night.      • levothyroxine (SYNTHROID, LEVOTHROID) 75 MCG tablet Take 1 tablet by mouth Daily.      • metFORMIN (GLUCOPHAGE) 500 MG tablet Take 1 tablet by mouth 2 (Two) Times a Day With Meals.      • naloxone (NARCAN) 4 MG/0.1ML nasal spray Call 911. Don't prime. Shiner in 1 nostril for overdose. Repeat in 2-3 minutes in other nostril if no or minimal breathing/responsiveness. 2 each 0    • nitroglycerin (NITROSTAT) 0.4 MG SL tablet Place 1 tablet under the tongue Every 5 (Five) Minutes As Needed.      • pantoprazole (PROTONIX) 40 MG EC tablet Take 1 tablet by mouth Daily.      • polyethylene glycol (MIRALAX) powder Take 17 g by mouth Daily As Needed.      • potassium chloride 10 MEQ CR tablet Take 1 tablet by mouth 2 (Two) Times a Day.      • ranolazine (RANEXA) 1000 MG 12 hr tablet Take 1 tablet by mouth  "2 (Two) Times a Day.      • rosuvastatin (CRESTOR) 40 MG tablet Take 1 tablet by mouth Daily.      • Vitamin D, Cholecalciferol, 1000 UNITS tablet Take 1 tablet by mouth Daily.          Allergies:  Adhesive tape, Cefazolin, Cefuroxime axetil, Cephalosporins, Neomycin-polymyxin b gu, and Percocet [oxycodone-acetaminophen]    Social History:   Social History     Socioeconomic History   • Marital status:    Tobacco Use   • Smoking status: Former     Current packs/day: 0.00     Types: Cigarettes     Quit date:      Years since quittin.2     Passive exposure: Never   • Smokeless tobacco: Never   Vaping Use   • Vaping status: Never Used   Substance and Sexual Activity   • Alcohol use: No   • Drug use: No   • Sexual activity: Defer       Family History:   Family History   Problem Relation Age of Onset   • Kidney disease Father    • Heart disease Father    • Cancer Mother         brain   • Colon cancer Neg Hx    • Colon polyps Neg Hx            Physical Exam:    Vitals: BP 97/54 (BP Location: Right arm, Patient Position: Lying)   Pulse 94   Temp 97.8 °F (36.6 °C) (Oral)   Resp 16   Ht 180.3 cm (71\")   Wt 91.1 kg (200 lb 12.8 oz)   SpO2 92%   BMI 28.01 kg/m²   Physical Exam  Vitals and nursing note reviewed. Exam conducted with a chaperone present.   Constitutional:       Appearance: Normal appearance.   HENT:      Head: Normocephalic and atraumatic.      Right Ear: Tympanic membrane normal.      Left Ear: Tympanic membrane normal.      Nose: Nose normal.      Mouth/Throat:      Mouth: Mucous membranes are moist.   Eyes:      Pupils: Pupils are equal, round, and reactive to light.   Cardiovascular:      Rate and Rhythm: Normal rate and regular rhythm.   Pulmonary:      Breath sounds: Wheezing present.   Abdominal:      General: Abdomen is flat. Bowel sounds are normal.   Musculoskeletal:         General: Normal range of motion.   Skin:     General: Skin is warm.      Capillary Refill: Capillary refill " takes less than 2 seconds.   Neurological:      General: No focal deficit present.      Mental Status: He is alert.   Psychiatric:         Mood and Affect: Mood normal.       Lab Results (last 24 hours)       Procedure Component Value Units Date/Time    Basic Metabolic Panel [[id number]]  (Abnormal) Collected: 03/12/25 0301    Specimen: Blood Updated: 03/12/25 0411     Glucose 86 mg/dL      BUN 14 mg/dL      Creatinine 1.25 mg/dL      Sodium 135 mmol/L      Potassium 3.2 mmol/L      Chloride 98 mmol/L      CO2 24.0 mmol/L      Calcium 8.6 mg/dL      BUN/Creatinine Ratio 11.2     Anion Gap 13.0 mmol/L      eGFR 61.6 mL/min/1.73     Narrative:      GFR Categories in Chronic Kidney Disease (CKD)      GFR Category          GFR (mL/min/1.73)    Interpretation  G1                     90 or greater         Normal or high (1)  G2                      60-89                Mild decrease (1)  G3a                   45-59                Mild to moderate decrease  G3b                   30-44                Moderate to severe decrease  G4                    15-29                Severe decrease  G5                    14 or less           Kidney failure          (1)In the absence of evidence of kidney disease, neither GFR category G1 or G2 fulfill the criteria for CKD.    eGFR calculation 2021 CKD-EPI creatinine equation, which does not include race as a factor    Magnesium [[id number]]  (Normal) Collected: 03/12/25 0301    Specimen: Blood Updated: 03/12/25 0411     Magnesium 2.1 mg/dL     Phosphorus [[id number]]  (Normal) Collected: 03/12/25 0301    Specimen: Blood Updated: 03/12/25 0411     Phosphorus 2.6 mg/dL     CBC & Differential [[id number]]  (Abnormal) Collected: 03/12/25 0301    Specimen: Blood Updated: 03/12/25 0404    Narrative:      The following orders were created for panel order CBC & Differential.  Procedure                               Abnormality         Status                     ---------                                -----------         ------                     CBC Auto Differential[[id number]]        Abnormal            Final result                 Please view results for these tests on the individual orders.    CBC Auto Differential [[id number]]  (Abnormal) Collected: 03/12/25 0301    Specimen: Blood Updated: 03/12/25 0404     WBC 3.49 10*3/mm3      RBC 2.42 10*6/mm3      Hemoglobin 8.6 g/dL      Hematocrit 26.8 %      .7 fL      MCH 35.5 pg      MCHC 32.1 g/dL      RDW 17.2 %      RDW-SD 69.5 fl      MPV 10.1 fL      Platelets 59 10*3/mm3      Neutrophil % 58.4 %      Lymphocyte % 25.8 %      Monocyte % 13.2 %      Eosinophil % 2.0 %      Basophil % 0.3 %      Immature Grans % 0.3 %      Neutrophils, Absolute 2.04 10*3/mm3      Lymphocytes, Absolute 0.90 10*3/mm3      Monocytes, Absolute 0.46 10*3/mm3      Eosinophils, Absolute 0.07 10*3/mm3      Basophils, Absolute 0.01 10*3/mm3      Immature Grans, Absolute 0.01 10*3/mm3     Respiratory Panel PCR w/COVID-19(SARS-CoV-2) [facility] In-House, NP Swab in UTM/VTM, 2 HR TAT - Swab, Nasopharynx [[id number]]  (Abnormal) Collected: 03/11/25 1451    Specimen: Swab from Nasopharynx Updated: 03/11/25 1604     ADENOVIRUS, PCR Not Detected     Coronavirus 229E Not Detected     Coronavirus HKU1 Not Detected     Coronavirus NL63 Not Detected     Coronavirus OC43 Not Detected     COVID19 Not Detected     Human Metapneumovirus Not Detected     Human Rhinovirus/Enterovirus Detected     Influenza A PCR Not Detected     Influenza B PCR Not Detected     Parainfluenza Virus 1 Not Detected     Parainfluenza Virus 2 Not Detected     Parainfluenza Virus 3 Not Detected     Parainfluenza Virus 4 Not Detected     RSV, PCR Not Detected     Bordetella pertussis pcr Not Detected     Bordetella parapertussis PCR Not Detected     Chlamydophila pneumoniae PCR Not Detected     Mycoplasma pneumo by PCR Not Detected    Narrative:      In the setting of a positive respiratory panel with a  viral infection PLUS a negative procalcitonin without other underlying concern for bacterial infection, consider observing off antibiotics or discontinuation of antibiotics and continue supportive care. If the respiratory panel is positive for atypical bacterial infection (Bordetella pertussis, Chlamydophila pneumoniae, or Mycoplasma pneumoniae), consider antibiotic de-escalation to target atypical bacterial infection.    High Sensitivity Troponin T 1Hr [[id number]]  (Abnormal) Collected: 03/11/25 1451    Specimen: Blood Updated: 03/11/25 1524     HS Troponin T 110 ng/L      Troponin T Numeric Delta -9 ng/L      Troponin T % Delta -8    Narrative:      High Sensitive Troponin T Reference Range:  <14.0 ng/L- Negative Female for AMI  <22.0 ng/L- Negative Male for AMI  >=14 - Abnormal Female indicating possible myocardial injury.  >=22 - Abnormal Male indicating possible myocardial injury.   Clinicians would have to utilize clinical acumen, EKG, Troponin, and serial changes to determine if it is an Acute Myocardial Infarction or myocardial injury due to an underlying chronic condition.         High Sensitivity Troponin T [[id number]]  (Abnormal) Collected: 03/11/25 1331    Specimen: Blood Updated: 03/11/25 1406     HS Troponin T 119 ng/L     Narrative:      High Sensitive Troponin T Reference Range:  <14.0 ng/L- Negative Female for AMI  <22.0 ng/L- Negative Male for AMI  >=14 - Abnormal Female indicating possible myocardial injury.  >=22 - Abnormal Male indicating possible myocardial injury.   Clinicians would have to utilize clinical acumen, EKG, Troponin, and serial changes to determine if it is an Acute Myocardial Infarction or myocardial injury due to an underlying chronic condition.         Comprehensive Metabolic Panel [[id number]]  (Abnormal) Collected: 03/11/25 1331    Specimen: Blood Updated: 03/11/25 1404     Glucose 122 mg/dL      BUN 16 mg/dL      Creatinine 1.41 mg/dL      Sodium 139 mmol/L      Potassium 3.0  mmol/L      Chloride 99 mmol/L      CO2 24.0 mmol/L      Calcium 9.6 mg/dL      Total Protein 7.0 g/dL      Albumin 3.5 g/dL      ALT (SGPT) 52 U/L      AST (SGOT) 78 U/L      Alkaline Phosphatase 171 U/L      Total Bilirubin 1.0 mg/dL      Globulin 3.5 gm/dL      A/G Ratio 1.0 g/dL      BUN/Creatinine Ratio 11.3     Anion Gap 16.0 mmol/L      eGFR 53.3 mL/min/1.73     Narrative:      GFR Categories in Chronic Kidney Disease (CKD)      GFR Category          GFR (mL/min/1.73)    Interpretation  G1                     90 or greater         Normal or high (1)  G2                      60-89                Mild decrease (1)  G3a                   45-59                Mild to moderate decrease  G3b                   30-44                Moderate to severe decrease  G4                    15-29                Severe decrease  G5                    14 or less           Kidney failure          (1)In the absence of evidence of kidney disease, neither GFR category G1 or G2 fulfill the criteria for CKD.    eGFR calculation 2021 CKD-EPI creatinine equation, which does not include race as a factor    BNP [[id number]]  (Normal) Collected: 03/11/25 1331    Specimen: Blood Updated: 03/11/25 1402     proBNP 874.5 pg/mL     Narrative:      This assay is used as an aid in the diagnosis of individuals suspected of having heart failure. It can be used as an aid in the diagnosis of acute decompensated heart failure (ADHF) in patients presenting with signs and symptoms of ADHF to the emergency department (ED). In addition, NT-proBNP of <300 pg/mL indicates ADHF is not likely.    Age Range Result Interpretation  NT-proBNP Concentration (pg/mL:      <50             Positive            >450                   Gray                 300-450                    Negative             <300    50-75           Positive            >900                  Gray                300-900                  Negative            <300      >75             Positive             >1800                  Gray                300-1800                  Negative            <300    Magnesium [[id number]]  (Normal) Collected: 03/11/25 1331    Specimen: Blood Updated: 03/11/25 1400     Magnesium 2.1 mg/dL     Protime-INR [[id number]]  (Abnormal) Collected: 03/11/25 1331    Specimen: Blood Updated: 03/11/25 1355     Protime 15.4 Seconds      INR 1.15    CBC & Differential [[id number]]  (Abnormal) Collected: 03/11/25 1331    Specimen: Blood Updated: 03/11/25 1350    Narrative:      The following orders were created for panel order CBC & Differential.  Procedure                               Abnormality         Status                     ---------                               -----------         ------                     CBC Auto Differential[[id number]]        Abnormal            Final result                 Please view results for these tests on the individual orders.    CBC Auto Differential [[id number]]  (Abnormal) Collected: 03/11/25 1331    Specimen: Blood Updated: 03/11/25 1350     WBC 4.56 10*3/mm3      RBC 2.72 10*6/mm3      Hemoglobin 9.8 g/dL      Hematocrit 29.8 %      .6 fL      MCH 36.0 pg      MCHC 32.9 g/dL      RDW 17.3 %      RDW-SD 68.6 fl      MPV 10.0 fL      Platelets 66 10*3/mm3      Neutrophil % 67.6 %      Lymphocyte % 18.9 %      Monocyte % 11.6 %      Eosinophil % 1.5 %      Basophil % 0.2 %      Immature Grans % 0.2 %      Neutrophils, Absolute 3.08 10*3/mm3      Lymphocytes, Absolute 0.86 10*3/mm3      Monocytes, Absolute 0.53 10*3/mm3      Eosinophils, Absolute 0.07 10*3/mm3      Basophils, Absolute 0.01 10*3/mm3      Immature Grans, Absolute 0.01 10*3/mm3              -----------------------------------------------------------------    Radiology:     CT Head Without Contrast  Result Date: 3/11/2025  EXAM: CT HEAD WO CONTRAST-  DATE: 3/11/2025 12:56 PM  HISTORY: syncope   COMPARISON: 1/24/2025.  DOSE LENGTH PRODUCT: 943.32 mGy.cm  Automated exposure control was  also utilized to decrease patient radiation dose.  TECHNIQUE: Unenhanced CT images obtained from vertex to skull base with multiplanar reformats.  FINDINGS: There is no acute intracranial hemorrhage, midline shift, mass effect, or hydrocephalus. There is no CT evidence for acute infarct.  There are chronic changes with volume loss and chronic small vessel ischemic change of the periventricular white matter.  SOFT TISSUES: The scalp soft tissues are unremarkable.   SINUS:The visualized paranasal sinuses and mastoid air cells are clear.  ORBITS: The visualized orbits and globes are unremarkable.       1. Chronic changes and no acute intracranial findings.  This report was signed and finalized on 3/11/2025 2:26 PM by Jose Crenshaw.      XR Chest 1 View  Result Date: 3/11/2025  EXAMINATION: XR CHEST 1 VW-  3/11/2025 1:52 PM 1 view  HISTORY: syncope  COMPARISON: 6/4/2024  TECHNIQUE: A single frontal view of the chest was obtained.  FINDINGS: Median sternotomy wires are noted. Fusion changes in the lower cervical spine. Arthritis in both shoulders.  No airspace consolidation or pneumothorax. The heart is not enlarged. Since the previous examination, there may have been a proximal LEFT humeral fracture.       1.  No acute cardiopulmonary process.  2.  Abnormal appearance of the proximal LEFT humerus, different than on the exam from 6/4/2024. Correlate for previous proximal humerus fracture. If no history of previous proximal humeral fracture, consider 3 view shoulder series on the LEFT.  3.  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    This report was signed and finalized on 3/11/2025 1:57 PM by Dr. Jed Messina MD.        Assessment and Plan     Primary Problem:  Syncope  Rhinovirus with COPD exacerabtion  Anemia  Elevated Troponin concerning for NSTEMI  BELTRAN  Hospital Problem list:    Syncope      PMH:  Past Medical History:   Diagnosis Date   • Anemia    •  Anxiety    • Arthritis    • BPH (benign prostatic hypertrophy)    • CAD (coronary artery disease)    • Cancer     non-hodgkins lymphoma   • Diabetes mellitus    • Disease of thyroid gland    • GERD (gastroesophageal reflux disease)    • Hearing loss    • History of transfusion    • Hyperlipidemia    • Hypertension    • Iliac artery aneurysm, bilateral    • Kidney stone    • Liver cirrhosis    • Migraines    • Sleep apnea     c-pap       Treatment Plan:  Serial Trops- Tele- Consult cardiology  IVF- Orthostatics  Nebs  Resume home meds  PT/OT consult    Disposition: home    Figueroa Chaves MD 3/12/2025 07:36 CDT

## 2025-03-12 NOTE — PLAN OF CARE
"Patient w c/o feeling \"awful\" today.  Large BM.  Echo and US carotid.  Neuro consult, EEG in am. Zithromax started.  Stool softener given.  Patient has home CPAP at bedside.  IVF continue at 75 mL/hr  PT/OT.    Problem: Adult Inpatient Plan of Care  Goal: Readiness for Transition of Care  Outcome: Not Progressing     Problem: Adult Inpatient Plan of Care  Goal: Plan of Care Review  Outcome: Progressing  Goal: Patient-Specific Goal (Individualized)  Outcome: Progressing  Goal: Absence of Hospital-Acquired Illness or Injury  Outcome: Progressing  Intervention: Identify and Manage Fall Risk  Recent Flowsheet Documentation  Taken 3/12/2025 1700 by Leyla An, RN  Safety Promotion/Fall Prevention:   activity supervised   safety round/check completed  Goal: Optimal Comfort and Wellbeing  Outcome: Progressing     Problem: Skin Injury Risk Increased  Goal: Skin Health and Integrity  Outcome: Progressing   Goal Outcome Evaluation:                                            "

## 2025-03-12 NOTE — PLAN OF CARE
Goal Outcome Evaluation:  Plan of Care Reviewed With: patient           Outcome Evaluation: OT eval completed. Pt presents alert and oriented x4, sitting up in bed. He reports at baseline being independent with all adls and mobility, residing at home with spouse. Within the last 6 months he went through rehab placement s/p femur fx and since then has been going to OP therapy. Today he was able to bring self to sitting at EOB with CGA. Donned slip on shoes with set-up only. Performed sit <> stand t/f from EOB with CGA and amb short distance in hallway. Used restroom with set-up and CGA but did require Min A for t/f from toileting. Left sitting up EOB with spouse and nsg present in room. He would benefit from skilled OT services to address decreased activity tolerance, strength and increased SOA with activity. He would benefit from skilled OT services to address these deficits. Recommend d/c home with assist when medically ready.    Anticipated Discharge Disposition (OT): home with assist

## 2025-03-12 NOTE — PAYOR COMM NOTE
"3/12/25 [facility] [phone]  [phone]    INPATIENT ADMIT ON 3/11/25. FAXING FOR INPATIENT REVIEW.      Infection: Rhinovirus , MRSA  Isolation Updates Required  Isolation: Droplet    Alex Lucero (69 y.o. Male)       Date of Birth   1955    Social Security Number       Address   [address] [facility] [address]    Home Phone   [phone]    MRN   [id number]       Baptism   [facility]    Marital Status                               Admission Date   3/11/2025    Admission Type   Emergency    Admitting Provider   Figueroa Chaves MD    Attending Provider   Figueroa Chaves MD    Department, Room/Bed   [facility] 4B, 450/1       Discharge Date       Discharge Disposition       Discharge Destination                                 Attending Provider: Figueroa Chaves MD    Allergies: Adhesive Tape, Cefazolin, Cefuroxime Axetil, Cephalosporins, Neomycin-polymyxin B Gu, Percocet [Oxycodone-acetaminophen]    Isolation: Droplet   Infection: MRSA (07/15/23), Rhinovirus  (03/11/25)   Code Status: CPR    Ht: 180.3 cm (71\")   Wt: 91.1 kg (200 lb 12.8 oz)    Admission Cmt: None   Principal Problem: Syncope [R55]                   Active Insurance as of 3/11/2025       Primary Coverage       Payor Plan Insurance Group Employer/Plan Group    AETNA MEDICARE REPLACEMENT AETNA MEDICARE ADVANTAGE PPO [id number]       Payor Plan Address Payor Plan Phone Number Payor Plan Fax Number Effective Dates    [address] [phone]  1/1/2024 - None Entered    [facility] [id number]         Subscriber Name Subscriber Birth Date Member ID       ALEX LUCERO 1955 [id number]                     Emergency Contacts        (Rel.) Home Phone Work Phone Mobile Phone    Sangita Lucero (Spouse) [phone] -- [phone]             [facility] Encounter Date/Time: 3/11/2025 1301   Hospital Account: [id number]    MRN: [id number]   Patient:  " Alex Lucero   Contact Serial #: [id number]   SSN:          ENCOUNTER             Patient Class: Inpatient   Unit: [facility] 4B   Hospital Service: Medicine     Bed: 450/1   Admitting Provider: Figueroa Chaves MD   Referring Physician: Figueroa Chaves   Attending Provider: Figueroa Chaves MD   Adm Diagnosis: Syncope [R55]               PATIENT             Name: Alex Lucero : 1955 (69 yrs)   Address: [place] [address] Sex: Male   City: [place] [address]   County: [place]   Marital Status:  Ethnicity: NOT        Race: WHITE   Primary Care Provider: Figueroa Chaves MD Patients Phone: Home Phone: [phone]     Mobile Phone: [phone]     EMERGENCY CONTACT   Contact Name Legal Guardian? Relationship to Patient Home Phone Work Phone Mobile Phone   1. Sangita Lucero  2. *No Contact Specified*      Spouse    [phone]      GUARANTOR             Guarantor: Alex Lucero     : 1955   Address: [place] [address] Sex: [place] [address]     Relation to Patient: Self       Home Phone: [phone]   Guarantor ID: [id number]       Work Phone:     GUARANTOR EMPLOYER   Employer:           Status: RETIRED   COVERAGE          PRIMARY INSURANCE   Payor: AETNA MEDICARE REPLACEMENT Plan: AETNA MEDICARE ADVANTAGE PPO   Group Number: [id number] Insurance Type: INDEMNITY   Subscriber Name: ALEX LUCERO Subscriber : 1955   Subscriber ID: [id number] Coverage Address: [facility] [id number] [place] [address]   Pat. Rel. to Subscriber: Self Coverage Phone: [phone]   SECONDARY INSURANCE   Payor: N/A Plan: N/A   Group Number:   Insurance Type:     Subscriber Name:   Subscriber :     Subscriber ID:   Coverage Address:     Pat. Rel. to Subscriber:   Coverage Phone:        Contact Serial # ([id number])         2025    Chart ID ([id number])         Yossi Allen MD   Physician  Emergency Medicine     ED Provider Notes    "  Signed     Date of Service: 03/11/25 1357  Creation Time: 03/11/25 1357     Signed       Expand All Collapse All    Subjective  History of Present Illness  69-year-old male presents to the ED for evaluation following episode of syncope.  He has history of hypertension, hyperlipidemia, diabetes, coronary disease.  Patient was participating in outpatient physical therapy earlier today, developed sudden onset of abdominal pain.  This was followed by lightheadedness, generalized weakness, near syncope and hypotension.  He had episode of bowel incontinence.  He denies any prodrome of shortness of breath or chest pain.  Episode lasted about 15 to 20 minutes and resolved before patient arrived to the ED.  Currently states he feels \"woozy\", no abdominal discomfort.  States his belly feels better after his bowel movement.  No chest pain or shortness of breath, no nausea or vomiting.  Does endorse several day history of cough and congestion.  Currently rates his symptoms as mild severity.     History provided by:  Patient        Review of Systems   All other systems reviewed and are negative.        Medical History        Past Medical History:   Diagnosis Date    Anemia      Anxiety      Arthritis      BPH (benign prostatic hypertrophy)      CAD (coronary artery disease)      Cancer       non-hodgkins lymphoma    Diabetes mellitus      Disease of thyroid gland      GERD (gastroesophageal reflux disease)      Hearing loss      History of transfusion      Hyperlipidemia      Hypertension      Iliac artery aneurysm, bilateral      Kidney stone      Liver cirrhosis      Migraines      Sleep apnea       c-pap            Allergies         Allergies   Allergen Reactions    Adhesive Tape Rash       Pt states that if it isn't left on very long it is okay    Cefazolin Rash    Cefuroxime Axetil Rash    Cephalosporins Rash    Neomycin-Polymyxin B Gu Rash    Percocet [Oxycodone-Acetaminophen] Rash            Surgical History       "   Past Surgical History:   Procedure Laterality Date    COLONOSCOPY   02/04/2014    COLONOSCOPY N/A 04/26/2017     Procedure: COLONOSCOPY WITH ANESTHESIA;  Surgeon: Sher Cui MD;  Location: [facility] ENDOSCOPY;  Service:     CORONARY ARTERY BYPASS GRAFT         2    CYSTOSCOPY URETEROSCOPY LASER LITHOTRIPSY Left 04/17/2017     Procedure: URETEROSCOPY LASER LITHOTRIPSY WITH DOUBLE J STENT INSERTION, LEFT ;  Surgeon: Weston Peck MD;  Location:  [facility] OR;  Service:     CYSTOSCOPY URETEROSCOPY LASER LITHOTRIPSY Left 08/14/2017     Procedure: CYSTOSCOPY URETEROSCOPY LASER LITHOTRIPSY STENT INSERTION STONE EXTRACTION;  Surgeon: Weston Peck MD;  Location:  [facility] OR;  Service:     CYSTOSCOPY W/ URETERAL STENT PLACEMENT Left 04/17/2017     Procedure: CYSTOSCOPY URETERAL DOUBLE J STENT INSERTION, LEFT;  Surgeon: Weston Peck MD;  Location:  [facility] OR;  Service:     ENDOSCOPY N/A 04/26/2017     Procedure: ESOPHAGOGASTRODUODENOSCOPY WITH ANESTHESIA;  Surgeon: Sher Cui MD;  Location: [facility] ENDOSCOPY;  Service:     INCISION AND DRAINAGE LEG Right 07/12/2023     Procedure: INCISION AND DRAINAGE - RIGHT FOOT;  Surgeon: Silas Swan DPM;  Location:  [facility] OR;  Service: Podiatry;  Laterality: Right;    JOINT REPLACEMENT Right      KIDNEY STONE SURGERY        KNEE SURGERY Right 08/2024     replacement    NECK SURGERY        ROTATOR CUFF REPAIR        SHOULDER SURGERY        TONSILLECTOMY        TRANS METATARSAL AMPUTATION Right 08/09/2023     Procedure: Partial 4th Ray Amputation - Right Foot;  Surgeon: Silas Swan DPM;  Location:  [facility] OR;  Service: Podiatry;  Laterality: Right;    URETEROSCOPY LASER LITHOTRIPSY WITH STENT INSERTION Left 09/01/2022     Procedure: LEFT URETEROSCOPY LASER LITHOTRIPSY WITH STENT INSERTION;  Surgeon: Weston Peck MD;  Location:  [facility] OR;  Service: Urology;  Laterality: Left;    URETEROSCOPY LASER LITHOTRIPSY WITH STENT INSERTION Left  09/15/2022     Procedure: LEFT URETEROSCOPY LASER LITHOTRIPSY WITH STENT EXCHANGE;  Surgeon: Weston Peck MD;  Location: [facility] OR;  Service: Urology;  Laterality: Left;                  Family History   Problem Relation Age of Onset    Kidney disease Father      Heart disease Father      Cancer Mother           brain    Colon cancer Neg Hx      Colon polyps Neg Hx           Social History   Social History            Socioeconomic History    Marital status:    Tobacco Use    Smoking status: Former       Current packs/day: 0.00       Types: Cigarettes       Quit date:        Years since quittin.2       Passive exposure: Never    Smokeless tobacco: Never   Vaping Use    Vaping status: Never Used   Substance and Sexual Activity    Alcohol use: No    Drug use: No    Sexual activity: Defer                  Objective  Physical Exam  Vitals and nursing note reviewed.   Constitutional:       Appearance: Normal appearance. He is normal weight.   HENT:      Head: Normocephalic and atraumatic.      Nose: Nose normal. No congestion or rhinorrhea.      Mouth/Throat:      Mouth: Mucous membranes are moist.   Eyes:      Conjunctiva/sclera: Conjunctivae normal.      Pupils: Pupils are equal, round, and reactive to light.   Cardiovascular:      Rate and Rhythm: Normal rate and regular rhythm.      Heart sounds: Normal heart sounds. No murmur heard.  Pulmonary:      Effort: Pulmonary effort is normal.      Breath sounds: Normal breath sounds. No wheezing, rhonchi or rales.   Abdominal:      General: Abdomen is flat. Bowel sounds are normal.      Palpations: Abdomen is soft.   Musculoskeletal:      Right lower leg: No edema.      Left lower leg: No edema.   Skin:     General: Skin is warm and dry.      Capillary Refill: Capillary refill takes less than 2 seconds.   Neurological:      General: No focal deficit present.      Mental Status: He is alert and oriented to person, place, and time.             Procedures           Lab Results (last 24 hours)         Procedure Component Value Units Date/Time     CBC & Differential [[id number]]  (Abnormal) Collected: 03/11/25 1331     Specimen: Blood Updated: 03/11/25 1350     Narrative:       The following orders were created for panel order CBC & Differential.  Procedure                               Abnormality         Status                     ---------                               -----------         ------                     CBC Auto Differential[[id number]]        Abnormal            Final result                  Please view results for these tests on the individual orders.     Comprehensive Metabolic Panel [[id number]]  (Abnormal) Collected: 03/11/25 1331     Specimen: Blood Updated: 03/11/25 1404       Glucose 122 mg/dL         BUN 16 mg/dL         Creatinine 1.41 mg/dL         Sodium 139 mmol/L         Potassium 3.0 mmol/L         Chloride 99 mmol/L         CO2 24.0 mmol/L         Calcium 9.6 mg/dL         Total Protein 7.0 g/dL         Albumin 3.5 g/dL         ALT (SGPT) 52 U/L         AST (SGOT) 78 U/L         Alkaline Phosphatase 171 U/L         Total Bilirubin 1.0 mg/dL         Globulin 3.5 gm/dL         A/G Ratio 1.0 g/dL         BUN/Creatinine Ratio 11.3       Anion Gap 16.0 mmol/L         eGFR 53.3 mL/min/1.73       Narrative:       GFR Categories in Chronic Kidney Disease (CKD)                GFR Category          GFR (mL/min/1.73)    Interpretation  G1                       90 or greater          Normal or high (1)  G2                              60-89                Mild decrease (1)  G3a                   45-59                Mild to moderate decrease  G3b                   30-44                Moderate to severe decrease  G4                    15-29                Severe decrease  G5                    14 or less           Kidney failure                                                 (1)In the absence of evidence of kidney disease, neither GFR  category G1 or G2 fulfill the criteria for CKD.     eGFR calculation 2021 CKD-EPI creatinine equation, which does not include race as a factor     Protime-INR [[id number]]  (Abnormal) Collected: 03/11/25 1331     Specimen: Blood Updated: 03/11/25 1355       Protime 15.4 Seconds         INR 1.15     Magnesium [[id number]]  (Normal) Collected: 03/11/25 1331     Specimen: Blood Updated: 03/11/25 1400       Magnesium 2.1 mg/dL       BNP [[id number]]  (Normal) Collected: 03/11/25 1331     Specimen: Blood Updated: 03/11/25 1402       proBNP 874.5 pg/mL       Narrative:       This assay is used as an aid in the diagnosis of individuals suspected of having heart failure. It can be used as an aid in the diagnosis of acute decompensated heart failure (ADHF) in patients presenting with signs and symptoms of ADHF to the emergency department (ED). In addition, NT-proBNP of <300 pg/mL indicates ADHF is not likely.     Age Range        Result Interpretation  NT-proBNP Concentration (pg/mL:        <50             Positive            >450                        Fox                        300-450                          Negative                        <300     50-75           Positive            >900                  Gray                300-900                  Negative            <300        >75             Positive            >1800                  Gray                300-1800                  Negative            <300     High Sensitivity Troponin T [[id number]]  (Abnormal) Collected: 03/11/25 1331     Specimen: Blood Updated: 03/11/25 1406       HS Troponin T 119 ng/L       Narrative:       High Sensitive Troponin T Reference Range:  <14.0 ng/L- Negative Female for AMI  <22.0 ng/L- Negative Male for AMI  >=14 - Abnormal Female indicating possible myocardial injury.  >=22 - Abnormal Male indicating possible myocardial injury.   Clinicians would have to utilize clinical acumen, EKG, Troponin, and serial changes to determine if it is  an Acute Myocardial Infarction or myocardial injury due to an underlying chronic condition.           CBC Auto Differential [[id number]]  (Abnormal) Collected: 03/11/25 1331     Specimen: Blood Updated: 03/11/25 1350       WBC 4.56 10*3/mm3         RBC 2.72 10*6/mm3         Hemoglobin 9.8 g/dL         Hematocrit 29.8 %         .6 fL         MCH 36.0 pg         MCHC 32.9 g/dL         RDW 17.3 %         RDW-SD 68.6 fl         MPV 10.0 fL         Platelets 66 10*3/mm3         Neutrophil % 67.6 %         Lymphocyte % 18.9 %         Monocyte % 11.6 %         Eosinophil % 1.5 %         Basophil % 0.2 %         Immature Grans % 0.2 %         Neutrophils, Absolute 3.08 10*3/mm3         Lymphocytes, Absolute 0.86 10*3/mm3         Monocytes, Absolute 0.53 10*3/mm3         Eosinophils, Absolute 0.07 10*3/mm3         Basophils, Absolute 0.01 10*3/mm3         Immature Grans, Absolute 0.01 10*3/mm3       Respiratory Panel PCR w/COVID-19(SARS-CoV-2) RICHARD/JENNIFER/DENIA/[facility]ON[facility] In-House, NP Swab in UTM/VTM, 2 HR TAT - Swab, Nasopharynx [[id number]]  (Abnormal) Collected: 03/11/25 1451     Specimen: Swab from Nasopharynx Updated: 03/11/25 1604       ADENOVIRUS, PCR Not Detected       Coronavirus 229E Not Detected       Coronavirus HKU1 Not Detected       Coronavirus NL63 Not Detected       Coronavirus OC43 Not Detected       COVID19 Not Detected       Human Metapneumovirus Not Detected       Human Rhinovirus/Enterovirus Detected       Influenza A PCR Not Detected       Influenza B PCR Not Detected       Parainfluenza Virus 1 Not Detected       Parainfluenza Virus 2 Not Detected       Parainfluenza Virus 3 Not Detected       Parainfluenza Virus 4 Not Detected       RSV, PCR Not Detected       Bordetella pertussis pcr Not Detected       Bordetella parapertussis PCR Not Detected       Chlamydophila pneumoniae PCR Not Detected       Mycoplasma pneumo by PCR Not Detected     Narrative:       In the setting of a positive respiratory  panel with a viral infection PLUS a negative procalcitonin without other underlying concern for bacterial infection, consider observing off antibiotics or discontinuation of antibiotics and continue supportive care. If the respiratory panel is positive for atypical bacterial infection (Bordetella pertussis, Chlamydophila pneumoniae, or Mycoplasma pneumoniae), consider antibiotic de-escalation to target atypical bacterial infection.     High Sensitivity Troponin T 1Hr [[id number]]  (Abnormal) Collected: 03/11/25 1451     Specimen: Blood Updated: 03/11/25 1524       HS Troponin T 110 ng/L         Troponin T Numeric Delta -9 ng/L         Troponin T % Delta -8     Narrative:       High Sensitive Troponin T Reference Range:  <14.0 ng/L- Negative Female for AMI  <22.0 ng/L- Negative Male for AMI  >=14 - Abnormal Female indicating possible myocardial injury.  >=22 - Abnormal Male indicating possible myocardial injury.   Clinicians would have to utilize clinical acumen, EKG, Troponin, and serial changes to determine if it is an Acute Myocardial Infarction or myocardial injury due to an underlying chronic condition.                 CT Head Without Contrast  Result Date: 3/11/2025  EXAM: CT HEAD WO CONTRAST-  DATE: 3/11/2025 12:56 PM  HISTORY: syncope   COMPARISON: 1/24/2025.  DOSE LENGTH PRODUCT: 943.32 mGy.cm  Automated exposure control was also utilized to decrease patient radiation dose.  TECHNIQUE: Unenhanced CT images obtained from vertex to skull base with multiplanar reformats.  FINDINGS: There is no acute intracranial hemorrhage, midline shift, mass effect, or hydrocephalus. There is no CT evidence for acute infarct.  There are chronic changes with volume loss and chronic small vessel ischemic change of the periventricular white matter.  SOFT TISSUES: The scalp soft tissues are unremarkable.   SINUS:The visualized paranasal sinuses and mastoid air cells are clear.  ORBITS: The visualized orbits and globes are  unremarkable.        1. Chronic changes and no acute intracranial findings.  This report was signed and finalized on 3/11/2025 2:26 PM by Jose Crenshaw.       XR Chest 1 View  Result Date: 3/11/2025  EXAMINATION: XR CHEST 1 VW-  3/11/2025 1:52 PM 1 view  HISTORY: syncope  COMPARISON: 6/4/2024  TECHNIQUE: A single frontal view of the chest was obtained.  FINDINGS: Median sternotomy wires are noted. Fusion changes in the lower cervical spine. Arthritis in both shoulders.  No airspace consolidation or pneumothorax. The heart is not enlarged. Since the previous examination, there may have been a proximal LEFT humeral fracture.        1.  No acute cardiopulmonary process.  2.  Abnormal appearance of the proximal LEFT humerus, different than on the exam from 6/4/2024. Correlate for previous proximal humerus fracture. If no history of previous proximal humeral fracture, consider 3 view shoulder series on the LEFT.  3.  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    This report was signed and finalized on 3/11/2025 1:57 PM by Dr. Jed Messina MD.       ED Course      ED Course as of 03/11/25 1745   Tu Mar 11, 2025   1716 69-year-old male with history of hypertension, hyperlipidemia, diabetes, coronary disease who presents to the ED for evaluation following episode of syncope following outpatient physical therapy.  No acute ischemic changes on EKG.  BNP was normal at 875.  Initial troponin elevated to 119, repeat 110.  This is higher than baseline but delta is within normal limits(less than 20% delta).  He has chronic anemia.  Does have mild elevated ALT and AST, T. bili normal.  Possibly related to his viral URI.  Has had cough and congestion, rhinovirus detected.  COVID-19 negative.  Potassium minimally decreased at 3.0.  Will begin repletion in ED.     Given history risk factors, syncope, elevated troponins, will admit for inpatient syncope workup and serial  troponins. [AW]       ED Course User Index  [AW] Yossi Allen MD                                  Total (NIH Stroke Scale): 0                   Medical Decision Making  Problems Addressed:  Elevated troponin: complicated acute illness or injury  Syncope, unspecified syncope type: complicated acute illness or injury     Amount and/or Complexity of Data Reviewed  Labs: ordered.  Radiology: ordered.  ECG/medicine tests: ordered.     Risk  Prescription drug management.  Decision regarding hospitalization.           Final diagnoses:   Syncope, unspecified syncope type   Elevated troponin         ED Disposition  ED Disposition         ED Disposition   Decision to Admit    Condition   --    Comment   Level of Care: Telemetry [5]   Diagnosis: Syncope [967307]   Admitting Physician: DAVID CHAVES [[id number]]   Attending Physician: DAVID CHAVES [[id number]]   Certification: I Certify That Inpatient Hospital Services Are Medically Necessary For Greater Than 2 Midnights                      No follow-up provider specified.         Medication List       No changes were made to your prescriptions during this visit.               Yossi Allen MD  03/11/25 1745                   David Chaves MD   Physician  Medicine     H&P     Signed     Date of Service: 03/12/25 0736  Creation Time: 03/12/25 0736     Signed       Expand All Collapse All         History and Physical     Patient:  Franko Lucero  MRN: [id number]     CHIEF COMPLAINT:  syncope     History Obtained From: the patient   PCP: David Chaves MD     HISTORY OF PRESENT ILLNESS:   The patient is a 69 y.o. male who presents with an extensive PMH was sitting outside on a brick wall awaiting his wife to  from physical therapy when he had a true syncopal event. Awoke in ambulance had defecated on himself. No prodromal symptoms. He has had a cold with cough and congestion 2 weeks. He has had two prior similar events in the past. This AM, a/o  "x4, \"feeling awful\".      REVIEW OF SYSTEMS:     Review of Systems   Constitutional:  Positive for activity change and fatigue.   HENT:  Positive for congestion.    Respiratory:  Positive for cough and shortness of breath.    Cardiovascular: Negative.    Gastrointestinal:  Positive for diarrhea.   Genitourinary: Negative.    Musculoskeletal:  Positive for arthralgias and gait problem.   Neurological:  Positive for dizziness and weakness.            Past Medical History:  Medical History[]Expand by Default        Past Medical History:   Diagnosis Date    Anemia      Anxiety      Arthritis      BPH (benign prostatic hypertrophy)      CAD (coronary artery disease)      Cancer       non-hodgkins lymphoma    Diabetes mellitus      Disease of thyroid gland      GERD (gastroesophageal reflux disease)      Hearing loss      History of transfusion      Hyperlipidemia      Hypertension      Iliac artery aneurysm, bilateral      Kidney stone      Liver cirrhosis      Migraines      Sleep apnea       c-pap            Past Surgical History:  Surgical History         Past Surgical History:   Procedure Laterality Date    COLONOSCOPY   02/04/2014    COLONOSCOPY N/A 04/26/2017     Procedure: COLONOSCOPY WITH ANESTHESIA;  Surgeon: Sher Cui MD;  Location: [facility] ENDOSCOPY;  Service:     CORONARY ARTERY BYPASS GRAFT         2    CYSTOSCOPY URETEROSCOPY LASER LITHOTRIPSY Left 04/17/2017     Procedure: URETEROSCOPY LASER LITHOTRIPSY WITH DOUBLE J STENT INSERTION, LEFT ;  Surgeon: Weston Peck MD;  Location: [facility] OR;  Service:     CYSTOSCOPY URETEROSCOPY LASER LITHOTRIPSY Left 08/14/2017     Procedure: CYSTOSCOPY URETEROSCOPY LASER LITHOTRIPSY STENT INSERTION STONE EXTRACTION;  Surgeon: Weston Peck MD;  Location: [facility] OR;  Service:     CYSTOSCOPY W/ URETERAL STENT PLACEMENT Left 04/17/2017     Procedure: CYSTOSCOPY URETERAL DOUBLE J STENT INSERTION, LEFT;  Surgeon: Weston Peck MD;  [facility]" [facility];  Service:     ENDOSCOPY N/A 04/26/2017     Procedure: ESOPHAGOGASTRODUODENOSCOPY WITH ANESTHESIA;  Surgeon: Sher Cui MD;  Location:  [facility] ENDOSCOPY;  Service:     INCISION AND DRAINAGE LEG Right 07/12/2023     Procedure: INCISION AND DRAINAGE - RIGHT FOOT;  Surgeon: Silas Swan DPM;  Location:  [facility] OR;  Service: Podiatry;  Laterality: Right;    JOINT REPLACEMENT Right      KIDNEY STONE SURGERY        KNEE SURGERY Right 08/2024     replacement    NECK SURGERY        ROTATOR CUFF REPAIR        SHOULDER SURGERY        TONSILLECTOMY        TRANS METATARSAL AMPUTATION Right 08/09/2023     Procedure: Partial 4th Ray Amputation - Right Foot;  Surgeon: Silas Swan DPM;  Location:  [facility] OR;  Service: Podiatry;  Laterality: Right;    URETEROSCOPY LASER LITHOTRIPSY WITH STENT INSERTION Left 09/01/2022     Procedure: LEFT URETEROSCOPY LASER LITHOTRIPSY WITH STENT INSERTION;  Surgeon: Weston Peck MD;  Location:  [facility];  Service: Urology;  Laterality: Left;    URETEROSCOPY LASER LITHOTRIPSY WITH STENT INSERTION Left 09/15/2022     Procedure: LEFT URETEROSCOPY LASER LITHOTRIPSY WITH STENT EXCHANGE;  Surgeon: Weston Peck MD;  Location:  [facility];  Service: Urology;  Laterality: Left;            Medications Prior to Admission:    Prescriptions Prior to Admission           Medications Prior to Admission   Medication Sig Dispense Refill Last Dose/Taking    acetaminophen (TYLENOL) 325 MG tablet Take 2 tablets by mouth Every 6 (Six) Hours As Needed for Mild Pain, Fever or Moderate Pain.          ALPRAZolam (XANAX) 0.25 MG tablet Take 1 tablet by mouth 2 (Two) Times a Day As Needed for Anxiety.          busPIRone (BUSPAR) 15 MG tablet Take 1 tablet by mouth 2 (Two) Times a Day.          clopidogrel (PLAVIX) 75 MG tablet Take 1 tablet by mouth Daily.          docusate sodium (COLACE) 100 MG capsule Take 1 capsule by mouth Every Evening.          DULoxetine (CYMBALTA) 60 MG  capsule Take 1 capsule by mouth Every Night.          empagliflozin (JARDIANCE) 25 MG tablet tablet Take 1 tablet by mouth Daily.          ferrous sulfate 325 (65 FE) MG EC tablet Take 1 tablet by mouth 2 (Two) Times a Day.          gabapentin (NEURONTIN) 300 MG capsule Take 1 capsule by mouth Daily.          GARLIC-CALCIUM PO Take 1 tablet by mouth Daily.          HYDROcodone-acetaminophen (NORCO) 5-325 MG per tablet Take 1 tablet by mouth Every 4 (Four) Hours As Needed for Moderate Pain. 15 tablet 0      icosapent ethyl (VASCEPA) 1 g capsule capsule Take 2 g by mouth 2 (Two) Times a Day With Meals.          Insulin Degludec (TRESIBA SC) Inject 12 Units under the skin into the appropriate area as directed Every Night.          levothyroxine (SYNTHROID, LEVOTHROID) 75 MCG tablet Take 1 tablet by mouth Daily.          metFORMIN (GLUCOPHAGE) 500 MG tablet Take 1 tablet by mouth 2 (Two) Times a Day With Meals.          naloxone (NARCAN) 4 MG/0.1ML nasal spray Call 911. Don't prime. [place] in 1 nostril for overdose. Repeat in 2-3 minutes in other nostril if no or minimal breathing/responsiveness. 2 each 0      nitroglycerin (NITROSTAT) 0.4 MG SL tablet Place 1 tablet under the tongue Every 5 (Five) Minutes As Needed.          pantoprazole (PROTONIX) 40 MG EC tablet Take 1 tablet by mouth Daily.          polyethylene glycol (MIRALAX) powder Take 17 g by mouth Daily As Needed.          potassium chloride 10 MEQ CR tablet Take 1 tablet by mouth 2 (Two) Times a Day.          ranolazine (RANEXA) 1000 MG 12 hr tablet Take 1 tablet by mouth 2 (Two) Times a Day.          rosuvastatin (CRESTOR) 40 MG tablet Take 1 tablet by mouth Daily.          Vitamin D, Cholecalciferol, 1000 UNITS tablet Take 1 tablet by mouth Daily.                  Allergies:  Adhesive tape, Cefazolin, Cefuroxime axetil, Cephalosporins, Neomycin-polymyxin b gu, and Percocet [oxycodone-acetaminophen]     Social History:   Social History   Social History        "     Socioeconomic History    Marital status:    Tobacco Use    Smoking status: Former       Current packs/day: 0.00       Types: Cigarettes       Quit date:        Years since quittin.2       Passive exposure: Never    Smokeless tobacco: Never   Vaping Use    Vaping status: Never Used   Substance and Sexual Activity    Alcohol use: No    Drug use: No    Sexual activity: Defer            Family History:         Family History   Problem Relation Age of Onset    Kidney disease Father      Heart disease Father      Cancer Mother           brain    Colon cancer Neg Hx      Colon polyps Neg Hx                 Physical Exam:    Vitals: BP 97/54 (BP Location: Right arm, Patient Position: Lying)   Pulse 94   Temp 97.8 °F (36.6 °C) (Oral)   Resp 16   Ht 180.3 cm (71\")   Wt 91.1 kg (200 lb 12.8 oz)   SpO2 92%   BMI 28.01 kg/m²   Physical Exam  Vitals and nursing note reviewed. Exam conducted with a chaperone present.   Constitutional:       Appearance: Normal appearance.   HENT:      Head: Normocephalic and atraumatic.      Right Ear: Tympanic membrane normal.      Left Ear: Tympanic membrane normal.      Nose: Nose normal.      Mouth/Throat:      Mouth: Mucous membranes are moist.   Eyes:      Pupils: Pupils are equal, round, and reactive to light.   Cardiovascular:      Rate and Rhythm: Normal rate and regular rhythm.   Pulmonary:      Breath sounds: Wheezing present.   Abdominal:      General: Abdomen is flat. Bowel sounds are normal.   Musculoskeletal:         General: Normal range of motion.   Skin:     General: Skin is warm.      Capillary Refill: Capillary refill takes less than 2 seconds.   Neurological:      General: No focal deficit present.      Mental Status: He is alert.   Psychiatric:         Mood and Affect: Mood normal.         Lab Results (last 24 hours)         Procedure Component Value Units Date/Time     Basic Metabolic Panel [[id number]]  (Abnormal) Collected: 25 0301     " Specimen: Blood Updated: 03/12/25 0411       Glucose 86 mg/dL         BUN 14 mg/dL         Creatinine 1.25 mg/dL         Sodium 135 mmol/L         Potassium 3.2 mmol/L         Chloride 98 mmol/L         CO2 24.0 mmol/L         Calcium 8.6 mg/dL         BUN/Creatinine Ratio 11.2       Anion Gap 13.0 mmol/L         eGFR 61.6 mL/min/1.73       Narrative:       GFR Categories in Chronic Kidney Disease (CKD)                GFR Category          GFR (mL/min/1.73)    Interpretation  G1                       90 or greater          Normal or high (1)  G2                              60-89                Mild decrease (1)  G3a                   45-59                Mild to moderate decrease  G3b                   30-44                Moderate to severe decrease  G4                    15-29                Severe decrease  G5                    14 or less           Kidney failure                                                 (1)In the absence of evidence of kidney disease, neither GFR category G1 or G2 fulfill the criteria for CKD.     eGFR calculation 2021 CKD-EPI creatinine equation, which does not include race as a factor     Magnesium [[id number]]  (Normal) Collected: 03/12/25 0301     Specimen: Blood Updated: 03/12/25 0411       Magnesium 2.1 mg/dL       Phosphorus [[id number]]  (Normal) Collected: 03/12/25 0301     Specimen: Blood Updated: 03/12/25 0411       Phosphorus 2.6 mg/dL       CBC & Differential [[id number]]  (Abnormal) Collected: 03/12/25 0301     Specimen: Blood Updated: 03/12/25 0404     Narrative:       The following orders were created for panel order CBC & Differential.  Procedure                               Abnormality         Status                     ---------                               -----------         ------                     CBC Auto Differential[[id number]]        Abnormal            Final result                  Please view results for these tests on the individual orders.     CBC Auto  Differential [[id number]]  (Abnormal) Collected: 03/12/25 0301     Specimen: Blood Updated: 03/12/25 0404       WBC 3.49 10*3/mm3         RBC 2.42 10*6/mm3         Hemoglobin 8.6 g/dL         Hematocrit 26.8 %         .7 fL         MCH 35.5 pg         MCHC 32.1 g/dL         RDW 17.2 %         RDW-SD 69.5 fl         MPV 10.1 fL         Platelets 59 10*3/mm3         Neutrophil % 58.4 %         Lymphocyte % 25.8 %         Monocyte % 13.2 %         Eosinophil % 2.0 %         Basophil % 0.3 %         Immature Grans % 0.3 %         Neutrophils, Absolute 2.04 10*3/mm3         Lymphocytes, Absolute 0.90 10*3/mm3         Monocytes, Absolute 0.46 10*3/mm3         Eosinophils, Absolute 0.07 10*3/mm3         Basophils, Absolute 0.01 10*3/mm3         Immature Grans, Absolute 0.01 10*3/mm3       Respiratory Panel PCR w/COVID-19(SARS-CoV-2) [facility] In-House, NP Swab in UTM/VTM, 2 HR TAT - Swab, Nasopharynx [[id number]]  (Abnormal) Collected: 03/11/25 1451     Specimen: Swab from Nasopharynx Updated: 03/11/25 1604       ADENOVIRUS, PCR Not Detected       Coronavirus 229E Not Detected       Coronavirus HKU1 Not Detected       Coronavirus NL63 Not Detected       Coronavirus OC43 Not Detected       COVID19 Not Detected       Human Metapneumovirus Not Detected       Human Rhinovirus/Enterovirus Detected       Influenza A PCR Not Detected       Influenza B PCR Not Detected       Parainfluenza Virus 1 Not Detected       Parainfluenza Virus 2 Not Detected       Parainfluenza Virus 3 Not Detected       Parainfluenza Virus 4 Not Detected       RSV, PCR Not Detected       Bordetella pertussis pcr Not Detected       Bordetella parapertussis PCR Not Detected       Chlamydophila pneumoniae PCR Not Detected       Mycoplasma pneumo by PCR Not Detected     Narrative:       In the setting of a positive respiratory panel with a viral infection PLUS a negative procalcitonin without other underlying concern for bacterial  infection, consider observing off antibiotics or discontinuation of antibiotics and continue supportive care. If the respiratory panel is positive for atypical bacterial infection (Bordetella pertussis, Chlamydophila pneumoniae, or Mycoplasma pneumoniae), consider antibiotic de-escalation to target atypical bacterial infection.     High Sensitivity Troponin T 1Hr [[id number]]  (Abnormal) Collected: 03/11/25 1451     Specimen: Blood Updated: 03/11/25 1524       HS Troponin T 110 ng/L         Troponin T Numeric Delta -9 ng/L         Troponin T % Delta -8     Narrative:       High Sensitive Troponin T Reference Range:  <14.0 ng/L- Negative Female for AMI  <22.0 ng/L- Negative Male for AMI  >=14 - Abnormal Female indicating possible myocardial injury.  >=22 - Abnormal Male indicating possible myocardial injury.   Clinicians would have to utilize clinical acumen, EKG, Troponin, and serial changes to determine if it is an Acute Myocardial Infarction or myocardial injury due to an underlying chronic condition.           High Sensitivity Troponin T [[id number]]  (Abnormal) Collected: 03/11/25 1331     Specimen: Blood Updated: 03/11/25 1406       HS Troponin T 119 ng/L       Narrative:       High Sensitive Troponin T Reference Range:  <14.0 ng/L- Negative Female for AMI  <22.0 ng/L- Negative Male for AMI  >=14 - Abnormal Female indicating possible myocardial injury.  >=22 - Abnormal Male indicating possible myocardial injury.   Clinicians would have to utilize clinical acumen, EKG, Troponin, and serial changes to determine if it is an Acute Myocardial Infarction or myocardial injury due to an underlying chronic condition.           Comprehensive Metabolic Panel [[id number]]  (Abnormal) Collected: 03/11/25 1331     Specimen: Blood Updated: 03/11/25 1404       Glucose 122 mg/dL         BUN 16 mg/dL         Creatinine 1.41 mg/dL         Sodium 139 mmol/L         Potassium 3.0 mmol/L         Chloride 99 mmol/L         CO2 24.0  mmol/L         Calcium 9.6 mg/dL         Total Protein 7.0 g/dL         Albumin 3.5 g/dL         ALT (SGPT) 52 U/L         AST (SGOT) 78 U/L         Alkaline Phosphatase 171 U/L         Total Bilirubin 1.0 mg/dL         Globulin 3.5 gm/dL         A/G Ratio 1.0 g/dL         BUN/Creatinine Ratio 11.3       Anion Gap 16.0 mmol/L         eGFR 53.3 mL/min/1.73       Narrative:       GFR Categories in Chronic Kidney Disease (CKD)                GFR Category          GFR (mL/min/1.73)    Interpretation  G1                       90 or greater          Normal or high (1)  G2                              60-89                Mild decrease (1)  G3a                   45-59                Mild to moderate decrease  G3b                   30-44                Moderate to severe decrease  G4                    15-29                Severe decrease  G5                    14 or less           Kidney failure                                                 (1)In the absence of evidence of kidney disease, neither GFR category G1 or G2 fulfill the criteria for CKD.     eGFR calculation 2021 CKD-EPI creatinine equation, which does not include race as a factor     BNP [[id number]]  (Normal) Collected: 03/11/25 1331     Specimen: Blood Updated: 03/11/25 1402       proBNP 874.5 pg/mL       Narrative:       This assay is used as an aid in the diagnosis of individuals suspected of having heart failure. It can be used as an aid in the diagnosis of acute decompensated heart failure (ADHF) in patients presenting with signs and symptoms of ADHF to the emergency department (ED). In addition, NT-proBNP of <300 pg/mL indicates ADHF is not likely.     Age Range        Result Interpretation  NT-proBNP Concentration (pg/mL:        <50             Positive            >450                        Fox                        300-450                          Negative                        <300     50-75           Positive            >900                  Fox                 300-900                  Negative            <300        >75             Positive            >1800                  Gray                300-1800                  Negative            <300     Magnesium [[id number]]  (Normal) Collected: 03/11/25 1331     Specimen: Blood Updated: 03/11/25 1400       Magnesium 2.1 mg/dL       Protime-INR [[id number]]  (Abnormal) Collected: 03/11/25 1331     Specimen: Blood Updated: 03/11/25 1355       Protime 15.4 Seconds         INR 1.15     CBC & Differential [[id number]]  (Abnormal) Collected: 03/11/25 1331     Specimen: Blood Updated: 03/11/25 1350     Narrative:       The following orders were created for panel order CBC & Differential.  Procedure                               Abnormality         Status                     ---------                               -----------         ------                     CBC Auto Differential[[id number]]        Abnormal            Final result                  Please view results for these tests on the individual orders.     CBC Auto Differential [[id number]]  (Abnormal) Collected: 03/11/25 1331     Specimen: Blood Updated: 03/11/25 1350       WBC 4.56 10*3/mm3         RBC 2.72 10*6/mm3         Hemoglobin 9.8 g/dL         Hematocrit 29.8 %         .6 fL         MCH 36.0 pg         MCHC 32.9 g/dL         RDW 17.3 %         RDW-SD 68.6 fl         MPV 10.0 fL         Platelets 66 10*3/mm3         Neutrophil % 67.6 %         Lymphocyte % 18.9 %         Monocyte % 11.6 %         Eosinophil % 1.5 %         Basophil % 0.2 %         Immature Grans % 0.2 %         Neutrophils, Absolute 3.08 10*3/mm3         Lymphocytes, Absolute 0.86 10*3/mm3         Monocytes, Absolute 0.53 10*3/mm3         Eosinophils, Absolute 0.07 10*3/mm3         Basophils, Absolute 0.01 10*3/mm3         Immature Grans, Absolute 0.01 10*3/mm3                  -----------------------------------------------------------------     Radiology:      CT Head Without  Contrast  Result Date: 3/11/2025  EXAM: CT HEAD WO CONTRAST-  DATE: 3/11/2025 12:56 PM  HISTORY: syncope   COMPARISON: 1/24/2025.  DOSE LENGTH PRODUCT: 943.32 mGy.cm  Automated exposure control was also utilized to decrease patient radiation dose.  TECHNIQUE: Unenhanced CT images obtained from vertex to skull base with multiplanar reformats.  FINDINGS: There is no acute intracranial hemorrhage, midline shift, mass effect, or hydrocephalus. There is no CT evidence for acute infarct.  There are chronic changes with volume loss and chronic small vessel ischemic change of the periventricular white matter.  SOFT TISSUES: The scalp soft tissues are unremarkable.   SINUS:The visualized paranasal sinuses and mastoid air cells are clear.  ORBITS: The visualized orbits and globes are unremarkable.        1. Chronic changes and no acute intracranial findings.  This report was signed and finalized on 3/11/2025 2:26 PM by Jose Crenshaw.       XR Chest 1 View  Result Date: 3/11/2025  EXAMINATION: XR CHEST 1 VW-  3/11/2025 1:52 PM 1 view  HISTORY: syncope  COMPARISON: 6/4/2024  TECHNIQUE: A single frontal view of the chest was obtained.  FINDINGS: Median sternotomy wires are noted. Fusion changes in the lower cervical spine. Arthritis in both shoulders.  No airspace consolidation or pneumothorax. The heart is not enlarged. Since the previous examination, there may have been a proximal LEFT humeral fracture.        1.  No acute cardiopulmonary process.  2.  Abnormal appearance of the proximal LEFT humerus, different than on the exam from 6/4/2024. Correlate for previous proximal humerus fracture. If no history of previous proximal humeral fracture, consider 3 view shoulder series on the LEFT.  3.  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    This report was signed and finalized on 3/11/2025 1:57 PM by Dr. Jed Messina MD.          Assessment and Plan      Primary  Problem:  Syncope  Rhinovirus with COPD exacerabtion  Anemia  Elevated Troponin concerning for NSTEMI  BELTRAN  Hospital Problem list:    Syncope        PMH:  Medical History        Past Medical History:   Diagnosis Date    Anemia      Anxiety      Arthritis      BPH (benign prostatic hypertrophy)      CAD (coronary artery disease)      Cancer       non-hodgkins lymphoma    Diabetes mellitus      Disease of thyroid gland      GERD (gastroesophageal reflux disease)      Hearing loss      History of transfusion      Hyperlipidemia      Hypertension      Iliac artery aneurysm, bilateral      Kidney stone      Liver cirrhosis      Migraines      Sleep apnea       c-pap            Treatment Plan:  Serial Trops- Tele- Consult cardiology  IVF- Orthostatics  Nebs  Resume home meds  PT/OT consult     Disposition: home     Figueroa Chaves MD 3/12/2025 07:36 CDT                Date/Time Temp Pulse Resp BP Patient Position Device (Oxygen Therapy) Flow (L/min) (Oxygen Therapy) SpO2   03/12/25 0743 98.3 (36.8) 91 18 107/58 Lying nasal cannula 2 97   03/12/25 0438 97.8 (36.6) 94 16 97/54 Lying room air -- 92   03/                       Patient Communication    CBC & Differential     Released  Not seen        CBC Auto Differential     Released  Not seen     Results   CBC & Differential (Order [id number])  Linked Results    Procedure Abnormality Status   CBC Auto Differential Abnormal Abnormal  Final result      Contains abnormal data CBC & Differential  Order: [id number]   Status: Final result    Test Result Released: Yes (not seen)    Specimen Information: Blood   0 Result Notes       Specimen Collected: 03/12/25 03:01 CDT Last Resulted: 03/12/25 04:04 CDT       Order Details        View Encounter        Lab and Collection Details        Routing        Result History     View All Conversations on this Encounter     Result Care Coordination      Patient Communication     Released  Not seen Back to Top      Contains abnormal data CBC  Auto Differential  Order: [id number] - Part of Panel Order [id number]   Status: Final result    Test Result Released: Yes (not seen)    Specimen Information: Blood   0 Result Notes            Component  Ref Range & Units (hover) 03:01  (3/12/25)  [place] 1 d ago  (3/11/25)  [facility] 6 mo ago  (8/29/24)  [place] 6 mo ago  (8/28/24)  [facility] 6 mo ago  (8/27/24)  [facility] 6 mo ago  (8/26/24)  [place] 6 mo ago  (8/25/24)  [place]   WBC 3.49 4.56 7.1 R 5.7 R 5.4 R 8.0 R 9.2 R   RBC 2.42 Low  2.72 Low  2.13 Low  R 1.88 Low  R 1.89 Low  R 2.08 Low  R 2.18 Low  R   Hemoglobin 8.6 Low  9.8 Low  8.2 Low  R 7.6 Low  R 7.7 Low  R 8.3 Low  R 8.5 Low  R   Hematocrit 26.8 Low  29.8 Low  25.6 Low  R 22.9 Low  R 23.1 Low  R 25.0 Low  R 25.6 Low  R   .7 High  109.6 High  120.2 High  R 121.8 High  R 122.2 High  R 120.2 High  R 117.4 High  R   MCH 35.5 High  36.0 High  38.5 High  R 40.4 High  R 40.7 High  R 39.9 High  R 39.0 High  R   MCHC 32.1 32.9 32.0 Low  R 33.2 R 33.3 R 33.2 R 33.2 R   RDW 17.2 High  17.3 High  18.5 High  R 18.5 High  R 18.0 High  R 18.9 High  R 17.5 High  R   RDW-SD 69.5 High  68.6 High         MPV 10.1 10.0 10.3 R 10.6 R 10.5 R 9.7 R 9.9 R   Platelets 59 Low  66 Low  93 Low  R 90 Low  R 90 Low  R 96 Low  R 116 Low  R   Neutrophil % 58.4 67.6 77.1 High  R 71.5 High  R 74.7 High  R 67.4 High  R 78.0 High  R   Lymphocyte % 25.8 18.9 Low  9.3 Low  R 15.2 Low  R 12.2 Low  R 16.5 Low  R 9.9 Low  R   Monocyte % 13.2 High  11.6 11.5 High  R 10.4 High  R 10.9 High  R 14.5 High  R 11.1 High  R   Eosinophil % 2.0 1.5 1.3 R 1.4 R 1.1 R 0.8 R 0.0 R   Basophil % 0.3 0.2 0.1 R 0.4 R 0.2 R 0.3 R 0.1 R   Immature Grans % 0.3 0.2        Neutrophils, Absolute 2.04 3.08 5.5 R 4.0 R 4.0 R 5.4 R 7.2 R   Lymphocytes, Absolute 0.90 0.86 0.7 Low  R 0.9 Low  R 0.7 Low  R 1.3 R 0.9 Low  R   Monocytes, Absolute 0.46 0.53 0.80 R 0.60 R 0.60 R 1.20 High  R 1.00 High  R    Eosinophils, Absolute 0.07 0.07 0.10 R 0.10 R 0.10 R 0.10 R 0.00 R   Basophils, Absolute 0.01 0.01 0.00 R 0.00 R 0.00 R 0.00 R 0.00 R   Immature Grans, Absolute 0.01 0               tains abnormal data Iron Profile  Order: [id number]   Status: Final result       Next appt: Today at 12:15 PM in Radiology ([facility] N[facility]AS CART 1)    Test Result Released: No (scheduled for 3/12/2025 10:16 AM)    Specimen Information: Blood   0 Result Notes            Component  Ref Range & Units (hover) 03:01  (3/12/25)  [place] 6 mo ago  (8/19/24)  [place] 6 mo ago  (8/19/24)  [place] 1 yr ago  (7/10/23)  [place] 2 yr ago  (8/25/22)  [place] 3 yr ago  (8/20/21)  [place] 3 yr ago  (7/30/21)  [place]   Iron 50 Low  47 Low  R 72 31 Low  69 R 69 R 59 R   Iron Saturation (TSAT) 26  32 14 Low       Transferrin 131 Low   150 Low  154 Low       TIBC 195 Low  181 Low                  ontains abnormal data Ferritin      Order: [id number]   Status: Final result       Next appt: Today at 12:15 PM in Radiology ([facility] US NIV[facility] CART 1)    Test Result Released: No (scheduled for 3/12/2025 10:16 AM)    Specimen Information: Blood   0 Result Notes            Component  Ref Range & Units (hover) 03:01  (3/12/25)  [place] 6 mo ago  (8/19/24)  [place] 1 yr ago  (7/10/23)  [place] 3 yr ago  (2/18/22)  [place] 3 yr ago  (11/19/21)  [place] 3 yr ago  (8/20/21)  [place] 3 yr ago  (7/30/21)  [place]   Ferritin 637.60 High                   Encounter Date    3/11/25    XR Chest 1 View [[id number]] (Order [id number])  Order  Status: Final result     Patient Location    Patient Class Location   Inpatient [facility] 4B, 450, 1     [phone]     Appointment Information    PACS Images     Radiology Images  Study Result    Narrative & Impression   EXAMINATION: XR CHEST 1 VW-  3/11/2025 1:52 PM 1 view     HISTORY: syncope     COMPARISON: 6/4/2024    "  TECHNIQUE: A single frontal view of the chest was obtained.     FINDINGS:  Median sternotomy wires are noted. Fusion changes in the lower cervical  spine. Arthritis in both shoulders.     No airspace consolidation or pneumothorax. The heart is not enlarged.  Since the previous examination, there may have been a proximal LEFT  humeral fracture.     IMPRESSION:     1.  No acute cardiopulmonary process.     2.  Abnormal appearance of the proximal LEFT humerus, different than on  the exam from 6/4/2024. Correlate for previous proximal humerus  fracture. If no history of previous proximal humeral fracture, consider  3 view shoulder series on the LEFT.     3.  Severe arthritis in the RIGHT shoulder with probable chronic  full-thickness rotator cuff tear.           This report was signed and finalized on 3/11/2025 1:57 PM by Dr. Jed Messina MD.          Isidra Silva, RN   Registered Nurse  Nursing     Plan of Care     Signed     Date of Service: 03/12/25 0553  Creation Time: 03/12/25 0553     Signed         Goal Outcome Evaluation:  Plan of Care Reviewed With: patient  Progress: no change  Outcome Evaluation: VSS.  Pt continues to complain of cough when awake.  K 3.2 on AM labs; replacement protocol orders placed.  Telemetry SR 90-97.  Safety maintained.                           Signed       Expand All Collapse All         History and Physical     Patient:  Franko Lucero  MRN: [id number]     CHIEF COMPLAINT:  syncope     History Obtained From: the patient   PCP: Figueroa Chaves MD     HISTORY OF PRESENT ILLNESS:   The patient is a 69 y.o. male who presents with an extensive PMH was sitting outside on a brick wall awaiting his wife to  from physical therapy when he had a true syncopal event. Awoke in ambulance had defecated on himself. No prodromal symptoms. He has had a cold with cough and congestion 2 weeks. He has had two prior similar events in the past. This AM, a/o x4, \"feeling awful\".    "   REVIEW OF SYSTEMS:     Review of Systems   Constitutional:  Positive for activity change and fatigue.   HENT:  Positive for congestion.    Respiratory:  Positive for cough and shortness of breath.    Cardiovascular: Negative.    Gastrointestinal:  Positive for diarrhea.   Genitourinary: Negative.    Musculoskeletal:  Positive for arthralgias and gait problem.   Neurological:  Positive for dizziness and weakness.            Past Medical History:  Medical History        Past Medical History:   Diagnosis Date    Anemia      Anxiety      Arthritis      BPH (benign prostatic hypertrophy)      CAD (coronary artery disease)      Cancer       non-hodgkins lymphoma    Diabetes mellitus      Disease of thyroid gland      GERD (gastroesophageal reflux disease)      Hearing loss      History of transfusion      Hyperlipidemia      Hypertension      Iliac artery aneurysm, bilateral      Kidney stone      Liver cirrhosis      Migraines      Sleep apnea       c-pap            Past Surgical History:  Surgical History         Past Surgical History:   Procedure Laterality Date    COLONOSCOPY   02/04/2014    COLONOSCOPY N/A 04/26/2017     Procedure: COLONOSCOPY WITH ANESTHESIA;  Surgeon: Sher Cui MD;  Location: [facility] ENDOSCOPY;  Service:     CORONARY ARTERY BYPASS GRAFT         2    CYSTOSCOPY URETEROSCOPY LASER LITHOTRIPSY Left 04/17/2017     Procedure: URETEROSCOPY LASER LITHOTRIPSY WITH DOUBLE J STENT INSERTION, LEFT ;  Surgeon: Weston Peck MD;  Location: [facility] OR;  Service:     CYSTOSCOPY URETEROSCOPY LASER LITHOTRIPSY Left 08/14/2017     Procedure: CYSTOSCOPY URETEROSCOPY LASER LITHOTRIPSY STENT INSERTION STONE EXTRACTION;  Surgeon: Weston Peck MD;  Location: [facility] OR;  Service:     CYSTOSCOPY W/ URETERAL STENT PLACEMENT Left 04/17/2017     Procedure: CYSTOSCOPY URETERAL DOUBLE J STENT INSERTION, LEFT;  Surgeon: Weston Peck MD;  Location: [facility] OR;  Service:     ENDOSCOPY N/A  04/26/2017     Procedure: ESOPHAGOGASTRODUODENOSCOPY WITH ANESTHESIA;  Surgeon: Sher Cui MD;  Location: [facility] ENDOSCOPY;  Service:     INCISION AND DRAINAGE LEG Right 07/12/2023     Procedure: INCISION AND DRAINAGE - RIGHT FOOT;  Surgeon: Silas Swan DPM;  Location:  [facility] OR;  Service: Podiatry;  Laterality: Right;    JOINT REPLACEMENT Right      KIDNEY STONE SURGERY        KNEE SURGERY Right 08/2024     replacement    NECK SURGERY        ROTATOR CUFF REPAIR        SHOULDER SURGERY        TONSILLECTOMY        TRANS METATARSAL AMPUTATION Right 08/09/2023     Procedure: Partial 4th Ray Amputation - Right Foot;  Surgeon: Silas Swan DPM;  Location:  [facility] OR;  Service: Podiatry;  Laterality: Right;    URETEROSCOPY LASER LITHOTRIPSY WITH STENT INSERTION Left 09/01/2022     Procedure: LEFT URETEROSCOPY LASER LITHOTRIPSY WITH STENT INSERTION;  Surgeon: Weston Peck MD;  Location: [facility] OR;  Service: Urology;  Laterality: Left;    URETEROSCOPY LASER LITHOTRIPSY WITH STENT INSERTION Left 09/15/2022     Procedure: LEFT URETEROSCOPY LASER LITHOTRIPSY WITH STENT EXCHANGE;  Surgeon: Weston Peck MD;  Location: [facility] OR;  Service: Urology;  Laterality: Left;            Medications Prior to Admission:    Prescriptions Prior to Admission           Medications Prior to Admission   Medication Sig Dispense Refill Last Dose/Taking    acetaminophen (TYLENOL) 325 MG tablet Take 2 tablets by mouth Every 6 (Six) Hours As Needed for Mild Pain, Fever or Moderate Pain.          ALPRAZolam (XANAX) 0.25 MG tablet Take 1 tablet by mouth 2 (Two) Times a Day As Needed for Anxiety.          busPIRone (BUSPAR) 15 MG tablet Take 1 tablet by mouth 2 (Two) Times a Day.          clopidogrel (PLAVIX) 75 MG tablet Take 1 tablet by mouth Daily.          docusate sodium (COLACE) 100 MG capsule Take 1 capsule by mouth Every Evening.          DULoxetine (CYMBALTA) 60 MG capsule Take 1 capsule by mouth Every  Night.          empagliflozin (JARDIANCE) 25 MG tablet tablet Take 1 tablet by mouth Daily.          ferrous sulfate 325 (65 FE) MG EC tablet Take 1 tablet by mouth 2 (Two) Times a Day.          gabapentin (NEURONTIN) 300 MG capsule Take 1 capsule by mouth Daily.          GARLIC-CALCIUM PO Take 1 tablet by mouth Daily.          HYDROcodone-acetaminophen (NORCO) 5-325 MG per tablet Take 1 tablet by mouth Every 4 (Four) Hours As Needed for Moderate Pain. 15 tablet 0      icosapent ethyl (VASCEPA) 1 g capsule capsule Take 2 g by mouth 2 (Two) Times a Day With Meals.          Insulin Degludec (TRESIBA SC) Inject 12 Units under the skin into the appropriate area as directed Every Night.          levothyroxine (SYNTHROID, LEVOTHROID) 75 MCG tablet Take 1 tablet by mouth Daily.          metFORMIN (GLUCOPHAGE) 500 MG tablet Take 1 tablet by mouth 2 (Two) Times a Day With Meals.          naloxone (NARCAN) 4 MG/0.1ML nasal spray Call 911. Don't prime. [place] in 1 nostril for overdose. Repeat in 2-3 minutes in other nostril if no or minimal breathing/responsiveness. 2 each 0      nitroglycerin (NITROSTAT) 0.4 MG SL tablet Place 1 tablet under the tongue Every 5 (Five) Minutes As Needed.          pantoprazole (PROTONIX) 40 MG EC tablet Take 1 tablet by mouth Daily.          polyethylene glycol (MIRALAX) powder Take 17 g by mouth Daily As Needed.          potassium chloride 10 MEQ CR tablet Take 1 tablet by mouth 2 (Two) Times a Day.          ranolazine (RANEXA) 1000 MG 12 hr tablet Take 1 tablet by mouth 2 (Two) Times a Day.          rosuvastatin (CRESTOR) 40 MG tablet Take 1 tablet by mouth Daily.          Vitamin D, Cholecalciferol, 1000 UNITS tablet Take 1 tablet by mouth Daily.                  Allergies:  Adhesive tape, Cefazolin, Cefuroxime axetil, Cephalosporins, Neomycin-polymyxin b gu, and Percocet [oxycodone-acetaminophen]     Social History:   Social History   Social History            Socioeconomic History    Marital  "status:    Tobacco Use    Smoking status: Former       Current packs/day: 0.00       Types: Cigarettes       Quit date:        Years since quittin.2       Passive exposure: Never    Smokeless tobacco: Never   Vaping Use    Vaping status: Never Used   Substance and Sexual Activity    Alcohol use: No    Drug use: No    Sexual activity: Defer            Family History:         Family History   Problem Relation Age of Onset    Kidney disease Father      Heart disease Father      Cancer Mother           brain    Colon cancer Neg Hx      Colon polyps Neg Hx                 Physical Exam:    Vitals: BP 97/54 (BP Location: Right arm, Patient Position: Lying)   Pulse 94   Temp 97.8 °F (36.6 °C) (Oral)   Resp 16   Ht 180.3 cm (71\")   Wt 91.1 kg (200 lb 12.8 oz)   SpO2 92%   BMI 28.01 kg/m²   Physical Exam  Vitals and nursing note reviewed. Exam conducted with a chaperone present.   Constitutional:       Appearance: Normal appearance.   HENT:      Head: Normocephalic and atraumatic.      Right Ear: Tympanic membrane normal.      Left Ear: Tympanic membrane normal.      Nose: Nose normal.      Mouth/Throat:      Mouth: Mucous membranes are moist.   Eyes:      Pupils: Pupils are equal, round, and reactive to light.   Cardiovascular:      Rate and Rhythm: Normal rate and regular rhythm.   Pulmonary:      Breath sounds: Wheezing present.   Abdominal:      General: Abdomen is flat. Bowel sounds are normal.   Musculoskeletal:         General: Normal range of motion.   Skin:     General: Skin is warm.      Capillary Refill: Capillary refill takes less than 2 seconds.   Neurological:      General: No focal deficit present.      Mental Status: He is alert.   Psychiatric:         Mood and Affect: Mood normal.         Lab Results (last 24 hours)         Procedure Component Value Units Date/Time     Basic Metabolic Panel [[id number]]  (Abnormal) Collected: 25     Specimen: Blood Updated: 25      "  Glucose 86 mg/dL         BUN 14 mg/dL         Creatinine 1.25 mg/dL         Sodium 135 mmol/L         Potassium 3.2 mmol/L         Chloride 98 mmol/L         CO2 24.0 mmol/L         Calcium 8.6 mg/dL         BUN/Creatinine Ratio 11.2       Anion Gap 13.0 mmol/L         eGFR 61.6 mL/min/1.73       Narrative:       GFR Categories in Chronic Kidney Disease (CKD)                GFR Category          GFR (mL/min/1.73)    Interpretation  G1                       90 or greater          Normal or high (1)  G2                              60-89                Mild decrease (1)  G3a                   45-59                Mild to moderate decrease  G3b                   30-44                Moderate to severe decrease  G4                    15-29                Severe decrease  G5                    14 or less           Kidney failure                                                 (1)In the absence of evidence of kidney disease, neither GFR category G1 or G2 fulfill the criteria for CKD.     eGFR calculation 2021 CKD-EPI creatinine equation, which does not include race as a factor     Magnesium [[id number]]  (Normal) Collected: 03/12/25 0301     Specimen: Blood Updated: 03/12/25 0411       Magnesium 2.1 mg/dL       Phosphorus [[id number]]  (Normal) Collected: 03/12/25 0301     Specimen: Blood Updated: 03/12/25 0411       Phosphorus 2.6 mg/dL       CBC & Differential [[id number]]  (Abnormal) Collected: 03/12/25 0301     Specimen: Blood Updated: 03/12/25 0404     Narrative:       The following orders were created for panel order CBC & Differential.  Procedure                               Abnormality         Status                     ---------                               -----------         ------                     CBC Auto Differential[[id number]]        Abnormal            Final result                  Please view results for these tests on the individual orders.     CBC Auto Differential [[id number]]  (Abnormal) Collected:  03/12/25 0301     Specimen: Blood Updated: 03/12/25 0404       WBC 3.49 10*3/mm3         RBC 2.42 10*6/mm3         Hemoglobin 8.6 g/dL         Hematocrit 26.8 %         .7 fL         MCH 35.5 pg         MCHC 32.1 g/dL         RDW 17.2 %         RDW-SD 69.5 fl         MPV 10.1 fL         Platelets 59 10*3/mm3         Neutrophil % 58.4 %         Lymphocyte % 25.8 %         Monocyte % 13.2 %         Eosinophil % 2.0 %         Basophil % 0.3 %         Immature Grans % 0.3 %         Neutrophils, Absolute 2.04 10*3/mm3         Lymphocytes, Absolute 0.90 10*3/mm3         Monocytes, Absolute 0.46 10*3/mm3         Eosinophils, Absolute 0.07 10*3/mm3         Basophils, Absolute 0.01 10*3/mm3         Immature Grans, Absolute 0.01 10*3/mm3       Respiratory Panel PCR w/COVID-19(SARS-CoV-2) [facility] In-House, NP Swab in UTM/VTM, 2 HR TAT - Swab, Nasopharynx [[id number]]  (Abnormal) Collected: 03/11/25 1451     Specimen: Swab from Nasopharynx Updated: 03/11/25 1604       ADENOVIRUS, PCR Not Detected       Coronavirus 229E Not Detected       Coronavirus HKU1 Not Detected       Coronavirus NL63 Not Detected       Coronavirus OC43 Not Detected       COVID19 Not Detected       Human Metapneumovirus Not Detected       Human Rhinovirus/Enterovirus Detected       Influenza A PCR Not Detected       Influenza B PCR Not Detected       Parainfluenza Virus 1 Not Detected       Parainfluenza Virus 2 Not Detected       Parainfluenza Virus 3 Not Detected       Parainfluenza Virus 4 Not Detected       RSV, PCR Not Detected       Bordetella pertussis pcr Not Detected       Bordetella parapertussis PCR Not Detected       Chlamydophila pneumoniae PCR Not Detected       Mycoplasma pneumo by PCR Not Detected     Narrative:       In the setting of a positive respiratory panel with a viral infection PLUS a negative procalcitonin without other underlying concern for bacterial infection, consider observing off antibiotics or  discontinuation of antibiotics and continue supportive care. If the respiratory panel is positive for atypical bacterial infection (Bordetella pertussis, Chlamydophila pneumoniae, or Mycoplasma pneumoniae), consider antibiotic de-escalation to target atypical bacterial infection.     High Sensitivity Troponin T 1Hr [[id number]]  (Abnormal) Collected: 03/11/25 1451     Specimen: Blood Updated: 03/11/25 1524       HS Troponin T 110 ng/L         Troponin T Numeric Delta -9 ng/L         Troponin T % Delta -8     Narrative:       High Sensitive Troponin T Reference Range:  <14.0 ng/L- Negative Female for AMI  <22.0 ng/L- Negative Male for AMI  >=14 - Abnormal Female indicating possible myocardial injury.  >=22 - Abnormal Male indicating possible myocardial injury.   Clinicians would have to utilize clinical acumen, EKG, Troponin, and serial changes to determine if it is an Acute Myocardial Infarction or myocardial injury due to an underlying chronic condition.           High Sensitivity Troponin T [[id number]]  (Abnormal) Collected: 03/11/25 1331     Specimen: Blood Updated: 03/11/25 1406       HS Troponin T 119 ng/L       Narrative:       High Sensitive Troponin T Reference Range:  <14.0 ng/L- Negative Female for AMI  <22.0 ng/L- Negative Male for AMI  >=14 - Abnormal Female indicating possible myocardial injury.  >=22 - Abnormal Male indicating possible myocardial injury.   Clinicians would have to utilize clinical acumen, EKG, Troponin, and serial changes to determine if it is an Acute Myocardial Infarction or myocardial injury due to an underlying chronic condition.           Comprehensive Metabolic Panel [[id number]]  (Abnormal) Collected: 03/11/25 1331     Specimen: Blood Updated: 03/11/25 1404       Glucose 122 mg/dL         BUN 16 mg/dL         Creatinine 1.41 mg/dL         Sodium 139 mmol/L         Potassium 3.0 mmol/L         Chloride 99 mmol/L         CO2 24.0 mmol/L         Calcium 9.6 mg/dL         Total  Protein 7.0 g/dL         Albumin 3.5 g/dL         ALT (SGPT) 52 U/L         AST (SGOT) 78 U/L         Alkaline Phosphatase 171 U/L         Total Bilirubin 1.0 mg/dL         Globulin 3.5 gm/dL         A/G Ratio 1.0 g/dL         BUN/Creatinine Ratio 11.3       Anion Gap 16.0 mmol/L         eGFR 53.3 mL/min/1.73       Narrative:       GFR Categories in Chronic Kidney Disease (CKD)                GFR Category          GFR (mL/min/1.73)    Interpretation  G1                       90 or greater          Normal or high (1)  G2                              60-89                Mild decrease (1)  G3a                   45-59                Mild to moderate decrease  G3b                   30-44                Moderate to severe decrease  G4                    15-29                Severe decrease  G5                    14 or less           Kidney failure                                                 (1)In the absence of evidence of kidney disease, neither GFR category G1 or G2 fulfill the criteria for CKD.     eGFR calculation 2021 CKD-EPI creatinine equation, which does not include race as a factor     BNP [[id number]]  (Normal) Collected: 03/11/25 1331     Specimen: Blood Updated: 03/11/25 1402       proBNP 874.5 pg/mL       Narrative:       This assay is used as an aid in the diagnosis of individuals suspected of having heart failure. It can be used as an aid in the diagnosis of acute decompensated heart failure (ADHF) in patients presenting with signs and symptoms of ADHF to the emergency department (ED). In addition, NT-proBNP of <300 pg/mL indicates ADHF is not likely.     Age Range        Result Interpretation  NT-proBNP Concentration (pg/mL:        <50             Positive            >450                        Fox                        300-450                          Negative                        <300     50-75           Positive            >900                  Gray                300-900                   Negative            <300        >75             Positive            >1800                  Gray                300-1800                  Negative            <300     Magnesium [[id number]]  (Normal) Collected: 03/11/25 1331     Specimen: Blood Updated: 03/11/25 1400       Magnesium 2.1 mg/dL       Protime-INR [[id number]]  (Abnormal) Collected: 03/11/25 1331     Specimen: Blood Updated: 03/11/25 1355       Protime 15.4 Seconds         INR 1.15     CBC & Differential [[id number]]  (Abnormal) Collected: 03/11/25 1331     Specimen: Blood Updated: 03/11/25 1350     Narrative:       The following orders were created for panel order CBC & Differential.  Procedure                               Abnormality         Status                     ---------                               -----------         ------                     CBC Auto Differential[[id number]]        Abnormal            Final result                  Please view results for these tests on the individual orders.     CBC Auto Differential [[id number]]  (Abnormal) Collected: 03/11/25 1331     Specimen: Blood Updated: 03/11/25 1350       WBC 4.56 10*3/mm3         RBC 2.72 10*6/mm3         Hemoglobin 9.8 g/dL         Hematocrit 29.8 %         .6 fL         MCH 36.0 pg         MCHC 32.9 g/dL         RDW 17.3 %         RDW-SD 68.6 fl         MPV 10.0 fL         Platelets 66 10*3/mm3         Neutrophil % 67.6 %         Lymphocyte % 18.9 %         Monocyte % 11.6 %         Eosinophil % 1.5 %         Basophil % 0.2 %         Immature Grans % 0.2 %         Neutrophils, Absolute 3.08 10*3/mm3         Lymphocytes, Absolute 0.86 10*3/mm3         Monocytes, Absolute 0.53 10*3/mm3         Eosinophils, Absolute 0.07 10*3/mm3         Basophils, Absolute 0.01 10*3/mm3         Immature Grans, Absolute 0.01 10*3/mm3                  -----------------------------------------------------------------                       Current Facility-Administered Medications   Medication Dose  Route Frequency Provider Last Rate Last Admin    acetaminophen (TYLENOL) tablet 650 mg  650 mg Oral Q6H PRN Figueroa Chaves MD   650 mg at 03/11/25 2048    ALPRAZolam (XANAX) tablet 0.25 mg  0.25 mg Oral BID PRN Figueroa Chaves MD   0.25 mg at 03/11/25 2046    azithromycin (ZITHROMAX) 500 mg in sodium chloride 0.9 % 250 mL IVPB-VTB  500 mg Intravenous Q24H Figueroa Chaves MD   500 mg at 03/12/25 0806    sennosides-docusate (PERICOLACE) 8.6-50 MG per tablet 2 tablet  2 tablet Oral BID PRN Figueroa Chaves MD        And    polyethylene glycol (MIRALAX) packet 17 g  17 g Oral Daily PRN Figueroa Chaves MD        And    bisacodyl (DULCOLAX) EC tablet 5 mg  5 mg Oral Daily PRN Figueroa Chaves MD        And    bisacodyl (DULCOLAX) suppository 10 mg  10 mg Rectal Daily PRN Figueroa Chaves MD        busPIRone (BUSPAR) tablet 15 mg  15 mg Oral BID Figueroa Chaves MD   15 mg at 03/11/25 2046    Calcium Replacement - Follow Nurse / BPA Driven Protocol   Not Applicable PRN Figueroa Chaves MD        cholecalciferol (VITAMIN D3) tablet 1,000 Units  1,000 Units Oral Daily Figueroa Chaves MD        clopidogrel (PLAVIX) tablet 75 mg  75 mg Oral Daily Figueroa Chaves MD   75 mg at 03/11/25 2045    docusate sodium (COLACE) capsule 100 mg  100 mg Oral Q PM Figueroa Chaves MD        DULoxetine (CYMBALTA) DR capsule 60 mg  60 mg Oral Nightly Figueroa Chaves MD   60 mg at 03/11/25 2045    empagliflozin (JARDIANCE) tablet 25 mg  25 mg Oral Daily Figueroa Chaves MD        [Held by provider] enoxaparin sodium (LOVENOX) syringe 40 mg  40 mg Subcutaneous Daily Figueroa Chaves MD        ferric gluconate (FERRLECIT) 250 MG in sodium chloride 0.9% 250 mL IVPB  250 mg Intravenous Once Figueroa Chaves MD        ferrous sulfate tablet 325 mg  325 mg Oral Daily With Breakfast Figueroa Chaves MD        gabapentin (NEURONTIN) capsule 300 mg  300 mg Oral Daily Figueroa Chaves MD   300  mg at 03/11/25 2046    Hydrocod Mak-Chlorphe Mak ER (TUSSIONEX PENNKINETIC) 10-8 MG/5ML ER suspension 5 mL  5 mL Oral Q12H PRN Figueroa Chaves MD        HYDROcodone-acetaminophen (NORCO) 5-325 MG per tablet 1 tablet  1 tablet Oral Q4H PRN Figueroa Chaves MD        ipratropium-albuterol (DUO-NEB) nebulizer solution 3 mL  3 mL Nebulization 4x Daily - RT Figueroa Chaves MD        levothyroxine (SYNTHROID, LEVOTHROID) tablet 75 mcg  75 mcg Oral Q AM Figueroa Chaves MD   75 mcg at 03/12/25 0531    Magnesium Standard Dose Replacement - Follow Nurse / BPA Driven Protocol   Not Applicable PRN Figueroa Chaves MD        metFORMIN (GLUCOPHAGE) tablet 500 mg  500 mg Oral BID With Meals Figueroa Chaves MD   500 mg at 03/11/25 2045    nitroglycerin (NITROSTAT) SL tablet 0.4 mg  0.4 mg Sublingual Q5 Min PRN Figueroa Chaves MD        nitroglycerin (NITROSTAT) SL tablet 0.4 mg  0.4 mg Sublingual Q5 Min PRN Figueroa Chaves MD        ondansetron ODT (ZOFRAN-ODT) disintegrating tablet 4 mg  4 mg Oral Q6H PRN Figueroa Chaves MD        Or    ondansetron (ZOFRAN) injection 4 mg  4 mg Intravenous Q6H PRN Figueroa Chaves MD        pantoprazole (PROTONIX) EC tablet 40 mg  40 mg Oral Q AM Figueroa Chaves MD   40 mg at 03/12/25 0531    Phosphorus Replacement - Follow Nurse / BPA Driven Protocol   Not Applicable PRN Figueroa Chaves MD        potassium chloride (KLOR-CON) packet 40 mEq  40 mEq Oral Q4H Figueroa Chaves MD   40 mEq at 03/12/25 0602    potassium chloride (MICRO-K/KLOR-CON) CR capsule  20 mEq Oral Daily Figueroa Chaves MD        Potassium Replacement - Follow Nurse / BPA Driven Protocol   Not Applicable PRN Figueroa Chaves MD        ranolazine (RANEXA) 12 hr tablet 1,000 mg  1,000 mg Oral BID Figueroa Chaves MD   1,000 mg at 03/11/25 2045    rosuvastatin (CRESTOR) tablet 40 mg  40 mg Oral Daily Figueroa Chaves MD   40 mg at 03/11/25 2045    sodium chloride 0.9 %  flush 10 mL  10 mL Intravenous PRN Figueroa Chaves MD        sodium chloride 0.9 % flush 10 mL  10 mL Intravenous Q12H Figueroa Chaves MD   10 mL at 03/11/25 2046    sodium chloride 0.9 % flush 10 mL  10 mL Intravenous PRN Figueroa Chaves MD        sodium chloride 0.9 % infusion 40 mL  40 mL Intravenous PRN Figueroa Chaves MD        sodium chloride 0.9 % infusion  75 mL/hr Intravenous Continuous Figueroa Chaves MD 75 mL/hr at 03/11/25 1828 75 mL/hr at 03/11/25 1828    sodium chloride 0.9 % infusion  75 mL/hr Intravenous Continuous Figueroa Chaves MD

## 2025-03-13 ENCOUNTER — APPOINTMENT (OUTPATIENT)
Dept: NEUROLOGY | Facility: HOSPITAL | Age: 70
DRG: 281 | End: 2025-03-13
Payer: MEDICARE

## 2025-03-13 ENCOUNTER — APPOINTMENT (OUTPATIENT)
Dept: MRI IMAGING | Facility: HOSPITAL | Age: 70
DRG: 281 | End: 2025-03-13
Payer: MEDICARE

## 2025-03-13 LAB
ANION GAP SERPL CALCULATED.3IONS-SCNC: 11 MMOL/L (ref 5–15)
BASOPHILS # BLD AUTO: 0.01 10*3/MM3 (ref 0–0.2)
BASOPHILS NFR BLD AUTO: 0.3 % (ref 0–1.5)
BUN SERPL-MCNC: 14 MG/DL (ref 8–23)
BUN/CREAT SERPL: 10.9 (ref 7–25)
CALCIUM SPEC-SCNC: 8.9 MG/DL (ref 8.6–10.5)
CHLORIDE SERPL-SCNC: 104 MMOL/L (ref 98–107)
CO2 SERPL-SCNC: 25 MMOL/L (ref 22–29)
CREAT SERPL-MCNC: 1.29 MG/DL (ref 0.76–1.27)
DEPRECATED RDW RBC AUTO: 73 FL (ref 37–54)
EGFRCR SERPLBLD CKD-EPI 2021: 59.3 ML/MIN/1.73
EOSINOPHIL # BLD AUTO: 0.05 10*3/MM3 (ref 0–0.4)
EOSINOPHIL NFR BLD AUTO: 1.5 % (ref 0.3–6.2)
ERYTHROCYTE [DISTWIDTH] IN BLOOD BY AUTOMATED COUNT: 17.9 % (ref 12.3–15.4)
GLUCOSE SERPL-MCNC: 115 MG/DL (ref 65–99)
HCT VFR BLD AUTO: 27.7 % (ref 37.5–51)
HGB BLD-MCNC: 8.7 G/DL (ref 13–17.7)
IMM GRANULOCYTES # BLD AUTO: 0.01 10*3/MM3 (ref 0–0.05)
IMM GRANULOCYTES NFR BLD AUTO: 0.3 % (ref 0–0.5)
LYMPHOCYTES # BLD AUTO: 0.76 10*3/MM3 (ref 0.7–3.1)
LYMPHOCYTES NFR BLD AUTO: 23 % (ref 19.6–45.3)
MAGNESIUM SERPL-MCNC: 2.1 MG/DL (ref 1.6–2.4)
MCH RBC QN AUTO: 35.2 PG (ref 26.6–33)
MCHC RBC AUTO-ENTMCNC: 31.4 G/DL (ref 31.5–35.7)
MCV RBC AUTO: 112.1 FL (ref 79–97)
MONOCYTES # BLD AUTO: 0.57 10*3/MM3 (ref 0.1–0.9)
MONOCYTES NFR BLD AUTO: 17.2 % (ref 5–12)
NEUTROPHILS NFR BLD AUTO: 1.91 10*3/MM3 (ref 1.7–7)
NEUTROPHILS NFR BLD AUTO: 57.7 % (ref 42.7–76)
PHOSPHATE SERPL-MCNC: 2.3 MG/DL (ref 2.5–4.5)
PLATELET # BLD AUTO: 68 10*3/MM3 (ref 140–450)
PMV BLD AUTO: 10.2 FL (ref 6–12)
POTASSIUM SERPL-SCNC: 3.8 MMOL/L (ref 3.5–5.2)
RBC # BLD AUTO: 2.47 10*6/MM3 (ref 4.14–5.8)
SODIUM SERPL-SCNC: 140 MMOL/L (ref 136–145)
WBC NRBC COR # BLD AUTO: 3.31 10*3/MM3 (ref 3.4–10.8)

## 2025-03-13 PROCEDURE — 25510000001 GADOPICLENOL 0.5 MMOL/ML SOLUTION: Performed by: FAMILY MEDICINE

## 2025-03-13 PROCEDURE — 25810000003 SODIUM CHLORIDE 0.9 % SOLUTION 250 ML FLEX CONT: Performed by: FAMILY MEDICINE

## 2025-03-13 PROCEDURE — 80048 BASIC METABOLIC PNL TOTAL CA: CPT | Performed by: FAMILY MEDICINE

## 2025-03-13 PROCEDURE — 83735 ASSAY OF MAGNESIUM: CPT | Performed by: FAMILY MEDICINE

## 2025-03-13 PROCEDURE — 25810000003 SODIUM CHLORIDE 0.9 % SOLUTION: Performed by: FAMILY MEDICINE

## 2025-03-13 PROCEDURE — 95819 EEG AWAKE AND ASLEEP: CPT

## 2025-03-13 PROCEDURE — 94799 UNLISTED PULMONARY SVC/PX: CPT

## 2025-03-13 PROCEDURE — 86041 ACETYLCHOLN RCPTR BNDNG ANTB: CPT | Performed by: NURSE PRACTITIONER

## 2025-03-13 PROCEDURE — 84100 ASSAY OF PHOSPHORUS: CPT | Performed by: FAMILY MEDICINE

## 2025-03-13 PROCEDURE — 86042 ACETYLCHOLN RCPTR BLCKG ANTB: CPT | Performed by: NURSE PRACTITIONER

## 2025-03-13 PROCEDURE — A9579 GAD-BASE MR CONTRAST NOS,1ML: HCPCS | Performed by: FAMILY MEDICINE

## 2025-03-13 PROCEDURE — 86043 ACETYLCHOLN RCPTR MODLG ANTB: CPT | Performed by: NURSE PRACTITIONER

## 2025-03-13 PROCEDURE — 25010000002 AZITHROMYCIN PER 500 MG: Performed by: FAMILY MEDICINE

## 2025-03-13 PROCEDURE — 85025 COMPLETE CBC W/AUTO DIFF WBC: CPT | Performed by: FAMILY MEDICINE

## 2025-03-13 PROCEDURE — 70553 MRI BRAIN STEM W/O & W/DYE: CPT

## 2025-03-13 PROCEDURE — 36415 COLL VENOUS BLD VENIPUNCTURE: CPT | Performed by: FAMILY MEDICINE

## 2025-03-13 PROCEDURE — 95819 EEG AWAKE AND ASLEEP: CPT | Performed by: PSYCHIATRY & NEUROLOGY

## 2025-03-13 RX ADMIN — IPRATROPIUM BROMIDE AND ALBUTEROL SULFATE 3 ML: .5; 3 SOLUTION RESPIRATORY (INHALATION) at 14:17

## 2025-03-13 RX ADMIN — SODIUM CHLORIDE 75 ML/HR: 9 INJECTION, SOLUTION INTRAVENOUS at 04:23

## 2025-03-13 RX ADMIN — LEVOTHYROXINE SODIUM 75 MCG: 75 TABLET ORAL at 05:07

## 2025-03-13 RX ADMIN — CLOPIDOGREL BISULFATE 75 MG: 75 TABLET, FILM COATED ORAL at 09:25

## 2025-03-13 RX ADMIN — AZITHROMYCIN DIHYDRATE 500 MG: 500 INJECTION, POWDER, LYOPHILIZED, FOR SOLUTION INTRAVENOUS at 09:26

## 2025-03-13 RX ADMIN — HYDROCODONE POLISTIREX AND CHLORPHENIRAMINE POLISTIREX 5 ML: 10; 8 SUSPENSION, EXTENDED RELEASE ORAL at 07:04

## 2025-03-13 RX ADMIN — IPRATROPIUM BROMIDE AND ALBUTEROL SULFATE 3 ML: .5; 3 SOLUTION RESPIRATORY (INHALATION) at 06:29

## 2025-03-13 RX ADMIN — METFORMIN HYDROCHLORIDE 500 MG: 500 TABLET ORAL at 17:42

## 2025-03-13 RX ADMIN — HYDROCODONE POLISTIREX AND CHLORPHENIRAMINE POLISTIREX 5 ML: 10; 8 SUSPENSION, EXTENDED RELEASE ORAL at 21:24

## 2025-03-13 RX ADMIN — DOCUSATE SODIUM 100 MG: 100 CAPSULE, LIQUID FILLED ORAL at 17:42

## 2025-03-13 RX ADMIN — Medication 10 ML: at 20:07

## 2025-03-13 RX ADMIN — BUSPIRONE HYDROCHLORIDE 15 MG: 5 TABLET ORAL at 20:06

## 2025-03-13 RX ADMIN — POTASSIUM CHLORIDE 20 MEQ: 750 CAPSULE, EXTENDED RELEASE ORAL at 09:24

## 2025-03-13 RX ADMIN — DULOXETINE HYDROCHLORIDE 60 MG: 30 CAPSULE, DELAYED RELEASE ORAL at 20:07

## 2025-03-13 RX ADMIN — GADOPICLENOL 9 ML: 485.1 INJECTION INTRAVENOUS at 16:25

## 2025-03-13 RX ADMIN — BUSPIRONE HYDROCHLORIDE 15 MG: 5 TABLET ORAL at 09:25

## 2025-03-13 RX ADMIN — Medication 10 ML: at 09:25

## 2025-03-13 RX ADMIN — PANTOPRAZOLE SODIUM 40 MG: 40 TABLET, DELAYED RELEASE ORAL at 05:07

## 2025-03-13 RX ADMIN — RANOLAZINE 1000 MG: 500 TABLET, FILM COATED, EXTENDED RELEASE ORAL at 09:25

## 2025-03-13 RX ADMIN — GABAPENTIN 300 MG: 300 CAPSULE ORAL at 09:25

## 2025-03-13 RX ADMIN — RANOLAZINE 1000 MG: 500 TABLET, FILM COATED, EXTENDED RELEASE ORAL at 20:07

## 2025-03-13 RX ADMIN — ROSUVASTATIN 40 MG: 20 TABLET, FILM COATED ORAL at 09:24

## 2025-03-13 RX ADMIN — METFORMIN HYDROCHLORIDE 500 MG: 500 TABLET ORAL at 09:24

## 2025-03-13 RX ADMIN — EMPAGLIFLOZIN 25 MG: 25 TABLET, FILM COATED ORAL at 09:25

## 2025-03-13 RX ADMIN — IPRATROPIUM BROMIDE AND ALBUTEROL SULFATE 3 ML: .5; 3 SOLUTION RESPIRATORY (INHALATION) at 19:14

## 2025-03-13 RX ADMIN — Medication 1000 UNITS: at 09:25

## 2025-03-13 RX ADMIN — FERROUS SULFATE TAB 325 MG (65 MG ELEMENTAL FE) 325 MG: 325 (65 FE) TAB at 09:24

## 2025-03-13 NOTE — PROGRESS NOTES
Daily Progress Note  Franko Lucero  MRN: [id number] LOS: 2    Admit Date: 3/11/2025   3/13/2025 08:38 CDT    Subjective:      Chief Complaint:  Chief Complaint   Patient presents with   • Syncope       Interval History:    Reviewed overnight events and nursing notes.   Sitting up with the side of the bed states he feels significantly better.  No further cough congestion.  No syncopal events overnight.  Reviewed the results of cardiac testing with Dr. Jeong.  Agree with neurology consultation as his symptoms more concerning for seizure.    Review of Systems   Constitutional:  Positive for activity change.   HENT:  Positive for congestion.    Respiratory:  Positive for cough.    Cardiovascular: Negative.    Gastrointestinal: Negative.    Genitourinary: Negative.    Musculoskeletal: Negative.    Neurological:  Positive for dizziness.   Psychiatric/Behavioral: Negative.         DIET:  Diet: Regular/House, Diabetic; Consistent Carbohydrate; Texture: Regular (IDDSI 7); Fluid Consistency: Thin (IDDSI 0)    Medications:   sodium chloride, 75 mL/hr, Last Rate: 75 mL/hr (03/11/25 1828)  sodium chloride, 75 mL/hr, Last Rate: 75 mL/hr (03/13/25 0423)      azithromycin, 500 mg, Intravenous, Q24H  busPIRone, 15 mg, Oral, BID  cholecalciferol, 1,000 Units, Oral, Daily  clopidogrel, 75 mg, Oral, Daily  docusate sodium, 100 mg, Oral, Q PM  DULoxetine, 60 mg, Oral, Nightly  empagliflozin, 25 mg, Oral, Daily  [Held by provider] enoxaparin sodium, 40 mg, Subcutaneous, Daily  ferrous sulfate, 325 mg, Oral, Daily With Breakfast  gabapentin, 300 mg, Oral, Daily  ipratropium-albuterol, 3 mL, Nebulization, 4x Daily - RT  levothyroxine, 75 mcg, Oral, Q AM  metFORMIN, 500 mg, Oral, BID With Meals  pantoprazole, 40 mg, Oral, Q AM  potassium chloride, 20 mEq, Oral, Daily  ranolazine, 1,000 mg, Oral, BID  rosuvastatin, 40 mg, Oral, Daily  sodium chloride, 10 mL, Intravenous, Q12H        Data:     Code Status:   Code Status and Medical  Interventions: CPR (Attempt to Resuscitate); Full Support   Ordered at: 25 1750     Code Status (Patient has no pulse and is not breathing):    CPR (Attempt to Resuscitate)     Medical Interventions (Patient has pulse or is breathing):    Full Support     Level Of Support Discussed With:    Patient       Family History   Problem Relation Age of Onset   • Kidney disease Father    • Heart disease Father    • Cancer Mother         brain   • Colon cancer Neg Hx    • Colon polyps Neg Hx      Social History     Socioeconomic History   • Marital status:    Tobacco Use   • Smoking status: Former     Current packs/day: 0.00     Types: Cigarettes     Quit date:      Years since quittin.2     Passive exposure: Never   • Smokeless tobacco: Never   Vaping Use   • Vaping status: Never Used   Substance and Sexual Activity   • Alcohol use: No   • Drug use: No   • Sexual activity: Defer       Labs:    Lab Results (last 72 hours)       Procedure Component Value Units Date/Time    Basic Metabolic Panel [[id number]]  (Abnormal) Collected: 25 0431    Specimen: Blood Updated: 25 05     Glucose 115 mg/dL      BUN 14 mg/dL      Creatinine 1.29 mg/dL      Sodium 140 mmol/L      Potassium 3.8 mmol/L      Chloride 104 mmol/L      CO2 25.0 mmol/L      Calcium 8.9 mg/dL      BUN/Creatinine Ratio 10.9     Anion Gap 11.0 mmol/L      eGFR 59.3 mL/min/1.73     Narrative:      GFR Categories in Chronic Kidney Disease (CKD)      GFR Category          GFR (mL/min/1.73)    Interpretation  G1                     90 or greater         Normal or high (1)  G2                      60-89                Mild decrease (1)  G3a                   45-59                Mild to moderate decrease  G3b                   30-44                Moderate to severe decrease  G4                    15-29                Severe decrease  G5                    14 or less           Kidney failure          (1)In the absence of evidence of kidney  disease, neither GFR category G1 or G2 fulfill the criteria for CKD.    eGFR calculation 2021 CKD-EPI creatinine equation, which does not include race as a factor    Magnesium [[id number]]  (Normal) Collected: 03/13/25 0431    Specimen: Blood Updated: 03/13/25 0554     Magnesium 2.1 mg/dL     Phosphorus [[id number]]  (Abnormal) Collected: 03/13/25 0431    Specimen: Blood Updated: 03/13/25 0554     Phosphorus 2.3 mg/dL     CBC & Differential [[id number]]  (Abnormal) Collected: 03/13/25 0431    Specimen: Blood Updated: 03/13/25 0531    Narrative:      The following orders were created for panel order CBC & Differential.  Procedure                               Abnormality         Status                     ---------                               -----------         ------                     CBC Auto Differential[[id number]]        Abnormal            Final result                 Please view results for these tests on the individual orders.    CBC Auto Differential [[id number]]  (Abnormal) Collected: 03/13/25 0431    Specimen: Blood Updated: 03/13/25 0531     WBC 3.31 10*3/mm3      RBC 2.47 10*6/mm3      Hemoglobin 8.7 g/dL      Hematocrit 27.7 %      .1 fL      MCH 35.2 pg      MCHC 31.4 g/dL      RDW 17.9 %      RDW-SD 73.0 fl      MPV 10.2 fL      Platelets 68 10*3/mm3      Neutrophil % 57.7 %      Lymphocyte % 23.0 %      Monocyte % 17.2 %      Eosinophil % 1.5 %      Basophil % 0.3 %      Immature Grans % 0.3 %      Neutrophils, Absolute 1.91 10*3/mm3      Lymphocytes, Absolute 0.76 10*3/mm3      Monocytes, Absolute 0.57 10*3/mm3      Eosinophils, Absolute 0.05 10*3/mm3      Basophils, Absolute 0.01 10*3/mm3      Immature Grans, Absolute 0.01 10*3/mm3     Potassium [[id number]]  (Normal) Collected: 03/12/25 1518    Specimen: Blood Updated: 03/12/25 1601     Potassium 3.9 mmol/L     High Sensitivity Troponin T [[id number]]  (Abnormal) Collected: 03/12/25 0854    Specimen: Blood Updated: 03/12/25 0942     HS  Troponin T 98 ng/L     Narrative:      High Sensitive Troponin T Reference Range:  <14.0 ng/L- Negative Female for AMI  <22.0 ng/L- Negative Male for AMI  >=14 - Abnormal Female indicating possible myocardial injury.  >=22 - Abnormal Male indicating possible myocardial injury.   Clinicians would have to utilize clinical acumen, EKG, Troponin, and serial changes to determine if it is an Acute Myocardial Infarction or myocardial injury due to an underlying chronic condition.         Iron Profile [[id number]]  (Abnormal) Collected: 03/12/25 0301    Specimen: Blood Updated: 03/12/25 0916     Iron 50 mcg/dL      Iron Saturation (TSAT) 26 %      Transferrin 131 mg/dL      TIBC 195 mcg/dL     Ferritin [[id number]]  (Abnormal) Collected: 03/12/25 0301    Specimen: Blood Updated: 03/12/25 0916     Ferritin 637.60 ng/mL     Narrative:      Results may be falsely decreased if patient taking Biotin.      Basic Metabolic Panel [[id number]]  (Abnormal) Collected: 03/12/25 0301    Specimen: Blood Updated: 03/12/25 0411     Glucose 86 mg/dL      BUN 14 mg/dL      Creatinine 1.25 mg/dL      Sodium 135 mmol/L      Potassium 3.2 mmol/L      Chloride 98 mmol/L      CO2 24.0 mmol/L      Calcium 8.6 mg/dL      BUN/Creatinine Ratio 11.2     Anion Gap 13.0 mmol/L      eGFR 61.6 mL/min/1.73     Narrative:      GFR Categories in Chronic Kidney Disease (CKD)      GFR Category          GFR (mL/min/1.73)    Interpretation  G1                     90 or greater         Normal or high (1)  G2                      60-89                Mild decrease (1)  G3a                   45-59                Mild to moderate decrease  G3b                   30-44                Moderate to severe decrease  G4                    15-29                Severe decrease  G5                    14 or less           Kidney failure          (1)In the absence of evidence of kidney disease, neither GFR category G1 or G2 fulfill the criteria for CKD.    eGFR calculation 2021  CKD-EPI creatinine equation, which does not include race as a factor    Magnesium [[id number]]  (Normal) Collected: 03/12/25 0301    Specimen: Blood Updated: 03/12/25 0411     Magnesium 2.1 mg/dL     Phosphorus [[id number]]  (Normal) Collected: 03/12/25 0301    Specimen: Blood Updated: 03/12/25 0411     Phosphorus 2.6 mg/dL     CBC & Differential [[id number]]  (Abnormal) Collected: 03/12/25 0301    Specimen: Blood Updated: 03/12/25 0404    Narrative:      The following orders were created for panel order CBC & Differential.  Procedure                               Abnormality         Status                     ---------                               -----------         ------                     CBC Auto Differential[[id number]]        Abnormal            Final result                 Please view results for these tests on the individual orders.    CBC Auto Differential [[id number]]  (Abnormal) Collected: 03/12/25 0301    Specimen: Blood Updated: 03/12/25 0404     WBC 3.49 10*3/mm3      RBC 2.42 10*6/mm3      Hemoglobin 8.6 g/dL      Hematocrit 26.8 %      .7 fL      MCH 35.5 pg      MCHC 32.1 g/dL      RDW 17.2 %      RDW-SD 69.5 fl      MPV 10.1 fL      Platelets 59 10*3/mm3      Neutrophil % 58.4 %      Lymphocyte % 25.8 %      Monocyte % 13.2 %      Eosinophil % 2.0 %      Basophil % 0.3 %      Immature Grans % 0.3 %      Neutrophils, Absolute 2.04 10*3/mm3      Lymphocytes, Absolute 0.90 10*3/mm3      Monocytes, Absolute 0.46 10*3/mm3      Eosinophils, Absolute 0.07 10*3/mm3      Basophils, Absolute 0.01 10*3/mm3      Immature Grans, Absolute 0.01 10*3/mm3     Respiratory Panel PCR w/COVID-19(SARS-CoV-2) RICHARD/JENNIFER/DENIA/PAD/COR/VONDA In-House, NP Swab in [facility], 2 HR TAT - Swab, Nasopharynx [[id number]]  (Abnormal) Collected: 03/11/25 1451    Specimen: Swab from Nasopharynx Updated: 03/11/25 1604     ADENOVIRUS, PCR Not Detected     Coronavirus 229E Not Detected     Coronavirus HKU1 Not Detected     Coronavirus NL63  Not Detected     Coronavirus OC43 Not Detected     COVID19 Not Detected     Human Metapneumovirus Not Detected     Human Rhinovirus/Enterovirus Detected     Influenza A PCR Not Detected     Influenza B PCR Not Detected     Parainfluenza Virus 1 Not Detected     Parainfluenza Virus 2 Not Detected     Parainfluenza Virus 3 Not Detected     Parainfluenza Virus 4 Not Detected     RSV, PCR Not Detected     Bordetella pertussis pcr Not Detected     Bordetella parapertussis PCR Not Detected     Chlamydophila pneumoniae PCR Not Detected     Mycoplasma pneumo by PCR Not Detected    Narrative:      In the setting of a positive respiratory panel with a viral infection PLUS a negative procalcitonin without other underlying concern for bacterial infection, consider observing off antibiotics or discontinuation of antibiotics and continue supportive care. If the respiratory panel is positive for atypical bacterial infection (Bordetella pertussis, Chlamydophila pneumoniae, or Mycoplasma pneumoniae), consider antibiotic de-escalation to target atypical bacterial infection.    High Sensitivity Troponin T 1Hr [[id number]]  (Abnormal) Collected: 03/11/25 1451    Specimen: Blood Updated: 03/11/25 1524     HS Troponin T 110 ng/L      Troponin T Numeric Delta -9 ng/L      Troponin T % Delta -8    Narrative:      High Sensitive Troponin T Reference Range:  <14.0 ng/L- Negative Female for AMI  <22.0 ng/L- Negative Male for AMI  >=14 - Abnormal Female indicating possible myocardial injury.  >=22 - Abnormal Male indicating possible myocardial injury.   Clinicians would have to utilize clinical acumen, EKG, Troponin, and serial changes to determine if it is an Acute Myocardial Infarction or myocardial injury due to an underlying chronic condition.         High Sensitivity Troponin T [[id number]]  (Abnormal) Collected: 03/11/25 1331    Specimen: Blood Updated: 03/11/25 1406     HS Troponin T 119 ng/L     Narrative:      High Sensitive Troponin T  Reference Range:  <14.0 ng/L- Negative Female for AMI  <22.0 ng/L- Negative Male for AMI  >=14 - Abnormal Female indicating possible myocardial injury.  >=22 - Abnormal Male indicating possible myocardial injury.   Clinicians would have to utilize clinical acumen, EKG, Troponin, and serial changes to determine if it is an Acute Myocardial Infarction or myocardial injury due to an underlying chronic condition.         Comprehensive Metabolic Panel [[id number]]  (Abnormal) Collected: 03/11/25 1331    Specimen: Blood Updated: 03/11/25 1404     Glucose 122 mg/dL      BUN 16 mg/dL      Creatinine 1.41 mg/dL      Sodium 139 mmol/L      Potassium 3.0 mmol/L      Chloride 99 mmol/L      CO2 24.0 mmol/L      Calcium 9.6 mg/dL      Total Protein 7.0 g/dL      Albumin 3.5 g/dL      ALT (SGPT) 52 U/L      AST (SGOT) 78 U/L      Alkaline Phosphatase 171 U/L      Total Bilirubin 1.0 mg/dL      Globulin 3.5 gm/dL      A/G Ratio 1.0 g/dL      BUN/Creatinine Ratio 11.3     Anion Gap 16.0 mmol/L      eGFR 53.3 mL/min/1.73     Narrative:      GFR Categories in Chronic Kidney Disease (CKD)      GFR Category          GFR (mL/min/1.73)    Interpretation  G1                     90 or greater         Normal or high (1)  G2                      60-89                Mild decrease (1)  G3a                   45-59                Mild to moderate decrease  G3b                   30-44                Moderate to severe decrease  G4                    15-29                Severe decrease  G5                    14 or less           Kidney failure          (1)In the absence of evidence of kidney disease, neither GFR category G1 or G2 fulfill the criteria for CKD.    eGFR calculation 2021 CKD-EPI creatinine equation, which does not include race as a factor    BNP [[id number]]  (Normal) Collected: 03/11/25 1331    Specimen: Blood Updated: 03/11/25 1402     proBNP 874.5 pg/mL     Narrative:      This assay is used as an aid in the diagnosis of  individuals suspected of having heart failure. It can be used as an aid in the diagnosis of acute decompensated heart failure (ADHF) in patients presenting with signs and symptoms of ADHF to the emergency department (ED). In addition, NT-proBNP of <300 pg/mL indicates ADHF is not likely.    Age Range Result Interpretation  NT-proBNP Concentration (pg/mL:      <50             Positive            >450                   Gray                 300-450                    Negative             <300    50-75           Positive            >900                  Gray                300-900                  Negative            <300      >75             Positive            >1800                  Gray                300-1800                  Negative            <300    Magnesium [[id number]]  (Normal) Collected: 03/11/25 1331    Specimen: Blood Updated: 03/11/25 1400     Magnesium 2.1 mg/dL     Protime-INR [[id number]]  (Abnormal) Collected: 03/11/25 1331    Specimen: Blood Updated: 03/11/25 1355     Protime 15.4 Seconds      INR 1.15    CBC & Differential [[id number]]  (Abnormal) Collected: 03/11/25 1331    Specimen: Blood Updated: 03/11/25 1350    Narrative:      The following orders were created for panel order CBC & Differential.  Procedure                               Abnormality         Status                     ---------                               -----------         ------                     CBC Auto Differential[[id number]]        Abnormal            Final result                 Please view results for these tests on the individual orders.    CBC Auto Differential [[id number]]  (Abnormal) Collected: 03/11/25 1331    Specimen: Blood Updated: 03/11/25 1350     WBC 4.56 10*3/mm3      RBC 2.72 10*6/mm3      Hemoglobin 9.8 g/dL      Hematocrit 29.8 %      .6 fL      MCH 36.0 pg      MCHC 32.9 g/dL      RDW 17.3 %      RDW-SD 68.6 fl      MPV 10.0 fL      Platelets 66 10*3/mm3      Neutrophil % 67.6 %      Lymphocyte %  "18.9 %      Monocyte % 11.6 %      Eosinophil % 1.5 %      Basophil % 0.2 %      Immature Grans % 0.2 %      Neutrophils, Absolute 3.08 10*3/mm3      Lymphocytes, Absolute 0.86 10*3/mm3      Monocytes, Absolute 0.53 10*3/mm3      Eosinophils, Absolute 0.07 10*3/mm3      Basophils, Absolute 0.01 10*3/mm3      Immature Grans, Absolute 0.01 10*3/mm3               Objective:     Vitals: /69 (BP Location: Left arm, Patient Position: Lying)   Pulse 102   Temp 98 °F (36.7 °C) (Oral)   Resp 16   Ht 180.3 cm (71\")   Wt 85 kg (187 lb 6.4 oz)   SpO2 96%   BMI 26.14 kg/m²    Intake/Output Summary (Last 24 hours) at 3/13/2025 0838  Last data filed at 3/13/2025 0500  Gross per 24 hour   Intake 480 ml   Output 700 ml   Net -220 ml    Temp (24hrs), Av.2 °F (36.8 °C), Min:98 °F (36.7 °C), Max:98.6 °F (37 °C)      Physical Exam  Vitals and nursing note reviewed. Exam conducted with a chaperone present.   Constitutional:       Appearance: Normal appearance. He is obese.   HENT:      Head: Normocephalic and atraumatic.   Eyes:      Pupils: Pupils are equal, round, and reactive to light.   Cardiovascular:      Rate and Rhythm: Normal rate and regular rhythm.      Pulses: Normal pulses.      Heart sounds: Normal heart sounds.   Pulmonary:      Effort: Pulmonary effort is normal.      Breath sounds: Normal breath sounds.   Abdominal:      General: Abdomen is flat. Bowel sounds are normal.      Palpations: Abdomen is soft.   Musculoskeletal:         General: Swelling present.   Skin:     General: Skin is warm.      Capillary Refill: Capillary refill takes less than 2 seconds.   Neurological:      General: No focal deficit present.      Mental Status: He is alert.           Assessment and Plan:     Primary Problem:  Syncope  Incontinence  DM II  HTN  BELTRAN  Hospital Problem list:    Syncope      PMH:  Past Medical History:   Diagnosis Date   • Anemia    • Anxiety    • Arthritis    • BPH (benign prostatic hypertrophy)    • CAD " (coronary artery disease)    • Cancer     non-hodgkins lymphoma   • Diabetes mellitus    • Disease of thyroid gland    • GERD (gastroesophageal reflux disease)    • Hearing loss    • History of transfusion    • Hyperlipidemia    • Hypertension    • Iliac artery aneurysm, bilateral    • Kidney stone    • Liver cirrhosis    • Migraines    • Sleep apnea     c-pap       Treatment Plan:  Cardiology work up negative  IVF- Orthostatics resolved- dc  Nebs  Resume home meds  PT/OT consult  EEG and neurology consult    Disposition: home    Reviewed treatment plans with the patient and/or family.   20 minutes spent in face to face interaction and coordination of care.     Electronically signed by Figueroa Chaves MD on 3/13/2025 at 08:38 CDT

## 2025-03-13 NOTE — PLAN OF CARE
Goal Outcome Evaluation:        Problem: Adult Inpatient Plan of Care  Goal: Plan of Care Review  Outcome: Progressing  Flowsheets (Taken 3/13/2025 8564)  Progress: improving  Outcome Evaluation: AAOx4, VSS, Pt has no c/o pain overnight. Pt slept with his c-pap. We changed his linens and washed him up this morning and he asked to sit in the chair awhile. S/ST  on tele.  Pt has scheduled EEG today. Safety and droplet precautions maintained, call light within reach, care plan ongoing.

## 2025-03-14 ENCOUNTER — READMISSION MANAGEMENT (OUTPATIENT)
Dept: CALL CENTER | Facility: HOSPITAL | Age: 70
End: 2025-03-14
Payer: MEDICARE

## 2025-03-14 VITALS
WEIGHT: 188.4 LBS | SYSTOLIC BLOOD PRESSURE: 100 MMHG | RESPIRATION RATE: 20 BRPM | TEMPERATURE: 97.5 F | HEART RATE: 91 BPM | BODY MASS INDEX: 26.38 KG/M2 | HEIGHT: 71 IN | DIASTOLIC BLOOD PRESSURE: 39 MMHG | OXYGEN SATURATION: 95 %

## 2025-03-14 LAB
FERRITIN SERPL-MCNC: 924.7 NG/ML (ref 30–400)
IRON 24H UR-MRATE: 84 MCG/DL (ref 59–158)
IRON SATN MFR SERPL: 36 % (ref 20–50)
TIBC SERPL-MCNC: 231 MCG/DL (ref 298–536)
TRANSFERRIN SERPL-MCNC: 155 MG/DL (ref 200–360)

## 2025-03-14 PROCEDURE — 25010000002 AZITHROMYCIN PER 500 MG: Performed by: FAMILY MEDICINE

## 2025-03-14 PROCEDURE — 94664 DEMO&/EVAL PT USE INHALER: CPT

## 2025-03-14 PROCEDURE — 25810000003 SODIUM CHLORIDE 0.9 % SOLUTION 250 ML FLEX CONT: Performed by: FAMILY MEDICINE

## 2025-03-14 PROCEDURE — 25010000002 NA FERRIC GLUC CPLX PER 12.5 MG: Performed by: FAMILY MEDICINE

## 2025-03-14 PROCEDURE — 94799 UNLISTED PULMONARY SVC/PX: CPT

## 2025-03-14 PROCEDURE — 25810000003 SODIUM CHLORIDE 0.9 % SOLUTION: Performed by: FAMILY MEDICINE

## 2025-03-14 PROCEDURE — 83540 ASSAY OF IRON: CPT | Performed by: FAMILY MEDICINE

## 2025-03-14 PROCEDURE — 84466 ASSAY OF TRANSFERRIN: CPT | Performed by: FAMILY MEDICINE

## 2025-03-14 PROCEDURE — 97161 PT EVAL LOW COMPLEX 20 MIN: CPT | Performed by: PHYSICAL THERAPIST

## 2025-03-14 PROCEDURE — 94760 N-INVAS EAR/PLS OXIMETRY 1: CPT

## 2025-03-14 PROCEDURE — 82728 ASSAY OF FERRITIN: CPT | Performed by: FAMILY MEDICINE

## 2025-03-14 RX ORDER — ALPRAZOLAM 0.25 MG
0.25 TABLET ORAL NIGHTLY
Qty: 30 TABLET | Refills: 0 | Status: SHIPPED | OUTPATIENT
Start: 2025-03-14

## 2025-03-14 RX ADMIN — IPRATROPIUM BROMIDE AND ALBUTEROL SULFATE 3 ML: .5; 3 SOLUTION RESPIRATORY (INHALATION) at 09:50

## 2025-03-14 RX ADMIN — LEVOTHYROXINE SODIUM 75 MCG: 75 TABLET ORAL at 05:36

## 2025-03-14 RX ADMIN — AZITHROMYCIN DIHYDRATE 500 MG: 500 INJECTION, POWDER, LYOPHILIZED, FOR SOLUTION INTRAVENOUS at 08:40

## 2025-03-14 RX ADMIN — GABAPENTIN 300 MG: 300 CAPSULE ORAL at 08:40

## 2025-03-14 RX ADMIN — METFORMIN HYDROCHLORIDE 500 MG: 500 TABLET ORAL at 08:39

## 2025-03-14 RX ADMIN — PANTOPRAZOLE SODIUM 40 MG: 40 TABLET, DELAYED RELEASE ORAL at 05:37

## 2025-03-14 RX ADMIN — IPRATROPIUM BROMIDE AND ALBUTEROL SULFATE 3 ML: .5; 3 SOLUTION RESPIRATORY (INHALATION) at 06:10

## 2025-03-14 RX ADMIN — Medication 1000 UNITS: at 08:39

## 2025-03-14 RX ADMIN — SODIUM CHLORIDE 250 ML: 9 INJECTION, SOLUTION INTRAVENOUS at 10:16

## 2025-03-14 RX ADMIN — ROSUVASTATIN 40 MG: 20 TABLET, FILM COATED ORAL at 08:39

## 2025-03-14 RX ADMIN — RANOLAZINE 1000 MG: 500 TABLET, FILM COATED, EXTENDED RELEASE ORAL at 08:39

## 2025-03-14 RX ADMIN — CLOPIDOGREL BISULFATE 75 MG: 75 TABLET, FILM COATED ORAL at 08:39

## 2025-03-14 RX ADMIN — FERROUS SULFATE TAB 325 MG (65 MG ELEMENTAL FE) 325 MG: 325 (65 FE) TAB at 08:39

## 2025-03-14 RX ADMIN — Medication 10 ML: at 08:40

## 2025-03-14 RX ADMIN — POTASSIUM CHLORIDE 20 MEQ: 750 CAPSULE, EXTENDED RELEASE ORAL at 08:39

## 2025-03-14 RX ADMIN — EMPAGLIFLOZIN 25 MG: 25 TABLET, FILM COATED ORAL at 08:39

## 2025-03-14 RX ADMIN — BUSPIRONE HYDROCHLORIDE 15 MG: 5 TABLET ORAL at 08:39

## 2025-03-14 NOTE — THERAPY EVALUATION
Patient Name: Franko Lucero  : 1955    MRN: [id number]                              Today's Date: 3/14/2025       Admit Date: 3/11/2025    Visit Dx:     ICD-10-CM ICD-9-CM   1. Syncope, unspecified syncope type  R55 780.2   2. Elevated troponin  R79.89 790.6   3. Syncope and collapse  R55 780.2   4. Dysphagia, unspecified type [R13.10]  R13.10 787.20   5. KAHLIL (generalized anxiety disorder)  F41.1 300.02   6. Impaired mobility [Z74.09]  Z74.09 799.89     Patient Active Problem List   Diagnosis    Iliac artery aneurysm, bilateral    Hypertension    Hyperlipidemia    Diabetes mellitus    CAD (coronary artery disease)    Iron deficiency anemia    Constipation    Abnormal LFTs    Coagulopathy    Ureteral stone    Kidney stones    Hypotension    Acute foot pain    Macrocytic anemia    BELTRAN (acute kidney injury)    Acute kidney failure, unspecified    Type 2 diabetes mellitus with foot ulcer (CODE)    Anemia, chronic disease    Syncope     Past Medical History:   Diagnosis Date    Anemia     Anxiety     Arthritis     BPH (benign prostatic hypertrophy)     CAD (coronary artery disease)     Cancer     non-hodgkins lymphoma    Diabetes mellitus     Disease of thyroid gland     GERD (gastroesophageal reflux disease)     Hearing loss     History of transfusion     Hyperlipidemia     Hypertension     Iliac artery aneurysm, bilateral     Kidney stone     Liver cirrhosis     Migraines     Sleep apnea     c-pap     Past Surgical History:   Procedure Laterality Date    COLONOSCOPY  2014    COLONOSCOPY N/A 2017    Procedure: COLONOSCOPY WITH ANESTHESIA;  Surgeon: Sher Cui MD;  Location: [facility] ENDOSCOPY;  Service:     CORONARY ARTERY BYPASS GRAFT      2    CYSTOSCOPY URETEROSCOPY LASER LITHOTRIPSY Left 2017    Procedure: URETEROSCOPY LASER LITHOTRIPSY WITH DOUBLE J STENT INSERTION, LEFT ;  Surgeon: Weston Peck MD;  Location: [facility] OR;  Service:     CYSTOSCOPY URETEROSCOPY LASER  LITHOTRIPSY Left 08/14/2017    Procedure: CYSTOSCOPY URETEROSCOPY LASER LITHOTRIPSY STENT INSERTION STONE EXTRACTION;  Surgeon: Weston Peck MD;  Location:  [facility] OR;  Service:     CYSTOSCOPY W/ URETERAL STENT PLACEMENT Left 04/17/2017    Procedure: CYSTOSCOPY URETERAL DOUBLE J STENT INSERTION, LEFT;  Surgeon: Westno Peck MD;  Location:  [facility] OR;  Service:     ENDOSCOPY N/A 04/26/2017    Procedure: ESOPHAGOGASTRODUODENOSCOPY WITH ANESTHESIA;  Surgeon: Sher Cui MD;  Location:  [facility] ENDOSCOPY;  Service:     INCISION AND DRAINAGE LEG Right 07/12/2023    Procedure: INCISION AND DRAINAGE - RIGHT FOOT;  Surgeon: Silas Swan DPM;  Location:  [facility] OR;  Service: Podiatry;  Laterality: Right;    JOINT REPLACEMENT Right     KIDNEY STONE SURGERY      KNEE SURGERY Right 08/2024    replacement    NECK SURGERY      ROTATOR CUFF REPAIR      SHOULDER SURGERY      TONSILLECTOMY      TRANS METATARSAL AMPUTATION Right 08/09/2023    Procedure: Partial 4th Ray Amputation - Right Foot;  Surgeon: Silas Swan DPM;  Location:  [facility] OR;  Service: Podiatry;  Laterality: Right;    URETEROSCOPY LASER LITHOTRIPSY WITH STENT INSERTION Left 09/01/2022    Procedure: LEFT URETEROSCOPY LASER LITHOTRIPSY WITH STENT INSERTION;  Surgeon: Weston Peck MD;  Location: [facility] OR;  Service: Urology;  Laterality: Left;    URETEROSCOPY LASER LITHOTRIPSY WITH STENT INSERTION Left 09/15/2022    Procedure: LEFT URETEROSCOPY LASER LITHOTRIPSY WITH STENT EXCHANGE;  Surgeon: Weston Peck MD;  Location: [facility] OR;  Service: Urology;  Laterality: Left;      General Information       Row Name 03/14/25 0840          Physical Therapy Time and Intention    Document Type evaluation  -SB     Mode of Treatment physical therapy  -SB       Row Name 03/14/25 0840          General Information    Patient Profile Reviewed yes  -SB     Prior Level of Function independent:;all household mobility;ADL's;transfer  RW  -SB      Existing Precautions/Restrictions fall  -SB     Barriers to Rehab medically complex  -SB       Row Name 03/14/25 0840          Living Environment    Current Living Arrangements home  -SB     People in Home spouse  -SB       Row Name 03/14/25 0840          Home Main Entrance    Number of Stairs, Main Entrance none  -SB       Row Name 03/14/25 0840          Stairs Within Home, Primary    Number of Stairs, Within Home, Primary none  -SB       Row Name 03/14/25 0840          Cognition    Orientation Status (Cognition) oriented x 4  -SB       Row Name 03/14/25 0840          Safety Issues/Impairments Affecting Functional Mobility    Impairments Affecting Function (Mobility) strength;balance;shortness of breath;endurance/activity tolerance  -SB               User Key  (r) = Recorded By, (t) = Taken By, (c) = Cosigned By      Initials Name Provider Type    Anuradha Duffy PT DPT Physical Therapist                   Mobility       Row Name 03/14/25 0840          Bed Mobility    Bed Mobility --  -SB     Comment, (Bed Mobility) sitting EOB upon arrival with nsg  -SB       Row Name 03/14/25 0840          Sit-Stand Transfer    Sit-Stand [place] (Transfers) minimum assist (75% patient effort);verbal cues  -SB     Assistive Device (Sit-Stand Transfers) walker, front-wheeled  -SB       Row Name 03/14/25 0840          Gait/Stairs (Locomotion)    [place] Level (Gait) contact guard;verbal cues  -SB     Assistive Device (Gait) walker, front-wheeled  -SB     Patient was able to Ambulate yes  -SB     Distance in Feet (Gait) 20  -SB     Deviations/Abnormal Patterns (Gait) gait speed decreased  -SB     Bilateral Gait Deviations forward flexed posture  -SB               User Key  (r) = Recorded By, (t) = Taken By, (c) = Cosigned By      Initials Name Provider Type    Anuradha Duffy PT DPT Physical Therapist                   Obj/Interventions       Row Name 03/14/25 0840          Range of Motion Comprehensive    General  Range of Motion bilateral lower extremity ROM WFL  -SB       Row Name 03/14/25 0840          Strength Comprehensive (MMT)    General Manual Muscle Testing (MMT) Assessment lower extremity strength deficits identified  -SB     Comment, General Manual Muscle Testing (MMT) Assessment BLE 4/5  -SB       Row Name 03/14/25 0840          Balance    Balance Assessment sitting static balance;sitting dynamic balance;standing static balance;standing dynamic balance  -SB     Static Sitting Balance independent  -SB     Dynamic Sitting Balance independent  -SB     Position, Sitting Balance unsupported;sitting edge of bed  -SB     Static Standing Balance standby assist  -SB     Dynamic Standing Balance contact guard  -SB     Position/Device Used, Standing Balance supported;walker, front-wheeled  -SB       Row Name 03/14/25 0840          Sensory Assessment (Somatosensory)    Sensory Assessment (Somatosensory) LE sensation intact  -SB               User Key  (r) = Recorded By, (t) = Taken By, (c) = Cosigned By      Initials Name Provider Type    Anuradha Duffy, PT DPT Physical Therapist                   Goals/Plan       Row Name 03/14/25 0840          Bed Mobility Goal 1 (PT)    Activity/Assistive Device (Bed Mobility Goal 1, PT) --  -SB     [place] Level/Cues Needed (Bed Mobility Goal 1, PT) --  -SB     Time Frame (Bed Mobility Goal 1, PT) --  -SB     Progress/Outcomes (Bed Mobility Goal 1, PT) --  -SB       Row Name 03/14/25 0840          Transfer Goal 1 (PT)    Activity/Assistive Device (Transfer Goal 1, PT) --  -SB     [place] Level/Cues Needed (Transfer Goal 1, PT) --  -SB     Time Frame (Transfer Goal 1, PT) --  -SB     Progress/Outcome (Transfer Goal 1, PT) --  -SB       Row Name 03/14/25 0840          Gait Training Goal 1 (PT)    Activity/Assistive Device (Gait Training Goal 1, PT) --  -SB     [place] Level (Gait Training Goal 1, PT) --  -SB       Row Name 03/14/25 0840          Therapy Assessment/Plan  (PT)    Planned Therapy Interventions (PT) --  -SB               User Key  (r) = Recorded By, (t) = Taken By, (c) = Cosigned By      Initials Name Provider Type    Anuradha Duffy PT DPT Physical Therapist                   Clinical Impression       Row Name 03/14/25 0840          Pain    Pretreatment Pain Rating 0/10 - no pain  -SB     Pre/Posttreatment Pain Comment c/o weakness  -SB       Row Name 03/14/25 0840          Plan of Care Review    Plan of Care Reviewed With patient  -SB     Progress no change  -SB     Outcome Evaluation PT eval completed. Pt alert and oriented x4 and sitting EOB upon arrival. Pt reports independence at home prior to  arrival with use of RW. Today, pt performs sit to stand t/f with min A and gait training with CGA to BR. Pt with decreased gait speed and forward flexed posture. Pt performs shower, only needing assist with washing back. Pt plans to d/c home today and therefore PT will sign off. Rec d/c home with assist.  -SB       Row Name 03/14/25 0840          Therapy Assessment/Plan (PT)    Patient/Family Therapy Goals Statement (PT) go home  -SB     Rehab Potential (PT) --  -SB     Criteria for Skilled Interventions Met (PT) no;does not meet criteria for skilled intervention  -SB     Therapy Frequency (PT) evaluation only  -SB     Predicted Duration of Therapy Intervention (PT) --  -SB       Row Name 03/14/25 0840          Vital Signs    O2 Delivery Pre Treatment room air  -SB       Row Name 03/14/25 0840          Positioning and Restraints    Pre-Treatment Position in bed  -SB     Post Treatment Position chair  -SB     In Chair notified nsg;reclined;call light within reach;encouraged to call for assist;with family/caregiver  -SB               User Key  (r) = Recorded By, (t) = Taken By, (c) = Cosigned By      Initials Name Provider Type    Anuradha Duffy, PT DPT Physical Therapist                   Outcome Measures       Row Name 03/14/25 0840          How much help from  another person do you currently need...    Turning from your back to your side while in flat bed without using bedrails? 4  -SB     Moving from lying on back to sitting on the side of a flat bed without bedrails? 4  -SB     Moving to and from a bed to a chair (including a wheelchair)? 3  -SB     Standing up from a chair using your arms (e.g., wheelchair, bedside chair)? 3  -SB     Climbing 3-5 steps with a railing? 3  -SB     To walk in hospital room? 3  -SB     AM-PAC 6 Clicks Score (PT) 20  -SB     Highest Level of Mobility Goal 6 --> Walk 10 steps or more  -SB       Row Name 03/14/25 0840          Functional Assessment    Outcome Measure Options AM-PAC 6 Clicks Basic Mobility (PT)  -SB               User Key  (r) = Recorded By, (t) = Taken By, (c) = Cosigned By      Initials Name Provider Type    Anuradha Duffy, PT DPT Physical Therapist                                 Physical Therapy Education       Title: PT OT SLP Therapies (In Progress)       Topic: Physical Therapy (In Progress)       Point: Mobility training (In Progress)       Learning Progress Summary            Patient Acceptance, E, NR by SB at 3/14/2025 0905    Comment: pt edu on POC, benefits of act and d/c plans   Family Acceptance, E, NR by SB at 3/14/2025 0905    Comment: pt edu on POC, benefits of act and d/c plans                      Point: Home exercise program (Not Started)       Learner Progress:  Not documented in this visit.              Point: Body mechanics (Not Started)       Learner Progress:  Not documented in this visit.              Point: Precautions (In Progress)       Learning Progress Summary            Patient Acceptance, E, NR by SB at 3/14/2025 0905    Comment: pt edu on POC, benefits of act and d/c plans   Family Acceptance, E, NR by SB at 3/14/2025 0905    Comment: pt edu on POC, benefits of act and d/c plans                                      User Key       Initials Effective Dates Name Provider Type Discipline    SB  07/11/23 -  Anuradha Jensen PT DPT Physical Therapist PT                  PT Recommendation and Plan     Progress: no change  Outcome Evaluation: PT eval completed. Pt alert and oriented x4 and sitting EOB upon arrival. Pt reports independence at home prior to  arrival with use of RW. Today, pt performs sit to stand t/f with min A and gait training with CGA to BR. Pt with decreased gait speed and forward flexed posture. Pt performs shower, only needing assist with washing back. Pt plans to d/c home today and therefore PT will sign off. Rec d/c home with assist.     Time Calculation:         PT Charges       Row Name 03/14/25 1159             Time Calculation    Start Time 0840  -SB      Stop Time 0938  -SB      Time Calculation (min) 58 min  -SB      PT Received On 03/14/25  -SB         Untimed Charges    PT Eval/Re-eval Minutes 58  -SB         Total Minutes    Untimed Charges Total Minutes 58  -SB       Total Minutes 58  -SB                User Key  (r) = Recorded By, (t) = Taken By, (c) = Cosigned By      Initials Name Provider Type    SB Anuradha Jensen PT DPT Physical Therapist                  Therapy Charges for Today       Code Description Service Date Service Provider Modifiers Qty    50010032645 HC PT EVAL LOW COMPLEXITY 4 3/14/2025 Anuradha Jensen PT DPT GP 1            PT G-Codes  Outcome Measure Options: AM-PAC 6 Clicks Basic Mobility (PT)  AM-PAC 6 Clicks Score (PT): 20  AM-PAC 6 Clicks Score (OT): 21  PT Discharge Summary  Anticipated Discharge Disposition (PT): home with assist    Anuradha Jensen PT DPT  3/14/2025

## 2025-03-14 NOTE — PLAN OF CARE
Goal Outcome Evaluation:  Plan of Care Reviewed With: patient        Problem: Adult Inpatient Plan of Care  Goal: Plan of Care Review  Outcome: Progressing  Flowsheets (Taken 3/14/2025 2261)  Progress: no change  Outcome Evaluation: AAOx4, VSS, no c/o pain, Pt has not slept well overnight.  He seems to be dropping things, water, urinal, just about everything he picks up.  We have changed his linens and bed several times because he has spilled his drinks on him and urinated on himself from not being able to hold his urinal and then dropping on the floor.  SR  on tele. Safety maintained, care plan ongoing.  Plan of Care Reviewed With: patient

## 2025-03-14 NOTE — PLAN OF CARE
Goal Outcome Evaluation:  Plan of Care Reviewed With: patient        Progress: no change  Outcome Evaluation: PT eval completed. Pt alert and oriented x4 and sitting EOB upon arrival. Pt reports independence at home prior to  arrival with use of RW. Today, pt performs sit to stand t/f with min A and gait training with CGA to BR. Pt with decreased gait speed and forward flexed posture. Pt performs shower, only needing assist with washing back. Pt plans to d/c home today and therefore PT will sign off. Rec d/c home with assist.    Anticipated Discharge Disposition (PT): home with assist

## 2025-03-14 NOTE — PROGRESS NOTES
Neurology Progress Note      Chief Complaint: Possible seizure-like activity  Length of Stay:  3   Subjective     Subjective:  3/14/2025: Patient re-evaluated along with Dr. Carnes with teleneurology this morning.  Patient's wife is at bedside.  No acute neurological changes overnight.  No seizure activity noted.  MRI of the brain with and without contrast without acute ischemia/infarct or mass.  Right maxillary sinusitis noted.  Acetylcholine receptor antibody test is still pending as it is a send out.  Patient's EEG was negative for epileptiform activity.  Patient continues to have issues with ptosis of his eyelids especially the left eyelid.  He continues to have fatigue/sleepiness.    History of Present Illness:   Franko Lucero is a 69 y.o. male past medical history of anemia, anxiety, arthritis, BPH, CAD, non-Hodgkin's lymphoma (in remission), diabetes, thyroid disease, GERD, hearing loss, hyperlipidemia, hypertension, bilateral iliac artery aneurysm, kidney stones, cirrhosis of the liver, migraine headaches, stage II/III renal disease, and sleep apnea (wears CPAP).  Patient was consulted to inpatient neurology due to concerns of possible seizure-like activity.  Patient was initially admitted on 3/11 with what is described as a syncopal episode.  Patient was at [facility] and the staff was attempting to help him go to the bathroom.  Apparently, the  patient had a syncopal episode.  He was incontinent of his bowels. He did not bite his tongue.  He was initially hypotensive.  Once EMS got there his blood pressure improved to 102/58.  His blood sugar was 195.  He was also noted to be dehydrated and anemic on admission and has been battling an URI for past week or more. CT scan of the head was performed that was negative for acute intracranial abnormalities.  Ultrasound of the carotid arteries revealed a 50 to 69% on the right and less than 50% on the left.  However, there is heavy plaque burden  "bilaterally. Patient has been seen and worked up this admission by cardiology who do not feel as though the episode was cardiac related.  Patient does have a Holter monitor ordered to be applied at discharge.  His wife is with him at bedside and states that he is had 2 episodes in the remote past.  The episode that brought him to the hospital a couple days ago as well as an episode that occurred while in the car with her.  Patient was a passenger in the vehicle while she was driving.  She states that he stated that he started to feel funny and then had a blank stare.  She states that he was disoriented, lost control of his bowel, and his hands were in a fist/gripping position.  On further questioning, patient has had an issue with both of his eyelids drooping with the left one much worse than the right for quite some time.  He states that his vision is \"fuzzy\" at time and that he might have double vision sometimes, but did not really know for sure. He does have dizziness with upward gaze. Additionally, patient has been having some issues with swallowing but thought maybe it was related to his previous neck surgery years ago. Patient is very tired and falls asleep easily while talking to him. He denies every having been diagnosed with myasthenia gravis or having been tested for it. Orthostatic blood pressures have not yet been obtained today. Wife present at bedside and helps provide some of his history.  At his baseline patient is independent with all ADLs and uses a walker for stability.  Within the last 6 months he was in rehab s/p femur fracture and has been going to outpatient therapy   Medications:  Current Facility-Administered Medications   Medication Dose Route Frequency Provider Last Rate Last Admin    acetaminophen (TYLENOL) tablet 650 mg  650 mg Oral Q6H PRN Figueroa Chaves MD   650 mg at 03/11/25 2048    ALPRAZolam (XANAX) tablet 0.25 mg  0.25 mg Oral BID PRN Figueroa Chaves MD   0.25 mg at " 03/11/25 2046    sennosides-docusate (PERICOLACE) 8.6-50 MG per tablet 2 tablet  2 tablet Oral BID PRN Figueroa Chaves MD        And    polyethylene glycol (MIRALAX) packet 17 g  17 g Oral Daily PRN Figueroa Chaves MD        And    bisacodyl (DULCOLAX) EC tablet 5 mg  5 mg Oral Daily PRN Figueroa Chaves MD        And    bisacodyl (DULCOLAX) suppository 10 mg  10 mg Rectal Daily PRN Figueroa Chaves MD        busPIRone (BUSPAR) tablet 15 mg  15 mg Oral BID Figueroa Chaves MD   15 mg at 03/14/25 0839    Calcium Replacement - Follow Nurse / BPA Driven Protocol   Not Applicable PRN Figueroa Chaves MD        cholecalciferol (VITAMIN D3) tablet 1,000 Units  1,000 Units Oral Daily Figueroa Chaves MD   1,000 Units at 03/14/25 0839    clopidogrel (PLAVIX) tablet 75 mg  75 mg Oral Daily Figueroa Chaves MD   75 mg at 03/14/25 0839    docusate sodium (COLACE) capsule 100 mg  100 mg Oral Q PM Figueroa Chaves MD   100 mg at 03/13/25 1742    DULoxetine (CYMBALTA) DR capsule 60 mg  60 mg Oral Nightly Figueroa Chaves MD   60 mg at 03/13/25 2007    [START ON 3/15/2025] empagliflozin (JARDIANCE) tablet 10 mg  10 mg Oral Daily Figueroa Chaves MD        [Held by provider] enoxaparin sodium (LOVENOX) syringe 40 mg  40 mg Subcutaneous Daily Figueroa Chaves MD        ferric gluconate (FERRLECIT) 250 MG in sodium chloride 0.9% 250 mL IVPB  250 mg Intravenous Once Figueroa Chaves  mL/hr at 03/14/25 1200 250 mg at 03/14/25 1200    ferrous sulfate tablet 325 mg  325 mg Oral Daily With Breakfast Figueroa Chaves MD   325 mg at 03/14/25 0839    gabapentin (NEURONTIN) capsule 300 mg  300 mg Oral Daily Figueroa Chaves MD   300 mg at 03/14/25 0840    Hydrocod Mak-Chlorphe Mak ER (TUSSIONEX PENNKINETIC) 10-8 MG/5ML ER suspension 5 mL  5 mL Oral Q12H PRN Figueroa Chaves MD   5 mL at 03/13/25 2124    HYDROcodone-acetaminophen (NORCO) 5-325 MG per tablet 1 tablet  1 tablet Oral Q4H  PRN Figueroa Chaves MD        ipratropium-albuterol (DUO-NEB) nebulizer solution 3 mL  3 mL Nebulization 4x Daily - RT Fiugeroa Chaves MD   3 mL at 03/14/25 0950    levothyroxine (SYNTHROID, LEVOTHROID) tablet 75 mcg  75 mcg Oral Q AM Figueroa Chaves MD   75 mcg at 03/14/25 0536    Magnesium Standard Dose Replacement - Follow Nurse / BPA Driven Protocol   Not Applicable PRN Figueroa Chaves MD        metFORMIN (GLUCOPHAGE) tablet 500 mg  500 mg Oral BID With Meals Figueroa Chaves MD   500 mg at 03/14/25 0839    nitroglycerin (NITROSTAT) SL tablet 0.4 mg  0.4 mg Sublingual Q5 Min PRN Figueroa Chaves MD        ondansetron ODT (ZOFRAN-ODT) disintegrating tablet 4 mg  4 mg Oral Q6H PRN Figueroa Chaves MD        Or    ondansetron (ZOFRAN) injection 4 mg  4 mg Intravenous Q6H PRN Figueroa Chaves MD        pantoprazole (PROTONIX) EC tablet 40 mg  40 mg Oral Q AM Figueroa Chaves MD   40 mg at 03/14/25 0537    Phosphorus Replacement - Follow Nurse / BPA Driven Protocol   Not Applicable PRN Figueroa Chaves MD        potassium chloride (MICRO-K/KLOR-CON) CR capsule  20 mEq Oral Daily Figueroa Chaves MD   20 mEq at 03/14/25 0839    Potassium Replacement - Follow Nurse / BPA Driven Protocol   Not Applicable PRN Figueroa Chaves MD        ranolazine (RANEXA) 12 hr tablet 1,000 mg  1,000 mg Oral BID Figueroa Chaves MD   1,000 mg at 03/14/25 0839    rosuvastatin (CRESTOR) tablet 40 mg  40 mg Oral Daily Figueroa Chaves MD   40 mg at 03/14/25 0839    sodium chloride 0.9 % flush 10 mL  10 mL Intravenous PRN Figueroa Chaves MD        sodium chloride 0.9 % flush 10 mL  10 mL Intravenous Q12H Figueroa Chaves MD   10 mL at 03/14/25 0840    sodium chloride 0.9 % flush 10 mL  10 mL Intravenous PRN Figueroa Chaves MD        sodium chloride 0.9 % infusion 40 mL  40 mL Intravenous PRN Figueroa Chaves MD                 Objective      Vital Signs  Temp:  [97.5 °F (36.4  "°C)-98.2 °F (36.8 °C)] 97.5 °F (36.4 °C)  Heart Rate:  [] 91  Resp:  [16-24] 20  BP: ()/(39-81) 100/39    Physical Exam:  Head:  Normocephalic, atraumatic  HEENT:  Neck supple, ptosis noted of bilateral eyelids, but worse on left, faint \"twitching\" note to right eye/upper cheek area.    CVS:  Regular rate and rhythm.  No murmurs  Carotid Examination:  No bruits  Lungs:  Cough and congestion noted. O2 at 2L per NC.   Abdomen:  Nontender, Nondistended  Extremities:  No signs of peripheral edema  Skin:  No rashes       Pertinent Neuro Exam:  No acute logical changes since last assessment.  Continues to be very drowsy but will awaken with minimal stimulation.  Oriented x 4.    CN II:  Visual fields full.  Pupils equally reactive to light  CN III, IV, VI:  Extraocular Muscles full with no signs of nystagmus. Reports \"dizziness\" with sustained upward gaze. Bilateral ptosis noted with left much worse than right. Denies diplopia currently.   CN V:  Facial sensory is symmetric with no asymmetries.  CN VII:  Facial motor symmetric  CN VIII:  Gross hearing intact bilaterally  CN IX:  Palate elevates symmetrically  CN X:  Palate elevates symmetrically  CN XI:  Shoulder shrug symmetric  CN XII:  Tongue is midline on protrusion. No trauma noted to tongue.      Motor: (strength out of 5:  1= minimal movement, 2 = movement in plane of gravity, 3 = movement against gravity, 4 = movement against some resistance, 5 = full strength)     -Right Upper Ext: Proximal: 5Distal: 5  -Left Upper Ext: Proximal: 5Distal: 5     -Right Lower Ext: Proximal: 5Distal: 5  -Left Lower Ext: Proximal: 5Distal: 5     DTR:  -Right              Biceps: 2+       Triceps: 2+      Brachioradialis: 2+              Patella: 2+       Ankle: 2+         Neg Babinski  -Left              Biceps: 2+       Triceps: 2+      Brachioradialis: 2+              Patella: 2+       Ankle: 2+         Neg Babinski     Sensory:  -Intact to light touch   "   Coordination:  -Finger to nose intact  -Heel to shin intact  -No ataxia     Gait  -Not tested. Defer to PT/OT. Fall risk.     Last nurse assessment:  Interval: baseline  1a. Level of Consciousness: 0-->Alert, keenly responsive  1b. LOC Questions: 0-->Answers both questions correctly  1c. LOC Commands: 0-->Performs both tasks correctly  2. Best Gaze: 0-->Normal  3. Visual: 0-->No visual loss  4. Facial Palsy: 0-->Normal symmetrical movements  5a. Motor Arm, Left: 0-->No drift, limb holds 90 (or 45) degrees for full 10 secs  5b. Motor Arm, Right: 0-->No drift, limb holds 90 (or 45) degrees for full 10 secs  6a. Motor Leg, Left: 0-->No drift, leg holds 30 degree position for full 5 secs  6b. Motor Leg, Right: 0-->No drift, leg holds 30 degree position for full 5 secs  7. Limb Ataxia: 0-->Absent  8. Sensory: 0-->Normal, no sensory loss  9. Best Language: 0-->No aphasia, normal  10. Dysarthria: 0-->Normal  11. Extinction and Inattention (formerly Neglect): 0-->No abnormality    Total (NIH Stroke Scale): 0       Results Review:      Labs:  Result Review:  I have personally reviewed the results from the time of this admission to 3/14/2025 14:00 CDT and agree with these findings:  [x]  Laboratory list / accordion  []  Microbiology  [x]  Radiology  [x]  EKG/Telemetry   []  Cardiology/Vascular   []  Pathology  []  Old records  [x]  Other:EEG  Most notable findings include: see above. Acetycholine receptor antibody test is pending.     Imaging:  MRI Brain With & Without Contrast  Result Date: 3/13/2025  EXAMINATION: MRI BRAIN W WO CONTRAST-  3/13/2025 3:00 PM  HISTORY: altered mental status; lethargy; hx of non-hodgkins lymphoma in past; R55-Syncope and collapse; R79.89-Other specified abnormal findings of blood chemistry; R55-Syncope and collapse; R13.10-Dysphagia, unspecified  MR imaging of the brain without and with IV gadolinium contrast. Axial, sagittal, and coronal sequences.  Complete opacification of the RIGHT  maxillary sinus with slight bulging of the medial wall which raises the question of a developing mucocele.  There is patchy mucosal thickening within the ethmoid air cells.  Normal ventricle size. Moderate cortical atrophy.  The FLAIR sequence shows moderate confluent small vessel disease within the periventricular white matter.  No brain hemorrhage. Mild basal ganglia calcification noted.  No acute infarct based on the DWI sequence.  Postcontrast images show no enhancing mass lesion. No sign of vasculitis. The dural venous sinuses are patent.      Impression: 1. RIGHT maxillary sinusitis changes. Question developing mucocele. 2. Moderate atrophy and small vessel disease. 3. No acute infarct. 4. No intracranial mass.    This report was signed and finalized on 3/13/2025 4:45 PM by Dr. Mike Plasencia MD.        Assessment/Plan     Hospital Problem List      Syncope    Impression:  Syncopal episode with low concern for seizure or seizure-like activity as patient's initial blood pressure was hypotensive. With seizure activity typically both blood pressure and heart rate are elevated.  MRI of the brain with and without contrast without acute intracranial findings or lesion/mass.  CT of the head with chronic changes and no acute intracranial findings.  EEG with diffuse cerebral dysfunction of mild degree, nonspecific.  This is most commonly seen due to toxic/metabolic or diffuse hypoxemic/ischemic causes.  No definitive evidence epilepsy/epileptiform activity was seen.  No orthostasis noted with lying and standing vital sign check as noted above from 3/13/2025.  Concern for possible myasthenia gravis due to ptosis of eyelids, dysphagia, dizziness with sustained upward gaze, unsteady gait, excessive drowsiness/fatigue, excetra.  Unsteady gait, excessive drowsiness/fatigue, etc.   Abnormal carotid ultrasound with heavy plaque burden noted.  HTN  HLD    Plan:  Acetylcholine receptor antibody test still pending (send out) to  assess for myasthenia gravis.  Consider CTA of the neck, but patient would need hydration due to current renal status.  May defer for outpatient workup?  Continue to monitor blood pressure.  Blood pressure is currently 100/39.  Patient encouraged to drink plenty of fluids as he still seems to be somewhat dehydrated.  Continue Plavix 75 mg daily  Continue Crestor 40 mg daily  PT/OT-appreciate their input and assistance  Neuro will continue to follow.  Patient will need to see Dr. Sowmya Flores, neurologist, after discharge.  Patient may possibly have myasthenia gravis or ocular myasthenia.  Patient will need nerve conduction studies with jitter test and repetitive nerve testing.   Further recommendations per Dr. Carnes.    Medical Decision Making    Number/Complexity of Problems  Moderate  1 undiagnosed new problem with uncertain prognosis -   1 acute illness with systemic symptoms -   High  1 acute or chronic illness that pose a threat to life/body function -   High    MDM Data  Moderate - 1/3 categories  Extensive - 2/3 categories    Category 1: 3 of the following  Review of external notes  Review of results  Ordering of each unique test  Independent historian  Category 2:  Independent interpretation of test (ex: imaging)  Category 3:  Discussion of management with another provider    Exensive  Treatment Plan  Moderate - Prescription Drug management  High  Drug therapy requiring intensive monitoring for toxicity  Decision regarding hospitalization or escalation of care  De-escalate care/DNR decisions  Mod       Denae Naik, APRN  03/14/25  13:56 CDT

## 2025-03-14 NOTE — DISCHARGE SUMMARY
Hospital Discharge Summary    Franko Lucero  :  1955  MRN:  [id number]    Admit date:  3/11/2025  Discharge date:  3/14/2025    Admitting Physician:    Figueroa Chaves MD    Discharge Diagnoses:      Syncope  Rhinovirus with COPD exacerabtion  Anemia  Elevated Troponin concerning for NSTEMI  BELTRAN    Hospital Course:   The patient is a 69 y.o. male who presents with an extensive PMH was sitting outside on a brick wall awaiting his wife to  from physical therapy when he had a true syncopal event. Awoke in ambulance had defecated on himself. No prodromal symptoms. He has had a cold with cough and congestion 2 weeks. He has had two prior similar events in the past. Work up including cardiology and neurology consultations undergone. Cleared for cardiac event per cardiology. Concern for possible seizure and EEG/MRI brain/Carotid studies done showing only atherosclerosis of carotids but not flow limiting. He is chronically anemic due to iron deficiency. He has a h/o non-hodgkins lymphoma. Was noted to have low bp and BELTRAN, treated with IVF bolus. Pt. Was able to ambulate without symptoms prior to dc and no further syncopal events. The probable cause of his sycope was vagal/orthostatic.      The patient was admitted for the above noted medical/surgical issues. Please see daily progress note for further details concerning their stay. The patient improved throughout their stay and reached maximum medical improvement on the day of discharge. The patient/family agree with the treatment plan as outlined above. All questions concerning their stay were answered prior to discharge. They understand the importance of follow up concerning any abnormal test results.     Physical Exam  Vitals and nursing note reviewed. Exam conducted with a chaperone present.   Constitutional:       Appearance: Normal appearance. He is obese.   HENT:      Head: Normocephalic and atraumatic.      Nose: Nose normal.      Mouth/Throat:       Mouth: Mucous membranes are moist.   Eyes:      Pupils: Pupils are equal, round, and reactive to light.   Cardiovascular:      Rate and Rhythm: Normal rate and regular rhythm.      Pulses: Normal pulses.      Heart sounds: Normal heart sounds.   Pulmonary:      Effort: Pulmonary effort is normal.      Breath sounds: Normal breath sounds.   Abdominal:      General: Abdomen is flat. Bowel sounds are normal.      Palpations: Abdomen is soft.   Musculoskeletal:         General: Normal range of motion.   Skin:     General: Skin is warm.      Capillary Refill: Capillary refill takes less than 2 seconds.   Neurological:      General: No focal deficit present.      Mental Status: He is alert.         Discharge Medications:         Discharge Medications        Changes to Medications        Instructions Start Date   empagliflozin 10 MG tablet tablet  Commonly known as: JARDIANCE  What changed:   medication strength  how much to take   10 mg, Oral, Every Morning             Continue These Medications        Instructions Start Date   acetaminophen 325 MG tablet  Commonly known as: TYLENOL   650 mg, Every 6 Hours PRN      ALPRAZolam 0.25 MG tablet  Commonly known as: XANAX   0.25 mg, Oral, Nightly      busPIRone 15 MG tablet  Commonly known as: BUSPAR   15 mg, 2 Times Daily      cholecalciferol 25 MCG (1000 UT) tablet  Commonly known as: VITAMIN D3   1 tablet, Oral, Daily      clopidogrel 75 MG tablet  Commonly known as: PLAVIX   75 mg, Every Morning      docusate sodium 100 MG capsule  Commonly known as: COLACE   100 mg, Every Evening      DULoxetine 60 MG capsule  Commonly known as: CYMBALTA   60 mg, Nightly      ferrous sulfate 325 (65 FE) MG EC tablet   325 mg, 2 Times Daily      gabapentin 300 MG capsule  Commonly known as: NEURONTIN   300 mg, Nightly      GARLIC-CALCIUM PO   1 tablet, Daily      icosapent ethyl 1 g capsule capsule  Commonly known as: VASCEPA   2 g, 2 Times Daily With Meals      levothyroxine 75 MCG  tablet  Commonly known as: SYNTHROID, LEVOTHROID   75 mcg, Every Early Morning      metFORMIN 500 MG tablet  Commonly known as: GLUCOPHAGE   500 mg, 2 Times Daily With Meals      naloxone 4 MG/0.1ML nasal spray  Commonly known as: NARCAN   1 spray, Nasal, As Needed, Call 911. Don't prime. Richardson in 1 nostril for overdose. Repeat in 2-3 minutes in other nostril if no or minimal breathing/responsiveness.      nitroglycerin 0.4 MG SL tablet  Commonly known as: NITROSTAT   0.4 mg, Sublingual, Every 5 Minutes PRN      pantoprazole 40 MG EC tablet  Commonly known as: PROTONIX   40 mg, Daily      polyethylene glycol 17 GM/SCOOP powder  Commonly known as: MIRALAX   17 g, Daily PRN      potassium chloride 20 MEQ CR tablet  Commonly known as: KLOR-CON M20   20 mEq, Oral, Daily      ranolazine 1000 MG 12 hr tablet  Commonly known as: RANEXA   1,000 mg, 2 Times Daily      rosuvastatin 40 MG tablet  Commonly known as: CRESTOR   40 mg, Daily      Tresiba 100 UNIT/ML solution injection  Generic drug: Insulin Degludec   18 Units, Subcutaneous, Nightly               Activity: up with assist    Diet: cardiac    Consults: Cardiology, Neurology    Significant Diagnostic Studies:    MRI Brain With & Without Contrast  Result Date: 3/13/2025  1. RIGHT maxillary sinusitis changes. Question developing mucocele. 2. Moderate atrophy and small vessel disease. 3. No acute infarct. 4. No intracranial mass.    This report was signed and finalized on 3/13/2025 4:45 PM by Dr. Mike Plasencia MD.      EEG  Result Date: 3/13/2025  Diffuse cerebral dysfunction of mild degree, nonspecific.  This is most commonly seen due to toxic/metabolic or diffuse hypoxemic/ischemic cause Definitive evidence for epilepsy is not seen on the study This report is transcribed using the Dragon dictation system.      US Carotid Bilateral  Result Date: 3/12/2025  Impression: 1. There is 50 to 69% stenosis of the right internal carotid artery. 2. There is less than 50%  stenosis of the left internal carotid artery. 3. Antegrade flow is demonstrated in bilateral vertebral arteries. 4. There is heavy plaque burden at both bifurcations. Further imaging/evaluation may be warranted with a CTA of the neck.  Comments: Bilateral carotid vertebral arterial duplex scan was performed.  Grayscale imaging shows intimal thickening and calcified elements at the carotid bifurcation. The right internal carotid artery peak systolic velocity is 134.2 cm/sec. The end-diastolic velocity is 25.7 cm/sec. The right ICA/CCA ratio is approximately 1.6. These findings correlate with 50 to 69% stenosis of the right internal carotid artery.  Grayscale imaging shows intimal thickening and calcified elements at the carotid bifurcation. The left internal carotid artery peak systolic velocity is 111.5 cm/sec. The end-diastolic velocity is 17.8 cm/sec. The left ICA/CCA ratio is approximately 1.3. These findings correlate with less than 50% stenosis of the left internal carotid artery.  Antegrade flow is demonstrated in bilateral vertebral arteries. There is greater than 50% stenosis in bilateral external carotid arteries.  This report was signed and finalized on 3/12/2025 3:56 PM by Dr. Handy Traylor MD.      CT Head Without Contrast  Result Date: 3/11/2025  1. Chronic changes and no acute intracranial findings.  This report was signed and finalized on 3/11/2025 2:26 PM by Jose Crenshaw.      XR Chest 1 View  Result Date: 3/11/2025   1.  No acute cardiopulmonary process.  2.  Abnormal appearance of the proximal LEFT humerus, different than on the exam from 6/4/2024. Correlate for previous proximal humerus fracture. If no history of previous proximal humeral fracture, consider 3 view shoulder series on the LEFT.  3.  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    This report was signed and finalized on 3/11/2025 1:57 PM by Dr. Jed Messina MD.              Treatments:   as above    Disposition:   home    Time spent on discharge including discussion with patient/family, SW, and coordination of care.     Follow up with Figueroa Chaves MD in 1 weeks.    Signed:  Figueroa Chaves MD   3/14/2025, 09:46 CDT

## 2025-03-15 NOTE — THERAPY DISCHARGE NOTE
Acute Care - Occupational Therapy Discharge Summary  [facility]     Patient Name: Franko Lucero  : 1955  MRN: [id number]    Today's Date: 3/15/2025                 Admit Date: 3/11/2025        OT Recommendation and Plan    Visit Dx:    ICD-10-CM ICD-9-CM   1. Syncope, unspecified syncope type  R55 780.2   2. Elevated troponin  R79.89 790.6   3. Syncope and collapse  R55 780.2   4. Dysphagia, unspecified type [R13.10]  R13.10 787.20   5. KAHLIL (generalized anxiety disorder)  F41.1 300.02   6. Impaired mobility [Z74.09]  Z74.09 799.89                OT Rehab Goals       Row Name 03/15/25 1500             Bathing Goal 1 (OT)    Activity/Device (Bathing Goal 1, OT) bathing skills, all  -LS      [place] Level/Cues Needed (Bathing Goal 1, OT) modified independence  -LS      Time Frame (Bathing Goal 1, OT) long term goal (LTG);by discharge  -LS      Progress/Outcomes (Bathing Goal 1, OT) goal not met  -LS         Dressing Goal 1 (OT)    Activity/Device (Dressing Goal 1, OT) dressing skills, all  -LS      [place]/Cues Needed (Dressing Goal 1, OT) modified independence  -LS      Time Frame (Dressing Goal 1, OT) long term goal (LTG);by discharge  -LS      Progress/Outcome (Dressing Goal 1, OT) goal not met  -LS         Toileting Goal 1 (OT)    Activity/Device (Toileting Goal 1, OT) toileting skills, all  -LS      [place] Level/Cues Needed (Toileting Goal 1, OT) modified independence  -LS      Time Frame (Toileting Goal 1, OT) long term goal (LTG);by discharge  -LS      Progress/Outcome (Toileting Goal 1, OT) goal not met  -LS                User Key  (r) = Recorded By, (t) = Taken By, (c) = Cosigned By      Initials Name Provider Type Discipline    Diane James, OTR/L Occupational Therapist OT                                OT Discharge Summary  Anticipated Discharge Disposition (OT): home with assist  Reason for Discharge: Discharge from facility  Outcomes Achieved: Refer to plan of care for updates  on goals achieved  Discharge Destination: Home      Diane Pisano OTR/TONY  3/15/2025

## 2025-03-15 NOTE — OUTREACH NOTE
Prep Survey      Flowsheet Row Responses   [facility] facility patient discharged from? [facility]   Is LACE score < 7 ? No   Eligibility Readm Mgmt   Discharge diagnosis Syncope  Rhinovirus with COPD exacerabtion  Anemia  Elevated Troponin concerning for NSTEMI   Does the patient have one of the following disease processes/diagnoses(primary or secondary)? COPD   Does the patient have Home health ordered? No   Is there a DME ordered? No   Prep survey completed? Yes            ALEXUS JASMINE - Registered Nurse

## 2025-03-15 NOTE — PAYOR COMM NOTE
"UT HOME 3-14-25    Alex Lucero (69 y.o. Male)       Date of Birth   1955    Social Security Number       Address   [address] [facility] [address]    Home Phone   [phone]    MRN   [id number]       [facility]    Marital Status                               Admission Date   3/11/2025    Admission Type   Emergency    Admitting Provider   Figueroa Chaves MD    Attending Provider       Department, Room/Bed   [address], 450/1       Discharge Date   3/14/2025    Discharge Disposition   Home or Self Care    Discharge Destination   Home                              Attending Provider: (none)   Allergies: Adhesive Tape, Cefazolin, Cefuroxime Axetil, Cephalosporins, Neomycin-polymyxin B Gu, Percocet [Oxycodone-acetaminophen]    Isolation: Droplet, Contact   Infection: MRSA (07/15/23), Rhinovirus  (25)   Code Status: Prior    Ht: 180.3 cm (71\")   Wt: 85.5 kg (188 lb 6.4 oz)    Admission Cmt: None   Principal Problem: Syncope [R55]                   Active Insurance as of 3/11/2025       Primary Coverage       Payor Plan Insurance Group Employer/Plan Group    AETNA MEDICARE REPLACEMENT AETNA MEDICARE ADVANTAGE PPO [id number]       Payor Plan Address Payor Plan Phone Number Payor Plan Fax Number Effective Dates    [address] [phone]  2024 - None Entered    [facility] [id number]         Subscriber Name Subscriber Birth Date Member ID       ALEX LUCERO 1955 [id number]                     Emergency Contacts        (Rel.) Home Phone Work Phone Mobile Phone    Sangita Lucero (Spouse) [phone] -- [phone]                 Discharge Summary        Figueroa Chaves MD at 25 0946            Hospital Discharge Summary    Alex Lucero  :  1955  MRN:  [id number]    Admit date:  3/11/2025  Discharge date:  3/14/2025    Admitting Physician:    Figueroa Chaves MD    Discharge Diagnoses:      Syncope  Rhinovirus with COPD " exacerabtion  Anemia  Elevated Troponin concerning for NSTEMI  BELTRAN    Hospital Course:   The patient is a 69 y.o. male who presents with an extensive PMH was sitting outside on a brick wall awaiting his wife to  from physical therapy when he had a true syncopal event. Awoke in ambulance had defecated on himself. No prodromal symptoms. He has had a cold with cough and congestion 2 weeks. He has had two prior similar events in the past. Work up including cardiology and neurology consultations undergone. Cleared for cardiac event per cardiology. Concern for possible seizure and EEG/MRI brain/Carotid studies done showing only atherosclerosis of carotids but not flow limiting. He is chronically anemic due to iron deficiency. He has a h/o non-hodgkins lymphoma. Was noted to have low bp and BELTRAN, treated with IVF bolus. Pt. Was able to ambulate without symptoms prior to dc and no further syncopal events. The probable cause of his sycope was vagal/orthostatic.      The patient was admitted for the above noted medical/surgical issues. Please see daily progress note for further details concerning their stay. The patient improved throughout their stay and reached maximum medical improvement on the day of discharge. The patient/family agree with the treatment plan as outlined above. All questions concerning their stay were answered prior to discharge. They understand the importance of follow up concerning any abnormal test results.     Physical Exam  Vitals and nursing note reviewed. Exam conducted with a chaperone present.   Constitutional:       Appearance: Normal appearance. He is obese.   HENT:      Head: Normocephalic and atraumatic.      Nose: Nose normal.      Mouth/Throat:      Mouth: Mucous membranes are moist.   Eyes:      Pupils: Pupils are equal, round, and reactive to light.   Cardiovascular:      Rate and Rhythm: Normal rate and regular rhythm.      Pulses: Normal pulses.      Heart sounds: Normal heart  sounds.   Pulmonary:      Effort: Pulmonary effort is normal.      Breath sounds: Normal breath sounds.   Abdominal:      General: Abdomen is flat. Bowel sounds are normal.      Palpations: Abdomen is soft.   Musculoskeletal:         General: Normal range of motion.   Skin:     General: Skin is warm.      Capillary Refill: Capillary refill takes less than 2 seconds.   Neurological:      General: No focal deficit present.      Mental Status: He is alert.         Discharge Medications:         Discharge Medications        Changes to Medications        Instructions Start Date   empagliflozin 10 MG tablet tablet  Commonly known as: JARDIANCE  What changed:   medication strength  how much to take   10 mg, Oral, Every Morning             Continue These Medications        Instructions Start Date   acetaminophen 325 MG tablet  Commonly known as: TYLENOL   650 mg, Every 6 Hours PRN      ALPRAZolam 0.25 MG tablet  Commonly known as: XANAX   0.25 mg, Oral, Nightly      busPIRone 15 MG tablet  Commonly known as: BUSPAR   15 mg, 2 Times Daily      cholecalciferol 25 MCG (1000 UT) tablet  Commonly known as: VITAMIN D3   1 tablet, Oral, Daily      clopidogrel 75 MG tablet  Commonly known as: PLAVIX   75 mg, Every Morning      docusate sodium 100 MG capsule  Commonly known as: COLACE   100 mg, Every Evening      DULoxetine 60 MG capsule  Commonly known as: CYMBALTA   60 mg, Nightly      ferrous sulfate 325 (65 FE) MG EC tablet   325 mg, 2 Times Daily      gabapentin 300 MG capsule  Commonly known as: NEURONTIN   300 mg, Nightly      GARLIC-CALCIUM PO   1 tablet, Daily      icosapent ethyl 1 g capsule capsule  Commonly known as: VASCEPA   2 g, 2 Times Daily With Meals      levothyroxine 75 MCG tablet  Commonly known as: SYNTHROID, LEVOTHROID   75 mcg, Every Early Morning      metFORMIN 500 MG tablet  Commonly known as: GLUCOPHAGE   500 mg, 2 Times Daily With Meals      naloxone 4 MG/0.1ML nasal spray  Commonly known as: NARCAN   1  spray, Nasal, As Needed, Call 911. Don't prime. [place] in 1 nostril for overdose. Repeat in 2-3 minutes in other nostril if no or minimal breathing/responsiveness.      nitroglycerin 0.4 MG SL tablet  Commonly known as: NITROSTAT   0.4 mg, Sublingual, Every 5 Minutes PRN      pantoprazole 40 MG EC tablet  Commonly known as: PROTONIX   40 mg, Daily      polyethylene glycol 17 GM/SCOOP powder  Commonly known as: MIRALAX   17 g, Daily PRN      potassium chloride 20 MEQ CR tablet  Commonly known as: KLOR-CON M20   20 mEq, Oral, Daily      ranolazine 1000 MG 12 hr tablet  Commonly known as: RANEXA   1,000 mg, 2 Times Daily      rosuvastatin 40 MG tablet  Commonly known as: CRESTOR   40 mg, Daily      Tresiba 100 UNIT/ML solution injection  Generic drug: Insulin Degludec   18 Units, Subcutaneous, Nightly               Activity: up with assist    Diet: cardiac    Consults: Cardiology, Neurology    Significant Diagnostic Studies:    MRI Brain With & Without Contrast  Result Date: 3/13/2025  1. RIGHT maxillary sinusitis changes. Question developing mucocele. 2. Moderate atrophy and small vessel disease. 3. No acute infarct. 4. No intracranial mass.    This report was signed and finalized on 3/13/2025 4:45 PM by Dr. Mike Plasencia MD.      EEG  Result Date: 3/13/2025  Diffuse cerebral dysfunction of mild degree, nonspecific.  This is most commonly seen due to toxic/metabolic or diffuse hypoxemic/ischemic cause Definitive evidence for epilepsy is not seen on the study This report is transcribed using the Dragon dictation system.      US Carotid Bilateral  Result Date: 3/12/2025  Impression: 1. There is 50 to 69% stenosis of the right internal carotid artery. 2. There is less than 50% stenosis of the left internal carotid artery. 3. Antegrade flow is demonstrated in bilateral vertebral arteries. 4. There is heavy plaque burden at both bifurcations. Further imaging/evaluation may be warranted with a CTA of the neck.  Comments:  Bilateral carotid vertebral arterial duplex scan was performed.  Grayscale imaging shows intimal thickening and calcified elements at the carotid bifurcation. The right internal carotid artery peak systolic velocity is 134.2 cm/sec. The end-diastolic velocity is 25.7 cm/sec. The right ICA/CCA ratio is approximately 1.6. These findings correlate with 50 to 69% stenosis of the right internal carotid artery.  Grayscale imaging shows intimal thickening and calcified elements at the carotid bifurcation. The left internal carotid artery peak systolic velocity is 111.5 cm/sec. The end-diastolic velocity is 17.8 cm/sec. The left ICA/CCA ratio is approximately 1.3. These findings correlate with less than 50% stenosis of the left internal carotid artery.  Antegrade flow is demonstrated in bilateral vertebral arteries. There is greater than 50% stenosis in bilateral external carotid arteries.  This report was signed and finalized on 3/12/2025 3:56 PM by Dr. Handy Traylor MD.      CT Head Without Contrast  Result Date: 3/11/2025  1. Chronic changes and no acute intracranial findings.  This report was signed and finalized on 3/11/2025 2:26 PM by Jose Crenshaw.      XR Chest 1 View  Result Date: 3/11/2025   1.  No acute cardiopulmonary process.  2.  Abnormal appearance of the proximal LEFT humerus, different than on the exam from 6/4/2024. Correlate for previous proximal humerus fracture. If no history of previous proximal humeral fracture, consider 3 view shoulder series on the LEFT.  3.  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    This report was signed and finalized on 3/11/2025 1:57 PM by Dr. Jed Messina MD.             Treatments:   as above    Disposition:   home    Time spent on discharge including discussion with patient/family, SW, and coordination of care.     Follow up with Figueroa Chaves MD in 1 weeks.    Signed:  Figueroa Chaves MD   3/14/2025,  09:46 CDT    Electronically signed by Figueroa Chaves MD at 03/14/25 0957

## 2025-03-17 ENCOUNTER — CARE COORDINATION (OUTPATIENT)
Dept: CASE MANAGEMENT | Age: 70
End: 2025-03-17

## 2025-03-17 NOTE — CARE COORDINATION
Care Transitions Note    Initial Call - Call within 2 business days of discharge: Yes    Attempted to reach patient for transitions of care follow up. Unable to reach patient.    Outreach Attempts:   Unable to leave message.     Patient: Daniel Ramirez    Patient : 1955   MRN: [id number]    Reason for Admission: Syncope and Collapse, Other  Discharge Date: 3/14/2025  RURS: Readmission Risk Score: 15.3    Was this an external facility discharge? Yes. Discharge Date: 3/14/2025. Facility [facility]: [facility]    Follow Up Appointment:   Patient has hospital follow up appointment scheduled  UTR     Future Appointments         Provider Specialty Dept Phone    2025 1:00 PM Addy Lozano PA-C Orthopedic Surgery [phone]    2025 11:00 AM Maikel Rodrigez MD Orthopedic Surgery [phone]          Plan for follow-up on next business day.      France Perez RN

## 2025-03-17 NOTE — THERAPY DISCHARGE NOTE
Acute Care - Speech Language Pathology Discharge Summary  [facility]       Patient Name: Franko Lucero  : 1955  MRN: [id number]    Today's Date: 3/17/2025                   Admit Date: 3/11/2025      SLP Recommendation and Plan  Regular solids and thin liquids    Visit Dx:    ICD-10-CM ICD-9-CM   1. Syncope, unspecified syncope type  R55 780.2   2. Elevated troponin  R79.89 790.6   3. Syncope and collapse  R55 780.2   4. Dysphagia, unspecified type [R13.10]  R13.10 787.20   5. KAHLIL (generalized anxiety disorder)  F41.1 300.02   6. Impaired mobility [Z74.09]  Z74.09 799.89                SLP GOALS       Row Name 25 1000             (LTG) Swallow    (LTG) Swallow PT will tolerate LRD with no overt s/s of aspiration or distress.  -MB      [place] (Swallow Long Term Goal) independently (over 90% accuracy)  -MB      Time Frame (Swallow Long Term Goal) by discharge  -MB      Progress/Outcomes (Swallow Long Term Goal) goal not met  -MB         (STG) Patient will tolerate trials of    Consistencies Trialed (Tolerate trials) regular textures;thin liquids  -MB      Desired Outcome (Tolerate trials) without signs/symptoms of aspiration;without signs of distress;with adequate oral prep/transit/clearance  -MB      [place] (Tolerate trials) independently (over 90% accuracy)  -MB      Time Frame (Tolerate trials) other (see comments)  -MB      Progress/Outcomes (Tolerate trials) goal not met  -MB                User Key  (r) = Recorded By, (t) = Taken By, (c) = Cosigned By      Initials Name Provider Type    Mer Waters CCC-SLP Speech and Language Pathologist                    [email or web address]@      SLP Discharge Summary  Anticipated Discharge Disposition (SLP): unknown  Reason for Discharge: discharge from this facility  Progress Toward Achieving Short/long Term Goals: goals not met within established timelines  Discharge Destination: home      Mer Wallace CCC-SLP  3/17/2025

## 2025-03-18 ENCOUNTER — CARE COORDINATION (OUTPATIENT)
Dept: CASE MANAGEMENT | Age: 70
End: 2025-03-18

## 2025-03-18 DIAGNOSIS — R55 SYNCOPE AND COLLAPSE: Primary | ICD-10-CM

## 2025-03-18 PROCEDURE — 1111F DSCHRG MED/CURRENT MED MERGE: CPT | Performed by: FAMILY MEDICINE

## 2025-03-18 RX ORDER — CLOPIDOGREL BISULFATE 75 MG/1
75 TABLET ORAL DAILY
COMMUNITY

## 2025-03-18 RX ORDER — POTASSIUM CHLORIDE 1500 MG/1
20 TABLET, EXTENDED RELEASE ORAL DAILY
COMMUNITY

## 2025-03-18 RX ORDER — POLYETHYLENE GLYCOL 3350 17 G/17G
17 POWDER, FOR SOLUTION ORAL DAILY PRN
COMMUNITY

## 2025-03-18 NOTE — CARE COORDINATION
in-home monitoring: Deferred at this time because too much to discuss; will discuss at next outreach.    Assessments:  Care Transitions 24 Hour Call    Schedule Follow Up Appointment with PCP: Completed  Do you have a copy of your discharge instructions?: Yes  Do you have all of your prescriptions and are they filled?: Yes  Have you been contacted by a [facility] Pharmacist?: No  Have you scheduled your follow up appointment?: Yes  How are you going to get to your appointment?: Car - family or friend to transport  Do you feel like you have everything you need to keep you well at home?: Yes  Care Transitions Interventions        Follow Up Appointment:   Discussed follow up appointments. Patient has hospital follow up appointment scheduled within 7 days of discharge.   Patient reports he has a HFU with his PCP on 3/20/2025.  Future Appointments         Provider Specialty Dept Phone    5/28/2025 1:00 PM Addy Lozano PA-C Orthopedic Surgery [phone]    8/13/2025 11:00 AM Maikel Rodrigez MD Orthopedic Surgery [phone]          Care Summary Note:  Reviewed available encounter information for continuity of care prior to my initial follow up Care Transitions Coordination phone call following discharge from the hospital - chart notes, consults, progress notes, test results, medication list, appointments, AVS, other pertinent information.  Placed a call to the number listed for patient. Introduced myself and explained the purpose of my call as well as verifying name, date of birth of patient.  Spoke with patient and wife over speaker phone regarding hospitalization, discharge and status thus far.  He reported he is doing fair.  He said the only real issue he is having is an ongoing cough.  He said he has called his PCP and he has sent something in for him, but he does not have it yet.  HE said the cough in almost continuous.  It is productive at time, thick, yellowish in color.  He said he is able to eat, but his appetite

## 2025-03-19 ENCOUNTER — READMISSION MANAGEMENT (OUTPATIENT)
Dept: CALL CENTER | Facility: HOSPITAL | Age: 70
End: 2025-03-19
Payer: MEDICARE

## 2025-03-19 NOTE — OUTREACH NOTE
COPD/PN Week 1 Survey      Flowsheet Row Responses   [facility] patient discharged from? [place]   Does the patient have one of the following disease processes/diagnoses(primary or secondary)? COPD   Week 1 attempt successful? Yes   Call start time 0939   Call end time 0947   Meds reviewed with patient/caregiver? Yes   Is the patient having any side effects they believe may be caused by any medication additions or changes? No   Does the patient have all medications ordered at discharge? N/A   Is the patient taking all medications as directed (includes completed medication regime)? Yes   Comments regarding appointments PCP appt 03/20/25. Reviewed multiple appts per AVS. States will also be seeing a pulmonologist.   Does the patient have a primary care provider?  Yes   Does the patient have an appointment with their PCP or specialist within 7 days of discharge? Yes   Has the patient kept scheduled appointments due by today? N/A   Has home health visited the patient within 72 hours of discharge? N/A   DME comments CPAP.   Pulse Ox monitoring None   Psychosocial issues? No   Did the patient receive a copy of their discharge instructions? Yes   Nursing interventions Reviewed instructions with patient   What is the patient's perception of their health status since discharge? Improving   Nursing Interventions Nurse provided patient education   Are the patient's immunizations up to date?  Yes   Nursing interventions Advised patient to discuss with provider at next visit   If the patient is a current smoker, are they able to teach back resources for cessation? Not a smoker   Is the patient/caregiver able to teach back the hierarchy of who to call/visit for symptoms/problems? PCP, Specialist, Home health nurse, Urgent Care, ED, 911 Yes   Is the patient able to teach back COPD zones? No   Nursing interventions Education provided on various zones   Patient reports what zone on this call? Green Zone   Green Zone Reports  doing well, Breathing without shortness of breath, Usual activity and exercise level, Usual amounts of cough and phlegm/mucous, Slept well last night, Appetite is good   Green Zone interventions: Take daily medications, Use oxygen as prescribed, Continue regular exercise/diet plan, At all times avoid cigarette smoking, vaping and inhaled irritants   Week 1 call completed? Yes   Is the patient interested in additional calls from an ambulatory ? No   Would this patient benefit from a Referral to [facility] Social Work? No   Wrap up additional comments Patient reports is improving. States cough is less frequent. Denies any SOA, chest pain, or fever/chills. Denies any needs or concerns today.   Call end time 8401            Annie LOW - Registered Nurse

## 2025-03-21 ENCOUNTER — CARE COORDINATION (OUTPATIENT)
Dept: CASE MANAGEMENT | Age: 70
End: 2025-03-21

## 2025-03-21 LAB
ACHR BIND AB SER-SCNC: <0.03 NMOL/L (ref 0–0.24)
ACHR BLOCK AB SER-ACNC: 4 % (ref 0–25)
ACHR MOD AB SER QL FC: 0 % (ref 0–45)

## 2025-03-21 NOTE — CARE COORDINATION
Care Transitions Note    Follow Up Call     Attempted to reach patient for transitions of care follow up.  Unable to reach patient.      Outreach Attempts:   Unable to leave message.     Follow Up Appointment:   Future Appointments         Provider Specialty Dept Phone    5/28/2025 1:00 PM Addy Lozano PA-C Orthopedic Surgery [phone]    8/13/2025 11:00 AM Maikel Rodrigez MD Orthopedic Surgery [phone]          Plan for follow-up call in 6-10 days based on severity of symptoms and risk factors.   Plan for next call: symptom management-cardiac issues, neuro issues, respiratory issues, activity/mobility, appetite, intake, med effects, etc  self management-home mgmt needs, other  follow-up appointment-findings from HFU appt  medication management-med changes, other      France Perez RN

## 2025-03-27 ENCOUNTER — READMISSION MANAGEMENT (OUTPATIENT)
Dept: CALL CENTER | Facility: HOSPITAL | Age: 70
End: 2025-03-27
Payer: MEDICARE

## 2025-03-27 ENCOUNTER — CARE COORDINATION (OUTPATIENT)
Dept: CASE MANAGEMENT | Age: 70
End: 2025-03-27

## 2025-03-27 NOTE — OUTREACH NOTE
COPD/PN Week 2 Survey      Flowsheet Row Responses   [facility] patient discharged from? [place]   Does the patient have one of the following disease processes/diagnoses(primary or secondary)? COPD   Week 2 attempt successful? Yes   Call start time 1557   Call end time 1605   Discharge diagnosis Syncope  Rhinovirus with COPD exacerabtion  Anemia  Elevated Troponin concerning for NSTEMI   Meds reviewed with patient/caregiver? Yes   Is the patient taking all medications as directed (includes completed medication regime)? Yes   Does the patient have a primary care provider?  Yes   Has the patient kept scheduled appointments due by today? Yes   DME comments encouraged pt to purchase a pulse ox to check O2 levels intermittently   Pulse Ox monitoring None   Did the patient receive a copy of their discharge instructions? Yes   Nursing interventions Reviewed instructions with patient   What is the patient's perception of their health status since discharge? Improving  [some cough/low energy]   Is the patient able to teach back COPD zones? Yes   Week 2 call completed? Yes   Call end time 1605            Philomena ANAND - Registered Nurse

## 2025-03-27 NOTE — CARE COORDINATION
Placed a call to the PCP's office and left a message for phone nurse reporting on patient's overall status and falls.  Reported wife's concerns and request for home health as well.  Left contact information.

## 2025-03-27 NOTE — CARE COORDINATION
Care Transitions Note    Follow Up Call     Patient Current Location:    Home: [address]  [place] [address]    Care Transition Nurse contacted the spouse/partner  by telephone. Verified name and  as identifiers.    Additional needs identified to be addressed with provider   High priority:      Spoke with wife today for follow up Care Transitions Coordination call.  She reported patient has had 4 or 5 falls over the past few days.  Said he is very weak, not regaining his strength as they think he should.  She said he has not had any injuries yet, but she concerned that he will if this keeps up.  She is wanting to see about getting home health therapy to work with him.  Wants [facility] if they can get them.         Method of communication with provider: phone.    Care Summary Note:   Placed a call to the home and spoke with the wife for follow up.  She reported patient is still quite weak and has had 4 or 5 falls over the past few days.  Said he is very weak, not regaining his strength as they think he should.  Said he has not had any injuries as yet, but she is concerned he will if this keeps up.  She said they would like to get home health therapy to work with him.  Would like to have [facility] if possible.  She said he is still having a hacky cough also.  No real shortness of breath to speak of though.  He is not wanting to eat, appetite is poor.  He is sleeping more.  She said he has not had chest pain, wheezing, other symptoms.  She is just concerned that he is not progressing and will have another fall that will not end well.  Taking meds as prescribed.  No other issues to report.      Plan of care updates since last contact:  Review of patient management of conditions/medications:    Ongoing assessment of patient's ability to manage self care needs in the home environment under current circumstances.    Assessments:  Care Transitions Subsequent and Final Call    Subsequent and Final Calls  Have your medications

## 2025-03-28 ENCOUNTER — HOSPITAL ENCOUNTER (OUTPATIENT)
Dept: CT IMAGING | Facility: HOSPITAL | Age: 70
Discharge: HOME | End: 2025-03-28
Payer: MEDICARE

## 2025-03-28 ENCOUNTER — TRANSCRIBE ORDERS (OUTPATIENT)
Dept: ADMINISTRATIVE | Facility: HOSPITAL | Age: 70
End: 2025-03-28
Payer: MEDICARE

## 2025-03-28 DIAGNOSIS — M79.2 PAROXYSMAL NERVE PAIN: ICD-10-CM

## 2025-03-28 DIAGNOSIS — N18.30 STAGE 3 CHRONIC KIDNEY DISEASE, UNSPECIFIED WHETHER STAGE 3A OR 3B CKD: ICD-10-CM
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
## 2025-03-28 DIAGNOSIS — S30.1XXA ABDOMINAL HEMATOMA: ICD-10-CM
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
## 2025-03-28 PROCEDURE — 70450 CT HEAD/BRAIN W/O DYE: CPT

## 2025-03-28 PROCEDURE — 74150 CT ABDOMEN W/O CONTRAST: CPT

## 2025-04-01 ENCOUNTER — CARE COORDINATION (OUTPATIENT)
Dept: CASE MANAGEMENT | Age: 70
End: 2025-04-01

## 2025-04-01 NOTE — CARE COORDINATION
Made some calls and discovered that no other agencies cover [facility] so therefore, there is no one that can see the patient at this time for  home health. I called him back and let him know this.  I asked if he felt like he is strong enough to get out or is interested in doing outpatient therapy.  He said he is not interested in that at this time.  He said he has an exercise bike and a treadmill and he can do those.  We also discussed walking on level surfaces.  We discussed falling precautions.  I told him if it becomes something we need to re-address, we can do that again if we need to.  He voiced understanding.

## 2025-04-01 NOTE — CARE COORDINATION
Placed a call to [facility] with Monica and spoke with intake.  Was told they had gotten a referral to see patient for PT but could not accept due to staffing.  Will contact patient to see what other agency he would be willing to go with and see if the PCP's office can send it elsewhere.

## 2025-04-01 NOTE — CARE COORDINATION
Care Transitions Note    Follow Up Call     Patient Current Location:    Home: [address]  [place] [address]    Care Transition Nurse contacted the patient, spouse/partner  by telephone. Verified name and  as identifiers.    Additional needs identified to be addressed with provider   No needs identified       Method of communication with provider: none.    Care Summary Note:   Placed a call to the patient for follow up.  Spoke first with wife then the two together over speaker phone.  Patient reported he is doing better.  Said he has been healed.  He said he is not completely well, but is feeling so much better than he was at one time.  Said he is breathing better, coughing less and is eating better.  Said everything is better except that he is still a little weak and not getting the strength back in his legs like he wishes. We discussed simple exercise examples that he can do on his own, but they still have not heard from home health.  He is wanting  PT.  I will check on this again. He has seen his PCP.  No other needs noted.      Plan of care updates since last contact:  Review of patient management of conditions/medications:    HH: Requesting [facility]  PT    Medication Review:  No changes since last call.     Assessments:  Care Transitions Subsequent and Final Call    Schedule Follow Up Appointment with PCP: Completed  Subsequent and Final Calls  Have your medications changed?: No  Do you have any questions related to your medications?: No  Do you currently have any active services?: No  Do you have any needs or concerns that I can assist you with?: No  Identified Barriers: None  Care Transitions Interventions  Other Interventions:            Follow Up Appointment:   KELLY appointment attended as scheduled   Future Appointments         Provider Specialty Dept Phone    2025 1:00 PM Addy Lozano PA-C Orthopedic Surgery [phone]    2025 11:00 AM Maikel Rodrigez MD Orthopedic Surgery [phone]

## 2025-04-08 ENCOUNTER — CARE COORDINATION (OUTPATIENT)
Dept: CASE MANAGEMENT | Age: 70
End: 2025-04-08

## 2025-04-08 ENCOUNTER — READMISSION MANAGEMENT (OUTPATIENT)
Dept: CALL CENTER | Facility: HOSPITAL | Age: 70
End: 2025-04-08
Payer: MEDICARE

## 2025-04-08 NOTE — OUTREACH NOTE
COPD/PN Week 3 Survey      Flowsheet Row Responses   [facility] facility patient discharged from? [facility]   Does the patient have one of the following disease processes/diagnoses(primary or secondary)? COPD   Week 3 attempt successful? No   Unsuccessful attempts Attempt 1   Revoke Other            Gisele H - Registered Nurse

## 2025-04-08 NOTE — CARE COORDINATION
concerns that I can assist you with?: No  Identified Barriers: None  Care Transitions Interventions  Other Interventions:            Follow Up Appointment:   Reviewed upcoming appointment(s). Reported that he sees his PCP on 4/10.  Future Appointments         Provider Specialty Dept Phone    5/28/2025 1:00 PM Addy Lozano PA-C Orthopedic Surgery [phone]    8/13/2025 11:00 AM Maikel Rodrigez MD Orthopedic Surgery [phone]          Care Transition Nurse provided contact information.    Plan for follow-up call in 6-10 days based on severity of symptoms and risk factors.  Plan for next call: symptom management-appetite, respiratory issues, cardiac issues, activity tolerance/mobility, other  findings from follow up appt     France Perez RN

## 2025-04-09 NOTE — PROGRESS NOTES
Enter Query Response Below      Query Response: Possible Myasthenia Gravis              If applicable, please update the problem list.

## 2025-04-09 NOTE — PROGRESS NOTES
Enter Query Response Below      Query Response: Acute kidney injury (BELTRAN) was supported with additional clinical indicators: (please specify) baseline creat. 0.7             If applicable, please update the problem list.

## 2025-04-16 ENCOUNTER — CARE COORDINATION (OUTPATIENT)
Dept: CASE MANAGEMENT | Age: 70
End: 2025-04-16

## 2025-04-16 NOTE — CARE COORDINATION
Care Transitions Note    Follow Up Call     Attempted to reach patient for transitions of care follow up.  Unable to reach patient.      Outreach Attempts:   Unable to leave message.     Follow Up Appointment:   Future Appointments         Provider Specialty Dept Phone    5/28/2025 1:00 PM Addy Lozano PA-C Orthopedic Surgery [phone]    8/13/2025 11:00 AM Maikel Rodrigez MD Orthopedic Surgery [phone]          Plan for follow-up call in 6-10 days based on severity of symptoms and risk factors.   Plan for next call:  symptom management-appetite, respiratory issues, cardiac issues, activity tolerance/mobility, other  findings from follow up appt, readiness for CTN sign off    France Perez RN

## 2025-04-17 ENCOUNTER — TELEPHONE (OUTPATIENT)
Dept: NEUROLOGY | Facility: CLINIC | Age: 70
End: 2025-04-17

## 2025-04-17 PROBLEM — R41.82 AMS (ALTERED MENTAL STATUS): Status: ACTIVE | Noted: 2025-04-17

## 2025-04-17 NOTE — TELEPHONE ENCOUNTER
Caller: Sangita Lucero    Relationship to patient: Emergency Contact; SPOUSE    Best call back number: [phone]    Chief complaint: PT'S WIFE CALLED TO REPORT THAT PT HAS HAD ANOTHER SPELL TODAY, 4/17/25. SHE STATES PT IS VERY CONFUSED AND UNRESPONSIVE TO HER. SHE ALSO NOTES HE KEEPS RUNNING INTO THINGS. PT WAS ORIGINALLY SCHEDULED FOR HIS HOSPITAL F/U APPT W/ DR. DIAL TODAY BUT HAD CALLED EARLIER TO RESCHEDULE. PT RESCHEDULED FOR 4/21/25. PT'S WIFE THEN CALLED WITH REQUEST TO RECLAIM PT'S APPT FOR TODAY DUE TO HIS CURRENT SYMPTOMS.     I ADVISED PT'S WIFE THAT PT SHOULD REPORT TO THE NEAREST ED FOR FURTHER EVALUATION GIVEN HIS REPORTED SYMPTOMS. PT'S WIFE VERBALIZED UNDERSTANDING AND WILL GET PT TO THE ED.    Patient directed to call 911 or go to their nearest emergency room.     Patient verbalized understanding: [x] Yes  [] No    DOCUMENTING PER PROTOCOL.

## 2025-04-17 NOTE — TELEPHONE ENCOUNTER
PT SPOUSE CALLING TO SEE IF APPT FOR 4/17/25 AT 2:00 COULD BE PUSHED BACK.  PROVIDER CANNOT DO THAT TODAY, PLEASE FIND A PLACE TO WORK THIS APPOINTMENT IN ASAP    THANK YOU

## 2025-04-17 NOTE — TELEPHONE ENCOUNTER
Spoke to pt's wife & daughter and thoroughly explained that the pt has had an extensive workup through the ED, while admitted previously, and then through his PCP.   An emergency room visit may not be necessary for the same things he had previously been treated/worked up for and that our office advised to follow up with PCP &/or Urgent care for potentially routine labs to assure he did not have something else going on if needed.  This case was discussed with Dr. Gordon & Dr. Flores to which gave these directions- advised to keep follow up appt with neurology.

## 2025-04-17 NOTE — PROGRESS NOTES
Brief admit note from office:    Patient presented to the office with multiple episodes of near syncope, confusion, AMS, agitation, incontinence (feces and urine). He was recently seen at [facility] for similar, although now much worse per family in the room. He was directly admitted to [facility] from the office for further evaluation. He recently had MRI brain, EEG, neurology, etc. Would like to again have neurology follow along and plan to repeat MRI brain with and without contrast and add lumbar puncture while inpatient.     Yossi Rosen MD

## 2025-04-18 NOTE — PLAN OF CARE
Goal Outcome Evaluation:  Plan of Care Reviewed With: (P) patient, family           Outcome Evaluation: (P) PT evaluation completed. Pt found in fowlers on room air, with family at bedside upon arrival,and orientedx3 with disoriented to time. Pt reports that he was primarily uses his motorized scooter and using RW to walk short distances, difficulty obtaining pt t/f ability. Pt reports that he has adjustable bed at home, a scooter, w/c, ramp, walk in shower, and shower chair. Pt BP was taken in supine reading 114/62 with HR 97. Pt performed supine to sitting t/f with Tara, verbal cues with HOB elevated, and bedrails. Pt reported symptoms of some dizziness in sitting with patient educated on putting hand down on the bed and moving UEs to increase blood flow. Pt BP was taken in sitting reading 92/52 with HR 64 with positive orthostatic hypotension, pt reporting increased dizziness symptoms, demoing posterior lean and not feeling good. Pt was layed back down in bed with legs elevated with reports of decrease in symptoms with nursing/aide notified. When symptoms were calmed down sensation testing and limited strenght were performed with pt supine with pt demoing sensation deficits in bilateral bottom of feet and demoing UE weakness with noted increase weakness in R UE. Pt reports feeling better and with nursing staff BP was taken in sitting reading 101/52 with HR 98 with no reports of dizziness. Pt performed STS t/f ModA and RW with verbal cues positioning, BP taken in standing reading 97/47, and no c/o of dizziness. Pt ambulated a short distance forward and backwards with ModA and RW before verbalizing needing to sit down due to weakness. Pt reported no dizziness symptoms left sitting in bed with family at bedside, call button, and encouraged to call if need anything. Recommended pt be seen during stay to improve LE/UE strength, functional mobility, and activity tolerance. Based on evaluation recommended pt d/c to SNF  to improve LE/UE strength, functional mobility, and activity tolerance or home with 24 7 assistance and home health depending on the support family is able to provide.    Anticipated Discharge Disposition (PT): (P) skilled nursing facility, home with 24/7 care, home with home health

## 2025-04-18 NOTE — PLAN OF CARE
Goal Outcome Evaluation:      Pt is A&Ox4, but can be confused to situation. Wound to 2nd toe on L foot. Up x1 assit to BR. Numbness and tingling in feet. No c/o pain. Call light within reach. Safety maintained.

## 2025-04-18 NOTE — H&P
History and Physical    Patient:  Franko Lucero  MRN: [id number]    CHIEF COMPLAINT: Altered mental status    History Obtained From: the patient   PCP: Figueroa Chaves MD    HISTORY OF PRESENT ILLNESS:   The patient is a 69 y.o. male who presents with multiple episodes of near syncope, confusion, AMS, agitation, incontinence (feces and urine).  He presented to the office with the above symptoms on 4/17/2025.  He was recently seen at [facility] for similar, although now much worse per family in the room. He was directly admitted to [facility] from the office for further evaluation. He recently had MRI brain, EEG, neurology consultation, etc. Would like to again have neurology follow along.  No further episodes since admission.    REVIEW OF SYSTEMS:    Review of Systems   Constitutional:  Positive for activity change and fatigue.   Respiratory:  Positive for cough. Negative for shortness of breath.    Gastrointestinal:  Negative for abdominal pain, nausea and vomiting.   Skin:  Negative for color change.   Neurological:  Positive for syncope. Negative for seizures.   Psychiatric/Behavioral:  Positive for agitation, behavioral problems and confusion.           Past Medical History:  Past Medical History:   Diagnosis Date    Anemia     Anxiety     Arthritis     BPH (benign prostatic hypertrophy)     CAD (coronary artery disease)     Cancer     non-hodgkins lymphoma    Cirrhosis     Diabetes mellitus     Disease of thyroid gland     GERD (gastroesophageal reflux disease)     Hearing loss     History of transfusion     Hyperlipidemia     Hypertension     Iliac artery aneurysm, bilateral     Kidney stone     Liver cirrhosis     Migraines     Sleep apnea     c-pap       Past Surgical History:  Past Surgical History:   Procedure Laterality Date    COLONOSCOPY  02/04/2014    COLONOSCOPY N/A 04/26/2017    Procedure: COLONOSCOPY WITH ANESTHESIA;  Surgeon: Sher Cui MD;  Location: [facility] ENDOSCOPY;  Service:     CORONARY  ARTERY BYPASS GRAFT      2    CYSTOSCOPY URETEROSCOPY LASER LITHOTRIPSY Left 04/17/2017    Procedure: URETEROSCOPY LASER LITHOTRIPSY WITH DOUBLE J STENT INSERTION, LEFT ;  Surgeon: Weston Peck MD;  Location:  [facility] OR;  Service:     CYSTOSCOPY URETEROSCOPY LASER LITHOTRIPSY Left 08/14/2017    Procedure: CYSTOSCOPY URETEROSCOPY LASER LITHOTRIPSY STENT INSERTION STONE EXTRACTION;  Surgeon: Weston Peck MD;  Location:  [facility] OR;  Service:     CYSTOSCOPY W/ URETERAL STENT PLACEMENT Left 04/17/2017    Procedure: CYSTOSCOPY URETERAL DOUBLE J STENT INSERTION, LEFT;  Surgeon: Weston Peck MD;  Location:  [facility] OR;  Service:     ENDOSCOPY N/A 04/26/2017    Procedure: ESOPHAGOGASTRODUODENOSCOPY WITH ANESTHESIA;  Surgeon: Sher Cui MD;  Location: [facility] ENDOSCOPY;  Service:     INCISION AND DRAINAGE LEG Right 07/12/2023    Procedure: INCISION AND DRAINAGE - RIGHT FOOT;  Surgeon: Silas Swan DPM;  Location:  [facility] OR;  Service: Podiatry;  Laterality: Right;    JOINT REPLACEMENT Right     KIDNEY STONE SURGERY      KNEE SURGERY Right 08/2024    replacement    NECK SURGERY      ROTATOR CUFF REPAIR      SHOULDER SURGERY      TONSILLECTOMY      TRANS METATARSAL AMPUTATION Right 08/09/2023    Procedure: Partial 4th Ray Amputation - Right Foot;  Surgeon: Silas Swan DPM;  Location:  [facility] OR;  Service: Podiatry;  Laterality: Right;    URETEROSCOPY LASER LITHOTRIPSY WITH STENT INSERTION Left 09/01/2022    Procedure: LEFT URETEROSCOPY LASER LITHOTRIPSY WITH STENT INSERTION;  Surgeon: Weston Peck MD;  Location:  [facility] OR;  Service: Urology;  Laterality: Left;    URETEROSCOPY LASER LITHOTRIPSY WITH STENT INSERTION Left 09/15/2022    Procedure: LEFT URETEROSCOPY LASER LITHOTRIPSY WITH STENT EXCHANGE;  Surgeon: Weston Peck MD;  Location:  [facility] OR;  Service: Urology;  Laterality: Left;       Medications Prior to Admission:    Medications Prior to Admission   Medication  Sig Dispense Refill Last Dose/Taking    acetaminophen (TYLENOL) 325 MG tablet Take 2 tablets by mouth Every 6 (Six) Hours As Needed for Mild Pain, Fever or Moderate Pain.       ALPRAZolam (XANAX) 0.25 MG tablet Take 1 tablet by mouth Every Night. 30 tablet 0     busPIRone (BUSPAR) 15 MG tablet Take 1 tablet by mouth 2 (Two) Times a Day.       clopidogrel (PLAVIX) 75 MG tablet Take 1 tablet by mouth Every Morning.       docusate sodium (COLACE) 100 MG capsule Take 1 capsule by mouth Every Evening.       DULoxetine (CYMBALTA) 60 MG capsule Take 1 capsule by mouth Every Night.       ferrous sulfate 325 (65 FE) MG EC tablet Take 1 tablet by mouth 2 (Two) Times a Day.       gabapentin (NEURONTIN) 300 MG capsule Take 1 capsule by mouth Every Night.       GARLIC-CALCIUM PO Take 1 tablet by mouth Daily.       guaiFENesin (MUCINEX) 600 MG 12 hr tablet Take 1 tablet by mouth 2 (Two) Times a Day.       icosapent ethyl (VASCEPA) 1 g capsule capsule Take 2 g by mouth 2 (Two) Times a Day With Meals.       Insulin Degludec (Tresiba) 100 UNIT/ML solution injection Inject 18 Units under the skin into the appropriate area as directed Every Night.       levothyroxine (SYNTHROID, LEVOTHROID) 75 MCG tablet Take 1 tablet by mouth Every Morning.       metFORMIN (GLUCOPHAGE) 500 MG tablet Take 1 tablet by mouth 2 (Two) Times a Day With Meals.       naloxone (NARCAN) 4 MG/0.1ML nasal spray Administer 1 spray into the nostril(s) as directed by provider As Needed for Opioid Reversal. Call 911. Don't prime. [place] in 1 nostril for overdose. Repeat in 2-3 minutes in other nostril if no or minimal breathing/responsiveness.       nitroglycerin (NITROSTAT) 0.4 MG SL tablet Place 1 tablet under the tongue Every 5 (Five) Minutes As Needed for Chest Pain.       pantoprazole (PROTONIX) 40 MG EC tablet Take 1 tablet by mouth Daily.       polyethylene glycol (MIRALAX) powder Take 17 g by mouth Daily As Needed.       potassium chloride (KLOR-CON M20) 20  "MEQ CR tablet Take 1 tablet by mouth Daily.       ranolazine (RANEXA) 1000 MG 12 hr tablet Take 1 tablet by mouth 2 (Two) Times a Day.       rosuvastatin (CRESTOR) 40 MG tablet Take 1 tablet by mouth Daily.       Vitamin D, Cholecalciferol, 1000 UNITS tablet Take 1 tablet by mouth Daily.          Allergies:  Adhesive tape, Cefazolin, Cefuroxime axetil, Cephalosporins, Neomycin-polymyxin b gu, and Percocet [oxycodone-acetaminophen]    Social History:   Social History     Socioeconomic History    Marital status:    Tobacco Use    Smoking status: Former     Current packs/day: 0.00     Types: Cigarettes     Quit date:      Years since quittin.3     Passive exposure: Never    Smokeless tobacco: Never   Vaping Use    Vaping status: Never Used   Substance and Sexual Activity    Alcohol use: No    Drug use: No    Sexual activity: Defer       Family History:   Family History   Problem Relation Age of Onset    Kidney disease Father     Heart disease Father     Cancer Mother         brain    Colon cancer Neg Hx     Colon polyps Neg Hx            Physical Exam:    Vitals: /58 (BP Location: Left arm, Patient Position: Sitting)   Pulse 102   Temp 97.6 °F (36.4 °C) (Oral)   Resp 16   Ht 182.9 cm (72\")   SpO2 97%   BMI 25.55 kg/m²   Physical Exam  Vitals reviewed.   Constitutional:       Appearance: Normal appearance. He is not ill-appearing.   HENT:      Head: Normocephalic and atraumatic.   Eyes:      General:         Right eye: No discharge.         Left eye: No discharge.      Extraocular Movements: Extraocular movements intact.      Conjunctiva/sclera: Conjunctivae normal.   Cardiovascular:      Rate and Rhythm: Normal rate and regular rhythm.      Pulses: Normal pulses.      Heart sounds: No murmur heard.  Pulmonary:      Effort: Pulmonary effort is normal. No respiratory distress.      Breath sounds: No wheezing or rhonchi.   Abdominal:      General: Abdomen is flat. Bowel sounds are normal. There " is no distension.   Skin:     Capillary Refill: Capillary refill takes less than 2 seconds.   Neurological:      General: No focal deficit present.      Mental Status: He is alert and oriented to person, place, and time.   Psychiatric:         Mood and Affect: Mood normal.           Lab Results (last 24 hours)       Procedure Component Value Units Date/Time    CBC & Differential [[id number]]  (Abnormal) Collected: 04/18/25 0355    Specimen: Blood Updated: 04/18/25 0530    Narrative:      The following orders were created for panel order CBC & Differential.  Procedure                               Abnormality         Status                     ---------                               -----------         ------                     CBC Auto Differential[[id number]]        Abnormal            Final result                 Please view results for these tests on the individual orders.    CBC Auto Differential [[id number]]  (Abnormal) Collected: 04/18/25 0355    Specimen: Blood Updated: 04/18/25 0530     WBC 5.55 10*3/mm3      RBC 2.61 10*6/mm3      Hemoglobin 9.4 g/dL      Hematocrit 30.2 %      .7 fL      MCH 36.0 pg      MCHC 31.1 g/dL      RDW 17.9 %      RDW-SD 75.7 fl      MPV 10.1 fL      Platelets 60 10*3/mm3     Manual Differential [[id number]]  (Abnormal) Collected: 04/18/25 0355    Specimen: Blood Updated: 04/18/25 0530     Neutrophil % 79.6 %      Lymphocyte % 7.5 %      Monocyte % 10.8 %      Eosinophil % 2.2 %      Basophil % 0.0 %      Neutrophils Absolute 4.42 10*3/mm3      Lymphocytes Absolute 0.42 10*3/mm3      Monocytes Absolute 0.60 10*3/mm3      Eosinophils Absolute 0.12 10*3/mm3      Basophils Absolute 0.00 10*3/mm3      Anisocytosis Slight/1+     Macrocytes Slight/1+     Polychromasia Slight/1+     WBC Morphology Normal     Platelet Estimate Decreased    Basic Metabolic Panel [[id number]]  (Abnormal) Collected: 04/18/25 0355    Specimen: Blood Updated: 04/18/25 0454     Glucose 128 mg/dL      BUN 19  mg/dL      Creatinine 1.38 mg/dL      Sodium 141 mmol/L      Potassium 3.6 mmol/L      Chloride 102 mmol/L      CO2 23.0 mmol/L      Calcium 9.7 mg/dL      BUN/Creatinine Ratio 13.8     Anion Gap 16.0 mmol/L      eGFR 54.7 mL/min/1.73     Narrative:      GFR Categories in Chronic Kidney Disease (CKD)      GFR Category          GFR (mL/min/1.73)    Interpretation  G1                     90 or greater         Normal or high (1)  G2                      60-89                Mild decrease (1)  G3a                   45-59                Mild to moderate decrease  G3b                   30-44                Moderate to severe decrease  G4                    15-29                Severe decrease  G5                    14 or less           Kidney failure          (1)In the absence of evidence of kidney disease, neither GFR category G1 or G2 fulfill the criteria for CKD.    eGFR calculation 2021 CKD-EPI creatinine equation, which does not include race as a factor    Basic Metabolic Panel [[id number]]  (Abnormal) Collected: 04/17/25 1948    Specimen: Blood Updated: 04/17/25 2011     Glucose 152 mg/dL      BUN 19 mg/dL      Creatinine 1.40 mg/dL      Sodium 139 mmol/L      Potassium 3.5 mmol/L      Chloride 101 mmol/L      CO2 24.0 mmol/L      Calcium 9.4 mg/dL      BUN/Creatinine Ratio 13.6     Anion Gap 14.0 mmol/L      eGFR 53.7 mL/min/1.73     Narrative:      GFR Categories in Chronic Kidney Disease (CKD)      GFR Category          GFR (mL/min/1.73)    Interpretation  G1                     90 or greater         Normal or high (1)  G2                      60-89                Mild decrease (1)  G3a                   45-59                Mild to moderate decrease  G3b                   30-44                Moderate to severe decrease  G4                    15-29                Severe decrease  G5                    14 or less           Kidney failure          (1)In the absence of evidence of kidney disease, neither GFR  category G1 or G2 fulfill the criteria for CKD.    eGFR calculation 2021 CKD-EPI creatinine equation, which does not include race as a factor    CBC & Differential [[id number]]  (Abnormal) Collected: 04/17/25 1948    Specimen: Blood Updated: 04/17/25 1958    Narrative:      The following orders were created for panel order CBC & Differential.  Procedure                               Abnormality         Status                     ---------                               -----------         ------                     CBC Auto Differential[[id number]]        Abnormal            Final result                 Please view results for these tests on the individual orders.    CBC Auto Differential [[id number]]  (Abnormal) Collected: 04/17/25 1948    Specimen: Blood Updated: 04/17/25 1958     WBC 5.75 10*3/mm3      RBC 2.50 10*6/mm3      Hemoglobin 9.4 g/dL      Hematocrit 28.7 %      .8 fL      MCH 37.6 pg      MCHC 32.8 g/dL      RDW 18.0 %      RDW-SD 75.6 fl      MPV 9.9 fL      Platelets 66 10*3/mm3      Neutrophil % 69.7 %      Lymphocyte % 14.8 %      Monocyte % 13.6 %      Eosinophil % 1.4 %      Basophil % 0.3 %      Immature Grans % 0.2 %      Neutrophils, Absolute 4.01 10*3/mm3      Lymphocytes, Absolute 0.85 10*3/mm3      Monocytes, Absolute 0.78 10*3/mm3      Eosinophils, Absolute 0.08 10*3/mm3      Basophils, Absolute 0.02 10*3/mm3      Immature Grans, Absolute 0.01 10*3/mm3      nRBC 0.0 /100 WBC              -----------------------------------------------------------------  EKG:   Radiology:     No results found.    Assessment and Plan     Primary Problem:  AMS (altered mental status)    Hospital Problem list:    AMS (altered mental status)      PMH:  Past Medical History:   Diagnosis Date    Anemia     Anxiety     Arthritis     BPH (benign prostatic hypertrophy)     CAD (coronary artery disease)     Cancer     non-hodgkins lymphoma    Cirrhosis     Diabetes mellitus     Disease of thyroid gland     GERD  "(gastroesophageal reflux disease)     Hearing loss     History of transfusion     Hyperlipidemia     Hypertension     Iliac artery aneurysm, bilateral     Kidney stone     Liver cirrhosis     Migraines     Sleep apnea     c-pap       Treatment Plan:  Altered mental status: Family reports agitation, confusion, disorientation during these \"episodes\".  Discussed with Dr. Gordon who recommended against repeating MRI with or without contrast as there is not something structural that would explain his symptoms.  Most concerning for Lewy body dementia with orthostatic hypotension.  - IVF for orthostatics  - PT/OT    Cough: Symptom control    CODE STATUS: Full    DVT prophylaxis: Home Plavix    Disposition: Inpatient    Yossi Rosen MD 4/18/2025 08:11 CDT    "

## 2025-04-18 NOTE — PLAN OF CARE
Goal Outcome Evaluation:  Plan of Care Reviewed With: patient        Progress: no change  Outcome Evaluation: Aox4 forgetful at times, Up x1 w walker. 2L NC at night d/t not having Cpap, Swollen abd from cirrhosis, eyes are little yellow. wound on L foot 2nd toe. Numbness BLE. BLE swollen. Pt has good hand  but drops items freq. Has incont. spells at times w/ urine.  Call light within reach, bed alarm on, pt moves freq in bed.

## 2025-04-18 NOTE — CONSULTS
"  Neurology Consult Note    Consult Date: 2025  Referring MD: Yossi Rosen MD  Reason for Consult: spells    Patient: Franko Lucero (69 y.o. male)  MRN: [id number]  : 1955    History of Present Illness:   Franko Lucero is a 69 y.o. male who continues to have spells.  He has had multiple neurologic workups in the past for this exact entity.  Most recent neurologic consultation was dated 3/13 of this year and performed by NEAL Mathias and signed by Dr. Carnes.  At that time they postulated that the syncopal spells had a low suspicion for seizure-like activity.  The EEG showed nonspecific slowing.  Orthostasis was considered as well.  There was concern over whether there may be some components of myasthenia gravis with his ptosis and they sent away a myasthenia panel which has now came back and is unremarkable.  Prior to this the [facility] neurology department saw the patient on 2022.  He was admitted for syncope and hypotension.  It was noted that time the patient had a severe peripheral neuropathy.  He also had some medications that may have been inducing some orthostasis.  He was diagnosed with orthostatic hypotension and some of it was attributed to having a severe diabetic polyneuropathy.  There may have also been a component of medications causing the hypotension.    His wife asked him to go to the doctor yesterday.  He was direct admitted by his primary care doctor.  I was able to speak to his wife over the phone and spoke to the patient at length in the room.  He also has multiple family members.  He tells me that he is having several spells per day.  The spells consist of him just \"losing control.\"  He does not really lose consciousness.  He often just does bizarre behavior.  They give the example that he was in his electric wheelchair and hit the accelerator button and written to several walls and even several people.  He knew he was hitting the button but he had " difficulties releasing it.  No seizure activity has been seen.  There has been no tongue biting and no incontinence.  No generalized tonic-clonic activity was seen.  The patient knows that the spells are coming.  He retains consciousness.  He is even able to speak through them and answer questions appropriately.  He just feels as if he loses control of his body.  His wife believes occasionally he is slow to answer during the spells or can stare off in space.    He does have a spell while I am in the room.  He tells me that he is about to have 1.  As he is having he says see this is me having a spell.  I cannot really notice any difference in him.  Maybe he takes a little bit longer to form sentences but other than that I see no neurologic changes.      Medical History:   Past Medical/Surgical Hx:  Past Medical History:   Diagnosis Date    Anemia     Anxiety     Arthritis     BPH (benign prostatic hypertrophy)     CAD (coronary artery disease)     Cancer     non-hodgkins lymphoma    Cirrhosis     Diabetes mellitus     Disease of thyroid gland     GERD (gastroesophageal reflux disease)     Hearing loss     History of transfusion     Hyperlipidemia     Hypertension     Iliac artery aneurysm, bilateral     Kidney stone     Liver cirrhosis     Migraines     Sleep apnea     c-pap     Past Surgical History:   Procedure Laterality Date    COLONOSCOPY  02/04/2014    COLONOSCOPY N/A 04/26/2017    Procedure: COLONOSCOPY WITH ANESTHESIA;  Surgeon: Sher Cui MD;  Location: [facility] ENDOSCOPY;  Service:     CORONARY ARTERY BYPASS GRAFT      2    CYSTOSCOPY URETEROSCOPY LASER LITHOTRIPSY Left 04/17/2017    Procedure: URETEROSCOPY LASER LITHOTRIPSY WITH DOUBLE J STENT INSERTION, LEFT ;  Surgeon: Weston Peck MD;  Location: [facility] OR;  Service:     CYSTOSCOPY URETEROSCOPY LASER LITHOTRIPSY Left 08/14/2017    Procedure: CYSTOSCOPY URETEROSCOPY LASER LITHOTRIPSY STENT INSERTION STONE EXTRACTION;  Surgeon: Weston  Alejo Peck MD;  Location:  [facility] OR;  Service:     CYSTOSCOPY W/ URETERAL STENT PLACEMENT Left 04/17/2017    Procedure: CYSTOSCOPY URETERAL DOUBLE J STENT INSERTION, LEFT;  Surgeon: Weston Peck MD;  Location:  [facility] OR;  Service:     ENDOSCOPY N/A 04/26/2017    Procedure: ESOPHAGOGASTRODUODENOSCOPY WITH ANESTHESIA;  Surgeon: Sher Cui MD;  Location:  [facility] ENDOSCOPY;  Service:     INCISION AND DRAINAGE LEG Right 07/12/2023    Procedure: INCISION AND DRAINAGE - RIGHT FOOT;  Surgeon: Silas Swan DPM;  Location:  [facility] OR;  Service: Podiatry;  Laterality: Right;    JOINT REPLACEMENT Right     KIDNEY STONE SURGERY      KNEE SURGERY Right 08/2024    replacement    NECK SURGERY      ROTATOR CUFF REPAIR      SHOULDER SURGERY      TONSILLECTOMY      TRANS METATARSAL AMPUTATION Right 08/09/2023    Procedure: Partial 4th Ray Amputation - Right Foot;  Surgeon: Silas Swan DPM;  Location:  [facility] OR;  Service: Podiatry;  Laterality: Right;    URETEROSCOPY LASER LITHOTRIPSY WITH STENT INSERTION Left 09/01/2022    Procedure: LEFT URETEROSCOPY LASER LITHOTRIPSY WITH STENT INSERTION;  Surgeon: Weston Peck MD;  Location:  [facility] OR;  Service: Urology;  Laterality: Left;    URETEROSCOPY LASER LITHOTRIPSY WITH STENT INSERTION Left 09/15/2022    Procedure: LEFT URETEROSCOPY LASER LITHOTRIPSY WITH STENT EXCHANGE;  Surgeon: Weston Peck MD;  Location:  [facility] OR;  Service: Urology;  Laterality: Left;       Medications On Admission:  Medications Prior to Admission   Medication Sig Dispense Refill Last Dose/Taking    ALPRAZolam (XANAX) 0.25 MG tablet Take 1 tablet by mouth Every Night. 30 tablet 0 Taking    Budeson-Glycopyrrol-Formoterol (Breztri Aerosphere) 160-9-4.8 MCG/ACT aerosol inhaler Inhale 2 puffs 2 (Two) Times a Day. (Patient taking differently: Inhale 2 puffs 2 (Two) Times a Day As Needed.)   Patient Taking Differently    busPIRone (BUSPAR) 15 MG tablet Take 1 tablet by mouth  2 (Two) Times a Day.   Taking    clopidogrel (PLAVIX) 75 MG tablet Take 1 tablet by mouth Every Morning.   Taking    docusate sodium (COLACE) 100 MG capsule Take 2 capsules by mouth Every Evening.   Taking    DULoxetine (CYMBALTA) 60 MG capsule Take 1 capsule by mouth Every Night.   Taking    empagliflozin (JARDIANCE) 25 MG tablet tablet Take 1 tablet by mouth Daily.   Taking    ferrous sulfate 325 (65 FE) MG EC tablet Take 1 tablet by mouth 2 (Two) Times a Day.   Taking    gabapentin (NEURONTIN) 300 MG capsule Take 1 capsule by mouth Every Night.   Taking    GARLIC PO Take 1 capsule by mouth Daily.   Taking    acetaminophen (TYLENOL) 325 MG tablet Take 2 tablets by mouth Every 6 (Six) Hours As Needed for Mild Pain, Fever or Moderate Pain.       guaiFENesin (MUCINEX) 600 MG 12 hr tablet Take 1 tablet by mouth Daily.   Taking    icosapent ethyl (VASCEPA) 1 g capsule capsule Take 2 g by mouth 2 (Two) Times a Day With Meals.   Taking    Insulin Degludec (Tresiba) 100 UNIT/ML solution injection Inject 18 Units under the skin into the appropriate area as directed Every Night. (Patient not taking: Reported on 4/18/2025)   Not Taking    levothyroxine (SYNTHROID, LEVOTHROID) 75 MCG tablet Take 1 tablet by mouth Every Morning.   Taking    metFORMIN (GLUCOPHAGE) 500 MG tablet Take 1 tablet by mouth 2 (Two) Times a Day With Meals.   Taking    naloxone (NARCAN) 4 MG/0.1ML nasal spray Administer 1 spray into the nostril(s) as directed by provider As Needed for Opioid Reversal. Call 911. Don't prime. [place] in 1 nostril for overdose. Repeat in 2-3 minutes in other nostril if no or minimal breathing/responsiveness. (Patient not taking: Reported on 4/18/2025)   Not Taking    nitroglycerin (NITROSTAT) 0.4 MG SL tablet Place 1 tablet under the tongue Every 5 (Five) Minutes As Needed for Chest Pain.       pantoprazole (PROTONIX) 40 MG EC tablet Take 1 tablet by mouth Daily.   Taking    polyethylene glycol (MIRALAX) powder Take 17 g by  mouth Daily As Needed.       potassium chloride (KLOR-CON M20) 20 MEQ CR tablet Take 1 tablet by mouth Daily. (Patient not taking: Reported on 4/18/2025)   Not Taking    ranolazine (RANEXA) 1000 MG 12 hr tablet Take 1 tablet by mouth 2 (Two) Times a Day.   Taking    rosuvastatin (CRESTOR) 40 MG tablet Take 1 tablet by mouth Daily.   Taking    Vitamin D, Cholecalciferol, 1000 UNITS tablet Take 1 tablet by mouth Daily.   Taking       Current Medications:    Current Facility-Administered Medications:     acetaminophen (TYLENOL) tablet 650 mg, 650 mg, Oral, Q4H PRN **OR** acetaminophen (TYLENOL) 160 MG/5ML oral solution 650 mg, 650 mg, Oral, Q4H PRN **OR** acetaminophen (TYLENOL) suppository 650 mg, 650 mg, Rectal, Q4H PRN, Yossi Rosen MD    ALPRAZolam (XANAX) tablet 0.25 mg, 0.25 mg, Oral, Nightly PRN, Yossi Rosen MD    sennosides-docusate (PERICOLACE) 8.6-50 MG per tablet 2 tablet, 2 tablet, Oral, BID PRN **AND** polyethylene glycol (MIRALAX) packet 17 g, 17 g, Oral, Daily PRN **AND** bisacodyl (DULCOLAX) EC tablet 5 mg, 5 mg, Oral, Daily PRN **AND** bisacodyl (DULCOLAX) suppository 10 mg, 10 mg, Rectal, Daily PRN, Yossi Rosen MD    busPIRone (BUSPAR) tablet 15 mg, 15 mg, Oral, Q12H, Yossi Rosen MD, 15 mg at 04/18/25 0925    clopidogrel (PLAVIX) tablet 75 mg, 75 mg, Oral, Daily, Yossi Rosen MD, 75 mg at 04/18/25 0926    DULoxetine (CYMBALTA) DR capsule 60 mg, 60 mg, Oral, Daily, Yossi Rosen MD, 60 mg at 04/18/25 0926    gabapentin (NEURONTIN) capsule 300 mg, 300 mg, Oral, Nightly, Yossi Rosen MD, 300 mg at 04/17/25 2219    guaiFENesin (MUCINEX) 12 hr tablet 600 mg, 600 mg, Oral, Q12H, Yossi Rosen MD, 600 mg at 04/18/25 0926    guaiFENesin-codeine (ROMILAR-AC) solution 5 mL, 5 mL, Oral, Q6H PRN, Yossi Rosen MD, 5 mL at 04/18/25 1149    levothyroxine (SYNTHROID, LEVOTHROID) tablet 75 mcg, 75 mcg, Oral, Q AM, Yossi Rosen MD, 75 mcg at 04/18/25 0608     metFORMIN (GLUCOPHAGE) tablet 500 mg, 500 mg, Oral, BID With Meals, Yossi Rosen MD, 500 mg at 25 0926    ondansetron ODT (ZOFRAN-ODT) disintegrating tablet 4 mg, 4 mg, Oral, Q6H PRN **OR** ondansetron (ZOFRAN) injection 4 mg, 4 mg, Intravenous, Q6H PRN, Yossi Rosen MD    pantoprazole (PROTONIX) EC tablet 40 mg, 40 mg, Oral, Q AM, Yossi Rosen MD, 40 mg at 25 0608    ranolazine (RANEXA) 12 hr tablet 1,000 mg, 1,000 mg, Oral, Q12H, Yossi Rosen MD, 1,000 mg at 25 0925    rosuvastatin (CRESTOR) tablet 40 mg, 40 mg, Oral, Nightly, Yossi Rosen MD, 40 mg at 25 2218    sodium chloride 0.9 % flush 10 mL, 10 mL, Intravenous, Q12H, Yossi Rosen MD, 10 mL at 25 0926    sodium chloride 0.9 % flush 10 mL, 10 mL, Intravenous, PRN, Yossi Rosen MD    sodium chloride 0.9 % infusion 40 mL, 40 mL, Intravenous, Once, Yossi Rosen MD     Allergies:  Allergies   Allergen Reactions    Adhesive Tape Rash     Pt states that if it isn't left on very long it is okay    Cefazolin Rash    Cefuroxime Axetil Rash    Cephalosporins Rash    Neomycin-Polymyxin B Gu Rash    Percocet [Oxycodone-Acetaminophen] Rash       Social Hx:  Social History     Socioeconomic History    Marital status:    Tobacco Use    Smoking status: Former     Current packs/day: 0.00     Types: Cigarettes     Quit date:      Years since quittin.3     Passive exposure: Never    Smokeless tobacco: Never   Vaping Use    Vaping status: Never Used   Substance and Sexual Activity    Alcohol use: No    Drug use: No    Sexual activity: Defer       Family Hx:  Family History   Problem Relation Age of Onset    Kidney disease Father     Heart disease Father     Cancer Mother         brain    Colon cancer Neg Hx     Colon polyps Neg Hx      Physical Examination:   Vital Signs:  Vitals:    25 1048 25 1049 25 1059 25 1102   BP: 114/62 94/52 101/52 92/47   BP Location:  Left arm Left arm Left arm Left arm   Patient Position: Lying Sitting Sitting Standing   Pulse: 97 64 98 102   Resp: 16      Temp: 97.9 °F (36.6 °C)      TempSrc: Oral      SpO2: 98%      Height:             General Exam:  Head:  Normocephalic, atraumatic  HEENT:  Neck supple  Fundoscopic Exam:  No signs of disc edema  CVS:  Regular rate and rhythm.  No murmurs  Carotid Examination:  No bruits  Lungs:  Clear to auscultation  Abdomen:  Nontender, Nondistended  Extremities:  No signs of peripheral edema  Skin:  No rashes    Neurologic Exam:    Mental Status:    -Awake, Alert, Oriented X 3  -No word finding difficulties  -No aphasia  -No dysarthria  -Follows simple and complex commands    CN II:  Visual fields full.  Pupils equally reactive to light  CN III, IV, VI:  Extraocular Muscles full with no signs of nystagmus.  Mild ptosis bilaterally  CN V:  Facial sensory is symmetric with no asymmetries.  CN VII:  Facial motor symmetric  CN VIII:  Gross hearing intact bilaterally  CN IX:  Palate elevates symmetrically  CN X:  Palate elevates symmetrically  CN XI:  Shoulder shrug symmetric  CN XII:  Tongue is midline on protrusion    Motor: (strength out of 5:  1= minimal movement, 2 = movement in plane of gravity, 3 = movement against gravity, 4 = movement against some resistance, 5 = full strength)    Moving all extremities equally  No signs of increased tone.  No signs of cogwheeling.  DTR:  Hyporeflexic throughout.    Sensory:  Gradient loss of sensory below the wrist and knees    Coordination:  - No obvious ataxia.  Mild essential tremor noted.    Gait  - Utilizes an electric wheelchair at home    Recent Diagnostics:   Laboratory Results:   - Reviewed in EMR  Lab Results   Component Value Date    GLUCOSE 128 (H) 04/18/2025    CALCIUM 9.7 04/18/2025     04/18/2025    K 3.6 04/18/2025    CO2 23.0 04/18/2025     04/18/2025    BUN 19 04/18/2025    CREATININE 1.38 (H) 04/18/2025    EGFRIFAFRI >59 08/24/2022     EGFRIFNONA >60 08/24/2022    BCR 13.8 04/18/2025    ANIONGAP 16.0 (H) 04/18/2025     Lab Results   Component Value Date    WBC 5.55 04/18/2025    HGB 9.4 (L) 04/18/2025    HCT 30.2 (L) 04/18/2025    .7 (H) 04/18/2025    PLT 60 (L) 04/18/2025     Lab Results   Component Value Date    PTT 33.3 05/25/2016    INR 1.15 (H) 03/11/2025     Lab Results   Component Value Date    TRIG 123 07/11/2023    HDL 33 (L) 07/11/2023    LDL 72 07/11/2023     Lab Results   Component Value Date    HGBA1C 6.10 (H) 07/10/2023       Imaging Results:  Imaging Results (Last 24 Hours)       ** No results found for the last 24 hours. **             Other labs:  CBC reviewed shows a mild anemia  Metabolic panel shows chronic renal insufficiency with a creatinine 1.38  Acetylcholine receptor antibody performed on 3/13 is unremarkable with no signs of neuromuscular junction disorders  Other RAD:  EEG performed on 3/13 shows generalized slowing  MRI brain with and without contrast performed on 3/13 shows no acute findings  Carotid ultrasound shows moderate stenosis in the right ICA with the left side being unremarkable      Assessment & Plan:   Patient presenting with atypical spells.  His EEG showed slowing on his last admission.  I think it would be preferred if we could capture one of the spells on EEG.  I think we should do an event driven EEG today.  Will do a 24-hour EEG and I have asked that the patient's family stay with him.  They are to write down anything that they see with the correlating time so that we can look at the EEG during the spells and determine whether there is an epileptic etiology.  Otherwise I would say the spells likely represent a multifactorial event with a progressive neurocognitive disorder underlying the events and possibly some orthostatic hypotension events as well.    Impression:  Spells -rule out epileptic etiologies  Chronic diabetes mellitus  Hypotension  Chronic renal  insufficiency  Hypertension  Hyperlipidemia  Coronary artery disease  Non-Hodgkin's lymphoma      Plan:   Continuous EEG with video monitoring with patient diary  If this is negative then the events are definitively nonepileptic in etiology.  The patient be discharged home with follow-up in the outpatient neurology clinic.    Jed Gordon MD  04/18/25  12:33 CDT    Medical Decision Making    Number/Complexity of Problems  Moderate  1 undiagnosed new problem with uncertain prognosis -   1 acute illness with systemic symptoms -   High  1 acute or chronic illness that poses a threat to life/body function -   High     MDM Data  Moderate - 1/3 categories  Extensive - 2/3 categories    Category 1: 3 of the following  Review of external notes  Review of results  Ordering of each unique test  Independent historian  Category 2:  Independent interpretation of test (ex: imaging)  Category 3:  Discussion of management with another provider    Extensive     Treatment Plan  Moderate - Prescription Drug management  High  Drug therapy requiring intensive monitoring for toxicity  Decision regarding hospitalization or escalation of care  De-escalate care/DNR decisions

## 2025-04-18 NOTE — THERAPY EVALUATION
Patient Name: Franko Lucero  : 1955    MRN: [id number]                              Today's Date: 2025       Admit Date: 2025    Visit Dx:     ICD-10-CM ICD-9-CM   1. Impaired functional mobility and activity tolerance [Z74.09]  Z74.09 V49.89     Patient Active Problem List   Diagnosis    Iliac artery aneurysm, bilateral    Hypertension    Hyperlipidemia    Diabetes mellitus    CAD (coronary artery disease)    Iron deficiency anemia    Constipation    Abnormal LFTs    Coagulopathy    Ureteral stone    Kidney stones    Hypotension    Acute foot pain    Macrocytic anemia    BELTRAN (acute kidney injury)    Acute kidney failure, unspecified    Type 2 diabetes mellitus with foot ulcer (CODE)    Anemia, chronic disease    Syncope    AMS (altered mental status)     Past Medical History:   Diagnosis Date    Anemia     Anxiety     Arthritis     BPH (benign prostatic hypertrophy)     CAD (coronary artery disease)     Cancer     non-hodgkins lymphoma    Cirrhosis     Diabetes mellitus     Disease of thyroid gland     GERD (gastroesophageal reflux disease)     Hearing loss     History of transfusion     Hyperlipidemia     Hypertension     Iliac artery aneurysm, bilateral     Kidney stone     Liver cirrhosis     Migraines     Sleep apnea     c-pap     Past Surgical History:   Procedure Laterality Date    COLONOSCOPY  2014    COLONOSCOPY N/A 2017    Procedure: COLONOSCOPY WITH ANESTHESIA;  Surgeon: Sher Cui MD;  Location: [facility] ENDOSCOPY;  Service:     CORONARY ARTERY BYPASS GRAFT      2    CYSTOSCOPY URETEROSCOPY LASER LITHOTRIPSY Left 2017    Procedure: URETEROSCOPY LASER LITHOTRIPSY WITH DOUBLE J STENT INSERTION, LEFT ;  Surgeon: Weston Peck MD;  Location: [facility] OR;  Service:     CYSTOSCOPY URETEROSCOPY LASER LITHOTRIPSY Left 2017    Procedure: CYSTOSCOPY URETEROSCOPY LASER LITHOTRIPSY STENT INSERTION STONE EXTRACTION;  Surgeon: Weston Peck MD;  Location:   [facility] OR;  Service:     CYSTOSCOPY W/ URETERAL STENT PLACEMENT Left 04/17/2017    Procedure: CYSTOSCOPY URETERAL DOUBLE J STENT INSERTION, LEFT;  Surgeon: Weston Peck MD;  Location: [facility] OR;  Service:     ENDOSCOPY N/A 04/26/2017    Procedure: ESOPHAGOGASTRODUODENOSCOPY WITH ANESTHESIA;  Surgeon: Sher Cui MD;  Location: [facility] ENDOSCOPY;  Service:     INCISION AND DRAINAGE LEG Right 07/12/2023    Procedure: INCISION AND DRAINAGE - RIGHT FOOT;  Surgeon: Silas Swan DPM;  Location:  [facility] OR;  Service: Podiatry;  Laterality: Right;    JOINT REPLACEMENT Right     KIDNEY STONE SURGERY      KNEE SURGERY Right 08/2024    replacement    NECK SURGERY      ROTATOR CUFF REPAIR      SHOULDER SURGERY      TONSILLECTOMY      TRANS METATARSAL AMPUTATION Right 08/09/2023    Procedure: Partial 4th Ray Amputation - Right Foot;  Surgeon: Silas Swan DPM;  Location:  [facility] OR;  Service: Podiatry;  Laterality: Right;    URETEROSCOPY LASER LITHOTRIPSY WITH STENT INSERTION Left 09/01/2022    Procedure: LEFT URETEROSCOPY LASER LITHOTRIPSY WITH STENT INSERTION;  Surgeon: Weston Peck MD;  Location: [facility] OR;  Service: Urology;  Laterality: Left;    URETEROSCOPY LASER LITHOTRIPSY WITH STENT INSERTION Left 09/15/2022    Procedure: LEFT URETEROSCOPY LASER LITHOTRIPSY WITH STENT EXCHANGE;  Surgeon: Weston Peck MD;  Location:  [facility] OR;  Service: Urology;  Laterality: Left;      General Information       Row Name 04/18/25 1024          Physical Therapy Time and Intention    Document Type evaluation  -MEGHAN ibrahim (r)) MEGHAN ford)     Mode of Treatment physical therapy  -MEGHAN ford (r t))       Row Name 04/18/25 1024          General Information    Patient Profile Reviewed yes  -MEGHAN APPIAH (r) (michelle) MEGHAN ford)     Prior Level of Function gait;all household mobility;ADL's  Pt reports he was ambulating short distances in the home and primarily using his scooter for ambulation. Very difficult to shayy of pt  was performing t/fs independently prior independently.  -MEGHAN ford (r t))     Existing Precautions/Restrictions fall  -MEGHAN ford (r t))     Barriers to Rehab medically complex;cognitive status  -MEGHAN ford (r t))       Row Name 04/18/25 1024          Living Environment    Current Living Arrangements home;other (see comments)  reports multiple family members are close  -MEGHAN ford (r t))     People in Home spouse  -MEGHAN ford (r t))       Row Name 04/18/25 1024          Home Main Entrance    Number of Stairs, Main Entrance none;other (see comments)  ramp uses the scooter to get inside the house  -MEGHAN ford (r t))       Row Name 04/18/25 1024          Cognition    Orientation Status (Cognition) verbal cues/prompts needed for orientation;time;oriented x 3  -MEGHAN ford (r t))       Row Name 04/18/25 1024          Safety Issues/Impairments Affecting Functional Mobility    Safety Issues Affecting Function (Mobility) positioning of assistive device;safety precautions follow-through/compliance;safety precaution awareness;sequencing abilities  -MEGHAN ford (r t))     Impairments Affecting Function (Mobility) balance;range of motion (ROM);sensation/sensory awareness;strength;coordination;endurance/activity tolerance  -MEGHAN ford (r t))               User Key  (r) = Recorded By, (t) = Taken By, (c) = Cosigned By      Initials Name Provider Type    Leda Villa, PT Physical Therapist    Cate Hernandez PT Student PT Student                   Mobility       Row Name 04/18/25 1116          Bed Mobility    Bed Mobility supine-sit;sit-supine  -MEGHAN ford (r t))     Supine-Sit [place] (Bed Mobility) verbal cues;minimum assist (75% patient effort)  -MEGHAN ford (r t))     Sit-Supine [place] (Bed Mobility) minimum assist (75% patient effort);verbal cues  -MEGHAN ford (r t))     Assistive Device (Bed Mobility) head of bed elevated;bed rails  -MEGHAN ford (r t))       Row Name  04/18/25 1116          Sit-Stand Transfer    Sit-Stand [place] (Transfers) moderate assist (50% patient effort);2 person assist;verbal cues  -MEGHAN (denia) MD (t) MEGHAN (c)     Assistive Device (Sit-Stand Transfers) walker, front-wheeled  -MEGHAN (denia) MD (t) MEGHAN (c)       Row Name 04/18/25 1116          Gait/Stairs (Locomotion)    [place] Level (Gait) moderate assist (50% patient effort);verbal cues  -MEGHAN (denia) MD (michelle) MEGHAN (c)     Assistive Device (Gait) walker, front-wheeled  -MEGHAN (denia) MD (t) MEGHAN (c)     Patient was able to Ambulate yes  -MEGHAN (denia) MD (t) MEGHAN (c)     Distance in Feet (Gait) 4  -MEGHAN (denia) MD (michelle) MEGHAN (yamila)     Stairs, Impairments other (see comments)  -MEGHAN APPIAH (r) (michelle) MEGHAN (c)     Comment, (Gait/Stairs) Pt ambulated 4' forward and backwards with ModA and RW with verbal cuse for positioning of RW. Pt demo difficulty picking up feet to take steps and was only able to walk a few feet before verbalizing needing to sit down.  -MEGHAN (denia) MD (t) MEGHAN (c)               User Key  (r) = Recorded By, (t) = Taken By, (c) = Cosigned By      Initials Name Provider Type    Leda Villa, PT Physical Therapist    Cate Hernandez, PT Student PT Student                   Obj/Interventions       Row Name 04/18/25 1120          Range of Motion Comprehensive    General Range of Motion other (see comments)  -MEGHAN (denia) MD (michelle) MEGHAN (c)     Comment, General Range of Motion Due to pt dizziness/hypotnesion symptoms difficult to perform formal AROM testing. Pt able to move bilateral UEs WFL and able to move bilateral knee extensors WFL and ankle dorsiflexors WFL.  -MEGHAN (oneyda APPIAH (t) MEGHAN (c)       Row Name 04/18/25 1120          Strength Comprehensive (MMT)    General Manual Muscle Testing (MMT) Assessment other (see comments)  -MEGHAN APPIAH (r) (michelle) MEGHAN (c)     Comment, General Manual Muscle Testing (MMT) Assessment Formal MMT testing limited due pt dizziness/hypotension symptoms with pt demo functionally able to lift bilateral LE off bed at least 3/5 strength, 4-/5  strength in ankle dorsiflexors, and  demo bilateral LE weakness demoed by ModA required to perform STS t/f to RW. Pt demo R UE shoulder flexor 3/5 strength and L UE shoulder flexor 4-/5 strength.  -MEGHAN (r) MD (t) MEGHAN (c)       Row Name 04/18/25 1120          Balance    Balance Assessment sitting static balance;sitting dynamic balance;standing static balance;standing dynamic balance  -MEGHAN (r) MD (t) MEGHAN (c)     Static Sitting Balance verbal cues;contact guard;standby assist  -MEGHAN (r) MD (t) MEGHAN (c)     Dynamic Sitting Balance verbal cues;contact guard  -MEGHAN (r) MD (t) MEGHAN (c)     Position, Sitting Balance sitting edge of bed  -MEGHAN (r) MD (t) MEGHAN (c)     Static Standing Balance moderate assist;verbal cues  -MEGHAN (r) MD (t) MEGHAN (c)     Dynamic Standing Balance moderate assist;verbal cues  -MEGHAN (r) MD (t) MEGHAN (c)     Position/Device Used, Standing Balance supported;walker, front-wheeled  -MEGHAN (denia) MD (t) MEGHAN (c)       Row Name 04/18/25 1120          Sensory Assessment (Somatosensory)    Sensory Assessment (Somatosensory) other (see comments)  pt reports LE intact except tingling in bilateral feet with pt report of neuropathy.  -MEGHAN (denia) MD (t) MEGHAN (c)               User Key  (r) = Recorded By, (t) = Taken By, (c) = Cosigned By      Initials Name Provider Type    Leda Villa, PT Physical Therapist    Cate Hernandez, PT Student PT Student                   Goals/Plan       Row Name 04/18/25 1150          Bed Mobility Goal 1 (PT)    Activity/Assistive Device (Bed Mobility Goal 1, PT) sit to supine;supine to sit  -MEGHAN (oneyda APPIAH (t) MEGHAN (c)     [place] Level/Cues Needed (Bed Mobility Goal 1, PT) modified independence  -MEGHAN APPIAH (r) (michelle) MEGHAN (yamila)     Time Frame (Bed Mobility Goal 1, PT) long term goal (LTG);10 days  -MEGHAN APPIAH (r) (michelle) MEGHAN (yamila)     Strategies/Barriers (Bed Mobility Goal 1, PT) while maintaining BP into sitting  -MEGHAN APPIAH (r) (t) MEGHAN (yamila)     Progress/Outcomes (Bed Mobility Goal 1, PT) new goal  -MEGHAN APPIAH (r) (t) MEGHAN (c)       Row Name  04/18/25 1150          Transfer Goal 1 (PT)    Activity/Assistive Device (Transfer Goal 1, PT) sit-to-stand/stand-to-sit;bed-to-chair/chair-to-bed;walker rolling  -MEGHAN (denia) MD (t) JE (c)     [place] Level/Cues Needed (Transfer Goal 1, PT) minimum assist (75% or more patient effort)  -JE (r) MD (t) JE (c)     Time Frame (Transfer Goal 1, PT) long term goal (LTG);10 days  -MEGHAN (r) MD (t) JE (c)     Strategies/Barriers (Transfers Goal 1, PT) while maintaining BP  -JE (r) MD (t) JE (c)     Progress/Outcome (Transfer Goal 1, PT) new goal  -MEGHAN (r) MD (t) JE (c)       Row Name 04/18/25 1150          Gait Training Goal 1 (PT)    Activity/Assistive Device (Gait Training Goal 1, PT) gait (walking locomotion);assistive device use;decrease fall risk;improve balance and speed;forward stepping;increase endurance/gait distance;walker, rolling  -MEGHAN (denia) MD (t) JE (c)     [place] Level (Gait Training Goal 1, PT) minimum assist (75% or more patient effort)  -MEGHAN (r) MD (t) JE (c)     Distance (Gait Training Goal 1, PT) 12'  -MEGHAN (denia) MD (t) JE (c)     Time Frame (Gait Training Goal 1, PT) 10 days;long term goal (LTG)  -MEGHAN (r) MD (t) JE (c)     Progress/Outcome (Gait Training Goal 1, PT) new goal  -JE (r) MD (t) JE (c)       Row Name 04/18/25 1150          Problem Specific Goal 1 (PT)    Problem Specific Goal 1 (PT) Pt will propel WC forwards, backwards, turns 40' with SBA  -MEGHAN (denia) MD (t) JE (c)     Time Frame (Problem Specific Goal 1, PT) long-term goal (LTG);1 week  -MEGHAN (denia) MD (t) JE (c)     Progress/Outcome (Problem Specific Goal 1, PT) new goal  -MEGHAN (r) MD (t) JE (c)       Row Name 04/18/25 1150          Therapy Assessment/Plan (PT)    Planned Therapy Interventions (PT) balance training;bed mobility training;wheelchair management/propulsion training;patient/family education;transfer training;strengthening;gait training  -MEGHAN (r) MD (t) MEGHAN (c)               User Key  (r) = Recorded By, (t) = Taken By, (c) = Cosigned By       Initials Name Provider Type    Leda Villa, PT Physical Therapist    Cate Hernandez, PT Student PT Student                   Clinical Impression       Row Name 04/18/25 1128          Pain    Pretreatment Pain Rating 0/10 - no pain  -MEGHAN rivera) MD (michelle) MEGHAN (yamila)     Additional Documentation Pain Scale: FACES Pre/Post-Treatment (Group)  -MEGHAN ibrahim (r)) MEGHAN ford)       Row Name 04/18/25 1128          Pain Scale: FACES Pre/Post-Treatment    Posttreatment Pain Rating 0-->no hurt  -MEGHAN APPIAH (r) (michelle) MEGHAN ford)       Row Name 04/18/25 1128          Plan of Care Review    Plan of Care Reviewed With patient;family  -MEGHAN ford (r t))     Outcome Evaluation PT evaluation completed. Pt found in fowlers on room air, with family at bedside upon arrival,and orientedx3 with disoriented to time. Pt reports that he was primarily uses his motorized scooter and using RW to walk short distances, difficulty obtaining pt t/f ability. Pt reports that he has adjustable bed at home, a scooter, w/c, ramp, walk in shower, and shower chair. Pt BP was taken in supine reading 114/62 with HR 97. Pt performed supine to sitting t/f with Tara, verbal cues with HOB elevated, and bedrails. Pt reported symptoms of some dizziness in sitting with patient educated on putting hand down on the bed and moving UEs to increase blood flow. Pt BP was taken in sitting reading 92/52 with HR 64 with positive orthostatic hypotension, pt reporting increased dizziness symptoms, demoing posterior lean and not feeling good. Pt was layed back down in bed with legs elevated with reports of decrease in symptoms with nursing/aide notified. When symptoms were calmed down sensation testing and limited strenght were performed with pt supine with pt demoing sensation deficits in bilateral bottom of feet and demoing UE weakness with noted increase weakness in R UE. Pt reports feeling better and with nursing staff BP was taken in sitting reading 101/52 with HR 98 with no reports of  dizziness. Pt performed STS t/f ModA and RW with verbal cues positioning, BP taken in standing reading 97/47, and no c/o of dizziness. Pt ambulated a short distance forward and backwards with ModA and RW before verbalizing needing to sit down due to weakness. Pt reported no dizziness symptoms left sitting in bed with family at bedside, call button, and encouraged to call if need anything. Recommended pt be seen during stay to improve LE/UE strength, functional mobility, and activity tolerance. Based on evaluation recommended pt d/c to SNF to improve LE/UE strength, functional mobility, and activity tolerance or home with 24 7 assistance and home health depending on the support family is able to provide.  -MEGHAN ford (r t))       Row Name 04/18/25 1128          Therapy Assessment/Plan (PT)    Rehab Potential (PT) fair  -MEGHAN ford (r t))     Criteria for Skilled Interventions Met (PT) yes;skilled treatment is necessary  -MEGHAN ibrahim (r)) MEGHAN ford)     Therapy Frequency (PT) 2 times/day  -MEGHAN ibrahim (r)) MEGHAN (yamila)     Predicted Duration of Therapy Intervention (PT) until discharge  -MEGHAN ibrahim (r)) MEGHAN (yamila)       Row Name 04/18/25 1128          Vital Signs    Pre Systolic BP Rehab 114  supine  -MEGHAN ibrahim (r)) MEGHAN (c)     Pre Treatment Diastolic BP 62  -MEGHAN (denia) MD ibrahim) MEGHAN (c)     Intra Systolic BP Rehab 92  sitting  -MEGHAN (oneyda ibrahim) MEGHAN (c)     Intra Treatment Diastolic BP 52  -MEGHAN (oneyda APPIAH (michelle) MEGHAN (c)     Pretreatment Heart Rate (beats/min) 97  supine  -MEGHAN (denia) MD ibrahim) MEGHAN (c)     Intratreatment Heart Rate (beats/min) 64  sitting  -MEGHAN (oneyda ibrahim) MEGHAN (c)       Row Name 04/18/25 1128          Positioning and Restraints    Pre-Treatment Position in bed  -MEGHAN ibrahim (r)) MEGHAN (c)     Post Treatment Position bed  -MEGHAN ford (r t))     In Bed sitting;with family/caregiver;sitting EOB;call light within reach;encouraged to call for assist  -MEGHAN ibrahim (r)) MEGHAN (yamila)               User Key  (r) = Recorded By, (t) = Taken By, (c) = Cosigned By       Initials Name Provider Type    Leda Villa, PT Physical Therapist    Cate Hernandez, PT Student PT Student                   Outcome Measures       Row Name 04/18/25 1154 04/18/25 0845       How much help from another person do you currently need...    Turning from your back to your side while in flat bed without using bedrails? 3  -MEGHAN (denia) MD (t) MEGHAN (yamila) 3  -MARGAUX    Moving from lying on back to sitting on the side of a flat bed without bedrails? 3  -MEGHAN (denia) MD (t) MEGHAN (yamila) 3  -MARGAUX    Moving to and from a bed to a chair (including a wheelchair)? 2  -MEGHAN (denia) MD (michelle) MEGHAN (yamila) 3  -MARGAUX    Standing up from a chair using your arms (e.g., wheelchair, bedside chair)? 2  -MEGHAN (denia) MD (michelle) MEGHAN (yamila) 3  -MARGAUX    Climbing 3-5 steps with a railing? 2  -MEGHAN (denia) MD (t) MEGHAN (yamila) 3  -MARGAUX    To walk in hospital room? 2  -MEGHAN (denia) MD (t) MEGHAN (yamila) 3  -MARGAUX    AM-PAC 6 Clicks Score (PT) 14  -MEGHAN (denia) MD (t) 18  -MARGAUX    Highest Level of Mobility Goal 4 --> Transfer to chair/commode  -MEGHAN APPIAH (r) (t) 6 --> Walk 10 steps or more  -MARGAUX      Row Name 04/18/25 1154          Functional Assessment    Outcome Measure Options AM-PAC 6 Clicks Basic Mobility (PT)  -MEGHAN (oneyda APPIAH (t) MEGHAN (yamila)               User Key  (r) = Recorded By, (t) = Taken By, (c) = Cosigned By      Initials Name Provider Type    Leda Villa, PT Physical Therapist    Karly Savage, RN Registered Nurse    Cate Hernandez, PT Student PT Student                                 Physical Therapy Education       Title: PT OT SLP Therapies (In Progress)       Topic: Physical Therapy (In Progress)       Point: Mobility training (Not Started)       Learner Progress:  Not documented in this visit.              Point: Home exercise program (Not Started)       Learner Progress:  Not documented in this visit.              Point: Body mechanics (Done)       Learning Progress Summary            Patient Acceptance, E, VU,NR by MD at 4/18/2025 1511    Comment: Pt educated on dizziness precautions in sitting and  moving UE to increase blood flow. Pt educated on body mechanics/precautions in t/f to feel back of chair or bed with both feet before sitting down.                      Point: Precautions (Done)       Learning Progress Summary            Patient Acceptance, E, VU,NR by MD at 4/18/2025 8697    Comment: Pt educated on dizziness precautions in sitting and moving UE to increase blood flow. Pt educated on body mechanics/precautions in t/f to feel back of chair or bed with both feet before sitting down.                                      User Key       Initials Effective Dates Name Provider Type Discipline    MD 01/29/25 -  Cate Knutson, PT Student PT Student PT                  PT Recommendation and Plan  Planned Therapy Interventions (PT): balance training, bed mobility training, wheelchair management/propulsion training, patient/family education, transfer training, strengthening, gait training  Outcome Evaluation: PT evaluation completed. Pt found in fowlers on room air, with family at bedside upon arrival,and orientedx3 with disoriented to time. Pt reports that he was primarily uses his motorized scooter and using RW to walk short distances, difficulty obtaining pt t/f ability. Pt reports that he has adjustable bed at home, a scooter, w/c, ramp, walk in shower, and shower chair. Pt BP was taken in supine reading 114/62 with HR 97. Pt performed supine to sitting t/f with Tara, verbal cues with HOB elevated, and bedrails. Pt reported symptoms of some dizziness in sitting with patient educated on putting hand down on the bed and moving UEs to increase blood flow. Pt BP was taken in sitting reading 92/52 with HR 64 with positive orthostatic hypotension, pt reporting increased dizziness symptoms, demoing posterior lean and not feeling good. Pt was layed back down in bed with legs elevated with reports of decrease in symptoms with nursing/aide notified. When symptoms were calmed down sensation testing and limited  strenght were performed with pt supine with pt demoing sensation deficits in bilateral bottom of feet and demoing UE weakness with noted increase weakness in R UE. Pt reports feeling better and with nursing staff BP was taken in sitting reading 101/52 with HR 98 with no reports of dizziness. Pt performed STS t/f ModA and RW with verbal cues positioning, BP taken in standing reading 97/47, and no c/o of dizziness. Pt ambulated a short distance forward and backwards with ModA and RW before verbalizing needing to sit down due to weakness. Pt reported no dizziness symptoms left sitting in bed with family at bedside, call button, and encouraged to call if need anything. Recommended pt be seen during stay to improve LE/UE strength, functional mobility, and activity tolerance. Based on evaluation recommended pt d/c to SNF to improve LE/UE strength, functional mobility, and activity tolerance or home with 24 7 assistance and home health depending on the support family is able to provide.     Time Calculation:         PT Charges       Row Name 04/18/25 1155             Time Calculation    Start Time 1024  plus 10 minutes chart review  -MEGHAN rivera) MD (t) MEGHAN (c)      Stop Time 1110  -MEGHAN rivera) MD (t) MEGHAN (c)      Time Calculation (min) 46 min  -MEGHAN rivera) MD (t)      PT Received On 04/18/25  -MEGHAN (denia) MD (t) MEGHAN (c)      PT Goal Re-Cert Due Date 04/28/25  -MEGHAN APPIAH (r) (t) MEGHAN (c)         Untimed Charges    PT Eval/Re-eval Minutes 56  -MEGHAN (denia) MD (t) MEGHAN (c)         Total Minutes    Untimed Charges Total Minutes 56  -MEGHAN (denia) MD (t)       Total Minutes 56  -MEGHAN (denia) MD (t)                User Key  (r) = Recorded By, (t) = Taken By, (c) = Cosigned By      Initials Name Provider Type    Leda Villa, PT Physical Therapist    Cate Hernandez, PT Student PT Student                      PT G-Codes  Outcome Measure Options: AM-PAC 6 Clicks Basic Mobility (PT)  AM-PAC 6 Clicks Score (PT): 14  PT Discharge Summary  Anticipated Discharge Disposition  (PT): skilled nursing facility, home with 24/7 care, home with home health    Cate Knutson, PT Student  4/18/2025

## 2025-04-18 NOTE — PLAN OF CARE
Goal Outcome Evaluation:  Plan of Care Reviewed With: caregiver        Progress: no change  Outcome Evaluation: [place] assessment completed secondary to poor appetite and unintentional weight loss. No current weight in chart to review at this time. Last weight 3/ 14/25 188lbs. Cardiac diet. PO intake not available to assess at this time. Nursing to obtain weight s/p EEG. Modified diet to CCHO/Cardiac due to pmh of diabetes and elevated glucose. Boost Glucose Control BID. Follow for plan of care.

## 2025-04-19 PROBLEM — J96.02 ACUTE HYPERCAPNIC RESPIRATORY FAILURE: Status: ACTIVE | Noted: 2025-01-01

## 2025-04-19 PROBLEM — C85.90 NON-HODGKIN LYMPHOMA: Status: ACTIVE | Noted: 2025-01-01

## 2025-04-19 PROBLEM — D69.6 THROMBOCYTOPENIA: Status: ACTIVE | Noted: 2022-08-24

## 2025-04-19 NOTE — PLAN OF CARE
"Goal Outcome Evaluation:  Plan of Care Reviewed With: patient        Progress: no change  Outcome Evaluation: Aox4 has confusion spells and will drop items at times. Pt had \"spells\" during this shift. Pt was able to answer questions just took a longer time to respond. Swollen abd. BLE has numbness and edema. Pt eyes are little yellow. Has incont. spells at times w/ urine. Spouse stated \"I would like his liver checked while he is in the hospt. Since he has cirrhosis.\" EEG is still in process. Call light within reach, spouse at bedside.                              "

## 2025-04-19 NOTE — PROGRESS NOTES
"  Neurology Consult Note    Consult Date: 2025  Referring MD: Yossi Rosen MD  Reason for Consult: spells    Patient: Franko Lucero (69 y.o. male)  MRN: [id number]  : 1955    History of Present Illness:   Franko Lucero is a 69 y.o. male who continues to have spells.  He has had multiple neurologic workups in the past for this exact entity.  Most recent neurologic consultation was dated 3/13 of this year and performed by NEAL Mathias and signed by Dr. Carnes.  At that time they postulated that the syncopal spells had a low suspicion for seizure-like activity.  The EEG showed nonspecific slowing.  Orthostasis was considered as well.  There was concern over whether there may be some components of myasthenia gravis with his ptosis and they sent away a myasthenia panel which has now came back and is unremarkable.  Prior to this the [facility] neurology department saw the patient on 2022.  He was admitted for syncope and hypotension.  It was noted that time the patient had a severe peripheral neuropathy.  He also had some medications that may have been inducing some orthostasis.  He was diagnosed with orthostatic hypotension and some of it was attributed to having a severe diabetic polyneuropathy.  There may have also been a component of medications causing the hypotension.    Per Dr. Gordon 25:  His wife asked him to go to the doctor yesterday.  He was direct admitted by his primary care doctor.  I was able to speak to his wife over the phone and spoke to the patient at length in the room.  He also has multiple family members.  He tells me that he is having several spells per day.  The spells consist of him just \"losing control.\"  He does not really lose consciousness.  He often just does bizarre behavior.  They give the example that he was in his electric wheelchair and hit the accelerator button and written to several walls and even several people.  He knew he was hitting the " button but he had difficulties releasing it.  No seizure activity has been seen.  There has been no tongue biting and no incontinence.  No generalized tonic-clonic activity was seen.  The patient knows that the spells are coming.  He retains consciousness.  He is even able to speak through them and answer questions appropriately.  He just feels as if he loses control of his body.  His wife believes occasionally he is slow to answer during the spells or can stare off in space. He does have a spell while I am in the room.  He tells me that he is about to have 1.  As he is having he says see this is me having a spell.  I cannot really notice any difference in him.  Maybe he takes a little bit longer to form sentences but other than that I see no neurologic changes.    Per myself 4/18/25:   I saw and examined the patient at bedside. Patient's wife kept a seizure diary. This was communicated to Dr. Bruce, epileptology. Liver function panel sent. Awaiting final read of EEG.   Spells overnight:   4/18 at 11:50 AM spell of trouble holding items.   4/18 at 12:45 PM urinated, confused      Medical History:   Past Medical/Surgical Hx:  Past Medical History:   Diagnosis Date    Anemia     Anxiety     Arthritis     BPH (benign prostatic hypertrophy)     CAD (coronary artery disease)     Cancer     non-hodgkins lymphoma    Cirrhosis     Diabetes mellitus     Disease of thyroid gland     GERD (gastroesophageal reflux disease)     Hearing loss     History of transfusion     Hyperlipidemia     Hypertension     Iliac artery aneurysm, bilateral     Kidney stone     Liver cirrhosis     Migraines     Sleep apnea     c-pap     Past Surgical History:   Procedure Laterality Date    COLONOSCOPY  02/04/2014    COLONOSCOPY N/A 04/26/2017    Procedure: COLONOSCOPY WITH ANESTHESIA;  Surgeon: Sher Cui MD;  Location: [facility] ENDOSCOPY;  Service:     CORONARY ARTERY BYPASS GRAFT      2    CYSTOSCOPY URETEROSCOPY LASER LITHOTRIPSY Left  04/17/2017    Procedure: URETEROSCOPY LASER LITHOTRIPSY WITH DOUBLE J STENT INSERTION, LEFT ;  Surgeon: Weston Peck MD;  Location:  [facility] OR;  Service:     CYSTOSCOPY URETEROSCOPY LASER LITHOTRIPSY Left 08/14/2017    Procedure: CYSTOSCOPY URETEROSCOPY LASER LITHOTRIPSY STENT INSERTION STONE EXTRACTION;  Surgeon: Weston Peck MD;  Location:  [facility] OR;  Service:     CYSTOSCOPY W/ URETERAL STENT PLACEMENT Left 04/17/2017    Procedure: CYSTOSCOPY URETERAL DOUBLE J STENT INSERTION, LEFT;  Surgeon: Weston Peck MD;  Location:  [facility] OR;  Service:     ENDOSCOPY N/A 04/26/2017    Procedure: ESOPHAGOGASTRODUODENOSCOPY WITH ANESTHESIA;  Surgeon: Sher Cui MD;  Location: [facility] ENDOSCOPY;  Service:     INCISION AND DRAINAGE LEG Right 07/12/2023    Procedure: INCISION AND DRAINAGE - RIGHT FOOT;  Surgeon: Silas Swan DPM;  Location:  [facility] OR;  Service: Podiatry;  Laterality: Right;    JOINT REPLACEMENT Right     KIDNEY STONE SURGERY      KNEE SURGERY Right 08/2024    replacement    NECK SURGERY      ROTATOR CUFF REPAIR      SHOULDER SURGERY      TONSILLECTOMY      TRANS METATARSAL AMPUTATION Right 08/09/2023    Procedure: Partial 4th Ray Amputation - Right Foot;  Surgeon: Silas Swan DPM;  Location:  [facility] OR;  Service: Podiatry;  Laterality: Right;    URETEROSCOPY LASER LITHOTRIPSY WITH STENT INSERTION Left 09/01/2022    Procedure: LEFT URETEROSCOPY LASER LITHOTRIPSY WITH STENT INSERTION;  Surgeon: Weston Peck MD;  Location:  [facility] OR;  Service: Urology;  Laterality: Left;    URETEROSCOPY LASER LITHOTRIPSY WITH STENT INSERTION Left 09/15/2022    Procedure: LEFT URETEROSCOPY LASER LITHOTRIPSY WITH STENT EXCHANGE;  Surgeon: Weston Peck MD;  Location:  [facility] OR;  Service: Urology;  Laterality: Left;       Medications On Admission:  Medications Prior to Admission   Medication Sig Dispense Refill Last Dose/Taking    ALPRAZolam (XANAX) 0.25 MG tablet Take 1  tablet by mouth Every Night. 30 tablet 0 Taking    Budeson-Glycopyrrol-Formoterol (Breztri Aerosphere) 160-9-4.8 MCG/ACT aerosol inhaler Inhale 2 puffs 2 (Two) Times a Day. (Patient taking differently: Inhale 2 puffs 2 (Two) Times a Day As Needed.)   Patient Taking Differently    busPIRone (BUSPAR) 15 MG tablet Take 1 tablet by mouth 2 (Two) Times a Day.   Taking    clopidogrel (PLAVIX) 75 MG tablet Take 1 tablet by mouth Every Morning.   Taking    docusate sodium (COLACE) 100 MG capsule Take 2 capsules by mouth Every Evening.   Taking    DULoxetine (CYMBALTA) 60 MG capsule Take 1 capsule by mouth Every Night.   Taking    empagliflozin (JARDIANCE) 25 MG tablet tablet Take 1 tablet by mouth Daily.   Taking    ferrous sulfate 325 (65 FE) MG EC tablet Take 1 tablet by mouth 2 (Two) Times a Day.   Taking    gabapentin (NEURONTIN) 300 MG capsule Take 1 capsule by mouth Every Night.   Taking    GARLIC PO Take 1 capsule by mouth Daily.   Taking    guaiFENesin (MUCINEX) 600 MG 12 hr tablet Take 1 tablet by mouth Daily.   Taking    icosapent ethyl (VASCEPA) 1 g capsule capsule Take 2 g by mouth 2 (Two) Times a Day With Meals.   Taking    levothyroxine (SYNTHROID, LEVOTHROID) 75 MCG tablet Take 1 tablet by mouth Every Morning.   Taking    metFORMIN (GLUCOPHAGE) 500 MG tablet Take 1 tablet by mouth 2 (Two) Times a Day With Meals.   Taking    pantoprazole (PROTONIX) 40 MG EC tablet Take 1 tablet by mouth Daily.   Taking    ranolazine (RANEXA) 1000 MG 12 hr tablet Take 1 tablet by mouth 2 (Two) Times a Day.   Taking    rosuvastatin (CRESTOR) 40 MG tablet Take 1 tablet by mouth Daily.   Taking    Vitamin D, Cholecalciferol, 1000 UNITS tablet Take 1 tablet by mouth Daily.   Taking    acetaminophen (TYLENOL) 325 MG tablet Take 2 tablets by mouth Every 6 (Six) Hours As Needed for Mild Pain, Fever or Moderate Pain.       polyethylene glycol (MIRALAX) powder Take 17 g by mouth Daily As Needed.          Current Medications:    Current  Facility-Administered Medications:     acetaminophen (TYLENOL) tablet 650 mg, 650 mg, Oral, Q4H PRN, 650 mg at 04/19/25 0015 **OR** acetaminophen (TYLENOL) 160 MG/5ML oral solution 650 mg, 650 mg, Oral, Q4H PRN **OR** acetaminophen (TYLENOL) suppository 650 mg, 650 mg, Rectal, Q4H PRN, Yossi Rosen MD    ALPRAZolam (XANAX) tablet 0.25 mg, 0.25 mg, Oral, Nightly PRN, Yossi Rosen MD, 0.25 mg at 04/18/25 2112    sennosides-docusate (PERICOLACE) 8.6-50 MG per tablet 2 tablet, 2 tablet, Oral, BID PRN **AND** polyethylene glycol (MIRALAX) packet 17 g, 17 g, Oral, Daily PRN **AND** bisacodyl (DULCOLAX) EC tablet 5 mg, 5 mg, Oral, Daily PRN **AND** bisacodyl (DULCOLAX) suppository 10 mg, 10 mg, Rectal, Daily PRN, Yossi Rosen MD    busPIRone (BUSPAR) tablet 15 mg, 15 mg, Oral, Q12H, Yossi Rosen MD, 15 mg at 04/18/25 2112    clopidogrel (PLAVIX) tablet 75 mg, 75 mg, Oral, Daily, Yossi Rosen MD, 75 mg at 04/18/25 0926    DULoxetine (CYMBALTA) DR capsule 60 mg, 60 mg, Oral, Daily, Yossi Rosen MD, 60 mg at 04/18/25 0926    gabapentin (NEURONTIN) capsule 300 mg, 300 mg, Oral, Nightly, Yossi Rosen MD, 300 mg at 04/18/25 2112    guaiFENesin (MUCINEX) 12 hr tablet 600 mg, 600 mg, Oral, Q12H, Yossi Rosen MD, 600 mg at 04/18/25 2112    guaiFENesin-codeine (ROMILAR-AC) solution 5 mL, 5 mL, Oral, Q6H PRN, Yossi Rosen MD, 5 mL at 04/19/25 0015    levothyroxine (SYNTHROID, LEVOTHROID) tablet 75 mcg, 75 mcg, Oral, Q AM, Yossi Rosen MD, 75 mcg at 04/19/25 0333    metFORMIN (GLUCOPHAGE) tablet 500 mg, 500 mg, Oral, BID With Meals, Yossi Rosen MD, 500 mg at 04/18/25 1810    ondansetron ODT (ZOFRAN-ODT) disintegrating tablet 4 mg, 4 mg, Oral, Q6H PRN **OR** ondansetron (ZOFRAN) injection 4 mg, 4 mg, Intravenous, Q6H PRN, Yossi Rosen MD    pantoprazole (PROTONIX) EC tablet 40 mg, 40 mg, Oral, Q AM, Yossi Rosen MD, 40 mg at 04/19/25 0333    ranolazine  (RANEXA) 12 hr tablet 1,000 mg, 1,000 mg, Oral, Q12H, Yossi Rosen MD, 1,000 mg at 25    rosuvastatin (CRESTOR) tablet 40 mg, 40 mg, Oral, Nightly, Yossi Rosen MD, 40 mg at 25    sodium chloride 0.9 % flush 10 mL, 10 mL, Intravenous, Q12H, Yossi Rosen MD, 10 mL at 25    sodium chloride 0.9 % flush 10 mL, 10 mL, Intravenous, PRN, Yossi Rosen MD    sodium chloride 0.9 % infusion 40 mL, 40 mL, Intravenous, Once, Yossi Rosen MD     Allergies:  Allergies   Allergen Reactions    Adhesive Tape Rash     Pt states that if it isn't left on very long it is okay    Cefazolin Rash    Cefuroxime Axetil Rash    Cephalosporins Rash    Neomycin-Polymyxin B Gu Rash    Percocet [Oxycodone-Acetaminophen] Rash       Social Hx:  Social History     Socioeconomic History    Marital status:    Tobacco Use    Smoking status: Former     Current packs/day: 0.00     Types: Cigarettes     Quit date:      Years since quittin.3     Passive exposure: Never    Smokeless tobacco: Never   Vaping Use    Vaping status: Never Used   Substance and Sexual Activity    Alcohol use: No    Drug use: No    Sexual activity: Defer       Family Hx:  Family History   Problem Relation Age of Onset    Kidney disease Father     Heart disease Father     Cancer Mother         brain    Colon cancer Neg Hx     Colon polyps Neg Hx      Physical Examination:   Vital Signs:  Vitals:    25 2105 25 2354 25 0048 25 0329   BP: 100/53 122/62 106/56 101/60   BP Location: Left arm Left arm Left arm Left arm   Patient Position: Lying Lying Lying Lying   Pulse: 89 100  100   Resp: 16 16  18   Temp: 98.5 °F (36.9 °C) 97.5 °F (36.4 °C)  98 °F (36.7 °C)   TempSrc: Oral Oral  Oral   SpO2: 95% 100%  98%   Height:           Physical Exam:  General Appearance: Sleeping, arouses for exam, EEG in place   HEENT: anicteric sclera, no scleral injection. No tongue bite  Lungs: respirations  appear comfortable, no obvious increased work of breathing  Extremities: No cyanosis or fingernail clubbing   Skin: No rashes in exposed skin areas     Neurological Examination:   Mental status: Alert. Oriented to person and month and year. Moderate dysarthria.    Cranial Nerves:  Left eye ptosis. Extraocular movements intact without nystagmus.  Face symmetric.    Motor: At least 3/5 in bilateral UE, 2/5 bilateral LE (baseline scooter)  Gait: Not assessed.       Recent Diagnostics:   Laboratory Results:   - Reviewed in EMR  Lab Results   Component Value Date    GLUCOSE 128 (H) 04/18/2025    CALCIUM 9.7 04/18/2025     04/18/2025    K 3.6 04/18/2025    CO2 23.0 04/18/2025     04/18/2025    BUN 19 04/18/2025    CREATININE 1.38 (H) 04/18/2025    EGFRIFAFRI >59 08/24/2022    EGFRIFNONA >60 08/24/2022    BCR 13.8 04/18/2025    ANIONGAP 16.0 (H) 04/18/2025     Lab Results   Component Value Date    WBC 5.55 04/18/2025    HGB 9.4 (L) 04/18/2025    HCT 30.2 (L) 04/18/2025    .7 (H) 04/18/2025    PLT 60 (L) 04/18/2025     Lab Results   Component Value Date    PTT 33.3 05/25/2016    INR 1.15 (H) 03/11/2025     Lab Results   Component Value Date    TRIG 123 07/11/2023    HDL 33 (L) 07/11/2023    LDL 72 07/11/2023     Lab Results   Component Value Date    HGBA1C 6.10 (H) 07/10/2023       Imaging Results:  Imaging Results (Last 24 Hours)       Procedure Component Value Units Date/Time    MRI Brain With & Without Contrast [[id number]] Collected: 04/18/25 1343     Updated: 04/18/25 1355    Narrative:      MRI BRAIN W WO CONTRAST- 4/18/2025 11:30 AM     HISTORY: AMS, syncope     TECHNIQUE: Multisequence, multiplanar MRI of the brain before and after  the intravenous administration of Vueway contrast.     COMPARISON: Brain MRIs dated 3/13/2025 and 4/11/2016     FINDINGS:     No diffusion signal abnormality. No acute intra-axial or extra-axial  hemorrhage. There are a few scattered microhemorrhages, one in each  of  the cerebral hemispheres and a third in the left cerebellar hemisphere.  No intracranial mass lesion or pathologic enhancement. Mild chronic  small vessel ischemic changes. Ventricles are nondilated. Posterior  fossa structures are unremarkable. Pituitary gland and sella are  unremarkable. The major intracranial flow-voids are preserved. Orbital  contents are unremarkable. Chronic right maxillary sinus disease. Trace  right mastoid effusion. Normal marrow signal in the skull base and  calvarium.       Impression:         1.  No acute intracranial process.  2.  Mild chronic small vessel ischemic change.  3.  There are 3 incidentally noted tiny remote microhemorrhages which I  suspect are hypertensive in nature.  4.  Chronic paranasal sinus disease, right maxillary sinus in  particular.     This report was signed and finalized on 4/18/2025 1:52 PM by Dr Phan Pascual.                Other labs:  CBC reviewed shows a mild anemia  Metabolic panel shows chronic renal insufficiency with a creatinine 1.38  Acetylcholine receptor antibody performed on 3/13 is unremarkable with no signs of neuromuscular junction disorders  Other RAD:  EEG performed on 3/13 shows generalized slowing  MRI brain with and without contrast performed on 3/13 shows no acute findings  Carotid ultrasound shows moderate stenosis in the right ICA with the left side being unremarkable      Assessment & Plan:   Patient presenting with atypical spells.  His EEG showed slowing on his last admission.  I think it would be preferred if we could capture one of the spells on EEG.  I think we should do an event driven EEG today.  Will do a 24-hour EEG and I have asked that the patient's family stay with him.  They are to write down anything that they see with the correlating time so that we can look at the EEG during the spells and determine whether there is an epileptic etiology.  Otherwise I would say the spells likely represent a multifactorial event  with a progressive neurocognitive disorder underlying the events and possibly some orthostatic hypotension events as well.    Impression:  Spells -rule out epileptic etiologies  Chronic diabetes mellitus  Hypotension  Chronic renal insufficiency  Hypertension  Hyperlipidemia  Coronary artery disease  Non-Hodgkin's lymphoma      Plan:   Follow up reads of continuous EEG with video monitoring with patient diary  If this is negative then the events are definitively nonepileptic in etiology.  Follow-up LFT panel, elevated ammonia, may need lactulose     Denae Aguayo MD  04/19/25  06:54 CDT

## 2025-04-19 NOTE — PROGRESS NOTES
Family Medicine Progress Note    Patient:  Franko Lucero  YOB: 1955    MRN: [id number]     Acct: [id number]     Admit date: 4/17/2025    Patient Seen, Chart, Consults notes, Labs, Radiology studies reviewed.    Subjective: Day 2 of stay with multiple episodes of near syncope, confusion, agitation, incontinence, and most recent (in last 24 hours) has had no real significant change.  Is currently undergoing 24-hour EEG which is complaining about.  Also has complaint of not having a bowel movement for 4 to 5 days.  His wife voices some concern about abdomen.  States he stays nauseous and has decreased appetite in addition to the constipation he is complaining about.    Past, Family, Social History unchanged from admission.    Diet: Diet: Cardiac, Diabetic; Healthy Heart (2-3 Na+); Consistent Carbohydrate; Fluid Consistency: Thin (IDDSI 0)    Medications:  Scheduled Meds:busPIRone, 15 mg, Oral, Q12H  clopidogrel, 75 mg, Oral, Daily  DULoxetine, 60 mg, Oral, Daily  gabapentin, 300 mg, Oral, Nightly  guaiFENesin, 600 mg, Oral, Q12H  lactulose, 20 g, Oral, Daily  levothyroxine, 75 mcg, Oral, Q AM  metFORMIN, 500 mg, Oral, BID With Meals  pantoprazole, 40 mg, Oral, Q AM  ranolazine, 1,000 mg, Oral, Q12H  rosuvastatin, 40 mg, Oral, Nightly  sodium chloride, 10 mL, Intravenous, Q12H  sodium chloride, 40 mL, Intravenous, Once      Continuous Infusions:   PRN Meds:  acetaminophen **OR** acetaminophen **OR** acetaminophen    ALPRAZolam    senna-docusate sodium **AND** polyethylene glycol **AND** bisacodyl **AND** bisacodyl    guaiFENesin-codeine    ondansetron ODT **OR** ondansetron    sodium chloride    Objective:    Lab Results (last 24 hours)       Procedure Component Value Units Date/Time    Manual Differential [[id number]]  (Abnormal) Collected: 04/19/25 0614    Specimen: Blood Updated: 04/19/25 0713     Neutrophil % 67.0 %      Lymphocyte % 13.0 %      Monocyte % 15.0 %      Eosinophil % 3.0 %       Basophil % 1.0 %      Metamyelocyte % 1.0 %      Neutrophils Absolute 3.17 10*3/mm3      Lymphocytes Absolute 0.61 10*3/mm3      Monocytes Absolute 0.71 10*3/mm3      Eosinophils Absolute 0.14 10*3/mm3      Basophils Absolute 0.05 10*3/mm3      Anisocytosis Mod/2+     Macrocytes Mod/2+     Poikilocytes Slight/1+     Polychromasia Slight/1+     WBC Morphology Normal     Platelet Estimate Decreased    CBC & Differential [[id number]]  (Abnormal) Collected: 04/19/25 0614    Specimen: Blood Updated: 04/19/25 0713    Narrative:      The following orders were created for panel order CBC & Differential.  Procedure                               Abnormality         Status                     ---------                               -----------         ------                     CBC Auto Differential[[id number]]        Abnormal            Final result                 Please view results for these tests on the individual orders.    CBC Auto Differential [[id number]]  (Abnormal) Collected: 04/19/25 0614    Specimen: Blood Updated: 04/19/25 0713     WBC 4.73 10*3/mm3      RBC 2.49 10*6/mm3      Hemoglobin 9.1 g/dL      Hematocrit 28.9 %      .1 fL      MCH 36.5 pg      MCHC 31.5 g/dL      RDW 17.8 %      RDW-SD 74.8 fl      MPV 10.5 fL      Platelets 56 10*3/mm3     Basic Metabolic Panel [[id number]]  (Abnormal) Collected: 04/19/25 0614    Specimen: Blood Updated: 04/19/25 0707     Glucose 103 mg/dL      BUN 21 mg/dL      Creatinine 1.31 mg/dL      Sodium 139 mmol/L      Potassium 3.5 mmol/L      Chloride 103 mmol/L      CO2 25.0 mmol/L      Calcium 9.4 mg/dL      BUN/Creatinine Ratio 16.0     Anion Gap 11.0 mmol/L      eGFR 58.2 mL/min/1.73     Narrative:      GFR Categories in Chronic Kidney Disease (CKD)      GFR Category          GFR (mL/min/1.73)    Interpretation  G1                     90 or greater         Normal or high (1)  G2                      60-89                Mild decrease (1)  G3a                   45-59                 Mild to moderate decrease  G3b                   30-44                Moderate to severe decrease  G4                    15-29                Severe decrease  G5                    14 or less           Kidney failure          (1)In the absence of evidence of kidney disease, neither GFR category G1 or G2 fulfill the criteria for CKD.    eGFR calculation 2021 CKD-EPI creatinine equation, which does not include race as a factor             Imaging Results (Last 72 Hours)       Procedure Component Value Units Date/Time    MRI Brain With & Without Contrast [[id number]] Collected: 04/18/25 1343     Updated: 04/18/25 1355    Narrative:      MRI BRAIN W WO CONTRAST- 4/18/2025 11:30 AM     HISTORY: AMS, syncope     TECHNIQUE: Multisequence, multiplanar MRI of the brain before and after  the intravenous administration of Vueway contrast.     COMPARISON: Brain MRIs dated 3/13/2025 and 4/11/2016     FINDINGS:     No diffusion signal abnormality. No acute intra-axial or extra-axial  hemorrhage. There are a few scattered microhemorrhages, one in each of  the cerebral hemispheres and a third in the left cerebellar hemisphere.  No intracranial mass lesion or pathologic enhancement. Mild chronic  small vessel ischemic changes. Ventricles are nondilated. Posterior  fossa structures are unremarkable. Pituitary gland and sella are  unremarkable. The major intracranial flow-voids are preserved. Orbital  contents are unremarkable. Chronic right maxillary sinus disease. Trace  right mastoid effusion. Normal marrow signal in the skull base and  calvarium.       Impression:         1.  No acute intracranial process.  2.  Mild chronic small vessel ischemic change.  3.  There are 3 incidentally noted tiny remote microhemorrhages which I  suspect are hypertensive in nature.  4.  Chronic paranasal sinus disease, right maxillary sinus in  particular.     This report was signed and finalized on 4/18/2025 1:52 PM by Dr Phan Pascual.     "            Physical Exam:    Vitals: BP 95/55 (BP Location: Left arm, Patient Position: Lying)   Pulse 93   Temp 98.1 °F (36.7 °C) (Oral)   Resp 16   Ht 182.9 cm (72\")   SpO2 93%   BMI 25.55 kg/m²   24 hour intake/output:  Intake/Output Summary (Last 24 hours) at 4/19/2025 0828  Last data filed at 4/19/2025 0715  Gross per 24 hour   Intake 240 ml   Output 1700 ml   Net -1460 ml     Last 3 weights:  Wt Readings from Last 3 Encounters:   03/14/25 85.5 kg (188 lb 6.4 oz)   02/10/25 83.9 kg (185 lb)   01/24/25 83.9 kg (185 lb)       General Appearance alert, fatigued, cooperative, and distress none  Head  currently with the EEG in place  Eyes conjunctivae and sclerae normal and no icterus  Neck supple and trachea midline  Lungs clear to auscultation, respirations regular, and respirations even  Heart regular rhythm & normal rate and normal S1, S2  Abdomen normal bowel sounds and soft non-tender, distended  Extremities no cyanosis and no redness, edema trace lower bilateral  Skin turgor normal  Neurologic Mental Status alertness awake and fatigued        Assessment:      AMS (altered mental status)    Hypertension    Hyperlipidemia    Diabetes mellitus    CAD (coronary artery disease)    Abnormal LFTs    Macrocytic anemia          Plan:  Continue with EEG today.  I will add an ammonia level to the lab today as I looked back on previous admission and CT scan of the abdomen was done at that time.  It did show some cirrhosis evidence of some portal vein hypertension.  Not see where we check that before.  Also at that time showed.  Moderate to large amount of stool burden.  I think it would be reasonable even not knowing his ammonia level treated with lactulose and see if that helps relieve his constipation.  I will also order a KUB to further evaluate stool burden.  Appreciate neurology's assistance in administering the EGD and eager to see results especially compared notation by his wife as far as timing.    Greater " than 25 minutes spent in coordination of care today.    Electronically signed by Gareth Bailey MD on 4/19/2025 at 08:28 CDT

## 2025-04-19 NOTE — PLAN OF CARE
Goal Outcome Evaluation:      Pt is A&Ox4. Up x2 assit using walker. Stood and took a few steps with PT today. Neurology consulted. Pt currently having 24 hour EEG, and cannot get out of bed. Room air. Wound care consulted due to new wound on L forearm. C/o pain managed with pain meds. Call light within reach. Safety maintained.

## 2025-04-19 NOTE — CONSULTS
[facility] Intensivist Consult  Consults    Date of Admission: 4/17/2025  Date of Consult: 04/19/25    Primary Care Physician: Figueroa Chaves MD  Referring Physician: Gareth Lee MD  Chief Complaint/Reason for Consultation: Acute hypercapnic respiratory failure    Subjective   History of Present Illness  69-year-old male with extensive past medical history as listed below, but significant for recurrent syncopal and presyncopal events, liver cirrhosis, DINORAH requiring CPAP, hypothyroidism on Synthroid, CAD status post CABG, CKD stage III, and DM type II, presented to the hospital on 4/17/2025 for recurrent presyncopal episodes.  Patient was admitted in March of this year with syncope.  Workup at that time negative for seizure or any intracranial abnormality and no clear cardiac event or cause.  Patient had recurrent episodes of near syncope confusion agitation and was incontinent of stool and urine.  They initially presented to primary care office who then referred to the hospital for direct admission.  Neurology was consulted.  MRI brain was done without acute findings.  Plan for 24-hour EEG.  Their suspicion was for neurocognitive disorder +/- orthostatic involvement.  Patient also noted to have elevated ammonia level and CT findings in last admission with cirrhosis and portal venous hypertension.  He was started on lactulose this morning.  Later in the morning of 4/19, patient had another syncopal event.  A rapid response was called on the floor.  ABGs obtained during this, patient found to have a respiratory acidosis with pH of 7.18 and CO2 of 71 with some hypoxia as well with a PaO2 of 60 on 3 L nasal cannula.  Patient request to come to ICU for further monitoring and management.  Upon arrival to ICU, patient was placed on BiPAP support.  Slowly became more arousable.  Blood pressure and heart rate were stable.  O2 sat 100% on 60% FiO2.    Review of Systems   Otherwise  complete ROS is negative except as mentioned above.    Past Medical History:   Past Medical History:   Diagnosis Date    Anemia     Anxiety     Arthritis     BPH (benign prostatic hypertrophy)     CAD (coronary artery disease)     Cancer     non-hodgkins lymphoma    Cirrhosis     Diabetes mellitus     Disease of thyroid gland     GERD (gastroesophageal reflux disease)     Hearing loss     History of transfusion     Hyperlipidemia     Hypertension     Iliac artery aneurysm, bilateral     Kidney stone     Liver cirrhosis     Migraines     Sleep apnea     c-pap     Past Surgical History:  Past Surgical History:   Procedure Laterality Date    COLONOSCOPY  02/04/2014    COLONOSCOPY N/A 04/26/2017    Procedure: COLONOSCOPY WITH ANESTHESIA;  Surgeon: Sher Cui MD;  Location:  [facility];  Service:     CORONARY ARTERY BYPASS GRAFT      2    CYSTOSCOPY URETEROSCOPY LASER LITHOTRIPSY Left 04/17/2017    Procedure: URETEROSCOPY LASER LITHOTRIPSY WITH DOUBLE J STENT INSERTION, LEFT ;  Surgeon: Weston Peck MD;  Location:  [facility];  Service:     CYSTOSCOPY URETEROSCOPY LASER LITHOTRIPSY Left 08/14/2017    Procedure: CYSTOSCOPY URETEROSCOPY LASER LITHOTRIPSY STENT INSERTION STONE EXTRACTION;  Surgeon: Weston Peck MD;  Location:  [facility];  Service:     CYSTOSCOPY W/ URETERAL STENT PLACEMENT Left 04/17/2017    Procedure: CYSTOSCOPY URETERAL DOUBLE J STENT INSERTION, LEFT;  Surgeon: Weston Peck MD;  Location:  [facility];  Service:     ENDOSCOPY N/A 04/26/2017    Procedure: ESOPHAGOGASTRODUODENOSCOPY WITH ANESTHESIA;  Surgeon: Sher Cui MD;  Location:  [facility];  Service:     INCISION AND DRAINAGE LEG Right 07/12/2023    Procedure: INCISION AND DRAINAGE - RIGHT FOOT;  Surgeon: Silas Swan DPM;  Location:  [facility];  Service: Podiatry;  Laterality: Right;    JOINT REPLACEMENT Right     KIDNEY STONE SURGERY      KNEE SURGERY Right 08/2024    replacement    NECK SURGERY       ROTATOR CUFF REPAIR      SHOULDER SURGERY      TONSILLECTOMY      TRANS METATARSAL AMPUTATION Right 08/09/2023    Procedure: Partial 4th Ray Amputation - Right Foot;  Surgeon: Silas Swan DPM;  Location:  [facility];  Service: Podiatry;  Laterality: Right;    URETEROSCOPY LASER LITHOTRIPSY WITH STENT INSERTION Left 09/01/2022    Procedure: LEFT URETEROSCOPY LASER LITHOTRIPSY WITH STENT INSERTION;  Surgeon: Weston Peck MD;  Location:  [facility];  Service: Urology;  Laterality: Left;    URETEROSCOPY LASER LITHOTRIPSY WITH STENT INSERTION Left 09/15/2022    Procedure: LEFT URETEROSCOPY LASER LITHOTRIPSY WITH STENT EXCHANGE;  Surgeon: Weston Peck MD;  Location:  [facility];  Service: Urology;  Laterality: Left;     Social History:  reports that he quit smoking about 28 years ago. His smoking use included cigarettes. He has never been exposed to tobacco smoke. He has never used smokeless tobacco. He reports that he does not drink alcohol and does not use drugs.    Family History: family history includes Cancer in his mother; Heart disease in his father; Kidney disease in his father.     Allergies:   Allergies   Allergen Reactions    Adhesive Tape Rash     Pt states that if it isn't left on very long it is okay    Cefazolin Rash    Cefuroxime Axetil Rash    Cephalosporins Rash    Neomycin-Polymyxin B Gu Rash    Percocet [Oxycodone-Acetaminophen] Rash     Medications: Scheduled Meds:busPIRone, 15 mg, Oral, Q12H  clopidogrel, 75 mg, Oral, Daily  DULoxetine, 60 mg, Oral, Daily  gabapentin, 300 mg, Oral, Nightly  guaiFENesin, 600 mg, Oral, Q12H  lactulose, 300 mL, Rectal, Once  lactulose, 20 g, Oral, Daily  levothyroxine, 75 mcg, Oral, Q AM  metFORMIN, 500 mg, Oral, BID With Meals  pantoprazole, 40 mg, Oral, Q AM  ranolazine, 1,000 mg, Oral, Q12H  rosuvastatin, 40 mg, Oral, Nightly  sodium chloride, 10 mL, Intravenous, Q12H  sodium chloride, 40 mL, Intravenous, Once      Continuous Infusions:   PRN  Meds:.  acetaminophen **OR** acetaminophen **OR** acetaminophen    ALPRAZolam    senna-docusate sodium **AND** polyethylene glycol **AND** bisacodyl **AND** bisacodyl    guaiFENesin-codeine    ondansetron ODT **OR** ondansetron    sodium chloride    I have utilized all available immediate resources to obtain, update, or review the patient's current medications (including all prescriptions, over-the-counter products, herbals, cannabis/cannabidiol products, and vitamin/mineral/dietary (nutritional) supplements).     Objective   Objective      Physical Exam:   Temp:  [97.5 °F (36.4 °C)-98.5 °F (36.9 °C)] 98.1 °F (36.7 °C)  Heart Rate:  [] 96  Resp:  [16-24] 24  BP: ()/(53-72) 116/71  Physical Exam  Nursing note reviewed.   Constitutional:       General: He is not in acute distress.     Appearance: He is ill-appearing.   Eyes:      Pupils: Pupils are equal, round, and reactive to light.   Cardiovascular:      Rate and Rhythm: Normal rate and regular rhythm.      Pulses: Normal pulses.      Heart sounds: Normal heart sounds.   Pulmonary:      Effort: Pulmonary effort is normal.      Breath sounds: Normal breath sounds.   Abdominal:      General: Bowel sounds are normal. There is distension.      Palpations: Abdomen is soft.   Musculoskeletal:         General: Normal range of motion.   Skin:     General: Skin is warm.      Findings: Bruising present.   Neurological:      Mental Status: He is lethargic.      Comments: Awakens to voice, follows some commands, no focal motor deficits          Result Review:  I have personally reviewed the results from the time of this admission to 4/19/2025 12:46 CDT and agree with these findings:  [x]  Laboratory list / accordion  []  Microbiology  [x]  Radiology  [x]  EKG/Telemetry   []  Cardiology/Vascular   []  Pathology  [x]  Old records  []  Other:    CBC          4/17/2025    19:48 4/18/2025    03:55 4/19/2025    06:14   CBC   WBC 5.75  5.55  4.73    RBC 2.50  2.61  2.49     Hemoglobin 9.4  9.4  9.1    Hematocrit 28.7  30.2  28.9    .8  115.7  116.1    MCH 37.6  36.0  36.5    MCHC 32.8  31.1  31.5    RDW 18.0  17.9  17.8    Platelets 66  60  56      CMP          4/17/2025    19:48 4/18/2025    03:55 4/19/2025    06:14 4/19/2025    11:10   CMP   Glucose 152  128  103     BUN 19 19 21     Creatinine 1.40  1.38  1.31     EGFR 53.7  54.7  58.2     Sodium 139  141  139  141    Potassium 3.5  3.6  3.5     Chloride 101  102  103     Calcium 9.4  9.7  9.4     BUN/Creatinine Ratio 13.6  13.8  16.0     Anion Gap 14.0  16.0  11.0       Site   Date Value Ref Range Status   04/19/2025 Left Radial  Final     Ronak's Test   Date Value Ref Range Status   04/19/2025 Positive  Final     pH, Arterial   Date Value Ref Range Status   04/19/2025 7.182 (C) 7.350 - 7.450 pH units Final     Comment:     85 Value below critical limit     pCO2, Arterial   Date Value Ref Range Status   04/19/2025 71.5 (C) 35.0 - 45.0 mm Hg Final     Comment:     86 Value above critical limit     pO2, Arterial   Date Value Ref Range Status   04/19/2025 60.3 (L) 83.0 - 108.0 mm Hg Final     Comment:     84 Value below reference range     HCO3, Arterial   Date Value Ref Range Status   04/19/2025 26.8 (H) 20.0 - 26.0 mmol/L Final     Comment:     83 Value above reference range     Base Excess, Arterial   Date Value Ref Range Status   04/19/2025 -2.5 (L) 0.0 - 2.0 mmol/L Final     Comment:     84 Value below reference range     O2 Saturation, Arterial   Date Value Ref Range Status   04/19/2025 83.7 (L) 94.0 - 99.0 % Final     Comment:     84 Value below reference range     Hemoglobin, Blood Gas   Date Value Ref Range Status   04/19/2025 10.6 (L) 14 - 18 g/dL Final     Comment:     84 Value below reference range     Hematocrit, Blood Gas   Date Value Ref Range Status   04/19/2025 32.6 (L) 38.0 - 51.0 % Final     Comment:     84 Value below reference range     Oxyhemoglobin   Date Value Ref Range Status   04/19/2025 82.1 (L)  94 - 99 % Final     Comment:     84 Value below reference range     Methemoglobin   Date Value Ref Range Status   04/19/2025 0.50 0.00 - 3.00 % Final     Carboxyhemoglobin   Date Value Ref Range Status   04/19/2025 1.4 0 - 5 % Final     Barometric Pressure for Blood Gas   Date Value Ref Range Status   04/19/2025 755 mmHg Final     Modality   Date Value Ref Range Status   04/19/2025 Nasal Cannula  Final      Latest Reference Range & Units 04/19/25 08:48   Ammonia 16 - 60 umol/L 66 (H)   (H): Data is abnormally high    XR Chest 1 View  Result Date: 4/19/2025  1.  Bibasilar atelectasis (left greater than right). Left lower lobe appears mostly collapsed. 2.  No pleural effusion or pneumothorax.  This report was signed and finalized on 4/19/2025 11:30 AM by Dr Phan Pascual.      XR Abdomen KUB  Result Date: 4/19/2025   1.  Large volume colonic stool with mild gaseous distention along the transverse and sigmoid segments, measuring up to 7.6 cm. Overall bowel gas pattern is nonobstructive.    This report was signed and finalized on 4/19/2025 10:47 AM by Dr Phan Pascual.      MRI Brain With & Without Contrast  Result Date: 4/18/2025   1.  No acute intracranial process. 2.  Mild chronic small vessel ischemic change. 3.  There are 3 incidentally noted tiny remote microhemorrhages which I suspect are hypertensive in nature. 4.  Chronic paranasal sinus disease, right maxillary sinus in particular.  This report was signed and finalized on 4/18/2025 1:52 PM by Dr Phan Pascual.      Assessment / Plan   Assessment:   Active Hospital Problems    Diagnosis     **AMS (altered mental status)     Macrocytic anemia     Abnormal LFTs     Hypertension     Hyperlipidemia     Diabetes mellitus     CAD (coronary artery disease)    69-year-old male with acute hypercapnic respiratory failure secondary to altered mentation from syncopal/presyncopal event, now being transferred to CCU for critical care management.    Acute hypercapnic  respiratory failure  - Placed on BiPAP, patient already becoming more arousable  - Will obtain repeat ABG in 1 hour from starting BiPAP  - Has history of DINORAH, would encourage CPAP at minimum while sleeping  - start duoneb, pulmicort, on triple therapy as outpatient  - Wean back to regular nasal cannula O2 as indicated once fully awake and ABG corrected    Altered mentation, syncopal events  - Neurology consulting, currently undergoing 24-hour EEG  - Events from last admission noted, no neurological source identified at that time, suspicion for orthostasis in conjunction with a neurocognitive disorder  - Follow 24-hour EEG results, and neurology's recommendations  - Will support with BiPAP as needed for any respiratory insufficiency with the events    Cirrhosis of liver  - Ammonia elevated on labs today, could be aspect of hepatic encephalopathy, will continue lactulose    Hypothyroidism  - Continue current Synthroid dose, will check TSH and T4, last checked per our records in 2024    VTE: SCDs only, chronically thrombocytopenic, weightless currently in the 50s with iron deficiency anemia, on Plavix  GI: N/A  NTN: N.p.o. while lethargic on BiPAP, cardiac diet once awake  Abx: N/A  Lines/Tubes: Peripheral IV  CODE STATUS: Full resuscitation    Disposition: Continue critical care management    Total critical care time: 40 minutes     Due to a high probability of clinically significant, life threatening deterioration, the patient required my highest level of preparedness to intervene emergently and I personally spent this critical care time directly and personally managing the patient.      This critical care time included obtaining a history; examining the patient; pulse oximetry; ordering and review of studies; arranging urgent treatment with development of a management plan; evaluation of patient's response to treatment; frequent reassessment; and, discussions with other providers.     This critical care time was  performed to assess and manage the high probability of imminent, life-threatening deterioration that could result in multi-organ failure. It was exclusive of separately billable procedures and treating other patients and teaching time.     Please see MDM section and the rest of the note for further information on patient assessment and treatment.     Part of this note may be an electronic transcription/translation of spoken language to printed text using the Dragon dictation system      Electronically signed by NEAL Huston on 4/19/2025 at 13:42 CDT

## 2025-04-19 NOTE — NURSING NOTE
Entered Pt room to help him w/ urinal. Pt became unresponsive. Sternal rub, no reaction, pupils nonrestricting. O2 dropped to 55% on RA. Pt began having a spell that lasted longer than usual. Charge nurse consulted, rapid response called. Pt moved to CCU.

## 2025-04-20 NOTE — SIGNIFICANT NOTE
"Contacted yesterday by hospitalist service after med alert was called on the patient while on the floor.  Had another \"episode\" that he was having prior to arrival and was minimally responsive.  Appeared to be in respiratory failure.  Was in the process of getting 24-hour EEG which did require it to be paused.  Found to be severely acidotic and hypoxic.  Discussed with hospitalist that was covering the event and agreed that it felt most appropriate to move him to the intensive care unit.  He graciously reached out to intensivist who came and accepted patient.  He is now in CCU.  Briefly stopped to talk to his wife.  Discovered that he may have a little bit of pneumonia patient unclear on exactly on timing of that.  His wife describes possibly some aspiration intermittently just before admission and since admission.  Will follow peripherally and let his primary providers know that he is in the unit so that they may follow-up tomorrow and check-in on his status.    Electronically signed by Gareth Bailey MD, 04/20/25, 10:11 AM CDT.   "

## 2025-04-20 NOTE — PROGRESS NOTES
"Pharmacy Dosing Service  Pharmacokinetics  Vancomycin Follow-up Evaluation    Assessment:  Active Hospital Problems    Diagnosis  POA    **AMS (altered mental status) [R41.82]  Yes    Acute hypercapnic respiratory failure [J96.02]  Unknown    Macrocytic anemia [D53.9]  Yes    Abnormal LFTs [R79.89]  Yes    Hypertension [I10]  Yes    Hyperlipidemia [E78.5]  Yes    Diabetes mellitus [E11.9]  Yes    CAD (coronary artery disease) [I25.10]  Yes       Current Order: Vancomycin 1500 mg IVPB every 24 hours  Indication: empiric/bacteremia  Current end date:4/24/25    Levels/Previous evaluations:   Started with 1500 mg IV q24h - one dose received    Other antimicrobials and/or additional factors considered in dosing methods:   PMH BELTRAN/CKD    AUC Model Data - Current Regimen:  Regimen: 1500 mg IV every 24 hours.  Exposure target: AUC24 (range)400-600 mg/L.hr   AUC24,ss: >>> 600 mg/L.hr  Elevated risk of nephrotoxicity - serum creatinine increased today    AUC Model Data - Proposed Regimen:  Regimen: 1250 mg IV every 24 hours.  Exposure target: AUC24 (range)400-600 mg/L.hr   AUC24,ss: 574 mg/L.hr  Probability of AUC24 > 400: 86 %  Ctrough,ss: 18.2 mg/L  Probability of Ctrough,ss > 20: 41 %  Probability of nephrotoxicity (Lodise EB 2009): 14 %        Plan: adjust to 1250 mg IV q24h with increase in serum creatinine today.     Clinical pharmacist following daily     Subjective:  Franko Lucero is a 69 y.o. male currently on Vancomycin, day 1 of treatment.      Objective:  Ht: 182.9 cm (72\"); Wt: 80.5 kg (177 lb 7.5 oz)  Estimated Creatinine Clearance: 46.2 mL/min (A) (by C-G formula based on SCr of 1.67 mg/dL (H)).   Creatinine   Date Value Ref Range Status   04/20/2025 1.67 (H) 0.76 - 1.27 mg/dL Final   04/19/2025 1.31 (H) 0.76 - 1.27 mg/dL Final   04/18/2025 1.38 (H) 0.76 - 1.27 mg/dL Final      Lab Results   Component Value Date    WBC 12.98 (H) 04/20/2025    WBC 4.73 04/19/2025    WBC 5.55 04/18/2025         Culture " Results:  Microbiology Results (last 10 days)       Procedure Component Value - Date/Time    MRSA Screen, PCR (Inpatient) - Swab, Nares [[id number]]  (Abnormal) Collected: 04/20/25 0415    Lab Status: Final result Specimen: Swab from Nares Updated: 04/20/25 0618     MRSA PCR MRSA Detected    Narrative:      The negative predictive value of this diagnostic test is high and should only be used to consider de-escalating anti-MRSA therapy. A positive result may indicate colonization with MRSA and must be correlated clinically.    Respiratory Panel PCR w/COVID-19(SARS-CoV-2) [facility] In-House, NP Swab in UTM/VTM, 2 HR TAT - Swab, Nasopharynx [[id number]]  (Normal) Collected: 04/20/25 0048    Lab Status: Final result Specimen: Swab from Nasopharynx Updated: 04/20/25 0149     ADENOVIRUS, PCR Not Detected     Coronavirus 229E Not Detected     Coronavirus HKU1 Not Detected     Coronavirus NL63 Not Detected     Coronavirus OC43 Not Detected     COVID19 Not Detected     Human Metapneumovirus Not Detected     Human Rhinovirus/Enterovirus Not Detected     Influenza A PCR Not Detected     Influenza B PCR Not Detected     Parainfluenza Virus 1 Not Detected     Parainfluenza Virus 2 Not Detected     Parainfluenza Virus 3 Not Detected     Parainfluenza Virus 4 Not Detected     RSV, PCR Not Detected     Bordetella pertussis pcr Not Detected     Bordetella parapertussis PCR Not Detected     Chlamydophila pneumoniae PCR Not Detected     Mycoplasma pneumo by PCR Not Detected    Narrative:      In the setting of a positive respiratory panel with a viral infection PLUS a negative procalcitonin without other underlying concern for bacterial infection, consider observing off antibiotics or discontinuation of antibiotics and continue supportive care. If the respiratory panel is positive for atypical bacterial infection (Bordetella pertussis, Chlamydophila pneumoniae, or Mycoplasma pneumoniae), consider antibiotic de-escalation  to target atypical bacterial infection.            Eugenio Ortega, PharmD   04/20/25 08:46 CDT

## 2025-04-20 NOTE — PLAN OF CARE
Goal Outcome Evaluation:    Patient is maintaining ventilation and oxygenation with current NPPV settings. No changes made at this time. Will continue to work towards weaning off NPPV.      Problem: Noninvasive Ventilation Acute  Goal: Effective Unassisted Ventilation and Oxygenation  Outcome: Progressing  Intervention: Monitor and Manage Noninvasive Ventilation  Recent Flowsheet Documentation  Taken 4/20/2025 0336 by Lorie Morris RRT  Airway/Ventilation Management: airway patency maintained  NPPV/CPAP Maintenance: proper fit/secure  Taken 4/19/2025 2334 by Lorie Morris RRT  Airway/Ventilation Management:   airway patency maintained   oxygen therapy provided  NPPV/CPAP Maintenance: proper fit/secure     Problem: Skin Injury Risk Increased  Goal: Skin Health and Integrity  Intervention: Optimize Skin Protection  Recent Flowsheet Documentation  Taken 4/20/2025 0336 by Lorie Morris RRT  Head of Bed (HOB) Positioning: HOB at 30-45 degrees  Taken 4/19/2025 2334 by Lorie Morris RRT  Head of Bed (HOB) Positioning: HOB at 30 degrees     Problem: Sepsis/Septic Shock  Goal: Absence of Infection Signs and Symptoms  Intervention: Promote Recovery  Recent Flowsheet Documentation  Taken 4/20/2025 0336 by Lorie Morris RRT  Airway/Ventilation Management: airway patency maintained  Taken 4/19/2025 2334 by Lorie Morris RRT  Airway/Ventilation Management:   airway patency maintained   oxygen therapy provided

## 2025-04-20 NOTE — CONSULTS
"Pharmacy Dosing Service  Pharmacokinetics  Vancomycin Initial Evaluation  Assessment/Action/Plan:  Loading dose: None  Current Order: Vancomycin 1,500 mg IVPB every 24 hours  Current end date/final dose: 4/24/25 at 0500  Levels: None ordered at this time  Additional antimicrobial agent(s): Merrem  MRSA Nasal PCR ordered: Yes (Vancomycin indication is pneumonia, bone/joint, SSTI or IAI) - ordered by APRN    Vancomycin dosage initiated based on population pharmacokinetic parameters. Pharmacy will continue to follow daily and adjust dose accordingly.     Subjective:  Franko Lucero is a 69 y.o. male with a Vancomycin \"Pharmacy to Dose\" consult for the treatment of Bacteremia , day 1 of 5 of treatment.    AUC Model Data:  Regimen: 1500 mg IV every 24 hours.  Start time: 05:00 on 04/20/2025  Exposure target: AUC24 (range)400-600 mg/L.hr   AUC24,ss: 557 mg/L.hr  Probability of AUC24 > 400: 84 %  Ctrough,ss: 16.7 mg/L  Probability of Ctrough,ss > 20: 33 %  Probability of nephrotoxicity (Lodise EB 2009): 12 %    Objective:  Ht: 182.9 cm (72\"); Wt: 80.5 kg (177 lb 7.5 oz)  Estimated Creatinine Clearance: 58.9 mL/min (A) (by C-G formula based on SCr of 1.31 mg/dL (H)).   Creatinine   Date Value Ref Range Status   04/19/2025 1.31 (H) 0.76 - 1.27 mg/dL Final   04/18/2025 1.38 (H) 0.76 - 1.27 mg/dL Final   04/17/2025 1.40 (H) 0.76 - 1.27 mg/dL Final      Lab Results   Component Value Date    WBC 4.73 04/19/2025    WBC 5.55 04/18/2025    WBC 5.75 04/17/2025      Baseline culture results:  Microbiology Results (last 10 days)       Procedure Component Value - Date/Time    Respiratory Panel PCR w/COVID-19(SARS-CoV-2) R[facility]ARD/JENNIFER/[facility] In-House, NP Swab in UTM/VTM, 2 HR TAT - Swab, Nasopharynx [[id number]]  (Normal) Collected: 04/20/25 0048    Lab Status: Final result Specimen: Swab from Nasopharynx Updated: 04/20/25 0149     ADENOVIRUS, PCR Not Detected     Coronavirus 229E Not Detected     Coronavirus HKU1 Not Detected     " Coronavirus NL63 Not Detected     Coronavirus OC43 Not Detected     COVID19 Not Detected     Human Metapneumovirus Not Detected     Human Rhinovirus/Enterovirus Not Detected     Influenza A PCR Not Detected     Influenza B PCR Not Detected     Parainfluenza Virus 1 Not Detected     Parainfluenza Virus 2 Not Detected     Parainfluenza Virus 3 Not Detected     Parainfluenza Virus 4 Not Detected     RSV, PCR Not Detected     Bordetella pertussis pcr Not Detected     Bordetella parapertussis PCR Not Detected     Chlamydophila pneumoniae PCR Not Detected     Mycoplasma pneumo by PCR Not Detected    Narrative:      In the setting of a positive respiratory panel with a viral infection PLUS a negative procalcitonin without other underlying concern for bacterial infection, consider observing off antibiotics or discontinuation of antibiotics and continue supportive care. If the respiratory panel is positive for atypical bacterial infection (Bordetella pertussis, Chlamydophila pneumoniae, or Mycoplasma pneumoniae), consider antibiotic de-escalation to target atypical bacterial infection.            Franko Francis, PharmD  04/20/25 05:01 CDT

## 2025-04-20 NOTE — PROGRESS NOTES
[facility]  INPATIENT PROGRESS NOTE    Patient Name: Franko Lucero  Date of Admission: 4/17/2025  Today's Date: 04/20/25  Length of Stay:  LOS: 3 days   Primary Care Physician: Figueroa Chaves MD  Next of Kin: Primary Emergency Contact: Sangita Lucero, Home Phone: [phone]      Subjective   Chief Complaint: Recurrent syncopal episodes, hypoxia, hypercapnia    HPI   69-year-old male with past medical history of recurrent syncopal and presyncopal episodes, liver cirrhosis, DINORAH requiring CPAP, hypothyroidism, CAD s/p CABG, CKD stage III, and DM admitted to the hospital on/17/25 for recurrent syncopal and presyncopal episodes.  He was also admitted in March of this year with similar issues.  Workup at that time was negative for seizure or any intracranial abnormality, with no clear cardiac cause either.  Patient had recurrent episodes of near syncope and confusion, along with agitation and was incontinent of stool and urine.  He was initially seen by primary care office who referred him to the hospital for direct admission.  Neurology was consulted.  MRI brain done at that time showed no acute findings.  Suspicion was for a neurocognitive disorder with or without orthostatic involvement.  Patient has been noted to have elevated ammonia level and CT findings with last admission showing cirrhosis and portal venous hypertension.  On morning of 4/19, patient had another syncopal event.  A rapid response was called, and ABG done at that time revealed respiratory acidosis with pH of 7.18 and pCO2 of 71 with PaO2 of 60 on 3 L nasal cannula.  He was then transferred to the ICU and placed on BiPAP support.  He slowly became more arousable after being on BiPAP.    Interval history:  4/20: Patient has been somewhat lethargic, but does arouse easily.  He has been taken off BiPAP and now is back on 3 L nasal cannula.  Blood pressures also been low, requiring  Levophed infusion.  Lactic acid has remained high, and patient has received IV fluids as well as empiric antibiotic treatment.  MRSA screen was positive, but respiratory panel was negative and blood cultures are currently in process.        Review of Systems   All pertinent negatives and positives are as above. All other systems have been reviewed and are negative unless otherwise stated.     Past Medical and Past Surgical History   Active and Resolved Problems  Active Hospital Problems    Diagnosis  POA    **AMS (altered mental status) [R41.82]  Yes    Acute hypercapnic respiratory failure [J96.02]  Unknown    Macrocytic anemia [D53.9]  Yes    Abnormal LFTs [R79.89]  Yes    Hypertension [I10]  Yes    Hyperlipidemia [E78.5]  Yes    Diabetes mellitus [E11.9]  Yes    CAD (coronary artery disease) [I25.10]  Yes      Resolved Hospital Problems   No resolved problems to display.       Past Medical History:   Past Medical History:   Diagnosis Date    Anemia     Anxiety     Arthritis     BPH (benign prostatic hypertrophy)     CAD (coronary artery disease)     Cancer     non-hodgkins lymphoma    Cirrhosis     Diabetes mellitus     Disease of thyroid gland     GERD (gastroesophageal reflux disease)     Hearing loss     History of transfusion     Hyperlipidemia     Hypertension     Iliac artery aneurysm, bilateral     Kidney stone     Liver cirrhosis     Migraines     Sleep apnea     c-pap     Past Surgical History:   Past Surgical History:   Procedure Laterality Date    COLONOSCOPY  02/04/2014    COLONOSCOPY N/A 04/26/2017    Procedure: COLONOSCOPY WITH ANESTHESIA;  Surgeon: Sher Cui MD;  Location: [facility] ENDOSCOPY;  Service:     CORONARY ARTERY BYPASS GRAFT      2    CYSTOSCOPY URETEROSCOPY LASER LITHOTRIPSY Left 04/17/2017    Procedure: URETEROSCOPY LASER LITHOTRIPSY WITH DOUBLE J STENT INSERTION, LEFT ;  Surgeon: Weston Peck MD;  Location: [facility] OR;  Service:     CYSTOSCOPY URETEROSCOPY LASER  LITHOTRIPSY Left 08/14/2017    Procedure: CYSTOSCOPY URETEROSCOPY LASER LITHOTRIPSY STENT INSERTION STONE EXTRACTION;  Surgeon: Weston Peck MD;  Location: [facility] OR;  Service:     CYSTOSCOPY W/ URETERAL STENT PLACEMENT Left 04/17/2017    Procedure: CYSTOSCOPY URETERAL DOUBLE J STENT INSERTION, LEFT;  Surgeon: Weston Peck MD;  Location: [facility] OR;  Service:     ENDOSCOPY N/A 04/26/2017    Procedure: ESOPHAGOGASTRODUODENOSCOPY WITH ANESTHESIA;  Surgeon: Sher Cui MD;  Location: [facility] ENDOSCOPY;  Service:     INCISION AND DRAINAGE LEG Right 07/12/2023    Procedure: INCISION AND DRAINAGE - RIGHT FOOT;  Surgeon: Silas Swan DPM;  Location:  [facility] OR;  Service: Podiatry;  Laterality: Right;    JOINT REPLACEMENT Right     KIDNEY STONE SURGERY      KNEE SURGERY Right 08/2024    replacement    NECK SURGERY      ROTATOR CUFF REPAIR      SHOULDER SURGERY      TONSILLECTOMY      TRANS METATARSAL AMPUTATION Right 08/09/2023    Procedure: Partial 4th Ray Amputation - Right Foot;  Surgeon: Silas Swan DPM;  Location:  [facility] OR;  Service: Podiatry;  Laterality: Right;    URETEROSCOPY LASER LITHOTRIPSY WITH STENT INSERTION Left 09/01/2022    Procedure: LEFT URETEROSCOPY LASER LITHOTRIPSY WITH STENT INSERTION;  Surgeon: Weston Peck MD;  Location: [facility] OR;  Service: Urology;  Laterality: Left;    URETEROSCOPY LASER LITHOTRIPSY WITH STENT INSERTION Left 09/15/2022    Procedure: LEFT URETEROSCOPY LASER LITHOTRIPSY WITH STENT EXCHANGE;  Surgeon: Weston Peck MD;  Location: [facility] OR;  Service: Urology;  Laterality: Left;     Social and Family History   Family History:  family history includes Cancer in his mother; Heart disease in his father; Kidney disease in his father.    Tobacco/Social History:  reports that he quit smoking about 28 years ago. His smoking use included cigarettes. He has never been exposed to tobacco smoke. He has never used smokeless tobacco. He  reports that he does not drink alcohol and does not use drugs.    Allergies   Allergies:   He is allergic to adhesive tape, cefazolin, cefuroxime axetil, cephalosporins, neomycin-polymyxin b gu, and percocet [oxycodone-acetaminophen].    Objective    Temp:  [98.7 °F (37.1 °C)-101.1 °F (38.4 °C)] 99.1 °F (37.3 °C)  Heart Rate:  [] 95  Resp:  [12-26] 12  BP: ()/(36-87) 85/45  Physical Exam  Vitals and nursing note reviewed.   HENT:      Head: Normocephalic.      Nose: Nose normal.      Mouth/Throat:      Mouth: Mucous membranes are dry.   Eyes:      Pupils: Pupils are equal, round, and reactive to light.   Cardiovascular:      Rate and Rhythm: Normal rate and regular rhythm.      Pulses: Normal pulses.   Pulmonary:      Effort: No respiratory distress.      Breath sounds: Normal breath sounds. No stridor. No rhonchi.   Abdominal:      General: There is distension.      Palpations: Abdomen is soft.      Tenderness: There is no abdominal tenderness.      Comments: Hypoactive bowel sounds x 4   Musculoskeletal:         General: Normal range of motion.   Skin:     General: Skin is warm.      Capillary Refill: Capillary refill takes less than 2 seconds.   Neurological:      General: No focal deficit present.      Mental Status: He is easily aroused.   Psychiatric:         Behavior: Behavior is cooperative.           Inpatient Medications   Medications: Scheduled Meds:budesonide, 0.25 mg, Nebulization, BID - RT  busPIRone, 15 mg, Oral, Q12H  Chlorhexidine Gluconate Cloth, 1 Application, Topical, Daily  clopidogrel, 75 mg, Oral, Daily  DULoxetine, 60 mg, Oral, Daily  gabapentin, 300 mg, Oral, Nightly  guaiFENesin, 600 mg, Oral, Q12H  ipratropium-albuterol, 3 mL, Nebulization, 4x Daily - RT  lactated ringers, 500 mL, Intravenous, Once  lactulose, 300 mL, Rectal, Once  lactulose, 20 g, Oral, BID  levothyroxine, 75 mcg, Oral, Q AM  mupirocin, 1 Application, Each Nare, BID  pantoprazole, 40 mg, Oral, Q  AM  piperacillin-tazobactam, 4.5 g, Intravenous, Once  piperacillin-tazobactam, 4.5 g, Intravenous, Q8H  ranolazine, 1,000 mg, Oral, Q12H  rosuvastatin, 40 mg, Oral, Nightly  sodium chloride, 10 mL, Intravenous, Q12H  sodium chloride, 40 mL, Intravenous, Once  [START ON 4/21/2025] vancomycin, 1,250 mg, Intravenous, Q24H      Continuous Infusions:lactated ringers, 150 mL/hr, Last Rate: 150 mL/hr (04/20/25 0255)  norepinephrine, 0.02-0.3 mcg/kg/min (Order-Specific), Last Rate: 0.08 mcg/kg/min (04/20/25 0654)      PRN Meds:.  acetaminophen **OR** acetaminophen **OR** acetaminophen    ALPRAZolam    senna-docusate sodium **AND** polyethylene glycol **AND** bisacodyl **AND** bisacodyl    guaiFENesin-codeine    ondansetron ODT **OR** ondansetron    sodium chloride    I have reviewed the patient's current medications.   Outpatient Medications     Current Outpatient Medications   Medication Instructions    acetaminophen (TYLENOL) 650 mg, Every 6 Hours PRN    ALPRAZolam (XANAX) 0.25 mg, Oral, Nightly    Budeson-Glycopyrrol-Formoterol (Breztri Aerosphere) 160-9-4.8 MCG/ACT aerosol inhaler 2 puffs, 2 Times Daily    busPIRone (BUSPAR) 15 mg, 2 Times Daily    clopidogrel (PLAVIX) 75 mg, Every Morning    docusate sodium (COLACE) 200 mg, Every Evening    DULoxetine (CYMBALTA) 60 mg, Nightly    empagliflozin (JARDIANCE) 25 mg, Oral, Daily    ferrous sulfate 325 mg, 2 Times Daily    gabapentin (NEURONTIN) 300 mg, Nightly    GARLIC PO 1 capsule, Oral, Daily    guaiFENesin (MUCINEX) 600 mg, Oral, Daily    icosapent ethyl (VASCEPA) 2 g, 2 Times Daily With Meals    levothyroxine (SYNTHROID, LEVOTHROID) 75 mcg, Every Early Morning    metFORMIN (GLUCOPHAGE) 500 mg, 2 Times Daily With Meals    pantoprazole (PROTONIX) 40 mg, Daily    polyethylene glycol (MIRALAX) 17 g, Daily PRN    ranolazine (RANEXA) 1,000 mg, 2 Times Daily    rosuvastatin (CRESTOR) 40 mg, Daily    Vitamin D, Cholecalciferol, 1000 UNITS tablet 1 tablet, Oral, Daily        Current Antibiotics   piperacillin-tazobactam (ZOSYN) 4.5 g IVPB in 100 mL NS MBP (CD)  vancomycin - 1.25-0.9 GM/250ML-%    Results:   CBC:      Lab 04/20/25 0426 04/19/25 0614 04/18/25 0355 04/17/25 1948   WBC 12.98* 4.73 5.55 5.75   HEMOGLOBIN 9.1* 9.1* 9.4* 9.4*   HEMATOCRIT 28.5* 28.9* 30.2* 28.7*   PLATELETS 66* 56* 60* 66*   NEUTROS ABS  --  3.17 4.42 4.01   IMMATURE GRANS (ABS)  --   --   --  0.01   LYMPHS ABS  --   --   --  0.85   MONOS ABS  --   --   --  0.78   EOS ABS  --  0.14 0.12 0.08   .3* 116.1* 115.7* 114.8*     LIVER FUNCTION TESTS:      Lab 04/20/25 0426   TOTAL PROTEIN 5.9*   ALBUMIN 2.7*   ALT (SGPT) 41   AST (SGOT) 68*   BILIRUBIN 1.0   INDIRECT BILIRUBIN 0.3   BILIRUBIN DIRECT 0.7*   ALK PHOS 127*     ABG:      Lab 04/20/25  0750 04/20/25 0426 04/19/25 1428 04/19/25 1110 04/19/25 0614 04/18/25 0355 04/17/25 1948   PH, ARTERIAL 7.401  --  7.371 7.182*  --   --   --    PO2 ART 79.8*  --  69.3* 60.3*  --   --   --    PCO2, ARTERIAL 40.9  --  46.8* 71.5*  --   --   --    HCO3 ART 25.3  --  27.1* 26.8*  --   --   --    ANION GAP  --  17.0*  --   --  11.0 16.0* 14.0     BMP/MG/PHOS:      Lab 04/20/25  0426 04/19/25  1110 04/19/25 0614 04/18/25 0355 04/17/25 1948   SODIUM 139  --  139 141 139   SODIUM, ARTERIAL  --  141  --   --   --    POTASSIUM 3.3*  --  3.5 3.6 3.5   CHLORIDE 99  --  103 102 101   BUN 25*  --  21 19 19   CREATININE 1.67*  --  1.31* 1.38* 1.40*   CALCIUM 9.4  --  9.4 9.7 9.4       IMAGING STUDIES:  CT Abdomen Pelvis Without Contrast  Result Date: 4/20/2025   1.  Liver cirrhosis with moderate volume ascites. 2.  Consolidative infiltrates in the left upper lobe lingula, left lower lobe and right lower lobe which could reflect dependent atelectasis or perhaps aspiration pneumonitis. The left lower lobe in particular is completely consolidated and there is  debris opacifying the lobar and segmental airways reflecting mucus plugging and/or aspirated material.  3.  No retroperitoneal lower abdominal wall hematoma.  This report was signed and finalized on 4/20/2025 11:14 AM by Dr Phan Pascual.      XR Chest 1 View  Result Date: 4/20/2025  1.  Increasing perihilar pulmonary infiltrates, especially on the left. Primary differential consideration would be a developing pulmonary edema. Questionable small left-sided pleural effusion as well.  This report was signed and finalized on 4/20/2025 8:00 AM by Dr Phan Pascual.      EEG Continuous Monitoring With Video  Result Date: 4/19/2025    EEG background is moderate generalized slow with generalized triphasic waves present No rhythmic or periodic discharges are seen No electrographic seizures are seen An episode of unresponsiveness occurring at the end of the study has no EEG correlate and is not epileptic in nature Findings were reviewed with the consultant neurologist, and the study will continue for an additional 24 hours This report is transcribed using the Dragon dictation system.     XR Chest 1 View  Result Date: 4/19/2025  1.  Bibasilar atelectasis (left greater than right). Left lower lobe appears mostly collapsed. 2.  No pleural effusion or pneumothorax.  This report was signed and finalized on 4/19/2025 11:30 AM by Dr Phan Pascual.      XR Abdomen KUB  Result Date: 4/19/2025   1.  Large volume colonic stool with mild gaseous distention along the transverse and sigmoid segments, measuring up to 7.6 cm. Overall bowel gas pattern is nonobstructive.    This report was signed and finalized on 4/19/2025 10:47 AM by Dr Phan Pascual.      MRI Brain With & Without Contrast  Result Date: 4/18/2025   1.  No acute intracranial process. 2.  Mild chronic small vessel ischemic change. 3.  There are 3 incidentally noted tiny remote microhemorrhages which I suspect are hypertensive in nature. 4.  Chronic paranasal sinus disease, right maxillary sinus in particular.  This report was signed and finalized on 4/18/2025 1:52 PM by Dr Chisholm  Ankur.      Assessment/Plan   69-year-old male with acute hypercapnic respiratory failure secondary to altered mentation from syncopal/presyncopal event transferred to CCU for critical care management.  He tolerated the BiPAP well and became more responsive.  He is currently on nasal cannula.  However, he is still somewhat drowsy, but arouses easily and is cooperative.    Acute hypercapnic respiratory failure  -Patient was placed on BiPAP and he tolerated this well.  Currently he is on 3 to 4 L nasal cannula  - Monitor closely  - History of DINORAH, patient will need CPAP or BiPAP while sleeping  - Continue DuoNeb, Pulmicort    Altered mentation/syncopal episodes  - Neurology consulting, we appreciate their recommendations  - Continuous EEG showed findings consistent with diffuse cerebral dysfunction of moderate degree  -Will support with BiPAP as needed for any respiratory insufficiency with the events     Liver cirrhosis  - Ammonia level 43 today  - Remains on 20 g lactulose twice daily    Hypothyroidism  - Sinew 75 mcg levothyroxine as scheduled daily    Sepsis  - Continue vancomycin and Zosyn  -Received IV fluid bolus  -MRSA screening was positive.  Blood culture in process.  Continue to monitor.  - Remains on Levophed infusion for hemodynamic support, wean as able.    6.  Thrombocytopenia  - Count 66 today, slightly improved from yesterday  - Appears to be chronic, not surprising given patient's history of cirrhosis  - Continue SCDs for VTE prophylaxis at this time      CODE STATUS: Full  VTE prophylaxis: SCDs  Nutrition: Cardiac diet  Bowel: Romana-Colace, MiraLAX, Dulcolax as needed  GI prophylaxis: Protonix  Antibiotics: Vancomycin and Zosyn     Disposition: Critical care management    Total critical care time: 38 minutes    Due to a high probability of clinically significant, life threatening deterioration, the patient required my highest level of preparedness to intervene emergently and I personally spent this  critical care time directly and personally managing the patient.     This critical care time included obtaining a history; examining the patient; pulse oximetry; ordering and review of studies; arranging urgent treatment with development of a management plan; evaluation of patient's response to treatment; frequent reassessment; and, discussions with other providers.    This critical care time was performed to assess and manage the high probability of imminent, life-threatening deterioration that could result in multi-organ failure. It was exclusive of separately billable procedures and treating other patients and teaching time.    Please see MDM section and the rest of the note for further information on patient assessment and treatment.    Part of this note may be an electronic transcription/translation of spoken language to printed text using the Dragon Dictation System    Electronically signed by Chinmay Meza PA-C on 4/20/2025 at 12:10 CDT

## 2025-04-20 NOTE — PROGRESS NOTES
RT EQUIPMENT DEVICE RELATED - SKIN ASSESSMENT    Gary Score:  Gary Score: 17     RT Medical Equipment/Device:     NIV Mask:  Under-the-nose   size:  B    Skin Assessment:      Nose:  Intact    Device Skin Pressure Protection:  Skin-to-device areas padded:  None Required    Nurse Notification:  Delma Morris, RRT

## 2025-04-20 NOTE — PROGRESS NOTES
RT EQUIPMENT DEVICE RELATED - SKIN ASSESSMENT    Gary Score:  Gary Score: 17     RT Medical Equipment/Device:     NIV Mask:  Under-the-nose   size:  B    Skin Assessment:      Nose:  Intact    Device Skin Pressure Protection:  Skin-to-device areas padded:  None Required    Nurse Notification:  Delma Vaca, RRT

## 2025-04-20 NOTE — PLAN OF CARE
Goal Outcome Evaluation:                      PRN tylenol administered at 1908 for complaints of leg pain. Tylenol suppository administered at 0101 for temp of 101.1. Temp elevated, hypotensive, minimally responsive at 0000 assessment. Sepsis positive. New orders for lactic acid (resulted 2.7, blood cultures, respiratory panel, MRSA swab, UA, 1,000 ml LR bolus, maintenance LR at 150, levophed, Merrem, and Vancomycin.  A&O x4 at 0400 assessment. Tested MRSA positive

## 2025-04-20 NOTE — SIGNIFICANT NOTE
Significant Note:     I attempted to see the patient at bedside.  He was not available for evaluation as he was in CT.  EEG discontinued. One spell captured which was nonepileptic.  My colleague Dr. Gordon to resume care tomorrow.    Denae Aguyao MD   04/20/25  10:47 CDT  [facility] [place] [facility]

## 2025-04-21 NOTE — PROGRESS NOTES
RT EQUIPMENT DEVICE RELATED - SKIN ASSESSMENT    Gary Score:  Gary Score: 15     RT Medical Equipment/Device:     Nasal Cannula    Skin Assessment:      Ears:  Intact    Device Skin Pressure Protection:  Skin-to-device areas padded:  None Required    Nurse Notification:  Delma Morris, RRT

## 2025-04-21 NOTE — THERAPY EVALUATION
Patient Name: Franko Lucero  : 1955    MRN: [id number]                              Today's Date: 2025       Admit Date: 2025    Visit Dx:     ICD-10-CM ICD-9-CM   1. Impaired functional mobility and activity tolerance [Z74.09]  Z74.09 V49.89   2. Dysphagia, unspecified type  R13.10 787.20     Patient Active Problem List   Diagnosis    Iliac artery aneurysm, bilateral    Hypertension    Hyperlipidemia    Diabetes mellitus    CAD (coronary artery disease)    Iron deficiency anemia    Constipation    Abnormal LFTs    Coagulopathy    Ureteral stone    Kidney stones    Hypotension    Acute foot pain    Macrocytic anemia    BELTRAN (acute kidney injury)    Acute kidney failure, unspecified    Type 2 diabetes mellitus with foot ulcer (CODE)    Anemia, chronic disease    Syncope    AMS (altered mental status)    Non-Hodgkin lymphoma    Thrombocytopenia    Acute hypercapnic respiratory failure     Past Medical History:   Diagnosis Date    Anemia     Anxiety     Arthritis     BPH (benign prostatic hypertrophy)     CAD (coronary artery disease)     Cancer     non-hodgkins lymphoma    Cirrhosis     Diabetes mellitus     Disease of thyroid gland     GERD (gastroesophageal reflux disease)     Hearing loss     History of transfusion     Hyperlipidemia     Hypertension     Iliac artery aneurysm, bilateral     Kidney stone     Liver cirrhosis     Migraines     Sleep apnea     c-pap     Past Surgical History:   Procedure Laterality Date    COLONOSCOPY  2014    COLONOSCOPY N/A 2017    Procedure: COLONOSCOPY WITH ANESTHESIA;  Surgeon: Sher Cui MD;  Location: [facility] ENDOSCOPY;  Service:     CORONARY ARTERY BYPASS GRAFT      2    CYSTOSCOPY URETEROSCOPY LASER LITHOTRIPSY Left 2017    Procedure: URETEROSCOPY LASER LITHOTRIPSY WITH DOUBLE J STENT INSERTION, LEFT ;  Surgeon: Weston Peck MD;  Location: [facility] OR;  Service:     CYSTOSCOPY URETEROSCOPY LASER LITHOTRIPSY Left 2017     Procedure: CYSTOSCOPY URETEROSCOPY LASER LITHOTRIPSY STENT INSERTION STONE EXTRACTION;  Surgeon: Weston Peck MD;  Location:  [facility] OR;  Service:     CYSTOSCOPY W/ URETERAL STENT PLACEMENT Left 04/17/2017    Procedure: CYSTOSCOPY URETERAL DOUBLE J STENT INSERTION, LEFT;  Surgeon: Weston Peck MD;  Location:  [facility] OR;  Service:     ENDOSCOPY N/A 04/26/2017    Procedure: ESOPHAGOGASTRODUODENOSCOPY WITH ANESTHESIA;  Surgeon: Sher Cui MD;  Location:  [facility] ENDOSCOPY;  Service:     INCISION AND DRAINAGE LEG Right 07/12/2023    Procedure: INCISION AND DRAINAGE - RIGHT FOOT;  Surgeon: Silas Swan DPM;  Location:  [facility] OR;  Service: Podiatry;  Laterality: Right;    JOINT REPLACEMENT Right     KIDNEY STONE SURGERY      KNEE SURGERY Right 08/2024    replacement    NECK SURGERY      ROTATOR CUFF REPAIR      SHOULDER SURGERY      TONSILLECTOMY      TRANS METATARSAL AMPUTATION Right 08/09/2023    Procedure: Partial 4th Ray Amputation - Right Foot;  Surgeon: Silas Swan DPM;  Location:  [facility] OR;  Service: Podiatry;  Laterality: Right;    URETEROSCOPY LASER LITHOTRIPSY WITH STENT INSERTION Left 09/01/2022    Procedure: LEFT URETEROSCOPY LASER LITHOTRIPSY WITH STENT INSERTION;  Surgeon: Weston Peck MD;  Location: [facility] OR;  Service: Urology;  Laterality: Left;    URETEROSCOPY LASER LITHOTRIPSY WITH STENT INSERTION Left 09/15/2022    Procedure: LEFT URETEROSCOPY LASER LITHOTRIPSY WITH STENT EXCHANGE;  Surgeon: Weston Peck MD;  Location:  [facility] OR;  Service: Urology;  Laterality: Left;      General Information       Row Name 04/21/25 1039          OT Time and Intention    Document Type evaluation  -JW     Mode of Treatment occupational therapy  -JW     Patient Effort adequate  -JW       Row Name 04/21/25 1039          General Information    Patient Profile Reviewed yes  -JW     Prior Level of Function mod assist:;ADL's;transfer;bed mobility  per granddaughter with  electric scooter for mobility.  -     Existing Precautions/Restrictions fall;oxygen therapy device and L/min  -     Barriers to Rehab medically complex;cognitive status;previous functional deficit  -       Row Name 04/21/25 1039          Living Environment    Current Living Arrangements home  -     People in Home spouse  -       Row Name 04/21/25 1039          Home Main Entrance    Number of Stairs, Main Entrance other (see comments)  ramp  -       Row Name 04/21/25 1039          Cognition    Orientation Status (Cognition) verbal cues/prompts needed for orientation;time;oriented x 3  -       Row Name 04/21/25 1039          Safety Issues/Impairments Affecting Functional Mobility    Impairments Affecting Function (Mobility) balance;range of motion (ROM);sensation/sensory awareness;strength;coordination;endurance/activity tolerance;cognition;shortness of breath  -               User Key  (r) = Recorded By, (t) = Taken By, (c) = Cosigned By      Initials Name Provider Type    Kathy Grissom, OTR/L, CSRS Occupational Therapist                     Mobility/ADL's       Row Name 04/21/25 1039          Bed Mobility    Bed Mobility supine-sit;sit-supine  -     Supine-Sit [place] (Bed Mobility) verbal cues;moderate assist (50% patient effort);2 person assist  -     Sit-Supine [place] (Bed Mobility) verbal cues;moderate assist (50% patient effort);2 person assist;maximum assist (25% patient effort)  -     Bed Mobility, Safety Issues decreased use of arms for pushing/pulling;decreased use of legs for bridging/pushing  -     Assistive Device (Bed Mobility) head of bed elevated;bed rails  -       Row Name 04/21/25 1039          Transfers    Transfers sit-stand transfer;stand-sit transfer  -     Comment, (Transfers) x3 reps for clean up of BM.  -       Row Name 04/21/25 1039          Sit-Stand Transfer    Sit-Stand [place] (Transfers) verbal cues;moderate assist (50% patient  effort);maximum assist (25% patient effort);2 person assist  -JW       Row Name 04/21/25 1039          Stand-Sit Transfer    Stand-Sit [place] (Transfers) moderate assist (50% patient effort);maximum assist (25% patient effort);2 person assist  -JW       Row Name 04/21/25 1039          Functional Mobility    Functional Mobility- Ind. Level unable to perform  -JW       Row Name 04/21/25 1039          Activities of Daily Living    BADL Assessment/Intervention lower body dressing;toileting  -JW       Row Name 04/21/25 1039          Lower Body Dressing Assessment/Training    [place] Level (Lower Body Dressing) don;socks;maximum assist (25% patient effort)  -     Position (Lower Body Dressing) edge of bed sitting  -JW       Row Name 04/21/25 1039          Toileting Assessment/Training    [place] Level (Toileting) adjust/manage clothing;change pad/brief;perform perineal hygiene;maximum assist (25% patient effort);dependent (less than 25% patient effort)  -     Position (Toileting) supported standing  -     Comment, (Toileting) found to be incontinent of stool  -               User Key  (r) = Recorded By, (t) = Taken By, (c) = Cosigned By      Initials Name Provider Type     Kathy Guevara, RONAL/L, CSRS Occupational Therapist                   Obj/Interventions       Row Name 04/21/25 1039          Sensory Assessment (Somatosensory)    Sensory Assessment (Somatosensory) UE sensation intact  -JW       Row Name 04/21/25 1039          Vision Assessment/Intervention    Visual Impairment/Limitations WFL  -JW       Row Name 04/21/25 1039          Range of Motion Comprehensive    General Range of Motion bilateral upper extremity ROM WF  -     Comment, General Range of Motion except for B shoulder AROM limited approx 50% due to weakness.  -[facility] Name 04/21/25 1039          Strength Comprehensive (MMT)    Comment, General Manual Muscle Testing (MMT) Assessment B UE strength grossly 3/5  -        Row Name 04/21/25 1039          Balance    Balance Assessment sitting static balance;sitting dynamic balance;standing static balance  -     Static Sitting Balance minimal assist  -     Dynamic Sitting Balance minimal assist;moderate assist  -     Position, Sitting Balance supported;sitting edge of bed  -     Static Standing Balance moderate assist;maximum assist;2-person assist  -               User Key  (r) = Recorded By, (t) = Taken By, (c) = Cosigned By      Initials Name Provider Type     Kathy Guevara, OTR/L, CSRS Occupational Therapist                   Goals/Plan       Row Name 04/21/25 1039          Transfer Goal 1 (OT)    Activity/Assistive Device (Transfer Goal 1, OT) commode, bedside without drop arms;shower chair  -     [place] Level/Cues Needed (Transfer Goal 1, OT) moderate assist (50-74% patient effort)  -     Time Frame (Transfer Goal 1, OT) long term goal (LTG);by discharge  -     Progress/Outcome (Transfer Goal 1, OT) new goal  -JW       Row Name 04/21/25 1039          Dressing Goal 1 (OT)    Activity/Device (Dressing Goal 1, OT) dressing skills, all  -JW     [place]/Cues Needed (Dressing Goal 1, OT) moderate assist (50-74% patient effort)  -     Time Frame (Dressing Goal 1, OT) long term goal (LTG);by discharge  -     Progress/Outcome (Dressing Goal 1, OT) new goal  -JW       Row Name 04/21/25 1039          Toileting Goal 1 (OT)    Activity/Device (Toileting Goal 1, OT) toileting skills, all  -     [place] Level/Cues Needed (Toileting Goal 1, OT) moderate assist (50-74% patient effort)  -     Time Frame (Toileting Goal 1, OT) long term goal (LTG);by discharge  -     Progress/Outcome (Toileting Goal 1, OT) new goal  -       Row Name 04/21/25 1039          Therapy Assessment/Plan (OT)    Planned Therapy Interventions (OT) activity tolerance training;adaptive equipment training;BADL retraining;functional balance retraining;transfer/mobility  "retraining;occupation/activity based interventions;strengthening exercise;patient/caregiver education/training  -               User Key  (r) = Recorded By, (t) = Taken By, (c) = Cosigned By      Initials Name Provider Type    NARCISO Kathy Guevara, OTR/L, CSRS Occupational Therapist                   Clinical Impression       Row Name 04/21/25 1039          Pain Scale: FACES Pre/Post-Treatment    Pain: FACES Scale, Pretreatment 2-->hurts little bit  -     Posttreatment Pain Rating 6-->hurts even more  -     Pre/Posttreatment Pain Comment generalized pain \"hurts all over\"  -       Row Name 04/21/25 1039          Plan of Care Review    Plan of Care Reviewed With patient  -     Outcome Evaluation OT eval completed. Pt presents lethargic, but awoken with verbal and tactile cues. He is oriented x3, on bipap but nsg transitioned him to 5L NC for session. Granddaughter present in room and reports that baseline that pt requires assistance for all alds from spouse and uses of motorized w/c for mobility. Today he demonstrates generalized weakness, decreased activity tolerance, balance, and increased SOA with activity. Performed supine to sit at EOB with Mod A-Max Ax2. Min A to maintain static sitting balance at EOB. Max-Dep A to don socks. Performed sit <> stand t/f x3 reps in total with Mod-Max Ax2 required for each one. Incontinent of stool and clean up occured in standing. Dep A for all aspects of toileting. Returned back to folwers in bed at end of session with Max Ax2. O2 sats dropped to 85% with activity but with vcs and positioning in bed, returned to low 90s at end of session. Mr. Moses would benefit from skilled oT services to address these deficits and assist with return to PLOF. Recommend d/c to SNF.  -       Row Name 04/21/25 103          Therapy Assessment/Plan (OT)    Rehab Potential (OT) fair  -JW     Criteria for Skilled Therapeutic Interventions Met (OT) yes;skilled treatment is necessary  " -     Therapy Frequency (OT) 5 times/wk  -     Predicted Duration of Therapy Intervention (OT) 10 days  -       Row Name 04/21/25 1039          Therapy Plan Review/Discharge Plan (OT)    Anticipated Discharge Disposition (OT) skilled nursing facility  -       Row Name 04/21/25 1039          Positioning and Restraints    Pre-Treatment Position in bed  -     Post Treatment Position bed  -JW     In Bed notified nsg;sitting;call light within reach;encouraged to call for assist;exit alarm on;side rails up x3;patient within staff view;RUE elevated;LUE elevated;L heel elevated;heels elevated;SCD pump applied  -               User Key  (r) = Recorded By, (t) = Taken By, (c) = Cosigned By      Initials Name Provider Type    Kathy Grissom, OTR/L, CSRS Occupational Therapist                   Outcome Measures       Row Name 04/21/25 1039          How much help from another is currently needed...    Putting on and taking off regular lower body clothing? 1  -JW     Bathing (including washing, rinsing, and drying) 2  -JW     Toileting (which includes using toilet bed pan or urinal) 1  -JW     Putting on and taking off regular upper body clothing 2  -JW     Taking care of personal grooming (such as brushing teeth) 2  -JW     Eating meals 3  -JW     AM-PAC 6 Clicks Score (OT) 11  -       Row Name 04/21/25 0800          How much help from another person do you currently need...    Turning from your back to your side while in flat bed without using bedrails? 2  -SY     Moving from lying on back to sitting on the side of a flat bed without bedrails? 2  -SY     Moving to and from a bed to a chair (including a wheelchair)? 2  -SY     Standing up from a chair using your arms (e.g., wheelchair, bedside chair)? 2  -SY     Climbing 3-5 steps with a railing? 2  -SY     To walk in hospital room? 2  -SY     AM-PAC 6 Clicks Score (PT) 12  -SY     Highest Level of Mobility Goal 4 --> Transfer to chair/commode  -SY        Row Name 04/21/25 1039          Functional Assessment    Outcome Measure Options AM-PAC 6 Clicks Daily Activity (OT)  -               User Key  (r) = Recorded By, (t) = Taken By, (c) = Cosigned By      Initials Name Provider Type    Alex Estes, RN Registered Nurse    Kathy Grissom OTR/L, CSRS Occupational Therapist                    Occupational Therapy Education       Title: PT OT SLP Therapies (In Progress)       Topic: Occupational Therapy (In Progress)       Point: ADL training (Done)       Learning Progress Summary            Patient Acceptance, E, VU by NARCISO at 4/21/2025 1204                      Point: Precautions (Done)       Learning Progress Summary            Patient Acceptance, E, VU by NARCISO at 4/21/2025 1204                                      User Key       Initials Effective Dates Name Provider Type Discipline     11/15/24 -  [place], Kathy, OTR/L, CSRS Occupational Therapist OT                  OT Recommendation and Plan  Planned Therapy Interventions (OT): activity tolerance training, adaptive equipment training, BADL retraining, functional balance retraining, transfer/mobility retraining, occupation/activity based interventions, strengthening exercise, patient/caregiver education/training  Therapy Frequency (OT): 5 times/wk  Plan of Care Review  Plan of Care Reviewed With: patient  Outcome Evaluation: OT eval completed. Pt presents lethargic, but awoken with verbal and tactile cues. He is oriented x3, on bipap but nsg transitioned him to 5L NC for session. Granddaughter present in room and reports that baseline that pt requires assistance for all alds from spouse and uses of motorized w/c for mobility. Today he demonstrates generalized weakness, decreased activity tolerance, balance, and increased SOA with activity. Performed supine to sit at EOB with Mod A-Max Ax2. Min A to maintain static sitting balance at EOB. Max-Dep A to don socks. Performed sit <> stand t/f x3 reps in  total with Mod-Max Ax2 required for each one. Incontinent of stool and clean up occured in standing. Dep A for all aspects of toileting. Returned back to folwers in bed at end of session with Max Ax2. O2 sats dropped to 85% with activity but with vcs and positioning in bed, returned to low 90s at end of session. Mr. Moses would benefit from skilled oT services to address these deficits and assist with return to PLOF. Recommend d/c to SNF.     Time Calculation:         Time Calculation- OT       Row Name 04/21/25 1039             Time Calculation- OT    OT Start Time 1039  -      OT Stop Time 1140  -      OT Time Calculation (min) 61 min  -      OT Received On 04/21/25  -      OT Goal Re-Cert Due Date 05/01/25  -                User Key  (r) = Recorded By, (t) = Taken By, (c) = Cosigned By      Initials Name Provider Type     Kathy Guevara OTR/L, CSRS Occupational Therapist                  Therapy Charges for Today       Code Description Service Date Service Provider Modifiers Qty    [id number]  OT EVAL MOD COMPLEXITY 4 4/21/2025 Kathy Guevara OTR/L, CSRS GO 1                 RONAL Andrew/L, CSRS  4/21/2025

## 2025-04-21 NOTE — PROGRESS NOTES
Enter Query Response Below      Query Response: Severe sepsis causing acute organ failure, as evidenced by acute hypercapnic respiratory failure              If applicable, please update the problem list.

## 2025-04-21 NOTE — PLAN OF CARE
Problem: Noninvasive Ventilation Acute  Goal: Effective Unassisted Ventilation and Oxygenation  Outcome: Progressing   Goal Outcome Evaluation:    Pt has been on bipap all day, tolerating settings well

## 2025-04-21 NOTE — PLAN OF CARE
Goal Outcome Evaluation:  Plan of Care Reviewed With: patient, caregiver     Progress: no change     Anticipated Discharge Disposition (SLP): unknown     SLP Swallowing Diagnosis: mild, oral dysphagia, severe, pharyngeal dysphagia, other (see comments) (unable to evaluate esophageal phase of the swallow) (04/21/25 1400)    SPEECH-LANGUAGE PATHOLOGY EVALUTION - VFSS    Subjective: The patient was seen on this date for a VFSS(Videofluoroscopic Swallowing Study).   Patient was cooperative, but weak and required extended time to participate .      Significant history: Presented due to multiple episodes of near syncope, confusion, agitation, and incontinence. Medical history of CABG, neck sx, sleep apnea, migraines, GERD, DM, cirrhosis. SLP consulted due to concern for aspiration from wife prior to coming and during admission. CT abdomen with consolidative infiltrates in lungs with debris and concern for aspiration pneumonitis. A bedside swallow evaluation raised concerns for pharyngeal and esophageal dysphagia.      Objective: Risks/benefits were reviewed with the patient, and consent was obtained. The study was completed with SLP and Radiologist present. The patient was seen in lateral view(s). Textures given included thin liquid, nectar thick liquid, and honey thick liquid.    Assessment: Difficulties were noted with thin liquid, nectar thick liquid, and honey thick liquid, characterized by decreased oral control, decreased airway protection during the swallow and after the swallow due to residue throughout the pharynx which caused continued penetration (with nectar and honey thick) and aspiration (with thin liquids). An excessive number of swallows was required to attempt to clear pharyngeal residue after thicker consistency liquids (honey and nectar). No true clearance of the pharynx was noted, which increases the risk of aspiration. All penetration and aspiration seen were silent. Patient was cued to cough. Patient  cough response was ineffective to clear the airway throughout the study.     The patient demonstrated silent penetration and silent aspiration.  Noted with, thin liquid, nectar thick liquid, and honey thick liquid.    Residue was diffuse throughout the pharynx and was rated as  moderate to severe.    SLP Findings: Patient presents with  mild oral phase dysphagia and severe pharyngeal phase dysphagia. No evaluation of the esophageal phase was completed due to the increased risk of aspiration with oral intake.       Recommendations: Diet Textures: NPO,  no  food. Consider alternative means of nutrition. Medications should be taken  by alternate means. May have water and Ice between meals after oral care, under staff or family supervision and with the recommended strategies for safe swallowing.    Recommended Strategies: Upright for PO, small bites and sips, no straw, and supervision with all PO. Oral care before breakfast, after all meals and PRN.    Other Recommended Evaluations:  repeat VSS if needed. Continue to work to improve airway protection/cough response to improve safety with oral intake, as the pharyngeal deficits are likely not acute.        Dysphagia therapy is recommended. Rationale: safe diet tolerance.    Cate Rivera, SLP 4/21/2025 15:18 CDT

## 2025-04-21 NOTE — THERAPY EVALUATION
Acute Care - Speech Language Pathology   Swallow Initial Evaluation [facility]     Patient Name: Franko Lucero  : 1955  MRN: [id number]  Today's Date: 2025               Admit Date: 2025  SPEECH-LANGUAGE PATHOLOGY EVALUATION - SWALLOW  Subjective: The patient was seen on this date for a Clinical Swallow evaluation.  Patient was alert and cooperative. Spouse present.   Significant history: Presented due to multiple episodes of near syncope, confusion, agitation, and incontinence. Medical history of CABG, neck sx, sleep apnea, migraines, GERD, DM, cirrhosis. SLP consulted due to concern for aspiration from wife prior to coming and during admission. CT abdomen with consolidative infiltrates in lungs with debris and concern for aspiration pneumonitis.   Objective: Oral motor examination results: generalized weakness.  Textures given during assessment of swallow function included thin liquid, nectar thick liquid, honey thick liquid, and puree consistency.  Assessment: Difficulties were noted with all consistencies.  Observations: Delayed weak coughing and throat clearing with all intake this AM. Coughing noted prior to PO intake and at end of session as well. Voice remained clear throughout. Cannot fully determine toleration of the bedside, appears to be esophageal in nature as well.   SLP Findings:  Patient presents with suspected oropharyngeal dysphagia, with esophageal component.   Recommendations: Diet Textures: NPO  Medications should be taken whole/crushed with puree. May have sips of water/ice between meals and/or after oral care, under staff or family supervision and with the recommended strategies for safe swallowing.   Recommended Strategies: upright for PO, small bites and sips, supervision with all PO, and 1:1 assist with all PO. Oral care 2x a shift and PRN.  Other Recommended Evaluations: VFSS    Dysphagia therapy is recommended.      Shannan Carpio MS CCC-SLP 2025 10:36  CDT    Visit Dx:     ICD-10-CM ICD-9-CM   1. Impaired functional mobility and activity tolerance [Z74.09]  Z74.09 V49.89   2. Dysphagia, unspecified type  R13.10 787.20     Patient Active Problem List   Diagnosis    Iliac artery aneurysm, bilateral    Hypertension    Hyperlipidemia    Diabetes mellitus    CAD (coronary artery disease)    Iron deficiency anemia    Constipation    Abnormal LFTs    Coagulopathy    Ureteral stone    Kidney stones    Hypotension    Acute foot pain    Macrocytic anemia    BELTRAN (acute kidney injury)    Acute kidney failure, unspecified    Type 2 diabetes mellitus with foot ulcer (CODE)    Anemia, chronic disease    Syncope    AMS (altered mental status)    Non-Hodgkin lymphoma    Thrombocytopenia    Acute hypercapnic respiratory failure     Past Medical History:   Diagnosis Date    Anemia     Anxiety     Arthritis     BPH (benign prostatic hypertrophy)     CAD (coronary artery disease)     Cancer     non-hodgkins lymphoma    Cirrhosis     Diabetes mellitus     Disease of thyroid gland     GERD (gastroesophageal reflux disease)     Hearing loss     History of transfusion     Hyperlipidemia     Hypertension     Iliac artery aneurysm, bilateral     Kidney stone     Liver cirrhosis     Migraines     Sleep apnea     c-pap     Past Surgical History:   Procedure Laterality Date    COLONOSCOPY  02/04/2014    COLONOSCOPY N/A 04/26/2017    Procedure: COLONOSCOPY WITH ANESTHESIA;  Surgeon: Sher Cui MD;  Location: [facility] ENDOSCOPY;  Service:     CORONARY ARTERY BYPASS GRAFT      2    CYSTOSCOPY URETEROSCOPY LASER LITHOTRIPSY Left 04/17/2017    Procedure: URETEROSCOPY LASER LITHOTRIPSY WITH DOUBLE J STENT INSERTION, LEFT ;  Surgeon: Weston Peck MD;  Location: [facility] OR;  Service:     CYSTOSCOPY URETEROSCOPY LASER LITHOTRIPSY Left 08/14/2017    Procedure: CYSTOSCOPY URETEROSCOPY LASER LITHOTRIPSY STENT INSERTION STONE EXTRACTION;  Surgeon: Weston Peck MD;  Location: [facility]  OR;  Service:     CYSTOSCOPY W/ URETERAL STENT PLACEMENT Left 04/17/2017    Procedure: CYSTOSCOPY URETERAL DOUBLE J STENT INSERTION, LEFT;  Surgeon: Weston Peck MD;  Location: [facility] OR;  Service:     ENDOSCOPY N/A 04/26/2017    Procedure: ESOPHAGOGASTRODUODENOSCOPY WITH ANESTHESIA;  Surgeon: Sher Cui MD;  Location: [facility] ENDOSCOPY;  Service:     INCISION AND DRAINAGE LEG Right 07/12/2023    Procedure: INCISION AND DRAINAGE - RIGHT FOOT;  Surgeon: Silas Swan DPM;  Location: [facility] OR;  Service: Podiatry;  Laterality: Right;    JOINT REPLACEMENT Right     KIDNEY STONE SURGERY      KNEE SURGERY Right 08/2024    replacement    NECK SURGERY      ROTATOR CUFF REPAIR      SHOULDER SURGERY      TONSILLECTOMY      TRANS METATARSAL AMPUTATION Right 08/09/2023    Procedure: Partial 4th Ray Amputation - Right Foot;  Surgeon: Silas Swan DPM;  Location: [facility] OR;  Service: Podiatry;  Laterality: Right;    URETEROSCOPY LASER LITHOTRIPSY WITH STENT INSERTION Left 09/01/2022    Procedure: LEFT URETEROSCOPY LASER LITHOTRIPSY WITH STENT INSERTION;  Surgeon: Weston Peck MD;  Location: [facility] OR;  Service: Urology;  Laterality: Left;    URETEROSCOPY LASER LITHOTRIPSY WITH STENT INSERTION Left 09/15/2022    Procedure: LEFT URETEROSCOPY LASER LITHOTRIPSY WITH STENT EXCHANGE;  Surgeon: Weston Peck MD;  Location: [facility] OR;  Service: Urology;  Laterality: Left;       SLP Recommendation and Plan  SLP Swallowing Diagnosis: mild, oral dysphagia, R/O pharyngeal dysphagia, suspected esophageal dysphagia (04/21/25 0830)  SLP Diet Recommendation: NPO, water between meals after oral care, with supervision, ice chips between meals after oral care, with supervision (04/21/25 0830)  Recommended Precautions and Strategies: upright posture during/after eating, small bites of food and sips of liquid, general aspiration precautions, reflux precautions, 1:1 supervision, assist with feeding,  fatigue precautions (04/21/25 0830)  SLP Rec. for Method of Medication Administration: meds crushed, with puree, as tolerated (04/21/25 0830)     Monitor for Signs of Aspiration: yes, notify SLP if any concerns (04/21/25 0830)  Recommended Diagnostics: reassess via VFSS ([facility]) (04/21/25 0830)  Swallow Criteria for Skilled Therapeutic Interventions Met: demonstrates skilled criteria (04/21/25 0830)  Anticipated Discharge Disposition (SLP): unknown (04/21/25 0830)  Rehab Potential/Prognosis, Swallowing: adequate, monitor progress closely (04/21/25 0830)  Therapy Frequency (Swallow): PRN (04/21/25 0830)  Predicted Duration Therapy Intervention (Days): until discharge (04/21/25 0830)  Oral Care Recommendations: Oral Care BID/PRN, Toothbrush (04/21/25 0830)                                        Progress: no change (Initial Evaluation)      SWALLOW EVALUATION (Last 72 Hours)       SLP Adult Swallow Evaluation       Row Name 04/21/25 0830                   Rehab Evaluation    Document Type evaluation  -MG        Subjective Information no complaints  -MG        Patient Observations alert;cooperative;agree to therapy  -MG        Patient/Family/Caregiver Comments/Observations Wife present  -MG        Patient Effort adequate  -MG        Symptoms Noted During/After Treatment fatigue  -MG           General Information    Patient Profile Reviewed yes  -MG        Pertinent History Of Current Problem Presented due to multiple episodes of near syncope, confusion, agitation, and incontinence. Medical history of CABG, neck sx, sleep apnea, migraines, GERD, DM, cirrhosis. SLP consulted due to concern for aspiration from wife prior to coming and during admission. CT abdomen with consolidative infiltrates in lungs with debris and concern for aspiration pneumonitis.  -MG        Current Method of Nutrition regular textures;thin liquids  -MG        Precautions/Limitations, Vision WFL;for purposes of eval  -MG        Precautions/Limitations,  Hearing WFL;for purposes of eval  -MG        Prior Level of Function-Communication WFL  -MG        Prior Level of Function-Swallowing no diet consistency restrictions;esophageal concerns  -MG        Plans/Goals Discussed with patient;spouse/S.O.;agreed upon  -MG        Barriers to Rehab medically complex  -MG        Patient's Goals for Discharge patient did not state  -MG        Family Goals for Discharge patient able to eat/drink without coughing/choking  -MG           Pain    Additional Documentation Pain Scale: FACES Pre/Post-Treatment (Group)  -MG           Pain Scale: FACES Pre/Post-Treatment    Pain: FACES Scale, Pretreatment 0-->no hurt  -MG        Posttreatment Pain Rating 2-->hurts little bit  -MG        Pre/Posttreatment Pain Comment Back pain with upright positioning; returned to reclined at end of session.  -MG           Oral Motor Structure and Function    Dentition Assessment edentulous, does not have dentures  -MG        Secretion Management WNL/WFL  -MG        Mucosal Quality dry  -MG        Volitional Swallow weak;delayed  -MG        Volitional Cough weak;non-productive  -MG           Oral Musculature and Cranial Nerve Assessment    Oral Motor General Assessment generalized oral motor weakness  -MG           General Eating/Swallowing Observations    Respiratory Support Currently in Use nasal cannula  -MG        O2 Liters 3L  -MG        Eating/Swallowing Skills fed by SLP  -MG        Positioning During Eating upright in bed  -MG        Utensils Used spoon;straw  -MG        Consistencies Trialed pureed;honey-thick liquids;nectar/syrup-thick liquids;thin liquids  -MG           Clinical Swallow Eval    Oral Prep Phase WFL  -MG        Oral Transit WFL  -MG        Oral Residue WFL  -MG        Pharyngeal Phase suspected pharyngeal impairment  -MG        Esophageal Phase suspected esophageal impairment  -MG        Clinical Swallow Evaluation Summary See note  -MG           Pharyngeal Phase Concerns     Pharyngeal Phase Concerns multiple swallows;cough;throat clear  -MG           Esophageal Phase Concerns    Esophageal Phase Concerns --  delayed coughing; prior history of dilations with known reflux  -MG           SLP Evaluation Clinical Impression    SLP Swallowing Diagnosis mild;oral dysphagia;R/O pharyngeal dysphagia;suspected esophageal dysphagia  -MG        Functional Impact risk of aspiration/pneumonia;risk of malnutrition;risk of dehydration  -MG        Rehab Potential/Prognosis, Swallowing adequate, monitor progress closely  -MG        Swallow Criteria for Skilled Therapeutic Interventions Met demonstrates skilled criteria  -MG           Recommendations    Therapy Frequency (Swallow) PRN  -MG        Predicted Duration Therapy Intervention (Days) until discharge  -MG        SLP Diet Recommendation NPO;water between meals after oral care, with supervision;ice chips between meals after oral care, with supervision  -MG        Recommended Diagnostics reassess via VFSS ([facility])  -MG        Recommended Precautions and Strategies upright posture during/after eating;small bites of food and sips of liquid;general aspiration precautions;reflux precautions;1:1 supervision;assist with feeding;fatigue precautions  -MG        Oral Care Recommendations Oral Care BID/PRN;Toothbrush  -MG        SLP Rec. for Method of Medication Administration meds crushed;with puree;as tolerated  -MG        Monitor for Signs of Aspiration yes;notify SLP if any concerns  -MG        Anticipated Discharge Disposition (SLP) unknown  -MG           Swallow Goals (SLP)    Swallow LTGs Swallow Long Term Goal (free text)  -MG        Swallow STGs diet tolerance goal selection (SLP)  -MG        Diet Tolerance Goal Selection (SLP) Patient will tolerate trials of  -MG           (LTG) Swallow    (LTG) Swallow Patient will tolerate least restrictive diet without overt s/s of aspiration.  -MG        Leslie (Swallow Long Term Goal) independently (over 90%  accuracy)  -MG        Time Frame (Swallow Long Term Goal) by discharge  -MG        Barriers (Swallow Long Term Goal) medically complex  -MG        Progress/Outcomes (Swallow Long Term Goal) new goal  -MG           (STG) Patient will tolerate trials of    Consistencies Trialed (Tolerate trials) soft to chew (chopped) textures;thin liquids  -MG        Desired Outcome (Tolerate trials) without signs/symptoms of aspiration;without signs of distress;with adequate oral prep/transit/clearance  -MG        [place] (Tolerate trials) independently (over 90% accuracy)  -MG        Time Frame (Tolerate trials) by discharge  -MG        Progress/Outcomes (Tolerate trials) new goal  -MG                  User Key  (r) = Recorded By, (t) = Taken By, (c) = Cosigned By      Initials Name Effective Dates    MG Shannan Carpio, MS [facility]-SLP 07/11/23 -                     EDUCATION  The patient has been educated in the following areas:   Dysphagia (Swallowing Impairment) Oral Care/Hydration NPO rationale.        SLP GOALS       Row Name 04/21/25 0830             (LTG) Swallow    (LTG) Swallow Patient will tolerate least restrictive diet without overt s/s of aspiration.  -MG      [place] (Swallow Long Term Goal) independently (over 90% accuracy)  -MG      Time Frame (Swallow Long Term Goal) by discharge  -MG      Barriers (Swallow Long Term Goal) medically complex  -MG      Progress/Outcomes (Swallow Long Term Goal) new goal  -MG         (STG) Patient will tolerate trials of    Consistencies Trialed (Tolerate trials) soft to chew (chopped) textures;thin liquids  -MG      Desired Outcome (Tolerate trials) without signs/symptoms of aspiration;without signs of distress;with adequate oral prep/transit/clearance  -MG      [place] (Tolerate trials) independently (over 90% accuracy)  -MG      Time Frame (Tolerate trials) by discharge  -MG      Progress/Outcomes (Tolerate trials) new goal  -MG                User Key  (r) = Recorded  By, (t) = Taken By, (c) = Cosigned By      Initials Name Provider Type    Shannan Wilkerson MS CCC-SLP Speech and Language Pathologist                         Time Calculation:    Time Calculation- SLP       Row Name 04/21/25 1030             Time Calculation- SLP    SLP Start Time 0830  -MG      SLP Stop Time 0940  -MG      SLP Time Calculation (min) 70 min  -MG      SLP Received On 04/21/25  -MG      SLP Goal Re-Cert Due Date 05/01/25  -MG         Untimed Charges    SLP Eval/Re-eval  ST Eval Oral Pharyng Swallow - 37604  -MG      [id number] Eval Oral Pharyng Swallow Minutes 70  -MG         Total Minutes    Untimed Charges Total Minutes 70  -MG       Total Minutes 70  -MG                User Key  (r) = Recorded By, (t) = Taken By, (c) = Cosigned By      Initials Name Provider Type    Shannan Wilkerson MS CCC-SLP Speech and Language Pathologist                    Therapy Charges for Today       Code Description Service Date Service Provider Modifiers Qty    [id number] HC ST EVAL ORAL PHARYNG SWALLOW 5 4/21/2025 Shannan Carpio MS CCC-SLP GN 1                 Shannan Carpio MS CCC-SPENSER  4/21/2025

## 2025-04-21 NOTE — PLAN OF CARE
Goal Outcome Evaluation:  Plan of Care Reviewed With: patient, spouse, caregiver (KAVITA Johnson)        Progress: no change (Initial Evaluation)       Anticipated Discharge Disposition (SLP): unknown          SLP Swallowing Diagnosis: mild, oral dysphagia, R/O pharyngeal dysphagia, suspected esophageal dysphagia (04/21/25 0830)               SPEECH-LANGUAGE PATHOLOGY EVALUATION - SWALLOW  Subjective: The patient was seen on this date for a Clinical Swallow evaluation.  Patient was alert and cooperative. Spouse present.   Significant history: Presented due to multiple episodes of near syncope, confusion, agitation, and incontinence. Medical history of CABG, neck sx, sleep apnea, migraines, GERD, DM, cirrhosis. SLP consulted due to concern for aspiration from wife prior to coming and during admission. CT abdomen with consolidative infiltrates in lungs with debris and concern for aspiration pneumonitis.   Objective: Oral motor examination results: generalized weakness.  Textures given during assessment of swallow function included thin liquid, nectar thick liquid, honey thick liquid, and puree consistency.  Assessment: Difficulties were noted with all consistencies.  Observations: Delayed weak coughing and throat clearing with all intake this AM. Coughing noted prior to PO intake and at end of session as well. Voice remained clear throughout. Cannot fully determine toleration of the bedside, appears to be esophageal in nature as well.   SLP Findings:  Patient presents with suspected oropharyngeal dysphagia, with esophageal component.   Recommendations: Diet Textures: NPO  Medications should be taken whole/crushed with puree. May have sips of water/ice between meals and/or after oral care, under staff or family supervision and with the recommended strategies for safe swallowing.   Recommended Strategies: upright for PO, small bites and sips, supervision with all PO, and 1:1 assist with all PO. Oral care 2x a shift and  PRN.  Other Recommended Evaluations: VFSS    Dysphagia therapy is recommended.      Shannan Carpio MS CCC-SLP 4/21/2025 10:25 CDT

## 2025-04-21 NOTE — PLAN OF CARE
Goal Outcome Evaluation:      Alert and oriented to self and time. Wore bipap most of the night. Levo at 0.03. MAPs above 65. Normal sinus rhythm with PVCs. Afebrile. Urinated spontaneously in external catheter. No BM this shift.

## 2025-04-21 NOTE — PROGRESS NOTES
RT EQUIPMENT DEVICE RELATED - SKIN ASSESSMENT    Gary Score:  Gary Score: 13     RT Medical Equipment/Device:     NIV Mask:  Under-the-nose   size:  b    Skin Assessment:      Cheek:  Intact  Chin:  Intact  Nares:  Intact  Nose:  Intact  Lips:  Intact  Mouth:  Intact    Device Skin Pressure Protection:  Skin-to-device areas padded:  None Required    Nurse Notification:  Delma Sanchez, CRT

## 2025-04-21 NOTE — PLAN OF CARE
Goal Outcome Evaluation:  Plan of Care Reviewed With: patient, spouse        Progress: declining  Outcome Evaluation: Nutrition follow up. Pt has a pending wound counsult. He had a syncopal event and a rapid response was called 4/19 and he was transferred to CCU. He did have an episode of aspiration this morning when he was taking his lactulose. He had a clinical swallow evaluation this morning and was noted to have difficulty with all consistencies with suspected oropharyngeal dysphagia with esophageal component. He is now NPO and orders in place for a VFSS and dysphagia therapy is recommended. He was previously on a reg consistency, heart healthy, C.CHO diet with thin liquids. He had only consumed 25% of 2 meals in the last couple days. He consumed avg of 80mL of po fluid and 2183mL of IVF's over the last 3 days with 917mL of urine output. Last BM noted on 4/16. He has reduced skin turgor. 1-2+ edema charted to ankles/feet. Per review of weight hx, no sig changes noted over the last 6 months, down 10# in 10 months (5%), not clinically significant. Will cont to follow for VFSS results and pt/family desire for aggressive measures, including alternate means of nutrition support if desired. He is currently a limited support code status with DNI and no CPR. Will follow.

## 2025-04-21 NOTE — PROGRESS NOTES
Neurology Progress Note    Consult Date: 2025  Referring MD: Yossi Rosen MD  Reason for Consult: spells    Patient: Franko Lucero (69 y.o. male)  MRN: [id number]  : 1955    History of Present Illness:   Interval history: No further spells overnight.  No seizure activity.  The patient remains in respiratory failure and currently is in the critical care unit.  The continuous EEG was done and caught 2 spells both on .  One was at 10:50 AM with the patient's wife describing difficulties with holding items.  The second was at 12:45 PM.  The patient was confused and also urinated.  Both of these had no EEG correlate.      Medical History:   Past Medical/Surgical Hx:  Past Medical History:   Diagnosis Date    Anemia     Anxiety     Arthritis     BPH (benign prostatic hypertrophy)     CAD (coronary artery disease)     Cancer     non-hodgkins lymphoma    Cirrhosis     Diabetes mellitus     Disease of thyroid gland     GERD (gastroesophageal reflux disease)     Hearing loss     History of transfusion     Hyperlipidemia     Hypertension     Iliac artery aneurysm, bilateral     Kidney stone     Liver cirrhosis     Migraines     Sleep apnea     c-pap     Past Surgical History:   Procedure Laterality Date    COLONOSCOPY  2014    COLONOSCOPY N/A 2017    Procedure: COLONOSCOPY WITH ANESTHESIA;  Surgeon: Sher Cui MD;  Location: [facility] ENDOSCOPY;  Service:     CORONARY ARTERY BYPASS GRAFT      2    CYSTOSCOPY URETEROSCOPY LASER LITHOTRIPSY Left 2017    Procedure: URETEROSCOPY LASER LITHOTRIPSY WITH DOUBLE J STENT INSERTION, LEFT ;  Surgeon: Weston Peck MD;  Location: [facility] OR;  Service:     CYSTOSCOPY URETEROSCOPY LASER LITHOTRIPSY Left 2017    Procedure: CYSTOSCOPY URETEROSCOPY LASER LITHOTRIPSY STENT INSERTION STONE EXTRACTION;  Surgeon: Weston Peck MD;  Location: [facility] OR;  Service:     CYSTOSCOPY W/ URETERAL STENT PLACEMENT Left 2017     Procedure: CYSTOSCOPY URETERAL DOUBLE J STENT INSERTION, LEFT;  Surgeon: Weston Peck MD;  Location: [facility] OR;  Service:     ENDOSCOPY N/A 04/26/2017    Procedure: ESOPHAGOGASTRODUODENOSCOPY WITH ANESTHESIA;  Surgeon: Sher Cui MD;  Location: [facility] ENDOSCOPY;  Service:     INCISION AND DRAINAGE LEG Right 07/12/2023    Procedure: INCISION AND DRAINAGE - RIGHT FOOT;  Surgeon: Silas Swan DPM;  Location: [facility] OR;  Service: Podiatry;  Laterality: Right;    JOINT REPLACEMENT Right     KIDNEY STONE SURGERY      KNEE SURGERY Right 08/2024    replacement    NECK SURGERY      ROTATOR CUFF REPAIR      SHOULDER SURGERY      TONSILLECTOMY      TRANS METATARSAL AMPUTATION Right 08/09/2023    Procedure: Partial 4th Ray Amputation - Right Foot;  Surgeon: Silas Swan DPM;  Location: [facility] OR;  Service: Podiatry;  Laterality: Right;    URETEROSCOPY LASER LITHOTRIPSY WITH STENT INSERTION Left 09/01/2022    Procedure: LEFT URETEROSCOPY LASER LITHOTRIPSY WITH STENT INSERTION;  Surgeon: Weston Peck MD;  Location: [facility] OR;  Service: Urology;  Laterality: Left;    URETEROSCOPY LASER LITHOTRIPSY WITH STENT INSERTION Left 09/15/2022    Procedure: LEFT URETEROSCOPY LASER LITHOTRIPSY WITH STENT EXCHANGE;  Surgeon: Weston Peck MD;  Location: [facility] OR;  Service: Urology;  Laterality: Left;       Medications On Admission:  Medications Prior to Admission   Medication Sig Dispense Refill Last Dose/Taking    ALPRAZolam (XANAX) 0.25 MG tablet Take 1 tablet by mouth Every Night. 30 tablet 0 Taking    Budeson-Glycopyrrol-Formoterol (Breztri Aerosphere) 160-9-4.8 MCG/ACT aerosol inhaler Inhale 2 puffs 2 (Two) Times a Day. (Patient taking differently: Inhale 2 puffs 2 (Two) Times a Day As Needed.)   Patient Taking Differently    busPIRone (BUSPAR) 15 MG tablet Take 1 tablet by mouth 2 (Two) Times a Day.   Taking    clopidogrel (PLAVIX) 75 MG tablet Take 1 tablet by mouth Every Morning.    Taking    docusate sodium (COLACE) 100 MG capsule Take 2 capsules by mouth Every Evening.   Taking    DULoxetine (CYMBALTA) 60 MG capsule Take 1 capsule by mouth Every Night.   Taking    empagliflozin (JARDIANCE) 25 MG tablet tablet Take 1 tablet by mouth Daily.   Taking    ferrous sulfate 325 (65 FE) MG EC tablet Take 1 tablet by mouth 2 (Two) Times a Day.   Taking    gabapentin (NEURONTIN) 300 MG capsule Take 1 capsule by mouth Every Night.   Taking    GARLIC PO Take 1 capsule by mouth Daily.   Taking    guaiFENesin (MUCINEX) 600 MG 12 hr tablet Take 1 tablet by mouth Daily.   Taking    icosapent ethyl (VASCEPA) 1 g capsule capsule Take 2 g by mouth 2 (Two) Times a Day With Meals.   Taking    levothyroxine (SYNTHROID, LEVOTHROID) 75 MCG tablet Take 1 tablet by mouth Every Morning.   Taking    metFORMIN (GLUCOPHAGE) 500 MG tablet Take 1 tablet by mouth 2 (Two) Times a Day With Meals.   Taking    pantoprazole (PROTONIX) 40 MG EC tablet Take 1 tablet by mouth Daily.   Taking    ranolazine (RANEXA) 1000 MG 12 hr tablet Take 1 tablet by mouth 2 (Two) Times a Day.   Taking    rosuvastatin (CRESTOR) 40 MG tablet Take 1 tablet by mouth Daily.   Taking    Vitamin D, Cholecalciferol, 1000 UNITS tablet Take 1 tablet by mouth Daily.   Taking    acetaminophen (TYLENOL) 325 MG tablet Take 2 tablets by mouth Every 6 (Six) Hours As Needed for Mild Pain, Fever or Moderate Pain.       polyethylene glycol (MIRALAX) powder Take 17 g by mouth Daily As Needed.          Current Medications:    Current Facility-Administered Medications:     acetaminophen (TYLENOL) tablet 650 mg, 650 mg, Oral, Q4H PRN, 650 mg at 04/19/25 1908 **OR** acetaminophen (TYLENOL) 160 MG/5ML oral solution 650 mg, 650 mg, Oral, Q4H PRN **OR** acetaminophen (TYLENOL) suppository 650 mg, 650 mg, Rectal, Q4H PRN, Yossi Rosen MD, 650 mg at 04/20/25 0101    ALPRAZolam (XANAX) tablet 0.25 mg, 0.25 mg, Oral, Nightly PRN, Yossi Rosen MD, 0.25 mg at 04/18/25  2112    sennosides-docusate (PERICOLACE) 8.6-50 MG per tablet 2 tablet, 2 tablet, Oral, BID PRN **AND** polyethylene glycol (MIRALAX) packet 17 g, 17 g, Oral, Daily PRN **AND** bisacodyl (DULCOLAX) EC tablet 5 mg, 5 mg, Oral, Daily PRN **AND** bisacodyl (DULCOLAX) suppository 10 mg, 10 mg, Rectal, Daily PRN, Yossi Rosen MD, 10 mg at 04/21/25 0912    budesonide (PULMICORT) nebulizer solution 0.25 mg, 0.25 mg, Nebulization, BID - RT, Ahsan Dowell APRN, 0.25 mg at 04/21/25 0704    busPIRone (BUSPAR) tablet 15 mg, 15 mg, Oral, Q12H, Yossi Rosen MD, 15 mg at 04/20/25 2027    Chlorhexidine Gluconate Cloth 2 % pads 1 Application, 1 Application, Topical, Daily, Vikram Dai APRN, 1 Application at 04/21/25 0819    clopidogrel (PLAVIX) tablet 75 mg, 75 mg, Oral, Daily, Yossi Rosen MD, 75 mg at 04/19/25 0814    DULoxetine (CYMBALTA) DR capsule 60 mg, 60 mg, Oral, Daily, Yossi Rosen MD, 60 mg at 04/19/25 0814    ferrous sulfate tablet 325 mg, 325 mg, Oral, BID With Meals, Chinmay Meza PA-C, 325 mg at 04/20/25 1729    [Held by provider] gabapentin (NEURONTIN) capsule 300 mg, 300 mg, Oral, Nightly, Yossi Rosen MD, 300 mg at 04/19/25 2025    guaiFENesin (MUCINEX) 12 hr tablet 600 mg, 600 mg, Oral, Q12H, Yossi Rosen MD, 600 mg at 04/20/25 2027    guaiFENesin-codeine (ROMILAR-AC) solution 5 mL, 5 mL, Oral, Q6H PRN, Yossi Rosen MD, 5 mL at 04/19/25 1041    heparin (porcine) 5000 UNIT/ML injection 5,000 Units, 5,000 Units, Subcutaneous, Q8H, Chinmay Meza, PRISCILLA    ipratropium-albuterol (DUO-NEB) nebulizer solution 3 mL, 3 mL, Nebulization, 4x Daily - RT, Ahsan Dowell APRN, 3 mL at 04/21/25 1047    lactated ringers infusion, 150 mL/hr, Intravenous, Continuous, Vikram Dai APRN, Last Rate: 150 mL/hr at 04/21/25 1038, 150 mL/hr at 04/21/25 1038    lactulose (CHRONULAC) solution for enema 300 mL, 300 mL, Rectal, Once, Jasvir Steven MD    lactulose (CHRONULAC)  solution for enema 300 mL, 300 mL, Rectal, Once, Chinmay Meza, PRISCILLA    lactulose solution 20 g, 20 g, Oral, BID, Ahsan Dowell APRN, 20 g at 04/21/25 0938    levothyroxine (SYNTHROID, LEVOTHROID) tablet 75 mcg, 75 mcg, Oral, Q AM, Yossi Rosen MD, 75 mcg at 04/21/25 0638    mupirocin (BACTROBAN) 2 % nasal ointment 1 Application, 1 Application, Each Nare, BID, Vikram Dai APRN, 1 Application at 04/21/25 0912    norepinephrine (LEVOPHED) 8 mg in 250 mL NS infusion (premix), 0.02-0.3 mcg/kg/min (Order-Specific), Intravenous, Titrated, Vikram Dai APRN, Stopped at 04/21/25 1038    ondansetron ODT (ZOFRAN-ODT) disintegrating tablet 4 mg, 4 mg, Oral, Q6H PRN **OR** ondansetron (ZOFRAN) injection 4 mg, 4 mg, Intravenous, Q6H PRN, Yossi Rosen MD    pantoprazole (PROTONIX) EC tablet 40 mg, 40 mg, Oral, Q AM, Yossi Rosen MD, 40 mg at 04/21/25 0637    piperacillin-tazobactam (ZOSYN) 4.5 g IVPB in 100 mL NS MBP (CD), 4.5 g, Intravenous, Q8H, Stuart Brooks DO, 4.5 g at 04/21/25 0456    ranolazine (RANEXA) 12 hr tablet 1,000 mg, 1,000 mg, Oral, Q12H, Yossi Rosen MD, 1,000 mg at 04/20/25 2027    rosuvastatin (CRESTOR) tablet 40 mg, 40 mg, Oral, Nightly, Yossi Rosen MD, 40 mg at 04/20/25 2027    sodium chloride 0.9 % flush 10 mL, 10 mL, Intravenous, Q12H, Yossi Rosen MD, 10 mL at 04/21/25 0913    sodium chloride 0.9 % flush 10 mL, 10 mL, Intravenous, PRN, Yossi Rosen MD    sodium chloride 0.9 % infusion 40 mL, 40 mL, Intravenous, Once, Yossi Rosen MD    vancomycin IVPB 1250 mg in 250 mL NS (premix), 1,250 mg, Intravenous, Q24H, Stuart Brooks, DO, Last Rate: 200 mL/hr at 04/21/25 0501, 1,250 mg at 04/21/25 0501     Allergies:  Allergies   Allergen Reactions    Adhesive Tape Rash     Pt states that if it isn't left on very long it is okay    Cefazolin Rash    Cefuroxime Axetil Rash    Cephalosporins Rash    Neomycin-Polymyxin B Gu Rash     Percocet [Oxycodone-Acetaminophen] Rash       Social Hx:  Social History     Socioeconomic History    Marital status:    Tobacco Use    Smoking status: Former     Current packs/day: 0.00     Types: Cigarettes     Quit date:      Years since quittin.3     Passive exposure: Never    Smokeless tobacco: Never   Vaping Use    Vaping status: Never Used   Substance and Sexual Activity    Alcohol use: No    Drug use: No    Sexual activity: Defer       Family Hx:  Family History   Problem Relation Age of Onset    Kidney disease Father     Heart disease Father     Cancer Mother         brain    Colon cancer Neg Hx     Colon polyps Neg Hx      Physical Examination:   Vital Signs:  Vitals:    25 1056 25 1115 25 1130 25 1145   BP: 102/66 95/60 93/49    BP Location:       Patient Position:       Pulse: 95 94 101 98   Resp: 24      Temp:       TempSrc:       SpO2: 94% 99% 94% 92%   Weight:       Height:           Physical Exam:  Laying in bed.  Difficulties with respiration.  Productive cough.     Neurological Examination:   Mental status: Alert. Oriented to person and month and year. Moderate dysarthria.    Cranial Nerves:  Left eye ptosis. Extraocular movements intact without nystagmus.  Face symmetric.    Motor: At least 3/5 in bilateral UE, 2/5 bilateral LE (baseline scooter)  Gait: Not assessed.       Recent Diagnostics:   Laboratory Results:   - Reviewed in EMR  Lab Results   Component Value Date    GLUCOSE 121 (H) 2025    CALCIUM 9.2 2025     2025    K 3.4 (L) 2025    CO2 23.0 2025     2025    BUN 34 (H) 2025    CREATININE 1.90 (H) 2025    EGFRIFAFRI >59 2022    EGFRIFNONA >60 2022    BCR 17.9 2025    ANIONGAP 16.0 (H) 2025     Lab Results   Component Value Date    WBC 15.29 (H) 2025    HGB 9.3 (L) 2025    HCT 29.8 (L) 2025    .8 (H) 2025    PLT 71 (L) 2025     Lab  Results   Component Value Date    PTT 33.3 05/25/2016    INR 1.15 (H) 03/11/2025     Lab Results   Component Value Date    TRIG 123 07/11/2023    HDL 33 (L) 07/11/2023    LDL 72 07/11/2023     Lab Results   Component Value Date    HGBA1C 6.10 (H) 07/10/2023       Imaging Results:  Imaging Results (Last 24 Hours)       Procedure Component Value Units Date/Time    XR Chest 1 View [[id number]] Collected: 04/21/25 1111     Updated: 04/21/25 1116    Narrative:      EXAMINATION:  XR CHEST 1 VW-  4/21/2025 9:26 AM     HISTORY: Worsening hypoxemia. Z74.09-Other reduced mobility;  R13.10-Dysphagia, unspecified.     COMPARISON: 4/20/2025.     TECHNIQUE: Single view AP image.     FINDINGS: Bilateral infiltrates are not significantly changed. The  infiltrate is greater on the left. There is blunting of the left  costophrenic angle. Heart size is upper limits of normal. Prior median  sternotomy. Prior cervical fusion. No acute bony abnormality is seen.          Impression:      1. No change in bilateral infiltrates. Pneumonia versus edema.  2. Probable pleural effusion on the left.           This report was signed and finalized on 4/21/2025 11:13 AM by Dr. Ivan Cr MD.                Other labs:  CBC reviewed shows a mild anemia  Metabolic panel shows chronic renal insufficiency with a creatinine 1.38  Acetylcholine receptor antibody performed on 3/13 is unremarkable with no signs of neuromuscular junction disorders  Other RAD:  EEG performed on 3/13 shows generalized slowing  MRI brain with and without contrast performed on 3/13 shows no acute findings  Carotid ultrasound shows moderate stenosis in the right ICA with the left side being unremarkable      Assessment & Plan:       Impression:  Nonepileptic spells.  Spells may be secondary to respiratory issues.  Likely a metabolic issue.  No evidence of primary CNS pathology.  Chronic diabetes mellitus  Hypotension  Chronic renal  insufficiency  Hypertension  Hyperlipidemia  Coronary artery disease  Non-Hodgkin's lymphoma      Plan:   No further neurologic workup or treatment indicated at this time.  Neurology signing off.    Jed Gordon MD  04/21/25  11:53 CDT

## 2025-04-21 NOTE — THERAPY EVALUATION
Acute Care - Speech Language Pathology   Swallow Re-Evaluation [facility]     Patient Name: Franko Lucero  : 1955  MRN: [id number]  Today's Date: 2025               Admit Date: 2025    SPEECH-LANGUAGE PATHOLOGY EVALUTION - VFSS    Subjective: The patient was seen on this date for a VFSS(Videofluoroscopic Swallowing Study).  Patient was cooperative, but weak and required extended time to participate.      Significant history: Presented due to multiple episodes of near syncope, confusion, agitation, and incontinence. Medical history of CABG, neck sx, sleep apnea, migraines, GERD, DM, cirrhosis. SLP consulted due to concern for aspiration from wife prior to coming and during admission. CT abdomen with consolidative infiltrates in lungs with debris and concern for aspiration pneumonitis. A bedside swallow evaluation raised concerns for pharyngeal and esophageal dysphagia.      Objective: Risks/benefits were reviewed with the patient, and consent was obtained. The study was completed with SLP and Radiologist present. The patient was seen in lateral view(s). Textures given included thin liquid, nectar thick liquid, and honey thick liquid.    Assessment: Difficulties were noted with thin liquid, nectar thick liquid, and honey thick liquid, characterized by decreased oral control, decreased airway protection during the swallow and after the swallow due to residue throughout the pharynx which caused continued penetration (with nectar and honey thick) and aspiration (with thin liquids). An excessive number of swallows was required to attempt to clear pharyngeal residue after thicker consistency liquids (honey and nectar). No true clearance of the pharynx was noted, which increases the risk of aspiration. All penetration and aspiration seen were silent. Patient was cued to cough. Patient cough response was ineffective to clear the airway throughout the study.     The patient demonstrated silent penetration  and silent aspiration.  Noted with, thin liquid, nectar thick liquid, and honey thick liquid.    Residue was diffuse throughout the pharynx and was rated as  moderate to severe.    SLP Findings: Patient presents with mild oral phase dysphagia and severe pharyngeal phase dysphagia. No evaluation of the esophageal phase was completed due to the increased risk of aspiration with oral intake.      Recommendations: Diet Textures: NPO, no food. Consider alternative means of nutrition. Medications should be taken  by alternate means. May have water and Ice between meals after oral care, under staff or family supervision and with the recommended strategies for safe swallowing.    Recommended Strategies: Upright for PO, small bites and sips, no straw, and supervision with all PO. Oral care before breakfast, after all meals and PRN.    Other Recommended Evaluations: repeat VSS if needed. Continue to work to improve airway protection/cough response to improve safety with oral intake, as the pharyngeal deficits are likely not acute.       Dysphagia therapy is recommended. Rationale: safe diet tolerance.    Cate Rivera, SLP 4/21/2025 15:24 CDT      Visit Dx:     ICD-10-CM ICD-9-CM   1. Impaired functional mobility and activity tolerance [Z74.09]  Z74.09 V49.89   2. Dysphagia, unspecified type  R13.10 787.20     Patient Active Problem List   Diagnosis    Iliac artery aneurysm, bilateral    Hypertension    Hyperlipidemia    Diabetes mellitus    CAD (coronary artery disease)    Iron deficiency anemia    Constipation    Abnormal LFTs    Coagulopathy    Ureteral stone    Kidney stones    Hypotension    Acute foot pain    Macrocytic anemia    BELTRAN (acute kidney injury)    Acute kidney failure, unspecified    Type 2 diabetes mellitus with foot ulcer (CODE)    Anemia, chronic disease    Syncope    AMS (altered mental status)    Non-Hodgkin lymphoma    Thrombocytopenia    Acute hypercapnic respiratory failure     Past Medical History:    Diagnosis Date    Anemia     Anxiety     Arthritis     BPH (benign prostatic hypertrophy)     CAD (coronary artery disease)     Cancer     non-hodgkins lymphoma    Cirrhosis     Diabetes mellitus     Disease of thyroid gland     GERD (gastroesophageal reflux disease)     Hearing loss     History of transfusion     Hyperlipidemia     Hypertension     Iliac artery aneurysm, bilateral     Kidney stone     Liver cirrhosis     Migraines     Sleep apnea     c-pap     Past Surgical History:   Procedure Laterality Date    COLONOSCOPY  02/04/2014    COLONOSCOPY N/A 04/26/2017    Procedure: COLONOSCOPY WITH ANESTHESIA;  Surgeon: Sher Cui MD;  Location:  [facility];  Service:     CORONARY ARTERY BYPASS GRAFT      2    CYSTOSCOPY URETEROSCOPY LASER LITHOTRIPSY Left 04/17/2017    Procedure: URETEROSCOPY LASER LITHOTRIPSY WITH DOUBLE J STENT INSERTION, LEFT ;  Surgeon: Weston Peck MD;  Location:  [facility];  Service:     CYSTOSCOPY URETEROSCOPY LASER LITHOTRIPSY Left 08/14/2017    Procedure: CYSTOSCOPY URETEROSCOPY LASER LITHOTRIPSY STENT INSERTION STONE EXTRACTION;  Surgeon: Weston Peck MD;  Location:  [facility];  Service:     CYSTOSCOPY W/ URETERAL STENT PLACEMENT Left 04/17/2017    Procedure: CYSTOSCOPY URETERAL DOUBLE J STENT INSERTION, LEFT;  Surgeon: Weston Peck MD;  Location:  [facility];  Service:     ENDOSCOPY N/A 04/26/2017    Procedure: ESOPHAGOGASTRODUODENOSCOPY WITH ANESTHESIA;  Surgeon: Sher Cui MD;  Location: [facility];  Service:     INCISION AND DRAINAGE LEG Right 07/12/2023    Procedure: INCISION AND DRAINAGE - RIGHT FOOT;  Surgeon: Silas Swan DPM;  Location:  [facility];  Service: Podiatry;  Laterality: Right;    JOINT REPLACEMENT Right     KIDNEY STONE SURGERY      KNEE SURGERY Right 08/2024    replacement    NECK SURGERY      ROTATOR CUFF REPAIR      SHOULDER SURGERY      TONSILLECTOMY      TRANS METATARSAL AMPUTATION Right 08/09/2023     Procedure: Partial 4th Ray Amputation - Right Foot;  Surgeon: Silas Swan DPM;  Location:  [facility];  Service: Podiatry;  Laterality: Right;    URETEROSCOPY LASER LITHOTRIPSY WITH STENT INSERTION Left 09/01/2022    Procedure: LEFT URETEROSCOPY LASER LITHOTRIPSY WITH STENT INSERTION;  Surgeon: Weston Peck MD;  Location:  [facility];  Service: Urology;  Laterality: Left;    URETEROSCOPY LASER LITHOTRIPSY WITH STENT INSERTION Left 09/15/2022    Procedure: LEFT URETEROSCOPY LASER LITHOTRIPSY WITH STENT EXCHANGE;  Surgeon: Weston Peck MD;  Location:  [facility];  Service: Urology;  Laterality: Left;       SLP Recommendation and Plan  SLP Swallowing Diagnosis: mild, oral dysphagia, severe, pharyngeal dysphagia, other (see comments) (unable to evaluate esophageal phase of the swallow) (04/21/25 1400)  SLP Diet Recommendation: NPO, water between meals after oral care, with supervision, ice chips between meals after oral care, with supervision (04/21/25 1400)  Recommended Precautions and Strategies: upright posture during/after eating, small bites of food and sips of liquid, general aspiration precautions, reflux precautions, 1:1 supervision, assist with feeding, fatigue precautions (04/21/25 1400)  SLP Rec. for Method of Medication Administration: meds via alternate route (04/21/25 1400)     Monitor for Signs of Aspiration: yes, notify SLP if any concerns (04/21/25 1400)     Swallow Criteria for Skilled Therapeutic Interventions Met: demonstrates skilled criteria (04/21/25 1400)  Anticipated Discharge Disposition (SLP): unknown (04/21/25 1400)  Rehab Potential/Prognosis, Swallowing: adequate, monitor progress closely (04/21/25 1400)  Therapy Frequency (Swallow): PRN (04/21/25 1400)  Predicted Duration Therapy Intervention (Days): until discharge (04/21/25 1400)  Oral Care Recommendations: Oral Care BID/PRN, Toothbrush (04/21/25 1400)                                        Progress: no  change      SWALLOW EVALUATION (Last 72 Hours)       SLP Adult Swallow Evaluation       Row Name 04/21/25 1400 04/21/25 0830                Rehab Evaluation    Document Type evaluation  -MD evaluation  -MG       Subjective Information complains of  difficulty swallowing  -MD no complaints  -MG       Patient Observations cooperative;agree to therapy  -MD alert;cooperative;agree to therapy  -MG       Patient/Family/Caregiver Comments/Observations No family present  -MD Wife present  -MG       Patient Effort fair  -MD adequate  -MG       Symptoms Noted During/After Treatment fatigue  -MD fatigue  -MG          General Information    Patient Profile Reviewed yes  -MD yes  -MG       Pertinent History Of Current Problem Presented due to multiple episodes of near syncope, confusion, agitation, and incontinence. Medical history of CABG, neck sx, sleep apnea, migraines, GERD, DM, cirrhosis. SLP consulted due to concern for aspiration from wife prior to coming and during admission. CT abdomen with consolidative infiltrates in lungs with debris and concern for aspiration pneumonitis.  -MD Presented due to multiple episodes of near syncope, confusion, agitation, and incontinence. Medical history of CABG, neck sx, sleep apnea, migraines, GERD, DM, cirrhosis. SLP consulted due to concern for aspiration from wife prior to coming and during admission. CT abdomen with consolidative infiltrates in lungs with debris and concern for aspiration pneumonitis.  -MG       Current Method of Nutrition NPO  -MD regular textures;thin liquids  -MG       Precautions/Limitations, Vision WFL;for purposes of eval  -MD WFL;for purposes of eval  -MG       Precautions/Limitations, Hearing WFL;for purposes of eval  -MD WFL;for purposes of eval  -MG       Prior Level of Function-Communication WFL  -MD WFL  -MG       Prior Level of Function-Swallowing no diet consistency restrictions;esophageal concerns  -MD no diet consistency restrictions;esophageal  concerns  -MG       Plans/Goals Discussed with patient  -MD patient;spouse/S.O.;agreed upon  -MG       Barriers to Rehab medically complex  -MD medically complex  -MG       Patient's Goals for Discharge patient did not state  -MD patient did not state  -MG       Family Goals for Discharge -- patient able to eat/drink without coughing/choking  -MG          Pain    Additional Documentation -- Pain Scale: FACES Pre/Post-Treatment (Group)  -MG          Pain Scale: FACES Pre/Post-Treatment    Pain: FACES Scale, Pretreatment 0-->no hurt  -MD 0-->no hurt  -MG       Posttreatment Pain Rating 0-->no hurt  -MD 2-->hurts little bit  -MG       Pre/Posttreatment Pain Comment -- Back pain with upright positioning; returned to reclined at end of session.  -MG          Oral Motor Structure and Function    Dentition Assessment edentulous, does not have dentures  -MD edentulous, does not have dentures  -MG       Secretion Management WNL/WFL  -MD WNL/WFL  -MG       Mucosal Quality dry  -MD dry  -MG       Volitional Swallow weak;delayed  -MD weak;delayed  -MG       Volitional Cough weak;non-productive  -MD weak;non-productive  -MG          Oral Musculature and Cranial Nerve Assessment    Oral Motor General Assessment generalized oral motor weakness  -MD generalized oral motor weakness  -MG          General Eating/Swallowing Observations    Respiratory Support Currently in Use nasal cannula  -MD nasal cannula  -MG       O2 Liters 3L  -MD 3L  -MG       Eating/Swallowing Skills fed by staff/caregiver  -MD fed by SLP  -MG       Positioning During Eating upright in chair  -MD upright in bed  -MG       Utensils Used spoon;straw  -MD spoon;straw  -MG       Consistencies Trialed honey-thick liquids;nectar/syrup-thick liquids;thin liquids  -MD pureed;honey-thick liquids;nectar/syrup-thick liquids;thin liquids  -MG          Clinical Swallow Eval    Oral Prep Phase -- WFL  -MG       Oral Transit -- WFL  -MG       Oral Residue -- WFL  -MG        Pharyngeal Phase -- suspected pharyngeal impairment  -MG       Esophageal Phase -- suspected esophageal impairment  -MG       Clinical Swallow Evaluation Summary -- See note  -MG          Pharyngeal Phase Concerns    Pharyngeal Phase Concerns -- multiple swallows;cough;throat clear  -MG          Esophageal Phase Concerns    Esophageal Phase Concerns -- --  delayed coughing; prior history of dilations with known reflux  -MG          MBS/VFSS    Utensils Used spoon;straw  -MD --       Consistencies Trialed honey-thick liquids;nectar/syrup-thick liquids;thin liquids  -MD --          MBS/VFSS Interpretation    Oral Prep Phase impaired oral phase of swallowing  -MD --       Oral Transit Phase impaired  -MD --       Oral Residue impaired  -MD --       VFSS Summary see note  -MD --          Oral Preparatory Phase    Oral Preparatory Phase prolonged manipulation  -MD --       Prolonged Manipulation honey-thick liquids;nectar-thick liquids;thin liquids  -MD --          Oral Transit Phase    Impaired Oral Transit Phase delayed initiation of bolus transit;premature spillage of liquids into pharynx  -MD --       Delayed Initiation of bolus transit honey-thick liquids  -MD --       Premature Spillage of Liquids into Pharynx nectar-thick liquids  -MD --          Oral Residue    Impaired Oral Residue lingual residue;palatal residue  -MD --       Palatal Residue all consistencies tested  -MD --       Lingual Residue all consistencies tested  -MD --       Response to Oral Residue partial residue clearance;with spontaneous subsequent swallow;with cued swallow  -MD --          Initiation of Pharyngeal Swallow    Initiation of Pharyngeal Swallow bolus in valleculae  -MD --       Pharyngeal Phase impaired pharyngeal phase of swallowing  -MD --       Anatomical abnormalities noted c-spine hardware  -MD --       Anatomical abnormalities functional impact functional impact on swallowing  -MD --       Penetration During the Swallow  nectar-thick liquids  -MD --       Aspiration During the Swallow thin liquids  -MD --       Penetration After the Swallow nectar-thick liquids;honey-thick liquids;thin liquids  -MD --       Aspiration After the Swallow thin liquids  -MD --       Depth of Penetration deep  -MD --       Response to Penetration No  -MD --       No spontaneous response to penetration and non-effective laryngeal clearance with cue (see comments)  -MD --       Response to Aspiration No  -MD --       No spontaneous response to aspiration and non-effective subglottic clearance with cue (see comments)  -MD --       Pharyngeal Residue honey-thick liquids;nectar-thick liquids;thin liquids  -MD --       Response to Residue partial residue clearance;other (see comments)  not fully cleared  -MD --       Attempted Compensatory Maneuvers other (see comments)  unable to ocmplete a chin tuck maneuver  -MD --          Esophageal Phase    Esophageal Phase other (see comments)  did not complete  -MD --          SLP Evaluation Clinical Impression    SLP Swallowing Diagnosis mild;oral dysphagia;severe;pharyngeal dysphagia;other (see comments)  unable to evaluate esophageal phase of the swallow  -MD mild;oral dysphagia;R/O pharyngeal dysphagia;suspected esophageal dysphagia  -MG       Functional Impact risk of aspiration/pneumonia;risk of malnutrition;risk of dehydration  -MD risk of aspiration/pneumonia;risk of malnutrition;risk of dehydration  -MG       Rehab Potential/Prognosis, Swallowing adequate, monitor progress closely  -MD adequate, monitor progress closely  -MG       Swallow Criteria for Skilled Therapeutic Interventions Met demonstrates skilled criteria  -MD demonstrates skilled criteria  -MG          Recommendations    Therapy Frequency (Swallow) PRN  -MD PRN  -MG       Predicted Duration Therapy Intervention (Days) until discharge  -MD until discharge  -MG       SLP Diet Recommendation NPO;water between meals after oral care, with  supervision;ice chips between meals after oral care, with supervision  -MD NPO;water between meals after oral care, with supervision;ice chips between meals after oral care, with supervision  -MG       Recommended Diagnostics -- reassess via VFSS ([facility])  -MG       Recommended Precautions and Strategies upright posture during/after eating;small bites of food and sips of liquid;general aspiration precautions;reflux precautions;1:1 supervision;assist with feeding;fatigue precautions  -MD upright posture during/after eating;small bites of food and sips of liquid;general aspiration precautions;reflux precautions;1:1 supervision;assist with feeding;fatigue precautions  -MG       Oral Care Recommendations Oral Care BID/PRN;Toothbrush  -MD Oral Care BID/PRN;Toothbrush  -MG       SLP Rec. for Method of Medication Administration meds via alternate route  -MD meds crushed;with puree;as tolerated  -MG       Monitor for Signs of Aspiration yes;notify SLP if any concerns  -MD yes;notify SLP if any concerns  -MG       Anticipated Discharge Disposition (SLP) unknown  -MD unknown  -MG          Swallow Goals (SLP)    Swallow LTGs Swallow Long Term Goal (free text)  -MD Swallow Long Term Goal (free text)  -MG       Swallow STGs diet tolerance goal selection (SLP)  -MD diet tolerance goal selection (SLP)  -MG       Diet Tolerance Goal Selection (SLP) Patient will tolerate trials of  -MD Patient will tolerate trials of  -MG          (LTG) Swallow    (LTG) Swallow Patient will tolerate least restrictive diet without overt s/s of aspiration.  -MD Patient will tolerate least restrictive diet without overt s/s of aspiration.  -MG       [place] (Swallow Long Term Goal) independently (over 90% accuracy)  -MD independently (over 90% accuracy)  -MG       Time Frame (Swallow Long Term Goal) by discharge  -MD by discharge  -MG       Barriers (Swallow Long Term Goal) medically complex  -MD medically complex  -MG       Progress/Outcomes  (Swallow Long Term Goal) progress slower than expected  -MD new goal  -MG          (STG) Patient will tolerate trials of    Consistencies Trialed (Tolerate trials) soft to chew (chopped) textures;thin liquids  -MD soft to chew (chopped) textures;thin liquids  -MG       Desired Outcome (Tolerate trials) without signs/symptoms of aspiration;without signs of distress;with adequate oral prep/transit/clearance  -MD without signs/symptoms of aspiration;without signs of distress;with adequate oral prep/transit/clearance  -MG       [place] (Tolerate trials) independently (over 90% accuracy)  -MD independently (over 90% accuracy)  -MG       Time Frame (Tolerate trials) by discharge  -MD by discharge  -MG       Progress/Outcomes (Tolerate trials) progress slower than expected  -MD new goal  -MG                 User Key  (r) = Recorded By, (t) = Taken By, (c) = Cosigned By      Initials Name Effective Dates    MG Shannan Carpio, MS [facility]-SLP 07/11/23 -     Cate Gale, SLP 06/21/22 -                     EDUCATION  The patient has been educated in the following areas:   Dysphagia (Swallowing Impairment).        SLP GOALS       Row Name 04/21/25 1400 04/21/25 0830          (LTG) Swallow    (LTG) Swallow Patient will tolerate least restrictive diet without overt s/s of aspiration.  -MD Patient will tolerate least restrictive diet without overt s/s of aspiration.  -MG     [place] (Swallow Long Term Goal) independently (over 90% accuracy)  -MD independently (over 90% accuracy)  -MG     Time Frame (Swallow Long Term Goal) by discharge  -MD by discharge  -MG     Barriers (Swallow Long Term Goal) medically complex  -MD medically complex  -MG     Progress/Outcomes (Swallow Long Term Goal) progress slower than expected  -MD new goal  -MG        (STG) Patient will tolerate trials of    Consistencies Trialed (Tolerate trials) soft to chew (chopped) textures;thin liquids  -MD soft to chew (chopped) textures;thin liquids   -MG     Desired Outcome (Tolerate trials) without signs/symptoms of aspiration;without signs of distress;with adequate oral prep/transit/clearance  -MD without signs/symptoms of aspiration;without signs of distress;with adequate oral prep/transit/clearance  -MG     [place] (Tolerate trials) independently (over 90% accuracy)  -MD independently (over 90% accuracy)  -MG     Time Frame (Tolerate trials) by discharge  -MD by discharge  -MG     Progress/Outcomes (Tolerate trials) progress slower than expected  -MD new goal  -MG               User Key  (r) = Recorded By, (t) = Taken By, (c) = Cosigned By      Initials Name Provider Type    MG Shannan Carpio, MS CCC-SLP Speech and Language Pathologist    Cate Gale, SLP Speech and Language Pathologist                         Time Calculation:    Time Calculation- SLP       Row Name 04/21/25 1519 04/21/25 1030          Time Calculation- SLP    SLP Start Time 1400  -MD 0830  -MG     SLP Stop Time 1523  -MD 0940  -MG     SLP Time Calculation (min) 83 min  -MD 70 min  -MG     SLP Received On 04/21/25  -MD 04/21/25  -MG     SLP Goal Re-Cert Due Date 05/01/25  -MD 05/01/25  -MG        Untimed Charges    SLP Eval/Re-eval  -- ST Eval Oral Pharyng Swallow - 05906  -MG     02795-PK Eval Oral Pharyng Swallow Minutes -- 70  -MG     14348-DM Motion Fluoro Eval Swallow Minutes 83  -MD --        Total Minutes    Untimed Charges Total Minutes 83  -MD 70  -MG      Total Minutes 83  -MD 70  -MG               User Key  (r) = Recorded By, (t) = Taken By, (c) = Cosigned By      Initials Name Provider Type    Shannan Wilkerson, MS CCC-SLP Speech and Language Pathologist    Cate Gale, SLP Speech and Language Pathologist                    Therapy Charges for Today       Code Description Service Date Service Provider Modifiers Qty    [id number] HC ST MOTION FLUORO EVAL SWALLOW 6 4/21/2025 Cate Rivera, SLP GN 1                 SPENSER Drake  4/21/2025

## 2025-04-21 NOTE — PLAN OF CARE
"Goal Outcome Evaluation: Patient is stable on current NPPV settings. No changes made at this time. Patient states he wears a \" breathing machine\" at home at night.      Problem: Noninvasive Ventilation Acute  Goal: Effective Unassisted Ventilation and Oxygenation  Outcome: Progressing  Intervention: Monitor and Manage Noninvasive Ventilation  Recent Flowsheet Documentation  Taken 4/21/2025 0309 by Lorie Morris RRT  Airway/Ventilation Management: airway patency maintained  NPPV/CPAP Maintenance: proper fit/secure  Taken 4/20/2025 2325 by Lorie Morris RRT  Airway/Ventilation Management:   airway patency maintained   oxygen therapy provided  NPPV/CPAP Maintenance: proper fit/secure  Taken 4/20/2025 2053 by Lorie Morris RRT  Airway/Ventilation Management:   airway patency maintained   oxygen therapy provided  NPPV/CPAP Maintenance:   adjusted   proper fit/secure     Problem: Skin Injury Risk Increased  Goal: Skin Health and Integrity  Intervention: Optimize Skin Protection  Recent Flowsheet Documentation  Taken 4/21/2025 0309 by Lorie Morris RRT  Head of Bed (HOB) Positioning: HOB at 30-45 degrees  Taken 4/20/2025 2325 by Lorie Morris RRT  Head of Bed (HOB) Positioning: HOB at 30-45 degrees  Taken 4/20/2025 2053 by Lorie Morris RRT  Head of Bed (HOB) Positioning: HOB at 30-45 degrees     Problem: Sepsis/Septic Shock  Goal: Absence of Infection Signs and Symptoms  Intervention: Promote Recovery  Recent Flowsheet Documentation  Taken 4/21/2025 0309 by Lorie Morris RRT  Airway/Ventilation Management: airway patency maintained  Taken 4/20/2025 2325 by Lorie Morris RRT  Airway/Ventilation Management:   airway patency maintained   oxygen therapy provided  Taken 4/20/2025 2053 by Lorie Morris RRT  Airway/Ventilation Management:   airway patency maintained   oxygen therapy provided                                               "

## 2025-04-21 NOTE — CASE MANAGEMENT/SOCIAL WORK
Discharge Planning Assessment  [facility]     Patient Name: Franko Lucero  MRN: [id number]  Today's Date: 4/21/2025    Admit Date: 4/17/2025        Discharge Needs Assessment       Row Name 04/21/25 0905       Living Environment    People in Home spouse    Name(s) of People in Home Sangita Lucero (Spouse)  [phone] (Mobile)    Current Living Arrangements home    Potentially Unsafe Housing Conditions none    In the past 12 months has the electric, gas, oil, or water company threatened to shut off services in your home? No    Primary Care Provided by self    Provides Primary Care For no one    Family Caregiver if Needed spouse    Family Caregiver Names Sangita Lucero (Spouse)  [phone] (Mobile)    Quality of Family Relationships helpful;involved;supportive       Resource/Environmental Concerns    Resource/Environmental Concerns none    Transportation Concerns none       Transportation Needs    In the past 12 months, has lack of transportation kept you from medical appointments or from getting medications? no    In the past 12 months, has lack of transportation kept you from meetings, work, or from getting things needed for daily living? No       Food Insecurity    Within the past 12 months, you worried that your food would run out before you got the money to buy more. Never true    Within the past 12 months, the food you bought just didn't last and you didn't have money to get more. Never true       Transition Planning    Patient/Family Anticipates Transition to home with family    Patient/Family Anticipated Services at Transition none    Transportation Anticipated family or friend will provide       Discharge Needs Assessment    Equipment Currently Used at Home walker, standard;cpap    Concerns to be Addressed discharge planning    Do you want help finding or keeping work or a job? I do not need or want help    Do you want help with school or training? For example, starting or completing job training or  getting a high school diploma, GED or equivalent No    Anticipated Changes Related to Illness none    Equipment Needed After Discharge none    Discharge Coordination/Progress Lives at home with wife. Has supportive extended family.  Has CPAP and a standard walker at home. PCP is Dr. Gareth Chaves. No needs today. CM/SS will be closley following during hospital stay/recovery and will be available to assist with any needed discharge planning.                   Discharge Plan    No documentation.                      Demographic Summary    No documentation.                  Functional Status    No documentation.                  Psychosocial    No documentation.                  Abuse/Neglect    No documentation.                  Legal    No documentation.                  Substance Abuse    No documentation.                  Patient Forms    No documentation.                     Rachelle Mao RN

## 2025-04-21 NOTE — THERAPY TREATMENT NOTE
Acute Care - Physical Therapy Treatment Note  [facility]     Patient Name: Franko Lucero  : 1955  MRN: [id number]  Today's Date: 2025      Visit Dx:     ICD-10-CM ICD-9-CM   1. Impaired functional mobility and activity tolerance [Z74.09]  Z74.09 V49.89   2. Dysphagia, unspecified type  R13.10 787.20     Patient Active Problem List   Diagnosis    Iliac artery aneurysm, bilateral    Hypertension    Hyperlipidemia    Diabetes mellitus    CAD (coronary artery disease)    Iron deficiency anemia    Constipation    Abnormal LFTs    Coagulopathy    Ureteral stone    Kidney stones    Hypotension    Acute foot pain    Macrocytic anemia    BELTRAN (acute kidney injury)    Acute kidney failure, unspecified    Type 2 diabetes mellitus with foot ulcer (CODE)    Anemia, chronic disease    Syncope    AMS (altered mental status)    Non-Hodgkin lymphoma    Thrombocytopenia    Acute hypercapnic respiratory failure     Past Medical History:   Diagnosis Date    Anemia     Anxiety     Arthritis     BPH (benign prostatic hypertrophy)     CAD (coronary artery disease)     Cancer     non-hodgkins lymphoma    Cirrhosis     Diabetes mellitus     Disease of thyroid gland     GERD (gastroesophageal reflux disease)     Hearing loss     History of transfusion     Hyperlipidemia     Hypertension     Iliac artery aneurysm, bilateral     Kidney stone     Liver cirrhosis     Migraines     Sleep apnea     c-pap     Past Surgical History:   Procedure Laterality Date    COLONOSCOPY  2014    COLONOSCOPY N/A 2017    Procedure: COLONOSCOPY WITH ANESTHESIA;  Surgeon: Sher Cui MD;  Location: [facility] ENDOSCOPY;  Service:     CORONARY ARTERY BYPASS GRAFT      2    CYSTOSCOPY URETEROSCOPY LASER LITHOTRIPSY Left 2017    Procedure: URETEROSCOPY LASER LITHOTRIPSY WITH DOUBLE J STENT INSERTION, LEFT ;  Surgeon: Weston Peck MD;  Location: [facility] OR;  Service:     CYSTOSCOPY URETEROSCOPY LASER LITHOTRIPSY Left 2017     Procedure: CYSTOSCOPY URETEROSCOPY LASER LITHOTRIPSY STENT INSERTION STONE EXTRACTION;  Surgeon: Weston Peck MD;  Location:  [facility] OR;  Service:     CYSTOSCOPY W/ URETERAL STENT PLACEMENT Left 04/17/2017    Procedure: CYSTOSCOPY URETERAL DOUBLE J STENT INSERTION, LEFT;  Surgeon: Weston Peck MD;  Location:  [facility] OR;  Service:     ENDOSCOPY N/A 04/26/2017    Procedure: ESOPHAGOGASTRODUODENOSCOPY WITH ANESTHESIA;  Surgeon: Sher Cui MD;  Location:  [facility] ENDOSCOPY;  Service:     INCISION AND DRAINAGE LEG Right 07/12/2023    Procedure: INCISION AND DRAINAGE - RIGHT FOOT;  Surgeon: Silas Swan DPM;  Location:  [facility] OR;  Service: Podiatry;  Laterality: Right;    JOINT REPLACEMENT Right     KIDNEY STONE SURGERY      KNEE SURGERY Right 08/2024    replacement    NECK SURGERY      ROTATOR CUFF REPAIR      SHOULDER SURGERY      TONSILLECTOMY      TRANS METATARSAL AMPUTATION Right 08/09/2023    Procedure: Partial 4th Ray Amputation - Right Foot;  Surgeon: Silas Swan DPM;  Location:  [facility] OR;  Service: Podiatry;  Laterality: Right;    URETEROSCOPY LASER LITHOTRIPSY WITH STENT INSERTION Left 09/01/2022    Procedure: LEFT URETEROSCOPY LASER LITHOTRIPSY WITH STENT INSERTION;  Surgeon: Weston Peck MD;  Location: [facility] OR;  Service: Urology;  Laterality: Left;    URETEROSCOPY LASER LITHOTRIPSY WITH STENT INSERTION Left 09/15/2022    Procedure: LEFT URETEROSCOPY LASER LITHOTRIPSY WITH STENT EXCHANGE;  Surgeon: Weston Peck MD;  Location:  [facility] OR;  Service: Urology;  Laterality: Left;     PT Assessment (Last 12 Hours)       PT Evaluation and Treatment       Row Name 04/21/25 1108 04/21/25 0908       Physical Therapy Time and Intention    Subjective Information complains of;weakness  -WK --    Document Type therapy note (daily note)  -WK --    Mode of Treatment physical therapy  -WK --    Session Not Performed -- other (see comments)  -WK    Comment -- with Dr AMARAL       Row Name 04/21/25 1108          General Information    Existing Precautions/Restrictions fall;oxygen therapy device and L/min  -WK       Row Name 04/21/25 1108          Pain    Pretreatment Pain Rating --  states hurts all over  -WK       Row Name 04/21/25 1108          Bed Mobility    Supine-Sit [place] (Bed Mobility) verbal cues;moderate assist (50% patient effort);2 person assist  -WK     Sit-Supine [place] (Bed Mobility) verbal cues;moderate assist (50% patient effort);2 person assist;maximum assist (25% patient effort)  -WK       Row Name 04/21/25 1108          Sit-Stand Transfer    Sit-Stand [place] (Transfers) verbal cues;moderate assist (50% patient effort);maximum assist (25% patient effort);2 person assist  stood x3  -WK     Assistive Device (Sit-Stand Transfers) --  HHA, cues for posture but unable to correct. Pt had BM and nsg assisted with clean up  -WK       Row Name 04/21/25 1108          Gait/Stairs (Locomotion)    Comment, (Gait/Stairs) Educated on POC and BOA.  -WK       Row Name 04/21/25 1108          Balance    Static Sitting Balance verbal cues;standby assist;minimal assist  Leans to R and was difficult to correct and maintain at midline.  -WK       Row Name             Wound 04/17/25 1804 Left anterior second toe    Wound - Properties Group Placement Date: 04/17/25  -MARGAUX Placement Time: 1804  -MARGAUX Side: Left  -MARGAUX Orientation: anterior  -MARGAUX Location: second toe  -MARGAUX    Retired Wound - Properties Group Placement Date: 04/17/25  -MARGAUX Placement Time: 1804  -MARGAUX Side: Left  -MARGAUX Orientation: anterior  -MARGAUX Location: second toe  -MARGAUX    Retired Wound - Properties Group Placement Date: 04/17/25  -MARGAUX Placement Time: 1804  -MARGAUX Side: Left  -MARGAUX Orientation: anterior  -MARGAUX Location: second toe  -MARGAUX    Retired Wound - Properties Group Date first assessed: 04/17/25  -MARGAUX Time first assessed: 1804  -MARGAUX Side: Left  -MARGAUX Location: second toe  -MARGAUX      Row Name             Wound 04/18/25 0922    Wound  - Properties Group Placement Date: 04/18/25  - Placement Time: 0922 -MARGAUX    Retired Wound - Properties Group Placement Date: 04/18/25 - Placement Time: 0922 -    Retired Wound - Properties Group Placement Date: 04/18/25  - Placement Time: 0922 -KC    Retired Wound - Properties Group Date first assessed: 04/18/25  - Time first assessed: 0922 -MARGAUX      Row Name 04/21/25 1108          Plan of Care Review    Plan of Care Reviewed With patient  -WK     Outcome Evaluation Pt performed bed mobility modA -max x2. Pt performed sit to stand mod-max x 2 and performed 3 sit to stand. Pt cued to correct posture but was unable. Pt Bp pre 93/49, during standing 67/27, and post 93/61. Pt had mild LOB to R and difficulty maintaining midline without assist.  -WK       Row Name 04/21/25 1108          Vital Signs    Pre Systolic BP Rehab 93  -WK     Pre Treatment Diastolic BP 49  -WK     Intra Systolic BP Rehab 67  -WK     Intra Treatment Diastolic BP 26  standing. pt did not c/o dizzines.  -WK     Post Systolic BP Rehab 93  -WK     Post Treatment Diastolic BP 61  -WK     Pre SpO2 (%) 92  -WK     O2 Delivery Pre Treatment --  bipap  -WK     Intra SpO2 (%) --  86-92 on 3L o2  -WK     O2 Delivery Intra Treatment nasal cannula  -WK     Post SpO2 (%) 90  -WK     O2 Delivery Post Treatment nasal cannula  -WK       Row Name 04/21/25 1109          Positioning and Restraints    Pre-Treatment Position in bed  -WK     Post Treatment Position bed  -WK     In Bed fowlers;call light within reach;encouraged to call for assist;patient within staff view  -WK               User Key  (r) = Recorded By, (t) = Taken By, (c) = Cosigned By      Initials Name Provider Type    WK Mignon Connelly PTA Physical Therapist Assistant    Karly Savage, RN Registered Nurse                    Physical Therapy Education       Title: PT OT SLP Therapies (In Progress)       Topic: Physical Therapy (In Progress)       Point: Mobility training (Not  Started)       Learner Progress:  Not documented in this visit.              Point: Home exercise program (Not Started)       Learner Progress:  Not documented in this visit.              Point: Body mechanics (Done)       Learning Progress Summary            Patient Acceptance, E, VU,NR by MD at 4/18/2025 1516    Comment: Pt educated on dizziness precautions in sitting and moving UE to increase blood flow. Pt educated on body mechanics/precautions in t/f to feel back of chair or bed with both feet before sitting down.                      Point: Precautions (Done)       Learning Progress Summary            Patient Acceptance, E, VU,NR by MD at 4/18/2025 1516    Comment: Pt educated on dizziness precautions in sitting and moving UE to increase blood flow. Pt educated on body mechanics/precautions in t/f to feel back of chair or bed with both feet before sitting down.                                      User Key       Initials Effective Dates Name Provider Type Discipline    MD 01/29/25 -  Cate Knutson, PT Student PT Student PT                  PT Recommendation and Plan     Plan of Care Reviewed With: patient  Outcome Evaluation: Pt performed bed mobility modA -max x2. Pt performed sit to stand mod-max x 2 and performed 3 sit to stand. Pt cued to correct posture but was unable. Pt Bp pre 93/49, during standing 67/27, and post 93/61. Pt had mild LOB to R and difficulty maintaining midline without assist.       Time Calculation:    PT Charges       Row Name 04/21/25 1157             Time Calculation    Start Time 1108  -WK      Stop Time 1146  -WK      Time Calculation (min) 38 min  -WK      PT Received On 04/21/25  -WK         Time Calculation- PT    Total Timed Code Minutes- PT 38 minute(s)  -WK         Timed Charges    93435 - PT Therapeutic Activity Minutes 38  -WK         Total Minutes    Timed Charges Total Minutes 38  -WK       Total Minutes 38  -WK                User Key  (r) = Recorded By, (t) = Taken By,  (c) = Cosigned By      Initials Name Provider Type    WK Mignon Connelly PTA Physical Therapist Assistant                  Therapy Charges for Today       Code Description Service Date Service Provider Modifiers Qty    [id number] HC PT THERAPEUTIC ACT EA 15 MIN 4/21/2025 Mignon Connelly PTA GP 3            PT G-Codes  Outcome Measure Options: AM-PAC 6 Clicks Basic Mobility (PT)  AM-PAC 6 Clicks Score (PT): 12    Mignon Connelly PTA  4/21/2025

## 2025-04-21 NOTE — PLAN OF CARE
Goal Outcome Evaluation:  Plan of Care Reviewed With: patient           Outcome Evaluation: OT eval completed. Pt presents lethargic, but awoken with verbal and tactile cues. He is oriented x3, on bipap but nsg transitioned him to 5L NC for session. Granddaughter present in room and reports that baseline that pt requires assistance for all alds from spouse and uses of motorized w/c for mobility. Today he demonstrates generalized weakness, decreased activity tolerance, balance, and increased SOA with activity. Performed supine to sit at EOB with Mod A-Max Ax2. Min A to maintain static sitting balance at EOB. Max-Dep A to don socks. Performed sit <> stand t/f x3 reps in total with Mod-Max Ax2 required for each one. Incontinent of stool and clean up occured in standing. Dep A for all aspects of toileting. Returned back to folwers in bed at end of session with Max Ax2. O2 sats dropped to 85% with activity but with vcs and positioning in bed, returned to low 90s at end of session. Mr. Moses would benefit from skilled oT services to address these deficits and assist with return to PLOF. Recommend d/c to SNF.    Anticipated Discharge Disposition (OT): skilled nursing facility

## 2025-04-21 NOTE — PLAN OF CARE
Problem: Fall Injury Risk  Goal: Absence of Fall and Fall-Related Injury  Outcome: Progressing  Intervention: Promote Injury-Free Environment  Recent Flowsheet Documentation  Taken 4/21/2025 1600 by Alex Chatterjee RN  Safety Promotion/Fall Prevention: safety round/check completed  Taken 4/21/2025 1500 by Alex Chatterjee RN  Safety Promotion/Fall Prevention: safety round/check completed  Taken 4/21/2025 1400 by Alex Chatterjee RN  Safety Promotion/Fall Prevention: safety round/check completed  Taken 4/21/2025 1300 by Alex Chatterjee RN  Safety Promotion/Fall Prevention: safety round/check completed  Taken 4/21/2025 1200 by Alex Chatterjee RN  Safety Promotion/Fall Prevention: safety round/check completed  Taken 4/21/2025 1100 by Alex Chatterjee RN  Safety Promotion/Fall Prevention: safety round/check completed  Taken 4/21/2025 1000 by Alex Chatterjee RN  Safety Promotion/Fall Prevention: safety round/check completed  Taken 4/21/2025 0900 by Alex Chatterjee RN  Safety Promotion/Fall Prevention: safety round/check completed  Taken 4/21/2025 0800 by Alex Chatterjee RN  Safety Promotion/Fall Prevention: safety round/check completed  Taken 4/21/2025 0700 by Alex Chatterjee RN  Safety Promotion/Fall Prevention: safety round/check completed     Problem: Skin Injury Risk Increased  Goal: Skin Health and Integrity  Outcome: Progressing  Intervention: Optimize Skin Protection  Recent Flowsheet Documentation  Taken 4/21/2025 1500 by Alex Chatterjee RN  Head of Bed (HOB) Positioning: HOB at 30-45 degrees  Taken 4/21/2025 1300 by Alex Chatterjee RN  Head of Bed (HOB) Positioning: HOB at 30-45 degrees  Taken 4/21/2025 1100 by Alex Chatterjee RN  Head of Bed (HOB) Positioning: HOB at 30-45 degrees  Taken 4/21/2025 0900 by Alex Chatterjee RN  Head of Bed ([facility]) Positioning: HOB at 30-45 degrees  Taken 4/21/2025 0800 by Alex Chatterjee RN  Activity Management: bedrest  Pressure Reduction Techniques: weight shift assistance provided  Pressure Reduction Devices:  specialty bed utilized  Skin Protection: silicone foam dressing in place  Taken 4/21/2025 0700 by Alex Chatterjee, RN  Head of Bed (HOB) Positioning: HOB at 30-45 degrees     Problem: Noninvasive Ventilation Acute  Goal: Effective Unassisted Ventilation and Oxygenation  Outcome: Progressing  Intervention: Monitor and Manage Noninvasive Ventilation  Recent Flowsheet Documentation  Taken 4/21/2025 0800 by Alex Chatterjee RN  Airway/Ventilation Management: airway patency maintained     Problem: Sepsis/Septic Shock  Goal: Optimal Coping  Outcome: Progressing  Goal: Absence of Bleeding  Outcome: Progressing  Goal: Blood Glucose Level Within Target Range  Outcome: Progressing  Goal: Absence of Infection Signs and Symptoms  Outcome: Progressing  Intervention: Promote Recovery  Recent Flowsheet Documentation  Taken 4/21/2025 0800 by Alex Chatterjee RN  Activity Management: bedrest  Airway/Ventilation Management: airway patency maintained  Goal: Optimal Nutrition Delivery  Outcome: Progressing     Problem: Adult Inpatient Plan of Care  Goal: Plan of Care Review  Outcome: Progressing  Flowsheets (Taken 4/21/2025 1648)  Progress: no change  Plan of Care Reviewed With:   patient   spouse     Problem: Oral Intake Inadequate  Goal: Improved Oral Intake  Outcome: Progressing   Goal Outcome Evaluation:  Plan of Care Reviewed With: patient, spouse   Patient disoriented to time and situation. Afebrile. Required increased 02 needs after oral medication administration this morning. Swallow study performed confirmed aspiration of both thin and thick liquids. Patient was made NPO. 02 has since been weaned down to 2lnc. Titrated off levophed at 0945 maintaining MAP greater than 65. Voiding spontaneously with 850 UOP this shift. Stood at bedside with PT. Turned Q2. Code status changed after discussion with Dr. Reynoso see orders.      Progress: no change

## 2025-04-21 NOTE — PROGRESS NOTES
Daily Progress Note  Franko Lucero  MRN: [id number] LOS: 4    Admit Date: 4/17/2025 4/21/2025 08:50 CDT    Subjective:      Chief Complaint:  Respiratory failure    Interval History:    Reviewed overnight events and nursing notes.   Now in ICU after acute respiratory distress    Review of Systems   Unable to perform ROS: Mental status change       DIET:  Diet: Cardiac, Diabetic; Healthy Heart (2-3 Na+); Consistent Carbohydrate; Fluid Consistency: Thin (IDDSI 0)    Medications:   lactated ringers, 150 mL/hr, Last Rate: 150 mL/hr (04/21/25 0132)  norepinephrine, 0.02-0.3 mcg/kg/min (Order-Specific), Last Rate: 0.03 mcg/kg/min (04/21/25 0637)      budesonide, 0.25 mg, Nebulization, BID - RT  busPIRone, 15 mg, Oral, Q12H  Chlorhexidine Gluconate Cloth, 1 Application, Topical, Daily  clopidogrel, 75 mg, Oral, Daily  DULoxetine, 60 mg, Oral, Daily  ferrous sulfate, 325 mg, Oral, BID With Meals  [Held by provider] gabapentin, 300 mg, Oral, Nightly  guaiFENesin, 600 mg, Oral, Q12H  ipratropium-albuterol, 3 mL, Nebulization, 4x Daily - RT  lactulose, 300 mL, Rectal, Once  lactulose, 20 g, Oral, BID  levothyroxine, 75 mcg, Oral, Q AM  mupirocin, 1 Application, Each Nare, BID  pantoprazole, 40 mg, Oral, Q AM  piperacillin-tazobactam, 4.5 g, Intravenous, Q8H  ranolazine, 1,000 mg, Oral, Q12H  rosuvastatin, 40 mg, Oral, Nightly  sodium chloride, 10 mL, Intravenous, Q12H  sodium chloride, 40 mL, Intravenous, Once  vancomycin, 1,250 mg, Intravenous, Q24H        Data:     Code Status:   Code Status and Medical Interventions: CPR (Attempt to Resuscitate); Full Support   Ordered at: 04/18/25 0756     Code Status (Patient has no pulse and is not breathing):    CPR (Attempt to Resuscitate)     Medical Interventions (Patient has pulse or is breathing):    Full Support       Family History   Problem Relation Age of Onset    Kidney disease Father     Heart disease Father     Cancer Mother         brain    Colon cancer Neg Hx      Colon polyps Neg Hx      Social History     Socioeconomic History    Marital status:    Tobacco Use    Smoking status: Former     Current packs/day: 0.00     Types: Cigarettes     Quit date:      Years since quittin.3     Passive exposure: Never    Smokeless tobacco: Never   Vaping Use    Vaping status: Never Used   Substance and Sexual Activity    Alcohol use: No    Drug use: No    Sexual activity: Defer       Labs:    Lab Results (last 72 hours)       Procedure Component Value Units Date/Time    Lactic Acid, Plasma [[id number]]  (Abnormal) Collected: 25    Specimen: Blood Updated: 25 0511     Lactate 2.2 mmol/L     Comprehensive Metabolic Panel [[id number]]  (Abnormal) Collected: 25    Specimen: Blood Updated: 25 0459     Glucose 121 mg/dL      BUN 34 mg/dL      Creatinine 1.90 mg/dL      Sodium 140 mmol/L      Potassium 3.4 mmol/L      Chloride 101 mmol/L      CO2 23.0 mmol/L      Calcium 9.2 mg/dL      Total Protein 5.5 g/dL      Albumin 2.8 g/dL      ALT (SGPT) 38 U/L      AST (SGOT) 57 U/L      Alkaline Phosphatase 128 U/L      Total Bilirubin 0.9 mg/dL      Globulin 2.7 gm/dL      A/G Ratio 1.0 g/dL      BUN/Creatinine Ratio 17.9     Anion Gap 16.0 mmol/L      eGFR 37.2 mL/min/1.73     Narrative:      GFR Categories in Chronic Kidney Disease (CKD)      GFR Category          GFR (mL/min/1.73)    Interpretation  G1                     90 or greater         Normal or high (1)  G2                      60-89                Mild decrease (1)  G3a                   45-59                Mild to moderate decrease  G3b                   30-44                Moderate to severe decrease  G4                    15-29                Severe decrease  G5                    14 or less           Kidney failure          (1)In the absence of evidence of kidney disease, neither GFR category G1 or G2 fulfill the criteria for CKD.    eGFR calculation  CKD-EPI creatinine equation,  which does not include race as a factor    Magnesium [[id number]]  (Normal) Collected: 04/21/25 0349    Specimen: Blood Updated: 04/21/25 0459     Magnesium 2.1 mg/dL     Phosphorus [[id number]]  (Normal) Collected: 04/21/25 0349    Specimen: Blood Updated: 04/21/25 0459     Phosphorus 3.1 mg/dL     CBC & Differential [[id number]]  (Abnormal) Collected: 04/21/25 0349    Specimen: Blood Updated: 04/21/25 0441    Narrative:      The following orders were created for panel order CBC & Differential.  Procedure                               Abnormality         Status                     ---------                               -----------         ------                     CBC Auto Differential[[id number]]        Abnormal            Final result                 Please view results for these tests on the individual orders.    CBC Auto Differential [[id number]]  (Abnormal) Collected: 04/21/25 0349    Specimen: Blood Updated: 04/21/25 0441     WBC 15.29 10*3/mm3      RBC 2.53 10*6/mm3      Hemoglobin 9.3 g/dL      Hematocrit 29.8 %      .8 fL      MCH 36.8 pg      MCHC 31.2 g/dL      RDW 17.9 %      RDW-SD 77.9 fl      MPV 10.3 fL      Platelets 71 10*3/mm3      Neutrophil % 82.7 %      Lymphocyte % 6.3 %      Monocyte % 9.4 %      Eosinophil % 0.3 %      Basophil % 0.3 %      Immature Grans % 1.0 %      Neutrophils, Absolute 12.66 10*3/mm3      Lymphocytes, Absolute 0.96 10*3/mm3      Monocytes, Absolute 1.43 10*3/mm3      Eosinophils, Absolute 0.04 10*3/mm3      Basophils, Absolute 0.04 10*3/mm3      Immature Grans, Absolute 0.16 10*3/mm3     Blood Culture - Blood, Hand, Left [[id number]]  (Normal) Collected: 04/20/25 0039    Specimen: Blood from Hand, Left Updated: 04/21/25 0100     Blood Culture No growth at 24 hours    Blood Culture - Blood, Hand, Right [[id number]]  (Normal) Collected: 04/20/25 0039    Specimen: Blood from Hand, Right Updated: 04/21/25 0100     Blood Culture No growth at 24 hours    Ammonia [[id number]]   (Normal) Collected: 04/20/25 1142    Specimen: Blood Updated: 04/20/25 1209     Ammonia 43 umol/L     STAT Lactic Acid, Reflex [[id number]]  (Abnormal) Collected: 04/20/25 1142    Specimen: Blood Updated: 04/20/25 1209     Lactate 2.7 mmol/L     POC Glucose Once [[id number]]  (Normal) Collected: 04/20/25 1008    Specimen: Blood Updated: 04/20/25 1020     Glucose 125 mg/dL      Comment: : [id number] [facility] [employer] ID: [id number]       STAT Lactic Acid, Reflex [[id number]]  (Abnormal) Collected: 04/20/25 0800    Specimen: Blood Updated: 04/20/25 0852     Lactate 2.4 mmol/L     Blood Gas, Arterial - [[id number]]  (Abnormal) Collected: 04/20/25 0750    Specimen: Arterial Blood Updated: 04/20/25 0751     Site Right Brachial     Ronak's Test N/A     pH, Arterial 7.401 pH units      pCO2, Arterial 40.9 mm Hg      pO2, Arterial 79.8 mm Hg      Comment: 84 Value below reference range        HCO3, Arterial 25.3 mmol/L      Base Excess, Arterial 0.5 mmol/L      O2 Saturation, Arterial 96.5 %      Temperature 37.0     Barometric Pressure for Blood Gas 757 mmHg      Modality BiPap     FIO2 60 %      Ventilator Mode NA     IPAP 16     Comment: Meter: [id number]     :  Mauro Jimenez, RRT        EPAP 6     Collected by [id number]     pCO2, Temperature Corrected 40.9 mm Hg      pH, Temp Corrected 7.401 pH Units      pO2, Temperature Corrected 79.8 mm Hg      PO2/FIO2 133    MRSA Screen, PCR (Inpatient) - Swab, Nares [[id number]]  (Abnormal) Collected: 04/20/25 0415    Specimen: Swab from Nares Updated: 04/20/25 0618     MRSA PCR MRSA Detected    Narrative:      The negative predictive value of this diagnostic test is high and should only be used to consider de-escalating anti-MRSA therapy. A positive result may indicate colonization with MRSA and must be correlated clinically.    STAT Lactic Acid, Reflex [[id number]]  (Abnormal) Collected: 04/20/25 0426    Specimen: Blood Updated: 04/20/25 0528     Lactate 2.5 mmol/L      Basic Metabolic Panel [[id number]]  (Abnormal) Collected: 04/20/25 0426    Specimen: Blood Updated: 04/20/25 0527     Glucose 91 mg/dL      BUN 25 mg/dL      Creatinine 1.67 mg/dL      Sodium 139 mmol/L      Potassium 3.3 mmol/L      Chloride 99 mmol/L      CO2 23.0 mmol/L      Calcium 9.4 mg/dL      BUN/Creatinine Ratio 15.0     Anion Gap 17.0 mmol/L      eGFR 43.5 mL/min/1.73     Narrative:      GFR Categories in Chronic Kidney Disease (CKD)      GFR Category          GFR (mL/min/1.73)    Interpretation  G1                     90 or greater         Normal or high (1)  G2                      60-89                Mild decrease (1)  G3a                   45-59                Mild to moderate decrease  G3b                   30-44                Moderate to severe decrease  G4                    15-29                Severe decrease  G5                    14 or less           Kidney failure          (1)In the absence of evidence of kidney disease, neither GFR category G1 or G2 fulfill the criteria for CKD.    eGFR calculation 2021 CKD-EPI creatinine equation, which does not include race as a factor    Hepatic Function Panel [[id number]]  (Abnormal) Collected: 04/20/25 0426    Specimen: Blood Updated: 04/20/25 0527     Total Protein 5.9 g/dL      Albumin 2.7 g/dL      ALT (SGPT) 41 U/L      AST (SGOT) 68 U/L      Alkaline Phosphatase 127 U/L      Total Bilirubin 1.0 mg/dL      Bilirubin, Direct 0.7 mg/dL      Bilirubin, Indirect 0.3 mg/dL     CBC (No Diff) [[id number]]  (Abnormal) Collected: 04/20/25 0426    Specimen: Blood Updated: 04/20/25 0513     WBC 12.98 10*3/mm3      RBC 2.45 10*6/mm3      Hemoglobin 9.1 g/dL      Hematocrit 28.5 %      .3 fL      MCH 37.1 pg      MCHC 31.9 g/dL      RDW 17.4 %      RDW-SD 75.5 fl      MPV 10.0 fL      Platelets 66 10*3/mm3     Respiratory Panel PCR w/COVID-19(SARS-CoV-2) RICHARD/JENNIFER/DENIA/PAD/COR/VONDA In-House, NP Swab in UTM/VTM, 2 HR TAT - Swab, Nasopharynx [[id number]]   (Normal) Collected: 04/20/25 0048    Specimen: Swab from Nasopharynx Updated: 04/20/25 0149     ADENOVIRUS, PCR Not Detected     Coronavirus 229E Not Detected     Coronavirus HKU1 Not Detected     Coronavirus NL63 Not Detected     Coronavirus OC43 Not Detected     COVID19 Not Detected     Human Metapneumovirus Not Detected     Human Rhinovirus/Enterovirus Not Detected     Influenza A PCR Not Detected     Influenza B PCR Not Detected     Parainfluenza Virus 1 Not Detected     Parainfluenza Virus 2 Not Detected     Parainfluenza Virus 3 Not Detected     Parainfluenza Virus 4 Not Detected     RSV, PCR Not Detected     Bordetella pertussis pcr Not Detected     Bordetella parapertussis PCR Not Detected     Chlamydophila pneumoniae PCR Not Detected     Mycoplasma pneumo by PCR Not Detected    Narrative:      In the setting of a positive respiratory panel with a viral infection PLUS a negative procalcitonin without other underlying concern for bacterial infection, consider observing off antibiotics or discontinuation of antibiotics and continue supportive care. If the respiratory panel is positive for atypical bacterial infection (Bordetella pertussis, Chlamydophila pneumoniae, or Mycoplasma pneumoniae), consider antibiotic de-escalation to target atypical bacterial infection.    Lactic Acid, Plasma [[id number]]  (Abnormal) Collected: 04/20/25 0039    Specimen: Blood Updated: 04/20/25 0111     Lactate 2.7 mmol/L     Urinalysis With Culture If Indicated - Urine, Clean Catch [[id number]]  (Abnormal) Collected: 04/20/25 0034    Specimen: Urine, Clean Catch Updated: 04/20/25 0110     Color, UA Dark Yellow     Appearance, UA Cloudy     pH, UA 5.5     Specific Gravity, UA 1.019     Glucose, UA >=1000 mg/dL (3+)     Ketones, UA Negative     Bilirubin, UA Negative     Blood, UA Small (1+)     Protein,  mg/dL (2+)     Leuk Esterase, UA Trace     Nitrite, UA Negative     Urobilinogen, UA 1.0 E.U./dL    Narrative:      In absence  of clinical symptoms, the presence of pyuria, bacteria, and/or nitrites on the urinalysis result does not correlate with infection.    Urinalysis, Microscopic Only - Urine, Clean Catch [[id number]] Collected: 04/20/25 0034    Specimen: Urine, Clean Catch Updated: 04/20/25 0110     RBC, UA 0-2 /HPF      WBC, UA 0-2 /HPF      Comment: Urine culture not indicated.        Bacteria, UA None Seen /HPF      Squamous Epithelial Cells, UA 0-2 /HPF      Hyaline Casts, UA 0-2 /LPF      Calcium Oxalate Crystals, UA Moderate/2+ /HPF      Methodology Manual Light Microscopy    Blood Gas, Arterial - [[id number]]  (Abnormal) Collected: 04/19/25 1428    Specimen: Arterial Blood Updated: 04/19/25 2016     Site Right Brachial     Ronak's Test N/A     pH, Arterial 7.371 pH units      pCO2, Arterial 46.8 mm Hg      pO2, Arterial 69.3 mm Hg      HCO3, Arterial 27.1 mmol/L      Base Excess, Arterial 1.5 mmol/L      O2 Saturation, Arterial 93.6 %      Temperature 37.0     Barometric Pressure for Blood Gas 757 mmHg      Modality BiPap     FIO2 60 %      IPAP 16     EPAP 6     Collected by [id number]     pCO2, Temperature Corrected 46.8 mm Hg      pH, Temp Corrected 7.371 pH Units      pO2, Temperature Corrected 69.3 mm Hg      PO2/FIO2 116    TSH [[id number]]  (Normal) Collected: 04/19/25 0614    Specimen: Blood Updated: 04/19/25 1350     TSH 3.490 uIU/mL     T4, Free [[id number]]  (Normal) Collected: 04/19/25 0614    Specimen: Blood Updated: 04/19/25 1350     Free T4 1.23 ng/dL     POC Glucose Once [[id number]]  (Abnormal) Collected: 04/19/25 1103    Specimen: Blood Updated: 04/19/25 1120     Glucose 185 mg/dL      Comment: : [id number] Edwarddaniela JuarezLevi ID: [id number]       Blood Gas, Arterial With Co-Ox [[id number]]  (Abnormal) Collected: 04/19/25 1110    Specimen: Arterial Blood Updated: 04/19/25 1110     Site Left Radial     Ronak's Test Positive     pH, Arterial 7.182 pH units      Comment: 85 Value below critical limit        pCO2, Arterial  71.5 mm Hg      Comment: 86 Value above critical limit        pO2, Arterial 60.3 mm Hg      Comment: 84 Value below reference range        HCO3, Arterial 26.8 mmol/L      Comment: 83 Value above reference range        Base Excess, Arterial -2.5 mmol/L      Comment: 84 Value below reference range        O2 Saturation, Arterial 83.7 %      Comment: 84 Value below reference range        Hemoglobin, Blood Gas 10.6 g/dL      Comment: 84 Value below reference range        Hematocrit, Blood Gas 32.6 %      Comment: 84 Value below reference range        Oxyhemoglobin 82.1 %      Comment: 84 Value below reference range        Methemoglobin 0.50 %      Carboxyhemoglobin 1.4 %      Temperature 37.0     Sodium, Arterial 141 mmol/L      Potassium, Arterial 3.5 mmol/L      Ionized Calcium 5.41 mg/dL      Comment: 83 Value above reference range        Barometric Pressure for Blood Gas 755 mmHg      Modality Nasal Cannula     Flow Rate 3.0 lpm      Ventilator Mode NA     Notified Who DR PADILLA     Notified By Mehnaz Souza CRT     Notified Time 04/19/2025 11:10     Collected by [id number]     Comment: Meter: [id number]     :  Mehnaz Souza CRT        pH, Temp Corrected 7.182 pH Units      Comment: 85 Value below critical limit        pCO2, Temperature Corrected 71.5 mm Hg      Comment: 86 Value above critical limit        pO2, Temperature Corrected 60.3 mm Hg      Comment: 84 Value below reference range       Ammonia [[id number]]  (Abnormal) Collected: 04/19/25 0848    Specimen: Blood Updated: 04/19/25 0920     Ammonia 66 umol/L     Manual Differential [[id number]]  (Abnormal) Collected: 04/19/25 0614    Specimen: Blood Updated: 04/19/25 0713     Neutrophil % 67.0 %      Lymphocyte % 13.0 %      Monocyte % 15.0 %      Eosinophil % 3.0 %      Basophil % 1.0 %      Metamyelocyte % 1.0 %      Neutrophils Absolute 3.17 10*3/mm3      Lymphocytes Absolute 0.61 10*3/mm3      Monocytes Absolute 0.71 10*3/mm3       Eosinophils Absolute 0.14 10*3/mm3      Basophils Absolute 0.05 10*3/mm3      Anisocytosis Mod/2+     Macrocytes Mod/2+     Poikilocytes Slight/1+     Polychromasia Slight/1+     WBC Morphology Normal     Platelet Estimate Decreased    CBC & Differential [[id number]]  (Abnormal) Collected: 04/19/25 0614    Specimen: Blood Updated: 04/19/25 0713    Narrative:      The following orders were created for panel order CBC & Differential.  Procedure                               Abnormality         Status                     ---------                               -----------         ------                     CBC Auto Differential[[id number]]        Abnormal            Final result                 Please view results for these tests on the individual orders.    CBC Auto Differential [[id number]]  (Abnormal) Collected: 04/19/25 0614    Specimen: Blood Updated: 04/19/25 0713     WBC 4.73 10*3/mm3      RBC 2.49 10*6/mm3      Hemoglobin 9.1 g/dL      Hematocrit 28.9 %      .1 fL      MCH 36.5 pg      MCHC 31.5 g/dL      RDW 17.8 %      RDW-SD 74.8 fl      MPV 10.5 fL      Platelets 56 10*3/mm3     Basic Metabolic Panel [[id number]]  (Abnormal) Collected: 04/19/25 0614    Specimen: Blood Updated: 04/19/25 0707     Glucose 103 mg/dL      BUN 21 mg/dL      Creatinine 1.31 mg/dL      Sodium 139 mmol/L      Potassium 3.5 mmol/L      Chloride 103 mmol/L      CO2 25.0 mmol/L      Calcium 9.4 mg/dL      BUN/Creatinine Ratio 16.0     Anion Gap 11.0 mmol/L      eGFR 58.2 mL/min/1.73     Narrative:      GFR Categories in Chronic Kidney Disease (CKD)      GFR Category          GFR (mL/min/1.73)    Interpretation  G1                     90 or greater         Normal or high (1)  G2                      60-89                Mild decrease (1)  G3a                   45-59                Mild to moderate decrease  G3b                   30-44                Moderate to severe decrease  G4                    15-29                Severe  "decrease  G5                    14 or less           Kidney failure          (1)In the absence of evidence of kidney disease, neither GFR category G1 or G2 fulfill the criteria for CKD.    eGFR calculation  CKD-EPI creatinine equation, which does not include race as a factor              Objective:     Vitals: /58 (BP Location: Right arm, Patient Position: Lying)   Pulse 97   Temp 96.8 °F (36 °C) (Axillary)   Resp 20   Ht 182.9 cm (72\")   Wt 85.4 kg (188 lb 4.4 oz)   SpO2 92%   BMI 25.53 kg/m²    Intake/Output Summary (Last 24 hours) at 2025 0850  Last data filed at 2025 0415  Gross per 24 hour   Intake 4189.84 ml   Output 600 ml   Net 3589.84 ml    Temp (24hrs), Av.2 °F (36.2 °C), Min:96.8 °F (36 °C), Max:97.4 °F (36.3 °C)      Physical Exam  Vitals and nursing note reviewed. Exam conducted with a chaperone present.   Constitutional:       Appearance: He is ill-appearing.   HENT:      Head: Normocephalic and atraumatic.   Cardiovascular:      Rate and Rhythm: Normal rate and regular rhythm.      Pulses: Normal pulses.   Pulmonary:      Effort: Respiratory distress present.      Breath sounds: Wheezing and rales present.   Abdominal:      General: There is distension.      Tenderness: There is no guarding or rebound.   Skin:     General: Skin is warm.      Capillary Refill: Capillary refill takes less than 2 seconds.   Neurological:      Mental Status: He is alert. Mental status is at baseline.             Assessment and Plan:     Primary Problem:  AMS (altered mental status)  Aspiration pneumonia  Acute on Chronic Respiratory Failure  Cirrhosis  [facility] Problem list:    AMS (altered mental status)    Hypertension    Hyperlipidemia    Diabetes mellitus    CAD (coronary artery disease)    Abnormal LFTs    Macrocytic anemia    Acute hypercapnic respiratory failure      PMH:  Past Medical History:   Diagnosis Date    Anemia     Anxiety     Arthritis     BPH (benign " prostatic hypertrophy)     CAD (coronary artery disease)     Cancer     non-hodgkins lymphoma    Cirrhosis     Diabetes mellitus     Disease of thyroid gland     GERD (gastroesophageal reflux disease)     Hearing loss     History of transfusion     Hyperlipidemia     Hypertension     Iliac artery aneurysm, bilateral     Kidney stone     Liver cirrhosis     Migraines     Sleep apnea     c-pap       Treatment Plan:  Continue IV abx, BIPAP as needed  Pulmonary toilet  Multisystem organ failure - discussed with wife who understands severity of condition and has made DNI    Disposition: TBD    Reviewed treatment plans with the patient and/or family.   20 minutes spent in face to face interaction and coordination of care.     Electronically signed by Figueroa Chaves MD on 4/21/2025 at 08:50 CDT

## 2025-04-21 NOTE — PROGRESS NOTES
"Pharmacy Dosing Service  Pharmacokinetics  Vancomycin Follow-up Evaluation    Assessment:  Active Hospital Problems    Diagnosis  POA    **AMS (altered mental status) [R41.82]  Yes    Acute hypercapnic respiratory failure [J96.02]  Unknown    Macrocytic anemia [D53.9]  Yes    Abnormal LFTs [R79.89]  Yes    Hypertension [I10]  Yes    Hyperlipidemia [E78.5]  Yes    Diabetes mellitus [E11.9]  Yes    CAD (coronary artery disease) [I25.10]  Yes       Current Order: Vancomycin 1250 mg IVPB every 24 hours  Indication: empiric/bacteremia/PNA  Current end date:4/24/25    Levels/Previous evaluations:   Started with 1500 mg IV q24h - one dose received  Changed to 1250 mg for second dose due to increase in Scr    Other antimicrobials and/or additional factors considered in dosing methods:   [facility] BELTRAN/CKD  MRSA+    AUC Model Data - Current Regimen:  Regimen: 1250 mg IV every 24 hours.  Exposure target: AUC24 (range)400-600 mg/L.hr   AUC24,ss: 615 mg/L.hr  Probability of AUC24 > 400: 89 %  Ctrough,ss: 20.1 mg/L  Probability of Ctrough,ss > 20: 50 %  Probability of nephrotoxicity (Lodise EB 2009): 17 %      Plan: check trough tomorrow AM prior to 5 AM dose    Clinical pharmacist following daily     Subjective:  Franko Lucero is a 69 y.o. male currently on Vancomycin, day 2 of treatment.      Objective:  Ht: 182.9 cm (72\"); Wt: 85.4 kg (188 lb 4.4 oz)  Estimated Creatinine Clearance: 43.1 mL/min (A) (by C-G formula based on SCr of 1.9 mg/dL (H)).   Creatinine   Date Value Ref Range Status   04/21/2025 1.90 (H) 0.76 - 1.27 mg/dL Final   04/20/2025 1.67 (H) 0.76 - 1.27 mg/dL Final   04/19/2025 1.31 (H) 0.76 - 1.27 mg/dL Final      Lab Results   Component Value Date    WBC 15.29 (H) 04/21/2025    WBC 12.98 (H) 04/20/2025    WBC 4.73 04/19/2025             Culture Results:  Microbiology Results (last 10 days)       Procedure Component Value - Date/Time    MRSA Screen, PCR (Inpatient) - Swab, Nares [[id number]]  (Abnormal) Collected: " 04/20/25 0415    Lab Status: Final result Specimen: Swab from Nares Updated: 04/20/25 0618     MRSA PCR MRSA Detected    Narrative:      The negative predictive value of this diagnostic test is high and should only be used to consider de-escalating anti-MRSA therapy. A positive result may indicate colonization with MRSA and must be correlated clinically.    Respiratory Panel PCR w/COVID-19(SARS-CoV-2) RICHARD/JENNIFER/DENIA/[facility] In-House, NP Swab in UTM/VTM, 2 HR TAT - Swab, Nasopharynx [[id number]]  (Normal) Collected: 04/20/25 0048    Lab Status: Final result Specimen: Swab from Nasopharynx Updated: 04/20/25 0149     ADENOVIRUS, PCR Not Detected     Coronavirus 229E Not Detected     Coronavirus HKU1 Not Detected     Coronavirus NL63 Not Detected     Coronavirus OC43 Not Detected     COVID19 Not Detected     Human Metapneumovirus Not Detected     Human Rhinovirus/Enterovirus Not Detected     Influenza A PCR Not Detected     Influenza B PCR Not Detected     Parainfluenza Virus 1 Not Detected     Parainfluenza Virus 2 Not Detected     Parainfluenza Virus 3 Not Detected     Parainfluenza Virus 4 Not Detected     RSV, PCR Not Detected     Bordetella pertussis pcr Not Detected     Bordetella parapertussis PCR Not Detected     Chlamydophila pneumoniae PCR Not Detected     Mycoplasma pneumo by PCR Not Detected    Narrative:      In the setting of a positive respiratory panel with a viral infection PLUS a negative procalcitonin without other underlying concern for bacterial infection, consider observing off antibiotics or discontinuation of antibiotics and continue supportive care. If the respiratory panel is positive for atypical bacterial infection (Bordetella pertussis, Chlamydophila pneumoniae, or Mycoplasma pneumoniae), consider antibiotic de-escalation to target atypical bacterial infection.    Blood Culture - Blood, Hand, Left [[id number]]  (Normal) Collected: 04/20/25 0039    Lab Status: Preliminary result Specimen:  Blood from Hand, Left Updated: 04/21/25 0100     Blood Culture No growth at 24 hours    Blood Culture - Blood, Hand, Right [[id number]]  (Normal) Collected: 04/20/25 0039    Lab Status: Preliminary result Specimen: Blood from Hand, Right Updated: 04/21/25 0100     Blood Culture No growth at 24 hours            Eugenio Ortega, PharmD   04/21/25 11:52 CDT

## 2025-04-21 NOTE — PROGRESS NOTES
[facility] Intensivist Services  INPATIENT PROGRESS NOTE    Patient Name: Franko Lucero  Date of Admission: 4/17/2025  Today's Date: 04/21/25  Length of Stay:  LOS: 4 days   Primary Care Physician: Figueroa Chaves MD  Next of Kin: Primary Emergency Contact: Sangita Lucero, Home Phone: [phone]      Subjective   Chief Complaint: Recurrent syncopal episodes, hypoxia, hypercapnia    HPI   69-year-old male with past medical history of recurrent syncopal and presyncopal episodes, liver cirrhosis, DINORAH requiring CPAP, hypothyroidism, CAD s/p CABG, CKD stage III, and DM admitted to the hospital on/17/25 for recurrent syncopal and presyncopal episodes.  He was also admitted in March of this year with similar issues.  Workup at that time was negative for seizure or any intracranial abnormality, with no clear cardiac cause either.  Patient had recurrent episodes of near syncope and confusion, along with agitation and was incontinent of stool and urine.  He was initially seen by primary care office who referred him to the hospital for direct admission.  Neurology was consulted.  MRI brain done at that time showed no acute findings.  Suspicion was for a neurocognitive disorder with or without orthostatic involvement.  Patient has been noted to have elevated ammonia level and CT findings with last admission showing cirrhosis and portal venous hypertension.  On morning of 4/19, patient had another syncopal event.  A rapid response was called, and ABG done at that time revealed respiratory acidosis with pH of 7.18 and pCO2 of 71 with PaO2 of 60 on 3 L nasal cannula.  He was then transferred to the ICU and placed on BiPAP support.  He slowly became more arousable after being on BiPAP.    Interval history:  4/20: Patient has been somewhat lethargic, but does arouse easily.  He has been taken off BiPAP and now is back on 3 L nasal cannula.  Blood pressures also been low, requiring  Levophed infusion.  Lactic acid has remained high, and patient has received IV fluids as well as empiric antibiotic treatment.  MRSA screen was positive, but respiratory panel was negative and blood cultures are currently in process.    4/21: Patient required Levophed infusion earlier today, but has since been weaned off.  There is concern he may be aspirating.  EEG showed nonepileptic spells with thoughts that spells may be secondary to respiratory issues.  Neurology has signed off.  Patient intermittently requiring the BiPAP, currently on 5 L nasal cannula.  After conversation with patient's wife, she has decided to make the patient DNR/DNI.  Patient was alert and oriented to person and place.  He had no complaints at time of my exam other than mild lower abdominal pain.    Review of Systems   All pertinent negatives and positives are as above. All other systems have been reviewed and are negative unless otherwise stated.     Past Medical and Past Surgical History   Active and Resolved Problems  Active Hospital Problems    Diagnosis  POA    **AMS (altered mental status) [R41.82]  Yes    Acute hypercapnic respiratory failure [J96.02]  Unknown    Macrocytic anemia [D53.9]  Yes    Abnormal LFTs [R79.89]  Yes    Hypertension [I10]  Yes    Hyperlipidemia [E78.5]  Yes    Diabetes mellitus [E11.9]  Yes    CAD (coronary artery disease) [I25.10]  Yes      Resolved Hospital Problems   No resolved problems to display.       Past Medical History:   Past Medical History:   Diagnosis Date    Anemia     Anxiety     Arthritis     BPH (benign prostatic hypertrophy)     CAD (coronary artery disease)     Cancer     non-hodgkins lymphoma    Cirrhosis     Diabetes mellitus     Disease of thyroid gland     GERD (gastroesophageal reflux disease)     Hearing loss     History of transfusion     Hyperlipidemia     Hypertension     Iliac artery aneurysm, bilateral     Kidney stone     Liver cirrhosis     Migraines     Sleep apnea     c-pap      Past Surgical History:   Past Surgical History:   Procedure Laterality Date    COLONOSCOPY  02/04/2014    COLONOSCOPY N/A 04/26/2017    Procedure: COLONOSCOPY WITH ANESTHESIA;  Surgeon: Sher Cui MD;  Location:  [facility];  Service:     CORONARY ARTERY BYPASS GRAFT      2    CYSTOSCOPY URETEROSCOPY LASER LITHOTRIPSY Left 04/17/2017    Procedure: URETEROSCOPY LASER LITHOTRIPSY WITH DOUBLE J STENT INSERTION, LEFT ;  Surgeon: Weston Peck MD;  Location:  [facility] OR;  Service:     CYSTOSCOPY URETEROSCOPY LASER LITHOTRIPSY Left 08/14/2017    Procedure: CYSTOSCOPY URETEROSCOPY LASER LITHOTRIPSY STENT INSERTION STONE EXTRACTION;  Surgeon: Weston Peck MD;  Location:  [facility];  Service:     CYSTOSCOPY W/ URETERAL STENT PLACEMENT Left 04/17/2017    Procedure: CYSTOSCOPY URETERAL DOUBLE J STENT INSERTION, LEFT;  Surgeon: Weston Peck MD;  Location:  [facility];  Service:     ENDOSCOPY N/A 04/26/2017    Procedure: ESOPHAGOGASTRODUODENOSCOPY WITH ANESTHESIA;  Surgeon: Sher Cui MD;  Location: [facility];  Service:     INCISION AND DRAINAGE LEG Right 07/12/2023    Procedure: INCISION AND DRAINAGE - RIGHT FOOT;  Surgeon: Silas Swan DPM;  Location:  [facility];  Service: Podiatry;  Laterality: Right;    JOINT REPLACEMENT Right     KIDNEY STONE SURGERY      KNEE SURGERY Right 08/2024    replacement    NECK SURGERY      ROTATOR CUFF REPAIR      SHOULDER SURGERY      TONSILLECTOMY      TRANS METATARSAL AMPUTATION Right 08/09/2023    Procedure: Partial 4th Ray Amputation - Right Foot;  Surgeon: Silas Swan DPM;  Location:  [facility];  Service: Podiatry;  Laterality: Right;    URETEROSCOPY LASER LITHOTRIPSY WITH STENT INSERTION Left 09/01/2022    Procedure: LEFT URETEROSCOPY LASER LITHOTRIPSY WITH STENT INSERTION;  Surgeon: Weston Peck MD;  Location:  [facility];  Service: Urology;  Laterality: Left;    URETEROSCOPY LASER LITHOTRIPSY WITH STENT INSERTION Left  09/15/2022    Procedure: LEFT URETEROSCOPY LASER LITHOTRIPSY WITH STENT EXCHANGE;  Surgeon: Weston Peck MD;  Location: [facility];  Service: Urology;  Laterality: Left;     Social and Family History   Family History:  family history includes Cancer in his mother; Heart disease in his father; Kidney disease in his father.    Tobacco/Social History:  reports that he quit smoking about 28 years ago. His smoking use included cigarettes. He has never been exposed to tobacco smoke. He has never used smokeless tobacco. He reports that he does not drink alcohol and does not use drugs.    Allergies   Allergies:   He is allergic to adhesive tape, cefazolin, cefuroxime axetil, cephalosporins, neomycin-polymyxin b gu, and percocet [oxycodone-acetaminophen].    Objective    Temp:  [96.8 °F (36 °C)-97.4 °F (36.3 °C)] 96.8 °F (36 °C)  Heart Rate:  [] 95  Resp:  [16-27] 24  BP: ()/(44-73) 102/66  Physical Exam  Vitals and nursing note reviewed.   HENT:      Head: Normocephalic.      Nose: Nose normal.      Mouth/Throat:      Mouth: Mucous membranes are moist.   Eyes:      Pupils: Pupils are equal, round, and reactive to light.   Cardiovascular:      Rate and Rhythm: Normal rate and regular rhythm.      Pulses: Normal pulses.   Pulmonary:      Effort: No respiratory distress.      Breath sounds: Normal breath sounds. No stridor. No rhonchi.   Abdominal:      General: There is distension.      Palpations: Abdomen is soft.      Tenderness: There is no abdominal tenderness.      Comments: Hypoactive bowel sounds x 4   Musculoskeletal:         General: Normal range of motion.   Skin:     General: Skin is warm.      Capillary Refill: Capillary refill takes less than 2 seconds.   Neurological:      General: No focal deficit present.      Mental Status: He is easily aroused.      Comments: Oriented to person and place only.    Psychiatric:         Behavior: Behavior is cooperative.           Inpatient Medications    Medications: Scheduled Meds:budesonide, 0.25 mg, Nebulization, BID - RT  busPIRone, 15 mg, Oral, Q12H  Chlorhexidine Gluconate Cloth, 1 Application, Topical, Daily  clopidogrel, 75 mg, Oral, Daily  DULoxetine, 60 mg, Oral, Daily  ferrous sulfate, 325 mg, Oral, BID With Meals  [Held by provider] gabapentin, 300 mg, Oral, Nightly  guaiFENesin, 600 mg, Oral, Q12H  heparin (porcine), 5,000 Units, Subcutaneous, Q8H  ipratropium-albuterol, 3 mL, Nebulization, 4x Daily - RT  lactulose, 300 mL, Rectal, Once  lactulose, 300 mL, Rectal, Once  lactulose, 20 g, Oral, BID  levothyroxine, 75 mcg, Oral, Q AM  mupirocin, 1 Application, Each Nare, BID  pantoprazole, 40 mg, Oral, Q AM  piperacillin-tazobactam, 4.5 g, Intravenous, Q8H  ranolazine, 1,000 mg, Oral, Q12H  rosuvastatin, 40 mg, Oral, Nightly  sodium chloride, 10 mL, Intravenous, Q12H  sodium chloride, 40 mL, Intravenous, Once  vancomycin, 1,250 mg, Intravenous, Q24H      Continuous Infusions:lactated ringers, 150 mL/hr, Last Rate: 150 mL/hr (04/21/25 1038)  norepinephrine, 0.02-0.3 mcg/kg/min (Order-Specific), Last Rate: Stopped (04/21/25 1038)      PRN Meds:.  acetaminophen **OR** acetaminophen **OR** acetaminophen    ALPRAZolam    senna-docusate sodium **AND** polyethylene glycol **AND** bisacodyl **AND** bisacodyl    guaiFENesin-codeine    ondansetron ODT **OR** ondansetron    sodium chloride    I have reviewed the patient's current medications.   Outpatient Medications     Current Outpatient Medications   Medication Instructions    acetaminophen (TYLENOL) 650 mg, Every 6 Hours PRN    ALPRAZolam (XANAX) 0.25 mg, Oral, Nightly    Budeson-Glycopyrrol-Formoterol (Breztri Aerosphere) 160-9-4.8 MCG/ACT aerosol inhaler 2 puffs, 2 Times Daily    busPIRone (BUSPAR) 15 mg, 2 Times Daily    clopidogrel (PLAVIX) 75 mg, Every Morning    docusate sodium (COLACE) 200 mg, Every Evening    DULoxetine (CYMBALTA) 60 mg, Nightly    empagliflozin (JARDIANCE) 25 mg, Oral, Daily    ferrous  sulfate 325 mg, 2 Times Daily    gabapentin (NEURONTIN) 300 mg, Nightly    GARLIC PO 1 capsule, Oral, Daily    guaiFENesin (MUCINEX) 600 mg, Oral, Daily    icosapent ethyl (VASCEPA) 2 g, 2 Times Daily With Meals    levothyroxine (SYNTHROID, LEVOTHROID) 75 mcg, Every Early Morning    metFORMIN (GLUCOPHAGE) 500 mg, 2 Times Daily With Meals    pantoprazole (PROTONIX) 40 mg, Daily    polyethylene glycol (MIRALAX) 17 g, Daily PRN    ranolazine (RANEXA) 1,000 mg, 2 Times Daily    rosuvastatin (CRESTOR) 40 mg, Daily    Vitamin D, Cholecalciferol, 1000 UNITS tablet 1 tablet, Oral, Daily       Current Antibiotics   piperacillin-tazobactam (ZOSYN) 4.5 g IVPB in 100 mL NS MBP (CD)  vancomycin - 1.25-0.9 GM/250ML-%    Results:   CBC:      Lab 04/21/25  0349 04/20/25  0426 04/19/25  0614 04/18/25  0355 04/17/25  1948   WBC 15.29* 12.98* 4.73 5.55 5.75   HEMOGLOBIN 9.3* 9.1* 9.1* 9.4* 9.4*   HEMATOCRIT 29.8* 28.5* 28.9* 30.2* 28.7*   PLATELETS 71* 66* 56* 60* 66*   NEUTROS ABS 12.66*  --  3.17 4.42 4.01   IMMATURE GRANS (ABS) 0.16*  --   --   --  0.01   LYMPHS ABS 0.96  --   --   --  0.85   MONOS ABS 1.43*  --   --   --  0.78   EOS ABS 0.04  --  0.14 0.12 0.08   .8* 116.3* 116.1* 115.7* 114.8*     LIVER FUNCTION TESTS:      Lab 04/21/25 0349 04/20/25  0426   TOTAL PROTEIN 5.5* 5.9*   ALBUMIN 2.8* 2.7*   GLOBULIN 2.7  --    ALT (SGPT) 38 41   AST (SGOT) 57* 68*   BILIRUBIN 0.9 1.0   INDIRECT BILIRUBIN  --  0.3   BILIRUBIN DIRECT  --  0.7*   ALK PHOS 128* 127*     ABG:      Lab 04/21/25  0349 04/20/25  0750 04/20/25  0426 04/19/25  1428 04/19/25  1110 04/19/25  0614 04/18/25  0355 04/17/25  1948   PH, ARTERIAL  --  7.401  --  7.371 7.182*  --   --   --    PO2 ART  --  79.8*  --  69.3* 60.3*  --   --   --    PCO2, ARTERIAL  --  40.9  --  46.8* 71.5*  --   --   --    HCO3 ART  --  25.3  --  27.1* 26.8*  --   --   --    ANION GAP 16.0*  --  17.0*  --   --  11.0 16.0* 14.0     BMP/MG/PHOS:      Lab 04/21/25  0349 04/20/25  0426  04/19/25  1110 04/19/25  0614 04/18/25  0355 04/17/25  1948   SODIUM 140 139  --  139 141 139   SODIUM, ARTERIAL  --   --  141  --   --   --    POTASSIUM 3.4* 3.3*  --  3.5 3.6 3.5   CHLORIDE 101 99  --  103 102 101   BUN 34* 25*  --  21 19 19   CREATININE 1.90* 1.67*  --  1.31* 1.38* 1.40*   CALCIUM 9.2 9.4  --  9.4 9.7 9.4   MAGNESIUM 2.1  --   --   --   --   --    PHOSPHORUS 3.1  --   --   --   --   --        IMAGING STUDIES:  EEG Continuous Monitoring With Video  Result Date: 4/20/2025    EEG background is moderate generalized slow with at times frequent generalized triphasic waves present No lateralizing features are seen No definitive epileptiform activity or electrographic seizures are seen Summary: The background over the course of this 2-day monitoring study suggest diffuse cerebral dysfunction of moderate degree.  The presence of generalized triphasic waves is most consistent with toxic/metabolic or diffuse hypoxemic cause A single episode of unresponsiveness had no EEG correlate and appears to be a nonepileptic event No evidence for epilepsy is seen on the study This report is transcribed using the Dragon dictation system.     CT Abdomen Pelvis Without Contrast  Result Date: 4/20/2025   1.  Liver cirrhosis with moderate volume ascites. 2.  Consolidative infiltrates in the left upper lobe lingula, left lower lobe and right lower lobe which could reflect dependent atelectasis or perhaps aspiration pneumonitis. The left lower lobe in particular is completely consolidated and there is  debris opacifying the lobar and segmental airways reflecting mucus plugging and/or aspirated material. 3.  No retroperitoneal lower abdominal wall hematoma.  This report was signed and finalized on 4/20/2025 11:14 AM by Dr Phan Pascual.      XR Chest 1 View  Result Date: 4/20/2025  1.  Increasing perihilar pulmonary infiltrates, especially on the left. Primary differential consideration would be a developing pulmonary edema.  Questionable small left-sided pleural effusion as well.  This report was signed and finalized on 4/20/2025 8:00 AM by Dr Phan Pascual.      EEG Continuous Monitoring With Video  Result Date: 4/19/2025    EEG background is moderate generalized slow with generalized triphasic waves present No rhythmic or periodic discharges are seen No electrographic seizures are seen An episode of unresponsiveness occurring at the end of the study has no EEG correlate and is not epileptic in nature Findings were reviewed with the consultant neurologist, and the study will continue for an additional 24 hours This report is transcribed using the Dragon dictation system.     XR Chest 1 View  Result Date: 4/19/2025  1.  Bibasilar atelectasis (left greater than right). Left lower lobe appears mostly collapsed. 2.  No pleural effusion or pneumothorax.  This report was signed and finalized on 4/19/2025 11:30 AM by Dr Phan Pascual.      Assessment/Plan   69-year-old male with acute hypercapnic respiratory failure secondary to altered mentation from syncopal/presyncopal event transferred to CCU for critical care management.  He tolerated the BiPAP well and became more responsive.  He is currently on nasal cannula.  However, he is still somewhat drowsy, but arouses easily and is cooperative.    Acute hypercapnic respiratory failure  -Patient was placed on BiPAP and he tolerated this well.  Currently he is on 5L nasal cannula with intermittent BiPAP  - Monitor closely  - History of DINORAH, patient will need  BiPAP while sleeping  - Continue DuoNejorge Pulmicort    Altered mentation/syncopal episodes  - Neurology consulting, we appreciate their recommendations  - Continuous EEG showed findings consistent with diffuse cerebral dysfunction of moderate degree  -Will support with BiPAP as needed for any respiratory insufficiency with the events     Liver cirrhosis  - Ammonia level 43 on 4/20/25  - Remains on 20 g lactulose twice  daily    Hypothyroidism  - Continue 75 mcg levothyroxine as scheduled daily    Sepsis  - Continue vancomycin and Zosyn  -Received IV fluid bolus  -MRSA screening was positive.  Blood culture negative to date.  Continue to monitor.  -Patient has been weaned off levophed. Continue to monitor VS closely. Restart levophed for MAP <65.     6.  Thrombocytopenia  - Count 71 today, slightly improved from yesterday  - Appears to be chronic, not surprising given patient's history of cirrhosis  -We have started SC heparin q8h      CODE STATUS: Full  VTE prophylaxis: heparin SC  Nutrition: Cardiac diet  Bowel: Romana-Colace, MiraLAX, Dulcolax as needed  GI prophylaxis: Protonix  Antibiotics: Vancomycin and Zosyn     Disposition: Critical care management at this time. If patient's condition continues to improve, he may be suitable for transfer to floor in next 24-48 hours.    A minimum of 50 minutes spent on patient care, high MDM.     This time included obtaining a history; examining the patient; pulse oximetry; ordering and review of studies; arranging urgent treatment with development of a management plan; evaluation of patient's response to treatment; frequent reassessment; and, discussions with other providers.    Please see MDM section and the rest of the note for further information on patient assessment and treatment.    Part of this note may be an electronic transcription/translation of spoken language to printed text using the Dragon Dictation System    Electronically signed by Chinmay Meza PA-C on 4/21/2025 at 11:06 CDT

## 2025-04-21 NOTE — PLAN OF CARE
Goal Outcome Evaluation:  Plan of Care Reviewed With: patient           Outcome Evaluation: Pt performed bed mobility modA -max x2. Pt performed sit to stand mod-max x 2 and performed 3 sit to stand. Pt cued to correct posture but was unable. Pt Bp pre 93/49, during standing 67/27, and post 93/61. Pt had mild LOB to R and difficulty maintaining midline without assist.

## 2025-04-22 NOTE — PLAN OF CARE
Goal Outcome Evaluation:  Plan of Care Reviewed With: patient, spouse, caregiver     Progress: no change     Anticipated Discharge Disposition (SLP): unknown     Treatment Assessment (SLP): continued (04/22/25 0855)  Treatment Assessment Comments (SLP): see note (04/22/25 0855)  Plan for Continued Treatment (SLP): continue treatment per plan of care (04/22/25 0855)    Patient seen to address dysphagia. Patient continues NPO due to severe deficits identified on video swallow study on the previous date. Dobhoff is placed. Patient completed oral care with suction. Possible oral thrush noted. RN notified. Secretions noted throughout oropharynx. Cough slightly improved, although inability to expel sputum continues. Patient weak and unable to complete swallow strengthening. Continue NPO. Previous functional deficits exacerbated by acute weakness. Ice chips on occasion after oral care with supervision. ST will continue POC.    Cate Rivera, SLP 4/22/2025 15:24 CDT

## 2025-04-22 NOTE — PROGRESS NOTES
"Pharmacy Dosing Service  Pharmacokinetics  Vancomycin Follow-up Evaluation    Assessment/Action/Plan:  Current Order: Vancomycin 750 mg IVPB every 24 hours (decreased from 1,250 mg every 24 hours)  Current end date/final dose: 4/24/25 at 0500  Additional antimicrobial agent(s): Zosyn    Vancomycin trough = 18 (~23 hours post dose). Today's dose already hung. Renal function continues to deteriorate. Decreased Vancomycin dose from 1,250 mg every 24 hours to 750 mg every 24 hours starting with next dose. Pharmacy will continue to follow daily and adjust dose accordingly.     Subjective:  Franko Lucero is a 69 y.o. male currently on Vancomycin 750 mg IV every 24 hours for the treatment of Bacteremia/Empiric/Pneumonia, day 3 of treatment.    AUC Model Data:  Regimen: 750 mg IV every 24 hours.  Start time: 05:00 on 04/23/2025  Exposure target: AUC24 (range)400-600 mg/L.hr   AUC24,ss: 535 mg/L.hr  Probability of AUC24 > 400: 90 %  Ctrough,ss: 17.7 mg/L  Probability of Ctrough,ss > 20: 22 %  Probability of nephrotoxicity (Lodise EB 2009): 12 %    Objective:  Ht: 182.9 cm (72\"); Wt: 87.5 kg (192 lb 14.4 oz)  Estimated Creatinine Clearance: 39.7 mL/min (A) (by C-G formula based on SCr of 2.11 mg/dL (H)).   Creatinine   Date Value Ref Range Status   04/22/2025 2.11 (H) 0.76 - 1.27 mg/dL Final   04/21/2025 1.90 (H) 0.76 - 1.27 mg/dL Final   04/20/2025 1.67 (H) 0.76 - 1.27 mg/dL Final      Lab Results   Component Value Date    WBC 10.82 (H) 04/22/2025    WBC 15.29 (H) 04/21/2025    WBC 12.98 (H) 04/20/2025         Lab Results   Component Value Date    VANCOTROUGH 18.00 04/22/2025       Culture Results:  Microbiology Results (last 10 days)       Procedure Component Value - Date/Time    MRSA Screen, PCR (Inpatient) - Swab, Nares [[id number]]  (Abnormal) Collected: 04/20/25 0415    Lab Status: Final result Specimen: Swab from Nares Updated: 04/20/25 0618     MRSA PCR MRSA Detected    Narrative:      The negative predictive value " of this diagnostic test is high and should only be used to consider de-escalating anti-MRSA therapy. A positive result may indicate colonization with MRSA and must be correlated clinically.    Respiratory Panel PCR w/COVID-19(SARS-CoV-2) RICHARD/JENNIFER/DENIA/PAD/COR/VONDA In-House, NP Swab in UTM/VTM, 2 HR TAT - Swab, Nasopharynx [[id number]]  (Normal) Collected: 04/20/25 0048    Lab Status: Final result Specimen: Swab from Nasopharynx Updated: 04/20/25 0149     ADENOVIRUS, PCR Not Detected     Coronavirus 229E Not Detected     Coronavirus HKU1 Not Detected     Coronavirus NL63 Not Detected     Coronavirus OC43 Not Detected     COVID19 Not Detected     Human Metapneumovirus Not Detected     Human Rhinovirus/Enterovirus Not Detected     Influenza A PCR Not Detected     Influenza B PCR Not Detected     Parainfluenza Virus 1 Not Detected     Parainfluenza Virus 2 Not Detected     Parainfluenza Virus 3 Not Detected     Parainfluenza Virus 4 Not Detected     RSV, PCR Not Detected     Bordetella pertussis pcr Not Detected     Bordetella parapertussis PCR Not Detected     Chlamydophila pneumoniae PCR Not Detected     Mycoplasma pneumo by PCR Not Detected    Narrative:      In the setting of a positive respiratory panel with a viral infection PLUS a negative procalcitonin without other underlying concern for bacterial infection, consider observing off antibiotics or discontinuation of antibiotics and continue supportive care. If the respiratory panel is positive for atypical bacterial infection (Bordetella pertussis, Chlamydophila pneumoniae, or Mycoplasma pneumoniae), consider antibiotic de-escalation to target atypical bacterial infection.    Blood Culture - Blood, Hand, Left [[id number]]  (Normal) Collected: 04/20/25 0039    Lab Status: Preliminary result Specimen: Blood from Hand, Left Updated: 04/22/25 0100     Blood Culture No growth at 2 days    Blood Culture - Blood, Hand, Right [[id number]]  (Normal) Collected: 04/20/25 0039     Lab Status: Preliminary result Specimen: Blood from Hand, Right Updated: 04/22/25 0100     Blood Culture No growth at 2 days            Franko Francis, PharmD   04/22/25 05:56 CDT

## 2025-04-22 NOTE — THERAPY TREATMENT NOTE
Acute Care - Speech Language Pathology   Swallow Treatment Note [facility]     Patient Name: Franko Lucero  : 1955  MRN: [id number]  Today's Date: 2025               Admit Date: 2025    Patient seen to address dysphagia. Patient continues NPO due to severe deficits identified on video swallow study on the previous date. Dobhoff is placed. Patient completed oral care with suction. Possible oral thrush noted. RN notified. Secretions noted throughout oropharynx. Cough slightly improved, although inability to expel sputum continues. Patient weak and unable to complete swallow strengthening. Continue NPO. Previous functional deficits exacerbated by acute weakness. Ice chips on occasion after oral care with supervision. ST will continue POC.    SPENSER Drake 2025 15:25 CDT      Visit Dx:     ICD-10-CM ICD-9-CM   1. Impaired functional mobility and activity tolerance [Z74.09]  Z74.09 V49.89   2. Dysphagia, unspecified type  R13.10 787.20     Patient Active Problem List   Diagnosis    Iliac artery aneurysm, bilateral    Hypertension    Hyperlipidemia    Diabetes mellitus    CAD (coronary artery disease)    Iron deficiency anemia    Constipation    Abnormal LFTs    Coagulopathy    Ureteral stone    Kidney stones    Hypotension    Acute foot pain    Macrocytic anemia    BELTRAN (acute kidney injury)    Acute kidney failure, unspecified    Type 2 diabetes mellitus with foot ulcer (CODE)    Anemia, chronic disease    Syncope    AMS (altered mental status)    Non-Hodgkin lymphoma    Thrombocytopenia    Acute hypercapnic respiratory failure     Past Medical History:   Diagnosis Date    Anemia     Anxiety     Arthritis     BPH (benign prostatic hypertrophy)     CAD (coronary artery disease)     Cancer     non-hodgkins lymphoma    Cirrhosis     Diabetes mellitus     Disease of thyroid gland     GERD (gastroesophageal reflux disease)     Hearing loss     History of transfusion     Hyperlipidemia      Hypertension     Iliac artery aneurysm, bilateral     Kidney stone     Liver cirrhosis     Migraines     Sleep apnea     c-pap     Past Surgical History:   Procedure Laterality Date    COLONOSCOPY  02/04/2014    COLONOSCOPY N/A 04/26/2017    Procedure: COLONOSCOPY WITH ANESTHESIA;  Surgeon: Sher Cui MD;  Location: [facility] ENDOSCOPY;  Service:     CORONARY ARTERY BYPASS GRAFT      2    CYSTOSCOPY URETEROSCOPY LASER LITHOTRIPSY Left 04/17/2017    Procedure: URETEROSCOPY LASER LITHOTRIPSY WITH DOUBLE J STENT INSERTION, LEFT ;  Surgeon: Weston Peck MD;  Location:  [facility] OR;  Service:     CYSTOSCOPY URETEROSCOPY LASER LITHOTRIPSY Left 08/14/2017    Procedure: CYSTOSCOPY URETEROSCOPY LASER LITHOTRIPSY STENT INSERTION STONE EXTRACTION;  Surgeon: Weston Peck MD;  Location:  [facility] OR;  Service:     CYSTOSCOPY W/ URETERAL STENT PLACEMENT Left 04/17/2017    Procedure: CYSTOSCOPY URETERAL DOUBLE J STENT INSERTION, LEFT;  Surgeon: Weston Peck MD;  Location: [facility] OR;  Service:     ENDOSCOPY N/A 04/26/2017    Procedure: ESOPHAGOGASTRODUODENOSCOPY WITH ANESTHESIA;  Surgeon: Sher Cui MD;  Location: [facility] ENDOSCOPY;  Service:     INCISION AND DRAINAGE LEG Right 07/12/2023    Procedure: INCISION AND DRAINAGE - RIGHT FOOT;  Surgeon: Silas Swan DPM;  Location: [facility] OR;  Service: Podiatry;  Laterality: Right;    JOINT REPLACEMENT Right     KIDNEY STONE SURGERY      KNEE SURGERY Right 08/2024    replacement    NECK SURGERY      ROTATOR CUFF REPAIR      SHOULDER SURGERY      TONSILLECTOMY      TRANS METATARSAL AMPUTATION Right 08/09/2023    Procedure: Partial 4th Ray Amputation - Right Foot;  Surgeon: Silas Swan DPM;  Location:  [facility] OR;  Service: Podiatry;  Laterality: Right;    URETEROSCOPY LASER LITHOTRIPSY WITH STENT INSERTION Left 09/01/2022    Procedure: LEFT URETEROSCOPY LASER LITHOTRIPSY WITH STENT INSERTION;  Surgeon: Weston Peck MD;  Location: [facility]  OR;  Service: Urology;  Laterality: Left;    URETEROSCOPY LASER LITHOTRIPSY WITH STENT INSERTION Left 09/15/2022    Procedure: LEFT URETEROSCOPY LASER LITHOTRIPSY WITH STENT EXCHANGE;  Surgeon: Weston Peck MD;  Location: [facility] OR;  Service: Urology;  Laterality: Left;       SLP Recommendation and Plan     SLP Diet Recommendation: NPO, water between meals after oral care, with supervision, ice chips between meals after oral care, with supervision (04/22/25 0855)  Recommended Precautions and Strategies: upright posture during/after eating, small bites of food and sips of liquid, general aspiration precautions, reflux precautions, 1:1 supervision, assist with feeding, fatigue precautions (04/22/25 0855)  SLP Rec. for Method of Medication Administration: meds via alternate route (04/22/25 0855)     Monitor for Signs of Aspiration: yes, notify SLP if any concerns (04/22/25 0855)  Recommended Diagnostics: reassess via [place] (04/22/25 0855)     Anticipated Discharge Disposition (SLP): unknown (04/22/25 0855)     Therapy Frequency (Swallow): PRN (04/22/25 0855)  Predicted Duration Therapy Intervention (Days): until discharge (04/22/25 0855)  Oral Care Recommendations: Oral Care BID/PRN, Toothbrush (04/22/25 0855)        Daily Summary of Progress (SLP): unable to show any progress toward functional goals (04/22/25 0855)               Treatment Assessment (SLP): continued (04/22/25 0855)  Treatment Assessment Comments (SLP): see note (04/22/25 0855)  Plan for Continued Treatment (SLP): continue treatment per plan of care (04/22/25 0855)         Progress: no change      SWALLOW EVALUATION (Last 72 Hours)       SLP Adult Swallow Evaluation       Row Name 04/22/25 0855 04/21/25 1400 04/21/25 0830             Rehab Evaluation    Document Type therapy note (daily note)  -MD evaluation  -MD evaluation  -MG      Subjective Information complains of  dry mouth  -MD complains of  difficulty swallowing  -MD no  complaints  -MG      Patient Observations cooperative;agree to therapy  -MD cooperative;agree to therapy  -MD alert;cooperative;agree to therapy  -MG      Patient/Family/Caregiver Comments/Observations Wife present  -MD No family present  -MD Wife present  -MG      Patient Effort poor  -MD fair  -MD adequate  -MG      Symptoms Noted During/After Treatment fatigue  -MD fatigue  -MD fatigue  -MG         General Information    Patient Profile Reviewed -- yes  -MD yes  -MG      Pertinent History Of Current Problem -- Presented due to multiple episodes of near syncope, confusion, agitation, and incontinence. Medical history of CABG, neck sx, sleep apnea, migraines, GERD, DM, cirrhosis. SLP consulted due to concern for aspiration from wife prior to coming and during admission. CT abdomen with consolidative infiltrates in lungs with debris and concern for aspiration pneumonitis.  -MD Presented due to multiple episodes of near syncope, confusion, agitation, and incontinence. Medical history of CABG, neck sx, sleep apnea, migraines, GERD, DM, cirrhosis. SLP consulted due to concern for aspiration from wife prior to coming and during admission. CT abdomen with consolidative infiltrates in lungs with debris and concern for aspiration pneumonitis.  -MG      Current Method of Nutrition -- NPO  -MD regular textures;thin liquids  -MG      Precautions/Limitations, Vision -- WFL;for purposes of eval  -MD WFL;for purposes of eval  -MG      Precautions/Limitations, Hearing -- WFL;for purposes of eval  -MD WFL;for purposes of eval  -MG      Prior Level of Function-Communication -- WFL  -MD WFL  -MG      Prior Level of Function-Swallowing -- no diet consistency restrictions;esophageal concerns  -MD no diet consistency restrictions;esophageal concerns  -MG      Plans/Goals Discussed with -- patient  -MD patient;spouse/S.O.;agreed upon  -MG      Barriers to Rehab -- medically complex  -MD medically complex  -MG      Patient's Goals for  Discharge -- patient did not state  -MD patient did not state  -MG      Family Goals for Discharge -- -- patient able to eat/drink without coughing/choking  -MG         Pain    Additional Documentation -- -- Pain Scale: FACES Pre/Post-Treatment (Group)  -MG         Pain Scale: FACES Pre/Post-Treatment    Pain: FACES Scale, Pretreatment 0-->no hurt  -MD 0-->no hurt  -MD 0-->no hurt  -MG      Posttreatment Pain Rating 0-->no hurt  -MD 0-->no hurt  -MD 2-->hurts little bit  -MG      Pre/Posttreatment Pain Comment -- -- Back pain with upright positioning; returned to reclined at end of session.  -MG         Oral Motor Structure and Function    Dentition Assessment -- edentulous, does not have dentures  -MD edentulous, does not have dentures  -MG      Secretion Management -- WNL/WFL  -MD WNL/WFL  -MG      Mucosal Quality -- dry  -MD dry  -MG      Volitional Swallow -- weak;delayed  -MD weak;delayed  -MG      Volitional Cough -- weak;non-productive  -MD weak;non-productive  -MG         Oral Musculature and Cranial Nerve Assessment    Oral Motor General Assessment -- generalized oral motor weakness  -MD generalized oral motor weakness  -MG         General Eating/Swallowing Observations    Respiratory Support Currently in Use -- nasal cannula  -MD nasal cannula  -MG      O2 Liters -- 3L  -MD 3L  -MG      Eating/Swallowing Skills -- fed by staff/caregiver  -MD fed by SLP  -MG      Positioning During Eating -- upright in chair  -MD upright in bed  -MG      Utensils Used -- spoon;straw  -MD spoon;straw  -MG      Consistencies Trialed -- honey-thick liquids;nectar/syrup-thick liquids;thin liquids  -MD pureed;honey-thick liquids;nectar/syrup-thick liquids;thin liquids  -MG         Clinical Swallow Eval    Oral Prep Phase -- -- WFL  -MG      Oral Transit -- -- WFL  -MG      Oral Residue -- -- WFL  -MG      Pharyngeal Phase -- -- suspected pharyngeal impairment  -MG      Esophageal Phase -- -- suspected esophageal impairment  -MG       Clinical Swallow Evaluation Summary -- -- See note  -MG         Pharyngeal Phase Concerns    Pharyngeal Phase Concerns -- -- multiple swallows;cough;throat clear  -MG         Esophageal Phase Concerns    Esophageal Phase Concerns -- -- --  delayed coughing; prior history of dilations with known reflux  -MG         MBS/VFSS    Utensils Used -- spoon;straw  -MD --      Consistencies Trialed -- honey-thick liquids;nectar/syrup-thick liquids;thin liquids  -MD --         MBS/VFSS Interpretation    Oral Prep Phase -- impaired oral phase of swallowing  -MD --      Oral Transit Phase -- impaired  -MD --      Oral Residue -- impaired  -MD --      VFSS Summary -- see note  -MD --         Oral Preparatory Phase    Oral Preparatory Phase -- prolonged manipulation  -MD --      Prolonged Manipulation -- honey-thick liquids;nectar-thick liquids;thin liquids  -MD --         Oral Transit Phase    Impaired Oral Transit Phase -- delayed initiation of bolus transit;premature spillage of liquids into pharynx  -MD --      Delayed Initiation of bolus transit -- honey-thick liquids  -MD --      Premature Spillage of Liquids into Pharynx -- nectar-thick liquids  -MD --         Oral Residue    Impaired Oral Residue -- lingual residue;palatal residue  -MD --      Palatal Residue -- all consistencies tested  -MD --      Lingual Residue -- all consistencies tested  -MD --      Response to Oral Residue -- partial residue clearance;with spontaneous subsequent swallow;with cued swallow  -MD --         Initiation of Pharyngeal Swallow    Initiation of Pharyngeal Swallow -- bolus in valleculae  -MD --      Pharyngeal Phase -- impaired pharyngeal phase of swallowing  -MD --      Anatomical abnormalities noted -- c-spine hardware  -MD --      Anatomical abnormalities functional impact -- functional impact on swallowing  -MD --      Penetration During the Swallow -- nectar-thick liquids  -MD --      Aspiration During the Swallow -- thin liquids   -MD --      Penetration After the Swallow -- nectar-thick liquids;honey-thick liquids;thin liquids  -MD --      Aspiration After the Swallow -- thin liquids  -MD --      Depth of Penetration -- deep  -MD --      Response to Penetration -- No  -MD --      No spontaneous response to penetration and -- non-effective laryngeal clearance with cue (see comments)  -MD --      Response to Aspiration -- No  -MD --      No spontaneous response to aspiration and -- non-effective subglottic clearance with cue (see comments)  -MD --      Pharyngeal Residue -- honey-thick liquids;nectar-thick liquids;thin liquids  -MD --      Response to Residue -- partial residue clearance;other (see comments)  not fully cleared  -MD --      Attempted Compensatory Maneuvers -- other (see comments)  unable to ocmplete a chin tuck maneuver  -MD --         Esophageal Phase    Esophageal Phase -- other (see comments)  did not complete  -MD --         SLP Evaluation Clinical Impression    SLP Swallowing Diagnosis -- mild;oral dysphagia;severe;pharyngeal dysphagia;other (see comments)  unable to evaluate esophageal phase of the swallow  -MD mild;oral dysphagia;R/O pharyngeal dysphagia;suspected esophageal dysphagia  -MG      Functional Impact -- risk of aspiration/pneumonia;risk of malnutrition;risk of dehydration  -MD risk of aspiration/pneumonia;risk of malnutrition;risk of dehydration  -MG      Rehab Potential/Prognosis, Swallowing -- adequate, monitor progress closely  -MD adequate, monitor progress closely  -MG      Swallow Criteria for Skilled Therapeutic Interventions Met -- demonstrates skilled criteria  -MD demonstrates skilled criteria  -MG         SLP Treatment Clinical Impressions    Treatment Assessment (SLP) continued  -MD -- --      Treatment Assessment Comments (SLP) see note  -MD -- --      Daily Summary of Progress (SLP) unable to show any progress toward functional goals  -MD -- --      Barriers to Overall Progress (SLP) Medically  complex;Other (see comments)  previous functional deficits  -MD -- --      Plan for Continued Treatment (SLP) continue treatment per plan of care  -MD -- --      Care Plan Review risks/benefits reviewed;current/potential barriers reviewed;patient/other agree to care plan  -MD -- --      Care Plan Review, Other Participant(s) caregiver;family  -MD -- --         Recommendations    Therapy Frequency (Swallow) PRN  -MD PRN  -MD PRN  -MG      Predicted Duration Therapy Intervention (Days) until discharge  -MD until discharge  -MD until discharge  -MG      SLP Diet Recommendation NPO;water between meals after oral care, with supervision;ice chips between meals after oral care, with supervision  -MD NPO;water between meals after oral care, with supervision;ice chips between meals after oral care, with supervision  -MD NPO;water between meals after oral care, with supervision;ice chips between meals after oral care, with supervision  -MG      Recommended Diagnostics reassess via VFSS (MBS)  -MD -- reassess via VFSS (MBS)  -MG      Recommended Precautions and Strategies upright posture during/after eating;small bites of food and sips of liquid;general aspiration precautions;reflux precautions;1:1 supervision;assist with feeding;fatigue precautions  -MD upright posture during/after eating;small bites of food and sips of liquid;general aspiration precautions;reflux precautions;1:1 supervision;assist with feeding;fatigue precautions  -MD upright posture during/after eating;small bites of food and sips of liquid;general aspiration precautions;reflux precautions;1:1 supervision;assist with feeding;fatigue precautions  -MG      Oral Care Recommendations Oral Care BID/PRN;Toothbrush  -MD Oral Care BID/PRN;Toothbrush  -MD Oral Care BID/PRN;Toothbrush  -MG      SLP Rec. for Method of Medication Administration meds via alternate route  -MD meds via alternate route  -MD meds crushed;with puree;as tolerated  -MG      Monitor for Signs  of Aspiration yes;notify SLP if any concerns  -MD yes;notify SLP if any concerns  -MD yes;notify SLP if any concerns  -MG      Anticipated Discharge Disposition (SLP) unknown  -MD unknown  -MD unknown  -MG         Swallow Goals (SLP)    Swallow LTGs Swallow Long Term Goal (free text)  -MD Swallow Long Term Goal (free text)  -MD Swallow Long Term Goal (free text)  -MG      Swallow STGs diet tolerance goal selection (SLP)  -MD diet tolerance goal selection (SLP)  -MD diet tolerance goal selection (SLP)  -MG      Diet Tolerance Goal Selection (SLP) Patient will tolerate trials of  -MD Patient will tolerate trials of  -MD Patient will tolerate trials of  -MG         (LTG) Swallow    (LTG) Swallow Patient will tolerate least restrictive diet without overt s/s of aspiration.  -MD Patient will tolerate least restrictive diet without overt s/s of aspiration.  -MD Patient will tolerate least restrictive diet without overt s/s of aspiration.  -MG      [place] (Swallow Long Term Goal) independently (over 90% accuracy)  -MD independently (over 90% accuracy)  -MD independently (over 90% accuracy)  -MG      Time Frame (Swallow Long Term Goal) by discharge  -MD by discharge  -MD by discharge  -MG      Barriers (Swallow Long Term Goal) medically complex  -MD medically complex  -MD medically complex  -MG      Progress/Outcomes (Swallow Long Term Goal) unable to make needed progress  -MD progress slower than expected  -MD new goal  -MG         (STG) Patient will tolerate trials of    Consistencies Trialed (Tolerate trials) soft to chew (chopped) textures;thin liquids  -MD soft to chew (chopped) textures;thin liquids  -MD soft to chew (chopped) textures;thin liquids  -MG      Desired Outcome (Tolerate trials) without signs/symptoms of aspiration;without signs of distress;with adequate oral prep/transit/clearance  -MD without signs/symptoms of aspiration;without signs of distress;with adequate oral prep/transit/clearance  -MD  without signs/symptoms of aspiration;without signs of distress;with adequate oral prep/transit/clearance  -MG      [place] (Tolerate trials) independently (over 90% accuracy)  -MD independently (over 90% accuracy)  -MD independently (over 90% accuracy)  -MG      Time Frame (Tolerate trials) by discharge  -MD by discharge  -MD by discharge  -MG      Progress/Outcomes (Tolerate trials) unable to make needed progress  -MD progress slower than expected  -MD new goal  -MG                User Key  (r) = Recorded By, (t) = Taken By, (c) = Cosigned By      Initials Name Effective Dates    MG Shannan Carpio TONY, MS [facility]-SLP 07/11/23 -     Cate Gale, SLP 06/21/22 -                     EDUCATION  The patient has been educated in the following areas:   Dysphagia (Swallowing Impairment).        SLP GOALS       Row Name 04/22/25 0855 04/21/25 1400 04/21/25 0830       (LTG) Swallow    (LTG) Swallow Patient will tolerate least restrictive diet without overt s/s of aspiration.  -MD Patient will tolerate least restrictive diet without overt s/s of aspiration.  -MD Patient will tolerate least restrictive diet without overt s/s of aspiration.  -MG    [place] (Swallow Long Term Goal) independently (over 90% accuracy)  -MD independently (over 90% accuracy)  -MD independently (over 90% accuracy)  -MG    Time Frame (Swallow Long Term Goal) by discharge  -MD by discharge  -MD by discharge  -MG    Barriers (Swallow Long Term Goal) medically complex  -MD medically complex  -MD medically complex  -MG    Progress/Outcomes (Swallow Long Term Goal) unable to make needed progress  -MD progress slower than expected  -MD new goal  -MG       (STG) Patient will tolerate trials of    Consistencies Trialed (Tolerate trials) soft to chew (chopped) textures;thin liquids  -MD soft to chew (chopped) textures;thin liquids  -MD soft to chew (chopped) textures;thin liquids  -MG    Desired Outcome (Tolerate trials) without signs/symptoms of  aspiration;without signs of distress;with adequate oral prep/transit/clearance  -MD without signs/symptoms of aspiration;without signs of distress;with adequate oral prep/transit/clearance  -MD without signs/symptoms of aspiration;without signs of distress;with adequate oral prep/transit/clearance  -MG    [place] (Tolerate trials) independently (over 90% accuracy)  -MD independently (over 90% accuracy)  -MD independently (over 90% accuracy)  -MG    Time Frame (Tolerate trials) by discharge  -MD by discharge  -MD by discharge  -MG    Progress/Outcomes (Tolerate trials) unable to make needed progress  -MD progress slower than expected  -MD new goal  -MG              User Key  (r) = Recorded By, (t) = Taken By, (c) = Cosigned By      Initials Name Provider Type    Shannan Wilkerson MS CCC-SLP Speech and Language Pathologist    Cate Gale, SLP Speech and Language Pathologist                         Time Calculation:    Time Calculation- SLP       Row Name 04/22/25 1524             Time Calculation- SLP    SLP Start Time 0855  -MD      SLP Stop Time 0949  -MD      SLP Time Calculation (min) 54 min  -MD      SLP Received On 04/22/25  -MD         Untimed Charges    [id number] Treatment Swallow Minutes 54  -MD         Total Minutes    Untimed Charges Total Minutes 54  -MD       Total Minutes 54  -MD                User Key  (r) = Recorded By, (t) = Taken By, (c) = Cosigned By      Initials Name Provider Type    Cate Gale, SLP Speech and Language Pathologist                    Therapy Charges for Today       Code Description Service Date Service Provider Modifiers Qty    94378214706 HC ST MOTION FLUORO EVAL SWALLOW 6 4/21/2025 Cate Rivera, SLP GN 1    [id number] HC ST TREATMENT SWALLOW 4 4/22/2025 Cate Rivera, SLP GN 1                 SPENSER Drake  4/22/2025

## 2025-04-22 NOTE — CONSULTS
Adult Nutrition  Assessment/PES/Intervention Note  TF Consult/Assessment    Patient Name:  Franko Lucero  YOB: 1955  MRN: [id number]  Admit Date:  4/17/2025    Assessment Date:  4/22/2025    Comments:  Nutrition consult for TF assessment. He has a DHT present now.     Recommendations/TF orders:  Start Nutren 1.5 at 25mL/hour. Increase rate by 10mL every 4 hours as tolerated to a goal rate of 65mL/hour. Free water flushes of 40mL/hour via pump.    See below for EEN and TF nutrient breakdown. Will follow to establish TF tolerance and make adjustments as necessary.     Reason for Assessment       Row Name 04/22/25 1131          Reason for Assessment    Reason For Assessment physician consult;TF/PN     Diagnosis neurologic conditions;diabetes diagnosis/complications;cardiac disease;infection/sepsis;liver disease;hematological/related complications;pulmonary disease  severe sepsis with multisystem organ failure, cirrhosis                    Nutrition/Diet History       Row Name 04/22/25 1132                    Factors Affecting Nutritional Intake difficulty/impaired swallowing;cognitive status/motor function                    Labs/Tests/Procedures/Meds       Row Name 04/22/25 1137          Labs/Procedures/Meds    Lab Results Reviewed reviewed, pertinent     Lab Results Comments K+ 3.3, BUN/Cr, GFR32.8        Diagnostic Tests/Procedures    Diagnostic Test/Procedure Reviewed reviewed, pertinent     Diagnostic Test/Procedures Comments clinical swallow evaluation 4/21 - recommend NPO at this time with AMONS        Medications    Pertinent Medications Reviewed reviewed, pertinent     Pertinent Medications Comments FeSO4, lactulose, zosyn,                    Physical Findings       Row Name 04/22/25 1137          Physical Findings    Overall Physical Appearance BM 4/21, scab to L 2nd toe, skin tear to L forearm, wallace score 13     Enteral Access Devices nasogastric tube  DHT placed 4/21                     Estimated/Assessed Needs - Anthropometrics       Row Name 04/22/25 1133          Anthropometrics    Calculation Weight 80.7 kg (178 lb)  IBW used secondary to cirrhosis and edema        Estimated/Assessed Needs    Additional Documentation KCAL/KG (Group);Fluid Requirements (Group);Protein Requirements (Group)        KCAL/KG    KCAL/KG 25 Kcal/Kg (kcal);30 Kcal/Kg (kcal)     25 Kcal/Kg (kcal) 2018.5     30 Kcal/Kg (kcal) 2422.2        Protein Requirements    Weight Used For Protein Calculations 80.7 kg (178 lb)  IBW     Est Protein Requirement Amount (gms/kg) 1.2 gm protein     Estimated Protein Requirements (gms/day) 96.89        Fluid Requirements    Fluid Requirements (mL/day) 9940-7001     Estimated Fluid Requirement Method other (see comments)  1mL/kcal                    Nutrition Prescription Ordered       Row Name 04/22/25 1140          Nutrition Prescription PO    Current PO Diet NPO                    Evaluation of Received Nutrient/Fluid Intake       Row Name 04/22/25 1141          Nutrient/Fluid Evaluation    Number of Days Evaluated 3 days     Additional Documentation Fluid Intake Evaluation (Group)        Fluid Intake Evaluation    Other Fluid (mL) 3278  UOP = 1050        PO Evaluation    % PO Intake NPO                       Problem/Interventions:   Problem 1       Row Name 04/22/25 1142          Nutrition Diagnoses Problem 1    Problem 1 Needs Alternate Route     Etiology (related to) Medical Diagnosis;Factors Affecting Nutrition     Nutrition related Increased nutrition needs     Cardiac Hypotension;HTN     Endocrine DM2     Hepatic Cirrhosis     Infectious Disease Sepsis  multisystem organ failure     Neurological AMS     Pulmonary/Critical Care Pneumonia  aspiration PNA, sepsis syndrome     Skin Skin breakdown     Oral Swallowing Difficulty     Mental State/Condition Impaired Cognitive Status/Motor Control     Signs/Symptoms (evidenced by) NPO;SLP/Swallow eval;PO diet not tolerated;EN Intake  Delivery     Percent (%) of EN goal --  consult to start EN     Swallow eval status Done     Type of SLP Evaluation Bedside                          Intervention Goal       Row Name 04/22/25 1147          Intervention Goal    General Nutrition support treatment;Improved nutrition related lab(s);Reduce/improve symptoms;Meet nutritional needs for age/condition;Disease management/therapy     TF/PN Inititiate TF/PN;Establish TF tolerance;Adjust TF/PN;Tolerate TF at goal;Deliver (%) goal     Deliver % of Goal 75 %  or >     Weight No significant weight loss  appropriate weight loss may be beneficial, has LE edema, 2+ noted to ankles/feet                    Nutrition Intervention       Row Name 04/22/25 1145          Nutrition Intervention    RD/Tech Action Follow Tx progress;Care plan reviewd;Recommend/ordered     Recommended/Ordered EN                    Nutrition Prescription       Row Name 04/22/25 1149          Nutrition Prescription EN    Enteral Prescription Enteral begin/change;Enteral to supply     Enteral Route NG  DHT in place     Product Nutren 1.5 andre     TF Delivery Method Continuous     Continuous TF Goal Rate (mL/hr) 65 mL/hr     Continuous TF Starting Rate (mL/hr) 25 mL/hr  increase by 10mL every 4 hours as tolerated to goal rate     Continuous TF Goal Volume (mL) 1430 mL     Water flush (mL)  40 mL     Water Flush Frequency Per hour     New EN Prescription Ordered? Yes        EN to Supply    Kcal/Day 2145 Kcal/Day     Kcal/Kg 25 Kcal/Kg     Kcal/Kg Weight Method Actual weight     Protein (gm/day) 97 gm/day     Meet Estimated Kcal Need (%) 100 %  of est'd kcal range     Meet Estimated Protein Need (%) 100 %     TF Free H2O (mL) 1093 mL     Total Free H2O (mL/day) 1973 mL/day     Fiber Per Day (gm/day) 0 gm/day        Other Orders    Obtain Weight Daily     Obtain Weight Ordered? Yes                    Education/Evaluation       Row Name 04/22/25 4911          Education    Education No discharge needs  identified at this time  d/c needs unknown at present        Monitor/Evaluation    Monitor Per protocol;I&O;Pertinent labs;TF delivery/tolerance;Weight;Skin status;Symptoms                     Electronically signed by:  Divina Wooten RDN, LD  04/22/25 12:00 CDT

## 2025-04-22 NOTE — PLAN OF CARE
Goal Outcome Evaluation:         To aleve signs and symptoms of DINORAH.

## 2025-04-22 NOTE — CONSULTS
[facility] Palliative Care Services  Initial Consult    Attending Physician: Adams Reynoso MD  Referring Provider: Chinmay Meza PA-C    Patient Name: Franko Lucero  Date of Admission: 4/17/2025  Today's Date: 04/22/25     Reason for Referral: Goals of Care/Advance Care Planning    Code Status and Medical Interventions: No CPR (Do Not Attempt to Resuscitate); Limited Support; No intubation (DNI)   Ordered at: 04/21/25 0959     Code Status (Patient has no pulse and is not breathing):    No CPR (Do Not Attempt to Resuscitate)     Medical Interventions (Patient has pulse or is breathing):    Limited Support     Medical Intervention Limits:    No intubation (DNI)     Level Of Support Discussed With:    Health Care Surrogate        Subjective     HPI: 69 y.o. male with past medical history including anemia, anxiety, arthritis, BPH, CAD, chronic kidney disease, cirrhosis of liver, diabetes mellitus, GERD, hyperlipidemia, hypertension, bilateral iliac artery aneurysm, non-Hodgkin's lymphoma, and sleep apnea.  Additional past medical history listed below.  According to chart review most recently hospitalized at this facility 3/11-3/14 due to syncope.  Noted he had been sick with upper respiratory infection for few weeks.  Cardiology evaluated during hospitalization and felt syncopal episode likely due to frail state in the setting of respiratory infection and dehydration and did not appear to be ACS.  Echocardiogram ordered and recommended Holter monitor at discharge.  Neurology consulted due to concerns of possible seizure-like activity.  Made recommendations including MRI brain and acetylcholine receptor antibody test to assess for myasthenia gravis which later resulted unremarkable.  MRI of brain negative for acute infarct or intracranial mass although visualized moderate atrophy and small vessel disease and right maxillary sinusitis changes.  EEG negative for epileptiform activity per chart review.   "No consideration of CTA of neck however would need hydration due to renal status and could be deferred to outpatient workup.  He was discharged home per chart review.  Noted he was evaluated by PCP outpatient 4/17 due to multiple episodes of near syncope, confusion, altered mental status, agitation, and incontinence.  He was directly admitted to medical floor from PCP office on 4/17.  Labs collected on date of admission revealed several abnormalities including creatinine 1.40, GFR 53.7, RBC 2.50, hemoglobin 9.4, hematocrit 28.7, platelets 66,000.  MRI of brain with and without contrast completed and negative for acute intracranial process.  Noted mild chronic small vessel ischemic changes, chronic paranasal sinus disease and 3 incidentally noted tiny remote microhemorrhages which suspect are hypertensive in nature per radiology report.  Neurology consulted and made recommendations including 24-hour EEG and having family at bedside to document any \"spells\" so this could be correlated with EEG.  Noted spells could represent a multifactorial event with a progressive neurocognitive disorder underlying the events and possibly some orthostatic hypotension.  Ammonia noted to be elevated at 66 on 4/19.  Reportedly had abdominal pain and constipation.  KUB completed revealing large volume colonic stool with mild gaseous distention along the transverse and sigmoid segments measuring up to 7.6 cm.  Overall bowel gas pattern nonobstructive.  Chart review notes he became unresponsive on 4/19 and oxygen saturations dropped to 55% on room air.  Rapid response called.  ABGs collected while on 3 L oxygen nasal cannula revealed pH 7.182, pCO2 71.5 and pO2 60.3.  He was placed on BiPAP and transferred to intensive care unit.  Noted he was on 24-hour EEG when this occurred and study stopped before 24 hours.  Chart review notes patient had 2 \"spells\" while on EEG and one was described as him having difficulty holding items and the " second was confusion and urination.  EEG report reviewed and noted background is moderate generalized slow with generalized triphasic waves, no electrographic seizures were seen.  Chest x-ray completed 4/19 revealed bibasilar atelectasis (left greater than right), left lower lobe appears mostly collapsed however no pleural effusion or pneumothorax.  Respiratory panel negative.  MRSA PCR positive.  CT abdomen pelvis completed 4/20 revealed liver cirrhosis with moderate volume ascites, consolidative infiltrates in left upper lobe lingula, left lower lobe and right lower lobe which could reflect dependent atelectasis or perhaps aspiration pneumonitis.  The left lower lobe in particular is completely consolidated and there is debris opacifying the lobar and segmental airways reflecting mucous plugging and/or aspirated material.  He was hypotensive and required Levophed 4/20-4/21 per MAR.  Continuous EEG completed again 4/19 at 12 PM through 4/20 at 10 AM.  Report revealed findings suggestive of diffuse cerebral dysfunction of moderate degree, no evidence of epilepsy is seen on the study.  Noted presence of generalized triphasic waves is most consistent with toxic/metabolic or diffuse hypoxemic cause.  Neurology felt spells for likely secondary to respiratory issues or metabolic issue and no further neurologic workup or treatment indicated at this time and signed off.  SLP evaluated 4/21 and noted oropharyngeal dysphagia with esophageal component recommended NPO.  VFSS completed 4/21 and found to have mild oral phase dysphagia and severe pharyngeal phase dysphagia and recommended NPO.  Dobbhoff tube placed night of 4/21.  Labs collected this morning reviewed.  Renal function continues to decline with creatinine 2.11, BUN 35, GFR 32.8.  Potassium slightly low at 3.3.  WBC remains elevated however trending downward.  Platelets remain low at 60,000.  Palliative care has been consulted to discuss goals of care/advance care  "planning.  Noted code status changes were made 4/21 to include no CPR and no intubation.   He is lying in bed, asleep and in no apparent distress.  Arouses to voice.  Oriented to person, place and somewhat situation at time of exam.  Denies pain or shortness of breath.  His spouse and son-in-law are at bedside.  Discussed with nursing and PA.     Advance Care Planning   Advanced Directives: No advance directive on file.   Advance Care Planning Discussion: Mr. Lucero is alert and able to participate in some discussion however having difficulty demonstrating ability to make complex medical decisions at time of exam.  Spoke with spouse, Sangita, who is next of kin and son-in-law at bedside.  They were agreeable to discussion goals of care.   Explored events leading to current hospitalization.  Spouse provided additional history.  Reports he has been dealing with cough for several months and unfortunately will not go away.  Reports he had a respiratory infection earlier this year however has continued to experience cough.  Shared she is now concerned that he may have been aspirating for around the last month however did not realize it.  Reports her main concern over his \"spells\" however unfortunately have not found exact cause to this.  We discussed current concerns including dysphagia, impaired mobility, in addition to underlying comorbidities.  Spouse reports he was previously diagnosed with cirrhosis however has not had any issues up until now.  We did discuss concerns regarding dysphagia and artificial nutrition.  Explained he can continue to have risk of aspiration despite feeding tube placement.  We also discussed use of temporary feeding tube versus more permanent.  Shared they have briefly discussed this.  If cirrhosis/ascites worsens unsure if he would be candidate for PEG tube placement.  He has not had nutrition started yet through Dobbhoff tube.  Family want to see how he tolerates and responds to this.  " Plans to have ongoing discussions.  Sangita shared she was able to speak with patient's PCP this morning who informed her of overall poor prognosis and was appreciative of discussion.  She appears to have good understanding that Mr. Lucero may not return to his baseline.  We briefly discussed alternative treatment options as well including more limited support interventions versus comfort measures if he experiences decline and/or no longer wishes to continue current measures.  aSngita shared it is hard to make these decisions and is hopeful Mr. Lucero will become more oriented to assist in making decisions.  She did confirm he did not wish to undergo CPR and/or intubation.  Plans to continue with remaining interventions at this time and determining how he does.  Family appear to still be processing information and events of hospitalization however were appreciative of discussion.  Encourage questions/concerns if arise.  Support provided.    The patient receives support from his spouse, children, and extended family. Patient's spouse is his next of kin.    Due to the palliative care topics discussed including goals of care, treatment options, and medical priorities we will establish an advance care plan.      Review of Systems   Cardiovascular:  Negative for chest pain.   Respiratory:  Positive for cough. Negative for shortness of breath.      Pain Assessment  Nonverbal Indicators of Pain: nonverbal indicators absent  CPOT Facial Expression: 0-->relaxed, neutral  CPOT Body Movements: 0-->absence of movements  CPOT Muscle Tension: 0-->relaxed  Ventilator Compliance/Vocalization: 0-->talking in normal tone or no sound  CPOT Score: 0  Pain Location: abdomen  Pain Description: intermittent  Past Medical History:   Diagnosis Date    Anemia     Anxiety     Arthritis     BPH (benign prostatic hypertrophy)     CAD (coronary artery disease)     Cancer     non-hodgkins lymphoma    Cirrhosis     Diabetes mellitus     Disease  of thyroid gland     GERD (gastroesophageal reflux disease)     Hearing loss     History of transfusion     Hyperlipidemia     Hypertension     Iliac artery aneurysm, bilateral     Kidney stone     Liver cirrhosis     Migraines     Sleep apnea     c-pap      Past Surgical History:   Procedure Laterality Date    COLONOSCOPY  02/04/2014    COLONOSCOPY N/A 04/26/2017    Procedure: COLONOSCOPY WITH ANESTHESIA;  Surgeon: Sher Cui MD;  Location: [facility] ENDOSCOPY;  Service:     CORONARY ARTERY BYPASS GRAFT      2    CYSTOSCOPY URETEROSCOPY LASER LITHOTRIPSY Left 04/17/2017    Procedure: URETEROSCOPY LASER LITHOTRIPSY WITH DOUBLE J STENT INSERTION, LEFT ;  Surgeon: Weston Peck MD;  Location:  [facility] OR;  Service:     CYSTOSCOPY URETEROSCOPY LASER LITHOTRIPSY Left 08/14/2017    Procedure: CYSTOSCOPY URETEROSCOPY LASER LITHOTRIPSY STENT INSERTION STONE EXTRACTION;  Surgeon: Weston Peck MD;  Location:  [facility] OR;  Service:     CYSTOSCOPY W/ URETERAL STENT PLACEMENT Left 04/17/2017    Procedure: CYSTOSCOPY URETERAL DOUBLE J STENT INSERTION, LEFT;  Surgeon: Weston Peck MD;  Location: [facility] OR;  Service:     ENDOSCOPY N/A 04/26/2017    Procedure: ESOPHAGOGASTRODUODENOSCOPY WITH ANESTHESIA;  Surgeon: Sher Cui MD;  Location: [facility] ENDOSCOPY;  Service:     INCISION AND DRAINAGE LEG Right 07/12/2023    Procedure: INCISION AND DRAINAGE - RIGHT FOOT;  Surgeon: Silas Swan DPM;  Location: [facility] OR;  Service: Podiatry;  Laterality: Right;    JOINT REPLACEMENT Right     KIDNEY STONE SURGERY      KNEE SURGERY Right 08/2024    replacement    NECK SURGERY      ROTATOR CUFF REPAIR      SHOULDER SURGERY      TONSILLECTOMY      TRANS METATARSAL AMPUTATION Right 08/09/2023    Procedure: Partial 4th Ray Amputation - Right Foot;  Surgeon: Silas Swan DPM;  Location:  [facility] OR;  Service: Podiatry;  Laterality: Right;    URETEROSCOPY LASER LITHOTRIPSY WITH STENT INSERTION Left 09/01/2022     Procedure: LEFT URETEROSCOPY LASER LITHOTRIPSY WITH STENT INSERTION;  Surgeon: Weston Peck MD;  Location:  [facility] OR;  Service: Urology;  Laterality: Left;    URETEROSCOPY LASER LITHOTRIPSY WITH STENT INSERTION Left 09/15/2022    Procedure: LEFT URETEROSCOPY LASER LITHOTRIPSY WITH STENT EXCHANGE;  Surgeon: Weston Peck MD;  Location:  [facility] OR;  Service: Urology;  Laterality: Left;      Social History     Socioeconomic History    Marital status:    Tobacco Use    Smoking status: Former     Current packs/day: 0.00     Types: Cigarettes     Quit date:      Years since quittin.3     Passive exposure: Never    Smokeless tobacco: Never   Vaping Use    Vaping status: Never Used   Substance and Sexual Activity    Alcohol use: No    Drug use: No    Sexual activity: Defer     Family History   Problem Relation Age of Onset    Kidney disease Father     Heart disease Father     Cancer Mother         brain    Colon cancer Neg Hx     Colon polyps Neg Hx       Allergies   Allergen Reactions    Adhesive Tape Rash     Pt states that if it isn't left on very long it is okay    Cefazolin Rash    Cefuroxime Axetil Rash    Cephalosporins Rash    Neomycin-Polymyxin B Gu Rash    Percocet [Oxycodone-Acetaminophen] Rash       Objective   Diagnostics: Reviewed    Intake/Output Summary (Last 24 hours) at 2025 0903  Last data filed at 2025 0538  Gross per 24 hour   Intake 2703.65 ml   Output 1800 ml   Net 903.65 ml       Current medications patient is presently taking including all prescriptions, over-the-counter, herbals and vitamin/mineral/dietary (nutritional) supplements with reviewed including route, type, dose and frequency and are current per MAR at time of dictation.  Current Facility-Administered Medications   Medication Dose Route Frequency Provider Last Rate Last Admin    acetaminophen (TYLENOL) tablet 650 mg  650 mg Per G Tube Q4H PRN Chinmay Meza PA-C        Or    acetaminophen  (TYLENOL) 160 MG/5ML oral solution 650 mg  650 mg Per G Tube Q4H PRN Chinmay Meza PA-C        Or    acetaminophen (TYLENOL) suppository 650 mg  650 mg Rectal Q4H PRN Chinmay Meza PA-C        ALPRAZolam (XANAX) tablet 0.25 mg  0.25 mg Per G Tube Nightly PRN Chinmay Meza PA-C        sennosides-docusate (PERICOLACE) 8.6-50 MG per tablet 2 tablet  2 tablet Per G Tube BID PRN Chinmay Meza PA-C        And    polyethylene glycol (MIRALAX) packet 17 g  17 g Per G Tube Daily PRN Chinmay Meza PA-C        And    bisacodyl (DULCOLAX) suppository 10 mg  10 mg Rectal Daily PRN Chinmay Meza PA-C        budesonide (PULMICORT) nebulizer solution 0.25 mg  0.25 mg Nebulization BID - RT Ahsan Dowell APRN   0.25 mg at 04/22/25 0613    busPIRone (BUSPAR) tablet 15 mg  15 mg Per G Tube Q12H Chinmay Meza PA-C        Chlorhexidine Gluconate Cloth 2 % pads 1 Application  1 Application Topical Daily Vikram Dai APRN   1 Application at 04/21/25 0819    clopidogrel (PLAVIX) tablet 75 mg  75 mg Per G Tube Daily Cihnmay Meza PA-C        DULoxetine (CYMBALTA) DR capsule 60 mg  60 mg Oral Daily Yossi Rosen MD   60 mg at 04/19/25 0814    Ferrous Sulfate 300 (60 Fe) MG/5ML solution 300 mg  300 mg Per G Tube Daily Chinmay Meza PA-C        [Held by provider] gabapentin (NEURONTIN) capsule 300 mg  300 mg Oral Nightly Yossi Rosen MD   300 mg at 04/19/25 2025    guaifenesin (ROBITUSSIN) 100 MG/5ML liquid 400 mg  400 mg Per G Tube Q8H Chinmay Meza PA-C        guaiFENesin-codeine (ROMILAR-AC) solution 5 mL  5 mL Oral Q6H PRN Yossi Rosen MD   5 mL at 04/19/25 1041    heparin (porcine) 5000 UNIT/ML injection 5,000 Units  5,000 Units Subcutaneous Q8H Chinmay Meza PA-C   5,000 Units at 04/22/25 0523    ipratropium-albuterol (DUO-NEB) nebulizer solution 3 mL  3 mL Nebulization 4x Daily - RT Ahsan Dowell APRN   3 mL at 04/22/25 0613    lactulose (CHRONULAC) solution for enema 300 mL   300 mL Rectal Once Jasvir Steven MD        lactulose solution 20 g  20 g Per G Tube BID Chinmay Meza PA-C        [START ON 4/23/2025] lansoprazole (PREVACID SOLUTAB) disintegrating tablet Tablet Delayed Release Dispersible 30 mg  30 mg Per G Tube Q AM Chinmay Meza PA-C        [START ON 4/23/2025] levothyroxine (SYNTHROID, LEVOTHROID) tablet 75 mcg  75 mcg Per G Tube Q AM Chinmay Meza PA-C        magnesium citrate solution 296 mL  296 mL Nasogastric Once Chinmay Meza PA-C        mupirocin (BACTROBAN) 2 % nasal ointment 1 Application  1 Application Each Nare BID Vikram Dai APRN   1 Application at 04/21/25 2156    norepinephrine (LEVOPHED) 8 mg in 250 mL NS infusion (premix)  0.02-0.3 mcg/kg/min (Order-Specific) Intravenous Titrated Vikram Dai APRN   Stopped at 04/21/25 1038    ondansetron ODT (ZOFRAN-ODT) disintegrating tablet 4 mg  4 mg Translingual Q6H PRN Chinmay Meza PA-C        Or    ondansetron (ZOFRAN) injection 4 mg  4 mg Intravenous Q6H PRN Chinmay Meza PA-C        piperacillin-tazobactam (ZOSYN) 4.5 g IVPB in 100 mL NS MBP (CD)  4.5 g Intravenous Q8H Stuart Brooks, DO   4.5 g at 04/22/25 0424    potassium chloride (KLOR-CON) packet 20 mEq  20 mEq Oral Once Chinmay Meza PA-C        ranolazine (RANEXA) 12 hr tablet 1,000 mg  1,000 mg Oral Q12H Yossi Rosen MD   1,000 mg at 04/21/25 2157    rosuvastatin (CRESTOR) tablet 40 mg  40 mg Per G Tube Nightly Chinmay Meza PA-C        sodium chloride 0.9 % flush 10 mL  10 mL Intravenous Q12H Yossi Rosen MD   10 mL at 04/21/25 2155    sodium chloride 0.9 % flush 10 mL  10 mL Intravenous PRN Yossi Rosen MD        sodium chloride 0.9 % infusion 40 mL  40 mL Intravenous Once Yossi Rosen MD        [START ON 4/23/2025] vancomycin (VANCOCIN) 750 mg in 0.9% NaCl 250 mL  750 mg Intravenous Q24H Adams Reynoso MD        norepinephrine, 0.02-0.3 mcg/kg/min (Order-Specific), Last Rate: Stopped  "(04/21/25 [facility])       acetaminophen **OR** acetaminophen **OR** acetaminophen    ALPRAZolam    senna-docusate sodium **AND** polyethylene glycol **AND** [DISCONTINUED] bisacodyl **AND** bisacodyl    guaiFENesin-codeine    ondansetron ODT **OR** ondansetron    sodium chloride  Assessment   BP 90/54   Pulse 94   Temp 97.7 °F (36.5 °C) (Axillary)   Resp 20   Ht 182.9 cm (72\")   Wt 87.5 kg (192 lb 14.4 oz)   SpO2 100%   BMI 26.16 kg/m²     Physical Exam  Vitals and nursing note reviewed.   Constitutional:       General: He is awake. He is not in acute distress.     Appearance: He is ill-appearing.      Interventions: Nasal cannula in place.   HENT:      Head: Normocephalic and atraumatic.      Nose:      Comments: DHT present.  Eyes:      General: Lids are normal.      Extraocular Movements: Extraocular movements intact.   Neck:      Vascular: No JVD.      Trachea: Trachea normal.   Cardiovascular:      Rate and Rhythm: Tachycardia present.   Pulmonary:      Effort: Pulmonary effort is normal.   Abdominal:      General: There is distension.   Musculoskeletal:      Cervical back: Neck supple.   Skin:     General: Skin is warm and dry.     Functional status: Palliative Performance Scale Score: Performance 40% based on the following measures: Ambulation: Mainly in bed, Activity and Evidence of Disease: Unable to do any work, extensive evidence of disease, Self-Care: Mainly assistance required,  Intake: Normal or reduced, LOC: Full, drowsy or confusion.  Nutritional status: Albumin 2.6. Body mass index is 26.16 kg/m².  Patient status: Disease state: Controlled with current treatments.    Active Hospital Problems    Diagnosis     **AMS (altered mental status)     Acute hypercapnic respiratory failure     Macrocytic anemia     Abnormal LFTs     Hypertension     Hyperlipidemia     Diabetes mellitus     CAD (coronary artery disease)      Impression/Problem List:  Altered mental status  Acute respiratory failure with " hypoxia and hypercapnia   Cirrhosis due to HUYNH per hepatology at [facility]  Dysphagia  Pulmonary infiltrates - Pneumonia/aspiration versus edema   Impaired functional mobility and activity tolerance  Chronic kidney disease stage 3  Thrombocytopenia  History of non-Hodgkin's lymphoma  Hypoalbuminemia  MRSA PCR positive  Hypokalemia     Plan / Recommendations     Palliative Care Encounter   Mr. Lucero is alert and able to participate in some discussion however having difficulty demonstrating ability to make complex medical decisions at time of exam.    Spoke with spouse, Sangita, who is next of kin and son-in-law at bedside.  Details of discussion above.   Discussed current concerns including dysphagia, impaired mobility, in addition to underlying comorbidities.    DHT placed last night.  Awaiting to start artifical nutrition.  Family wishes to see how he tolerate and if he makes a difference in overall condition. If cirrhosis/ascites worsens unsure if he would be candidate for PEG tube placement.    Sangita appears to have good understanding that Mr. Lucero may not return to his baseline.    Alternative treatment options discussed including transition to more limited support interventions versus comfort focused care if he experiences decline and/or no longer wishes to continue current measures.    Confirmed wishes of no CPR and/or intubation.  Wishes to continue with remaining interventions at this time.   Family appear to still be processing information and events of hospitalization however were appreciative of discussion.  Encourage questions/concerns if arise.  Support provided.     Thank you for allowing us to participate in patient's plan of care. Palliative Care Team will continue to follow patient.   Electronically signed by, NEAL Ruiz, 04/22/25.

## 2025-04-22 NOTE — PLAN OF CARE
Goal Outcome Evaluation:      Patient remains NPO. Inserted dobhoff per NP's order. Wore bipap most of the night. Normal sinus to sinus tach. Alert and oriented to person and place. Afebrile. No BM this shift, passing flatus.

## 2025-04-22 NOTE — PROGRESS NOTES
RT EQUIPMENT DEVICE RELATED - SKIN ASSESSMENT    Gary Score:  Gary Score: 13     RT Medical Equipment/Device:     NIV Mask:  Under-the-nose   size:  b    Skin Assessment:      Cheek:  Intact  Chin:  Intact    Device Skin Pressure Protection:   na    Nurse Notification:  Delma Souza, CRT

## 2025-04-22 NOTE — PROGRESS NOTES
RT EQUIPMENT DEVICE RELATED - SKIN ASSESSMENT    Gary Score:  Gary Score: 13     RT Medical Equipment/Device:     NIV Mask:  Under-the-nose   size:  B    Skin Assessment:      Cheek:  Intact  Chin:  Intact  Nares:  Intact  Mouth:  Intact    Device Skin Pressure Protection:  Skin-to skin areas padded    Nurse Notification:  No Elizabeth D Severs, RRT

## 2025-04-22 NOTE — NURSING NOTE
[facility]  INPATIENT WOUND & OSTOMY CARE    Today's Date: 04/22/25    Patient Name: Franko Lucero  MRN: [id number]  CSN: [id number]  PCP: Figueroa Chaves MD  Attending Provider: Adams Reynoso MD  Length of Stay: 5    Wound care consulted for wounds to left 2nd toe and left forearm. Nursing has uploaded picture to chart. Patient was admitted with AMS on 4/17/2025. Patient is currently lying in bed with wife at bedside.     Patient's wife states he fell about 5 times the week before last. She states that's how he got his skin tear and she noticed the wound to his toe a couple of weeks ago. Patient is at high risk for skin breakdown due to immobility.     Wound: scab to left 2nd toe  Base: unable to visualize due to scab  Periwound: dry and intact  Edges: attached and dry  Drainage: none     Wound: type 1 skin tear to left forearm  Base: flap completely covers base  Periwound: dry and intact  Edges: open and attached  Drainage:  scant and serous     Wound RN Orders (last 12 hours) (12h ago, onward)       Start     Ordered    04/22/25 1800  Wound Care  2 Times Daily      Question Answer Comment   Wound Locations left forearm    Wound Care Instructions cleanse with normals saline and apply a vaseline gauze to skin tear    Cleanse Normal Saline    Intervention Vaseline Gauze    Dressing: Silicone Border Dressing        04/22/25 0841    04/22/25 0842  Wound Care  2 Times Daily      Question Answer Comment   Wound Locations dorsal left 2nd toe    Wound Care Instructions Cleanse with NS. Apply therahoney to wound bed, cover with Vaseline gauze, cover with silicone foam border dressing.    Cleanse Normal Saline    Intervention Thera Honey    Intervention Vaseline Gauze        04/22/25 0841    04/22/25 0841  Silicone Border Dressing to Bony Prominences  Per Order Details        Comments: Apply silicone border foam dressing per protocol to sacral spine/bilateral heels for protection.  Nursing to change  dressing every 3 days and PRN if soiled. Nursing is to peel back dressing with every assessment to assess skin underneath dressing. No barrier cream under dressing.    04/22/25 0841    04/22/25 0840  Turn Patient  Now Then Every 2 Hours         04/22/25 0841    04/22/25 0840  Elevate Heels Off of Bed  Until Discontinued         04/22/25 0841    04/22/25 0840  Use Seat Cushion When Up In Chair  Continuous         04/22/25 0841    Unscheduled  Wound Care  As Needed      Question:  Wound Care Instructions  Answer:  Apply Moisture Barrier After Any Incontinence    04/22/25 0841          Inpatient wound care will continue to follow during hospital stay.  Please contact if any issues or concerns arise.     This document has been electronically signed by Kylie Nicole RN on 4/22/2025 08:41 CDT

## 2025-04-22 NOTE — PROGRESS NOTES
Daily Progress Note  Franko Lucero  MRN: [id number] LOS: 5    Admit Date: 4/17/2025 4/22/2025 08:52 CDT    Subjective:      Chief Complaint:  Respiratory failure    Interval History:    Reviewed overnight events and nursing notes.   Off Bipap, more conversant. Conference with patient and wife. Understanding of poor prognosis with Multisystem organ failure. Will continue current care but avoid heroic measures. If able to stay off pressors may transfer to floor.     Review of Systems   Unable to perform ROS: Mental status change       DIET:  NPO Diet NPO Type: Strict NPO    Medications:   norepinephrine, 0.02-0.3 mcg/kg/min (Order-Specific), Last Rate: Stopped (04/21/25 1038)      budesonide, 0.25 mg, Nebulization, BID - RT  busPIRone, 15 mg, Per G Tube, Q12H  Chlorhexidine Gluconate Cloth, 1 Application, Topical, Daily  clopidogrel, 75 mg, Per G Tube, Daily  DULoxetine, 60 mg, Oral, Daily  Ferrous Sulfate, 300 mg, Per G Tube, Daily  [Held by provider] gabapentin, 300 mg, Oral, Nightly  guaifenesin, 400 mg, Per G Tube, Q8H  heparin (porcine), 5,000 Units, Subcutaneous, Q8H  ipratropium-albuterol, 3 mL, Nebulization, 4x Daily - RT  lactulose, 300 mL, Rectal, Once  lactulose, 20 g, Per G Tube, BID  [START ON 4/23/2025] lansoprazole, 30 mg, Per G Tube, Q AM  [START ON 4/23/2025] levothyroxine, 75 mcg, Per G Tube, Q AM  magnesium citrate, 296 mL, Nasogastric, Once  mupirocin, 1 Application, Each Nare, BID  piperacillin-tazobactam, 4.5 g, Intravenous, Q8H  potassium chloride, 20 mEq, Oral, Once  ranolazine, 1,000 mg, Oral, Q12H  rosuvastatin, 40 mg, Per G Tube, Nightly  sodium chloride, 10 mL, Intravenous, Q12H  sodium chloride, 40 mL, Intravenous, Once  [START ON 4/23/2025] vancomycin, 750 mg, Intravenous, Q24H        Data:     Code Status:   Code Status and Medical Interventions: No CPR (Do Not Attempt to Resuscitate); Limited Support; No intubation (DNI)   Ordered at: 04/21/25 0959     Code Status (Patient has no  pulse and is not breathing):    No CPR (Do Not Attempt to Resuscitate)     Medical Interventions (Patient has pulse or is breathing):    Limited Support     Medical Intervention Limits:    No intubation (DNI)     Level Of Support Discussed With:    Health Care Surrogate       Family History   Problem Relation Age of Onset    Kidney disease Father     Heart disease Father     Cancer Mother         brain    Colon cancer Neg Hx     Colon polyps Neg Hx      Social History     Socioeconomic History    Marital status:    Tobacco Use    Smoking status: Former     Current packs/day: 0.00     Types: Cigarettes     Quit date:      Years since quittin.3     Passive exposure: Never    Smokeless tobacco: Never   Vaping Use    Vaping status: Never Used   Substance and Sexual Activity    Alcohol use: No    Drug use: No    Sexual activity: Defer       Labs:    Lab Results (last 72 hours)       Procedure Component Value Units Date/Time    Lactic Acid, Plasma [[id number]]  (Abnormal) Collected: 25    Specimen: Blood Updated: 25 05     Lactate 2.2 mmol/L     Comprehensive Metabolic Panel [[id number]]  (Abnormal) Collected: 25    Specimen: Blood Updated: 25 0459     Glucose 121 mg/dL      BUN 34 mg/dL      Creatinine 1.90 mg/dL      Sodium 140 mmol/L      Potassium 3.4 mmol/L      Chloride 101 mmol/L      CO2 23.0 mmol/L      Calcium 9.2 mg/dL      Total Protein 5.5 g/dL      Albumin 2.8 g/dL      ALT (SGPT) 38 U/L      AST (SGOT) 57 U/L      Alkaline Phosphatase 128 U/L      Total Bilirubin 0.9 mg/dL      Globulin 2.7 gm/dL      A/G Ratio 1.0 g/dL      BUN/Creatinine Ratio 17.9     Anion Gap 16.0 mmol/L      eGFR 37.2 mL/min/1.73     Narrative:      GFR Categories in Chronic Kidney Disease (CKD)      GFR Category          GFR (mL/min/1.73)    Interpretation  G1                     90 or greater         Normal or high (1)  G2                      60-89                Mild decrease  (1)  G3a                   45-59                Mild to moderate decrease  G3b                   30-44                Moderate to severe decrease  G4                    15-29                Severe decrease  G5                    14 or less           Kidney failure          (1)In the absence of evidence of kidney disease, neither GFR category G1 or G2 fulfill the criteria for CKD.    eGFR calculation 2021 CKD-EPI creatinine equation, which does not include race as a factor    Magnesium [[id number]]  (Normal) Collected: 04/21/25 0349    Specimen: Blood Updated: 04/21/25 0459     Magnesium 2.1 mg/dL     Phosphorus [[id number]]  (Normal) Collected: 04/21/25 0349    Specimen: Blood Updated: 04/21/25 0459     Phosphorus 3.1 mg/dL     CBC & Differential [[id number]]  (Abnormal) Collected: 04/21/25 0349    Specimen: Blood Updated: 04/21/25 0441    Narrative:      The following orders were created for panel order CBC & Differential.  Procedure                               Abnormality         Status                     ---------                               -----------         ------                     CBC Auto Differential[[id number]]        Abnormal            Final result                 Please view results for these tests on the individual orders.    CBC Auto Differential [[id number]]  (Abnormal) Collected: 04/21/25 0349    Specimen: Blood Updated: 04/21/25 0441     WBC 15.29 10*3/mm3      RBC 2.53 10*6/mm3      Hemoglobin 9.3 g/dL      Hematocrit 29.8 %      .8 fL      MCH 36.8 pg      MCHC 31.2 g/dL      RDW 17.9 %      RDW-SD 77.9 fl      MPV 10.3 fL      Platelets 71 10*3/mm3      Neutrophil % 82.7 %      Lymphocyte % 6.3 %      Monocyte % 9.4 %      Eosinophil % 0.3 %      Basophil % 0.3 %      Immature Grans % 1.0 %      Neutrophils, Absolute 12.66 10*3/mm3      Lymphocytes, Absolute 0.96 10*3/mm3      Monocytes, Absolute 1.43 10*3/mm3      Eosinophils, Absolute 0.04 10*3/mm3      Basophils, Absolute 0.04  10*3/mm3      Immature Grans, Absolute 0.16 10*3/mm3     Blood Culture - Blood, Hand, Left [[id number]]  (Normal) Collected: 04/20/25 0039    Specimen: Blood from Hand, Left Updated: 04/21/25 0100     Blood Culture No growth at 24 hours    Blood Culture - Blood, Hand, Right [[id number]]  (Normal) Collected: 04/20/25 0039    Specimen: Blood from Hand, Right Updated: 04/21/25 0100     Blood Culture No growth at 24 hours    Ammonia [[id number]]  (Normal) Collected: 04/20/25 1142    Specimen: Blood Updated: 04/20/25 1209     Ammonia 43 umol/L     STAT Lactic Acid, Reflex [[id number]]  (Abnormal) Collected: 04/20/25 1142    Specimen: Blood Updated: 04/20/25 1209     Lactate 2.7 mmol/L     POC Glucose Once [[id number]]  (Normal) Collected: 04/20/25 1008    Specimen: Blood Updated: 04/20/25 1020     Glucose 125 mg/dL      Comment: : [id number] [facility] [employer] ID: [id number]       STAT Lactic Acid, Reflex [[id number]]  (Abnormal) Collected: 04/20/25 0800    Specimen: Blood Updated: 04/20/25 0852     Lactate 2.4 mmol/L     Blood Gas, Arterial - [[id number]]  (Abnormal) Collected: 04/20/25 0750    Specimen: Arterial Blood Updated: 04/20/25 0751     Site Right Brachial     Ronak's Test N/A     pH, Arterial 7.401 pH units      pCO2, Arterial 40.9 mm Hg      pO2, Arterial 79.8 mm Hg      Comment: 84 Value below reference range        HCO3, Arterial 25.3 mmol/L      Base Excess, Arterial 0.5 mmol/L      O2 Saturation, Arterial 96.5 %      Temperature 37.0     Barometric Pressure for Blood Gas 757 mmHg      Modality BiPap     FIO2 60 %      Ventilator Mode NA     IPAP 16     Comment: Meter: [id number]     :  Mauro Jimenez, RRT        EPAP 6     Collected by [id number]     pCO2, Temperature Corrected 40.9 mm Hg      pH, Temp Corrected 7.401 pH Units      pO2, Temperature Corrected 79.8 mm Hg      PO2/FIO2 133    MRSA Screen, PCR (Inpatient) - Swab, Nares [[id number]]  (Abnormal) Collected: 04/20/25 0415    Specimen: Swab  from [facility] Updated: 04/20/25 0618     MRSA PCR MRSA Detected    Narrative:      The negative predictive value of this diagnostic test is high and should only be used to consider de-escalating anti-MRSA therapy. A positive result may indicate colonization with MRSA and must be correlated clinically.    STAT Lactic Acid, Reflex [[id number]]  (Abnormal) Collected: 04/20/25 0426    Specimen: Blood Updated: 04/20/25 0528     Lactate 2.5 mmol/L     Basic Metabolic Panel [[id number]]  (Abnormal) Collected: 04/20/25 0426    Specimen: Blood Updated: 04/20/25 0527     Glucose 91 mg/dL      BUN 25 mg/dL      Creatinine 1.67 mg/dL      Sodium 139 mmol/L      Potassium 3.3 mmol/L      Chloride 99 mmol/L      CO2 23.0 mmol/L      Calcium 9.4 mg/dL      BUN/Creatinine Ratio 15.0     Anion Gap 17.0 mmol/L      eGFR 43.5 mL/min/1.73     Narrative:      GFR Categories in Chronic Kidney Disease (CKD)      GFR Category          GFR (mL/min/1.73)    Interpretation  G1                     90 or greater         Normal or high (1)  G2                      60-89                Mild decrease (1)  G3a                   45-59                Mild to moderate decrease  G3b                   30-44                Moderate to severe decrease  G4                    15-29                Severe decrease  G5                    14 or less           Kidney failure          (1)In the absence of evidence of kidney disease, neither GFR category G1 or G2 fulfill the criteria for CKD.    eGFR calculation 2021 CKD-EPI creatinine equation, which does not include race as a factor    Hepatic Function Panel [[id number]]  (Abnormal) Collected: 04/20/25 0426    Specimen: Blood Updated: 04/20/25 0527     Total Protein 5.9 g/dL      Albumin 2.7 g/dL      ALT (SGPT) 41 U/L      AST (SGOT) 68 U/L      Alkaline Phosphatase 127 U/L      Total Bilirubin 1.0 mg/dL      Bilirubin, Direct 0.7 mg/dL      Bilirubin, Indirect 0.3 mg/dL     CBC (No Diff) [[id number]]  (Abnormal)  Collected: 04/20/25 0426    Specimen: Blood Updated: 04/20/25 0513     WBC 12.98 10*3/mm3      RBC 2.45 10*6/mm3      Hemoglobin 9.1 g/dL      Hematocrit 28.5 %      .3 fL      MCH 37.1 pg      MCHC 31.9 g/dL      RDW 17.4 %      RDW-SD 75.5 fl      MPV 10.0 fL      Platelets 66 10*3/mm3     Respiratory Panel PCR w/COVID-19(SARS-CoV-2) RICHARD/JENNIFER/DENIA/PAD/COR/VONDA In-House, NP Swab in UTM/VTM, 2 HR TAT - Swab, Nasopharynx [[id number]]  (Normal) Collected: 04/20/25 0048    Specimen: Swab from Nasopharynx Updated: 04/20/25 0149     ADENOVIRUS, PCR Not Detected     Coronavirus 229E Not Detected     Coronavirus HKU1 Not Detected     Coronavirus NL63 Not Detected     Coronavirus OC43 Not Detected     COVID19 Not Detected     Human Metapneumovirus Not Detected     Human Rhinovirus/Enterovirus Not Detected     Influenza A PCR Not Detected     Influenza B PCR Not Detected     Parainfluenza Virus 1 Not Detected     Parainfluenza Virus 2 Not Detected     Parainfluenza Virus 3 Not Detected     Parainfluenza Virus 4 Not Detected     RSV, PCR Not Detected     Bordetella pertussis pcr Not Detected     Bordetella parapertussis PCR Not Detected     Chlamydophila pneumoniae PCR Not Detected     Mycoplasma pneumo by PCR Not Detected    Narrative:      In the setting of a positive respiratory panel with a viral infection PLUS a negative procalcitonin without other underlying concern for bacterial infection, consider observing off antibiotics or discontinuation of antibiotics and continue supportive care. If the respiratory panel is positive for atypical bacterial infection (Bordetella pertussis, Chlamydophila pneumoniae, or Mycoplasma pneumoniae), consider antibiotic de-escalation to target atypical bacterial infection.    Lactic Acid, Plasma [[id number]]  (Abnormal) Collected: 04/20/25 0039    Specimen: Blood Updated: 04/20/25 0111     Lactate 2.7 mmol/L     Urinalysis With Culture If Indicated - Urine, Clean Catch [[id number]]   (Abnormal) Collected: 04/20/25 0034    Specimen: Urine, Clean Catch Updated: 04/20/25 0110     Color, UA Dark Yellow     Appearance, UA Cloudy     pH, UA 5.5     Specific Gravity, UA 1.019     Glucose, UA >=1000 mg/dL (3+)     Ketones, UA Negative     Bilirubin, UA Negative     Blood, UA Small (1+)     Protein,  mg/dL (2+)     Leuk Esterase, UA Trace     Nitrite, UA Negative     Urobilinogen, UA 1.0 E.U./dL    Narrative:      In absence of clinical symptoms, the presence of pyuria, bacteria, and/or nitrites on the urinalysis result does not correlate with infection.    Urinalysis, Microscopic Only - Urine, Clean Catch [[id number]] Collected: 04/20/25 0034    Specimen: Urine, Clean Catch Updated: 04/20/25 0110     RBC, UA 0-2 /HPF      WBC, UA 0-2 /HPF      Comment: Urine culture not indicated.        Bacteria, UA None Seen /HPF      Squamous Epithelial Cells, UA 0-2 /HPF      Hyaline Casts, UA 0-2 /LPF      Calcium Oxalate Crystals, UA Moderate/2+ /HPF      Methodology Manual Light Microscopy    Blood Gas, Arterial - [[id number]]  (Abnormal) Collected: 04/19/25 1428    Specimen: Arterial Blood Updated: 04/19/25 2016     Site Right Brachial     Ronak's Test N/A     pH, Arterial 7.371 pH units      pCO2, Arterial 46.8 mm Hg      pO2, Arterial 69.3 mm Hg      HCO3, Arterial 27.1 mmol/L      Base Excess, Arterial 1.5 mmol/L      O2 Saturation, Arterial 93.6 %      Temperature 37.0     Barometric Pressure for Blood Gas 757 mmHg      Modality BiPap     FIO2 60 %      IPAP 16     EPAP 6     Collected by [id number]     pCO2, Temperature Corrected 46.8 mm Hg      pH, Temp Corrected 7.371 pH Units      pO2, Temperature Corrected 69.3 mm Hg      PO2/FIO2 116    TSH [[id number]]  (Normal) Collected: 04/19/25 0614    Specimen: Blood Updated: 04/19/25 1350     TSH 3.490 uIU/mL     T4, Free [[id number]]  (Normal) Collected: 04/19/25 0614    Specimen: Blood Updated: 04/19/25 1350     Free T4 1.23 ng/dL     POC Glucose Once  [[id number]]  (Abnormal) Collected: 04/19/25 1103    Specimen: Blood Updated: 04/19/25 1120     Glucose 185 mg/dL      Comment: : [id number] Edward Watkins ID: [id number]       Blood Gas, Arterial With Co-Ox [[id number]]  (Abnormal) Collected: 04/19/25 1110    Specimen: Arterial Blood Updated: 04/19/25 1110     Site Left Radial     Ronak's Test Positive     pH, Arterial 7.182 pH units      Comment: 85 Value below critical limit        pCO2, Arterial 71.5 mm Hg      Comment: 86 Value above critical limit        pO2, Arterial 60.3 mm Hg      Comment: 84 Value below reference range        HCO3, Arterial 26.8 mmol/L      Comment: 83 Value above reference range        Base Excess, Arterial -2.5 mmol/L      Comment: 84 Value below reference range        O2 Saturation, Arterial 83.7 %      Comment: 84 Value below reference range        Hemoglobin, Blood Gas 10.6 g/dL      Comment: 84 Value below reference range        Hematocrit, Blood Gas 32.6 %      Comment: 84 Value below reference range        Oxyhemoglobin 82.1 %      Comment: 84 Value below reference range        Methemoglobin 0.50 %      Carboxyhemoglobin 1.4 %      Temperature 37.0     Sodium, Arterial 141 mmol/L      Potassium, Arterial 3.5 mmol/L      Ionized Calcium 5.41 mg/dL      Comment: 83 Value above reference range        Barometric Pressure for Blood Gas 755 mmHg      Modality Nasal Cannula     Flow Rate 3.0 lpm      Ventilator Mode NA     Notified Who DR PADILLA     Notified By Mehnaz Souza CRT     Notified Time 04/19/2025 11:10     Collected by [id number]     Comment: Meter: [id number]     :  Mehnaz Souza CRT        pH, Temp Corrected 7.182 pH Units      Comment: 85 Value below critical limit        pCO2, Temperature Corrected 71.5 mm Hg      Comment: 86 Value above critical limit        pO2, Temperature Corrected 60.3 mm Hg      Comment: 84 Value below reference range       Ammonia [[id number]]  (Abnormal) Collected: 04/19/25  0848    Specimen: Blood Updated: 04/19/25 0920     Ammonia 66 umol/L     Manual Differential [[id number]]  (Abnormal) Collected: 04/19/25 0614    Specimen: Blood Updated: 04/19/25 0713     Neutrophil % 67.0 %      Lymphocyte % 13.0 %      Monocyte % 15.0 %      Eosinophil % 3.0 %      Basophil % 1.0 %      Metamyelocyte % 1.0 %      Neutrophils Absolute 3.17 10*3/mm3      Lymphocytes Absolute 0.61 10*3/mm3      Monocytes Absolute 0.71 10*3/mm3      Eosinophils Absolute 0.14 10*3/mm3      Basophils Absolute 0.05 10*3/mm3      Anisocytosis Mod/2+     Macrocytes Mod/2+     Poikilocytes Slight/1+     Polychromasia Slight/1+     WBC Morphology Normal     Platelet Estimate Decreased    CBC & Differential [[id number]]  (Abnormal) Collected: 04/19/25 0614    Specimen: Blood Updated: 04/19/25 0713    Narrative:      The following orders were created for panel order CBC & Differential.  Procedure                               Abnormality         Status                     ---------                               -----------         ------                     CBC Auto Differential[[id number]]        Abnormal            Final result                 Please view results for these tests on the individual orders.    CBC Auto Differential [[id number]]  (Abnormal) Collected: 04/19/25 0614    Specimen: Blood Updated: 04/19/25 0713     WBC 4.73 10*3/mm3      RBC 2.49 10*6/mm3      Hemoglobin 9.1 g/dL      Hematocrit 28.9 %      .1 fL      MCH 36.5 pg      MCHC 31.5 g/dL      RDW 17.8 %      RDW-SD 74.8 fl      MPV 10.5 fL      Platelets 56 10*3/mm3     Basic Metabolic Panel [[id number]]  (Abnormal) Collected: 04/19/25 0614    Specimen: Blood Updated: 04/19/25 0707     Glucose 103 mg/dL      BUN 21 mg/dL      Creatinine 1.31 mg/dL      Sodium 139 mmol/L      Potassium 3.5 mmol/L      Chloride 103 mmol/L      CO2 25.0 mmol/L      Calcium 9.4 mg/dL      BUN/Creatinine Ratio 16.0     Anion Gap 11.0 mmol/L      eGFR 58.2 mL/min/1.73      "Narrative:      GFR Categories in Chronic Kidney Disease (CKD)      GFR Category          GFR (mL/min/1.73)    Interpretation  G1                     90 or greater         Normal or high (1)  G2                      60-89                Mild decrease (1)  G3a                   45-59                Mild to moderate decrease  G3b                   30-44                Moderate to severe decrease  G4                    15-29                Severe decrease  G5                    14 or less           Kidney failure          (1)In the absence of evidence of kidney disease, neither GFR category G1 or G2 fulfill the criteria for CKD.    eGFR calculation  CKD-EPI creatinine equation, which does not include race as a factor              Objective:     Vitals: BP 90/54   Pulse 94   Temp 97.7 °F (36.5 °C) (Axillary)   Resp 20   Ht 182.9 cm (72\")   Wt 87.5 kg (192 lb 14.4 oz)   SpO2 100%   BMI 26.16 kg/m²    Intake/Output Summary (Last 24 hours) at 2025 0852  Last data filed at 2025 0538  Gross per 24 hour   Intake 2703.65 ml   Output 1800 ml   Net 903.65 ml    Temp (24hrs), Av.6 °F (36.4 °C), Min:96.6 °F (35.9 °C), Max:98.5 °F (36.9 °C)      Physical Exam  Vitals and nursing note reviewed. Exam conducted with a chaperone present.   Constitutional:       Appearance: He is ill-appearing.   HENT:      Head: Normocephalic and atraumatic.   Cardiovascular:      Rate and Rhythm: Normal rate and regular rhythm.      Pulses: Normal pulses.   Pulmonary:      Effort: Respiratory distress present.      Breath sounds: Wheezing and rales present.   Abdominal:      General: There is distension.      Tenderness: There is no guarding or rebound.   Skin:     General: Skin is warm.      Capillary Refill: Capillary refill takes less than 2 seconds.   Neurological:      Mental Status: He is alert. Mental status is at baseline.             Assessment and Plan:     Primary Problem:  AMS (altered mental status)  Aspiration " pneumonia-Sepsis Syndrome  Acute on Chronic Respiratory Failure  Cirrhosis  DMII  [facility] Problem list:    AMS (altered mental status)    Hypertension    Hyperlipidemia    Diabetes mellitus    CAD (coronary artery disease)    Abnormal LFTs    Macrocytic anemia    Acute hypercapnic respiratory failure      PMH:  Past Medical History:   Diagnosis Date    Anemia     Anxiety     Arthritis     BPH (benign prostatic hypertrophy)     CAD (coronary artery disease)     Cancer     non-hodgkins lymphoma    Cirrhosis     Diabetes mellitus     Disease of thyroid gland     GERD (gastroesophageal reflux disease)     Hearing loss     History of transfusion     Hyperlipidemia     Hypertension     Iliac artery aneurysm, bilateral     Kidney stone     Liver cirrhosis     Migraines     Sleep apnea     c-pap       Treatment Plan:  Continue IV abx, BIPAP as needed  Pulmonary toilet  Multisystem organ failure - discussed with wife who understands severity of condition and has made DNI  To floor when ok with critical care.    Disposition: TBD    Reviewed treatment plans with the patient and/or family.   20 minutes spent in face to face interaction and coordination of care.     Electronically signed by Figueroa Chaves MD on 4/22/2025 at 08:52 CDT

## 2025-04-22 NOTE — PROGRESS NOTES
[facility]  INPATIENT PROGRESS NOTE    Patient Name: Franko Lucero  Date of Admission: 4/17/2025  Today's Date: 04/22/25  Length of Stay:  LOS: 5 days   Primary Care Physician: Figueroa Chaves MD  Next of Kin: Primary Emergency Contact: Sangita Lucero, Home Phone: [phone]      Subjective   Chief Complaint: Recurrent syncopal episodes, hypoxia, hypercapnia    HPI   69-year-old male with past medical history of recurrent syncopal and presyncopal episodes, liver cirrhosis, DINORAH requiring CPAP, hypothyroidism, CAD s/p CABG, CKD stage III, and DM admitted to the hospital on/17/25 for recurrent syncopal and presyncopal episodes.  He was also admitted in March of this year with similar issues.  Workup at that time was negative for seizure or any intracranial abnormality, with no clear cardiac cause either.  Patient had recurrent episodes of near syncope and confusion, along with agitation and was incontinent of stool and urine.  He was initially seen by primary care office who referred him to the hospital for direct admission.  Neurology was consulted.  MRI brain done at that time showed no acute findings.  Suspicion was for a neurocognitive disorder with or without orthostatic involvement.  Patient has been noted to have elevated ammonia level and CT findings with last admission showing cirrhosis and portal venous hypertension.  On morning of 4/19, patient had another syncopal event.  A rapid response was called, and ABG done at that time revealed respiratory acidosis with pH of 7.18 and pCO2 of 71 with PaO2 of 60 on 3 L nasal cannula.  He was then transferred to the ICU and placed on BiPAP support.  He slowly became more arousable after being on BiPAP.    Interval history:  4/20: Patient has been somewhat lethargic, but does arouse easily.  He has been taken off BiPAP and now is back on 3 L nasal cannula.  Blood pressures also been low, requiring  Levophed infusion.  Lactic acid has remained high, and patient has received IV fluids as well as empiric antibiotic treatment.  MRSA screen was positive, but respiratory panel was negative and blood cultures are currently in process.    4/21: Patient required Levophed infusion earlier today, but has since been weaned off.  There is concern he may be aspirating.  EEG showed nonepileptic spells with thoughts that spells may be secondary to respiratory issues.  Neurology has signed off.  Patient intermittently requiring the BiPAP, currently on 5 L nasal cannula.  After conversation with patient's wife, she has decided to make the patient DNR/DNI.  Patient was alert and oriented to person and place.  He had no complaints at time of my exam other than mild lower abdominal pain.    4/22: Patient is now off of the Levophed.  He wore BiPAP overnight.  He has no complaints for me this morning.  Oriented to person and place only.  Patient's wife states he had a virus in June of last year and has not had the same mobility since that time. Since June, the patient has had to use a walker or scooter to get around, and his ability to care for himself has only declined. Furthermore, she has noticed that he has been coughing with eating after every meal for several months, if not longer.  We are consulting palliative care to have further discussions with the patient and his family.    Review of Systems   All pertinent negatives and positives are as above. All other systems have been reviewed and are negative unless otherwise stated.     Past Medical and Past Surgical History   Active and Resolved Problems  Active Hospital Problems    Diagnosis  POA    **AMS (altered mental status) [R41.82]  Yes    Acute hypercapnic respiratory failure [J96.02]  Unknown    Macrocytic anemia [D53.9]  Yes    Abnormal LFTs [R79.89]  Yes    Hypertension [I10]  Yes    Hyperlipidemia [E78.5]  Yes    Diabetes mellitus [E11.9]  Yes    CAD (coronary artery  disease) [I25.10]  Yes      Resolved Hospital Problems   No resolved problems to display.       Past Medical History:   Past Medical History:   Diagnosis Date    Anemia     Anxiety     Arthritis     BPH (benign prostatic hypertrophy)     CAD (coronary artery disease)     Cancer     non-hodgkins lymphoma    Cirrhosis     Diabetes mellitus     Disease of thyroid gland     GERD (gastroesophageal reflux disease)     Hearing loss     History of transfusion     Hyperlipidemia     Hypertension     Iliac artery aneurysm, bilateral     Kidney stone     Liver cirrhosis     Migraines     Sleep apnea     c-pap     Past Surgical History:   Past Surgical History:   Procedure Laterality Date    COLONOSCOPY  02/04/2014    COLONOSCOPY N/A 04/26/2017    Procedure: COLONOSCOPY WITH ANESTHESIA;  Surgeon: Sher Cui MD;  Location: [facility] ENDOSCOPY;  Service:     CORONARY ARTERY BYPASS GRAFT      2    CYSTOSCOPY URETEROSCOPY LASER LITHOTRIPSY Left 04/17/2017    Procedure: URETEROSCOPY LASER LITHOTRIPSY WITH DOUBLE J STENT INSERTION, LEFT ;  Surgeon: Weston Peck MD;  Location: [facility] OR;  Service:     CYSTOSCOPY URETEROSCOPY LASER LITHOTRIPSY Left 08/14/2017    Procedure: CYSTOSCOPY URETEROSCOPY LASER LITHOTRIPSY STENT INSERTION STONE EXTRACTION;  Surgeon: Weston Peck MD;  Location: [facility] OR;  Service:     CYSTOSCOPY W/ URETERAL STENT PLACEMENT Left 04/17/2017    Procedure: CYSTOSCOPY URETERAL DOUBLE J STENT INSERTION, LEFT;  Surgeon: Weston Peck MD;  Location: [facility] OR;  Service:     ENDOSCOPY N/A 04/26/2017    Procedure: ESOPHAGOGASTRODUODENOSCOPY WITH ANESTHESIA;  Surgeon: Sher Cui MD;  Location: [facility] ENDOSCOPY;  Service:     INCISION AND DRAINAGE LEG Right 07/12/2023    Procedure: INCISION AND DRAINAGE - RIGHT FOOT;  Surgeon: Silas Swan DPM;  Location: [facility] OR;  Service: Podiatry;  Laterality: Right;    JOINT REPLACEMENT Right     KIDNEY STONE SURGERY      KNEE SURGERY Right  08/2024    replacement    NECK SURGERY      ROTATOR CUFF REPAIR      SHOULDER SURGERY      TONSILLECTOMY      TRANS METATARSAL AMPUTATION Right 08/09/2023    Procedure: Partial 4th Ray Amputation - Right Foot;  Surgeon: Silas Swan DPM;  Location:  [facility] OR;  Service: Podiatry;  Laterality: Right;    URETEROSCOPY LASER LITHOTRIPSY WITH STENT INSERTION Left 09/01/2022    Procedure: LEFT URETEROSCOPY LASER LITHOTRIPSY WITH STENT INSERTION;  Surgeon: Weston Peck MD;  Location:  [facility] OR;  Service: Urology;  Laterality: Left;    URETEROSCOPY LASER LITHOTRIPSY WITH STENT INSERTION Left 09/15/2022    Procedure: LEFT URETEROSCOPY LASER LITHOTRIPSY WITH STENT EXCHANGE;  Surgeon: Weston Peck MD;  Location:  [facility] OR;  Service: Urology;  Laterality: Left;     Social and Family History   Family History:  family history includes Cancer in his mother; Heart disease in his father; Kidney disease in his father.    Tobacco/Social History:  reports that he quit smoking about 28 years ago. His smoking use included cigarettes. He has never been exposed to tobacco smoke. He has never used smokeless tobacco. He reports that he does not drink alcohol and does not use drugs.    Allergies   Allergies:   He is allergic to adhesive tape, cefazolin, cefuroxime axetil, cephalosporins, neomycin-polymyxin b gu, and percocet [oxycodone-acetaminophen].    Objective    Temp:  [96.6 °F (35.9 °C)-98.5 °F (36.9 °C)] 97.7 °F (36.5 °C)  Heart Rate:  [] 94  Resp:  [14-24] 20  BP: ()/(49-71) 90/54  Physical Exam  Vitals and nursing note reviewed.   Constitutional:       General: He is awake. He is not in acute distress.     Appearance: He is ill-appearing (chronically ill-appearing).   HENT:      Head: Normocephalic.      Nose: Nose normal.      Mouth/Throat:      Mouth: Mucous membranes are moist.   Eyes:      Extraocular Movements: Extraocular movements intact.      Pupils: Pupils are equal, round, and reactive  to light.   Cardiovascular:      Rate and Rhythm: Normal rate and regular rhythm.      Pulses: Normal pulses.   Pulmonary:      Effort: No respiratory distress.      Breath sounds: Normal breath sounds. No stridor. No rhonchi.   Abdominal:      General: There is distension.      Palpations: Abdomen is soft.      Tenderness: There is no abdominal tenderness.      Comments: Hypoactive bowel sounds x 4   Musculoskeletal:         General: Normal range of motion.   Skin:     General: Skin is warm.      Capillary Refill: Capillary refill takes less than 2 seconds.   Neurological:      General: No focal deficit present.      Comments: Oriented to person and place only.    Psychiatric:         Behavior: Behavior is cooperative.           Inpatient Medications   Medications: Scheduled Meds:budesonide, 0.25 mg, Nebulization, BID - RT  busPIRone, 15 mg, Oral, Q12H  Chlorhexidine Gluconate Cloth, 1 Application, Topical, Daily  clopidogrel, 75 mg, Oral, Daily  DULoxetine, 60 mg, Oral, Daily  ferrous sulfate, 325 mg, Oral, BID With Meals  [Held by provider] gabapentin, 300 mg, Oral, Nightly  guaiFENesin, 600 mg, Oral, Q12H  heparin (porcine), 5,000 Units, Subcutaneous, Q8H  ipratropium-albuterol, 3 mL, Nebulization, 4x Daily - RT  lactulose, 300 mL, Rectal, Once  lactulose, 20 g, Oral, BID  levothyroxine, 75 mcg, Oral, Q AM  magnesium citrate, 296 mL, Nasogastric, Once  mupirocin, 1 Application, Each Nare, BID  pantoprazole, 40 mg, Oral, Q AM  piperacillin-tazobactam, 4.5 g, Intravenous, Q8H  potassium chloride, 20 mEq, Oral, Once  ranolazine, 1,000 mg, Oral, Q12H  rosuvastatin, 40 mg, Oral, Nightly  sodium chloride, 10 mL, Intravenous, Q12H  sodium chloride, 40 mL, Intravenous, Once  [START ON 4/23/2025] vancomycin, 750 mg, Intravenous, Q24H      Continuous Infusions:norepinephrine, 0.02-0.3 mcg/kg/min (Order-Specific), Last Rate: Stopped (04/21/25 1038)      PRN Meds:.  acetaminophen **OR** acetaminophen **OR** acetaminophen     ALPRAZolam    senna-docusate sodium **AND** polyethylene glycol **AND** bisacodyl **AND** bisacodyl    guaiFENesin-codeine    ondansetron ODT **OR** ondansetron    sodium chloride    I have reviewed the patient's current medications.   Outpatient Medications     Current Outpatient Medications   Medication Instructions    acetaminophen (TYLENOL) 650 mg, Every 6 Hours PRN    ALPRAZolam (XANAX) 0.25 mg, Oral, Nightly    Budeson-Glycopyrrol-Formoterol (Breztri Aerosphere) 160-9-4.8 MCG/ACT aerosol inhaler 2 puffs, 2 Times Daily    busPIRone (BUSPAR) 15 mg, 2 Times Daily    clopidogrel (PLAVIX) 75 mg, Every Morning    docusate sodium (COLACE) 200 mg, Every Evening    DULoxetine (CYMBALTA) 60 mg, Nightly    empagliflozin (JARDIANCE) 25 mg, Oral, Daily    ferrous sulfate 325 mg, 2 Times Daily    gabapentin (NEURONTIN) 300 mg, Nightly    GARLIC PO 1 capsule, Oral, Daily    guaiFENesin (MUCINEX) 600 mg, Oral, Daily    icosapent ethyl (VASCEPA) 2 g, 2 Times Daily With Meals    levothyroxine (SYNTHROID, LEVOTHROID) 75 mcg, Every Early Morning    metFORMIN (GLUCOPHAGE) 500 mg, 2 Times Daily With Meals    pantoprazole (PROTONIX) 40 mg, Daily    polyethylene glycol (MIRALAX) 17 g, Daily PRN    ranolazine (RANEXA) 1,000 mg, 2 Times Daily    rosuvastatin (CRESTOR) 40 mg, Daily    Vitamin D, Cholecalciferol, 1000 UNITS tablet 1 tablet, Oral, Daily       Current Antibiotics   piperacillin-tazobactam (ZOSYN) 4.5 g IVPB in 100 mL NS MBP (CD)  Vancomycin - 750-0.9 MG/250ML-%    Results:   CBC:      Lab 04/22/25  0409 04/21/25  0349 04/20/25  0426 04/19/25  0614 04/18/25  0355 04/17/25  1948   WBC 10.82* 15.29* 12.98* 4.73 5.55 5.75   HEMOGLOBIN 8.5* 9.3* 9.1* 9.1* 9.4* 9.4*   HEMATOCRIT 26.6* 29.8* 28.5* 28.9* 30.2* 28.7*   PLATELETS 60* 71* 66* 56* 60* 66*   NEUTROS ABS 9.06* 12.66*  --  3.17 4.42 4.01   IMMATURE GRANS (ABS) 0.18* 0.16*  --   --   --  0.01   LYMPHS ABS 0.63* 0.96  --   --   --  0.85   MONOS ABS 0.89 1.43*  --   --   --   0.78   EOS ABS 0.04 0.04  --  0.14 0.12 0.08   .7* 117.8* 116.3* 116.1* 115.7* 114.8*     LIVER FUNCTION TESTS:      Lab 04/22/25 0408 04/21/25 0349 04/20/25 0426   TOTAL PROTEIN 5.5* 5.5* 5.9*   ALBUMIN 2.6* 2.8* 2.7*   GLOBULIN 2.9 2.7  --    ALT (SGPT) 37 38 41   AST (SGOT) 69* 57* 68*   BILIRUBIN 0.8 0.9 1.0   INDIRECT BILIRUBIN  --   --  0.3   BILIRUBIN DIRECT  --   --  0.7*   ALK PHOS 139* 128* 127*     ABG:      Lab 04/22/25 0408 04/21/25 0349 04/20/25 0750 04/20/25 0426 04/19/25  1428 04/19/25  1110 04/19/25  0614 04/18/25  0355   PH, ARTERIAL  --   --  7.401  --  7.371 7.182*  --   --    PO2 ART  --   --  79.8*  --  69.3* 60.3*  --   --    PCO2, ARTERIAL  --   --  40.9  --  46.8* 71.5*  --   --    HCO3 ART  --   --  25.3  --  27.1* 26.8*  --   --    ANION GAP 14.0 16.0*  --  17.0*  --   --  11.0 16.0*     BMP/MG/PHOS:      Lab 04/22/25 0408 04/21/25 0349 04/20/25 0426 04/19/25  1110 04/19/25  0614 04/18/25  0355   SODIUM 140 140 139  --  139 141   SODIUM, ARTERIAL  --   --   --  141  --   --    POTASSIUM 3.3* 3.4* 3.3*  --  3.5 3.6   CHLORIDE 103 101 99  --  103 102   BUN 35* 34* 25*  --  21 19   CREATININE 2.11* 1.90* 1.67*  --  1.31* 1.38*   CALCIUM 9.0 9.2 9.4  --  9.4 9.7   MAGNESIUM 2.0 2.1  --   --   --   --    PHOSPHORUS 2.6 3.1  --   --   --   --        IMAGING STUDIES:  FL Video Swallow With Speech Single Contrast  Result Date: 4/21/2025  1. Abnormal swallow function study. 2. Fluoroscopy time: 2 minutes 40 seconds. 3. Dose: 22.3mGy. 4. Number of images: 1   This report was signed and finalized on 4/21/2025 2:47 PM by Dr. Geovanni Mcclain MD.      XR Chest 1 View  Result Date: 4/21/2025  1. No change in bilateral infiltrates. Pneumonia versus edema. 2. Probable pleural effusion on the left.    This report was signed and finalized on 4/21/2025 11:13 AM by Dr. Ivan Cr MD.      EEG Continuous Monitoring With Video  Result Date: 4/20/2025    EEG background is moderate  generalized slow with at times frequent generalized triphasic waves present No lateralizing features are seen No definitive epileptiform activity or electrographic seizures are seen Summary: The background over the course of this 2-day monitoring study suggest diffuse cerebral dysfunction of moderate degree.  The presence of generalized triphasic waves is most consistent with toxic/metabolic or diffuse hypoxemic cause A single episode of unresponsiveness had no EEG correlate and appears to be a nonepileptic event No evidence for epilepsy is seen on the study This report is transcribed using the Dragon dictation system.     CT Abdomen Pelvis Without Contrast  Result Date: 4/20/2025   1.  Liver cirrhosis with moderate volume ascites. 2.  Consolidative infiltrates in the left upper lobe lingula, left lower lobe and right lower lobe which could reflect dependent atelectasis or perhaps aspiration pneumonitis. The left lower lobe in particular is completely consolidated and there is  debris opacifying the lobar and segmental airways reflecting mucus plugging and/or aspirated material. 3.  No retroperitoneal lower abdominal wall hematoma.  This report was signed and finalized on 4/20/2025 11:14 AM by Dr Phan Pascual.      Assessment/Plan   69-year-old male with acute hypercapnic respiratory failure secondary to altered mentation from syncopal/presyncopal event transferred to CCU for critical care management.  He tolerated the BiPAP well and became more responsive.  He is currently on nasal cannula.  However, he is still somewhat drowsy, but arouses easily and is cooperative.    Acute hypercapnic respiratory failure  -Patient was placed on BiPAP and he tolerated this well.  Currently he is on 2 L nasal cannula with BiPAP at night or to sleep  - Monitor closely  - History of DINORAH, patient will need  BiPAP while sleeping  - Continue DuoNeb, Pulmicort    Altered mentation/syncopal episodes  - Neurology consulting, we  appreciate their recommendations  - Continuous EEG showed findings consistent with diffuse cerebral dysfunction of moderate degree  -Will support with BiPAP as needed for any respiratory insufficiency with the events     Liver cirrhosis  - Ammonia level 43 on 4/20/25  - Remains on 20 g lactulose twice daily    Hypothyroidism  - Continue 75 mcg levothyroxine as scheduled daily    Sepsis  - Continue vancomycin and Zosyn  -Received IV fluid bolus  -MRSA screening was positive.  Blood culture negative to date.  Continue to monitor.  -Patient remains off levophed. Continue to monitor VS closely.    6.  Thrombocytopenia  - Count 60 today, slightly worse from yesterday  - Appears to be chronic, not surprising given patient's history of cirrhosis  -No active bleeding noted  -Continue SC heparin q8h      CODE STATUS: Full  VTE prophylaxis: heparin SC  Nutrition: Cardiac diet  Bowel: Romana-Colace, MiraLAX, Dulcolax as needed  GI prophylaxis: Protonix  Antibiotics: Vancomycin and Zosyn     Disposition: Patient now suitable for transfer to medical floor.    A minimum of 50 minutes spent on patient care, high MDM.     This time included obtaining a history; examining the patient; pulse oximetry; ordering and review of studies; arranging urgent treatment with development of a management plan; evaluation of patient's response to treatment; frequent reassessment; and, discussions with other providers.    Patient is suitable to be transferred to the medical floor.  I have spoken with Dr. Chaves, primary care provider, who has accepted this patient.  The patient will go to room 359.  Dr. Chaves will be the attending physician.    Please see MDM section and the rest of the note for further information on patient assessment and treatment.    Part of this note may be an electronic transcription/translation of spoken language to printed text using the Dragon Dictation System    Electronically signed by Chinmay Meza PA-C on 4/22/2025 at  08:32 CDT

## 2025-04-23 ENCOUNTER — CARE COORDINATION (OUTPATIENT)
Dept: CASE MANAGEMENT | Age: 70
End: 2025-04-23

## 2025-04-23 NOTE — PROGRESS NOTES
RT EQUIPMENT DEVICE RELATED - SKIN ASSESSMENT    Gary Score:  Gary Score: 11     RT Medical Equipment/Device:     NIV Mask:  Under-the-nose   size:  B    Skin Assessment:      Cheek:  Intact  Nares:  Intact  Lips:  Intact  Mouth:  Intact    Device Skin Pressure Protection:  Skin-to-device areas padded:  None Required    Nurse Notification:  Delma Thorpe, RRT

## 2025-04-23 NOTE — PLAN OF CARE
Goal Outcome Evaluation:           Progress: no change             Patient alert and responsive. O2 sats hanging in 80s after oxygen tritrated up to 7 liters placed a call to respiratory to place patient on BIPap. Sats low 90s family at bedside. Patient seems overwhelm denies pain.

## 2025-04-23 NOTE — CONSULTS
Palliative care consult received this morning.  Please see initial consult note completed 4/22.  Will plan to follow-up with patient/family today.      Electronically signed by, NEAL uRiz, 04/23/25, 08:00 CDT.

## 2025-04-23 NOTE — PLAN OF CARE
Goal Outcome Evaluation:  Plan of Care Reviewed With: patient, spouse, caregiver, family     Progress: no change     Anticipated Discharge Disposition (SLP): unknown     Treatment Assessment (SLP): continued (04/23/25 1320)  Treatment Assessment Comments (SLP): see note (04/23/25 1320)  Plan for Continued Treatment (SLP): continue treatment per plan of care (04/23/25 1320)    Patient seen to address dysphagia. Patient denied pain. Patient initially lethargic, but easily awakened. Patient spouse and family present. Patient continues with dobhoff tube placement. Patient reports dry secretions throughout oral cavity. ST provided oral care to clear secretions and requested RN setup oral suction to assist with secretion management/airway protection. Patient was then given ice chips for swallow stimulation. Patient consumed 1 at a time with occasional s/s aspiration noted. When increasing to 2 or 3 at a time, overt s/s aspiration noted immediately. Patient cough response is improved slightly today. However, swallow function continues to be unsafe for oral intake. Continue with NPO. Allow 5 ice chips an hour after oral care for comfort. Oral care with suction every 4 hours. ST will continue POC.     Cate Rivera, SLP 4/23/2025 15:08 CDT

## 2025-04-23 NOTE — PLAN OF CARE
Problem: Skin Injury Risk Increased  Goal: Skin Health and Integrity  Outcome: Progressing  Intervention: Optimize Skin Protection  Recent Flowsheet Documentation  Taken 4/22/2025 2027 by Jessie Thorpe, JOSE EDUARDO  Head of Bed (HOB) Positioning: HOB elevated     Problem: Noninvasive Ventilation Acute  Goal: Effective Unassisted Ventilation and Oxygenation  Outcome: Progressing  Intervention: Monitor and Manage Noninvasive Ventilation  Recent Flowsheet Documentation  Taken 4/22/2025 2027 by Jessie Thorpe, JOSE EDUARDO  Airway/Ventilation Management: airway patency maintained  NPPV/CPAP Maintenance:   adjusted   proper fit/secure   Goal Outcome Evaluation:

## 2025-04-23 NOTE — PROGRESS NOTES
RT EQUIPMENT DEVICE RELATED - SKIN ASSESSMENT    Gary Score:  Gary Score: 11     RT Medical Equipment/Device:     NIV Mask:  Under-the-nose   size:  B    Skin Assessment:       :  Intact    Device Skin Pressure Protection:  Pressure points protected    Nurse Notification:  Delma Wills, RRT

## 2025-04-23 NOTE — NURSING NOTE
"Patient's family called this nurse to the bedside. Patient wants a break from the bipap--patient states \"I don't need anything...SHUT IT OFF!!\" Patient's family present advised the patient to wear the nasal cannula--placed. Patient waxes/wanes with agitation swings.  "

## 2025-04-23 NOTE — CARE COORDINATION
Care Transitions Note    Final Call     Attempted to reach patient for transitions of care follow up.  Unable to reach patient.      Outreach Attempts:   Multiple attempts to contact patient at phone numbers on file.   Unable to leave message.     Patient graduated from the Care Transitions program on 4/23/2025.  Patient/family has the ability to self-manage at this time..      Handoff:   Patient was not referred to the ACM team due to unable to contact patient.      Upcoming Appointments:    Future Appointments         Provider Specialty Dept Phone    5/28/2025 1:00 PM Addy Lozano PA-C Orthopedic Surgery [phone]    8/13/2025 11:00 AM Maikel Rodrigez MD Orthopedic Surgery [phone]          France Perez RN

## 2025-04-23 NOTE — THERAPY TREATMENT NOTE
Acute Care - Speech Language Pathology   Swallow Treatment Note [facility]     Patient Name: Franko Lucero  : 1955  MRN: [id number]  Today's Date: 2025               Admit Date: 2025    Patient seen to address dysphagia. Patient denied pain. Patient initially lethargic, but easily awakened. Patient spouse and family present. Patient continues with dobhoff tube placement. Patient reports dry secretions throughout oral cavity. ST provided oral care to clear secretions and requested RN setup oral suction to assist with secretion management/airway protection. Patient was then given ice chips for swallow stimulation. Patient consumed 1 at a time with occasional s/s aspiration noted. When increasing to 2 or 3 at a time, overt s/s aspiration noted immediately. Patient cough response is improved slightly today. However, swallow function continues to be unsafe for oral intake. Continue with NPO. Allow 5 ice chips an hour after oral care for comfort. Oral care with suction every 4 hours. ST will continue POC.     SPENSER Drake 2025 15:10 CDT      Visit Dx:     ICD-10-CM ICD-9-CM   1. Impaired functional mobility and activity tolerance [Z74.09]  Z74.09 V49.89   2. Dysphagia, unspecified type  R13.10 787.20     Patient Active Problem List   Diagnosis    Iliac artery aneurysm, bilateral    Hypertension    Hyperlipidemia    Diabetes mellitus    CAD (coronary artery disease)    Iron deficiency anemia    Constipation    Abnormal LFTs    Coagulopathy    Ureteral stone    Kidney stones    Hypotension    Acute foot pain    Macrocytic anemia    BELTRAN (acute kidney injury)    Acute kidney failure, unspecified    Type 2 diabetes mellitus with foot ulcer (CODE)    Anemia, chronic disease    Syncope    AMS (altered mental status)    Non-Hodgkin lymphoma    Thrombocytopenia    Acute hypercapnic respiratory failure     Past Medical History:   Diagnosis Date    Anemia     Anxiety     Arthritis     BPH (benign  prostatic hypertrophy)     CAD (coronary artery disease)     Cancer     non-hodgkins lymphoma    Cirrhosis     Diabetes mellitus     Disease of thyroid gland     GERD (gastroesophageal reflux disease)     Hearing loss     History of transfusion     Hyperlipidemia     Hypertension     Iliac artery aneurysm, bilateral     Kidney stone     Liver cirrhosis     Migraines     Sleep apnea     c-pap     Past Surgical History:   Procedure Laterality Date    COLONOSCOPY  02/04/2014    COLONOSCOPY N/A 04/26/2017    Procedure: COLONOSCOPY WITH ANESTHESIA;  Surgeon: Sher Cui MD;  Location:  [facility];  Service:     CORONARY ARTERY BYPASS GRAFT      2    CYSTOSCOPY URETEROSCOPY LASER LITHOTRIPSY Left 04/17/2017    Procedure: URETEROSCOPY LASER LITHOTRIPSY WITH DOUBLE J STENT INSERTION, LEFT ;  Surgeon: Weston Peck MD;  Location:  [facility] OR;  Service:     CYSTOSCOPY URETEROSCOPY LASER LITHOTRIPSY Left 08/14/2017    Procedure: CYSTOSCOPY URETEROSCOPY LASER LITHOTRIPSY STENT INSERTION STONE EXTRACTION;  Surgeon: Weston Peck MD;  Location:  [facility] OR;  Service:     CYSTOSCOPY W/ URETERAL STENT PLACEMENT Left 04/17/2017    Procedure: CYSTOSCOPY URETERAL DOUBLE J STENT INSERTION, LEFT;  Surgeon: Weston Peck MD;  Location:  [facility] OR;  Service:     ENDOSCOPY N/A 04/26/2017    Procedure: ESOPHAGOGASTRODUODENOSCOPY WITH ANESTHESIA;  Surgeon: Sher Cui MD;  Location: [facility] ENDOSCOPY;  Service:     INCISION AND DRAINAGE LEG Right 07/12/2023    Procedure: INCISION AND DRAINAGE - RIGHT FOOT;  Surgeon: Silas Swan DPM;  Location:  [facility] OR;  Service: Podiatry;  Laterality: Right;    JOINT REPLACEMENT Right     KIDNEY STONE SURGERY      KNEE SURGERY Right 08/2024    replacement    NECK SURGERY      ROTATOR CUFF REPAIR      SHOULDER SURGERY      TONSILLECTOMY      TRANS METATARSAL AMPUTATION Right 08/09/2023    Procedure: Partial 4th Ray Amputation - Right Foot;  Surgeon: iSlas Swan  JOSE ANTONIO DAHL;  Location:  [facility] OR;  Service: Podiatry;  Laterality: Right;    URETEROSCOPY LASER LITHOTRIPSY WITH STENT INSERTION Left 09/01/2022    Procedure: LEFT URETEROSCOPY LASER LITHOTRIPSY WITH STENT INSERTION;  Surgeon: Weston Peck MD;  Location:  [facility] OR;  Service: Urology;  Laterality: Left;    URETEROSCOPY LASER LITHOTRIPSY WITH STENT INSERTION Left 09/15/2022    Procedure: LEFT URETEROSCOPY LASER LITHOTRIPSY WITH STENT EXCHANGE;  Surgeon: Weston Peck MD;  Location:  [facility];  Service: Urology;  Laterality: Left;       SLP Recommendation and Plan     SLP Diet Recommendation: NPO, ice chips between meals after oral care, with supervision (04/23/25 1320)  Recommended Precautions and Strategies: upright posture during/after eating, small bites of food and sips of liquid, general aspiration precautions, reflux precautions, 1:1 supervision, assist with feeding, fatigue precautions (04/23/25 1320)  SLP Rec. for Method of Medication Administration: meds via alternate route (04/23/25 1320)     Monitor for Signs of Aspiration: yes, notify SLP if any concerns (04/23/25 1320)        Anticipated Discharge Disposition (SLP): unknown (04/23/25 1320)     Therapy Frequency (Swallow): PRN (04/23/25 1320)  Predicted Duration Therapy Intervention (Days): until discharge (04/23/25 1320)  Oral Care Recommendations: Oral Care BID/PRN, Toothbrush (04/23/25 1320)        Daily Summary of Progress (SLP): progress toward functional goals is gradual (04/23/25 1320)               Treatment Assessment (SLP): continued (04/23/25 1320)  Treatment Assessment Comments (SLP): see note (04/23/25 1320)  Plan for Continued Treatment (SLP): continue treatment per plan of care (04/23/25 1320)         Progress: no change      SWALLOW EVALUATION (Last 72 Hours)       SLP Adult Swallow Evaluation       Row Name 04/23/25 1320 04/22/25 0855 04/21/25 1400 04/21/25 0830          Rehab Evaluation    Document Type therapy note (daily  note)  -MD therapy note (daily note)  -MD evaluation  -MD evaluation  -MG     Subjective Information complains of  dry mouth  -MD complains of  dry mouth  -MD complains of  difficulty swallowing  -MD no complaints  -MG     Patient Observations alert;cooperative;agree to therapy  -MD cooperative;agree to therapy  -MD cooperative;agree to therapy  -MD alert;cooperative;agree to therapy  -MG     Patient/Family/Caregiver Comments/Observations Wife and friend present  -MD Wife present  -MD No family present  -MD Wife present  -MG     Patient Effort poor  -MD poor  -MD fair  -MD adequate  -MG     Symptoms Noted During/After Treatment fatigue  -MD fatigue  -MD fatigue  -MD fatigue  -MG        General Information    Patient Profile Reviewed -- -- yes  -MD yes  -MG     Pertinent History Of Current Problem -- -- Presented due to multiple episodes of near syncope, confusion, agitation, and incontinence. Medical history of CABG, neck sx, sleep apnea, migraines, GERD, DM, cirrhosis. SLP consulted due to concern for aspiration from wife prior to coming and during admission. CT abdomen with consolidative infiltrates in lungs with debris and concern for aspiration pneumonitis.  -MD Presented due to multiple episodes of near syncope, confusion, agitation, and incontinence. Medical history of CABG, neck sx, sleep apnea, migraines, GERD, DM, cirrhosis. SLP consulted due to concern for aspiration from wife prior to coming and during admission. CT abdomen with consolidative infiltrates in lungs with debris and concern for aspiration pneumonitis.  -MG     Current Method of Nutrition -- -- NPO  -MD regular textures;thin liquids  -MG     Precautions/Limitations, Vision -- -- WFL;for purposes of eval  -MD WFL;for purposes of eval  -MG     Precautions/Limitations, Hearing -- -- WFL;for purposes of eval  -MD WFL;for purposes of eval  -MG     Prior Level of Function-Communication -- -- WFL  -MD WFL  -MG     Prior Level of Function-Swallowing  -- -- no diet consistency restrictions;esophageal concerns  -MD no diet consistency restrictions;esophageal concerns  -MG     Plans/Goals Discussed with -- -- patient  -MD patient;spouse/S.O.;agreed upon  -MG     Barriers to Rehab -- -- medically complex  -MD medically complex  -MG     Patient's Goals for Discharge -- -- patient did not state  -MD patient did not state  -MG     Family Goals for Discharge -- -- -- patient able to eat/drink without coughing/choking  -MG        Pain    Additional Documentation -- -- -- Pain Scale: FACES Pre/Post-Treatment (Group)  -MG        Pain Scale: FACES Pre/Post-Treatment    Pain: FACES Scale, Pretreatment 0-->no hurt  -MD 0-->no hurt  -MD 0-->no hurt  -MD 0-->no hurt  -MG     Posttreatment Pain Rating 0-->no hurt  -MD 0-->no hurt  -MD 0-->no hurt  -MD 2-->hurts little bit  -MG     Pre/Posttreatment Pain Comment -- -- -- Back pain with upright positioning; returned to reclined at end of session.  -MG        Oral Motor Structure and Function    Dentition Assessment -- -- edentulous, does not have dentures  -MD edentulous, does not have dentures  -MG     Secretion Management -- -- WNL/WFL  -MD WNL/WFL  -MG     Mucosal Quality -- -- dry  -MD dry  -MG     Volitional Swallow -- -- weak;delayed  -MD weak;delayed  -MG     Volitional Cough -- -- weak;non-productive  -MD weak;non-productive  -MG        Oral Musculature and Cranial Nerve Assessment    Oral Motor General Assessment -- -- generalized oral motor weakness  -MD generalized oral motor weakness  -MG        General Eating/Swallowing Observations    Respiratory Support Currently in Use -- -- nasal cannula  -MD nasal cannula  -MG     O2 Liters -- -- 3L  -MD 3L  -MG     Eating/Swallowing Skills -- -- fed by staff/caregiver  -MD fed by SLP  -MG     Positioning During Eating -- -- upright in chair  -MD upright in bed  -MG     Utensils Used -- -- spoon;straw  -MD spoon;straw  -MG     Consistencies Trialed -- -- honey-thick  liquids;nectar/syrup-thick liquids;thin liquids  -MD pureed;honey-thick liquids;nectar/syrup-thick liquids;thin liquids  -MG        Clinical Swallow Eval    Oral Prep Phase -- -- -- WFL  -MG     Oral Transit -- -- -- WFL  -MG     Oral Residue -- -- -- WFL  -MG     Pharyngeal Phase -- -- -- suspected pharyngeal impairment  -MG     Esophageal Phase -- -- -- suspected esophageal impairment  -MG     Clinical Swallow Evaluation Summary -- -- -- See note  -MG        Pharyngeal Phase Concerns    Pharyngeal Phase Concerns -- -- -- multiple swallows;cough;throat clear  -MG        Esophageal Phase Concerns    Esophageal Phase Concerns -- -- -- --  delayed coughing; prior history of dilations with known reflux  -MG        MBS/VFSS    Utensils Used -- -- spoon;straw  -MD --     Consistencies Trialed -- -- honey-thick liquids;nectar/syrup-thick liquids;thin liquids  -MD --        MBS/VFSS Interpretation    Oral Prep Phase -- -- impaired oral phase of swallowing  -MD --     Oral Transit Phase -- -- impaired  -MD --     Oral Residue -- -- impaired  -MD --     VFSS Summary -- -- see note  -MD --        Oral Preparatory Phase    Oral Preparatory Phase -- -- prolonged manipulation  -MD --     Prolonged Manipulation -- -- honey-thick liquids;nectar-thick liquids;thin liquids  -MD --        Oral Transit Phase    Impaired Oral Transit Phase -- -- delayed initiation of bolus transit;premature spillage of liquids into pharynx  -MD --     Delayed Initiation of bolus transit -- -- honey-thick liquids  -MD --     Premature Spillage of Liquids into Pharynx -- -- nectar-thick liquids  -MD --        Oral Residue    Impaired Oral Residue -- -- lingual residue;palatal residue  -MD --     Palatal Residue -- -- all consistencies tested  -MD --     Lingual Residue -- -- all consistencies tested  -MD --     Response to Oral Residue -- -- partial residue clearance;with spontaneous subsequent swallow;with cued swallow  -MD --        Initiation of  Pharyngeal Swallow    Initiation of Pharyngeal Swallow -- -- bolus in valleculae  -MD --     Pharyngeal Phase -- -- impaired pharyngeal phase of swallowing  -MD --     Anatomical abnormalities noted -- -- c-spine hardware  -MD --     Anatomical abnormalities functional impact -- -- functional impact on swallowing  -MD --     Penetration During the Swallow -- -- nectar-thick liquids  -MD --     Aspiration During the Swallow -- -- thin liquids  -MD --     Penetration After the Swallow -- -- nectar-thick liquids;honey-thick liquids;thin liquids  -MD --     Aspiration After the Swallow -- -- thin liquids  -MD --     Depth of Penetration -- -- deep  -MD --     Response to Penetration -- -- No  -MD --     No spontaneous response to penetration and -- -- non-effective laryngeal clearance with cue (see comments)  -MD --     Response to Aspiration -- -- No  -MD --     No spontaneous response to aspiration and -- -- non-effective subglottic clearance with cue (see comments)  -MD --     Pharyngeal Residue -- -- honey-thick liquids;nectar-thick liquids;thin liquids  -MD --     Response to Residue -- -- partial residue clearance;other (see comments)  not fully cleared  -MD --     Attempted Compensatory Maneuvers -- -- other (see comments)  unable to ocmplete a chin tuck maneuver  -MD --        Esophageal Phase    Esophageal Phase -- -- other (see comments)  did not complete  -MD --        SLP Evaluation Clinical Impression    SLP Swallowing Diagnosis -- -- mild;oral dysphagia;severe;pharyngeal dysphagia;other (see comments)  unable to evaluate esophageal phase of the swallow  -MD mild;oral dysphagia;R/O pharyngeal dysphagia;suspected esophageal dysphagia  -MG     Functional Impact -- -- risk of aspiration/pneumonia;risk of malnutrition;risk of dehydration  -MD risk of aspiration/pneumonia;risk of malnutrition;risk of dehydration  -MG     Rehab Potential/Prognosis, Swallowing -- -- adequate, monitor progress closely  -MD  adequate, monitor progress closely  -MG     Swallow Criteria for Skilled Therapeutic Interventions Met -- -- demonstrates skilled criteria  -MD demonstrates skilled criteria  -MG        SLP Treatment Clinical Impressions    Treatment Assessment (SLP) continued  -MD continued  -MD -- --     Treatment Assessment Comments (SLP) see note  -MD see note  -MD -- --     Daily Summary of Progress (SLP) progress toward functional goals is gradual  -MD unable to show any progress toward functional goals  -MD -- --     Barriers to Overall Progress (SLP) Medically complex  -MD Medically complex;Other (see comments)  previous functional deficits  -MD -- --     Plan for Continued Treatment (SLP) continue treatment per plan of care  -MD continue treatment per plan of care  -MD -- --     Care Plan Review risks/benefits reviewed;current/potential barriers reviewed;patient/other agree to care plan  -MD risks/benefits reviewed;current/potential barriers reviewed;patient/other agree to care plan  -MD -- --     Care Plan Review, Other Participant(s) caregiver;family  -MD caregiver;family  -MD -- --        Recommendations    Therapy Frequency (Swallow) PRN  -MD PRN  -MD PRN  -MD PRN  -MG     Predicted Duration Therapy Intervention (Days) until discharge  -MD until discharge  -MD until discharge  -MD until discharge  -MG     SLP Diet Recommendation NPO;ice chips between meals after oral care, with supervision  -MD NPO;water between meals after oral care, with supervision;ice chips between meals after oral care, with supervision  -MD NPO;water between meals after oral care, with supervision;ice chips between meals after oral care, with supervision  -MD NPO;water between meals after oral care, with supervision;ice chips between meals after oral care, with supervision  -MG     Recommended Diagnostics -- reassess via VFSS (MBS)  -MD -- reassess via VFSS (MBS)  -MG     Recommended Precautions and Strategies upright posture during/after  eating;small bites of food and sips of liquid;general aspiration precautions;reflux precautions;1:1 supervision;assist with feeding;fatigue precautions  -MD upright posture during/after eating;small bites of food and sips of liquid;general aspiration precautions;reflux precautions;1:1 supervision;assist with feeding;fatigue precautions  -MD upright posture during/after eating;small bites of food and sips of liquid;general aspiration precautions;reflux precautions;1:1 supervision;assist with feeding;fatigue precautions  -MD upright posture during/after eating;small bites of food and sips of liquid;general aspiration precautions;reflux precautions;1:1 supervision;assist with feeding;fatigue precautions  -MG     Oral Care Recommendations Oral Care BID/PRN;Toothbrush  -MD Oral Care BID/PRN;Toothbrush  -MD Oral Care BID/PRN;Toothbrush  -MD Oral Care BID/PRN;Toothbrush  -MG     SLP Rec. for Method of Medication Administration meds via alternate route  -MD meds via alternate route  -MD meds via alternate route  -MD meds crushed;with puree;as tolerated  -MG     Monitor for Signs of Aspiration yes;notify SLP if any concerns  -MD yes;notify SLP if any concerns  -MD yes;notify SLP if any concerns  -MD yes;notify SLP if any concerns  -MG     Anticipated Discharge Disposition (SLP) unknown  -MD unknown  -MD unknown  -MD unknown  -MG        Swallow Goals (SLP)    Swallow LTGs Swallow Long Term Goal (free text)  -MD Swallow Long Term Goal (free text)  -MD Swallow Long Term Goal (free text)  -MD Swallow Long Term Goal (free text)  -MG     Swallow STGs diet tolerance goal selection (SLP)  -MD diet tolerance goal selection (SLP)  -MD diet tolerance goal selection (SLP)  -MD diet tolerance goal selection (SLP)  -MG     Diet Tolerance Goal Selection (SLP) Patient will tolerate trials of  -MD Patient will tolerate trials of  -MD Patient will tolerate trials of  -MD Patient will tolerate trials of  -MG        (LTG) Swallow    (LTG)  Swallow Patient will tolerate least restrictive diet without overt s/s of aspiration.  -MD Patient will tolerate least restrictive diet without overt s/s of aspiration.  -MD Patient will tolerate least restrictive diet without overt s/s of aspiration.  -MD Patient will tolerate least restrictive diet without overt s/s of aspiration.  -MG     [place] (Swallow Long Term Goal) independently (over 90% accuracy)  -MD independently (over 90% accuracy)  -MD independently (over 90% accuracy)  -MD independently (over 90% accuracy)  -MG     Time Frame (Swallow Long Term Goal) by discharge  -MD by discharge  -MD by discharge  -MD by discharge  -MG     Barriers (Swallow Long Term Goal) medically complex  -MD medically complex  -MD medically complex  -MD medically complex  -MG     Progress/Outcomes (Swallow Long Term Goal) unable to make needed progress  -MD unable to make needed progress  -MD progress slower than expected  -MD new goal  -MG        (STG) Patient will tolerate trials of    Consistencies Trialed (Tolerate trials) soft to chew (chopped) textures;thin liquids  -MD soft to chew (chopped) textures;thin liquids  -MD soft to chew (chopped) textures;thin liquids  -MD soft to chew (chopped) textures;thin liquids  -MG     Desired Outcome (Tolerate trials) without signs/symptoms of aspiration;without signs of distress;with adequate oral prep/transit/clearance  -MD without signs/symptoms of aspiration;without signs of distress;with adequate oral prep/transit/clearance  -MD without signs/symptoms of aspiration;without signs of distress;with adequate oral prep/transit/clearance  -MD without signs/symptoms of aspiration;without signs of distress;with adequate oral prep/transit/clearance  -MG     [place] (Tolerate trials) independently (over 90% accuracy)  -MD independently (over 90% accuracy)  -MD independently (over 90% accuracy)  -MD independently (over 90% accuracy)  -MG     Time Frame (Tolerate trials) by  discharge  -MD by discharge  -MD by discharge  -MD by discharge  -MG     Progress/Outcomes (Tolerate trials) unable to make needed progress  -MD unable to make needed progress  -MD progress slower than expected  -MD new goal  -MG               User Key  (r) = Recorded By, (t) = Taken By, (c) = Cosigned By      Initials Name Effective Dates    MG Shannan Carpio TONY, MS [facility]-SLP 07/11/23 -     Cate Gale, SLP 06/21/22 -                     EDUCATION  The patient has been educated in the following areas:   Dysphagia (Swallowing Impairment).        SLP GOALS       Row Name 04/23/25 1320 04/22/25 0855 04/21/25 1400       (LTG) Swallow    (LTG) Swallow Patient will tolerate least restrictive diet without overt s/s of aspiration.  -MD Patient will tolerate least restrictive diet without overt s/s of aspiration.  -MD Patient will tolerate least restrictive diet without overt s/s of aspiration.  -MD    [place] (Swallow Long Term Goal) independently (over 90% accuracy)  -MD independently (over 90% accuracy)  -MD independently (over 90% accuracy)  -MD    Time Frame (Swallow Long Term Goal) by discharge  -MD by discharge  -MD by discharge  -MD    Barriers (Swallow Long Term Goal) medically complex  -MD medically complex  -MD medically complex  -MD    Progress/Outcomes (Swallow Long Term Goal) unable to make needed progress  -MD unable to make needed progress  -MD progress slower than expected  -MD       (STG) Patient will tolerate trials of    Consistencies Trialed (Tolerate trials) soft to chew (chopped) textures;thin liquids  -MD soft to chew (chopped) textures;thin liquids  -MD soft to chew (chopped) textures;thin liquids  -MD    Desired Outcome (Tolerate trials) without signs/symptoms of aspiration;without signs of distress;with adequate oral prep/transit/clearance  -MD without signs/symptoms of aspiration;without signs of distress;with adequate oral prep/transit/clearance  -MD without signs/symptoms of  aspiration;without signs of distress;with adequate oral prep/transit/clearance  -MD    [place] (Tolerate trials) independently (over 90% accuracy)  -MD independently (over 90% accuracy)  -MD independently (over 90% accuracy)  -MD    Time Frame (Tolerate trials) by discharge  -MD by discharge  -MD by discharge  -MD    Progress/Outcomes (Tolerate trials) unable to make needed progress  -MD unable to make needed progress  -MD progress slower than expected  -MD      Row Name 04/21/25 0830             (LTG) Swallow    (LTG) Swallow Patient will tolerate least restrictive diet without overt s/s of aspiration.  -MG      [place] (Swallow Long Term Goal) independently (over 90% accuracy)  -MG      Time Frame (Swallow Long Term Goal) by discharge  -MG      Barriers (Swallow Long Term Goal) medically complex  -MG      Progress/Outcomes (Swallow Long Term Goal) new goal  -MG         (STG) Patient will tolerate trials of    Consistencies Trialed (Tolerate trials) soft to chew (chopped) textures;thin liquids  -MG      Desired Outcome (Tolerate trials) without signs/symptoms of aspiration;without signs of distress;with adequate oral prep/transit/clearance  -MG      [place] (Tolerate trials) independently (over 90% accuracy)  -MG      Time Frame (Tolerate trials) by discharge  -MG      Progress/Outcomes (Tolerate trials) new goal  -MG                User Key  (r) = Recorded By, (t) = Taken By, (c) = Cosigned By      Initials Name Provider Type    MG Shannan Carpio, MS CCC-SLP Speech and Language Pathologist    Cate Gale, SLP Speech and Language Pathologist                         Time Calculation:    Time Calculation- SLP       Row Name 04/23/25 1509             Time Calculation- SLP    SLP Start Time 1320  -MD      SLP Stop Time 1416  -MD      SLP Time Calculation (min) 56 min  -MD      SLP Received On 04/23/25  -MD         Untimed Charges    [id number] Treatment Swallow Minutes 56  -MD         Total  Minutes    Untimed Charges Total Minutes 56  -MD       Total Minutes 56  -MD                User Key  (r) = Recorded By, (t) = Taken By, (c) = Cosigned By      Initials Name Provider Type    Cate Gale, SLP Speech and Language Pathologist                    Therapy Charges for Today       Code Description Service Date Service Provider Modifiers Qty    [id number] HC ST TREATMENT SWALLOW 4 4/22/2025 Cate Rivera, SLP GN 1    [id number] HC ST TREATMENT SWALLOW 4 4/23/2025 Cate Rivera, SLP GN 1                 Cate Rivera, SLP  4/23/2025

## 2025-04-23 NOTE — PROGRESS NOTES
Daily Progress Note  Franko Lucero  MRN: [id number] LOS: 6    Admit Date: 4/17/2025 4/23/2025 06:30 CDT    Subjective:      Chief Complaint:  Respiratory failure    Interval History:    Reviewed overnight events and nursing notes.   A lot of congestion for which we started scopolamine last PM.   Overall little change    Review of Systems   Unable to perform ROS: Mental status change       DIET:  NPO Diet NPO Type: Tube Feeding    Medications:        budesonide, 0.25 mg, Nebulization, BID - RT  busPIRone, 15 mg, Per G Tube, Q12H  clopidogrel, 75 mg, Per G Tube, Daily  DULoxetine, 60 mg, Oral, Daily  Ferrous Sulfate, 300 mg, Per G Tube, Daily  [Held by provider] gabapentin, 300 mg, Oral, Nightly  guaifenesin, 400 mg, Per G Tube, Q8H  heparin (porcine), 5,000 Units, Subcutaneous, Q8H  ipratropium-albuterol, 3 mL, Nebulization, 4x Daily - RT  lactulose, 300 mL, Rectal, Once  lactulose, 20 g, Per G Tube, BID  lansoprazole, 30 mg, Per G Tube, Q AM  levothyroxine, 75 mcg, Per G Tube, Q AM  nystatin, 5 mL, Swish & Swallow, 4x Daily  piperacillin-tazobactam, 4.5 g, Intravenous, Q8H  ranolazine, 1,000 mg, Oral, Q12H  rosuvastatin, 40 mg, Per G Tube, Nightly  Scopolamine, 1 patch, Transdermal, Q72H  sodium chloride, 10 mL, Intravenous, Q12H  sodium chloride, 40 mL, Intravenous, Once  vancomycin, 750 mg, Intravenous, Q24H        Data:     Code Status:   Code Status and Medical Interventions: No CPR (Do Not Attempt to Resuscitate); Limited Support; No intubation (DNI)   Ordered at: 04/21/25 0959     Code Status (Patient has no pulse and is not breathing):    No CPR (Do Not Attempt to Resuscitate)     Medical Interventions (Patient has pulse or is breathing):    Limited Support     Medical Intervention Limits:    No intubation (DNI)     Level Of Support Discussed With:    Health Care Surrogate       Family History   Problem Relation Age of Onset    Kidney disease Father     Heart disease Father     Cancer Mother          brain    Colon cancer Neg Hx     Colon polyps Neg Hx      Social History     Socioeconomic History    Marital status:    Tobacco Use    Smoking status: Former     Current packs/day: 0.00     Types: Cigarettes     Quit date:      Years since quittin.3     Passive exposure: Never    Smokeless tobacco: Never   Vaping Use    Vaping status: Never Used   Substance and Sexual Activity    Alcohol use: No    Drug use: No    Sexual activity: Defer       Labs:    Lab Results (last 72 hours)       Procedure Component Value Units Date/Time    Lactic Acid, Plasma [[id number]]  (Abnormal) Collected: 25    Specimen: Blood Updated: 25 05     Lactate 2.2 mmol/L     Comprehensive Metabolic Panel [[id number]]  (Abnormal) Collected: 25    Specimen: Blood Updated: 25 045     Glucose 121 mg/dL      BUN 34 mg/dL      Creatinine 1.90 mg/dL      Sodium 140 mmol/L      Potassium 3.4 mmol/L      Chloride 101 mmol/L      CO2 23.0 mmol/L      Calcium 9.2 mg/dL      Total Protein 5.5 g/dL      Albumin 2.8 g/dL      ALT (SGPT) 38 U/L      AST (SGOT) 57 U/L      Alkaline Phosphatase 128 U/L      Total Bilirubin 0.9 mg/dL      Globulin 2.7 gm/dL      A/G Ratio 1.0 g/dL      BUN/Creatinine Ratio 17.9     Anion Gap 16.0 mmol/L      eGFR 37.2 mL/min/1.73     Narrative:      GFR Categories in Chronic Kidney Disease (CKD)      GFR Category          GFR (mL/min/1.73)    Interpretation  G1                     90 or greater         Normal or high (1)  G2                      60-89                Mild decrease (1)  G3a                   45-59                Mild to moderate decrease  G3b                   30-44                Moderate to severe decrease  G4                    15-29                Severe decrease  G5                    14 or less           Kidney failure          (1)In the absence of evidence of kidney disease, neither GFR category G1 or G2 fulfill the criteria for CKD.    eGFR calculation  2021 CKD-EPI creatinine equation, which does not include race as a factor    Magnesium [[id number]]  (Normal) Collected: 04/21/25 0349    Specimen: Blood Updated: 04/21/25 0459     Magnesium 2.1 mg/dL     Phosphorus [[id number]]  (Normal) Collected: 04/21/25 0349    Specimen: Blood Updated: 04/21/25 0459     Phosphorus 3.1 mg/dL     CBC & Differential [[id number]]  (Abnormal) Collected: 04/21/25 0349    Specimen: Blood Updated: 04/21/25 0441    Narrative:      The following orders were created for panel order CBC & Differential.  Procedure                               Abnormality         Status                     ---------                               -----------         ------                     CBC Auto Differential[[id number]]        Abnormal            Final result                 Please view results for these tests on the individual orders.    CBC Auto Differential [[id number]]  (Abnormal) Collected: 04/21/25 0349    Specimen: Blood Updated: 04/21/25 0441     WBC 15.29 10*3/mm3      RBC 2.53 10*6/mm3      Hemoglobin 9.3 g/dL      Hematocrit 29.8 %      .8 fL      MCH 36.8 pg      MCHC 31.2 g/dL      RDW 17.9 %      RDW-SD 77.9 fl      MPV 10.3 fL      Platelets 71 10*3/mm3      Neutrophil % 82.7 %      Lymphocyte % 6.3 %      Monocyte % 9.4 %      Eosinophil % 0.3 %      Basophil % 0.3 %      Immature Grans % 1.0 %      Neutrophils, Absolute 12.66 10*3/mm3      Lymphocytes, Absolute 0.96 10*3/mm3      Monocytes, Absolute 1.43 10*3/mm3      Eosinophils, Absolute 0.04 10*3/mm3      Basophils, Absolute 0.04 10*3/mm3      Immature Grans, Absolute 0.16 10*3/mm3     Blood Culture - Blood, Hand, Left [[id number]]  (Normal) Collected: 04/20/25 0039    Specimen: Blood from Hand, Left Updated: 04/21/25 0100     Blood Culture No growth at 24 hours    Blood Culture - Blood, Hand, Right [[id number]]  (Normal) Collected: 04/20/25 0039    Specimen: Blood from Hand, Right Updated: 04/21/25 0100     Blood Culture No growth  at 24 hours    Ammonia [[id number]]  (Normal) Collected: 04/20/25 1142    Specimen: Blood Updated: 04/20/25 1209     Ammonia 43 umol/L     STAT Lactic Acid, Reflex [[id number]]  (Abnormal) Collected: 04/20/25 1142    Specimen: Blood Updated: 04/20/25 1209     Lactate 2.7 mmol/L     POC Glucose Once [[id number]]  (Normal) Collected: 04/20/25 1008    Specimen: Blood Updated: 04/20/25 1020     Glucose 125 mg/dL      Comment: : [id number] [facility] [employer] ID: [id number]       STAT Lactic Acid, Reflex [[id number]]  (Abnormal) Collected: 04/20/25 0800    Specimen: Blood Updated: 04/20/25 0852     Lactate 2.4 mmol/L     Blood Gas, Arterial - [[id number]]  (Abnormal) Collected: 04/20/25 0750    Specimen: Arterial Blood Updated: 04/20/25 0751     Site Right Brachial     Ronak's Test N/A     pH, Arterial 7.401 pH units      pCO2, Arterial 40.9 mm Hg      pO2, Arterial 79.8 mm Hg      Comment: 84 Value below reference range        HCO3, Arterial 25.3 mmol/L      Base Excess, Arterial 0.5 mmol/L      O2 Saturation, Arterial 96.5 %      Temperature 37.0     Barometric Pressure for Blood Gas 757 mmHg      Modality BiPap     FIO2 60 %      Ventilator Mode NA     IPAP 16     Comment: Meter: [id number]     :  Mauro Jimenez, RRT        EPAP 6     Collected by [id number]     pCO2, Temperature Corrected 40.9 mm Hg      pH, Temp Corrected 7.401 pH Units      pO2, Temperature Corrected 79.8 mm Hg      PO2/FIO2 133    MRSA Screen, PCR (Inpatient) - Swab, Nares [[id number]]  (Abnormal) Collected: 04/20/25 0415    Specimen: Swab from Nares Updated: 04/20/25 0618     MRSA PCR MRSA Detected    Narrative:      The negative predictive value of this diagnostic test is high and should only be used to consider de-escalating anti-MRSA therapy. A positive result may indicate colonization with MRSA and must be correlated clinically.    STAT Lactic Acid, Reflex [[id number]]  (Abnormal) Collected: 04/20/25 0426    Specimen: Blood Updated:  04/20/25 0528     Lactate 2.5 mmol/L     Basic Metabolic Panel [[id number]]  (Abnormal) Collected: 04/20/25 0426    Specimen: Blood Updated: 04/20/25 0527     Glucose 91 mg/dL      BUN 25 mg/dL      Creatinine 1.67 mg/dL      Sodium 139 mmol/L      Potassium 3.3 mmol/L      Chloride 99 mmol/L      CO2 23.0 mmol/L      Calcium 9.4 mg/dL      BUN/Creatinine Ratio 15.0     Anion Gap 17.0 mmol/L      eGFR 43.5 mL/min/1.73     Narrative:      GFR Categories in Chronic Kidney Disease (CKD)      GFR Category          GFR (mL/min/1.73)    Interpretation  G1                     90 or greater         Normal or high (1)  G2                      60-89                Mild decrease (1)  G3a                   45-59                Mild to moderate decrease  G3b                   30-44                Moderate to severe decrease  G4                    15-29                Severe decrease  G5                    14 or less           Kidney failure          (1)In the absence of evidence of kidney disease, neither GFR category G1 or G2 fulfill the criteria for CKD.    eGFR calculation 2021 CKD-EPI creatinine equation, which does not include race as a factor    Hepatic Function Panel [[id number]]  (Abnormal) Collected: 04/20/25 0426    Specimen: Blood Updated: 04/20/25 0527     Total Protein 5.9 g/dL      Albumin 2.7 g/dL      ALT (SGPT) 41 U/L      AST (SGOT) 68 U/L      Alkaline Phosphatase 127 U/L      Total Bilirubin 1.0 mg/dL      Bilirubin, Direct 0.7 mg/dL      Bilirubin, Indirect 0.3 mg/dL     CBC (No Diff) [[id number]]  (Abnormal) Collected: 04/20/25 0426    Specimen: Blood Updated: 04/20/25 0513     WBC 12.98 10*3/mm3      RBC 2.45 10*6/mm3      Hemoglobin 9.1 g/dL      Hematocrit 28.5 %      .3 fL      MCH 37.1 pg      MCHC 31.9 g/dL      RDW 17.4 %      RDW-SD 75.5 fl      MPV 10.0 fL      Platelets 66 10*3/mm3     Respiratory Panel PCR w/COVID-19(SARS-CoV-2) RICHARD/JENNIFER/DENIA/PAD/COR/VONDA In-House, NP Swab in UTM/VTM, 2 HR TAT -  Swab, Nasopharynx [[id number]]  (Normal) Collected: 04/20/25 0048    Specimen: Swab from Nasopharynx Updated: 04/20/25 0149     ADENOVIRUS, PCR Not Detected     Coronavirus 229E Not Detected     Coronavirus HKU1 Not Detected     Coronavirus NL63 Not Detected     Coronavirus OC43 Not Detected     COVID19 Not Detected     Human Metapneumovirus Not Detected     Human Rhinovirus/Enterovirus Not Detected     Influenza A PCR Not Detected     Influenza B PCR Not Detected     Parainfluenza Virus 1 Not Detected     Parainfluenza Virus 2 Not Detected     Parainfluenza Virus 3 Not Detected     Parainfluenza Virus 4 Not Detected     RSV, PCR Not Detected     Bordetella pertussis pcr Not Detected     Bordetella parapertussis PCR Not Detected     Chlamydophila pneumoniae PCR Not Detected     Mycoplasma pneumo by PCR Not Detected    Narrative:      In the setting of a positive respiratory panel with a viral infection PLUS a negative procalcitonin without other underlying concern for bacterial infection, consider observing off antibiotics or discontinuation of antibiotics and continue supportive care. If the respiratory panel is positive for atypical bacterial infection (Bordetella pertussis, Chlamydophila pneumoniae, or Mycoplasma pneumoniae), consider antibiotic de-escalation to target atypical bacterial infection.    Lactic Acid, Plasma [[id number]]  (Abnormal) Collected: 04/20/25 0039    Specimen: Blood Updated: 04/20/25 0111     Lactate 2.7 mmol/L     Urinalysis With Culture If Indicated - Urine, Clean Catch [[id number]]  (Abnormal) Collected: 04/20/25 0034    Specimen: Urine, Clean Catch Updated: 04/20/25 0110     Color, UA Dark Yellow     Appearance, UA Cloudy     pH, UA 5.5     Specific Gravity, UA 1.019     Glucose, UA >=1000 mg/dL (3+)     Ketones, UA Negative     Bilirubin, UA Negative     Blood, UA Small (1+)     Protein,  mg/dL (2+)     Leuk Esterase, UA Trace     Nitrite, UA Negative     Urobilinogen, UA 1.0  E.U./dL    Narrative:      In absence of clinical symptoms, the presence of pyuria, bacteria, and/or nitrites on the urinalysis result does not correlate with infection.    Urinalysis, Microscopic Only - Urine, Clean Catch [[id number]] Collected: 04/20/25 0034    Specimen: Urine, Clean Catch Updated: 04/20/25 0110     RBC, UA 0-2 /HPF      WBC, UA 0-2 /HPF      Comment: Urine culture not indicated.        Bacteria, UA None Seen /HPF      Squamous Epithelial Cells, UA 0-2 /HPF      Hyaline Casts, UA 0-2 /LPF      Calcium Oxalate Crystals, UA Moderate/2+ /HPF      Methodology Manual Light Microscopy    Blood Gas, Arterial - [[id number]]  (Abnormal) Collected: 04/19/25 1428    Specimen: Arterial Blood Updated: 04/19/25 2016     Site Right Brachial     Ronak's Test N/A     pH, Arterial 7.371 pH units      pCO2, Arterial 46.8 mm Hg      pO2, Arterial 69.3 mm Hg      HCO3, Arterial 27.1 mmol/L      Base Excess, Arterial 1.5 mmol/L      O2 Saturation, Arterial 93.6 %      Temperature 37.0     Barometric Pressure for Blood Gas 757 mmHg      Modality BiPap     FIO2 60 %      IPAP 16     EPAP 6     Collected by [id number]     pCO2, Temperature Corrected 46.8 mm Hg      pH, Temp Corrected 7.371 pH Units      pO2, Temperature Corrected 69.3 mm Hg      PO2/FIO2 116    TSH [[id number]]  (Normal) Collected: 04/19/25 0614    Specimen: Blood Updated: 04/19/25 1350     TSH 3.490 uIU/mL     T4, Free [[id number]]  (Normal) Collected: 04/19/25 0614    Specimen: Blood Updated: 04/19/25 1350     Free T4 1.23 ng/dL     POC Glucose Once [[id number]]  (Abnormal) Collected: 04/19/25 1103    Specimen: Blood Updated: 04/19/25 1120     Glucose 185 mg/dL      Comment: : [id number] Edward Watkins ID: [id number]       Blood Gas, Arterial With Co-Ox [[id number]]  (Abnormal) Collected: 04/19/25 1110    Specimen: Arterial Blood Updated: 04/19/25 1110     Site Left Radial     Ronak's Test Positive     pH, Arterial 7.182 pH units      Comment: 85 Value  below critical limit        pCO2, Arterial 71.5 mm Hg      Comment: 86 Value above critical limit        pO2, Arterial 60.3 mm Hg      Comment: 84 Value below reference range        HCO3, Arterial 26.8 mmol/L      Comment: 83 Value above reference range        Base Excess, Arterial -2.5 mmol/L      Comment: 84 Value below reference range        O2 Saturation, Arterial 83.7 %      Comment: 84 Value below reference range        Hemoglobin, Blood Gas 10.6 g/dL      Comment: 84 Value below reference range        Hematocrit, Blood Gas 32.6 %      Comment: 84 Value below reference range        Oxyhemoglobin 82.1 %      Comment: 84 Value below reference range        Methemoglobin 0.50 %      Carboxyhemoglobin 1.4 %      Temperature 37.0     Sodium, Arterial 141 mmol/L      Potassium, Arterial 3.5 mmol/L      Ionized Calcium 5.41 mg/dL      Comment: 83 Value above reference range        Barometric Pressure for Blood Gas 755 mmHg      Modality Nasal Cannula     Flow Rate 3.0 lpm      Ventilator Mode NA     Notified Who DR PADILLA     Notified By Mehnaz Souza CRT     Notified Time 04/19/2025 11:10     Collected by [id number]     Comment: Meter: [id number]     :  Mehnaz Souza CRT        pH, Temp Corrected 7.182 pH Units      Comment: 85 Value below critical limit        pCO2, Temperature Corrected 71.5 mm Hg      Comment: 86 Value above critical limit        pO2, Temperature Corrected 60.3 mm Hg      Comment: 84 Value below reference range       Ammonia [[id number]]  (Abnormal) Collected: 04/19/25 0848    Specimen: Blood Updated: 04/19/25 0920     Ammonia 66 umol/L     Manual Differential [[id number]]  (Abnormal) Collected: 04/19/25 0614    Specimen: Blood Updated: 04/19/25 0713     Neutrophil % 67.0 %      Lymphocyte % 13.0 %      Monocyte % 15.0 %      Eosinophil % 3.0 %      Basophil % 1.0 %      Metamyelocyte % 1.0 %      Neutrophils Absolute 3.17 10*3/mm3      Lymphocytes Absolute 0.61 10*3/mm3       Monocytes Absolute 0.71 10*3/mm3      Eosinophils Absolute 0.14 10*3/mm3      Basophils Absolute 0.05 10*3/mm3      Anisocytosis Mod/2+     Macrocytes Mod/2+     Poikilocytes Slight/1+     Polychromasia Slight/1+     WBC Morphology Normal     Platelet Estimate Decreased    CBC & Differential [[id number]]  (Abnormal) Collected: 04/19/25 0614    Specimen: Blood Updated: 04/19/25 0713    Narrative:      The following orders were created for panel order CBC & Differential.  Procedure                               Abnormality         Status                     ---------                               -----------         ------                     CBC Auto Differential[[id number]]        Abnormal            Final result                 Please view results for these tests on the individual orders.    CBC Auto Differential [[id number]]  (Abnormal) Collected: 04/19/25 0614    Specimen: Blood Updated: 04/19/25 0713     WBC 4.73 10*3/mm3      RBC 2.49 10*6/mm3      Hemoglobin 9.1 g/dL      Hematocrit 28.9 %      .1 fL      MCH 36.5 pg      MCHC 31.5 g/dL      RDW 17.8 %      RDW-SD 74.8 fl      MPV 10.5 fL      Platelets 56 10*3/mm3     Basic Metabolic Panel [[id number]]  (Abnormal) Collected: 04/19/25 0614    Specimen: Blood Updated: 04/19/25 0707     Glucose 103 mg/dL      BUN 21 mg/dL      Creatinine 1.31 mg/dL      Sodium 139 mmol/L      Potassium 3.5 mmol/L      Chloride 103 mmol/L      CO2 25.0 mmol/L      Calcium 9.4 mg/dL      BUN/Creatinine Ratio 16.0     Anion Gap 11.0 mmol/L      eGFR 58.2 mL/min/1.73     Narrative:      GFR Categories in Chronic Kidney Disease (CKD)      GFR Category          GFR (mL/min/1.73)    Interpretation  G1                     90 or greater         Normal or high (1)  G2                      60-89                Mild decrease (1)  G3a                   45-59                Mild to moderate decrease  G3b                   30-44                Moderate to severe decrease  G4                  "   15-29                Severe decrease  G5                    14 or less           Kidney failure          (1)In the absence of evidence of kidney disease, neither GFR category G1 or G2 fulfill the criteria for CKD.    eGFR calculation  CKD-EPI creatinine equation, which does not include race as a factor              Objective:     Vitals: /70 (BP Location: Right arm, Patient Position: Lying)   Pulse 97   Temp 98.4 °F (36.9 °C) (Axillary)   Resp 22   Ht 182.9 cm (72\")   Wt 87.5 kg (192 lb 14.4 oz)   SpO2 95%   BMI 26.16 kg/m²    Intake/Output Summary (Last 24 hours) at 2025 0630  Last data filed at 2025 1400  Gross per 24 hour   Intake 100 ml   Output 500 ml   Net -400 ml    Temp (24hrs), Av.4 °F (36.9 °C), Min:98 °F (36.7 °C), Max:98.9 °F (37.2 °C)      Physical Exam  Vitals and nursing note reviewed. Exam conducted with a chaperone present.   Constitutional:       Appearance: He is ill-appearing.   HENT:      Head: Normocephalic and atraumatic.   Cardiovascular:      Rate and Rhythm: Normal rate and regular rhythm.      Pulses: Normal pulses.   Pulmonary:      Effort: Respiratory distress present.      Breath sounds: Wheezing and rales present.   Abdominal:      General: There is distension.      Tenderness: There is no guarding or rebound.   Skin:     General: Skin is warm.      Capillary Refill: Capillary refill takes less than 2 seconds.   Neurological:      Mental Status: He is alert. Mental status is at baseline.             Assessment and Plan:     Primary Problem:  AMS (altered mental status)  Aspiration pneumonia-Sepsis Syndrome  Acute on Chronic Respiratory Failure  Cirrhosis  [facility] Problem list:    AMS (altered mental status)    Hypertension    Hyperlipidemia    Diabetes mellitus    CAD (coronary artery disease)    Abnormal LFTs    Macrocytic anemia    Acute hypercapnic respiratory failure      PMH:  Past Medical History:   Diagnosis Date    Anemia     " Anxiety     Arthritis     BPH (benign prostatic hypertrophy)     CAD (coronary artery disease)     Cancer     non-hodgkins lymphoma    Cirrhosis     Diabetes mellitus     Disease of thyroid gland     GERD (gastroesophageal reflux disease)     Hearing loss     History of transfusion     Hyperlipidemia     Hypertension     Iliac artery aneurysm, bilateral     Kidney stone     Liver cirrhosis     Migraines     Sleep apnea     c-pap       Treatment Plan:  Continue IV abx, BIPAP as needed  Pulmonary toilet  Multisystem organ failure - discussed with wife who understands severity of condition and has made DNI  Now on floor  Palliative care consult    Disposition: TBD    Reviewed treatment plans with the patient and/or family.   20 minutes spent in face to face interaction and coordination of care.     Electronically signed by Figueroa Chaves MD on 4/23/2025 at 06:30 CDT

## 2025-04-23 NOTE — CASE MANAGEMENT/SOCIAL WORK
Continued Stay Note   [facility]     Patient Name: Franko Lucero  MRN: [id number]  Today's Date: 4/23/2025    Admit Date: 4/17/2025    Plan: Pending   Discharge Plan       Row Name 04/23/25 1024       Plan    Plan Pending    Plan Comments Patient now on 3A.  Patient currently on dobhoff feedings.  Diet will need to be advanced or nutrition plan addressed before SNF placement can be pursued.  SW following for plan of care.                   Discharge Codes    No documentation.                       ANA Elizabeth

## 2025-04-23 NOTE — PROGRESS NOTES
[facility] Palliative Care Services  Progress Note  Patient Name: Franko Lucero  Date of Admission: 4/17/2025  Today's Date: 04/23/25     Code Status and Medical Interventions: No CPR (Do Not Attempt to Resuscitate); Limited Support; No intubation (DNI)   Ordered at: 04/21/25 0959     Code Status (Patient has no pulse and is not breathing):    No CPR (Do Not Attempt to Resuscitate)     Medical Interventions (Patient has pulse or is breathing):    Limited Support     Medical Intervention Limits:    No intubation (DNI)     Level Of Support Discussed With:    Health Care Surrogate     Subjective   Chief complaint/Reason for Referral/Visit: Follow up on Goals of Care/Advance Care Planning and Support for Patient/Family.    Medical record reviewed.  Events noted.  He was transferred to medical floor afternoon of 4/22.   According to chart review he had decline in respiratory status and oxygen was titrated up to 7 L and he was ultimately placed on BiPAP support.  Also noted to refuse tube feeds overnight.  Labs collected this morning reviewed.  Continues to have decline in renal function with creatinine now 2.24, BUN 37, and GFR 30.6.  Alkaline phosphatase and AST trending upward.  Platelets slightly improved however remain low at 63,000.   He is lying in bed, asleep and in no apparent distress.  Arouses to voice.  Answers orientation questions correctly however has difficulty staying alert at time of exam.  Denies any pain or shortness of breath.  His spouse and daughter are at bedside.  Discussed with nursing.     Advance Care Planning   Advanced Directives: No advance directive on file.   Advance Care Planning Discussion: Mr. Lucero is having difficulty staying awake and demonstrating ability to make complex medical decisions at time of exam.  Spoke with his spouse, Sangita, and daughter at bedside.   Discussed concerns with his overall decline.  Sangita shared concerns with his inability to eat  "without aspirating and fears that he will aspirate if he receives artifical nutrition through DHT.  Explained there is risk of aspiration however measures done to lower risk of aspiration when receiving artificial nutrition.  Sangita also expressed concerns with his nausea and fears it will worsen with tube feeds and he may vomit.  We discussed potential scenarios including starting tube feeds at low rate and monitoring frequently and medicating for nausea prophylactically and/or as needed.  After further discussion family remained unsure whether they would like to start artificial nutrition.  Encouraged them to have ongoing discussions and if do wish to begin to notify nursing.  They demonstrated understanding.  We also discussed alternative treatment options.  Sangita shared she and Mr. Lucero had good discussion earlier this morning.  Reports he shared he understood he was not going to get better and is dying.  Support provided.  Shared they discussed his wishes including  arrangements.  Sangita reports he wanted to return home and she wants to be able to do this for him.  We discussed transition to comfort measures including details and expectations.  She expressed wishes to continue with current measures however confirmed would not wish to undergo CPR and/or intubation.  She stated \"We are going to continue everything else until the doctor tells me we have done all we can do.\"  Explained importance of advocating for Mr. Lucero if he no longer wishes to continue with aggressive measures as patient's can go through many aggressive interventions before being told \"done all we can do.\"  She demonstrated understanding.  Reports they have had discussions with other providers regarding wishes.  Reports plans to treat pneumonia in hospital before returning home.  Shared once he has been treated for this they are interested in returning home with hospice services.  Reflected on experience with past family " member and hospice.  For now plans to continue with current measures.  Encouraged ongoing discussions if/when he experiences decline.  Family appreciative of discussion.  Denied any questions/concerns at this time.  Encouraged if arise.  Support provided.    The patient receives support from his spouse, children, and extended family. Patient's spouse is his next of kin.    Due to the palliative care topics discussed including goals of care, treatment options, and hospice services we will establish an advance care plan.    Goals of care: Ongoing.    Review of Systems   Unable to perform ROS: Other   Cardiovascular:  Negative for chest pain.   Respiratory:  Negative for shortness of breath.        Pain Assessment  Nonverbal Indicators of Pain: nonverbal indicators absent  CPOT and PAINAD Scales: PAINAD (Pain Assessment in Advance Dementia Scale)  CPOT Facial Expression: 1-->tense  CPOT Body Movements: 1-->protection  CPOT Muscle Tension: 1-->tense, rigid  Ventilator Compliance/Vocalization: 0-->talking in normal tone or no sound  CPOT Score: 0  PAINAD Breathin-->occasional labored breathing, short period of hyperventilation  PAINAD Negative Vocalization: 1-->occasional moan/groan, low speech, negative/disapproving quality  PAINAD Facial Expression: 0-->smiling or inexpressive  PAINAD Body Language: 0-->relaxed  PAINAD Consolability: 0-->no need to console  PAINAD Score: 2  Pain Location: abdomen  Pain Description: intermittent  Objective   Diagnostics: Reviewed      Intake/Output Summary (Last 24 hours) at 2025 0800  Last data filed at 2025 1400  Gross per 24 hour   Intake 100 ml   Output 500 ml   Net -400 ml     Current Facility-Administered Medications   Medication Dose Route Frequency Provider Last Rate Last Admin    acetaminophen (TYLENOL) tablet 650 mg  650 mg Per G Tube Q4H PRN Chinmay Meza PA-C   650 mg at 25    Or    acetaminophen (TYLENOL) 160 MG/5ML oral solution 650 mg  650 mg  Per G Tube Q4H PRN Chinmay Meza PA-C   650 mg at 04/23/25 0626    Or    acetaminophen (TYLENOL) suppository 650 mg  650 mg Rectal Q4H PRN Chinmay Meza PA-C        sennosides-docusate (PERICOLACE) 8.6-50 MG per tablet 2 tablet  2 tablet Per G Tube BID PRN Chinmay Meza PA-C        And    polyethylene glycol (MIRALAX) packet 17 g  17 g Per G Tube Daily PRN Chinmay Meza PA-C        And    bisacodyl (DULCOLAX) suppository 10 mg  10 mg Rectal Daily PRN Chinmay Meza PA-C        budesonide (PULMICORT) nebulizer solution 0.25 mg  0.25 mg Nebulization BID - RT Ahsan Dowell APRN   0.25 mg at 04/23/25 0619    busPIRone (BUSPAR) tablet 15 mg  15 mg Per G Tube Q12H Chimnay Meza PA-C   15 mg at 04/22/25 2010    clopidogrel (PLAVIX) tablet 75 mg  75 mg Per G Tube Daily Chinmay Meza PA-C   75 mg at 04/22/25 0926    DULoxetine (CYMBALTA) DR capsule 60 mg  60 mg Oral Daily Yossi Rosen MD   60 mg at 04/19/25 0814    Ferrous Sulfate 300 (60 Fe) MG/5ML solution 300 mg  300 mg Per G Tube Daily Chinmay Meza PA-C   300 mg at 04/22/25 1104    [Held by provider] gabapentin (NEURONTIN) capsule 300 mg  300 mg Oral Nightly Yossi Rosen MD   300 mg at 04/19/25 2025    guaifenesin (ROBITUSSIN) 100 MG/5ML liquid 400 mg  400 mg Per G Tube Q8H Chinmay Meza PA-C   400 mg at 04/23/25 0626    guaiFENesin-codeine (ROMILAR-AC) solution 5 mL  5 mL Oral Q6H PRN Yossi Rosen MD   5 mL at 04/19/25 1041    heparin (porcine) 5000 UNIT/ML injection 5,000 Units  5,000 Units Subcutaneous Q8H Chinmay Meza PA-C   5,000 Units at 04/23/25 0627    ipratropium-albuterol (DUO-NEB) nebulizer solution 3 mL  3 mL Nebulization 4x Daily - RT Ahsan Dowell APRN   3 mL at 04/23/25 0619    lactulose (CHRONULAC) solution for enema 300 mL  300 mL Rectal Once Jasvir Steven MD        lactulose solution 20 g  20 g Per G Tube BID Chinmay Meza, PRISCILLA   20 g at 04/22/25 0927    lansoprazole (PREVACID SOLUTAB)  disintegrating tablet Tablet Delayed Release Dispersible 30 mg  30 mg Per G Tube Q AM Chinmay Meza PA-C   30 mg at 04/23/25 0627    levothyroxine (SYNTHROID, LEVOTHROID) tablet 75 mcg  75 mcg Per G Tube Q AM Chinmay Meza PA-C   75 mcg at 04/23/25 0627    nystatin (MYCOSTATIN) 100,000 unit/mL suspension 500,000 Units  5 mL Swish & Swallow 4x Daily Chinmay Meza PA-C   500,000 Units at 04/22/25 1252    ondansetron ODT (ZOFRAN-ODT) disintegrating tablet 4 mg  4 mg Translingual Q6H PRN Chinmay Meza PA-C        Or    ondansetron (ZOFRAN) injection 4 mg  4 mg Intravenous Q6H PRN Chinmay Meza PA-C   4 mg at 04/23/25 0627    piperacillin-tazobactam (ZOSYN) 4.5 g IVPB in 100 mL NS MBP (CD)  4.5 g Intravenous Q8H Figueroa Chaves MD   4.5 g at 04/23/25 0643    ranolazine (RANEXA) 12 hr tablet 1,000 mg  1,000 mg Oral Q12H Yossi Rosen MD   1,000 mg at 04/21/25 2157    rosuvastatin (CRESTOR) tablet 40 mg  40 mg Per G Tube Nightly Chinmay Meza PA-C   40 mg at 04/22/25 2011    scopolamine patch 1 mg/72 hr  1 patch Transdermal Q72H Figueroa Chaves MD   1 patch at 04/22/25 2237    sodium chloride 0.9 % flush 10 mL  10 mL Intravenous Q12H Yossi Rosen MD   10 mL at 04/22/25 2011    sodium chloride 0.9 % flush 10 mL  10 mL Intravenous PRN Yossi Rosen MD        sodium chloride 0.9 % infusion 40 mL  40 mL Intravenous Once Yossi Rosen MD        vancomycin (VANCOCIN) 750 mg in 0.9% NaCl 250 mL  750 mg Intravenous Q24H Figueroa Chaves .3 mL/hr at 04/23/25 0643 750 mg at 04/23/25 0643          acetaminophen **OR** acetaminophen **OR** acetaminophen    senna-docusate sodium **AND** polyethylene glycol **AND** [DISCONTINUED] bisacodyl **AND** bisacodyl    guaiFENesin-codeine    ondansetron ODT **OR** ondansetron    sodium chloride  Current medications patient is presently taking including all prescriptions, over-the-counter, herbals and vitamin/mineral/dietary (nutritional)  "supplements with reviewed including route, type, dose and frequency and are current per MAR at time of dictation.    Assessment:  Vital Signs: /70 (BP Location: Right arm, Patient Position: Lying)   Pulse 97   Temp 98.4 °F (36.9 °C) (Axillary)   Resp 20   Ht 182.9 cm (72\")   Wt 87.5 kg (192 lb 14.4 oz)   SpO2 98%   BMI 26.16 kg/m²     Physical Exam  Vitals and nursing note reviewed.   Constitutional:       General: He is sleeping. He is not in acute distress.     Appearance: He is ill-appearing.      Interventions: Nasal cannula in place.   HENT:      Head: Normocephalic and atraumatic.      Nose:      Comments: DHT present.   Eyes:      General: Lids are normal.      Extraocular Movements: Extraocular movements intact.   Neck:      Vascular: No JVD.      Trachea: Trachea normal.   Cardiovascular:      Rate and Rhythm: Tachycardia present.   Pulmonary:      Effort: Pulmonary effort is normal.   Musculoskeletal:      Cervical back: Neck supple.   Skin:     General: Skin is warm and dry.   Neurological:      Mental Status: He is oriented to person, place, and time and easily aroused.     Functional status: Palliative Performance Scale Score: Performance 30% based on the following measures: Ambulation: Totally bed bound, Activity and Evidence of Disease: Unable to do any work, extensive evidence of disease, Self-Care: Total care required,  Intake: Reduced, LOC: Full, drowsy or confusion.  Nutritional status: Albumin 2.6. Body mass index is 26.16 kg/m².  Patient status: Disease state: Controlled with current treatments.    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 Lucero is having difficulty staying awake and demonstrating ability to make complex medical decisions at time of exam.  Spoke with his spouse, Sangita, and daughter at bedside.  Details of discussion above.   Family remain unsure of whether or not they would like to start artifical nutrition through DHT at this time due to concerns for aspiration and nausea.  Encouraged ongoing discussions.   Sangita shared wishes to continue with current measures however confirmed would not wish to undergo CPR and/or intubation.    She stated \"We are going to continue everything else until the doctor tells me we have done all we can do.\"  Explained importance of advocating for Mr. Lucero if he no longer wishes to continue with aggressive measures as patient's can go through many aggressive interventions before being told \"done all we can do.\"   Alternative treatment options discussed.   Interested in home with hospice once he completes treatment for pneumonia.   Wish to continue current measures at this time before making any decisions.   Encouraged ongoing discussions if/when he experiences decline.  Family appreciative of discussion.  Denied any questions/concerns.  Encouraged if arise.  Support provided.     Thank you for allowing us to participate in patient's plan of care. Palliative Care Team will continue to follow patient.   I spent an additional 28 minutes on advance care planning, goals of care, and code status discussion.  Consent was obtained for ACP discussion.     Electronically signed by, NEAL Ruiz, 04/23/25.    "

## 2025-04-23 NOTE — PLAN OF CARE
Goal Outcome Evaluation:   Progress: no change     Patient AAOx4. Able to make needs known. Family still refusing feedings at this time. Ivs intact and patent. O2@4L BNC. Suction at bedside. Denies pain at this time. Bed low and locked with call light in reach. Family at bedside.

## 2025-04-24 NOTE — PROGRESS NOTES
[facility]  Progress Note  Patient Name: Franko Lucero  Date of Admission: 4/17/2025  Today's Date: 04/24/25     Code Status and Medical Interventions: No CPR (Do Not Attempt to Resuscitate); Limited Support; No intubation (DNI)   Ordered at: 04/21/25 0959     Code Status (Patient has no pulse and is not breathing):    No CPR (Do Not Attempt to Resuscitate)     Medical Interventions (Patient has pulse or is breathing):    Limited Support     Medical Intervention Limits:    No intubation (DNI)     Level Of Support Discussed With:    Health Care Surrogate     Subjective   Chief complaint/Reason for Referral/Visit: Follow up on Goals of Care/Advance Care Planning and Support for Patient/Family.    Medical record reviewed.  Events noted.  Labs collected this morning reviewed.  Renal function continues to decline with creatinine 2.41 and GFR 28.0.  Alkaline phosphatase trending upward as well as AST.  He is lying in bed, asleep, and in no apparent distress.  DHT has been removed.  His spouse and granddaughter at bedside.    Advance Care Planning   Advanced Directives: No advance directive on file.   Advance Care Planning Discussion: Mr. Lucero is somnolent at time of exam.  Spouse reports he just received dose of pain medication and has been able to rest.  Spoke with Mr. Lucero's spouse/next of kin, Sangita, and granddaughter at bedside.  She reflected on previous discussions.  Shared they have decided not to pursue artifical nutrition/feeding tube.  Reports plans to discharge home with hospice.  Discussed treatment options/medical priorities while hospitalized including comfort measures.  Expressed wishes to transition to comfort measures and focus on symptom management.  Wishes to complete IV antibiotics.  Hospice referral made by attending.  Family are anticipating him returning home with hospice at discharge.  Briefly discussed MOST form.  Interested in completing.   Initiating with assistance of palliative SW.  They were appreciative of visit.  Denied any questions/concerns at this time.  Encouraged if arise.  Support provided.     The patient receives support from his spouse, children, and extended family. Patient's spouse is his next of kin.    Due to the palliative care topics discussed including goals of care, treatment options, and hospice services we will establish an advance care plan.    Goals of care: Ongoing.    Review of Systems   Unable to perform ROS: Other       Pain Assessment  Nonverbal Indicators of Pain: nonverbal indicators absent  CPOT and PAINAD Scales: PAINAD (Pain Assessment in Advance Dementia Scale)  CPOT Facial Expression: 0-->relaxed, neutral  CPOT Body Movements: 0-->absence of movements  CPOT Muscle Tension: 0-->relaxed  Ventilator Compliance/Vocalization: 0-->talking in normal tone or no sound  CPOT Score: 0  PAINAD Breathin-->normal  PAINAD Negative Vocalization: 0-->none  PAINAD Facial Expression: 0-->smiling or inexpressive  PAINAD Body Language: 0-->relaxed  PAINAD Consolability: 0-->no need to console  PAINAD Score: 0  Pain Location: hip  Pain Description: intermittent  Objective   Diagnostics: Reviewed    No intake or output data in the 24 hours ending 25 1037    Current Facility-Administered Medications   Medication Dose Route Frequency Provider Last Rate Last Admin    acetaminophen (TYLENOL) tablet 650 mg  650 mg Per G Tube Q4H PRN Chinmay Meza PA-C   650 mg at 25    Or    acetaminophen (TYLENOL) 160 MG/5ML oral solution 650 mg  650 mg Per G Tube Q4H PRN Chinmay Meza PA-C   650 mg at 25 1300    Or    acetaminophen (TYLENOL) suppository 650 mg  650 mg Rectal Q4H PRN Chinmay Meza PA-C        sennosides-docusate (PERICOLACE) 8.6-50 MG per tablet 2 tablet  2 tablet Per G Tube BID PRN Chinmay Meza PA-C   2 tablet at 25 1259    And    polyethylene glycol (MIRALAX) packet 17 g  17 g Per G Tube Daily  PRN Chinmay Meza PA-C        And    bisacodyl (DULCOLAX) suppository 10 mg  10 mg Rectal Daily PRN Chinmay Meza PA-C        budesonide (PULMICORT) nebulizer solution 0.25 mg  0.25 mg Nebulization BID - RT Ahsan Dowell APRN   0.25 mg at 04/24/25 0726    busPIRone (BUSPAR) tablet 15 mg  15 mg Per G Tube Q12H Chinmay Meza PA-C   15 mg at 04/23/25 2222    clopidogrel (PLAVIX) tablet 75 mg  75 mg Per G Tube Daily Chinmay Meza PA-C   75 mg at 04/23/25 0933    DULoxetine (CYMBALTA) DR capsule 60 mg  60 mg Oral Daily Yossi Rosen MD   60 mg at 04/23/25 0933    Ferrous Sulfate 300 (60 Fe) MG/5ML solution 300 mg  300 mg Per G Tube Daily Chinmay Meza PA-C   300 mg at 04/23/25 1300    [Held by provider] gabapentin (NEURONTIN) capsule 300 mg  300 mg Oral Nightly Yossi Rosen MD   300 mg at 04/19/25 2025    guaifenesin (ROBITUSSIN) 100 MG/5ML liquid 400 mg  400 mg Per G Tube Q8H Chinmay Meza PA-C   400 mg at 04/23/25 2222    guaiFENesin-codeine (ROMILAR-AC) solution 5 mL  5 mL Oral Q6H PRN Yossi Rosen MD   5 mL at 04/19/25 1041    heparin (porcine) 5000 UNIT/ML injection 5,000 Units  5,000 Units Subcutaneous Q8H Chinmay Meza PA-C   5,000 Units at 04/24/25 0604    HYDROmorphone (DILAUDID) injection 0.5 mg  0.5 mg Intravenous Q4H PRN Debo Bernal PA   0.5 mg at 04/24/25 0935    ipratropium-albuterol (DUO-NEB) nebulizer solution 3 mL  3 mL Nebulization 4x Daily - RT Ahsan Dowell APRN   3 mL at 04/24/25 0726    lactulose (CHRONULAC) solution for enema 300 mL  300 mL Rectal Once Jasvir Steven MD        lactulose solution 20 g  20 g Per G Tube BID Chinmay Meza PA-C   20 g at 04/23/25 2221    lansoprazole (PREVACID SOLUTAB) disintegrating tablet Tablet Delayed Release Dispersible 30 mg  30 mg Per G Tube Q AM Chinmay Meza PA-C   30 mg at 04/23/25 0627    levothyroxine (SYNTHROID, LEVOTHROID) tablet 75 mcg  75 mcg Per G Tube Q AM Chinmay Meza PA-C   75  "mcg at 04/23/25 0627    nystatin (MYCOSTATIN) 100,000 unit/mL suspension 500,000 Units  5 mL Swish & Swallow 4x Daily Chinmay Meza PA-C   500,000 Units at 04/23/25 2221    ondansetron ODT (ZOFRAN-ODT) disintegrating tablet 4 mg  4 mg Translingual Q6H PRN Chinmay Meza PA-C        Or    ondansetron (ZOFRAN) injection 4 mg  4 mg Intravenous Q6H PRN Chinmay Meza PA-C   4 mg at 04/23/25 2338    piperacillin-tazobactam (ZOSYN) 4.5 g IVPB in 100 mL NS MBP (CD)  4.5 g Intravenous Q8H Figueroa Chaves MD   4.5 g at 04/24/25 0330    ranolazine (RANEXA) 12 hr tablet 1,000 mg  1,000 mg Oral Q12H Yossi Rosen MD   1,000 mg at 04/23/25 2222    rosuvastatin (CRESTOR) tablet 40 mg  40 mg Per G Tube Nightly Chinmay Meza PA-C   40 mg at 04/23/25 2222    scopolamine patch 1 mg/72 hr  1 patch Transdermal Q72H Figueroa Chaves MD   1 patch at 04/22/25 2237    sodium chloride 0.9 % flush 10 mL  10 mL Intravenous Q12H Yossi Rosen MD   10 mL at 04/24/25 0936    sodium chloride 0.9 % flush 10 mL  10 mL Intravenous PRN Yossi Rosen MD        sodium chloride 0.9 % infusion 40 mL  40 mL Intravenous Once Yossi Rosen MD              acetaminophen **OR** acetaminophen **OR** acetaminophen    senna-docusate sodium **AND** polyethylene glycol **AND** [DISCONTINUED] bisacodyl **AND** bisacodyl    guaiFENesin-codeine    HYDROmorphone    ondansetron ODT **OR** ondansetron    sodium chloride  Current medications patient is presently taking including all prescriptions, over-the-counter, herbals and vitamin/mineral/dietary (nutritional) supplements with reviewed including route, type, dose and frequency and are current per MAR at time of dictation.    Assessment:  Vital Signs: /53 (BP Location: Right arm, Patient Position: Lying)   Pulse 105   Temp 98 °F (36.7 °C) (Oral)   Resp 18   Ht 182.9 cm (72\")   Wt 87.5 kg (192 lb 14.4 oz)   SpO2 92%   BMI 26.16 kg/m²     Physical Exam  Vitals and " nursing note reviewed.   Constitutional:       General: He is sleeping. He is not in acute distress.     Appearance: He is ill-appearing.      Interventions: Nasal cannula in place.   HENT:      Head: Normocephalic and atraumatic.   Eyes:      General: Lids are normal.      Extraocular Movements: Extraocular movements intact.   Neck:      Vascular: No JVD.      Trachea: Trachea normal.   Cardiovascular:      Rate and Rhythm: Tachycardia present.   Pulmonary:      Effort: Pulmonary effort is normal.   Musculoskeletal:      Cervical back: Neck supple.   Skin:     General: Skin is warm and dry.     Functional status: Palliative Performance Scale Score: Performance 30% based on the following measures: Ambulation: Totally bed bound, Activity and Evidence of Disease: Unable to do any work, extensive evidence of disease, Self-Care: Total care required,  Intake: Reduced, LOC: Full, drowsy or confusion.  Nutritional status: Albumin 2.5. Body mass index is 26.16 kg/m².  Patient status: Disease state: No further treatment being pursued.    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   Mr. Lucero is somnolent at time of exam.   Spoke with Mr. Lucero's spouse/next of kin, Sangita, and granddaughter at bedside.  Details of discussion above.   Shared Mr. Lucero did not wish to pursue artifical nutrition/feeding tube placement.   Treatment options discussed further.  Expressed wishes to transition to comfort focused care.    Interested in returning home with hospice services.  Hospice consult placed by attending.   Discussed MOST form.  Interested in completing.  Initiating with assistance of palliative SW.   Family appreciative of discussion.  Denied any questions/concerns.  Encouraged if arise.  Support provided.     2.  Comfort Measures   Discontinue orders not in line with comfort.  Has been unable to tolerate PO due to aspiration and NG tube has been removed.    Wishes to complete IV antibiotics.    Palliative/comfort adjuvants ordered as needed.      Thank you for allowing us to participate in patient's plan of care. Palliative Care Team will continue to follow patient.   Electronically signed by, NEAL Ruiz, 04/24/25.

## 2025-04-24 NOTE — PAYOR COMM NOTE
"Alex Lucero (69 y.o. Male) [id number]   Continued Stay Clinical update  [facility] [phone]       Date of Birth   1955    Social Security Number       Address   [address] [facility] [address]    Home Phone   [phone]    MRN   [id number]       [employer]abaptist   [facility]    Marital Status                               Admission Date   4/17/2025    Admission Type   Urgent    Admitting Provider   Yossi Rosen MD    Attending Provider   Figueroa Chaves MD    Department, Room/Bed   [facility] 3A, 359/1       Discharge Date       Discharge Disposition       Discharge Destination                                 Attending Provider: Figueroa Chaves MD    Allergies: Adhesive Tape, Cefazolin, Cefuroxime Axetil, Cephalosporins, Neomycin-polymyxin B Gu, Percocet [Oxycodone-acetaminophen]    Isolation: Contact   Infection: MRSA (07/15/23)   Code Status: No CPR    Ht: 182.9 cm (72\")   Wt: 87.5 kg (192 lb 14.4 oz)    Admission Cmt: None   Principal Problem: AMS (altered mental status) [R41.82]                   Active Insurance as of 4/17/2025       Primary Coverage       Payor Plan Insurance Group Employer/Plan Group    AETNA MEDICARE REPLACEMENT AETNA MEDICARE ADVANTAGE O [id number]       Payor Plan Address Payor Plan Phone Number Payor Plan Fax Number Effective Dates    [address] [phone]  1/1/2024 - None Entered    [facility] [id number]         Subscriber Name Subscriber Birth Date Member ID       ALEX LUCERO 1955 [id number]                     Emergency Contacts        (Rel.) Home Phone Work Phone Mobile Phone    Sangita Lucero (Spouse) [phone] -- [phone]              Vital Signs (last day)       Date/Time Temp Temp src Pulse Resp BP Patient Position SpO2    04/24/25 0900 98 (36.7) Oral 105 18 101/53 Lying 92    04/24/25 0735 -- -- -- -- -- -- 93    04/24/25 0726 -- -- 102 18 -- -- 95    04/24/25 0358 " -- -- 102 16 116/62 Lying 95    04/24/25 0004 -- -- 107 16 110/57 Lying 93    04/23/25 2003 97.7 (36.5) Oral -- 20 100/56 Lying 98    04/23/25 1837 -- -- 98 18 -- Lying 94    04/23/25 1826 -- -- 96 18 -- Lying 95    04/23/25 1537 96.8 (36) Axillary 96 16 97/54 Lying 99    04/23/25 1356 -- -- 98 20 -- -- 100    04/23/25 1350 -- -- 98 20 -- -- 95    04/23/25 1152 98 (36.7) Oral 96 20 116/78 Lying 98    04/23/25 1009 -- -- 98 20 -- -- 100    04/23/25 1003 -- -- 98 20 -- -- 93    04/23/25 0852 97.5 (36.4) Oral 99 20 99/55 Lying 97    04/23/25 0630 -- -- 97 20 -- -- 98    04/23/25 0619 -- -- 97 22 -- -- 95          Current Facility-Administered Medications   Medication Dose Route Frequency Provider Last Rate Last Admin    acetaminophen (TYLENOL) tablet 650 mg  650 mg Per G Tube Q4H PRN Chinmay Meza PA-C   650 mg at 04/22/25 2009    Or    acetaminophen (TYLENOL) 160 MG/5ML oral solution 650 mg  650 mg Per G Tube Q4H PRN Chinmay Meza PA-C   650 mg at 04/23/25 1300    Or    acetaminophen (TYLENOL) suppository 650 mg  650 mg Rectal Q4H PRN Chinmay Meza PA-C        sennosides-docusate (PERICOLACE) 8.6-50 MG per tablet 2 tablet  2 tablet Per G Tube BID PRN Chinmay Meza PA-C   2 tablet at 04/23/25 1259    And    polyethylene glycol (MIRALAX) packet 17 g  17 g Per G Tube Daily PRN Chinmay Meza PA-C        And    bisacodyl (DULCOLAX) suppository 10 mg  10 mg Rectal Daily PRN Chinmay Meza PA-C        HYDROmorphone (DILAUDID) injection 0.5 mg  0.5 mg Intravenous Q4H PRN Debo Bernal PA   0.5 mg at 04/24/25 0935    ipratropium-albuterol (DUO-NEB) nebulizer solution 3 mL  3 mL Nebulization Q6H PRN Milvia Crenshaw APRN        LORazepam (ATIVAN) injection 0.5 mg  0.5 mg Intravenous Q1H PRN Milvia Crenshaw APRN        Or    LORazepam (ATIVAN) 2 MG/ML concentrated solution 0.5 mg  0.5 mg Sublingual Q1H PRN Milvia Crenshaw APRN        LORazepam (ATIVAN) injection 1 mg  1 mg Intravenous Q1H PRN  Milvia Crenshaw APRN        Or    LORazepam (ATIVAN) 2 MG/ML concentrated solution 1 mg  1 mg Sublingual Q1H PRN Milvia Crenshaw APRN        morphine concentrated solution 10 mg  10 mg Sublingual Q1H PRN Milvia Crenshaw APRN        morphine concentrated solution 5 mg  5 mg Sublingual Q1H PRN Milvia Crenshaw APRN        ondansetron ODT (ZOFRAN-ODT) disintegrating tablet 4 mg  4 mg Translingual Q6H PRN Chinmay Meza PA-C        Or    ondansetron (ZOFRAN) injection 4 mg  4 mg Intravenous Q6H PRN Chinmay Meza PA-C   4 mg at 04/23/25 2338    piperacillin-tazobactam (ZOSYN) 4.5 g IVPB in 100 mL NS MBP (CD)  4.5 g Intravenous Q8H Figueroa Chaves MD   4.5 g at 04/24/25 0330    prochlorperazine (COMPAZINE) injection 5 mg  5 mg Intravenous Q6H PRN Milvia Crenshaw APRN        Or    prochlorperazine (COMPAZINE) tablet 5 mg  5 mg Oral Q6H PRN Milvia Crenshaw APRN        Or    prochlorperazine (COMPAZINE) suppository 25 mg  25 mg Rectal Q12H PRN Milvia Crenshaw APRN        scopolamine patch 1 mg/72 hr  1 patch Transdermal Q72H Figueroa Chaves MD   1 patch at 04/22/25 2237    sodium chloride 0.9 % flush 10 mL  10 mL Intravenous Q12H Yossi Rosen MD   10 mL at 04/24/25 0936    sodium chloride 0.9 % flush 10 mL  10 mL Intravenous PRN Yossi Rosen MD        sodium chloride 0.9 % infusion 40 mL  40 mL Intravenous Once Yossi Rosen MD            Physician Progress Notes (last 24 hours)        Milvia Crenshaw APRN at 04/24/25 1010              [facility]  Progress Note  Patient Name: Franko Lucero  Date of Admission: 4/17/2025  Today's Date: 04/24/25     Code Status and Medical Interventions: No CPR (Do Not Attempt to Resuscitate); Limited Support; No intubation (DNI)   Ordered at: 04/21/25 0959     Code Status (Patient has no pulse and is not breathing):    No CPR (Do Not Attempt to Resuscitate)     Medical Interventions (Patient has pulse or  is breathing):    Limited Support     Medical Intervention Limits:    No intubation (DNI)     Level Of Support Discussed With:    Health Care Surrogate     Subjective   Chief complaint/Reason for Referral/Visit: Follow up on Goals of Care/Advance Care Planning and Support for Patient/Family.    Medical record reviewed.  Events noted.  Labs collected this morning reviewed.  Renal function continues to decline with creatinine 2.41 and GFR 28.0.  Alkaline phosphatase trending upward as well as AST.  He is lying in bed, asleep, and in no apparent distress.  DHT has been removed.  His spouse and granddaughter at bedside.    Advance Care Planning   Advanced Directives: No advance directive on file.   Advance Care Planning Discussion: Mr. Lucero is somnolent at time of exam.  Spouse reports he just received dose of pain medication and has been able to rest.  Spoke with Mr. Lucero's spouse/next of kin, Sangita, and granddaughter at bedside.  She reflected on previous discussions.  Shared they have decided not to pursue artifical nutrition/feeding tube.  Reports plans to discharge home with hospice.  Discussed treatment options/medical priorities while hospitalized including comfort measures.  Expressed wishes to transition to comfort measures and focus on symptom management.  Wishes to complete IV antibiotics.  Hospice referral made by attending.  Family are anticipating him returning home with hospice at discharge.  Briefly discussed MOST form.  Interested in completing.  Initiating with assistance of palliative SW.  They were appreciative of visit.  Denied any questions/concerns at this time.  Encouraged if arise.  Support provided.     The patient receives support from his spouse, children, and extended family. Patient's spouse is his next of kin.    Due to the palliative care topics discussed including goals of care, treatment options, and hospice services we will establish an advance care plan.    Goals of care:  Ongoing.    Review of Systems   Unable to perform ROS: Other       Pain Assessment  Nonverbal Indicators of Pain: nonverbal indicators absent  CPOT and PAINAD Scales: PAINAD (Pain Assessment in Advance Dementia Scale)  CPOT Facial Expression: 0-->relaxed, neutral  CPOT Body Movements: 0-->absence of movements  CPOT Muscle Tension: 0-->relaxed  Ventilator Compliance/Vocalization: 0-->talking in normal tone or no sound  CPOT Score: 0  PAINAD Breathin-->normal  PAINAD Negative Vocalization: 0-->none  PAINAD Facial Expression: 0-->smiling or inexpressive  PAINAD Body Language: 0-->relaxed  PAINAD Consolability: 0-->no need to console  PAINAD Score: 0  Pain Location: hip  Pain Description: intermittent  Objective   Diagnostics: Reviewed    No intake or output data in the 24 hours ending 25 1037    Current Facility-Administered Medications   Medication Dose Route Frequency Provider Last Rate Last Admin    acetaminophen (TYLENOL) tablet 650 mg  650 mg Per G Tube Q4H PRN Chinmay Meza PA-C   650 mg at 25    Or    acetaminophen (TYLENOL) 160 MG/5ML oral solution 650 mg  650 mg Per G Tube Q4H PRN Chinmay Meza PA-C   650 mg at 25 1300    Or    acetaminophen (TYLENOL) suppository 650 mg  650 mg Rectal Q4H PRN Chnimay Meza PA-C        sennosides-docusate (PERICOLACE) 8.6-50 MG per tablet 2 tablet  2 tablet Per G Tube BID PRN Chinmay Meza PA-C   2 tablet at 25 1259    And    polyethylene glycol (MIRALAX) packet 17 g  17 g Per G Tube Daily PRN Chinmay Meza PA-C        And    bisacodyl (DULCOLAX) suppository 10 mg  10 mg Rectal Daily PRN Chinmay Meza PA-C        budesonide (PULMICORT) nebulizer solution 0.25 mg  0.25 mg Nebulization BID - RT Ahsan Dowell APRN   0.25 mg at 25 0726    busPIRone (BUSPAR) tablet 15 mg  15 mg Per G Tube Q12H Chinmay Meza PA-C   15 mg at 25 2222    clopidogrel (PLAVIX) tablet 75 mg  75 mg Per G Tube Daily Chinmay Meza, PA-C    75 mg at 04/23/25 0933    DULoxetine (CYMBALTA) DR capsule 60 mg  60 mg Oral Daily Yossi Rosen MD   60 mg at 04/23/25 0933    Ferrous Sulfate 300 (60 Fe) MG/5ML solution 300 mg  300 mg Per G Tube Daily Chinmay Meza PA-C   300 mg at 04/23/25 1300    [Held by provider] gabapentin (NEURONTIN) capsule 300 mg  300 mg Oral Nightly Yossi Rosen MD   300 mg at 04/19/25 2025    guaifenesin (ROBITUSSIN) 100 MG/5ML liquid 400 mg  400 mg Per G Tube Q8H Chinmay Meza PA-C   400 mg at 04/23/25 2222    guaiFENesin-codeine (ROMILAR-AC) solution 5 mL  5 mL Oral Q6H PRN Yossi Rosen MD   5 mL at 04/19/25 1041    heparin (porcine) 5000 UNIT/ML injection 5,000 Units  5,000 Units Subcutaneous Q8H Chinmay Meza PA-C   5,000 Units at 04/24/25 0604    HYDROmorphone (DILAUDID) injection 0.5 mg  0.5 mg Intravenous Q4H PRN Debo Bernal PA   0.5 mg at 04/24/25 0935    ipratropium-albuterol (DUO-NEB) nebulizer solution 3 mL  3 mL Nebulization 4x Daily - RT Ahsan Dowell APRN   3 mL at 04/24/25 0726    lactulose (CHRONULAC) solution for enema 300 mL  300 mL Rectal Once Jasvir Steven MD        lactulose solution 20 g  20 g Per G Tube BID Chinmay Meza PA-C   20 g at 04/23/25 2221    lansoprazole (PREVACID SOLUTAB) disintegrating tablet Tablet Delayed Release Dispersible 30 mg  30 mg Per G Tube Q AM Chinmay Meza PA-C   30 mg at 04/23/25 0627    levothyroxine (SYNTHROID, LEVOTHROID) tablet 75 mcg  75 mcg Per G Tube Q AM Chinmay Meza PA-C   75 mcg at 04/23/25 0627    nystatin (MYCOSTATIN) 100,000 unit/mL suspension 500,000 Units  5 mL Swish & Swallow 4x Daily Chinmay Meza PA-C   500,000 Units at 04/23/25 2221    ondansetron ODT (ZOFRAN-ODT) disintegrating tablet 4 mg  4 mg Translingual Q6H PRN Chinmay Meza PA-C        Or    ondansetron (ZOFRAN) injection 4 mg  4 mg Intravenous Q6H PRN Chinmay Meza PA-C   4 mg at 04/23/25 2338    piperacillin-tazobactam (ZOSYN) 4.5 g IVPB in 100  "mL NS MBP (CD)  4.5 g Intravenous Q8H Figueroa Chaves MD   4.5 g at 04/24/25 0330    ranolazine (RANEXA) 12 hr tablet 1,000 mg  1,000 mg Oral Q12H Yossi Rosen MD   1,000 mg at 04/23/25 2222    rosuvastatin (CRESTOR) tablet 40 mg  40 mg Per G Tube Nightly Chinmay Meza PA-C   40 mg at 04/23/25 2222    scopolamine patch 1 mg/72 hr  1 patch Transdermal Q72H Figueroa Chaves MD   1 patch at 04/22/25 2237    sodium chloride 0.9 % flush 10 mL  10 mL Intravenous Q12H Yossi Rosen MD   10 mL at 04/24/25 0936    sodium chloride 0.9 % flush 10 mL  10 mL Intravenous PRN Yossi Rosen MD        sodium chloride 0.9 % infusion 40 mL  40 mL Intravenous Once oYssi Rosen MD              acetaminophen **OR** acetaminophen **OR** acetaminophen    senna-docusate sodium **AND** polyethylene glycol **AND** [DISCONTINUED] bisacodyl **AND** bisacodyl    guaiFENesin-codeine    HYDROmorphone    ondansetron ODT **OR** ondansetron    sodium chloride  Current medications patient is presently taking including all prescriptions, over-the-counter, herbals and vitamin/mineral/dietary (nutritional) supplements with reviewed including route, type, dose and frequency and are current per MAR at time of dictation.    Assessment:  Vital Signs: /53 (BP Location: Right arm, Patient Position: Lying)   Pulse 105   Temp 98 °F (36.7 °C) (Oral)   Resp 18   Ht 182.9 cm (72\")   Wt 87.5 kg (192 lb 14.4 oz)   SpO2 92%   BMI 26.16 kg/m²     Physical Exam  Vitals and nursing note reviewed.   Constitutional:       General: He is sleeping. He is not in acute distress.     Appearance: He is ill-appearing.      Interventions: Nasal cannula in place.   HENT:      Head: Normocephalic and atraumatic.   Eyes:      General: Lids are normal.      Extraocular Movements: Extraocular movements intact.   Neck:      Vascular: No JVD.      Trachea: Trachea normal.   Cardiovascular:      Rate and Rhythm: Tachycardia present. "   Pulmonary:      Effort: Pulmonary effort is normal.   Musculoskeletal:      Cervical back: Neck supple.   Skin:     General: Skin is warm and dry.     Functional status: Palliative Performance Scale Score: Performance 30% based on the following measures: Ambulation: Totally bed bound, Activity and Evidence of Disease: Unable to do any work, extensive evidence of disease, Self-Care: Total care required,  Intake: Reduced, LOC: Full, drowsy or confusion.  Nutritional status: Albumin 2.5. Body mass index is 26.16 kg/m².  Patient status: Disease state: No further treatment being pursued.    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   Mr. Lucero is somnolent at time of exam.  Spoke with Mr. Lucero's spouse/next of kin, Sangita, and granddaughter at bedside.  Details of discussion above.   Shared Mr. Lucero did not wish to pursue artifical nutrition/feeding tube placement.   Treatment options discussed further.  Expressed wishes to transition to comfort focused care.    Interested in returning home with hospice services.  Hospice consult placed by attending.   Discussed MOST form.  Interested in completing.  Initiating  with assistance of palliative SW.   Family appreciative of discussion.  Denied any questions/concerns.  Encouraged if arise.  Support provided.     2.  Comfort Measures   Discontinue orders not in line with comfort.  Has been unable to tolerate PO due to aspiration and NG tube has been removed.    Wishes to complete IV antibiotics.    Palliative/comfort adjuvants ordered as needed.      Thank you for allowing us to participate in patient's plan of care. Palliative Care Team will continue to follow patient.   Electronically signed by, NEAL Ruiz, 04/24/25.      Electronically signed by Milvia Crenshaw APRN at 04/24/25 1058       Figueroa Chaves MD at 04/24/25 0821            Daily Progress Note  Franko Lucero  MRN: [id number] LOS: 7    Admit Date: 4/17/2025 4/24/2025 08:22 CDT    Subjective:      Chief Complaint:  Respiratory failure    Interval History:    Reviewed overnight events and nursing notes.   A lot of congestion for which we started scopolamine last PM.   Overall little change    Review of Systems   Unable to perform ROS: Mental status change       DIET:  NPO Diet NPO Type: Strict NPO    Medications:        budesonide, 0.25 mg, Nebulization, BID - RT  busPIRone, 15 mg, Per G Tube, Q12H  clopidogrel, 75 mg, Per G Tube, Daily  DULoxetine, 60 mg, Oral, Daily  Ferrous Sulfate, 300 mg, Per G Tube, Daily  [Held by provider] gabapentin, 300 mg, Oral, Nightly  guaifenesin, 400 mg, Per G Tube, Q8H  heparin (porcine), 5,000 Units, Subcutaneous, Q8H  ipratropium-albuterol, 3 mL, Nebulization, 4x Daily - RT  lactulose, 300 mL, Rectal, Once  lactulose, 20 g, Per G Tube, BID  lansoprazole, 30 mg, Per G Tube, Q AM  levothyroxine, 75 mcg, Per G Tube, Q AM  nystatin, 5 mL, Swish & Swallow, 4x Daily  piperacillin-tazobactam, 4.5 g, Intravenous, Q8H  ranolazine, 1,000 mg, Oral, Q12H  rosuvastatin, 40 mg, Per G Tube, Nightly  Scopolamine, 1 patch, Transdermal, Q72H  sodium chloride, 10 mL, Intravenous,  Q12H  sodium chloride, 40 mL, Intravenous, Once  vancomycin, 750 mg, Intravenous, Q24H        Data:     Code Status:   Code Status and Medical Interventions: No CPR (Do Not Attempt to Resuscitate); Limited Support; No intubation (DNI)   Ordered at: 25 0959     Code Status (Patient has no pulse and is not breathing):    No CPR (Do Not Attempt to Resuscitate)     Medical Interventions (Patient has pulse or is breathing):    Limited Support     Medical Intervention Limits:    No intubation (DNI)     Level Of Support Discussed With:    Health Care Surrogate       Family History   Problem Relation Age of Onset    Kidney disease Father     Heart disease Father     Cancer Mother         brain    Colon cancer Neg Hx     Colon polyps Neg Hx      Social History     Socioeconomic History    Marital status:    Tobacco Use    Smoking status: Former     Current packs/day: 0.00     Types: Cigarettes     Quit date:      Years since quittin.3     Passive exposure: Never    Smokeless tobacco: Never   Vaping Use    Vaping status: Never Used   Substance and Sexual Activity    Alcohol use: No    Drug use: No    Sexual activity: Defer       Labs:    Lab Results (last 72 hours)       Procedure Component Value Units Date/Time    Lactic Acid, Plasma [[id number]]  (Abnormal) Collected: 25    Specimen: Blood Updated: 25 0511     Lactate 2.2 mmol/L     Comprehensive Metabolic Panel [[id number]]  (Abnormal) Collected: 25    Specimen: Blood Updated: 25 0459     Glucose 121 mg/dL      BUN 34 mg/dL      Creatinine 1.90 mg/dL      Sodium 140 mmol/L      Potassium 3.4 mmol/L      Chloride 101 mmol/L      CO2 23.0 mmol/L      Calcium 9.2 mg/dL      Total Protein 5.5 g/dL      Albumin 2.8 g/dL      ALT (SGPT) 38 U/L      AST (SGOT) 57 U/L      Alkaline Phosphatase 128 U/L      Total Bilirubin 0.9 mg/dL      Globulin 2.7 gm/dL      A/G Ratio 1.0 g/dL      BUN/Creatinine Ratio 17.9     Anion Gap 16.0  mmol/L      eGFR 37.2 mL/min/1.73     Narrative:      GFR Categories in Chronic Kidney Disease (CKD)      GFR Category          GFR (mL/min/1.73)    Interpretation  G1                     90 or greater         Normal or high (1)  G2                      60-89                Mild decrease (1)  G3a                   45-59                Mild to moderate decrease  G3b                   30-44                Moderate to severe decrease  G4                    15-29                Severe decrease  G5                    14 or less           Kidney failure          (1)In the absence of evidence of kidney disease, neither GFR category G1 or G2 fulfill the criteria for CKD.    eGFR calculation 2021 CKD-EPI creatinine equation, which does not include race as a factor    Magnesium [[id number]]  (Normal) Collected: 04/21/25 0349    Specimen: Blood Updated: 04/21/25 0459     Magnesium 2.1 mg/dL     Phosphorus [[id number]]  (Normal) Collected: 04/21/25 0349    Specimen: Blood Updated: 04/21/25 0459     Phosphorus 3.1 mg/dL     CBC & Differential [[id number]]  (Abnormal) Collected: 04/21/25 0349    Specimen: Blood Updated: 04/21/25 0441    Narrative:      The following orders were created for panel order CBC & Differential.  Procedure                               Abnormality         Status                     ---------                               -----------         ------                     CBC Auto Differential[[id number]]        Abnormal            Final result                 Please view results for these tests on the individual orders.    CBC Auto Differential [[id number]]  (Abnormal) Collected: 04/21/25 0349    Specimen: Blood Updated: 04/21/25 0441     WBC 15.29 10*3/mm3      RBC 2.53 10*6/mm3      Hemoglobin 9.3 g/dL      Hematocrit 29.8 %      .8 fL      MCH 36.8 pg      MCHC 31.2 g/dL      RDW 17.9 %      RDW-SD 77.9 fl      MPV 10.3 fL      Platelets 71 10*3/mm3      Neutrophil % 82.7 %      Lymphocyte % 6.3 %       Monocyte % 9.4 %      Eosinophil % 0.3 %      Basophil % 0.3 %      Immature Grans % 1.0 %      Neutrophils, Absolute 12.66 10*3/mm3      Lymphocytes, Absolute 0.96 10*3/mm3      Monocytes, Absolute 1.43 10*3/mm3      Eosinophils, Absolute 0.04 10*3/mm3      Basophils, Absolute 0.04 10*3/mm3      Immature Grans, Absolute 0.16 10*3/mm3     Blood Culture - Blood, Hand, Left [[id number]]  (Normal) Collected: 04/20/25 0039    Specimen: Blood from Hand, Left Updated: 04/21/25 0100     Blood Culture No growth at 24 hours    Blood Culture - Blood, Hand, Right [[id number]]  (Normal) Collected: 04/20/25 0039    Specimen: Blood from Hand, Right Updated: 04/21/25 0100     Blood Culture No growth at 24 hours    Ammonia [[id number]]  (Normal) Collected: 04/20/25 1142    Specimen: Blood Updated: 04/20/25 1209     Ammonia 43 umol/L     STAT Lactic Acid, Reflex [[id number]]  (Abnormal) Collected: 04/20/25 1142    Specimen: Blood Updated: 04/20/25 1209     Lactate 2.7 mmol/L     POC Glucose Once [[id number]]  (Normal) Collected: 04/20/25 1008    Specimen: Blood Updated: 04/20/25 1020     Glucose 125 mg/dL      Comment: : [id number] [facility] [employer] ID: [id number]       STAT Lactic Acid, Reflex [[id number]]  (Abnormal) Collected: 04/20/25 0800    Specimen: Blood Updated: 04/20/25 0852     Lactate 2.4 mmol/L     Blood Gas, Arterial - [[id number]]  (Abnormal) Collected: 04/20/25 0750    Specimen: Arterial Blood Updated: 04/20/25 0751     Site Right Brachial     Ronak's Test N/A     pH, Arterial 7.401 pH units      pCO2, Arterial 40.9 mm Hg      pO2, Arterial 79.8 mm Hg      Comment: 84 Value below reference range        HCO3, Arterial 25.3 mmol/L      Base Excess, Arterial 0.5 mmol/L      O2 Saturation, Arterial 96.5 %      Temperature 37.0     Barometric Pressure for Blood Gas 757 mmHg      Modality BiPap     FIO2 60 %      Ventilator Mode NA     IPAP 16     Comment: Meter: [id number]     :  Mauro Jimenez, RRT         EPAP 6     Collected by [id number]     pCO2, Temperature Corrected 40.9 mm Hg      pH, Temp Corrected 7.401 pH Units      pO2, Temperature Corrected 79.8 mm Hg      PO2/FIO2 133    MRSA Screen, PCR (Inpatient) - Swab, Nares [[id number]]  (Abnormal) Collected: 04/20/25 0415    Specimen: Swab from Nares Updated: 04/20/25 0618     MRSA PCR MRSA Detected    Narrative:      The negative predictive value of this diagnostic test is high and should only be used to consider de-escalating anti-MRSA therapy. A positive result may indicate colonization with MRSA and must be correlated clinically.    STAT Lactic Acid, Reflex [[id number]]  (Abnormal) Collected: 04/20/25 0426    Specimen: Blood Updated: 04/20/25 0528     Lactate 2.5 mmol/L     Basic Metabolic Panel [[id number]]  (Abnormal) Collected: 04/20/25 0426    Specimen: Blood Updated: 04/20/25 0527     Glucose 91 mg/dL      BUN 25 mg/dL      Creatinine 1.67 mg/dL      Sodium 139 mmol/L      Potassium 3.3 mmol/L      Chloride 99 mmol/L      CO2 23.0 mmol/L      Calcium 9.4 mg/dL      BUN/Creatinine Ratio 15.0     Anion Gap 17.0 mmol/L      eGFR 43.5 mL/min/1.73     Narrative:      GFR Categories in Chronic Kidney Disease (CKD)      GFR Category          GFR (mL/min/1.73)    Interpretation  G1                     90 or greater         Normal or high (1)  G2                      60-89                Mild decrease (1)  G3a                   45-59                Mild to moderate decrease  G3b                   30-44                Moderate to severe decrease  G4                    15-29                Severe decrease  G5                    14 or less           Kidney failure          (1)In the absence of evidence of kidney disease, neither GFR category G1 or G2 fulfill the criteria for CKD.    eGFR calculation 2021 CKD-EPI creatinine equation, which does not include race as a factor    Hepatic Function Panel [[id number]]  (Abnormal) Collected: 04/20/25 0426    Specimen: Blood Updated:  04/20/25 0527     Total Protein 5.9 g/dL      Albumin 2.7 g/dL      ALT (SGPT) 41 U/L      AST (SGOT) 68 U/L      Alkaline Phosphatase 127 U/L      Total Bilirubin 1.0 mg/dL      Bilirubin, Direct 0.7 mg/dL      Bilirubin, Indirect 0.3 mg/dL     CBC (No Diff) [[id number]]  (Abnormal) Collected: 04/20/25 0426    Specimen: Blood Updated: 04/20/25 0513     WBC 12.98 10*3/mm3      RBC 2.45 10*6/mm3      Hemoglobin 9.1 g/dL      Hematocrit 28.5 %      .3 fL      MCH 37.1 pg      MCHC 31.9 g/dL      RDW 17.4 %      RDW-SD 75.5 fl      MPV 10.0 fL      Platelets 66 10*3/mm3     Respiratory Panel PCR w/COVID-19(SARS-CoV-2) RICHARD/JENNIFER/DENIA/PAD/COR/VONDA In-House, NP Swab in UTM/VTM, 2 HR TAT - Swab, Nasopharynx [[id number]]  (Normal) Collected: 04/20/25 0048    Specimen: Swab from Nasopharynx Updated: 04/20/25 0149     ADENOVIRUS, PCR Not Detected     Coronavirus 229E Not Detected     Coronavirus HKU1 Not Detected     Coronavirus NL63 Not Detected     Coronavirus OC43 Not Detected     COVID19 Not Detected     Human Metapneumovirus Not Detected     Human Rhinovirus/Enterovirus Not Detected     Influenza A PCR Not Detected     Influenza B PCR Not Detected     Parainfluenza Virus 1 Not Detected     Parainfluenza Virus 2 Not Detected     Parainfluenza Virus 3 Not Detected     Parainfluenza Virus 4 Not Detected     RSV, PCR Not Detected     Bordetella pertussis pcr Not Detected     Bordetella parapertussis PCR Not Detected     Chlamydophila pneumoniae PCR Not Detected     Mycoplasma pneumo by PCR Not Detected    Narrative:      In the setting of a positive respiratory panel with a viral infection PLUS a negative procalcitonin without other underlying concern for bacterial infection, consider observing off antibiotics or discontinuation of antibiotics and continue supportive care. If the respiratory panel is positive for atypical bacterial infection (Bordetella pertussis, Chlamydophila pneumoniae, or Mycoplasma pneumoniae),  consider antibiotic de-escalation to target atypical bacterial infection.    Lactic Acid, Plasma [[id number]]  (Abnormal) Collected: 04/20/25 0039    Specimen: Blood Updated: 04/20/25 0111     Lactate 2.7 mmol/L     Urinalysis With Culture If Indicated - Urine, Clean Catch [[id number]]  (Abnormal) Collected: 04/20/25 0034    Specimen: Urine, Clean Catch Updated: 04/20/25 0110     Color, UA Dark Yellow     Appearance, UA Cloudy     pH, UA 5.5     Specific Gravity, UA 1.019     Glucose, UA >=1000 mg/dL (3+)     Ketones, UA Negative     Bilirubin, UA Negative     Blood, UA Small (1+)     Protein,  mg/dL (2+)     Leuk Esterase, UA Trace     Nitrite, UA Negative     Urobilinogen, UA 1.0 E.U./dL    Narrative:      In absence of clinical symptoms, the presence of pyuria, bacteria, and/or nitrites on the urinalysis result does not correlate with infection.    Urinalysis, Microscopic Only - Urine, Clean Catch [[id number]] Collected: 04/20/25 0034    Specimen: Urine, Clean Catch Updated: 04/20/25 0110     RBC, UA 0-2 /HPF      WBC, UA 0-2 /HPF      Comment: Urine culture not indicated.        Bacteria, UA None Seen /HPF      Squamous Epithelial Cells, UA 0-2 /HPF      Hyaline Casts, UA 0-2 /LPF      Calcium Oxalate Crystals, UA Moderate/2+ /HPF      Methodology Manual Light Microscopy    Blood Gas, Arterial - [[id number]]  (Abnormal) Collected: 04/19/25 1428    Specimen: Arterial Blood Updated: 04/19/25 2016     Site Right Brachial     Ronak's Test N/A     pH, Arterial 7.371 pH units      pCO2, Arterial 46.8 mm Hg      pO2, Arterial 69.3 mm Hg      HCO3, Arterial 27.1 mmol/L      Base Excess, Arterial 1.5 mmol/L      O2 Saturation, Arterial 93.6 %      Temperature 37.0     Barometric Pressure for Blood Gas 757 mmHg      Modality BiPap     FIO2 60 %      IPAP 16     EPAP 6     Collected by [id number]     pCO2, Temperature Corrected 46.8 mm Hg      pH, Temp Corrected 7.371 pH Units      pO2, Temperature Corrected 69.3 mm Hg       PO2/FIO2 116    TSH [[id number]]  (Normal) Collected: 04/19/25 0614    Specimen: Blood Updated: 04/19/25 1350     TSH 3.490 uIU/mL     T4, Free [[id number]]  (Normal) Collected: 04/19/25 0614    Specimen: Blood Updated: 04/19/25 1350     Free T4 1.23 ng/dL     POC Glucose Once [[id number]]  (Abnormal) Collected: 04/19/25 1103    Specimen: Blood Updated: 04/19/25 1120     Glucose 185 mg/dL      Comment: : [id number] Edward Watkins ID: [id number]       Blood Gas, Arterial With Co-Ox [[id number]]  (Abnormal) Collected: 04/19/25 1110    Specimen: Arterial Blood Updated: 04/19/25 1110     Site Left Radial     Ronak's Test Positive     pH, Arterial 7.182 pH units      Comment: 85 Value below critical limit        pCO2, Arterial 71.5 mm Hg      Comment: 86 Value above critical limit        pO2, Arterial 60.3 mm Hg      Comment: 84 Value below reference range        HCO3, Arterial 26.8 mmol/L      Comment: 83 Value above reference range        Base Excess, Arterial -2.5 mmol/L      Comment: 84 Value below reference range        O2 Saturation, Arterial 83.7 %      Comment: 84 Value below reference range        Hemoglobin, Blood Gas 10.6 g/dL      Comment: 84 Value below reference range        Hematocrit, Blood Gas 32.6 %      Comment: 84 Value below reference range        Oxyhemoglobin 82.1 %      Comment: 84 Value below reference range        Methemoglobin 0.50 %      Carboxyhemoglobin 1.4 %      Temperature 37.0     Sodium, Arterial 141 mmol/L      Potassium, Arterial 3.5 mmol/L      Ionized Calcium 5.41 mg/dL      Comment: 83 Value above reference range        Barometric Pressure for Blood Gas 755 mmHg      Modality Nasal Cannula     Flow Rate 3.0 lpm      Ventilator Mode NA     Notified Who DR PADILLA     Notified By Mehnaz Souza CRT     Notified Time 04/19/2025 11:10     Collected by [id number]     Comment: Meter: [id number]     :  Mehnaz Souza CRT        pH, Temp Corrected 7.182 pH Units       Comment: 85 Value below critical limit        pCO2, Temperature Corrected 71.5 mm Hg      Comment: 86 Value above critical limit        pO2, Temperature Corrected 60.3 mm Hg      Comment: 84 Value below reference range       Ammonia [[id number]]  (Abnormal) Collected: 04/19/25 0848    Specimen: Blood Updated: 04/19/25 0920     Ammonia 66 umol/L     Manual Differential [[id number]]  (Abnormal) Collected: 04/19/25 0614    Specimen: Blood Updated: 04/19/25 0713     Neutrophil % 67.0 %      Lymphocyte % 13.0 %      Monocyte % 15.0 %      Eosinophil % 3.0 %      Basophil % 1.0 %      Metamyelocyte % 1.0 %      Neutrophils Absolute 3.17 10*3/mm3      Lymphocytes Absolute 0.61 10*3/mm3      Monocytes Absolute 0.71 10*3/mm3      Eosinophils Absolute 0.14 10*3/mm3      Basophils Absolute 0.05 10*3/mm3      Anisocytosis Mod/2+     Macrocytes Mod/2+     Poikilocytes Slight/1+     Polychromasia Slight/1+     WBC Morphology Normal     Platelet Estimate Decreased    CBC & Differential [[id number]]  (Abnormal) Collected: 04/19/25 0614    Specimen: Blood Updated: 04/19/25 0713    Narrative:      The following orders were created for panel order CBC & Differential.  Procedure                               Abnormality         Status                     ---------                               -----------         ------                     CBC Auto Differential[[id number]]        Abnormal            Final result                 Please view results for these tests on the individual orders.    CBC Auto Differential [[id number]]  (Abnormal) Collected: 04/19/25 0614    Specimen: Blood Updated: 04/19/25 0713     WBC 4.73 10*3/mm3      RBC 2.49 10*6/mm3      Hemoglobin 9.1 g/dL      Hematocrit 28.9 %      .1 fL      MCH 36.5 pg      MCHC 31.5 g/dL      RDW 17.8 %      RDW-SD 74.8 fl      MPV 10.5 fL      Platelets 56 10*3/mm3     Basic Metabolic Panel [[id number]]  (Abnormal) Collected: 04/19/25 0614    Specimen: Blood Updated: 04/19/25  "0707     Glucose 103 mg/dL      BUN 21 mg/dL      Creatinine 1.31 mg/dL      Sodium 139 mmol/L      Potassium 3.5 mmol/L      Chloride 103 mmol/L      CO2 25.0 mmol/L      Calcium 9.4 mg/dL      BUN/Creatinine Ratio 16.0     Anion Gap 11.0 mmol/L      eGFR 58.2 mL/min/1.73     Narrative:      GFR Categories in Chronic Kidney Disease (CKD)      GFR Category          GFR (mL/min/1.73)    Interpretation  G1                     90 or greater         Normal or high (1)  G2                      60-89                Mild decrease (1)  G3a                   45-59                Mild to moderate decrease  G3b                   30-44                Moderate to severe decrease  G4                    15-29                Severe decrease  G5                    14 or less           Kidney failure          (1)In the absence of evidence of kidney disease, neither GFR category G1 or G2 fulfill the criteria for CKD.    eGFR calculation  CKD-EPI creatinine equation, which does not include race as a factor              Objective:     Vitals: /62 (BP Location: Right arm, Patient Position: Lying)   Pulse 102   Temp 97.7 °F (36.5 °C) (Oral)   Resp 18   Ht 182.9 cm (72\")   Wt 87.5 kg (192 lb 14.4 oz)   SpO2 93%   BMI 26.16 kg/m²  No intake or output data in the 24 hours ending 25 0822   Temp (24hrs), Av.5 °F (36.4 °C), Min:96.8 °F (36 °C), Max:98 °F (36.7 °C)      Physical Exam  Vitals and nursing note reviewed. Exam conducted with a chaperone present.   Constitutional:       Appearance: He is ill-appearing.   HENT:      Head: Normocephalic and atraumatic.   Cardiovascular:      Rate and Rhythm: Normal rate and regular rhythm.      Pulses: Normal pulses.   Pulmonary:      Effort: Respiratory distress present.      Breath sounds: Wheezing and rales present.   Abdominal:      General: There is distension.      Tenderness: There is no guarding or rebound.   Skin:     General: Skin is warm.      Capillary Refill: " Capillary refill takes less than 2 seconds.   Neurological:      Mental Status: He is alert. Mental status is at baseline.             Assessment and Plan:     Primary Problem:  AMS (altered mental status)  Aspiration pneumonia-Sepsis Syndrome  Acute on Chronic Respiratory Failure  Cirrhosis  II  [facility] Problem list:    AMS (altered mental status)    Hypertension    Hyperlipidemia    Diabetes mellitus    CAD (coronary artery disease)    Abnormal LFTs    Macrocytic anemia    Acute hypercapnic respiratory failure      PMH:  Past Medical History:   Diagnosis Date    Anemia     Anxiety     Arthritis     BPH (benign prostatic hypertrophy)     CAD (coronary artery disease)     Cancer     non-hodgkins lymphoma    Cirrhosis     Diabetes mellitus     Disease of thyroid gland     GERD (gastroesophageal reflux disease)     Hearing loss     History of transfusion     Hyperlipidemia     Hypertension     Iliac artery aneurysm, bilateral     Kidney stone     Liver cirrhosis     Migraines     Sleep apnea     c-pap       Treatment Plan:  Continue IV abx, BIPAP as needed  Pulmonary toilet  Multisystem organ failure - discussed with wife who understands severity of condition and has made DNI  Now on floor  Palliative care consulted  The immediate decision needs to be on nutritional support. If we aren't going to artificially feed he is not going to recover. He will not be able to take oral nutrition due to aspiration and recurrent pneumonia risk. So, either PEG and NPO long term or dc on hospice.     Disposition: TBD    Reviewed treatment plans with the patient and/or family.   20 minutes spent in face to face interaction and coordination of care.     Electronically signed by Figueroa Chaves MD on 4/24/2025 at 08:22 CDT    Electronically signed by Figueroa Chaves MD at 04/24/25 0824

## 2025-04-24 NOTE — PROGRESS NOTES
Nutrition Services    Patient Name:  Franko Lucero  YOB: 1955  MRN: [id number]  Admit Date:  4/17/2025    Inpatient nutrition services reviewed POC. Interventions align with the goal(s) to maintain comfort at this time. Inpatient nutrition services/RD available upon request.     Electronically signed by:  Regina Daniels RDN, ROXANA  04/24/25 12:02 CDT

## 2025-04-24 NOTE — PROGRESS NOTES
"Pharmacy Dosing Service  Antimicrobial  Vancomycin      Current order: Vancomycin 750 mg IVPB every 24 hours    Indication: empiric, pneumonia  Therapeutic target: AUC/ to 600 mcg*hr/mL    Start date: 04/20  Current end date: 04/26  _______________________________________________________________________    Recent Level(s) and Corresponding Dose(s):  04/22 trough = 18 mcg/mL @04:08, ~23 hours post-dose 1250 mg  04/24 random = 25.3 mcg/mL @03:29, ~20.75 hours post-dose 750 mg    Pharmacokinetic Data:  Regimen: 750 mg IV every 24 hours.  Exposure target: AUC24 (range)400-600 mg/L.hr   AUC24,ss: 626 mg/L.hr  Probability of AUC24 > 400: 100 %  Ctrough,ss: 21.8 mg/L  Probability of Ctrough,ss > 20: 67 %  Probability of nephrotoxicity (Lodise EB 2009): 21 %    _______________________________________________________________________    Other antimicrobial agent(s): Zosyn    Additional dosing consideration(s):     low serum albumin    increasing serum creatinine  _______________________________________________________________________    Assessment/Plan:  04/24 random level returned above upper limit of therapeutic range and correlates with excessive drug exposure. Patient remains on concurrent Zosyn therapy; SCr elevated and increasing.     Patient received 750 mg IV dose this AM (random level was added afterward to existing specimen taken this AM prior to dose). Based on AUC model projections, vancomycin level will remain \"on board\" through at least 04/27. Therefore, today's dose effectively completes 7-day course per existing order. Vancomycin has been discontinued accordingly. Pharmacy will continue to follow via routine daily clinical review.       Subjective:  Franko Lucero is a 69 y.o. male currently on day #5 of IV vancomycin therapy.    1. Impaired functional mobility and activity tolerance [Z74.09]    2. Dysphagia, unspecified type        Anti-Infectives (From admission, onward)      Ordered     Dose/Rate " Route Frequency Start Stop    04/22/25 0556  vancomycin (VANCOCIN) 750 mg in 0.9% NaCl 250 mL  Status:  Discontinued        Ordering Provider: Figueroa Chaves MD    750 mg  333.3 mL/hr over 45 Minutes Intravenous Every 24 Hours 04/23/25 0500 04/24/25 1004    04/20/25 0849  vancomycin IVPB 1250 mg in 250 mL NS (premix)  Status:  Discontinued        Ordering Provider: Stuart Brooks DO    1,250 mg  200 mL/hr over 75 Minutes Intravenous Every 24 Hours 04/21/25 0500 04/22/25 0555    04/20/25 1724  piperacillin-tazobactam (ZOSYN) 4.5 g IVPB in 100 mL NS MBP (CD)        Ordering Provider: Figueroa Chaves MD    4.5 g  over 4 Hours Intravenous Every 8 Hours 04/20/25 2000 04/28/25 0359    04/20/25 0954  piperacillin-tazobactam (ZOSYN) 4.5 g IVPB in 100 mL NS MBP (CD)  Status:  Discontinued        Ordering Provider: Chinmay Meza PA-C    4.5 g  over 4 Hours Intravenous Every 8 Hours 04/20/25 1700 04/20/25 1724    04/20/25 0954  piperacillin-tazobactam (ZOSYN) 4.5 g IVPB in 100 mL NS MBP (CD)        Ordering Provider: Chinmay Meza PA-C    4.5 g  over 30 Minutes Intravenous Once 04/20/25 1045 04/20/25 1331    04/20/25 0354  meropenem (MERREM) 1,000 mg in sodium chloride 0.9 % 100 mL MBP  Status:  Discontinued        Ordering Provider: Vikram Dai APRN    1,000 mg  over 3 Hours Intravenous Every 8 Hours 04/20/25 1000 04/20/25 0954    04/20/25 0411  vancomycin IVPB 1500 mg in 0.9% NaCl (Premix) 500 mL  Status:  Discontinued        Ordering Provider: Vikram Dai APRN    1,500 mg  333.3 mL/hr over 90 Minutes Intravenous Every 24 Hours 04/20/25 0500 04/20/25 0849    04/20/25 0354  meropenem (MERREM) 1,000 mg in sodium chloride 0.9 % 100 mL MBP        Ordering Provider: Vikram Dai APRN    1,000 mg  over 30 Minutes Intravenous Once 04/20/25 0445 04/20/25 0450    04/20/25 0354  Pharmacy to dose vancomycin  Status:  Discontinued        Ordering Provider: Vikram Dai APRN     Not Applicable  "Continuous PRN 04/20/25 0353 04/20/25 0506          Diuretics (From admission, onward)      None            Past Medical / Surgical / Social History reviewed.     Objective:  Ht: 182.9 cm (72\"); Wt: 87.5 kg (192 lb 14.4 oz) Body mass index is 26.16 kg/m².  Estimated Creatinine Clearance: 34.8 mL/min (A) (by C-G formula based on SCr of 2.41 mg/dL (H)).    BUN   Date Value Ref Range Status   04/24/2025 36 (H) 8 - 23 mg/dL Final   04/23/2025 37 (H) 8 - 23 mg/dL Final   04/22/2025 35 (H) 8 - 23 mg/dL Final      Creatinine   Date Value Ref Range Status   04/24/2025 2.41 (H) 0.76 - 1.27 mg/dL Final   04/23/2025 2.24 (H) 0.76 - 1.27 mg/dL Final   04/22/2025 2.11 (H) 0.76 - 1.27 mg/dL Final      Lab Results   Component Value Date    WBC 12.13 (H) 04/24/2025    WBC 11.14 (H) 04/23/2025    WBC 10.82 (H) 04/22/2025      Lab Results   Component Value Date    ALBUMIN 2.5 (L) 04/24/2025       Lab Results   Component Value Date    VANCOTROUGH 18.00 04/22/2025    VANCORANDOM 25.30 04/24/2025         Culture Results:  Microbiology Results (last 10 days)       Procedure Component Value - Date/Time    MRSA Screen, PCR (Inpatient) - Swab, Nares [[id number]]  (Abnormal) Collected: 04/20/25 0415    Lab Status: Final result Specimen: Swab from Nares Updated: 04/20/25 0618     MRSA PCR MRSA Detected    Narrative:      The negative predictive value of this diagnostic test is high and should only be used to consider de-escalating anti-MRSA therapy. A positive result may indicate colonization with MRSA and must be correlated clinically.    Respiratory Panel PCR w/COVID-19(SARS-CoV-2) RICHARD/JENNIFER/DENIA/PAD/COR/VONDA In-House, NP Swab in UTM/VTM, 2 HR TAT - Swab, Nasopharynx [[id number]]  (Normal) Collected: 04/20/25 0048    Lab Status: Final result Specimen: Swab from Nasopharynx Updated: 04/20/25 0149     ADENOVIRUS, PCR Not Detected     Coronavirus 229E Not Detected     Coronavirus HKU1 Not Detected     Coronavirus NL63 Not Detected     Coronavirus " OC43 Not Detected     COVID19 Not Detected     Human Metapneumovirus Not Detected     Human Rhinovirus/Enterovirus Not Detected     Influenza A PCR Not Detected     Influenza B PCR Not Detected     Parainfluenza Virus 1 Not Detected     Parainfluenza Virus 2 Not Detected     Parainfluenza Virus 3 Not Detected     Parainfluenza Virus 4 Not Detected     RSV, PCR Not Detected     Bordetella pertussis pcr Not Detected     Bordetella parapertussis PCR Not Detected     Chlamydophila pneumoniae PCR Not Detected     Mycoplasma pneumo by PCR Not Detected    Narrative:      In the setting of a positive respiratory panel with a viral infection PLUS a negative procalcitonin without other underlying concern for bacterial infection, consider observing off antibiotics or discontinuation of antibiotics and continue supportive care. If the respiratory panel is positive for atypical bacterial infection (Bordetella pertussis, Chlamydophila pneumoniae, or Mycoplasma pneumoniae), consider antibiotic de-escalation to target atypical bacterial infection.    Blood Culture - Blood, Hand, Left [[id number]]  (Normal) Collected: 04/20/25 0039    Lab Status: Preliminary result Specimen: Blood from Hand, Left Updated: 04/24/25 0100     Blood Culture No growth at 4 days    Blood Culture - Blood, Hand, Right [[id number]]  (Normal) Collected: 04/20/25 0039    Lab Status: Preliminary result Specimen: Blood from Hand, Right Updated: 04/24/25 0100     Blood Culture No growth at 4 days            Hari Negrete, PharmD  04/24/25 10:05 CDT

## 2025-04-24 NOTE — THERAPY DISCHARGE NOTE
Acute Care - Speech Language Pathology   Swallow Re-Evaluation/Discharge [facility]     Patient Name: Franko Lucero  : 1955  MRN: [id number]  Today's Date: 2025               Admit Date: 2025    Patient seen to address education re: oral feeding and transition to comfort measures. Patient asleep. Patient wife present. Patient has not been sleeping according to patient wife. Patient wife stated she wanted to be able to give the patient ice chips and yogurt or pudding. ST provided education re: safe swallow strategies. Patient wife accepts the risk of aspiration with oral intake. Patient and family refuse alternative means of nutrition. Patient has been made comfort measures. Diet order per MD. Education is completed with patient family. ST to sign off at this time.    Cate Rivera SLP 2025 13:34 CDT      Visit Dx:    ICD-10-CM ICD-9-CM   1. Impaired functional mobility and activity tolerance [Z74.09]  Z74.09 V49.89   2. Dysphagia, unspecified type  R13.10 787.20     Patient Active Problem List   Diagnosis    Iliac artery aneurysm, bilateral    Hypertension    Hyperlipidemia    Diabetes mellitus    CAD (coronary artery disease)    Iron deficiency anemia    Constipation    Abnormal LFTs    Coagulopathy    Ureteral stone    Kidney stones    Hypotension    Acute foot pain    Macrocytic anemia    BELTRAN (acute kidney injury)    Acute kidney failure, unspecified    Type 2 diabetes mellitus with foot ulcer (CODE)    Anemia, chronic disease    Syncope    AMS (altered mental status)    Non-Hodgkin lymphoma    Thrombocytopenia    Acute hypercapnic respiratory failure     Past Medical History:   Diagnosis Date    Anemia     Anxiety     Arthritis     BPH (benign prostatic hypertrophy)     CAD (coronary artery disease)     Cancer     non-hodgkins lymphoma    Cirrhosis     Diabetes mellitus     Disease of thyroid gland     GERD (gastroesophageal reflux disease)     Hearing loss     History of transfusion      Hyperlipidemia     Hypertension     Iliac artery aneurysm, bilateral     Kidney stone     Liver cirrhosis     Migraines     Sleep apnea     c-pap     Past Surgical History:   Procedure Laterality Date    COLONOSCOPY  02/04/2014    COLONOSCOPY N/A 04/26/2017    Procedure: COLONOSCOPY WITH ANESTHESIA;  Surgeon: Sher Cui MD;  Location: [facility] ENDOSCOPY;  Service:     CORONARY ARTERY BYPASS GRAFT      2    CYSTOSCOPY URETEROSCOPY LASER LITHOTRIPSY Left 04/17/2017    Procedure: URETEROSCOPY LASER LITHOTRIPSY WITH DOUBLE J STENT INSERTION, LEFT ;  Surgeon: Weston Peck MD;  Location:  [facility] OR;  Service:     CYSTOSCOPY URETEROSCOPY LASER LITHOTRIPSY Left 08/14/2017    Procedure: CYSTOSCOPY URETEROSCOPY LASER LITHOTRIPSY STENT INSERTION STONE EXTRACTION;  Surgeon: Weston Peck MD;  Location:  [facility] OR;  Service:     CYSTOSCOPY W/ URETERAL STENT PLACEMENT Left 04/17/2017    Procedure: CYSTOSCOPY URETERAL DOUBLE J STENT INSERTION, LEFT;  Surgeon: Weston Peck MD;  Location:  [facility] OR;  Service:     ENDOSCOPY N/A 04/26/2017    Procedure: ESOPHAGOGASTRODUODENOSCOPY WITH ANESTHESIA;  Surgeon: Sher Cui MD;  Location: [facility] ENDOSCOPY;  Service:     INCISION AND DRAINAGE LEG Right 07/12/2023    Procedure: INCISION AND DRAINAGE - RIGHT FOOT;  Surgeon: Silas Swan DPM;  Location:  [facility] OR;  Service: Podiatry;  Laterality: Right;    JOINT REPLACEMENT Right     KIDNEY STONE SURGERY      KNEE SURGERY Right 08/2024    replacement    NECK SURGERY      ROTATOR CUFF REPAIR      SHOULDER SURGERY      TONSILLECTOMY      TRANS METATARSAL AMPUTATION Right 08/09/2023    Procedure: Partial 4th Ray Amputation - Right Foot;  Surgeon: Silas Swan DPM;  Location:  [facility] OR;  Service: Podiatry;  Laterality: Right;    URETEROSCOPY LASER LITHOTRIPSY WITH STENT INSERTION Left 09/01/2022    Procedure: LEFT URETEROSCOPY LASER LITHOTRIPSY WITH STENT INSERTION;  Surgeon: Weston Peck MD;   Location:  [facility] OR;  Service: Urology;  Laterality: Left;    URETEROSCOPY LASER LITHOTRIPSY WITH STENT INSERTION Left 09/15/2022    Procedure: LEFT URETEROSCOPY LASER LITHOTRIPSY WITH STENT EXCHANGE;  Surgeon: Weston Peck MD;  Location:  [facility] OR;  Service: Urology;  Laterality: Left;       SLP Recommendation and Plan  SLP Swallowing Diagnosis: mild, oral dysphagia, severe, pharyngeal dysphagia, other (see comments) (04/24/25 1220)  SLP Diet Recommendation: other (see comments) (per MD discretion) (04/24/25 1220)     Monitor for Signs of Aspiration: yes, notify SLP if any concerns (04/24/25 1220)     Swallow Criteria for Skilled Therapeutic Interventions Met: demonstrates skilled criteria (04/24/25 1220)  Anticipated Discharge Disposition (SLP): unknown (04/24/25 1220)  Rehab Potential/Prognosis, Swallowing: adequate, monitor progress closely (04/24/25 1220)  Therapy Frequency (Swallow): evaluation only (04/24/25 1220)              Anticipated Discharge Disposition (SLP): unknown (04/24/25 1220)                            Progress: declining (04/24/25 1327)    SWALLOW EVALUATION (Last 72 Hours)       SLP Adult Swallow Evaluation       Row Name 04/24/25 1220 04/23/25 1320 04/22/25 0855 04/21/25 1400          Rehab Evaluation    Document Type discharge evaluation/summary  -MD therapy note (daily note)  -MD therapy note (daily note)  -MD evaluation  -MD     Subjective Information no complaints  -MD complains of  dry mouth  -MD complains of  dry mouth  -MD complains of  difficulty swallowing  -MD     Patient Observations lethargic  -MD alert;cooperative;agree to therapy  -MD cooperative;agree to therapy  -MD cooperative;agree to therapy  -MD     Patient/Family/Caregiver Comments/Observations Wife present  -MD Wife and friend present  -MD Wife present  -MD No family present  -MD     Patient Effort poor  -MD poor  -MD poor  -MD fair  -MD     Symptoms Noted During/After Treatment fatigue  -MD fatigue  -MD  fatigue  -MD fatigue  -MD        General Information    Patient Profile Reviewed yes  -MD -- -- yes  -MD     Pertinent History Of Current Problem Patient made comfort measures. Evaluation for education only.  -MD -- -- Presented due to multiple episodes of near syncope, confusion, agitation, and incontinence. Medical history of CABG, neck sx, sleep apnea, migraines, GERD, DM, cirrhosis. SLP consulted due to concern for aspiration from wife prior to coming and during admission. CT abdomen with consolidative infiltrates in lungs with debris and concern for aspiration pneumonitis.  -MD     Current Method of Nutrition NPO  -MD -- -- NPO  -MD     Precautions/Limitations, Vision difficult to assess  -MD -- -- WFL;for purposes of eval  -MD     Precautions/Limitations, Hearing difficult to assess  -MD -- -- WFL;for purposes of eval  -MD     Prior Level of Function-Communication WFL  -MD -- -- WFL  -MD     Prior Level of Function-Swallowing no diet consistency restrictions  -MD -- -- no diet consistency restrictions;esophageal concerns  -MD     Plans/Goals Discussed with spouse/S.O.  -MD -- -- patient  -MD     Barriers to Rehab medically complex  -MD -- -- medically complex  -MD     Patient's Goals for Discharge patient did not state  -MD -- -- patient did not state  -MD     Family Goals for Discharge declined (alternate means of nutrition/hydration)  -MD -- -- --        Pain    Pretreatment Pain Rating 0/10 - no pain  -MD -- -- --     Posttreatment Pain Rating 0/10 - no pain  -MD -- -- --        Pain Scale: FACES Pre/Post-Treatment    Pain: FACES Scale, Pretreatment -- 0-->no hurt  -MD 0-->no hurt  -MD 0-->no hurt  -MD     Posttreatment Pain Rating -- 0-->no hurt  -MD 0-->no hurt  -MD 0-->no hurt  -MD        Oral Motor Structure and Function    Dentition Assessment edentulous, does not have dentures  -MD -- -- edentulous, does not have dentures  -MD     Secretion Management problems swallowing secretions  -MD -- -- WNL/WFL   -MD     Mucosal Quality sticky  -MD -- -- dry  -MD     Volitional Swallow unable to elicit  -MD -- -- weak;delayed  -MD     Volitional Cough unable to elicit  -MD -- -- weak;non-productive  -MD        Oral Musculature and Cranial Nerve Assessment    Oral Motor General Assessment unable to assess  -MD -- -- generalized oral motor weakness  -MD        General Eating/Swallowing Observations    Respiratory Support Currently in Use nasal cannula  -MD -- -- nasal cannula  -MD     O2 Liters -- -- -- 3L  -MD     Eating/Swallowing Skills other (see comments)  trials not presented per family request.  -MD -- -- fed by staff/caregiver  -MD     Positioning During Eating -- -- -- upright in chair  -MD     Utensils Used -- -- -- spoon;straw  -MD     Consistencies Trialed -- -- -- honey-thick liquids;nectar/syrup-thick liquids;thin liquids  -MD        MBS/VFSS    Utensils Used -- -- -- spoon;straw  -MD     Consistencies Trialed -- -- -- honey-thick liquids;nectar/syrup-thick liquids;thin liquids  -MD        MBS/VFSS Interpretation    Oral Prep Phase -- -- -- impaired oral phase of swallowing  -MD     Oral Transit Phase -- -- -- impaired  -MD     Oral Residue -- -- -- impaired  -MD     VFSS Summary -- -- -- see note  -MD        Oral Preparatory Phase    Oral Preparatory Phase -- -- -- prolonged manipulation  -MD     Prolonged Manipulation -- -- -- honey-thick liquids;nectar-thick liquids;thin liquids  -MD        Oral Transit Phase    Impaired Oral Transit Phase -- -- -- delayed initiation of bolus transit;premature spillage of liquids into pharynx  -MD     Delayed Initiation of bolus transit -- -- -- honey-thick liquids  -MD     Premature Spillage of Liquids into Pharynx -- -- -- nectar-thick liquids  -MD        Oral Residue    Impaired Oral Residue -- -- -- lingual residue;palatal residue  -MD     Palatal Residue -- -- -- all consistencies tested  -MD     Lingual Residue -- -- -- all consistencies tested  -MD     Response to Oral  Residue -- -- -- partial residue clearance;with spontaneous subsequent swallow;with cued swallow  -MD        Initiation of Pharyngeal Swallow    Initiation of Pharyngeal Swallow -- -- -- bolus in valleculae  -MD     Pharyngeal Phase -- -- -- impaired pharyngeal phase of swallowing  -MD     Anatomical abnormalities noted -- -- -- c-spine hardware  -MD     Anatomical abnormalities functional impact -- -- -- functional impact on swallowing  -MD     Penetration During the Swallow -- -- -- nectar-thick liquids  -MD     Aspiration During the Swallow -- -- -- thin liquids  -MD     Penetration After the Swallow -- -- -- nectar-thick liquids;honey-thick liquids;thin liquids  -MD     Aspiration After the Swallow -- -- -- thin liquids  -MD     Depth of Penetration -- -- -- deep  -MD     Response to Penetration -- -- -- No  -MD     No spontaneous response to penetration and -- -- -- non-effective laryngeal clearance with cue (see comments)  -MD     Response to Aspiration -- -- -- No  -MD     No spontaneous response to aspiration and -- -- -- non-effective subglottic clearance with cue (see comments)  -MD     Pharyngeal Residue -- -- -- honey-thick liquids;nectar-thick liquids;thin liquids  -MD     Response to Residue -- -- -- partial residue clearance;other (see comments)  not fully cleared  -MD     Attempted Compensatory Maneuvers -- -- -- other (see comments)  unable to ocmplete a chin tuck maneuver  -MD        Esophageal Phase    Esophageal Phase -- -- -- other (see comments)  did not complete  -MD        SLP Evaluation Clinical Impression    SLP Swallowing Diagnosis mild;oral dysphagia;severe;pharyngeal dysphagia;other (see comments)  -MD -- -- mild;oral dysphagia;severe;pharyngeal dysphagia;other (see comments)  unable to evaluate esophageal phase of the swallow  -MD     Functional Impact risk of aspiration/pneumonia;risk of malnutrition;risk of dehydration  -MD -- -- risk of aspiration/pneumonia;risk of malnutrition;risk  of dehydration  -MD     Rehab Potential/Prognosis, Swallowing adequate, monitor progress closely  -MD -- -- adequate, monitor progress closely  -MD     Swallow Criteria for Skilled Therapeutic Interventions Met demonstrates skilled criteria  -MD -- -- demonstrates skilled criteria  -MD        SLP Treatment Clinical Impressions    Treatment Assessment (SLP) -- continued  -MD continued  -MD --     Treatment Assessment Comments (SLP) -- see note  -MD see note  -MD --     Daily Summary of Progress (SLP) -- progress toward functional goals is gradual  -MD unable to show any progress toward functional goals  -MD --     Barriers to Overall Progress (SLP) -- Medically complex  -MD Medically complex;Other (see comments)  previous functional deficits  -MD --     Plan for Continued Treatment (SLP) -- continue treatment per plan of care  -MD continue treatment per plan of care  -MD --     Care Plan Review -- risks/benefits reviewed;current/potential barriers reviewed;patient/other agree to care plan  -MD risks/benefits reviewed;current/potential barriers reviewed;patient/other agree to care plan  -MD --     Care Plan Review, Other Participant(s) -- caregiver;family  -MD caregiver;family  -MD --        Recommendations    Therapy Frequency (Swallow) evaluation only  -MD PRN  -MD PRN  -MD PRN  -MD     Predicted Duration Therapy Intervention (Days) -- until discharge  -MD until discharge  -MD until discharge  -MD     SLP Diet Recommendation other (see comments)  per MD discretion  -MD NPO;ice chips between meals after oral care, with supervision  -MD NPO;water between meals after oral care, with supervision;ice chips between meals after oral care, with supervision  -MD NPO;water between meals after oral care, with supervision;ice chips between meals after oral care, with supervision  -MD     Recommended Diagnostics -- -- reassess via VFSS (MBS)  -MD --     Recommended Precautions and Strategies upright posture during/after  eating;small bites of food and sips of liquid;general aspiration precautions;reflux precautions;1:1 supervision;assist with feeding;fatigue precautions  -MD upright posture during/after eating;small bites of food and sips of liquid;general aspiration precautions;reflux precautions;1:1 supervision;assist with feeding;fatigue precautions  -MD upright posture during/after eating;small bites of food and sips of liquid;general aspiration precautions;reflux precautions;1:1 supervision;assist with feeding;fatigue precautions  -MD upright posture during/after eating;small bites of food and sips of liquid;general aspiration precautions;reflux precautions;1:1 supervision;assist with feeding;fatigue precautions  -MD     Oral Care Recommendations Oral Care BID/PRN  -MD Oral Care BID/PRN;Toothbrush  -MD Oral Care BID/PRN;Toothbrush  -MD Oral Care BID/PRN;Toothbrush  -MD     SLP Rec. for Method of Medication Administration meds via alternate route  -MD meds via alternate route  -MD meds via alternate route  -MD meds via alternate route  -MD     Monitor for Signs of Aspiration yes;notify SLP if any concerns  -MD yes;notify SLP if any concerns  -MD yes;notify SLP if any concerns  -MD yes;notify SLP if any concerns  -MD     Anticipated Discharge Disposition (SLP) unknown  -MD unknown  -MD unknown  -MD unknown  -MD        Swallow Goals (SLP)    Swallow LTGs Swallow Long Term Goal (free text)  -MD Swallow Long Term Goal (free text)  -MD Swallow Long Term Goal (free text)  -MD Swallow Long Term Goal (free text)  -MD     Swallow STGs diet tolerance goal selection (SLP)  -MD diet tolerance goal selection (SLP)  -MD diet tolerance goal selection (SLP)  -MD diet tolerance goal selection (SLP)  -MD     Diet Tolerance Goal Selection (SLP) Patient will tolerate trials of  -MD Patient will tolerate trials of  -MD Patient will tolerate trials of  -MD Patient will tolerate trials of  -MD        (LTG) Swallow    (LTG) Swallow Patient will  tolerate least restrictive diet without overt s/s of aspiration.  -MD Patient will tolerate least restrictive diet without overt s/s of aspiration.  -MD Patient will tolerate least restrictive diet without overt s/s of aspiration.  -MD Patient will tolerate least restrictive diet without overt s/s of aspiration.  -MD     [place] (Swallow Long Term Goal) independently (over 90% accuracy)  -MD independently (over 90% accuracy)  -MD independently (over 90% accuracy)  -MD independently (over 90% accuracy)  -MD     Time Frame (Swallow Long Term Goal) by discharge  -MD by discharge  -MD by discharge  -MD by discharge  -MD     Barriers (Swallow Long Term Goal) medically complex  -MD medically complex  -MD medically complex  -MD medically complex  -MD     Progress/Outcomes (Swallow Long Term Goal) medical status inhibited participation;goal not met  -MD unable to make needed progress  -MD unable to make needed progress  -MD progress slower than expected  -MD        (STG) Patient will tolerate trials of    Consistencies Trialed (Tolerate trials) soft to chew (chopped) textures;thin liquids  -MD soft to chew (chopped) textures;thin liquids  -MD soft to chew (chopped) textures;thin liquids  -MD soft to chew (chopped) textures;thin liquids  -MD     Desired Outcome (Tolerate trials) without signs/symptoms of aspiration;without signs of distress;with adequate oral prep/transit/clearance  -MD without signs/symptoms of aspiration;without signs of distress;with adequate oral prep/transit/clearance  -MD without signs/symptoms of aspiration;without signs of distress;with adequate oral prep/transit/clearance  -MD without signs/symptoms of aspiration;without signs of distress;with adequate oral prep/transit/clearance  -MD     [place] (Tolerate trials) independently (over 90% accuracy)  -MD independently (over 90% accuracy)  -MD independently (over 90% accuracy)  -MD independently (over 90% accuracy)  -MD     Time Frame  (Tolerate trials) by discharge  -MD by discharge  -MD by discharge  -MD by discharge  -MD     Progress/Outcomes (Tolerate trials) medical status inhibited participation;goal not met  -MD unable to make needed progress  -MD unable to make needed progress  -MD progress slower than expected  -MD               User Key  (r) = Recorded By, (t) = Taken By, (c) = Cosigned By      Initials Name Effective Dates    Cate Gale, SLP 06/21/22 -                     EDUCATION  The patient has been educated in the following areas:   Dysphagia (Swallowing Impairment).         SLP GOALS       Row Name 04/24/25 1220 04/23/25 1320 04/22/25 0855       (LTG) Swallow    (LTG) Swallow Patient will tolerate least restrictive diet without overt s/s of aspiration.  -MD Patient will tolerate least restrictive diet without overt s/s of aspiration.  -MD Patient will tolerate least restrictive diet without overt s/s of aspiration.  -MD    [place] (Swallow Long Term Goal) independently (over 90% accuracy)  -MD independently (over 90% accuracy)  -MD independently (over 90% accuracy)  -MD    Time Frame (Swallow Long Term Goal) by discharge  -MD by discharge  -MD by discharge  -MD    Barriers (Swallow Long Term Goal) medically complex  -MD medically complex  -MD medically complex  -MD    Progress/Outcomes (Swallow Long Term Goal) medical status inhibited participation;goal not met  -MD unable to make needed progress  -MD unable to make needed progress  -MD       (STG) Patient will tolerate trials of    Consistencies Trialed (Tolerate trials) soft to chew (chopped) textures;thin liquids  -MD soft to chew (chopped) textures;thin liquids  -MD soft to chew (chopped) textures;thin liquids  -MD    Desired Outcome (Tolerate trials) without signs/symptoms of aspiration;without signs of distress;with adequate oral prep/transit/clearance  -MD without signs/symptoms of aspiration;without signs of distress;with adequate oral prep/transit/clearance   -MD without signs/symptoms of aspiration;without signs of distress;with adequate oral prep/transit/clearance  -MD    [place] (Tolerate trials) independently (over 90% accuracy)  -MD independently (over 90% accuracy)  -MD independently (over 90% accuracy)  -MD    Time Frame (Tolerate trials) by discharge  -MD by discharge  -MD by discharge  -MD    Progress/Outcomes (Tolerate trials) medical status inhibited participation;goal not met  -MD unable to make needed progress  -MD unable to make needed progress  -MD      Row Name 04/21/25 1400             (LTG) Swallow    (LTG) Swallow Patient will tolerate least restrictive diet without overt s/s of aspiration.  -MD      [place] (Swallow Long Term Goal) independently (over 90% accuracy)  -MD      Time Frame (Swallow Long Term Goal) by discharge  -MD      Barriers (Swallow Long Term Goal) medically complex  -MD      Progress/Outcomes (Swallow Long Term Goal) progress slower than expected  -MD         (STG) Patient will tolerate trials of    Consistencies Trialed (Tolerate trials) soft to chew (chopped) textures;thin liquids  -MD      Desired Outcome (Tolerate trials) without signs/symptoms of aspiration;without signs of distress;with adequate oral prep/transit/clearance  -MD      [place] (Tolerate trials) independently (over 90% accuracy)  -MD      Time Frame (Tolerate trials) by discharge  -MD      Progress/Outcomes (Tolerate trials) progress slower than expected  -MD                User Key  (r) = Recorded By, (t) = Taken By, (c) = Cosigned By      Initials Name Provider Type    Cate Gale, SLP Speech and Language Pathologist                           Time Calculation:    Time Calculation- SLP       Row Name 04/24/25 1333             Time Calculation- SLP    SLP Start Time 1220  -MD      SLP Stop Time 1245  -MD      SLP Time Calculation (min) 25 min  -MD      SLP Received On 04/24/25  -MD         Untimed Charges    [id number] Eval Oral Pharyng Swallow  Minutes 25  -MD         Total Minutes    Untimed Charges Total Minutes 25  -MD       Total Minutes 25  -MD                User Key  (r) = Recorded By, (t) = Taken By, (c) = Cosigned By      Initials Name Provider Type    Cate Gale, SLP Speech and Language Pathologist                    Therapy Charges for Today       Code Description Service Date Service Provider Modifiers Qty    81259777326 HC ST TREATMENT SWALLOW 4 4/23/2025 Cate Rivera, SLP GN 1    [id number] HC ST EVAL ORAL PHARYNG SWALLOW 2 4/24/2025 Cate Rivera, SLP GN 1                 SLP Discharge Summary  Anticipated Discharge Disposition (SLP): unknown    SPENSER [place]  4/24/2025

## 2025-04-24 NOTE — PLAN OF CARE
Goal Outcome Evaluation:  Plan of Care Reviewed With: spouse     Progress: declining     Anticipated Discharge Disposition (SLP): unknown     SLP Swallowing Diagnosis: mild, oral dysphagia, severe, pharyngeal dysphagia, other (see comments) (04/24/25 1220)    DISCHARGE RE-EVALUATION OF SWALLOW    Patient seen to address education re: oral feeding and transition to comfort measures. Patient asleep. Patient wife present. Patient has not been sleeping according to patient wife. Patient wife stated she wanted to be able to give the patient ice chips and yogurt or pudding. ST provided education re: safe swallow strategies. Patient wife accepts the risk of aspiration with oral intake. Patient and family refuse alternative means of nutrition. Patient has been made comfort measures. Diet order per MD. Education is completed with patient family. ST to sign off at this time.    Cate Rivera, SLP 4/24/2025 13:32 CDT

## 2025-04-24 NOTE — THERAPY DISCHARGE NOTE
Acute Care - Physical Therapy Discharge Summary  [facility]       Patient Name: Franko Lucero  : 1955  MRN: [id number]    Today's Date: 2025                 Admit Date: 2025      PT Recommendation and Plan    Visit Dx:    ICD-10-CM ICD-9-CM   1. Impaired functional mobility and activity tolerance [Z74.09]  Z74.09 V49.89   2. Dysphagia, unspecified type  R13.10 787.20                PT Rehab Goals       Row Name 25 1547             Bed Mobility Goal 1 (PT)    Activity/Assistive Device (Bed Mobility Goal 1, PT) sit to supine;supine to sit  -MF      [place] Level/Cues Needed (Bed Mobility Goal 1, PT) modified independence  -MF      Time Frame (Bed Mobility Goal 1, PT) long term goal (LTG);10 days  -MF      Strategies/Barriers (Bed Mobility Goal 1, PT) while maintaining BP into sitting  -MF      Progress/Outcomes (Bed Mobility Goal 1, PT) goal not met  -MF         Transfer Goal 1 (PT)    Activity/Assistive Device (Transfer Goal 1, PT) sit-to-stand/stand-to-sit;bed-to-chair/chair-to-bed;walker, rolling  -MF      [place] Level/Cues Needed (Transfer Goal 1, PT) minimum assist (75% or more patient effort)  -MF      Time Frame (Transfer Goal 1, PT) long term goal (LTG);10 days  -MF      Strategies/Barriers (Transfers Goal 1, PT) while maintaining BP  -MF      Progress/Outcome (Transfer Goal 1, PT) goal not met  -MF         Gait Training Goal 1 (PT)    Activity/Assistive Device (Gait Training Goal 1, PT) gait (walking locomotion);assistive device use;decrease fall risk;improve balance and speed;forward stepping;increase endurance/gait distance;walker, rolling  -MF      [place] Level (Gait Training Goal 1, PT) minimum assist (75% or more patient effort)  -MF      Distance (Gait Training Goal 1, PT) 12'  -MF      Time Frame (Gait Training Goal 1, PT) 10 days;long term goal (LTG)  -MF      Progress/Outcome (Gait Training Goal 1, PT) goal not met  -MF         Problem Specific Goal 1 (PT)     Problem Specific Goal 1 (PT) Pt will propel WC forwards, backwards, turns 40' with SBA  -MF      Time Frame (Problem Specific Goal 1, PT) long-term goal (LTG);1 week  -      Progress/Outcome (Problem Specific Goal 1, PT) goal not met  -MF                User Key  (r) = Recorded By, (t) = Taken By, (c) = Cosigned By      Initials Name Provider Type Discipline     Kat Lee, PTA Physical Therapist Assistant PT                        PT Discharge Summary  Anticipated Discharge Disposition (PT): other (see comments) (comfort measures)  Reason for Discharge: Change in medical status  Outcomes Achieved: Unable to make functional progress toward goals at this time  Discharge Destination: other (comment) (comfort measures)      Kat Lee PTA   4/24/2025

## 2025-04-24 NOTE — THERAPY DISCHARGE NOTE
Acute Care - Occupational Therapy Discharge  [facility]    Patient Name: Franko Lucero  : 1955    MRN: [id number]                              Today's Date: 2025       Admit Date: 2025    Visit Dx:     ICD-10-CM ICD-9-CM   1. Impaired functional mobility and activity tolerance [Z74.09]  Z74.09 V49.89   2. Dysphagia, unspecified type  R13.10 787.20     Patient Active Problem List   Diagnosis    Iliac artery aneurysm, bilateral    Hypertension    Hyperlipidemia    Diabetes mellitus    CAD (coronary artery disease)    Iron deficiency anemia    Constipation    Abnormal LFTs    Coagulopathy    Ureteral stone    Kidney stones    Hypotension    Acute foot pain    Macrocytic anemia    BELTRAN (acute kidney injury)    Acute kidney failure, unspecified    Type 2 diabetes mellitus with foot ulcer (CODE)    Anemia, chronic disease    Syncope    AMS (altered mental status)    Non-Hodgkin lymphoma    Thrombocytopenia    Acute hypercapnic respiratory failure     Past Medical History:   Diagnosis Date    Anemia     Anxiety     Arthritis     BPH (benign prostatic hypertrophy)     CAD (coronary artery disease)     Cancer     non-hodgkins lymphoma    Cirrhosis     Diabetes mellitus     Disease of thyroid gland     GERD (gastroesophageal reflux disease)     Hearing loss     History of transfusion     Hyperlipidemia     Hypertension     Iliac artery aneurysm, bilateral     Kidney stone     Liver cirrhosis     Migraines     Sleep apnea     c-pap     Past Surgical History:   Procedure Laterality Date    COLONOSCOPY  2014    COLONOSCOPY N/A 2017    Procedure: COLONOSCOPY WITH ANESTHESIA;  Surgeon: Sher Cui MD;  Location: [facility] ENDOSCOPY;  Service:     CORONARY ARTERY BYPASS GRAFT      2    CYSTOSCOPY URETEROSCOPY LASER LITHOTRIPSY Left 2017    Procedure: URETEROSCOPY LASER LITHOTRIPSY WITH DOUBLE J STENT INSERTION, LEFT ;  Surgeon: Weston Peck MD;  Location: [facility] OR;  Service:      CYSTOSCOPY URETEROSCOPY LASER LITHOTRIPSY Left 08/14/2017    Procedure: CYSTOSCOPY URETEROSCOPY LASER LITHOTRIPSY STENT INSERTION STONE EXTRACTION;  Surgeon: Weston Peck MD;  Location: [facility] OR;  Service:     CYSTOSCOPY W/ URETERAL STENT PLACEMENT Left 04/17/2017    Procedure: CYSTOSCOPY URETERAL DOUBLE J STENT INSERTION, LEFT;  Surgeon: Weston Peck MD;  Location: [facility] OR;  Service:     ENDOSCOPY N/A 04/26/2017    Procedure: ESOPHAGOGASTRODUODENOSCOPY WITH ANESTHESIA;  Surgeon: Sher Cui MD;  Location: [facility] ENDOSCOPY;  Service:     INCISION AND DRAINAGE LEG Right 07/12/2023    Procedure: INCISION AND DRAINAGE - RIGHT FOOT;  Surgeon: Silas Swan DPM;  Location: [facility] OR;  Service: Podiatry;  Laterality: Right;    JOINT REPLACEMENT Right     KIDNEY STONE SURGERY      KNEE SURGERY Right 08/2024    replacement    NECK SURGERY      ROTATOR CUFF REPAIR      SHOULDER SURGERY      TONSILLECTOMY      TRANS METATARSAL AMPUTATION Right 08/09/2023    Procedure: Partial 4th Ray Amputation - Right Foot;  Surgeon: Silas Swan DPM;  Location: [facility] OR;  Service: Podiatry;  Laterality: Right;    URETEROSCOPY LASER LITHOTRIPSY WITH STENT INSERTION Left 09/01/2022    Procedure: LEFT URETEROSCOPY LASER LITHOTRIPSY WITH STENT INSERTION;  Surgeon: Weston Peck MD;  Location: [facility] OR;  Service: Urology;  Laterality: Left;    URETEROSCOPY LASER LITHOTRIPSY WITH STENT INSERTION Left 09/15/2022    Procedure: LEFT URETEROSCOPY LASER LITHOTRIPSY WITH STENT EXCHANGE;  Surgeon: Weston Peck MD;  Location: [facility] OR;  Service: Urology;  Laterality: Left;      General Information    No documentation.                  Mobility/ADL's    No documentation.                  Obj/Interventions    No documentation.                  Goals/Plan    No documentation.                  Clinical Impression    No documentation.                  Outcome Measures    No documentation.                  Occupational Therapy Education       Title: PT OT SLP Therapies (In Progress)       Topic: Occupational Therapy (In Progress)       Point: ADL training (Done)       Learning Progress Summary            Patient Acceptance, E, VU by NARCISO at 4/21/2025 1204                      Point: Precautions (Done)       Learning Progress Summary            Patient Acceptance, E, VU by NARCISO at 4/21/2025 1204                                      User Key       Initials Effective Dates Name Provider Type Discipline     11/15/24 -  Kathy Guevara OTR/L, CSRS Occupational Therapist OT                  OT Recommendation and Plan           Time Calculation:                 OT Discharge Summary  Anticipated Discharge Disposition (OT): skilled nursing facility  Reason for Discharge: other (comment) (comfort measures)  Outcomes Achieved: Unable to tolerate or actively participate in therapy  Discharge Destination: other (comment) (comfort measures)    RONAL Cano/TONY  4/24/2025

## 2025-04-24 NOTE — PLAN OF CARE
Goal Outcome Evaluation:  Plan of Care Reviewed With: patient, family      Resting in bed with eyes closed. VSS. Safety maintained. Family at bedside.

## 2025-04-24 NOTE — PROGRESS NOTES
Daily Progress Note  Franko Lucero  MRN: [id number] LOS: 7    Admit Date: 4/17/2025 4/24/2025 08:22 CDT    Subjective:      Chief Complaint:  Respiratory failure    Interval History:    Reviewed overnight events and nursing notes.   A lot of congestion for which we started scopolamine last PM.   Overall little change    Review of Systems   Unable to perform ROS: Mental status change       DIET:  NPO Diet NPO Type: Strict NPO    Medications:        budesonide, 0.25 mg, Nebulization, BID - RT  busPIRone, 15 mg, Per G Tube, Q12H  clopidogrel, 75 mg, Per G Tube, Daily  DULoxetine, 60 mg, Oral, Daily  Ferrous Sulfate, 300 mg, Per G Tube, Daily  [Held by provider] gabapentin, 300 mg, Oral, Nightly  guaifenesin, 400 mg, Per G Tube, Q8H  heparin (porcine), 5,000 Units, Subcutaneous, Q8H  ipratropium-albuterol, 3 mL, Nebulization, 4x Daily - RT  lactulose, 300 mL, Rectal, Once  lactulose, 20 g, Per G Tube, BID  lansoprazole, 30 mg, Per G Tube, Q AM  levothyroxine, 75 mcg, Per G Tube, Q AM  nystatin, 5 mL, Swish & Swallow, 4x Daily  piperacillin-tazobactam, 4.5 g, Intravenous, Q8H  ranolazine, 1,000 mg, Oral, Q12H  rosuvastatin, 40 mg, Per G Tube, Nightly  Scopolamine, 1 patch, Transdermal, Q72H  sodium chloride, 10 mL, Intravenous, Q12H  sodium chloride, 40 mL, Intravenous, Once  vancomycin, 750 mg, Intravenous, Q24H        Data:     Code Status:   Code Status and Medical Interventions: No CPR (Do Not Attempt to Resuscitate); Limited Support; No intubation (DNI)   Ordered at: 04/21/25 0959     Code Status (Patient has no pulse and is not breathing):    No CPR (Do Not Attempt to Resuscitate)     Medical Interventions (Patient has pulse or is breathing):    Limited Support     Medical Intervention Limits:    No intubation (DNI)     Level Of Support Discussed With:    Health Care Surrogate       Family History   Problem Relation Age of Onset    Kidney disease Father     Heart disease Father     Cancer Mother         brain     Colon cancer Neg Hx     Colon polyps Neg Hx      Social History     Socioeconomic History    Marital status:    Tobacco Use    Smoking status: Former     Current packs/day: 0.00     Types: Cigarettes     Quit date:      Years since quittin.3     Passive exposure: Never    Smokeless tobacco: Never   Vaping Use    Vaping status: Never Used   Substance and Sexual Activity    Alcohol use: No    Drug use: No    Sexual activity: Defer       Labs:    Lab Results (last 72 hours)       Procedure Component Value Units Date/Time    Lactic Acid, Plasma [[id number]]  (Abnormal) Collected: 25    Specimen: Blood Updated: 25 05     Lactate 2.2 mmol/L     Comprehensive Metabolic Panel [[id number]]  (Abnormal) Collected: 25    Specimen: Blood Updated: 25 0459     Glucose 121 mg/dL      BUN 34 mg/dL      Creatinine 1.90 mg/dL      Sodium 140 mmol/L      Potassium 3.4 mmol/L      Chloride 101 mmol/L      CO2 23.0 mmol/L      Calcium 9.2 mg/dL      Total Protein 5.5 g/dL      Albumin 2.8 g/dL      ALT (SGPT) 38 U/L      AST (SGOT) 57 U/L      Alkaline Phosphatase 128 U/L      Total Bilirubin 0.9 mg/dL      Globulin 2.7 gm/dL      A/G Ratio 1.0 g/dL      BUN/Creatinine Ratio 17.9     Anion Gap 16.0 mmol/L      eGFR 37.2 mL/min/1.73     Narrative:      GFR Categories in Chronic Kidney Disease (CKD)      GFR Category          GFR (mL/min/1.73)    Interpretation  G1                     90 or greater         Normal or high (1)  G2                      60-89                Mild decrease (1)  G3a                   45-59                Mild to moderate decrease  G3b                   30-44                Moderate to severe decrease  G4                    15-29                Severe decrease  G5                    14 or less           Kidney failure          (1)In the absence of evidence of kidney disease, neither GFR category G1 or G2 fulfill the criteria for CKD.    eGFR calculation   CKD-EPI creatinine equation, which does not include race as a factor    Magnesium [[id number]]  (Normal) Collected: 04/21/25 0349    Specimen: Blood Updated: 04/21/25 0459     Magnesium 2.1 mg/dL     Phosphorus [[id number]]  (Normal) Collected: 04/21/25 0349    Specimen: Blood Updated: 04/21/25 0459     Phosphorus 3.1 mg/dL     CBC & Differential [[id number]]  (Abnormal) Collected: 04/21/25 0349    Specimen: Blood Updated: 04/21/25 0441    Narrative:      The following orders were created for panel order CBC & Differential.  Procedure                               Abnormality         Status                     ---------                               -----------         ------                     CBC Auto Differential[[id number]]        Abnormal            Final result                 Please view results for these tests on the individual orders.    CBC Auto Differential [[id number]]  (Abnormal) Collected: 04/21/25 0349    Specimen: Blood Updated: 04/21/25 0441     WBC 15.29 10*3/mm3      RBC 2.53 10*6/mm3      Hemoglobin 9.3 g/dL      Hematocrit 29.8 %      .8 fL      MCH 36.8 pg      MCHC 31.2 g/dL      RDW 17.9 %      RDW-SD 77.9 fl      MPV 10.3 fL      Platelets 71 10*3/mm3      Neutrophil % 82.7 %      Lymphocyte % 6.3 %      Monocyte % 9.4 %      Eosinophil % 0.3 %      Basophil % 0.3 %      Immature Grans % 1.0 %      Neutrophils, Absolute 12.66 10*3/mm3      Lymphocytes, Absolute 0.96 10*3/mm3      Monocytes, Absolute 1.43 10*3/mm3      Eosinophils, Absolute 0.04 10*3/mm3      Basophils, Absolute 0.04 10*3/mm3      Immature Grans, Absolute 0.16 10*3/mm3     Blood Culture - Blood, Hand, Left [[id number]]  (Normal) Collected: 04/20/25 0039    Specimen: Blood from Hand, Left Updated: 04/21/25 0100     Blood Culture No growth at 24 hours    Blood Culture - Blood, Hand, Right [[id number]]  (Normal) Collected: 04/20/25 0039    Specimen: Blood from Hand, Right Updated: 04/21/25 0100     Blood Culture No growth at 24  hours    Ammonia [[id number]]  (Normal) Collected: 04/20/25 1142    Specimen: Blood Updated: 04/20/25 1209     Ammonia 43 umol/L     STAT Lactic Acid, Reflex [[id number]]  (Abnormal) Collected: 04/20/25 1142    Specimen: Blood Updated: 04/20/25 1209     Lactate 2.7 mmol/L     POC Glucose Once [[id number]]  (Normal) Collected: 04/20/25 1008    Specimen: Blood Updated: 04/20/25 1020     Glucose 125 mg/dL      Comment: : [id number] [facility] ID: [id number]       STAT Lactic Acid, Reflex [[id number]]  (Abnormal) Collected: 04/20/25 0800    Specimen: Blood Updated: 04/20/25 0852     Lactate 2.4 mmol/L     Blood Gas, Arterial - [[id number]]  (Abnormal) Collected: 04/20/25 0750    Specimen: Arterial Blood Updated: 04/20/25 0751     Site Right Brachial     Ronak's Test N/A     pH, Arterial 7.401 pH units      pCO2, Arterial 40.9 mm Hg      pO2, Arterial 79.8 mm Hg      Comment: 84 Value below reference range        HCO3, Arterial 25.3 mmol/L      Base Excess, Arterial 0.5 mmol/L      O2 Saturation, Arterial 96.5 %      Temperature 37.0     Barometric Pressure for Blood Gas 757 mmHg      Modality BiPap     FIO2 60 %      Ventilator Mode NA     IPAP 16     Comment: Meter: [id number]     :  Mauro Jimenez, RRT        EPAP 6     Collected by [id number]     pCO2, Temperature Corrected 40.9 mm Hg      pH, Temp Corrected 7.401 pH Units      pO2, Temperature Corrected 79.8 mm Hg      PO2/FIO2 133    MRSA Screen, PCR (Inpatient) - Swab, Nares [[id number]]  (Abnormal) Collected: 04/20/25 0415    Specimen: Swab from Nares Updated: 04/20/25 0618     MRSA PCR MRSA Detected    Narrative:      The negative predictive value of this diagnostic test is high and should only be used to consider de-escalating anti-MRSA therapy. A positive result may indicate colonization with MRSA and must be correlated clinically.    STAT Lactic Acid, Reflex [[id number]]  (Abnormal) Collected: 04/20/25 0426    Specimen: Blood Updated: 04/20/25  0528     Lactate 2.5 mmol/L     Basic Metabolic Panel [[id number]]  (Abnormal) Collected: 04/20/25 0426    Specimen: Blood Updated: 04/20/25 0527     Glucose 91 mg/dL      BUN 25 mg/dL      Creatinine 1.67 mg/dL      Sodium 139 mmol/L      Potassium 3.3 mmol/L      Chloride 99 mmol/L      CO2 23.0 mmol/L      Calcium 9.4 mg/dL      BUN/Creatinine Ratio 15.0     Anion Gap 17.0 mmol/L      eGFR 43.5 mL/min/1.73     Narrative:      GFR Categories in Chronic Kidney Disease (CKD)      GFR Category          GFR (mL/min/1.73)    Interpretation  G1                     90 or greater         Normal or high (1)  G2                      60-89                Mild decrease (1)  G3a                   45-59                Mild to moderate decrease  G3b                   30-44                Moderate to severe decrease  G4                    15-29                Severe decrease  G5                    14 or less           Kidney failure          (1)In the absence of evidence of kidney disease, neither GFR category G1 or G2 fulfill the criteria for CKD.    eGFR calculation 2021 CKD-EPI creatinine equation, which does not include race as a factor    Hepatic Function Panel [[id number]]  (Abnormal) Collected: 04/20/25 0426    Specimen: Blood Updated: 04/20/25 0527     Total Protein 5.9 g/dL      Albumin 2.7 g/dL      ALT (SGPT) 41 U/L      AST (SGOT) 68 U/L      Alkaline Phosphatase 127 U/L      Total Bilirubin 1.0 mg/dL      Bilirubin, Direct 0.7 mg/dL      Bilirubin, Indirect 0.3 mg/dL     CBC (No Diff) [[id number]]  (Abnormal) Collected: 04/20/25 0426    Specimen: Blood Updated: 04/20/25 0513     WBC 12.98 10*3/mm3      RBC 2.45 10*6/mm3      Hemoglobin 9.1 g/dL      Hematocrit 28.5 %      .3 fL      MCH 37.1 pg      MCHC 31.9 g/dL      RDW 17.4 %      RDW-SD 75.5 fl      MPV 10.0 fL      Platelets 66 10*3/mm3     Respiratory Panel PCR w/COVID-19(SARS-CoV-2) RICHARD/JENNIFER/DENIA/PAD/COR/VONDA In-House, NP Swab in UTM/VTM, 2 HR TAT - Swab,  Nasopharynx [[id number]]  (Normal) Collected: 04/20/25 0048    Specimen: Swab from Nasopharynx Updated: 04/20/25 0149     ADENOVIRUS, PCR Not Detected     Coronavirus 229E Not Detected     Coronavirus HKU1 Not Detected     Coronavirus NL63 Not Detected     Coronavirus OC43 Not Detected     COVID19 Not Detected     Human Metapneumovirus Not Detected     Human Rhinovirus/Enterovirus Not Detected     Influenza A PCR Not Detected     Influenza B PCR Not Detected     Parainfluenza Virus 1 Not Detected     Parainfluenza Virus 2 Not Detected     Parainfluenza Virus 3 Not Detected     Parainfluenza Virus 4 Not Detected     RSV, PCR Not Detected     Bordetella pertussis pcr Not Detected     Bordetella parapertussis PCR Not Detected     Chlamydophila pneumoniae PCR Not Detected     Mycoplasma pneumo by PCR Not Detected    Narrative:      In the setting of a positive respiratory panel with a viral infection PLUS a negative procalcitonin without other underlying concern for bacterial infection, consider observing off antibiotics or discontinuation of antibiotics and continue supportive care. If the respiratory panel is positive for atypical bacterial infection (Bordetella pertussis, Chlamydophila pneumoniae, or Mycoplasma pneumoniae), consider antibiotic de-escalation to target atypical bacterial infection.    Lactic Acid, Plasma [[id number]]  (Abnormal) Collected: 04/20/25 0039    Specimen: Blood Updated: 04/20/25 0111     Lactate 2.7 mmol/L     Urinalysis With Culture If Indicated - Urine, Clean Catch [[id number]]  (Abnormal) Collected: 04/20/25 0034    Specimen: Urine, Clean Catch Updated: 04/20/25 0110     Color, UA Dark Yellow     Appearance, UA Cloudy     pH, UA 5.5     Specific Gravity, UA 1.019     Glucose, UA >=1000 mg/dL (3+)     Ketones, UA Negative     Bilirubin, UA Negative     Blood, UA Small (1+)     Protein,  mg/dL (2+)     Leuk Esterase, UA Trace     Nitrite, UA Negative     Urobilinogen, UA 1.0 E.U./dL     Narrative:      In absence of clinical symptoms, the presence of pyuria, bacteria, and/or nitrites on the urinalysis result does not correlate with infection.    Urinalysis, Microscopic Only - Urine, Clean Catch [[id number]] Collected: 04/20/25 0034    Specimen: Urine, Clean Catch Updated: 04/20/25 0110     RBC, UA 0-2 /HPF      WBC, UA 0-2 /HPF      Comment: Urine culture not indicated.        Bacteria, UA None Seen /HPF      Squamous Epithelial Cells, UA 0-2 /HPF      Hyaline Casts, UA 0-2 /LPF      Calcium Oxalate Crystals, UA Moderate/2+ /HPF      Methodology Manual Light Microscopy    Blood Gas, Arterial - [[id number]]  (Abnormal) Collected: 04/19/25 1428    Specimen: Arterial Blood Updated: 04/19/25 2016     Site Right Brachial     Ronak's Test N/A     pH, Arterial 7.371 pH units      pCO2, Arterial 46.8 mm Hg      pO2, Arterial 69.3 mm Hg      HCO3, Arterial 27.1 mmol/L      Base Excess, Arterial 1.5 mmol/L      O2 Saturation, Arterial 93.6 %      Temperature 37.0     Barometric Pressure for Blood Gas 757 mmHg      Modality BiPap     FIO2 60 %      IPAP 16     EPAP 6     Collected by [id number]     pCO2, Temperature Corrected 46.8 mm Hg      pH, Temp Corrected 7.371 pH Units      pO2, Temperature Corrected 69.3 mm Hg      PO2/FIO2 116    TSH [[id number]]  (Normal) Collected: 04/19/25 0614    Specimen: Blood Updated: 04/19/25 1350     TSH 3.490 uIU/mL     T4, Free [[id number]]  (Normal) Collected: 04/19/25 0614    Specimen: Blood Updated: 04/19/25 1350     Free T4 1.23 ng/dL     POC Glucose Once [[id number]]  (Abnormal) Collected: 04/19/25 1103    Specimen: Blood Updated: 04/19/25 1120     Glucose 185 mg/dL      Comment: : [id number] Edwarddaniela JuarezLevi ID: [id number]       Blood Gas, Arterial With Co-Ox [[id number]]  (Abnormal) Collected: 04/19/25 1110    Specimen: Arterial Blood Updated: 04/19/25 1110     Site Left Radial     Ronak's Test Positive     pH, Arterial 7.182 pH units      Comment: 85 Value below critical  limit        pCO2, Arterial 71.5 mm Hg      Comment: 86 Value above critical limit        pO2, Arterial 60.3 mm Hg      Comment: 84 Value below reference range        HCO3, Arterial 26.8 mmol/L      Comment: 83 Value above reference range        Base Excess, Arterial -2.5 mmol/L      Comment: 84 Value below reference range        O2 Saturation, Arterial 83.7 %      Comment: 84 Value below reference range        Hemoglobin, Blood Gas 10.6 g/dL      Comment: 84 Value below reference range        Hematocrit, Blood Gas 32.6 %      Comment: 84 Value below reference range        Oxyhemoglobin 82.1 %      Comment: 84 Value below reference range        Methemoglobin 0.50 %      Carboxyhemoglobin 1.4 %      Temperature 37.0     Sodium, Arterial 141 mmol/L      Potassium, Arterial 3.5 mmol/L      Ionized Calcium 5.41 mg/dL      Comment: 83 Value above reference range        Barometric Pressure for Blood Gas 755 mmHg      Modality Nasal Cannula     Flow Rate 3.0 lpm      Ventilator Mode NA     Notified Who DR PADILLA     Notified By Mehnaz Souza CRT     Notified Time 04/19/2025 11:10     Collected by [id number]     Comment: Meter: [id number]     :  Mehnaz Souza CRT        pH, Temp Corrected 7.182 pH Units      Comment: 85 Value below critical limit        pCO2, Temperature Corrected 71.5 mm Hg      Comment: 86 Value above critical limit        pO2, Temperature Corrected 60.3 mm Hg      Comment: 84 Value below reference range       Ammonia [[id number]]  (Abnormal) Collected: 04/19/25 0848    Specimen: Blood Updated: 04/19/25 0920     Ammonia 66 umol/L     Manual Differential [[id number]]  (Abnormal) Collected: 04/19/25 0614    Specimen: Blood Updated: 04/19/25 0713     Neutrophil % 67.0 %      Lymphocyte % 13.0 %      Monocyte % 15.0 %      Eosinophil % 3.0 %      Basophil % 1.0 %      Metamyelocyte % 1.0 %      Neutrophils Absolute 3.17 10*3/mm3      Lymphocytes Absolute 0.61 10*3/mm3      Monocytes Absolute  0.71 10*3/mm3      Eosinophils Absolute 0.14 10*3/mm3      Basophils Absolute 0.05 10*3/mm3      Anisocytosis Mod/2+     Macrocytes Mod/2+     Poikilocytes Slight/1+     Polychromasia Slight/1+     WBC Morphology Normal     Platelet Estimate Decreased    CBC & Differential [[id number]]  (Abnormal) Collected: 04/19/25 0614    Specimen: Blood Updated: 04/19/25 0713    Narrative:      The following orders were created for panel order CBC & Differential.  Procedure                               Abnormality         Status                     ---------                               -----------         ------                     CBC Auto Differential[[id number]]        Abnormal            Final result                 Please view results for these tests on the individual orders.    CBC Auto Differential [[id number]]  (Abnormal) Collected: 04/19/25 0614    Specimen: Blood Updated: 04/19/25 0713     WBC 4.73 10*3/mm3      RBC 2.49 10*6/mm3      Hemoglobin 9.1 g/dL      Hematocrit 28.9 %      .1 fL      MCH 36.5 pg      MCHC 31.5 g/dL      RDW 17.8 %      RDW-SD 74.8 fl      MPV 10.5 fL      Platelets 56 10*3/mm3     Basic Metabolic Panel [[id number]]  (Abnormal) Collected: 04/19/25 0614    Specimen: Blood Updated: 04/19/25 0707     Glucose 103 mg/dL      BUN 21 mg/dL      Creatinine 1.31 mg/dL      Sodium 139 mmol/L      Potassium 3.5 mmol/L      Chloride 103 mmol/L      CO2 25.0 mmol/L      Calcium 9.4 mg/dL      BUN/Creatinine Ratio 16.0     Anion Gap 11.0 mmol/L      eGFR 58.2 mL/min/1.73     Narrative:      GFR Categories in Chronic Kidney Disease (CKD)      GFR Category          GFR (mL/min/1.73)    Interpretation  G1                     90 or greater         Normal or high (1)  G2                      60-89                Mild decrease (1)  G3a                   45-59                Mild to moderate decrease  G3b                   30-44                Moderate to severe decrease  G4                    15-29            "     Severe decrease  G5                    14 or less           Kidney failure          (1)In the absence of evidence of kidney disease, neither GFR category G1 or G2 fulfill the criteria for CKD.    eGFR calculation  CKD-EPI creatinine equation, which does not include race as a factor              Objective:     Vitals: /62 (BP Location: Right arm, Patient Position: Lying)   Pulse 102   Temp 97.7 °F (36.5 °C) (Oral)   Resp 18   Ht 182.9 cm (72\")   Wt 87.5 kg (192 lb 14.4 oz)   SpO2 93%   BMI 26.16 kg/m²  No intake or output data in the 24 hours ending 25 0822   Temp (24hrs), Av.5 °F (36.4 °C), Min:96.8 °F (36 °C), Max:98 °F (36.7 °C)      Physical Exam  Vitals and nursing note reviewed. Exam conducted with a chaperone present.   Constitutional:       Appearance: He is ill-appearing.   HENT:      Head: Normocephalic and atraumatic.   Cardiovascular:      Rate and Rhythm: Normal rate and regular rhythm.      Pulses: Normal pulses.   Pulmonary:      Effort: Respiratory distress present.      Breath sounds: Wheezing and rales present.   Abdominal:      General: There is distension.      Tenderness: There is no guarding or rebound.   Skin:     General: Skin is warm.      Capillary Refill: Capillary refill takes less than 2 seconds.   Neurological:      Mental Status: He is alert. Mental status is at baseline.             Assessment and Plan:     Primary Problem:  AMS (altered mental status)  Aspiration pneumonia-Sepsis Syndrome  Acute on Chronic Respiratory Failure  Cirrhosis  [facility] Problem list:    AMS (altered mental status)    Hypertension    Hyperlipidemia    Diabetes mellitus    CAD (coronary artery disease)    Abnormal LFTs    Macrocytic anemia    Acute hypercapnic respiratory failure      PMH:  Past Medical History:   Diagnosis Date    Anemia     Anxiety     Arthritis     BPH (benign prostatic hypertrophy)     CAD (coronary artery disease)     Cancer     non-hodgkins " lymphoma    Cirrhosis     Diabetes mellitus     Disease of thyroid gland     GERD (gastroesophageal reflux disease)     Hearing loss     History of transfusion     Hyperlipidemia     Hypertension     Iliac artery aneurysm, bilateral     Kidney stone     Liver cirrhosis     Migraines     Sleep apnea     c-pap       Treatment Plan:  Continue IV abx, BIPAP as needed  Pulmonary toilet  Multisystem organ failure - discussed with wife who understands severity of condition and has made DNI  Now on floor  Palliative care consulted  The immediate decision needs to be on nutritional support. If we aren't going to artificially feed he is not going to recover. He will not be able to take oral nutrition due to aspiration and recurrent pneumonia risk. So, either PEG and NPO long term or dc on hospice.     Disposition: TBD    Reviewed treatment plans with the patient and/or family.   20 minutes spent in face to face interaction and coordination of care.     Electronically signed by Figueroa Chaves MD on 4/24/2025 at 08:22 CDT

## 2025-04-25 NOTE — PLAN OF CARE
Goal Outcome Evaluation:  Plan of Care Reviewed With: patient, family   Alert and oriented x4. O2 on at 2 l/m per nasal cannula. NPO. IV antibiotics continue. Family at bedside. Safety maintained.

## 2025-04-25 NOTE — PLAN OF CARE
Goal Outcome Evaluation:  Plan of Care Reviewed With: patient, spouse        Progress: no change  Outcome Evaluation: AOx4. VSS on 4L O2 NC. Pt is now bedfast. Pt to be NPO now d/t removal of NG tube. Pt family and pt made decision to transition to hospice. PRN pain medication given with relief noted. Pt turned frequently. IV abx given as ordered. Contact precautions maintained. Safety maintained. Call light within reach.

## 2025-04-25 NOTE — DISCHARGE SUMMARY
Hospital Discharge Summary    Franko Lucero  :  1955  MRN:  [id number]    Admit date:  2025  Discharge date:  2025    Admitting Physician:    Yossi Rosen MD    Discharge Diagnoses:      AMS (altered mental status)    Hypertension    Hyperlipidemia    Diabetes mellitus    CAD (coronary artery disease)    Abnormal LFTs    Macrocytic anemia    Acute hypercapnic respiratory failure      Hospital Course:   The patient is a 69 y.o. male who presents with multiple episodes of near syncope, confusion, AMS, agitation, incontinence (feces and urine).  He presented to the office with the above symptoms on 2025.  He was recently seen at [facility] for similar, although now much worse per family in the room. He was directly admitted to [facility] from the office for further evaluation. He recently had MRI brain, EEG, neurology consultation, etc. Would like to again have neurology follow along.  No further episodes since admission. While on the floor suffered an acute respiratory distress event requiring bipap and transfer to ICU. There he was thought to have had an acute aspiration pneumonia. Further treatment with IV abx, aggressive pulmonary toilet and pressor support managed to temporarily stabilize the patient. The family and patient decided to dc artificial nutrition via NGT and refused PEG. Given persistent high risk for aspiration po intake was limited. The decision for home with hospice was made on  and arrangements started. However, the patient passed peacefully this morning prior to discharge home.        Significant Diagnostic Studies:    FL Video Swallow With Speech Single Contrast  Result Date: 2025  1. Abnormal swallow function study. 2. Fluoroscopy time: 2 minutes 40 seconds. 3. Dose: 22.3mGy. 4. Number of images: 1   This report was signed and finalized on 2025 2:47 PM by Dr. Geovanni Mcclain MD.      XR Chest 1 View  Result Date: 2025  1. No change in bilateral infiltrates.  Pneumonia versus edema. 2. Probable pleural effusion on the left.    This report was signed and finalized on 4/21/2025 11:13 AM by Dr. Ivan Cr MD.      EEG Continuous Monitoring With Video  Result Date: 4/20/2025    EEG background is moderate generalized slow with at times frequent generalized triphasic waves present No lateralizing features are seen No definitive epileptiform activity or electrographic seizures are seen Summary: The background over the course of this 2-day monitoring study suggest diffuse cerebral dysfunction of moderate degree.  The presence of generalized triphasic waves is most consistent with toxic/metabolic or diffuse hypoxemic cause A single episode of unresponsiveness had no EEG correlate and appears to be a nonepileptic event No evidence for epilepsy is seen on the study This report is transcribed using the Dragon dictation system.     CT Abdomen Pelvis Without Contrast  Result Date: 4/20/2025   1.  Liver cirrhosis with moderate volume ascites. 2.  Consolidative infiltrates in the left upper lobe lingula, left lower lobe and right lower lobe which could reflect dependent atelectasis or perhaps aspiration pneumonitis. The left lower lobe in particular is completely consolidated and there is  debris opacifying the lobar and segmental airways reflecting mucus plugging and/or aspirated material. 3.  No retroperitoneal lower abdominal wall hematoma.  This report was signed and finalized on 4/20/2025 11:14 AM by Dr Phan Pascual.      XR Chest 1 View  Result Date: 4/20/2025  1.  Increasing perihilar pulmonary infiltrates, especially on the left. Primary differential consideration would be a developing pulmonary edema. Questionable small left-sided pleural effusion as well.  This report was signed and finalized on 4/20/2025 8:00 AM by Dr Phan Pascual.      EEG Continuous Monitoring With Video  Result Date: 4/19/2025    EEG background is moderate generalized slow with generalized triphasic  waves present No rhythmic or periodic discharges are seen No electrographic seizures are seen An episode of unresponsiveness occurring at the end of the study has no EEG correlate and is not epileptic in nature Findings were reviewed with the consultant neurologist, and the study will continue for an additional 24 hours This report is transcribed using the Dragon dictation system.     XR Chest 1 View  Result Date: 4/19/2025  1.  Bibasilar atelectasis (left greater than right). Left lower lobe appears mostly collapsed. 2.  No pleural effusion or pneumothorax.  This report was signed and finalized on 4/19/2025 11:30 AM by Dr Phan Pascual.      XR Abdomen KUB  Result Date: 4/19/2025   1.  Large volume colonic stool with mild gaseous distention along the transverse and sigmoid segments, measuring up to 7.6 cm. Overall bowel gas pattern is nonobstructive.    This report was signed and finalized on 4/19/2025 10:47 AM by Dr Phan Pascual.      MRI Brain With & Without Contrast  Result Date: 4/18/2025   1.  No acute intracranial process. 2.  Mild chronic small vessel ischemic change. 3.  There are 3 incidentally noted tiny remote microhemorrhages which I suspect are hypertensive in nature. 4.  Chronic paranasal sinus disease, right maxillary sinus in particular.  This report was signed and finalized on 4/18/2025 1:52 PM by Dr Phan Pascual.           48 minsTime spent on discharge including discussion with patient/family, SW, and coordination of care.     Signed:  Figueroa Chaves MD   4/25/2025, 10:13 CDT

## 2025-04-26 NOTE — PAYOR COMM NOTE
"Alex Lucero (Dcsd. Male)     23       Date of Birth   1955    Social Security Number       Address   [address] [place] [address]    Home Phone   [phone]    MRN   [id number]       [facility]    Marital Status                               Admission Date   2025    Admission Type   Urgent    Admitting Provider   Yossi Rosen MD    Attending Provider       Department, Room/Bed   [facility] 3A, 359/1       Discharge Date   2025    Discharge Disposition       Discharge Destination                                 Attending Provider: (none)   Allergies: Adhesive Tape, Cefazolin, Cefuroxime Axetil, Cephalosporins, Neomycin-polymyxin B Gu, Percocet [Oxycodone-acetaminophen]    Isolation: None   Infection: MRSA (07/15/23)   Code Status: Prior    Ht: 182.9 cm (72\")   Wt: 87.5 kg (192 lb 14.4 oz)    Admission Cmt: None   Principal Problem: AMS (altered mental status) [R41.82]                   Active Insurance as of 2025       Primary Coverage       Payor Plan Insurance Group Employer/Plan Group    [employer] MEDICARE REPLACEMENT AET[employer] MEDICARE ADVANTAGE PPO [id number]       Payor Plan Address Payor Plan Phone Number Payor Plan Fax Number Effective Dates    [address] [phone]  2024 - None Entered    [facility] [id number]         Subscriber Name Subscriber Birth Date Member ID       ALEX LUCERO 1955 [id number]                     Emergency Contacts        (Rel.) Home Phone Work Phone Mobile Phone    Sangita Lucero (Spouse) [phone] -- [phone]                 Discharge Summary        Figueroa Chaves MD at 25 1013            Hospital Discharge Summary    Alex Lucero  :  1955  MRN:  [id number]    Admit date:  2025  Discharge date:  2025    Admitting Physician:    Yossi Rosen MD    Discharge Diagnoses:      AMS (altered mental status)    Hypertension    Hyperlipidemia    " Diabetes mellitus    CAD (coronary artery disease)    Abnormal LFTs    Macrocytic anemia    Acute hypercapnic respiratory failure      Hospital Course:   The patient is a 69 y.o. male who presents with multiple episodes of near syncope, confusion, AMS, agitation, incontinence (feces and urine).  He presented to the office with the above symptoms on 4/17/2025.  He was recently seen at [facility] for similar, although now much worse per family in the room. He was directly admitted to [facility] from the office for further evaluation. He recently had MRI brain, EEG, neurology consultation, etc. Would like to again have neurology follow along.  No further episodes since admission. While on the floor suffered an acute respiratory distress event requiring bipap and transfer to ICU. There he was thought to have had an acute aspiration pneumonia. Further treatment with IV abx, aggressive pulmonary toilet and pressor support managed to temporarily stabilize the patient. The family and patient decided to dc artificial nutrition via NGT and refused PEG. Given persistent high risk for aspiration po intake was limited. The decision for home with hospice was made on 4/24 and arrangements started. However, the patient passed peacefully this morning prior to discharge home.        Significant Diagnostic Studies:    FL Video Swallow With Speech Single Contrast  Result Date: 4/21/2025  1. Abnormal swallow function study. 2. Fluoroscopy time: 2 minutes 40 seconds. 3. Dose: 22.3mGy. 4. Number of images: 1   This report was signed and finalized on 4/21/2025 2:47 PM by Dr. Geovanni Mcclain MD.      XR Chest 1 View  Result Date: 4/21/2025  1. No change in bilateral infiltrates. Pneumonia versus edema. 2. Probable pleural effusion on the left.    This report was signed and finalized on 4/21/2025 11:13 AM by Dr. Ivan Cr MD.      EEG Continuous Monitoring With Video  Result Date: 4/20/2025    EEG background is moderate generalized slow with at  times frequent generalized triphasic waves present No lateralizing features are seen No definitive epileptiform activity or electrographic seizures are seen Summary: The background over the course of this 2-day monitoring study suggest diffuse cerebral dysfunction of moderate degree.  The presence of generalized triphasic waves is most consistent with toxic/metabolic or diffuse hypoxemic cause A single episode of unresponsiveness had no EEG correlate and appears to be a nonepileptic event No evidence for epilepsy is seen on the study This report is transcribed using the Dragon dictation system.     CT Abdomen Pelvis Without Contrast  Result Date: 4/20/2025   1.  Liver cirrhosis with moderate volume ascites. 2.  Consolidative infiltrates in the left upper lobe lingula, left lower lobe and right lower lobe which could reflect dependent atelectasis or perhaps aspiration pneumonitis. The left lower lobe in particular is completely consolidated and there is  debris opacifying the lobar and segmental airways reflecting mucus plugging and/or aspirated material. 3.  No retroperitoneal lower abdominal wall hematoma.  This report was signed and finalized on 4/20/2025 11:14 AM by Dr Phan Pascual.      XR Chest 1 View  Result Date: 4/20/2025  1.  Increasing perihilar pulmonary infiltrates, especially on the left. Primary differential consideration would be a developing pulmonary edema. Questionable small left-sided pleural effusion as well.  This report was signed and finalized on 4/20/2025 8:00 AM by Dr Phan Pascual.      EEG Continuous Monitoring With Video  Result Date: 4/19/2025    EEG background is moderate generalized slow with generalized triphasic waves present No rhythmic or periodic discharges are seen No electrographic seizures are seen An episode of unresponsiveness occurring at the end of the study has no EEG correlate and is not epileptic in nature Findings were reviewed with the consultant neurologist, and the  study will continue for an additional 24 hours This report is transcribed using the Dragon dictation system.     XR Chest 1 View  Result Date: 4/19/2025  1.  Bibasilar atelectasis (left greater than right). Left lower lobe appears mostly collapsed. 2.  No pleural effusion or pneumothorax.  This report was signed and finalized on 4/19/2025 11:30 AM by Dr Phan Pascual.      XR Abdomen KUB  Result Date: 4/19/2025   1.  Large volume colonic stool with mild gaseous distention along the transverse and sigmoid segments, measuring up to 7.6 cm. Overall bowel gas pattern is nonobstructive.    This report was signed and finalized on 4/19/2025 10:47 AM by Dr Phan Pascual.      MRI Brain With & Without Contrast  Result Date: 4/18/2025   1.  No acute intracranial process. 2.  Mild chronic small vessel ischemic change. 3.  There are 3 incidentally noted tiny remote microhemorrhages which I suspect are hypertensive in nature. 4.  Chronic paranasal sinus disease, right maxillary sinus in particular.  This report was signed and finalized on 4/18/2025 1:52 PM by Dr Phan Pascual.           48 minsTime spent on discharge including discussion with patient/family, SW, and coordination of care.     Signed:  Figueroa Chaves MD   4/25/2025, 10:13 CDT    Electronically signed by Figueroa Chaves MD at 04/25/25 1016

## 2025-04-28 NOTE — PROGRESS NOTES
Enter Query Response Below      Query Response: the patient's syncope and collapse likely mutlifactorial but made acutely worse by acute respiratory failure, hypoxia, hypotenision related to pneumonia and sepsis.              If applicable, please update the problem list.

## 2025-07-22 NOTE — PLAN OF CARE
This nurse went into exam room to dress patient's toe wounds at wound care center appointment this date. Patient was sitting on chair with shoes and socks on. I had wound gel and metatarsal pads to give patient. I asked if I could put a dressing on his toes and he said he would at home. I asked him if he needed any wound gel and he had plenty. I offered him the metatarsal pads and he took them. No

## (undated) DEVICE — PACK,SHOULDER II,SIRUS: Brand: MEDLINE

## (undated) DEVICE — ENDOSCOPIC SEAL URO 1 SIZE FITS ALL: Brand: ENDOSCOPIC SEAL

## (undated) DEVICE — BANDAGE,GAUZE,BULKEE II,4.5"X4.1YD,STRL: Brand: MEDLINE

## (undated) DEVICE — URETERAL ACCESS SHEATH SET: Brand: NAVIGATOR HD

## (undated) DEVICE — GLV SURG BIOGEL M LTX PF 7 1/2

## (undated) DEVICE — GLOVE SURG SZ 9 L12IN FNGR THK13MIL BRN LTX SYN POLYMER W

## (undated) DEVICE — SENSR O2 OXIMAX FNGR A/ 18IN NONSTR

## (undated) DEVICE — GW SENSR DUALFLEX NITNL STR .038IN 3X150CM

## (undated) DEVICE — GUIDEWIRE ORTH DIA2.5MM LOK SMOOTH DRL TIP FLX THRD FOR

## (undated) DEVICE — FORCEPS BX L240CM JAW DIA2.4MM ORNG L CAP W/ NDL DISP RAD

## (undated) DEVICE — BHS TURNOVER CYSTO: Brand: MEDLINE INDUSTRIES, INC.

## (undated) DEVICE — PK TURNOVER CYSTO RM

## (undated) DEVICE — THE CHANNEL CLEANING BRUSH IS A NYLON FLEXI BRUSH ATTACHED TO A FLEXIBLE PLASTIC SHEATH DESIGNED TO SAFELY REMOVE DEBRIS FROM FLEXIBLE ENDOSCOPES.

## (undated) DEVICE — CUFF,BP,DISP,1 TUBE,ADULT,HP: Brand: MEDLINE

## (undated) DEVICE — GLV SURG SENSICARE PI ORTHO SZ7.5 LF STRL

## (undated) DEVICE — SNARE ENDOSCP L240CM LOOP W13MM SHTH DIA2.4MM SM OVL FLX

## (undated) DEVICE — PAD,ABDOMINAL,8"X10",ST,LF: Brand: MEDLINE

## (undated) DEVICE — Device: Brand: DEFENDO AIR/WATER/SUCTION AND BIOPSY VALVE

## (undated) DEVICE — WRAP AROUND LENS-STERLIE

## (undated) DEVICE — DUAL LUMEN URETERAL CATHETER

## (undated) DEVICE — ENDOGATOR AUXILIARY WATER JET CONNECTOR: Brand: ENDOGATOR

## (undated) DEVICE — PK CYSTO 30

## (undated) DEVICE — FIBR LASR MOSES 200 DFL 2J 80HZ

## (undated) DEVICE — BLADE LARYNSCP STERILE SZ 4 TI DISP SPECTRM DVM

## (undated) DEVICE — MSK O2 MD CONCENTR A/ LF 7FT 1P/U

## (undated) DEVICE — BAPTIST TURNOVER KIT: Brand: MEDLINE INDUSTRIES, INC.

## (undated) DEVICE — WATER 50W MAX / AIR 8W MAX: Brand: FLEXIVA TRACTIP

## (undated) DEVICE — BIT DRL L180MM DIA4.3MM QUIK CPL W/O STP REUSE

## (undated) DEVICE — CVR BRD ARM 13X30

## (undated) DEVICE — TBG SMPL FLTR LINE NASL 02/C02 A/ BX/100

## (undated) DEVICE — C-ARMOR C-ARM EQUIPMENT COVERS CLEAR STERILE UNIVERSAL FIT 12 PER CASE: Brand: C-ARMOR

## (undated) DEVICE — PAD,ARMBOARD,CONV,FOAM,2X8X20",12PR/CS: Brand: MEDLINE

## (undated) DEVICE — DRSNG WND GZ CURAD OIL EMULSION 3X8IN STRL PK/3

## (undated) DEVICE — BANDAGE COMPR W6INXL10YD ST M E WHITE/BEIGE

## (undated) DEVICE — C-ARM: Brand: UNBRANDED

## (undated) DEVICE — SPNG GZ WOVN 4X4IN 12PLY 10/BX STRL

## (undated) DEVICE — SUTURE VICRYL + SZ 0 L27IN ABSRB UD CT-1 L36MM 1/2 CIR TAPR VCP260H

## (undated) DEVICE — BNDG ELAS W/CLIP 6IN 10YD LF STRL

## (undated) DEVICE — UNDERCAST PADDING: Brand: DEROYAL

## (undated) DEVICE — NDL HYPO PRECISIONGLIDE REG 22G 1 1/2

## (undated) DEVICE — ENDO KIT,LOURDES HOSPITAL: Brand: MEDLINE INDUSTRIES, INC.

## (undated) DEVICE — Device

## (undated) DEVICE — CONMED SCOPE SAVER BITE BLOCK, 20X27 MM: Brand: SCOPE SAVER

## (undated) DEVICE — SURGICAL PROCEDURE PACK LOWER EXTREMITY LOURDES HOSP

## (undated) DEVICE — URETERAL ACCESS SHEATH SET: Brand: NAVIGATOR

## (undated) DEVICE — OPTIFOAM GENTLE SA, POSTOP, 4X12: Brand: MEDLINE

## (undated) DEVICE — BNDG ELAS CO-FLEX SLF ADHR 4IN5YD LF STRL

## (undated) DEVICE — NITINOL STONE RETRIEVAL BASKET: Brand: ZERO TIP

## (undated) DEVICE — 4-PORT MANIFOLD: Brand: NEPTUNE 2

## (undated) DEVICE — SENSOR PLSE OXMTR AD CBL L3FT ADH TRANSMISSIVE

## (undated) DEVICE — PK EXTRM 30

## (undated) DEVICE — PRECISION THIN (9.0 X 0.38 X 25.0MM)

## (undated) DEVICE — PREP SOL POVIDONE/IODINE BT 4OZ

## (undated) DEVICE — SUTURE VICRYL + SZ 2-0 L36IN ABSRB UD L36MM CT-1 1/2 CIR VCP945H

## (undated) DEVICE — BIT DRL L300MM DIA4.3MM QUIK CPL PERC CALIB W/O STP REUSE

## (undated) DEVICE — TUBE ET 7.5MM NSL ORAL BASIC CUF INTMED MURPHY EYE RADPQ

## (undated) DEVICE — TRAP FLD MINIVAC MEGADYNE 100ML

## (undated) DEVICE — INTENDED FOR TISSUE SEPARATION, AND OTHER PROCEDURES THAT REQUIRE A SHARP SURGICAL BLADE TO PUNCTURE OR CUT.: Brand: BARD-PARKER ® STAINLESS STEEL BLADES

## (undated) DEVICE — HANDPIECE SET WITH HIGH FLOW TIP AND SUCTION TUBE: Brand: INTERPULSE

## (undated) DEVICE — KIT RETRCT SHIM DISP FOR MINIMAL ACCS SYS MAXCESS 4